# Patient Record
Sex: MALE | Race: WHITE | Employment: OTHER | URBAN - METROPOLITAN AREA
[De-identification: names, ages, dates, MRNs, and addresses within clinical notes are randomized per-mention and may not be internally consistent; named-entity substitution may affect disease eponyms.]

---

## 2017-01-13 DIAGNOSIS — I10 ESSENTIAL HYPERTENSION WITH GOAL BLOOD PRESSURE LESS THAN 140/90: Primary | ICD-10-CM

## 2017-01-13 NOTE — TELEPHONE ENCOUNTER
Nifedipine 30mg    Last Written Prescription Date: 09/22/2016  Last Fill Quantity: 90, # refills: 1  Last Office Visit with G, P or [facility] prescribing provider: 11/21/2016-Dr Alves       BP Readings from Last 3 Encounters:   11/21/16 114/58   11/15/16 124/70   11/10/16 131/60

## 2017-01-17 RX ORDER — NIFEDIPINE 30 MG
30 TABLET, EXTENDED RELEASE ORAL DAILY
Qty: 90 TABLET | Refills: 1 | Status: SHIPPED | OUTPATIENT
Start: 2017-01-17 | End: 2017-04-20

## 2017-01-17 NOTE — TELEPHONE ENCOUNTER
Prescription approved per [facility] Refill Protocol..  Channing Mcdonough RN, BSN

## 2017-03-16 DIAGNOSIS — C61 MALIGNANT NEOPLASM OF PROSTATE (H): Primary | ICD-10-CM

## 2017-04-20 ENCOUNTER — TELEPHONE (OUTPATIENT)
Dept: OTHER | Facility: CLINIC | Age: 77
End: 2017-04-20

## 2017-04-20 ENCOUNTER — OFFICE VISIT (OUTPATIENT)
Dept: FAMILY MEDICINE | Facility: CLINIC | Age: 77
End: 2017-04-20
Payer: MEDICARE

## 2017-04-20 VITALS
HEIGHT: 66 IN | HEART RATE: 91 BPM | WEIGHT: 178.6 LBS | DIASTOLIC BLOOD PRESSURE: 58 MMHG | BODY MASS INDEX: 28.7 KG/M2 | SYSTOLIC BLOOD PRESSURE: 142 MMHG | TEMPERATURE: 97.6 F | RESPIRATION RATE: 12 BRPM

## 2017-04-20 DIAGNOSIS — Z00.00 ROUTINE GENERAL MEDICAL EXAMINATION AT A HEALTH CARE FACILITY: Primary | ICD-10-CM

## 2017-04-20 DIAGNOSIS — R60.0 PEDAL EDEMA: ICD-10-CM

## 2017-04-20 DIAGNOSIS — C61 PROSTATE CANCER (H): ICD-10-CM

## 2017-04-20 DIAGNOSIS — R73.9 ELEVATED BLOOD SUGAR: ICD-10-CM

## 2017-04-20 DIAGNOSIS — I10 HTN, GOAL BELOW 150/90: ICD-10-CM

## 2017-04-20 DIAGNOSIS — K40.90 RIGHT INGUINAL HERNIA: ICD-10-CM

## 2017-04-20 DIAGNOSIS — D50.8 IRON DEFICIENCY ANEMIA SECONDARY TO INADEQUATE DIETARY IRON INTAKE: ICD-10-CM

## 2017-04-20 DIAGNOSIS — Z12.5 ENCOUNTER FOR SCREENING FOR MALIGNANT NEOPLASM OF PROSTATE: ICD-10-CM

## 2017-04-20 LAB
ANION GAP SERPL CALCULATED.3IONS-SCNC: 9 MMOL/L (ref 3–14)
BASOPHILS # BLD AUTO: 0.1 10E9/L (ref 0–0.2)
BASOPHILS NFR BLD AUTO: 1.2 %
BUN SERPL-MCNC: 12 MG/DL (ref 7–30)
CALCIUM SERPL-MCNC: 9.6 MG/DL (ref 8.5–10.1)
CHLORIDE SERPL-SCNC: 105 MMOL/L (ref 94–109)
CO2 SERPL-SCNC: 27 MMOL/L (ref 20–32)
CREAT SERPL-MCNC: 0.87 MG/DL (ref 0.66–1.25)
DIFFERENTIAL METHOD BLD: ABNORMAL
EOSINOPHIL # BLD AUTO: 0.1 10E9/L (ref 0–0.7)
EOSINOPHIL NFR BLD AUTO: 2.6 %
ERYTHROCYTE [DISTWIDTH] IN BLOOD BY AUTOMATED COUNT: 14.6 % (ref 10–15)
GFR SERPL CREATININE-BSD FRML MDRD: 85 ML/MIN/1.7M2
GLUCOSE SERPL-MCNC: 98 MG/DL (ref 70–99)
HBA1C MFR BLD: 5.4 % (ref 4.3–6)
HCT VFR BLD AUTO: 38.3 % (ref 40–53)
HGB BLD-MCNC: 12.7 G/DL (ref 13.3–17.7)
LYMPHOCYTES # BLD AUTO: 0.9 10E9/L (ref 0.8–5.3)
LYMPHOCYTES NFR BLD AUTO: 19.9 %
MCH RBC QN AUTO: 31.2 PG (ref 26.5–33)
MCHC RBC AUTO-ENTMCNC: 33.2 G/DL (ref 31.5–36.5)
MCV RBC AUTO: 94 FL (ref 78–100)
MONOCYTES # BLD AUTO: 0.6 10E9/L (ref 0–1.3)
MONOCYTES NFR BLD AUTO: 14 %
NEUTROPHILS # BLD AUTO: 2.7 10E9/L (ref 1.6–8.3)
NEUTROPHILS NFR BLD AUTO: 62.3 %
PLATELET # BLD AUTO: 134 10E9/L (ref 150–450)
POTASSIUM SERPL-SCNC: 3.8 MMOL/L (ref 3.4–5.3)
PSA SERPL-ACNC: NORMAL UG/L (ref 0–4)
RBC # BLD AUTO: 4.07 10E12/L (ref 4.4–5.9)
SODIUM SERPL-SCNC: 141 MMOL/L (ref 133–144)
TRANSFERRIN SERPL-MCNC: 274 MG/DL (ref 210–360)
WBC # BLD AUTO: 4.3 10E9/L (ref 4–11)

## 2017-04-20 PROCEDURE — 80048 BASIC METABOLIC PNL TOTAL CA: CPT | Performed by: FAMILY MEDICINE

## 2017-04-20 PROCEDURE — 99213 OFFICE O/P EST LOW 20 MIN: CPT | Mod: 25 | Performed by: FAMILY MEDICINE

## 2017-04-20 PROCEDURE — 84466 ASSAY OF TRANSFERRIN: CPT | Performed by: FAMILY MEDICINE

## 2017-04-20 PROCEDURE — 85025 COMPLETE CBC W/AUTO DIFF WBC: CPT | Performed by: FAMILY MEDICINE

## 2017-04-20 PROCEDURE — 83036 HEMOGLOBIN GLYCOSYLATED A1C: CPT | Performed by: FAMILY MEDICINE

## 2017-04-20 PROCEDURE — 84153 ASSAY OF PSA TOTAL: CPT | Performed by: FAMILY MEDICINE

## 2017-04-20 PROCEDURE — G0439 PPPS, SUBSEQ VISIT: HCPCS | Performed by: FAMILY MEDICINE

## 2017-04-20 PROCEDURE — 36415 COLL VENOUS BLD VENIPUNCTURE: CPT | Performed by: FAMILY MEDICINE

## 2017-04-20 RX ORDER — LISINOPRIL 20 MG/1
20 TABLET ORAL DAILY
Qty: 90 TABLET | Refills: 1 | Status: SHIPPED | OUTPATIENT
Start: 2017-04-20 | End: 2018-01-06

## 2017-04-20 NOTE — MR AVS SNAPSHOT
After Visit Summary   4/20/2017    Milo Serna    MRN: [id number]           Patient Information     Date Of Birth          1940        Visit Information        Provider Department      4/20/2017 9:00 AM Oscar Alves MD [facility]        Today's Diagnoses     Routine general medical examination at a health care facility    -  1    Elevated blood sugar        HTN, goal below 150/90        Prostate cancer (H)        Encounter for screening for malignant neoplasm of prostate         Iron deficiency anemia secondary to inadequate dietary iron intake        Right inguinal hernia        Pedal edema          Care Instructions      Preventive Health Recommendations:   Male Ages 65 and over    Yearly exam:             See your health care provider every year in order to  o   Review health changes.   o   Discuss preventive care.    o   Review your medicines if your doctor has prescribed any.    Talk with your health care provider about whether you should have a test to screen for prostate cancer (PSA).    Every 3 years, have a diabetes test (fasting glucose). If you are at risk for diabetes, you should have this test more often.    Every 5 years, have a cholesterol test. Have this test more often if you are at risk for high cholesterol or heart disease.     Every 10 years, have a colonoscopy. Or, have a yearly FIT test (stool test). These exams will check for colon cancer.    Talk to with your health care provider about screening for Abdominal Aortic Aneurysm if you have a family history of AAA or have a history of smoking.    Shots:     Get a flu shot each year.     Get a tetanus shot every 10 years.     Talk to your doctor about your pneumonia vaccines. There are now two you should receive - Pneumovax (PPSV 23) and Prevnar (PCV 13).     Talk to your doctor about a shingles vaccine.     Talk to your doctor about the hepatitis B vaccine.  Nutrition:     Eat at least 5 servings of  fruits and vegetables each day.     Eat whole-grain bread, whole-wheat pasta and brown rice instead of white grains and rice.     Talk to your provider about Calcium and Vitamin D.   Lifestyle    Exercise for at least 150 minutes a week (30 minutes a day, 5 days a week). This will help you control your weight and prevent disease.     Limit alcohol to one drink per day.     No smoking.     Wear sunscreen to prevent skin cancer.     See your dentist every six months for an exam and cleaning.     See your eye doctor every 1 to 2 years to screen for conditions such as glaucoma, macular degeneration, cataracts, etc         Follow-ups after your visit        Additional Services     GENERAL SURG ADULT REFERRAL       Your provider has referred you to: [facility]: [facility] [place] [phone]   [email or web address]    Please be aware that coverage of these services is subject to the terms and limitations of your health insurance plan.  Call member services at your health plan with any benefit or coverage questions.      Please bring the following with you to your appointment:    (1) Any X-Rays, CTs or MRIs which have been performed.  Contact the facility where they were done to arrange for  prior to your scheduled appointment.   (2) List of current medications   (3) This referral request   (4) Any documents/labs given to you for this referral            VASCULAR SURGERY REFERRAL       Your provider has referred you to: **[facility]  Services [phone] - [facility]    [email or web address]    Please be aware that coverage of these services is subject to the terms and limitations of your health insurance plan.  Call member services at your health plan with any benefit or coverage questions.      Please bring the following with you to your appointment:    (1) Any X-Rays, CTs or MRIs which have been performed.  Contact the  facility where they were done to arrange for  prior to your scheduled appointment.    (2) List of current medications   (3) This referral request   (4) Any documents/labs given to you for this referral                  Your next 10 appointments already scheduled     May 02, 2017  1:20 PM CDT   Return Visit with Krystian Owens MD   [facility] ([facility])    [address] [facility]  [address]  [facility] [id number] [phone]            May 05, 2017  8:30 AM CDT   SHORT with Oscar Alves MD   [facility]    [address] [place] [id number] [phone]              Who to contact     If you have questions or need follow up information about today's clinic visit or your schedule please contact [facility] directly at [phone].  Normal or non-critical lab and imaging results will be communicated to you by MyChart, letter or phone within 4 business days after the clinic has received the results. If you do not hear from us within 7 days, please contact the clinic through [facility]IDhart or phone. If you have a critical or abnormal lab result, we will notify you by phone as soon as possible.  Submit refill requests through [employer] or call your pharmacy and they will forward the refill request to us. Please allow 3 business days for your refill to be completed.          Additional Information About Your Visit        SLIDhart Information     [facility] gives you secure access to your electronic health record. If you see a primary care provider, you can also send messages to your care team and make appointments. If you have questions, please call your primary care clinic.  If you do not have a primary care provider, please call [phone] and they will assist you.        Care EveryWhere ID     This is your Care EveryWhere ID. This could be used by other  "organizations to access your [place] medical records  [id number]        Your Vitals Were     Pulse Temperature Respirations Height BMI (Body Mass Index)       91 97.6  F (36.4  C) (Oral) 12 5' 6\" (1.676 m) 28.83 kg/m2        Blood Pressure from Last 3 Encounters:   04/20/17 142/58   11/21/16 114/58   11/15/16 124/70    Weight from Last 3 Encounters:   04/20/17 178 lb 9.6 oz (81 kg)   11/21/16 195 lb 11.2 oz (88.8 kg)   11/09/16 188 lb (85.3 kg)              We Performed the Following     Basic metabolic panel  (Ca, Cl, CO2, Creat, Gluc, K, Na, BUN)     CBC with platelets and differential     GENERAL SURG ADULT REFERRAL     Hemoglobin A1c     PSA, screen     Transferrin     VASCULAR SURGERY REFERRAL          Today's Medication Changes          These changes are accurate as of: 4/20/17  1:58 PM.  If you have any questions, ask your nurse or doctor.               These medicines have changed or have updated prescriptions.        Dose/Directions    lisinopril 20 MG tablet   Commonly known as:  PRINIVIL/ZESTRIL   This may have changed:    - medication strength  - how much to take   Used for:  HTN, goal below 150/90   Changed by:  Oscar Alves MD        Dose:  20 mg   Take 1 tablet (20 mg) by mouth daily   Quantity:  90 tablet   Refills:  1         Stop taking these medicines if you haven't already. Please contact your care team if you have questions.     NIFEdipine ER 30 MG Tb24   Commonly known as:  ADALAT CC   Stopped by:  Oscar Alves MD                Where to get your medicines      These medications were sent to [facility] [address]  [address] [facility] [address]     Phone:  [phone]     lisinopril 20 MG tablet                Primary Care Provider Office Phone # Fax #    Oscar Alves [phone]       [address] [address]        Thank you!     Thank you for choosing [facility]  for your care. Our goal is always to provide you with excellent care. Hearing back from our patients is one way we can continue to improve our services. Please take a few minutes to complete the written survey that you may receive in the mail after your visit with us. Thank you!             Your Updated Medication List - Protect others around you: Learn how to safely use, store and throw away your medicines at www.disposemymeds.org.          This list is accurate as of: 4/20/17  1:58 PM.  Always use your most recent med list.                   Brand Name Dispense Instructions for use    acetaminophen 650 MG CR tablet    TYLENOL    100 tablet    Take 1 tablet (650 mg) by mouth every 6 hours as needed for pain       apixaban ANTICOAGULANT 2.5 MG tablet    ELIQUIS    180 tablet    Take 1 tablet (2.5 mg) by mouth 2 times daily       aspirin 81 MG tablet      Take 81 mg by mouth daily       co-enzyme Q-10 100 MG Caps capsule     180 capsule    Take 1 capsule (100 mg) by mouth daily       diclofenac 1 % Gel topical gel    VOLTAREN    100 g    Apply 4 gramsto area qid prn       doxycycline 100 MG capsule    VIBRAMYCIN    90 capsule    Take 1 capsule (100 mg) by mouth daily       fish oil-omega-3 fatty acids 1000 MG capsule    EQL FISH OIL    180 capsule    Take 1 capsule by mouth daily.       Glucosamine-Chondroitin 250-200 MG Tabs    OSTEO BI-FLEX REGULAR STRENGTH    100 tablet    Take 1 tablet by mouth 2 times daily       lisinopril 20 MG tablet    PRINIVIL/ZESTRIL    90 tablet    Take 1 tablet (20 mg) by mouth daily       MULTIVITAL Chew     360 tablet    Take 2 chew tab by mouth 2 times daily       omeprazole 40 MG capsule    priLOSEC    90 capsule    Take 1 capsule (40 mg) by mouth daily       SAW PALMETTO COMPLEX Caps     200 capsule    Take 1 capsule by mouth daily.       senna 8.6 MG tablet    SENOKOT    60 tablet    Take 1 tablet by mouth daily       simvastatin 20 MG tablet    ZOCOR    90  tablet    Take 1 tablet (20 mg) by mouth At Bedtime       sodium chloride 0.65 % nasal spray    OCEAN NASAL SPRAY    2 Bottle    Spray 1 spray into both nostrils daily as needed for congestion       tamsulosin 0.4 MG capsule    FLOMAX    90 capsule    Take 1 capsule (0.4 mg) by mouth daily

## 2017-04-20 NOTE — PROGRESS NOTES
SUBJECTIVE:                                                              Milo Serna is a 76 year old male who presents for Preventive Visit.    Answers for HPI/ROS submitted by the patient on 4/17/2017   Annual Exam:  Getting at least 3 servings of Calcium per day:: Yes  Bi-annual eye exam:: NO  Dental care twice a year:: Yes  Sleep apnea or symptoms of sleep apnea:: None  Diet:: Low salt, Low fat/cholesterol  Frequency of exercise:: 4-5 days/week  Taking medications regularly:: Yes  Medication side effects:: None  Additional concerns today:: YES  Q1: Little interest or pleasure in doing things: 0=Not at all  Q2: Feeling down, depressed or hopeless: 0=Not at all  PHQ-2 Score: 0  Duration of exercise:: 30-45 minutes    COGNITIVE SCREEN  1) Repeat 3 items (Banana, Sunrise, Chair)    2) Clock draw: NORMAL  3) 3 item recall: Recalls 2 objects   Results: NORMAL clock, 1-2 items recalled: COGNITIVE IMPAIRMENT LESS LIKELY    Mini-CogTM Copyright MIKAELA Tucker. Licensed by the author for use in [facility]; reprinted with permission (lito@[facility]). All rights reserved.        Social History   Substance Use Topics     Smoking status: Former Smoker     Smokeless tobacco: Never Used      Comment: quit 3/1974     Alcohol use 0.0 oz/week     0 Standard drinks or equivalent per week      Comment: small amounts to moderate       occ    Today's PHQ-2 Score:   PHQ-2 ( 1999 [employer]) 4/17/2017 11/21/2016   Q1: Little interest or pleasure in doing things - 0   Q2: Feeling down, depressed or hopeless - 0   PHQ-2 Score - 0   Little interest or pleasure in doing things Not at all -   Feeling down, depressed or hopeless Not at all -   PHQ-2 Score 0 -       Do you feel safe in your environment - Yes    Do you have a Health Care Directive?: Yes: Advance Directive has been received and scanned.    Current providers sharing in care for this patient include:   Patient Care Team:  Oscar Alves MD as PCP - General (Family  Practice)      Hearing impairment: NO    Ability to successfully perform activities of daily living: Yes, no assistance needed     Fall risk:       Home safety:  none identified      The following health maintenance items are reviewed in Epic and correct as of today:  Health Maintenance   Topic Date Due     MEDICARE ANNUAL WELLNESS VISIT  11/04/2005     ADVANCE DIRECTIVE PLANNING Q5 YRS (NO INBASKET)  01/10/2017     BMP Q6 MOS (NO INBASKET)  05/15/2017     FALL RISK ASSESSMENT  06/24/2017     INFLUENZA VACCINE (SYSTEM ASSIGNED)  09/01/2017     A1C Q1 YR (NO INBASKET)  10/14/2017     TETANUS IMMUNIZATION (SYSTEM ASSIGNED)  09/22/2020     LIPID MONITORING Q5 YEARS (NO INBASKET)  10/02/2020     PNEUMOCOCCAL  Completed       Routine general medical examination at a health care facility  (primary encounter diagnosis): See above      Elevated blood sugar:   Glucose   Date Value Ref Range Status   11/15/2016 105 (H) 70 - 99 mg/dL Final     Lab Results   Component Value Date    A1C 5.5 10/14/2016    A1C 5.5@ 09/21/2005         HTN, goal below 150/90: BP elevated   BP Readings from Last 6 Encounters:   04/20/17 159/68   11/21/16 114/58   11/15/16 124/70   11/10/16 131/60   11/07/16 127/66   10/19/16 108/68         Prostate cancer (H):  The patient follows up with urology and requires a PSA test    Encounter for screening for malignant neoplasm of prostate: ICD 10 requirement      The patient follows up with cardiology and has an echocardiogram last fall.         Past Medical History:   Diagnosis Date     Anemia, unspecified 11/8/2016     Atrial fibrillation (H) 5/27/2015     BPH (benign prostatic hyperplasia) 1/25/2012     CAD (coronary artery disease) 4/4/2012     Closed fracture of metatarsal bone 4/4/2012     Coronary atherosclerosis of unspecified type of vessel, native or graft [facility]    CAB 1996 following MI (at [facility])      Dilated aortic root (H) 5/26/2016     Drug-induced erectile dysfunction  10/2/2015     Elevated blood sugar 2016     Elevated PSA 2013     Essential hypertension with goal blood pressure less than 140/90 2016     Essential hypertension, benign      HAV (hallux abducto valgus) 2012     History of prostate cancer 6/15/2016     HTN, goal below 150/90 2017     Iron deficiency anemia secondary to inadequate dietary iron intake 11/15/2016     Low blood pressure reading 10/17/2016     Lumbago     had degendisc dz on MRI      Mixed hyperlipidemia      Neuropathy of foot 2012     OA (osteoarthritis) 2012     Old myocardial infarction 2012     Pes planus 2012     Prostate cancer (H) 2016     PUD (peptic ulcer disease) 2012     Rhinophyma 2012     Rosacea 1/15/2008     Unspecified hyperplasia of prostate with urinary obstruction and other lower urinary tract symptoms (LUTS)     better on avodart & hytrin     Venous stasis 2012     Venous stasis 2012       Past Surgical History:   Procedure Laterality Date     C NONSPECIFIC PROCEDURE      CAB  [place]     C NONSPECIFIC PROCEDURE      l inguinal hernia repair      C NONSPECIFIC PROCEDURE      R hernia remote     C NONSPECIFIC PROCEDURE      R ankle ORIF for fx     C NONSPECIFIC PROCEDURE  2005    nasal surgery      C NONSPECIFIC PROCEDURE      R rotater repair (remote)      C NONSPECIFIC PROCEDURE      appy 1966     C NONSPECIFIC PROCEDURE      sinus surgery x 2     C NONSPECIFIC PROCEDURE      L shoulder      CARDIAC SURGERY      bipass     CYSTOSCOPY FLEXIBLE, CYOABLATION PROSTATE N/A 2016    Procedure: CYSTOSCOPY FLEXIBLE, CRYOABLATION PROSTATE;  Surgeon: Krystian Owens MD;  Location:  OR     GENITOURINARY SURGERY      prostate surgery        Family History   Problem Relation Age of Onset     CANCER Mother      stomach cancer,  age 61     HEART DISEASE Father      MI,  age 71     HEART DISEASE Brother      stent placement, RA       "Hypertension Brother      HEART DISEASE Sister      rythmn problems with heart, hypertension       Social History   Substance Use Topics     Smoking status: Former Smoker     Smokeless tobacco: Never Used      Comment: quit 3/1974     Alcohol use 0.0 oz/week     0 Standard drinks or equivalent per week      Comment: small amounts to moderate       ROS: Denies falls, dizziness, vision changes, tooth pain, difficulty swallowing, heart burn, chest pain,  palpations, diarrhea, constipation, numbness, tingles, epistaxis       This document serves as a record of the services and decisions personally performed and made by Joselyn Moore MD. It was created on his behalf by Andres Alvarado a trained medical scribe. The creation of this document is based the provider's statements to the medical scribe. Andres Alvarado April 20, 2017 9:13 AM  OBJECTIVE:                                                            /68 (BP Location: Left arm, Patient Position: Chair, Cuff Size: Adult Regular)  Pulse 91  Temp 97.6  F (36.4  C) (Oral)  Resp 12  Ht 1.676 m (5' 6\")  Wt 81 kg (178 lb 9.6 oz)  BMI 28.83 kg/m2 Estimated body mass index is 28.83 kg/(m^2) as calculated from the following:    Height as of this encounter: 1.676 m (5' 6\").    Weight as of this encounter: 81 kg (178 lb 9.6 oz).  EXAM:   GEN: Healthy, Alert, No distress  EYES: pupils are symmetric  ENT: ears without cerumen, mucus membranes moist  NECK: no thyroidmegaly  CHEST: Clear to auscultation, respirations unlabored  HEART: irregularly irregular rhythm, arthritic nodules in the hands and feet   ABD: soft without mass or organomegaly      FEET: onychomycosis on multiple toes, palpable pedal pulses  ASSESSMENT / PLAN:                                                            (Z00.00) Routine general medical examination at a health care facility  (primary encounter diagnosis)  Comment: Routine  Plan: No changes    (R73.9) Elevated blood sugar  Comment: Assess " "  Glucose   Date Value Ref Range Status   11/15/2016 105 (H) 70 - 99 mg/dL Final     Lab Results   Component Value Date    A1C 5.5 10/14/2016    A1C 5.5@ 09/21/2005   Plan: Hemoglobin A1c            (I10) HTN, goal below 150/90  Comment: stop norvasc recheck 2 weeks  BP Readings from Last 6 Encounters:   04/20/17 142/58   11/21/16 114/58   11/15/16 124/70   11/10/16 131/60   11/07/16 127/66   10/19/16 108/68   Plan: Basic metabolic panel  (Ca, Cl, CO2, Creat,         Gluc, K, Na, BUN), lisinopril (PRINIVIL/ZESTRIL) 20 MG tablet            (C61) Prostate cancer (H)  Comment: Assess  Plan: PSA, screen      (  (D50.8) Iron deficiency anemia secondary to inadequate dietary iron intake  Comment: assess  Plan: CBC with platelets and differential,         Transferrin                      (Z12.5) Encounter for screening for malignant neoplasm of prostate   Comment: Offered  Plan: PSA, screen            RCK 2 weeks      End of Life Planning:  Patient currently has an advanced directive: Not Discussed     COUNSELING:  Reviewed preventive health counseling, as reflected in patient instructions       Prostate cancer screening      Estimated body mass index is 31.59 kg/(m^2) as calculated from the following:    Height as of 11/21/16: 5' 6\" (1.676 m).    Weight as of 11/21/16: 195 lb 11.2 oz (88.8 kg).  Weight management plan: Not Discussed    reports that he has quit smoking. He has never used smokeless tobacco.    Appropriate preventive services were discussed with this patient, including applicable screening as appropriate for cardiovascular disease, diabetes, osteopenia/osteoporosis, and glaucoma.  As appropriate for age/gender, discussed screening for colorectal cancer, prostate cancer, breast cancer, and cervical cancer. Checklist reviewing preventive services available has been given to the patient.    Reviewed patients plan of care and provided an AVS. The Basic Care Plan (routine screening as documented in Health " Maintenance) for Milo meets the Care Plan requirement. This Care Plan has been established and reviewed with the Patient.    Counseling Resources:  ATP IV Guidelines  Pooled Cohorts Equation Calculator  Breast Cancer Risk Calculator  FRAX Risk Assessment  ICSI Preventive Guidelines  Dietary Guidelines for Americans, 2010  USDA's MyPlate  ASA Prophylaxis  Lung CA Screening    The information in this document, created by a scribe for me, accurately reflects the services I personally performed and the decisions made by me. I have reviewed and approved this document for accuracy. 9:13 AM April 20, 2017    Oscar Alves MD  [facility]

## 2017-04-20 NOTE — TELEPHONE ENCOUNTER
Pt is scheduled to see Dr. Biggs on 5/22 at 1pm. Goes to  for all his care, and has not had any imaging done.

## 2017-04-20 NOTE — NURSING NOTE
"Chief Complaint   Patient presents with     Physical     fasting       Initial /68 (BP Location: Left arm, Patient Position: Chair, Cuff Size: Adult Regular)  Pulse 91  Temp 97.6  F (36.4  C) (Oral)  Resp 12  Ht 5' 6\" (1.676 m)  Wt 178 lb 9.6 oz (81 kg)  BMI 28.83 kg/m2 Estimated body mass index is 28.83 kg/(m^2) as calculated from the following:    Height as of this encounter: 5' 6\" (1.676 m).    Weight as of this encounter: 178 lb 9.6 oz (81 kg).  Medication Reconciliation: complete left arm Beronica Lucero MA    "

## 2017-04-20 NOTE — PROGRESS NOTES
"  Right inguinal hernia Pt reports pain in right inguinal area, palliatives and provocatives not noted. S/P herniorrhaphy, he believes he has recurrence  Pedal edema does not care for sox at all. Cardiology felt MV Insufficiency not clinically contributing, although worsening  ROS no dyspnea  /58  Pulse 91  Temp 97.6  F (36.4  C) (Oral)  Resp 12  Ht 5' 6\" (1.676 m)  Wt 178 lb 9.6 oz (81 kg)  BMI 28.83 kg/m2  No palpable hernia  1 plus edema, brawny changes, palpable pulses    ASSESSMENT / PLAN:        (K40.90) Right inguinal hernia  Comment: If so, subtle  Plan: GENERAL SURG ADULT REFERRAL           (R60.0) Pedal edema  Comment: stop norvasc  Plan: VASCULAR SURGERY REFERRAL        Was on chlorthalidone previously: hypotension. May benefit from compression hose      RTC 2 weeks    Oscar Alves MD        "

## 2017-04-20 NOTE — TELEPHONE ENCOUNTER
"Pt referred to [facility] by Oscar Alves MD for pedal edema    4-20-17 Dr. Alves notes   \"MS: +1 edema, brawny changes  FEET: onychomycosis on multiple toes, palpable, pedal pulses\"    Pt needs to be scheduled for consult with vascular medicine.  Will route to scheduling to coordinate an appointment at next available.    Ana Rodríguez RN BSN  "

## 2017-04-21 PROBLEM — D69.6 THROMBOCYTOPENIA (H): Status: ACTIVE | Noted: 2017-04-21

## 2017-05-01 ENCOUNTER — OFFICE VISIT (OUTPATIENT)
Dept: SURGERY | Facility: CLINIC | Age: 77
End: 2017-05-01
Payer: MEDICARE

## 2017-05-01 ENCOUNTER — OFFICE VISIT (OUTPATIENT)
Dept: FAMILY MEDICINE | Facility: CLINIC | Age: 77
End: 2017-05-01
Payer: MEDICARE

## 2017-05-01 VITALS
BODY MASS INDEX: 28.61 KG/M2 | WEIGHT: 178 LBS | HEIGHT: 66 IN | RESPIRATION RATE: 14 BRPM | TEMPERATURE: 97.9 F | DIASTOLIC BLOOD PRESSURE: 76 MMHG | HEART RATE: 75 BPM | SYSTOLIC BLOOD PRESSURE: 154 MMHG

## 2017-05-01 VITALS
DIASTOLIC BLOOD PRESSURE: 68 MMHG | WEIGHT: 178 LBS | HEART RATE: 57 BPM | HEIGHT: 66 IN | BODY MASS INDEX: 28.61 KG/M2 | SYSTOLIC BLOOD PRESSURE: 138 MMHG | OXYGEN SATURATION: 99 %

## 2017-05-01 DIAGNOSIS — K40.91 RECURRENT RIGHT INGUINAL HERNIA: Primary | ICD-10-CM

## 2017-05-01 DIAGNOSIS — Z01.818 PREOP GENERAL PHYSICAL EXAM: Primary | ICD-10-CM

## 2017-05-01 PROCEDURE — 99215 OFFICE O/P EST HI 40 MIN: CPT | Performed by: FAMILY MEDICINE

## 2017-05-01 PROCEDURE — 99203 OFFICE O/P NEW LOW 30 MIN: CPT | Performed by: SURGERY

## 2017-05-01 PROCEDURE — 93000 ELECTROCARDIOGRAM COMPLETE: CPT | Performed by: FAMILY MEDICINE

## 2017-05-01 NOTE — PROGRESS NOTES
"Milo is a 76 year old male who presents for evaluation for inguinal hernia.  Symptoms began a few months ago.  This is described as aching and is located in the right groin, most notably when walking a lot.  He does note a bulge which he is able to reduce.  Pt has had previous abdominal surgery including open inguinal hernias on both sides in the past.  The right one was done in the [facility] many years ago, [facility] as well.  He has no left sided symptoms. Employment does not require heavy lifting; he is retired.  Of note, he has had a cryoablation of his prostate last year, he states that he had a Biggs afterwards for a week, no current urinary concerns.    Constipation No   Dysuria No  Cough No    Pt's chart has been reviewed for PMH, PSH, allergies, medications, social history and family history.    ROS:  Pulm:  No shortness of breath, dyspnea on exertion, cough, or hemoptysis  CV:  negative  ABD:  See chief complaint  :  Negative  All other systems negative/normal    /68  Pulse 57  Ht 5' 6\" (1.676 m)  Wt 178 lb (80.7 kg)  SpO2 99%  BMI 28.73 kg/m2  Physical exam:  Patient able to get up on table without difficulty.  Abdomen is abdomen is soft without significant tenderness, masses, organomegaly or guarding  bowel sounds are positive and no caput medusa noted.  There are no low midline scars  Hernia  Left inguinal hernia is not present with valsalva              Right inguinal hernia is present with valsalva              The hernia is reducible   There are bilateral well healed groin incisions              Testicles are normal    Assesment: right inguinal hernia, recurrent    Plan: The nature of hernias, anatomic considerations, indications and approaches to repair were discussed.  Risks of operative intervention were discussed including infection, bleeding, harm to structures including vessels and nerves as well as recurrence, which is about 5% per " decade of life.  Post operative recuperation and activity limitations were reviewed as well.  As his hernia is recurrent after a prior open repair, I have advised laparoscopy as a means to reduce further recurrence risk and risk to the cord structures.  I have also offered exploration of the left groin and repair if there is a hernia present as well.  The patient is interested in pursuing repair and we will assist in scheduling this with him.      Enrico Dalton MD  5/1/2017 12:30 PM    Please route or send letter to:  Primary Care Provider (PCP)

## 2017-05-01 NOTE — PROGRESS NOTES
[facility]  [address] [facility] [address]  [phone]  Dept: [phone]    PRE-OP EVALUATION:  Today's date: 2017    Milo Serna (: 1940) presents for pre-operative evaluation assessment as requested by Dr. Bridges.  He requires evaluation and anesthesia risk assessment prior to undergoing surgery/procedure for treatment of Herniorrhaphy Inguinal .  Proposed procedure: Herniorrhaphy Inguinal      Date of Surgery/ Procedure: 5/10/17  Time of Surgery/ Procedure: 935 am   Hospital/Surgical Facility: [facility]    Primary Physician: Oscar Alves  Type of Anesthesia Anticipated: General    Patient has a Health Care Directive or Living Will:  YES In the Process of getting it changes    4. NO - Are you troubled by shortness of breath when: walking on the level, up a slight hill or at night?  5. NO - Do you currently have a cold, bronchitis or other respiratory infection?  6. NO - Do you have a cough, shortness of breath or wheezing?  7. NO - Do you sometimes get pains in the calves of your legs when you walk?  8. NO - Do you or anyone in your family have previous history of blood clots?  10. NO - Have you ever had problems with anemia or been told to take iron pills?  11. NO - Have you had any abnormal blood loss such as black, tarry or bloody stools, or abnormal vaginal bleeding?  14. NO - Do you have sleep apnea, excessive snoring or daytime drowsiness?  15. NO - Do you have any prosthetic heart valves?  16. NO - Do you have prosthetic joints?  17. NO - Is there any chance that you may be pregnant?  1. YES - Do you have a history of heart attack, stroke, stent, bypass or surgery on an artery in the head, neck, heart or legs?  2. YES - Do you ever have any pain or discomfort in your chest?  3. YES - Do you have a history of  Heart Failure?  9. YES - Do you or does anyone in your family have a serious bleeding problem such as prolonged bleeding following  surgeries or cuts?  12. YES - Have you ever had a blood transfusion?  13. YES - Have you or any of your relatives ever had problems with anesthesia?  HPI:                                                      Brief HPI related to upcoming procedure: Herniorrhaphy Inguinal     MEDICAL HISTORY:                                                      Patient Active Problem List    Diagnosis Date Noted     Thrombocytopenia (H) 04/21/2017     Priority: Medium     HTN, goal below 150/90 04/20/2017     Priority: Medium     Iron deficiency anemia secondary to inadequate dietary iron intake 11/15/2016     Priority: Medium     Prostate cancer (H) 11/09/2016     Priority: Medium     Elevated blood sugar 11/08/2016     Priority: Medium     Low blood pressure reading 10/17/2016     Priority: Medium     History of prostate cancer 06/15/2016     Priority: Medium     Acute bilateral low back pain with left-sided sciatica 06/15/2016     Priority: Medium     Dilated aortic root (H) 05/26/2016     Priority: Medium     Drug-induced erectile dysfunction 10/02/2015     Priority: Medium     PVC's (premature ventricular contractions) 05/27/2015     Priority: Medium     Premature atrial contractions 05/27/2015     Priority: Medium     Atrial fibrillation (H) 05/27/2015     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       Venous stasis 04/04/2012     In both legs.       Advanced directives, counseling/discussion 01/10/2012     Advance Directive Problem List Overview:   Name Relationship Phone    Primary Health Care Agent            Alternative Health Care Agent          Patient states has Advance Directive and will bring in a copy  to clinic. 1/10/2012        HYPERLIPIDEMIA LDL GOAL <100 10/31/2010     Rosacea 01/15/2008     Coronary atherosclerosis      CAB 1996 following MI (at [facility])   Problem list name updated by automated process. Provider to review        Past Medical History:   Diagnosis Date     Anemia, unspecified 11/8/2016     Atrial fibrillation (H) 5/27/2015     BPH (benign prostatic hyperplasia) 1/25/2012     CAD (coronary artery disease) 4/4/2012     Closed fracture of metatarsal bone 4/4/2012     Coronary atherosclerosis of unspecified type of vessel, native or graft [facility]    CAB 1996 following MI (at [facility])      Dilated aortic root (H) 5/26/2016     Drug-induced erectile dysfunction 10/2/2015     Elevated blood sugar 11/8/2016     Elevated PSA 9/19/2013     Essential hypertension with goal blood pressure less than 140/90 9/22/2016     Essential hypertension, benign      HAV (hallux abducto valgus) 4/4/2012     History of prostate cancer 6/15/2016     HTN, goal below 150/90 4/20/2017     Iron deficiency anemia secondary to inadequate dietary iron intake 11/15/2016     Low blood pressure reading 10/17/2016     Lumbago     had degendisc dz on MRI 7/07     Mixed hyperlipidemia      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     OA (osteoarthritis) 4/4/2012     Old myocardial infarction 4/4/2012     Pes planus 4/4/2012     Prostate cancer (H) 5/26/2016     PUD (peptic ulcer disease) 4/4/2012     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     Rosacea 1/15/2008     Thrombocytopenia (H) 4/21/2017     Unspecified hyperplasia of prostate with urinary obstruction and other lower urinary tract symptoms (LUTS)     better on avodart & hytrin     Venous stasis 4/4/2012     Venous stasis 4/4/2012     Past Surgical History:   Procedure Laterality Date     C NONSPECIFIC PROCEDURE      CAB 1996 [place]     C NONSPECIFIC PROCEDURE  2/07    l inguinal hernia repair      C NONSPECIFIC PROCEDURE      R hernia remote     C NONSPECIFIC PROCEDURE  2000    R ankle  ORIF for fx     C NONSPECIFIC PROCEDURE  2005    nasal surgery      C NONSPECIFIC PROCEDURE      R rotater repair (remote)      C NONSPECIFIC PROCEDURE      appy 1966     C NONSPECIFIC PROCEDURE      sinus surgery x 2     C NONSPECIFIC PROCEDURE      L shoulder 6/09     CARDIAC SURGERY      bipass     CYSTOSCOPY FLEXIBLE, CYOABLATION PROSTATE N/A 11/9/2016    Procedure: CYSTOSCOPY FLEXIBLE, CRYOABLATION PROSTATE;  Surgeon: Krystian Owens MD;  Location:  OR     GENITOURINARY SURGERY      prostate surgery      Current Outpatient Prescriptions   Medication Sig Dispense Refill     lisinopril (PRINIVIL/ZESTRIL) 20 MG tablet Take 1 tablet (20 mg) by mouth daily 90 tablet 1     senna (SENOKOT) 8.6 MG tablet Take 1 tablet by mouth daily 60 tablet 0     omeprazole (PRILOSEC) 40 MG capsule Take 1 capsule (40 mg) by mouth daily 90 capsule 3     diclofenac (VOLTAREN) 1 % GEL Apply 4 gramsto area qid prn 100 g 11     simvastatin (ZOCOR) 20 MG tablet Take 1 tablet (20 mg) by mouth At Bedtime 90 tablet 3     tamsulosin (FLOMAX) 0.4 MG 24 hr capsule Take 1 capsule (0.4 mg) by mouth daily 90 capsule 3     doxycycline (VIBRAMYCIN) 100 MG capsule Take 1 capsule (100 mg) by mouth daily 90 capsule 3     apixaban ANTICOAGULANT (ELIQUIS) 2.5 MG tablet Take 1 tablet (2.5 mg) by mouth 2 times daily 180 tablet 3     aspirin 81 MG tablet Take 81 mg by mouth daily       acetaminophen (TYLENOL) 650 MG CR tablet Take 1 tablet (650 mg) by mouth every 6 hours as needed for pain 100 tablet 11     co-enzyme Q-10 100 MG CAPS Take 1 capsule (100 mg) by mouth daily 180 capsule 3     sodium chloride (OCEAN NASAL SPRAY) 0.65 % nasal spray Spray 1 spray into both nostrils daily as needed for congestion 2 Bottle 6     Glucosamine-Chondroitin (OSTEO BI-FLEX REGULAR STRENGTH) 250-200 MG TABS Take 1 tablet by mouth 2 times daily 100 tablet 8     Multiple Vitamins-Minerals (MULTIVITAL) CHEW Take 2 chew tab by mouth 2 times daily 360 tablet 3     fish  "oil-omega-3 fatty acids (EQL FISH OIL) 1000 MG capsule Take 1 capsule by mouth daily. 180 capsule 12     Zn-Pyg Afri-Nettle-Saw Palmet (SAW PALMETTO COMPLEX) CAPS Take 1 capsule by mouth daily. 200 capsule PRN     OTC products: None, except as noted above    No Known Allergies   Latex Allergy: NO    Social History   Substance Use Topics     Smoking status: Former Smoker     Smokeless tobacco: Never Used      Comment: quit 3/1974     Alcohol use 0.0 oz/week     0 Standard drinks or equivalent per week      Comment: small amounts to moderate     History   Drug Use No     REVIEW OF SYSTEMS:                                                    Denies difficulty swallowing, heart burn, chest pain, SOB, palpations, diarrhea, constipation, dysuria, urinary frequency, bruising, bleeding.A complete review of systems is otherwise negative      This document serves as a record of the services and decisions personally performed and made by Joselyn Moore MD. It was created on his behalf by Andres Alvarado a trained medical scribe. The creation of this document is based the provider's statements to the medical scribe. Andres Alvarado May 1, 2017 6:13 PM  EXAM:                                                    /76 (BP Location: Right arm, Patient Position: Chair, Cuff Size: Adult Regular)  Pulse 75  Temp 97.9  F (36.6  C) (Oral)  Resp 14  Ht 5' 6\" (1.676 m)  Wt 178 lb (80.7 kg)  BMI 28.73 kg/m2  GEN: Healthy, Alert, No distress  NECK: no thyromegaly  CHEST: Clear to auscultation, respirations unlabored  HEART: S1S2 RRR no murmur nor apical displacement  ABD: soft without mass or organomegaly   MS: no edema  DIAGNOSTICS:                                                    EKG: atrial fibrillation, rate , no LVH by voltage criteria, nonspecific intraventricular conduction delay, nonspecific ST-T changes, unchanged from previous tracings    Recent Labs   Lab Test  04/20/17   0938  11/15/16   1124   10/14/16   0953   HGB  " 12.7*  11.0*   < >   --    PLT  134*  159   < >   --    INR   --   1.27*   --    --    NA  141  135   < >  134   POTASSIUM  3.8  3.7   < >  3.9   CR  0.87  1.04   < >  1.18   A1C  5.4   --    --   5.5    < > = values in this interval not displayed.     IMPRESSION:                                                    Reason for surgery/procedure: Herniorrhaphy Inguinal   Diagnosis/reason for consult: assess perioperative risk    The proposed surgical procedure is considered INTERMEDIATE risk.    REVISED CARDIAC RISK INDEX  The patient has the following serious cardiovascular risks for perioperative complications such as (MI, PE, VFib and 3  AV Block):  No serious cardiac risks  INTERPRETATION: 0 risks: Class I (very low risk - 0.4% complication rate)    The patient has the following additional risks for perioperative complications:  A fib, thrombocytopenia  Lab Results   Component Value Date    WBC 4.3 04/20/2017     Lab Results   Component Value Date    RBC 4.07 04/20/2017     Lab Results   Component Value Date    HGB 12.7 04/20/2017     Lab Results   Component Value Date    HCT 38.3 04/20/2017     No components found for: MCT  Lab Results   Component Value Date    MCV 94 04/20/2017     Lab Results   Component Value Date    MCH 31.2 04/20/2017     Lab Results   Component Value Date    MCHC 33.2 04/20/2017     Lab Results   Component Value Date    RDW 14.6 04/20/2017     Lab Results   Component Value Date     04/20/2017           ICD-10-CM    1. Preop general physical exam Z01.818        RECOMMENDATIONS:                                                          Pt will hold eliquis 5 days before procedure, remain on ASA    APPROVAL GIVEN to proceed with proposed procedure, without further diagnostic evaluation       Signed Electronically by: Oscar Alves MD    The information in this document, created by a scribe for me, accurately reflects the services I personally performed and the decisions made by me. I  have reviewed and approved this document for accuracy. 6:23 PM May 1, 2017    Copy of this evaluation report is provided to requesting physician.    [facility] Preop Guidelines

## 2017-05-01 NOTE — MR AVS SNAPSHOT
After Visit Summary   5/1/2017    Milo Serna    MRN: [id number]           Patient Information     Date Of Birth          1940        Visit Information        Provider Department      5/1/2017 11:30 AM Enrico Bridges MD [facility]      Today's Diagnoses     Recurrent right inguinal hernia    -  1       Follow-ups after your visit        Your next 10 appointments already scheduled     May 02, 2017  1:20 PM CDT   Return Visit with Krystian Owens MD   [facility] ([facility])    [address]  [address]  [facility] [address]   [phone]            May 05, 2017  8:30 AM CDT   SHORT with Oscar Alves MD   [facility] ([facility])    [address] [place] [id number] [phone]            May 10, 2017   Procedure with Enrico Bridges MD   [facility] (--)    [address]  [facility] [address]   [phone]            May 10, 2017  9:45 AM CDT   [facility] Same Day Surgery with Enrico Bridges MD, Haider Alva PA-C   [facility] ([facility])    [facility])   [phone]            May 22, 2017  1:00 PM CDT   New Visit with Grover Biggs MD   [facility] ([facility])    [address]  [facility] [address] [phone]              Who to contact     If you have questions or need follow up information about today's clinic visit or your schedule please contact [facility] directly at [phone].  Normal or non-critical lab and imaging results will be communicated to you by MyChart, letter or phone within 4  "business days after the clinic has received the results. If you do not hear from us within 7 days, please contact the clinic through [facility] or phone. If you have a critical or abnormal lab result, we will notify you by phone as soon as possible.  Submit refill requests through [facility] or call your pharmacy and they will forward the refill request to us. Please allow 3 business days for your refill to be completed.          Additional Information About Your Visit        [facility] Information     [facility] gives you secure access to your electronic health record. If you see a primary care provider, you can also send messages to your care team and make appointments. If you have questions, please call your primary care clinic.  If you do not have a primary care provider, please call [phone] and they will assist you.        Care EveryWhere ID     This is your Care EveryWhere ID. This could be used by other organizations to access your [place] medical records  [id number]        Your Vitals Were     Pulse Height Pulse Oximetry BMI (Body Mass Index)          57 5' 6\" (1.676 m) 99% 28.73 kg/m2         Blood Pressure from Last 3 Encounters:   05/01/17 138/68   04/20/17 142/58   11/21/16 114/58    Weight from Last 3 Encounters:   05/01/17 178 lb (80.7 kg)   04/20/17 178 lb 9.6 oz (81 kg)   11/21/16 195 lb 11.2 oz (88.8 kg)              Today, you had the following     No orders found for display       Primary Care Provider Office Phone # Fax #    Oscar Alves [phone]       [address] [address]        Thank you!     Thank you for choosing [facility]  for your care. Our goal is always to provide you with excellent care. Hearing back from our patients is one way we can continue to improve our services. Please take a few minutes to complete the written survey that you may receive in the mail after your visit with us. Thank you!      "        Your Updated Medication List - Protect others around you: Learn how to safely use, store and throw away your medicines at www.disposemymeds.org.          This list is accurate as of: 5/1/17 12:39 PM.  Always use your most recent med list.                   Brand Name Dispense Instructions for use    acetaminophen 650 MG CR tablet    TYLENOL    100 tablet    Take 1 tablet (650 mg) by mouth every 6 hours as needed for pain       apixaban ANTICOAGULANT 2.5 MG tablet    ELIQUIS    180 tablet    Take 1 tablet (2.5 mg) by mouth 2 times daily       aspirin 81 MG tablet      Take 81 mg by mouth daily       co-enzyme Q-10 100 MG Caps capsule     180 capsule    Take 1 capsule (100 mg) by mouth daily       diclofenac 1 % Gel topical gel    VOLTAREN    100 g    Apply 4 gramsto area qid prn       doxycycline 100 MG capsule    VIBRAMYCIN    90 capsule    Take 1 capsule (100 mg) by mouth daily       fish oil-omega-3 fatty acids 1000 MG capsule    EQL FISH OIL    180 capsule    Take 1 capsule by mouth daily.       Glucosamine-Chondroitin 250-200 MG Tabs    OSTEO BI-FLEX REGULAR STRENGTH    100 tablet    Take 1 tablet by mouth 2 times daily       lisinopril 20 MG tablet    PRINIVIL/ZESTRIL    90 tablet    Take 1 tablet (20 mg) by mouth daily       MULTIVITAL Chew     360 tablet    Take 2 chew tab by mouth 2 times daily       omeprazole 40 MG capsule    priLOSEC    90 capsule    Take 1 capsule (40 mg) by mouth daily       SAW PALMETTO COMPLEX Caps     200 capsule    Take 1 capsule by mouth daily.       senna 8.6 MG tablet    SENOKOT    60 tablet    Take 1 tablet by mouth daily       simvastatin 20 MG tablet    ZOCOR    90 tablet    Take 1 tablet (20 mg) by mouth At Bedtime       sodium chloride 0.65 % nasal spray    OCEAN NASAL SPRAY    2 Bottle    Spray 1 spray into both nostrils daily as needed for congestion       tamsulosin 0.4 MG capsule    FLOMAX    90 capsule    Take 1 capsule (0.4 mg) by mouth daily

## 2017-05-01 NOTE — MR AVS SNAPSHOT
After Visit Summary   5/1/2017    Milo Serna    MRN: [id number]           Patient Information     Date Of Birth          1940        Visit Information        Provider Department      5/1/2017 6:00 PM Oscar Alves MD [facility]        Today's Diagnoses     Preop general physical exam    -  1      Care Instructions      Before Your Surgery      Call your surgeon if there is any change in your health. This includes signs of a cold or flu (such as a sore throat, runny nose, cough, rash or fever).    Do not smoke, drink alcohol or take over the counter medicine (unless your surgeon or primary care doctor tells you to) for the 24 hours before and after surgery.    If you take prescribed drugs: Follow your doctor s orders about which medicines to take and which to stop until after surgery.    Eating and drinking prior to surgery: follow the instructions from your surgeon    Take a shower or bath the night before surgery. Use the soap your surgeon gave you to gently clean your skin. If you do not have soap from your surgeon, use your regular soap. Do not shave or scrub the surgery site.  Wear clean pajamas and have clean sheets on your bed.    STOP Eliquis 5 DAYS prior to surgery.   No meds AM of surgery        Follow-ups after your visit        Your next 10 appointments already scheduled     May 02, 2017  1:20 PM CDT   Return Visit with Krystian Owens MD   [facility] [place] ([facility])    [address]  [address]  [facility] [address]   [phone]            May 10, 2017   Procedure with Enrico Bridges MD   [facility]Op Services (--)    [address]  [facility] [address]   [phone]            May 10, 2017  9:45 AM CDT   [facility] Same Day Surgery with Enrico Bridges MD, Haider Alva PA-C   [facility]  "([facility])    [facility] ([facility])   [phone]            May 22, 2017  1:00 PM CDT   New Visit with Grover Biggs MD   [facility] ([facility])    [address]  [place] [address] [phone]              Who to contact     If you have questions or need follow up information about today's clinic visit or your schedule please contact [facility] directly at [phone].  Normal or non-critical lab and imaging results will be communicated to you by [employer] Infotekhart, letter or phone within 4 business days after the clinic has received the results. If you do not hear from us within 7 days, please contact the clinic through Mix[employer]t or phone. If you have a critical or abnormal lab result, we will notify you by phone as soon as possible.  Submit refill requests through Feedsky or call your pharmacy and they will forward the refill request to us. Please allow 3 business days for your refill to be completed.          Additional Information About Your Visit        [employer] Information     [employer] gives you secure access to your electronic health record. If you see a primary care provider, you can also send messages to your care team and make appointments. If you have questions, please call your primary care clinic.  If you do not have a primary care provider, please call [phone] and they will assist you.        Care EveryWhere ID     This is your Care EveryWhere ID. This could be used by other organizations to access your [facility] medical records  [id number]        Your Vitals Were     Pulse Temperature Respirations Height BMI (Body Mass Index)       75 97.9  F (36.6  C) (Oral) 14 5' 6\" (1.676 m) 28.73 kg/m2        Blood Pressure from Last 3 Encounters:   05/01/17 154/76   05/01/17 138/68   04/20/17 142/58    Weight from Last 3 Encounters:   05/01/17 178 " lb (80.7 kg)   05/01/17 178 lb (80.7 kg)   04/20/17 178 lb 9.6 oz (81 kg)              We Performed the Following     EKG 12-lead complete w/read - Clinics        Primary Care Provider Office Phone # Fax #    Oscar Alves [phone]       [facility] [address] [address]        Thank you!     Thank you for choosing [facility]  for your care. Our goal is always to provide you with excellent care. Hearing back from our patients is one way we can continue to improve our services. Please take a few minutes to complete the written survey that you may receive in the mail after your visit with us. Thank you!             Your Updated Medication List - Protect others around you: Learn how to safely use, store and throw away your medicines at www.disposemymeds.org.          This list is accurate as of: 5/1/17  6:43 PM.  Always use your most recent med list.                   Brand Name Dispense Instructions for use    acetaminophen 650 MG CR tablet    TYLENOL    100 tablet    Take 1 tablet (650 mg) by mouth every 6 hours as needed for pain       apixaban ANTICOAGULANT 2.5 MG tablet    ELIQUIS    180 tablet    Take 1 tablet (2.5 mg) by mouth 2 times daily       aspirin 81 MG tablet      Take 81 mg by mouth daily       co-enzyme Q-10 100 MG Caps capsule     180 capsule    Take 1 capsule (100 mg) by mouth daily       diclofenac 1 % Gel topical gel    VOLTAREN    100 g    Apply 4 gramsto area qid prn       doxycycline 100 MG capsule    VIBRAMYCIN    90 capsule    Take 1 capsule (100 mg) by mouth daily       fish oil-omega-3 fatty acids 1000 MG capsule    EQL FISH OIL    180 capsule    Take 1 capsule by mouth daily.       Glucosamine-Chondroitin 250-200 MG Tabs    OSTEO BI-FLEX REGULAR STRENGTH    100 tablet    Take 1 tablet by mouth 2 times daily       lisinopril 20 MG tablet    PRINIVIL/ZESTRIL    90 tablet    Take 1 tablet (20 mg) by mouth daily        MULTIVITAL Chew     360 tablet    Take 2 chew tab by mouth 2 times daily       omeprazole 40 MG capsule    priLOSEC    90 capsule    Take 1 capsule (40 mg) by mouth daily       SAW PALMETTO COMPLEX Caps     200 capsule    Take 1 capsule by mouth daily.       senna 8.6 MG tablet    SENOKOT    60 tablet    Take 1 tablet by mouth daily       simvastatin 20 MG tablet    ZOCOR    90 tablet    Take 1 tablet (20 mg) by mouth At Bedtime       sodium chloride 0.65 % nasal spray    OCEAN NASAL SPRAY    2 Bottle    Spray 1 spray into both nostrils daily as needed for congestion       tamsulosin 0.4 MG capsule    FLOMAX    90 capsule    Take 1 capsule (0.4 mg) by mouth daily

## 2017-05-01 NOTE — LETTER
"May 1, 2017    RE:  Milo Serna-:  [birth year]    Milo is a 76 year old male who presents for evaluation for inguinal hernia. Symptoms began a few months ago. This is described as aching and is located in the right groin, most notably when walking a lot. He does note a bulge which he is able to reduce.  Pt has had previous abdominal surgery including open inguinal hernias on both sides in the past. The right one was done in [facility] years ago, [facility] as [facility]. He has no left sided symptoms. Employment does not require heavy lifting; he is retired. Of note, he has had a cryoablation of his prostate last year, he states that he had a Biggs afterwards for a week, no current urinary concerns.     Constipation No   Dysuria No  Cough No     Pt's chart has been reviewed for PMH, PSH, allergies, medications, social history and family history.     ROS:  Pulm: No shortness of breath, dyspnea on exertion, cough, or hemoptysis  CV: negative  ABD: See chief complaint  : Negative  All other systems negative/normal     /68  Pulse 57  Ht 5' 6\" (1.676 m)  Wt 178 lb (80.7 kg)  SpO2 99%  BMI 28.73 kg/m2  Physical exam: Patient able to get up on table without difficulty.  Abdomen is abdomen is soft without significant tenderness, masses, organomegaly or guarding bowel sounds are positive and no caput medusa noted. There are no low midline scars  Hernia Left inguinal hernia is not present with valsalva  Right inguinal hernia is present with valsalva  The hernia is reducible  There are bilateral well healed groin incisions  Testicles are normal     Assesment: right inguinal hernia, recurrent     Plan: The nature of hernias, anatomic considerations, indications and approaches to repair were discussed. Risks of operative intervention were discussed including infection, bleeding, harm to structures including vessels and nerves as well as recurrence, which is about " 5% per decade of life. Post operative recuperation and activity limitations were reviewed as well. As his hernia is recurrent after a prior open repair, I have advised laparoscopy as a means to reduce further recurrence risk and risk to the cord structures. I have also offered exploration of the left groin and repair if there is a hernia present as well. The patient is interested in pursuing repair and we will assist in scheduling this with him.        Enrico Dalton MD  5/1/2017 12:30 PM

## 2017-05-01 NOTE — PATIENT INSTRUCTIONS
Before Your Surgery      Call your surgeon if there is any change in your health. This includes signs of a cold or flu (such as a sore throat, runny nose, cough, rash or fever).    Do not smoke, drink alcohol or take over the counter medicine (unless your surgeon or primary care doctor tells you to) for the 24 hours before and after surgery.    If you take prescribed drugs: Follow your doctor s orders about which medicines to take and which to stop until after surgery.    Eating and drinking prior to surgery: follow the instructions from your surgeon    Take a shower or bath the night before surgery. Use the soap your surgeon gave you to gently clean your skin. If you do not have soap from your surgeon, use your regular soap. Do not shave or scrub the surgery site.  Wear clean pajamas and have clean sheets on your bed.    STOP Eliquis 5 DAYS prior to surgery.   No meds AM of surgery

## 2017-05-02 ENCOUNTER — OFFICE VISIT (OUTPATIENT)
Dept: UROLOGY | Facility: CLINIC | Age: 77
End: 2017-05-02
Payer: MEDICARE

## 2017-05-02 VITALS
WEIGHT: 172 LBS | DIASTOLIC BLOOD PRESSURE: 80 MMHG | SYSTOLIC BLOOD PRESSURE: 168 MMHG | HEIGHT: 67 IN | HEART RATE: 56 BPM | BODY MASS INDEX: 27 KG/M2

## 2017-05-02 DIAGNOSIS — C61 PROSTATE CANCER (H): Primary | ICD-10-CM

## 2017-05-02 PROCEDURE — 99213 OFFICE O/P EST LOW 20 MIN: CPT | Performed by: UROLOGY

## 2017-05-02 ASSESSMENT — PAIN SCALES - GENERAL: PAINLEVEL: MILD PAIN (2)

## 2017-05-02 NOTE — LETTER
5/2/2017       RE: Milo Serna  [address] [place] [address]     Dear Colleague,    Thank you for referring your patient, Milo Serna, to the [facility] [place] [facility]. Please see a copy of my visit note below.    History: This very pleasant 76-year-old gentleman had cryoablation of the prostate 6 months ago for Angelique 8 adenocarcinoma the prostate.  At this point he feels very well, his urine stream is greatly improved, and PSA is < 0.01.  He has no significant complaints today    Past Medical History:   Diagnosis Date     Anemia, unspecified 11/8/2016     Atrial fibrillation (H) 5/27/2015     BPH (benign prostatic hyperplasia) 1/25/2012     CAD (coronary artery disease) 4/4/2012     Closed fracture of metatarsal bone 4/4/2012     Coronary atherosclerosis of unspecified type of vessel, native or [facility]    CAB 1996 following MI (at [facility])      Dilated aortic root (H) 5/26/2016     Drug-induced erectile dysfunction 10/2/2015     Elevated blood sugar 11/8/2016     Elevated PSA 9/19/2013     Essential hypertension with goal blood pressure less than 140/90 9/22/2016     Essential hypertension, benign      Gout      HAV (hallux abducto valgus) 4/4/2012     Hernia, abdominal      History of prostate cancer 6/15/2016     HTN, goal below 150/90 4/20/2017     Iron deficiency anemia secondary to inadequate dietary iron intake 11/15/2016     Low blood pressure reading 10/17/2016     Lumbago     had degendisc dz on MRI 7/07     Mixed hyperlipidemia      Mumps      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     OA (osteoarthritis) 4/4/2012     Old myocardial infarction 4/4/2012     Palpitations      Pes planus 4/4/2012     Prostate cancer (H) 5/26/2016     PUD (peptic ulcer disease) 4/4/2012     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     Rosacea 1/15/2008     Thrombocytopenia (H) 4/21/2017     Unspecified hyperplasia of  prostate with urinary obstruction and other lower urinary tract symptoms (LUTS)     better on avodart & hytrin     Venous stasis 2012     Venous stasis 2012       Social History     Social History     Marital status:      Spouse name: N/A     Number of children: N/A     Years of education: N/A     Social History Main Topics     Smoking status: Former Smoker     Smokeless tobacco: Never Used      Comment: quit 3/1974     Alcohol use 0.0 oz/week     0 Standard drinks or equivalent per week      Comment: small amounts to moderate     Drug use: No     Sexual activity: Yes     Partners: Female     Other Topics Concern     Caffeine Concern Yes     1 cups of coffee and soda per day     Special Diet No     Exercise Yes     walking     Seat Belt Yes     Social History Narrative       Past Surgical History:   Procedure Laterality Date     C NONSPECIFIC PROCEDURE      CAB  [place]     C NONSPECIFIC PROCEDURE      l inguinal hernia repair      C NONSPECIFIC PROCEDURE      R hernia remote     C NONSPECIFIC PROCEDURE      R ankle ORIF for fx     C NONSPECIFIC PROCEDURE      nasal surgery      C NONSPECIFIC PROCEDURE      R rotater repair (remote)      C NONSPECIFIC PROCEDURE      appy 1966     C NONSPECIFIC PROCEDURE      sinus surgery x 2     C NONSPECIFIC PROCEDURE      L shoulder      CARDIAC SURGERY      bipass     CYSTOSCOPY FLEXIBLE, CYOABLATION PROSTATE N/A 2016    Procedure: CYSTOSCOPY FLEXIBLE, CRYOABLATION PROSTATE;  Surgeon: Krystian Owens MD;  Location: [facility] OR     GENITOURINARY SURGERY      prostate surgery      HERNIA REPAIR       PROSTATE SURGERY         Family History   Problem Relation Age of Onset     CANCER Mother      stomach cancer,  age 61     HEART DISEASE Father      MI,  age 71     HEART DISEASE Brother      stent placement, RA      Hypertension Brother      HEART DISEASE Sister      rythmn problems with heart, hypertension         Current  Outpatient Prescriptions:      lisinopril (PRINIVIL/ZESTRIL) 20 MG tablet, Take 1 tablet (20 mg) by mouth daily, Disp: 90 tablet, Rfl: 1     senna (SENOKOT) 8.6 MG tablet, Take 1 tablet by mouth daily, Disp: 60 tablet, Rfl: 0     omeprazole (PRILOSEC) 40 MG capsule, Take 1 capsule (40 mg) by mouth daily, Disp: 90 capsule, Rfl: 3     diclofenac (VOLTAREN) 1 % GEL, Apply 4 gramsto area qid prn, Disp: 100 g, Rfl: 11     simvastatin (ZOCOR) 20 MG tablet, Take 1 tablet (20 mg) by mouth At Bedtime, Disp: 90 tablet, Rfl: 3     tamsulosin (FLOMAX) 0.4 MG 24 hr capsule, Take 1 capsule (0.4 mg) by mouth daily, Disp: 90 capsule, Rfl: 3     doxycycline (VIBRAMYCIN) 100 MG capsule, Take 1 capsule (100 mg) by mouth daily, Disp: 90 capsule, Rfl: 3     apixaban ANTICOAGULANT (ELIQUIS) 2.5 MG tablet, Take 1 tablet (2.5 mg) by mouth 2 times daily, Disp: 180 tablet, Rfl: 3     aspirin 81 MG tablet, Take 81 mg by mouth daily, Disp: , Rfl:      co-enzyme Q-10 100 MG CAPS, Take 1 capsule (100 mg) by mouth daily, Disp: 180 capsule, Rfl: 3     sodium chloride (OCEAN NASAL SPRAY) 0.65 % nasal spray, Spray 1 spray into both nostrils daily as needed for congestion, Disp: 2 Bottle, Rfl: 6     Glucosamine-Chondroitin (OSTEO BI-FLEX REGULAR STRENGTH) 250-200 MG TABS, Take 1 tablet by mouth 2 times daily, Disp: 100 tablet, Rfl: 8     Multiple Vitamins-Minerals (MULTIVITAL) CHEW, Take 2 chew tab by mouth 2 times daily, Disp: 360 tablet, Rfl: 3     fish oil-omega-3 fatty acids (EQL FISH OIL) 1000 MG capsule, Take 1 capsule by mouth daily., Disp: 180 capsule, Rfl: 12     Zn-Pyg Afri-Nettle-Saw Palmet (SAW PALMETTO COMPLEX) CAPS, Take 1 capsule by mouth daily., Disp: 200 capsule, Rfl: PRN     acetaminophen (TYLENOL) 650 MG CR tablet, Take 1 tablet (650 mg) by mouth every 6 hours as needed for pain, Disp: 100 tablet, Rfl: 11    10 point ROS of systems including Constitutional, Eyes, Respiratory, Cardiovascular, Gastroenterology, Genitourinary,  "Integumentary, Muscularskeletal, Psychiatric were all negative except for pertinent positives noted in my HPI.    Examination:   /80  Pulse 56  Ht 1.702 m (5' 7\")  Wt 78 kg (172 lb)  BMI 26.94 kg/m2  General Impression: Very pleasant gentleman in no acute distress, well-oriented in time place and person.  Mental Status: Normal.  HEENT.  There is no evidence of jaundice, and mucous membranes are normal  Skin: The skin is normal to examination  Respiratory System: The respiratory cycle is normal  Lymph Nodes: Not examined  Back/Flank Tenderness: Not examined  Cardiovascular System: Examined  Abdominal Examination: Not examined  Extremities: There is no significant peripheral edema  Genitial: Not examined   Rectal examination . good sphincter tone, normal perianal sensation.  Smooth rectal mucosa without hemorrhoids or fissures.  Smooth soft and small prostate without evidence of tenderness, bogginess and nodules.  Seminal vesicles.  Not palpable  Rectal Examination: See above  Neurologic System: There are no no abnormal clinical neurological signs in the central peripheral nervous system    Impression: He is exhibiting excellent biochemical control of his disease 6 months after cryoablation of the prostate for Angelique 8 adenocarcinoma prostate.  In addition his urine stream is also greatly improved.  I'm very pleased with his progress at this point.  We will need to repeat the PSA and examination in 6 months.  I discussed the entire situation with the patient in detail today.  I answered all the questions    Plan: 6 months for PSA and examination    Time: 20 minutes.  Greater than 50% was spent in discussion and consultation.      \"This dictation was performed with voice recognition software and may contain errors,  omissions and inadvertent word substitution.\"        Again, thank you for allowing me to participate in the care of your patient.      Sincerely,    Krystian Owens MD      "

## 2017-05-02 NOTE — MR AVS SNAPSHOT
After Visit Summary   5/2/2017    Milo Serna    MRN: [id number]           Patient Information     Date Of Birth          1940        Visit Information        Provider Department      5/2/2017 1:20 PM Krystian Owens MD [facility]        Today's Diagnoses     Prostate cancer (H)    -  1       Follow-ups after your visit        Follow-up notes from your care team     Return in about 6 months (around 11/2/2017) for PSA, Physical Exam.      Your next 10 appointments already scheduled     May 10, 2017   Procedure with Enrico Bridges MD   [facility] (--)    [address]  [facility] [address]   [phone]            May 10, 2017  9:45 AM CDT   [facility] Same Day Surgery with Enrico Bridges MD, Haider Alva PA-C   [facility] ([facility])    [facility] ([facility])   [phone]            May 22, 2017  1:00 PM CDT   New Visit with Grover Biggs MD   [facility] ([facility])    [address]  [facility] [address] [phone]            Dec 19, 2017  1:00 PM CST   Return Visit with Krystian Owens MD   [facility] ([facility] [place])    [address]  [address]  [facility] [id number] [phone]              Who to contact     If you have questions or need follow up information about today's clinic visit or your schedule please contact [facility] directly at [phone].  Normal or non-critical lab and imaging results will be communicated to you by MyChart, letter or phone within 4 business days after the clinic has received the results. If you do not hear from us within 7  "days, please contact the clinic through [employer] or phone. If you have a critical or abnormal lab result, we will notify you by phone as soon as possible.  Submit refill requests through [employer] or call your pharmacy and they will forward the refill request to us. Please allow 3 business days for your refill to be completed.          Additional Information About Your Visit        [employer] Information     [employer] gives you secure access to your electronic health record. If you see a primary care provider, you can also send messages to your care team and make appointments. If you have questions, please call your primary care clinic.  If you do not have a primary care provider, please call [phone] and they will assist you.        Care EveryWhere ID     This is your Care EveryWhere ID. This could be used by other organizations to access your [facility] medical records  [id number]        Your Vitals Were     Pulse Height BMI (Body Mass Index)             56 1.702 m (5' 7\") 26.94 kg/m2          Blood Pressure from Last 3 Encounters:   05/02/17 168/80   05/01/17 154/76   05/01/17 138/68    Weight from Last 3 Encounters:   05/02/17 78 kg (172 lb)   05/01/17 80.7 kg (178 lb)   05/01/17 80.7 kg (178 lb)              Today, you had the following     No orders found for display       Primary Care Provider Office Phone # Fax #    Oscar Alves [phone]       [address] [address]        Thank you!     Thank you for choosing [facility] [place]  for your care. Our goal is always to provide you with excellent care. Hearing back from our patients is one way we can continue to improve our services. Please take a few minutes to complete the written survey that you may receive in the mail after your visit with us. Thank you!             Your Updated Medication List - Protect others around you: Learn how to safely use, store and " throw away your medicines at www.disposemymeds.org.          This list is accurate as of: 5/2/17  2:07 PM.  Always use your most recent med list.                   Brand Name Dispense Instructions for use    acetaminophen 650 MG CR tablet    TYLENOL    100 tablet    Take 1 tablet (650 mg) by mouth every 6 hours as needed for pain       apixaban ANTICOAGULANT 2.5 MG tablet    ELIQUIS    180 tablet    Take 1 tablet (2.5 mg) by mouth 2 times daily       aspirin 81 MG tablet      Take 81 mg by mouth daily       co-enzyme Q-10 100 MG Caps capsule     180 capsule    Take 1 capsule (100 mg) by mouth daily       diclofenac 1 % Gel topical gel    VOLTAREN    100 g    Apply 4 gramsto area qid prn       doxycycline 100 MG capsule    VIBRAMYCIN    90 capsule    Take 1 capsule (100 mg) by mouth daily       fish oil-omega-3 fatty acids 1000 MG capsule    EQL FISH OIL    180 capsule    Take 1 capsule by mouth daily.       Glucosamine-Chondroitin 250-200 MG Tabs    OSTEO BI-FLEX REGULAR STRENGTH    100 tablet    Take 1 tablet by mouth 2 times daily       lisinopril 20 MG tablet    PRINIVIL/ZESTRIL    90 tablet    Take 1 tablet (20 mg) by mouth daily       MULTIVITAL Chew     360 tablet    Take 2 chew tab by mouth 2 times daily       omeprazole 40 MG capsule    priLOSEC    90 capsule    Take 1 capsule (40 mg) by mouth daily       SAW PALMETTO COMPLEX Caps     200 capsule    Take 1 capsule by mouth daily.       senna 8.6 MG tablet    SENOKOT    60 tablet    Take 1 tablet by mouth daily       simvastatin 20 MG tablet    ZOCOR    90 tablet    Take 1 tablet (20 mg) by mouth At Bedtime       sodium chloride 0.65 % nasal spray    OCEAN NASAL SPRAY    2 Bottle    Spray 1 spray into both nostrils daily as needed for congestion       tamsulosin 0.4 MG capsule    FLOMAX    90 capsule    Take 1 capsule (0.4 mg) by mouth daily

## 2017-05-02 NOTE — PROGRESS NOTES
History: This very pleasant 76-year-old gentleman had cryoablation of the prostate 6 months ago for Angelique 8 adenocarcinoma the prostate.  At this point he feels very well, his urine stream is greatly improved, and PSA is < 0.01.  He has no significant complaints today    Past Medical History:   Diagnosis Date     Anemia, unspecified 11/8/2016     Atrial fibrillation (H) 5/27/2015     BPH (benign prostatic hyperplasia) 1/25/2012     CAD (coronary artery disease) 4/4/2012     Closed fracture of metatarsal bone 4/4/2012     Coronary atherosclerosis of unspecified type of vessel, native or graft [facility]    CAB 1996 following MI (at [facility])      Dilated aortic root (H) 5/26/2016     Drug-induced erectile dysfunction 10/2/2015     Elevated blood sugar 11/8/2016     Elevated PSA 9/19/2013     Essential hypertension with goal blood pressure less than 140/90 9/22/2016     Essential hypertension, benign      Gout      HAV (hallux abducto valgus) 4/4/2012     Hernia, abdominal      History of prostate cancer 6/15/2016     HTN, goal below 150/90 4/20/2017     Iron deficiency anemia secondary to inadequate dietary iron intake 11/15/2016     Low blood pressure reading 10/17/2016     Lumbago     had degendisc dz on MRI 7/07     Mixed hyperlipidemia      Mumps      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     OA (osteoarthritis) 4/4/2012     Old myocardial infarction 4/4/2012     Palpitations      Pes planus 4/4/2012     Prostate cancer (H) 5/26/2016     PUD (peptic ulcer disease) 4/4/2012     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     Rosacea 1/15/2008     Thrombocytopenia (H) 4/21/2017     Unspecified hyperplasia of prostate with urinary obstruction and other lower urinary tract symptoms (LUTS)     better on avodart & hytrin     Venous stasis 4/4/2012     Venous stasis 4/4/2012       Social History     Social History     Marital status:      Spouse name: N/A     Number of children: N/A     Years of education: N/A     Social History Main  Topics     Smoking status: Former Smoker     Smokeless tobacco: Never Used      Comment: quit 3/1974     Alcohol use 0.0 oz/week     0 Standard drinks or equivalent per week      Comment: small amounts to moderate     Drug use: No     Sexual activity: Yes     Partners: Female     Other Topics Concern     Caffeine Concern Yes     1 cups of coffee and soda per day     Special Diet No     Exercise Yes     walking     Seat Belt Yes     Social History Narrative       Past Surgical History:   Procedure Laterality Date     C NONSPECIFIC PROCEDURE      CAB  [place]     C NONSPECIFIC PROCEDURE      l inguinal hernia repair      C NONSPECIFIC PROCEDURE      R hernia remote     C NONSPECIFIC PROCEDURE      R ankle ORIF for fx     C NONSPECIFIC PROCEDURE      nasal surgery      C NONSPECIFIC PROCEDURE      R rotater repair (remote)      C NONSPECIFIC PROCEDURE      appy      C NONSPECIFIC PROCEDURE      sinus surgery x 2     C NONSPECIFIC PROCEDURE      L shoulder      CARDIAC SURGERY      bipass     CYSTOSCOPY FLEXIBLE, CYOABLATION PROSTATE N/A 2016    Procedure: CYSTOSCOPY FLEXIBLE, CRYOABLATION PROSTATE;  Surgeon: Krystian Owens MD;  Location: [facility] OR     GENITOURINARY SURGERY      prostate surgery      HERNIA REPAIR       PROSTATE SURGERY         Family History   Problem Relation Age of Onset     CANCER Mother      stomach cancer,  age 61     HEART DISEASE Father      MI,  age 71     HEART DISEASE Brother      stent placement, RA      Hypertension Brother      HEART DISEASE Sister      rythmn problems with heart, hypertension         Current Outpatient Prescriptions:      lisinopril (PRINIVIL/ZESTRIL) 20 MG tablet, Take 1 tablet (20 mg) by mouth daily, Disp: 90 tablet, Rfl: 1     senna (SENOKOT) 8.6 MG tablet, Take 1 tablet by mouth daily, Disp: 60 tablet, Rfl: 0     omeprazole (PRILOSEC) 40 MG capsule, Take 1 capsule (40 mg) by mouth daily, Disp: 90 capsule, Rfl: 3      "diclofenac (VOLTAREN) 1 % GEL, Apply 4 gramsto area qid prn, Disp: 100 g, Rfl: 11     simvastatin (ZOCOR) 20 MG tablet, Take 1 tablet (20 mg) by mouth At Bedtime, Disp: 90 tablet, Rfl: 3     tamsulosin (FLOMAX) 0.4 MG 24 hr capsule, Take 1 capsule (0.4 mg) by mouth daily, Disp: 90 capsule, Rfl: 3     doxycycline (VIBRAMYCIN) 100 MG capsule, Take 1 capsule (100 mg) by mouth daily, Disp: 90 capsule, Rfl: 3     apixaban ANTICOAGULANT (ELIQUIS) 2.5 MG tablet, Take 1 tablet (2.5 mg) by mouth 2 times daily, Disp: 180 tablet, Rfl: 3     aspirin 81 MG tablet, Take 81 mg by mouth daily, Disp: , Rfl:      co-enzyme Q-10 100 MG CAPS, Take 1 capsule (100 mg) by mouth daily, Disp: 180 capsule, Rfl: 3     sodium chloride (OCEAN NASAL SPRAY) 0.65 % nasal spray, Spray 1 spray into both nostrils daily as needed for congestion, Disp: 2 Bottle, Rfl: 6     Glucosamine-Chondroitin (OSTEO BI-FLEX REGULAR STRENGTH) 250-200 MG TABS, Take 1 tablet by mouth 2 times daily, Disp: 100 tablet, Rfl: 8     Multiple Vitamins-Minerals (MULTIVITAL) CHEW, Take 2 chew tab by mouth 2 times daily, Disp: 360 tablet, Rfl: 3     fish oil-omega-3 fatty acids (EQL FISH OIL) 1000 MG capsule, Take 1 capsule by mouth daily., Disp: 180 capsule, Rfl: 12     Zn-Pyg Afri-Nettle-Saw Palmet (SAW PALMETTO COMPLEX) CAPS, Take 1 capsule by mouth daily., Disp: 200 capsule, Rfl: PRN     acetaminophen (TYLENOL) 650 MG CR tablet, Take 1 tablet (650 mg) by mouth every 6 hours as needed for pain, Disp: 100 tablet, Rfl: 11    10 point ROS of systems including Constitutional, Eyes, Respiratory, Cardiovascular, Gastroenterology, Genitourinary, Integumentary, Muscularskeletal, Psychiatric were all negative except for pertinent positives noted in my HPI.    Examination:   /80  Pulse 56  Ht 1.702 m (5' 7\")  Wt 78 kg (172 lb)  BMI 26.94 kg/m2  General Impression: Very pleasant gentleman in no acute distress, well-oriented in time place and person.  Mental Status: " "Normal.  HEENT.  There is no evidence of jaundice, and mucous membranes are normal  Skin: The skin is normal to examination  Respiratory System: The respiratory cycle is normal  Lymph Nodes: Not examined  Back/Flank Tenderness: Not examined  Cardiovascular System: Examined  Abdominal Examination: Not examined  Extremities: There is no significant peripheral edema  Genitial: Not examined   Rectal examination . good sphincter tone, normal perianal sensation.  Smooth rectal mucosa without hemorrhoids or fissures.  Smooth soft and small prostate without evidence of tenderness, bogginess and nodules.  Seminal vesicles.  Not palpable  Rectal Examination: See above  Neurologic System: There are no no abnormal clinical neurological signs in the central peripheral nervous system    Impression: He is exhibiting excellent biochemical control of his disease 6 months after cryoablation of the prostate for Angelique 8 adenocarcinoma prostate.  In addition his urine stream is also greatly improved.  I'm very pleased with his progress at this point.  We will need to repeat the PSA and examination in 6 months.  I discussed the entire situation with the patient in detail today.  I answered all the questions    Plan: 6 months for PSA and examination    Time: 20 minutes.  Greater than 50% was spent in discussion and consultation.      \"This dictation was performed with voice recognition software and may contain errors,  omissions and inadvertent word substitution.\"      "

## 2017-05-02 NOTE — PROGRESS NOTES
Hypotension resolved with nifedipine cessation  BP Readings from Last 1 Encounters:   05/01/17 154/76     Monitor after surgery  Oscar Alves

## 2017-05-09 NOTE — H&P (VIEW-ONLY)
[facility]  [address] [address]  [phone]  Dept: [phone]    PRE-OP EVALUATION:  Today's date: 2017    Milo Serna (: 1940) presents for pre-operative evaluation assessment as requested by Dr. Bridges.  He requires evaluation and anesthesia risk assessment prior to undergoing surgery/procedure for treatment of Herniorrhaphy Inguinal .  Proposed procedure: Herniorrhaphy Inguinal      Date of Surgery/ Procedure: 5/10/17  Time of Surgery/ Procedure: 935 am   Hospital/Surgical Facility: [facility]    Primary Physician: Oscar Alves  Type of Anesthesia Anticipated: General    Patient has a Health Care Directive or Living Will:  YES In the Process of getting it changes    4. NO - Are you troubled by shortness of breath when: walking on the level, up a slight hill or at night?  5. NO - Do you currently have a cold, bronchitis or other respiratory infection?  6. NO - Do you have a cough, shortness of breath or wheezing?  7. NO - Do you sometimes get pains in the calves of your legs when you walk?  8. NO - Do you or anyone in your family have previous history of blood clots?  10. NO - Have you ever had problems with anemia or been told to take iron pills?  11. NO - Have you had any abnormal blood loss such as black, tarry or bloody stools, or abnormal vaginal bleeding?  14. NO - Do you have sleep apnea, excessive snoring or daytime drowsiness?  15. NO - Do you have any prosthetic heart valves?  16. NO - Do you have prosthetic joints?  17. NO - Is there any chance that you may be pregnant?  1. YES - Do you have a history of heart attack, stroke, stent, bypass or surgery on an artery in the head, neck, heart or legs?  2. YES - Do you ever have any pain or discomfort in your chest?  3. YES - Do you have a history of  Heart Failure?  9. YES - Do you or does anyone in your family have a serious bleeding problem such as prolonged bleeding following  surgeries or cuts?  12. YES - Have you ever had a blood transfusion?  13. YES - Have you or any of your relatives ever had problems with anesthesia?  HPI:                                                      Brief HPI related to upcoming procedure: Herniorrhaphy Inguinal     MEDICAL HISTORY:                                                      Patient Active Problem List    Diagnosis Date Noted     Thrombocytopenia (H) 04/21/2017     Priority: Medium     HTN, goal below 150/90 04/20/2017     Priority: Medium     Iron deficiency anemia secondary to inadequate dietary iron intake 11/15/2016     Priority: Medium     Prostate cancer (H) 11/09/2016     Priority: Medium     Elevated blood sugar 11/08/2016     Priority: Medium     Low blood pressure reading 10/17/2016     Priority: Medium     History of prostate cancer 06/15/2016     Priority: Medium     Acute bilateral low back pain with left-sided sciatica 06/15/2016     Priority: Medium     Dilated aortic root (H) 05/26/2016     Priority: Medium     Drug-induced erectile dysfunction 10/02/2015     Priority: Medium     PVC's (premature ventricular contractions) 05/27/2015     Priority: Medium     Premature atrial contractions 05/27/2015     Priority: Medium     Atrial fibrillation (H) 05/27/2015     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       Venous stasis 04/04/2012     In both legs.       Advanced directives, counseling/discussion 01/10/2012     Advance Directive Problem List Overview:   Name Relationship Phone    Primary Health Care Agent            Alternative Health Care Agent          Patient states has Advance Directive and will bring in a copy  to clinic. 1/10/2012        HYPERLIPIDEMIA LDL GOAL <100 10/31/2010     Rosacea 01/15/2008     Coronary atherosclerosis      CAB 1996 following MI (at [facility])   Problem list name updated by automated process. Provider to review        Past Medical History:   Diagnosis Date     Anemia, unspecified 11/8/2016     Atrial fibrillation (H) 5/27/2015     BPH (benign prostatic hyperplasia) 1/25/2012     CAD (coronary artery disease) 4/4/2012     Closed fracture of metatarsal bone 4/4/2012     Coronary atherosclerosis of unspecified type of vessel, native or graft [facility]    CAB 1996 following MI (at [facility])      Dilated aortic root (H) 5/26/2016     Drug-induced erectile dysfunction 10/2/2015     Elevated blood sugar 11/8/2016     Elevated PSA 9/19/2013     Essential hypertension with goal blood pressure less than 140/90 9/22/2016     Essential hypertension, benign      HAV (hallux abducto valgus) 4/4/2012     History of prostate cancer 6/15/2016     HTN, goal below 150/90 4/20/2017     Iron deficiency anemia secondary to inadequate dietary iron intake 11/15/2016     Low blood pressure reading 10/17/2016     Lumbago     had degendisc dz on MRI 7/07     Mixed hyperlipidemia      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     OA (osteoarthritis) 4/4/2012     Old myocardial infarction 4/4/2012     Pes planus 4/4/2012     Prostate cancer (H) 5/26/2016     PUD (peptic ulcer disease) 4/4/2012     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     Rosacea 1/15/2008     Thrombocytopenia (H) 4/21/2017     Unspecified hyperplasia of prostate with urinary obstruction and other lower urinary tract symptoms (LUTS)     better on avodart & hytrin     Venous stasis 4/4/2012     Venous stasis 4/4/2012     Past Surgical History:   Procedure Laterality Date     C NONSPECIFIC PROCEDURE      CAB 1996 [place]     C NONSPECIFIC PROCEDURE  2/07    l inguinal hernia repair      C NONSPECIFIC PROCEDURE      R hernia remote     C NONSPECIFIC PROCEDURE  2000    R ankle  ORIF for fx     C NONSPECIFIC PROCEDURE  2005    nasal surgery      C NONSPECIFIC PROCEDURE      R rotater repair (remote)      C NONSPECIFIC PROCEDURE      appy 1966     C NONSPECIFIC PROCEDURE      sinus surgery x 2     C NONSPECIFIC PROCEDURE      L shoulder 6/09     CARDIAC SURGERY      bipass     CYSTOSCOPY FLEXIBLE, CYOABLATION PROSTATE N/A 11/9/2016    Procedure: CYSTOSCOPY FLEXIBLE, CRYOABLATION PROSTATE;  Surgeon: Krystian Owens MD;  Location:  OR     GENITOURINARY SURGERY      prostate surgery      Current Outpatient Prescriptions   Medication Sig Dispense Refill     lisinopril (PRINIVIL/ZESTRIL) 20 MG tablet Take 1 tablet (20 mg) by mouth daily 90 tablet 1     senna (SENOKOT) 8.6 MG tablet Take 1 tablet by mouth daily 60 tablet 0     omeprazole (PRILOSEC) 40 MG capsule Take 1 capsule (40 mg) by mouth daily 90 capsule 3     diclofenac (VOLTAREN) 1 % GEL Apply 4 gramsto area qid prn 100 g 11     simvastatin (ZOCOR) 20 MG tablet Take 1 tablet (20 mg) by mouth At Bedtime 90 tablet 3     tamsulosin (FLOMAX) 0.4 MG 24 hr capsule Take 1 capsule (0.4 mg) by mouth daily 90 capsule 3     doxycycline (VIBRAMYCIN) 100 MG capsule Take 1 capsule (100 mg) by mouth daily 90 capsule 3     apixaban ANTICOAGULANT (ELIQUIS) 2.5 MG tablet Take 1 tablet (2.5 mg) by mouth 2 times daily 180 tablet 3     aspirin 81 MG tablet Take 81 mg by mouth daily       acetaminophen (TYLENOL) 650 MG CR tablet Take 1 tablet (650 mg) by mouth every 6 hours as needed for pain 100 tablet 11     co-enzyme Q-10 100 MG CAPS Take 1 capsule (100 mg) by mouth daily 180 capsule 3     sodium chloride (OCEAN NASAL SPRAY) 0.65 % nasal spray Spray 1 spray into both nostrils daily as needed for congestion 2 Bottle 6     Glucosamine-Chondroitin (OSTEO BI-FLEX REGULAR STRENGTH) 250-200 MG TABS Take 1 tablet by mouth 2 times daily 100 tablet 8     Multiple Vitamins-Minerals (MULTIVITAL) CHEW Take 2 chew tab by mouth 2 times daily 360 tablet 3     fish  "oil-omega-3 fatty acids (EQL FISH OIL) 1000 MG capsule Take 1 capsule by mouth daily. 180 capsule 12     Zn-Pyg Afri-Nettle-Saw Palmet (SAW PALMETTO COMPLEX) CAPS Take 1 capsule by mouth daily. 200 capsule PRN     OTC products: None, except as noted above    No Known Allergies   Latex Allergy: NO    Social History   Substance Use Topics     Smoking status: Former Smoker     Smokeless tobacco: Never Used      Comment: quit 3/1974     Alcohol use 0.0 oz/week     0 Standard drinks or equivalent per week      Comment: small amounts to moderate     History   Drug Use No     REVIEW OF SYSTEMS:                                                    Denies difficulty swallowing, heart burn, chest pain, SOB, palpations, diarrhea, constipation, dysuria, urinary frequency, bruising, bleeding.A complete review of systems is otherwise negative      This document serves as a record of the services and decisions personally performed and made by Joselyn Moore MD. It was created on his behalf by Andres Alvarado a trained medical scribe. The creation of this document is based the provider's statements to the medical scribe. Andres Alvarado May 1, 2017 6:13 PM  EXAM:                                                    /76 (BP Location: Right arm, Patient Position: Chair, Cuff Size: Adult Regular)  Pulse 75  Temp 97.9  F (36.6  C) (Oral)  Resp 14  Ht 5' 6\" (1.676 m)  Wt 178 lb (80.7 kg)  BMI 28.73 kg/m2  GEN: Healthy, Alert, No distress  NECK: no thyromegaly  CHEST: Clear to auscultation, respirations unlabored  HEART: S1S2 RRR no murmur nor apical displacement  ABD: soft without mass or organomegaly   MS: no edema  DIAGNOSTICS:                                                    EKG: atrial fibrillation, rate , no LVH by voltage criteria, nonspecific intraventricular conduction delay, nonspecific ST-T changes, unchanged from previous tracings    Recent Labs   Lab Test  04/20/17   0938  11/15/16   1124   10/14/16   0953   HGB  " 12.7*  11.0*   < >   --    PLT  134*  159   < >   --    INR   --   1.27*   --    --    NA  141  135   < >  134   POTASSIUM  3.8  3.7   < >  3.9   CR  0.87  1.04   < >  1.18   A1C  5.4   --    --   5.5    < > = values in this interval not displayed.     IMPRESSION:                                                    Reason for surgery/procedure: Herniorrhaphy Inguinal   Diagnosis/reason for consult: assess perioperative risk    The proposed surgical procedure is considered INTERMEDIATE risk.    REVISED CARDIAC RISK INDEX  The patient has the following serious cardiovascular risks for perioperative complications such as (MI, PE, VFib and 3  AV Block):  No serious cardiac risks  INTERPRETATION: 0 risks: Class I (very low risk - 0.4% complication rate)    The patient has the following additional risks for perioperative complications:  A fib, thrombocytopenia  Lab Results   Component Value Date    WBC 4.3 04/20/2017     Lab Results   Component Value Date    RBC 4.07 04/20/2017     Lab Results   Component Value Date    HGB 12.7 04/20/2017     Lab Results   Component Value Date    HCT 38.3 04/20/2017     No components found for: MCT  Lab Results   Component Value Date    MCV 94 04/20/2017     Lab Results   Component Value Date    MCH 31.2 04/20/2017     Lab Results   Component Value Date    MCHC 33.2 04/20/2017     Lab Results   Component Value Date    RDW 14.6 04/20/2017     Lab Results   Component Value Date     04/20/2017           ICD-10-CM    1. Preop general physical exam Z01.818        RECOMMENDATIONS:                                                          Pt will hold eliquis 5 days before procedure, remain on ASA    APPROVAL GIVEN to proceed with proposed procedure, without further diagnostic evaluation       Signed Electronically by: Oscar Alves MD    The information in this document, created by a scribe for me, accurately reflects the services I personally performed and the decisions made by me. I  have reviewed and approved this document for accuracy. 6:23 PM May 1, 2017    Copy of this evaluation report is provided to requesting physician.    [facility] Preop Guidelines

## 2017-05-10 ENCOUNTER — ANESTHESIA (OUTPATIENT)
Dept: SURGERY | Facility: CLINIC | Age: 77
End: 2017-05-10
Payer: MEDICARE

## 2017-05-10 ENCOUNTER — ANESTHESIA EVENT (OUTPATIENT)
Dept: SURGERY | Facility: CLINIC | Age: 77
End: 2017-05-10
Payer: MEDICARE

## 2017-05-10 ENCOUNTER — HOSPITAL ENCOUNTER (OUTPATIENT)
Facility: CLINIC | Age: 77
Discharge: HOME | End: 2017-05-10
Attending: SURGERY | Admitting: SURGERY
Payer: MEDICARE

## 2017-05-10 ENCOUNTER — APPOINTMENT (OUTPATIENT)
Dept: SURGERY | Facility: PHYSICIAN GROUP | Age: 77
End: 2017-05-10
Payer: MEDICARE

## 2017-05-10 ENCOUNTER — SURGERY (OUTPATIENT)
Age: 77
End: 2017-05-10

## 2017-05-10 VITALS
DIASTOLIC BLOOD PRESSURE: 88 MMHG | TEMPERATURE: 97.7 F | HEIGHT: 67 IN | WEIGHT: 172 LBS | OXYGEN SATURATION: 94 % | BODY MASS INDEX: 27 KG/M2 | HEART RATE: 65 BPM | SYSTOLIC BLOOD PRESSURE: 155 MMHG | RESPIRATION RATE: 13 BRPM

## 2017-05-10 DIAGNOSIS — K40.91 RECURRENT RIGHT INGUINAL HERNIA: Primary | ICD-10-CM

## 2017-05-10 DIAGNOSIS — I48.19 PERSISTENT ATRIAL FIBRILLATION (H): ICD-10-CM

## 2017-05-10 PROCEDURE — C1781 MESH (IMPLANTABLE): HCPCS | Performed by: SURGERY

## 2017-05-10 PROCEDURE — 25000566 ZZH SEVOFLURANE, EA 15 MIN: Performed by: SURGERY

## 2017-05-10 PROCEDURE — 37000008 ZZH ANESTHESIA TECHNICAL FEE, 1ST 30 MIN: Performed by: SURGERY

## 2017-05-10 PROCEDURE — 71000027 ZZH RECOVERY PHASE 2 EACH 15 MINS: Performed by: SURGERY

## 2017-05-10 PROCEDURE — 71000012 ZZH RECOVERY PHASE 1 LEVEL 1 FIRST HR: Performed by: SURGERY

## 2017-05-10 PROCEDURE — 25000125 ZZHC RX 250: Performed by: NURSE ANESTHETIST, CERTIFIED REGISTERED

## 2017-05-10 PROCEDURE — 40000306 ZZH STATISTIC PRE PROC ASSESS II: Performed by: SURGERY

## 2017-05-10 PROCEDURE — S0020 INJECTION, BUPIVICAINE HYDRO: HCPCS | Performed by: SURGERY

## 2017-05-10 PROCEDURE — 27210794 ZZH OR GENERAL SUPPLY STERILE: Performed by: SURGERY

## 2017-05-10 PROCEDURE — 49651 LAP ING HERNIA REPAIR RECUR: CPT | Mod: AS | Performed by: PHYSICIAN ASSISTANT

## 2017-05-10 PROCEDURE — 25000128 H RX IP 250 OP 636: Performed by: NURSE ANESTHETIST, CERTIFIED REGISTERED

## 2017-05-10 PROCEDURE — 49651 LAP ING HERNIA REPAIR RECUR: CPT | Performed by: SURGERY

## 2017-05-10 PROCEDURE — 71000013 ZZH RECOVERY PHASE 1 LEVEL 1 EA ADDTL HR: Performed by: SURGERY

## 2017-05-10 PROCEDURE — 36000056 ZZH SURGERY LEVEL 3 1ST 30 MIN: Performed by: SURGERY

## 2017-05-10 PROCEDURE — C1727 CATH, BAL TIS DIS, NON-VAS: HCPCS | Performed by: SURGERY

## 2017-05-10 PROCEDURE — 25000128 H RX IP 250 OP 636: Performed by: SURGERY

## 2017-05-10 PROCEDURE — 37000009 ZZH ANESTHESIA TECHNICAL FEE, EACH ADDTL 15 MIN: Performed by: SURGERY

## 2017-05-10 PROCEDURE — 25000125 ZZHC RX 250: Performed by: SURGERY

## 2017-05-10 PROCEDURE — 36000058 ZZH SURGERY LEVEL 3 EA 15 ADDTL MIN: Performed by: SURGERY

## 2017-05-10 PROCEDURE — 25800025 ZZH RX 258: Performed by: ANESTHESIOLOGY

## 2017-05-10 DEVICE — MESH PROGRIP LAPAROSCOPIC 5.9X3.9" PARIETEX SELF-FIX LPG1510: Type: IMPLANTABLE DEVICE | Site: ABDOMEN | Status: FUNCTIONAL

## 2017-05-10 RX ORDER — MEPERIDINE HYDROCHLORIDE 25 MG/ML
12.5 INJECTION INTRAMUSCULAR; INTRAVENOUS; SUBCUTANEOUS EVERY 5 MIN PRN
Status: DISCONTINUED | OUTPATIENT
Start: 2017-05-10 | End: 2017-05-10 | Stop reason: HOSPADM

## 2017-05-10 RX ORDER — ALBUTEROL SULFATE 0.83 MG/ML
2.5 SOLUTION RESPIRATORY (INHALATION) EVERY 4 HOURS PRN
Status: DISCONTINUED | OUTPATIENT
Start: 2017-05-10 | End: 2017-05-10 | Stop reason: HOSPADM

## 2017-05-10 RX ORDER — KETOROLAC TROMETHAMINE 30 MG/ML
INJECTION, SOLUTION INTRAMUSCULAR; INTRAVENOUS PRN
Status: DISCONTINUED | OUTPATIENT
Start: 2017-05-10 | End: 2017-05-10

## 2017-05-10 RX ORDER — LABETALOL HYDROCHLORIDE 5 MG/ML
10 INJECTION, SOLUTION INTRAVENOUS
Status: DISCONTINUED | OUTPATIENT
Start: 2017-05-10 | End: 2017-05-10 | Stop reason: HOSPADM

## 2017-05-10 RX ORDER — ACETAMINOPHEN 10 MG/ML
1000 INJECTION, SOLUTION INTRAVENOUS ONCE
Status: DISCONTINUED | OUTPATIENT
Start: 2017-05-10 | End: 2017-05-10 | Stop reason: CLARIF

## 2017-05-10 RX ORDER — ONDANSETRON 4 MG/1
4 TABLET, ORALLY DISINTEGRATING ORAL EVERY 30 MIN PRN
Status: DISCONTINUED | OUTPATIENT
Start: 2017-05-10 | End: 2017-05-10 | Stop reason: HOSPADM

## 2017-05-10 RX ORDER — CEFAZOLIN SODIUM 1 G/3ML
1 INJECTION, POWDER, FOR SOLUTION INTRAMUSCULAR; INTRAVENOUS SEE ADMIN INSTRUCTIONS
Status: DISCONTINUED | OUTPATIENT
Start: 2017-05-10 | End: 2017-05-10 | Stop reason: HOSPADM

## 2017-05-10 RX ORDER — DROPERIDOL 2.5 MG/ML
0.62 INJECTION, SOLUTION INTRAMUSCULAR; INTRAVENOUS
Status: DISCONTINUED | OUTPATIENT
Start: 2017-05-10 | End: 2017-05-10 | Stop reason: HOSPADM

## 2017-05-10 RX ORDER — FENTANYL CITRATE 50 UG/ML
INJECTION, SOLUTION INTRAMUSCULAR; INTRAVENOUS PRN
Status: DISCONTINUED | OUTPATIENT
Start: 2017-05-10 | End: 2017-05-10

## 2017-05-10 RX ORDER — ONDANSETRON 2 MG/ML
INJECTION INTRAMUSCULAR; INTRAVENOUS PRN
Status: DISCONTINUED | OUTPATIENT
Start: 2017-05-10 | End: 2017-05-10

## 2017-05-10 RX ORDER — DEXAMETHASONE SODIUM PHOSPHATE 4 MG/ML
INJECTION, SOLUTION INTRA-ARTICULAR; INTRALESIONAL; INTRAMUSCULAR; INTRAVENOUS; SOFT TISSUE PRN
Status: DISCONTINUED | OUTPATIENT
Start: 2017-05-10 | End: 2017-05-10

## 2017-05-10 RX ORDER — DIAZEPAM 10 MG/2ML
2.5 INJECTION, SOLUTION INTRAMUSCULAR; INTRAVENOUS
Status: DISCONTINUED | OUTPATIENT
Start: 2017-05-10 | End: 2017-05-10 | Stop reason: HOSPADM

## 2017-05-10 RX ORDER — PROPOFOL 10 MG/ML
INJECTION, EMULSION INTRAVENOUS PRN
Status: DISCONTINUED | OUTPATIENT
Start: 2017-05-10 | End: 2017-05-10

## 2017-05-10 RX ORDER — DIMENHYDRINATE 50 MG/ML
25 INJECTION, SOLUTION INTRAMUSCULAR; INTRAVENOUS
Status: DISCONTINUED | OUTPATIENT
Start: 2017-05-10 | End: 2017-05-10 | Stop reason: HOSPADM

## 2017-05-10 RX ORDER — NEOSTIGMINE METHYLSULFATE 1 MG/ML
VIAL (ML) INJECTION PRN
Status: DISCONTINUED | OUTPATIENT
Start: 2017-05-10 | End: 2017-05-10

## 2017-05-10 RX ORDER — GLYCINE 1.5 G/100ML
SOLUTION IRRIGATION PRN
Status: DISCONTINUED | OUTPATIENT
Start: 2017-05-10 | End: 2017-05-10 | Stop reason: HOSPADM

## 2017-05-10 RX ORDER — SODIUM CHLORIDE, SODIUM LACTATE, POTASSIUM CHLORIDE, CALCIUM CHLORIDE 600; 310; 30; 20 MG/100ML; MG/100ML; MG/100ML; MG/100ML
INJECTION, SOLUTION INTRAVENOUS CONTINUOUS
Status: DISCONTINUED | OUTPATIENT
Start: 2017-05-10 | End: 2017-05-10 | Stop reason: HOSPADM

## 2017-05-10 RX ORDER — HYDROCODONE BITARTRATE AND ACETAMINOPHEN 5; 325 MG/1; MG/1
1-2 TABLET ORAL
Status: DISCONTINUED | OUTPATIENT
Start: 2017-05-10 | End: 2017-05-10 | Stop reason: HOSPADM

## 2017-05-10 RX ORDER — ONDANSETRON 2 MG/ML
4 INJECTION INTRAMUSCULAR; INTRAVENOUS EVERY 30 MIN PRN
Status: DISCONTINUED | OUTPATIENT
Start: 2017-05-10 | End: 2017-05-10 | Stop reason: HOSPADM

## 2017-05-10 RX ORDER — BUPIVACAINE HYDROCHLORIDE 5 MG/ML
INJECTION, SOLUTION EPIDURAL; INTRACAUDAL PRN
Status: DISCONTINUED | OUTPATIENT
Start: 2017-05-10 | End: 2017-05-10 | Stop reason: HOSPADM

## 2017-05-10 RX ORDER — FENTANYL CITRATE 50 UG/ML
25-50 INJECTION, SOLUTION INTRAMUSCULAR; INTRAVENOUS
Status: DISCONTINUED | OUTPATIENT
Start: 2017-05-10 | End: 2017-05-10 | Stop reason: HOSPADM

## 2017-05-10 RX ORDER — GLYCOPYRROLATE 0.2 MG/ML
INJECTION, SOLUTION INTRAMUSCULAR; INTRAVENOUS PRN
Status: DISCONTINUED | OUTPATIENT
Start: 2017-05-10 | End: 2017-05-10

## 2017-05-10 RX ORDER — LIDOCAINE HYDROCHLORIDE 10 MG/ML
INJECTION, SOLUTION INFILTRATION; PERINEURAL PRN
Status: DISCONTINUED | OUTPATIENT
Start: 2017-05-10 | End: 2017-05-10

## 2017-05-10 RX ORDER — CEFAZOLIN SODIUM 2 G/100ML
2 INJECTION, SOLUTION INTRAVENOUS
Status: COMPLETED | OUTPATIENT
Start: 2017-05-10 | End: 2017-05-10

## 2017-05-10 RX ORDER — HYDROCODONE BITARTRATE AND ACETAMINOPHEN 5; 325 MG/1; MG/1
1-2 TABLET ORAL EVERY 4 HOURS PRN
Qty: 20 TABLET | Refills: 0 | Status: SHIPPED | OUTPATIENT
Start: 2017-05-10 | End: 2017-09-18

## 2017-05-10 RX ADMIN — GLYCINE 250 ML: 1.5 SOLUTION IRRIGATION at 10:55

## 2017-05-10 RX ADMIN — PROPOFOL 120 MG: 10 INJECTION, EMULSION INTRAVENOUS at 10:00

## 2017-05-10 RX ADMIN — KETOROLAC TROMETHAMINE 15 MG: 30 INJECTION, SOLUTION INTRAMUSCULAR at 10:00

## 2017-05-10 RX ADMIN — DEXAMETHASONE SODIUM PHOSPHATE 8 MG: 4 INJECTION, SOLUTION INTRA-ARTICULAR; INTRALESIONAL; INTRAMUSCULAR; INTRAVENOUS; SOFT TISSUE at 10:00

## 2017-05-10 RX ADMIN — GLYCOPYRROLATE 0.2 MG: 0.2 INJECTION, SOLUTION INTRAMUSCULAR; INTRAVENOUS at 10:00

## 2017-05-10 RX ADMIN — SODIUM CHLORIDE, POTASSIUM CHLORIDE, SODIUM LACTATE AND CALCIUM CHLORIDE: 600; 310; 30; 20 INJECTION, SOLUTION INTRAVENOUS at 10:54

## 2017-05-10 RX ADMIN — GLYCOPYRROLATE 0.4 MG: 0.2 INJECTION, SOLUTION INTRAMUSCULAR; INTRAVENOUS at 11:15

## 2017-05-10 RX ADMIN — LIDOCAINE HYDROCHLORIDE 30 MG: 10 INJECTION, SOLUTION INFILTRATION; PERINEURAL at 10:00

## 2017-05-10 RX ADMIN — ROCURONIUM BROMIDE 40 MG: 10 INJECTION INTRAVENOUS at 10:00

## 2017-05-10 RX ADMIN — CEFAZOLIN SODIUM 2 G: 2 INJECTION, SOLUTION INTRAVENOUS at 09:52

## 2017-05-10 RX ADMIN — ONDANSETRON 4 MG: 2 INJECTION INTRAMUSCULAR; INTRAVENOUS at 10:00

## 2017-05-10 RX ADMIN — FENTANYL CITRATE 50 MCG: 50 INJECTION, SOLUTION INTRAMUSCULAR; INTRAVENOUS at 10:38

## 2017-05-10 RX ADMIN — FENTANYL CITRATE 100 MCG: 50 INJECTION, SOLUTION INTRAMUSCULAR; INTRAVENOUS at 10:00

## 2017-05-10 RX ADMIN — BUPIVACAINE HYDROCHLORIDE 30 ML: 5 INJECTION, SOLUTION EPIDURAL; INTRACAUDAL at 11:13

## 2017-05-10 RX ADMIN — ROCURONIUM BROMIDE 10 MG: 10 INJECTION INTRAVENOUS at 11:05

## 2017-05-10 RX ADMIN — SODIUM CHLORIDE, POTASSIUM CHLORIDE, SODIUM LACTATE AND CALCIUM CHLORIDE: 600; 310; 30; 20 INJECTION, SOLUTION INTRAVENOUS at 09:52

## 2017-05-10 RX ADMIN — Medication 4 MG: at 11:15

## 2017-05-10 ASSESSMENT — LIFESTYLE VARIABLES: TOBACCO_USE: 1

## 2017-05-10 ASSESSMENT — ENCOUNTER SYMPTOMS: DYSRHYTHMIAS: 1

## 2017-05-10 NOTE — IP AVS SNAPSHOT
MRN:[id number]                      After Visit Summary   5/10/2017    Milo Serna    MRN: [id number]           Thank you!     Thank you for choosing [facility] for your care. Our goal is always to provide you with excellent care. Hearing back from our patients is one way we can continue to improve our services. Please take a few minutes to complete the written survey that you may receive in the mail after you visit. If you would like to speak to someone directly about your visit please contact Patient Relations at [phone]. Thank you!          Patient Information     Date Of Birth          1940        About your hospital stay     You were admitted on:  May 10, 2017 You last received care in the:  [facility] PreOP/PostOP    You were discharged on:  May 10, 2017       Who to Call     For medical emergencies, please call 911.  For non-urgent questions about your medical care, please call your primary care provider or clinic, [phone]  For questions related to your surgery, please call your surgery clinic        Attending Provider     Provider Specialty    Enrico Bridges MD General Surgery       Primary Care Provider Office Phone # Fax #    Oscar Alves [phone]       [facility] [id number] [place] [id number]        Your next 10 appointments already scheduled     May 22, 2017  1:00 PM CDT   New Visit with Grover Biggs MD   [facility] ([facility])    [address] [place]  [facility] [address] [phone]            Dec 19, 2017  1:00 PM CST   Return Visit with Krystian Owens MD   [facility] ([facility])    [address]  [address]  [facility] [id number] [phone]              Further instructions from your care team       HOME  CARE FOLLOWING INGUINAL/FEMORAL HERNIA REPAIR  LETTY Mendoza E. Gavin, N. Guttormson, D. Maurer, ABIGAIL Prescott    DIET:  No restrictions.  Increased fluid intake is recommended. While taking pain medications, increase dietary fiber or add a fiber supplementation like Metamucil or Citrucel to help prevent constipation - a possible side effect of pain medications.    NAUSEA:  If nauseated from the anesthetic/pain meds; rest in bed, get up cautiously with assistance, and drink clear liquids (juice, tea, broth).    ACTIVITY:  Light Activity -- you may immediately be up and about as tolerated.  Driving -- you may drive when comfortable and off narcotic pain medications.  Light Work -- resume when comfortable off pain medications.  (If you can drive, you probably can work.)  Strenuous Work/Activity -- limit lifting to 20 pounds for 3 weeks.  Active Sports (running, biking, etc.) -- cautiously resume after 4 weeks.    INCISIONAL CARE:    If you have a dressing in place, keep clean and dry for 48 hours; you may replace the gauze if it becomes soiled.    After 48 hours you may remove the dressing and shower.  Do not submerse incision in water for 1 week.    If you have a Dermabond dressing (a type of skin glue), you may shower immediately.    Sutures will absorb and need not be removed.    If present, leave the steri-strips (white paper tapes) in place for 14 days after surgery.    If present, leave Dermabond glue in place until it wears/flakes off.    Expect a variable amount of swelling/black and blue discoloration that may involve the penis/scrotum or labia.    Some numbness around the incision is common.    A lump/ridge under the incision is normal and will gradually resolve.    DISCOMFORT:  Local anesthetic placed at surgery should provide relief for 4-8 hours.  Begin taking pain pills before discomfort is severe.  Take the pain medication with some food, when possible, to minimize side  effects.  Intermittent use of ice packs to the hernia repair site may help during the first 1-3 weeks after surgery.  Expect gradual improvement.    Over-the-counter anti-inflammatory medications (i.e. Ibuprofen/Advil/Motrin or Naprosyn/Aleve) may be used per package instructions in addition to or while tapering off the narcotic pain medications to decrease swelling and sensitivity at the repair site.  DO NOT TAKE these Anti-inflammatory medications if your primary physician has advised against doing so, or if you have acid reflux, ulcer, or bleeding disorder, or take blood-thinner medications.  Call your primary physician or the surgery office if you have medication questions.    RETURN APPOINTMENT:  Schedule a follow-up visit 2-3 weeks post-op.  Office Phone:  [phone]     CONTACT US IF THE FOLLOWING DEVELOPS:   1. A fever that is above 101     2. If there is a large amount of drainage, bleeding, or swelling.   3. Severe pain that is not relieved by your prescription.   4. Drainage that is thick, cloudy, yellow, green or white.   5. Any other questions not answered by  Frequently Asked Questions  sheet.      FREQUENTLY ASKED QUESTIONS:    Q:  How should my incision look?    A:  Normally your incision will appear slightly swollen with light redness directly along the incision itself as it heals.  It may feel like a bump or ridge as the healing/scarring happens, and over time (3-4 months) this bump or ridge feeling should slowly go away.  In general, clear or pink watery drainage can be normal at first as your incision heals, but should decrease over time.    Q:  How do I know if my incision is infected?  A:  Look at your incision for signs of infection, like redness around the incision spreading to surrounding skin, or drainage of cloudy or foul-smelling drainage.  If you feel warm, check your temperature to see if you are running a fever.    **If any of these things occur, please notify the nurse at our  office.  We may need you to come into the office for an incision check.      Q:  How do I take care of my incision?  A:  If you have a dressing in place - Starting the day after surgery, replace the dressing 1-2 times a day until there is no further drainage from the incision.  At that time, a dressing is no longer needed.  Try to minimize tape on the skin if irritation is occurring at the tape sites.  If you have significant irritation from tape on the skin, please call the office to discuss other method of dressing your incision.    Small pieces of tape called  steri-strips  may be present directly overlying your incision; these may be removed 10 days after surgery unless otherwise specified by your surgeon.  If these tapes start to loosen at the ends, you may trim them back until they fall off or are removed.    A:  If you had  Dermabond  tissue glue used as a dressing (this causes your incision to look shiny with a clear covering over it) - This type of dressing wears off with time and does not require more dressings over the top unless it is draining around the glue as it wears off.  Do not apply ointments or lotions over the incisions until the glue has completely worn off.    Q:  There is a piece of tape or a sticky  lead  still on my skin.  Can I remove this?  A:  Sometimes the sticky  leads  used for monitoring during surgery or for evaluation in the emergency department are not all removed while you are in the hospital.  These sometimes have a tab or metal dot on them.  You can easily remove these on your own, like taking off a band-aid.  If there is a gel substance under the  lead , simply wipe/clean it off with a washcloth or paper towel.      Q:  What can I do to minimize constipation (very hard stools, or lack of stools)?  A:  Stay well hydrated.  Increase your dietary fiber intake or take a fiber supplement -with plenty of water.  Walk around frequently.  You may consider an over-the-counter  stool-softener.  Your Pharmacist can assist you with choosing one that is stocked at your pharmacy.  Constipation is also one of the most common side effects of pain medication.  If you are using pain medication, be pro-active and try to PREVENT problems with constipation by taking the steps above BEFORE constipation becomes a problem.    Q:  What do I do if I need more pain medications?  A:  Call the office to receive refills.  Be aware that certain pain meds cannot be called into a pharmacy and actually require a paper prescription.  A change may be made in your pain med as you progress thru your recovery period or if you have side effects to certain meds.    --Pain meds are NOT refilled after 5pm on weekdays, and NOT AT ALL on the weekends, so please look ahead to prevent problems.      Q:  Why am I having a hard time sleeping now that I am at home?  A:  Many medications you receive while you are in the hospital can impact your sleep for a number of days after your surgery/hospitalization.  Decreased level of activity and naps during the day may also make sleeping at night difficult.  Try to minimize day-time naps, and get up frequently during the day to walk around your home during your recovery time.  Sleep aides may be of some help, but are not recommended for long-term use.      Q:  I am having some back discomfort.  What should I do?  A:  This may be related to certain positioning that was required for your surgery, extended periods of time in bed, or other changes in your overall activity level.  You may try ice, heat, acetaminophen, or ibuprofen to treat this temporarily.  Note that many pain medications have acetaminophen in them and would state this on the prescription bottle.  Be sure not to exceed the maximum of 4000mg per day of acetaminophen.     **If the pain you are having does not resolve, is severe, or is a flare of back pain you have had on other occasions prior to surgery, please contact your  primary physician for further recommendations or for an appointment to be examined at their office.    Q:  Why am I having headaches?  A:  Headaches can be caused by many things:  caffeine withdrawal, use of pain meds, dehydration, high blood pressure, lack of sleep, over-activity/exhaustion, flare-up of usual migraine headaches.  If you feel this is related to muscle tension (a band-like feeling around the head, or a pressure at the low-back of the head) you may try ice or heat to this area.  You may need to drink more fluids (try electrolyte drink like Gatorade), rest, or take your usual migraine medications.   **If your headaches do not resolve, worsen, are accompanied by other symptoms, or if your blood pressure is high, please call your primary physician for recommendation and/or examination.    Q:  I am unable to urinate.  What do I do?  A:  A small percentage of people can have difficulty urinating initially after surgery.  This includes being able to urinate only a very small amount at a time and feeling discomfort or pressure in the very low abdomen.  This is called  urinary retention , and is actually an urgent situation.  Proceed to your nearest Emergency department for evaluation (not an Urgent Care Center).  Sometimes the bladder does not work correctly after certain medications you receive during surgery, or related to certain procedures.  You may need to have a catheter placed until your bladder recovers.  When planning to go to an Emergency department, it may help to call the ER to let them know you are coming in for this problem after a surgery.  This may help you get in quicker to be evaluated.  **If you have symptoms of a urinary tract infection, please contact your primary physician for the proper evaluation and treatment.          If you have other questions, please call the office Monday thru Friday between 8am and 5pm to discuss with the nurse or physician assistant.  #[phone]    There  is a surgeon ON CALL on weekday evenings and over the weekend in case of urgent need only, and may be contacted at the same number.    If you are having an emergency, call 911 or proceed to your nearest emergency department.      GENERAL ANESTHESIA OR SEDATION ADULT DISCHARGE INSTRUCTIONS   SPECIAL PRECAUTIONS FOR 24 HOURS AFTER SURGERY    IT IS NOT UNUSUAL TO FEEL LIGHT-HEADED OR FAINT, UP TO 24 HOURS AFTER SURGERY OR WHILE TAKING PAIN MEDICATION.  IF YOU HAVE THESE SYMPTOMS; SIT FOR A FEW MINUTES BEFORE STANDING AND HAVE SOMEONE ASSIST YOU WHEN YOU GET UP TO WALK OR USE THE BATHROOM.    YOU SHOULD REST AND RELAX FOR THE NEXT 24 HOURS AND YOU MUST MAKE ARRANGEMENTS TO HAVE SOMEONE STAY WITH YOU FOR AT LEAST 24 HOURS AFTER YOUR DISCHARGE.  AVOID HAZARDOUS AND STRENUOUS ACTIVITIES.  DO NOT MAKE IMPORTANT DECISIONS FOR 24 HOURS.    DO NOT DRIVE ANY VEHICLE OR OPERATE MECHANICAL EQUIPMENT FOR 24 HOURS FOLLOWING THE END OF YOUR SURGERY.  EVEN THOUGH YOU MAY FEEL NORMAL, YOUR REACTIONS MAY BE AFFECTED BY THE MEDICATION YOU HAVE RECEIVED.    DO NOT DRINK ALCOHOLIC BEVERAGES FOR 24 HOURS FOLLOWING YOUR SURGERY.    DRINK CLEAR LIQUIDS (APPLE JUICE, GINGER ALE, 7-UP, BROTH, ETC.).  PROGRESS TO YOUR REGULAR DIET AS YOU FEEL ABLE.    YOU MAY HAVE A DRY MOUTH, A SORE THROAT, MUSCLES ACHES OR TROUBLE SLEEPING.  THESE SHOULD GO AWAY AFTER 24 HOURS.    CALL YOUR DOCTOR FOR ANY OF THE FOLLOWING:  SIGNS OF INFECTION (FEVER, GROWING TENDERNESS AT THE SURGERY SITE, A LARGE AMOUNT OF DRAINAGE OR BLEEDING, SEVERE PAIN, FOUL-SMELLING DRAINAGE, REDNESS OR SWELLING.    IT HAS BEEN OVER 8 TO 10 HOURS SINCE SURGERY AND YOU ARE STILL NOT ABLE TO URINATE (PASS WATER).         You received Toradol, an IV form of ibuprofen (Motrin) at 1000.  Do not take any ibuprofen products until 4:00 pm.          Pending Results     No orders found from 5/8/2017 to 5/11/2017.            Admission Information     Date & Time Provider Department Dept. Phone     "5/10/2017 Enrico Bridges MD [facility] PreOP/PostOP [phone]      Your Vitals Were     Blood Pressure Pulse Temperature Respirations Height Weight    162/95 65 97.5  F (36.4  C) (Temporal) 14 1.702 m (5' 7\") 78 kg (172 lb)    Pulse Oximetry BMI (Body Mass Index)                95% 26.94 kg/m2          [employer] Information     [employer] gives you secure access to your electronic health record. If you see a primary care provider, you can also send messages to your care team and make appointments. If you have questions, please call your primary care clinic.  If you do not have a primary care provider, please call [phone] and they will assist you.        Care EveryWhere ID     This is your Care EveryWhere ID. This could be used by other organizations to access your [place] medical records  [id number]           Review of your medicines      START taking        Dose / Directions    HYDROcodone-acetaminophen 5-325 MG per tablet   Commonly known as:  NORCO   Used for:  Recurrent right inguinal hernia        Dose:  1-2 tablet   Take 1-2 tablets by mouth every 4 hours as needed for other (Moderate to Severe Pain)   Quantity:  20 tablet   Refills:  0         CONTINUE these medicines which have NOT CHANGED        Dose / Directions    acetaminophen 650 MG CR tablet   Commonly known as:  TYLENOL   Used for:  Osteoarthritis        Dose:  650 mg   Take 1 tablet (650 mg) by mouth every 6 hours as needed for pain   Quantity:  100 tablet   Refills:  11       apixaban ANTICOAGULANT 2.5 MG tablet   Commonly known as:  ELIQUIS   Used for:  Persistent atrial fibrillation (H)        Dose:  2.5 mg   Start taking on:  5/12/2017   Take 1 tablet (2.5 mg) by mouth 2 times daily   Quantity:  180 tablet   Refills:  3       aspirin 81 MG tablet        Dose:  81 mg   Take 81 mg by mouth daily   Refills:  0       co-enzyme Q-10 100 MG Caps capsule   Used for:  Mixed hyperlipidemia        Dose:  100 mg   Take 1 capsule (100 mg) " by mouth daily   Quantity:  180 capsule   Refills:  3       diclofenac 1 % Gel topical gel   Commonly known as:  VOLTAREN   Used for:  Acute left-sided low back pain without sciatica        Apply 4 gramsto area qid prn   Quantity:  100 g   Refills:  11       doxycycline 100 MG capsule   Commonly known as:  VIBRAMYCIN   Used for:  Rosacea        Dose:  100 mg   Take 1 capsule (100 mg) by mouth daily   Quantity:  90 capsule   Refills:  3       fish oil-omega-3 fatty acids 1000 MG capsule   Commonly known as:  EQL FISH OIL   Used for:  Hyperlipidemia LDL goal <100        Dose:  1 g   Take 1 capsule by mouth daily.   Quantity:  180 capsule   Refills:  12       Glucosamine-Chondroitin 250-200 MG Tabs   Commonly known as:  OSTEO BI-FLEX REGULAR STRENGTH   Used for:  Osteoarthritis        Dose:  1 tablet   Take 1 tablet by mouth 2 times daily   Quantity:  100 tablet   Refills:  8       lisinopril 20 MG tablet   Commonly known as:  PRINIVIL/ZESTRIL   Used for:  HTN, goal below 150/90        Dose:  20 mg   Take 1 tablet (20 mg) by mouth daily   Quantity:  90 tablet   Refills:  1       MULTIVITAL Chew   Used for:  Neuropathy of foot        Dose:  2 chew tab   Take 2 chew tab by mouth 2 times daily   Quantity:  360 tablet   Refills:  3       omeprazole 40 MG capsule   Commonly known as:  priLOSEC   Used for:  PUD (peptic ulcer disease)        Dose:  40 mg   Take 1 capsule (40 mg) by mouth daily   Quantity:  90 capsule   Refills:  3       SAW PALMETTO COMPLEX Caps   Used for:  Essential hypertension, benign        Dose:  1 capsule   Take 1 capsule by mouth daily.   Quantity:  200 capsule   Refills:  PRN       senna 8.6 MG tablet   Commonly known as:  SENOKOT   Used for:  Prostate cancer (H)        Dose:  1 tablet   Take 1 tablet by mouth daily   Quantity:  60 tablet   Refills:  0       simvastatin 20 MG tablet   Commonly known as:  ZOCOR   Used for:  Hyperlipidemia LDL goal <100        Dose:  20 mg   Take 1 tablet (20 mg) by  mouth At Bedtime   Quantity:  90 tablet   Refills:  3       sodium chloride 0.65 % nasal spray   Commonly known as:  OCEAN NASAL SPRAY   Used for:  Bloody nose        Dose:  1 spray   Spray 1 spray into both nostrils daily as needed for congestion   Quantity:  2 Bottle   Refills:  6       tamsulosin 0.4 MG capsule   Commonly known as:  FLOMAX   Used for:  Benign prostatic hyperplasia with lower urinary tract symptoms, unspecified morphology        Dose:  0.4 mg   Take 1 capsule (0.4 mg) by mouth daily   Quantity:  90 capsule   Refills:  3            Where to get your medicines      These medications were sent to [place], [place] - [address] [facility]  [address] [facility] [address]     Phone:  [phone]     apixaban ANTICOAGULANT 2.5 MG tablet         Some of these will need a paper prescription and others can be bought over the counter. Ask your nurse if you have questions.     Bring a paper prescription for each of these medications     HYDROcodone-acetaminophen 5-325 MG per tablet                Protect others around you: Learn how to safely use, store and throw away your medicines at www.disposemymeds.org.             Medication List: This is a list of all your medications and when to take them. Check marks below indicate your daily home schedule. Keep this list as a reference.      Medications           Morning Afternoon Evening Bedtime As Needed    acetaminophen 650 MG CR tablet   Commonly known as:  TYLENOL   Take 1 tablet (650 mg) by mouth every 6 hours as needed for pain                                apixaban ANTICOAGULANT 2.5 MG tablet   Commonly known as:  ELIQUIS   Take 1 tablet (2.5 mg) by mouth 2 times daily   Start taking on:  5/12/2017                                aspirin 81 MG tablet   Take 81 mg by mouth daily                                co-enzyme Q-10 100 MG Caps capsule   Take 1 capsule (100 mg) by mouth daily                                 diclofenac 1 % Gel topical gel   Commonly known as:  VOLTAREN   Apply 4 gramsto area qid prn                                doxycycline 100 MG capsule   Commonly known as:  VIBRAMYCIN   Take 1 capsule (100 mg) by mouth daily                                fish oil-omega-3 fatty acids 1000 MG capsule   Commonly known as:  EQL FISH OIL   Take 1 capsule by mouth daily.                                Glucosamine-Chondroitin 250-200 MG Tabs   Commonly known as:  OSTEO BI-FLEX REGULAR STRENGTH   Take 1 tablet by mouth 2 times daily                                HYDROcodone-acetaminophen 5-325 MG per tablet   Commonly known as:  NORCO   Take 1-2 tablets by mouth every 4 hours as needed for other (Moderate to Severe Pain)                                lisinopril 20 MG tablet   Commonly known as:  PRINIVIL/ZESTRIL   Take 1 tablet (20 mg) by mouth daily                                MULTIVITAL Chew   Take 2 chew tab by mouth 2 times daily                                omeprazole 40 MG capsule   Commonly known as:  priLOSEC   Take 1 capsule (40 mg) by mouth daily                                SAW PALMETTO COMPLEX Caps   Take 1 capsule by mouth daily.                                senna 8.6 MG tablet   Commonly known as:  SENOKOT   Take 1 tablet by mouth daily                                simvastatin 20 MG tablet   Commonly known as:  ZOCOR   Take 1 tablet (20 mg) by mouth At Bedtime                                sodium chloride 0.65 % nasal spray   Commonly known as:  OCEAN NASAL SPRAY   [place] 1 spray into both nostrils daily as needed for congestion                                tamsulosin 0.4 MG capsule   Commonly known as:  FLOMAX   Take 1 capsule (0.4 mg) by mouth daily

## 2017-05-10 NOTE — BRIEF OP NOTE
[facility] Brief Operative Note    Pre-operative diagnosis: hernia   Post-operative diagnosis Recurrent right inguinal hernia   Procedure: Procedure(s):  Laparoscopic right inguinal hernia repair with mesh - Wound Class: I-Clean   Surgeon(s): Surgeon(s) and Role:     * Enrico Bridges MD - Primary     * Haider Alva PA-C - Assisting   Estimated blood loss: 10 mL    Specimens: * No specimens in log *   Findings: Indirect defect containing sac, small direct defect containing fat only.  No obvious left groin hernia but thorough exploration aborted due to bleeding.

## 2017-05-10 NOTE — ANESTHESIA CARE TRANSFER NOTE
Patient: Milo Serna    Procedure(s):  Laparoscopic right inguinal hernia repair with mesh - Wound Class: I-Clean    Diagnosis: hernia  Diagnosis Additional Information: No value filed.    Anesthesia Type:   General, ETT     Note:  Airway :Face Mask  Patient transferred to:PACU  Comments: To PACU, adequate gas exchange, report to RN.      Vitals: (Last set prior to Anesthesia Care Transfer)    CRNA VITALS  5/10/2017 1053 - 5/10/2017 1129      5/10/2017             Pulse: 90    SpO2: 100 %    Resp Rate (observed): 15                Electronically Signed By: DEMETRI Zarate CRNA  May 10, 2017  11:29 AM

## 2017-05-10 NOTE — ANESTHESIA POSTPROCEDURE EVALUATION
Patient: Milo Serna    Procedure(s):  Laparoscopic right inguinal hernia repair with mesh - Wound Class: I-Clean    Diagnosis:hernia  Diagnosis Additional Information: Recurrent RIH  Dr. Dalton    Anesthesia Type:  General, ETT    Note:  Anesthesia Post Evaluation    Patient location during evaluation: PACU  Patient participation: Able to fully participate in evaluation  Level of consciousness: awake  Pain management: adequate  Airway patency: patent  Cardiovascular status: acceptable  Respiratory status: acceptable  Hydration status: acceptable  PONV: none             Last vitals:  Vitals:    05/10/17 1125 05/10/17 1130 05/10/17 1135   BP:  170/84 171/86   Pulse:      Resp: 10 10 13   Temp: 98.3  F (36.8  C)     SpO2:  100% 100%         Electronically Signed By: Damian Thurman MD  May 10, 2017  11:53 AM

## 2017-05-10 NOTE — IP AVS SNAPSHOT
[facility] PreOP/PostOP    [address]    [facility] [address]    Phone:  [phone]    Fax:  [phone]                                       After Visit Summary   5/10/2017    Milo Serna    MRN: [id number]           After Visit Summary Signature Page     I have received my discharge instructions, and my questions have been answered. I have discussed any challenges I see with this plan with the nurse or doctor.    ..........................................................................................................................................  Patient/Patient Representative Signature      ..........................................................................................................................................  Patient Representative Print Name and Relationship to Patient    ..................................................               ................................................  Date                                            Time    ..........................................................................................................................................  Reviewed by Signature/Title    ...................................................              ..............................................  Date                                                            Time

## 2017-05-10 NOTE — ANESTHESIA PREPROCEDURE EVALUATION
Anesthesia Evaluation     . Pt has had prior anesthetic. Type: General    No history of anesthetic complications          ROS/MED HX    ENT/Pulmonary:     (+)tobacco use, Past use , . .    Neurologic:  - neg neurologic ROS     Cardiovascular:     (+) Dyslipidemia, hypertension--CAD, -CABG-date: 1996, . Taking blood thinners Pt has received instructions: Instructions Given to patient: held. . . :. dysrhythmias a-fib, Irregular Heartbeat/Palpitations, .       METS/Exercise Tolerance:     Hematologic:     (+) Other Hematologic Disorder-chronic thrombocytopenia      Musculoskeletal:  - neg musculoskeletal ROS       GI/Hepatic:  - neg GI/hepatic ROS       Renal/Genitourinary:     (+) BPH,       Endo:  - neg endo ROS       Psychiatric:  - neg psychiatric ROS       Infectious Disease:  - neg infectious disease ROS       Malignancy:   (+) Malignancy History of Prostate  Prostate CA Remission status post Surgery,         Other:    - neg other ROS                 Physical Exam  Normal systems: cardiovascular, pulmonary and dental    Airway   Mallampati: II  TM distance: >3 FB  Neck ROM: full    Dental     Cardiovascular   Rhythm and rate: regular and normal      Pulmonary    breath sounds clear to auscultation    Other findings: Lab Test        04/20/17     11/15/16     11/11/16     11/07/16                       0938          1124          1432          1024          WBC          4.3          4.7           --          4.8           HGB          12.7*        11.0*        10.9*        11.8*         MCV          94           99            --          99            PLT          134*         159           --          121*          INR           --          1.27*         --           --            Lab Test        04/20/17     11/15/16     11/07/16                       0938          1124          1024          NA           141          135          138           POTASSIUM    3.8          3.7          3.9           CHLORIDE      105          102          104           CO2          27           27           25            BUN          12           12           12            CR           0.87         1.04         0.90          ANIONGAP     9            6            9             ANG          9.6          9.1          9.5           GLC          98           105*         115*                EKG: atrial fibrillation, rate , no LVH by voltage criteria, nonspecific intraventricular conduction delay, nonspecific ST-T changes, unchanged from previous tracings                   Anesthesia Plan      History & Physical Review  History and physical reviewed and following examination; no interval change.    ASA Status:  3 .    NPO Status:  > 8 hours    Plan for General and ETT with Intravenous induction. Maintenance will be Balanced.    PONV prophylaxis:  Ondansetron (or other 5HT-3) and Dexamethasone or Solumedrol       Postoperative Care  Postoperative pain management:  IV analgesics and Oral pain medications.      Consents  Anesthetic plan, risks, benefits and alternatives discussed with:  Patient.  Use of blood products discussed: No .   .                          .

## 2017-05-10 NOTE — DISCHARGE INSTRUCTIONS
HOME CARE FOLLOWING INGUINAL/FEMORAL HERNIA REPAIR  LETTY Mendoza E. Gavin, N. Guttormson, D. Maurer, ABIGAIL Prescott    DIET:  No restrictions.  Increased fluid intake is recommended. While taking pain medications, increase dietary fiber or add a fiber supplementation like Metamucil or Citrucel to help prevent constipation - a possible side effect of pain medications.    NAUSEA:  If nauseated from the anesthetic/pain meds; rest in bed, get up cautiously with assistance, and drink clear liquids (juice, tea, broth).    ACTIVITY:  Light Activity -- you may immediately be up and about as tolerated.  Driving -- you may drive when comfortable and off narcotic pain medications.  Light Work -- resume when comfortable off pain medications.  (If you can drive, you probably can work.)  Strenuous Work/Activity -- limit lifting to 20 pounds for 3 weeks.  Active Sports (running, biking, etc.) -- cautiously resume after 4 weeks.    INCISIONAL CARE:    If you have a dressing in place, keep clean and dry for 48 hours; you may replace the gauze if it becomes soiled.    After 48 hours you may remove the dressing and shower.  Do not submerse incision in water for 1 week.    If you have a Dermabond dressing (a type of skin glue), you may shower immediately.    Sutures will absorb and need not be removed.    If present, leave the steri-strips (white paper tapes) in place for 14 days after surgery.    If present, leave Dermabond glue in place until it wears/flakes off.    Expect a variable amount of swelling/black and blue discoloration that may involve the penis/scrotum or labia.    Some numbness around the incision is common.    A lump/ridge under the incision is normal and will gradually resolve.    DISCOMFORT:  Local anesthetic placed at surgery should provide relief for 4-8 hours.  Begin taking pain pills before discomfort is severe.  Take the pain medication with some food, when possible, to minimize side  effects.  Intermittent use of ice packs to the hernia repair site may help during the first 1-3 weeks after surgery.  Expect gradual improvement.    Over-the-counter anti-inflammatory medications (i.e. Ibuprofen/Advil/Motrin or Naprosyn/Aleve) may be used per package instructions in addition to or while tapering off the narcotic pain medications to decrease swelling and sensitivity at the repair site.  DO NOT TAKE these Anti-inflammatory medications if your primary physician has advised against doing so, or if you have acid reflux, ulcer, or bleeding disorder, or take blood-thinner medications.  Call your primary physician or the surgery office if you have medication questions.    RETURN APPOINTMENT:  Schedule a follow-up visit 2-3 weeks post-op.  Office Phone:  [phone]     CONTACT US IF THE FOLLOWING DEVELOPS:   1. A fever that is above 101     2. If there is a large amount of drainage, bleeding, or swelling.   3. Severe pain that is not relieved by your prescription.   4. Drainage that is thick, cloudy, yellow, green or white.   5. Any other questions not answered by  Frequently Asked Questions  sheet.      FREQUENTLY ASKED QUESTIONS:    Q:  How should my incision look?    A:  Normally your incision will appear slightly swollen with light redness directly along the incision itself as it heals.  It may feel like a bump or ridge as the healing/scarring happens, and over time (3-4 months) this bump or ridge feeling should slowly go away.  In general, clear or pink watery drainage can be normal at first as your incision heals, but should decrease over time.    Q:  How do I know if my incision is infected?  A:  Look at your incision for signs of infection, like redness around the incision spreading to surrounding skin, or drainage of cloudy or foul-smelling drainage.  If you feel warm, check your temperature to see if you are running a fever.    **If any of these things occur, please notify the nurse at our  office.  We may need you to come into the office for an incision check.      Q:  How do I take care of my incision?  A:  If you have a dressing in place - Starting the day after surgery, replace the dressing 1-2 times a day until there is no further drainage from the incision.  At that time, a dressing is no longer needed.  Try to minimize tape on the skin if irritation is occurring at the tape sites.  If you have significant irritation from tape on the skin, please call the office to discuss other method of dressing your incision.    Small pieces of tape called  steri-strips  may be present directly overlying your incision; these may be removed 10 days after surgery unless otherwise specified by your surgeon.  If these tapes start to loosen at the ends, you may trim them back until they fall off or are removed.    A:  If you had  Dermabond  tissue glue used as a dressing (this causes your incision to look shiny with a clear covering over it) - This type of dressing wears off with time and does not require more dressings over the top unless it is draining around the glue as it wears off.  Do not apply ointments or lotions over the incisions until the glue has completely worn off.    Q:  There is a piece of tape or a sticky  lead  still on my skin.  Can I remove this?  A:  Sometimes the sticky  leads  used for monitoring during surgery or for evaluation in the emergency department are not all removed while you are in the hospital.  These sometimes have a tab or metal dot on them.  You can easily remove these on your own, like taking off a band-aid.  If there is a gel substance under the  lead , simply wipe/clean it off with a washcloth or paper towel.      Q:  What can I do to minimize constipation (very hard stools, or lack of stools)?  A:  Stay well hydrated.  Increase your dietary fiber intake or take a fiber supplement -with plenty of water.  Walk around frequently.  You may consider an over-the-counter  stool-softener.  Your Pharmacist can assist you with choosing one that is stocked at your pharmacy.  Constipation is also one of the most common side effects of pain medication.  If you are using pain medication, be pro-active and try to PREVENT problems with constipation by taking the steps above BEFORE constipation becomes a problem.    Q:  What do I do if I need more pain medications?  A:  Call the office to receive refills.  Be aware that certain pain meds cannot be called into a pharmacy and actually require a paper prescription.  A change may be made in your pain med as you progress thru your recovery period or if you have side effects to certain meds.    --Pain meds are NOT refilled after 5pm on weekdays, and NOT AT ALL on the weekends, so please look ahead to prevent problems.      Q:  Why am I having a hard time sleeping now that I am at home?  A:  Many medications you receive while you are in the hospital can impact your sleep for a number of days after your surgery/hospitalization.  Decreased level of activity and naps during the day may also make sleeping at night difficult.  Try to minimize day-time naps, and get up frequently during the day to walk around your home during your recovery time.  Sleep aides may be of some help, but are not recommended for long-term use.      Q:  I am having some back discomfort.  What should I do?  A:  This may be related to certain positioning that was required for your surgery, extended periods of time in bed, or other changes in your overall activity level.  You may try ice, heat, acetaminophen, or ibuprofen to treat this temporarily.  Note that many pain medications have acetaminophen in them and would state this on the prescription bottle.  Be sure not to exceed the maximum of 4000mg per day of acetaminophen.     **If the pain you are having does not resolve, is severe, or is a flare of back pain you have had on other occasions prior to surgery, please contact your  primary physician for further recommendations or for an appointment to be examined at their office.    Q:  Why am I having headaches?  A:  Headaches can be caused by many things:  caffeine withdrawal, use of pain meds, dehydration, high blood pressure, lack of sleep, over-activity/exhaustion, flare-up of usual migraine headaches.  If you feel this is related to muscle tension (a band-like feeling around the head, or a pressure at the low-back of the head) you may try ice or heat to this area.  You may need to drink more fluids (try electrolyte drink like Gatorade), rest, or take your usual migraine medications.   **If your headaches do not resolve, worsen, are accompanied by other symptoms, or if your blood pressure is high, please call your primary physician for recommendation and/or examination.    Q:  I am unable to urinate.  What do I do?  A:  A small percentage of people can have difficulty urinating initially after surgery.  This includes being able to urinate only a very small amount at a time and feeling discomfort or pressure in the very low abdomen.  This is called  urinary retention , and is actually an urgent situation.  Proceed to your nearest Emergency department for evaluation (not an Urgent Care Center).  Sometimes the bladder does not work correctly after certain medications you receive during surgery, or related to certain procedures.  You may need to have a catheter placed until your bladder recovers.  When planning to go to an Emergency department, it may help to call the ER to let them know you are coming in for this problem after a surgery.  This may help you get in quicker to be evaluated.  **If you have symptoms of a urinary tract infection, please contact your primary physician for the proper evaluation and treatment.          If you have other questions, please call the office Monday thru Friday between 8am and 5pm to discuss with the nurse or physician assistant.  #[phone]    There  is a surgeon ON CALL on weekday evenings and over the weekend in case of urgent need only, and may be contacted at the same number.    If you are having an emergency, call 911 or proceed to your nearest emergency department.      GENERAL ANESTHESIA OR SEDATION ADULT DISCHARGE INSTRUCTIONS   SPECIAL PRECAUTIONS FOR 24 HOURS AFTER SURGERY    IT IS NOT UNUSUAL TO FEEL LIGHT-HEADED OR FAINT, UP TO 24 HOURS AFTER SURGERY OR WHILE TAKING PAIN MEDICATION.  IF YOU HAVE THESE SYMPTOMS; SIT FOR A FEW MINUTES BEFORE STANDING AND HAVE SOMEONE ASSIST YOU WHEN YOU GET UP TO WALK OR USE THE BATHROOM.    YOU SHOULD REST AND RELAX FOR THE NEXT 24 HOURS AND YOU MUST MAKE ARRANGEMENTS TO HAVE SOMEONE STAY WITH YOU FOR AT LEAST 24 HOURS AFTER YOUR DISCHARGE.  AVOID HAZARDOUS AND STRENUOUS ACTIVITIES.  DO NOT MAKE IMPORTANT DECISIONS FOR 24 HOURS.    DO NOT DRIVE ANY VEHICLE OR OPERATE MECHANICAL EQUIPMENT FOR 24 HOURS FOLLOWING THE END OF YOUR SURGERY.  EVEN THOUGH YOU MAY FEEL NORMAL, YOUR REACTIONS MAY BE AFFECTED BY THE MEDICATION YOU HAVE RECEIVED.    DO NOT DRINK ALCOHOLIC BEVERAGES FOR 24 HOURS FOLLOWING YOUR SURGERY.    DRINK CLEAR LIQUIDS (APPLE JUICE, GINGER ALE, 7-UP, BROTH, ETC.).  PROGRESS TO YOUR REGULAR DIET AS YOU FEEL ABLE.    YOU MAY HAVE A DRY MOUTH, A SORE THROAT, MUSCLES ACHES OR TROUBLE SLEEPING.  THESE SHOULD GO AWAY AFTER 24 HOURS.    CALL YOUR DOCTOR FOR ANY OF THE FOLLOWING:  SIGNS OF INFECTION (FEVER, GROWING TENDERNESS AT THE SURGERY SITE, A LARGE AMOUNT OF DRAINAGE OR BLEEDING, SEVERE PAIN, FOUL-SMELLING DRAINAGE, REDNESS OR SWELLING.    IT HAS BEEN OVER 8 TO 10 HOURS SINCE SURGERY AND YOU ARE STILL NOT ABLE TO URINATE (PASS WATER).         You received Toradol, an IV form of ibuprofen (Motrin) at 1000.  Do not take any ibuprofen products until 4:00 pm.

## 2017-05-11 NOTE — OP NOTE
DATE OF PROCEDURE:  05/10/2017      PREOPERATIVE DIAGNOSES:  Recurrent right inguinal hernia.      PREOPERATIVE DIAGNOSIS:  Recurrent right inguinal hernia.      PROCEDURE:  Laparoscopic preperitoneal repair of right inguinal hernia with placement of underlay mesh.      ANESTHESIA:  General plus local.      SURGEON:  Enrico Dalton MD      ASSISTANT:  Haider Alva PA-C  necessary for adequate exposure, camera management, suturing, cutting of suture and identification of critical structures.      SPECIMENS:  None submitted.      COMPLICATIONS:  None.      INDICATIONS:  Mr. Milo Serna is a 76-year-old gentleman with a previous history of a left and right inguinal hernia repair, both with mesh, presented with a symptomatic recurrence on the right side.  This has been present for a few months and was causing aching, especially when walking long distances as he frequently does.  There was no appreciable recurrent left inguinal hernia.  Because of this recurrence after previous anterior approach, I offered and recommended laparoscopy as a means to reduce the chance of further recurrence as well as cord injury.  Risks otherwise of the operation were discussed including infection, bleeding, harm to adjacent structures, need to revert to an anterior approach as well as hernia recurrence.  The patient verbalized understanding of all the above and consented to proceed.      FINDINGS:  There was a direct defect on the right containing fat as well as an indirect defect consisting of a well-developed sac.  This was repaired with ProGrip mesh in an underlay fashion.  We did not explore the left groin as aggressively as there was a moderate amount of inflammation and bleeding during the course of our dissection.  There was no clinically evident hernia previously.      DESCRIPTION OF PROCEDURE:  With the patient under excellent general anesthesia in supine position, Biggs catheter was placed to decompress the bladder, the low  abdomen subsequently clippered and prepped and draped in the usual sterile surgical fashion.  Timeout was then performed confirming the patient, procedure to be done as well as drug allergies and preoperative markings.  He did receive a dose of Ancef prior to incision for infection prophylaxis.  We began by marking the midline between the pubic symphysis and the umbilicus.  A transverse incision was made just inferior to the umbilicus and electrocautery dissection was carried out down to the level of the anterior rectus sheath defect off the patient's left.  The sheath was incised longitudinally for several centimeters.  Stay sutures were placed at the apices, the rectus muscle was then swept laterally to expose the preperitoneal fat.  A Spacemaker balloon dissector was threaded down through this defect to the level of the pubis and gently insufflated under direct vision with the manual insufflating balloon.  The balloon trocar was advanced over this, the balloon dissector was desufflated and brought through the trocar.  Gentle insufflation was applied to the preperitoneal space and 2 further 5 mm ports were placed along the midline under direct vision.  We developed a preperitoneal space around the pubis and then out lateral to the cord bluntly with bullet nose graspers.  There was a moderate amount of inflammation in the fat and a moderate amount of vascularity here as well driving a slow meticulous dissection.  There was a tongue of fat emanating towards the direct space on the triangle of Hesselbach on the right, this fat was gently reduced again with the use of electrocautery gently.  We also noted that the peritoneum was emanating into the internal ring consistent with an indirect defect, the peritoneum was pulled medially.  The cord was swept laterally.  This was done circumferentially to the point where we had a window between these 2 structures.  At this juncture, we reduced the hernia sac and cauterized  these last few attachments such that it was entirely reduced.  The peritoneum was then swept away from the cord for several centimeters as well.  We visualized the left groin and noted that the epigastrics had been dissected downwards by the balloon, there was no obvious direct defect, however.  We explored lateral to the cord slightly but did not visualize the cord due to the obstructed view and elected not to do further dissection attributable to the amount of bleeding we experienced already on the right side.  We introduced ProGrip mesh into the field soaked it in IrriSept and rinsed it gently.  The inferior and lateral aspect was trimmed slightly.  We rolled this mesh up tightly and placed it through the balloon trocar.  It was unfurled over the groin such that the midportion was above the internal ring, the medial aspect was along the pubic symphysis.  We swept the hernia sac away to assure that it was not beneath the mesh as well.  The inferior aspect of the mesh was then held down as we slowly released the preperitoneal insufflation.  The lower ports were then removed as was the balloon trocar.  The anterior rectus sheath defect was closed with 0 Vicryl stitches in a figure-of-eight fashion x2.  The stay sutures then tied atop of them.  30 mL of 0.5% Marcaine were distributed in all the incisions.  Skin was closed with 4-0 Vicryls in a deep dermal interrupted fashion and skin glue was applied atop of this.  The patient tolerated the procedure well.  Biggs catheter was removed and he was extubated and brought to recovery in excellent condition.  All sharps and sponge counts were correct at the conclusion of the case.         AXEL VERAS MD             D: 05/10/2017 11:42   T: 05/10/2017 23:11   MT: [id number]      Name:     SAÚL GONZALEZ   MRN:      [id number]        Account:        [id number]   :      1940           Procedure Date: 05/10/2017      Document: [id number]       cc: Oscar Alves MD

## 2017-05-22 ENCOUNTER — OFFICE VISIT (OUTPATIENT)
Dept: OTHER | Facility: CLINIC | Age: 77
End: 2017-05-22
Attending: INTERNAL MEDICINE
Payer: MEDICARE

## 2017-05-22 VITALS
OXYGEN SATURATION: 99 % | BODY MASS INDEX: 27.57 KG/M2 | WEIGHT: 176 LBS | SYSTOLIC BLOOD PRESSURE: 153 MMHG | HEART RATE: 44 BPM | DIASTOLIC BLOOD PRESSURE: 86 MMHG

## 2017-05-22 DIAGNOSIS — I87.2 VENOUS (PERIPHERAL) INSUFFICIENCY: Primary | ICD-10-CM

## 2017-05-22 PROCEDURE — 99211 OFF/OP EST MAY X REQ PHY/QHP: CPT

## 2017-05-22 PROCEDURE — 99214 OFFICE O/P EST MOD 30 MIN: CPT | Mod: ZP | Performed by: INTERNAL MEDICINE

## 2017-05-22 NOTE — MR AVS SNAPSHOT
After Visit Summary   5/22/2017    Milo Serna    MRN: [id number]           Patient Information     Date Of Birth          1940        Visit Information        Provider Department      5/22/2017 1:00 PM Grover Biggs MD [facility]      Today's Diagnoses     Venous (peripheral) insufficiency    -  1       Follow-ups after your visit        Your next 10 appointments already scheduled     May 25, 2017  8:30 AM CDT   Office Visit with Oscar Alves MD   [facility] ([facility]    [id number] [place] [address] [phone]           Bring a current list of meds and any records pertaining to this visit.  For Physicals, please bring immunization records and any forms needing to be filled out.  Please arrive 10 minutes early to complete paperwork.            May 31, 2017  9:00 AM CDT   Post Op with Idalia Huerta PA-C   [facility]    [address]  [facility] [id number] [phone]            Dec 14, 2017 10:00 AM CST   LAB with CR LAB   [facility] ([facility]    [address] [facility] [address] [phone]           Patient must bring picture ID.  Patient should be prepared to give a urine specimen  Please do not eat 10-12 hours before your appointment if you are coming in fasting for labs on lipids, cholesterol, or glucose (sugar).  Pregnant women should follow their Care Team instructions. Water with medications is okay. Do not drink coffee or other fluids.   If you have concerns about taking  your medications, please ask at office or if scheduling via [employer], send a message by clicking on Secure Messaging, Message Your Care Team.            Dec 19, 2017  1:00 PM CST   Return Visit with Krystian Owens  MD   [facility] ([facility])    [address] [facility]  [address]  [facility] [address] [phone]              Who to contact     If you have questions or need follow up information about today's clinic visit or your schedule please contact [facility] directly at [phone].  Normal or non-critical lab and imaging results will be communicated to you by MyChart, letter or phone within 4 business days after the clinic has received the results. If you do not hear from us within 7 days, please contact the clinic through [email or web address] or phone. If you have a critical or abnormal lab result, we will notify you by phone as soon as possible.  Submit refill requests through [employer] or call your pharmacy and they will forward the refill request to us. Please allow 3 business days for your refill to be completed.          Additional Information About Your Visit        [email or web address] Information     [employer] gives you secure access to your electronic health record. If you see a primary care provider, you can also send messages to your care team and make appointments. If you have questions, please call your primary care clinic.  If you do not have a primary care provider, please call [phone] and they will assist you.        Care EveryWhere ID     This is your Care EveryWhere ID. This could be used by other organizations to access your [facility] medical records  [id number]        Your Vitals Were     Pulse Pulse Oximetry BMI (Body Mass Index)             44 99% 27.57 kg/m2          Blood Pressure from Last 3 Encounters:   05/22/17 153/86   05/10/17 155/88   05/02/17 168/80    Weight from Last 3 Encounters:   05/22/17 176 lb (79.8 kg)   05/10/17 172 lb (78 kg)   05/02/17 172 lb (78 kg)              Today, you had the following     No orders found for display       Primary Care Provider Office Phone # Fax #    Oscar Alves [phone]  [phone]       [facility] [address]  [facility] [address]        Thank you!     Thank you for choosing [facility]  for your care. Our goal is always to provide you with excellent care. Hearing back from our patients is one way we can continue to improve our services. Please take a few minutes to complete the written survey that you may receive in the mail after your visit with us. Thank you!             Your Updated Medication List - Protect others around you: Learn how to safely use, store and throw away your medicines at www.disposemymeds.org.          This list is accurate as of: 5/22/17  1:41 PM.  Always use your most recent med list.                   Brand Name Dispense Instructions for use    acetaminophen 650 MG CR tablet    TYLENOL    100 tablet    Take 1 tablet (650 mg) by mouth every 6 hours as needed for pain       apixaban ANTICOAGULANT 2.5 MG tablet    ELIQUIS    180 tablet    Take 1 tablet (2.5 mg) by mouth 2 times daily       aspirin 81 MG tablet      Take 81 mg by mouth daily       co-enzyme Q-10 100 MG Caps capsule     180 capsule    Take 1 capsule (100 mg) by mouth daily       diclofenac 1 % Gel topical gel    VOLTAREN    100 g    Apply 4 gramsto area qid prn       doxycycline 100 MG capsule    VIBRAMYCIN    90 capsule    Take 1 capsule (100 mg) by mouth daily       fish oil-omega-3 fatty acids 1000 MG capsule    EQL FISH OIL    180 capsule    Take 1 capsule by mouth daily.       Glucosamine-Chondroitin 250-200 MG Tabs    OSTEO BI-FLEX REGULAR STRENGTH    100 tablet    Take 1 tablet by mouth 2 times daily       HYDROcodone-acetaminophen 5-325 MG per tablet    NORCO    20 tablet    Take 1-2 tablets by mouth every 4 hours as needed for other (Moderate to Severe Pain)       lisinopril 20 MG tablet    PRINIVIL/ZESTRIL    90 tablet    Take 1 tablet (20 mg) by mouth daily       MULTIVITAL Chew     360 tablet    Take 2 chew tab by mouth 2 times daily        omeprazole 40 MG capsule    priLOSEC    90 capsule    Take 1 capsule (40 mg) by mouth daily       SAW PALMETTO COMPLEX Caps     200 capsule    Take 1 capsule by mouth daily.       senna 8.6 MG tablet    SENOKOT    60 tablet    Take 1 tablet by mouth daily       simvastatin 20 MG tablet    ZOCOR    90 tablet    Take 1 tablet (20 mg) by mouth At Bedtime       sodium chloride 0.65 % nasal spray    OCEAN NASAL SPRAY    2 Bottle    Spray 1 spray into both nostrils daily as needed for congestion       tamsulosin 0.4 MG capsule    FLOMAX    90 capsule    Take 1 capsule (0.4 mg) by mouth daily

## 2017-05-22 NOTE — NURSING NOTE
"Chief Complaint   Patient presents with     Consult     New Pt referred by Dr. Alves for Pedel Edema        Initial /88 (BP Location: Right arm, Patient Position: Chair, Cuff Size: Adult Regular)  Pulse 53  Wt 176 lb (79.8 kg)  SpO2 99%  BMI 27.57 kg/m2 Estimated body mass index is 27.57 kg/(m^2) as calculated from the following:    Height as of 5/10/17: 5' 7\" (1.702 m).    Weight as of this encounter: 176 lb (79.8 kg).  Medication Reconciliation: complete     Face to face nursing time: 8 minutes    Senait Armenta MA     "

## 2017-05-22 NOTE — PROGRESS NOTES
See letter to PCP.      Greater than one half the fortyfive minutes total spent on the pt's visit were spent providing education and counselling to the patient regarding the above matters.

## 2017-05-25 ENCOUNTER — OFFICE VISIT (OUTPATIENT)
Dept: FAMILY MEDICINE | Facility: CLINIC | Age: 77
End: 2017-05-25
Payer: MEDICARE

## 2017-05-25 VITALS
DIASTOLIC BLOOD PRESSURE: 72 MMHG | WEIGHT: 175.6 LBS | OXYGEN SATURATION: 100 % | HEIGHT: 67 IN | HEART RATE: 63 BPM | RESPIRATION RATE: 16 BRPM | TEMPERATURE: 97.7 F | SYSTOLIC BLOOD PRESSURE: 130 MMHG | BODY MASS INDEX: 27.56 KG/M2

## 2017-05-25 DIAGNOSIS — D50.8 IRON DEFICIENCY ANEMIA SECONDARY TO INADEQUATE DIETARY IRON INTAKE: ICD-10-CM

## 2017-05-25 DIAGNOSIS — I10 HTN, GOAL BELOW 150/90: ICD-10-CM

## 2017-05-25 DIAGNOSIS — I48.19 PERSISTENT ATRIAL FIBRILLATION (H): Primary | ICD-10-CM

## 2017-05-25 DIAGNOSIS — Z98.890 H/O INGUINAL HERNIA REPAIR: ICD-10-CM

## 2017-05-25 DIAGNOSIS — Z87.19 H/O INGUINAL HERNIA REPAIR: ICD-10-CM

## 2017-05-25 DIAGNOSIS — I87.8 VENOUS STASIS: ICD-10-CM

## 2017-05-25 PROCEDURE — 99214 OFFICE O/P EST MOD 30 MIN: CPT | Performed by: FAMILY MEDICINE

## 2017-05-25 RX ORDER — FERROUS GLUCONATE 324(38)MG
324 TABLET ORAL
Qty: 270 TABLET | Refills: 3 | Status: SHIPPED | OUTPATIENT
Start: 2017-05-25 | End: 2017-10-02

## 2017-05-25 NOTE — NURSING NOTE
"Chief Complaint   Patient presents with     Hernia     post surgical follow up     Hypertension       Initial /88 (BP Location: Right arm, Patient Position: Chair, Cuff Size: Adult Regular)  Pulse 63  Temp 97.7  F (36.5  C) (Oral)  Resp 16  Ht 5' 6.5\" (1.689 m)  Wt 175 lb 9.6 oz (79.7 kg)  SpO2 100%  BMI 27.92 kg/m2 Estimated body mass index is 27.92 kg/(m^2) as calculated from the following:    Height as of this encounter: 5' 6.5\" (1.689 m).    Weight as of this encounter: 175 lb 9.6 oz (79.7 kg).  Medication Reconciliation: incomplete   Josselyn Mccollum CMA ([facility])      "

## 2017-05-25 NOTE — PROGRESS NOTES
SUBJECTIVE:                                                    Milo Serna is a 76 year old male who presents to clinic today with wife for the following health issues:    Hospital Follow-up Visit:    Hospital/Nursing Home/IP Rehab Facility: [facility]  Date of Admission: 5/10/17  Date of Discharge: 5/10/17  Reason(s) for Admission: hernia repair            Problems taking medications regularly:  None       Medication changes since discharge: None       Problems adhering to non-medication therapy:  None    Summary of hospitalization:  [facility] discharge summary reviewed  Diagnostic Tests/Treatments reviewed.  Follow up needed: none  Other Healthcare Providers Involved in Patient s Care:         None  Update since discharge: improved.     Post Discharge Medication Reconciliation: discharge medications reconciled and changed, per note/orders (see AVS).  Plan of care communicated with patient and wife     Coding guidelines for this visit:  Type of Medical   Decision Making Face-to-Face Visit       within 7 Days of discharge Face-to-Face Visit        within 14 days of discharge   Moderate Complexity [id number]   High Complexity [id number]          Hypertension Follow-up      Outpatient blood pressures are being checked at home.  Results are averaging 121/61 (l) 162/81 (h). In hospital they were rather high    Low Salt Diet: low salt     Surgery 5/10/17 for Laparoscopic Right Inguinal Herniorrhaphy with placement of underlay mesh.         BP Readings from Last 3 Encounters:   05/25/17 130/72   05/22/17 153/86   05/10/17 155/88    he is moving his bowels he has no pain  Weight -- Pt reported 30 pound intentional weight loss due to exercise and diet changes.     Wt Readings from Last 5 Encounters:   05/25/17 79.7 kg (175 lb 9.6 oz)   05/22/17 79.8 kg (176 lb)   05/10/17 78 kg (172 lb)   05/02/17 78 kg (172 lb)   05/01/17 80.7 kg (178 lb)       Amount of exercise or physical activity: 4-5  days/week for an average of 30-45 minutes    Problems taking medications regularly: No    Medication side effects: none    Diet: low salt  Persistent atrial fibrillation (H)  (primary encounter diagnosis) his anticoagulation as prescribed by cardiology. His vascular surgery consultant suggested he discuss dosing changes with cardiology  Venous stasis consultation suggested no interventions as his skin is intact and he is asymptomatic evaluation for deteriorating status is delineated  Iron deficiency anemia secondary to inadequate dietary iron intake   Hemoglobin   Date Value Ref Range Status   04/20/2017 12.7 (L) 13.3 - 17.7 g/dL Final     Comment:     Results confirmed by repeat test   ]      Past Medical History:   Diagnosis Date     Anemia, unspecified 11/8/2016     Atrial fibrillation (H) 5/27/2015     BPH (benign prostatic hyperplasia) 1/25/2012     CAD (coronary artery disease) 4/4/2012     Closed fracture of metatarsal bone 4/4/2012     Coronary atherosclerosis of unspecified type of vessel, native or graft [facility]    CAB 1996 following MI (at [facility])      Dilated aortic root (H) 5/26/2016     Drug-induced erectile dysfunction 10/2/2015     Elevated blood sugar 11/8/2016     Elevated PSA 9/19/2013     Essential hypertension with goal blood pressure less than 140/90 9/22/2016     Essential hypertension, benign      Gout      HAV (hallux abducto valgus) 4/4/2012     Hernia, abdominal      History of prostate cancer 6/15/2016     HTN, goal below 150/90 4/20/2017     Iron deficiency anemia secondary to inadequate dietary iron intake 11/15/2016     Low blood pressure reading 10/17/2016     Lumbago     had degendisc dz on MRI 7/07     Mixed hyperlipidemia      Mumps      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     OA (osteoarthritis) 4/4/2012     Old myocardial infarction 4/4/2012     Palpitations      Pes planus 4/4/2012     Prostate cancer (H) 5/26/2016     PUD (peptic ulcer disease) 4/4/2012     Rhinophyma  2012     Rosacea 1/15/2008     Thrombocytopenia (H) 2017     Unspecified hyperplasia of prostate with urinary obstruction and other lower urinary tract symptoms (LUTS)     better on avodart & hytrin     Venous stasis 2012     Venous stasis 2012       Past Surgical History:   Procedure Laterality Date     C NONSPECIFIC PROCEDURE      CAB  [place]     C NONSPECIFIC PROCEDURE      l inguinal hernia repair      C NONSPECIFIC PROCEDURE      R hernia remote     C NONSPECIFIC PROCEDURE      R ankle ORIF for fx     C NONSPECIFIC PROCEDURE      nasal surgery      C NONSPECIFIC PROCEDURE      R rotater repair (remote)      C NONSPECIFIC PROCEDURE      appy 1966     C NONSPECIFIC PROCEDURE      sinus surgery x 2     C NONSPECIFIC PROCEDURE      L shoulder      CARDIAC SURGERY      bipass     CYSTOSCOPY FLEXIBLE, CYOABLATION PROSTATE N/A 2016    Procedure: CYSTOSCOPY FLEXIBLE, CRYOABLATION PROSTATE;  Surgeon: Krystian Owens MD;  Location: [facility] OR     GENITOURINARY SURGERY      prostate surgery      HERNIA REPAIR       LAPAROSCOPIC HERNIORRHAPHY INGUINAL Right 5/10/2017    Procedure: LAPAROSCOPIC HERNIORRHAPHY INGUINAL;  Laparoscopic preperitoneal repair of right inguinal hernia with placement of underlay mesh;  Surgeon: Enrico Bridges MD;  Location: [facility] OR     PROSTATE SURGERY         Family History   Problem Relation Age of Onset     CANCER Mother      stomach cancer,  age 61     HEART DISEASE Father      MI,  age 71     HEART DISEASE Brother      stent placement, RA      Hypertension Brother      HEART DISEASE Sister      rythmn problems with heart, hypertension       Social History   Substance Use Topics     Smoking status: Former Smoker     Smokeless tobacco: Never Used      Comment: quit 3/1974     Alcohol use 0.0 oz/week     0 Standard drinks or equivalent per week      Comment: small amounts to moderate       Problem list and histories reviewed &  "adjusted, as indicated.  Additional history: as documented    Reviewed and updated as needed this visit by clinical staff  Tobacco  Allergies  Meds  Med Hx  Surg Hx  Fam Hx  Soc Hx      Reviewed and updated as needed this visit by Provider         ROS:  Positive for bruising to abdominal wall. Great mood no pain some edema no dyspnea    This document serves as a record of the services and decisions personally performed and made by Oscar Alves MD. It was created on their behalf by Buddy Early, a trained medical scribe. The creation of this document is based the provider's statements to the medical scribe.  Buddy Early May 25, 2017 8:47 AM       OBJECTIVE:                                                    /72 (BP Location: Right arm, Patient Position: Chair, Cuff Size: Adult Regular)  Pulse 63  Temp 97.7  F (36.5  C) (Oral)  Resp 16  Ht 1.689 m (5' 6.5\")  Wt 79.7 kg (175 lb 9.6 oz)  SpO2 100%  BMI 27.92 kg/m2  Body mass index is 27.92 kg/(m^2).  GENERAL: healthy, alert and no distress  SKIN: ecchymosis to left inguinal area and hip, surgical incisions noted with no signs of infection  NEURO: mentation intact and speech normal  PSYCH: mentation appears normal, affect normal/bright  Trace edema  Diagnostic Test Results:  Results for orders placed or performed in visit on 04/20/17   Basic metabolic panel  (Ca, Cl, CO2, Creat, Gluc, K, Na, BUN)   Result Value Ref Range    Sodium 141 133 - 144 mmol/L    Potassium 3.8 3.4 - 5.3 mmol/L    Chloride 105 94 - 109 mmol/L    Carbon Dioxide 27 20 - 32 mmol/L    Anion Gap 9 3 - 14 mmol/L    Glucose 98 70 - 99 mg/dL    Urea Nitrogen 12 7 - 30 mg/dL    Creatinine 0.87 0.66 - 1.25 mg/dL    GFR Estimate 85 >60 mL/min/1.7m2    GFR Estimate If Black >90   GFR Calc   >60 mL/min/1.7m2    Calcium 9.6 8.5 - 10.1 mg/dL   Hemoglobin A1c   Result Value Ref Range    Hemoglobin A1C 5.4 4.3 - 6.0 %   PSA, screen   Result Value Ref Range    PSA  0 - 4 ug/L     " <0.01  Assay Method:  Chemiluminescence using Siemens Vista analyzer     CBC with platelets and differential   Result Value Ref Range    WBC 4.3 4.0 - 11.0 10e9/L    RBC Count 4.07 (L) 4.4 - 5.9 10e12/L    Hemoglobin 12.7 (L) 13.3 - 17.7 g/dL    Hematocrit 38.3 (L) 40.0 - 53.0 %    MCV 94 78 - 100 fl    MCH 31.2 26.5 - 33.0 pg    MCHC 33.2 31.5 - 36.5 g/dL    RDW 14.6 10.0 - 15.0 %    Platelet Count 134 (L) 150 - 450 10e9/L    Diff Method Automated Method     % Neutrophils 62.3 %    % Lymphocytes 19.9 %    % Monocytes 14.0 %    % Eosinophils 2.6 %    % Basophils 1.2 %    Absolute Neutrophil 2.7 1.6 - 8.3 10e9/L    Absolute Lymphocytes 0.9 0.8 - 5.3 10e9/L    Absolute Monocytes 0.6 0.0 - 1.3 10e9/L    Absolute Eosinophils 0.1 0.0 - 0.7 10e9/L    Absolute Basophils 0.1 0.0 - 0.2 10e9/L   Transferrin   Result Value Ref Range    Transferrin 274 210 - 360 mg/dL        ASSESSMENT/PLAN:                                                      (I48.1) Persistent atrial fibrillation (H)  (primary encounter diagnosis)  Comment: stable  Plan: I will defer to cardiology patient is 76 years old    (I87.8) Venous stasis  Comment: stable  Plan: continue to monitor refer if need be and future    (D50.8) Iron deficiency anemia secondary to inadequate dietary iron intake  Comment: Refill  Plan: ferrous gluconate (FERGON) 324 (38 FE) MG         Tablet recheck in the fall          (I10) HTN, goal below 150/90  Comment: 130/72   Plan: continue to monitor      H/O inguinal hernia repair doing well no sequelae      Patient Instructions   Follow up in October before you leave for [place]    The information in this document, created by the medical scribe for me, accurately reflects the services I personally performed and the decisions made by me. I have reviewed and approved this document for accuracy.   MD Oscar Fairbanks MD  [facility]

## 2017-05-25 NOTE — MR AVS SNAPSHOT
After Visit Summary   5/25/2017    Milo Serna    MRN: [id number]           Patient Information     Date Of Birth          1940        Visit Information        Provider Department      5/25/2017 8:30 AM Oscar Alves MD [facility]        Today's Diagnoses     Persistent atrial fibrillation (H)    -  1    Venous stasis        Iron deficiency anemia secondary to inadequate dietary iron intake        HTN, goal below 150/90          Care Instructions    Follow up in October before you leave for [place]          Follow-ups after your visit        Your next 10 appointments already scheduled     May 31, 2017 10:30 AM CDT   Post Op with Idalia Huerta PA-C   [facility] ([facility]    [address]  [facility] [id number] [phone]            Dec 14, 2017 10:00 AM CST   LAB with CR LAB   [facility] ([facility])    [address] [place] [address] [phone]           Patient must bring picture ID.  Patient should be prepared to give a urine specimen  Please do not eat 10-12 hours before your appointment if you are coming in fasting for labs on lipids, cholesterol, or glucose (sugar).  Pregnant women should follow their Care Team instructions. Water with medications is okay. Do not drink coffee or other fluids.   If you have concerns about taking  your medications, please ask at office or if scheduling via [employer], send a message by clicking on Secure Messaging, Message Your Care Team.            Dec 19, 2017  1:00 PM CST   Return Visit with Krystian Owens MD   [facility] ([facility])    [address]  [address]  [facility] [phone] [phone]              Who to contact     If you have questions or need follow up information about today's clinic visit or  "your schedule please contact [facility] directly at [phone].  Normal or non-critical lab and imaging results will be communicated to you by MyChart, letter or phone within 4 business days after the clinic has received the results. If you do not hear from us within 7 days, please contact the clinic through [employer] or phone. If you have a critical or abnormal lab result, we will notify you by phone as soon as possible.  Submit refill requests through [employer] or call your pharmacy and they will forward the refill request to us. Please allow 3 business days for your refill to be completed.          Additional Information About Your Visit        [employer] Information     [employer] gives you secure access to your electronic health record. If you see a primary care provider, you can also send messages to your care team and make appointments. If you have questions, please call your primary care clinic.  If you do not have a primary care provider, please call [phone] and they will assist you.        Care EveryWhere ID     This is your Care EveryWhere ID. This could be used by other organizations to access your [place] medical records  [id number]        Your Vitals Were     Pulse Temperature Respirations Height Pulse Oximetry BMI (Body Mass Index)    63 97.7  F (36.5  C) (Oral) 16 5' 6.5\" (1.689 m) 100% 27.92 kg/m2       Blood Pressure from Last 3 Encounters:   05/25/17 130/72   05/22/17 153/86   05/10/17 155/88    Weight from Last 3 Encounters:   05/25/17 175 lb 9.6 oz (79.7 kg)   05/22/17 176 lb (79.8 kg)   05/10/17 172 lb (78 kg)              Today, you had the following     No orders found for display         Today's Medication Changes          These changes are accurate as of: 5/25/17  9:02 AM.  If you have any questions, ask your nurse or doctor.               Start taking these medicines.        Dose/Directions    ferrous gluconate 324 (38 FE) MG tablet   Commonly known as:  FERGON   Used " for:  Iron deficiency anemia secondary to inadequate dietary iron intake   Started by:  Oscar Alves MD        Dose:  324 mg   Take 1 tablet (324 mg) by mouth 3 times daily (with meals)   Quantity:  270 tablet   Refills:  3            Where to get your medicines      These medications were sent to [facility] - [place], [place] - [address]  [address] [address]     Phone:  [phone]     ferrous gluconate 324 (38 FE) MG tablet                Primary Care Provider Office Phone # Fax #    Oscar Alves [phone]       [facility] [id number] [place] [address]        Thank you!     Thank you for choosing [facility]  for your care. Our goal is always to provide you with excellent care. Hearing back from our patients is one way we can continue to improve our services. Please take a few minutes to complete the written survey that you may receive in the mail after your visit with us. Thank you!             Your Updated Medication List - Protect others around you: Learn how to safely use, store and throw away your medicines at www.disposemymeds.org.          This list is accurate as of: 5/25/17  9:02 AM.  Always use your most recent med list.                   Brand Name Dispense Instructions for use    acetaminophen 650 MG CR tablet    TYLENOL    100 tablet    Take 1 tablet (650 mg) by mouth every 6 hours as needed for pain       apixaban ANTICOAGULANT 2.5 MG tablet    ELIQUIS    180 tablet    Take 1 tablet (2.5 mg) by mouth 2 times daily       aspirin 81 MG tablet      Take 81 mg by mouth daily       co-enzyme Q-10 100 MG Caps capsule     180 capsule    Take 1 capsule (100 mg) by mouth daily       diclofenac 1 % Gel topical gel    VOLTAREN    100 g    Apply 4 gramsto area qid prn       doxycycline 100 MG capsule    VIBRAMYCIN    90 capsule    Take 1 capsule (100 mg) by mouth daily       ferrous gluconate  324 (38 FE) MG tablet    FERGON    270 tablet    Take 1 tablet (324 mg) by mouth 3 times daily (with meals)       fish oil-omega-3 fatty acids 1000 MG capsule    EQL FISH OIL    180 capsule    Take 1 capsule by mouth daily.       Glucosamine-Chondroitin 250-200 MG Tabs    OSTEO BI-FLEX REGULAR STRENGTH    100 tablet    Take 1 tablet by mouth 2 times daily       HYDROcodone-acetaminophen 5-325 MG per tablet    NORCO    20 tablet    Take 1-2 tablets by mouth every 4 hours as needed for other (Moderate to Severe Pain)       lisinopril 20 MG tablet    PRINIVIL/ZESTRIL    90 tablet    Take 1 tablet (20 mg) by mouth daily       MULTIVITAL Chew     360 tablet    Take 2 chew tab by mouth 2 times daily       omeprazole 40 MG capsule    priLOSEC    90 capsule    Take 1 capsule (40 mg) by mouth daily       SAW PALMETTO COMPLEX Caps     200 capsule    Take 1 capsule by mouth daily.       senna 8.6 MG tablet    SENOKOT    60 tablet    Take 1 tablet by mouth daily       simvastatin 20 MG tablet    ZOCOR    90 tablet    Take 1 tablet (20 mg) by mouth At Bedtime       sodium chloride 0.65 % nasal spray    OCEAN NASAL SPRAY    2 Bottle    Spray 1 spray into both nostrils daily as needed for congestion       tamsulosin 0.4 MG capsule    FLOMAX    90 capsule    Take 1 capsule (0.4 mg) by mouth daily

## 2017-05-31 ENCOUNTER — OFFICE VISIT (OUTPATIENT)
Dept: SURGERY | Facility: CLINIC | Age: 77
End: 2017-05-31
Payer: MEDICARE

## 2017-05-31 VITALS — DIASTOLIC BLOOD PRESSURE: 74 MMHG | HEART RATE: 54 BPM | SYSTOLIC BLOOD PRESSURE: 128 MMHG

## 2017-05-31 DIAGNOSIS — Z09 SURGICAL FOLLOWUP VISIT: Primary | ICD-10-CM

## 2017-05-31 PROCEDURE — 99024 POSTOP FOLLOW-UP VISIT: CPT | Performed by: PHYSICIAN ASSISTANT

## 2017-05-31 NOTE — PROGRESS NOTES
[facility] Note 5/31/2017  Subjective:  Milo Serna is here for his first postoperative visit.  He underwent Laparoscopic repair of recurrent Right Inguinal hernia with Progrip mesh by Dr. Dalton.  He is now 3 weeks postop, and feels his recovery has been progressing nicely.  He did have extensive bruising postop after he resumed his anticoagulation on POD#2, but this is resolving nicely at this point.  Rx/OTC pain medication has been used appropriately for pain control.  Postop recovery complications: None.    Today he has minimal discomfort at the repair site with activities, tolerating a regular diet, and having normal bowel activity.  Current pain management: none.  Following restrictions outlined by surgeon.    Objective:  Abd - soft, non-tender, non-distended.  Yellowish bruising discoloration at beltline and around to left flank  Laparoscopic incisions - healing well, no erythema/bruising, +normal healing ridge, no seroma/hematoma noted, no hernia noted at right groin    Assessment:  S/p Laparoscopic repair of recurrent Right Inguinal hernia with Progrip mesh  Postoperative bruising due to concurrent anticoagulation therapy  A.Fib    Plan:  Milo may continue to slowly advance his activities at this time.  General recommendation is to remain at a 20 lb weight restriction until 4 weeks after surgery.  After that time, he may increase weight restriction by 10 lbs per week.  He may continue to utilize OTC pain management options as well as use of ice/heat to site for comfort.  He should expect progressive resolution of the healing ridge along the incisional site over the following 2-3 months.      Milo is recommended to contact the office if worsening pain, onset of fever/redness at inc site, or new drainage from the area.  Pt also recommended to call office at any time if ongoing questions/concerns during recovery, but otherwise may follow-up on a prn basis.  Pt is in agreement  with this plan.      Idalia Huerta PA-C      Please route or send letter to:  Primary Care Provider (PCP)

## 2017-05-31 NOTE — MR AVS SNAPSHOT
After Visit Summary   5/31/2017    Milo Serna    MRN: [id number]           Patient Information     Date Of Birth          1940        Visit Information        Provider Department      5/31/2017 10:30 AM Idalia Huerta PA-C [facility]      Today's Diagnoses     Surgical followup visit    -  1       Follow-ups after your visit        Follow-up notes from your care team     Return if symptoms worsen or fail to improve.      Your next 10 appointments already scheduled     Dec 14, 2017 10:00 AM CST   LAB with CR LAB   [facility] ([facility])    [id number] [place] [phone]           Patient must bring picture ID.  Patient should be prepared to give a urine specimen  Please do not eat 10-12 hours before your appointment if you are coming in fasting for labs on lipids, cholesterol, or glucose (sugar).  Pregnant women should follow their Care Team instructions. Water with medications is okay. Do not drink coffee or other fluids.   If you have concerns about taking  your medications, please ask at office or if scheduling via [employer], send a message by clicking on Secure Messaging, Message Your Care Team.            Dec 19, 2017  1:00 PM CST   Return Visit with Krystian Owens MD   [facility] ([facility])    [address]  [address]  [facility] [address] [phone]              Who to contact     If you have questions or need follow up information about today's clinic visit or your schedule please contact [facility] directly at [phone].  Normal or non-critical lab and imaging results will be communicated to you by MyChart, letter or phone within 4 business days after the clinic has received the results. If you do not hear from us within 7 days,  please contact the clinic through [employer] or phone. If you have a critical or abnormal lab result, we will notify you by phone as soon as possible.  Submit refill requests through [employer] or call your pharmacy and they will forward the refill request to us. Please allow 3 business days for your refill to be completed.          Additional Information About Your Visit        [employer] Information     [employer] gives you secure access to your electronic health record. If you see a primary care provider, you can also send messages to your care team and make appointments. If you have questions, please call your primary care clinic.  If you do not have a primary care provider, please call [phone] and they will assist you.        Care EveryWhere ID     This is your Care EveryWhere ID. This could be used by other organizations to access your [place] medical records  [id number]        Your Vitals Were     Pulse                   54            Blood Pressure from Last 3 Encounters:   05/31/17 128/74   05/25/17 130/72   05/22/17 153/86    Weight from Last 3 Encounters:   05/25/17 175 lb 9.6 oz (79.7 kg)   05/22/17 176 lb (79.8 kg)   05/10/17 172 lb (78 kg)              Today, you had the following     No orders found for display       Primary Care Provider Office Phone # Fax #    Oscar Alves [phone]       [address] [address]        Thank you!     Thank you for choosing [facility]  for your care. Our goal is always to provide you with excellent care. Hearing back from our patients is one way we can continue to improve our services. Please take a few minutes to complete the written survey that you may receive in the mail after your visit with us. Thank you!             Your Updated Medication List - Protect others around you: Learn how to safely use, store and throw away your medicines at www.disposemymeds.org.          This list is  accurate as of: 5/31/17 11:03 AM.  Always use your most recent med list.                   Brand Name Dispense Instructions for use    acetaminophen 650 MG CR tablet    TYLENOL    100 tablet    Take 1 tablet (650 mg) by mouth every 6 hours as needed for pain       apixaban ANTICOAGULANT 2.5 MG tablet    ELIQUIS    180 tablet    Take 1 tablet (2.5 mg) by mouth 2 times daily       aspirin 81 MG tablet      Take 81 mg by mouth daily       co-enzyme Q-10 100 MG Caps capsule     180 capsule    Take 1 capsule (100 mg) by mouth daily       diclofenac 1 % Gel topical gel    VOLTAREN    100 g    Apply 4 gramsto area qid prn       doxycycline 100 MG capsule    VIBRAMYCIN    90 capsule    Take 1 capsule (100 mg) by mouth daily       ferrous gluconate 324 (38 FE) MG tablet    FERGON    270 tablet    Take 1 tablet (324 mg) by mouth 3 times daily (with meals)       fish oil-omega-3 fatty acids 1000 MG capsule    EQL FISH OIL    180 capsule    Take 1 capsule by mouth daily.       Glucosamine-Chondroitin 250-200 MG Tabs    OSTEO BI-FLEX REGULAR STRENGTH    100 tablet    Take 1 tablet by mouth 2 times daily       HYDROcodone-acetaminophen 5-325 MG per tablet    NORCO    20 tablet    Take 1-2 tablets by mouth every 4 hours as needed for other (Moderate to Severe Pain)       lisinopril 20 MG tablet    PRINIVIL/ZESTRIL    90 tablet    Take 1 tablet (20 mg) by mouth daily       MULTIVITAL Chew     360 tablet    Take 2 chew tab by mouth 2 times daily       omeprazole 40 MG capsule    priLOSEC    90 capsule    Take 1 capsule (40 mg) by mouth daily       SAW PALMETTO COMPLEX Caps     200 capsule    Take 1 capsule by mouth daily.       senna 8.6 MG tablet    SENOKOT    60 tablet    Take 1 tablet by mouth daily       simvastatin 20 MG tablet    ZOCOR    90 tablet    Take 1 tablet (20 mg) by mouth At Bedtime       sodium chloride 0.65 % nasal spray    OCEAN NASAL SPRAY    2 Bottle    Spray 1 spray into both nostrils daily as needed for  congestion       tamsulosin 0.4 MG capsule    FLOMAX    90 capsule    Take 1 capsule (0.4 mg) by mouth daily

## 2017-05-31 NOTE — LETTER
[facility] 2017    RE:  Milo Serna-:  [birth year]    Subjective:  Milo Serna is here for his first postoperative visit.  He underwent Laparoscopic repair of recurrent Right Inguinal hernia with Progrip mesh by Dr. Dalton.  He is now 3 weeks postop, and feels his recovery has been progressing nicely.  He did have extensive bruising postop after he resumed his anticoagulation on POD#2, but this is resolving nicely at this point.  Rx/OTC pain medication has been used appropriately for pain control.  Postop recovery complications: None.     Today he has minimal discomfort at the repair site with activities, tolerating a regular diet, and having normal bowel activity.  Current pain management: none.  Following restrictions outlined by surgeon.     Objective:  Abd - soft, non-tender, non-distended.  Yellowish bruising discoloration at beltline and around to left flank  Laparoscopic incisions - healing well, no erythema/bruising, +normal healing ridge, no seroma/hematoma noted, no hernia noted at right groin     Assessment:  S/p Laparoscopic repair of recurrent Right Inguinal hernia with Progrip mesh  Postoperative bruising due to concurrent anticoagulation therapy  A.Fib     Plan:  Milo may continue to slowly advance his activities at this time.  General recommendation is to remain at a 20 lb weight restriction until 4 weeks after surgery.  After that time, he may increase weight restriction by 10 lbs per week.  He may continue to utilize OTC pain management options as well as use of ice/heat to site for comfort.  He should expect progressive resolution of the healing ridge along the incisional site over the following 2-3 months.       Milo is recommended to contact the office if worsening pain, onset of fever/redness at inc site, or new drainage from the area.  Pt also recommended to call office at any time if ongoing questions/concerns during recovery, but otherwise may  follow-up on a prn basis.  Pt is in agreement with this plan.        Idalia Huerta PA-C

## 2017-06-19 DIAGNOSIS — L71.9 ROSACEA: ICD-10-CM

## 2017-06-19 DIAGNOSIS — K27.9 PUD (PEPTIC ULCER DISEASE): ICD-10-CM

## 2017-06-19 DIAGNOSIS — N40.1 BENIGN PROSTATIC HYPERPLASIA WITH LOWER URINARY TRACT SYMPTOMS, UNSPECIFIED MORPHOLOGY: ICD-10-CM

## 2017-06-20 NOTE — TELEPHONE ENCOUNTER
Doxycycline 100 mg       Last Written Prescription Date: 09/22/16  Last Fill Quantity: 90,  # refills: 3   [place] [facility] [place] [facility] or [facility] prescribing provider: 05/25/17 Dr. Alves     Tamsulosin 0.4 mg          Last Written Prescription Date: 09/22/16  Last Fill Quantity: 90, # refills: 3    Last Office Visit with [facility] or [facility] prescribing provider:  05/25/17 Dr. Alves    Future Office Visit:      BP Readings from Last 3 Encounters:   05/31/17 128/74   05/25/17 130/72   05/22/17 153/86                                                    Omeprazole 40 mg       Last Written Prescription Date: 09/22/16  Last Fill Quantity: 90,  # refills: 3   Last Office Visit with [facility] or [facility] prescribing provider: 05/25/17 Dr. Alves

## 2017-06-21 RX ORDER — OMEPRAZOLE 40 MG/1
CAPSULE, DELAYED RELEASE ORAL
Qty: 90 CAPSULE | Refills: 2 | Status: SHIPPED | OUTPATIENT
Start: 2017-06-21 | End: 2018-02-28

## 2017-06-21 RX ORDER — TAMSULOSIN HYDROCHLORIDE 0.4 MG/1
CAPSULE ORAL
Qty: 90 CAPSULE | Refills: 2 | Status: SHIPPED | OUTPATIENT
Start: 2017-06-21 | End: 2018-01-15

## 2017-06-21 RX ORDER — DOXYCYCLINE 100 MG/1
CAPSULE ORAL
Qty: 90 CAPSULE | Refills: 2 | Status: SHIPPED | OUTPATIENT
Start: 2017-06-21 | End: 2018-02-22

## 2017-07-11 ENCOUNTER — OFFICE VISIT (OUTPATIENT)
Dept: PODIATRY | Facility: CLINIC | Age: 77
End: 2017-07-11
Payer: MEDICARE

## 2017-07-11 VITALS
SYSTOLIC BLOOD PRESSURE: 124 MMHG | BODY MASS INDEX: 27.28 KG/M2 | DIASTOLIC BLOOD PRESSURE: 74 MMHG | HEIGHT: 67 IN | WEIGHT: 173.8 LBS

## 2017-07-11 DIAGNOSIS — I87.2 VENOUS INSUFFICIENCY OF BOTH LOWER EXTREMITIES: ICD-10-CM

## 2017-07-11 DIAGNOSIS — M79.671 RIGHT FOOT PAIN: Primary | ICD-10-CM

## 2017-07-11 DIAGNOSIS — M20.11 HAV (HALLUX ABDUCTO VALGUS), RIGHT: ICD-10-CM

## 2017-07-11 DIAGNOSIS — B35.1 ONYCHOMYCOSIS OF TOENAIL: ICD-10-CM

## 2017-07-11 DIAGNOSIS — M21.42 PES PLANUS OF BOTH FEET: ICD-10-CM

## 2017-07-11 DIAGNOSIS — M21.41 PES PLANUS OF BOTH FEET: ICD-10-CM

## 2017-07-11 DIAGNOSIS — L84 SKIN CALLUS: ICD-10-CM

## 2017-07-11 PROCEDURE — 99203 OFFICE O/P NEW LOW 30 MIN: CPT | Performed by: PODIATRIST

## 2017-07-11 RX ORDER — UREA 200 MG/G
CREAM TOPICAL PRN
Qty: 120 G | Refills: 2 | Status: SHIPPED | OUTPATIENT
Start: 2017-07-11 | End: 2019-01-07

## 2017-07-11 RX ORDER — NAFTIFINE HYDROCHLORIDE 10 MG/G
GEL TOPICAL DAILY
Qty: 90 G | Refills: 3 | Status: SHIPPED | OUTPATIENT
Start: 2017-07-11 | End: 2020-04-14

## 2017-07-11 NOTE — NURSING NOTE
"Chief Complaint   Patient presents with     Foot Problems     pt thinks he has a callus on his right foot around the arch x9-10months        Initial /74  Ht 5' 6.5\" (1.689 m)  Wt 173 lb 12.8 oz (78.8 kg)  BMI 27.63 kg/m2 Estimated body mass index is 27.63 kg/(m^2) as calculated from the following:    Height as of this encounter: 5' 6.5\" (1.689 m).    Weight as of this encounter: 173 lb 12.8 oz (78.8 kg).  Medication Reconciliation: complete   Gigi Pfeiffer MA      "

## 2017-07-11 NOTE — PATIENT INSTRUCTIONS
DR. RODRIGUES'S CLINIC LOCATIONS:   MONDAY AM - [place] TUESDAY - [place]   [address] [address] [address] [address] [place] [address]   [phone] / [phone] / [phone]       WEDNESDAY - [place] FRIDAY AM - [facility]   [address]   [place] [address] [address] [place] [address]   [phone] / [phone]       FRIDAY PM - [place] SCHEDULE SURGERY: [phone]   [address] BILLING QUESTIONS: [phone]   [address] [place] [address] AFTER HOURS: [phone]   [phone] / [phone] APPOINTMENTS: [phone]         Body Mass Index (BMI)  Many things can cause foot and ankle problems. Foot structure, activity level, foot mechanics and injuries are common causes of pain.  One very important issue that often goes unmentioned, is body weight. Extra weight can cause increased stress on muscles, ligaments, bones and tendons.  Sometimes just a few extra pounds is all it takes to put one over her/his threshold. Without reducing that stress, it can be difficult to alleviate pain. Some people are uncomfortable addressing this issue, but we feel it is important for you to think about it. As Foot &  Ankle specialists, our job is addressing the lower extremity problem and possible causes. Regarding extra body weight, we encourage patients to discuss diet and weight management plans with their primary care doctors. It is this team approach that gives you the best opportunity for pain relief and getting you back on your feet.        CALLUS / CORNS / IPKs  When there is excessive friction or pressure on the skin, the body responds by making the skin thicker to protect the deeper structures from becoming exposed. While this works well to protect the deeper structures, the thickened skin can increase pressure and pain.    CALLUS: Flat, diffuse thickening are simple calluses and they are usually caused by friction. Often  these are the result of rubbing on a shoe or going barefoot.    CORNS: Calluses with a central core between the toes are called corns. These result from prominent joints on adjacent toes rubbing together. Theses are a symptom of bone malalignment and will always recur unless the underlying bones are addressed surgically.    IPKs: Calluses with a central core on the ball of the foot are usually IPKs (intractable plantar keratosis). These are caused by excessive pressure from the metatarsals, the bones that make up the ball of the foot. Often one of these bones is too long or too prominent.  Again, these will always recur unless the underlying bone issue is addressed. There is no cure for these. They will either go away by themselves, recur, or more could develop.    ROUTINE MAINTENANCE  1. File them down with a pumice stone or callus file a couple times a week.   2. An electric callus removing device. Amope Pedi Perfect Electronic Pedicure Foot File and Callus Remover can be a good option.   3. Lotion can be applied to soften the callus. A urea based cream such as Kersal or Vanicream or thicker cream with shea butter are good options.  4. Toe spacers or toe covers can be used for corns, gel pads can be used for other lesions on the bottom of the foot.   If there is a surgical pathology noted, such as a prominent bone, often this needs to be addressed surgically to minimize recurrence. However, sometimes the lesion simply migrates to another spot after surgery, so it is not a guaranteed cure.         FOOT CARE NURSES  If you are interested in having a foot care nurse come out to your   home, please call one of these contacts for more information:  [facility]  [phone] [facility]  [phone]   [facility]  [phone]  [facility] [phone]  [place]   [facility]  [phone]  At [facility] [phone]       NAIL FUNGUS  / ONYCHOMYCOSIS   Nail fungus is not a hygiene problem and will not likely lead to significant medical   problems. The nails may get thick causing pain and possibly local skin infection.   Treatments include debridement (trimming), oral antifungals, topical antifungals and complete removal of the nail. Most fungal nails are not treated.   Topicals such as tea tree oil can be helpful for surface fungus and may, at best, limit   progression. Over the counter creams (such as Lamisil) can also be used however, their effectiveness is also quite low.  Topical treatment with Pen lac is expensive and often not covered by insurance. Pen lac has an approximate 8% success rate. Topical therapy recommendations is to apply twice a day for at least 3-4 months as it takes 9 months for new nail to grow out.    Experts suggest soaking your feet for 15 to 20 minutes in a mixture of 1 cup vinegar to 4 cups warm water. Be sure to rinse well and pat your feet dry when you're done. You can soak your feet like this daily. But if your skin becomes irritated, try soaking only two to three times a week. Vicks VapoRub, as with vinegar, there have been no controlled clinical trials to assess the effectiveness of Vicks VapoRub on nail fungus, but there have been numerous anecdotal reports that it works. There's no consensus on how often to apply this product, so check with your doctor before using it on your nails.     Oral therapies include Sporanox and Lamisil. Oral therapies are also expensive and not very effective. Side effects such as liver disease are the main concern. Return of fungus is common even if the treatment worked.     Other Tips:  - Penlac nail medication apply daily x 4 months; remove old polish first day of each week  - Antifungal cream/powder (Zeasorb)   apply daily to feet and shoes x 2 months  - Clean shoes with Lysol or in washing machine every few weeks  - Rotate shoe gear; give them 24 hours to dry out between days  wearing them  - Clean pair of socks in morning, clean pair in afternoon if your feet sweat  - Shower shoes used in public showers/pools

## 2017-07-11 NOTE — MR AVS SNAPSHOT
After Visit Summary   7/11/2017    Milo Serna    MRN: [id number]           Patient Information     Date Of Birth          1940        Visit Information        Provider Department      7/11/2017 8:45 AM Zulay Alexandra DPM, Podiatry/Foot and Ankle Surgery [facility]        Care Instructions      DR. ALEXANDRA'S CLINIC LOCATIONS:   MONDAY AM - [place] TUESDAY - [place]   [address] [address] [place] [address] [place] [address]   [phone] / [phone]       WEDNESDAY - [place] FRIDAY AM - [facility]   [address]   [place] [address] [place] [address]   [phone] / [phone]       FRIDAY PM - [place] SCHEDULE SURGERY: [phone]   [address] BILLING QUESTIONS: [phone]   [place] [address] AFTER HOURS: [phone]   [phone] / [phone] APPOINTMENTS: [phone]         Body Mass Index (BMI)  Many things can cause foot and ankle problems. Foot structure, activity level, foot mechanics and injuries are common causes of pain.  One very important issue that often goes unmentioned, is body weight. Extra weight can cause increased stress on muscles, ligaments, bones and tendons.  Sometimes just a few extra pounds is all it takes to put one over her/his threshold. Without reducing that stress, it can be difficult to alleviate pain. Some people are uncomfortable addressing this issue, but we feel it is important for you to think about it. As Foot &  Ankle specialists, our job is addressing the lower extremity problem and possible causes. Regarding extra body weight, we encourage patients to discuss diet and weight management plans with their primary care doctors. It is this team approach that gives you the best opportunity for pain relief and getting you back on your feet.        CALLUS / CORNS / IPKs  When there is excessive  friction or pressure on the skin, the body responds by making the skin thicker to protect the deeper structures from becoming exposed. While this works well to protect the deeper structures, the thickened skin can increase pressure and pain.    CALLUS: Flat, diffuse thickening are simple calluses and they are usually caused by friction. Often these are the result of rubbing on a shoe or going barefoot.    CORNS: Calluses with a central core between the toes are called corns. These result from prominent joints on adjacent toes rubbing together. Theses are a symptom of bone malalignment and will always recur unless the underlying bones are addressed surgically.    IPKs: Calluses with a central core on the ball of the foot are usually IPKs (intractable plantar keratosis). These are caused by excessive pressure from the metatarsals, the bones that make up the ball of the foot. Often one of these bones is too long or too prominent.  Again, these will always recur unless the underlying bone issue is addressed. There is no cure for these. They will either go away by themselves, recur, or more could develop.    ROUTINE MAINTENANCE  1. File them down with a pumice stone or callus file a couple times a week.   2. An electric callus removing device. Amope Pedi Perfect Electronic Pedicure Foot File and Callus Remover can be a good option.   3. Lotion can be applied to soften the callus. A urea based cream such as Kersal or Vanicream or thicker cream with shea butter are good options.  4. Toe spacers or toe covers can be used for corns, gel pads can be used for other lesions on the bottom of the foot.   If there is a surgical pathology noted, such as a prominent bone, often this needs to be addressed surgically to minimize recurrence. However, sometimes the lesion simply migrates to another spot after surgery, so it is not a guaranteed cure.         FOOT CARE NURSES  If you are interested in having a foot care nurse come out to  your   home, please call one of these contacts for more information:  [facility]  [phone] [facility]  [phone]   [facility]  [phone]  [facility] [phone]  [place]   [facility]  [phone]  At [facility] [phone]       NAIL FUNGUS / ONYCHOMYCOSIS   Nail fungus is not a hygiene problem and will not likely lead to significant medical   problems. The nails may get thick causing pain and possibly local skin infection.   Treatments include debridement (trimming), oral antifungals, topical antifungals and complete removal of the nail. Most fungal nails are not treated.   Topicals such as tea tree oil can be helpful for surface fungus and may, at best, limit   progression. Over the counter creams (such as Lamisil) can also be used however, their effectiveness is also quite low.  Topical treatment with Pen lac is expensive and often not covered by insurance. Pen lac has an approximate 8% success rate. Topical therapy recommendations is to apply twice a day for at least 3-4 months as it takes 9 months for new nail to grow out.    Experts suggest soaking your feet for 15 to 20 minutes in a mixture of 1 cup vinegar to 4 cups warm water. Be sure to rinse well and pat your feet dry when you're done. You can soak your feet like this daily. But if your skin becomes irritated, try soaking only two to three times a week. Vicks VapoRub, as with vinegar, there have been no controlled clinical trials to assess the effectiveness of Vicks VapoRub on nail fungus, but there have been numerous anecdotal reports that it works. There's no consensus on how often to apply this product, so check with your doctor before using it on your nails.     Oral therapies include Sporanox and Lamisil. Oral therapies are also expensive and not very effective. Side effects such as liver disease are the main concern. Return of fungus is common  even if the treatment worked.     Other Tips:  - Penlac nail medication apply daily x 4 months; remove old polish first day of each week  - Antifungal cream/powder (Zeasorb) - apply daily to feet and shoes x 2 months  - Clean shoes with Lysol or in washing machine every few weeks  - Rotate shoe gear; give them 24 hours to dry out between days wearing them  - Clean pair of socks in morning, clean pair in afternoon if your feet sweat  - Shower shoes used in public showers/pools            Follow-ups after your visit        Your next 10 appointments already scheduled     Sep 14, 2017  9:00 AM CDT   Ech Complete with [id number] ([facility])    [address]  [facility] [id number] [phone]           1.  Please bring or wear a comfortable two-piece outfit. 2.  You may eat, drink and take your normal medicines. 3.  For any questions that cannot be answered, please contact the ordering physician ***Please check-in at the [facility] Registration Office located in [address] in the [facility] building. When you are finished registering, please go to [address] and have a seat. The technician will call your name for the test.            Sep 18, 2017  9:15 AM CDT   Return Visit with Michael Duckworth MD   [facility] [place] ([facility])    [address]  [facility] [id number] [phone]            Sep 25, 2017  9:00 AM CDT   [facility] with [facility] Alves MD   [facility]    [address] [facility] [address] [phone]            Dec 14, 2017 10:00 AM CST   LAB with CR LAB   [facility]    [address] [facility] [facility] [address] [phone]           Patient must bring picture ID.  Patient should be prepared to give a urine specimen  Please do not  eat 10-12 hours before your appointment if you are coming in fasting for labs on lipids, cholesterol, or glucose (sugar).  Pregnant women should follow their Care Team instructions. Water with medications is okay. Do not drink coffee or other fluids.   If you have concerns about taking  your medications, please ask at office or if scheduling via LIFEMODELER, send a message by clicking on Secure Messaging, Message Your Care Team.            Dec 20, 2017 10:10 AM CST   (Arrive by 10:00 AM)   Return Visit with Krystian Owens MD   [facility]    [address]  [address]  [facility] [address] [phone]              Who to contact     If you have questions or need follow up information about today's clinic visit or your schedule please contact [facility] directly at [phone].  Normal or non-critical lab and imaging results will be communicated to you by [email or web address], letter or phone within 4 business days after the clinic has received the results. If you do not hear from us within 7 days, please contact the clinic through LIFXt or phone. If you have a critical or abnormal lab result, we will notify you by phone as soon as possible.  Submit refill requests through LIFEMODELER or call your pharmacy and they will forward the refill request to us. Please allow 3 business days for your refill to be completed.          Additional Information About Your Visit        [email or web address] Information     LIFEMODELER gives you secure access to your electronic health record. If you see a primary care provider, you can also send messages to your care team and make appointments. If you have questions, please call your primary care clinic.  If you do not have a primary care provider, please call [phone] and they will assist you.        Care EveryWhere ID     This is your Care EveryWhere ID. This could be used by other organizations to access your  "[facility] medical records  [id number]        Your Vitals Were     Height BMI (Body Mass Index)                5' 6.5\" (1.689 m) 27.63 kg/m2           Blood Pressure from Last 3 Encounters:   07/11/17 124/74   05/31/17 128/74   05/25/17 130/72    Weight from Last 3 Encounters:   07/11/17 173 lb 12.8 oz (78.8 kg)   05/25/17 175 lb 9.6 oz (79.7 kg)   05/22/17 176 lb (79.8 kg)              Today, you had the following     No orders found for display       Primary Care Provider Office Phone # Fax #    Oscar Alves [phone]       [facility] [address] [place] [address]        Equal Access to Services     JOSE BHAKTA : Hadii aad ku hadasho Soomaali, waaxda luqadaha, qaybta kaalmada adeegyada, chayito reyes . So [facility] [phone].    ATENCIÓN: Si habla español, tiene a randall disposición servicios gratuitos de asistencia lingüística. Gloria al [phone].    We comply with applicable federal civil rights laws and [place] laws. We do not discriminate on the basis of race, color, national origin, age, disability sex, sexual orientation or gender identity.            Thank you!     Thank you for choosing [facility]  for your care. Our goal is always to provide you with excellent care. Hearing back from our patients is one way we can continue to improve our services. Please take a few minutes to complete the written survey that you may receive in the mail after your visit with us. Thank you!             Your Updated Medication List - Protect others around you: Learn how to safely use, store and throw away your medicines at www.disposemymeds.org.          This list is accurate as of: 7/11/17  9:08 AM.  Always use your most recent med list.                   Brand Name Dispense Instructions for use Diagnosis    acetaminophen 650 MG CR tablet    TYLENOL    100 tablet    Take 1 tablet (650 mg) by mouth every 6 hours as needed for pain "    Osteoarthritis       apixaban ANTICOAGULANT 2.5 MG tablet    ELIQUIS    180 tablet    Take 1 tablet (2.5 mg) by mouth 2 times daily    Persistent atrial fibrillation (H)       aspirin 81 MG tablet      Take 81 mg by mouth daily        co-enzyme Q-10 100 MG Caps capsule     180 capsule    Take 1 capsule (100 mg) by mouth daily    Mixed hyperlipidemia       diclofenac 1 % Gel topical gel    VOLTAREN    100 g    Apply 4 gramsto area qid prn    Acute left-sided low back pain without sciatica       doxycycline 100 MG capsule    VIBRAMYCIN    90 capsule    TAKE 1 CAPSULE DAILY    Rosacea       ferrous gluconate 324 (38 FE) MG tablet    FERGON    270 tablet    Take 1 tablet (324 mg) by mouth 3 times daily (with meals)    Iron deficiency anemia secondary to inadequate dietary iron intake       fish oil-omega-3 fatty acids 1000 MG capsule    EQL FISH OIL    180 capsule    Take 1 capsule by mouth daily.    Hyperlipidemia LDL goal <100       Glucosamine-Chondroitin 250-200 MG Tabs    OSTEO BI-FLEX REGULAR STRENGTH    100 tablet    Take 1 tablet by mouth 2 times daily    Osteoarthritis       HYDROcodone-acetaminophen 5-325 MG per tablet    NORCO    20 tablet    Take 1-2 tablets by mouth every 4 hours as needed for other (Moderate to Severe Pain)    Recurrent right inguinal hernia       lisinopril 20 MG tablet    PRINIVIL/ZESTRIL    90 tablet    Take 1 tablet (20 mg) by mouth daily    HTN, goal below 150/90       MULTIVITAL Chew     360 tablet    Take 2 chew tab by mouth 2 times daily    Neuropathy of foot       omeprazole 40 MG capsule    priLOSEC    90 capsule    TAKE 1 CAPSULE DAILY    PUD (peptic ulcer disease)       SAW PALMETTO COMPLEX Caps     200 capsule    Take 1 capsule by mouth daily.    Essential hypertension, benign       senna 8.6 MG tablet    SENOKOT    60 tablet    Take 1 tablet by mouth daily    Prostate cancer (H)       simvastatin 20 MG tablet    ZOCOR    90 tablet    Take 1 tablet (20 mg) by mouth At  Bedtime    Hyperlipidemia LDL goal <100       sodium chloride 0.65 % nasal spray    OCEAN NASAL SPRAY    2 Bottle    Spray 1 spray into both nostrils daily as needed for congestion    Bloody nose       tamsulosin 0.4 MG capsule    FLOMAX    90 capsule    TAKE 1 CAPSULE DAILY    Benign prostatic hyperplasia with lower urinary tract symptoms, unspecified morphology

## 2017-07-11 NOTE — LETTER
"      7/11/2017         RE: Milo Serna  [address] [place] [address]        Dear Colleague,    Thank you for referring your patient, Milo Serna, to the [facility]. Please see a copy of my visit note below.    PATIENT HISTORY:  Milo Serna is a 76 year old male who presents to clinic for painful \"lesion\" on the right foot. Notes that he likes to walk but has been having hard time due to pain. Pain is 8/10. Shoes help some. bein off foot helps, walking makes it hurt more. Denies injury. Would like to know what can be done to get rid of the lesion and the pain. Also notes that his nails are thick and would like to know what can be done for that.     Review of Systems:  Patient denies fever, chills, rash, wound, stiffness, limping, numbness, weakness, heart burn, blood in stool, chest pain with activity, calf pain when walking, shortness of breath with activity, chronic cough, easy bleeding/bruising, swelling of ankles, excessive thirst, fatigue, depression, anxiety.       PAST MEDICAL HISTORY:   Past Medical History:   Diagnosis Date     Anemia, unspecified 11/8/2016     Atrial fibrillation (H) 5/27/2015     BPH (benign prostatic hyperplasia) 1/25/2012     CAD (coronary artery disease) 4/4/2012     Closed fracture of metatarsal bone 4/4/2012     Coronary atherosclerosis of unspecified type of vessel, native or [facility]    CAB 1996 following MI (at [facility])      Dilated aortic root (H) 5/26/2016     Drug-induced erectile dysfunction 10/2/2015     Elevated blood sugar 11/8/2016     Elevated PSA 9/19/2013     Essential hypertension with goal blood pressure less than 140/90 9/22/2016     Essential hypertension, benign      Gout      HAV (hallux abducto valgus) 4/4/2012     Hernia, abdominal      History of prostate cancer 6/15/2016     HTN, goal below 150/90 4/20/2017     Iron deficiency anemia secondary to inadequate dietary iron intake " 11/15/2016     Low blood pressure reading 10/17/2016     Lumbago     had degendisc dz on MRI 7/07     Mixed hyperlipidemia      Mumps      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     OA (osteoarthritis) 4/4/2012     Old myocardial infarction 4/4/2012     Palpitations      Pes planus 4/4/2012     Prostate cancer (H) 5/26/2016     PUD (peptic ulcer disease) 4/4/2012     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     Rosacea 1/15/2008     Thrombocytopenia (H) 4/21/2017     Unspecified hyperplasia of prostate with urinary obstruction and other lower urinary tract symptoms (LUTS)     better on avodart & hytrin     Venous stasis 4/4/2012     Venous stasis 4/4/2012        PAST SURGICAL HISTORY:   Past Surgical History:   Procedure Laterality Date     C NONSPECIFIC PROCEDURE      CAB 1996 [place]     C NONSPECIFIC PROCEDURE  2/07    l inguinal hernia repair      C NONSPECIFIC PROCEDURE      R hernia remote     C NONSPECIFIC PROCEDURE  2000    R ankle ORIF for fx     C NONSPECIFIC PROCEDURE  2005    nasal surgery      C NONSPECIFIC PROCEDURE      R rotater repair (remote)      C NONSPECIFIC PROCEDURE      appy 1966     C NONSPECIFIC PROCEDURE      sinus surgery x 2     C NONSPECIFIC PROCEDURE      L shoulder 6/09     CARDIAC SURGERY      bipass     CYSTOSCOPY FLEXIBLE, CYOABLATION PROSTATE N/A 11/9/2016    Procedure: CYSTOSCOPY FLEXIBLE, CRYOABLATION PROSTATE;  Surgeon: Krystian Owens MD;  Location:  OR     GENITOURINARY SURGERY      prostate surgery      HERNIA REPAIR       LAPAROSCOPIC HERNIORRHAPHY INGUINAL Right 5/10/2017    Procedure: LAPAROSCOPIC HERNIORRHAPHY INGUINAL;  Laparoscopic preperitoneal repair of right inguinal hernia with placement of underlay mesh;  Surgeon: Enrico Bridges MD;  Location: [facility] OR     PROSTATE SURGERY          MEDICATIONS:   Current Outpatient Prescriptions:      Urea 20 % CREA cream, Apply topically as needed Apply to right foot daily, Disp: 120 g, Rfl: 2     naftifine (NAFTIN) 1 % GEL topical gel, Apply  topically daily Apply to affected nails daily, Disp: 90 g, Rfl: 3     doxycycline (VIBRAMYCIN) 100 MG capsule, TAKE 1 CAPSULE DAILY, Disp: 90 capsule, Rfl: 2     tamsulosin (FLOMAX) 0.4 MG capsule, TAKE 1 CAPSULE DAILY, Disp: 90 capsule, Rfl: 2     omeprazole (PRILOSEC) 40 MG capsule, TAKE 1 CAPSULE DAILY, Disp: 90 capsule, Rfl: 2     ferrous gluconate (FERGON) 324 (38 FE) MG tablet, Take 1 tablet (324 mg) by mouth 3 times daily (with meals), Disp: 270 tablet, Rfl: 3     apixaban ANTICOAGULANT (ELIQUIS) 2.5 MG tablet, Take 1 tablet (2.5 mg) by mouth 2 times daily, Disp: 180 tablet, Rfl: 3     HYDROcodone-acetaminophen (NORCO) 5-325 MG per tablet, Take 1-2 tablets by mouth every 4 hours as needed for other (Moderate to Severe Pain) (Patient not taking: Reported on 5/31/2017), Disp: 20 tablet, Rfl: 0     lisinopril (PRINIVIL/ZESTRIL) 20 MG tablet, Take 1 tablet (20 mg) by mouth daily, Disp: 90 tablet, Rfl: 1     senna (SENOKOT) 8.6 MG tablet, Take 1 tablet by mouth daily, Disp: 60 tablet, Rfl: 0     diclofenac (VOLTAREN) 1 % GEL, Apply 4 gramsto area qid prn, Disp: 100 g, Rfl: 11     simvastatin (ZOCOR) 20 MG tablet, Take 1 tablet (20 mg) by mouth At Bedtime, Disp: 90 tablet, Rfl: 3     aspirin 81 MG tablet, Take 81 mg by mouth daily, Disp: , Rfl:      acetaminophen (TYLENOL) 650 MG CR tablet, Take 1 tablet (650 mg) by mouth every 6 hours as needed for pain, Disp: 100 tablet, Rfl: 11     co-enzyme Q-10 100 MG CAPS, Take 1 capsule (100 mg) by mouth daily, Disp: 180 capsule, Rfl: 3     sodium chloride (OCEAN NASAL SPRAY) 0.65 % nasal spray, Spray 1 spray into both nostrils daily as needed for congestion, Disp: 2 Bottle, Rfl: 6     Glucosamine-Chondroitin (OSTEO BI-FLEX REGULAR STRENGTH) 250-200 MG TABS, Take 1 tablet by mouth 2 times daily, Disp: 100 tablet, Rfl: 8     Multiple Vitamins-Minerals (MULTIVITAL) CHEW, Take 2 chew tab by mouth 2 times daily, Disp: 360 tablet, Rfl: 3     fish oil-omega-3 fatty acids (EQL FISH  "OIL) 1000 MG capsule, Take 1 capsule by mouth daily., Disp: 180 capsule, Rfl: 12     Zn-Pyg Afri-Nettle-Saw Palmet (SAW PALMETTO COMPLEX) CAPS, Take 1 capsule by mouth daily., Disp: 200 capsule, Rfl: PRN     ALLERGIES:  No Known Allergies     SOCIAL HISTORY:   Social History     Social History     Marital status:      Spouse name: N/A     Number of children: N/A     Years of education: N/A     Occupational History     Not on file.     Social History Main Topics     Smoking status: Former Smoker     Smokeless tobacco: Never Used      Comment: quit 3/1974     Alcohol use 0.0 oz/week     0 Standard drinks or equivalent per week      Comment: small amounts to moderate     Drug use: No     Sexual activity: Yes     Partners: Female     Other Topics Concern     Caffeine Concern Yes     1 cups of coffee and soda per day     Special Diet No     Exercise Yes     walking     Seat Belt Yes     Social History Narrative        FAMILY HISTORY:   Family History   Problem Relation Age of Onset     CANCER Mother      stomach cancer,  age 61     HEART DISEASE Father      MI,  age 71     HEART DISEASE Brother      stent placement, RA      Hypertension Brother      HEART DISEASE Sister      rythmn problems with heart, hypertension        EXAM:Vitals: /74  Ht 5' 6.5\" (1.689 m)  Wt 173 lb 12.8 oz (78.8 kg)  BMI 27.63 kg/m2  BMI= Body mass index is 27.63 kg/(m^2).    General appearance: Patient is alert and fully cooperative with history & exam.  No sign of distress is noted during the visit.     Psychiatric: Affect is pleasant & appropriate.  Patient appears motivated to improve health.     Respiratory: Breathing is regular & unlabored while sitting.     HEENT: Hearing is intact to spoken word.  Speech is clear.  No gross evidence of visual impairment that would impact ambulation.     Dermatologic: localized hyperkeratotic lesion plantar right arch. No open lesions noted. No redness or signs of infection. " Both great toenails are thicken and present with subungual debri.     Vascular: DP & PT pulses are intact & regular bilaterally. edema and varicosities noted.  CFT and skin temperature is normal to both lower extremities.     Neurologic: Lower extremity sensation is diminished.     Musculoskeletal: Patient is ambulatory without assistive device or brace.  Decrease arch height. Lateral deviation of the right hallux.      ASSESSMENT:    Right foot pain  Skin callus  Pes planus of both feet  Venous insufficiency of both lower extremities  HAV (hallux abducto valgus), right  Onychomycosis of toenail     PLAN:  Reviewed patient's chart in epic. Discussed causes of keratomas.  They are due to areas of increase friction.  Hammertoes can create these as they put more pressure to the metatarsal head.  Discussed treatments such as using foot file, pumice stone, metatarsal pads, orthotics, and not walking barefoot.     Discussed causes and treatments of nail fungus.  Explained that even if a culture comes back negative, a patient could still have nail fungus.  Discussed treatment options with patient and explained that there isn't one treatment that is 100% effective.  Discussed oral lamisil which is the most effective at about 70% but which can have liver effects.  Explained that if she wanted to try this that she would need serial blood draws to test her liver function.  Discussed over the counter antifungal creams.  Explained that these are about 50% effective and need to be applied twice a day for about 3-4 months.  Also talked about prescription penlac which is a nail laquer.  Again this is also only 50% effective.  Also discussed that if there was damage to the nail and the nail is now dystrophic that non of the above is going to change the nail.  If there was damage, there is note anything that can be done for the nail to correct it.  Discussed that if it becomes painful, we can remove the nail in clinic.         at  this time, recommend different shoes as his appear to rub in area, urea cream, foot file and was given prescription for anti fungal cream.     Zulay Alexandra DPM, Podiatry/Foot and Ankle Surgery    Weight management plan: Patient was referred to their PCP to discuss a diet and exercise plan.      Again, thank you for allowing me to participate in the care of your patient.        Sincerely,        Zulay Alexandra DPM, Podiatry/Foot and Ankle Surgery

## 2017-07-11 NOTE — PROGRESS NOTES
"PATIENT HISTORY:  Milo Serna is a 76 year old male who presents to clinic for painful \"lesion\" on the right foot. Notes that he likes to walk but has been having hard time due to pain. Pain is 8/10. Shoes help some. bein off foot helps, walking makes it hurt more. Denies injury. Would like to know what can be done to get rid of the lesion and the pain. Also notes that his nails are thick and would like to know what can be done for that.     Review of Systems:  Patient denies fever, chills, rash, wound, stiffness, limping, numbness, weakness, heart burn, blood in stool, chest pain with activity, calf pain when walking, shortness of breath with activity, chronic cough, easy bleeding/bruising, swelling of ankles, excessive thirst, fatigue, depression, anxiety.       PAST MEDICAL HISTORY:   Past Medical History:   Diagnosis Date     Anemia, unspecified 11/8/2016     Atrial fibrillation (H) 5/27/2015     BPH (benign prostatic hyperplasia) 1/25/2012     CAD (coronary artery disease) 4/4/2012     Closed fracture of metatarsal bone 4/4/2012     Coronary atherosclerosis of unspecified type of vessel, native or graft [facility]    CAB 1996 following MI (at [facility])      Dilated aortic root (H) 5/26/2016     Drug-induced erectile dysfunction 10/2/2015     Elevated blood sugar 11/8/2016     Elevated PSA 9/19/2013     Essential hypertension with goal blood pressure less than 140/90 9/22/2016     Essential hypertension, benign      Gout      HAV (hallux abducto valgus) 4/4/2012     Hernia, abdominal      History of prostate cancer 6/15/2016     HTN, goal below 150/90 4/20/2017     Iron deficiency anemia secondary to inadequate dietary iron intake 11/15/2016     Low blood pressure reading 10/17/2016     Lumbago     had degendisc dz on MRI 7/07     Mixed hyperlipidemia      Mumps      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     OA (osteoarthritis) 4/4/2012     Old myocardial infarction 4/4/2012     Palpitations      Pes " planus 4/4/2012     Prostate cancer (H) 5/26/2016     PUD (peptic ulcer disease) 4/4/2012     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     Rosacea 1/15/2008     Thrombocytopenia (H) 4/21/2017     Unspecified hyperplasia of prostate with urinary obstruction and other lower urinary tract symptoms (LUTS)     better on avodart & hytrin     Venous stasis 4/4/2012     Venous stasis 4/4/2012        PAST SURGICAL HISTORY:   Past Surgical History:   Procedure Laterality Date     C NONSPECIFIC PROCEDURE      CAB 1996 [place]     C NONSPECIFIC PROCEDURE  2/07    l inguinal hernia repair      C NONSPECIFIC PROCEDURE      R hernia remote     C NONSPECIFIC PROCEDURE  2000    R ankle ORIF for fx     C NONSPECIFIC PROCEDURE  2005    nasal surgery      C NONSPECIFIC PROCEDURE      R rotater repair (remote)      C NONSPECIFIC PROCEDURE      appy 1966     C NONSPECIFIC PROCEDURE      sinus surgery x 2     C NONSPECIFIC PROCEDURE      L shoulder 6/09     CARDIAC SURGERY      bipass     CYSTOSCOPY FLEXIBLE, CYOABLATION PROSTATE N/A 11/9/2016    Procedure: CYSTOSCOPY FLEXIBLE, CRYOABLATION PROSTATE;  Surgeon: Krystian Owens MD;  Location: [facility] OR     GENITOURINARY SURGERY      prostate surgery      HERNIA REPAIR       LAPAROSCOPIC HERNIORRHAPHY INGUINAL Right 5/10/2017    Procedure: LAPAROSCOPIC HERNIORRHAPHY INGUINAL;  Laparoscopic preperitoneal repair of right inguinal hernia with placement of underlay mesh;  Surgeon: Enrico Bridges MD;  Location: [facility] OR     PROSTATE SURGERY          MEDICATIONS:   Current Outpatient Prescriptions:      Urea 20 % CREA cream, Apply topically as needed Apply to right foot daily, Disp: 120 g, Rfl: 2     naftifine (NAFTIN) 1 % GEL topical gel, Apply topically daily Apply to affected nails daily, Disp: 90 g, Rfl: 3     doxycycline (VIBRAMYCIN) 100 MG capsule, TAKE 1 CAPSULE DAILY, Disp: 90 capsule, Rfl: 2     tamsulosin (FLOMAX) 0.4 MG capsule, TAKE 1 CAPSULE DAILY, Disp: 90 capsule, Rfl: 2     omeprazole  (PRILOSEC) 40 MG capsule, TAKE 1 CAPSULE DAILY, Disp: 90 capsule, Rfl: 2     ferrous gluconate (FERGON) 324 (38 FE) MG tablet, Take 1 tablet (324 mg) by mouth 3 times daily (with meals), Disp: 270 tablet, Rfl: 3     apixaban ANTICOAGULANT (ELIQUIS) 2.5 MG tablet, Take 1 tablet (2.5 mg) by mouth 2 times daily, Disp: 180 tablet, Rfl: 3     HYDROcodone-acetaminophen (NORCO) 5-325 MG per tablet, Take 1-2 tablets by mouth every 4 hours as needed for other (Moderate to Severe Pain) (Patient not taking: Reported on 5/31/2017), Disp: 20 tablet, Rfl: 0     lisinopril (PRINIVIL/ZESTRIL) 20 MG tablet, Take 1 tablet (20 mg) by mouth daily, Disp: 90 tablet, Rfl: 1     senna (SENOKOT) 8.6 MG tablet, Take 1 tablet by mouth daily, Disp: 60 tablet, Rfl: 0     diclofenac (VOLTAREN) 1 % GEL, Apply 4 gramsto area qid prn, Disp: 100 g, Rfl: 11     simvastatin (ZOCOR) 20 MG tablet, Take 1 tablet (20 mg) by mouth At Bedtime, Disp: 90 tablet, Rfl: 3     aspirin 81 MG tablet, Take 81 mg by mouth daily, Disp: , Rfl:      acetaminophen (TYLENOL) 650 MG CR tablet, Take 1 tablet (650 mg) by mouth every 6 hours as needed for pain, Disp: 100 tablet, Rfl: 11     co-enzyme Q-10 100 MG CAPS, Take 1 capsule (100 mg) by mouth daily, Disp: 180 capsule, Rfl: 3     sodium chloride (OCEAN NASAL SPRAY) 0.65 % nasal spray, Spray 1 spray into both nostrils daily as needed for congestion, Disp: 2 Bottle, Rfl: 6     Glucosamine-Chondroitin (OSTEO BI-FLEX REGULAR STRENGTH) 250-200 MG TABS, Take 1 tablet by mouth 2 times daily, Disp: 100 tablet, Rfl: 8     Multiple Vitamins-Minerals (MULTIVITAL) CHEW, Take 2 chew tab by mouth 2 times daily, Disp: 360 tablet, Rfl: 3     fish oil-omega-3 fatty acids (EQL FISH OIL) 1000 MG capsule, Take 1 capsule by mouth daily., Disp: 180 capsule, Rfl: 12     Zn-Pyg Afri-Nettle-Saw Palmet (SAW PALMETTO COMPLEX) CAPS, Take 1 capsule by mouth daily., Disp: 200 capsule, Rfl: PRN     ALLERGIES:  No Known Allergies     SOCIAL HISTORY:  "  Social History     Social History     Marital status:      Spouse name: N/A     Number of children: N/A     Years of education: N/A     Occupational History     Not on file.     Social History Main Topics     Smoking status: Former Smoker     Smokeless tobacco: Never Used      Comment: quit 3/1974     Alcohol use 0.0 oz/week     0 Standard drinks or equivalent per week      Comment: small amounts to moderate     Drug use: No     Sexual activity: Yes     Partners: Female     Other Topics Concern     Caffeine Concern Yes     1 cups of coffee and soda per day     Special Diet No     Exercise Yes     walking     Seat Belt Yes     Social History Narrative        FAMILY HISTORY:   Family History   Problem Relation Age of Onset     CANCER Mother      stomach cancer,  age 61     HEART DISEASE Father      MI,  age 71     HEART DISEASE Brother      stent placement, RA      Hypertension Brother      HEART DISEASE Sister      rythmn problems with heart, hypertension        EXAM:Vitals: /74  Ht 5' 6.5\" (1.689 m)  Wt 173 lb 12.8 oz (78.8 kg)  BMI 27.63 kg/m2  BMI= Body mass index is 27.63 kg/(m^2).    General appearance: Patient is alert and fully cooperative with history & exam.  No sign of distress is noted during the visit.     Psychiatric: Affect is pleasant & appropriate.  Patient appears motivated to improve health.     Respiratory: Breathing is regular & unlabored while sitting.     HEENT: Hearing is intact to spoken word.  Speech is clear.  No gross evidence of visual impairment that would impact ambulation.     Dermatologic: localized hyperkeratotic lesion plantar right arch. No open lesions noted. No redness or signs of infection. Both great toenails are thicken and present with subungual debri.     Vascular: DP & PT pulses are intact & regular bilaterally. edema and varicosities noted.  CFT and skin temperature is normal to both lower extremities.     Neurologic: Lower extremity " sensation is diminished.     Musculoskeletal: Patient is ambulatory without assistive device or brace.  Decrease arch height. Lateral deviation of the right hallux.      ASSESSMENT:    Right foot pain  Skin callus  Pes planus of both feet  Venous insufficiency of both lower extremities  HAV (hallux abducto valgus), right  Onychomycosis of toenail     PLAN:  Reviewed patient's chart in epic. Discussed causes of keratomas.  They are due to areas of increase friction.  Hammertoes can create these as they put more pressure to the metatarsal head.  Discussed treatments such as using foot file, pumice stone, metatarsal pads, orthotics, and not walking barefoot.     Discussed causes and treatments of nail fungus.  Explained that even if a culture comes back negative, a patient could still have nail fungus.  Discussed treatment options with patient and explained that there isn't one treatment that is 100% effective.  Discussed oral lamisil which is the most effective at about 70% but which can have liver effects.  Explained that if she wanted to try this that she would need serial blood draws to test her liver function.  Discussed over the counter antifungal creams.  Explained that these are about 50% effective and need to be applied twice a day for about 3-4 months.  Also talked about prescription penlac which is a nail laquer.  Again this is also only 50% effective.  Also discussed that if there was damage to the nail and the nail is now dystrophic that non of the above is going to change the nail.  If there was damage, there is note anything that can be done for the nail to correct it.  Discussed that if it becomes painful, we can remove the nail in clinic.         at this time, recommend different shoes as his appear to rub in area, urea cream, foot file and was given prescription for anti fungal cream.     Zulay Alexandra DPM, Podiatry/Foot and Ankle Surgery    Weight management plan: Patient was referred to their PCP  to discuss a diet and exercise plan.

## 2017-08-07 DIAGNOSIS — I48.19 PERSISTENT ATRIAL FIBRILLATION (H): ICD-10-CM

## 2017-08-07 DIAGNOSIS — E78.5 HYPERLIPIDEMIA LDL GOAL <100: ICD-10-CM

## 2017-08-08 NOTE — TELEPHONE ENCOUNTER
Simvastatin       Last Written Prescription Date: 09/22/2016  Last Fill Quantity: 180,06/05/2017 # refills: 3    Last Office Visit with [facility] [place] [place] provider:  5/25/2017-Dr Alves   Future Office Visit:    Next 5 appointments (look out 90 days)     Sep 18, 2017  9:15 AM CDT   Return Visit with Michael Duckworth MD   [facility] [place] ([facility]    [address]  [facility] [id number] [phone]            Sep 25, 2017  9:00 AM CDT   SHORT with Oscar Alves MD   [facility]    [address] [place] [address] [phone]                  Cholesterol   Date Value Ref Range Status   10/02/2015 122 <200 mg/dL Final     Comment:     LDL Cholesterol is the primary guide to therapy.   The NCEP recommends further evaluation of: patients with cholesterol greater   than 200 mg/dL if additional risk factors are present, cholesterol greater   than   240 mg/dL, triglycerides greater than 150 mg/dL, or HDL less than 40 mg/dL.       HDL Cholesterol   Date Value Ref Range Status   10/02/2015 48 >40 mg/dL Final     LDL Cholesterol Calculated   Date Value Ref Range Status   10/02/2015 63 0 - 129 mg/dL Final     Comment:     LDL Cholesterol is the primary guide to therapy: LDL-cholesterol goal in high   risk patients is <100 mg/dL and in very high risk patients is <70 mg/dL.       Triglycerides   Date Value Ref Range Status   10/02/2015 53 0 - 150 mg/dL Final     Comment:     Fasting specimen     Cholesterol/HDL Ratio   Date Value Ref Range Status   10/02/2015 2.5 0.0 - 5.0 Final     ALT   Date Value Ref Range Status   09/18/2013 37 0 - 70 U/L Final        Eliquis          Last Written Prescription Date: 05/11/2017  Last Fill Quantity: 180, # refills: 3    Last Office Visit with [facility] [place] [place] provider:  05/25/2017-Dr Alves   Future Office Visit:    Next 5  appointments (look out 90 days)     Sep 18, 2017  9:15 AM CDT   Return Visit with Michael Duckworth MD   [facility] [place] ([facility])    [address]  [facility] [address] [phone]            Sep 25, 2017  9:00 AM CDT   SHORT with Oscar Alves MD   [facility]    [address] [place] [address] [phone]                   Lab Results   Component Value Date    WBC 4.3 04/20/2017     Lab Results   Component Value Date    RBC 4.07 04/20/2017     Lab Results   Component Value Date    HGB 12.7 04/20/2017     Lab Results   Component Value Date    HCT 38.3 04/20/2017     No components found for: MCT  Lab Results   Component Value Date    MCV 94 04/20/2017     Lab Results   Component Value Date    MCH 31.2 04/20/2017     Lab Results   Component Value Date    MCHC 33.2 04/20/2017     Lab Results   Component Value Date    RDW 14.6 04/20/2017     Lab Results   Component Value Date     04/20/2017     Lab Results   Component Value Date    AST 34 09/18/2013     Lab Results   Component Value Date    ALT 37 09/18/2013     Creatinine   Date Value Ref Range Status   04/20/2017 0.87 0.66 - 1.25 mg/dL Final   ]

## 2017-08-09 RX ORDER — APIXABAN 2.5 MG/1
TABLET, FILM COATED ORAL
Qty: 180 TABLET | Refills: 1 | Status: SHIPPED | OUTPATIENT
Start: 2017-08-09 | End: 2018-02-22

## 2017-08-09 RX ORDER — SIMVASTATIN 20 MG
TABLET ORAL
Qty: 90 TABLET | Refills: 0 | Status: SHIPPED | OUTPATIENT
Start: 2017-08-09 | End: 2017-10-11

## 2017-08-09 NOTE — TELEPHONE ENCOUNTER
Simvastatin-labs not current.  Do you want fasting labs at 9/25/17 visit or just non-fasting?  Does have 9 am so I can send letter to come fasting.  Sent to provider.  Please advise.  Eliquis-Medication approved per standing orders.     Rebeka Gale RN    5/25/17  Patient Instructions   Follow up in October before you leave for [place]     The information in this document, created by the medical scribe for me, accurately reflects the services I personally performed and the decisions made by me. I have reviewed and approved this document for accuracy.   MD Oscar Fairbanks MD  [facility]

## 2017-09-14 ENCOUNTER — PRE VISIT (OUTPATIENT)
Dept: CARDIOLOGY | Facility: CLINIC | Age: 77
End: 2017-09-14

## 2017-09-14 ENCOUNTER — HOSPITAL ENCOUNTER (OUTPATIENT)
Dept: CARDIOLOGY | Facility: CLINIC | Age: 77
Discharge: HOME | End: 2017-09-14
Attending: INTERNAL MEDICINE | Admitting: INTERNAL MEDICINE
Payer: MEDICARE

## 2017-09-14 DIAGNOSIS — I71.20 THORACIC AORTIC ANEURYSM WITHOUT RUPTURE (H): ICD-10-CM

## 2017-09-14 DIAGNOSIS — I34.0 NON-RHEUMATIC MITRAL REGURGITATION: ICD-10-CM

## 2017-09-14 PROCEDURE — 93306 TTE W/DOPPLER COMPLETE: CPT

## 2017-09-14 PROCEDURE — 93306 TTE W/DOPPLER COMPLETE: CPT | Mod: 26 | Performed by: INTERNAL MEDICINE

## 2017-09-18 ENCOUNTER — OFFICE VISIT (OUTPATIENT)
Dept: CARDIOLOGY | Facility: CLINIC | Age: 77
End: 2017-09-18
Attending: INTERNAL MEDICINE
Payer: MEDICARE

## 2017-09-18 VITALS
BODY MASS INDEX: 28.93 KG/M2 | SYSTOLIC BLOOD PRESSURE: 138 MMHG | DIASTOLIC BLOOD PRESSURE: 70 MMHG | WEIGHT: 184.3 LBS | HEART RATE: 52 BPM | HEIGHT: 67 IN

## 2017-09-18 DIAGNOSIS — I25.10 CORONARY ARTERY DISEASE INVOLVING NATIVE CORONARY ARTERY OF NATIVE HEART WITHOUT ANGINA PECTORIS: ICD-10-CM

## 2017-09-18 DIAGNOSIS — I27.20 PULMONARY HYPERTENSION (H): ICD-10-CM

## 2017-09-18 DIAGNOSIS — I71.20 THORACIC AORTIC ANEURYSM WITHOUT RUPTURE (H): ICD-10-CM

## 2017-09-18 DIAGNOSIS — I42.9 SECONDARY CARDIOMYOPATHY (H): Primary | ICD-10-CM

## 2017-09-18 PROCEDURE — 99214 OFFICE O/P EST MOD 30 MIN: CPT | Performed by: INTERNAL MEDICINE

## 2017-09-18 RX ORDER — DIAZEPAM 5 MG
5 TABLET ORAL ONCE
Qty: 1 TABLET | Refills: 0 | Status: SHIPPED | OUTPATIENT
Start: 2017-09-18 | End: 2017-09-18

## 2017-09-18 NOTE — PROGRESS NOTES
REASON FOR CLINIC VISIT:  Follow-up thoracic aorta dilatation, CAD, atrial fibrillation.      HISTORY OF PRESENT ILLNESS:  Mr. Serna is a very pleasant 76-year-old gentleman with history of CAD with history of CABG in 1986, chronic atrial fibrillation with [id number]-YVPd score of 4, intentionally on low dose apixaban in view of history of epistaxis, dyslipidemia, hypertension, mild to moderately dilated ascending aorta who is here for annual followup.  The patient had a repeat echocardiogram done last week that reported a normal LVEF, restrictive filling pattern.  RV was severely dilated with mildly reduced RV systolic function.  There was 2+ mitral regurgitation.  There was 1+ tricuspid regurgitation, elevated RVSP with RVSP of 31 mmHg plus RA and IVC was dilated.  Aortic root 4.1 cm was 4 cm last year and ascending aorta 4.5 cm was 4.2 cm last year.  These echocardiogram images were personally reviewed by me and RV does appear more enlarged compared to last year's study.  The patient tells me overall he is doing well.  He walks 2-4 hours a week.  Overall, 4-5 times a week.  No chest discomfort or shortness of breath with physical activity.  He has intentionally lost about 10 pounds of weight since we met last year.  He does notice some lower extremity edema, especially at the end of the day.  He is presently on 2.5 mg b.i.d. of apixaban, baby aspirin, lisinopril 20 mg daily, simvastatin 20 mg daily, LDL is well controlled at 63.  No history of PE or DVT.  No history of tobacco abuse.      PHYSICAL EXAMINATION:   VITAL SIGNS:  Blood pressure 138/70, heart rate 52 and regular, weight 184 pounds, BMI 29.36.   GENERAL:  The patient appears pleasant, comfortable.   NECK:  JVP elevated to around 12 cm.   CARDIOVASCULAR SYSTEM:  Irregular, normal rate.  There is grade II/VI systolic murmur heard over the apex.  No S3, S4, rub or gallop.   RESPIRATORY SYSTEM:  Clear to auscultation bilaterally.   ABDOMEN:  Soft,  nontender.   EXTREMITIES:  Minimal to 1+ pitting pedal edema with some chronic venous stasis changes.   PSYCH:  Normal affect.   SKIN:  Some bruises seen over bilateral forearms.   HEENT:  No pallor or icterus.      IMPRESSION AND PLAN:  A very pleasant 76-year-old gentleman with history of CAD with history of CABG more than 30 years ago, chronic atrial fibrillation, NAE4XC9-XSBd score of 4, on apixaban for stroke prophylaxis, systemic hypertension, pulmonary hypertension, moderate dilated ascending aorta and mildly dilated aortic root and with evidence of enlargement of RV with mildly reduced RV systolic function with elevated CVP assessment on examination as well as on echocardiogram with mild pedal edema.  He does not appear in any decompensated congestive heart failure either left or right-sided and no symptoms concerning for angina.  One possibility of RV enlargement and RV systolic dysfunction could be due to pulmonary hypertension which itself could be due to elevated filling pressure on the left side.  There is also some increase in ascending aorta size.  At this time, I recommend doing a cardiac MRI along with MRA angiogram to have a better look at the RV size and RV function as well as to more comprehensively measure the aortic dimensions.  The patient does tell me that during the time of his bypass surgery there was some aorta complication and he had a patch repair of the aorta.  Regarding RV enlargement and possible pulmonary hypertension as the cause of it, I recommend a review in Pulmonary Hypertension clinic to see if further workup like right heart catheterization is warranted.  At this time, I am setting up a follow up in a year with an echocardiogram but my office is going to update him with the results of the cardiac MRI and the MRA angiogram.         DOUG ROSA MD             D: 09/18/2017 10:21   T: 09/18/2017 12:30   MT: viviane      Name:     SAÚL GONZALEZ   MRN:      [id number]         Account:      [id number]   :      1940           Service Date: 2017      Document: [id number]

## 2017-09-18 NOTE — MR AVS SNAPSHOT
After Visit Summary   9/18/2017    Milo Serna    MRN: [id number]           Patient Information     Date Of Birth          1940        Visit Information        Provider Department      9/18/2017 9:15 AM Michael Duckworht MD [facility] AT [place]        Today's Diagnoses     Secondary cardiomyopathy (H)    -  1    Thoracic aortic aneurysm without rupture (H)        Pulmonary hypertension (H)        Coronary artery disease involving native coronary artery of native heart without angina pectoris           Follow-ups after your visit        Additional Services     Follow-Up with Pulmonary Hypertension Clinic           Follow-Up with Cardiologist                 Your next 10 appointments already scheduled     Sep 25, 2017  9:00 AM CDT   SHORT with Oscar Alves MD   [facility] ([facility])    [address] [address] [phone]            Sep 28, 2017  9:30 AM CDT   MR MYOCARDIUM W CONTRAST with SCIMR1   [facility] (Cardiovascular Imaging at [facility])    [address] [place] [address]  [address]  [facility] [id number] [phone]           Take your medicines as usual, unless your doctor tells you not to. Bring a list of your current medicines to your exam (including vitamins, minerals and over-the-counter drugs).  You will be given intravenous contrast for this exam. To prepare:   The day before your exam, drink extra fluids at least six 8-ounce glasses (unless your doctor tells you to restrict your fluids).   Have a blood test (creatinine test) within 30 days of your exam. Go to your clinic or Diagnostic Imaging Department for this test.  The MRI machine uses a strong magnet. Please wear clothes without metal (snaps, zippers). A sweatsuit works well, or we may give you a hospital gown.  Please remove any body piercings and hair extensions before you arrive. You  will also remove watches, jewelry, hairpins, wallets, dentures, partial dental plates and hearing aids. You may wear contact lenses, and you may be able to wear your rings. We have a safe place to keep your personal items, but it is safer to leave them at home.   **IMPORTANT** THE INSTRUCTIONS BELOW ARE ONLY FOR THOSE PATIENTS WHO HAVE BEEN TOLD THEY WILL RECEIVE SEDATION OR GENERAL ANESTHESIA DURING THEIR MRI PROCEDURE:  IF YOU WILL RECEIVE SEDATION (take medicine to help you relax during your exam):   You must get the medicine from your doctor before you arrive. Bring the medicine to the exam. Do not take it at home.   Arrive one hour early. Bring someone who can take you home after the test. Your medicine will make you sleepy. After the exam, you may not drive, take a bus or take a taxi by yourself.   No eating 8 hours before your exam. You may have clear liquids up until 4 hours before your exam. (Clear liquids include water, clear tea, black coffee and fruit juice without pulp.)  IF YOU WILL RECEIVE ANESTHESIA (be asleep for your exam):   Arrive 1 1/2 hours early. Bring someone who can take you home after the test. You may not drive, take a bus or take a taxi by yourself.   No eating 8 hours before your exam. You may have clear liquids up until 4 hours before your exam. (Clear liquids include water, clear tea, black coffee and fruit juice without pulp.)  Please call the Imaging Department at your exam site with any questions.            Sep 28, 2017 11:00 AM CDT   MR CHEST W/O & W CONTRAST ANGIOGRAM with SCIMR1   [facility] (Cardiovascular Imaging at [facility])    [address] [place] [address]  [address] [id number] [phone]           Take your medicines as usual, unless your doctor tells you not to. Bring a list of your current medicines to your exam (including vitamins, minerals and over-the-counter drugs).  You will be given intravenous contrast for this exam. To  prepare:   The day before your exam, drink extra fluids at least six 8-ounce glasses (unless your doctor tells you to restrict your fluids).   Have a blood test (creatinine test) within 30 days of your exam. Go to your clinic or Diagnostic Imaging Department for this test.  The MRI machine uses a strong magnet. Please wear clothes without metal (snaps, zippers). A sweatsuit works well, or we may give you a hospital gown.  Please remove any body piercings and hair extensions before you arrive. You will also remove watches, jewelry, hairpins, wallets, dentures, partial dental plates and hearing aids. You may wear contact lenses, and you may be able to wear your rings. We have a safe place to keep your personal items, but it is safer to leave them at home.   **IMPORTANT** THE INSTRUCTIONS BELOW ARE ONLY FOR THOSE PATIENTS WHO HAVE BEEN TOLD THEY WILL RECEIVE SEDATION OR GENERAL ANESTHESIA DURING THEIR MRI PROCEDURE:  IF YOU WILL RECEIVE SEDATION (take medicine to help you relax during your exam):   You must get the medicine from your doctor before you arrive. Bring the medicine to the exam. Do not take it at home.   Arrive one hour early. Bring someone who can take you home after the test. Your medicine will make you sleepy. After the exam, you may not drive, take a bus or take a taxi by yourself.   No eating 8 hours before your exam. You may have clear liquids up until 4 hours before your exam. (Clear liquids include water, clear tea, black coffee and fruit juice without pulp.)  IF YOU WILL RECEIVE ANESTHESIA (be asleep for your exam):   Arrive 1 1/2 hours early. Bring someone who can take you home after the test. You may not drive, take a bus or take a taxi by yourself.   No eating 8 hours before your exam. You may have clear liquids up until 4 hours before your exam. (Clear liquids include water, clear tea, black coffee and fruit juice without pulp.)  Please call the Imaging Department at your exam site with any  questions.            Sep 28, 2017  1:45 PM CDT   Pulmonary Hypertension New with Sixto Conley MD   [facility] AT [place] ([facility])    [address]  [place] [id number] [phone]            Dec 14, 2017 10:00 AM CST   LAB with CR LAB   [facility] ([facility])    [address] [facility] [address] [phone]           Patient must bring picture ID. Patient should be prepared to give a urine specimen  Please do not eat 10-12 hours before your appointment if you are coming in fasting for labs on lipids, cholesterol, or glucose (sugar). Pregnant women should follow their Care Team instructions. Water with medications is okay. Do not drink coffee or other fluids. If you have concerns about taking  your medications, please ask at office or if scheduling via [employer], send a message by clicking on Secure Messaging, Message Your Care Team.            Dec 20, 2017 10:10 AM CST   (Arrive by 10:00 AM)   Return Visit with Krystian Owens MD   [facility] [place] ([facility] [place])    [address]  [address]  [facility] [id number] [phone]              Future tests that were ordered for you today     Open Future Orders        Priority Expected Expires Ordered    Follow-Up with Cardiologist Routine 9/18/2018 9/19/2018 9/18/2017    Echocardiogram Routine 9/18/2018 9/19/2018 9/18/2017    Follow-Up with Pulmonary Hypertension Clinic Routine  9/18/2018 9/18/2017    MRI Angiogram chest w & w/o contrast Routine 9/19/2017 9/18/2018 9/18/2017    MRI Cardiac w/contrast Routine 9/19/2017 9/18/2018 9/18/2017            Who to contact     If you have questions or need follow up information about today's clinic visit or your schedule please contact [facility] [place] directly at [phone].  Normal or non-critical lab  "and imaging results will be communicated to you by MyChart, letter or phone within 4 business days after the clinic has received the results. If you do not hear from us within 7 days, please contact the clinic through [employer] or phone. If you have a critical or abnormal lab result, we will notify you by phone as soon as possible.  Submit refill requests through [employer] or call your pharmacy and they will forward the refill request to us. Please allow 3 business days for your refill to be completed.          Additional Information About Your Visit        [employer] Information     [employer] gives you secure access to your electronic health record. If you see a primary care provider, you can also send messages to your care team and make appointments. If you have questions, please call your primary care clinic.  If you do not have a primary care provider, please call [phone] and they will assist you.        Care EveryWhere ID     This is your Care EveryWhere ID. This could be used by other organizations to access your [place] medical records  [id number]        Your Vitals Were     Pulse Height BMI (Body Mass Index)             52 1.689 m (5' 6.5\") 29.3 kg/m2          Blood Pressure from Last 3 Encounters:   09/18/17 138/70   07/11/17 124/74   05/31/17 128/74    Weight from Last 3 Encounters:   09/18/17 83.6 kg (184 lb 4.8 oz)   07/11/17 78.8 kg (173 lb 12.8 oz)   05/25/17 79.7 kg (175 lb 9.6 oz)                 Today's Medication Changes          These changes are accurate as of: 9/18/17 10:26 AM.  If you have any questions, ask your nurse or doctor.               Start taking these medicines.        Dose/Directions    diazepam 5 MG tablet   Commonly known as:  VALIUM   Used for:  Secondary cardiomyopathy (H)   Started by:  Michael Duckworth MD        Dose:  5 mg   Take 1 tablet (5 mg) by mouth once for 1 dose   Quantity:  1 tablet   Refills:  0            Where to get your medicines      Some of these will need a " paper prescription and others can be bought over the counter.  Ask your nurse if you have questions.     Bring a paper prescription for each of these medications     diazepam 5 MG tablet                Primary Care Provider Office Phone # Fax #    Oscar Alves [phone] [address]  [facility] [address]        Equal Access to Services     JOSE BHAKTA : Hadii aad ku hadasho Soomaali, waaxda luqadaha, qaybta kaalmada adeegyada, waxay idiin hayaan adeeg kharash lacolbyn . So [facility] [phone].    ATENCIÓN: Si habla español, tiene a randall disposición servicios gratuitos de asistencia lingüística. Llame al [phone].    We comply with applicable federal civil rights laws and [place] laws. We do not discriminate on the basis of race, color, national origin, age, disability sex, sexual orientation or gender identity.            Thank you!     Thank you for choosing [facility] AT [place]  for your care. Our goal is always to provide you with excellent care. Hearing back from our patients is one way we can continue to improve our services. Please take a few minutes to complete the written survey that you may receive in the mail after your visit with us. Thank you!             Your Updated Medication List - Protect others around you: Learn how to safely use, store and throw away your medicines at www.disposemymeds.org.          This list is accurate as of: 9/18/17 10:26 AM.  Always use your most recent med list.                   Brand Name Dispense Instructions for use Diagnosis    acetaminophen 650 MG CR tablet    TYLENOL    100 tablet    Take 1 tablet (650 mg) by mouth every 6 hours as needed for pain    Osteoarthritis       aspirin 81 MG tablet      Take 81 mg by mouth daily        co-enzyme Q-10 100 MG Caps capsule     180 capsule    Take 1 capsule (100 mg) by mouth daily    Mixed hyperlipidemia       diazepam 5 MG tablet    VALIUM    1 tablet    Take 1  tablet (5 mg) by mouth once for 1 dose    Secondary cardiomyopathy (H)       diclofenac 1 % Gel topical gel    VOLTAREN    100 g    Apply 4 gramsto area qid prn    Acute left-sided low back pain without sciatica       doxycycline 100 MG capsule    VIBRAMYCIN    90 capsule    TAKE 1 CAPSULE DAILY    Rosacea       ELIQUIS 2.5 MG tablet   Generic drug:  apixaban ANTICOAGULANT     180 tablet    TAKE 1 TABLET TWICE A DAY    Persistent atrial fibrillation (H)       ferrous gluconate 324 (38 FE) MG tablet    FERGON    270 tablet    Take 1 tablet (324 mg) by mouth 3 times daily (with meals)    Iron deficiency anemia secondary to inadequate dietary iron intake       fish oil-omega-3 fatty acids 1000 MG capsule    EQL FISH OIL    180 capsule    Take 1 capsule by mouth daily.    Hyperlipidemia LDL goal <100       Glucosamine-Chondroitin 250-200 MG Tabs    OSTEO BI-FLEX REGULAR STRENGTH    100 tablet    Take 1 tablet by mouth 2 times daily    Osteoarthritis       lisinopril 20 MG tablet    PRINIVIL/ZESTRIL    90 tablet    Take 1 tablet (20 mg) by mouth daily    HTN, goal below 150/90       MULTIVITAL Chew     360 tablet    Take 2 chew tab by mouth 2 times daily    Neuropathy of foot       naftifine 1 % Gel topical gel    NAFTIN    90 g    Apply topically daily Apply to affected nails daily    Onychomycosis of toenail       omeprazole 40 MG capsule    priLOSEC    90 capsule    TAKE 1 CAPSULE DAILY    PUD (peptic ulcer disease)       SAW PALMETTO COMPLEX Caps     200 capsule    Take 1 capsule by mouth daily.    Essential hypertension, benign       senna 8.6 MG tablet    SENOKOT    60 tablet    Take 1 tablet by mouth daily    Prostate cancer (H)       simvastatin 20 MG tablet    ZOCOR    90 tablet    TAKE 1 TABLET AT BEDTIME    Hyperlipidemia LDL goal <100       sodium chloride 0.65 % nasal spray    OCEAN NASAL SPRAY    2 Bottle    Spray 1 spray into both nostrils daily as needed for congestion    Bloody nose       tamsulosin 0.4  MG capsule    FLOMAX    90 capsule    TAKE 1 CAPSULE DAILY    Benign prostatic hyperplasia with lower urinary tract symptoms, unspecified morphology       Urea 20 % Crea cream     120 g    Apply topically as needed Apply to right foot daily    Right foot pain, Skin callus, Pes planus of both feet, Venous insufficiency of both lower extremities, HAV (hallux abducto valgus), right, Onychomycosis of toenail

## 2017-09-18 NOTE — PROGRESS NOTES
HPI and Plan:   See dictation(#[id number])    Orders Placed This Encounter   Procedures     MRI Cardiac w/contrast     MRI Angiogram chest w & w/o contrast     Follow-Up with Pulmonary Hypertension Clinic     Follow-Up with Cardiologist     Echocardiogram       Orders Placed This Encounter   Medications     diazepam (VALIUM) 5 MG tablet     Sig: Take 1 tablet (5 mg) by mouth once for 1 dose     Dispense:  1 tablet     Refill:  0     Take an hour before the mri, do not drive or operate heavy Synarcary for atleast 24 hrs after taking valium       Medications Discontinued During This Encounter   Medication Reason     HYDROcodone-acetaminophen (NORCO) 5-325 MG per tablet Stopped by Patient         Encounter Diagnoses   Name Primary?     Secondary cardiomyopathy (H) Yes     Thoracic aortic aneurysm without rupture (H)      Pulmonary hypertension (H)      Coronary artery disease involving native coronary artery of native heart without angina pectoris        CURRENT MEDICATIONS:  Current Outpatient Prescriptions   Medication Sig Dispense Refill     diazepam (VALIUM) 5 MG tablet Take 1 tablet (5 mg) by mouth once for 1 dose 1 tablet 0     simvastatin (ZOCOR) 20 MG tablet TAKE 1 TABLET AT BEDTIME 90 tablet 0     ELIQUIS 2.5 MG tablet TAKE 1 TABLET TWICE A  tablet 1     Urea 20 % CREA cream Apply topically as needed Apply to right foot daily 120 g 2     naftifine (NAFTIN) 1 % GEL topical gel Apply topically daily Apply to affected nails daily 90 g 3     doxycycline (VIBRAMYCIN) 100 MG capsule TAKE 1 CAPSULE DAILY 90 capsule 2     tamsulosin (FLOMAX) 0.4 MG capsule TAKE 1 CAPSULE DAILY 90 capsule 2     omeprazole (PRILOSEC) 40 MG capsule TAKE 1 CAPSULE DAILY 90 capsule 2     ferrous gluconate (FERGON) 324 (38 FE) MG tablet Take 1 tablet (324 mg) by mouth 3 times daily (with meals) 270 tablet 3     lisinopril (PRINIVIL/ZESTRIL) 20 MG tablet Take 1 tablet (20 mg) by mouth daily 90 tablet 1     senna (SENOKOT) 8.6 MG tablet  Take 1 tablet by mouth daily 60 tablet 0     diclofenac (VOLTAREN) 1 % GEL Apply 4 gramsto area qid prn 100 g 11     aspirin 81 MG tablet Take 81 mg by mouth daily       acetaminophen (TYLENOL) 650 MG CR tablet Take 1 tablet (650 mg) by mouth every 6 hours as needed for pain 100 tablet 11     co-enzyme Q-10 100 MG CAPS Take 1 capsule (100 mg) by mouth daily 180 capsule 3     sodium chloride (OCEAN NASAL SPRAY) 0.65 % nasal spray Spray 1 spray into both nostrils daily as needed for congestion 2 Bottle 6     Glucosamine-Chondroitin (OSTEO BI-FLEX REGULAR STRENGTH) 250-200 MG TABS Take 1 tablet by mouth 2 times daily 100 tablet 8     Multiple Vitamins-Minerals (MULTIVITAL) CHEW Take 2 chew tab by mouth 2 times daily 360 tablet 3     fish oil-omega-3 fatty acids (EQL FISH OIL) 1000 MG capsule Take 1 capsule by mouth daily. 180 capsule 12     Zn-Pyg Afri-Nettle-Saw Palmet (SAW PALMETTO COMPLEX) CAPS Take 1 capsule by mouth daily. 200 capsule PRN       ALLERGIES   No Known Allergies    PAST MEDICAL HISTORY:  Past Medical History:   Diagnosis Date     Anemia, unspecified 11/8/2016     Atrial fibrillation (H) 5/27/2015     BPH (benign prostatic hyperplasia) 1/25/2012     CAD (coronary artery disease) 4/4/2012     Closed fracture of metatarsal bone 4/4/2012     Coronary atherosclerosis of unspecified type of vessel, native or graft [facility]    CAB 1996 following MI (at [facility])      Dilated aortic root (H) 5/26/2016     Drug-induced erectile dysfunction 10/2/2015     Elevated blood sugar 11/8/2016     Elevated PSA 9/19/2013     Essential hypertension with goal blood pressure less than 140/90 9/22/2016     Essential hypertension, benign      Gout      HAV (hallux abducto valgus) 4/4/2012     Hernia, abdominal      History of prostate cancer 6/15/2016     HTN, goal below 150/90 4/20/2017     Iron deficiency anemia secondary to inadequate dietary iron intake 11/15/2016     Low blood pressure reading 10/17/2016      Lumbago     had degendisc dz on MRI      Mixed hyperlipidemia      Mumps      Neuropathy of foot 2012     OA (osteoarthritis) 2012     Old myocardial infarction 2012     Palpitations      Pes planus 2012     Prostate cancer (H) 2016     PUD (peptic ulcer disease) 2012     Rhinophyma 2012     Rosacea 1/15/2008     Thrombocytopenia (H) 2017     Unspecified hyperplasia of prostate with urinary obstruction and other lower urinary tract symptoms (LUTS)     better on avodart & hytrin     Venous stasis 2012     Venous stasis 2012       PAST SURGICAL HISTORY:  Past Surgical History:   Procedure Laterality Date     C NONSPECIFIC PROCEDURE      CAB 1996     C NONSPECIFIC PROCEDURE      l inguinal hernia repair      C NONSPECIFIC PROCEDURE      R hernia remote     C NONSPECIFIC PROCEDURE      R ankle ORIF for fx     C NONSPECIFIC PROCEDURE      nasal surgery      C NONSPECIFIC PROCEDURE      R rotater repair (remote)      C NONSPECIFIC PROCEDURE      appy 1966     C NONSPECIFIC PROCEDURE      sinus surgery x 2     C NONSPECIFIC PROCEDURE      L shoulder      CARDIAC SURGERY      bipass     CYSTOSCOPY FLEXIBLE, CYOABLATION PROSTATE N/A 2016    Procedure: CYSTOSCOPY FLEXIBLE, CRYOABLATION PROSTATE;  Surgeon: Krystian Owens MD;  Location:  OR     GENITOURINARY SURGERY      prostate surgery      HERNIA REPAIR       LAPAROSCOPIC HERNIORRHAPHY INGUINAL Right 5/10/2017    Procedure: LAPAROSCOPIC HERNIORRHAPHY INGUINAL;  Laparoscopic preperitoneal repair of right inguinal hernia with placement of underlay mesh;  Surgeon: Enrico Bridges MD;  Location: [facility] OR     PROSTATE SURGERY         FAMILY HISTORY:  Family History   Problem Relation Age of Onset     CANCER Mother      stomach cancer,  age 61     HEART DISEASE Father      MI,  age 71     HEART DISEASE Brother      stent placement, RA      Hypertension Brother      HEART  "DISEASE Sister      rythmn problems with heart, hypertension       SOCIAL HISTORY:  Social History     Social History     Marital status:      Spouse name: N/A     Number of children: N/A     Years of education: N/A     Social History Main Topics     Smoking status: Former Smoker     Smokeless tobacco: Never Used      Comment: quit 3/1974     Alcohol use 0.0 oz/week     0 Standard drinks or equivalent per week      Comment: small amounts to moderate     Drug use: No     Sexual activity: Yes     Partners: Female     Other Topics Concern     Caffeine Concern Yes     1 cups of coffee and soda per day     Special Diet No     Exercise Yes     walking     Seat Belt Yes     Social History Narrative       Review of Systems:  Skin:  Positive for bruising rosacea   Eyes:  Positive for glasses;cataracts catarct extraction of right eye, cataract in left eye  ENT:  Positive for hearing loss;sinus trouble    Respiratory:  Positive for sleep apnea does not use machine    Cardiovascular:    Positive for;edema of the legs  Gastroenterology: Negative      Genitourinary:  Positive for urinary frequency;prostate problem;nocturia hx prostate cancer, 2x per night  Musculoskeletal:  Positive for arthritis back and feet  Neurologic:  Positive for numbness or tingling of feet    Psychiatric:  Positive for sleep disturbances nocturia  Heme/Lymph/Imm:  Positive for easy bruising    Endocrine:  Negative        Physical Exam:  Vitals: /70 (BP Location: Right arm, Patient Position: Chair, Cuff Size: Adult Regular)  Pulse 52  Ht 1.689 m (5' 6.5\")  Wt 83.6 kg (184 lb 4.8 oz)  BMI 29.3 kg/m2    Constitutional:           Skin:           Head:           Eyes:           ENT:           Neck:           Chest:             Cardiac:                    Abdomen:           Vascular:                                          Extremities and Back:                 Neurological:                 JALYN Duckworth MD  [id number]  [place] [id number]

## 2017-09-18 NOTE — LETTER
9/18/2017    Oscar Alves MD  [address] [facility] [address]    RE: Milo Serna       Dear Colleague,    I had the pleasure of seeing Milo Serna in the [facility].    REASON FOR CLINIC VISIT:  Follow-up thoracic aorta dilatation, CAD, atrial fibrillation.      Mr. Serna is a very pleasant 76-year-old gentleman with history of CAD with history of CABG in 1986, chronic atrial fibrillation with [id number] score of 4, intentionally on low dose apixaban in view of history of epistaxis, dyslipidemia, hypertension, mild to moderately dilated ascending aorta who is here for annual followup.  The patient had a repeat echocardiogram done last week that reported a normal LVEF, restrictive filling pattern.  RV was severely dilated with mildly reduced RV systolic function.  There was 2+ mitral regurgitation.  There was 1+ tricuspid regurgitation, elevated RVSP with RVSP of 31 mmHg plus RA and IVC was dilated.  Aortic root 4.1 cm was 4 cm last year and ascending aorta 4.5 cm was 4.2 cm last year.  These echocardiogram images were personally reviewed by me and RV does appear more enlarged compared to last year's study.  The patient tells me overall he is doing well.  He walks 2-4 hours a week.  Overall, 4-5 times a week.  No chest discomfort or shortness of breath with physical activity.  He has intentionally lost about 10 pounds of weight since we met last year.  He does notice some lower extremity edema, especially at the end of the day.  He is presently on 2.5 mg b.i.d. of apixaban, baby aspirin, lisinopril 20 mg daily, simvastatin 20 mg daily, LDL is well controlled at 63.  No history of PE or DVT.  No history of tobacco abuse.      PHYSICAL EXAMINATION:   VITAL SIGNS:  Blood pressure 138/70, heart rate 52 and regular, weight 184 pounds, BMI 29.36.   GENERAL:  The patient appears pleasant, comfortable.   NECK:  JVP elevated to around 12 cm.   CARDIOVASCULAR SYSTEM:   Irregular, normal rate.  There is grade II/VI systolic murmur heard over the apex.  No S3, S4, rub or gallop.   RESPIRATORY SYSTEM:  Clear to auscultation bilaterally.   ABDOMEN:  Soft, nontender.   EXTREMITIES:  Minimal to 1+ pitting pedal edema with some chronic venous stasis changes.   PSYCH:  Normal affect.   SKIN:  Some bruises seen over bilateral forearms.   HEENT:  No pallor or icterus.     Outpatient Encounter Prescriptions as of 2017   Medication Sig Dispense Refill     [] diazepam (VALIUM) 5 MG tablet Take 1 tablet (5 mg) by mouth once for 1 dose 1 tablet 0     simvastatin (ZOCOR) 20 MG tablet TAKE 1 TABLET AT BEDTIME 90 tablet 0     ELIQUIS 2.5 MG tablet TAKE 1 TABLET TWICE A  tablet 1     Urea 20 % CREA cream Apply topically as needed Apply to right foot daily 120 g 2     naftifine (NAFTIN) 1 % GEL topical gel Apply topically daily Apply to affected nails daily 90 g 3     doxycycline (VIBRAMYCIN) 100 MG capsule TAKE 1 CAPSULE DAILY 90 capsule 2     tamsulosin (FLOMAX) 0.4 MG capsule TAKE 1 CAPSULE DAILY 90 capsule 2     omeprazole (PRILOSEC) 40 MG capsule TAKE 1 CAPSULE DAILY 90 capsule 2     ferrous gluconate (FERGON) 324 (38 FE) MG tablet Take 1 tablet (324 mg) by mouth 3 times daily (with meals) 270 tablet 3     lisinopril (PRINIVIL/ZESTRIL) 20 MG tablet Take 1 tablet (20 mg) by mouth daily 90 tablet 1     senna (SENOKOT) 8.6 MG tablet Take 1 tablet by mouth daily 60 tablet 0     diclofenac (VOLTAREN) 1 % GEL Apply 4 gramsto area qid prn 100 g 11     aspirin 81 MG tablet Take 81 mg by mouth daily       acetaminophen (TYLENOL) 650 MG CR tablet Take 1 tablet (650 mg) by mouth every 6 hours as needed for pain 100 tablet 11     co-enzyme Q-10 100 MG CAPS Take 1 capsule (100 mg) by mouth daily 180 capsule 3     sodium chloride (OCEAN NASAL SPRAY) 0.65 % nasal spray Spray 1 spray into both nostrils daily as needed for congestion 2 Bottle 6     Glucosamine-Chondroitin (OSTEO BI-FLEX  REGULAR STRENGTH) 250-200 MG TABS Take 1 tablet by mouth 2 times daily 100 tablet 8     Multiple Vitamins-Minerals (MULTIVITAL) CHEW Take 2 chew tab by mouth 2 times daily 360 tablet 3     fish oil-omega-3 fatty acids (EQL FISH OIL) 1000 MG capsule Take 1 capsule by mouth daily. 180 capsule 12     Zn-Pyg Afri-Nettle-Saw Palmet (SAW PALMETTO COMPLEX) CAPS Take 1 capsule by mouth daily. 200 capsule PRN     [DISCONTINUED] HYDROcodone-acetaminophen (NORCO) 5-325 MG per tablet Take 1-2 tablets by mouth every 4 hours as needed for other (Moderate to Severe Pain) (Patient not taking: Reported on 5/31/2017) 20 tablet 0     No facility-administered encounter medications on file as of 9/18/2017.       IMPRESSION AND PLAN:  A very pleasant 76-year-old gentleman with history of CAD with history of CABG more than 30 years ago, chronic atrial fibrillation, [id number] score of 4, on apixaban for stroke prophylaxis, systemic hypertension, pulmonary hypertension, moderate dilated ascending aorta and mildly dilated aortic root and with evidence of enlargement of RV with mildly reduced RV systolic function with elevated CVP assessment on examination as well as on echocardiogram with mild pedal edema.  He does not appear in any decompensated congestive heart failure either left or right-sided and no symptoms concerning for angina.  One possibility of RV enlargement and RV systolic dysfunction could be due to pulmonary hypertension which itself could be due to elevated filling pressure on the left side.  There is also some increase in ascending aorta size.  At this time, I recommend doing a cardiac MRI along with MRA angiogram to have a better look at the RV size and RV function as well as to more comprehensively measure the aortic dimensions.  The patient does tell me that during the time of his bypass surgery there was some aorta complication and he had a patch repair of the aorta.  Regarding RV enlargement and possible pulmonary  hypertension as the cause of it, I recommend a review in Pulmonary Hypertension clinic to see if further workup like right heart catheterization is warranted.  At this time, I am setting up a follow up in a year with an echocardiogram but my office is going to update him with the results of the cardiac MRI and the MRA angiogram.     Again, thank you for allowing me to participate in the care of your patient.      Sincerely,    Michael Duckworth MD     [facility]

## 2017-09-20 ENCOUNTER — CARE COORDINATION (OUTPATIENT)
Dept: CARDIOLOGY | Facility: CLINIC | Age: 77
End: 2017-09-20

## 2017-09-25 ENCOUNTER — OFFICE VISIT (OUTPATIENT)
Dept: FAMILY MEDICINE | Facility: CLINIC | Age: 77
End: 2017-09-25
Payer: MEDICARE

## 2017-09-25 VITALS
SYSTOLIC BLOOD PRESSURE: 128 MMHG | RESPIRATION RATE: 14 BRPM | TEMPERATURE: 97.5 F | HEIGHT: 67 IN | HEART RATE: 66 BPM | BODY MASS INDEX: 28.19 KG/M2 | DIASTOLIC BLOOD PRESSURE: 78 MMHG | WEIGHT: 179.6 LBS

## 2017-09-25 DIAGNOSIS — Z71.89 ADVANCED DIRECTIVES, COUNSELING/DISCUSSION: ICD-10-CM

## 2017-09-25 DIAGNOSIS — D69.6 THROMBOCYTOPENIA (H): ICD-10-CM

## 2017-09-25 DIAGNOSIS — I77.810 DILATED AORTIC ROOT (H): Primary | ICD-10-CM

## 2017-09-25 DIAGNOSIS — I27.20 PULMONARY HYPERTENSION (H): ICD-10-CM

## 2017-09-25 DIAGNOSIS — I10 HTN, GOAL BELOW 150/90: ICD-10-CM

## 2017-09-25 DIAGNOSIS — D50.8 IRON DEFICIENCY ANEMIA SECONDARY TO INADEQUATE DIETARY IRON INTAKE: ICD-10-CM

## 2017-09-25 DIAGNOSIS — I25.10 CORONARY ARTERY DISEASE INVOLVING NATIVE CORONARY ARTERY OF NATIVE HEART WITHOUT ANGINA PECTORIS: ICD-10-CM

## 2017-09-25 DIAGNOSIS — Z23 NEED FOR PROPHYLACTIC VACCINATION AND INOCULATION AGAINST INFLUENZA: ICD-10-CM

## 2017-09-25 LAB
BASOPHILS # BLD AUTO: 0 10E9/L (ref 0–0.2)
BASOPHILS NFR BLD AUTO: 0.9 %
DIFFERENTIAL METHOD BLD: ABNORMAL
EOSINOPHIL # BLD AUTO: 0.1 10E9/L (ref 0–0.7)
EOSINOPHIL NFR BLD AUTO: 2.9 %
ERYTHROCYTE [DISTWIDTH] IN BLOOD BY AUTOMATED COUNT: 15.6 % (ref 10–15)
HCT VFR BLD AUTO: 37.8 % (ref 40–53)
HGB BLD-MCNC: 12.9 G/DL (ref 13.3–17.7)
LYMPHOCYTES # BLD AUTO: 0.7 10E9/L (ref 0.8–5.3)
LYMPHOCYTES NFR BLD AUTO: 14.5 %
MCH RBC QN AUTO: 31.9 PG (ref 26.5–33)
MCHC RBC AUTO-ENTMCNC: 34.1 G/DL (ref 31.5–36.5)
MCV RBC AUTO: 93 FL (ref 78–100)
MONOCYTES # BLD AUTO: 0.6 10E9/L (ref 0–1.3)
MONOCYTES NFR BLD AUTO: 13.8 %
NEUTROPHILS # BLD AUTO: 3.1 10E9/L (ref 1.6–8.3)
NEUTROPHILS NFR BLD AUTO: 67.9 %
PLATELET # BLD AUTO: 124 10E9/L (ref 150–450)
RBC # BLD AUTO: 4.05 10E12/L (ref 4.4–5.9)
WBC # BLD AUTO: 4.5 10E9/L (ref 4–11)

## 2017-09-25 PROCEDURE — 80048 BASIC METABOLIC PNL TOTAL CA: CPT | Performed by: FAMILY MEDICINE

## 2017-09-25 PROCEDURE — G0008 ADMIN INFLUENZA VIRUS VAC: HCPCS | Performed by: FAMILY MEDICINE

## 2017-09-25 PROCEDURE — 99214 OFFICE O/P EST MOD 30 MIN: CPT | Mod: 25 | Performed by: FAMILY MEDICINE

## 2017-09-25 PROCEDURE — 85025 COMPLETE CBC W/AUTO DIFF WBC: CPT | Performed by: FAMILY MEDICINE

## 2017-09-25 PROCEDURE — 80061 LIPID PANEL: CPT | Performed by: FAMILY MEDICINE

## 2017-09-25 PROCEDURE — 36415 COLL VENOUS BLD VENIPUNCTURE: CPT | Performed by: FAMILY MEDICINE

## 2017-09-25 PROCEDURE — 90662 IIV NO PRSV INCREASED AG IM: CPT | Performed by: FAMILY MEDICINE

## 2017-09-25 PROCEDURE — 82728 ASSAY OF FERRITIN: CPT | Performed by: FAMILY MEDICINE

## 2017-09-25 NOTE — PROGRESS NOTES
SUBJECTIVE:   Milo Serna is a 76 year old male who presents to clinic today for the following health issues:    Dilated aortic root (H)  (primary encounter diagnosis): periodic monitoring    Thrombocytopenia (H) periodic monitoring  Lab Results   Component Value Date     04/20/2017       Pulmonary hypertension (H): Dx by recent cardiology eval. R heart is dilated Patient has plans to see Pulm Htn cardiologyThursday.    HTN, goal below 150/90  Hypertension Follow-up    Outpatient blood pressures are being checked at home. .    Low Salt Diet: no added salt    Amount of exercise or physical activity: 4-5 days/week for an average of 30-45 minutes    Problems taking medications regularly: No    Medication side effects: none  Diet: no salt     Iron deficiency anemia secondary to inadequate dietary iron intake   Hemoglobin   Date Value Ref Range Status   04/20/2017 12.7 (L) 13.3 - 17.7 g/dL Final     Comment:     Results confirmed by repeat test   ]  On iron supplements  Coronary artery disease involving native coronary artery of native heart without angina pectoris: no CP      Problem list and histories reviewed & adjusted, as indicated.  Additional history: none        Reviewed and updated as needed this visit by clinical staffTobacco  Allergies  Meds  Med Hx  Surg Hx  Fam Hx  Soc Hx       Past Medical History:   Diagnosis Date     Anemia, unspecified 11/8/2016     Atrial fibrillation (H) 5/27/2015     BPH (benign prostatic hyperplasia) 1/25/2012     CAD (coronary artery disease) 4/4/2012     Closed fracture of metatarsal bone 4/4/2012     Coronary atherosclerosis of unspecified type of vessel, native or graft nl stress at [facility] 2006    CAB 1996 following MI (at [facility])      Dilated aortic root (H) 5/26/2016     Drug-induced erectile dysfunction 10/2/2015     Elevated blood sugar 11/8/2016     Elevated PSA 9/19/2013     Essential hypertension with goal blood pressure less than 140/90 9/22/2016      Essential hypertension, benign      Gout      HAV (hallux abducto valgus) 4/4/2012     Hernia, abdominal      History of prostate cancer 6/15/2016     HTN, goal below 150/90 4/20/2017     Iron deficiency anemia secondary to inadequate dietary iron intake 11/15/2016     Low blood pressure reading 10/17/2016     Lumbago     had degendisc dz on MRI 7/07     Mixed hyperlipidemia      Mumps      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     OA (osteoarthritis) 4/4/2012     Old myocardial infarction 4/4/2012     Palpitations      Pes planus 4/4/2012     Prostate cancer (H) 5/26/2016     PUD (peptic ulcer disease) 4/4/2012     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     Rosacea 1/15/2008     Thrombocytopenia (H) 4/21/2017     Unspecified hyperplasia of prostate with urinary obstruction and other lower urinary tract symptoms (LUTS)     better on avodart & hytrin     Venous stasis 4/4/2012     Venous stasis 4/4/2012       Past Surgical History:   Procedure Laterality Date     C NONSPECIFIC PROCEDURE      CAB 1996 [place]     C NONSPECIFIC PROCEDURE  2/07    l inguinal hernia repair      C NONSPECIFIC PROCEDURE      R hernia remote     C NONSPECIFIC PROCEDURE  2000    R ankle ORIF for fx     C NONSPECIFIC PROCEDURE  2005    nasal surgery      C NONSPECIFIC PROCEDURE      R rotater repair (remote)      C NONSPECIFIC PROCEDURE      appy 1966     C NONSPECIFIC PROCEDURE      sinus surgery x 2     C NONSPECIFIC PROCEDURE      L shoulder 6/09     CARDIAC SURGERY      bipass     CYSTOSCOPY FLEXIBLE, CYOABLATION PROSTATE N/A 11/9/2016    Procedure: CYSTOSCOPY FLEXIBLE, CRYOABLATION PROSTATE;  Surgeon: Krystian Owens MD;  Location:  OR     GENITOURINARY SURGERY      prostate surgery      HERNIA REPAIR       LAPAROSCOPIC HERNIORRHAPHY INGUINAL Right 5/10/2017    Procedure: LAPAROSCOPIC HERNIORRHAPHY INGUINAL;  Laparoscopic preperitoneal repair of right inguinal hernia with placement of underlay mesh;  Surgeon: Enrico Bridges MD;  Location:   "OR     PROSTATE SURGERY         Family History   Problem Relation Age of Onset     CANCER Mother      stomach cancer,  age 61     HEART DISEASE Father      MI,  age 71     HEART DISEASE Brother      stent placement, RA      Hypertension Brother      HEART DISEASE Sister      rythmn problems with heart, hypertension       Social History   Substance Use Topics     Smoking status: Former Smoker     Smokeless tobacco: Never Used      Comment: quit 3/1974     Alcohol use 0.0 oz/week     0 Standard drinks or equivalent per week      Comment: small amounts to moderate       Reviewed and updated as needed this visit by Provider         ROS:  No shortness of breath, edema resolves daily    This document serves as a record of the services and decisions personally performed and made by Oscar Alves MD. It was created on his behalf by Betty Vines, a trained medical scribe.  The creation of this document is based on the scribe's personal observations and the provider's statements to the medical scribe.  Betty Vines, 2017 9:32 AM    OBJECTIVE:     BP (!) 160/95 (BP Location: Right arm, Patient Position: Chair, Cuff Size: Adult Regular)  Pulse 66  Temp 97.5  F (36.4  C) (Oral)  Resp 14  Ht 1.689 m (5' 6.5\")  Wt 81.5 kg (179 lb 9.6 oz)  BMI 28.55 kg/m2  Body mass index is 28.55 kg/(m^2).  Repeat BP  128/78      CV: Trace LE edema      ASSESSMENT/PLAN:   ASSESSMENT / PLAN:  (I77.810) Dilated aortic root (H)  (primary encounter diagnosis)  Comment: evaluation bt cardiology no symptoms discussed  Plan:     (I27.2) Pulmonary hypertension (H)  Comment: broaden database  Plan: Basic metabolic panel  (Ca, Cl, CO2, Creat,         Gluc, K, Na, BUN)            (D69.6) Thrombocytopenia (H)  Comment: periodic measure  Plan: CBC with platelets and differential            (I10) HTN, goal below 150/90  Comment: controlled  BP Readings from Last 6 Encounters:   17 (!) 160/95   17 138/70 "   07/11/17 124/74   05/31/17 128/74   05/25/17 130/72   05/22/17 153/86       Plan: Basic metabolic panel  (Ca, Cl, CO2, Creat,         Gluc, K, Na, BUN)            (D50.8) Iron deficiency anemia secondary to inadequate dietary iron intake  Comment: broaden database  Plan: CBC with platelets and differential, Ferritin                (I25.10) Coronary artery disease involving native coronary artery of native heart without angina pectoris  Comment: no sx  Recent Labs   Lab Test  10/02/15   0955  09/25/14   1210   CHOL  122  117   HDL  48  45   LDL  63  60   TRIG  53  62   CHOLHDLRATIO  2.5  2.6     Plan: Lipid Profile (Chol, Trig, HDL, LDL calc)        Adv directives he proposes to update his desires, last filed 2012  RTC 3-6 mos, after [place] trip    The information in this document, created by the medical scribe for me, accurately reflects the services I personally performed and the decisions made by me. I have reviewed and approved this document for accuracy prior to leaving the patient care area.  Oscar Alves MD September 25, 2017 9:32 AM    Oscar Alves MD  [facility]  Injectable Influenza Immunization Documentation    1.  Is the person to be vaccinated sick today?   No    2. Does the person to be vaccinated have an allergy to a component   of the vaccine?   No    3. Has the person to be vaccinated ever had a serious reaction   to influenza vaccine in the past?   No    4. Has the person to be vaccinated ever had Guillain-Barré syndrome?   No    Form completed by Beronica Lucero MA

## 2017-09-25 NOTE — NURSING NOTE
"Chief Complaint   Patient presents with     Hypertension     fasting      Lipids       Initial BP (!) 160/95 (BP Location: Right arm, Patient Position: Chair, Cuff Size: Adult Regular)  Pulse 66  Temp 97.5  F (36.4  C) (Oral)  Resp 14  Ht 5' 6.5\" (1.689 m)  Wt 179 lb 9.6 oz (81.5 kg)  BMI 28.55 kg/m2 Estimated body mass index is 28.55 kg/(m^2) as calculated from the following:    Height as of this encounter: 5' 6.5\" (1.689 m).    Weight as of this encounter: 179 lb 9.6 oz (81.5 kg).  Medication Reconciliation: complete rt arm Beronica Lucero MA      "

## 2017-09-25 NOTE — MR AVS SNAPSHOT
After Visit Summary   9/25/2017    Milo Serna    MRN: [id number]           Patient Information     Date Of Birth          1940        Visit Information        Provider Department      9/25/2017 9:00 AM Oscar Alves MD [facility]        Today's Diagnoses     Dilated aortic root (H)    -  1    Pulmonary hypertension (H)        HTN, goal below 150/90        Iron deficiency anemia secondary to inadequate dietary iron intake        Thrombocytopenia (H)        Coronary artery disease involving native coronary artery of native heart without angina pectoris        Need for prophylactic vaccination and inoculation against influenza        Advanced directives, counseling/discussion           Follow-ups after your visit        Your next 10 appointments already scheduled     Sep 28, 2017  9:30 AM CDT   MR MYOCARDIUM W CONTRAST with SCIMR1   [facility] (Cardiovascular Imaging at [facility])    [address] [place] [address]  [address] [place] [address] [phone]           Take your medicines as usual, unless your doctor tells you not to. Bring a list of your current medicines to your exam (including vitamins, minerals and over-the-counter drugs).  You will be given intravenous contrast for this exam. To prepare:   The day before your exam, drink extra fluids at least six 8-ounce glasses (unless your doctor tells you to restrict your fluids).   Have a blood test (creatinine test) within 30 days of your exam. Go to your clinic or Diagnostic Imaging Department for this test.  The MRI machine uses a strong magnet. Please wear clothes without metal (snaps, zippers). A sweatsuit works well, or we may give you a hospital gown.  Please remove any body piercings and hair extensions before you arrive. You will also remove watches, jewelry, hairpins, wallets, dentures, partial dental plates and hearing aids. You may wear contact lenses, and you may be  able to wear your rings. We have a safe place to keep your personal items, but it is safer to leave them at home.   **IMPORTANT** THE INSTRUCTIONS BELOW ARE ONLY FOR THOSE PATIENTS WHO HAVE BEEN TOLD THEY WILL RECEIVE SEDATION OR GENERAL ANESTHESIA DURING THEIR MRI PROCEDURE:  IF YOU WILL RECEIVE SEDATION (take medicine to help you relax during your exam):   You must get the medicine from your doctor before you arrive. Bring the medicine to the exam. Do not take it at home.   Arrive one hour early. Bring someone who can take you home after the test. Your medicine will make you sleepy. After the exam, you may not drive, take a bus or take a taxi by yourself.   No eating 8 hours before your exam. You may have clear liquids up until 4 hours before your exam. (Clear liquids include water, clear tea, black coffee and fruit juice without pulp.)  IF YOU WILL RECEIVE ANESTHESIA (be asleep for your exam):   Arrive 1 1/2 hours early. Bring someone who can take you home after the test. You may not drive, take a bus or take a taxi by yourself.   No eating 8 hours before your exam. You may have clear liquids up until 4 hours before your exam. (Clear liquids include water, clear tea, black coffee and fruit juice without pulp.)  Please call the Imaging Department at your exam site with any questions.            Sep 28, 2017 11:00 AM CDT   MR CHEST W/O & W CONTRAST ANGIOGRAM with SCIMR1   [facility] (Cardiovascular Imaging at [facility])    [address] [place] [address]  [address] [id number] [phone]           Take your medicines as usual, unless your doctor tells you not to. Bring a list of your current medicines to your exam (including vitamins, minerals and over-the-counter drugs).  You will be given intravenous contrast for this exam. To prepare:   The day before your exam, drink extra fluids at least six 8-ounce glasses (unless your doctor tells you to restrict your fluids).   Have  a blood test (creatinine test) within 30 days of your exam. Go to your clinic or Diagnostic Imaging Department for this test.  The MRI machine uses a strong magnet. Please wear clothes without metal (snaps, zippers). A sweatsuit works well, or we may give you a hospital gown.  Please remove any body piercings and hair extensions before you arrive. You will also remove watches, jewelry, hairpins, wallets, dentures, partial dental plates and hearing aids. You may wear contact lenses, and you may be able to wear your rings. We have a safe place to keep your personal items, but it is safer to leave them at home.   **IMPORTANT** THE INSTRUCTIONS BELOW ARE ONLY FOR THOSE PATIENTS WHO HAVE BEEN TOLD THEY WILL RECEIVE SEDATION OR GENERAL ANESTHESIA DURING THEIR MRI PROCEDURE:  IF YOU WILL RECEIVE SEDATION (take medicine to help you relax during your exam):   You must get the medicine from your doctor before you arrive. Bring the medicine to the exam. Do not take it at home.   Arrive one hour early. Bring someone who can take you home after the test. Your medicine will make you sleepy. After the exam, you may not drive, take a bus or take a taxi by yourself.   No eating 8 hours before your exam. You may have clear liquids up until 4 hours before your exam. (Clear liquids include water, clear tea, black coffee and fruit juice without pulp.)  IF YOU WILL RECEIVE ANESTHESIA (be asleep for your exam):   Arrive 1 1/2 hours early. Bring someone who can take you home after the test. You may not drive, take a bus or take a taxi by yourself.   No eating 8 hours before your exam. You may have clear liquids up until 4 hours before your exam. (Clear liquids include water, clear tea, black coffee and fruit juice without pulp.)  Please call the Imaging Department at your exam site with any questions.            Sep 28, 2017  1:45 PM CDT   Pulmonary Hypertension New with Giannal Miesha Conley MD   [facility] ([facility])    [address]  [place] [address] [phone]            Dec 14, 2017 10:00 AM CST   LAB with CR LAB   [facility]    [address] [address] [address] [phone]           Patient must bring picture ID. Patient should be prepared to give a urine specimen  Please do not eat 10-12 hours before your appointment if you are coming in fasting for labs on lipids, cholesterol, or glucose (sugar). Pregnant women should follow their Care Team instructions. Water with medications is okay. Do not drink coffee or other fluids. If you have concerns about taking  your medications, please ask at office or if scheduling via [employer], send a message by clicking on Secure Messaging, Message Your Care Team.            Dec 20, 2017 10:10 AM CST   (Arrive by 10:00 AM)   Return Visit with Krystian Owens MD   [facility] ([facility])    [address]  [address]  [place] [id number] [phone]              Who to contact     If you have questions or need follow up information about today's clinic visit or your schedule please contact [facility] directly at [phone].  Normal or non-critical lab and imaging results will be communicated to you by [employer]by[employer]hart, letter or phone within 4 business days after the clinic has received the results. If you do not hear from us within 7 days, please contact the clinic through MyChart or phone. If you have a critical or abnormal lab result, we will notify you by phone as soon as possible.  Submit refill requests through [employer] or call your pharmacy and they will forward the refill request to us. Please allow 3 business days for your refill to be completed.          Additional Information About Your Visit        [employer] Information     [employer] gives you secure access to your electronic health  "record. If you see a primary care provider, you can also send messages to your care team and make appointments. If you have questions, please call your primary care clinic.  If you do not have a primary care provider, please call [phone] and they will assist you.        Care EveryWhere ID     This is your Care EveryWhere ID. This could be used by other organizations to access your [place] medical records  [id number]        Your Vitals Were     Pulse Temperature Respirations Height BMI (Body Mass Index)       66 97.5  F (36.4  C) (Oral) 14 5' 6.5\" (1.689 m) 28.55 kg/m2        Blood Pressure from Last 3 Encounters:   09/25/17 128/78   09/18/17 138/70   07/11/17 124/74    Weight from Last 3 Encounters:   09/25/17 179 lb 9.6 oz (81.5 kg)   09/18/17 184 lb 4.8 oz (83.6 kg)   07/11/17 173 lb 12.8 oz (78.8 kg)              We Performed the Following     Basic metabolic panel  (Ca, Cl, CO2, Creat, Gluc, K, Na, BUN)     CBC with platelets and differential     Ferritin     FLU VACCINE, INCREASED ANTIGEN, PRESV FREE, AGE 65+ [01121]     Lipid Profile (Chol, Trig, HDL, LDL calc)     Vaccine Administration, Initial [05574]        Primary Care Provider Office Phone # Fax #    Oscar Alves [phone] [address] [facility] [address]        Equal Access to Services     [facility] AH: Hadii aad ku hadasho Soomaali, waaxda luqadaha, qaybta kaalmada adeegyada, chayito reyes . So [facility] [phone].    ATENCIÓN: Si habla fatouañol, tiene a randall disposición servicios gratuitos de asistencia lingüística. Llame al [phone].    We comply with applicable federal civil rights laws and [place] laws. We do not discriminate on the basis of race, color, national origin, age, disability sex, sexual orientation or gender identity.            Thank you!     Thank you for choosing [facility]  for your care. Our goal is always to provide you with excellent care. " Hearing back from our patients is one way we can continue to improve our services. Please take a few minutes to complete the written survey that you may receive in the mail after your visit with us. Thank you!             Your Updated Medication List - Protect others around you: Learn how to safely use, store and throw away your medicines at www.disposemymeds.org.          This list is accurate as of: 9/25/17 12:45 PM.  Always use your most recent med list.                   Brand Name Dispense Instructions for use Diagnosis    acetaminophen 650 MG CR tablet    TYLENOL    100 tablet    Take 1 tablet (650 mg) by mouth every 6 hours as needed for pain    Osteoarthritis       aspirin 81 MG tablet      Take 81 mg by mouth daily        co-enzyme Q-10 100 MG Caps capsule     180 capsule    Take 1 capsule (100 mg) by mouth daily    Mixed hyperlipidemia       diclofenac 1 % Gel topical gel    VOLTAREN    100 g    Apply 4 gramsto area qid prn    Acute left-sided low back pain without sciatica       doxycycline 100 MG capsule    VIBRAMYCIN    90 capsule    TAKE 1 CAPSULE DAILY    Rosacea       ELIQUIS 2.5 MG tablet   Generic drug:  apixaban ANTICOAGULANT     180 tablet    TAKE 1 TABLET TWICE A DAY    Persistent atrial fibrillation (H)       ferrous gluconate 324 (38 FE) MG tablet    FERGON    270 tablet    Take 1 tablet (324 mg) by mouth 3 times daily (with meals)    Iron deficiency anemia secondary to inadequate dietary iron intake       Glucosamine-Chondroitin 250-200 MG Tabs    OSTEO BI-FLEX REGULAR STRENGTH    100 tablet    Take 1 tablet by mouth 2 times daily    Osteoarthritis       lisinopril 20 MG tablet    PRINIVIL/ZESTRIL    90 tablet    Take 1 tablet (20 mg) by mouth daily    HTN, goal below 150/90       MULTIVITAL Chew     360 tablet    Take 2 chew tab by mouth 2 times daily    Neuropathy of foot       naftifine 1 % Gel topical gel    NAFTIN    90 g    Apply topically daily Apply to affected nails daily     Onychomycosis of toenail       OMEGA 3-6-9 PO           omeprazole 40 MG capsule    priLOSEC    90 capsule    TAKE 1 CAPSULE DAILY    PUD (peptic ulcer disease)       senna 8.6 MG tablet    SENOKOT    60 tablet    Take 1 tablet by mouth daily    Prostate cancer (H)       simvastatin 20 MG tablet    ZOCOR    90 tablet    TAKE 1 TABLET AT BEDTIME    Hyperlipidemia LDL goal <100       sodium chloride 0.65 % nasal spray    OCEAN NASAL SPRAY    2 Bottle    Spray 1 spray into both nostrils daily as needed for congestion    Bloody nose       tamsulosin 0.4 MG capsule    FLOMAX    90 capsule    TAKE 1 CAPSULE DAILY    Benign prostatic hyperplasia with lower urinary tract symptoms, unspecified morphology       Urea 20 % Crea cream     120 g    Apply topically as needed Apply to right foot daily    Right foot pain, Skin callus, Pes planus of both feet, Venous insufficiency of both lower extremities, HAV (hallux abducto valgus), right, Onychomycosis of toenail

## 2017-09-26 LAB
ANION GAP SERPL CALCULATED.3IONS-SCNC: 10 MMOL/L (ref 3–14)
BUN SERPL-MCNC: 11 MG/DL (ref 7–30)
CALCIUM SERPL-MCNC: 9.4 MG/DL (ref 8.5–10.1)
CHLORIDE SERPL-SCNC: 102 MMOL/L (ref 94–109)
CHOLEST SERPL-MCNC: 122 MG/DL
CO2 SERPL-SCNC: 25 MMOL/L (ref 20–32)
CREAT SERPL-MCNC: 0.83 MG/DL (ref 0.66–1.25)
FERRITIN SERPL-MCNC: 97 NG/ML (ref 26–388)
GFR SERPL CREATININE-BSD FRML MDRD: 90 ML/MIN/1.7M2
GLUCOSE SERPL-MCNC: 101 MG/DL (ref 70–99)
HDLC SERPL-MCNC: 64 MG/DL
LDLC SERPL CALC-MCNC: 46 MG/DL
NONHDLC SERPL-MCNC: 58 MG/DL
POTASSIUM SERPL-SCNC: 4.1 MMOL/L (ref 3.4–5.3)
SODIUM SERPL-SCNC: 137 MMOL/L (ref 133–144)
TRIGL SERPL-MCNC: 62 MG/DL

## 2017-09-26 NOTE — PROGRESS NOTES
Blood count is coming up. Reduce the iron to 1 daily, though. You were iron deficient last November  Oscar Alves MD

## 2017-09-27 ENCOUNTER — CARE COORDINATION (OUTPATIENT)
Dept: CARDIOLOGY | Facility: CLINIC | Age: 77
End: 2017-09-27

## 2017-09-27 DIAGNOSIS — I27.20 PULMONARY HYPERTENSION (H): Primary | ICD-10-CM

## 2017-09-27 NOTE — PROGRESS NOTES
Call back from patient - he agrees to previsit testing for Pulmonary HTN workup with Dr Conley visit.  Patsy Donato RN 09/27/17 5:17 PM

## 2017-09-28 ENCOUNTER — HOSPITAL ENCOUNTER (OUTPATIENT)
Dept: CARDIOLOGY | Facility: CLINIC | Age: 77
End: 2017-09-28
Attending: INTERNAL MEDICINE
Payer: MEDICARE

## 2017-09-28 ENCOUNTER — HOSPITAL ENCOUNTER (OUTPATIENT)
Dept: CARDIOLOGY | Facility: CLINIC | Age: 77
Discharge: HOME | End: 2017-09-28
Attending: INTERNAL MEDICINE | Admitting: INTERNAL MEDICINE
Payer: MEDICARE

## 2017-09-28 ENCOUNTER — DOCUMENTATION ONLY (OUTPATIENT)
Dept: CARDIOLOGY | Facility: CLINIC | Age: 77
End: 2017-09-28

## 2017-09-28 DIAGNOSIS — I42.9 SECONDARY CARDIOMYOPATHY (H): ICD-10-CM

## 2017-09-28 DIAGNOSIS — I27.20 PULMONARY HYPERTENSION (H): ICD-10-CM

## 2017-09-28 DIAGNOSIS — I71.20 THORACIC AORTIC ANEURYSM WITHOUT RUPTURE (H): ICD-10-CM

## 2017-09-28 LAB
ALBUMIN SERPL-MCNC: 4.1 G/DL (ref 3.4–5)
ALP SERPL-CCNC: 195 U/L (ref 40–150)
ALT SERPL W P-5'-P-CCNC: 18 U/L (ref 0–70)
ANION GAP SERPL CALCULATED.3IONS-SCNC: 7 MMOL/L (ref 3–14)
AST SERPL W P-5'-P-CCNC: 19 U/L (ref 0–45)
BILIRUB SERPL-MCNC: 1.5 MG/DL (ref 0.2–1.3)
BUN SERPL-MCNC: 10 MG/DL (ref 7–30)
CALCIUM SERPL-MCNC: 9.7 MG/DL (ref 8.5–10.1)
CHLORIDE SERPL-SCNC: 103 MMOL/L (ref 94–109)
CO2 SERPL-SCNC: 28 MMOL/L (ref 20–32)
CREAT SERPL-MCNC: 0.8 MG/DL (ref 0.66–1.25)
GFR SERPL CREATININE-BSD FRML MDRD: >90 ML/MIN/1.7M2
GLUCOSE SERPL-MCNC: 101 MG/DL (ref 70–99)
NT-PROBNP SERPL-MCNC: 3364 PG/ML (ref 0–450)
POTASSIUM SERPL-SCNC: 3.9 MMOL/L (ref 3.4–5.3)
PROT SERPL-MCNC: 8.3 G/DL (ref 6.8–8.8)
SODIUM SERPL-SCNC: 138 MMOL/L (ref 133–144)
TSH SERPL DL<=0.005 MIU/L-ACNC: 1.12 MU/L (ref 0.4–4)

## 2017-09-28 PROCEDURE — 36415 COLL VENOUS BLD VENIPUNCTURE: CPT | Performed by: INTERNAL MEDICINE

## 2017-09-28 PROCEDURE — 25000128 H RX IP 250 OP 636: Performed by: INTERNAL MEDICINE

## 2017-09-28 PROCEDURE — 86038 ANTINUCLEAR ANTIBODIES: CPT | Performed by: INTERNAL MEDICINE

## 2017-09-28 PROCEDURE — 86431 RHEUMATOID FACTOR QUANT: CPT | Performed by: INTERNAL MEDICINE

## 2017-09-28 PROCEDURE — 84443 ASSAY THYROID STIM HORMONE: CPT | Performed by: INTERNAL MEDICINE

## 2017-09-28 PROCEDURE — 71555 MRI ANGIO CHEST W OR W/O DYE: CPT | Mod: 26 | Performed by: INTERNAL MEDICINE

## 2017-09-28 PROCEDURE — A9585 GADOBUTROL INJECTION: HCPCS | Performed by: INTERNAL MEDICINE

## 2017-09-28 PROCEDURE — 75561 CARDIAC MRI FOR MORPH W/DYE: CPT | Mod: 26 | Performed by: INTERNAL MEDICINE

## 2017-09-28 PROCEDURE — 83880 ASSAY OF NATRIURETIC PEPTIDE: CPT | Performed by: INTERNAL MEDICINE

## 2017-09-28 PROCEDURE — 86039 ANTINUCLEAR ANTIBODIES (ANA): CPT | Performed by: INTERNAL MEDICINE

## 2017-09-28 PROCEDURE — 75561 CARDIAC MRI FOR MORPH W/DYE: CPT

## 2017-09-28 PROCEDURE — 80053 COMPREHEN METABOLIC PANEL: CPT | Performed by: INTERNAL MEDICINE

## 2017-09-28 PROCEDURE — 71555 MRI ANGIO CHEST W OR W/O DYE: CPT

## 2017-09-28 RX ORDER — GADOBUTROL 604.72 MG/ML
5-65 INJECTION INTRAVENOUS ONCE
Status: COMPLETED | OUTPATIENT
Start: 2017-09-28 | End: 2017-09-28

## 2017-09-28 RX ADMIN — GADOBUTROL 16 ML: 604.72 INJECTION INTRAVENOUS at 12:04

## 2017-09-28 NOTE — PROGRESS NOTES
Hi Dr. Duckworth: c-MRI/MRA angiogram results for your review. Tests were ordered to evaluate RV size and function and thoracic aortic measurements. No mention of aortic patch. Patient will see Dr. Conley on 10/2 in [facility] clinic. Thanks, Bill      Comparison CMR: None    1. The left ventricle wall is moderately dilated with normal wall thickness. The global systolic function  is mildly reduced. The LVEF is 53%. There is akinesis of the basal inferior and hypokinesis of the  mid-distal inferior segments .     2. The right ventricle is moderately dilated in cavity size. The global systolic function is normal. The  RVEF is 56%.     3. Both atria are severely enlarged.    4. Mitral and aortic valve regurgitation are present.     5. There is subendocardial late gadolinium enhancement in the basal to mid inferior and inferoseptal  segments, consistent with a prior myocardial infarction in the RCA territory, there is approximately 60%  viability in the RCA territory and 100% viability in the LAD and LCx territories.     6. There is no intracardiac thrombus.    7. There is no pericardial effusion present.    MRA Aorta    1. Mild dilation of the aortic sinuses of Valsalva (4.1 x 3.8 cm), sinotubular junction (3.8 x 3.6 cm), and  proximal ascending aorta (3.9 x 3.9 cm). The transverse arch and descending thoracic aorta are normal in  size without an aneurysm or dissection.    2. The aortic arch is left sided. There is normal branching of the arch vessels. There is no coarctation.    3. The main and proximal branch pulmonary arteries are normal in size.     4. The systemic venous connections are normal.     CONCLUSIONS: Ischemic cardiomyopathy, LVEF of 53%, with prior myocardial infarction in the RCA territory.  Moderately dilated right ventricle with preserved function. Mildly dilated aorta at the sinuses of Valsalva  with a maximal diameter of 4.1 cm.     CORE  EXAM   ==========================================================================================================    MEASUREMENTS   ----------------------------------------------------------------------------------------      VOLUMETRIC ANALYSIS       ----------------------------------------------  .--------------------------------------------------------------.                    LV      Reference   RV      Reference    +------+-----------+--------+------------+--------+------------+   EDV   ml         255.99   (105-187)  246.03   (100-200)          ml/m^2        134   (58-93)     128.8   (52-98)      ESV   ml         119.28   (25-70)    108.79   (16-76)            ml/m^2       62.5   (13-35)        57   (8-37)       CO    L/min        6.97                   7                      L/min/m^2     3.6                 3.7                MASS                                                       SV    ml         136.71   ()   137.24   ()           ml/m^2       71.6   (39-63)      71.9   (36-69)      EF    %            53.4   (59-77)     55.78   (57-83)     '------+-----------+--------+------------+--------+------------'            HEART RATE:  51       LV DIMENSIONS       ----------------------------------------------          WALL THICKNESS - ANTEROSEPTAL:  1 cm          WALL THICKNESS - INFEROLATERAL:  0.7 cm          LV BHAVANI:  6.6 cm          LV ESD:  4.8 cm        LA DIMENSIONS (LV SYSTOLE)       ----------------------------------------------          DIAMETER:  6.4 cm        AORTIC ROOT DIMENSIONS       ----------------------------------------------          AORTIC ROOT SIZE:  Normal       ANATOMY   ----------------------------------------------------------------------------------------      LEFT VENTRICLE       ----------------------------------------------          WALL THICKNESS:  Normal           CAVITY SIZE:  MODERATELY  ENLARGED         RIGHT VENTRICLE       ----------------------------------------------          WALL THICKNESS:  Normal           CONTRACTILITY:  Normal           CAVITY SIZE:  MODERATELY ENLARGED         LEFT ATRIUM       ----------------------------------------------          CAVITY SIZE:  SEVERELY ENLARGED         RIGHT ATRIUM       ----------------------------------------------          CAVITY SIZE:  SEVERELY ENLARGED         PERICARDIUM       ----------------------------------------------          Normal           EFFUSION:  None         PLEURAL EFFUSION       ----------------------------------------------  None       VALVES   ----------------------------------------------------------------------------------------      AORTIC VALVE       ----------------------------------------------          AORTIC VALVE LEAFLETS:  Trileaflet           AORTIC REGURGITATION:  Present         MITRAL VALVE       ----------------------------------------------          MITRAL VALVE LEAFLETS:  Normal Leaflets           MITRAL REGURGITATION:  Present         TRICUSPID VALVE       ----------------------------------------------          TRICUSPID VALVE LEAFLETS:  Normal Leaflets         PULMONIC VALVE       ----------------------------------------------          PULMONIC VALVE LEAFLETS:  Normal Leaflets       17 SEGMENT   ----------------------------------------------------------------------------------------  .-------------------------------------------------------------------------------------------.   Segments            Wall Motion    Hyperenhancement  Stress Perfusion  Interpretation   +--------------------+---------------+------------------+------------------+----------------+   Base Anterior       Normal/Hyper   None                                Normal            Base Anteroseptal   Normal/Hyper   None                                Normal            Base Inferoseptal   Mild/Mod Hypo  1-25%                                Sub-Endo MI       Base Inferior       Akinetic       %                             Transmural MI     Base Inferolateral  Normal/Hyper   1-25%                               Sub-Endo MI       Base Anterolateral  Normal/Hyper   None                                Normal            Mid Anterior        Normal/Hyper   None                                Normal            Mid Anteroseptal    Normal/Hyper   None                                Normal            Mid Inferoseptal    Mild/Mod Hypo  1-25%                               Sub-Endo MI       Mid Inferior        Severe Hypo    26-50%                              Sub-Endo MI       Mid Inferolateral   Normal/Hyper   None                                Normal            Mid Anterolateral   Normal/Hyper   None                                Normal            Apical Anterior     Normal/Hyper   None                                Normal            Apical Septal       Normal/Hyper   None                                Normal            Apical Inferior     Mild/Mod Hypo  None                                Normal            Apical Lateral      Normal/Hyper   None                                Normal            [place]                Normal/Hyper   None                                Normal           +--------------------+---------------+------------------+------------------+----------------+   RV Segments         Wall Motion    Hyperenhancement  Stress Perfusion  Interpretation   +--------------------+---------------+------------------+------------------+----------------+   RV Basal Anterior   Normal/Hyper   None                                Normal            RV Basal Inferior   Normal/Hyper   None                                Normal            RV Mid              Normal/Hyper   None                                Normal            RV Apical            Normal/Hyper   None                                Normal           '--------------------+---------------+------------------+------------------+----------------'        FINDINGS       ----------------------------------------------          INFARCT/SCAR SIZE:  10 %      VASCULAR   ==========================================================================================================    UPPER VASCULAR   ----------------------------------------------------------------------------------------    EVALUATION OF THE THORACIC AORTA  ================================      COMMENTS        (no comments)    .----------------------------------------------------------------------------------------------------.   EVALUATION DETAILS (Thoracic Aorta)                                                                  ----------------------------------------------------------------------------------------------------   AORTIC ROOT                                                                                           Annulus Max Diameter A:  3.2 cm                                                                       Annulus Max Diameter B:  2.7 cm                                                                       Sinus of Valsalva Max Diameter A:  4.1 cm                                                             Sinus of Valsalva Max Diameter B:  3.8 cm                                                             Sinotubular Junction Max Diameter A:  3.8 cm                                                          Sinotubular Junction Max Diameter B:  3.6 cm                                                         +----------------------------------------------------------------------------------------------------+   ASCENDING AORTA                                                                                       Dimension A:  3.9 cm                                                                                   Dimension B:  3.9 cm                                                                                 +----------------------------------------------------------------------------------------------------+   ARCH OF THE AORTA                                                                                     Dimension A:  2.8 cm                                                                                  Dimension B:  2.7 cm                                                                                 +----------------------------------------------------------------------------------------------------+   ISTHMUS OF THE AORTA                                                                                  Dimension A:  3.2 cm                                                                                  Dimension B:  3.1 cm                                                                                 +----------------------------------------------------------------------------------------------------+   MID-DESCENDING AORTA                                                                                  Dimension A:  2.7 cm                                                                                  Dimension B:  2.6 cm                                                                                 +----------------------------------------------------------------------------------------------------+   DISTAL DESCENDING AORTA                                                                               Dimension A:  2.7 cm                                                                                  Dimension B:  2.6 cm                                                                                 .----------------------------------------------------------------------------------------------------.          IMPRESSION AND PLAN:  A very pleasant 76-year-old  gentleman with history of CAD with history of CABG more than 30 years ago, chronic atrial fibrillation, [id number] score of 4, on apixaban for stroke prophylaxis, systemic hypertension, pulmonary hypertension, moderate dilated ascending aorta and mildly dilated aortic root and with evidence of enlargement of RV with mildly reduced RV systolic function with elevated CVP assessment on examination as well as on echocardiogram with mild pedal edema.  He does not appear in any decompensated congestive heart failure either left or right-sided and no symptoms concerning for angina.  One possibility of RV enlargement and RV systolic dysfunction could be due to pulmonary hypertension which itself could be due to elevated filling pressure on the left side.  There is also some increase in ascending aorta size.  At this time, I recommend doing a cardiac MRI along with MRA angiogram to have a better look at the RV size and RV function as well as to more comprehensively measure the aortic dimensions.  The patient does tell me that during the time of his bypass surgery there was some aorta complication and he had a patch repair of the aorta.  Regarding RV enlargement and possible pulmonary hypertension as the cause of it, I recommend a review in Pulmonary Hypertension clinic to see if further workup like right heart catheterization is warranted.  At this time, I am setting up a follow up in a year with an echocardiogram but my office is going to update him with the results of the cardiac MRI and the MRA angiogram.           DOUG ROSA MD

## 2017-09-29 ENCOUNTER — TELEPHONE (OUTPATIENT)
Dept: CARDIOLOGY | Facility: CLINIC | Age: 77
End: 2017-09-29

## 2017-09-29 DIAGNOSIS — I77.810 DILATED AORTIC ROOT (H): Primary | ICD-10-CM

## 2017-09-29 LAB
ANA PAT SER IF-IMP: ABNORMAL
ANA SER QL IF: POSITIVE
ANA TITR SER IF: ABNORMAL {TITER}
RHEUMATOID FACT SER NEPH-ACNC: <20 IU/ML (ref 0–20)

## 2017-09-29 NOTE — TELEPHONE ENCOUNTER
I reviewed with Dr. Duckworth this morning the c-MRI and MRA angiogram. He is doubtful that further workup is necessary based on reasonable RV function. Still the same, he would like patient to keep appointment with Dr. Conley. I will update Dr. Conley's team. Rashawn

## 2017-09-29 NOTE — TELEPHONE ENCOUNTER
Phone call to patient to inform him that we have the results of his c-MRI/MRA which will be reviewed in detail on Monday with Dr. Conley. I mentioned to him that a portion of his aorta is mildly dilated with no evidence of a repair patch that he thought was done when he had his CBAG. I asked him when he planned to leave for [place] and he said that it will be around October 9,10 or 11. He plans to return in December for some medical appointments.I will update Dr. Conley's team regarding his travel plans. Patient appreciated the call. Rashawn

## 2017-09-29 NOTE — PROGRESS NOTES
Mild aortic root dilatation.  Recommend repeat MRA aorta in 2 years.  Moderately dilated RV with normal RVEF noted. Borderline pulmonary hypertension on echo. Will await pulmonary htn clinic review.

## 2017-10-02 ENCOUNTER — OFFICE VISIT (OUTPATIENT)
Dept: CARDIOLOGY | Facility: CLINIC | Age: 77
End: 2017-10-02
Payer: MEDICARE

## 2017-10-02 VITALS
HEIGHT: 67 IN | DIASTOLIC BLOOD PRESSURE: 82 MMHG | WEIGHT: 182.6 LBS | OXYGEN SATURATION: 100 % | BODY MASS INDEX: 28.66 KG/M2 | SYSTOLIC BLOOD PRESSURE: 169 MMHG | HEART RATE: 50 BPM

## 2017-10-02 DIAGNOSIS — I10 BENIGN ESSENTIAL HYPERTENSION: ICD-10-CM

## 2017-10-02 DIAGNOSIS — Z95.1 S/P CABG (CORONARY ARTERY BYPASS GRAFT): ICD-10-CM

## 2017-10-02 DIAGNOSIS — I51.7 RIGHT HEART ENLARGEMENT: Primary | ICD-10-CM

## 2017-10-02 DIAGNOSIS — F10.29 ALCOHOL DEPENDENCE WITH UNSPECIFIED ALCOHOL-INDUCED DISORDER (H): ICD-10-CM

## 2017-10-02 PROCEDURE — 93000 ELECTROCARDIOGRAM COMPLETE: CPT | Performed by: INTERNAL MEDICINE

## 2017-10-02 PROCEDURE — 99214 OFFICE O/P EST MOD 30 MIN: CPT | Performed by: INTERNAL MEDICINE

## 2017-10-02 NOTE — LETTER
10/2/2017    Oscar Alves MD  [id number] [id number]    RE: Milo W Daphne       Dear Colleague,    I had the pleasure of seeing Milo QUIJANO Daphne in the [facility].    LOCATION:  [facility].      REFERRAL PROVIDER:  Dr. Michael Duckworth MD cardiologist [facility] [place] [facility].      PRIMARY CARE PROVIDER:     Dr. Oscar Alves    [address] [place] [address]      REASON FOR VISIT AND CHIEF COMPLAINT:   1.  Right ventricular dysfunction   2.  Question of pulmonary hypertension.      The patient is new to my practice.  He is an established patient of my cardiologist partner, Dr. Michael Duckworth, who last saw him a couple of weeks ago on 09/18/2017.  The patient is accompanied by his wife today.  He follows with Dr. Duckworth for a diagnosis of coronary artery disease, chronic atrial fibrillation, and stable ascending aorta dilatation of 4.2-4.5 cm.  He is on long-term Eliquis anticoagulation for a high CHADS-VASc score (on lower dose of 2.5 mg b.i.d. due to history of epistaxis) and essential hypertension.      On his recent annual followup, he had an echocardiogram which showed a moderately dilated right ventricle with mildly reduced systolic function, normal left ventricular systolic function with an LVEF of 55%-60%, moderate mitral regurgitation, mild to moderate tricuspid regurgitation, dilated IVC and an estimated right ventricular systolic pressure of approximately 40 mmHg.  The ascending aorta was 4.5 cm.  The right ventricular enlargement was new compared to his previous study in 2016.      The patient was referred here to see if additional testing is needed.  In the interim, Dr. Duckworth also ordered a cardiac MRI, which in fact showed that the right ventricle was moderately dilated with normal systolic function, RVEF of 56%.  The left ventricle was also reported as moderately dilated with normal wall thickness and LVEF of 53% with  akinesis of the basal inferior with wall motion abnormalities in keeping with his known CAD and CABG history.  There was severe biatrial enlargement, and the proximal ascending aorta measured 3.9 x 3.9 cm with aortic root measuring 4.1 cm.  The pulmonary arteries were normal in size.      The patient really does not have much in terms of symptoms.  He says he feels occasionally fatigued and attributes this to being 76 years old, but he walks for 45 minutes 4 times a week at his own pace and is able to do this consistently.  There is mild chronic lower extremity edema, no orthopnea, PND or palpitations.  He has had no bleeding issues.  He does not have a history of previous DVT/PE, connective tissue disease, known congenital heart disease such as an ASD, history of anorexia medications, lung disease, he quit smoking in the year 2000, but he does have alcohol excess.  He used to drink heavily for at least 2 or 3 decades and over the last few years has tried to cut down, but even at this he drinks about 3-4 alcoholic drinks a day.  His systemic blood pressure is not well controlled, and today it was 169/82 mmHg, whereas in the past it has been reasonably controlled.  His oxygen saturations at rest are normal.      Please see my attached note in Epic for medications, past medical, surgical, social and family history.       His family history is concerning for significant coronary artery disease, mostly non-premature.  There is no family history of pulmonary hypertension.  One of his brothers reportedly had rheumatoid arthritis.      PHYSICAL EXAMINATION:   VITAL SIGNS:  Blood pressure 169/82, pulse 50 per minute and irregular, height 1.689 meters, weight 82.8 kg, saturations 100% on room air, BMI 29.03 kg/m2.   CONSTITUTIONAL:  He is comfortable, cooperative, alert, oriented, well-developed, well-nourished.   EYES:  Normal.   ENT:  Satisfactory dentition with some fillings, no cyanosis.   CARDIOVASCULAR:  Jugular venous  pressure is normal.  Carotid pulse character is normal.  Apical impulse is normal to palpation.  Midline sternotomy scar is well-healed.  Heart sounds are very slow at about 45-50 BPM, irregularly irregular without any audible murmur.  No S3 or S4.   LUNGS:  Breath sounds are rather quiet, but he has good air entry to the bases.  There are no rales or wheeze.   GASTROINTESTINAL:  Soft, nontender, no hepatosplenomegaly or masses.  Active bowel sounds.   MUSCULOSKELETAL:  Normal muscle tone and strength.  Normal gait.   EXTREMITIES:  He has bilateral lower extremity skin changes suggesting chronic venous stasis with superficial varicosities but minimal to no lower extremity edema.      DIAGNOSTIC DATA:  I reviewed pertinent labs and imaging studies including ECG, transthoracic echocardiogram and cardiac MRI as documented in HPI.      ECG done today shows atrial fibrillation with a very slow ventricular rate of 50 BPM.  The QTc is 473 milliseconds.      LABORATORY DATA -- Sodium 138, potassium 3.9, BUN 10, creatinine 0.8 with an estimated GFR greater than 90, serum albumin 4.1, total bilirubin 1.5, alkaline phosphatase is mildly elevated at 195 with normal ALT and AST of 18 and 19.  Rheumatoid factor negative.  NT proBNP is elevated at 3364, TSH 1.1, glucose 101, hemoglobin 12.9.      DIAGNOSES:   1.  Right ventricular dilatation with preserved systolic function.  Etiology unclear.   2.  Chronic atrial fibrillation with slow ventricular response, not on any atrioventricular slowing agents.   3.  Coronary artery disease, status post remote CABG in 1996.   4.  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   5.  Alcohol excess.   6.  Hypertension.     Outpatient Encounter Prescriptions as of 10/2/2017   Medication Sig Dispense Refill     FERROUS GLUCONATE PO Take 324 mg by mouth daily (with breakfast)       Omega 3-6-9 Fatty Acids (OMEGA 3-6-9 PO)        ELIQUIS 2.5 MG tablet TAKE 1 TABLET TWICE A  tablet 1      [DISCONTINUED] simvastatin (ZOCOR) 20 MG tablet TAKE 1 TABLET AT BEDTIME 90 tablet 0     Urea 20 % CREA cream Apply topically as needed Apply to right foot daily 120 g 2     naftifine (NAFTIN) 1 % GEL topical gel Apply topically daily Apply to affected nails daily 90 g 3     doxycycline (VIBRAMYCIN) 100 MG capsule TAKE 1 CAPSULE DAILY 90 capsule 2     tamsulosin (FLOMAX) 0.4 MG capsule TAKE 1 CAPSULE DAILY 90 capsule 2     omeprazole (PRILOSEC) 40 MG capsule TAKE 1 CAPSULE DAILY 90 capsule 2     lisinopril (PRINIVIL/ZESTRIL) 20 MG tablet Take 1 tablet (20 mg) by mouth daily 90 tablet 1     senna (SENOKOT) 8.6 MG tablet Take 1 tablet by mouth daily 60 tablet 0     diclofenac (VOLTAREN) 1 % GEL Apply 4 gramsto area qid prn 100 g 11     aspirin 81 MG tablet Take 81 mg by mouth daily       acetaminophen (TYLENOL) 650 MG CR tablet Take 1 tablet (650 mg) by mouth every 6 hours as needed for pain 100 tablet 11     co-enzyme Q-10 100 MG CAPS Take 1 capsule (100 mg) by mouth daily 180 capsule 3     sodium chloride (OCEAN NASAL SPRAY) 0.65 % nasal spray Spray 1 spray into both nostrils daily as needed for congestion 2 Bottle 6     Glucosamine-Chondroitin (OSTEO BI-FLEX REGULAR STRENGTH) 250-200 MG TABS Take 1 tablet by mouth 2 times daily 100 tablet 8     Multiple Vitamins-Minerals (MULTIVITAL) CHEW Take 2 chew tab by mouth 2 times daily 360 tablet 3     [DISCONTINUED] ferrous gluconate (FERGON) 324 (38 FE) MG tablet Take 1 tablet (324 mg) by mouth 3 times daily (with meals) (Patient taking differently: Take 324 mg by mouth daily (with breakfast) ) 270 tablet 3     No facility-administered encounter medications on file as of 10/2/2017.       ASSESSMENT:    This is a 76-year-old gentleman with chronic atrial arrhythmia, severe biatrial enlargement and right ventricular enlargement without a clear etiology.  His right ventricular systolic function is preserved, and he has no symptoms of right or left-sided heart failure.  He  has a fairly slow ventricular response despite being on no AV blocking agents and states this has been the case for several years.  There is no evidence of exertional fatigue or intolerance; therefore, I do not think he has significant chronotropic incompetence, but certainly this is something that can be assessed.      PLAN:   1.  I agree with Dr. Duckworth that the RV dilatation is new compared to a year ago and should be investigated further.  Although he is 76 years old, the primary 2 differentials would be undiagnosed sleep-disordered breathing (wife does report he has insomnia, daytime fatigue and drowsiness) and inter-atrial shunt like late presenting ASD.   2.  The patient is scheduled to go to [place] for the winter 2 weeks down the line.  He wants to defer all testing until he returns in 04/2018.  We will facilitate this.   3.  I counseled him to cut back on his alcohol intake.  His alkaline phosphatase is elevated.  He will need a liver workup as well.   4.  Recommended downstream testing includes -- liver ultrasound, transthoracic echocardiogram with bubble study to rule out intraatrial shunt, V/Q scan, 6-minute walk, Sleep Clinic referral.      It was a pleasure to visit with the patient.  I look forward to seeing him again.  As reiterated with the patient, I will primarily focus on evaluation of his RV dysfunction, and all his other cardiology problems will be addressed by Dr. Duckworth and his team.     Sincerely,    Sixto Conley MD     [facility]

## 2017-10-02 NOTE — MR AVS SNAPSHOT
After Visit Summary   10/2/2017    Milo Serna    MRN: [id number]           Patient Information     Date Of Birth          1940        Visit Information        Provider Department      10/2/2017 2:45 PM Sixto Conley MD [facility] [place]        Today's Diagnoses     Right heart enlargement    -  1    S/P CABG (coronary artery bypass graft)        Alcohol dependence with unspecified alcohol-induced disorder (H)          Care Instructions    Dr Conley would like to further assess why your right heart is enlarged and recommends further testing in April 2018 and a visit with her after tests Including:  Hepatitis B & C lab  HIV lab test  Echocardiogram with bubble study  PFT lung testing with DLCO  6 minute walk test  VQ scan with chest xray  Liver Ultrasound  Sleep MD consult     Call my nurse Patsy JOHNSON with any questions you may have. [phone].            Follow-ups after your visit        Your next 10 appointments already scheduled     Dec 14, 2017 10:00 AM CST   LAB with [facility] LAB   [facility]    [address] [place] [id number] [phone]           Patient must bring picture ID. Patient should be prepared to give a urine specimen  Please do not eat 10-12 hours before your appointment if you are coming in fasting for labs on lipids, cholesterol, or glucose (sugar). Pregnant women should follow their Care Team instructions. Water with medications is okay. Do not drink coffee or other fluids. If you have concerns about taking  your medications, please ask at office or if scheduling via Re[employer], send a message by clicking on Secure Messaging, Message Your Care Team.            Dec 20, 2017 10:10 AM CST   (Arrive by 10:00 AM)   Return Visit with Krystian Owens MD   [facility] ([facility])    [address]  [address]  [place] [address] [phone]              Who to contact     If you have questions or need follow up information about today's clinic visit or your schedule please contact [facility] [place] directly at [phone].  Normal or non-critical lab and imaging results will be communicated to you by MyChart, letter or phone within 4 business days after the clinic has received the results. If you do not hear from us within 7 days, please contact the clinic through [employer] or phone. If you have a critical or abnormal lab result, we will notify you by phone as soon as possible.  Submit refill requests through [employer] or call your pharmacy and they will forward the refill request to us. Please allow 3 business days for your refill to be completed.          Additional Information About Your Visit        [employer] Information     [employer] gives you secure access to your electronic health record. If you see a primary care provider, you can also send messages to your care team and make appointments. If you have questions, please call your primary care clinic.  If you do not have a primary care provider, please call [phone] and they will assist you.        Care EveryWhere ID     This is your Care EveryWhere ID. This could be used by other organizations to access your [place] medical records  [id number]        Your Vitals Were     Pulse Height Pulse Oximetry BMI (Body Mass Index)          50 1.689 m (5' 6.5\") 100% 29.03 kg/m2         Blood Pressure from Last 3 Encounters:   10/02/17 169/82   09/25/17 128/78   09/18/17 138/70    Weight from Last 3 Encounters:   10/02/17 82.8 kg (182 lb 9.6 oz)   09/25/17 81.5 kg (179 lb 9.6 oz)   09/18/17 83.6 kg (184 lb 4.8 oz)              We Performed the Following     EKG 12-lead complete w/read - Clinics          Today's Medication Changes          These changes are accurate as of: 10/2/17  3:56 PM.  If you have any questions, ask your nurse or " doctor.               These medicines have changed or have updated prescriptions.        Dose/Directions    FERROUS GLUCONATE PO   This may have changed:  Another medication with the same name was removed. Continue taking this medication, and follow the directions you see here.   Changed by:  Sixto Conley MD        Dose:  324 mg   Take 324 mg by mouth daily (with breakfast)   Refills:  0                Primary Care Provider Office Phone # Fax #    Oscar Alves [phone] [address] [facility] [address]        Equal Access to Services     [facility]: Hadii aad ku hadasho Soomaali, waaxda luqadaha, qaybta kaalmada adeegyada, waxay idiin hayaan adeeg bakari reyes . So [facility] [phone].    ATENCIÓN: Si habla español, tiene a randall disposición servicios gratuitos de asistencia lingüística. Ll[facility] [phone].    We comply with applicable federal civil rights laws and [place] laws. We do not discriminate on the basis of race, color, national origin, age, disability, sex, sexual orientation, or gender identity.            Thank you!     Thank you for choosing [facility] [place]  for your care. Our goal is always to provide you with excellent care. Hearing back from our patients is one way we can continue to improve our services. Please take a few minutes to complete the written survey that you may receive in the mail after your visit with us. Thank you!             Your Updated Medication List - Protect others around you: Learn how to safely use, store and throw away your medicines at www.disposemymeds.org.          This list is accurate as of: 10/2/17  3:56 PM.  Always use your most recent med list.                   Brand Name Dispense Instructions for use Diagnosis    acetaminophen 650 MG CR tablet    TYLENOL    100 tablet    Take 1 tablet (650 mg) by mouth every 6 hours as needed for pain    Osteoarthritis       aspirin 81 MG tablet       Take 81 mg by mouth daily        co-enzyme Q-10 100 MG Caps capsule     180 capsule    Take 1 capsule (100 mg) by mouth daily    Mixed hyperlipidemia       diclofenac 1 % Gel topical gel    VOLTAREN    100 g    Apply 4 gramsto area qid prn    Acute left-sided low back pain without sciatica       doxycycline 100 MG capsule    VIBRAMYCIN    90 capsule    TAKE 1 CAPSULE DAILY    Rosacea       ELIQUIS 2.5 MG tablet   Generic drug:  apixaban ANTICOAGULANT     180 tablet    TAKE 1 TABLET TWICE A DAY    Persistent atrial fibrillation (H)       FERROUS GLUCONATE PO      Take 324 mg by mouth daily (with breakfast)        Glucosamine-Chondroitin 250-200 MG Tabs    OSTEO BI-FLEX REGULAR STRENGTH    100 tablet    Take 1 tablet by mouth 2 times daily    Osteoarthritis       lisinopril 20 MG tablet    PRINIVIL/ZESTRIL    90 tablet    Take 1 tablet (20 mg) by mouth daily    HTN, goal below 150/90       MULTIVITAL Chew     360 tablet    Take 2 chew tab by mouth 2 times daily    Neuropathy of foot       naftifine 1 % Gel topical gel    NAFTIN    90 g    Apply topically daily Apply to affected nails daily    Onychomycosis of toenail       OMEGA 3-6-9 PO           omeprazole 40 MG capsule    priLOSEC    90 capsule    TAKE 1 CAPSULE DAILY    PUD (peptic ulcer disease)       senna 8.6 MG tablet    SENOKOT    60 tablet    Take 1 tablet by mouth daily    Prostate cancer (H)       simvastatin 20 MG tablet    ZOCOR    90 tablet    TAKE 1 TABLET AT BEDTIME    Hyperlipidemia LDL goal <100       sodium chloride 0.65 % nasal spray    OCEAN NASAL SPRAY    2 Bottle    Spray 1 spray into both nostrils daily as needed for congestion    Bloody nose       tamsulosin 0.4 MG capsule    FLOMAX    90 capsule    TAKE 1 CAPSULE DAILY    Benign prostatic hyperplasia with lower urinary tract symptoms, unspecified morphology       Urea 20 % Crea cream     120 g    Apply topically as needed Apply to right foot daily    Right foot pain, Skin callus, Pes  planus of both feet, Venous insufficiency of both lower extremities, Hav (hallux abducto valgus), right, Onychomycosis of toenail

## 2017-10-02 NOTE — PATIENT INSTRUCTIONS
Dr Conley would like to further assess why your right heart is enlarged and recommends further testing in April 2018 and a visit with her after tests Including:  Hepatitis B & C lab  HIV lab test  Echocardiogram with bubble study  PFT lung testing with DLCO  6 minute walk test  VQ scan with chest xray  Liver Ultrasound  Sleep MD consult     Call my nurse Patsy JOHNSON with any questions you may have. [phone].

## 2017-10-02 NOTE — PROGRESS NOTES
Dictation reference number - [id number]    REFERRING PROVIDER:  No referring provider defined for this encounter.    PRIMARY CARE PROVIDER:  Oscar Alves  [address] [facility] [address]      REVIEW OF SYSTEMS:  A comprehensive 10-point review of systems was completed and the pertinent positives are documented in the history of present illness.    Skin:  Positive for bruising   Eyes:  Positive for glasses;cataracts  ENT:  Positive for hearing loss;sinus trouble  Respiratory:  Negative    Cardiovascular:  Negative    Gastroenterology: Negative    Genitourinary:  Positive for urinary frequency;prostate problem;nocturia  Musculoskeletal:  Positive for arthritis  Neurologic:  Positive for numbness or tingling of feet  Psychiatric:  Positive for sleep disturbances  Heme/Lymph/Imm:  Positive for easy bruising  Endocrine:  Negative      CURRENT MEDICATIONS:  Current Outpatient Prescriptions   Medication Sig Dispense Refill     FERROUS GLUCONATE PO Take 324 mg by mouth daily (with breakfast)       Omega 3-6-9 Fatty Acids (OMEGA 3-6-9 PO)        simvastatin (ZOCOR) 20 MG tablet TAKE 1 TABLET AT BEDTIME 90 tablet 0     ELIQUIS 2.5 MG tablet TAKE 1 TABLET TWICE A  tablet 1     Urea 20 % CREA cream Apply topically as needed Apply to right foot daily 120 g 2     naftifine (NAFTIN) 1 % GEL topical gel Apply topically daily Apply to affected nails daily 90 g 3     doxycycline (VIBRAMYCIN) 100 MG capsule TAKE 1 CAPSULE DAILY 90 capsule 2     tamsulosin (FLOMAX) 0.4 MG capsule TAKE 1 CAPSULE DAILY 90 capsule 2     omeprazole (PRILOSEC) 40 MG capsule TAKE 1 CAPSULE DAILY 90 capsule 2     lisinopril (PRINIVIL/ZESTRIL) 20 MG tablet Take 1 tablet (20 mg) by mouth daily 90 tablet 1     senna (SENOKOT) 8.6 MG tablet Take 1 tablet by mouth daily 60 tablet 0     diclofenac (VOLTAREN) 1 % GEL Apply 4 gramsto area qid prn 100 g 11     aspirin 81 MG tablet Take 81 mg by mouth daily       acetaminophen (TYLENOL) 650 MG CR tablet  Take 1 tablet (650 mg) by mouth every 6 hours as needed for pain 100 tablet 11     co-enzyme Q-10 100 MG CAPS Take 1 capsule (100 mg) by mouth daily 180 capsule 3     sodium chloride (OCEAN NASAL SPRAY) 0.65 % nasal spray Spray 1 spray into both nostrils daily as needed for congestion 2 Bottle 6     Glucosamine-Chondroitin (OSTEO BI-FLEX REGULAR STRENGTH) 250-200 MG TABS Take 1 tablet by mouth 2 times daily 100 tablet 8     Multiple Vitamins-Minerals (MULTIVITAL) CHEW Take 2 chew tab by mouth 2 times daily 360 tablet 3         ALLERGIES:  No Known Allergies    PAST MEDICAL HISTORY:    Past Medical History:   Diagnosis Date     Anemia, unspecified 11/8/2016     Atrial fibrillation (H) 5/27/2015     BPH (benign prostatic hyperplasia) 1/25/2012     CAD (coronary artery disease) 4/4/2012     Closed fracture of metatarsal bone 4/4/2012     Coronary atherosclerosis of unspecified type of vessel, native or [facility]    CAB 1996 following MI (at [facility])      Dilated aortic root (H) 5/26/2016     Drug-induced erectile dysfunction 10/2/2015     Elevated blood sugar 11/8/2016     Elevated PSA 9/19/2013     Essential hypertension with goal blood pressure less than 140/90 9/22/2016     Essential hypertension, benign      Gout      HAV (hallux abducto valgus) 4/4/2012     Hernia, abdominal      History of prostate cancer 6/15/2016     HTN, goal below 150/90 4/20/2017     Iron deficiency anemia secondary to inadequate dietary iron intake 11/15/2016     Low blood pressure reading 10/17/2016     Lumbago     had degendisc dz on MRI 7/07     Mixed hyperlipidemia      Mumps      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     OA (osteoarthritis) 4/4/2012     Old myocardial infarction 4/4/2012     Palpitations      Pes planus 4/4/2012     Prostate cancer (H) 5/26/2016     PUD (peptic ulcer disease) 4/4/2012     Pulmonary hypertension 9/25/2017     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     Rosacea 1/15/2008     Thrombocytopenia (H) 4/21/2017      Unspecified hyperplasia of prostate with urinary obstruction and other lower urinary tract symptoms (LUTS)     better on avodart & hytrin     Venous stasis 2012     Venous stasis 2012       PAST SURGICAL HISTORY:    Past Surgical History:   Procedure Laterality Date     C NONSPECIFIC PROCEDURE      CAB 1996     C NONSPECIFIC PROCEDURE      l inguinal hernia repair      C NONSPECIFIC PROCEDURE      R hernia remote     C NONSPECIFIC PROCEDURE      R ankle ORIF for fx     C NONSPECIFIC PROCEDURE      nasal surgery      C NONSPECIFIC PROCEDURE      R rotater repair (remote)      C NONSPECIFIC PROCEDURE      appy 1966     C NONSPECIFIC PROCEDURE      sinus surgery x 2     C NONSPECIFIC PROCEDURE      L shoulder      CARDIAC SURGERY      bipass     CYSTOSCOPY FLEXIBLE, CYOABLATION PROSTATE N/A 2016    Procedure: CYSTOSCOPY FLEXIBLE, CRYOABLATION PROSTATE;  Surgeon: Krystian Owens MD;  Location: [facility] OR     GENITOURINARY SURGERY      prostate surgery      HERNIA REPAIR       LAPAROSCOPIC HERNIORRHAPHY INGUINAL Right 5/10/2017    Procedure: LAPAROSCOPIC HERNIORRHAPHY INGUINAL;  Laparoscopic preperitoneal repair of right inguinal hernia with placement of underlay mesh;  Surgeon: Enrico Bridges MD;  Location: [facility] OR     PROSTATE SURGERY         FAMILY HISTORY:    Family History   Problem Relation Age of Onset     CANCER Mother      stomach cancer,  age 61     HEART DISEASE Father      MI,  age 71     HEART DISEASE Brother      stent placement, RA      Hypertension Brother      HEART DISEASE Sister      rythmn problems with heart, hypertension       SOCIAL HISTORY:    Social History     Social History     Marital status:      Spouse name: N/A     Number of children: N/A     Years of education: N/A     Social History Main Topics     Smoking status: Former Smoker     Smokeless tobacco: Never Used      Comment: quit 3/1974     Alcohol use 0.0 oz/week     0  "Standard drinks or equivalent per week      Comment: small amounts to moderate     Drug use: No     Sexual activity: Yes     Partners: Female     Other Topics Concern     Caffeine Concern Yes     1 cups of coffee and soda per day     Special Diet No     Exercise Yes     walking     Seat Belt Yes     Social History Narrative       PHYSICAL EXAM:    Vitals: /82  Pulse 50  Ht 1.689 m (5' 6.5\")  Wt 82.8 kg (182 lb 9.6 oz)  SpO2 100%  BMI 29.03 kg/m2  Wt Readings from Last 5 Encounters:   10/02/17 82.8 kg (182 lb 9.6 oz)   09/25/17 81.5 kg (179 lb 9.6 oz)   09/18/17 83.6 kg (184 lb 4.8 oz)   07/11/17 78.8 kg (173 lb 12.8 oz)   05/25/17 79.7 kg (175 lb 9.6 oz)       Constitutional: Comfortable at rest. Cooperative, alert and oriented, well developed, well nourished.  Eyes: Pupils equal and round, conjunctivae and lids unremarkable, sclera white, no xanthalasma,   ENT: Satisfactory dentition.  No cyanosis or pallor.  Neck: Carotid pulses are full and equal bilaterally, no carotid bruit, no thyromegaly     Respiratory: Normal respiratory effort with symmetrical chest wall movements and no use of accessory muscles. Good air entry with normal breath sounds and no rales or wheeze.  Cardiovascular: Normal jugular venous pulse and pressure.  Normal carotid pulse character and volume.  No carotid bruit.  Apical impulse is undisplaced and normal to palpation without parasternal heave or thrill.  Heart sounds are normal and irregular. No audible murmur. No S3, S4 or friction rub.    GI: Soft, nontender, no hepatosplenomegaly, no masses, active bowel sounds.    Skin: No rash, erythema, ecchymosis.  Musculoskeletal: Normal muscle tone and strength. Normal gait. No spinal deformities.  Neuropsychiatric: Oriented to time place and person.  Affect normal.  No gross motor deficits.            Encounter Diagnoses   Name Primary?     Right heart enlargement Yes     S/P CABG (coronary artery bypass graft)      Alcohol dependence " with unspecified alcohol-induced disorder (H)        Orders Placed This Encounter   Procedures     EKG 12-lead complete w/read - Clinics       CC  REFERRING PROVIDER:  No referring provider defined for this encounter.

## 2017-10-03 NOTE — PROGRESS NOTES
LOCATION:  [facility].      REFERRAL PROVIDER:  Dr. Michael Duckworth MD cardiologist [facility].      PRIMARY CARE PROVIDER:     Dr. Oscar Alves    [address] [phone]      REASON FOR VISIT AND CHIEF COMPLAINT:   1.  Right ventricular dysfunction   2.  Question of pulmonary hypertension.      HISTORY OF PRESENT ILLNESS:  The patient is new to my practice.  He is an established patient of my cardiologist partner, Dr. Michael Duckworth, who last saw him a couple of weeks ago on 09/18/2017.  The patient is accompanied by his wife today.  He follows with Dr. Duckworth for a diagnosis of coronary artery disease, chronic atrial fibrillation, and stable ascending aorta dilatation of 4.2-4.5 cm.  He is on long-term Eliquis anticoagulation for a high CHADS-VASc score (on lower dose of 2.5 mg b.i.d. due to history of epistaxis) and essential hypertension.      On his recent annual followup, he had an echocardiogram which showed a moderately dilated right ventricle with mildly reduced systolic function, normal left ventricular systolic function with an LVEF of 55%-60%, moderate mitral regurgitation, mild to moderate tricuspid regurgitation, dilated IVC and an estimated right ventricular systolic pressure of approximately 40 mmHg.  The ascending aorta was 4.5 cm.  The right ventricular enlargement was new compared to his previous study in 2016.      The patient was referred here to see if additional testing is needed.  In the interim, Dr. Duckworth also ordered a cardiac MRI, which in fact showed that the right ventricle was moderately dilated with normal systolic function, RVEF of 56%.  The left ventricle was also reported as moderately dilated with normal wall thickness and LVEF of 53% with akinesis of the basal inferior with wall motion abnormalities in keeping with his known CAD and CABG history.  There was severe biatrial enlargement, and the proximal ascending aorta measured  3.9 x 3.9 cm with aortic root measuring 4.1 cm.  The pulmonary arteries were normal in size.      The patient really does not have much in terms of symptoms.  He says he feels occasionally fatigued and attributes this to being 76 years old, but he walks for 45 minutes 4 times a week at his own pace and is able to do this consistently.  There is mild chronic lower extremity edema, no orthopnea, PND or palpitations.  He has had no bleeding issues.  He does not have a history of previous DVT/PE, connective tissue disease, known congenital heart disease such as an ASD, history of anorexia medications, lung disease, he quit smoking in the year 2000, but he does have alcohol excess.  He used to drink heavily for at least 2 or 3 decades and over the last few years has tried to cut down, but even at this he drinks about 3-4 alcoholic drinks a day.  His systemic blood pressure is not well controlled, and today it was 169/82 mmHg, whereas in the past it has been reasonably controlled.  His oxygen saturations at rest are normal.      Please see my attached note in Epic for medications, past medical, surgical, social and family history.       His family history is concerning for significant coronary artery disease, mostly non-premature.  There is no family history of pulmonary hypertension.  One of his brothers reportedly had rheumatoid arthritis.      PHYSICAL EXAMINATION:   VITAL SIGNS:  Blood pressure 169/82, pulse 50 per minute and irregular, height 1.689 meters, weight 82.8 kg, saturations 100% on room air, BMI 29.03 kg/m2.   CONSTITUTIONAL:  He is comfortable, cooperative, alert, oriented, well-developed, well-nourished.   EYES:  Normal.   ENT:  Satisfactory dentition with some fillings, no cyanosis.   CARDIOVASCULAR:  Jugular venous pressure is normal.  Carotid pulse character is normal.  Apical impulse is normal to palpation.  Midline sternotomy scar is well-healed.  Heart sounds are very slow at about 45-50 BPM,  irregularly irregular without any audible murmur.  No S3 or S4.   LUNGS:  Breath sounds are rather quiet, but he has good air entry to the bases.  There are no rales or wheeze.   GASTROINTESTINAL:  Soft, nontender, no hepatosplenomegaly or masses.  Active bowel sounds.   MUSCULOSKELETAL:  Normal muscle tone and strength.  Normal gait.   EXTREMITIES:  He has bilateral lower extremity skin changes suggesting chronic venous stasis with superficial varicosities but minimal to no lower extremity edema.      DIAGNOSTIC DATA:  I reviewed pertinent labs and imaging studies including ECG, transthoracic echocardiogram and cardiac MRI as documented in HPI.      ECG done today shows atrial fibrillation with a very slow ventricular rate of 50 BPM.  The QTc is 473 milliseconds.      LABORATORY DATA -- Sodium 138, potassium 3.9, BUN 10, creatinine 0.8 with an estimated GFR greater than 90, serum albumin 4.1, total bilirubin 1.5, alkaline phosphatase is mildly elevated at 195 with normal ALT and AST of 18 and 19.  Rheumatoid factor negative.  NT proBNP is elevated at 3364, TSH 1.1, glucose 101, hemoglobin 12.9.      DIAGNOSES:   1.  Right ventricular dilatation with preserved systolic function.  Etiology unclear.   2.  Chronic atrial fibrillation with slow ventricular response, not on any atrioventricular slowing agents.   3.  Coronary artery disease, status post remote CABG in 1996.   4.  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   5.  Alcohol excess.   6.  Hypertension.      ASSESSMENT:    This is a 76-year-old gentleman with chronic atrial arrhythmia, severe biatrial enlargement and right ventricular enlargement without a clear etiology.  His right ventricular systolic function is preserved, and he has no symptoms of right or left-sided heart failure.  He has a fairly slow ventricular response despite being on no AV blocking agents and states this has been the case for several years.  There is no evidence of exertional  fatigue or intolerance; therefore, I do not think he has significant chronotropic incompetence, but certainly this is something that can be assessed.      PLAN:   1.  I agree with Dr. Duckworth that the RV dilatation is new compared to a year ago and should be investigated further.  Although he is 76 years old, the primary 2 differentials would be undiagnosed sleep-disordered breathing (wife does report he has insomnia, daytime fatigue and drowsiness) and inter-atrial shunt like late presenting ASD.   2.  The patient is scheduled to go to [place] for the winter 2 weeks down the line.  He wants to defer all testing until he returns in 2018.  We will facilitate this.   3.  I counseled him to cut back on his alcohol intake.  His alkaline phosphatase is elevated.  He will need a liver workup as well.   4.  Recommended downstream testing includes -- liver ultrasound, transthoracic echocardiogram with bubble study to rule out intraatrial shunt, V/Q scan, 6-minute walk, Sleep Clinic referral.      It was a pleasure to visit with the patient.  I look forward to seeing him again.  As reiterated with the patient, I will primarily focus on evaluation of his RV dysfunction, and all his other cardiology problems will be addressed by Dr. Duckworth and his team.         DANIELA PENA MD             D: 10/02/2017 16:34   T: 10/03/2017 02:11   MT: KANE      Name:     SAÚL GONZALEZ   MRN:      [id number]        Account:      [id number]   :      1940           Service Date: 10/02/2017      Document: [id number]

## 2017-10-11 DIAGNOSIS — E78.5 HYPERLIPIDEMIA LDL GOAL <100: ICD-10-CM

## 2017-10-12 RX ORDER — SIMVASTATIN 20 MG
TABLET ORAL
Qty: 90 TABLET | Refills: 1 | Status: SHIPPED | OUTPATIENT
Start: 2017-10-12 | End: 2018-05-01

## 2017-10-12 NOTE — TELEPHONE ENCOUNTER
SIMVASTATIN TABS 20MG       Last Written Prescription Date: 08-  Last Fill Quantity: 08-, #90 refills: 0    Last Office Visit with [facility] or [facility] prescribing provider:  09- Dr. Alves.   Future Office Visit:      Cholesterol   Date Value Ref Range Status   09/25/2017 122 <200 mg/dL Final     HDL Cholesterol   Date Value Ref Range Status   09/25/2017 64 >39 mg/dL Final     LDL Cholesterol Calculated   Date Value Ref Range Status   09/25/2017 46 <100 mg/dL Final     Comment:     Desirable:       <100 mg/dl     Triglycerides   Date Value Ref Range Status   09/25/2017 62 <150 mg/dL Final     Comment:     Fasting specimen     Cholesterol/HDL Ratio   Date Value Ref Range Status   10/02/2015 2.5 0.0 - 5.0 Final     ALT   Date Value Ref Range Status   09/28/2017 18 0 - 70 U/L Final

## 2017-10-30 ENCOUNTER — TELEPHONE (OUTPATIENT)
Dept: CARDIOLOGY | Facility: CLINIC | Age: 77
End: 2017-10-30

## 2017-10-30 NOTE — TELEPHONE ENCOUNTER
Call from patient to report that he will be home from [place] for a while for the holidays. He stated that he will be flying back to [place] on 12/14/17 and wanted to see about completing some of the testing recommended by Dr. Conley at his visit on 10/2/17. Call was transferred to scheduling to arrange for this. He will plan to get his echo with bubble study and liver ultrasound done while he is here in December. Reviewed that further planned testing and follow up visit with Dr. Conley could be left as planned for 4/2018 when he returns from [place] for the season. He stated that he can be reached at [phone] while he is in [place]. Will route to Dr. Conley's nurse team for update. ZOILA Garcia RN

## 2017-12-20 ENCOUNTER — OFFICE VISIT (OUTPATIENT)
Dept: UROLOGY | Facility: CLINIC | Age: 77
End: 2017-12-20
Payer: MEDICARE

## 2017-12-20 VITALS
BODY MASS INDEX: 28.56 KG/M2 | HEART RATE: 80 BPM | SYSTOLIC BLOOD PRESSURE: 142 MMHG | DIASTOLIC BLOOD PRESSURE: 68 MMHG | WEIGHT: 182 LBS | HEIGHT: 67 IN

## 2017-12-20 DIAGNOSIS — Z85.46 PERSONAL HISTORY OF MALIGNANT NEOPLASM OF PROSTATE: Primary | ICD-10-CM

## 2017-12-20 LAB — PSA SERPL-MCNC: 0.12 NG/ML (ref 0–4)

## 2017-12-20 PROCEDURE — 36415 COLL VENOUS BLD VENIPUNCTURE: CPT | Performed by: UROLOGY

## 2017-12-20 PROCEDURE — 99213 OFFICE O/P EST LOW 20 MIN: CPT | Performed by: UROLOGY

## 2017-12-20 PROCEDURE — 84153 ASSAY OF PSA TOTAL: CPT | Performed by: UROLOGY

## 2017-12-20 ASSESSMENT — PAIN SCALES - GENERAL: PAINLEVEL: NO PAIN (0)

## 2017-12-20 NOTE — LETTER
12/20/2017       RE: Milo Serna  [address] [place] [address]     Dear Colleague,    Thank you for referring your patient, Milo Serna, to the [facility] at [facility]. Please see a copy of my visit note below.    History: It is a great pleasure to see this very pleasant 77-year-old gentleman in follow-up consultation today.  We recall that he had, being diagnosed with prostate cancer with [place] score 7 and 8, but he also had atrial fibrillation and was on anticoagulants.  The initial size of the prostate was 77 cc, he was not considered a candidate for radical prostatectomy, nor was he considered appropriate for radiation therapy due to his need for anticoagulants.  Socially therefore we reduced the size of the prostate with Eligard with, 2 doses, and then the cryoablation of the prostate just over a year ago.  PSA today 0.12 having started at 7.0.  He is voiding well without any issues of incontinence, with a good stream, with no evidence of gross hematuria and he has no other significant complaints    Past Medical History:   Diagnosis Date     Anemia, unspecified 11/8/2016     Atrial fibrillation (H) 5/27/2015     BPH (benign prostatic hyperplasia) 1/25/2012     CAD (coronary artery disease) 4/4/2012     Closed fracture of metatarsal bone 4/4/2012     Coronary atherosclerosis of unspecified type of vessel, native or [facility]    CAB 1996 following MI (at [facility])      Dilated aortic root (H) 5/26/2016     Drug-induced erectile dysfunction 10/2/2015     Elevated blood sugar 11/8/2016     Elevated PSA 9/19/2013     Essential hypertension with goal blood pressure less than 140/90 9/22/2016     Essential hypertension, benign      Gout      HAV (hallux abducto valgus) 4/4/2012     Hernia, abdominal      History of prostate cancer 6/15/2016     HTN, goal below 150/90 4/20/2017     Iron  deficiency anemia secondary to inadequate dietary iron intake 11/15/2016     Low blood pressure reading 10/17/2016     [place]     had degendisc dz on MRI 7/07     Mixed hyperlipidemia      Mumps      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     OA (osteoarthritis) 4/4/2012     Old myocardial infarction 4/4/2012     Palpitations      Pes planus 4/4/2012     Prostate cancer (H) 5/26/2016     PUD (peptic ulcer disease) 4/4/2012     Pulmonary hypertension 9/25/2017     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     Rosacea 1/15/2008     Thrombocytopenia (H) 4/21/2017     Unspecified hyperplasia of prostate with urinary obstruction and other lower urinary tract symptoms (LUTS)     better on avodart & hytrin     Venous stasis 4/4/2012     Venous stasis 4/4/2012       Social History     Social History     Marital status:      Spouse name: N/A     Number of children: N/A     Years of education: N/A     Social History Main Topics     Smoking status: Former Smoker     Smokeless tobacco: Never Used      Comment: quit 3/1974     Alcohol use 0.0 oz/week     0 Standard drinks or equivalent per week      Comment: small amounts to moderate     Drug use: No     Sexual activity: Yes     Partners: Female     Other Topics Concern     Caffeine Concern Yes     1 cups of coffee and soda per day     Special Diet No     Exercise Yes     walking     Seat Belt Yes     Social History Narrative       Past Surgical History:   Procedure Laterality Date     C NONSPECIFIC PROCEDURE      CAB 1996 [place]     C NONSPECIFIC PROCEDURE  2/07    l inguinal hernia repair      C NONSPECIFIC PROCEDURE      R hernia remote     C NONSPECIFIC PROCEDURE  2000    R ankle ORIF for fx     C NONSPECIFIC PROCEDURE  2005    nasal surgery      C NONSPECIFIC PROCEDURE      R rotater repair (remote)      C NONSPECIFIC PROCEDURE      appy 1966     C NONSPECIFIC PROCEDURE      sinus surgery x 2     C NONSPECIFIC PROCEDURE      L shoulder 6/09     CARDIAC SURGERY      bipass     CYSTOSCOPY FLEXIBLE,  CYOABLATION PROSTATE N/A 2016    Procedure: CYSTOSCOPY FLEXIBLE, CRYOABLATION PROSTATE;  Surgeon: Krystian Owens MD;  Location: [facility] OR     GENITOURINARY SURGERY      prostate surgery      HERNIA REPAIR       LAPAROSCOPIC HERNIORRHAPHY INGUINAL Right 5/10/2017    Procedure: LAPAROSCOPIC HERNIORRHAPHY INGUINAL;  Laparoscopic preperitoneal repair of right inguinal hernia with placement of underlay mesh;  Surgeon: Enrico Bridges MD;  Location: [facility] OR     PROSTATE SURGERY         Family History   Problem Relation Age of Onset     CANCER Mother      stomach cancer,  age 61     HEART DISEASE Father      MI,  age 71     HEART DISEASE Brother      stent placement, RA      Hypertension Brother      HEART DISEASE Sister      rythmn problems with heart, hypertension         Current Outpatient Prescriptions:      simvastatin (ZOCOR) 20 MG tablet, TAKE 1 TABLET AT BEDTIME, Disp: 90 tablet, Rfl: 1     FERROUS GLUCONATE PO, Take 324 mg by mouth daily (with breakfast), Disp: , Rfl:      Omega 3-6-9 Fatty Acids (OMEGA 3-6-9 PO), , Disp: , Rfl:      ELIQUIS 2.5 MG tablet, TAKE 1 TABLET TWICE A DAY, Disp: 180 tablet, Rfl: 1     Urea 20 % CREA cream, Apply topically as needed Apply to right foot daily, Disp: 120 g, Rfl: 2     naftifine (NAFTIN) 1 % GEL topical gel, Apply topically daily Apply to affected nails daily, Disp: 90 g, Rfl: 3     doxycycline (VIBRAMYCIN) 100 MG capsule, TAKE 1 CAPSULE DAILY, Disp: 90 capsule, Rfl: 2     tamsulosin (FLOMAX) 0.4 MG capsule, TAKE 1 CAPSULE DAILY, Disp: 90 capsule, Rfl: 2     omeprazole (PRILOSEC) 40 MG capsule, TAKE 1 CAPSULE DAILY, Disp: 90 capsule, Rfl: 2     lisinopril (PRINIVIL/ZESTRIL) 20 MG tablet, Take 1 tablet (20 mg) by mouth daily, Disp: 90 tablet, Rfl: 1     senna (SENOKOT) 8.6 MG tablet, Take 1 tablet by mouth daily, Disp: 60 tablet, Rfl: 0     diclofenac (VOLTAREN) 1 % GEL, Apply 4 gramsto area qid prn, Disp: 100 g, Rfl: 11     aspirin 81 MG  "tablet, Take 81 mg by mouth daily, Disp: , Rfl:      acetaminophen (TYLENOL) 650 MG CR tablet, Take 1 tablet (650 mg) by mouth every 6 hours as needed for pain, Disp: 100 tablet, Rfl: 11     co-enzyme Q-10 100 MG CAPS, Take 1 capsule (100 mg) by mouth daily, Disp: 180 capsule, Rfl: 3     sodium chloride (OCEAN NASAL SPRAY) 0.65 % nasal spray, Spray 1 spray into both nostrils daily as needed for congestion, Disp: 2 Bottle, Rfl: 6     Glucosamine-Chondroitin (OSTEO BI-FLEX REGULAR STRENGTH) 250-200 MG TABS, Take 1 tablet by mouth 2 times daily, Disp: 100 tablet, Rfl: 8     Multiple Vitamins-Minerals (MULTIVITAL) CHEW, Take 2 chew tab by mouth 2 times daily, Disp: 360 tablet, Rfl: 3    10 point ROS of systems including Constitutional, Eyes, Respiratory, Cardiovascular, Gastroenterology, Genitourinary, Integumentary, Muscularskeletal, Psychiatric were all negative except for pertinent positives noted in my HPI.    Examination:   /68 (BP Location: Right arm)  Pulse 80  Ht 1.689 m (5' 6.5\")  Wt 82.6 kg (182 lb)  BMI 28.94 kg/m2  General Impression: Very pleasant gentleman in no acute distress, well-oriented to time place and person  Mental Status: Normal.  HEENT.  There is no chemical evidence of jaundice and mucous membranes are normal  Skin: Skin is otherwise normal to examination  Respiratory System: The respiratory cycle is normal  Lymph Nodes: Not examined  Back/Flank Tenderness: Not examined  Cardiovascular System: Not examined  Abdominal Examination: Not examined  Extremities: There is no significant peripheral edema  Genitial: Not examined  Rectal Examination: Good sphincter tone, normal perianal sensation.  Through the rectal mucosa without hemorrhoids or fissures.  Smooth soft and small prostate without evidence of tenderness, bogginess or nodules.  Seminal vesicles.  Not palpable.  Perineum is otherwise normal to examination  Neurologic System: There are no focal abnormal clinical neurological signs " and central, or peripheral nervous systems    Impression: His progress is excellent.  He has made an excellent biochemical response to cryotherapy of the prostate.  He is voiding well without incontinence.  I would recommend we repeat PSA in 6 months and if it remains stable we will probably then be able to see him once a year.  I did discuss the entire situation with the patient in detail.  I answered all his questions    Plan: 6 months for PSA and examination    Time: 20 minutes.  Greater than 50% spent in discussion and consultation      Again, thank you for allowing me to participate in the care of your patient.      Sincerely,    Krystian Owens MD

## 2017-12-20 NOTE — NURSING NOTE
Chief Complaint   Patient presents with     Clinic Care Coordination - Follow-up     History of Prostate Cancer      Merly Arriaza LPN

## 2017-12-20 NOTE — PROGRESS NOTES
History: It is a great pleasure to see this very pleasant 77-year-old gentleman in follow-up consultation today.  We recall that he had, being diagnosed with prostate cancer with [place] score 7 and 8, but he also had atrial fibrillation and was on anticoagulants.  The initial size of the prostate was 77 cc, he was not considered a candidate for radical prostatectomy, nor was he considered appropriate for radiation therapy due to his need for anticoagulants.  Socially therefore we reduced the size of the prostate with Eligard with, 2 doses, and then the cryoablation of the prostate just over a year ago.  PSA today 0.12 having started at 7.0.  He is voiding well without any issues of incontinence, with a good stream, with no evidence of gross hematuria and he has no other significant complaints    Past Medical History:   Diagnosis Date     Anemia, unspecified 11/8/2016     Atrial fibrillation (H) 5/27/2015     BPH (benign prostatic hyperplasia) 1/25/2012     CAD (coronary artery disease) 4/4/2012     Closed fracture of metatarsal bone 4/4/2012     Coronary atherosclerosis of unspecified type of vessel, native or graft [facility]    CAB 1996 following MI (at [facility])      Dilated aortic root (H) 5/26/2016     Drug-induced erectile dysfunction 10/2/2015     Elevated blood sugar 11/8/2016     Elevated PSA 9/19/2013     Essential hypertension with goal blood pressure less than 140/90 9/22/2016     Essential hypertension, benign      Gout      HAV (hallux abducto valgus) 4/4/2012     Hernia, abdominal      History of prostate cancer 6/15/2016     HTN, goal below 150/90 4/20/2017     Iron deficiency anemia secondary to inadequate dietary iron intake 11/15/2016     Low blood pressure reading 10/17/2016     Lumbago     had degendisc dz on MRI 7/07     Mixed hyperlipidemia      Mumps      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     OA (osteoarthritis) 4/4/2012     Old myocardial infarction 4/4/2012     Palpitations      Pes  planus 4/4/2012     Prostate cancer (H) 5/26/2016     PUD (peptic ulcer disease) 4/4/2012     Pulmonary hypertension 9/25/2017     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     Rosacea 1/15/2008     Thrombocytopenia (H) 4/21/2017     Unspecified hyperplasia of prostate with urinary obstruction and other lower urinary tract symptoms (LUTS)     better on avodart & hytrin     Venous stasis 4/4/2012     Venous stasis 4/4/2012       Social History     Social History     Marital status:      Spouse name: N/A     Number of children: N/A     Years of education: N/A     Social History Main Topics     Smoking status: Former Smoker     Smokeless tobacco: Never Used      Comment: quit 3/1974     Alcohol use 0.0 oz/week     0 Standard drinks or equivalent per week      Comment: small amounts to moderate     Drug use: No     Sexual activity: Yes     Partners: Female     Other Topics Concern     Caffeine Concern Yes     1 cups of coffee and soda per day     Special Diet No     Exercise Yes     walking     Seat Belt Yes     Social History Narrative       Past Surgical History:   Procedure Laterality Date     C NONSPECIFIC PROCEDURE      CAB 1996 [place]     C NONSPECIFIC PROCEDURE  2/07    l inguinal hernia repair      C NONSPECIFIC PROCEDURE      R hernia remote     C NONSPECIFIC PROCEDURE  2000    R ankle ORIF for fx     C NONSPECIFIC PROCEDURE  2005    nasal surgery      C NONSPECIFIC PROCEDURE      R rotater repair (remote)      C NONSPECIFIC PROCEDURE      appy 1966     C NONSPECIFIC PROCEDURE      sinus surgery x 2     C NONSPECIFIC PROCEDURE      L shoulder 6/09     CARDIAC SURGERY      bipass     CYSTOSCOPY FLEXIBLE, CYOABLATION PROSTATE N/A 11/9/2016    Procedure: CYSTOSCOPY FLEXIBLE, CRYOABLATION PROSTATE;  Surgeon: Krystian Owens MD;  Location:  OR     GENITOURINARY SURGERY      prostate surgery      HERNIA REPAIR       LAPAROSCOPIC HERNIORRHAPHY INGUINAL Right 5/10/2017    Procedure: LAPAROSCOPIC HERNIORRHAPHY INGUINAL;   Laparoscopic preperitoneal repair of right inguinal hernia with placement of underlay mesh;  Surgeon: Enrico Bridges MD;  Location: [facility] OR     PROSTATE SURGERY         Family History   Problem Relation Age of Onset     CANCER Mother      stomach cancer,  age 61     HEART DISEASE Father      MI,  age 71     HEART DISEASE Brother      stent placement, RA      Hypertension Brother      HEART DISEASE Sister      rythmn problems with heart, hypertension         Current Outpatient Prescriptions:      simvastatin (ZOCOR) 20 MG tablet, TAKE 1 TABLET AT BEDTIME, Disp: 90 tablet, Rfl: 1     FERROUS GLUCONATE PO, Take 324 mg by mouth daily (with breakfast), Disp: , Rfl:      Omega 3-6-9 Fatty Acids (OMEGA 3-6-9 PO), , Disp: , Rfl:      ELIQUIS 2.5 MG tablet, TAKE 1 TABLET TWICE A DAY, Disp: 180 tablet, Rfl: 1     Urea 20 % CREA cream, Apply topically as needed Apply to right foot daily, Disp: 120 g, Rfl: 2     naftifine (NAFTIN) 1 % GEL topical gel, Apply topically daily Apply to affected nails daily, Disp: 90 g, Rfl: 3     doxycycline (VIBRAMYCIN) 100 MG capsule, TAKE 1 CAPSULE DAILY, Disp: 90 capsule, Rfl: 2     tamsulosin (FLOMAX) 0.4 MG capsule, TAKE 1 CAPSULE DAILY, Disp: 90 capsule, Rfl: 2     omeprazole (PRILOSEC) 40 MG capsule, TAKE 1 CAPSULE DAILY, Disp: 90 capsule, Rfl: 2     lisinopril (PRINIVIL/ZESTRIL) 20 MG tablet, Take 1 tablet (20 mg) by mouth daily, Disp: 90 tablet, Rfl: 1     senna (SENOKOT) 8.6 MG tablet, Take 1 tablet by mouth daily, Disp: 60 tablet, Rfl: 0     diclofenac (VOLTAREN) 1 % GEL, Apply 4 gramsto area qid prn, Disp: 100 g, Rfl: 11     aspirin 81 MG tablet, Take 81 mg by mouth daily, Disp: , Rfl:      acetaminophen (TYLENOL) 650 MG CR tablet, Take 1 tablet (650 mg) by mouth every 6 hours as needed for pain, Disp: 100 tablet, Rfl: 11     co-enzyme Q-10 100 MG CAPS, Take 1 capsule (100 mg) by mouth daily, Disp: 180 capsule, Rfl: 3     sodium chloride (OCEAN NASAL SPRAY) 0.65  "% nasal spray, Spray 1 spray into both nostrils daily as needed for congestion, Disp: 2 Bottle, Rfl: 6     Glucosamine-Chondroitin (OSTEO BI-FLEX REGULAR STRENGTH) 250-200 MG TABS, Take 1 tablet by mouth 2 times daily, Disp: 100 tablet, Rfl: 8     Multiple Vitamins-Minerals (MULTIVITAL) CHEW, Take 2 chew tab by mouth 2 times daily, Disp: 360 tablet, Rfl: 3    10 point ROS of systems including Constitutional, Eyes, Respiratory, Cardiovascular, Gastroenterology, Genitourinary, Integumentary, Muscularskeletal, Psychiatric were all negative except for pertinent positives noted in my HPI.    Examination:   /68 (BP Location: Right arm)  Pulse 80  Ht 1.689 m (5' 6.5\")  Wt 82.6 kg (182 lb)  BMI 28.94 kg/m2  General Impression: Very pleasant gentleman in no acute distress, well-oriented to time place and person  Mental Status: Normal.  HEENT.  There is no chemical evidence of jaundice and mucous membranes are normal  Skin: Skin is otherwise normal to examination  Respiratory System: The respiratory cycle is normal  Lymph Nodes: Not examined  Back/Flank Tenderness: Not examined  Cardiovascular System: Not examined  Abdominal Examination: Not examined  Extremities: There is no significant peripheral edema  Genitial: Not examined  Rectal Examination: Good sphincter tone, normal perianal sensation.  Through the rectal mucosa without hemorrhoids or fissures.  Smooth soft and small prostate without evidence of tenderness, bogginess or nodules.  Seminal vesicles.  Not palpable.  Perineum is otherwise normal to examination  Neurologic System: There are no focal abnormal clinical neurological signs and central, or peripheral nervous systems    Impression: His progress is excellent.  He has made an excellent biochemical response to cryotherapy of the prostate.  He is voiding well without incontinence.  I would recommend we repeat PSA in 6 months and if it remains stable we will probably then be able to see him once a " "year.  I did discuss the entire situation with the patient in detail.  I answered all his questions    Plan: 6 months for PSA and examination    Time: 20 minutes.  Greater than 50% spent in discussion and consultation    \"This dictation was performed with voice recognition software and may contain errors,  omissions and inadvertent word substitution.\"      "

## 2017-12-20 NOTE — MR AVS SNAPSHOT
After Visit Summary   12/20/2017    Milo Serna    MRN: [id number]           Patient Information     Date Of Birth          1940        Visit Information        Provider Department      12/20/2017 10:20 AM Krystian Owens MD [facility]        Today's Diagnoses     Personal history of malignant neoplasm of prostate    -  1       Follow-ups after your visit        Follow-up notes from your care team     Return in about 6 months (around 6/20/2018) for PSA, Physical Exam.      Your next 10 appointments already scheduled     Jun 20, 2018 10:20 AM CDT   Return Visit with Krystian Owens MD   [facility] ([facility])    [address]  [address]  [facility] [address] [phone]              Future tests that were ordered for you today     Open Future Orders        Priority Expected Expires Ordered    UA without Microscopic Routine  12/20/2018 12/20/2017            Who to contact     If you have questions or need follow up information about today's clinic visit or your schedule please contact [facility] directly at [phone].  Normal or non-critical lab and imaging results will be communicated to you by MyChart, letter or phone within 4 business days after the clinic has received the results. If you do not hear from us within 7 days, please contact the clinic through [employer] or phone. If you have a critical or abnormal lab result, we will notify you by phone as soon as possible.  Submit refill requests through [employer] or call your pharmacy and they will forward the refill request to us. Please allow 3 business days for your refill to be completed.          Additional Information About Your Visit        MyChart Information     [employer] gives you secure access to your electronic health record. If you see a primary care provider, you can also  "send messages to your care team and make appointments. If you have questions, please call your primary care clinic.  If you do not have a primary care provider, please call [phone] and they will assist you.        Care EveryWhere ID     This is your Care EveryWhere ID. This could be used by other organizations to access your [facility] medical records  [id number]        Your Vitals Were     Pulse Height BMI (Body Mass Index)             80 1.689 m (5' 6.5\") 28.94 kg/m2          Blood Pressure from Last 3 Encounters:   12/20/17 142/68   10/02/17 169/82   09/25/17 128/78    Weight from Last 3 Encounters:   12/20/17 82.6 kg (182 lb)   10/02/17 82.8 kg (182 lb 9.6 oz)   09/25/17 81.5 kg (179 lb 9.6 oz)              We Performed the Following     PSA Diag Urologic Phys        Primary Care Provider Office Phone # Fax #    Oscar Alves [phone] [address] [address]        Equal Access to Services     [facility]: Hadii porfirio ku hadasho Sohoda, waaxda luqadaha, qaybta kaalmada julián, chayito reyes . So [facility] [phone].    ATENCIÓN: Si habla español, tiene a randall disposición servicios gratuitos de asistencia lingüística. [facility] [phone].    We comply with applicable federal civil rights laws and [place] laws. We do not discriminate on the basis of race, color, national origin, age, disability, sex, sexual orientation, or gender identity.            Thank you!     Thank you for choosing [facility] [place]  for your care. Our goal is always to provide you with excellent care. Hearing back from our patients is one way we can continue to improve our services. Please take a few minutes to complete the written survey that you may receive in the mail after your visit with us. Thank you!             Your Updated Medication List - Protect others around you: Learn how to safely use, store and throw away your " medicines at www.disposemymeds.org.          This list is accurate as of: 12/20/17 11:03 AM.  Always use your most recent med list.                   Brand Name Dispense Instructions for use Diagnosis    acetaminophen 650 MG CR tablet    TYLENOL    100 tablet    Take 1 tablet (650 mg) by mouth every 6 hours as needed for pain    Osteoarthritis       aspirin 81 MG tablet      Take 81 mg by mouth daily        co-enzyme Q-10 100 MG Caps capsule     180 capsule    Take 1 capsule (100 mg) by mouth daily    Mixed hyperlipidemia       diclofenac 1 % Gel topical gel    VOLTAREN    100 g    Apply 4 gramsto area qid prn    Acute left-sided low back pain without sciatica       doxycycline 100 MG capsule    VIBRAMYCIN    90 capsule    TAKE 1 CAPSULE DAILY    Rosacea       ELIQUIS 2.5 MG tablet   Generic drug:  apixaban ANTICOAGULANT     180 tablet    TAKE 1 TABLET TWICE A DAY    Persistent atrial fibrillation (H)       FERROUS GLUCONATE PO      Take 324 mg by mouth daily (with breakfast)        Glucosamine-Chondroitin 250-200 MG Tabs    OSTEO BI-FLEX REGULAR STRENGTH    100 tablet    Take 1 tablet by mouth 2 times daily    Osteoarthritis       lisinopril 20 MG tablet    PRINIVIL/ZESTRIL    90 tablet    Take 1 tablet (20 mg) by mouth daily    HTN, goal below 150/90       MULTIVITAL Chew     360 tablet    Take 2 chew tab by mouth 2 times daily    Neuropathy of foot       naftifine 1 % Gel topical gel    NAFTIN    90 g    Apply topically daily Apply to affected nails daily    Onychomycosis of toenail       OMEGA 3-6-9 PO           omeprazole 40 MG capsule    priLOSEC    90 capsule    TAKE 1 CAPSULE DAILY    PUD (peptic ulcer disease)       senna 8.6 MG tablet    SENOKOT    60 tablet    Take 1 tablet by mouth daily    Prostate cancer (H)       simvastatin 20 MG tablet    ZOCOR    90 tablet    TAKE 1 TABLET AT BEDTIME    Hyperlipidemia LDL goal <100       sodium chloride 0.65 % nasal spray    OCEAN NASAL SPRAY    2 Bottle    [place] 1  spray into both nostrils daily as needed for congestion    Bloody nose       tamsulosin 0.4 MG capsule    FLOMAX    90 capsule    TAKE 1 CAPSULE DAILY    Benign prostatic hyperplasia with lower urinary tract symptoms, unspecified morphology       Urea 20 % Crea cream     120 g    Apply topically as needed Apply to right foot daily    Right foot pain, Skin callus, Pes planus of both feet, Venous insufficiency of both lower extremities, Hav (hallux abducto valgus), right, Onychomycosis of toenail

## 2018-01-06 DIAGNOSIS — I10 HTN, GOAL BELOW 150/90: ICD-10-CM

## 2018-01-06 RX ORDER — LISINOPRIL 20 MG/1
20 TABLET ORAL DAILY
Qty: 90 TABLET | Refills: 0 | Status: SHIPPED | OUTPATIENT
Start: 2018-01-06 | End: 2018-01-06

## 2018-01-06 NOTE — TELEPHONE ENCOUNTER
Mr. Serna called. Nearly out of lisinopril   BP Readings from Last 1 Encounters:   12/20/17 142/68     We sent one 90 day refill to day so does not run out.   Dr. Alves, pt does not have plan to follow up with you.    BP Readings from Last 1 Encounters:   12/20/17 142/68     OK additional refills?  Or when is next OV due?   Sudheer Hinojosa RN

## 2018-01-08 RX ORDER — LISINOPRIL 20 MG/1
20 TABLET ORAL DAILY
Qty: 90 TABLET | Refills: 0 | Status: SHIPPED | OUTPATIENT
Start: 2018-01-08 | End: 2018-05-01

## 2018-01-15 DIAGNOSIS — N40.1 BENIGN PROSTATIC HYPERPLASIA WITH LOWER URINARY TRACT SYMPTOMS: Primary | ICD-10-CM

## 2018-01-15 NOTE — TELEPHONE ENCOUNTER
"Requested Prescriptions   Pending Prescriptions Disp Refills     tamsulosin (FLOMAX) 0.4 MG capsule 90 capsule 2     Sig: Take 1 capsule (0.4 mg) by mouth daily    Alpha Blockers Failed    1/15/2018 11:50 AM       Failed - BP is less than 140/90    BP Readings from Last 3 Encounters:   12/20/17 142/68   10/02/17 169/82   09/25/17 128/78                Passed - Recent or future visit with authorizing provider's specialty    Patient had office visit in the last year or has a visit in the next 30 days with authorizing provider.  See \"Patient Info\" tab in inbasket, or \"Choose Columns\" in Meds & Orders section of the refill encounter.              Passed - Patient does not have Tadalafil, Vardenafil, or Sildenafil on their medication list       Passed - Patient is 18 years of age or older        Last Written Prescription Date:  06/21/17-pt would like 30 day supply is in [place]  Last Fill Quantity: 90,  # refills: 2   Last Office Visit with [facility], [facility] or [facility] prescribing provider:  09/25/17 w/Dr Alves   Future Office Visit:       "

## 2018-01-17 DIAGNOSIS — I27.20 PULMONARY HYPERTENSION (H): Primary | ICD-10-CM

## 2018-01-18 RX ORDER — TAMSULOSIN HYDROCHLORIDE 0.4 MG/1
0.4 CAPSULE ORAL DAILY
Qty: 90 CAPSULE | Refills: 2 | Status: SHIPPED | OUTPATIENT
Start: 2018-01-18 | End: 2018-02-28

## 2018-01-18 NOTE — TELEPHONE ENCOUNTER
Prescription approved per [facility] [place] [place] Refill Protocol.    Senait Vaughan RN -- [facility]

## 2018-02-28 DIAGNOSIS — K27.9 PUD (PEPTIC ULCER DISEASE): ICD-10-CM

## 2018-02-28 DIAGNOSIS — N40.1 BENIGN PROSTATIC HYPERPLASIA WITH LOWER URINARY TRACT SYMPTOMS: ICD-10-CM

## 2018-03-02 RX ORDER — TAMSULOSIN HYDROCHLORIDE 0.4 MG/1
CAPSULE ORAL
Qty: 90 CAPSULE | Refills: 0 | Status: SHIPPED | OUTPATIENT
Start: 2018-03-02 | End: 2018-05-29

## 2018-03-02 RX ORDER — OMEPRAZOLE 40 MG/1
CAPSULE, DELAYED RELEASE ORAL
Qty: 90 CAPSULE | Refills: 0 | Status: SHIPPED | OUTPATIENT
Start: 2018-03-02 | End: 2018-05-29

## 2018-03-02 NOTE — TELEPHONE ENCOUNTER
BP elevated.  Due for visit when returns from [place].  One refill each sent as new pharmacy and not sure when last filled.  Rebeka Gale RN

## 2018-03-19 DIAGNOSIS — I10 HTN, GOAL BELOW 150/90: ICD-10-CM

## 2018-03-19 RX ORDER — LISINOPRIL 20 MG/1
TABLET ORAL
Qty: 90 TABLET | Refills: 0 | Status: SHIPPED | OUTPATIENT
Start: 2018-03-19 | End: 2018-05-30

## 2018-03-19 NOTE — TELEPHONE ENCOUNTER
Failing due to bp.  Note on 1/8/18 refill to see MD in March.  No upcoming appt's.  May still be in [place] for winter?  Letter sent.  Sent to provider.  Rebeka Gale RN

## 2018-03-19 NOTE — LETTER
[facility]  [address] [place], [address]  [phone]        March 19, 2018    Milo Serna                                                                                                                           [address] [place] [address]            We recently received a call from your pharmacy requesting a refill of Lisinopril.     A review of your chart indicates that an appointment is required with your provider for med check. Please call the clinic at [phone] to schedule your appointment.     Taking care of your health is important to us and ongoing visits with your provider are vital to your care.  We look forward to seeing you in the near future.     Sincerely,     [facility]

## 2018-04-24 ENCOUNTER — HOSPITAL ENCOUNTER (OUTPATIENT)
Dept: RESPIRATORY THERAPY | Facility: CLINIC | Age: 78
End: 2018-04-24
Attending: INTERNAL MEDICINE
Payer: MEDICARE

## 2018-04-24 ENCOUNTER — HOSPITAL ENCOUNTER (OUTPATIENT)
Dept: CARDIOLOGY | Facility: CLINIC | Age: 78
Discharge: HOME | End: 2018-04-24
Attending: INTERNAL MEDICINE | Admitting: INTERNAL MEDICINE
Payer: MEDICARE

## 2018-04-24 DIAGNOSIS — I51.7 RIGHT HEART ENLARGEMENT: ICD-10-CM

## 2018-04-24 DIAGNOSIS — F10.29 ALCOHOL DEPENDENCE WITH UNSPECIFIED ALCOHOL-INDUCED DISORDER (H): ICD-10-CM

## 2018-04-24 DIAGNOSIS — Z95.1 S/P CABG (CORONARY ARTERY BYPASS GRAFT): ICD-10-CM

## 2018-04-24 DIAGNOSIS — I27.20 PULMONARY HYPERTENSION (H): ICD-10-CM

## 2018-04-24 LAB
DLCOUNC-%PRED-PRE: 100 %
DLCOUNC-PRE: 23.02 ML/MIN/MMHG
DLCOUNC-PRED: 22.95 ML/MIN/MMHG
ERV-%PRED-PRE: 152 %
ERV-PRE: 1.11 L
ERV-PRED: 0.73 L
EXPTIME-PRE: 12.59 SEC
FEF2575-%PRED-PRE: 93 %
FEF2575-PRE: 1.81 L/SEC
FEF2575-PRED: 1.94 L/SEC
FEFMAX-%PRED-PRE: 139 %
FEFMAX-PRE: 9.45 L/SEC
FEFMAX-PRED: 6.76 L/SEC
FEV1-%PRED-PRE: 104 %
FEV1-PRE: 2.77 L
FEV1FEV6-PRE: 76 %
FEV1FEV6-PRED: 76 %
FEV1FVC-PRE: 73 %
FEV1FVC-PRED: 72 %
FEV1SVC-PRE: 68 %
FEV1SVC-PRED: 66 %
FIFMAX-PRE: 5.56 L/SEC
FRCPLETH-%PRED-PRE: 107 %
FRCPLETH-PRE: 3.87 L
FRCPLETH-PRED: 3.6 L
FVC-%PRED-PRE: 106 %
FVC-PRE: 3.79 L
FVC-PRED: 3.55 L
HBV CORE AB SERPL QL IA: NONREACTIVE
HCV AB SERPL QL IA: NONREACTIVE
HIV 1+2 AB+HIV1 P24 AG SERPL QL IA: NONREACTIVE
IC-%PRED-PRE: 89 %
IC-PRE: 2.95 L
IC-PRED: 3.31 L
RVPLETH-%PRED-PRE: 101 %
RVPLETH-PRE: 2.76 L
RVPLETH-PRED: 2.73 L
TLCPLETH-%PRED-PRE: 104 %
TLCPLETH-PRE: 6.83 L
TLCPLETH-PRED: 6.52 L
VA-%PRED-PRE: 90 %
VA-PRE: 5.67 L
VC-%PRED-PRE: 100 %
VC-PRE: 4.07 L
VC-PRED: 4.04 L

## 2018-04-24 PROCEDURE — 94010 BREATHING CAPACITY TEST: CPT

## 2018-04-24 PROCEDURE — 93225 XTRNL ECG REC<48 HRS REC: CPT | Performed by: INTERNAL MEDICINE

## 2018-04-24 PROCEDURE — 94726 PLETHYSMOGRAPHY LUNG VOLUMES: CPT

## 2018-04-24 PROCEDURE — 94729 DIFFUSING CAPACITY: CPT

## 2018-04-24 PROCEDURE — 93227 XTRNL ECG REC<48 HR R&I: CPT | Performed by: INTERNAL MEDICINE

## 2018-04-24 PROCEDURE — 86803 HEPATITIS C AB TEST: CPT | Performed by: INTERNAL MEDICINE

## 2018-04-24 PROCEDURE — 86704 HEP B CORE ANTIBODY TOTAL: CPT | Performed by: INTERNAL MEDICINE

## 2018-04-24 PROCEDURE — 87389 HIV-1 AG W/HIV-1&-2 AB AG IA: CPT | Performed by: INTERNAL MEDICINE

## 2018-04-24 PROCEDURE — 36415 COLL VENOUS BLD VENIPUNCTURE: CPT | Performed by: INTERNAL MEDICINE

## 2018-04-24 NOTE — PROGRESS NOTES
PFT Note:        Pt completed pulmonary function testing with DLCO.  Good Pt effort and cooperation.     Spirometry   Meets all ATS-ERS recommendations except back-extrapolated volume was >5% of FVC.     Plethysmography All plethysmographic measurements meet ATS-ERS recommendations.    DLCO  Meets all ATS-ERS recommendations.  DLCO is an average of 2 maneuvers.  No recent Hgb for DLCO correction.    April 24, 2018.11:00 AM  Enrico Elliott

## 2018-04-25 ENCOUNTER — HOSPITAL ENCOUNTER (OUTPATIENT)
Dept: ULTRASOUND IMAGING | Facility: CLINIC | Age: 78
End: 2018-04-25
Attending: INTERNAL MEDICINE
Payer: MEDICARE

## 2018-04-25 ENCOUNTER — HOSPITAL ENCOUNTER (OUTPATIENT)
Dept: CARDIOLOGY | Facility: CLINIC | Age: 78
Discharge: HOME | End: 2018-04-25
Attending: INTERNAL MEDICINE | Admitting: INTERNAL MEDICINE
Payer: MEDICARE

## 2018-04-25 DIAGNOSIS — F10.29 ALCOHOL DEPENDENCE WITH UNSPECIFIED ALCOHOL-INDUCED DISORDER (H): ICD-10-CM

## 2018-04-25 DIAGNOSIS — Z95.1 S/P CABG (CORONARY ARTERY BYPASS GRAFT): ICD-10-CM

## 2018-04-25 DIAGNOSIS — I51.7 RIGHT HEART ENLARGEMENT: ICD-10-CM

## 2018-04-25 PROCEDURE — 76700 US EXAM ABDOM COMPLETE: CPT

## 2018-04-25 PROCEDURE — 93306 TTE W/DOPPLER COMPLETE: CPT

## 2018-04-25 PROCEDURE — 93306 TTE W/DOPPLER COMPLETE: CPT | Mod: 26 | Performed by: INTERNAL MEDICINE

## 2018-04-26 ENCOUNTER — HOSPITAL ENCOUNTER (OUTPATIENT)
Dept: NUCLEAR MEDICINE | Facility: CLINIC | Age: 78
Setting detail: NUCLEAR MEDICINE
Discharge: HOME | End: 2018-04-26
Attending: INTERNAL MEDICINE | Admitting: INTERNAL MEDICINE
Payer: MEDICARE

## 2018-04-26 ENCOUNTER — HOSPITAL ENCOUNTER (OUTPATIENT)
Dept: GENERAL RADIOLOGY | Facility: CLINIC | Age: 78
Discharge: HOME | End: 2018-04-26
Attending: INTERNAL MEDICINE | Admitting: INTERNAL MEDICINE
Payer: MEDICARE

## 2018-04-26 DIAGNOSIS — I51.7 RIGHT HEART ENLARGEMENT: ICD-10-CM

## 2018-04-26 DIAGNOSIS — Z95.1 S/P CABG (CORONARY ARTERY BYPASS GRAFT): ICD-10-CM

## 2018-04-26 DIAGNOSIS — F10.29 ALCOHOL DEPENDENCE WITH UNSPECIFIED ALCOHOL-INDUCED DISORDER (H): ICD-10-CM

## 2018-04-26 LAB — INTERPRETATION MONITOR -MUSE: NORMAL

## 2018-04-26 PROCEDURE — 71046 X-RAY EXAM CHEST 2 VIEWS: CPT

## 2018-04-26 PROCEDURE — 34300033 ZZH RX 343: Performed by: INTERNAL MEDICINE

## 2018-04-26 PROCEDURE — A9540 TC99M MAA: HCPCS | Performed by: INTERNAL MEDICINE

## 2018-04-26 PROCEDURE — 78580 LUNG PERFUSION IMAGING: CPT

## 2018-04-26 RX ADMIN — Medication 3.3 MCI.: at 09:03

## 2018-04-30 ENCOUNTER — HOSPITAL ENCOUNTER (OUTPATIENT)
Dept: CARDIAC REHAB | Facility: CLINIC | Age: 78
End: 2018-04-30
Attending: INTERNAL MEDICINE
Payer: MEDICARE

## 2018-04-30 PROCEDURE — 94618 PULMONARY STRESS TESTING: CPT

## 2018-04-30 PROCEDURE — 40000116 ZZH STATISTIC OP CR VISIT

## 2018-04-30 NOTE — PROGRESS NOTES
SIX MINUTE WALK TEST  Cardiac Rehab  2018    Milo Serna MRN# [id number]   YOB: 1940 Age: 77 year old     Height:67  Weight (lbs):182 Weight (kg):  Supplemental oxygen during the test: No, flow 0 L/min    Oxygen Appliance: None    Oximetry: 99    Gait Aid: None     Pre-test Post-test   Time 11am 11:30 am   Blood Pressure (mm Hg) 140/80 164/80   Heart rate (bpm) 52 89   Rated Perceived Dyspnea (Jorgito Scale) 0 -- Nothing at all  4 -- Somewhat severe    Rated Perceived Exertion (Jorgito Scale) 0 -- (Nothing at all) 2 -- Weak (light)    SpO2 (%) 99 99     Total distance walked in 6 minutes: 1295 feet, 394 meters    Paused during test?No  Number of pauses: 0  Total Time stopped: 0    Stopped test before 6 minutes? No  Time completed:66  Distance: 1295  Reason: none    Did the patient experience any pain or discomfort during the test? yes  Pain Ratin/10  Pain Description: [id number]  Comments: Chronic right foot discomfort everyday    Oxygen Titration Required: No  Resting oxygen requirement: 0 Liters on None  Exercise oxygen requirement: 0 Liters on None    Performing Staff: Sandra Roberts, RT         
2 seconds or less

## 2018-05-01 ENCOUNTER — OFFICE VISIT (OUTPATIENT)
Dept: PODIATRY | Facility: CLINIC | Age: 78
End: 2018-05-01
Payer: MEDICARE

## 2018-05-01 ENCOUNTER — OFFICE VISIT (OUTPATIENT)
Dept: FAMILY MEDICINE | Facility: CLINIC | Age: 78
End: 2018-05-01
Payer: MEDICARE

## 2018-05-01 VITALS
BODY MASS INDEX: 28.63 KG/M2 | TEMPERATURE: 98.1 F | HEART RATE: 54 BPM | WEIGHT: 182.4 LBS | DIASTOLIC BLOOD PRESSURE: 80 MMHG | SYSTOLIC BLOOD PRESSURE: 144 MMHG | OXYGEN SATURATION: 98 % | HEIGHT: 67 IN

## 2018-05-01 VITALS
WEIGHT: 175 LBS | BODY MASS INDEX: 27.47 KG/M2 | DIASTOLIC BLOOD PRESSURE: 78 MMHG | SYSTOLIC BLOOD PRESSURE: 130 MMHG | HEIGHT: 67 IN

## 2018-05-01 DIAGNOSIS — L84 PRE-ULCERATIVE CORN OR CALLOUS: ICD-10-CM

## 2018-05-01 DIAGNOSIS — M21.41 PES PLANUS OF BOTH FEET: ICD-10-CM

## 2018-05-01 DIAGNOSIS — I27.20 PULMONARY HYPERTENSION (H): ICD-10-CM

## 2018-05-01 DIAGNOSIS — M21.42 PES PLANUS OF BOTH FEET: ICD-10-CM

## 2018-05-01 DIAGNOSIS — I10 HTN, GOAL BELOW 150/90: ICD-10-CM

## 2018-05-01 DIAGNOSIS — R73.9 ELEVATED BLOOD SUGAR: Primary | ICD-10-CM

## 2018-05-01 DIAGNOSIS — E78.5 HYPERLIPIDEMIA LDL GOAL <100: ICD-10-CM

## 2018-05-01 DIAGNOSIS — K82.4 GALLBLADDER POLYP: ICD-10-CM

## 2018-05-01 DIAGNOSIS — M79.671 FOOT PAIN, RIGHT: ICD-10-CM

## 2018-05-01 DIAGNOSIS — G57.91 NEUROPATHY OF RIGHT FOOT: Primary | ICD-10-CM

## 2018-05-01 LAB — HBA1C MFR BLD: 5.4 % (ref 0–5.6)

## 2018-05-01 PROCEDURE — 99214 OFFICE O/P EST MOD 30 MIN: CPT | Performed by: FAMILY MEDICINE

## 2018-05-01 PROCEDURE — 11055 PARING/CUTG B9 HYPRKER LES 1: CPT | Performed by: PODIATRIST

## 2018-05-01 PROCEDURE — 80048 BASIC METABOLIC PNL TOTAL CA: CPT | Performed by: FAMILY MEDICINE

## 2018-05-01 PROCEDURE — 99213 OFFICE O/P EST LOW 20 MIN: CPT | Mod: 25 | Performed by: PODIATRIST

## 2018-05-01 PROCEDURE — 83036 HEMOGLOBIN GLYCOSYLATED A1C: CPT | Performed by: FAMILY MEDICINE

## 2018-05-01 PROCEDURE — 36415 COLL VENOUS BLD VENIPUNCTURE: CPT | Performed by: FAMILY MEDICINE

## 2018-05-01 RX ORDER — SIMVASTATIN 20 MG
20 TABLET ORAL AT BEDTIME
Qty: 90 TABLET | Refills: 1 | Status: SHIPPED | OUTPATIENT
Start: 2018-05-01 | End: 2019-01-15

## 2018-05-01 NOTE — PROGRESS NOTES
"  SUBJECTIVE:   Milo Serna is a 77 year old male who presents to clinic today for the following health issues:      Hypertension Follow-up      Outpatient blood pressures are being checked at home.  Results are \"mostly good\".    Low Salt Diet: no added salt      Amount of exercise or physical activity: None - has been having some trouble with his foot    Problems taking medications regularly: No    Medication side effects: none    Diet: regular (no restrictions)    Elevated blood sugar  (primary encounter diagnosis) periodic assessment  Glucose   Date Value Ref Range Status   09/28/2017 101 (H) 70 - 99 mg/dL Final   ]    HTN, goal below 150/90 satisfactory today  BP Readings from Last 6 Encounters:   05/01/18 144/80   05/01/18 130/78   12/20/17 142/68   10/02/17 169/82   09/25/17 128/78   09/18/17 138/70       Pulmonary hypertension: Follows pulmonology no edema dyspnea    Gallbladder polyp: Gallbladder polyp.  Surveillance has been discussed not arranged    Hyperlipidemia LDL goal <100: Patient is on a statin     Problem list and histories reviewed & adjusted, as indicated.  Additional history: as documented    Patient Active Problem List   Diagnosis     Coronary atherosclerosis     Rosacea     HYPERLIPIDEMIA LDL GOAL <100     Advanced directives, counseling/discussion     Neuropathy of foot     Pes planus     HAV (hallux abducto valgus)     Closed fracture of metatarsal bone     OA (osteoarthritis)     Old myocardial infarction     PUD (peptic ulcer disease)     CAD (coronary artery disease)     Rhinophyma     Venous stasis     PVC's (premature ventricular contractions)     Premature atrial contractions     Atrial fibrillation (H)     Drug-induced erectile dysfunction     Dilated aortic root (H)     History of prostate cancer     Acute bilateral low back pain with left-sided sciatica     Elevated blood sugar     Iron deficiency anemia secondary to inadequate dietary iron intake     HTN, goal below 150/90 "     Thrombocytopenia (H)     Pulmonary hypertension     Past Surgical History:   Procedure Laterality Date     C NONSPECIFIC PROCEDURE      CAB  [place]     C NONSPECIFIC PROCEDURE      l inguinal hernia repair      C NONSPECIFIC PROCEDURE      R hernia remote     C NONSPECIFIC PROCEDURE      R ankle ORIF for fx     C NONSPECIFIC PROCEDURE      nasal surgery      C NONSPECIFIC PROCEDURE      R rotater repair (remote)      C NONSPECIFIC PROCEDURE      appy      C NONSPECIFIC PROCEDURE      sinus surgery x 2     C NONSPECIFIC PROCEDURE      L shoulder      CARDIAC SURGERY      bipass     CYSTOSCOPY FLEXIBLE, CYOABLATION PROSTATE N/A 2016    Procedure: CYSTOSCOPY FLEXIBLE, CRYOABLATION PROSTATE;  Surgeon: Krystian Owens MD;  Location: [facility] OR     GENITOURINARY SURGERY      prostate surgery      HERNIA REPAIR       LAPAROSCOPIC HERNIORRHAPHY INGUINAL Right 5/10/2017    Procedure: LAPAROSCOPIC HERNIORRHAPHY INGUINAL;  Laparoscopic preperitoneal repair of right inguinal hernia with placement of underlay mesh;  Surgeon: Enrico Bridges MD;  Location: [facility] OR     PROSTATE SURGERY         Social History   Substance Use Topics     Smoking status: Former Smoker     Smokeless tobacco: Never Used      Comment: quit 3/1974     Alcohol use 0.0 oz/week     0 Standard drinks or equivalent per week      Comment: small amounts to moderate     Family History   Problem Relation Age of Onset     CANCER Mother      stomach cancer,  age 61     HEART DISEASE Father      MI,  age 71     HEART DISEASE Brother      stent placement, RA      Hypertension Brother      HEART DISEASE Sister      rythmn problems with heart, hypertension           Reviewed and updated as needed this visit by clinical staff  Tobacco  Allergies  Meds  Med Hx  Surg Hx  Fam Hx  Soc Hx      Reviewed and updated as needed this visit by Provider         ROS: No abdominal symptoms no falls    This document  "serves as a record of the services and decisions personally performed and made by Oscar Alves MD. It was created on his behalf by Betty Vines, a trained medical scribe.  The creation of this document is based on the scribe's personal observations and the provider's statements to the medical scribe.  Betty Vines, May 1, 2018 10:11 AM    OBJECTIVE:     /80 (BP Location: Right arm, Patient Position: Chair, Cuff Size: Adult Regular)  Pulse 54  Temp 98.1  F (36.7  C) (Oral)  Ht 1.702 m (5' 7\")  Wt 82.7 kg (182 lb 6.4 oz)  SpO2 98%  BMI 28.57 kg/m2  Body mass index is 28.57 kg/(m^2).          ASSESSMENT/PLAN:     ASSESSMENT / PLAN:  (R73.9) Elevated blood sugar  (primary encounter diagnosis)  Comment: Not prediabetes  Lab Results   Component Value Date    A1C 5.4 04/20/2017    A1C 5.5 10/14/2016    A1C 5.5@ 09/21/2005        Plan: Hemoglobin A1c is due            (I10) HTN, goal below 150/90  Comment: controlled  Plan: Basic metabolic panel  (Ca, Cl, CO2, Creat,         Gluc, K, Na, BUN)            (I27.20) Pulmonary hypertension  Comment:  Quiescent  Plan:     (K82.4) Gallbladder polyp  Comment: Discussed surveillance  Plan US q 6-12 mos recommended:     (E78.5) Hyperlipidemia LDL goal <100  Comment:   LDL Cholesterol Calculated   Date Value Ref Range Status   09/25/2017 46 <100 mg/dL Final     Comment:     Desirable:       <100 mg/dl   ]    Plan: simvastatin (ZOCOR) 20 MG tablet              RTC twice yearly      The information in this document, created by the medical scribe for me, accurately reflects the services I personally performed and the decisions made by me. I have reviewed and approved this document for accuracy prior to leaving the patient care area.  Oscar Alves MD May 1, 2018 10:11 AM      Oscar Alves MD  [facility]  "

## 2018-05-01 NOTE — LETTER
5/1/2018         RE: Milo Serna  [address] [place] [address]        Dear Colleague,    Thank you for referring your patient, Milo Serna, to the [facility]. Please see a copy of my visit note below.    PATIENT HISTORY:   Milo Serna is a 77 year old male who presents to clinic for painful callus to bottom of the right foot. Denies injury. Has some numbness to foot but very sore when there is pressure to callus. Notes wife normally trims it down but has not in a month. Pain is 9/10. Would like to know how to get rid of it.     Review of Systems:  Patient denies fever, chills, rash, wound, stiffness, numbness, weakness, heart burn, blood in stool, chest pain with activity, calf pain when walking, shortness of breath with activity, chronic cough, easy bleeding/bruising, swelling of ankles, excessive thirst, fatigue, depression, anxiety.  Patient admits to limping at times.     PAST MEDICAL HISTORY:   Past Medical History:   Diagnosis Date     Anemia, unspecified 11/8/2016     Atrial fibrillation (H) 5/27/2015     BPH (benign prostatic hyperplasia) 1/25/2012     CAD (coronary artery disease) 4/4/2012     Closed fracture of metatarsal bone 4/4/2012     Coronary atherosclerosis of unspecified type of vessel, native or [facility]    CAB 1996 following MI (at [facility])      Dilated aortic root (H) 5/26/2016     Drug-induced erectile dysfunction 10/2/2015     Elevated blood sugar 11/8/2016     Elevated PSA 9/19/2013     Essential hypertension with goal blood pressure less than 140/90 9/22/2016     Essential hypertension, benign      Gallbladder polyp 5/1/2018     Gout      HAV (hallux abducto valgus) 4/4/2012     Hernia, abdominal      History of prostate cancer 6/15/2016     HTN, goal below 150/90 4/20/2017     Iron deficiency anemia secondary to inadequate dietary iron intake 11/15/2016     Low blood pressure reading 10/17/2016     Lumbago      had degendisc dz on MRI 7/07     Mixed hyperlipidemia      Mumps      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     OA (osteoarthritis) 4/4/2012     Old myocardial infarction 4/4/2012     Palpitations      Pes planus 4/4/2012     Prostate cancer (H) 5/26/2016     PUD (peptic ulcer disease) 4/4/2012     Pulmonary hypertension 9/25/2017     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     Rosacea 1/15/2008     Thrombocytopenia (H) 4/21/2017     Unspecified hyperplasia of prostate with urinary obstruction and other lower urinary tract symptoms (LUTS)     better on avodart & hytrin     Venous stasis 4/4/2012     Venous stasis 4/4/2012        PAST SURGICAL HISTORY:   Past Surgical History:   Procedure Laterality Date     C NONSPECIFIC PROCEDURE      CAB 1996 [place]     C NONSPECIFIC PROCEDURE  2/07    l inguinal hernia repair      C NONSPECIFIC PROCEDURE      R hernia remote     C NONSPECIFIC PROCEDURE  2000    R ankle ORIF for fx     C NONSPECIFIC PROCEDURE  2005    nasal surgery      C NONSPECIFIC PROCEDURE      R rotater repair (remote)      C NONSPECIFIC PROCEDURE      appy 1966     C NONSPECIFIC PROCEDURE      sinus surgery x 2     C NONSPECIFIC PROCEDURE      L shoulder 6/09     CARDIAC SURGERY      bipass     CYSTOSCOPY FLEXIBLE, CYOABLATION PROSTATE N/A 11/9/2016    Procedure: CYSTOSCOPY FLEXIBLE, CRYOABLATION PROSTATE;  Surgeon: Krystain Owens MD;  Location:  OR     GENITOURINARY SURGERY      prostate surgery      HERNIA REPAIR       LAPAROSCOPIC HERNIORRHAPHY INGUINAL Right 5/10/2017    Procedure: LAPAROSCOPIC HERNIORRHAPHY INGUINAL;  Laparoscopic preperitoneal repair of right inguinal hernia with placement of underlay mesh;  Surgeon: Enrico Bridges MD;  Location: [facility] OR     PROSTATE SURGERY          MEDICATIONS:   Current Outpatient Prescriptions:      acetaminophen (TYLENOL) 650 MG CR tablet, Take 1 tablet (650 mg) by mouth every 6 hours as needed for pain, Disp: 100 tablet, Rfl: 11     aspirin 81 MG tablet, Take 81 mg by mouth  daily, Disp: , Rfl:      co-enzyme Q-10 100 MG CAPS, Take 1 capsule (100 mg) by mouth daily, Disp: 180 capsule, Rfl: 3     diclofenac (VOLTAREN) 1 % GEL, Apply 4 gramsto area qid prn, Disp: 100 g, Rfl: 11     doxycycline (VIBRAMYCIN) 100 MG capsule, TAKE 1 CAPSULE DAILY, Disp: 90 capsule, Rfl: 1     ELIQUIS 2.5 MG tablet, TAKE 1 TABLET TWICE A DAY, Disp: 180 tablet, Rfl: 1     FERROUS GLUCONATE PO, Take 324 mg by mouth daily (with breakfast), Disp: , Rfl:      Glucosamine-Chondroitin (OSTEO BI-FLEX REGULAR STRENGTH) 250-200 MG TABS, Take 1 tablet by mouth 2 times daily, Disp: 100 tablet, Rfl: 8     lisinopril (PRINIVIL/ZESTRIL) 20 MG tablet, TAKE 1 TABLET DAILY, Disp: 90 tablet, Rfl: 0     Multiple Vitamins-Minerals (MULTIVITAL) CHEW, Take 2 chew tab by mouth 2 times daily, Disp: 360 tablet, Rfl: 3     naftifine (NAFTIN) 1 % GEL topical gel, Apply topically daily Apply to affected nails daily, Disp: 90 g, Rfl: 3     Omega 3-6-9 Fatty Acids (OMEGA 3-6-9 PO), , Disp: , Rfl:      omeprazole (PRILOSEC) 40 MG capsule, TAKE 1 CAPSULE DAILY, Disp: 90 capsule, Rfl: 0     senna (SENOKOT) 8.6 MG tablet, Take 1 tablet by mouth daily, Disp: 60 tablet, Rfl: 0     simvastatin (ZOCOR) 20 MG tablet, Take 1 tablet (20 mg) by mouth At Bedtime, Disp: 90 tablet, Rfl: 1     sodium chloride (OCEAN NASAL SPRAY) 0.65 % nasal spray, Spray 1 spray into both nostrils daily as needed for congestion, Disp: 2 Bottle, Rfl: 6     tamsulosin (FLOMAX) 0.4 MG capsule, TAKE 1 CAPSULE DAILY, Disp: 90 capsule, Rfl: 0     Urea 20 % CREA cream, Apply topically as needed Apply to right foot daily, Disp: 120 g, Rfl: 2     [DISCONTINUED] lisinopril (PRINIVIL/ZESTRIL) 20 MG tablet, Take 1 tablet (20 mg) by mouth daily, Disp: 90 tablet, Rfl: 0     [DISCONTINUED] simvastatin (ZOCOR) 20 MG tablet, TAKE 1 TABLET AT BEDTIME, Disp: 90 tablet, Rfl: 1     ALLERGIES:  No Known Allergies     SOCIAL HISTORY:   Social History     Social History     Marital status:  "     Spouse name: N/A     Number of children: N/A     Years of education: N/A     Occupational History     Not on file.     Social History Main Topics     Smoking status: Former Smoker     Smokeless tobacco: Never Used      Comment: quit 3/1974     Alcohol use 0.0 oz/week     0 Standard drinks or equivalent per week      Comment: small amounts to moderate     Drug use: No     Sexual activity: Yes     Partners: Female     Other Topics Concern     Caffeine Concern Yes     1 cups of coffee and soda per day     Special Diet No     Exercise Yes     walking     Seat Belt Yes     Social History Narrative        FAMILY HISTORY:   Family History   Problem Relation Age of Onset     CANCER Mother      stomach cancer,  age 61     HEART DISEASE Father      MI,  age 71     HEART DISEASE Brother      stent placement, RA      Hypertension Brother      HEART DISEASE Sister      rythmn problems with heart, hypertension        EXAM:Vitals: /78  Ht 5' 7\" (1.702 m)  Wt 175 lb (79.4 kg)  BMI 27.41 kg/m2  BMI= Body mass index is 27.41 kg/(m^2).    General appearance: Patient is alert and fully cooperative with history & exam.  No sign of distress is noted during the visit.     Psychiatric: Affect is pleasant & appropriate.  Patient appears motivated to improve health.     Respiratory: Breathing is regular & unlabored while sitting.     HEENT: Hearing is intact to spoken word.  Speech is clear.  No gross evidence of visual impairment that would impact ambulation.     Dermatologic: localized nucleated hyperkeratotic lesion plantar left arch. No open lesions on debridement. No signs of infection noted.      Vascular: DP & PT pulses are intact & regular bilaterally.  No significant edema but varicosities noted.  CFT and skin temperature is normal to both lower extremities.     Neurologic: Lower extremity sensation is intact to light touch.  No evidence of weakness or contracture in the lower extremities.  No " evidence of neuropathy.     Musculoskeletal: Patient is ambulatory without assistive device or brace. Decrease arch height. Pain on palpation of callus at plantar arch..     ASSESSMENT:    Neuropathy of right foot  Pre-ulcerative corn or callous  Pes planus of both feet  Foot pain, right     PLAN:  Reviewed patient's chart in epic. Discussed causes of keratomas.  They are due to areas of increase friction.  Hammertoes can create these as they put more pressure to the metatarsal head.  Discussed treatments such as using foot file, pumice stone, metatarsal pads, orthotics, and not walking barefoot.     We discussed causes and treatments of flat feet.  Overtime, flat feet or falling arches can become painful and possible cause tension to the posterior tibial tendon leading to tendonitis or possible tear/rupture.  Conservatively we treat these with arch supports, shoe gear, physical therapy, immobilization and sometimes, surgically such as multiple fusions in the foot to help stabilize the foot. Surgery is quite involved and requires 6-10 weeks non weight bearing followed by 1 month of protected weight bearing.     At this time, pre ulcer callus was debrided, shoe was modified to offload callus. Recommend pumice stone, lotion to area.      PRocedure: After verbal consent, the porokeratoses was debrided using a #15 blade without incident. Patient tolerated procedure well.       Zulay Alexandra DPM, Podiatry/Foot and Ankle Surgery    Weight management plan: Patient was referred to their PCP to discuss a diet and exercise plan.    Patient to follow up with Primary Care provider regarding elevated blood pressure.        Again, thank you for allowing me to participate in the care of your patient.        Sincerely,        Zulay Alexandra DPM, Podiatry/Foot and Ankle Surgery

## 2018-05-01 NOTE — MR AVS SNAPSHOT
After Visit Summary   5/1/2018    Milo Serna    MRN: [id number]           Patient Information     Date Of Birth          1940        Visit Information        Provider Department      5/1/2018 10:00 AM Oscar Alves MD [facility]        Today's Diagnoses     Elevated blood sugar    -  1    HTN, goal below 150/90        Pulmonary hypertension        Gallbladder polyp        Hyperlipidemia LDL goal <100           Follow-ups after your visit        Follow-up notes from your care team     Return in about 6 months (around 11/1/2018).      Your next 10 appointments already scheduled     May 17, 2018 10:45 AM CDT   Pulmonary Hypertension Return with Sixto Conley MD   [facility] ([facility])    [address]  [place] [address] [phone]            Jun 20, 2018 10:20 AM CDT   Return Visit with Krystian Owens MD   [facility] ([facility])    [address]  [address]  [place] [address] [phone]              Who to contact     If you have questions or need follow up information about today's clinic visit or your schedule please contact [facility] directly at [phone].  Normal or non-critical lab and imaging results will be communicated to you by MyChart, letter or phone within 4 business days after the clinic has received the results. If you do not hear from us within 7 days, please contact the clinic through MyChart or phone. If you have a critical or abnormal lab result, we will notify you by phone as soon as possible.  Submit refill requests through [employer] or call your pharmacy and they will forward the refill request to us. Please allow 3 business days for your refill to be completed.          Additional Information About Your Visit        [employer] Information     [employer] gives you secure access to  "your electronic health record. If you see a primary care provider, you can also send messages to your care team and make appointments. If you have questions, please call your primary care clinic.  If you do not have a primary care provider, please call [phone] and they will assist you.        Care EveryWhere ID     This is your Care EveryWhere ID. This could be used by other organizations to access your [place] medical records  [id number]        Your Vitals Were     Pulse Temperature Height Pulse Oximetry BMI (Body Mass Index)       54 98.1  F (36.7  C) (Oral) 5' 7\" (1.702 m) 98% 28.57 kg/m2        Blood Pressure from Last 3 Encounters:   05/01/18 144/80   05/01/18 130/78   12/20/17 142/68    Weight from Last 3 Encounters:   05/01/18 182 lb 6.4 oz (82.7 kg)   05/01/18 175 lb (79.4 kg)   12/20/17 182 lb (82.6 kg)              We Performed the Following     Basic metabolic panel  (Ca, Cl, CO2, Creat, Gluc, K, Na, BUN)     Hemoglobin A1c          Today's Medication Changes          These changes are accurate as of 5/1/18 11:59 PM.  If you have any questions, ask your nurse or doctor.               These medicines have changed or have updated prescriptions.        Dose/Directions    lisinopril 20 MG tablet   Commonly known as:  PRINIVIL/ZESTRIL   This may have changed:  Another medication with the same name was removed. Continue taking this medication, and follow the directions you see here.   Used for:  HTN, goal below 150/90   Changed by:  Oscar Alves MD        TAKE 1 TABLET DAILY   Quantity:  90 tablet   Refills:  0       simvastatin 20 MG tablet   Commonly known as:  ZOCOR   This may have changed:  See the new instructions.   Used for:  Hyperlipidemia LDL goal <100   Changed by:  Oscar Alves MD        Dose:  20 mg   Take 1 tablet (20 mg) by mouth At Bedtime   Quantity:  90 tablet   Refills:  1            Where to get your medicines      These medications were sent to [facility] - " [place], [place] - [address] [address]     Phone:  [phone]     simvastatin 20 MG tablet                Primary Care Provider Office Phone # Fax #    Oscar Alves [phone] [address] [facility] [address]        Equal Access to Services     [facility]. Howard Memorial HospitalWILFRIDO : Hadii aad ku hadasho Soomaali, waaxda luqadaha, qaybta kaalmada adeegyada, waxay idiin hayaan adeeg kharash la'aan . So [facility] [phone].    ATENCIÓN: Si habla español, tiene a randall disposición servicios gratuitos de asistencia lingüística. Gloria al [phone].    We comply with applicable federal civil rights laws and [place] laws. We do not discriminate on the basis of race, color, national origin, age, disability, sex, sexual orientation, or gender identity.            Thank you!     Thank you for choosing [place] [facility]  for your care. Our goal is always to provide you with excellent care. Hearing back from our patients is one way we can continue to improve our services. Please take a few minutes to complete the written survey that you may receive in the mail after your visit with us. Thank you!             Your Updated Medication List - Protect others around you: Learn how to safely use, store and throw away your medicines at www.disposemymeds.org.          This list is accurate as of 5/1/18 11:59 PM.  Always use your most recent med list.                   Brand Name Dispense Instructions for use Diagnosis    acetaminophen 650 MG CR tablet    TYLENOL    100 tablet    Take 1 tablet (650 mg) by mouth every 6 hours as needed for pain    Osteoarthritis       aspirin 81 MG tablet      Take 81 mg by mouth daily        co-enzyme Q-10 100 MG Caps capsule     180 capsule    Take 1 capsule (100 mg) by mouth daily    Mixed hyperlipidemia       diclofenac 1 % Gel topical gel    VOLTAREN    100 g    Apply 4 gramsto area qid prn    Acute left-sided low back pain without  sciatica       doxycycline 100 MG capsule    VIBRAMYCIN    90 capsule    TAKE 1 CAPSULE DAILY    Rosacea       ELIQUIS 2.5 MG tablet   Generic drug:  apixaban ANTICOAGULANT     180 tablet    TAKE 1 TABLET TWICE A DAY    Persistent atrial fibrillation (H)       FERROUS GLUCONATE PO      Take 324 mg by mouth daily (with breakfast)        Glucosamine-Chondroitin 250-200 MG Tabs    OSTEO BI-FLEX REGULAR STRENGTH    100 tablet    Take 1 tablet by mouth 2 times daily    Osteoarthritis       lisinopril 20 MG tablet    PRINIVIL/ZESTRIL    90 tablet    TAKE 1 TABLET DAILY    HTN, goal below 150/90       MULTIVITAL Chew     360 tablet    Take 2 chew tab by mouth 2 times daily    Neuropathy of foot       naftifine 1 % Gel topical gel    NAFTIN    90 g    Apply topically daily Apply to affected nails daily    Onychomycosis of toenail       OMEGA 3-6-9 PO           omeprazole 40 MG capsule    priLOSEC    90 capsule    TAKE 1 CAPSULE DAILY    PUD (peptic ulcer disease)       senna 8.6 MG tablet    SENOKOT    60 tablet    Take 1 tablet by mouth daily    Prostate cancer (H)       simvastatin 20 MG tablet    ZOCOR    90 tablet    Take 1 tablet (20 mg) by mouth At Bedtime    Hyperlipidemia LDL goal <100       sodium chloride 0.65 % nasal spray    OCEAN NASAL SPRAY    2 Bottle    Spray 1 spray into both nostrils daily as needed for congestion    Bloody nose       tamsulosin 0.4 MG capsule    FLOMAX    90 capsule    TAKE 1 CAPSULE DAILY    Benign prostatic hyperplasia with lower urinary tract symptoms       Urea 20 % Crea cream     120 g    Apply topically as needed Apply to right foot daily    Right foot pain, Skin callus, Pes planus of both feet, Venous insufficiency of both lower extremities, Hav (hallux abducto valgus), right, Onychomycosis of toenail

## 2018-05-01 NOTE — PROGRESS NOTES
PATIENT HISTORY:   Milo Serna is a 77 year old male who presents to clinic for painful callus to bottom of the right foot. Denies injury. Has some numbness to foot but very sore when there is pressure to callus. Notes wife normally trims it down but has not in a month. Pain is 9/10. Would like to know how to get rid of it.     Review of Systems:  Patient denies fever, chills, rash, wound, stiffness, numbness, weakness, heart burn, blood in stool, chest pain with activity, calf pain when walking, shortness of breath with activity, chronic cough, easy bleeding/bruising, swelling of ankles, excessive thirst, fatigue, depression, anxiety.  Patient admits to limping at times.     PAST MEDICAL HISTORY:   Past Medical History:   Diagnosis Date     Anemia, unspecified 11/8/2016     Atrial fibrillation (H) 5/27/2015     BPH (benign prostatic hyperplasia) 1/25/2012     CAD (coronary artery disease) 4/4/2012     Closed fracture of metatarsal bone 4/4/2012     Coronary atherosclerosis of unspecified type of vessel, native or [facility]    CAB 1996 following MI (at [facility])      Dilated aortic root (H) 5/26/2016     Drug-induced erectile dysfunction 10/2/2015     Elevated blood sugar 11/8/2016     Elevated PSA 9/19/2013     Essential hypertension with goal blood pressure less than 140/90 9/22/2016     Essential hypertension, benign      Gallbladder polyp 5/1/2018     Gout      HAV (hallux abducto valgus) 4/4/2012     Hernia, abdominal      History of prostate cancer 6/15/2016     HTN, goal below 150/90 4/20/2017     Iron deficiency anemia secondary to inadequate dietary iron intake 11/15/2016     Low blood pressure reading 10/17/2016     Lumbago     had degendisc dz on MRI 7/07     Mixed hyperlipidemia      Mumps      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     OA (osteoarthritis) 4/4/2012     Old myocardial infarction 4/4/2012     Palpitations      Pes planus 4/4/2012     Prostate cancer (H) 5/26/2016     PUD  (peptic ulcer disease) 4/4/2012     Pulmonary hypertension 9/25/2017     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     Rosacea 1/15/2008     Thrombocytopenia (H) 4/21/2017     Unspecified hyperplasia of prostate with urinary obstruction and other lower urinary tract symptoms (LUTS)     better on avodart & hytrin     Venous stasis 4/4/2012     Venous stasis 4/4/2012        PAST SURGICAL HISTORY:   Past Surgical History:   Procedure Laterality Date     C NONSPECIFIC PROCEDURE      CAB 1996 [place]     C NONSPECIFIC PROCEDURE  2/07    l inguinal hernia repair      C NONSPECIFIC PROCEDURE      R hernia remote     C NONSPECIFIC PROCEDURE  2000    R ankle ORIF for fx     C NONSPECIFIC PROCEDURE  2005    nasal surgery      C NONSPECIFIC PROCEDURE      R rotater repair (remote)      C NONSPECIFIC PROCEDURE      appy 1966     C NONSPECIFIC PROCEDURE      sinus surgery x 2     C NONSPECIFIC PROCEDURE      L shoulder 6/09     CARDIAC SURGERY      bipass     CYSTOSCOPY FLEXIBLE, CYOABLATION PROSTATE N/A 11/9/2016    Procedure: CYSTOSCOPY FLEXIBLE, CRYOABLATION PROSTATE;  Surgeon: Krystian Owens MD;  Location: [facility] OR     GENITOURINARY SURGERY      prostate surgery      HERNIA REPAIR       LAPAROSCOPIC HERNIORRHAPHY INGUINAL Right 5/10/2017    Procedure: LAPAROSCOPIC HERNIORRHAPHY INGUINAL;  Laparoscopic preperitoneal repair of right inguinal hernia with placement of underlay mesh;  Surgeon: Enrico Bridges MD;  Location: [facility] OR     PROSTATE SURGERY          MEDICATIONS:   Current Outpatient Prescriptions:      acetaminophen (TYLENOL) 650 MG CR tablet, Take 1 tablet (650 mg) by mouth every 6 hours as needed for pain, Disp: 100 tablet, Rfl: 11     aspirin 81 MG tablet, Take 81 mg by mouth daily, Disp: , Rfl:      co-enzyme Q-10 100 MG CAPS, Take 1 capsule (100 mg) by mouth daily, Disp: 180 capsule, Rfl: 3     diclofenac (VOLTAREN) 1 % GEL, Apply 4 gramsto area qid prn, Disp: 100 g, Rfl: 11     doxycycline (VIBRAMYCIN) 100 MG capsule,  TAKE 1 CAPSULE DAILY, Disp: 90 capsule, Rfl: 1     ELIQUIS 2.5 MG tablet, TAKE 1 TABLET TWICE A DAY, Disp: 180 tablet, Rfl: 1     FERROUS GLUCONATE PO, Take 324 mg by mouth daily (with breakfast), Disp: , Rfl:      Glucosamine-Chondroitin (OSTEO BI-FLEX REGULAR STRENGTH) 250-200 MG TABS, Take 1 tablet by mouth 2 times daily, Disp: 100 tablet, Rfl: 8     lisinopril (PRINIVIL/ZESTRIL) 20 MG tablet, TAKE 1 TABLET DAILY, Disp: 90 tablet, Rfl: 0     Multiple Vitamins-Minerals (MULTIVITAL) CHEW, Take 2 chew tab by mouth 2 times daily, Disp: 360 tablet, Rfl: 3     naftifine (NAFTIN) 1 % GEL topical gel, Apply topically daily Apply to affected nails daily, Disp: 90 g, Rfl: 3     Omega 3-6-9 Fatty Acids (OMEGA 3-6-9 PO), , Disp: , Rfl:      omeprazole (PRILOSEC) 40 MG capsule, TAKE 1 CAPSULE DAILY, Disp: 90 capsule, Rfl: 0     senna (SENOKOT) 8.6 MG tablet, Take 1 tablet by mouth daily, Disp: 60 tablet, Rfl: 0     simvastatin (ZOCOR) 20 MG tablet, Take 1 tablet (20 mg) by mouth At Bedtime, Disp: 90 tablet, Rfl: 1     sodium chloride (OCEAN NASAL SPRAY) 0.65 % nasal spray, Spray 1 spray into both nostrils daily as needed for congestion, Disp: 2 Bottle, Rfl: 6     tamsulosin (FLOMAX) 0.4 MG capsule, TAKE 1 CAPSULE DAILY, Disp: 90 capsule, Rfl: 0     Urea 20 % CREA cream, Apply topically as needed Apply to right foot daily, Disp: 120 g, Rfl: 2     [DISCONTINUED] lisinopril (PRINIVIL/ZESTRIL) 20 MG tablet, Take 1 tablet (20 mg) by mouth daily, Disp: 90 tablet, Rfl: 0     [DISCONTINUED] simvastatin (ZOCOR) 20 MG tablet, TAKE 1 TABLET AT BEDTIME, Disp: 90 tablet, Rfl: 1     ALLERGIES:  No Known Allergies     SOCIAL HISTORY:   Social History     Social History     Marital status:      Spouse name: N/A     Number of children: N/A     Years of education: N/A     Occupational History     Not on file.     Social History Main Topics     Smoking status: Former Smoker     Smokeless tobacco: Never Used      Comment: quit 3/1974      "Alcohol use 0.0 oz/week     0 Standard drinks or equivalent per week      Comment: small amounts to moderate     Drug use: No     Sexual activity: Yes     Partners: Female     Other Topics Concern     Caffeine Concern Yes     1 cups of coffee and soda per day     Special Diet No     Exercise Yes     walking     Seat Belt Yes     Social History Narrative        FAMILY HISTORY:   Family History   Problem Relation Age of Onset     CANCER Mother      stomach cancer,  age 61     HEART DISEASE Father      MI,  age 71     HEART DISEASE Brother      stent placement, RA      Hypertension Brother      HEART DISEASE Sister      rythmn problems with heart, hypertension        EXAM:Vitals: /78  Ht 5' 7\" (1.702 m)  Wt 175 lb (79.4 kg)  BMI 27.41 kg/m2  BMI= Body mass index is 27.41 kg/(m^2).    General appearance: Patient is alert and fully cooperative with history & exam.  No sign of distress is noted during the visit.     Psychiatric: Affect is pleasant & appropriate.  Patient appears motivated to improve health.     Respiratory: Breathing is regular & unlabored while sitting.     HEENT: Hearing is intact to spoken word.  Speech is clear.  No gross evidence of visual impairment that would impact ambulation.     Dermatologic: localized nucleated hyperkeratotic lesion plantar left arch. No open lesions on debridement. No signs of infection noted.      Vascular: DP & PT pulses are intact & regular bilaterally.  No significant edema but varicosities noted.  CFT and skin temperature is normal to both lower extremities.     Neurologic: Lower extremity sensation is intact to light touch.  No evidence of weakness or contracture in the lower extremities.  No evidence of neuropathy.     Musculoskeletal: Patient is ambulatory without assistive device or brace. Decrease arch height. Pain on palpation of callus at plantar arch..     ASSESSMENT:    Neuropathy of right foot  Pre-ulcerative corn or callous  Pes planus " of both feet  Foot pain, right     PLAN:  Reviewed patient's chart in epic. Discussed causes of keratomas.  They are due to areas of increase friction.  Hammertoes can create these as they put more pressure to the metatarsal head.  Discussed treatments such as using foot file, pumice stone, metatarsal pads, orthotics, and not walking barefoot.     We discussed causes and treatments of flat feet.  Overtime, flat feet or falling arches can become painful and possible cause tension to the posterior tibial tendon leading to tendonitis or possible tear/rupture.  Conservatively we treat these with arch supports, shoe gear, physical therapy, immobilization and sometimes, surgically such as multiple fusions in the foot to help stabilize the foot. Surgery is quite involved and requires 6-10 weeks non weight bearing followed by 1 month of protected weight bearing.     At this time, pre ulcer callus was debrided, shoe was modified to offload callus. Recommend pumice stone, lotion to area.      PRocedure: After verbal consent, the porokeratoses was debrided using a #15 blade without incident. Patient tolerated procedure well.       Zulay Alexandra DPM, Podiatry/Foot and Ankle Surgery    Weight management plan: Patient was referred to their PCP to discuss a diet and exercise plan.    Patient to follow up with Primary Care provider regarding elevated blood pressure.

## 2018-05-01 NOTE — PATIENT INSTRUCTIONS
Thank you for choosing [facility]!    DR. [facility] CL[facility] SCHEDULE  MONDAY AM - [place] TUESDAY - [place]   [address] [address] [address] [address] [place] [address]   [phone] / [phone] / [phone]       WEDNESDAY - [place] FRIDAY AM - [facility]   [address]   [place] [address] [place] [address]   [phone] / [phone]       FRIDAY PM - [place] SCHEDULE SURGERY: [phone]   [address] BILLING QUESTIONS: [phone]   [place] [address] AFTER HOURS: [phone]   [phone] / [phone] APPOINTMENTS: [phone]     CALLUS / CORNS / IPKs  When there is excessive friction or pressure on the skin, the body responds by making the skin thicker to protect the deeper structures from becoming exposed. While this works well to protect the deeper structures, the thickened skin can increase pressure and pain.    CALLUS: Flat, diffuse thickening are simple calluses and they are usually caused by friction. Often these are the result of rubbing on a shoe or going barefoot.    CORNS: Calluses with a central core between the toes are called corns. These result from prominent joints on adjacent toes rubbing together. Theses are a symptom of bone malalignment and will always recur unless the underlying bones are addressed surgically.    IPKs: Calluses with a central core on the ball of the foot are usually IPKs (intractable plantar keratosis). These are caused by excessive pressure from the metatarsals, the bones that make up the ball of the foot. Often one of these bones is too long or too prominent.  Again, these will always recur unless the underlying bone issue is addressed. There is no cure for these. They will either go away by themselves, recur, or more could develop.    ROUTINE MAINTENANCE  1. File them down with a pumice stone or callus file a couple  times a week.   2. An electric callus removing device. Amope Pedi Perfect Electronic Pedicure Foot File and Callus Remover can be a good option.   3. Lotion can be applied to soften the callus. A urea based cream such as Kersal or Vanicream or thicker cream with shea butter are good options.  4. Toe spacers or toe covers can be used for corns, gel pads can be used for other lesions on the bottom of the foot.   If there is a surgical pathology noted, such as a prominent bone, often this needs to be addressed surgically to minimize recurrence. However, sometimes the lesion simply migrates to another spot after surgery, so it is not a guaranteed cure.             Body Mass Index (BMI)  Many things can cause foot and ankle problems. Foot structure, activity level, foot mechanics and injuries are common causes of pain.  One very important issue that often goes unmentioned, is body weight. Extra weight can cause increased stress on muscles, ligaments, bones and tendons.  Sometimes just a few extra pounds is all it takes to put one over her/his threshold. Without reducing that stress, it can be difficult to alleviate pain. Some people are uncomfortable addressing this issue, but we feel it is important for you to think about it. As Foot &  Ankle specialists, our job is addressing the lower extremity problem and possible causes. Regarding extra body weight, we encourage patients to discuss diet and weight management plans with their primary care doctors. It is this team approach that gives you the best opportunity for pain relief and getting you back on your feet.

## 2018-05-02 LAB
ANION GAP SERPL CALCULATED.3IONS-SCNC: 9 MMOL/L (ref 3–14)
BUN SERPL-MCNC: 12 MG/DL (ref 7–30)
CALCIUM SERPL-MCNC: 9.7 MG/DL (ref 8.5–10.1)
CHLORIDE SERPL-SCNC: 105 MMOL/L (ref 94–109)
CO2 SERPL-SCNC: 27 MMOL/L (ref 20–32)
CREAT SERPL-MCNC: 0.8 MG/DL (ref 0.66–1.25)
GFR SERPL CREATININE-BSD FRML MDRD: >90 ML/MIN/1.7M2
GLUCOSE SERPL-MCNC: 84 MG/DL (ref 70–99)
POTASSIUM SERPL-SCNC: 4.5 MMOL/L (ref 3.4–5.3)
SODIUM SERPL-SCNC: 141 MMOL/L (ref 133–144)

## 2018-05-17 ENCOUNTER — CARE COORDINATION (OUTPATIENT)
Dept: CARDIOLOGY | Facility: CLINIC | Age: 78
End: 2018-05-17

## 2018-05-17 ENCOUNTER — OFFICE VISIT (OUTPATIENT)
Dept: CARDIOLOGY | Facility: CLINIC | Age: 78
End: 2018-05-17
Payer: MEDICARE

## 2018-05-17 VITALS
HEIGHT: 67 IN | HEART RATE: 48 BPM | BODY MASS INDEX: 28.33 KG/M2 | DIASTOLIC BLOOD PRESSURE: 98 MMHG | WEIGHT: 180.5 LBS | OXYGEN SATURATION: 98 % | SYSTOLIC BLOOD PRESSURE: 156 MMHG

## 2018-05-17 DIAGNOSIS — I49.3 FREQUENT PVCS: ICD-10-CM

## 2018-05-17 DIAGNOSIS — I50.42 CHRONIC COMBINED SYSTOLIC AND DIASTOLIC CONGESTIVE HEART FAILURE (H): ICD-10-CM

## 2018-05-17 DIAGNOSIS — I50.810 RIGHT HEART FAILURE WITH REDUCED RIGHT VENTRICULAR FUNCTION (H): Primary | ICD-10-CM

## 2018-05-17 DIAGNOSIS — I10 HTN, GOAL BELOW 150/90: ICD-10-CM

## 2018-05-17 DIAGNOSIS — Z95.1 STATUS POST CORONARY ARTERY BYPASS GRAFT: ICD-10-CM

## 2018-05-17 DIAGNOSIS — I48.20 CHRONIC ATRIAL FIBRILLATION (H): ICD-10-CM

## 2018-05-17 PROCEDURE — 99215 OFFICE O/P EST HI 40 MIN: CPT | Performed by: INTERNAL MEDICINE

## 2018-05-17 RX ORDER — METOPROLOL SUCCINATE 25 MG/1
25 TABLET, EXTENDED RELEASE ORAL
Qty: 30 TABLET | Refills: 3 | Status: SHIPPED | OUTPATIENT
Start: 2018-05-17 | End: 2018-05-30

## 2018-05-17 RX ORDER — TORSEMIDE 20 MG/1
20 TABLET ORAL
Qty: 30 TABLET | Refills: 3 | Status: SHIPPED | OUTPATIENT
Start: 2018-05-17 | End: 2018-05-30

## 2018-05-17 NOTE — PROGRESS NOTES
Service Date: 05/17/2018      PRIMARY CARDIOLOGIST AND REFERRING PROVIDER:  Michael Duckworth MD      PRIMARY CARE PROVIDER:  Oscar Alves MD      REASON FOR VISIT:  Followup with multiple test results for evaluation of right ventricular systolic dysfunction and question of pulmonary hypertension.      HISTORY OF PRESENT ILLNESS:    Mr. Serna is accompanied by his wife today.  They are both known to me from his initial visit with me on 10/02/2017, after which they spent their winter in [place].  The patient is a 77-year-old  gentleman known to have CABG (1997), chronic atrial fibrillation not requiring AV slowing agents (on lower-dose Eliquis anticoagulation 2.5 mg b.i.d. due to history of epistaxis), essential hypertension, stable ascending aorta dilatation of 4.2 cm.  He has never used tobacco products.  He is not known to have diabetes or chronic kidney disease.     He was referred to me because of progressive dilatation of his right ventricle with mildly reduced systolic function, normal LV function of 55%-60% and estimated RVSP of 40 mmHg.  Cardiac MRI showed moderately dilated RV with normal systolic function, LVEF of 56%, severe biatrial enlargement and no evidence of intraatrial shunt. The patient is physically quite active.  He walks for about 30 minutes a day.  Denies any lower extremity edema, orthopnea, PND, dizziness or palpitations.  He does not recall any recurrence of chest pain, which is his historical angina since his CABG 20 years ago.         The patient had elected to go to [place] for the winter, but upon his return, he has completed some of the testing I required.      V/Q scan was negative for pulmonary embolus.      Labs - His hepatitis and HIV workup was normal, HbA1c was 5.4%, normal renal panel with sodium of 141, potassium 4.5, BUN 12, creatinine 0.8, estimated GFR greater than 90.  Normal TSH.  Hemoglobin 12.9.      A 24 hour Holter showed atrial fibrillation throughout with  an average heart rate of 57 BPM, as low as 34 BPM when asleep.  However, he did have 17% (14,700) polymorphic PVCs with the longest run of 4 beats.  No patient-recorded symptoms.      Liver ultrasound showed a few small hepatic cysts without evidence of fatty infiltration of the liver.  Mild splenomegaly.  No ascites.  (This was done due to a history of excess alcohol intake in the past.)      Pulmonary function tests were satisfactory with a normal FEV1 of 2.7 L (104% predicted), FEV1/FVC ratio 73%, normal diffusing capacity.      A repeat transthoracic echocardiogram (which I personally reviewed) showed a drop in LV function from previously normal to 35%-40% with inferior and inferolateral wall akinesis and moderate global hypokinesis.  The right ventricle was moderately dilated with moderately decreased systolic function.  There was severe biatrial enlargement without color evidence of atrial shunt.  Moderately severe functional mitral regurgitation, mild tricuspid regurgitation with an estimated RVSP of 50 mmHg.  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  Aortic valve was trileaflet and sclerotic.  In the underlying rhythm was atrial fibrillation with a rate of 50 BPM.  The ascending aorta was dilated at 4.0 cm (stable).      A 6 minute walk distance was 1300 feet (lower limit of normal 1167).  Oxygen saturation was above 95% at all times, and the heart rate went from a baseline of 52 to a peak of 97 BPM with normal blood pressure response.      CURRENT MEDICATIONS:   1.  Eliquis/apixaban 2.5 mg b.i.d.   2.  Aspirin 81 mg daily.   3.  Coenzyme Q10.   4.  Diclofenac (Voltaren) 1% gel.     5.  Doxycycline 100 mg daily.   6.  Ferrous gluconate 324 mg daily.   7.  Lisinopril 20 mg daily.   8.  Daily multivitamin.   9.  Omeprazole 40 mg daily.   10.  Simvastatin 20 mg daily.   11.  Tamsulosin (Flomax) 0.4 mg daily.   12.  Omega 3 fatty acids.      ALLERGIES:  No known allergies.      PHYSICAL  EXAMINATION:   VITAL SIGNS:  Blood pressure 160/80 (was rechecked), pulse 48 per minute and irregularly irregular, height 1.70 m (5 feet 7 inches), weight 81.9 kg (180 pounds), respiratory rate 16 per minute, sats 98% on room air, BMI 28.3 kg/m2.   CONSTITUTIONAL:  Comfortable at rest.  Appears his stated age.  Alert, oriented x3.   EYES:  No pallor or icterus.   ENT:  Satisfactory dentition.     RESPIRATORY:  Normal breath sounds.  Bilateral few bibasilar crackles.   CARDIOVASCULAR:  JVP is elevated at 4 cm.  Apical impulse is normal to palpation.  No parasternal heave or thrill.  Heart sounds are irregularly irregular, barely audible systolic murmur of mitral regurgitation without radiation.   ABDOMEN:  Soft and nontender.   EXTREMITIES:  He has chronic lower extremity skin discoloration consistent with possible venous stasis where his venous grafts were harvested.  However, there is no edema or clubbing.      DIAGNOSES:     1.  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   2.  Reduced right ventricular systolic function.   3.  Chronic atrial fibrillation with slow ventricular response in the absence of AV lelia slowing agents.   4.  Frequent premature ventricular complexes (17% ventricular ectopy burden).   5.  Stable coronary artery disease, status post CABG in 1997 (grafts not known).   6.  Hypertension.   7.  Prior history of epistaxis, therefore on lower-dose apixaban.      ASSESSMENT/PLAN:   1.  The patient has biventricular systolic heart failure that is clinically well compensated.  His LVEF has dropped from 55% to 40%.  I suspect this is ischemic cardiomyopathy.  His grafts are approximately 20 years old, but the patient does not have symptoms of angina or heart failure.  He can certainly benefit from a diuretic because his IVC is dilated and noncollapsible.  Additionally, he is very hypertensive, so we have room to optimize his heart failure medications.   2.   Medication changes:  Start torsemide 20 mg daily, start Toprol XL 25 mg daily, continue lisinopril 20 mg daily.   3.  Caution with Toprol XL.  I note that his average heart rate is in the 40s, and it does drop down to mid 30s when he is asleep.  This is to target his heart failure as well as frequent PVCs.  If he gets symptomatic bradycardia, we may have to discontinue it.   4.  [facility].O[facility] Clinic enrollment and follow up with Cardiology [facility], Juliano Caballero, in 2 weeks with previsit CBC, comprehensive metabolic panel and NT-proBNP.  I have discussed the patient's care with Juliano Caballero.   5.  Sleep Clinic referral.   6.  Phase 2 cardiac rehabilitation referral.   7.  Follow up in my clinic in 8 weeks with previsit labs and repeat 24 hour Holter.  My sense is even if the patient does have some pulmonary hypertension, it is largely going to be group 2 from left heart disease (combination of cardiomyopathy, functional mitral regurgitation and suboptimally treated hypertension).  An intracardiac shunt is less likely based on the cardiac MRI and echo findings, and the patient is not hypoxemic on the testing or have lung disease on the testing obtained.  Following optimization of heart failure therapy, we can progress to diagnostic coronary/graft angiography and right heart catheterization.      It was my pleasure to visit with this patient with complex co-morbidities.  I spent 50 minutes with him reviewing his extensive testing, medication changes, heart failure management and coordinating future care. Greater than 50% of the time was spent in counseling and co-ordination of care.     cc:   Michael Duckworth MD   [facility] [address]      Oscar Avles MD   [address] JOHANNY PENA MD             D: 05/17/2018   T: 05/17/2018   MT: [email or web address]      Name:     SAÚL GONZALEZ   MRN:      [id number]         Account:      [id number]   :      1940           Service Date: 2018      Document: [id number]

## 2018-05-17 NOTE — MR AVS SNAPSHOT
After Visit Summary   5/17/2018    Milo Serna    MRN: [id number]           Patient Information     Date Of Birth          1940        Visit Information        Provider Department      5/17/2018 10:45 AM Sixto Conley MD [facility]        Today's Diagnoses     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    -  1    Chronic combined systolic and diastolic congestive heart failure (H)        Chronic atrial fibrillation (H)        Frequent PVCs        Status post coronary artery bypass graft        HTN, goal below 150/90        Right heart enlargement        S/P CABG (coronary artery bypass graft)        Alcohol dependence with unspecified alcohol-induced disorder (H)          Care Instructions    1. MEDICATION CHANGES:  - Start torsemide 20 mg daily. Take in the morning.  - Start Metoprolol XL 25 mg daily.  - Continue lisinopril 20 mg daily.  - Increase Eliquis from 2.5 mg b.i.d. to 5 mg b.i.d.    2. C.O.R.E. clinic enrollment. Patient to follow-up with cardiology in [facility] in 2 weeks in CORE clinic with pre- visit CBC, comprehensive metabolic panel, NT proBNP.     3. Sleep clinic referral [place] location. Number to call if you do not hear from them is [phone].    4. Phase 2 cardiac rehabilitation referral.    5. Follow up in my clinic in 8 weeks with pre-visit CBC, comprehensive metabolic panel, NT proBNP, TSH with free T4, repeat 24-hour Holter.    If you have any questions or concerns, please call my nurse Patsy Gomez at [phone].            Follow-ups after your visit        Additional Services     Follow-Up with CORE Clinic       2 weeks w/ CORE visit            SLEEP EVALUATION & MANAGEMENT REFERRAL - [facility]  [phone] (Age 18 and up)       Please be aware that coverage of these services is subject to the terms and limitations of your health insurance plan.  Call member  "services at your health plan with any benefit or coverage questions.      Please bring the following to your appointment:    >>   List of current medications   >>   This referral request   >>   Any documents/labs given to you for this referral                CARDIAC REHAB REFERRAL       *This therapy referral will be filtered to a centralized scheduling office at [facility] and the patient will receive a call to schedule an appointment at a [facility] location most convenient for them.*     If you have not heard from the scheduling office within 2 business days, please call [phone] for all locations.    Please be aware that coverage of these services is subject to the terms and limitations of your health insurance plan.  Call member services at your health plan with any benefit or coverage questions.      **Note to Provider:  If you are referring outside of [facility] for the therapy appointment, please list the name of the location in the \"special instructions\" above, print the referral and give to the patient to schedule the appointment.                   Follow-Up with Pulmonary Hypertension Clinic       8 week visit w/DR Conley in PH clinic Labs and Holter                  Your next 10 appointments already scheduled     May 24, 2018  9:00 AM CDT   LAB with RU LAB   [facility]    [id number] [place] [address] [address]  [facility] [address] [phone]           Please do not eat 10-12 hours before your appointment if you are coming in fasting for labs on lipids, cholesterol, or glucose (sugar). This does not apply to pregnant women. Water, hot tea and black coffee (with nothing added) are okay. Do not drink other fluids, diet soda or chew gum.            May 30, 2018  8:45 AM CDT   LAB with RU LAB   [facility])    [address]  [facility] [address] "   [phone]           Please do not eat 10-12 hours before your appointment if you are coming in fasting for labs on lipids, cholesterol, or glucose (sugar). This does not apply to pregnant women. Water, hot tea and black coffee (with nothing added) are okay. Do not drink other fluids, diet soda or chew gum.            May 30, 2018  9:30 AM CDT   CORE Enrollment with Juliano Caballero PA-C   [facility] ([facility])    [address]  [facility] [address]   [phone]            Jun 20, 2018 10:20 AM CDT   Return Visit with Krystian Owens MD   [facility])    [address]  [address]  [facility] [address]   [phone]            Jul 12, 2018  9:00 AM CDT   Holter Monitor with [facility] HOLTER PROC [facility] ([facility])    [address]  [facility] [address]   [phone]           LOCATION - [address] [place] [address] **Please check-in at the [place] Registration Office, [address], in the [facility] building. When you are finished registering, please go to [address] and have a seat.            Jul 12, 2018  9:30 AM CDT   LAB with RU LAB   [facility] ([facility])    [address]  [facility] [address]   [phone]           Please do not eat 10-12 hours before your appointment if you are coming in fasting for labs on lipids, cholesterol, or glucose (sugar). This does not apply to pregnant women. Water, hot tea and black coffee (with nothing added) are okay. Do not drink other fluids, diet soda or chew gum.            Jul 19, 2018  8:45 AM CDT   Pulmonary Hypertension Return with Sixto Conley MD   [facility] ([facility])    [address]  [facility] [address]   [phone]              Future tests that were ordered for you today     Open Future Orders        Priority Expected Expires Ordered    CARDIAC REHAB REFERRAL Routine 6/17/2018 5/16/2020 5/17/2018    Follow-Up with Pulmonary Hypertension Clinic Routine 7/16/2018 5/17/2019 5/17/2018    Holter Monitor 24 hour - Adult Routine 7/17/2018 10/1/2018 5/17/2018    Comprehensive metabolic panel Routine 7/17/2018 5/17/2019 5/17/2018    TSH with free T4 reflex Routine 7/17/2018 5/17/2019 5/17/2018    CBC with platelets differential Routine 7/17/2018 5/17/2019 5/17/2018    N terminal pro BNP outpatient Routine 5/24/2018 5/17/2019 5/17/2018    SLEEP EVALUATION & MANAGEMENT REFERRAL - [facility]  [phone] (Age 18 and up) Routine 5/24/2018 5/17/2019 5/17/2018    CBC with platelets Routine 5/31/2018 5/17/2019 5/17/2018    N terminal pro BNP outpatient Routine 5/31/2018 5/17/2019 5/17/2018    Follow-Up with CORE Clinic Routine 5/24/2018 5/17/2019 5/17/2018    Comprehensive metabolic panel Routine 5/31/2018 5/17/2019 5/17/2018            Who to contact     If you have questions or need follow up information about today's clinic visit or your schedule please contact [facility] directly at [phone].  Normal or non-critical lab and imaging results will be communicated to you by MyChart, letter or phone within 4 business days after the clinic has received the results. If you do not hear from us within 7 days, please contact the clinic through MyChart or phone. If you have a critical or abnormal lab result, we will notify you by phone as soon as possible.  Submit refill requests through [employer] or call your pharmacy and they will forward the refill request to us. Please allow 3 business days for your refill to be completed.          Additional Information About Your Visit        MyChart Information     [employer]  "gives you secure access to your electronic health record. If you see a primary care provider, you can also send messages to your care team and make appointments. If you have questions, please call your primary care clinic.  If you do not have a primary care provider, please call [phone] and they will assist you.        Care EveryWhere ID     This is your Care EveryWhere ID. This could be used by other organizations to access your [facility] medical records  [id number]        Your Vitals Were     Pulse Height Pulse Oximetry BMI (Body Mass Index)          48 1.702 m (5' 7\") 98% 28.27 kg/m2         Blood Pressure from Last 3 Encounters:   05/17/18 (P) 160/80   05/01/18 144/80   05/01/18 130/78    Weight from Last 3 Encounters:   05/17/18 81.9 kg (180 lb 8 oz)   05/01/18 82.7 kg (182 lb 6.4 oz)   05/01/18 79.4 kg (175 lb)              We Performed the Following     Follow-Up with Pulmonary Hypertension Clinic          Today's Medication Changes          These changes are accurate as of 5/17/18 12:15 PM.  If you have any questions, ask your nurse or doctor.               Start taking these medicines.        Dose/Directions    metoprolol succinate 25 MG 24 hr tablet   Commonly known as:  TOPROL-XL   Used for:  Frequent PVCs, Chronic atrial fibrillation (H)   Started by:  Sixto Conley MD        Dose:  25 mg   Take 1 tablet (25 mg) by mouth daily (with breakfast)   Quantity:  30 tablet   Refills:  3       torsemide 20 MG tablet   Commonly known as:  DEMADEX   Used for:  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, Chronic combined systolic and diastolic congestive heart failure (H)   Started by:  Sixto Conley MD        Dose:  20 mg   Take 1 tablet (20 mg) by mouth daily (with breakfast)   Quantity:  30 tablet   Refills:  3         These medicines have changed or have updated prescriptions.        Dose/Directions    apixaban ANTICOAGULANT 5 MG tablet   Commonly known as:  ELIQUIS   This " may have changed:  See the new instructions.   Used for:  Chronic atrial fibrillation (H)   Changed by:  Sixto Conley MD        Dose:  5 mg   Take 1 tablet (5 mg) by mouth 2 times daily   Quantity:  100 tablet   Refills:  3            Where to get your medicines      These medications were sent to [id number] - [place], [place] - [address]  [address] [facility] [address]    Hours:  9Am-9Pm (M-F) 9Am-6Pm (S&S) Phone:  [phone]     metoprolol succinate 25 MG 24 hr tablet    torsemide 20 MG tablet         These medications were sent to [facility] - [address] [address]     Phone:  [phone]     apixaban ANTICOAGULANT 5 MG tablet                Primary Care Provider Office Phone # Fax #    Oscar Alves [phone] [address] [facility] [address]        Equal Access to Services     [facility] AH: Hadii aad ku hadasho Soomaali, waaxda luqadaha, qaybta kaalmada adeegyada, waxay idiin hayangel luisn alice reyes . So [facility] [phone].    ATENCIÓN: Si habla español, tiene a randall disposición servicios gratuitos de asistencia lingüística. [facility] [phone].    We comply with applicable federal civil rights laws and [place] laws. We do not discriminate on the basis of race, color, national origin, age, disability, sex, sexual orientation, or gender identity.            Thank you!     Thank you for choosing [facility]  for your care. Our goal is always to provide you with excellent care. Hearing back from our patients is one way we can continue to improve our services. Please take a few minutes to complete the written survey that you may receive in the mail after your visit with us. Thank you!             Your Updated Medication List - Protect others around you: Learn how to safely use, store and throw away your medicines at  www.disposemymeds.org.          This list is accurate as of 5/17/18 12:15 PM.  Always use your most recent med list.                   Brand Name Dispense Instructions for use Diagnosis    acetaminophen 650 MG CR tablet    TYLENOL    100 tablet    Take 1 tablet (650 mg) by mouth every 6 hours as needed for pain    Osteoarthritis       apixaban ANTICOAGULANT 5 MG tablet    ELIQUIS    100 tablet    Take 1 tablet (5 mg) by mouth 2 times daily    Chronic atrial fibrillation (H)       aspirin 81 MG tablet      Take 81 mg by mouth daily        co-enzyme Q-10 100 MG Caps capsule     180 capsule    Take 1 capsule (100 mg) by mouth daily    Mixed hyperlipidemia       diclofenac 1 % Gel topical gel    VOLTAREN    100 g    Apply 4 gramsto area qid prn    Acute left-sided low back pain without sciatica       doxycycline 100 MG capsule    VIBRAMYCIN    90 capsule    TAKE 1 CAPSULE DAILY    Rosacea       FERROUS GLUCONATE PO      Take 324 mg by mouth daily (with breakfast)        Glucosamine-Chondroitin 250-200 MG Tabs    OSTEO BI-FLEX REGULAR STRENGTH    100 tablet    Take 1 tablet by mouth 2 times daily    Osteoarthritis       lisinopril 20 MG tablet    PRINIVIL/ZESTRIL    90 tablet    TAKE 1 TABLET DAILY    HTN, goal below 150/90       metoprolol succinate 25 MG 24 hr tablet    TOPROL-XL    30 tablet    Take 1 tablet (25 mg) by mouth daily (with breakfast)    Frequent PVCs, Chronic atrial fibrillation (H)       MULTIVITAL Chew     360 tablet    Take 2 chew tab by mouth 2 times daily    Neuropathy of foot       naftifine 1 % Gel topical gel    NAFTIN    90 g    Apply topically daily Apply to affected nails daily    Onychomycosis of toenail       OMEGA 3-6-9 PO           omeprazole 40 MG capsule    priLOSEC    90 capsule    TAKE 1 CAPSULE DAILY    PUD (peptic ulcer disease)       senna 8.6 MG tablet    SENOKOT    60 tablet    Take 1 tablet by mouth daily    Prostate cancer (H)       simvastatin 20 MG tablet    ZOCOR    90 tablet     Take 1 tablet (20 mg) by mouth At Bedtime    Hyperlipidemia LDL goal <100       sodium chloride 0.65 % nasal spray    OCEAN NASAL SPRAY    2 Bottle    Spray 1 spray into both nostrils daily as needed for congestion    Bloody nose       tamsulosin 0.4 MG capsule    FLOMAX    90 capsule    TAKE 1 CAPSULE DAILY    Benign prostatic hyperplasia with lower urinary tract symptoms       torsemide 20 MG tablet    DEMADEX    30 tablet    Take 1 tablet (20 mg) by mouth daily (with breakfast)    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, Chronic combined systolic and diastolic congestive heart failure (H)       Urea 20 % Crea cream     120 g    Apply topically as needed Apply to right foot daily    Right foot pain, Skin callus, Pes planus of both feet, Venous insufficiency of both lower extremities, Hav (hallux abducto valgus), right, Onychomycosis of toenail

## 2018-05-17 NOTE — LETTER
5/17/2018    Oscar Alves MD  [address]  [facility] [address]    RE: Milo Serna       Dear Colleague,    I had the pleasure of seeing Milo Serna in the [facility].    Service Date: 05/17/2018      PRIMARY CARDIOLOGIST AND REFERRING PROVIDER:  Michael Duckworth MD      PRIMARY CARE PROVIDER:  Oscar Alves MD      REASON FOR VISIT:  Followup with multiple test results for evaluation of right ventricular systolic dysfunction and question of pulmonary hypertension.      HISTORY OF PRESENT ILLNESS:  Mr. Serna is accompanied by his wife today.  They are both known to me from his initial visit with me on 10/02/2017, after which they spent their winter in [place].  The patient is a 77-year-old  gentleman known to have CABG (1997), chronic atrial fibrillation not requiring AV slowing agents (on lower-dose Eliquis anticoagulation 2.5 mg b.i.d. due to history of epistaxis), essential hypertension, stable ascending aorta dilatation of 4.2 cm.  He has never used tobacco products.  He is not known to have diabetes or chronic kidney disease.      The patient is physically quite active.  He walks for about 30 minutes a day.  Denies any lower extremity edema, orthopnea, PND, dizziness or palpitations.  He does not recall any recurrence of chest pain, which is his historical angina since his CABG 20 years ago.        He was referred to me because of progressive dilatation of his right ventricle with mildly reduced systolic function, normal LV function of 55%-60% and estimated RVSP of 40 mmHg.  Cardiac MRI showed moderately dilated RV with normal systolic function, LVEF of 56%, severe biatrial enlargement and no evidence of intraatrial shunt.      The patient had elected to go to [place] for the winter, but upon his return, he has completed some of the testing I required.  V/Q scan was negative for pulmonary embolus.  His hepatitis and HIV workup was normal, HbA1c was  5.4%, normal renal panel with sodium of 141, potassium 4.5, BUN 12, creatinine 0.8, estimated GFR greater than 90.  Normal TSH.  Hemoglobin 12.9.      A 24 hour Holter showed atrial fibrillation throughout with an average heart rate of 57 BPM, as low as 34 BPM when asleep.  However, he did have 17% (14,700) polymorphic PVCs with the longest run of 4 beats.  No patient-recorded symptoms.      Liver ultrasound showed a few small hepatic cysts without evidence of fatty infiltration of the liver.  Mild splenomegaly.  No ascites.  (This was done due to a history of excess alcohol intake in the past.)      Pulmonary function tests were satisfactory with a normal FEV1 of 2.7 L (104% predicted), FEV1/FVC ratio 73%, normal diffusing capacity.      A repeat transthoracic echocardiogram (which I personally reviewed) showed a drop in LV function from previously normal to 35%-40% with inferior and inferolateral wall akinesis and moderate global hypokinesis.  The right ventricle was moderately dilated with moderately decreased systolic function.  There was severe biatrial enlargement without color evidence of atrial shunt.  Moderately severe functional mitral regurgitation, mild tricuspid regurgitation with an estimated RVSP of 50 mmHg.  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  Aortic valve was trileaflet and sclerotic.  In the underlying rhythm was atrial fibrillation with a rate of 50 BPM.  The ascending aorta was dilated at 4.0 cm (stable).      A 6 minute walk distance was 1300 feet (lower limit of normal 1167).  The patient's median saturation was above 95% at all times, and the heart rate went from a baseline of 52 to a peak of 97 BPM with normal blood pressure response.      CURRENT MEDICATIONS:   1.  Eliquis/apixaban 2.5 mg b.i.d.   2.  Aspirin 81 mg daily.   3.  Coenzyme Q10.   4.  Diclofenac (Voltaren) 1% gel.     5.  Doxycycline 100 mg daily.   6.  Ferrous gluconate 324 mg daily.   7.  Lisinopril 20  mg daily.   8.  Daily multivitamin.   9.  Omeprazole 40 mg daily.   10.  Simvastatin 20 mg daily.   11.  Tamsulosin (Flomax) 0.4 mg daily.   12.  Omega 3 fatty acids.      ALLERGIES:  No known allergies.      PHYSICAL EXAMINATION:   VITAL SIGNS:  Blood pressure 160/80 (was rechecked), pulse 48 per minute and irregularly irregular, height 1.70 m (5 feet 7 inches), weight 81.9 kg (180 pounds), respiratory rate 16 per minute, sats 98% on room air, BMI 28.3 kg/m2.   CONSTITUTIONAL:  Comfortable at rest.  Appears his stated age.  Alert, oriented x3.   EYES:  No pallor or icterus.   ENT:  Satisfactory dentition.     RESPIRATORY:  Normal breath sounds.  Bilateral few bibasilar crackles.   CARDIOVASCULAR:  JVP is elevated at 4 cm.  Apical impulse is normal to palpation.  No parasternal heave or thrill.  Heart sounds are irregularly irregular, barely audible systolic murmur of mitral regurgitation without radiation.   ABDOMEN:  Soft and nontender.   EXTREMITIES:  He has chronic lower extremity skin discoloration consistent with possible venous stasis where his venous grafts were harvested.  However, there is no edema or clubbing.      DIAGNOSES:     1.  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   2.  Reduced right ventricular systolic function.   3.  Chronic atrial fibrillation with slow ventricular response in the absence of AV lelia slowing agents.   4.  Frequent premature ventricular complexes (17% ventricular ectopy burden).   5.  Stable coronary artery disease, status post CABG in 1997 (grafts not known).   6.  Hypertension.   7.  Prior history of epistaxis, therefore on lower-dose apixaban.      ASSESSMENT/PLAN:   1.  The patient has biventricular systolic heart failure that is clinically well compensated.  His LVEF has dropped from 55%-40%.  I suspect this is ischemic cardiomyopathy.  His grafts are approximately 20 years old, but the patient does not have symptoms of angina or  heart failure.  He can certainly benefit from a diuretic because his IVC is dilated and noncollapsible.  Additionally, he is very hypertensive, so we have room to optimize his heart failure medications.   2.  Medication changes:  Start torsemide 20 mg daily, start Toprol XL 25 mg daily, continue lisinopril 20 mg daily.   3.  Caution with Toprol XL.  I note that his average heart rate is in the 40s, and it does drop down to mid 30s when he is asleep.  This is to target his heart failure as well as frequent PVCs.  If he gets symptomatic bradycardia, we may have to discontinue it.   4.  C.O.R.E. Clinic enrollment and follow up with Cardiology [facility], Juliano Caballero, in 2 weeks with previsit CBC, comprehensive metabolic panel and NT-proBNP.  I have discussed the patient's care with Juliano Caballero.   5.  Sleep Clinic referral.   6.  Phase 2 cardiac rehabilitation referral.   7.  Follow up in my clinic in 8 weeks with previsit labs and repeat 24 hour Holter.  My sense is even if the patient does have some pulmonary hypertension, it is largely going to be group 2 from left heart disease (combination of cardiomyopathy, functional mitral regurgitation and suboptimally treated hypertension).  An intracardiac shunt is less likely based on a cardiac MRI and echo findings, and the patient is not hypoxemic on the testing or have lung disease on the testing obtained.  Following optimization of heart failure therapy, we can progress to diagnostic coronary/graft angiography and right heart catheterization.      It was my pleasure to visit with the patient.  I spent 50 minutes with him reviewing his extensive testing, medication changes, heart failure management and coordinating future care.      cc:   Michael Duckworth MD   [facility] [place] [address]      Oscar Alves MD   [address] JOHANNY PENA MD              D: 2018   T: 2018   MT: afshin      Name:     SAÚL GONZALEZ   MRN:      [id number]        Account:      [id number]   :      1940           Service Date: 2018      Document: [id number]       Thank you for allowing me to participate in the care of your patient.      Sincerely,     Sixto Conley MD     [facility]    cc:   No referring provider defined for this encounter.

## 2018-05-17 NOTE — PROGRESS NOTES
Call to patient at request of Dr Conley - plan to keep Eliquis at 2.5 mg twice daily due to history of epitaxis. Left detailed message and to call back to confirm.  Called to pharmacy [facility] - disregard 5 mg rx. Pharmacist will cancel.  Pharmacy Contact   Telephone Fax   [phone]     Patsy Donato RN 05/17/18 12:57 PM    Call back from patient - he agrees to remain on eliquis 2.5 mg twice daily per DR Conley.  Patsy Donato RN 05/17/18 3:43 PM

## 2018-05-17 NOTE — PATIENT INSTRUCTIONS
1. MEDICATION CHANGES:  - Start torsemide 20 mg daily. Take in the morning.  - Start Metoprolol XL 25 mg daily.  - Continue lisinopril 20 mg daily.    2. C.O.R.E. clinic enrollment. Patient to follow-up with cardiology in [place] in 2 weeks in [facility] with pre- visit CBC, comprehensive metabolic panel, NT proBNP.     3. Sleep clinic referral [facility]. Number to call if you do not hear from them is [phone].    4. Phase 2 cardiac rehabilitation referral.    5. Follow up in my clinic in 8 weeks with pre-visit CBC, comprehensive metabolic panel, NT proBNP, TSH with free T4, repeat 24-hour Holter.    If you have any questions or concerns, please call my nurse Patsy Gomez at [phone].

## 2018-05-17 NOTE — LETTER
5/17/2018      Oscar Alves MD  [address]  [facility] [address]      RE: Milo Serna       Dear Colleague,    I had the pleasure of seeing Milo Serna in the [facility].    Service Date: 05/17/2018      PRIMARY CARDIOLOGIST AND REFERRING PROVIDER:  Michael Duckworth MD      PRIMARY CARE PROVIDER:  Oscar Alves MD      REASON FOR VISIT:  Followup with multiple test results for evaluation of right ventricular systolic dysfunction and question of pulmonary hypertension.      HISTORY OF PRESENT ILLNESS:    Mr. Serna is accompanied by his wife today.  They are both known to me from his initial visit with me on 10/02/2017, after which they spent their winter in [place].  The patient is a 77-year-old  gentleman known to have CABG (1997), chronic atrial fibrillation not requiring AV slowing agents (on lower-dose Eliquis anticoagulation 2.5 mg b.i.d. due to history of epistaxis), essential hypertension, stable ascending aorta dilatation of 4.2 cm.  He has never used tobacco products.  He is not known to have diabetes or chronic kidney disease.      The patient is physically quite active.  He walks for about 30 minutes a day.  Denies any lower extremity edema, orthopnea, PND, dizziness or palpitations.  He does not recall any recurrence of chest pain, which is his historical angina since his CABG 20 years ago.        He was referred to me because of progressive dilatation of his right ventricle with mildly reduced systolic function, normal LV function of 55%-60% and estimated RVSP of 40 mmHg.  Cardiac MRI showed moderately dilated RV with normal systolic function, LVEF of 56%, severe biatrial enlargement and no evidence of intraatrial shunt.      The patient had elected to go to [place] for the winter, but upon his return, he has completed some of the testing I required.  V/Q scan was negative for pulmonary embolus.  His hepatitis and HIV workup was normal,  HbA1c was 5.4%, normal renal panel with sodium of 141, potassium 4.5, BUN 12, creatinine 0.8, estimated GFR greater than 90.  Normal TSH.  Hemoglobin 12.9.      A 24 hour Holter showed atrial fibrillation throughout with an average heart rate of 57 BPM, as low as 34 BPM when asleep.  However, he did have 17% (14,700) polymorphic PVCs with the longest run of 4 beats.  No patient-recorded symptoms.      Liver ultrasound showed a few small hepatic cysts without evidence of fatty infiltration of the liver.  Mild splenomegaly.  No ascites.  (This was done due to a history of excess alcohol intake in the past.)      Pulmonary function tests were satisfactory with a normal FEV1 of 2.7 L (104% predicted), FEV1/FVC ratio 73%, normal diffusing capacity.      A repeat transthoracic echocardiogram (which I personally reviewed) showed a drop in LV function from previously normal to 35%-40% with inferior and inferolateral wall akinesis and moderate global hypokinesis.  The right ventricle was moderately dilated with moderately decreased systolic function.  There was severe biatrial enlargement without color evidence of atrial shunt.  Moderately severe functional mitral regurgitation, mild tricuspid regurgitation with an estimated RVSP of 50 mmHg.  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  Aortic valve was trileaflet and sclerotic.  In the underlying rhythm was atrial fibrillation with a rate of 50 BPM.  The ascending aorta was dilated at 4.0 cm (stable).      A 6 minute walk distance was 1300 feet (lower limit of normal 1167).  The patient's median saturation was above 95% at all times, and the heart rate went from a baseline of 52 to a peak of 97 BPM with normal blood pressure response.      CURRENT MEDICATIONS:   1.  Eliquis/apixaban 2.5 mg b.i.d.   2.  Aspirin 81 mg daily.   3.  Coenzyme Q10.   4.  Diclofenac (Voltaren) 1% gel.     5.  Doxycycline 100 mg daily.   6.  Ferrous gluconate 324 mg daily.   7.   Lisinopril 20 mg daily.   8.  Daily multivitamin.   9.  Omeprazole 40 mg daily.   10.  Simvastatin 20 mg daily.   11.  Tamsulosin (Flomax) 0.4 mg daily.   12.  Omega 3 fatty acids.      ALLERGIES:  No known allergies.      PHYSICAL EXAMINATION:   VITAL SIGNS:  Blood pressure 160/80 (was rechecked), pulse 48 per minute and irregularly irregular, height 1.70 m (5 feet 7 inches), weight 81.9 kg (180 pounds), respiratory rate 16 per minute, sats 98% on room air, BMI 28.3 kg/m2.   CONSTITUTIONAL:  Comfortable at rest.  Appears his stated age.  Alert, oriented x3.   EYES:  No pallor or icterus.   ENT:  Satisfactory dentition.     RESPIRATORY:  Normal breath sounds.  Bilateral few bibasilar crackles.   CARDIOVASCULAR:  JVP is elevated at 4 cm.  Apical impulse is normal to palpation.  No parasternal heave or thrill.  Heart sounds are irregularly irregular, barely audible systolic murmur of mitral regurgitation without radiation.   ABDOMEN:  Soft and nontender.   EXTREMITIES:  He has chronic lower extremity skin discoloration consistent with possible venous stasis where his venous grafts were harvested.  However, there is no edema or clubbing.      DIAGNOSES:     1.  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   2.  Reduced right ventricular systolic function.   3.  Chronic atrial fibrillation with slow ventricular response in the absence of AV lelia slowing agents.   4.  Frequent premature ventricular complexes (17% ventricular ectopy burden).   5.  Stable coronary artery disease, status post CABG in 1997 (grafts not known).   6.  Hypertension.   7.  Prior history of epistaxis, therefore on lower-dose apixaban.      ASSESSMENT/PLAN:   1.  The patient has biventricular systolic heart failure that is clinically well compensated.  His LVEF has dropped from 55%-40%.  I suspect this is ischemic cardiomyopathy.  His grafts are approximately 20 years old, but the patient does not have symptoms  of angina or heart failure.  He can certainly benefit from a diuretic because his IVC is dilated and noncollapsible.  Additionally, he is very hypertensive, so we have room to optimize his heart failure medications.   2.  Medication changes:  Start torsemide 20 mg daily, start Toprol XL 25 mg daily, continue lisinopril 20 mg daily.   3.  Caution with Toprol XL.  I note that his average heart rate is in the 40s, and it does drop down to mid 30s when he is asleep.  This is to target his heart failure as well as frequent PVCs.  If he gets symptomatic bradycardia, we may have to discontinue it.   4.  [facility] Clinic enrollment and follow up with Cardiology [place], Juliano Caballero, in 2 weeks with previsit CBC, comprehensive metabolic panel and NT-proBNP.  I have discussed the patient's care with Juliano Caballero.   5.  Sleep Clinic referral.   6.  Phase 2 cardiac rehabilitation referral.   7.  Follow up in my clinic in 8 weeks with previsit labs and repeat 24 hour Holter.  My sense is even if the patient does have some pulmonary hypertension, it is largely going to be group 2 from left heart disease (combination of cardiomyopathy, functional mitral regurgitation and suboptimally treated hypertension).  An intracardiac shunt is less likely based on a cardiac MRI and echo findings, and the patient is not hypoxemic on the testing or have lung disease on the testing obtained.  Following optimization of heart failure therapy, we can progress to diagnostic coronary/graft angiography and right heart catheterization.      It was my pleasure to visit with the patient.  I spent 50 minutes with him reviewing his extensive testing, medication changes, heart failure management and coordinating future care.      cc:   Michael Duckworth MD   [facility] [place] [id number]      Oscar Alves MD   [address] JOHANNY PENA MD              D: 2018   T: 2018   MT: afshin      Name:     SAÚL GONZALEZ   MRN:      [id number]        Account:      [id number]   :      1940           Service Date: 2018      Document: [id number]        Outpatient Encounter Prescriptions as of 2018   Medication Sig Dispense Refill     acetaminophen (TYLENOL) 650 MG CR tablet Take 1 tablet (650 mg) by mouth every 6 hours as needed for pain 100 tablet 11     apixaban ANTICOAGULANT (ELIQUIS) 2.5 MG tablet Take 1 tablet (2.5 mg) by mouth 2 times daily 200 tablet 3     aspirin 81 MG tablet Take 81 mg by mouth daily       co-enzyme Q-10 100 MG CAPS Take 1 capsule (100 mg) by mouth daily 180 capsule 3     diclofenac (VOLTAREN) 1 % GEL Apply 4 gramsto area qid prn 100 g 11     doxycycline (VIBRAMYCIN) 100 MG capsule TAKE 1 CAPSULE DAILY 90 capsule 1     FERROUS GLUCONATE PO Take 324 mg by mouth daily (with breakfast)       Glucosamine-Chondroitin (OSTEO BI-FLEX REGULAR STRENGTH) 250-200 MG TABS Take 1 tablet by mouth 2 times daily 100 tablet 8     lisinopril (PRINIVIL/ZESTRIL) 20 MG tablet TAKE 1 TABLET DAILY 90 tablet 0     metoprolol succinate (TOPROL-XL) 25 MG 24 hr tablet Take 1 tablet (25 mg) by mouth daily (with breakfast) 30 tablet 3     Multiple Vitamins-Minerals (MULTIVITAL) CHEW Take 2 chew tab by mouth 2 times daily 360 tablet 3     naftifine (NAFTIN) 1 % GEL topical gel Apply topically daily Apply to affected nails daily 90 g 3     omeprazole (PRILOSEC) 40 MG capsule TAKE 1 CAPSULE DAILY 90 capsule 0     senna (SENOKOT) 8.6 MG tablet Take 1 tablet by mouth daily 60 tablet 0     simvastatin (ZOCOR) 20 MG tablet Take 1 tablet (20 mg) by mouth At Bedtime 90 tablet 1     sodium chloride (OCEAN NASAL SPRAY) 0.65 % nasal spray Spray 1 spray into both nostrils daily as needed for congestion 2 Bottle 6     tamsulosin (FLOMAX) 0.4 MG capsule TAKE 1 CAPSULE DAILY 90 capsule 0     torsemide (DEMADEX) 20 MG tablet Take 1 tablet (20 mg) by  mouth daily (with breakfast) 30 tablet 3     Urea 20 % CREA cream Apply topically as needed Apply to right foot daily 120 g 2     Omega 3-6-9 Fatty Acids (OMEGA 3-6-9 PO)        [DISCONTINUED] apixaban ANTICOAGULANT (ELIQUIS) 5 MG tablet Take 1 tablet (5 mg) by mouth 2 times daily 100 tablet 3     [DISCONTINUED] ELIQUIS 2.5 MG tablet TAKE 1 TABLET TWICE A  tablet 1     No facility-administered encounter medications on file as of 5/17/2018.        Again, thank you for allowing me to participate in the care of your patient.      Sincerely,    Sixto Conley MD     [facility]

## 2018-05-22 ENCOUNTER — HOSPITAL ENCOUNTER (OUTPATIENT)
Dept: CARDIAC REHAB | Facility: CLINIC | Age: 78
End: 2018-05-22
Attending: INTERNAL MEDICINE
Payer: MEDICARE

## 2018-05-22 ENCOUNTER — TELEPHONE (OUTPATIENT)
Dept: FAMILY MEDICINE | Facility: CLINIC | Age: 78
End: 2018-05-22

## 2018-05-22 DIAGNOSIS — I49.3 FREQUENT PVCS: ICD-10-CM

## 2018-05-22 DIAGNOSIS — I10 HTN, GOAL BELOW 150/90: ICD-10-CM

## 2018-05-22 DIAGNOSIS — I50.42 CHRONIC COMBINED SYSTOLIC AND DIASTOLIC CONGESTIVE HEART FAILURE (H): ICD-10-CM

## 2018-05-22 DIAGNOSIS — I50.810 RIGHT HEART FAILURE WITH REDUCED RIGHT VENTRICULAR FUNCTION (H): ICD-10-CM

## 2018-05-22 DIAGNOSIS — Z95.1 STATUS POST CORONARY ARTERY BYPASS GRAFT: ICD-10-CM

## 2018-05-22 DIAGNOSIS — I48.20 CHRONIC ATRIAL FIBRILLATION (H): ICD-10-CM

## 2018-05-22 PROCEDURE — 93797 PHYS/QHP OP CAR RHAB WO ECG: CPT

## 2018-05-22 PROCEDURE — 40000575 ZZH STATISTIC OP CARDIAC VISIT #2

## 2018-05-22 PROCEDURE — 40000116 ZZH STATISTIC OP CR VISIT

## 2018-05-22 PROCEDURE — 93798 PHYS/QHP OP CAR RHAB W/ECG: CPT

## 2018-05-22 NOTE — TELEPHONE ENCOUNTER
Cardiac rehab calls, have to report pt score of 11 for PHQ 9 at cardiac rehab appointment today, pt informs normal for him, not suicidal, wants FYI sent to , sent  Patsy Whitaker RN, BSN  Message handled by Nurse Triage.

## 2018-05-25 ENCOUNTER — HOSPITAL ENCOUNTER (OUTPATIENT)
Dept: CARDIAC REHAB | Facility: CLINIC | Age: 78
End: 2018-05-25
Attending: INTERNAL MEDICINE
Payer: MEDICARE

## 2018-05-25 PROCEDURE — 40000116 ZZH STATISTIC OP CR VISIT

## 2018-05-25 PROCEDURE — 93798 PHYS/QHP OP CAR RHAB W/ECG: CPT

## 2018-05-29 DIAGNOSIS — K27.9 PUD (PEPTIC ULCER DISEASE): ICD-10-CM

## 2018-05-29 DIAGNOSIS — N40.1 BENIGN PROSTATIC HYPERPLASIA WITH LOWER URINARY TRACT SYMPTOMS: ICD-10-CM

## 2018-05-29 NOTE — TELEPHONE ENCOUNTER
"Requested Prescriptions   Pending Prescriptions Disp Refills     omeprazole (PRILOSEC) 40 MG capsule [Pharmacy Med Name: OMEPRAZOLE CAPS 40MG] 90 capsule 0     Sig: TAKE 1 CAPSULE DAILY    PPI Protocol Passed    5/29/2018  2:04 AM       Passed - Not on Clopidogrel (unless Pantoprazole ordered)       Passed - No diagnosis of osteoporosis on record       Passed - Recent (12 mo) or future (30 days) visit within the authorizing provider's specialty    Patient had office visit in the last 12 months or has a visit in the next 30 days with authorizing provider or within the authorizing provider's specialty.  See \"Patient Info\" tab in inbasket, or \"Choose Columns\" in Meds & Orders section of the refill encounter.           Passed - Patient is age 18 or older     Last Written Prescription Date:  3/02/18  Last Fill Quantity: 90 capsule,  # refills: 0   Last office visit: 5/1/2018 with prescribing provider:  Joselyn   Future Office Visit:           tamsulosin (FLOMAX) 0.4 MG capsule [Pharmacy Med Name: TAMSULOSIN HCL CAPS 0.4MG] 90 capsule 0     Sig: TAKE 1 CAPSULE DAILY    Alpha Blockers Failed    5/29/2018  2:04 AM       Failed - Blood pressure under 140/90 in past 12 months    BP Readings from Last 3 Encounters:   05/17/18 (P) 160/80   05/01/18 144/80   05/01/18 130/78                Passed - Recent (12 mo) or future (30 days) visit within the authorizing provider's specialty    Patient had office visit in the last 12 months or has a visit in the next 30 days with authorizing provider or within the authorizing provider's specialty.  See \"Patient Info\" tab in inbasket, or \"Choose Columns\" in Meds & Orders section of the refill encounter.           Passed - Patient does not have Tadalafil, Vardenafil, or Sildenafil on their medication list       Passed - Patient is 18 years of age or older     Last Written Prescription Date:  3/02/18  Last Fill Quantity: 90 capsule,  # refills: 0   Last office visit: 5/1/2018 with prescribing " provider:  Joselyn Grey Office Visit:

## 2018-05-30 ENCOUNTER — HOSPITAL ENCOUNTER (OUTPATIENT)
Dept: CARDIAC REHAB | Facility: CLINIC | Age: 78
End: 2018-05-30
Attending: INTERNAL MEDICINE
Payer: MEDICARE

## 2018-05-30 ENCOUNTER — OFFICE VISIT (OUTPATIENT)
Dept: CARDIOLOGY | Facility: CLINIC | Age: 78
End: 2018-05-30
Attending: INTERNAL MEDICINE
Payer: MEDICARE

## 2018-05-30 VITALS
HEART RATE: 50 BPM | WEIGHT: 181.1 LBS | HEIGHT: 67 IN | OXYGEN SATURATION: 99 % | DIASTOLIC BLOOD PRESSURE: 72 MMHG | BODY MASS INDEX: 28.43 KG/M2 | SYSTOLIC BLOOD PRESSURE: 144 MMHG

## 2018-05-30 DIAGNOSIS — I50.42 CHRONIC COMBINED SYSTOLIC AND DIASTOLIC CONGESTIVE HEART FAILURE (H): ICD-10-CM

## 2018-05-30 DIAGNOSIS — I49.3 FREQUENT PVCS: ICD-10-CM

## 2018-05-30 DIAGNOSIS — I50.810 RIGHT HEART FAILURE WITH REDUCED RIGHT VENTRICULAR FUNCTION (H): ICD-10-CM

## 2018-05-30 DIAGNOSIS — Z95.1 STATUS POST CORONARY ARTERY BYPASS GRAFT: ICD-10-CM

## 2018-05-30 DIAGNOSIS — I10 HTN, GOAL BELOW 150/90: ICD-10-CM

## 2018-05-30 DIAGNOSIS — I48.20 CHRONIC ATRIAL FIBRILLATION (H): ICD-10-CM

## 2018-05-30 LAB
ALBUMIN SERPL-MCNC: 3.7 G/DL (ref 3.4–5)
ALP SERPL-CCNC: 171 U/L (ref 40–150)
ALT SERPL W P-5'-P-CCNC: 18 U/L (ref 0–70)
ANION GAP SERPL CALCULATED.3IONS-SCNC: 6 MMOL/L (ref 3–14)
AST SERPL W P-5'-P-CCNC: 24 U/L (ref 0–45)
BILIRUB SERPL-MCNC: 1.7 MG/DL (ref 0.2–1.3)
BUN SERPL-MCNC: 18 MG/DL (ref 7–30)
CALCIUM SERPL-MCNC: 8.9 MG/DL (ref 8.5–10.1)
CHLORIDE SERPL-SCNC: 103 MMOL/L (ref 94–109)
CO2 SERPL-SCNC: 30 MMOL/L (ref 20–32)
CREAT SERPL-MCNC: 1.02 MG/DL (ref 0.66–1.25)
ERYTHROCYTE [DISTWIDTH] IN BLOOD BY AUTOMATED COUNT: 14.5 % (ref 10–15)
GFR SERPL CREATININE-BSD FRML MDRD: 71 ML/MIN/1.7M2
GLUCOSE SERPL-MCNC: 114 MG/DL (ref 70–99)
HCT VFR BLD AUTO: 37.2 % (ref 40–53)
HGB BLD-MCNC: 12.2 G/DL (ref 13.3–17.7)
MCH RBC QN AUTO: 31.8 PG (ref 26.5–33)
MCHC RBC AUTO-ENTMCNC: 32.8 G/DL (ref 31.5–36.5)
MCV RBC AUTO: 97 FL (ref 78–100)
NT-PROBNP SERPL-MCNC: 3161 PG/ML (ref 0–450)
PLATELET # BLD AUTO: 111 10E9/L (ref 150–450)
POTASSIUM SERPL-SCNC: 3.7 MMOL/L (ref 3.4–5.3)
PROT SERPL-MCNC: 7.3 G/DL (ref 6.8–8.8)
RBC # BLD AUTO: 3.84 10E12/L (ref 4.4–5.9)
SODIUM SERPL-SCNC: 139 MMOL/L (ref 133–144)
WBC # BLD AUTO: 4.1 10E9/L (ref 4–11)

## 2018-05-30 PROCEDURE — 80053 COMPREHEN METABOLIC PANEL: CPT | Performed by: INTERNAL MEDICINE

## 2018-05-30 PROCEDURE — 40000116 ZZH STATISTIC OP CR VISIT: Performed by: OCCUPATIONAL THERAPIST

## 2018-05-30 PROCEDURE — 99214 OFFICE O/P EST MOD 30 MIN: CPT | Performed by: PHYSICIAN ASSISTANT

## 2018-05-30 PROCEDURE — 36415 COLL VENOUS BLD VENIPUNCTURE: CPT | Performed by: INTERNAL MEDICINE

## 2018-05-30 PROCEDURE — 83880 ASSAY OF NATRIURETIC PEPTIDE: CPT | Performed by: INTERNAL MEDICINE

## 2018-05-30 PROCEDURE — 85027 COMPLETE CBC AUTOMATED: CPT | Performed by: INTERNAL MEDICINE

## 2018-05-30 PROCEDURE — 93798 PHYS/QHP OP CAR RHAB W/ECG: CPT | Performed by: OCCUPATIONAL THERAPIST

## 2018-05-30 RX ORDER — POTASSIUM CHLORIDE 750 MG/1
10 TABLET, EXTENDED RELEASE ORAL DAILY
Qty: 30 TABLET | Refills: 3 | Status: SHIPPED | OUTPATIENT
Start: 2018-05-30 | End: 2018-06-13

## 2018-05-30 RX ORDER — METOPROLOL SUCCINATE 25 MG/1
25 TABLET, EXTENDED RELEASE ORAL
Qty: 90 TABLET | Refills: 3 | Status: SHIPPED | OUTPATIENT
Start: 2018-05-30 | End: 2018-06-13

## 2018-05-30 RX ORDER — OMEPRAZOLE 40 MG/1
CAPSULE, DELAYED RELEASE ORAL
Qty: 90 CAPSULE | Refills: 0 | Status: SHIPPED | OUTPATIENT
Start: 2018-05-30 | End: 2018-10-30

## 2018-05-30 RX ORDER — TORSEMIDE 20 MG/1
20 TABLET ORAL
Qty: 90 TABLET | Refills: 3 | Status: SHIPPED | OUTPATIENT
Start: 2018-05-30 | End: 2019-06-17

## 2018-05-30 RX ORDER — TAMSULOSIN HYDROCHLORIDE 0.4 MG/1
CAPSULE ORAL
Qty: 90 CAPSULE | Refills: 0 | Status: SHIPPED | OUTPATIENT
Start: 2018-05-30 | End: 2018-10-30

## 2018-05-30 RX ORDER — LISINOPRIL 20 MG/1
20 TABLET ORAL DAILY
Qty: 90 TABLET | Refills: 3 | Status: SHIPPED | OUTPATIENT
Start: 2018-05-30 | End: 2019-06-26

## 2018-05-30 NOTE — PROGRESS NOTES
CARDIOLOGY CLINIC PROGRESS NOTE - [facility] ENROLLMENT     DOS: 2018    Milo Serna  : 1940, 77 year old  MRN: [id number]      History:   I had the pleasure of meeting Milo (he goes by Luís) MACIE Serna today for [facility] enrollment. He is a patient of Dr. Conley and Dr. Duckworth.       He saw Dr. Duckworth last 17, and he was referred to the [facility] clinic.  He met with Dr. Conley 10/2/17.     Luís is a 77-year-old  gentleman known to have CABG (), chronic atrial fibrillation not requiring AV slowing agents (on lower-dose Eliquis anticoagulation 2.5 mg b.i.d. due to history of epistaxis), essential hypertension, stable ascending aorta dilatation of 4.2 cm.  He has had progressive dilatation of his right ventricle with mildly reduced systolic function (cardiac MRI 17 showed moderately dilated RV with normal systolic function, LVEF of 56%, severe biatrial enlargement and no evidence of intraatrial shunt), for which he saw Dr. Conlye last .     He underwent testing that has been reviewed by Dr. Conley.   -  V/Q scan was negative for pulmonary embolus.     -  Hepatitis and HIV workup was normal, HbA1c was 5.4%, normal renal panel with sodium of 141, potassium 4.5, BUN 12, creatinine 0.8, estimated GFR  greater than 90.  Normal TSH.  Hemoglobin 12.9.   -  A 24 hour Holter showed atrial fibrillation throughout with an average heart rate of 57 BPM, as low as 34 BPM when asleep.  However, he did have 17% (14,700) polymorphic PVCs with the longest run of 4 beats.  No patient-recorded symptoms.   -  Liver ultrasound showed a few small hepatic cysts without evidence of fatty infiltration of the liver.  Mild splenomegaly.  No ascites.  (This was done due to a history of excess alcohol intake in the past.)   -  Pulmonary function tests were satisfactory with a normal FEV1 of 2.7 L (104% predicted), FEV1/FVC ratio 73%, normal diffusing capacity.   -  A repeat transthoracic echocardiogram (which I  personally reviewed) showed a drop in LV function from previously normal to 35%-40% with inferior and inferolateral wall akinesis and moderate global hypokinesis.  The right ventricle was moderately dilated with moderately decreased systolic function.  There was severe biatrial enlargement without color evidence of atrial shunt.  Moderately severe functional mitral regurgitation, mild tricuspid regurgitation with an estimated RVSP of 50 mmHg.  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  Aortic valve was trileaflet and sclerotic.  In the underlying rhythm was atrial fibrillation with a rate of 50 BPM.  The ascending aorta was dilated at 4.0 cm (stable).   -  A 6 minute walk distance was 1300 feet (lower limit of normal 1167).  Oxygen saturation was above 95% at all times, and the heart rate went from a baseline of 52 to a peak of 97 BPM with normal blood pressure response.       This testing revealed a new decline in LVEF.  He has significant PVC burden of 17%.   She started him on torsemide 20 mg daily, and Toprol XL 25 mg. Lisinopril 20 mg was continued.     Interval History:   He presents today for follow up. He is feeling well. He has no specific concerns. He has some very mild edema. He is weighing at home, and weight is very stable.  He has been checking his BP at home and it has been 130s mostly. At cardiac rehab he reports it was < 120.  Today he has not taken his medications yet and it is 144/72.   He is watching his diet and sodium consumption.   He attended a session of cardiac rehab, and reports it went well.   He has tried to make an appointment for a sleep evaluation, but he has not gotten through. He plans to walk up to the office today after our appointment.     ROS:  Skin:  Negative     Eyes:  Positive for cataracts;glasses  ENT:  Negative    Respiratory:  Positive for: needs to make sleep apnea appointment.  No ARAMBULA or SOB    Cardiovascular:  Negative;lightheadedness Positive  for;edema  Gastroenterology: Negative    Genitourinary:  Positive for urinary frequency;prostate problem;nocturia  Musculoskeletal:  Positive for arthritis;back pain  Neurologic:  Positive for numbness or tingling of feet  Psychiatric:  Positive for sleep disturbances  Heme/Lymph/Imm:  Positive for easy bruising  Endocrine:  Negative      PAST MEDICAL HISTORY:  Past Medical History:   Diagnosis Date     Anemia, unspecified 11/8/2016     Atrial fibrillation (H) 5/27/2015     BPH (benign prostatic hyperplasia) 1/25/2012     CAD (coronary artery disease) 4/4/2012     Closed fracture of metatarsal bone 4/4/2012     Coronary atherosclerosis of unspecified type of vessel, native or graft [facility]    CAB 1996 following MI (at [facility])      Dilated aortic root (H) 5/26/2016     Drug-induced erectile dysfunction 10/2/2015     Elevated blood sugar 11/8/2016     Elevated PSA 9/19/2013     Essential hypertension with goal blood pressure less than 140/90 9/22/2016     Essential hypertension, benign      Gallbladder polyp 5/1/2018     Gout      HAV (hallux abducto valgus) 4/4/2012     Hernia, abdominal      History of prostate cancer 6/15/2016     HTN, goal below 150/90 4/20/2017     Iron deficiency anemia secondary to inadequate dietary iron intake 11/15/2016     Low blood pressure reading 10/17/2016     Lumbago     had degendisc dz on MRI 7/07     Mixed hyperlipidemia      Mumps      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     OA (osteoarthritis) 4/4/2012     Old myocardial infarction 4/4/2012     Palpitations      Pes planus 4/4/2012     Prostate cancer (H) 5/26/2016     PUD (peptic ulcer disease) 4/4/2012     Pulmonary hypertension 9/25/2017     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     Rosacea 1/15/2008     Thrombocytopenia (H) 4/21/2017     Unspecified hyperplasia of prostate with urinary obstruction and other lower urinary tract symptoms (LUTS)     better on avodart & hytrin     Venous stasis 4/4/2012     Venous stasis 4/4/2012       PAST  SURGICAL HISTORY:  Past Surgical History:   Procedure Laterality Date     C NONSPECIFIC PROCEDURE      CAB  [place]     C NONSPECIFIC PROCEDURE      l inguinal hernia repair      C NONSPECIFIC PROCEDURE      R hernia remote     C NONSPECIFIC PROCEDURE      R ankle ORIF for fx     C NONSPECIFIC PROCEDURE      nasal surgery      C NONSPECIFIC PROCEDURE      R rotater repair (remote)      C NONSPECIFIC PROCEDURE      appy      C NONSPECIFIC PROCEDURE      sinus surgery x 2     C NONSPECIFIC PROCEDURE      L shoulder      CARDIAC SURGERY      bipass     CYSTOSCOPY FLEXIBLE, CYOABLATION PROSTATE N/A 2016    Procedure: CYSTOSCOPY FLEXIBLE, CRYOABLATION PROSTATE;  Surgeon: Krystian Owens MD;  Location: [facility] OR     GENITOURINARY SURGERY      prostate surgery      HERNIA REPAIR       LAPAROSCOPIC HERNIORRHAPHY INGUINAL Right 5/10/2017    Procedure: LAPAROSCOPIC HERNIORRHAPHY INGUINAL;  Laparoscopic preperitoneal repair of right inguinal hernia with placement of underlay mesh;  Surgeon: Enrico Bridges MD;  Location: [facility] OR     PROSTATE SURGERY         SOCIAL HISTORY:  Social History     Social History     Marital status:      Spouse name: N/A     Number of children: N/A     Years of education: N/A     Social History Main Topics     Smoking status: Former Smoker     Smokeless tobacco: Never Used      Comment: quit 3/1974     Alcohol use 0.0 oz/week     0 Standard drinks or equivalent per week      Comment: 1-2 drinks per day     Drug use: No     Sexual activity: Yes     Partners: Female     Other Topics Concern     Caffeine Concern Yes     1 cups of coffee and soda per day     Special Diet No     Exercise Yes     walking     Seat Belt Yes     Social History Narrative       FAMILY HISTORY:  Family History   Problem Relation Age of Onset     CANCER Mother      stomach cancer,  age 61     HEART DISEASE Father      MI,  age 71     HEART DISEASE Brother      stent  placement, RA      Hypertension Brother      HEART DISEASE Sister      rythmn problems with heart, hypertension       MEDS:   Current Outpatient Prescriptions on File Prior to Visit:  acetaminophen (TYLENOL) 650 MG CR tablet Take 1 tablet (650 mg) by mouth every 6 hours as needed for pain   apixaban ANTICOAGULANT (ELIQUIS) 2.5 MG tablet Take 1 tablet (2.5 mg) by mouth 2 times daily   aspirin 81 MG tablet Take 81 mg by mouth daily   co-enzyme Q-10 100 MG CAPS Take 1 capsule (100 mg) by mouth daily   doxycycline (VIBRAMYCIN) 100 MG capsule TAKE 1 CAPSULE DAILY   FERROUS GLUCONATE PO Take 324 mg by mouth daily (with breakfast)   Glucosamine-Chondroitin (OSTEO BI-FLEX REGULAR STRENGTH) 250-200 MG TABS Take 1 tablet by mouth 2 times daily   naftifine (NAFTIN) 1 % GEL topical gel Apply topically daily Apply to affected nails daily   Omega 3-6-9 Fatty Acids (OMEGA 3-6-9 PO) Take 1 tablet by mouth daily    simvastatin (ZOCOR) 20 MG tablet Take 1 tablet (20 mg) by mouth At Bedtime   sodium chloride (OCEAN NASAL SPRAY) 0.65 % nasal spray Spray 1 spray into both nostrils daily as needed for congestion   diclofenac (VOLTAREN) 1 % GEL Apply 4 gramsto area qid prn (Patient not taking: Reported on 5/30/2018)   Multiple Vitamins-Minerals (MULTIVITAL) CHEW Take 2 chew tab by mouth 2 times daily (Patient not taking: Reported on 5/30/2018)   omeprazole (PRILOSEC) 40 MG capsule TAKE 1 CAPSULE DAILY   senna (SENOKOT) 8.6 MG tablet Take 1 tablet by mouth daily (Patient not taking: Reported on 5/30/2018)   tamsulosin (FLOMAX) 0.4 MG capsule TAKE 1 CAPSULE DAILY   Urea 20 % CREA cream Apply topically as needed Apply to right foot daily (Patient not taking: Reported on 5/30/2018)   [DISCONTINUED] lisinopril (PRINIVIL/ZESTRIL) 20 MG tablet TAKE 1 TABLET DAILY   [DISCONTINUED] metoprolol succinate (TOPROL-XL) 25 MG 24 hr tablet Take 1 tablet (25 mg) by mouth daily (with breakfast)   [DISCONTINUED] torsemide (DEMADEX) 20 MG tablet Take 1  "tablet (20 mg) by mouth daily (with breakfast)     No current facility-administered medications on file prior to visit.     ALLERGIES: No Known Allergies    PHYSICAL EXAM:  Vitals: /72 (BP Location: Right arm, Patient Position: Sitting, Cuff Size: Adult Regular)  Pulse 50  Ht 1.702 m (5' 7\")  Wt 82.1 kg (181 lb 1.6 oz)  SpO2 99%  BMI 28.36 kg/m2  Constitutional:  cooperative, alert and oriented, well developed, well nourished, in no acute distress        Skin:  warm and dry to the touch        Head:  normocephalic        Eyes:  pupils equal and round;conjunctivae and lids unremarkable        ENT:  no pallor or cyanosis        Neck:  JVP normal        Respiratory:  clear to auscultation        Cardiac:   irregularly irregular rhythm;bradycardic distant heart sounds   systolic murmur;grade 2;grade 3;apical          GI:  abdomen soft;non-tender        Vascular:                                        Extremities and Musculoskeletal:  no deformities, clubbing, cyanosis, erythema observed   chronic lower extremity skin discoloration consistent with possible venous stasis     Neurological:  no gross motor deficits;affect appropriate          LABS/DATA:  I reviewed the following:  Component      Latest Ref Rng & Units 5/30/2018   Sodium      133 - 144 mmol/L 139   Potassium      3.4 - 5.3 mmol/L 3.7   Chloride      94 - 109 mmol/L 103   Carbon Dioxide      20 - 32 mmol/L 30   Anion Gap      3 - 14 mmol/L 6   Glucose      70 - 99 mg/dL 114 (H)   Urea Nitrogen      7 - 30 mg/dL 18   Creatinine      0.66 - 1.25 mg/dL 1.02   GFR Estimate      >60 mL/min/1.7m2 71   GFR Estimate If Black      >60 mL/min/1.7m2 85   Calcium      8.5 - 10.1 mg/dL 8.9   Bilirubin Total      0.2 - 1.3 mg/dL 1.7 (H)   Albumin      3.4 - 5.0 g/dL 3.7   Protein Total      6.8 - 8.8 g/dL 7.3   Alkaline Phosphatase      40 - 150 U/L 171 (H)   ALT      0 - 70 U/L 18   AST      0 - 45 U/L 24   WBC      4.0 - 11.0 10e9/L 4.1   RBC Count      4.4 - " 5.9 10e12/L 3.84 (L)   Hemoglobin      13.3 - 17.7 g/dL 12.2 (L)   Hematocrit      40.0 - 53.0 % 37.2 (L)   MCV      78 - 100 fl 97   MCH      26.5 - 33.0 pg 31.8   MCHC      31.5 - 36.5 g/dL 32.8   RDW      10.0 - 15.0 % 14.5   Platelet Count      150 - 450 10e9/L 111 (L)   N-Terminal Pro Bnp      0 - 450 pg/mL 3161 (H)         ASSESSMENT/PLAN:  1.  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   - ? If drop in LVEF from 55% to 40% is ischemic vs due to PVCs.  He has old grafts but no angina.   - Echo showed his IVC was dilated and collapsible. Torsemide 20 mg was started.  His weight is stable/unchanged. His NT pro BNP is elevated at 3161.    I considered increasing his torsemide, but he has no ARAMBULA/SOB and his BMP shows BUN and creat are both up (BUN 12->18, creat 0.8->1.02) but WNL. Will continue at this dose.   His K went from 4.5 in early May to 3.7.  With his low LVEF and frequent ectopy, would like his K at 4.0. Start KCl 10 mEq daily. BMP in 2 weeks.   - Continue lisinopril 20 mg, Toprol XL 25 mg. Continue to titrate as able.  Has not taken meds today so will not adjust either. Likely cannot tolerate much more BB.    - Continue cardiac rehab  - He has been educated to cut down his alcohol use from 2 drinks per day (1 beer and 1 wine) to 1 drink per day, ideally less.   - Sleep referral. He is going to schedule appt  - Dr. Conley will determine timing of proceeding to diagnostic coronary/graft angiography and right heart catheterization.     2.  Reduced right ventricular systolic function.  - Evaluation per Dr. Conley   - His CMP from today shows his total bili and alk phos are elevated. I will defer this to Dr. Conley.   - Dr. Conley will determine timing of proceeding to diagnostic coronary/graft angiography and right heart catheterization.     3.  Chronic atrial fibrillation with slow ventricular response in the absence of AV lelia slowing agents.   - Prior history of epistaxis,  therefore on lower-dose apixaban.   - Low dose Toprol XL started for CMY and PVCs.  Noted is that his average heart rate is in the 40s, and it does drop down to mid 30s when he is asleep.   If he gets symptomatic bradycardia, we may have to discontinue it.  He is tolerating it now.     4.  Frequent premature ventricular complexes (17% ventricular ectopy burden).   - Toprol XL 25 mg started  - Repeat Holter is scheduled for July 5.  H/o coronary artery disease, status post CABG in 1997 (grafts not known).    - No anginal sxs  - On ASA 81 mg (also Eliquis 2.5 mg BID for afib), Toprol XL 25 mg, simvastatin 20 mg.     6.  Hypertension. Better controlled on lisinopril 20 mg, Toprol XL 25 mg, torsemide 20 mg.          Follow up:  Juliano Caballero with BMP in 2-3 weeks  Dr. Conley with Holter and labs in July          Juliano Caballero PA-C

## 2018-05-30 NOTE — PATIENT INSTRUCTIONS
Call the [facility].O.R[facility] nurse for any questions or concerns:  [phone]    1.  Medication changes from today:  - I started potassium today.  Take 10mEq daily.     2.  Continue to weigh yourself daily and write it down.    3.  Call [facility] nurse if your weight is up more than 2 pounds in one day or 5 pounds in one week.    4.  Call [facility] nurse if you feel more short of breath, have more abdominal bloating, or leg swelling.  Also call if lightheaded or feeling like you will pass out.     5.  Continue low sodium diet (less than 2000 mg daily). If you eat less salt, you will retain less fluid.    6.  Alcohol can weaken your heart further. You should avoid alcohol or limit its use to special times, such as a holiday or birthday.     7.  Do NOT take Aleve (naproxen) or Advil (ibuprofen) without talking to your doctor first.     8.  Lab Results:   Component      Latest Ref Rng & Units 5/30/2018   Sodium      133 - 144 mmol/L 139   Potassium      3.4 - 5.3 mmol/L 3.7   Chloride      94 - 109 mmol/L 103   Carbon Dioxide      20 - 32 mmol/L 30   Anion Gap      3 - 14 mmol/L 6   Glucose      70 - 99 mg/dL 114 (H)   Urea Nitrogen      7 - 30 mg/dL 18   Creatinine      0.66 - 1.25 mg/dL 1.02   GFR Estimate      >60 mL/min/1.7m2 71   GFR Estimate If Black      >60 mL/min/1.7m2 85   Calcium      8.5 - 10.1 mg/dL 8.9   Bilirubin Total      0.2 - 1.3 mg/dL 1.7 (H)   Albumin      3.4 - 5.0 g/dL 3.7   Protein Total      6.8 - 8.8 g/dL 7.3   Alkaline Phosphatase      40 - 150 U/L 171 (H)   ALT      0 - 70 U/L 18   AST      0 - 45 U/L 24   WBC      4.0 - 11.0 10e9/L 4.1   RBC Count      4.4 - 5.9 10e12/L 3.84 (L)   Hemoglobin      13.3 - 17.7 g/dL 12.2 (L)   Hematocrit      40.0 - 53.0 % 37.2 (L)   MCV      78 - 100 fl 97   MCH      26.5 - 33.0 pg 31.8   MCHC      31.5 - 36.5 g/dL 32.8   RDW      10.0 - 15.0 % 14.5   Platelet Count      150 - 450 10e9/L 111 (L)   N-Terminal Pro Bnp      0 - 450 pg/mL 3161 (H)   9.  Follow   - See me with blood  tests in 2 weeks.    - Labs and monitor 7/12/18, then Dr. Conley 7/19/18      [facility]: Cardiomyopathy, Optimization, Rehabilitation, Education  The [facility] is a heart failure specialty clinic within the [facility] where you will work with your cardiologist, nurse practitioners, physician assistants and registered nurses who specialize in heart failure care. They are dedicated to helping patients with heart failure to carefully adjust medications, receive education, and learn who and when to call if symptoms develop. They specialize in helping you better understand your condition, slow the progression of your disease, improve the length and quality of your life, help you detect future heart problems before they become life threatening, and avoid hospitalizations.

## 2018-05-30 NOTE — MR AVS SNAPSHOT
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 nurse for any questions or concerns:  [phone]    1.  Medication changes from today:  - I started potassium today.  Take 10mEq daily.     2.  Continue to weigh yourself daily and write it down.    3.  Call CORE nurse if your weight is up more than 2 pounds in one day or 5 pounds in one week.    4.  Call CORE nurse if you feel more short of breath, have more abdominal bloating, or leg swelling.  Also call if lightheaded or feeling like you will pass out.     5.  Continue low sodium diet (less than 2000 mg daily). If you eat less salt, you will retain less fluid.    6.  Alcohol can weaken your heart further. You should avoid alcohol or limit its use to special times, such as a holiday or birthday.     7.  Do NOT take Aleve (naproxen) or Advil (ibuprofen) without talking to your doctor first.     8.  Lab Results:   Component      Latest Ref Rng & Units 5/30/2018   Sodium      133 - 144 mmol/L 139   Potassium      3.4 - 5.3 mmol/L 3.7   Chloride      94 - 109 mmol/L 103   Carbon Dioxide      20 - 32 mmol/L 30   Anion Gap      3 - 14 mmol/L 6   Glucose      70 - 99 mg/dL 114 (H)   Urea Nitrogen      7 - 30 mg/dL 18   Creatinine      0.66 - 1.25 mg/dL 1.02   GFR Estimate      >60 mL/min/1.7m2 71   GFR Estimate If Black      >60 mL/min/1.7m2 85   Calcium      8.5 - 10.1  mg/dL 8.9   Bilirubin Total      0.2 - 1.3 mg/dL 1.7 (H)   Albumin      3.4 - 5.0 g/dL 3.7   Protein Total      6.8 - 8.8 g/dL 7.3   Alkaline Phosphatase      40 - 150 U/L 171 (H)   ALT      0 - 70 U/L 18   AST      0 - 45 U/L 24   WBC      4.0 - 11.0 10e9/L 4.1   RBC Count      4.4 - 5.9 10e12/L 3.84 (L)   Hemoglobin      13.3 - 17.7 g/dL 12.2 (L)   Hematocrit      40.0 - 53.0 % 37.2 (L)   MCV      78 - 100 fl 97   MCH      26.5 - 33.0 pg 31.8   MCHC      31.5 - 36.5 g/dL 32.8   RDW      10.0 - 15.0 % 14.5   Platelet Count      150 - 450 10e9/L 111 (L)   N-Terminal Pro Bnp      0 - 450 pg/mL 3161 (H)   9.  Follow   - See me with blood tests in 2 weeks.    - Labs and monitor 7/12/18, then Dr. Conley 7/19/18      [facility]: Cardiomyopathy, Optimization, Rehabilitation, Education  The [facility] is a heart failure specialty clinic within the [facility] where you will work with your cardiologist, nurse practitioners, physician assistants and registered nurses who specialize in heart failure care. They are dedicated to helping patients with heart failure to carefully adjust medications, receive education, and learn who and when to call if symptoms develop. They specialize in helping you better understand your condition, slow the progression of your disease, improve the length and quality of your life, help you detect future heart problems before they become life threatening, and avoid hospitalizations.                Follow-ups after your visit        Additional Services     Follow-Up with [facility] - FILEMON visit                 Your next 10 appointments already scheduled     May 30, 2018  1:00 PM CDT   Cardiac Treatment with [facility] [address] [place] [address] ([facility])    [address], [address]  [facility] [address]   [phone]            Jun 01, 2018  8:00 AM CDT   Cardiac Treatment with [facility] [address] [place] [address]  ([facility])    [address], [address]  [facility] [address]   [phone]            Jun 04, 2018  8:00 AM CDT   Cardiac Treatment with [facility] 1   [facility] ([facility])    [address], [address]  [facility] [address]   [phone]            Jun 06, 2018  8:00 AM CDT   Cardiac Treatment with [facility] Card[facility] 1   [facility] ([facility])    [address], [address]  [facility] [address]   [phone]            Jun 08, 2018  8:00 AM CDT   Cardiac Treatment with [facility] 1   [facility] ([facility])    [address], [address]  [facility] [address]   [phone]            Jun 11, 2018  8:00 AM CDT   Cardiac Treatment with [facility] 1   [facility] ([facility])    [address], [address]  [facility] [address]   [phone]            Jun 13, 2018 10:00 AM CDT   Cardiac Treatment with Rh Card[facility] Re[facility] 1   [facility] ([facility])    [address], [address]  [facility] [address]   [phone]            Jun 13, 2018 12:45 PM CDT   LAB with RU LAB   [facility] ([facility])    [address] [place] [address] [address]  [facility] [address]   [phone]           Please do not eat 10-12 hours before your appointment if you are coming in fasting for labs on lipids, cholesterol, or glucose (sugar). This does not apply to pregnant women. Water, hot tea and black coffee (with nothing added) are okay. Do not drink other fluids, diet soda or chew gum.            Jun 13, 2018  1:50 PM CDT   Core Return with Juliano Caballero PA-C   [facility] ([facility])    [address] [place] [address] [address]  [facility] [address]   [phone]            Chris 15, 2018 10:00 AM CDT  "  Cardiac Treatment with Rh Cardiac Rehab 1   [facility] ([facility])    [address], [address]  [facility] [address] [phone]              Future tests that were ordered for you today     Open Future Orders        Priority Expected Expires Ordered    N terminal pro BNP outpatient Routine 6/13/2018 5/30/2019 5/30/2018    Follow-Up with [facility] - FILEMON visit Routine 6/13/2018 5/30/2019 5/30/2018    Basic metabolic panel Routine 6/13/2018 5/30/2019 5/30/2018            Who to contact     If you have questions or need follow up information about today's clinic visit or your schedule please contact [facility] directly at [phone].  Normal or non-critical lab and imaging results will be communicated to you by MyChart, letter or phone within 4 business days after the clinic has received the results. If you do not hear from us within 7 days, please contact the clinic through VERTILAShart or phone. If you have a critical or abnormal lab result, we will notify you by phone as soon as possible.  Submit refill requests through [employer] or call your pharmacy and they will forward the refill request to us. Please allow 3 business days for your refill to be completed.          Additional Information About Your Visit        VERTILAShart Information     [employer] gives you secure access to your electronic health record. If you see a primary care provider, you can also send messages to your care team and make appointments. If you have questions, please call your primary care clinic.  If you do not have a primary care provider, please call [phone] and they will assist you.        Care EveryWhere ID     This is your Care EveryWhere ID. This could be used by other organizations to access your [place] medical records  [id number]        Your Vitals Were     Pulse Height Pulse Oximetry BMI (Body Mass Index)          50 1.702 m (5' 7\") 99% 28.36 " kg/m2         Blood Pressure from Last 3 Encounters:   05/30/18 144/72   05/17/18 (P) 160/80   05/01/18 144/80    Weight from Last 3 Encounters:   05/30/18 82.1 kg (181 lb 1.6 oz)   05/17/18 81.9 kg (180 lb 8 oz)   05/01/18 82.7 kg (182 lb 6.4 oz)              We Performed the Following     Follow-Up with [facility]          Today's Medication Changes          These changes are accurate as of 5/30/18 10:40 AM.  If you have any questions, ask your nurse or doctor.               Start taking these medicines.        Dose/Directions    potassium chloride SA 10 MEQ CR tablet   Commonly known as:  K-DUR/KLOR-CON M   Used for:  Frequent PVCs, Chronic combined systolic and diastolic congestive heart failure (H)   Started by:  Juliano Caballero PA-C        Dose:  10 mEq   Take 1 tablet (10 mEq) by mouth daily   Quantity:  30 tablet   Refills:  3         These medicines have changed or have updated prescriptions.        Dose/Directions    lisinopril 20 MG tablet   Commonly known as:  PRINIVIL/ZESTRIL   This may have changed:  See the new instructions.   Used for:  HTN, goal below 150/90   Changed by:  Juliano Caballero PA-C        Dose:  20 mg   Take 1 tablet (20 mg) by mouth daily   Quantity:  90 tablet   Refills:  3            Where to get your medicines      These medications were sent to [id number] - [place], [place] - [address]  [address] [facility] [address]    Hours:  9Am-9Pm (M-F) 9Am-6Pm (S&S) Phone:  [phone]     potassium chloride SA 10 MEQ CR tablet         These medications were sent to [facility] - [place], [address]  [address] [place] [address]     Phone:  [phone]     lisinopril 20 MG tablet    metoprolol succinate 25 MG 24 hr tablet    torsemide 20 MG tablet                Primary Care Provider Office Phone # Fax #    Oscar Alves [phone] [address] [facility] [address]        Equal Access to  Services     [facility]: Hadii porfirio lucero benjamintaya Noraali, waaxda luqadaha, qaybta kaalmada julián, chayito reyes . So [facility] [phone].    ATENCIÓN: Si anglela jojo, tiene a randall disposición servicios gratuitos de asistencia lingüística. Llame al [phone].    We comply with applicable federal civil rights laws and [place] laws. We do not discriminate on the basis of race, color, national origin, age, disability, sex, sexual orientation, or gender identity.            Thank you!     Thank you for choosing [facility]  for your care. Our goal is always to provide you with excellent care. Hearing back from our patients is one way we can continue to improve our services. Please take a few minutes to complete the written survey that you may receive in the mail after your visit with us. Thank you!             Your Updated Medication List - Protect others around you: Learn how to safely use, store and throw away your medicines at www.disposemymeds.org.          This list is accurate as of 5/30/18 10:40 AM.  Always use your most recent med list.                   Brand Name Dispense Instructions for use Diagnosis    acetaminophen 650 MG CR tablet    TYLENOL    100 tablet    Take 1 tablet (650 mg) by mouth every 6 hours as needed for pain    Osteoarthritis       apixaban ANTICOAGULANT 2.5 MG tablet    ELIQUIS    200 tablet    Take 1 tablet (2.5 mg) by mouth 2 times daily    Chronic atrial fibrillation (H)       aspirin 81 MG tablet      Take 81 mg by mouth daily        co-enzyme Q-10 100 MG Caps capsule     180 capsule    Take 1 capsule (100 mg) by mouth daily    Mixed hyperlipidemia       diclofenac 1 % Gel topical gel    VOLTAREN    100 g    Apply 4 gramsto area qid prn    Acute left-sided low back pain without sciatica       doxycycline 100 MG capsule    VIBRAMYCIN    90 capsule    TAKE 1 CAPSULE DAILY    Rosacea       FERROUS GLUCONATE PO       Take 324 mg by mouth daily (with breakfast)        Glucosamine-Chondroitin 250-200 MG Tabs    OSTEO BI-FLEX REGULAR STRENGTH    100 tablet    Take 1 tablet by mouth 2 times daily    Osteoarthritis       lisinopril 20 MG tablet    PRINIVIL/ZESTRIL    90 tablet    Take 1 tablet (20 mg) by mouth daily    HTN, goal below 150/90       metoprolol succinate 25 MG 24 hr tablet    TOPROL-XL    90 tablet    Take 1 tablet (25 mg) by mouth daily (with breakfast)    Frequent PVCs, Chronic atrial fibrillation (H)       MULTIVITAL Chew     360 tablet    Take 2 chew tab by mouth 2 times daily    Neuropathy of foot       naftifine 1 % Gel topical gel    NAFTIN    90 g    Apply topically daily Apply to affected nails daily    Onychomycosis of toenail       OMEGA 3-6-9 PO      Take 1 tablet by mouth daily        omeprazole 40 MG capsule    priLOSEC    90 capsule    TAKE 1 CAPSULE DAILY    PUD (peptic ulcer disease)       potassium chloride SA 10 MEQ CR tablet    K-DUR/KLOR-CON M    30 tablet    Take 1 tablet (10 mEq) by mouth daily    Frequent PVCs, Chronic combined systolic and diastolic congestive heart failure (H)       senna 8.6 MG tablet    SENOKOT    60 tablet    Take 1 tablet by mouth daily    Prostate cancer (H)       simvastatin 20 MG tablet    ZOCOR    90 tablet    Take 1 tablet (20 mg) by mouth At Bedtime    Hyperlipidemia LDL goal <100       sodium chloride 0.65 % nasal spray    OCEAN NASAL SPRAY    2 Bottle    Spray 1 spray into both nostrils daily as needed for congestion    Bloody nose       tamsulosin 0.4 MG capsule    FLOMAX    90 capsule    TAKE 1 CAPSULE DAILY    Benign prostatic hyperplasia with lower urinary tract symptoms       torsemide 20 MG tablet    DEMADEX    90 tablet    Take 1 tablet (20 mg) by mouth daily (with breakfast)    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

## 2018-05-30 NOTE — LETTER
2018    Oscar Alves MD  [address]  [facility] [address]    RE: Milo QUIJANO Daphne       Dear Colleague,    I had the pleasure of seeing Milo Serna in the [facility].    CARDIOLOGY CLINIC PROGRESS NOTE - [facility]IC ENROLLMENT     DOS: 2018    Milo Serna  : 1940, 77 year old  MRN: [id number]      History:   I had the pleasure of meeting Milo (he goes by Luís) MACIE Gellerdiego today for [facility] enrollment. He is a patient of Dr. Conley and Dr. Duckworth.       He saw Dr. Duckworth last 17, and he was referred to the PH clinic.  He met with Dr. Conley 10/2/17.     Luís is a 77-year-old  gentleman known to have CABG (), chronic atrial fibrillation not requiring AV slowing agents (on lower-dose Eliquis anticoagulation 2.5 mg b.i.d. due to history of epistaxis), essential hypertension, stable ascending aorta dilatation of 4.2 cm.   He has had progressive dilatation of his right ventricle with mildly reduced systolic function (cardiac MRI 17 showed moderately dilated RV with normal systolic function, LVEF of 56%, severe biatrial enlargement and no evidence of intraatrial shunt), for which he saw Dr. Conley last .     He underwent testing that has been reviewed by Dr. Conley.   -  V/Q scan was negative for pulmonary embolus.     -  Hepatitis and HIV workup was normal, HbA1c was 5.4%, normal renal panel with sodium of 141, potassium 4.5, BUN 12, creatinine 0.8, estimated GFR  greater than 90.  Normal TSH.  Hemoglobin 12.9.   -  A 24 hour Holter showed atrial fibrillation throughout with an average heart rate of 57 BPM, as low as 34 BPM when asleep.  However, he did have 17% (14,700) polymorphic PVCs with the longest run of 4 beats.  No patient-recorded symptoms.   -  Liver ultrasound showed a few small hepatic cysts without evidence of fatty infiltration of the liver.  Mild splenomegaly.  No ascites.  (This was done due to a history of  excess alcohol intake in the past.)   -  Pulmonary function tests were satisfactory with a normal FEV1 of 2.7 L (104% predicted), FEV1/FVC ratio 73%, normal diffusing capacity.   -  A repeat transthoracic echocardiogram (which I personally reviewed) showed a drop in LV function from previously normal to 35%-40% with inferior and inferolateral wall akinesis and moderate global hypokinesis.  The right ventricle was moderately dilated with moderately decreased systolic function.  There was severe biatrial enlargement without color evidence of atrial shunt.  Moderately severe functional mitral regurgitation, mild tricuspid regurgitation with an estimated RVSP of 50 mmHg.  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  Aortic valve was trileaflet and sclerotic.  In the underlying rhythm was atrial fibrillation with a rate of 50 BPM.  The ascending aorta was dilated at 4.0 cm (stable).   -  A 6 minute walk distance was 1300 feet (lower limit of normal 1167).  Oxygen saturation was above 95% at all times, and the heart rate went from a baseline of 52 to a peak of 97 BPM with normal blood pressure response.       This testing revealed a new decline in LVEF.  He has significant PVC burden of 17%.   She started him on torsemide 20 mg daily, and Toprol XL 25 mg. Lisinopril 20 mg was continued.     Interval History:   He presents today for follow up. He is feeling well. He has no specific concerns. He has some very mild edema. He is weighing at home, and weight is very stable.  He has been checking his BP at home and it has been 130s mostly. At cardiac rehab he reports it was < 120.  Today he has not taken his medications yet and it is 144/72.   He is watching his diet and sodium consumption.   He attended a session of cardiac rehab, and reports it went well.   He has tried to make an appointment for a sleep evaluation, but he has not gotten through. He plans to walk up to the office today after our appointment.      ROS:  Skin:  Negative     Eyes:  Positive for cataracts;glasses  ENT:  Negative    Respiratory:  Positive for: needs to make sleep apnea appointment.  No ARAMBULA or SOB    Cardiovascular:  Negative;lightheadedness Positive for;edema  Gastroenterology: Negative    Genitourinary:  Positive for urinary frequency;prostate problem;nocturia  Musculoskeletal:  Positive for arthritis;back pain  Neurologic:  Positive for numbness or tingling of feet  Psychiatric:  Positive for sleep disturbances  Heme/Lymph/Imm:  Positive for easy bruising  Endocrine:  Negative      PAST MEDICAL HISTORY:  Past Medical History:   Diagnosis Date     Anemia, unspecified 11/8/2016     Atrial fibrillation (H) 5/27/2015     BPH (benign prostatic hyperplasia) 1/25/2012     CAD (coronary artery disease) 4/4/2012     Closed fracture of metatarsal bone 4/4/2012     Coronary atherosclerosis of unspecified type of vessel, native or graft [facility]    CAB 1996 following MI (at [facility])      Dilated aortic root (H) 5/26/2016     Drug-induced erectile dysfunction 10/2/2015     Elevated blood sugar 11/8/2016     Elevated PSA 9/19/2013     Essential hypertension with goal blood pressure less than 140/90 9/22/2016     Essential hypertension, benign      Gallbladder polyp 5/1/2018     Gout      HAV (hallux abducto valgus) 4/4/2012     Hernia, abdominal      History of prostate cancer 6/15/2016     HTN, goal below 150/90 4/20/2017     Iron deficiency anemia secondary to inadequate dietary iron intake 11/15/2016     Low blood pressure reading 10/17/2016     Lumbago     had degendisc dz on MRI 7/07     Mixed hyperlipidemia      Mumps      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     OA (osteoarthritis) 4/4/2012     Old myocardial infarction 4/4/2012     Palpitations      Pes planus 4/4/2012     Prostate cancer (H) 5/26/2016     PUD (peptic ulcer disease) 4/4/2012     Pulmonary hypertension 9/25/2017     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     Rosacea 1/15/2008      Thrombocytopenia (H) 4/21/2017     Unspecified hyperplasia of prostate with urinary obstruction and other lower urinary tract symptoms (LUTS)     better on avodart & hytrin     Venous stasis 4/4/2012     Venous stasis 4/4/2012       PAST SURGICAL HISTORY:  Past Surgical History:   Procedure Laterality Date     C NONSPECIFIC PROCEDURE      CAB 1996 [place]     C NONSPECIFIC PROCEDURE  2/07    l inguinal hernia repair      C NONSPECIFIC PROCEDURE      R hernia remote     C NONSPECIFIC PROCEDURE  2000    R ankle ORIF for fx     C NONSPECIFIC PROCEDURE  2005    nasal surgery      C NONSPECIFIC PROCEDURE      R rotater repair (remote)      C NONSPECIFIC PROCEDURE      appy 1966     C NONSPECIFIC PROCEDURE      sinus surgery x 2     C NONSPECIFIC PROCEDURE      L shoulder 6/09     CARDIAC SURGERY      bipass     CYSTOSCOPY FLEXIBLE, CYOABLATION PROSTATE N/A 11/9/2016    Procedure: CYSTOSCOPY FLEXIBLE, CRYOABLATION PROSTATE;  Surgeon: Krystian Owens MD;  Location: [facility] OR     GENITOURINARY SURGERY      prostate surgery      HERNIA REPAIR       LAPAROSCOPIC HERNIORRHAPHY INGUINAL Right 5/10/2017    Procedure: LAPAROSCOPIC HERNIORRHAPHY INGUINAL;  Laparoscopic preperitoneal repair of right inguinal hernia with placement of underlay mesh;  Surgeon: Enrico Bridges MD;  Location: [facility] OR     PROSTATE SURGERY         SOCIAL HISTORY:  Social History     Social History     Marital status:      Spouse name: N/A     Number of children: N/A     Years of education: N/A     Social History Main Topics     Smoking status: Former Smoker     Smokeless tobacco: Never Used      Comment: quit 3/1974     Alcohol use 0.0 oz/week     0 Standard drinks or equivalent per week      Comment: 1-2 drinks per day     Drug use: No     Sexual activity: Yes     Partners: Female     Other Topics Concern     Caffeine Concern Yes     1 cups of coffee and soda per day     Special Diet No     Exercise Yes     walking     Seat Belt Yes      Social History Narrative       FAMILY HISTORY:  Family History   Problem Relation Age of Onset     CANCER Mother      stomach cancer,  age 61     HEART DISEASE Father      MI,  age 71     HEART DISEASE Brother      stent placement, RA      Hypertension Brother      HEART DISEASE Sister      rythmn problems with heart, hypertension       MEDS:   Current Outpatient Prescriptions on File Prior to Visit:  acetaminophen (TYLENOL) 650 MG CR tablet Take 1 tablet (650 mg) by mouth every 6 hours as needed for pain   apixaban ANTICOAGULANT (ELIQUIS) 2.5 MG tablet Take 1 tablet (2.5 mg) by mouth 2 times daily   aspirin 81 MG tablet Take 81 mg by mouth daily   co-enzyme Q-10 100 MG CAPS Take 1 capsule (100 mg) by mouth daily   doxycycline (VIBRAMYCIN) 100 MG capsule TAKE 1 CAPSULE DAILY   FERROUS GLUCONATE PO Take 324 mg by mouth daily (with breakfast)   Glucosamine-Chondroitin (OSTEO BI-FLEX REGULAR STRENGTH) 250-200 MG TABS Take 1 tablet by mouth 2 times daily   naftifine (NAFTIN) 1 % GEL topical gel Apply topically daily Apply to affected nails daily   Omega 3-6-9 Fatty Acids (OMEGA 3-6-9 PO) Take 1 tablet by mouth daily    simvastatin (ZOCOR) 20 MG tablet Take 1 tablet (20 mg) by mouth At Bedtime   sodium chloride (OCEAN NASAL SPRAY) 0.65 % nasal spray Spray 1 spray into both nostrils daily as needed for congestion   diclofenac (VOLTAREN) 1 % GEL Apply 4 gramsto area qid prn (Patient not taking: Reported on 2018)   Multiple Vitamins-Minerals (MULTIVITAL) CHEW Take 2 chew tab by mouth 2 times daily (Patient not taking: Reported on 2018)   omeprazole (PRILOSEC) 40 MG capsule TAKE 1 CAPSULE DAILY   senna (SENOKOT) 8.6 MG tablet Take 1 tablet by mouth daily (Patient not taking: Reported on 2018)   tamsulosin (FLOMAX) 0.4 MG capsule TAKE 1 CAPSULE DAILY   Urea 20 % CREA cream Apply topically as needed Apply to right foot daily (Patient not taking: Reported on 2018)   [DISCONTINUED]  "lisinopril (PRINIVIL/ZESTRIL) 20 MG tablet TAKE 1 TABLET DAILY   [DISCONTINUED] metoprolol succinate (TOPROL-XL) 25 MG 24 hr tablet Take 1 tablet (25 mg) by mouth daily (with breakfast)   [DISCONTINUED] torsemide (DEMADEX) 20 MG tablet Take 1 tablet (20 mg) by mouth daily (with breakfast)     No current facility-administered medications on file prior to visit.     ALLERGIES: No Known Allergies    PHYSICAL EXAM:  Vitals: /72 (BP Location: Right arm, Patient Position: Sitting, Cuff Size: Adult Regular)  Pulse 50  Ht 1.702 m (5' 7\")  Wt 82.1 kg (181 lb 1.6 oz)  SpO2 99%  BMI 28.36 kg/m2  Constitutional:  cooperative, alert and oriented, well developed, well nourished, in no acute distress        Skin:  warm and dry to the touch        Head:  normocephalic        Eyes:  pupils equal and round;conjunctivae and lids unremarkable        ENT:  no pallor or cyanosis        Neck:  JVP normal        Respiratory:  clear to auscultation        Cardiac:   irregularly irregular rhythm;bradycardic distant heart sounds   systolic murmur;grade 2;grade 3;apical          GI:  abdomen soft;non-tender        Vascular:                                        Extremities and Musculoskeletal:  no deformities, clubbing, cyanosis, erythema observed   chronic lower extremity skin discoloration consistent with possible venous stasis     Neurological:  no gross motor deficits;affect appropriate          LABS/DATA:  I reviewed the following:  Component      Latest Ref Rng & Units 5/30/2018   Sodium      133 - 144 mmol/L 139   Potassium      3.4 - 5.3 mmol/L 3.7   Chloride      94 - 109 mmol/L 103   Carbon Dioxide      20 - 32 mmol/L 30   Anion Gap      3 - 14 mmol/L 6   Glucose      70 - 99 mg/dL 114 (H)   Urea Nitrogen      7 - 30 mg/dL 18   Creatinine      0.66 - 1.25 mg/dL 1.02   GFR Estimate      >60 mL/min/1.7m2 71   GFR Estimate If Black      >60 mL/min/1.7m2 85   Calcium      8.5 - 10.1 mg/dL 8.9   Bilirubin Total      0.2 - 1.3 " mg/dL 1.7 (H)   Albumin      3.4 - 5.0 g/dL 3.7   Protein Total      6.8 - 8.8 g/dL 7.3   Alkaline Phosphatase      40 - 150 U/L 171 (H)   ALT      0 - 70 U/L 18   AST      0 - 45 U/L 24   WBC      4.0 - 11.0 10e9/L 4.1   RBC Count      4.4 - 5.9 10e12/L 3.84 (L)   Hemoglobin      13.3 - 17.7 g/dL 12.2 (L)   Hematocrit      40.0 - 53.0 % 37.2 (L)   MCV      78 - 100 fl 97   MCH      26.5 - 33.0 pg 31.8   MCHC      31.5 - 36.5 g/dL 32.8   RDW      10.0 - 15.0 % 14.5   Platelet Count      150 - 450 10e9/L 111 (L)   N-Terminal Pro Bnp      0 - 450 pg/mL 3161 (H)         ASSESSMENT/PLAN:  1.  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   - ? If drop in LVEF from 55% to 40% is ischemic vs due to PVCs.  He has old grafts but no angina.   - Echo showed his IVC was dilated and collapsible. Torsemide 20 mg was started.  His weight is stable/unchanged. His NT pro BNP is elevated at 3161.    I considered increasing his torsemide, but he has no ARAMBULA/SOB and his BMP shows BUN and creat are both up (BUN 12->18, creat 0.8->1.02) but WNL. Will continue at this dose.   His K went from 4.5 in early May to 3.7.  With his low LVEF and frequent ectopy, would like his K at 4.0. Start KCl 10 mEq daily. BMP in 2 weeks.   - Continue lisinopril 20 mg, Toprol XL 25 mg. Continue to titrate as able.  Has not taken meds today so will not adjust either. Likely cannot tolerate much more BB.    - Continue cardiac rehab  - He has been educated to cut down his alcohol use from 2 drinks per day (1 beer and 1 wine) to 1 drink per day, ideally less.   - Sleep referral. He is going to schedule appt  - Dr. Conley will determine timing of proceeding to diagnostic coronary/graft angiography and right heart catheterization.     2.  Reduced right ventricular systolic function.  - Evaluation per Dr. Conley   - His CMP from today shows his total bili and alk phos are elevated. I will defer this to Dr. Conley.   - Dr. Conley will  determine timing of proceeding to diagnostic coronary/graft angiography and right heart catheterization.     3.  Chronic atrial fibrillation with slow ventricular response in the absence of AV lelia slowing agents.   - Prior history of epistaxis, therefore on lower-dose apixaban.   - Low dose Toprol XL started for CMY and PVCs.  Noted is that his average heart rate is in the 40s, and it does drop down to mid 30s when he is asleep.   If he gets symptomatic bradycardia, we may have to discontinue it.  He is tolerating it now.     4.  Frequent premature ventricular complexes (17% ventricular ectopy burden).   - Toprol XL 25 mg started  - Repeat Holter is scheduled for July 5.   H/o coronary artery disease, status post CABG in 1997 (grafts not known).    - No anginal sxs  - On ASA 81 mg (also Eliquis 2.5 mg BID for afib), Toprol XL 25 mg, simvastatin 20 mg.     6.  Hypertension.  Better controlled on lisinopril 20 mg, Toprol XL 25 mg, torsemide 20 mg.          Follow up:  Juliano Caballero with BMP in 2-3 weeks  Dr. Conley with Holter and labs in July          Juliano Caballero PA-C        Thank you for allowing me to participate in the care of your patient.    Sincerely,     Juliano Caballero PA-C     [facility]

## 2018-05-30 NOTE — TELEPHONE ENCOUNTER
Medication approved per standing orders.     Rebeka Gale, RN    5/1/18  ASSESSMENT/PLAN:      ASSESSMENT / PLAN:  (R73.9) Elevated blood sugar  (primary encounter diagnosis)  Comment: Not prediabetes        Lab Results   Component Value Date     A1C 5.4 04/20/2017     A1C 5.5 10/14/2016     A1C 5.5@ 09/21/2005                              Plan: Hemoglobin A1c is due         (I10) HTN, goal below 150/90  Comment: controlled  Plan: Basic metabolic panel  (Ca, Cl, CO2, Creat,         Gluc, K, Na, BUN)         (I27.20) Pulmonary hypertension  Comment:  Quiescent  Plan:      (K82.4) Gallbladder polyp  Comment: Discussed surveillance  Plan US q 6-12 mos recommended:      (E78.5) Hyperlipidemia LDL goal <100  Comment:           LDL Cholesterol Calculated   Date Value Ref Range Status   09/25/2017 46 <100 mg/dL Final       Comment:       Desirable:       <100 mg/dl   ]     Plan: simvastatin (ZOCOR) 20 MG tablet            RTC twice yearly        The information in this document, created by the medical scribe for me, accurately reflects the services I personally performed and the decisions made by me. I have reviewed and approved this document for accuracy prior to leaving the patient care area.  Oscar Alves MD May 1, 2018 10:11 AM        Oscar Alves MD  [facility]      Instructions        Return in about 6 months (around 11/1/2018).

## 2018-06-01 ENCOUNTER — HOSPITAL ENCOUNTER (OUTPATIENT)
Dept: CARDIAC REHAB | Facility: CLINIC | Age: 78
End: 2018-06-01
Attending: INTERNAL MEDICINE
Payer: MEDICARE

## 2018-06-01 PROCEDURE — 40000116 ZZH STATISTIC OP CR VISIT

## 2018-06-01 PROCEDURE — 93798 PHYS/QHP OP CAR RHAB W/ECG: CPT

## 2018-06-04 ENCOUNTER — HOSPITAL ENCOUNTER (OUTPATIENT)
Dept: CARDIAC REHAB | Facility: CLINIC | Age: 78
End: 2018-06-04
Attending: INTERNAL MEDICINE
Payer: MEDICARE

## 2018-06-04 PROCEDURE — 40000116 ZZH STATISTIC OP CR VISIT

## 2018-06-04 PROCEDURE — 40000575 ZZH STATISTIC OP CARDIAC VISIT #2: Performed by: OCCUPATIONAL THERAPIST

## 2018-06-04 PROCEDURE — 93797 PHYS/QHP OP CAR RHAB WO ECG: CPT | Mod: 59 | Performed by: OCCUPATIONAL THERAPIST

## 2018-06-04 PROCEDURE — 93798 PHYS/QHP OP CAR RHAB W/ECG: CPT

## 2018-06-06 ENCOUNTER — HOSPITAL ENCOUNTER (OUTPATIENT)
Dept: CARDIAC REHAB | Facility: CLINIC | Age: 78
End: 2018-06-06
Attending: INTERNAL MEDICINE
Payer: MEDICARE

## 2018-06-06 PROCEDURE — 93797 PHYS/QHP OP CAR RHAB WO ECG: CPT | Performed by: REHABILITATION PRACTITIONER

## 2018-06-06 PROCEDURE — 40000116 ZZH STATISTIC OP CR VISIT: Performed by: REHABILITATION PRACTITIONER

## 2018-06-06 PROCEDURE — 40000575 ZZH STATISTIC OP CARDIAC VISIT #2: Performed by: REHABILITATION PRACTITIONER

## 2018-06-06 PROCEDURE — 93798 PHYS/QHP OP CAR RHAB W/ECG: CPT | Performed by: REHABILITATION PRACTITIONER

## 2018-06-07 ENCOUNTER — OFFICE VISIT (OUTPATIENT)
Dept: SLEEP MEDICINE | Facility: CLINIC | Age: 78
End: 2018-06-07
Payer: MEDICARE

## 2018-06-07 VITALS
DIASTOLIC BLOOD PRESSURE: 58 MMHG | WEIGHT: 175 LBS | OXYGEN SATURATION: 99 % | SYSTOLIC BLOOD PRESSURE: 131 MMHG | RESPIRATION RATE: 14 BRPM | BODY MASS INDEX: 27.47 KG/M2 | HEART RATE: 47 BPM | HEIGHT: 67 IN

## 2018-06-07 DIAGNOSIS — I10 HTN, GOAL BELOW 150/90: ICD-10-CM

## 2018-06-07 DIAGNOSIS — Z95.1 S/P CABG (CORONARY ARTERY BYPASS GRAFT): ICD-10-CM

## 2018-06-07 DIAGNOSIS — Z95.1 STATUS POST CORONARY ARTERY BYPASS GRAFT: ICD-10-CM

## 2018-06-07 DIAGNOSIS — I49.3 FREQUENT PVCS: ICD-10-CM

## 2018-06-07 DIAGNOSIS — I50.810 RIGHT HEART FAILURE WITH REDUCED RIGHT VENTRICULAR FUNCTION (H): ICD-10-CM

## 2018-06-07 DIAGNOSIS — I51.7 RIGHT HEART ENLARGEMENT: ICD-10-CM

## 2018-06-07 DIAGNOSIS — I48.20 CHRONIC ATRIAL FIBRILLATION (H): ICD-10-CM

## 2018-06-07 DIAGNOSIS — F10.29 ALCOHOL DEPENDENCE WITH UNSPECIFIED ALCOHOL-INDUCED DISORDER (H): ICD-10-CM

## 2018-06-07 DIAGNOSIS — G47.19 EXCESSIVE DAYTIME SLEEPINESS: Primary | ICD-10-CM

## 2018-06-07 DIAGNOSIS — I50.42 CHRONIC COMBINED SYSTOLIC AND DIASTOLIC CONGESTIVE HEART FAILURE (H): ICD-10-CM

## 2018-06-07 PROCEDURE — 99205 OFFICE O/P NEW HI 60 MIN: CPT | Performed by: FAMILY MEDICINE

## 2018-06-07 PROCEDURE — 99354 ZZC PROLONGED SERV,OFFICE,1ST HR: CPT | Performed by: FAMILY MEDICINE

## 2018-06-07 RX ORDER — ZOLPIDEM TARTRATE 5 MG/1
TABLET ORAL
Qty: 1 TABLET | Refills: 0 | Status: SHIPPED | OUTPATIENT
Start: 2018-06-07 | End: 2018-07-19

## 2018-06-07 NOTE — NURSING NOTE
"Chief Complaint   Patient presents with     Consult     heart problem, cardiology referral,        Initial /58  Pulse (!) 47  Resp 14  Ht 1.702 m (5' 7.01\")  Wt 79.4 kg (175 lb)  SpO2 99%  BMI 27.4 kg/m2 Estimated body mass index is 27.4 kg/(m^2) as calculated from the following:    Height as of this encounter: 1.702 m (5' 7.01\").    Weight as of this encounter: 79.4 kg (175 lb).    Medication Reconciliation: complete    Neck circumference: 15.75 inches / 40 centimeters.    ESS 9    Zoe Arriaza, Sleep clinic Specialist  "

## 2018-06-07 NOTE — MR AVS SNAPSHOT
After Visit Summary   6/7/2018    Milo Serna    MRN: [id number]           Patient Information     Date Of Birth          1940        Visit Information        Provider Department      6/7/2018 9:30 AM Taye Phillips MD [facility]        Today's Diagnoses     Excessive daytime sleepiness    -  1    Right heart enlargement        S/P CABG (coronary artery bypass graft)        Alcohol dependence with unspecified alcohol-induced disorder (H)        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        Chronic combined systolic and diastolic congestive heart failure (H)        Chronic atrial fibrillation (H)        Frequent PVCs        Status post coronary artery bypass graft        HTN, goal below 150/90          Care Instructions    Your blood pressure was checked while you were in clinic today.  Please read the guidelines below about what these numbers mean and what you should do about them.  Your systolic blood pressure is the top number.  This is the pressure when the heart is pumping.  Your diastolic blood pressure is the bottom number.  This is the pressure in between beats.  If your systolic blood pressure is less than 120 and your diastolic blood pressure is less than 80, then your blood pressure is normal. There is nothing more that you need to do about it  If your systolic blood pressure is 120-139 or your diastolic blood pressure is 80-89, your blood pressure may be higher than it should be.  You should have your blood pressure re-checked within a year by a primary care provider.  If your systolic blood pressure is 140 or greater or your diastolic blood pressure is 90 or greater, you may have high blood pressure.  High blood pressure is treatable, but if left untreated over time it can put you at risk for heart attack, stroke, or kidney failure.  You should have your blood pressure re-checked by a primary care provider within the next four  weeks.  Your BMI is There is no height or weight on file to calculate BMI.  Weight management is a personal decision.  If you are interested in exploring weight loss strategies, the following discussion covers the approaches that may be successful. Body mass index (BMI) is one way to tell whether you are at a healthy weight, overweight, or obese. It measures your weight in relation to your height.  A BMI of 18.5 to 24.9 is in the healthy range. A person with a BMI of 25 to 29.9 is considered overweight, and someone with a BMI of 30 or greater is considered obese. More than two-thirds of American adults are considered overweight or obese.  Being overweight or obese increases the risk for further weight gain. Excess weight may lead to heart disease and diabetes.  Creating and following plans for healthy eating and physical activity may help you improve your health.  Weight control is part of healthy lifestyle and includes exercise, emotional health, and healthy eating habits. Careful eating habits lifelong are the mainstay of weight control. Though there are significant health benefits from weight loss, long-term weight loss with diet alone may be very difficult to achieve- studies show long-term success with dietary management in less than 10% of people. Attaining a healthy weight may be especially difficult to achieve in those with severe obesity. In some cases, medications, devices and surgical management might be considered.  What can you do?  If you are overweight or obese and are interested in methods for weight loss, you should discuss this with your provider.     Consider reducing daily calorie intake by 500 calories.     Keep a food journal.     Avoiding skipping meals, consider cutting portions instead.    Diet combined with exercise helps maintain muscle while optimizing fat loss. Strength training is particularly important for building and maintaining muscle mass. Exercise helps reduce stress, increase  "energy, and improves fitness. Increasing exercise without diet control, however, may not burn enough calories to loose weight.       Start walking three days a week 10-20 minutes at a time    Work towards walking thirty minutes five days a week     Eventually, increase the speed of your walking for 1-2 minutes at time    In addition, we recommend that you review healthy lifestyles and methods for weight loss available through the National Institutes of Health patient information sites:  http://win.niddk.nih.gov/publications/index.htm    And look into health and wellness programs that may be available through your health insurance provider, employer, local community center, or clarissa club.        MY TREATMENT INFORMATION FOR SLEEP APNEA -  Milo Serna    DOCTOR: Taye Phillips MD, MPH  SLEEP CENTER : [facility]         If I haven't had a sleep study yet, what can I expect?  A personal story from [employer]a  https://www.Eyeona.com/watch?v=AxPLmlRpnCs      Am I having a home sleep study?  Here is a video in case you get home and want to make sure you have done it correctly  https://www.Eyeona.com/watch?v=KJL0Q4iTrl8&feature=youtu.be      Frequently asked questions:  1. What is Obstructive Sleep Apnea (LENORA)? LENORA is the most common type of sleep apnea. Apnea literally means, \"without breath.\" It is characterized by repetitive pauses in breathing, despite continued effort to breathe, and is usually associated with a reduction in blood oxygen saturation. Apneas can last 10 to over 60 seconds. It is caused by narrowing or collapse of the upper airway as muscles relax during sleep. Severity of sleep apnea is determined by frequency of breathing events and their effect on your sleep and oxygen levels determined during sleep testing.     2. What are the consequences of LENORA? Symptoms include: daytime sleepiness- possibly increasing the risk of falling asleep while driving, unrefreshing/restless sleep, " snoring, insomnia, waking frequently to urinate, waking with heartburn or reflux, reduced concentration and memory, and morning headaches. Other health consequences may include development of high blood pressure and other cardiovascular disease in persons who are susceptible. Untreated LENORA  can contribute to heart disease, stroke and diabetes.     3. What are the treatment options? In most situations, sleep apnea is a lifelong disease that must be managed with daily therapy. Medications are not effective for sleep apnea and surgery is generally not performed until other therapies have been tried. Therapy is usually tailored to the individual patient based on many factors including your wishes as well as severity of sleep apnea and severity of obesity. Continuous Positive Airway (CPAP) is the most reliable treatment. An oral device to hold your jaw forward is usually the next most reliable option. Other options include postioning devices (to keep you off your back), weight loss, and surgery including a tongue pacing device. There is more detail about some of these options below.            1. CPAP-  WHAT DOES IT DO AND HOW CAN I LEARN TO WEAR IT?                               BEFORE I START, CAN I WATCH A MOVIE TO GET A PLAN ON HOW TO USE CPAP?  https://www.LiveGO.com/watch?c=n6R46au866Z      Continuous positive airway pressure, or CPAP, is the most effective treatment for obstructive sleep apnea. It works by blowing room air, through a mask, to hold your throat open. A decision to use CPAP is a major step forward in the pursuit of a healthier life. The successful use of CPAP will help you breathe easier, sleep better and live healthier. You can choose CPAP equipment from any durable medical equipment provider that meets your needs.  Using CPAP can be a positive experience if you keep these ordonez points in mind:  1. Commitment  CPAP is not a quick fix for your problem. It involves a long-term commitment to improve  "your sleep and your health.    2. Communication  Stay in close communication with both your sleep doctor and your CPAP supplier. Ask lots of questions and seek help when you need it.    3. Consistency  Use CPAP all night, every night and for every nap. You will receive the maximum health benefits from CPAP when you use it every time that you sleep. This will also make it easier for your body to adjust to the treatment.    4. Correction  The first machine and mask that you try may not be the best ones for you. Work with your sleep doctor and your CPAP supplier to make corrections to your equipment selection. Ask about trying a different type of machine or mask if you have ongoing problems. Make sure that your mask is a good fit and learn to use your equipment properly.    5. Challenge  Tell a family member or close friend to ask you each morning if you used your CPAP the previous night. Have someone to challenge you to give it your best effort.    6. Connection   Your adjustment to CPAP will be easier if you are able to connect with others who use the same treatment. Ask your sleep doctor if there is a support group in your area for people who have sleep apnea, or look for one on the Internet.  7. Comfort   Increase your level of comfort by using a saline spray, decongestant or heated humidifier if CPAP irritates your nose, mouth or throat. Use your unit's \"ramp\" setting to slowly get used to the air pressure level. There may be soft pads you can buy that will fit over your mask straps. Look on www.CPAP.com for accessories that can help make CPAP use more comfortable.  8. Cleaning   Clean your mask, tubing and headgear on a regular basis. Put this time in your schedule so that you don't forget to do it. Check and replace the filters for your CPAP unit and humidifier.    9. Completion   Although you are never finished with CPAP therapy, you should reward yourself by celebrating the completion of your first month of " treatment. Expect this first month to be your hardest period of adjustment. It will involve some trial and error as you find the machine, mask and pressure settings that are right for you.    10. Continuation  After your first month of treatment, continue to make a daily commitment to use your CPAP all night, every night and for every nap.    CPAP-Tips to starting with success:  Begin using your CPAP for short periods of time during the day while you watch TV or read.    Use CPAP every night and for every nap. Using it less often reduces the health benefits and makes it harder for your body to get used to it.    Make small adjustments to your mask, tubing, straps and headgear until you get the right fit. Tightening the mask may actually worsen the leak.  If it leaves significant marks on your face or irritates the bridge of your nose, it may not be the best mask for you.  Speak with the person who supplied the mask and consider trying other masks. Insurances will allow you to try different masks during the first month of starting CPAP.  Insurance also covers a new mask, hose and filter about every 6 months.    Use a saline nasal spray to ease mild nasal congestion. Neti-Pot or saline nasal rinses may also help. Nasal gel sprays can help reduce nasal dryness.  Biotene mouthwash can be helpful to protect your teeth if you experience frequent dry mouth.  Dry mouth may be a sign of air escaping out of your mouth or out of the mask in the case of a full face mask.  Speak with your provider if you expect that is the case.     Take a nasal decongestant to relieve more severe nasal or sinus congestion.  Do not use Afrin (oxymetazoline) nasal spray more than 3 days in a row.  Speak with your sleep doctor if your nasal congestion is chronic.    Use a heated humidifier that fits your CPAP model to enhance your breathing comfort. Adjust the heat setting up if you get a dry nose or throat, down if you get condensation in the  hose or mask.  Position the CPAP lower than you so that any condensation in the hose drains back into the machine rather than towards the mask.    Try a system that uses nasal pillows if traditional masks give you problems.    Clean your mask, tubing and headgear once a week. Make sure the equipment dries fully.    Regularly check and replace the filters for your CPAP unit and humidifier.    Work closely with your sleep provider and your CPAP supplier to make sure that you have the machine, mask and air pressure setting that works best for you. It is better to stop using it and call your provider to solve problems than to lay awake all night frustrated with the device.      BESIDES CPAP, WHAT OTHER THERAPIES ARE THERE?        Positioning Device  Positioning devices are generally used when sleep apnea is mild and only occurs on your back.This example shows a pillow that straps around the waist. It may be appropriate for those whose sleep study shows milder sleep apnea that occurs primarily when lying flat on one's back. Preliminary studies have shown benefit but effectiveness at home may need to be verified by a home sleep test. These devices are generally not covered by medical insurance.                        Oral Appliance  What is oral appliance therapy?  An oral appliance is a small acrylic device that fits over the upper and lower teeth or tongue (similar to an orthodontic retainer or a mouth guard). This device slightly advances the lower jaw or tongue, which moves the base of the tongue forward, opens the airway, improves breathing and can effectively treat snoring and obstructive sleep apnea sleep apnea. The appliance is fabricated and customized by a qualified dentist with experience in treating snoring and sleep apnea. Oral appliances are usually well tolerated and have relatively high compliance by patients1, 2, 3.  When is an oral appliance indicated?  Oral appliance therapy is recommended as a  first-line treatment for patients with primary snoring, mild sleep apnea, and for patients with moderate sleep apnea who prefer appliance therapy to use of CPAP4, 5. Severity of sleep apnea is determined by sleep testing and is based on the number of respiratory events per hour of sleep.   How successful is oral appliance therapy?  The success rate of oral appliance therapy in patients with mild sleep apnea is 75-80% while in patients with moderate sleep apnea it is 50-70%. The chance of success in patients with severe sleep apnea is 40-50%. The research also shows that oral appliances have a beneficial effect on the cardiovascular health of LENORA patients at the same magnitude as CPAP therapy7.  Oral appliances should be a second-line treatment in cases of severe sleep apnea, but if not completely successful then a combination therapy utilizing CPAP plus oral appliance therapy may be effective. Oral appliances tend to be effective in a broad range of patients although studies show that the patients who have the highest success are females, younger patients, those with milder disease, and less severe obesity. 3, 6.   The chances of success are lower in patients who have more severe LENORA, are older, and those who are morbidly obese.     Example of an oral appliance   Finding a dentist that practices dental sleep medicine  Specific training is available through the American Academy of Dental Sleep Medicine for dentists interested in working in the field of sleep. To find a dentist who is educated in the field of sleep and the use of oral appliances, near you, visit the Web site of the American Academy of Dental Sleep Medicine; also see   http://www.accpstorage.org/newOrganization/patients/oralAppliances.pdf  To search for a dentist certified in these practices:  Http://aadsm.org/FindADentist.aspx?1  1. Sree et al. Objectively measured vs self-reported compliance during oral appliance therapy for sleep-disordered  breathing. Chest 2013; 144(5): 6899-0714.  2. Og, et al. Objective measurement of compliance during oral appliance therapy for sleep-disordered breathing. Thorax 2013; 68(1): 91-96.  3. Yola et al. Mandibular advancement devices in 620 men and women with LENORA and snoring: tolerability and predictors of treatment success. Chest 2004; 125: 3664-2931.  4. Patrick et al. Oral appliances for snoring and LENORA: a review. Sleep 2006; 29: 244-262.  5. Brianne et al. Oral appliance treatment for LENORA: an update. J Clin Sleep Med 2014; 10(2): 215-227.  6. Enedelia et al. Predictors of OSAH treatment outcome. J Dent Res 2007; 86: 3306-2502.      Weight Loss:    Weight management is a personal decision.  If you are interested in exploring weight loss strategies, the following discussion covers the impact on weight loss on sleep apnea and the approaches that may be successful.    Weight loss decreases severity of sleep apnea in most people with obesity. For those with mild obesity who have developed snoring with weight gain, even 15-30 pound weight loss can improve and occasionally eliminate sleep apnea.  Structured and life-long dietary and health habits are necessary to lose weight and keep healthier weight levels.     Though there may be significant health benefits from weight loss, long-term weight loss is very difficult to achieve- studies show success with dietary management in less than 10% of people. In addition, substantial weight loss may require years of dietary control and may be difficult if patients have severe obesity. In these cases, surgical management may be considered.  Finally, older individuals who have tolerated obesity without health complications may be less likely to benefit from weight loss strategies.    Your BMI is Body mass index is 27.4 kg/(m^2).  Body mass index (BMI) is one way to tell whether you are at a healthy weight, overweight, or obese. It measures your weight in relation to  your height.  A BMI of 18.5 to 24.9 is in the healthy range. A person with a BMI of 25 to 29.9 is considered overweight, and someone with a BMI of 30 or greater is considered obese. More than two-thirds of American adults are considered overweight or obese.  Being overweight or obese increases the risk for further weight gain. Excess weight may lead to heart disease and diabetes.  Creating and following plans for healthy eating and physical activity may help you improve your health.  Weight control is part of healthy lifestyle and includes exercise, emotional health, and healthy eating habits. Careful eating habits lifelong are the mainstay of weight control. Though there are significant health benefits from weight loss, long-term weight loss with diet alone may be very difficult to achieve- studies show long-term success with dietary management in less than 10% of people. Attaining a healthy weight may be especially difficult to achieve in those with severe obesity. In some cases, medications, devices and surgical management might be considered.  What can you do?  If you are overweight or obese and are interested in methods for weight loss, you should discuss this with your provider.     Consider reducing daily calorie intake by 500 calories.     Keep a food journal.     Avoiding skipping meals, consider cutting portions instead.    Diet combined with exercise helps maintain muscle while optimizing fat loss. Strength training is particularly important for building and maintaining muscle mass. Exercise helps reduce stress, increase energy, and improves fitness. Increasing exercise without diet control, however, may not burn enough calories to loose weight.       Start walking three days a week 10-20 minutes at a time    Work towards walking thirty minutes five days a week     Eventually, increase the speed of your walking for 1-2 minutes at time    In addition, we recommend that you review healthy lifestyles and  methods for weight loss available through the National Institutes of Health patient information sites:  http://win.niddk.nih.gov/publications/index.htm    And look into health and wellness programs that may be available through your health insurance provider, employer, local community center, or clarissa club.    Surgery:    Upper Airway Surgery for LENORA  Surgery for LENORA is a second-line treatment option in the management of sleep apnea.  Surgery should be considered for patients who are having a difficult time tolerating CPAP.    Surgery for LENORA is directed at areas that are responsible for narrowing or complete obstruction of the airway during sleep.  There are a wide range of procedures available to enlarge and/or stabilize the airway to prevent blockage of breathing in the three major areas where it can occur: the palate, tongue, and nasal regions.  Successful surgical treatment depends on the accurate identification of the factors responsible for obstructive sleep apnea in each person.  A personalized approach is required because there is no single treatment that works well for everyone.  Because of anatomic variation, consultation with an examination by a sleep surgeon is a critical first step in determining what surgical options are best for each patient.  In some cases, examination during sedation may be recommended in order to guide the selection of procedures.  Patients will be counseled about risks and benefits as well as the typical recovery course after surgery. Surgery is typically not a cure for a person s LENORA.  However, surgery will often significantly improve one s LENORA severity (termed  success rate ).  Even in the absence of a cure, surgery will decrease the cardiovascular risk associated with OSA7; improve overall quality of life8 (sleepiness, functionality, sleep quality, etc).          Palate Procedures:  Patients with LENORA often have narrowing of their airway in the region of their tonsils and  uvula.  The goals of palate procedures are to widen the airway in this region as well as to help the tissues resist collapse.  Modern palate procedure techniques focus on tissue conservation and soft tissue rearrangement, rather than tissue removal.  Often the uvula is preserved in this procedure. Residual sleep apnea is common in patient after pharyngoplasty with an average reduction in sleep apnea events of 33%2.      Tongue Procedures:  While patients are awake, the muscles that surround the throat are active and keep this region open for breathing. These muscles relax during sleep, allowing the tongue and other structures to collapse and block breathing.  There are several different tongue procedures available.  Selection of a tongue base procedure depends on characteristics seen on physical exam.  Generally, procedures are aimed at removing bulky tissues in this area or preventing the back of the tongue from falling back during sleep.  Success rates for tongue surgery range from 50-62%3.    Hypoglossal Nerve Stimulation:  Hypoglossal nerve stimulation has recently received approval from the [place] Food and Drug Administration for the treatment of obstructive sleep apnea.  This is based on research showing that the system was safe and effective in treating sleep apnea6.  Results showed that the median AHI score decreased 68%, from 29.3 to 9.0. This therapy uses an implant system that senses breathing patterns and delivers mild stimulation to airway muscles, which keeps the airway open during sleep.  The system consists of three fully implanted components: a small generator (similar in size to a pacemaker), a breathing sensor, and a stimulation lead.  Using a small handheld remote, a patient turns the therapy on before bed and off upon awakening.    Candidates for this device must be greater than 22 years of age, have moderate to severe LENORA (AHI between 20-65), BMI less than 32, have tried CPAP/oral  appliance without success, and have appropriate upper airway anatomy (determined by a sleep endoscopy performed by Dr. Llamas).    Hypoglossal Nerve Stimulation Pathway:    The sleep surgeon s office will work with the patient through the insurance prior-authorization process (including communications and appeals).    Nasal Procedures:  Nasal obstruction can interfere with nasal breathing during the day and night.  Studies have shown that relief of nasal obstruction can improve the ability of some patients to tolerate positive airway pressure therapy for obstructive sleep apnea1.  Treatment options include medications such as nasal saline, topical corticosteroid and antihistamine sprays, and oral medications such as antihistamines or decongestants. Non-surgical treatments can include external nasal dilators for selected patients. If these are not successful by themselves, surgery can improve the nasal airway either alone or in combination with these other options.    Combination Procedures:  Combination of surgical procedures and other treatments may be recommended, particularly if patients have more than one area of narrowing or persistent positional disease.  The success rate of combination surgery ranges from 66-80%2,3.      1. Caro MANZANO. The Role of the Nose in Snoring and Obstructive Sleep Apnoea: An Update.  Eur Arch Otorhinolaryngol. 2011; 268: 1365-73.  2.  Capmary lou SM; George JA; Vinayak JR; Pallanch JF; Levi MB; Tasha SG; Kvng ONEILL. Surgical modifications of the upper airway for obstructive sleep apnea in adults: a systematic review and meta-analysis. SLEEP 2010;33(10):6268-8874. Martha CORNELIUS. Hypopharyngeal surgery in obstructive sleep apnea: an evidence-based medicine review.  Arch Otolaryngol Head Neck Surg. 2006 Feb;132(2):206-13.  3. Caleb SMITHH1, Angelina Y, Molina DUNN. The efficacy of anatomically based multilevel surgery for obstructive sleep apnea. Otolaryngol Head Neck Surg. 2003  Oct;129(4):327-35.  4. Martha CORNELIUS, Goldberg A. Hypopharyngeal Surgery in Obstructive Sleep Apnea: An Evidence-Based Medicine Review. Arch Otolaryngol Head Neck Surg. 2006 Feb;132(2):206-13.  5. Elel HENDRICKS et al. Upper-Airway Stimulation for Obstructive Sleep Apnea.  N Engl J Med. 2014 Jan 9;370(2):139-49.  6. Akbar Y et al. Increased Incidence of Cardiovascular Disease in Middle-aged Men with Obstructive Sleep Apnea. Am J Respir Crit Care Med; 2002 166: 159-165  7. Grijalva EM et al. Studying Life Effects and Effectiveness of Palatopharyngoplasty (SLEEP) study: Subjective Outcomes of Isolated Uvulopalatopharyngoplasty. Otolaryngol Head Neck Surg. 2011; 144: 623-631.  Please, schedule sleep study and follow up visit with the nurse (she will coming into the room and meet with you). Use sleeping aid only if needed during the night of the study.    Thank you!    Taye Phillips MD, MPH  Clinical Sleep and Occupational / Environmental Medicine              Follow-ups after your visit        Your next 10 appointments already scheduled     Jun 08, 2018  8:00 AM CDT   Cardiac Treatment with Rh Cardiac Rehab 1   [facility] ([facility])    [address], [address]  [facility] [address]   [phone]            Jun 11, 2018  8:00 AM CDT   Cardiac Treatment with Rh Cardiac Rehab 1   [facility] ([facility])    [address]  [facility] [address]   [phone]            Jun 13, 2018  8:00 AM CDT   Cardiac Treatment with Rh Cardiac Rehab 1   [facility] ([facility])    [address]  [place] [address]   [phone]            Jun 13, 2018 12:45 PM CDT   LAB with RU LAB   [facility] ([facility])    [address] [place] [address] [address]  [facility] [address]   [phone]           Please do not eat 10-12 hours before your appointment  if you are coming in fasting for labs on lipids, cholesterol, or glucose (sugar). This does not apply to pregnant women. Water, hot tea and black coffee (with nothing added) are okay. Do not drink other fluids, diet soda or chew gum.            Jun 13, 2018  1:50 PM CDT   Core Return with Juliano Caballero PA-C   [facility] ([facility])    [address]  [facility] [address]   [phone]            Chris 15, 2018  8:00 AM CDT   Cardiac Treatment with Rh Cardiac Rehab 1    ([facility])    [address], [address]  [place] [facility] [address]   [phone]            Chris 15, 2018  9:00 AM CDT   CONSULT with Rh Cardiac Rehab 1    ([facility])    [address]  [place] [facility] [address]   [phone]            Jun 19, 2018  9:30 AM CDT   Cardiac Treatment with Rh Cardiac Rehab 2    ([facility])    [address]  [place] [facility] [address]   [phone]            Jun 20, 2018 11:00 AM CDT   Return Visit with Krystian Owens MD   [facility] ([facility])    [address]  [address]  [facility] [address]   [phone]            Jun 21, 2018  9:30 AM CDT   Cardiac Treatment with Rh Cardiac Rehab 2    ([facility])    [address]  [place] [facility] [address]   [phone]              Future tests that were ordered for you today     Open Future Orders        Priority Expected Expires Ordered    Comprehensive Sleep Study Routine  12/4/2018 6/7/2018    ABG-Blood Gas Arterial ([place] / [place]) Routine  8/6/2018 6/7/2018            Who to contact     If you have questions or need follow up information about today's clinic visit or your schedule please contact  "[facility] directly at [phone].  Normal or non-critical lab and imaging results will be communicated to you by MyChart, letter or phone within 4 business days after the clinic has received the results. If you do not hear from us within 7 days, please contact the clinic through [employer] or phone. If you have a critical or abnormal lab result, we will notify you by phone as soon as possible.  Submit refill requests through [employer] or call your pharmacy and they will forward the refill request to us. Please allow 3 business days for your refill to be completed.          Additional Information About Your Visit        "[employer] Information     [employer] gives you secure access to your electronic health record. If you see a primary care provider, you can also send messages to your care team and make appointments. If you have questions, please call your primary care clinic.  If you do not have a primary care provider, please call [phone] and they will assist you.        Care EveryWhere ID     This is your Care EveryWhere ID. This could be used by other organizations to access your [place] medical records  [id number]        Your Vitals Were     Pulse Respirations Height Pulse Oximetry BMI (Body Mass Index)       47 14 1.702 m (5' 7.01\") 99% 27.4 kg/m2        Blood Pressure from Last 3 Encounters:   06/07/18 131/58   05/30/18 144/72   05/17/18 (P) 160/80    Weight from Last 3 Encounters:   06/07/18 79.4 kg (175 lb)   05/30/18 82.1 kg (181 lb 1.6 oz)   05/17/18 81.9 kg (180 lb 8 oz)              We Performed the Following     SLEEP EVALUATION & MANAGEMENT REFERRAL - AD[place] [place]  [phone] (Age 18 and up)     Sleep Study (referral)          Today's Medication Changes          These changes are accurate as of 6/7/18 10:05 AM.  If you have any questions, ask your nurse or doctor.               Start taking these medicines.        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  Ask your nurse if you have questions.     Bring a paper prescription for each of these medications     zolpidem 5 MG tablet                Primary Care Provider Office Phone # Fax #    Oscar Alves [phone] [address] [address]        Equal Access to Services     [facility]SE [facility] : Hadii porfirio lucero hadasho Sohoda, waaxda luqadaha, qaybta kaalmada adeegyada, chayito chacon haydianne reyes . So [facility] [phone].    ATENCIÓN: Si habla español, tiene a randall disposición servicios gratuitos de asistencia lingüística. Llame al [phone].    We comply with applicable federal civil rights laws and [place] laws. We do not discriminate on the basis of race, color, national origin, age, disability, sex, sexual orientation, or gender identity.            Thank you!     Thank you for choosing [facility]  for your care. Our goal is always to provide you with excellent care. Hearing back from our patients is one way we can continue to improve our services. Please take a few minutes to complete the written survey that you may receive in the mail after your visit with us. Thank you!             Your Updated Medication List - Protect others around you: Learn how to safely  use, store and throw away your medicines at www.disposemymeds.org.          This list is accurate as of 6/7/18 10:05 AM.  Always use your most recent med list.                   Brand Name Dispense Instructions for use Diagnosis    acetaminophen 650 MG CR tablet    TYLENOL    100 tablet    Take 1 tablet (650 mg) by mouth every 6 hours as needed for pain    Osteoarthritis       apixaban ANTICOAGULANT 2.5 MG tablet    ELIQUIS    200 tablet    Take 1 tablet (2.5 mg) by mouth 2 times daily    Chronic atrial fibrillation (H)       aspirin 81 MG tablet      Take 81 mg by mouth daily        co-enzyme Q-10 100 MG Caps capsule     180 capsule    Take 1 capsule (100 mg) by mouth daily    Mixed hyperlipidemia       diclofenac 1 % Gel topical gel    VOLTAREN    100 g    Apply 4 gramsto area qid prn    Acute left-sided low back pain without sciatica       doxycycline 100 MG capsule    VIBRAMYCIN    90 capsule    TAKE 1 CAPSULE DAILY    Rosacea       FERROUS GLUCONATE PO      Take 324 mg by mouth daily (with breakfast)        Glucosamine-Chondroitin 250-200 MG Tabs    OSTEO BI-FLEX REGULAR STRENGTH    100 tablet    Take 1 tablet by mouth 2 times daily    Osteoarthritis       lisinopril 20 MG tablet    PRINIVIL/ZESTRIL    90 tablet    Take 1 tablet (20 mg) by mouth daily    HTN, goal below 150/90       metoprolol succinate 25 MG 24 hr tablet    TOPROL-XL    90 tablet    Take 1 tablet (25 mg) by mouth daily (with breakfast)    Frequent PVCs, Chronic atrial fibrillation (H)       MULTIVITAL Chew     360 tablet    Take 2 chew tab by mouth 2 times daily    Neuropathy of foot       naftifine 1 % Gel topical gel    NAFTIN    90 g    Apply topically daily Apply to affected nails daily    Onychomycosis of toenail       OMEGA 3-6-9 PO      Take 1 tablet by mouth daily        omeprazole 40 MG capsule    priLOSEC    90 capsule    TAKE 1 CAPSULE DAILY    PUD (peptic ulcer disease)       potassium chloride SA 10 MEQ CR tablet    K-DUR/KLOR-CON  M    30 tablet    Take 1 tablet (10 mEq) by mouth daily    Frequent PVCs, Chronic combined systolic and diastolic congestive heart failure (H)       senna 8.6 MG tablet    SENOKOT    60 tablet    Take 1 tablet by mouth daily    Prostate cancer (H)       simvastatin 20 MG tablet    ZOCOR    90 tablet    Take 1 tablet (20 mg) by mouth At Bedtime    Hyperlipidemia LDL goal <100       sodium chloride 0.65 % nasal spray    OCEAN NASAL SPRAY    2 Bottle    Spray 1 spray into both nostrils daily as needed for congestion    Bloody nose       tamsulosin 0.4 MG capsule    FLOMAX    90 capsule    TAKE 1 CAPSULE DAILY    Benign prostatic hyperplasia with lower urinary tract symptoms       torsemide 20 MG tablet    DEMADEX    90 tablet    Take 1 tablet (20 mg) by mouth daily (with breakfast)    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, Chronic combined systolic and diastolic congestive heart failure (H)       Urea 20 % Crea cream     120 g    Apply topically as needed Apply to right foot daily    Right foot pain, Skin callus, Pes planus of both feet, Venous insufficiency of both lower extremities, Hav (hallux abducto valgus), right, Onychomycosis of toenail       zolpidem 5 MG tablet    AMBIEN    1 tablet    Take 1/2 tablet by mouth 15 minutes prior to sleep, for Sleep Study    Right heart enlargement, S/P CABG (coronary artery bypass graft), Alcohol dependence with unspecified alcohol-induced disorder (H), 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

## 2018-06-07 NOTE — PATIENT INSTRUCTIONS
Your blood pressure was checked while you were in clinic today.  Please read the guidelines below about what these numbers mean and what you should do about them.  Your systolic blood pressure is the top number.  This is the pressure when the heart is pumping.  Your diastolic blood pressure is the bottom number.  This is the pressure in between beats.  If your systolic blood pressure is less than 120 and your diastolic blood pressure is less than 80, then your blood pressure is normal. There is nothing more that you need to do about it  If your systolic blood pressure is 120-139 or your diastolic blood pressure is 80-89, your blood pressure may be higher than it should be.  You should have your blood pressure re-checked within a year by a primary care provider.  If your systolic blood pressure is 140 or greater or your diastolic blood pressure is 90 or greater, you may have high blood pressure.  High blood pressure is treatable, but if left untreated over time it can put you at risk for heart attack, stroke, or kidney failure.  You should have your blood pressure re-checked by a primary care provider within the next four weeks.  Your BMI is There is no height or weight on file to calculate BMI.  Weight management is a personal decision.  If you are interested in exploring weight loss strategies, the following discussion covers the approaches that may be successful. Body mass index (BMI) is one way to tell whether you are at a healthy weight, overweight, or obese. It measures your weight in relation to your height.  A BMI of 18.5 to 24.9 is in the healthy range. A person with a BMI of 25 to 29.9 is considered overweight, and someone with a BMI of 30 or greater is considered obese. More than two-thirds of American adults are considered overweight or obese.  Being overweight or obese increases the risk for further weight gain. Excess weight may lead to heart disease and diabetes.  Creating and following plans for  healthy eating and physical activity may help you improve your health.  Weight control is part of healthy lifestyle and includes exercise, emotional health, and healthy eating habits. Careful eating habits lifelong are the mainstay of weight control. Though there are significant health benefits from weight loss, long-term weight loss with diet alone may be very difficult to achieve- studies show long-term success with dietary management in less than 10% of people. Attaining a healthy weight may be especially difficult to achieve in those with severe obesity. In some cases, medications, devices and surgical management might be considered.  What can you do?  If you are overweight or obese and are interested in methods for weight loss, you should discuss this with your provider.     Consider reducing daily calorie intake by 500 calories.     Keep a food journal.     Avoiding skipping meals, consider cutting portions instead.    Diet combined with exercise helps maintain muscle while optimizing fat loss. Strength training is particularly important for building and maintaining muscle mass. Exercise helps reduce stress, increase energy, and improves fitness. Increasing exercise without diet control, however, may not burn enough calories to loose weight.       Start walking three days a week 10-20 minutes at a time    Work towards walking thirty minutes five days a week     Eventually, increase the speed of your walking for 1-2 minutes at time    In addition, we recommend that you review healthy lifestyles and methods for weight loss available through the National Institutes of Health patient information sites:  http://win.niddk.nih.gov/publications/index.htm    And look into health and wellness programs that may be available through your health insurance provider, employer, local community center, or clarissa club.        MY TREATMENT INFORMATION FOR SLEEP APNEA -  Milo Serna    DOCTOR: Taye Phillips MD,  "Health system  SLEEP CENTER : [facility]         If I haven't had a sleep study yet, what can I expect?  A personal story from Asa  https://www.youSportsBoardube.com/watch?v=AxPLmlRpnCs      Am I having a home sleep study?  Here is a video in case you get home and want to make sure you have done it correctly  https://www.PublicEarth[employer].com/watch?v=MKY8U0fPhf2&feature=youtu.be      Frequently asked questions:  1. What is Obstructive Sleep Apnea (LENORA)? LENORA is the most common type of sleep apnea. Apnea literally means, \"without breath.\" It is characterized by repetitive pauses in breathing, despite continued effort to breathe, and is usually associated with a reduction in blood oxygen saturation. Apneas can last 10 to over 60 seconds. It is caused by narrowing or collapse of the upper airway as muscles relax during sleep. Severity of sleep apnea is determined by frequency of breathing events and their effect on your sleep and oxygen levels determined during sleep testing.     2. What are the consequences of LENORA? Symptoms include: daytime sleepiness- possibly increasing the risk of falling asleep while driving, unrefreshing/restless sleep, snoring, insomnia, waking frequently to urinate, waking with heartburn or reflux, reduced concentration and memory, and morning headaches. Other health consequences may include development of high blood pressure and other cardiovascular disease in persons who are susceptible. Untreated LENORA  can contribute to heart disease, stroke and diabetes.     3. What are the treatment options? In most situations, sleep apnea is a lifelong disease that must be managed with daily therapy. Medications are not effective for sleep apnea and surgery is generally not performed until other therapies have been tried. Therapy is usually tailored to the individual patient based on many factors including your wishes as well as severity of sleep apnea and severity of obesity. Continuous Positive Airway (CPAP) is " the most reliable treatment. An oral device to hold your jaw forward is usually the next most reliable option. Other options include postioning devices (to keep you off your back), weight loss, and surgery including a tongue pacing device. There is more detail about some of these options below.            1. CPAP-  WHAT DOES IT DO AND HOW CAN I LEARN TO WEAR IT?                               BEFORE I START, CAN I WATCH A MOVIE TO GET A PLAN ON HOW TO USE CPAP?  https://www.[employer].com/watch?k=v6T82jl804Q      Continuous positive airway pressure, or CPAP, is the most effective treatment for obstructive sleep apnea. It works by blowing room air, through a mask, to hold your throat open. A decision to use CPAP is a major step forward in the pursuit of a healthier life. The successful use of CPAP will help you breathe easier, sleep better and live healthier. You can choose CPAP equipment from any durable medical equipment provider that meets your needs.  Using CPAP can be a positive experience if you keep these ordonez points in mind:  1. Commitment  CPAP is not a quick fix for your problem. It involves a long-term commitment to improve your sleep and your health.    2. Communication  Stay in close communication with both your sleep doctor and your CPAP supplier. Ask lots of questions and seek help when you need it.    3. Consistency  Use CPAP all night, every night and for every nap. You will receive the maximum health benefits from CPAP when you use it every time that you sleep. This will also make it easier for your body to adjust to the treatment.    4. Correction  The first machine and mask that you try may not be the best ones for you. Work with your sleep doctor and your CPAP supplier to make corrections to your equipment selection. Ask about trying a different type of machine or mask if you have ongoing problems. Make sure that your mask is a good fit and learn to use your equipment properly.    5. Challenge  Tell a  "family member or close friend to ask you each morning if you used your CPAP the previous night. Have someone to challenge you to give it your best effort.    6. Connection   Your adjustment to CPAP will be easier if you are able to connect with others who use the same treatment. Ask your sleep doctor if there is a support group in your area for people who have sleep apnea, or look for one on the Internet.  7. Comfort   Increase your level of comfort by using a saline spray, decongestant or heated humidifier if CPAP irritates your nose, mouth or throat. Use your unit's \"ramp\" setting to slowly get used to the air pressure level. There may be soft pads you can buy that will fit over your mask straps. Look on www.CPAP.com for accessories that can help make CPAP use more comfortable.  8. Cleaning   Clean your mask, tubing and headgear on a regular basis. Put this time in your schedule so that you don't forget to do it. Check and replace the filters for your CPAP unit and humidifier.    9. Completion   Although you are never finished with CPAP therapy, you should reward yourself by celebrating the completion of your first month of treatment. Expect this first month to be your hardest period of adjustment. It will involve some trial and error as you find the machine, mask and pressure settings that are right for you.    10. Continuation  After your first month of treatment, continue to make a daily commitment to use your CPAP all night, every night and for every nap.    CPAP-Tips to starting with success:  Begin using your CPAP for short periods of time during the day while you watch TV or read.    Use CPAP every night and for every nap. Using it less often reduces the health benefits and makes it harder for your body to get used to it.    Make small adjustments to your mask, tubing, straps and headgear until you get the right fit. Tightening the mask may actually worsen the leak.  If it leaves significant marks on your " face or irritates the bridge of your nose, it may not be the best mask for you.  Speak with the person who supplied the mask and consider trying other masks. Insurances will allow you to try different masks during the first month of starting CPAP.  Insurance also covers a new mask, hose and filter about every 6 months.    Use a saline nasal spray to ease mild nasal congestion. Neti-Pot or saline nasal rinses may also help. Nasal gel sprays can help reduce nasal dryness.  Biotene mouthwash can be helpful to protect your teeth if you experience frequent dry mouth.  Dry mouth may be a sign of air escaping out of your mouth or out of the mask in the case of a full face mask.  Speak with your provider if you expect that is the case.     Take a nasal decongestant to relieve more severe nasal or sinus congestion.  Do not use Afrin (oxymetazoline) nasal spray more than 3 days in a row.  Speak with your sleep doctor if your nasal congestion is chronic.    Use a heated humidifier that fits your CPAP model to enhance your breathing comfort. Adjust the heat setting up if you get a dry nose or throat, down if you get condensation in the hose or mask.  Position the CPAP lower than you so that any condensation in the hose drains back into the machine rather than towards the mask.    Try a system that uses nasal pillows if traditional masks give you problems.    Clean your mask, tubing and headgear once a week. Make sure the equipment dries fully.    Regularly check and replace the filters for your CPAP unit and humidifier.    Work closely with your sleep provider and your CPAP supplier to make sure that you have the machine, mask and air pressure setting that works best for you. It is better to stop using it and call your provider to solve problems than to lay awake all night frustrated with the device.      BESIDES CPAP, WHAT OTHER THERAPIES ARE THERE?        Positioning Device  Positioning devices are generally used when sleep  apnea is mild and only occurs on your back.This example shows a pillow that straps around the waist. It may be appropriate for those whose sleep study shows milder sleep apnea that occurs primarily when lying flat on one's back. Preliminary studies have shown benefit but effectiveness at home may need to be verified by a home sleep test. These devices are generally not covered by medical insurance.                        Oral Appliance  What is oral appliance therapy?  An oral appliance is a small acrylic device that fits over the upper and lower teeth or tongue (similar to an orthodontic retainer or a mouth guard). This device slightly advances the lower jaw or tongue, which moves the base of the tongue forward, opens the airway, improves breathing and can effectively treat snoring and obstructive sleep apnea sleep apnea. The appliance is fabricated and customized by a qualified dentist with experience in treating snoring and sleep apnea. Oral appliances are usually well tolerated and have relatively high compliance by patients1, 2, 3.  When is an oral appliance indicated?  Oral appliance therapy is recommended as a first-line treatment for patients with primary snoring, mild sleep apnea, and for patients with moderate sleep apnea who prefer appliance therapy to use of CPAP4, 5. Severity of sleep apnea is determined by sleep testing and is based on the number of respiratory events per hour of sleep.   How successful is oral appliance therapy?  The success rate of oral appliance therapy in patients with mild sleep apnea is 75-80% while in patients with moderate sleep apnea it is 50-70%. The chance of success in patients with severe sleep apnea is 40-50%. The research also shows that oral appliances have a beneficial effect on the cardiovascular health of LENORA patients at the same magnitude as CPAP therapy7.  Oral appliances should be a second-line treatment in cases of severe sleep apnea, but if not completely  successful then a combination therapy utilizing CPAP plus oral appliance therapy may be effective. Oral appliances tend to be effective in a broad range of patients although studies show that the patients who have the highest success are females, younger patients, those with milder disease, and less severe obesity. 3, 6.   The chances of success are lower in patients who have more severe LENORA, are older, and those who are morbidly obese.     Example of an oral appliance   Finding a dentist that practices dental sleep medicine  Specific training is available through the American Academy of Dental Sleep Medicine for dentists interested in working in the field of sleep. To find a dentist who is educated in the field of sleep and the use of oral appliances, near you, visit the Web site of the American Academy of Dental Sleep Medicine; also see   http://www.accpstorage.org/newOrganization/patients/oralAppliances.pdf  To search for a dentist certified in these practices:  Http://aadsm.org/FindADentist.aspx?1  1. Sree, et al. Objectively measured vs self-reported compliance during oral appliance therapy for sleep-disordered breathing. Chest 2013; 144(5): 8442-1907.  2. Og, et al. Objective measurement of compliance during oral appliance therapy for sleep-disordered breathing. Thorax 2013; 68(1): 91-96.  3. Yola et al. Mandibular advancement devices in 620 men and women with LENORA and snoring: tolerability and predictors of treatment success. Chest 2004; 125: 4355-3551.  4. Patrick, et al. Oral appliances for snoring and LENORA: a review. Sleep 2006; 29: 244-262.  5. Brianne et al. Oral appliance treatment for LENORA: an update. J Clin Sleep Med 2014; 10(2): 215-227.  6. Enedelia, et al. Predictors of OSAH treatment outcome. J Dent Res 2007; 86: 3795-8372.      Weight Loss:    Weight management is a personal decision.  If you are interested in exploring weight loss strategies, the following discussion covers  the impact on weight loss on sleep apnea and the approaches that may be successful.    Weight loss decreases severity of sleep apnea in most people with obesity. For those with mild obesity who have developed snoring with weight gain, even 15-30 pound weight loss can improve and occasionally eliminate sleep apnea.  Structured and life-long dietary and health habits are necessary to lose weight and keep healthier weight levels.     Though there may be significant health benefits from weight loss, long-term weight loss is very difficult to achieve- studies show success with dietary management in less than 10% of people. In addition, substantial weight loss may require years of dietary control and may be difficult if patients have severe obesity. In these cases, surgical management may be considered.  Finally, older individuals who have tolerated obesity without health complications may be less likely to benefit from weight loss strategies.    Your BMI is Body mass index is 27.4 kg/(m^2).  Body mass index (BMI) is one way to tell whether you are at a healthy weight, overweight, or obese. It measures your weight in relation to your height.  A BMI of 18.5 to 24.9 is in the healthy range. A person with a BMI of 25 to 29.9 is considered overweight, and someone with a BMI of 30 or greater is considered obese. More than two-thirds of American adults are considered overweight or obese.  Being overweight or obese increases the risk for further weight gain. Excess weight may lead to heart disease and diabetes.  Creating and following plans for healthy eating and physical activity may help you improve your health.  Weight control is part of healthy lifestyle and includes exercise, emotional health, and healthy eating habits. Careful eating habits lifelong are the mainstay of weight control. Though there are significant health benefits from weight loss, long-term weight loss with diet alone may be very difficult to achieve-  studies show long-term success with dietary management in less than 10% of people. Attaining a healthy weight may be especially difficult to achieve in those with severe obesity. In some cases, medications, devices and surgical management might be considered.  What can you do?  If you are overweight or obese and are interested in methods for weight loss, you should discuss this with your provider.     Consider reducing daily calorie intake by 500 calories.     Keep a food journal.     Avoiding skipping meals, consider cutting portions instead.    Diet combined with exercise helps maintain muscle while optimizing fat loss. Strength training is particularly important for building and maintaining muscle mass. Exercise helps reduce stress, increase energy, and improves fitness. Increasing exercise without diet control, however, may not burn enough calories to loose weight.       Start walking three days a week 10-20 minutes at a time    Work towards walking thirty minutes five days a week     Eventually, increase the speed of your walking for 1-2 minutes at time    In addition, we recommend that you review healthy lifestyles and methods for weight loss available through the National Institutes of Health patient information sites:  http://win.niddk.nih.gov/publications/index.htm    And look into health and wellness programs that may be available through your health insurance provider, employer, local community center, or clarissa club.    Surgery:    Upper Airway Surgery for LENORA  Surgery for LENORA is a second-line treatment option in the management of sleep apnea.  Surgery should be considered for patients who are having a difficult time tolerating CPAP.    Surgery for LENORA is directed at areas that are responsible for narrowing or complete obstruction of the airway during sleep.  There are a wide range of procedures available to enlarge and/or stabilize the airway to prevent blockage of breathing in the three major areas  where it can occur: the palate, tongue, and nasal regions.  Successful surgical treatment depends on the accurate identification of the factors responsible for obstructive sleep apnea in each person.  A personalized approach is required because there is no single treatment that works well for everyone.  Because of anatomic variation, consultation with an examination by a sleep surgeon is a critical first step in determining what surgical options are best for each patient.  In some cases, examination during sedation may be recommended in order to guide the selection of procedures.  Patients will be counseled about risks and benefits as well as the typical recovery course after surgery. Surgery is typically not a cure for a person s LENORA.  However, surgery will often significantly improve one s LENORA severity (termed  success rate ).  Even in the absence of a cure, surgery will decrease the cardiovascular risk associated with OSA7; improve overall quality of life8 (sleepiness, functionality, sleep quality, etc).          Palate Procedures:  Patients with LENORA often have narrowing of their airway in the region of their tonsils and uvula.  The goals of palate procedures are to widen the airway in this region as well as to help the tissues resist collapse.  Modern palate procedure techniques focus on tissue conservation and soft tissue rearrangement, rather than tissue removal.  Often the uvula is preserved in this procedure. Residual sleep apnea is common in patient after pharyngoplasty with an average reduction in sleep apnea events of 33%2.      Tongue Procedures:  While patients are awake, the muscles that surround the throat are active and keep this region open for breathing. These muscles relax during sleep, allowing the tongue and other structures to collapse and block breathing.  There are several different tongue procedures available.  Selection of a tongue base procedure depends on characteristics seen on physical  exam.  Generally, procedures are aimed at removing bulky tissues in this area or preventing the back of the tongue from falling back during sleep.  Success rates for tongue surgery range from 50-62%3.    Hypoglossal Nerve Stimulation:  Hypoglossal nerve stimulation has recently received approval from the [place] Food and Drug Administration for the treatment of obstructive sleep apnea.  This is based on research showing that the system was safe and effective in treating sleep apnea6.  Results showed that the median AHI score decreased 68%, from 29.3 to 9.0. This therapy uses an implant system that senses breathing patterns and delivers mild stimulation to airway muscles, which keeps the airway open during sleep.  The system consists of three fully implanted components: a small generator (similar in size to a pacemaker), a breathing sensor, and a stimulation lead.  Using a small handheld remote, a patient turns the therapy on before bed and off upon awakening.    Candidates for this device must be greater than 22 years of age, have moderate to severe LENORA (AHI between 20-65), BMI less than 32, have tried CPAP/oral appliance without success, and have appropriate upper airway anatomy (determined by a sleep endoscopy performed by Dr. Llamas).    Hypoglossal Nerve Stimulation Pathway:    The sleep surgeon s office will work with the patient through the insurance prior-authorization process (including communications and appeals).    Nasal Procedures:  Nasal obstruction can interfere with nasal breathing during the day and night.  Studies have shown that relief of nasal obstruction can improve the ability of some patients to tolerate positive airway pressure therapy for obstructive sleep apnea1.  Treatment options include medications such as nasal saline, topical corticosteroid and antihistamine sprays, and oral medications such as antihistamines or decongestants. Non-surgical treatments can include external nasal  dilators for selected patients. If these are not successful by themselves, surgery can improve the nasal airway either alone or in combination with these other options.    Combination Procedures:  Combination of surgical procedures and other treatments may be recommended, particularly if patients have more than one area of narrowing or persistent positional disease.  The success rate of combination surgery ranges from 66-80%2,3.      1. Caro MANZANO. The Role of the Nose in Snoring and Obstructive Sleep Apnoea: An Update.  Eur Arch Otorhinolaryngol. 2011; 268: 1365-73.  2.  Lidia SM; George JA; Vinayak JR; Pallanch JF; Levi MB; Tasha SG; Kvng ONEILL. Surgical modifications of the upper airway for obstructive sleep apnea in adults: a systematic review and meta-analysis. SLEEP 2010;33(10):4756-3656. Martha CORNELIUS. Hypopharyngeal surgery in obstructive sleep apnea: an evidence-based medicine review.  Arch Otolaryngol Head Neck Surg. 2006 Feb;132(2):206-13.  3. Caleb YH1, Angelina Y, Molina MONA. The efficacy of anatomically based multilevel surgery for obstructive sleep apnea. Otolaryngol Head Neck Surg. 2003 Oct;129(4):327-35.  4. Martha CORNELIUS, Goldberg A. Hypopharyngeal Surgery in Obstructive Sleep Apnea: An Evidence-Based Medicine Review. Arch Otolaryngol Head Neck Surg. 2006 Feb;132(2):206-13.  5. Elle PJ et al. Upper-Airway Stimulation for Obstructive Sleep Apnea.  N Engl J Med. 2014 Jan 9;370(2):139-49.  6. Akbar Y et al. Increased Incidence of Cardiovascular Disease in Middle-aged Men with Obstructive Sleep Apnea. Am J Respir Crit Care Med; 2002 166: 159-165  7. Grijalav EM et al. Studying Life Effects and Effectiveness of Palatopharyngoplasty (SLEEP) study: Subjective Outcomes of Isolated Uvulopalatopharyngoplasty. Otolaryngol Head Neck Surg. 2011; 144: 623-631.  Please, schedule sleep study and follow up visit with the nurse (she will coming into the room and meet with you). Use sleeping aid only if needed during the  night of the study.    Thank you!    Taye Phillips MD, MPH  Clinical Sleep and Occupational / Environmental Medicine

## 2018-06-07 NOTE — PROGRESS NOTES
"Sleep Center [facility]  Outpatient Sleep Medicine Consultation  June 7, 2018    Name: Milo Serna MRN# [id number]   Age: 77 year old YOB: 1940     Date of Consultation: June 7, 2018  Consultation is requested by: Sixto Conley MD  [address] [place] [id number]  Primary care provider: Oscar Alves    Patient is accompanied by: Patient presents alone today.       Reason for Sleep Consult:     Milo Serna is a 77 year old male patient that presents here for an initial evaluation due to \"chronic atrial fibrillation and heart failure.\"         Assessment and Plan:     Summary Sleep Diagnoses:    (1) EDS  (2) Cardiomegaly  (3) Congestive Heart Failure (LVEF estimated at 35 - 40%)  (4) Chronic Atrial Fibrillation  (5) Frequent PVCs  (6) CAD with CABG  (7) HTN  (8) Pulmonary Hypertension    Summary Recommendations / Discussion:    This patient has sxs, hx, and physical findings that suggest the diagnosis of a sleep disordered breathing. In addition, he has an intermediate pre-test probability of LENORA. Given this patient's extensive cardiac history, a portable HST sleep study should not be performed and it would not be the best testing alternative for this patient. Instead, this patient should undergo an in-lab split-night PSG sleep study (split at 15 events per hour). Based on the patient's history, clinical presentation, and physical examination, there is a reasonable level of suspicion for hypoventilation and, therefore, TCM monitoring as well as ABG studies would be necessary for a complete evaluation (ABG should only be obtained if TCM suggests hypoventilation). Given the decreased LVEF below 45% (currently at 35 - 40%), bilevel therapy, including ASV therapy, should not be used. If hypoxemia is noted during the study, obstructive events should be reduce below 10 events per hour and, if hypoxemia persists, oxygen supplementation should be started as per protocol. CPAP " should be used mainly for obstructive events. If central apneas are the predominant component, elevation of the head of the bed should be considered. Today, the nature and pathophysiology of LENORA were discussed. The different treatment options for LENORA were also reviewed and explained today. The patient was given zolpidem 2.5 mg to use only during the night of the study and only if needed (medication side effects and possible complications discussed). Lifestyle recommendations including healthy dietary and exercising habits were discussed. Pt will follow up with me after having completed an in-lab PSG sleep study.     Visit Summary:                       -In-lab PSG sleep study with TCM and, if needed, ABG (split at 15 and obtain ABG if TCM suggests hypoventilation) - Study placed as urgent                       -Avoid bilevel therapy, including ASV                       -As long as obstructive events are below 10 events per hour, oxygen supplementation should be used for persistent hypoxemia                       -Elevation of the head of the bed for central events                       -Zolpidem 2.5 mg for the night of the study                       -Follow up with me after PSG is completed    Coding:  (G47.19) Excessive daytime sleepiness  (primary encounter diagnosis)  (I51.7) Right heart enlargement  (I50.810) 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  (I50.42) Chronic combined systolic and diastolic congestive heart failure (H)  (I48.2) Chronic atrial fibrillation (H)  (I49.3) Frequent PVCs  (Z95.1) Status post coronary artery bypass graft  (I10) HTN, goal below 150/90    Counseling included a comprehensive review of diagnostic and therapeutic strategies as well as risks of inadequate therapy.    Educational materials provided in instructions. The patient was instructed to avoid driving or operating any heavy machinery when experiencing drowsiness.    All questions and concerns were addressed  today. Pt agrees and understands the assessment and plan.         History of Present Illness:   Milo Serna is a 77 year old  RHD male pt with PMH of coronary atherosclerosis with coronary artery disease and past myocardial infarction, hyperlipidemia, peptic ulcer disease, venous stasis, history of premature ventricular contractions and premature atrial contractions, chronic atrial fibrillation, dilated aortic root, history of prostate cancer, iron deficiency anemia, hypertension, thrombocytopenia, pulmonary hypertension, and biventricular systolic heart failure presents for an initial evaluation today due to congestive heart failure and atrial fibrillation.    This is a rather medically complex individual with extensive cardiac history including CABG in 1997, chronic atrial fibrillation, hypertension, stable ascending aortic dilation, and pulmonary hypertension. The patient has dilation of the right ventricle with also reduced LVEF estimated at 35 - 40% during most recent echocardiogram. The study also showed global hypokinesis of the left ventricle (see summary below).    The patient also completed a PFT with no obvious abnormalities noted (see summary below). A 6 minute walk distance was also obtained and this showed no obvious hypoxemia with saturations above 95% at all times.    Given this patient s medical co-morbidity as well as medical history, I was asked by Dr Sixto Conley to evaluate this patient for possible sleep disordered breathing.    This patient reports no snoring, gasping / choking for air, or witnessed apneas The patient admits having restorative sleep with no sleep inertia. He also admits having some mild EDS with no inadvertent naps. This patient admits having some xerostomia in the morning. The patient denies any morning cephalgia, morning myalgias, CP, SOB, N/V, diarrhea, nocturia, nocturnal coughing spells, positional dyspnea, orthopnea, or GERD sxs. The patient sleeps  with one pillow under his head with elevation of the head of the bed (due to chronic back pain). Lastly, this patient admits having some drowsiness when driving with no near accidents.     PREVIOUS IN- LAB or HOME SLEEP STUDIES: None Reported    SLEEP-WAKE SCHEDULE:     Milo Serna      -Describes himself as a morning person; prefers to go to sleep at 10:00 PM and wakes up at 6:00 AM.      -Pt takes about 6 naps a week and each nap lasts about 15 minutes to one hour. Pt feels refreshed after each nap; takes no inadvertent naps.      -ON WEEKDAYS, goes to sleep at 10:00 PM during the week; awakens 6:00 AM with an alarm; falls asleep in 5 minutes; denies difficulty falling asleep.      -ON WEEKENDS, goes to sleep at 10:00 PM and wakes up at 6:00 AM with an alarm; falls asleep in 5 minutes.        -Awakens 1-2 times a night for 5 minutes before falling back to sleep; awakens to go to the bathroom.      BEDTIME ACTIVITIES AND SHIFT WORK:    Milo Serna     -Bedtime Activities and Other Sleeping Information: Pt lives with wife. Pt sleeps with wife on a ashtyn size bed. No pets in the bedroom. Pt sleeps on back and sides. No electronics used in bed.      -Occupation: Retired     SCALES        -SLEEP APNEA: [employer] score: 4 / 8        -SLEEPINESS: [place] sleepiness scale (ESS):  9 / 24    Drowsy driving / near accidents: Some drowsiness reported with no near accidents    Consequences: Mild EDS reported    SLEEP COMPLAINTS:  Cardio-respiratory     -Snoring: No snoring reported    -Dyspnea: Pt denies having any witnessed apneas or dyspnea   -Morning headaches or confusion: Denies any morning cephalgia   -Coexisting Lung disease: See Above     -Coexisting Heart disease: See Above     -Does patient have a bed partner: Patient sleeps with spouse   -Has bed partner been sleeping separately because of snoring:  No            RLS Screen:    -When you try to relax in the evening or sleep at night, do you ever have  unpleasant, restless feelings in your legs that can be relieved by walking or movement? None Reported     -Periodic limb movement: None Reported    Narcolepsy:     - Denies sudden urges of sleep attacks   - Denies cataplexy   - Denies sleep paralysis    - Denies hallucinations    - Admits feeling refreshed after a nap.    Sleep Behaviors:   - Denies leg symptoms/movements   - Denies motor restlessness   - Denies night terrors   - Denies bruxism   - Denies automatic behaviors    Other Subjective Complaints:   - Denies anxiety or rumination    - Denies pain and discomfort at night   - Denies waking up with heart pounding or racing   - Denies GERD or aspiration         Parasomnia:    -NREM - Denies recurrent persistent confusional arousal, night eating, sleep walking or sleep terrors.      -REM - Denies dream enactment or injuries.          Medications:     Current Outpatient Prescriptions   Medication Sig     acetaminophen (TYLENOL) 650 MG CR tablet Take 1 tablet (650 mg) by mouth every 6 hours as needed for pain     apixaban ANTICOAGULANT (ELIQUIS) 2.5 MG tablet Take 1 tablet (2.5 mg) by mouth 2 times daily     aspirin 81 MG tablet Take 81 mg by mouth daily     co-enzyme Q-10 100 MG CAPS Take 1 capsule (100 mg) by mouth daily     diclofenac (VOLTAREN) 1 % GEL Apply 4 gramsto area qid prn (Patient not taking: Reported on 5/30/2018)     doxycycline (VIBRAMYCIN) 100 MG capsule TAKE 1 CAPSULE DAILY     FERROUS GLUCONATE PO Take 324 mg by mouth daily (with breakfast)     Glucosamine-Chondroitin (OSTEO BI-FLEX REGULAR STRENGTH) 250-200 MG TABS Take 1 tablet by mouth 2 times daily     lisinopril (PRINIVIL/ZESTRIL) 20 MG tablet Take 1 tablet (20 mg) by mouth daily     metoprolol succinate (TOPROL-XL) 25 MG 24 hr tablet Take 1 tablet (25 mg) by mouth daily (with breakfast)     Multiple Vitamins-Minerals (MULTIVITAL) CHEW Take 2 chew tab by mouth 2 times daily (Patient not taking: Reported on 5/30/2018)     naftifine (NAFTIN)  1 % GEL topical gel Apply topically daily Apply to affected nails daily     Omega 3-6-9 Fatty Acids (OMEGA 3-6-9 PO) Take 1 tablet by mouth daily      omeprazole (PRILOSEC) 40 MG capsule TAKE 1 CAPSULE DAILY     potassium chloride SA (K-DUR/KLOR-CON M) 10 MEQ CR tablet Take 1 tablet (10 mEq) by mouth daily     senna (SENOKOT) 8.6 MG tablet Take 1 tablet by mouth daily (Patient not taking: Reported on 5/30/2018)     simvastatin (ZOCOR) 20 MG tablet Take 1 tablet (20 mg) by mouth At Bedtime     sodium chloride (OCEAN NASAL SPRAY) 0.65 % nasal spray Spray 1 spray into both nostrils daily as needed for congestion     tamsulosin (FLOMAX) 0.4 MG capsule TAKE 1 CAPSULE DAILY     torsemide (DEMADEX) 20 MG tablet Take 1 tablet (20 mg) by mouth daily (with breakfast)     Urea 20 % CREA cream Apply topically as needed Apply to right foot daily (Patient not taking: Reported on 5/30/2018)     No current facility-administered medications for this visit.       No Known Allergies         Past Medical History:     Denies needing any 02 supplement at night.    Past Medical History:   Diagnosis Date     Anemia, unspecified 11/8/2016     Atrial fibrillation (H) 5/27/2015     BPH (benign prostatic hyperplasia) 1/25/2012     CAD (coronary artery disease) 4/4/2012     Closed fracture of metatarsal bone 4/4/2012     Coronary atherosclerosis of unspecified type of vessel, native or [facility]    CAB 1996 following MI (at [facility])      Dilated aortic root (H) 5/26/2016     Drug-induced erectile dysfunction 10/2/2015     Elevated blood sugar 11/8/2016     Elevated PSA 9/19/2013     Essential hypertension with goal blood pressure less than 140/90 9/22/2016     Essential hypertension, benign      Gallbladder polyp 5/1/2018     Gout      HAV (hallux abducto valgus) 4/4/2012     Hernia, abdominal      History of prostate cancer 6/15/2016     HTN, goal below 150/90 4/20/2017     Iron deficiency anemia secondary to inadequate  dietary iron intake 11/15/2016     Low blood pressure reading 10/17/2016     Lumbago     had degendisc dz on MRI 7/07     Mixed hyperlipidemia      Mumps      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     Past Surgical History:   Procedure Laterality Date     C NONSPECIFIC PROCEDURE      CAB 1996 [place]     C NONSPECIFIC PROCEDURE  2/07    l inguinal hernia repair      C NONSPECIFIC PROCEDURE      R hernia remote     C NONSPECIFIC PROCEDURE  2000    R ankle ORIF for fx     C NONSPECIFIC PROCEDURE  2005    nasal surgery      C NONSPECIFIC PROCEDURE      R rotater repair (remote)      C NONSPECIFIC PROCEDURE      appy 1966     C NONSPECIFIC PROCEDURE      sinus surgery x 2     C NONSPECIFIC PROCEDURE      L shoulder 6/09     CARDIAC SURGERY      bipass     CYSTOSCOPY FLEXIBLE, CYOABLATION PROSTATE N/A 11/9/2016    Procedure: CYSTOSCOPY FLEXIBLE, CRYOABLATION PROSTATE;  Surgeon: Krystian Owens MD;  Location:  OR     GENITOURINARY SURGERY      prostate surgery      HERNIA REPAIR       LAPAROSCOPIC HERNIORRHAPHY INGUINAL Right 5/10/2017    Procedure: LAPAROSCOPIC HERNIORRHAPHY INGUINAL;  Laparoscopic preperitoneal repair of right inguinal hernia with placement of underlay mesh;  Surgeon: Enrico Bridges MD;  Location: [facility] OR     PROSTATE SURGERY            Social History:     Social History   Substance Use Topics     Smoking status: Former Smoker      Smokeless tobacco: Never Used      Comment: quit 3/1974     Alcohol use 0.0 oz/week     0 Standard drinks or equivalent per week      Comment: 1-2 drinks per day     Chemical History:     Tobacco: Quit smoking about 44 yrs ago     Uses 2 cups/day of coffee. Last caffeine intake is usually before 10 AM.    Supplements for wakefulness: Patient does not use any supplements to stay awake    EtOH: Occasional use only  Recreational Drugs: Patient denies using any recreational drugs     Psych Hx:   Current dangers to self or others: No. Pt denies any SI / HI, hallucinations, or delusions         Family History:     Family History   Problem Relation Age of Onset     CANCER Mother      stomach cancer,  age 61     HEART DISEASE Father      MI,  age 71     HEART DISEASE Brother      stent placement, RA      Hypertension Brother      HEART DISEASE Sister      rythmn problems with heart, hypertension      Sleep Family Hx:       RLS - None reported   LENORA - None reported  Insomnia - None reported  Parasomnia - None reported         Review of Systems:     A complete 10 point review of systems was negative other than HPI or as commented below:     CONSTITUTIONAL: NEGATIVE for weight gain/loss, fever, chills, sweats or night sweats, drug allergies.  EYES: NEGATIVE for double vision. Pt reports changes in vision and blind spots.  ENT: NEGATIVE for ear pain, sore throat, sinus pain, post-nasal drip, runny nose, bloody nose  CARDIAC: NEGATIVE for chest pain or pressure or breathlessness when lying flat. Pt endorses swollen legs / swollen feet and palpitation.  NEUROLOGIC: NEGATIVE headaches, weakness or numbness in the arms or legs.  DERMATOLOGIC: NEGATIVE for rashes, new moles or change in mole(s)  PULMONARY: NEGATIVE SOB at rest, dry cough, coughing up blood, wheezing or whistling when breathing. Pt reports SOB with activity and mildly productive cough.  GASTROINTESTINAL: NEGATIVE for nausea or vomitting, loose or  "watery stools, fat or grease in stools, constipation, abdominal pain, bowel movements black in color or blood noted.  GENITOURINARY: NEGATIVE for pain during urination, blood in urine, urinating more frequently than usual, irregular menstrual periods.  MUSCULOSKELETAL: NEGATIVE for muscle pain, bone or joint pain, swollen joints.  ENDOCRINE: NEGATIVE for increased thirst or urination, diabetes.  LYMPHATIC: NEGATIVE for swollen lymph nodes, lumps or bumps in the breasts or nipple discharge.         Physical Examination:   /58  Pulse (!) 47  Resp 14  Ht 1.702 m (5' 7.01\")  Wt 79.4 kg (175 lb)  SpO2 99%  BMI 27.4 kg/m2     VS: Reviewed and some bradycardia noted (asymptomatic).  General: Alert, oriented, not in distress. Dressed casually; Good eye contact; Comfortably sitting in a chair; in no apparent distress  HEENT: Normocephalic and atraumatic; NL TM x 2; pupils are isocoric and equally responsive to the light. PERRLA. EOMI. Normal fundoscopic examination; Nasal turbinates are normal with a mild septal deviation noted;  Mallampati score: Grade III; Tonsillar hypertrophy: 1  hidden by pillars; Pharynx with no erythema or exudates. Some malocclusion observed.  NECK: Neck supple; symmetrical; no lymphadenopathy; no thyromegaly, bruit, JVD noted. Neck circumference of 15.75 inches (40 cm).  Lungs: Both hemithoraces are symmetrical, normal to palpation, no dullness to percussion, auscultation of lungs revealed normal breath sounds with no expirium prolongation, wheezing, rhonci and crackles.  CVS: Normal S1 and S2 heart sounds with no extra heart sounds. No murmur, rubs, or clicks. Normal peripheral pulses throughout with no obvious peripheral edema. Irregularly irregular rhythm noted.  Abdomen: Bowel sounds present. Abdomen is soft, non-tender, and non-distended. No organomegaly, ascitis, or obvious masses noted. Negative CVA tenderness.  Extremities/musculoskeletal: Deformity of the hands noted due to " advanced arthritis. Otherwise, no cyanosis or clubbing noted.  Neurology: Awake, alert, and oriented x 3. No obvious gross motor / sensorial deficits with normal strength in all extremities at 5/5 and normal sensation throughout. Cranial nerves are grossly intact with normal II to XII CN functions. Negative Romberg's test with normal gait. DTR are symmetric and normal at 2+/4.  Integumentary: No obvious skin rash.  Psychiatry: Mood and affect are appropriate. Euthymic with affect congruent with full range and intensity. No SI/HI with adequate insight and judgement.          Data: All pertinent previous laboratory data reviewed     Lab Results   Component Value Date    PH 7.38 02/20/2007    PO2 66 (L) 02/20/2007    PCO2 45 02/20/2007    HCO3 25 02/20/2007     Lab Results   Component Value Date    TSH 1.12 09/28/2017    TSH 1.82 08/23/2011     Lab Results   Component Value Date     (H) 05/30/2018    GLC 84 05/01/2018     Lab Results   Component Value Date    HGB 12.2 (L) 05/30/2018    HGB 12.9 (L) 09/25/2017     Lab Results   Component Value Date    BUN 18 05/30/2018    BUN 12 05/01/2018    CR 1.02 05/30/2018    CR 0.80 05/01/2018     Echocardiology:    -Echocardiogram obtained on 04/25/2018 showed mildly dilated left ventricle with normal left ventricular wall thickness. Left ventricle systolic function is reduced with a LVEF estimated at 35 - 40%. Moderate to severely global hypokinesia of the left ventricle noted. Right ventricle was mild to moderately dilated with moderately decreased systolic function. Severe biatrial enlargement noted. Moderate to mod-severe mitral regurgitation was present. Mild tricuspid regurgitation was noted. Mild pulmonary hypertension was present. Mild aortic regurgitation was observed. Only trace pulmonic valvular regurgitation was noted. The patient was in atrial fibrillation during the study.       Chest x-ray:    -Chest x-ray films obtained on 04/26/2018 showed enlarged cardiac  silhouette with also enlarged pulmonary arteries present. Median sternotomy wires were noted. No other cardiopulmonary abnormalities were noted.    PFT:    -The patient had a PFT obtained on 04/24/2018 and this showed normal lung mechanics, normal lung volumes, and normal gas transfer. FVC was 106%, FEV1 was 104%, VC was 100%, TLC was 104%, and the DLCO was also 100%.    Laboratory Studies:   Component Value Flag Ref Range Units Status Collected Lab   WBC 4.1  4.0 - 11.0 10e9/L Final 05/30/2018  8:44 AM [facility]   RBC Count 3.84 (L) 4.4 - 5.9 10e12/L Final 05/30/2018  8:44 AM [facility]   Hemoglobin 12.2 (L) 13.3 - 17.7 g/dL Final 05/30/2018  8:44 AM [facility]   Hematocrit 37.2 (L) 40.0 - 53.0 % Final 05/30/2018  8:44 AM [facility]   MCV 97  78 - 100 fl Final 05/30/2018  8:44 AM [facility]   MCH 31.8  26.5 - 33.0 pg Final 05/30/2018  8:44 AM [facility]   MCHC 32.8  31.5 - 36.5 g/dL Final 05/30/2018  8:44 AM [facility]   RDW 14.5  10.0 - 15.0 % Final 05/30/2018  8:44 AM [facility]   Platelet Count 111 (L) 150 - 450 10e9/L Final 05/30/2018  8:44 AM [facility]     Component Value Flag Ref Range Units Status Collected Lab   Sodium 139  133 - 144 mmol/L Final 05/30/2018  8:44 AM [facility]   Potassium 3.7  3.4 - 5.3 mmol/L Final 05/30/2018  8:44 AM [facility]   Chloride 103  94 - 109 mmol/L Final 05/30/2018  8:44 AM [facility]   Carbon Dioxide 30  20 - 32 mmol/L Final 05/30/2018  8:44 AM [facility]   Anion Gap 6  3 - 14 mmol/L Final 05/30/2018  8:44 AM [facility]   Glucose 114 (H) 70 - 99 mg/dL Final 05/30/2018  8:44 AM [facility]   Urea Nitrogen 18  7 - 30 mg/dL Final 05/30/2018  8:44 AM [facility]   Creatinine 1.02  0.66 - 1.25 mg/dL Final 05/30/2018  8:44 AM [facility]   GFR Estimate 71  >60 mL/min/1.7m2 Final 05/30/2018  8:44 AM [facility]   Comment:   Non  GFR Calc   GFR Estimate If Black 85  >60 mL/min/1.7m2 Final 05/30/2018  8:44 AM [facility]   Comment:   African American GFR Calc   Calcium 8.9  8.5 - 10.1 mg/dL Final 05/30/2018  8:44 AM [facility]   Bilirubin Total 1.7  (H) 0.2 - 1.3 mg/dL Final 05/30/2018  8:44 AM [facility]   Albumin 3.7  3.4 - 5.0 g/dL Final 05/30/2018  8:44 AM [facility]   Protein Total 7.3  6.8 - 8.8 g/dL Final 05/30/2018  8:44 AM [facility]   Alkaline Phosphatase 171 (H) 40 - 150 U/L Final 05/30/2018  8:44 AM [facility]   ALT 18  0 - 70 U/L Final 05/30/2018  8:44 AM [facility]   AST 24  0 - 45 U/L Final 05/30/2018  8:44 AM [facility]     Component Value Flag Ref Range Units Status Collected Lab   Hemoglobin A1C 5.4  0 - 5.6 % Final 05/01/2018 10:32 AM 53     Taye Phillips MD, MPH  Clinical Sleep Medicine    Total time spent with patient: 90 min. Over >50% of the time was spent for face to face counseling, education, and evaluation.

## 2018-06-08 ENCOUNTER — HOSPITAL ENCOUNTER (OUTPATIENT)
Dept: CARDIAC REHAB | Facility: CLINIC | Age: 78
End: 2018-06-08
Attending: INTERNAL MEDICINE
Payer: MEDICARE

## 2018-06-08 PROCEDURE — 93798 PHYS/QHP OP CAR RHAB W/ECG: CPT

## 2018-06-08 PROCEDURE — 40000116 ZZH STATISTIC OP CR VISIT

## 2018-06-10 ENCOUNTER — THERAPY VISIT (OUTPATIENT)
Dept: SLEEP MEDICINE | Facility: CLINIC | Age: 78
End: 2018-06-10
Payer: MEDICARE

## 2018-06-10 DIAGNOSIS — I48.20 CHRONIC ATRIAL FIBRILLATION (H): ICD-10-CM

## 2018-06-10 DIAGNOSIS — G47.19 EXCESSIVE DAYTIME SLEEPINESS: ICD-10-CM

## 2018-06-10 DIAGNOSIS — Z95.1 S/P CABG (CORONARY ARTERY BYPASS GRAFT): ICD-10-CM

## 2018-06-10 DIAGNOSIS — I49.3 FREQUENT PVCS: ICD-10-CM

## 2018-06-10 DIAGNOSIS — I50.810 RIGHT HEART FAILURE WITH REDUCED RIGHT VENTRICULAR FUNCTION (H): ICD-10-CM

## 2018-06-10 DIAGNOSIS — Z95.1 STATUS POST CORONARY ARTERY BYPASS GRAFT: ICD-10-CM

## 2018-06-10 DIAGNOSIS — I50.42 CHRONIC COMBINED SYSTOLIC AND DIASTOLIC CONGESTIVE HEART FAILURE (H): ICD-10-CM

## 2018-06-10 DIAGNOSIS — I51.7 RIGHT HEART ENLARGEMENT: ICD-10-CM

## 2018-06-10 DIAGNOSIS — I10 HTN, GOAL BELOW 150/90: ICD-10-CM

## 2018-06-10 DIAGNOSIS — F10.29 ALCOHOL DEPENDENCE WITH UNSPECIFIED ALCOHOL-INDUCED DISORDER (H): ICD-10-CM

## 2018-06-10 PROCEDURE — 95810 POLYSOM 6/> YRS 4/> PARAM: CPT | Performed by: FAMILY MEDICINE

## 2018-06-10 NOTE — MR AVS SNAPSHOT
After Visit Summary   6/10/2018    Milo Serna    MRN: [id number]           Patient Information     Date Of Birth          1940        Visit Information        Provider Department      6/10/2018 8:30 PM BED 3  SLEEP [facility]        Today's Diagnoses     Right heart enlargement        S/P CABG (coronary artery bypass graft)        Alcohol dependence with unspecified alcohol-induced disorder (H)        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        Chronic combined systolic and diastolic congestive heart failure (H)        Chronic atrial fibrillation (H)        Frequent PVCs        Status post coronary artery bypass graft        HTN, goal below 150/90        Excessive daytime sleepiness           Follow-ups after your visit        Your next 10 appointments already scheduled     Jun 13, 2018  8:00 AM CDT   Cardiac Treatment with Rh Cardiac Rehab 1   [facility] ([facility])    [address] [place] [address], [address]  [facility] [id number] [phone]            Jun 13, 2018 12:45 PM CDT   LAB with RU LAB   [facility] ([facility])    [address] [place] [address] [address]  [facility] [address] [phone]           Please do not eat 10-12 hours before your appointment if you are coming in fasting for labs on lipids, cholesterol, or glucose (sugar). This does not apply to pregnant women. Water, hot tea and black coffee (with nothing added) are okay. Do not drink other fluids, diet soda or chew gum.            Jun 13, 2018  1:50 PM CDT   Core Return with Juliano Caballero PA-C   [facility] ([facility])    [address] [place] [address] [address]  [facility] [id number] [phone]            Chris 15, 2018  8:00 AM CDT   Cardiac Treatment with Rh Cardiac Rehab 1   [facility] ([facility])    [address] [place] [address]  [facility], [address]  [facility] [address]   [phone]            Chris 15, 2018  9:00 AM CDT   CONSULT with Rh Cardiac Rehab 1   [facility] ([facility])    [address], [address]  [facility] [address]   [phone]            Jun 19, 2018  9:30 AM CDT   Cardiac Treatment with Rh Cardiac Rehab 2   [facility] ([facility])    [address], [address]  [facility] [address]   [phone]            Jun 19, 2018  2:00 PM CDT   Return Sleep Patient with Taye Phillips MD   [facility]    [address]  [facility] [address]   [phone]            Jun 20, 2018 11:00 AM CDT   Return Visit with Krystian Owens MD   [facility] ([facility])    [address]  [facility] [address]   [phone]            Jun 21, 2018  9:30 AM CDT   Cardiac Treatment with Rh Cardiac Rehab 2   [facility] ([facility])    [address], [address]  [facility] [address]   [phone]            Jun 22, 2018  8:00 AM CDT   Cardiac Treatment with Rh Cardiac Rehab 1   [facility] ([facility])    [address], [address]  [facility] [address]   [phone]              Who to contact     If you have questions or need follow up information about today's clinic visit or your schedule please contact [facility] directly at [phone].  Normal or non-critical lab and imaging results will be communicated to you by MyChart, letter or phone within 4 business days after the clinic has received the results. If you do not hear from us within 7 days, please contact the clinic through MyChart or phone. If you have a critical or abnormal lab result, we will notify you by phone as soon as possible.  Submit refill  requests through [facility] or call your pharmacy and they will forward the refill request to us. Please allow 3 business days for your refill to be completed.          Additional Information About Your Visit        PlaceBloggerhart Information     [facility] gives you secure access to your electronic health record. If you see a primary care provider, you can also send messages to your care team and make appointments. If you have questions, please call your primary care clinic.  If you do not have a primary care provider, please call [phone] and they will assist you.        Care EveryWhere ID     This is your Care EveryWhere ID. This could be used by other organizations to access your [facility] medical records  [id number]         Blood Pressure from Last 3 Encounters:   06/07/18 131/58   05/30/18 144/72   05/17/18 (P) 160/80    Weight from Last 3 Encounters:   06/07/18 79.4 kg (175 lb)   05/30/18 82.1 kg (181 lb 1.6 oz)   05/17/18 81.9 kg (180 lb 8 oz)              We Performed the Following     Comprehensive Sleep Study        Primary Care Provider Office Phone # Fax #    Oscar Alves [phone] [address] [facility] [address]        Equal Access to Services     JOSE BHAKTA : Hadii aad ku hadasho Soomaali, waaxda luqadaha, qaybta kaalmada adeegyada, chayito fuentesn alice abbasi. So [facility] [phone].    ATENCIÓN: Si habla español, tiene a randall disposición servicios gratuitos de asistencia lingüística. Llame al [phone].    We comply with applicable federal civil rights laws and [place] laws. We do not discriminate on the basis of race, color, national origin, age, disability, sex, sexual orientation, or gender identity.            Thank you!     Thank you for choosing [facility]  for your care. Our goal is always to provide you with excellent care. Hearing back from our patients is one way we can continue to improve our services. Please take a few minutes  to complete the written survey that you may receive in the mail after your visit with us. Thank you!             Your Updated Medication List - Protect others around you: Learn how to safely use, store and throw away your medicines at www.disposemymeds.org.          This list is accurate as of 6/10/18 11:59 PM.  Always use your most recent med list.                   Brand Name Dispense Instructions for use Diagnosis    acetaminophen 650 MG CR tablet    TYLENOL    100 tablet    Take 1 tablet (650 mg) by mouth every 6 hours as needed for pain    Osteoarthritis       apixaban ANTICOAGULANT 2.5 MG tablet    ELIQUIS    200 tablet    Take 1 tablet (2.5 mg) by mouth 2 times daily    Chronic atrial fibrillation (H)       aspirin 81 MG tablet      Take 81 mg by mouth daily        co-enzyme Q-10 100 MG Caps capsule     180 capsule    Take 1 capsule (100 mg) by mouth daily    Mixed hyperlipidemia       diclofenac 1 % Gel topical gel    VOLTAREN    100 g    Apply 4 gramsto area qid prn    Acute left-sided low back pain without sciatica       doxycycline 100 MG capsule    VIBRAMYCIN    90 capsule    TAKE 1 CAPSULE DAILY    Rosacea       FERROUS GLUCONATE PO      Take 324 mg by mouth daily (with breakfast)        Glucosamine-Chondroitin 250-200 MG Tabs    OSTEO BI-FLEX REGULAR STRENGTH    100 tablet    Take 1 tablet by mouth 2 times daily    Osteoarthritis       lisinopril 20 MG tablet    PRINIVIL/ZESTRIL    90 tablet    Take 1 tablet (20 mg) by mouth daily    HTN, goal below 150/90       metoprolol succinate 25 MG 24 hr tablet    TOPROL-XL    90 tablet    Take 1 tablet (25 mg) by mouth daily (with breakfast)    Frequent PVCs, Chronic atrial fibrillation (H)       MULTIVITAL Chew     360 tablet    Take 2 chew tab by mouth 2 times daily    Neuropathy of foot       naftifine 1 % Gel topical gel    NAFTIN    90 g    Apply topically daily Apply to affected nails daily    Onychomycosis of toenail       OMEGA 3-6-9 PO      Take 1  tablet by mouth daily        omeprazole 40 MG capsule    priLOSEC    90 capsule    TAKE 1 CAPSULE DAILY    PUD (peptic ulcer disease)       potassium chloride SA 10 MEQ CR tablet    K-DUR/KLOR-CON M    30 tablet    Take 1 tablet (10 mEq) by mouth daily    Frequent PVCs, Chronic combined systolic and diastolic congestive heart failure (H)       senna 8.6 MG tablet    SENOKOT    60 tablet    Take 1 tablet by mouth daily    Prostate cancer (H)       simvastatin 20 MG tablet    ZOCOR    90 tablet    Take 1 tablet (20 mg) by mouth At Bedtime    Hyperlipidemia LDL goal <100       sodium chloride 0.65 % nasal spray    OCEAN NASAL SPRAY    2 Bottle    Spray 1 spray into both nostrils daily as needed for congestion    Bloody nose       tamsulosin 0.4 MG capsule    FLOMAX    90 capsule    TAKE 1 CAPSULE DAILY    Benign prostatic hyperplasia with lower urinary tract symptoms       torsemide 20 MG tablet    DEMADEX    90 tablet    Take 1 tablet (20 mg) by mouth daily (with breakfast)    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, Chronic combined systolic and diastolic congestive heart failure (H)       Urea 20 % Crea cream     120 g    Apply topically as needed Apply to right foot daily    Right foot pain, Skin callus, Pes planus of both feet, Venous insufficiency of both lower extremities, Hav (hallux abducto valgus), right, Onychomycosis of toenail       zolpidem 5 MG tablet    AMBIEN    1 tablet    Take 1/2 tablet by mouth 15 minutes prior to sleep, for Sleep Study    Right heart enlargement, S/P CABG (coronary artery bypass graft), Alcohol dependence with unspecified alcohol-induced disorder (H), 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

## 2018-06-11 NOTE — PROGRESS NOTES
Diagnostic PSG completed per provider order.  Patient met criteria for PAP therapy. Pt refused treatment after very short time using.

## 2018-06-12 ENCOUNTER — HOSPITAL ENCOUNTER (OUTPATIENT)
Dept: CARDIAC REHAB | Facility: CLINIC | Age: 78
End: 2018-06-12
Attending: INTERNAL MEDICINE
Payer: MEDICARE

## 2018-06-12 PROCEDURE — 40000116 ZZH STATISTIC OP CR VISIT: Performed by: REHABILITATION PRACTITIONER

## 2018-06-12 PROCEDURE — 93798 PHYS/QHP OP CAR RHAB W/ECG: CPT | Performed by: REHABILITATION PRACTITIONER

## 2018-06-13 ENCOUNTER — OFFICE VISIT (OUTPATIENT)
Dept: CARDIOLOGY | Facility: CLINIC | Age: 78
End: 2018-06-13
Attending: PHYSICIAN ASSISTANT
Payer: MEDICARE

## 2018-06-13 ENCOUNTER — HOSPITAL ENCOUNTER (OUTPATIENT)
Dept: CARDIAC REHAB | Facility: CLINIC | Age: 78
End: 2018-06-13
Attending: INTERNAL MEDICINE
Payer: MEDICARE

## 2018-06-13 VITALS
HEIGHT: 67 IN | OXYGEN SATURATION: 98 % | HEART RATE: 40 BPM | DIASTOLIC BLOOD PRESSURE: 66 MMHG | SYSTOLIC BLOOD PRESSURE: 128 MMHG | WEIGHT: 175.1 LBS | BODY MASS INDEX: 27.48 KG/M2

## 2018-06-13 DIAGNOSIS — I50.42 CHRONIC COMBINED SYSTOLIC AND DIASTOLIC CONGESTIVE HEART FAILURE (H): ICD-10-CM

## 2018-06-13 DIAGNOSIS — I48.20 CHRONIC ATRIAL FIBRILLATION (H): ICD-10-CM

## 2018-06-13 DIAGNOSIS — I10 HTN, GOAL BELOW 150/90: ICD-10-CM

## 2018-06-13 DIAGNOSIS — I50.810 RIGHT HEART FAILURE WITH REDUCED RIGHT VENTRICULAR FUNCTION (H): ICD-10-CM

## 2018-06-13 DIAGNOSIS — I49.3 FREQUENT PVCS: ICD-10-CM

## 2018-06-13 LAB
ANION GAP SERPL CALCULATED.3IONS-SCNC: 9 MMOL/L (ref 3–14)
BUN SERPL-MCNC: 20 MG/DL (ref 7–30)
CALCIUM SERPL-MCNC: 8.8 MG/DL (ref 8.5–10.1)
CHLORIDE SERPL-SCNC: 102 MMOL/L (ref 94–109)
CO2 SERPL-SCNC: 27 MMOL/L (ref 20–32)
CREAT SERPL-MCNC: 0.88 MG/DL (ref 0.66–1.25)
GFR SERPL CREATININE-BSD FRML MDRD: 84 ML/MIN/1.7M2
GLUCOSE SERPL-MCNC: 94 MG/DL (ref 70–99)
NT-PROBNP SERPL-MCNC: 2155 PG/ML (ref 0–450)
POTASSIUM SERPL-SCNC: 3.6 MMOL/L (ref 3.4–5.3)
SODIUM SERPL-SCNC: 138 MMOL/L (ref 133–144)

## 2018-06-13 PROCEDURE — 93000 ELECTROCARDIOGRAM COMPLETE: CPT | Performed by: PHYSICIAN ASSISTANT

## 2018-06-13 PROCEDURE — 40000575 ZZH STATISTIC OP CARDIAC VISIT #2

## 2018-06-13 PROCEDURE — 93797 PHYS/QHP OP CAR RHAB WO ECG: CPT

## 2018-06-13 PROCEDURE — 83880 ASSAY OF NATRIURETIC PEPTIDE: CPT | Performed by: PHYSICIAN ASSISTANT

## 2018-06-13 PROCEDURE — 99214 OFFICE O/P EST MOD 30 MIN: CPT | Performed by: PHYSICIAN ASSISTANT

## 2018-06-13 PROCEDURE — 40000116 ZZH STATISTIC OP CR VISIT

## 2018-06-13 PROCEDURE — 93798 PHYS/QHP OP CAR RHAB W/ECG: CPT

## 2018-06-13 PROCEDURE — 80048 BASIC METABOLIC PNL TOTAL CA: CPT | Performed by: PHYSICIAN ASSISTANT

## 2018-06-13 PROCEDURE — 36415 COLL VENOUS BLD VENIPUNCTURE: CPT | Performed by: PHYSICIAN ASSISTANT

## 2018-06-13 RX ORDER — METOPROLOL SUCCINATE 25 MG/1
12.5 TABLET, EXTENDED RELEASE ORAL
Qty: 90 TABLET | Refills: 3 | Status: SHIPPED | OUTPATIENT
Start: 2018-06-13 | End: 2019-07-22

## 2018-06-13 RX ORDER — POTASSIUM CHLORIDE 750 MG/1
20 TABLET, EXTENDED RELEASE ORAL DAILY
Qty: 60 TABLET | Refills: 3 | Status: SHIPPED | OUTPATIENT
Start: 2018-06-13 | End: 2018-07-19

## 2018-06-13 NOTE — PROGRESS NOTES
CARDIOLOGY CLINIC PROGRESS NOTE - [facility]    DOS: 18    Milo Serna  : 1940, 77 year old  MRN: [id number]      History:   I had the pleasure of following up with Milo (he goes by Luís) MACIE Serna today in the [facility]. He is a patient of Dr. Conley and Dr. Duckworth.     He saw Dr. Duckworth last 17, and he was referred to the [facility] clinic.  He met with Dr. Conley 10/2/17.     Luís is a 77-year-old  gentleman known to have CAD s/p CABG (), chronic atrial fibrillation not requiring AV slowing agents (on lower-dose Eliquis anticoagulation 2.5 mg b.i.d. due to history of epistaxis), essential hypertension, stable ascending aorta dilatation of 4.2 cm.  He has had progressive dilatation of his right ventricle with mildly reduced systolic function (cardiac MRI 17 showed moderately dilated RV with normal systolic function, LVEF of 56%, severe biatrial enlargement and no evidence of intraatrial shunt), for which he saw Dr. Conley last .  More recently, he has been found to have a high burden of PVCs and a new decline in LVEF.      He underwent testing that has been reviewed by Dr. Conley.   -  V/Q scan was negative for pulmonary embolus.     -  Hepatitis and HIV workup was normal, HbA1c was 5.4%, normal renal panel with sodium of 141, potassium 4.5, BUN 12, creatinine 0.8, estimated GFR  greater than 90.  Normal TSH.  Hemoglobin 12.9.   -  A 24 hour Holter showed atrial fibrillation throughout with an average heart rate of 57 BPM, as low as 34 BPM when asleep.  However, he did have 17% (14,700) polymorphic PVCs with the longest run of 4 beats.  No patient-recorded symptoms.   -  Liver ultrasound showed a few small hepatic cysts without evidence of fatty infiltration of the liver.  Mild splenomegaly.  No ascites.  (This was done due to a history of excess alcohol intake in the past.)   -  Pulmonary function tests were satisfactory with a normal FEV1 of 2.7 L (104% predicted), FEV1/FVC  ratio 73%, normal diffusing capacity.   -  A repeat transthoracic echocardiogram showed a drop in LV function from previously normal to 35%-40% with inferior and inferolateral wall akinesis and moderate global hypokinesis.  The right ventricle was moderately dilated with moderately decreased systolic function.  There was severe biatrial enlargement without color evidence of atrial shunt.  Moderately severe functional mitral regurgitation, mild tricuspid regurgitation with an estimated RVSP of 50 mmHg.  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  Aortic valve was trileaflet and sclerotic.  In the underlying rhythm was atrial fibrillation with a rate of 50 BPM.  The ascending aorta was dilated at 4.0 cm (stable).   -  A 6 minute walk distance was 1300 feet (lower limit of normal 1167).  Oxygen saturation was above 95% at all times, and the heart rate went from a baseline of 52 to a peak of 97 BPM with normal blood pressure response.       This testing revealed a new decline in LVEF.  He has significant PVC burden of 17%.   She started him on torsemide 20 mg daily, and Toprol XL 25 mg. Lisinopril 20 mg was continued.     I met Luís to follow up. His NT pro BNP was elevated, but he had no sxs of CHF; therefore, we continued his current torsemide dose. His K was running lower, so we started KCl.     Interval History:   He presents today for follow up.   He is feeling well.  He has some very mild edema that is improved from prior.  He has some fatigue that he thinks is a little more pronounced over the past few weeks.    He is weighing at home, and weight is stable between 170-175 lbs.  In clinic, his weight is down 6 lbs since I last saw him.   He has been checking his BP at home and it has been 120s-130s mostly.   He is watching his diet and sodium consumption.   He has really cut down his alcohol intake.  He last had a drink 5 days ago, and then a week before that.   He is participating in cardiac rehab.    He has been seen in the sleep clinic.      ROS:  Skin:  Negative     Eyes:  Positive for cataracts;glasses  ENT:  Negative    Respiratory:    (saw Sleep MD 6/7/18)  Cardiovascular:    Positive for;fatigue  Gastroenterology: Negative    Genitourinary:  Positive for urinary frequency;prostate problem;nocturia  Musculoskeletal:  Positive for arthritis;back pain  Neurologic:  Positive for numbness or tingling of feet  Psychiatric:  Positive for sleep disturbances  Heme/Lymph/Imm:  Positive for easy bruising  Endocrine:  Negative      PAST MEDICAL HISTORY:  Past Medical History:   Diagnosis Date     Anemia, unspecified 11/8/2016     Atrial fibrillation (H) 5/27/2015     BPH (benign prostatic hyperplasia) 1/25/2012     CAD (coronary artery disease) 4/4/2012     Closed fracture of metatarsal bone 4/4/2012     Coronary atherosclerosis of unspecified type of vessel, native or graft [facility]    CAB 1996 following [facility] (at [facility])      Dilated aortic root (H) 5/26/2016     Drug-induced erectile dysfunction 10/2/2015     Elevated blood sugar 11/8/2016     Elevated PSA 9/19/2013     Essential hypertension with goal blood pressure less than 140/90 9/22/2016     Essential hypertension, benign      Gallbladder polyp 5/1/2018     Gout      HAV (hallux abducto valgus) 4/4/2012     Hernia, abdominal      History of prostate cancer 6/15/2016     HTN, goal below 150/90 4/20/2017     Iron deficiency anemia secondary to inadequate dietary iron intake 11/15/2016     Low blood pressure reading 10/17/2016     Lumbago     had degendisc dz on MRI 7/07     Mixed hyperlipidemia      Mumps      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     OA (osteoarthritis) 4/4/2012     Old myocardial infarction 4/4/2012     Palpitations      Pes planus 4/4/2012     Prostate cancer (H) 5/26/2016     PUD (peptic ulcer disease) 4/4/2012     Pulmonary hypertension 9/25/2017     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     Rosacea 1/15/2008     Thrombocytopenia (H) 4/21/2017      Unspecified hyperplasia of prostate with urinary obstruction and other lower urinary tract symptoms (LUTS)     better on avodart & hytrin     Venous stasis 4/4/2012     Venous stasis 4/4/2012       PAST SURGICAL HISTORY:  Past Surgical History:   Procedure Laterality Date     C NONSPECIFIC PROCEDURE      CAB 1996 [place]     C NONSPECIFIC PROCEDURE  2/07    l inguinal hernia repair      C NONSPECIFIC PROCEDURE      R hernia remote     C NONSPECIFIC PROCEDURE  2000    R ankle ORIF for fx     C NONSPECIFIC PROCEDURE  2005    nasal surgery      C NONSPECIFIC PROCEDURE      R rotater repair (remote)      C NONSPECIFIC PROCEDURE      appy 1966     C NONSPECIFIC PROCEDURE      sinus surgery x 2     C NONSPECIFIC PROCEDURE      L shoulder 6/09     CARDIAC SURGERY      bipass     CYSTOSCOPY FLEXIBLE, CYOABLATION PROSTATE N/A 11/9/2016    Procedure: CYSTOSCOPY FLEXIBLE, CRYOABLATION PROSTATE;  Surgeon: Krystian Owens MD;  Location: [facility] OR     GENITOURINARY SURGERY      prostate surgery      HERNIA REPAIR       LAPAROSCOPIC HERNIORRHAPHY INGUINAL Right 5/10/2017    Procedure: LAPAROSCOPIC HERNIORRHAPHY INGUINAL;  Laparoscopic preperitoneal repair of right inguinal hernia with placement of underlay mesh;  Surgeon: Enrico Bridges MD;  Location: [facility] OR     PROSTATE SURGERY         SOCIAL HISTORY:  Social History     Social History     Marital status:      Spouse name: N/A     Number of children: N/A     Years of education: N/A     Social History Main Topics     Smoking status: Former Smoker     Smokeless tobacco: Never Used      Comment: quit 3/1974     Alcohol use 0.0 oz/week     0 Standard drinks or equivalent per week      Comment: 1-2 drinks per day     Drug use: No     Sexual activity: Yes     Partners: Female     Other Topics Concern     Caffeine Concern Yes     1 cups of coffee and soda per day     Special Diet No     Exercise Yes     walking     Seat Belt Yes     Social History Narrative        FAMILY HISTORY:  Family History   Problem Relation Age of Onset     CANCER Mother      stomach cancer,  age 61     HEART DISEASE Father      MI,  age 71     HEART DISEASE Brother      stent placement, RA      Hypertension Brother      HEART DISEASE Sister      rythmn problems with heart, hypertension       MEDS:   Current Outpatient Prescriptions on File Prior to Visit:  acetaminophen (TYLENOL) 650 MG CR tablet Take 1 tablet (650 mg) by mouth every 6 hours as needed for pain   apixaban ANTICOAGULANT (ELIQUIS) 2.5 MG tablet Take 1 tablet (2.5 mg) by mouth 2 times daily   aspirin 81 MG tablet Take 81 mg by mouth daily   co-enzyme Q-10 100 MG CAPS Take 1 capsule (100 mg) by mouth daily   diclofenac (VOLTAREN) 1 % GEL Apply 4 gramsto area qid prn   doxycycline (VIBRAMYCIN) 100 MG capsule TAKE 1 CAPSULE DAILY   FERROUS GLUCONATE PO Take 324 mg by mouth daily (with breakfast)   Glucosamine-Chondroitin (OSTEO BI-FLEX REGULAR STRENGTH) 250-200 MG TABS Take 1 tablet by mouth 2 times daily   lisinopril (PRINIVIL/ZESTRIL) 20 MG tablet Take 1 tablet (20 mg) by mouth daily   metoprolol succinate (TOPROL-XL) 25 MG 24 hr tablet Take 1 tablet (25 mg) by mouth daily (with breakfast)   Multiple Vitamins-Minerals (MULTIVITAL) CHEW Take 2 chew tab by mouth 2 times daily   naftifine (NAFTIN) 1 % GEL topical gel Apply topically daily Apply to affected nails daily   Omega 3-6-9 Fatty Acids (OMEGA 3-6-9 PO) Take 1 tablet by mouth daily    omeprazole (PRILOSEC) 40 MG capsule TAKE 1 CAPSULE DAILY   senna (SENOKOT) 8.6 MG tablet Take 1 tablet by mouth daily   simvastatin (ZOCOR) 20 MG tablet Take 1 tablet (20 mg) by mouth At Bedtime   sodium chloride (OCEAN NASAL SPRAY) 0.65 % nasal spray Spray 1 spray into both nostrils daily as needed for congestion   tamsulosin (FLOMAX) 0.4 MG capsule TAKE 1 CAPSULE DAILY   torsemide (DEMADEX) 20 MG tablet Take 1 tablet (20 mg) by mouth daily (with breakfast)   Urea 20 % CREA cream  "Apply topically as needed Apply to right foot daily   zolpidem (AMBIEN) 5 MG tablet Take 1/2 tablet by mouth 15 minutes prior to sleep, for Sleep Study     No current facility-administered medications on file prior to visit.     ALLERGIES: No Known Allergies    PHYSICAL EXAM:  Vitals: /66 (BP Location: Right arm, Patient Position: Chair, Cuff Size: Adult Small)  Pulse (!) 40  Ht 1.702 m (5' 7\")  Wt 79.4 kg (175 lb 1.6 oz)  SpO2 98%  BMI 27.42 kg/m2  Constitutional:  cooperative, alert and oriented, well developed, well nourished, in no acute distress        Skin:  warm and dry to the touch        Head:  normocephalic        Eyes:  pupils equal and round;conjunctivae and lids unremarkable        ENT:  no pallor or cyanosis        Neck:  JVP normal        Respiratory:  clear to auscultation        Cardiac:   bradycardic;irregular rhythm;frequent premature beats distant heart sounds   systolic murmur;grade 2;grade 3;apical          GI:  abdomen soft;non-tender        Vascular:                                        Extremities and Musculoskeletal:  no deformities, clubbing, cyanosis, erythema observed   chronic lower extremity skin discoloration consistent with possible venous stasis     Neurological:  no gross motor deficits;affect appropriate          LABS/DATA:  I reviewed the following:  Component      Latest Ref Rng & Units 5/30/2018   Sodium      133 - 144 mmol/L 139   Potassium      3.4 - 5.3 mmol/L 3.7   Chloride      94 - 109 mmol/L 103   Carbon Dioxide      20 - 32 mmol/L 30   Anion Gap      3 - 14 mmol/L 6   Glucose      70 - 99 mg/dL 114 (H)   Urea Nitrogen      7 - 30 mg/dL 18   Creatinine      0.66 - 1.25 mg/dL 1.02   GFR Estimate      >60 mL/min/1.7m2 71   GFR Estimate If Black      >60 mL/min/1.7m2 85   Calcium      8.5 - 10.1 mg/dL 8.9   Bilirubin Total      0.2 - 1.3 mg/dL 1.7 (H)   Albumin      3.4 - 5.0 g/dL 3.7   Protein Total      6.8 - 8.8 g/dL 7.3   Alkaline Phosphatase      40 - 150 " U/L 171 (H)   ALT      0 - 70 U/L 18   AST      0 - 45 U/L 24   WBC      4.0 - 11.0 10e9/L 4.1   RBC Count      4.4 - 5.9 10e12/L 3.84 (L)   Hemoglobin      13.3 - 17.7 g/dL 12.2 (L)   Hematocrit      40.0 - 53.0 % 37.2 (L)   MCV      78 - 100 fl 97   MCH      26.5 - 33.0 pg 31.8   MCHC      31.5 - 36.5 g/dL 32.8   RDW      10.0 - 15.0 % 14.5   Platelet Count      150 - 450 10e9/L 111 (L)   N-Terminal Pro Bnp      0 - 450 pg/mL 3161 (H)       Component      Latest Ref Rng & Units 6/13/2018   Sodium      133 - 144 mmol/L 138   Potassium      3.4 - 5.3 mmol/L 3.6   Chloride      94 - 109 mmol/L 102   Carbon Dioxide      20 - 32 mmol/L 27   Anion Gap      3 - 14 mmol/L 9   Glucose      70 - 99 mg/dL 94   Urea Nitrogen      7 - 30 mg/dL 20   Creatinine      0.66 - 1.25 mg/dL 0.88   GFR Estimate      >60 mL/min/1.7m2 84   GFR Estimate If Black      >60 mL/min/1.7m2 >90   Calcium      8.5 - 10.1 mg/dL 8.8   N-Terminal Pro Bnp      0 - 450 pg/mL 2155 (H)         ASSESSMENT/PLAN:  1.  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   - ? If drop in LVEF from 55% to 40% is ischemic vs due to PVCs.  He has old grafts but no angina.   - Echo showed his IVC was dilated and collapsible. Torsemide 20 mg was started.  His weight is stable to down a bit, and his NT pro BNP is coming down, today it is 2155 (was 3161).   No ARAMBULA/SOB.  Continue torsemide at this dose.   His K went from 4.5 in early May to 3.7 5/30/18.  With his low LVEF and frequent ectopy, would like his K closer to 4.0. Started KCl 10 mEq daily. K today is 3.6.  Will increase to 20 mEq daily.  BMP in 1-2 weeks.  - Continue lisinopril 20 mg.  He has more fatigue, and bradycardia (but also frequent PVCs).  Will review Toprol XL dose with Dr. Conley.     - Continue cardiac rehab  - He has been educated to cut down his alcohol use from 2 drinks per day (1 beer and 1 wine) to 1 drink per day, ideally less.  He is doing well with this -  having only had a drink 5 days ago and then 1 week before that.   - Undergoing sleep evaluation currently.  - Dr. Conley will determine timing of proceeding to diagnostic coronary/graft angiography and right heart catheterization. Pending this eval, may need to consider EP referral re high PVC burden and decreased LVEF.     2.  Reduced right ventricular systolic function.  - Evaluation per Dr. Conley   - His CMP from 5/30/18 show his total bili and alk phos are elevated.  I will defer this to Dr. Conley.   - Dr. Conley will determine timing of proceeding to diagnostic coronary/graft angiography and right heart catheterization.     3.  Chronic atrial fibrillation with slow ventricular response in the absence of AV lelia slowing agents.   - Prior history of epistaxis, therefore on lower-dose apixaban.   - Low dose Toprol XL started for CMY and PVCs.  Noted is that his heart rate is in the 40s, and it does drop down to mid 30s when he is asleep.   He has more fatigue lately.  Will review Toprol XL dose with Dr. Conley.  We may need to decrease or discontinue.      4.  Frequent premature ventricular complexes (17% ventricular ectopy burden).   - Toprol XL 25 mg started.  We may be decreasing this.   - Repeat Holter is scheduled for July.   - May need EP referral pending ischemic evaluation re decreased LVEF.     5.  H/o coronary artery disease, status post CABG in 1997 (grafts not known).    - No anginal sxs  - On ASA 81 mg (also Eliquis 2.5 mg BID for afib), BB, simvastatin 20 mg.     6.  Hypertension. Controlled on lisinopril 20 mg, torsemide 20 mg, and Toprol XL 25 mg.  We may be decreasing the BB due to bradycardia and fatigue.          Follow up:  BMP in 1-2 weeks.   Dr. Conley with Holter and labs in July.      ADDENDUM   Discussed with Dr. Conley. We will decrease Toprol XL to 12.5 mg daily.             Juliano Caballero PA-C

## 2018-06-13 NOTE — LETTER
2018    Oscar Alves MD  [address]  [facility] [address]    RE: Milo Hudsonnancy       Dear Colleague,    I had the pleasure of seeing Milo Serna in the [facility].    CARDIOLOGY CLINIC PROGRESS NOTE - [facility]    DOS: 18    Milo Serna  : 1940, 77 year old  MRN: [id number]      History:   I had the pleasure of following up with Milo (he goes by LuísKena Serna today in the [facility]. He is a patient of Dr. Conley and Dr. Duckworth.     He saw Dr. Duckworth last 17, and he was referred to the [facility] clinic.  He met with Dr. Conley 10/2/17.     Luís is a 77-year-old  gentleman known to have CAD s/p CABG (), chronic atrial fibrillation not requiring AV slowing agents (on lower-dose Eliquis anticoagulation 2.5 mg b.i.d. due to history of epistaxis), essential hypertension, stable ascending aorta dilatation of 4.2 cm.  He has had progressive dilatation of his right ventricle with mildly reduced systolic function (cardiac MRI 17 showed moderately dilated RV with normal systolic function, LVEF of 56%, severe biatrial enlargement and no evidence of intraatrial shunt), for which he saw Dr. Conley last .  More recently, he has been found to have a high burden of PVCs and a new decline in LVEF.      He underwent testing that has been reviewed by Dr. Conley.   -  V/Q scan was negative for pulmonary embolus.     -  Hepatitis and HIV workup was normal, HbA1c was 5.4%, normal renal panel with sodium of 141, potassium 4.5, BUN 12, creatinine 0.8, estimated GFR  greater than 90.  Normal TSH.  Hemoglobin 12.9.   -  A 24 hour Holter showed atrial fibrillation throughout with an average heart rate of 57 BPM, as low as 34 BPM when asleep.  However, he did have 17% (14,700) polymorphic PVCs with the longest run of 4 beats.  No patient-recorded symptoms.   -  Liver ultrasound showed a few small hepatic cysts without evidence of fatty infiltration of  the liver.  Mild splenomegaly.  No ascites.  (This was done due to a history of excess alcohol intake in the past.)   -  Pulmonary function tests were satisfactory with a normal FEV1 of 2.7 L (104% predicted), FEV1/FVC ratio 73%, normal diffusing capacity.   -  A repeat transthoracic echocardiogram showed a drop in LV function from previously normal to 35%-40% with inferior and inferolateral wall akinesis and moderate global hypokinesis.  The right ventricle was moderately dilated with moderately decreased systolic function.  There was severe biatrial enlargement without color evidence of atrial shunt.  Moderately severe functional mitral regurgitation, mild tricuspid regurgitation with an estimated RVSP of 50 mmHg.  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  Aortic valve was trileaflet and sclerotic.  In the underlying rhythm was atrial fibrillation with a rate of 50 BPM.  The ascending aorta was dilated at 4.0 cm (stable).   -  A 6 minute walk distance was 1300 feet (lower limit of normal 1167).  Oxygen saturation was above 95% at all times, and the heart rate went from a baseline of 52 to a peak of 97 BPM with normal blood pressure response.       This testing revealed a new decline in LVEF.  He has significant PVC burden of 17%.   She started him on torsemide 20 mg daily, and Toprol XL 25 mg. Lisinopril 20 mg was continued.     I met Skip to follow up. His NT pro BNP was elevated, but he had no sxs of CHF; therefore, we continued his current torsemide dose. His K was running lower, so we started KCl.     Interval History:   He presents today for follow up.   He is feeling well.  He has some very mild edema that is improved from prior.  He has some fatigue that he thinks is a little more pronounced over the past few weeks.    He is weighing at home, and weight is stable between 170-175 lbs.  In clinic, his weight is down 6 lbs since I last saw him.   He has been checking his BP at home and it has  been 120s-130s mostly.   He is watching his diet and sodium consumption.   He has really cut down his alcohol intake.  He last had a drink 5 days ago, and then a week before that.   He is participating in cardiac rehab.   He has been seen in the sleep clinic.      ROS:  Skin:  Negative     Eyes:  Positive for cataracts;glasses  ENT:  Negative    Respiratory:    (saw Sleep MD 6/7/18)  Cardiovascular:    Positive for;fatigue  Gastroenterology: Negative    Genitourinary:  Positive for urinary frequency;prostate problem;nocturia  Musculoskeletal:  Positive for arthritis;back pain  Neurologic:  Positive for numbness or tingling of feet  Psychiatric:  Positive for sleep disturbances  Heme/Lymph/Imm:  Positive for easy bruising  Endocrine:  Negative      PAST MEDICAL HISTORY:  Past Medical History:   Diagnosis Date     Anemia, unspecified 11/8/2016     Atrial fibrillation (H) 5/27/2015     BPH (benign prostatic hyperplasia) 1/25/2012     CAD (coronary artery disease) 4/4/2012     Closed fracture of metatarsal bone 4/4/2012     Coronary atherosclerosis of unspecified type of vessel, native or graft [facility]    CAB 1996 following MI (at [facility])      Dilated aortic root (H) 5/26/2016     Drug-induced erectile dysfunction 10/2/2015     Elevated blood sugar 11/8/2016     Elevated PSA 9/19/2013     Essential hypertension with goal blood pressure less than 140/90 9/22/2016     Essential hypertension, benign      Gallbladder polyp 5/1/2018     Gout      HAV (hallux abducto valgus) 4/4/2012     Hernia, abdominal      History of prostate cancer 6/15/2016     HTN, goal below 150/90 4/20/2017     Iron deficiency anemia secondary to inadequate dietary iron intake 11/15/2016     Low blood pressure reading 10/17/2016     Lumbago     had degendisc dz on MRI 7/07     Mixed hyperlipidemia      Mumps      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     OA (osteoarthritis) 4/4/2012     Old myocardial infarction 4/4/2012     Palpitations       Pes planus 4/4/2012     Prostate cancer (H) 5/26/2016     PUD (peptic ulcer disease) 4/4/2012     Pulmonary hypertension 9/25/2017     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     Rosacea 1/15/2008     Thrombocytopenia (H) 4/21/2017     Unspecified hyperplasia of prostate with urinary obstruction and other lower urinary tract symptoms (LUTS)     better on avodart & hytrin     Venous stasis 4/4/2012     Venous stasis 4/4/2012       PAST SURGICAL HISTORY:  Past Surgical History:   Procedure Laterality Date     C NONSPECIFIC PROCEDURE      CAB 1996 [place]     C NONSPECIFIC PROCEDURE  2/07    l inguinal hernia repair      C NONSPECIFIC PROCEDURE      R hernia remote     C NONSPECIFIC PROCEDURE  2000    R ankle ORIF for fx     C NONSPECIFIC PROCEDURE  2005    nasal surgery      C NONSPECIFIC PROCEDURE      R rotater repair (remote)      C NONSPECIFIC PROCEDURE      appy 1966     C NONSPECIFIC PROCEDURE      sinus surgery x 2     C NONSPECIFIC PROCEDURE      L shoulder 6/09     CARDIAC SURGERY      bipass     CYSTOSCOPY FLEXIBLE, CYOABLATION PROSTATE N/A 11/9/2016    Procedure: CYSTOSCOPY FLEXIBLE, CRYOABLATION PROSTATE;  Surgeon: Krystian Owens MD;  Location: [facility] OR     GENITOURINARY SURGERY      prostate surgery      HERNIA REPAIR       LAPAROSCOPIC HERNIORRHAPHY INGUINAL Right 5/10/2017    Procedure: LAPAROSCOPIC HERNIORRHAPHY INGUINAL;  Laparoscopic preperitoneal repair of right inguinal hernia with placement of underlay mesh;  Surgeon: Enrico Bridges MD;  Location: [facility] OR     PROSTATE SURGERY         SOCIAL HISTORY:  Social History     Social History     Marital status:      Spouse name: N/A     Number of children: N/A     Years of education: N/A     Social History Main Topics     Smoking status: Former Smoker     Smokeless tobacco: Never Used      Comment: quit 3/1974     Alcohol use 0.0 oz/week     0 Standard drinks or equivalent per week      Comment: 1-2 drinks per day     Drug use: No     Sexual activity: Yes      Partners: Female     Other Topics Concern     Caffeine Concern Yes     1 cups of coffee and soda per day     Special Diet No     Exercise Yes     walking     Seat Belt Yes     Social History Narrative       FAMILY HISTORY:  Family History   Problem Relation Age of Onset     CANCER Mother      stomach cancer,  age 61     HEART DISEASE Father      MI,  age 71     HEART DISEASE Brother      stent placement, RA      Hypertension Brother      HEART DISEASE Sister      rythmn problems with heart, hypertension       MEDS:   Current Outpatient Prescriptions on File Prior to Visit:  acetaminophen (TYLENOL) 650 MG CR tablet Take 1 tablet (650 mg) by mouth every 6 hours as needed for pain   apixaban ANTICOAGULANT (ELIQUIS) 2.5 MG tablet Take 1 tablet (2.5 mg) by mouth 2 times daily   aspirin 81 MG tablet Take 81 mg by mouth daily   co-enzyme Q-10 100 MG CAPS Take 1 capsule (100 mg) by mouth daily   diclofenac (VOLTAREN) 1 % GEL Apply 4 gramsto area qid prn   doxycycline (VIBRAMYCIN) 100 MG capsule TAKE 1 CAPSULE DAILY   FERROUS GLUCONATE PO Take 324 mg by mouth daily (with breakfast)   Glucosamine-Chondroitin (OSTEO BI-FLEX REGULAR STRENGTH) 250-200 MG TABS Take 1 tablet by mouth 2 times daily   lisinopril (PRINIVIL/ZESTRIL) 20 MG tablet Take 1 tablet (20 mg) by mouth daily   metoprolol succinate (TOPROL-XL) 25 MG 24 hr tablet Take 1 tablet (25 mg) by mouth daily (with breakfast)   Multiple Vitamins-Minerals (MULTIVITAL) CHEW Take 2 chew tab by mouth 2 times daily   naftifine (NAFTIN) 1 % GEL topical gel Apply topically daily Apply to affected nails daily   Omega 3-6-9 Fatty Acids (OMEGA 3-6-9 PO) Take 1 tablet by mouth daily    omeprazole (PRILOSEC) 40 MG capsule TAKE 1 CAPSULE DAILY   senna (SENOKOT) 8.6 MG tablet Take 1 tablet by mouth daily   simvastatin (ZOCOR) 20 MG tablet Take 1 tablet (20 mg) by mouth At Bedtime   sodium chloride (OCEAN NASAL SPRAY) 0.65 % nasal spray Spray 1 spray into both  "nostrils daily as needed for congestion   tamsulosin (FLOMAX) 0.4 MG capsule TAKE 1 CAPSULE DAILY   torsemide (DEMADEX) 20 MG tablet Take 1 tablet (20 mg) by mouth daily (with breakfast)   Urea 20 % CREA cream Apply topically as needed Apply to right foot daily   zolpidem (AMBIEN) 5 MG tablet Take 1/2 tablet by mouth 15 minutes prior to sleep, for Sleep Study     No current facility-administered medications on file prior to visit.     ALLERGIES: No Known Allergies    PHYSICAL EXAM:  Vitals: /66 (BP Location: Right arm, Patient Position: Chair, Cuff Size: Adult Small)  Pulse (!) 40  Ht 1.702 m (5' 7\")  Wt 79.4 kg (175 lb 1.6 oz)  SpO2 98%  BMI 27.42 kg/m2  Constitutional:  cooperative, alert and oriented, well developed, well nourished, in no acute distress        Skin:  warm and dry to the touch        Head:  normocephalic        Eyes:  pupils equal and round;conjunctivae and lids unremarkable        ENT:  no pallor or cyanosis        Neck:  JVP normal        Respiratory:  clear to auscultation        Cardiac:   bradycardic;irregular rhythm;frequent premature beats distant heart sounds   systolic murmur;grade 2;grade 3;apical          GI:  abdomen soft;non-tender        Vascular:                                        Extremities and Musculoskeletal:  no deformities, clubbing, cyanosis, erythema observed   chronic lower extremity skin discoloration consistent with possible venous stasis     Neurological:  no gross motor deficits;affect appropriate          LABS/DATA:  I reviewed the following:  Component      Latest Ref Rng & Units 5/30/2018   Sodium      133 - 144 mmol/L 139   Potassium      3.4 - 5.3 mmol/L 3.7   Chloride      94 - 109 mmol/L 103   Carbon Dioxide      20 - 32 mmol/L 30   Anion Gap      3 - 14 mmol/L 6   Glucose      70 - 99 mg/dL 114 (H)   Urea Nitrogen      7 - 30 mg/dL 18   Creatinine      0.66 - 1.25 mg/dL 1.02   GFR Estimate      >60 mL/min/1.7m2 71   GFR Estimate If Black      >60 " mL/min/1.7m2 85   Calcium      8.5 - 10.1 mg/dL 8.9   Bilirubin Total      0.2 - 1.3 mg/dL 1.7 (H)   Albumin      3.4 - 5.0 g/dL 3.7   Protein Total      6.8 - 8.8 g/dL 7.3   Alkaline Phosphatase      40 - 150 U/L 171 (H)   ALT      0 - 70 U/L 18   AST      0 - 45 U/L 24   WBC      4.0 - 11.0 10e9/L 4.1   RBC Count      4.4 - 5.9 10e12/L 3.84 (L)   Hemoglobin      13.3 - 17.7 g/dL 12.2 (L)   Hematocrit      40.0 - 53.0 % 37.2 (L)   MCV      78 - 100 fl 97   MCH      26.5 - 33.0 pg 31.8   MCHC      31.5 - 36.5 g/dL 32.8   RDW      10.0 - 15.0 % 14.5   Platelet Count      150 - 450 10e9/L 111 (L)   N-Terminal Pro Bnp      0 - 450 pg/mL 3161 (H)       Component      Latest Ref Rng & Units 6/13/2018   Sodium      133 - 144 mmol/L 138   Potassium      3.4 - 5.3 mmol/L 3.6   Chloride      94 - 109 mmol/L 102   Carbon Dioxide      20 - 32 mmol/L 27   Anion Gap      3 - 14 mmol/L 9   Glucose      70 - 99 mg/dL 94   Urea Nitrogen      7 - 30 mg/dL 20   Creatinine      0.66 - 1.25 mg/dL 0.88   GFR Estimate      >60 mL/min/1.7m2 84   GFR Estimate If Black      >60 mL/min/1.7m2 >90   Calcium      8.5 - 10.1 mg/dL 8.8   N-Terminal Pro Bnp      0 - 450 pg/mL 2155 (H)         ASSESSMENT/PLAN:  1.  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   - ? If drop in LVEF from 55% to 40% is ischemic vs due to PVCs.  He has old grafts but no angina.   - Echo showed his IVC was dilated and collapsible. Torsemide 20 mg was started.  His weight is stable to down a bit, and his NT pro BNP is coming down, today it is 2155 (was 3161).   No ARAMBULA/SOB.  Continue torsemide at this dose.   His K went from 4.5 in early May to 3.7 5/30/18.  With his low LVEF and frequent ectopy, would like his K closer to 4.0. Started KCl 10 mEq daily. K today is 3.6.  Will increase to 20 mEq daily.  BMP in 1-2 weeks.  - Continue lisinopril 20 mg.  He has more fatigue, and bradycardia (but also frequent PVCs).  Will review Toprol XL  dose with Dr. Conley.     - Continue cardiac rehab  - He has been educated to cut down his alcohol use from 2 drinks per day (1 beer and 1 wine) to 1 drink per day, ideally less.  He is doing well with this - having only had a drink 5 days ago and then 1 week before that.   - Undergoing sleep evaluation currently.  - Dr. Conley will determine timing of proceeding to diagnostic coronary/graft angiography and right heart catheterization. Pending this eval, may need to consider EP referral re high PVC burden and decreased LVEF.     2.  Reduced right ventricular systolic function.  - Evaluation per Dr. Conley   - His CMP from 5/30/18 show his total bili and alk phos are elevated.  I will defer this to Dr. Conley.   - Dr. Conley will determine timing of proceeding to diagnostic coronary/graft angiography and right heart catheterization.     3.  Chronic atrial fibrillation with slow ventricular response in the absence of AV lelia slowing agents.   - Prior history of epistaxis, therefore on lower-dose apixaban.   - Low dose Toprol XL started for CMY and PVCs.  Noted is that his heart rate is in the 40s, and it does drop down to mid 30s when he is asleep.   He has more fatigue lately.  Will review Toprol XL dose with Dr. Conley.  We may need to decrease or discontinue.      4.  Frequent premature ventricular complexes (17% ventricular ectopy burden).   - Toprol XL 25 mg started.  We may be decreasing this.   - Repeat Holter is scheduled for July.   - May need EP referral pending ischemic evaluation re decreased LVEF.     5.  H/o coronary artery disease, status post CABG in 1997 (grafts not known).    - No anginal sxs  - On ASA 81 mg (also Eliquis 2.5 mg BID for afib), BB, simvastatin 20 mg.     6.  Hypertension.  Controlled on lisinopril 20 mg, torsemide 20 mg, and Toprol XL 25 mg.  We may be decreasing the BB due to bradycardia and fatigue.          Follow up:  BMP in 1-2 weeks.   Dr. Conley with Holter and labs in July.      ADDENDUM    Discussed with Dr. Conley. We will decrease Toprol XL to 12.5 mg daily.             Juliano Caballero PA-C    Toprol XL decreased from 25 mg QD to 12.5 mg. Medication list in EPIC updated, pharm updated, generic message left for pt to call [facility].  Marga Roberson, RN 3:23 PM 06/13/18      Thank you for allowing me to participate in the care of your patient.      Sincerely,     Juliano Caballero PA-C     [facility], [place]    cc:   Juliano Caballero PA-C  [address] [place] [address]

## 2018-06-13 NOTE — NURSING NOTE
The After Visit Summary was reviewed with the patient following their office visit with Juliano Caballero PA-C. Patient was educated about any medication changes, the importance of following a low sodium diet, importance of recording daily weights, and when to call [facility] clinic. Patient verbalized understanding of the information and agrees to call with any questions or concerns.     Labs: Lab results from today were reviewed with the patient during the office visit. A copy of the results were provided on the After Visit Summary.     Return appointment: Patient was given instructions on when and how to schedule their next office visit with the [facility].     - BMP (blood test) in 1-2 weeks   - Dr. Conley in July      Medication Changes:   Increase potassium (KDur) to 20 meq daily.  You will take 2 tablets now, instead of one.  - I will discuss the Toprol XL dose with Dr. Conley, and will call you if she would like to lower the dose.        Marga Roberson RN  [facility] Care Coordinator  [facility]  [phone]

## 2018-06-13 NOTE — PATIENT INSTRUCTIONS
Call the [facility] nurse for any questions or concerns:  [phone]    1.  Medication changes from today:  - Increase potassium (KDur) to 20 meq daily.  You will take 2 tablets now, instead of one.  - I will discuss the Toprol XL dose with Dr. Conley, and will call you if she would like to lower the dose.        2. Follow up plan:   - BMP (blood test) in 1-2 weeks   - Dr. Conley in July      3.  Weigh yourself daily and write it down.    4.  Call [facility] nurse if your weight is up more than 2 pounds in one day or 5 pounds in one week.    5.  Call [facility] nurse if you feel more short of breath, have more abdominal bloating, or leg swelling.    6.  Continue low sodium diet (less than 2000 mg daily). If you eat less salt, you will retain less fluid.    7.  Alcohol can weaken your heart further. You should avoid alcohol or limit its use to special times, such as a holiday or birthday.     8.  Do NOT take Aleve (naproxen) or Advil (ibuprofen) without talking to your doctor first.     9.  Lab Results:   Component      Latest Ref Rng & Units 6/13/2018   Sodium      133 - 144 mmol/L 138   Potassium      3.4 - 5.3 mmol/L 3.6   Chloride      94 - 109 mmol/L 102   Carbon Dioxide      20 - 32 mmol/L 27   Anion Gap      3 - 14 mmol/L 9   Glucose      70 - 99 mg/dL 94   Urea Nitrogen      7 - 30 mg/dL 20   Creatinine      0.66 - 1.25 mg/dL 0.88   GFR Estimate      >60 mL/min/1.7m2 84   GFR Estimate If Black      >60 mL/min/1.7m2 >90   Calcium      8.5 - 10.1 mg/dL 8.8   N-Terminal Pro Bnp      0 - 450 pg/mL 2155 (H)        [facility]: Cardiomyopathy, Optimization, Rehabilitation, Education  The [facility] is a heart failure specialty clinic within the [facility] where you will work with your cardiologist, nurse practitioners, physician assistants and registered nurses who specialize in heart failure care. They are dedicated to helping patients with heart failure to carefully adjust medications, receive  education, and learn who and when to call if symptoms develop. They specialize in helping you better understand your condition, slow the progression of your disease, improve the length and quality of your life, help you detect future heart problems before they become life threatening, and avoid hospitalizations.

## 2018-06-13 NOTE — PROGRESS NOTES
Toprol XL decreased from 25 mg QD to 12.5 mg. Medication list in EPIC updated, pharm updated, generic message left for pt to call [facility] clinic.  Marga Roberson RN 3:23 PM 06/13/18

## 2018-06-13 NOTE — LETTER
2018    Oscar Alves MD  [address]  [facility] [address]    RE: Milo Hudsonnancy       Dear Colleague,    I had the pleasure of seeing Milo Serna in the [facility].    CARDIOLOGY CLINIC PROGRESS NOTE - [facility]    DOS: 18    Milo Serna  : 1940, 77 year old  MRN: [id number]      History:   I had the pleasure of following up with Milo (he goes by LuísKena Serna today in the [facility]. He is a patient of Dr. Conley and Dr. Duckworth.     He saw Dr. Duckworth last 17, and he was referred to the [facility] clinic.  He met with Dr. Conley 10/2/17.     Luís is a 77-year-old  gentleman known to have CAD s/p CABG (), chronic atrial fibrillation not requiring AV slowing agents (on lower-dose Eliquis anticoagulation 2.5 mg b.i.d. due to history of epistaxis), essential hypertension, stable ascending aorta dilatation of 4.2 cm.  He has had progressive dilatation of his right ventricle with mildly reduced systolic function (cardiac MRI 17 showed moderately dilated RV with normal systolic function, LVEF of 56%, severe biatrial enlargement and no evidence of intraatrial shunt), for which he saw Dr. Conley last .  More recently, he has been found to have a high burden of PVCs and a new decline in LVEF.      He underwent testing that has been reviewed by Dr. Conley.   -  V/Q scan was negative for pulmonary embolus.     -  Hepatitis and HIV workup was normal, HbA1c was 5.4%, normal renal panel with sodium of 141, potassium 4.5, BUN 12, creatinine 0.8, estimated GFR  greater than 90.  Normal TSH.  Hemoglobin 12.9.   -  A 24 hour Holter showed atrial fibrillation throughout with an average heart rate of 57 BPM, as low as 34 BPM when asleep.  However, he did have 17% (14,700) polymorphic PVCs with the longest run of 4 beats.  No patient-recorded symptoms.   -  Liver ultrasound showed a few small hepatic cysts without evidence of fatty infiltration of  the liver.  Mild splenomegaly.  No ascites.  (This was done due to a history of excess alcohol intake in the past.)   -  Pulmonary function tests were satisfactory with a normal FEV1 of 2.7 L (104% predicted), FEV1/FVC ratio 73%, normal diffusing capacity.   -  A repeat transthoracic echocardiogram showed a drop in LV function from previously normal to 35%-40% with inferior and inferolateral wall akinesis and moderate global hypokinesis.  The right ventricle was moderately dilated with moderately decreased systolic function.  There was severe biatrial enlargement without color evidence of atrial shunt.  Moderately severe functional mitral regurgitation, mild tricuspid regurgitation with an estimated RVSP of 50 mmHg.  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  Aortic valve was trileaflet and sclerotic.  In the underlying rhythm was atrial fibrillation with a rate of 50 BPM.  The ascending aorta was dilated at 4.0 cm (stable).   -  A 6 minute walk distance was 1300 feet (lower limit of normal 1167).  Oxygen saturation was above 95% at all times, and the heart rate went from a baseline of 52 to a peak of 97 BPM with normal blood pressure response.       This testing revealed a new decline in LVEF.  He has significant PVC burden of 17%.   She started him on torsemide 20 mg daily, and Toprol XL 25 mg. Lisinopril 20 mg was continued.     I met Skip to follow up. His NT pro BNP was elevated, but he had no sxs of CHF; therefore, we continued his current torsemide dose. His K was running lower, so we started KCl.     Interval History:   He presents today for follow up.   He is feeling well.  He has some very mild edema that is improved from prior.  He has some fatigue that he thinks is a little more pronounced over the past few weeks.    He is weighing at home, and weight is stable between 170-175 lbs.  In clinic, his weight is down 6 lbs since I last saw him.   He has been checking his BP at home and it has  been 120s-130s mostly.   He is watching his diet and sodium consumption.   He has really cut down his alcohol intake.  He last had a drink 5 days ago, and then a week before that.   He is participating in cardiac rehab.   He has been seen in the sleep clinic.      ROS:  Skin:  Negative     Eyes:  Positive for cataracts;glasses  ENT:  Negative    Respiratory:    (saw Sleep MD 6/7/18)  Cardiovascular:    Positive for;fatigue  Gastroenterology: Negative    Genitourinary:  Positive for urinary frequency;prostate problem;nocturia  Musculoskeletal:  Positive for arthritis;back pain  Neurologic:  Positive for numbness or tingling of feet  Psychiatric:  Positive for sleep disturbances  Heme/Lymph/Imm:  Positive for easy bruising  Endocrine:  Negative      PAST MEDICAL HISTORY:  Past Medical History:   Diagnosis Date     Anemia, unspecified 11/8/2016     Atrial fibrillation (H) 5/27/2015     BPH (benign prostatic hyperplasia) 1/25/2012     CAD (coronary artery disease) 4/4/2012     Closed fracture of metatarsal bone 4/4/2012     Coronary atherosclerosis of unspecified type of vessel, native or graft [facility] 2006    CAB 1996 following MI (at [facility])      Dilated aortic root (H) 5/26/2016     Drug-induced erectile dysfunction 10/2/2015     Elevated blood sugar 11/8/2016     Elevated PSA 9/19/2013     Essential hypertension with goal blood pressure less than 140/90 9/22/2016     Essential hypertension, benign      Gallbladder polyp 5/1/2018     Gout      HAV (hallux abducto valgus) 4/4/2012     Hernia, abdominal      History of prostate cancer 6/15/2016     HTN, goal below 150/90 4/20/2017     Iron deficiency anemia secondary to inadequate dietary iron intake 11/15/2016     Low blood pressure reading 10/17/2016     Lumbago     had degendisc dz on MRI 7/07     Mixed hyperlipidemia      Mumps      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     OA (osteoarthritis) 4/4/2012     Old myocardial infarction 4/4/2012     Palpitations       Pes planus 4/4/2012     Prostate cancer (H) 5/26/2016     PUD (peptic ulcer disease) 4/4/2012     Pulmonary hypertension 9/25/2017     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     Rosacea 1/15/2008     Thrombocytopenia (H) 4/21/2017     Unspecified hyperplasia of prostate with urinary obstruction and other lower urinary tract symptoms (LUTS)     better on avodart & hytrin     Venous stasis 4/4/2012     Venous stasis 4/4/2012       PAST SURGICAL HISTORY:  Past Surgical History:   Procedure Laterality Date     C NONSPECIFIC PROCEDURE      CAB 1996 [place]     C NONSPECIFIC PROCEDURE  2/07    l inguinal hernia repair      C NONSPECIFIC PROCEDURE      R hernia remote     C NONSPECIFIC PROCEDURE  2000    R ankle ORIF for fx     C NONSPECIFIC PROCEDURE  2005    nasal surgery      C NONSPECIFIC PROCEDURE      R rotater repair (remote)      C NONSPECIFIC PROCEDURE      appy 1966     C NONSPECIFIC PROCEDURE      sinus surgery x 2     C NONSPECIFIC PROCEDURE      L shoulder 6/09     CARDIAC SURGERY      bipass     CYSTOSCOPY FLEXIBLE, CYOABLATION PROSTATE N/A 11/9/2016    Procedure: CYSTOSCOPY FLEXIBLE, CRYOABLATION PROSTATE;  Surgeon: Krystian Owens MD;  Location: [facility] OR     GENITOURINARY SURGERY      prostate surgery      HERNIA REPAIR       LAPAROSCOPIC HERNIORRHAPHY INGUINAL Right 5/10/2017    Procedure: LAPAROSCOPIC HERNIORRHAPHY INGUINAL;  Laparoscopic preperitoneal repair of right inguinal hernia with placement of underlay mesh;  Surgeon: Enrico Bridges MD;  Location: [facility] OR     PROSTATE SURGERY         SOCIAL HISTORY:  Social History     Social History     Marital status:      Spouse name: N/A     Number of children: N/A     Years of education: N/A     Social History Main Topics     Smoking status: Former Smoker     Smokeless tobacco: Never Used      Comment: quit 3/1974     Alcohol use 0.0 oz/week     0 Standard drinks or equivalent per week      Comment: 1-2 drinks per day     Drug use: No     Sexual activity: Yes      Partners: Female     Other Topics Concern     Caffeine Concern Yes     1 cups of coffee and soda per day     Special Diet No     Exercise Yes     walking     Seat Belt Yes     Social History Narrative       FAMILY HISTORY:  Family History   Problem Relation Age of Onset     CANCER Mother      stomach cancer,  age 61     HEART DISEASE Father      MI,  age 71     HEART DISEASE Brother      stent placement, RA      Hypertension Brother      HEART DISEASE Sister      rythmn problems with heart, hypertension       MEDS:   Current Outpatient Prescriptions on File Prior to Visit:  acetaminophen (TYLENOL) 650 MG CR tablet Take 1 tablet (650 mg) by mouth every 6 hours as needed for pain   apixaban ANTICOAGULANT (ELIQUIS) 2.5 MG tablet Take 1 tablet (2.5 mg) by mouth 2 times daily   aspirin 81 MG tablet Take 81 mg by mouth daily   co-enzyme Q-10 100 MG CAPS Take 1 capsule (100 mg) by mouth daily   diclofenac (VOLTAREN) 1 % GEL Apply 4 gramsto area qid prn   doxycycline (VIBRAMYCIN) 100 MG capsule TAKE 1 CAPSULE DAILY   FERROUS GLUCONATE PO Take 324 mg by mouth daily (with breakfast)   Glucosamine-Chondroitin (OSTEO BI-FLEX REGULAR STRENGTH) 250-200 MG TABS Take 1 tablet by mouth 2 times daily   lisinopril (PRINIVIL/ZESTRIL) 20 MG tablet Take 1 tablet (20 mg) by mouth daily   metoprolol succinate (TOPROL-XL) 25 MG 24 hr tablet Take 1 tablet (25 mg) by mouth daily (with breakfast)   Multiple Vitamins-Minerals (MULTIVITAL) CHEW Take 2 chew tab by mouth 2 times daily   naftifine (NAFTIN) 1 % GEL topical gel Apply topically daily Apply to affected nails daily   Omega 3-6-9 Fatty Acids (OMEGA 3-6-9 PO) Take 1 tablet by mouth daily    omeprazole (PRILOSEC) 40 MG capsule TAKE 1 CAPSULE DAILY   senna (SENOKOT) 8.6 MG tablet Take 1 tablet by mouth daily   simvastatin (ZOCOR) 20 MG tablet Take 1 tablet (20 mg) by mouth At Bedtime   sodium chloride (OCEAN NASAL SPRAY) 0.65 % nasal spray Spray 1 spray into both  "nostrils daily as needed for congestion   tamsulosin (FLOMAX) 0.4 MG capsule TAKE 1 CAPSULE DAILY   torsemide (DEMADEX) 20 MG tablet Take 1 tablet (20 mg) by mouth daily (with breakfast)   Urea 20 % CREA cream Apply topically as needed Apply to right foot daily   zolpidem (AMBIEN) 5 MG tablet Take 1/2 tablet by mouth 15 minutes prior to sleep, for Sleep Study     No current facility-administered medications on file prior to visit.     ALLERGIES: No Known Allergies    PHYSICAL EXAM:  Vitals: /66 (BP Location: Right arm, Patient Position: Chair, Cuff Size: Adult Small)  Pulse (!) 40  Ht 1.702 m (5' 7\")  Wt 79.4 kg (175 lb 1.6 oz)  SpO2 98%  BMI 27.42 kg/m2  Constitutional:  cooperative, alert and oriented, well developed, well nourished, in no acute distress        Skin:  warm and dry to the touch        Head:  normocephalic        Eyes:  pupils equal and round;conjunctivae and lids unremarkable        ENT:  no pallor or cyanosis        Neck:  JVP normal        Respiratory:  clear to auscultation        Cardiac:   bradycardic;irregular rhythm;frequent premature beats distant heart sounds   systolic murmur;grade 2;grade 3;apical          GI:  abdomen soft;non-tender        Vascular:                                        Extremities and Musculoskeletal:  no deformities, clubbing, cyanosis, erythema observed   chronic lower extremity skin discoloration consistent with possible venous stasis     Neurological:  no gross motor deficits;affect appropriate          LABS/DATA:  I reviewed the following:  Component      Latest Ref Rng & Units 5/30/2018   Sodium      133 - 144 mmol/L 139   Potassium      3.4 - 5.3 mmol/L 3.7   Chloride      94 - 109 mmol/L 103   Carbon Dioxide      20 - 32 mmol/L 30   Anion Gap      3 - 14 mmol/L 6   Glucose      70 - 99 mg/dL 114 (H)   Urea Nitrogen      7 - 30 mg/dL 18   Creatinine      0.66 - 1.25 mg/dL 1.02   GFR Estimate      >60 mL/min/1.7m2 71   GFR Estimate If Black      >60 " mL/min/1.7m2 85   Calcium      8.5 - 10.1 mg/dL 8.9   Bilirubin Total      0.2 - 1.3 mg/dL 1.7 (H)   Albumin      3.4 - 5.0 g/dL 3.7   Protein Total      6.8 - 8.8 g/dL 7.3   Alkaline Phosphatase      40 - 150 U/L 171 (H)   ALT      0 - 70 U/L 18   AST      0 - 45 U/L 24   WBC      4.0 - 11.0 10e9/L 4.1   RBC Count      4.4 - 5.9 10e12/L 3.84 (L)   Hemoglobin      13.3 - 17.7 g/dL 12.2 (L)   Hematocrit      40.0 - 53.0 % 37.2 (L)   MCV      78 - 100 fl 97   MCH      26.5 - 33.0 pg 31.8   MCHC      31.5 - 36.5 g/dL 32.8   RDW      10.0 - 15.0 % 14.5   Platelet Count      150 - 450 10e9/L 111 (L)   N-Terminal Pro Bnp      0 - 450 pg/mL 3161 (H)       Component      Latest Ref Rng & Units 6/13/2018   Sodium      133 - 144 mmol/L 138   Potassium      3.4 - 5.3 mmol/L 3.6   Chloride      94 - 109 mmol/L 102   Carbon Dioxide      20 - 32 mmol/L 27   Anion Gap      3 - 14 mmol/L 9   Glucose      70 - 99 mg/dL 94   Urea Nitrogen      7 - 30 mg/dL 20   Creatinine      0.66 - 1.25 mg/dL 0.88   GFR Estimate      >60 mL/min/1.7m2 84   GFR Estimate If Black      >60 mL/min/1.7m2 >90   Calcium      8.5 - 10.1 mg/dL 8.8   N-Terminal Pro Bnp      0 - 450 pg/mL 2155 (H)         ASSESSMENT/PLAN:  1.  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   - ? If drop in LVEF from 55% to 40% is ischemic vs due to PVCs.  He has old grafts but no angina.   - Echo showed his IVC was dilated and collapsible. Torsemide 20 mg was started.  His weight is stable to down a bit, and his NT pro BNP is coming down, today it is 2155 (was 3161).   No ARAMBULA/SOB.  Continue torsemide at this dose.   His K went from 4.5 in early May to 3.7 5/30/18.  With his low LVEF and frequent ectopy, would like his K closer to 4.0. Started KCl 10 mEq daily. K today is 3.6.  Will increase to 20 mEq daily.  BMP in 1-2 weeks.  - Continue lisinopril 20 mg.  He has more fatigue, and bradycardia (but also frequent PVCs).  Will review Toprol XL  dose with Dr. Conley.     - Continue cardiac rehab  - He has been educated to cut down his alcohol use from 2 drinks per day (1 beer and 1 wine) to 1 drink per day, ideally less.  He is doing well with this - having only had a drink 5 days ago and then 1 week before that.   - Undergoing sleep evaluation currently.  - Dr. Conley will determine timing of proceeding to diagnostic coronary/graft angiography and right heart catheterization. Pending this eval, may need to consider EP referral re high PVC burden and decreased LVEF.     2.  Reduced right ventricular systolic function.  - Evaluation per Dr. Conley   - His CMP from 5/30/18 show his total bili and alk phos are elevated.  I will defer this to Dr. Conley.   - Dr. Conley will determine timing of proceeding to diagnostic coronary/graft angiography and right heart catheterization.     3.  Chronic atrial fibrillation with slow ventricular response in the absence of AV lelia slowing agents.   - Prior history of epistaxis, therefore on lower-dose apixaban.   - Low dose Toprol XL started for CMY and PVCs.  Noted is that his heart rate is in the 40s, and it does drop down to mid 30s when he is asleep.   He has more fatigue lately.  Will review Toprol XL dose with Dr. Conley.  We may need to decrease or discontinue.      4.  Frequent premature ventricular complexes (17% ventricular ectopy burden).   - Toprol XL 25 mg started.  We may be decreasing this.   - Repeat Holter is scheduled for July.   - May need EP referral pending ischemic evaluation re decreased LVEF.     5.  H/o coronary artery disease, status post CABG in 1997 (grafts not known).    - No anginal sxs  - On ASA 81 mg (also Eliquis 2.5 mg BID for afib), BB, simvastatin 20 mg.     6.  Hypertension.  Controlled on lisinopril 20 mg, torsemide 20 mg, and Toprol XL 25 mg.  We may be decreasing the BB due to bradycardia and fatigue.          Follow up:  BMP in 1-2 weeks.   Dr. Conley with Holter and labs in July.      ADDENDUM    Discussed with Dr. Conley. We will decrease Toprol XL to 12.5 mg daily.       Toprol XL decreased from 25 mg QD to 12.5 mg. Medication list in EPIC updated, pharm updated, generic message left for pt to call [facility].  Marga Roberson, RN 3:23 PM 06/13/18      Thank you for allowing me to participate in the care of your patient.    Sincerely,     Juliano Caballero PA-C     [facility]

## 2018-06-13 NOTE — MR AVS SNAPSHOT
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 nurse for any questions or concerns:  [phone]    1.  Medication changes from today:  - Increase potassium (KDur) to 20 meq daily.  You will take 2 tablets now, instead of one.  - I will discuss the Toprol XL dose with Dr. Conley, and will call you if she would like to lower the dose.        2. Follow up plan:   - BMP (blood test) in 1-2 weeks   - Dr. Conley in July      3.  Weigh yourself daily and write it down.    4.  Call [facility] nurse if your weight is up more than 2 pounds in one day or 5 pounds in one week.    5.  Call [facility] nurse if you feel more short of breath, have more abdominal bloating, or leg swelling.    6.  Continue low sodium diet (less than 2000 mg daily). If you eat less salt, you will retain less fluid.    7.  Alcohol can weaken your heart further. You should avoid alcohol or limit its use to special times, such as a holiday or birthday.     8.  Do NOT take Aleve (naproxen) or Advil (ibuprofen) without talking to your doctor first.     9.  Lab Results:   Component      Latest Ref Rng & Units 6/13/2018   Sodium      133 - 144 mmol/L 138   Potassium      3.4 - 5.3 mmol/L 3.6   Chloride      94 - 109 mmol/L 102   Carbon Dioxide      20 - 32 mmol/L 27   Anion Gap      3 - 14 mmol/L 9   Glucose      70 - 99 mg/dL 94   Urea Nitrogen      7 - 30 mg/dL 20   Creatinine      0.66 - 1.25 mg/dL 0.88   GFR  Estimate      >60 mL/min/1.7m2 84   GFR Estimate If Black      >60 mL/min/1.7m2 >90   Calcium      8.5 - 10.1 mg/dL 8.8   N-Terminal Pro Bnp      0 - 450 pg/mL 2155 (H)        [facility]: Cardiomyopathy, Optimization, Rehabilitation, Education  The [facility] is a heart failure specialty clinic within the [facility] where you will work with your cardiologist, nurse practitioners, physician assistants and registered nurses who specialize in heart failure care. They are dedicated to helping patients with heart failure to carefully adjust medications, receive education, and learn who and when to call if symptoms develop. They specialize in helping you better understand your condition, slow the progression of your disease, improve the length and quality of your life, help you detect future heart problems before they become life threatening, and avoid hospitalizations.            Follow-ups after your visit        Your next 10 appointments already scheduled     Chris 15, 2018  8:00 AM CDT   Cardiac Treatment with Rh Cardiac Rehab 1   [facility]    [address], [address]  [place] [address]   [phone]            Chris 15, 2018  9:00 AM CDT   CONSULT with Rh Cardiac Rehab 1   [facility]    [address], [address]  [place] [address]   [phone]            Jun 19, 2018  9:30 AM CDT   Cardiac Treatment with Rh Cardiac Rehab 2   [facility]    [address], [address]  [place] [address]   [phone]            Jun 19, 2018  2:00 PM CDT   Return Sleep Patient with Taye Phillips MD   [facility] ([facility])    [address]  [place] [address]   [phone]            Jun 20, 2018 11:00 AM CDT   Return Visit with Krystian Owens MD    [facility] ([facility])    [address]  [address]  [place] [address]   [phone]            Jun 21, 2018  9:30 AM CDT   Cardiac Treatment with Rh Cardiac Rehab 2   [facility]    [address]  [place] [address]   [phone]            Jun 22, 2018  8:00 AM CDT   Cardiac Treatment with Rh Cardiac Rehab 1   [facility]    [address]  [place] [address]   [phone]            Jun 22, 2018  9:30 AM CDT   LAB with RU LAB   [facility] ([facility])    [address]  [place] [id number] [phone]           Please do not eat 10-12 hours before your appointment if you are coming in fasting for labs on lipids, cholesterol, or glucose (sugar). This does not apply to pregnant women. Water, hot tea and black coffee (with nothing added) are okay. Do not drink other fluids, diet soda or chew gum.            Jun 25, 2018  8:00 AM CDT   Cardiac Treatment with Rh Cardiac Rehab 1   [facility] ([facility]    [address]  [place] [address] [phone]            Jun 27, 2018  8:00 AM CDT   Cardiac Treatment with Rh Cardiac Rehab 1   [facility]    [address]  [place] [id number] [phone]              Future tests that were ordered for you today     Open Future Orders        Priority Expected Expires Ordered    Basic metabolic panel Routine 6/22/2018 6/13/2019 6/13/2018            Who to contact     If you have questions or need follow up information about today's clinic visit or your schedule please contact [facility] directly at [phone].  Normal or non-critical lab and  "imaging results will be communicated to you by MyChart, letter or phone within 4 business days after the clinic has received the results. If you do not hear from us within 7 days, please contact the clinic through [employer] or phone. If you have a critical or abnormal lab result, we will notify you by phone as soon as possible.  Submit refill requests through [employer] or call your pharmacy and they will forward the refill request to us. Please allow 3 business days for your refill to be completed.          Additional Information About Your Visit        [employer] Information     [employer] gives you secure access to your electronic health record. If you see a primary care provider, you can also send messages to your care team and make appointments. If you have questions, please call your primary care clinic.  If you do not have a primary care provider, please call [phone] and they will assist you.        Care EveryWhere ID     This is your Care EveryWhere ID. This could be used by other organizations to access your [facility] medical records  [id number]        Your Vitals Were     Pulse Height Pulse Oximetry BMI (Body Mass Index)          40 1.702 m (5' 7\") 98% 27.42 kg/m2         Blood Pressure from Last 3 Encounters:   06/13/18 128/66   06/07/18 131/58   05/30/18 144/72    Weight from Last 3 Encounters:   06/13/18 79.4 kg (175 lb 1.6 oz)   06/07/18 79.4 kg (175 lb)   05/30/18 82.1 kg (181 lb 1.6 oz)              We Performed the Following     EKG 12-lead complete w/read - Clinics (performed today)     Follow-Up with [facility] - FILEMON visit          Today's Medication Changes          These changes are accurate as of 6/13/18  2:36 PM.  If you have any questions, ask your nurse or doctor.               These medicines have changed or have updated prescriptions.        Dose/Directions    potassium chloride SA 10 MEQ CR tablet   Commonly known as:  K-DUR/KLOR-CON M   This may have changed:  how much to take   Used for:  " Frequent PVCs, Chronic combined systolic and diastolic congestive heart failure (H)   Changed by:  Juliano Caballero PA-C        Dose:  20 mEq   Take 2 tablets (20 mEq) by mouth daily   Quantity:  60 tablet   Refills:  3            Where to get your medicines      These medications were sent to [id number] - [place], [place] - [address]  [address] [facility] [address]    Hours:  9Am-9Pm (M-F) 9Am-6Pm (S&S) Phone:  [phone]     potassium chloride SA 10 MEQ CR tablet                Primary Care Provider Office Phone # Fax #    Oscar Alves [phone] [address] [address]        Equal Access to Services     JOSE BHAKTA : Hadii porfirio lucero hadasho Soomaali, waaxda luqadaha, qaybta kaalmada adeegyada, waxoskar reyes . So [facility] [phone].    ATENCIÓN: Si habla español, tiene a randall disposición servicios gratuitos de asistencia lingüística. [facility] [phone].    We comply with applicable federal civil rights laws and [place] laws. We do not discriminate on the basis of race, color, national origin, age, disability, sex, sexual orientation, or gender identity.            Thank you!     Thank you for choosing [facility]  for your care. Our goal is always to provide you with excellent care. Hearing back from our patients is one way we can continue to improve our services. Please take a few minutes to complete the written survey that you may receive in the mail after your visit with us. Thank you!             Your Updated Medication List - Protect others around you: Learn how to safely use, store and throw away your medicines at www.disposemymeds.org.          This list is accurate as of 6/13/18  2:36 PM.  Always use your most recent med list.                   Brand Name Dispense Instructions for use Diagnosis    acetaminophen 650 MG CR tablet    TYLENOL    100 tablet    Take 1 tablet  (650 mg) by mouth every 6 hours as needed for pain    Osteoarthritis       apixaban ANTICOAGULANT 2.5 MG tablet    ELIQUIS    200 tablet    Take 1 tablet (2.5 mg) by mouth 2 times daily    Chronic atrial fibrillation (H)       aspirin 81 MG tablet      Take 81 mg by mouth daily        co-enzyme Q-10 100 MG Caps capsule     180 capsule    Take 1 capsule (100 mg) by mouth daily    Mixed hyperlipidemia       diclofenac 1 % Gel topical gel    VOLTAREN    100 g    Apply 4 gramsto area qid prn    Acute left-sided low back pain without sciatica       doxycycline 100 MG capsule    VIBRAMYCIN    90 capsule    TAKE 1 CAPSULE DAILY    Rosacea       FERROUS GLUCONATE PO      Take 324 mg by mouth daily (with breakfast)        Glucosamine-Chondroitin 250-200 MG Tabs    OSTEO BI-FLEX REGULAR STRENGTH    100 tablet    Take 1 tablet by mouth 2 times daily    Osteoarthritis       lisinopril 20 MG tablet    PRINIVIL/ZESTRIL    90 tablet    Take 1 tablet (20 mg) by mouth daily    HTN, goal below 150/90       metoprolol succinate 25 MG 24 hr tablet    TOPROL-XL    90 tablet    Take 1 tablet (25 mg) by mouth daily (with breakfast)    Frequent PVCs, Chronic atrial fibrillation (H)       MULTIVITAL Chew     360 tablet    Take 2 chew tab by mouth 2 times daily    Neuropathy of foot       naftifine 1 % Gel topical gel    NAFTIN    90 g    Apply topically daily Apply to affected nails daily    Onychomycosis of toenail       OMEGA 3-6-9 PO      Take 1 tablet by mouth daily        omeprazole 40 MG capsule    priLOSEC    90 capsule    TAKE 1 CAPSULE DAILY    PUD (peptic ulcer disease)       potassium chloride SA 10 MEQ CR tablet    K-DUR/KLOR-CON M    60 tablet    Take 2 tablets (20 mEq) by mouth daily    Frequent PVCs, Chronic combined systolic and diastolic congestive heart failure (H)       senna 8.6 MG tablet    SENOKOT    60 tablet    Take 1 tablet by mouth daily    Prostate cancer (H)       simvastatin 20 MG tablet    ZOCOR    90 tablet     Take 1 tablet (20 mg) by mouth At Bedtime    Hyperlipidemia LDL goal <100       sodium chloride 0.65 % nasal spray    OCEAN NASAL SPRAY    2 Bottle    Spray 1 spray into both nostrils daily as needed for congestion    Bloody nose       tamsulosin 0.4 MG capsule    FLOMAX    90 capsule    TAKE 1 CAPSULE DAILY    Benign prostatic hyperplasia with lower urinary tract symptoms       torsemide 20 MG tablet    DEMADEX    90 tablet    Take 1 tablet (20 mg) by mouth daily (with breakfast)    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, Chronic combined systolic and diastolic congestive heart failure (H)       Urea 20 % Crea cream     120 g    Apply topically as needed Apply to right foot daily    Right foot pain, Skin callus, Pes planus of both feet, Venous insufficiency of both lower extremities, Hav (hallux abducto valgus), right, Onychomycosis of toenail       zolpidem 5 MG tablet    AMBIEN    1 tablet    Take 1/2 tablet by mouth 15 minutes prior to sleep, for Sleep Study    Right heart enlargement, S/P CABG (coronary artery bypass graft), Alcohol dependence with unspecified alcohol-induced disorder (H), 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

## 2018-06-14 ENCOUNTER — CARE COORDINATION (OUTPATIENT)
Dept: CARDIOLOGY | Facility: CLINIC | Age: 78
End: 2018-06-14

## 2018-06-14 LAB — SLPCOMP: NORMAL

## 2018-06-14 NOTE — PROGRESS NOTES
Called pt's wife cell pone to make sure pt received wisai's VM about decreasing dose of Toprol XL to 12.5 mg QD. Spoke to pt and he has not listened to VM or taken medications yet. Instructions given to pt to take half tablet of Toprol XL (has 25 mg tablets) and informed him that [facility] is aware of decreased dose.   Marga Roberson RN 8:53 AM 06/14/18

## 2018-06-14 NOTE — PROCEDURES
" SLEEP STUDY INTERPRETATION  SPLIT NIGHT STUDY      Patient: SAÚL GONZALEZ  YOB: 1940  Study Date: 6/10/2018  MRN: [id number]  Referring Provider: MD Conley Mothilal  Ordering Provider: Taye Phillips MD, MPH    Indications for Polysomnography: The patient is a 77 year old male who is 5' 7\" and weighs 175.0 lbs. His BMI is 27.5 kg/m2, [place] sleepiness scale 9, and neck circumference is 40.0 cm. Relevant medical history includes atherosclerosis with coronary artery disease and past myocardial infarction, hyperlipidemia, peptic ulcer disease, venous stasis, history of premature ventricular contractions and premature atrial contractions, chronic atrial fibrillation, dilated aortic root, history of prostate cancer, iron deficiency anemia, hypertension, thrombocytopenia, pulmonary hypertension, and biventricular systolic heart failure. A diagnostic polysomnogram (PSG) was performed to evaluate for obstructive sleep apnea (LENORA), periodic limb movements (PLMs), central sleep apnea (CSA), and/or sleep associated hypoventilation/hypoxemia. After 355.5 minutes of sleep time the patient exhibited sufficient respiratory events qualifying him for a CPAP trial which was then initiated, but patient was unable to tolerate.    Polysomnogram Data: A full night polysomnogram (PSG) recorded the standard physiologic parameters including EEG, EOG, EMG, ECG, nasal and oral airflow. Respiratory parameters of chest and abdominal movements were recorded with respiratory inductance plethysmography. Oxygen saturation was recorded by pulse oximetry. Hypopnea scoring rule used: 1B 4%    Diagnostic PSG  Sleep Architecture: Somewhat preserved sleep architecture with some sleep fragmentation and decreased sleep efficiency. REM sleep, excluding REM supine, captured.  The total recording time of the polysomnogram was 493.6 minutes. The total sleep time was 355.5 minutes. Sleep latency was mildly decreased at 5.0 minutes with " the use of a sleep aid (zolpidem 2.5 mg). REM latency was 106.0 minutes. Arousal index was increased at 29.0 arousals per hour. Sleep efficiency was decreased at 72.0%. Wake after sleep onset was 132.0 minutes. The patient spent 15.8% of total sleep time in Stage N1, 60.1% in Stage N2, 9.7% in Stage N3, and 14.5% in REM. No REM supine was captured during the sleep study.    Respiration: Moderate LENORA with some Cheyne-Stoke respirations noted. Sleep associated hypoxemia without hypoventilation observed. Rare and mild snoring recorded.    Events ? The polysomnogram revealed a presence of 28 obstructive, 9 central, and 0 mixed apneas resulting in an apnea index of 6.2 events per hour. There were 60 obstructive hypopneas and 0 central hypopneas resulting in an obstructive hypopnea index of 10.1 and central hypopnea index of 0 events per hour. The combined apnea/hypopnea index was 16.4 events per hour (central apnea/hypopnea index was 1.5 events per hour). The REM AHI was 22.1 events per hour. The supine AHI was 30.3 events per hour. The RERA index was 9.3 events per hour. The RDI was 25.7 events per hour.    Snoring - was reported as rare and mild snoring.    Respiratory rate and pattern - was notable for Cheyne-Alejandro respiratory rate and pattern with increased cycle length at 60 seconds and increased circulation time at 38 seconds (see picture 1 below).    Sustained Sleep Associated Hypoventilation - Transcutaneous carbon dioxide monitoring was used, however significant hypoventilation was not suggested by oximetry, and this was not present on TCM monitoring with a maximum change from 32.9 to 42.1 mmHg and 0 minutes at or greater than 55 mmHg.    Sleep Associated Hypoxemia - (Greater than 5 minutes O2 sat at or below 88%) was present. Baseline oxygen saturation was 94.9%. Lowest oxygen saturation was 83.0%. Time spent less than or equal to 88% was 16.3 minutes. Time spent less than or equal to 89% was 18.0 minutes.      Picture 1 - Cheyne-Stoke Respirations with increased circulation time and cycle length    Treatment PSG  Sleep Architecture: Abnormal sleep architecture with significant sleep fragmentation and decreased sleep efficiency. No REM sleep captured.  At 02:05:53 AM the patient was placed on PAP treatment and was titrated at pressures ranging from CPAP 5 cmH2O up to CPAP 5 cmH2O. The total recording time of the treatment portion of the study was 14.5 minutes. The total sleep time was 6.0 minutes. During the treatment portion of the study the sleep latency was 2.0 minutes. REM latency was 0 minutes. Arousal index was increased at 50.0 arousals per hour. Sleep efficiency was decreased at 41.4%. Wake after sleep onset was 2.5 minutes. The patient spent 75.0% of total sleep time in Stage N1, 25.0% in Stage N2, 0.0% in Stage N3, and 0.0% in REM.    Respiration: Incomplete PAP titration at a final PAP pressure of 5 cmH2O and with a residual AHI of 40 events per hour. Due to discomfort, the patient wished the titration to end during the sleep study (titration only last a few minutes).    The final pressure was CPAP 5 cmH2O with an AHI of 40.0 events per hour. No REM supine was captured during this final PAP pressure. This titration was considered unacceptable.    Movement Activity: Mildly increased period limb movements with mild cortical arousal association noted. Otherwise, no abnormal REM EMG activity, nocturnal behaviors, or bruxism noted.    Periodic Limb Movements  o During the diagnostic portion of the study, there were 136 PLMs recorded. The PLM index was 23.0 movements per hour. The PLM Arousal Index was 2.2 per hour.  o During the treatment portion of the study, there were 0 PLMs recorded. The PLM index was 0 movements per hour. The PLM Arousal Index was 0 per hour.    REM EMG Activity - Excessive transient/sustained muscle activity was not present.    Nocturnal Behavior - Abnormal sleep related behaviors were not  noted during/arising out of NREM / REM sleep.    Bruxism - None apparent.    Cardiac Summary: Atrial fibrillation with frequent polymorphic PVCs, couplets PVCs, bigeminal PVCs, as well as non-sustained ventricular contraction was observed.   During the diagnostic portion of the study, the average pulse rate was 42.5 bpm. The minimum pulse rate was 31.0 bpm while the maximum pulse rate was 80.0 bpm.    During the treatment portion of the study, the average pulse rate was 43.8 bpm. The minimum pulse rate was 36.9 bpm while the maximum pulse rate was 55.9 bpm.     Arrhythmias were noted. A trace with indiscernible P waves with irregularly irregular ventricular contractions was noted. This was consistent with atria fibrillation. In addition, polymorphic premature wide complex beats suggestive of polymorphic premature ventricular contractions (PVCs) were noted. In addition, some couplets (see picture two), bigeminal PVCs (see picture three), and a short non-sustained ventricular tachycardia (see picture four) were observed.     Picture 2 - Some couplet PVCs noted.    Picture 3 - Some bigeminal PVCs noted.      Picture 4 - Short non-sustained ventricular tachycardia noted.    Assessment:     The PSG sleep study demonstrated moderate LENORA with some Cheyne-Stoke respirations also noted. Sleep associated hypoxemia without hypoventilation was present. Rare and mild snoring was recorded during the study. Of note, the lack of REM supine during the diagnostic portion of the study may have underestimated the true severity of the condition.    The treatment portion of the sleep study showed an incomplete PAP titration at a final PAP pressure of 5 cmH2O and with a residual AHI of 40 events per hour. Due to discomfort, the patient wished the titration to end during the sleep study (titration only last a few minutes).    There was somewhat preserved sleep architecture with some sleep fragmentation and decreased sleep efficiency. REM  sleep, excluding REM supine, was captured during this portion of the study.    The short treatment portion of the study showed abnormal sleep architecture with significant sleep fragmentation and decreased sleep efficiency. No REM sleep was captured during this portion.    There were mildly increased period limb movements with mild cortical arousal association noted. Otherwise, there was no abnormal REM EMG activity, nocturnal behaviors, or bruxism noted during the sleep study.    Lastly, there was atrial fibrillation with frequent polymorphic PVCs, couplets PCVs, bigeminal PVCs, as well as non-sustained ventricular contraction observed throughout the PSG sleep study.    Recommendations:    An in-lab PSG titration sleep study would be necessary to adequately treat the moderate degree of LENORA accompanied by hypoxic stress in this patient.     If PAP therapy is not an acceptable initial treatment alternative, the patient may be a candidate for dental appliance through referral to Sleep Dentistry for the treatment of obstructive sleep apnea and/or socially disruptive snoring. Sleep positional therapy with elevation of the head of the bed as well as positional device to avoid supine sleep may also be necessary / considered.    Advice regarding the risks of drowsy driving.    Suggest optimizing sleep schedule and avoiding sleep deprivation.    Pharmacologic therapy should be used for management of restless legs syndrome only if present and clinically indicated and not based on the presence of periodic limb movements alone.    Although the cardiac findings noted during the sleep study are not new, these patient should follow up with cardiology.    Diagnostic Codes:     Obstructive Sleep Apnea G47.33    Sleep Hypoxemia G47.36     Unspecified Sleep Disturbance G47.9    Periodic Limb Movement Disorder G47.61    Cheyne?Alejandro Breathing Pattern R06.3    6/10/2018 [facility] Sleep Study (175.0 lbs) - AHI 16.4, RDI 25.7,  Supine AHI 30.3, REM AHI 22.1, Low O2% 83.0%, Time Spent ?88% 16.3, Time Spent ?89% 18.0. Treatment was titrated to a pressure of CPAP 5 with an AHI 40.0. Time spent in REM supine at this pressure was - minutes.         ______________________________________________________________   Electronically Signed By: Taye Phillips MD, MPH (06/13/2018)         Range(%) Time in range (min)   0.0 - 88.0 16.3   0.0 - 89.0 18.0       Stage Min(mm Hg) Max(mm Hg)   Wake 32.9 39.7   NREM(1+2+3) 32.9 40.8   REM 37.8 42.1         Range(mmHg) Time in range (min)   55.0 - 100.0 -   Excluded data <20.0 & >65.0 74.5

## 2018-06-15 ENCOUNTER — HOSPITAL ENCOUNTER (OUTPATIENT)
Dept: CARDIAC REHAB | Facility: CLINIC | Age: 78
End: 2018-06-15
Attending: INTERNAL MEDICINE
Payer: MEDICARE

## 2018-06-15 PROCEDURE — 40000116 ZZH STATISTIC OP CR VISIT

## 2018-06-15 PROCEDURE — 40000575 ZZH STATISTIC OP CARDIAC VISIT #2

## 2018-06-15 PROCEDURE — 93797 PHYS/QHP OP CAR RHAB WO ECG: CPT | Mod: 59

## 2018-06-15 PROCEDURE — 93798 PHYS/QHP OP CAR RHAB W/ECG: CPT

## 2018-06-19 ENCOUNTER — HOSPITAL ENCOUNTER (OUTPATIENT)
Dept: CARDIAC REHAB | Facility: CLINIC | Age: 78
End: 2018-06-19
Attending: INTERNAL MEDICINE
Payer: MEDICARE

## 2018-06-19 ENCOUNTER — OFFICE VISIT (OUTPATIENT)
Dept: SLEEP MEDICINE | Facility: CLINIC | Age: 78
End: 2018-06-19
Payer: MEDICARE

## 2018-06-19 VITALS
SYSTOLIC BLOOD PRESSURE: 110 MMHG | WEIGHT: 180 LBS | OXYGEN SATURATION: 98 % | RESPIRATION RATE: 14 BRPM | HEART RATE: 52 BPM | DIASTOLIC BLOOD PRESSURE: 52 MMHG | BODY MASS INDEX: 28.25 KG/M2 | HEIGHT: 67 IN

## 2018-06-19 DIAGNOSIS — I50.810 RIGHT HEART FAILURE WITH REDUCED RIGHT VENTRICULAR FUNCTION (H): ICD-10-CM

## 2018-06-19 DIAGNOSIS — G47.19 EXCESSIVE DAYTIME SLEEPINESS: ICD-10-CM

## 2018-06-19 DIAGNOSIS — I49.3 FREQUENT PVCS: ICD-10-CM

## 2018-06-19 DIAGNOSIS — G47.33 OSA (OBSTRUCTIVE SLEEP APNEA): Primary | ICD-10-CM

## 2018-06-19 DIAGNOSIS — I48.20 CHRONIC ATRIAL FIBRILLATION (H): ICD-10-CM

## 2018-06-19 DIAGNOSIS — R06.3 CHEYNE-STOKES BREATHING: ICD-10-CM

## 2018-06-19 DIAGNOSIS — G47.34 NOCTURNAL HYPOXEMIA: ICD-10-CM

## 2018-06-19 DIAGNOSIS — I50.42 CHRONIC COMBINED SYSTOLIC AND DIASTOLIC CHF (CONGESTIVE HEART FAILURE) (H): ICD-10-CM

## 2018-06-19 DIAGNOSIS — I10 ESSENTIAL HYPERTENSION: ICD-10-CM

## 2018-06-19 DIAGNOSIS — Z95.1 STATUS POST CORONARY ARTERY BYPASS GRAFT: ICD-10-CM

## 2018-06-19 DIAGNOSIS — I27.20 PULMONARY HYPERTENSION (H): ICD-10-CM

## 2018-06-19 PROCEDURE — 99215 OFFICE O/P EST HI 40 MIN: CPT | Performed by: FAMILY MEDICINE

## 2018-06-19 PROCEDURE — 93798 PHYS/QHP OP CAR RHAB W/ECG: CPT | Performed by: REHABILITATION PRACTITIONER

## 2018-06-19 PROCEDURE — 40000116 ZZH STATISTIC OP CR VISIT: Performed by: REHABILITATION PRACTITIONER

## 2018-06-19 RX ORDER — ZOLPIDEM TARTRATE 5 MG/1
TABLET ORAL
Qty: 1 TABLET | Refills: 0 | Status: SHIPPED | OUTPATIENT
Start: 2018-06-19 | End: 2018-07-19

## 2018-06-19 NOTE — MR AVS SNAPSHOT
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  Please read the guidelines below about what these numbers mean and what you should do about them.  Your systolic blood pressure is the top number.  This is the pressure when the heart is pumping.  Your diastolic blood pressure is the bottom number.  This is the pressure in between beats.  If your systolic blood pressure is less than 120 and your diastolic blood pressure is less than 80, then your blood pressure is normal. There is nothing more that you need to do about it  If your systolic blood pressure is 120-139 or your diastolic blood pressure is 80-89, your blood pressure may be higher than it should be.  You should have your blood pressure re-checked within a year by a primary care provider.  If your systolic blood pressure is 140 or greater or your diastolic blood pressure is 90 or greater, you may have high blood pressure.  High blood pressure is treatable, but if left untreated over time it can put you at risk for heart attack, stroke, or kidney failure.  You should have your blood pressure re-checked by a primary care provider within the next four weeks.  Your BMI is Body mass index is 28.19  kg/(m^2).  Weight management is a personal decision.  If you are interested in exploring weight loss strategies, the following discussion covers the approaches that may be successful. Body mass index (BMI) is one way to tell whether you are at a healthy weight, overweight, or obese. It measures your weight in relation to your height.  A BMI of 18.5 to 24.9 is in the healthy range. A person with a BMI of 25 to 29.9 is considered overweight, and someone with a BMI of 30 or greater is considered obese. More than two-thirds of American adults are considered overweight or obese.  Being overweight or obese increases the risk for further weight gain. Excess weight may lead to heart disease and diabetes.  Creating and following plans for healthy eating and physical activity may help you improve your health.  Weight control is part of healthy lifestyle and includes exercise, emotional health, and healthy eating habits. Careful eating habits lifelong are the mainstay of weight control. Though there are significant health benefits from weight loss, long-term weight loss with diet alone may be very difficult to achieve- studies show long-term success with dietary management in less than 10% of people. Attaining a healthy weight may be especially difficult to achieve in those with severe obesity. In some cases, medications, devices and surgical management might be considered.  What can you do?  If you are overweight or obese and are interested in methods for weight loss, you should discuss this with your provider.     Consider reducing daily calorie intake by 500 calories.     Keep a food journal.     Avoiding skipping meals, consider cutting portions instead.    Diet combined with exercise helps maintain muscle while optimizing fat loss. Strength training is particularly important for building and maintaining muscle mass. Exercise helps reduce stress, increase energy, and improves fitness. Increasing exercise without diet  control, however, may not burn enough calories to loose weight.       Start walking three days a week 10-20 minutes at a time    Work towards walking thirty minutes five days a week     Eventually, increase the speed of your walking for 1-2 minutes at time    In addition, we recommend that you review healthy lifestyles and methods for weight loss available through the National Institutes of Health patient information sites:  http://win.niddk.nih.gov/publications/index.htm    And look into health and wellness programs that may be available through your health insurance provider, employer, local community center, or clarissa club.      Please, schedule sleep study and follow up visit with the nurse (she will coming into the room and meet with you). Use sleeping aid only if needed during the night of the study.    Thank you!    Taye Phillips MD, MPH  Clinical Sleep and Occupational / Environmental Medicine              Follow-ups after your visit        Your next 10 appointments already scheduled     Jun 20, 2018 11:00 AM CDT   Return Visit with Krystian Owens MD   [facility] ([facility])    [address]  [address]  [facility] [address]   [phone]            Jun 21, 2018  9:30 AM CDT   Cardiac Treatment with Rh Cardiac Rehab 2   [facility])    [address]  [place] [facility] [address]   [phone]            Jun 22, 2018  8:00 AM CDT   Cardiac Treatment with Rh Cardiac Rehab 1   [facility] ([facility])    [address]  [place] [address]   [phone]            Jun 22, 2018  9:30 AM CDT   LAB with RU LAB   [facility] [place] ([facility])    [address]  [place] [address] [phone]           Please do not eat 10-12 hours before your appointment if you are coming  in fasting for labs on lipids, cholesterol, or glucose (sugar). This does not apply to pregnant women. Water, hot tea and black coffee (with nothing added) are okay. Do not drink other fluids, diet soda or chew gum.            Jun 25, 2018  8:00 AM CDT   Cardiac Treatment with Rh Cardiac Rehab 1    ([facility])    [address]  [place] [facility] [address]   [phone]            Jun 27, 2018  8:00 AM CDT   Cardiac Treatment with Rh Cardiac Rehab 1    ([facility])    [address]  [place] [facility] [address]   [phone]            Jun 28, 2018  9:30 AM CDT   Cardiac Treatment with Rh Cardiac Rehab 2    ([facility])    [address]  [place] [facility] [address]   [phone]            Jul 02, 2018  8:00 AM CDT   Cardiac Treatment with Rh Cardiac Rehab 1    ([facility])    [address]  [place] [facility] [place] [address]   [phone]            Jul 06, 2018  8:00 AM CDT   Cardiac Treatment with Rh Cardiac Rehab 1    ([facility])    [address]  [place] [facility] [address]   [phone]            Jul 09, 2018  8:00 AM CDT   Cardiac Treatment with Rh Cardiac Rehab 1    ([facility])    [address]  [place] [facility] [address]   [phone]              Future tests that were ordered for you today     Open Future Orders        Priority Expected Expires Ordered    ABG-Blood Gas Arterial ([place] / [place]) Routine  8/18/2018 6/19/2018    Comprehensive Sleep Study Routine  12/16/2018 6/19/2018            Who to contact     If you have questions or need follow up information about today's clinic visit or your schedule please contact [facility]  "directly at [phone].  Normal or non-critical lab and imaging results will be communicated to you by [employer], letter or phone within 4 business days after the clinic has received the results. If you do not hear from us within 7 days, please contact the clinic through [employer] or phone. If you have a critical or abnormal lab result, we will notify you by phone as soon as possible.  Submit refill requests through Contractually or call your pharmacy and they will forward the refill request to us. Please allow 3 business days for your refill to be completed.          Additional Information About Your Visit        [employer] Information     Contractually gives you secure access to your electronic health record. If you see a primary care provider, you can also send messages to your care team and make appointments. If you have questions, please call your primary care clinic.  If you do not have a primary care provider, please call [phone] and they will assist you.        Care EveryWhere ID     This is your Care EveryWhere ID. This could be used by other organizations to access your [facility] medical records  [id number]        Your Vitals Were     Pulse Respirations Height Pulse Oximetry BMI (Body Mass Index)       52 14 1.702 m (5' 7.01\") 98% 28.19 kg/m2        Blood Pressure from Last 3 Encounters:   06/19/18 110/52   06/13/18 128/66   06/07/18 131/58    Weight from Last 3 Encounters:   06/19/18 81.6 kg (180 lb)   06/13/18 79.4 kg (175 lb 1.6 oz)   06/07/18 79.4 kg (175 lb)                 Today's Medication Changes          These changes are accurate as of 6/19/18  2:32 PM.  If you have any questions, ask your nurse or doctor.               These medicines have changed or have updated prescriptions.        Dose/Directions    * zolpidem 5 MG tablet   Commonly known as:  AMBIEN   This may have changed:  Another medication with the same name was added. Make sure you understand how and when to take each.   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 Make sure you understand how and when to take each.   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 Read the directions carefully, and ask your doctor or other care provider to review them with you.         Where to get your medicines      Some of these will need a paper prescription and others can be bought over the counter.  Ask your nurse if you have questions.     Bring a paper prescription for each of these medications     zolpidem 5 MG tablet                Primary Care Provider Office Phone # Fax #    Oscar Alves [phone] [address] [facility] [address]        Equal Access to Services     JOSE BHAKTA AH: Donna Rowe, dex navarro, terrell gallego,  chayito blackmancole moore'aan ah. So [facility] [phone].    ATENCIÓN: Si anglela jojo, tiene a randall disposición servicios gratuitos de asistencia lingüística. [facility] [phone].    We comply with applicable federal civil rights laws and [place] laws. We do not discriminate on the basis of race, color, national origin, age, disability, sex, sexual orientation, or gender identity.            Thank you!     Thank you for choosing [facility]  for your care. Our goal is always to provide you with excellent care. Hearing back from our patients is one way we can continue to improve our services. Please take a few minutes to complete the written survey that you may receive in the mail after your visit with us. Thank you!             Your Updated Medication List - Protect others around you: Learn how to safely use, store and throw away your medicines at www.disposemymeds.org.          This list is accurate as of 6/19/18  2:32 PM.  Always use your most recent med list.                   Brand Name Dispense Instructions for use Diagnosis    acetaminophen 650 MG CR tablet    TYLENOL    100 tablet    Take 1 tablet (650 mg) by mouth every 6 hours as needed for pain    Osteoarthritis       apixaban ANTICOAGULANT 2.5 MG tablet    ELIQUIS    200 tablet    Take 1 tablet (2.5 mg) by mouth 2 times daily    Chronic atrial fibrillation (H)       aspirin 81 MG tablet      Take 81 mg by mouth daily        co-enzyme Q-10 100 MG Caps capsule     180 capsule    Take 1 capsule (100 mg) by mouth daily    Mixed hyperlipidemia       diclofenac 1 % Gel topical gel    VOLTAREN    100 g    Apply 4 gramsto area qid prn    Acute left-sided low back pain without sciatica       doxycycline 100 MG capsule    VIBRAMYCIN    90 capsule    TAKE 1 CAPSULE DAILY    Rosacea       FERROUS GLUCONATE PO      Take 324 mg by mouth daily (with breakfast)        Glucosamine-Chondroitin 250-200 MG Tabs    OSTEO BI-FLEX REGULAR  STRENGTH    100 tablet    Take 1 tablet by mouth 2 times daily    Osteoarthritis       lisinopril 20 MG tablet    PRINIVIL/ZESTRIL    90 tablet    Take 1 tablet (20 mg) by mouth daily    HTN, goal below 150/90       metoprolol succinate 25 MG 24 hr tablet    TOPROL-XL    90 tablet    Take 0.5 tablets (12.5 mg) by mouth daily (with breakfast)    Frequent PVCs, Chronic atrial fibrillation (H)       MULTIVITAL Chew     360 tablet    Take 2 chew tab by mouth 2 times daily    Neuropathy of foot       naftifine 1 % Gel topical gel    NAFTIN    90 g    Apply topically daily Apply to affected nails daily    Onychomycosis of toenail       OMEGA 3-6-9 PO      Take 1 tablet by mouth daily        omeprazole 40 MG capsule    priLOSEC    90 capsule    TAKE 1 CAPSULE DAILY    PUD (peptic ulcer disease)       potassium chloride SA 10 MEQ CR tablet    K-DUR/KLOR-CON M    60 tablet    Take 2 tablets (20 mEq) by mouth daily    Frequent PVCs, Chronic combined systolic and diastolic congestive heart failure (H)       senna 8.6 MG tablet    SENOKOT    60 tablet    Take 1 tablet by mouth daily    Prostate cancer (H)       simvastatin 20 MG tablet    ZOCOR    90 tablet    Take 1 tablet (20 mg) by mouth At Bedtime    Hyperlipidemia LDL goal <100       sodium chloride 0.65 % nasal spray    OCEAN NASAL SPRAY    2 Bottle    Spray 1 spray into both nostrils daily as needed for congestion    Bloody nose       tamsulosin 0.4 MG capsule    FLOMAX    90 capsule    TAKE 1 CAPSULE DAILY    Benign prostatic hyperplasia with lower urinary tract symptoms       torsemide 20 MG tablet    DEMADEX    90 tablet    Take 1 tablet (20 mg) by mouth daily (with breakfast)    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, Chronic combined systolic and diastolic congestive heart failure (H)       Urea 20 % Crea cream     120 g    Apply topically as needed Apply to right foot daily    Right foot pain, Skin callus, Pes planus of both feet, Venous  insufficiency of both lower extremities, Hav (hallux abducto valgus), right, Onychomycosis of toenail       * zolpidem 5 MG tablet    AMBIEN    1 tablet    Take 1/2 tablet by mouth 15 minutes prior to sleep, for Sleep Study    Right heart enlargement, S/P CABG (coronary artery bypass graft), Alcohol dependence with unspecified alcohol-induced disorder (H), 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       * zolpidem 5 MG tablet    AMBIEN    1 tablet    Take 1/2 tablet by mouth 15 minutes prior to sleep, for Sleep Study    Nocturnal hypoxemia, Cheyne-Alejandro breathing, LENORA (obstructive sleep apnea), Essential hypertension, Status post coronary artery bypass graft, Frequent PVCs, Chronic atrial fibrillation (H), Chronic combined systolic and diastolic CHF (congestive heart failure) (H), 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, Excessive daytime sleepiness       * Notice:  This list has 2 medication(s) that are the same as other medications prescribed for you. Read the directions carefully, and ask your doctor or other care provider to review them with you.

## 2018-06-19 NOTE — PATIENT INSTRUCTIONS
Your blood pressure was checked while you were in clinic today.  Please read the guidelines below about what these numbers mean and what you should do about them.  Your systolic blood pressure is the top number.  This is the pressure when the heart is pumping.  Your diastolic blood pressure is the bottom number.  This is the pressure in between beats.  If your systolic blood pressure is less than 120 and your diastolic blood pressure is less than 80, then your blood pressure is normal. There is nothing more that you need to do about it  If your systolic blood pressure is 120-139 or your diastolic blood pressure is 80-89, your blood pressure may be higher than it should be.  You should have your blood pressure re-checked within a year by a primary care provider.  If your systolic blood pressure is 140 or greater or your diastolic blood pressure is 90 or greater, you may have high blood pressure.  High blood pressure is treatable, but if left untreated over time it can put you at risk for heart attack, stroke, or kidney failure.  You should have your blood pressure re-checked by a primary care provider within the next four weeks.  Your BMI is Body mass index is 28.19 kg/(m^2).  Weight management is a personal decision.  If you are interested in exploring weight loss strategies, the following discussion covers the approaches that may be successful. Body mass index (BMI) is one way to tell whether you are at a healthy weight, overweight, or obese. It measures your weight in relation to your height.  A BMI of 18.5 to 24.9 is in the healthy range. A person with a BMI of 25 to 29.9 is considered overweight, and someone with a BMI of 30 or greater is considered obese. More than two-thirds of American adults are considered overweight or obese.  Being overweight or obese increases the risk for further weight gain. Excess weight may lead to heart disease and diabetes.  Creating and following plans for healthy eating and  physical activity may help you improve your health.  Weight control is part of healthy lifestyle and includes exercise, emotional health, and healthy eating habits. Careful eating habits lifelong are the mainstay of weight control. Though there are significant health benefits from weight loss, long-term weight loss with diet alone may be very difficult to achieve- studies show long-term success with dietary management in less than 10% of people. Attaining a healthy weight may be especially difficult to achieve in those with severe obesity. In some cases, medications, devices and surgical management might be considered.  What can you do?  If you are overweight or obese and are interested in methods for weight loss, you should discuss this with your provider.     Consider reducing daily calorie intake by 500 calories.     Keep a food journal.     Avoiding skipping meals, consider cutting portions instead.    Diet combined with exercise helps maintain muscle while optimizing fat loss. Strength training is particularly important for building and maintaining muscle mass. Exercise helps reduce stress, increase energy, and improves fitness. Increasing exercise without diet control, however, may not burn enough calories to loose weight.       Start walking three days a week 10-20 minutes at a time    Work towards walking thirty minutes five days a week     Eventually, increase the speed of your walking for 1-2 minutes at time    In addition, we recommend that you review healthy lifestyles and methods for weight loss available through the National Institutes of Health patient information sites:  http://win.niddk.nih.gov/publications/index.htm    And look into health and wellness programs that may be available through your health insurance provider, employer, local community center, or [employer].      Please, schedule sleep study and follow up visit with the nurse (she will coming into the room and meet with you). Use  sleeping aid only if needed during the night of the study.    Thank you!    Taye Phillips MD, MPH  Clinical Sleep and Occupational / Environmental Medicine

## 2018-06-19 NOTE — Clinical Note
Hi,  Pt completed PSG sleep study showing LENORA with also Cheyene Stoke respirations. Study showed some abnormal beats with also a non-sustained ventricular tachycardia. He is following up with cardiology in July. Please, contact the patient if you believe he needs to follow up with you guys before then. I looked at the record and these findings did not seem to be new.  Thanks!  Taye Phillips MD, MPH

## 2018-06-19 NOTE — PROGRESS NOTES
Sleep Center [facility]  Outpatient Sleep Medicine Follow Up Visit  June 19, 2018    Name: Milo Serna MRN# [id number]   Age: 77 year old YOB: 1940     Date of Follow Up Visit: June 19, 2018  Consultation is requested by: Sixto Conley MD  [address] [place] [id number]  Primary care provider: Oscar Alves    Patient is accompanied by: Patient presents alone today.       Reason for Sleep Consult:     Milo Serna is a 77 year old male patient that presents here for a follow up evaluation after completing PSG sleep study.         Assessment and Plan:     Summary Sleep Diagnoses:    (1) Moderate LENORA (AHI was 16.4 events per hour)  (2) Sleep Associated Hypoxemia (16.3 minutes under the SpO2 of 88%)  (3) Cheyne Stoke Respirations  (4) EDS  (5) Cardiomegaly  (6) Congestive Heart Failure (LVEF estimated at 35 - 40%)  (7) Chronic Atrial Fibrillation  (8) Frequent PVCs  (9) CAD with CABG  (10) HTN  (11) Pulmonary Hypertension    Summary Recommendations / Discussion:    During the initial visit, this patient had sxs, hx, and physical findings that suggested the diagnosis of a sleep disordered breathing. In addition, he had an intermediate pre-test probability of LENORA. Given this patient's extensive cardiac history, a portable HST sleep study was not performed, and instead, this patient underwent an in-lab split-night PSG sleep study. Based on the patient's history, clinical presentation, and physical examination, there was a reasonable level of suspicion for hypoventilation and, therefore, TCM monitoring as well as ABG studies were utilized. The in-lab PSG sleep study obtained on 06/10/2018 demonstrated moderate LENORA with Cheyne Stoke respirations as well as sleep associated hypoxemia (AHI was 16.4 events per hour and the patient spent 16.3 minutes under the SpO2 of 88%). During the study, PAP therapy was initiated, but the patient was unable to tolerate this and, as such, it  was discontinued. The patient showed atrial fibrillation with frequent polymorphic PVC, couplet PVCs, bigeminal PVCs, as well as non-sustained ventricular tachycardia. Today, the study results were carefully discussed and explained. The possible consequences of not treating hypoxic stress were also reviewed. After much discussion, the patient explained that he wishes to do everything possible to protect his cardiac function (including using a PAP device). As such, he is willing to undergo an in-lab PSG titration study and use PAP therapy if necessary. At this point, given the patient history and PSG findings, the patient will undergo an in-lab PSG titration study with TCM and, if necessary, ABG. Given the decreased LVEF below 45% (currently at 35 - 40%), bilevel therapy, including ASV therapy, should not be used. If hypoxemia is noted during the study, obstructive events should be reduce below 10 events per hour and, if hypoxemia persists, oxygen supplementation should be started as per protocol. CPAP should be used mainly for obstructive events. If central apneas are the predominant component, elevation of the head of the bed should be considered. Once again, the nature and pathophysiology of LENORA were discussed. The different treatment options for LENORA were also reviewed and explained again today. The patient was given zolpidem 2.5 mg to use only during the night of the study and only if needed (medication side effects and possible complications discussed). Lifestyle recommendations including healthy dietary and exercising habits were discussed. Pt will follow up with me after having completed an in-lab PSG titration sleep study.    The patient will follow up with cardiology regarding cardiac findings noted on PSG. Cardiologist will be informed today.     Visit Summary:                       -In-lab PSG titration sleep study with TCM and, if needed, ABG - Study placed as urgent                       -Avoid bilevel  therapy, including ASV                       -As long as obstructive events are below 10 events per hour, oxygen supplementation should be used for persistent hypoxemia                       -Elevation of the head of the bed for central events                       -Zolpidem 2.5 mg for the night of the study     -Follow up with cardiology                       -Follow up with me after PSG is completed    Coding:  (G47.33) LENORA (obstructive sleep apnea)  (primary encounter diagnosis)  (G47.19) Excessive daytime sleepiness  (I50.810) 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  (I50.42) Chronic combined systolic and diastolic CHF (congestive heart failure) (H)  (I48.2) Chronic atrial fibrillation (H)  (I49.3) Frequent PVCs  (Z95.1) Status post coronary artery bypass graft  (I10) Essential hypertension  (R06.3) Cheyne-Alejandro breathing  (G47.34) Nocturnal hypoxemia  (I27.20) Pulmonary hypertension    Counseling included a comprehensive review of diagnostic and therapeutic strategies as well as risks of inadequate therapy.    Educational materials provided in instructions. The patient was instructed to avoid driving or operating any heavy machinery when experiencing drowsiness.    All questions and concerns were addressed today. Pt agrees and understands the assessment and plan.         History of Present Illness:   Milo Serna is a 77 year old  RHD male pt with PMH of coronary atherosclerosis with coronary artery disease and past myocardial infarction, hyperlipidemia, peptic ulcer disease, venous stasis, history of premature ventricular contractions and premature atrial contractions, chronic atrial fibrillation, dilated aortic root, history of prostate cancer, iron deficiency anemia, hypertension, thrombocytopenia, pulmonary hypertension, and biventricular systolic heart failure presents for a follow up evaluation today after completing an in-lab PSG sleep study due to congestive heart  failure and atrial fibrillation.    As previously explained, this is a rather medically complex individual with extensive cardiac history including CABG in 1997, chronic atrial fibrillation, hypertension, stable ascending aortic dilation, and pulmonary hypertension. The patient has dilation of the right ventricle with also reduced LVEF estimated at 35 - 40% during most recent echocardiogram. The study also showed global hypokinesis of the left ventricle (see summary below).    The patient also completed a PFT with no obvious abnormalities noted (see summary below). A 6 minute walk distance was also obtained and this showed no obvious hypoxemia with saturations above 95% at all times.    During the initial visit, this patient reported no snoring, gasping / choking for air, or witnessed apneas The patient admitted having restorative sleep with no sleep inertia. He also admitted having some mild EDS with no inadvertent naps. This patient admitted having some xerostomia in the morning. The patient denied any morning cephalgia, morning myalgias, CP, SOB, N/V, diarrhea, nocturia, nocturnal coughing spells, positional dyspnea, orthopnea, or GERD sxs. The patient sleeps with one pillow under his head with elevation of the head of the bed (due to chronic back pain). Lastly, this patient admitted having some drowsiness when driving with no near accidents.     After the initial visit, the patient completed an in-lab PSG sleep study on 06/10/2018 and this demonstrated moderate LENORA with Cheyne Stoke respirations as well as sleep associated hypoxemia (AHI was 16.4 events per hour and the patient spent 16.3 minutes under the SpO2 of 88%). There was mildly increased PLMs noted with only mild cortical arousal association noted. During the study, PAP therapy was initiated, but the patient was unable to tolerate this and, as such, it was discontinued. The patient showed atrial fibrillation with frequent polymorphic PVC, couplet PVCs,  bigeminal PVCs, as well as non-sustained ventricular tachycardia.    Today, the patient denies any new sxs or concerns. Pt explains that he was unable to tolerate the PAP therapy during the night of the study, but he is willing to try it again. In fact, the patient states that he wishes to PAP therapy to try to protect his heart as much possible.    PREVIOUS IN- LAB or HOME SLEEP STUDIES: None Reported    SLEEP-WAKE SCHEDULE:     Milo Serna      -Describes himself as a morning person; prefers to go to sleep at 10:00 PM and wakes up at 6:00 AM.      -Pt takes about 6 naps a week and each nap lasts about 15 minutes to one hour. Pt feels refreshed after each nap; takes no inadvertent naps.      -ON WEEKDAYS, goes to sleep at 10:00 PM during the week; awakens 6:00 AM with an alarm; falls asleep in 5 minutes; denies difficulty falling asleep.      -ON WEEKENDS, goes to sleep at 10:00 PM and wakes up at 6:00 AM with an alarm; falls asleep in 5 minutes.        -Awakens 1-2 times a night for 5 minutes before falling back to sleep; awakens to go to the bathroom.      BEDTIME ACTIVITIES AND SHIFT WORK:    Milo QUIJANO Daphne     -Bedtime Activities and Other Sleeping Information: Pt lives with wife. Pt sleeps with wife on a ashtyn size bed. No pets in the bedroom. Pt sleeps on back and sides. No electronics used in bed.      -Occupation: Retired     SCALES        -SLEEP APNEA: Stopbang score: 4 / 8        -SLEEPINESS: Charlotte sleepiness scale (ESS):  9 / 24    Drowsy driving / near accidents: Some drowsiness reported with no near accidents    Consequences: Mild EDS reported    SLEEP COMPLAINTS:  Cardio-respiratory     -Snoring: No snoring reported    -Dyspnea: Pt denies having any witnessed apneas or dyspnea   -Morning headaches or confusion: Denies any morning cephalgia   -Coexisting Lung disease: See Above     -Coexisting Heart disease: See Above     -Does patient have a bed partner: Patient sleeps with spouse   -Has  bed partner been sleeping separately because of snoring:  No            RLS Screen:    -When you try to relax in the evening or sleep at night, do you ever have unpleasant, restless feelings in your legs that can be relieved by walking or movement? None Reported     -Periodic limb movement: None Reported    Narcolepsy:     - Denies sudden urges of sleep attacks   - Denies cataplexy   - Denies sleep paralysis    - Denies hallucinations    - Admits feeling refreshed after a nap.    Sleep Behaviors:   - Denies leg symptoms/movements   - Denies motor restlessness   - Denies night terrors   - Denies bruxism   - Denies automatic behaviors    Other Subjective Complaints:   - Denies anxiety or rumination    - Denies pain and discomfort at night   - Denies waking up with heart pounding or racing   - Denies GERD or aspiration         Parasomnia:    -NREM - Denies recurrent persistent confusional arousal, night eating, sleep walking or sleep terrors.      -REM - Denies dream enactment or injuries.          Medications:     Current Outpatient Prescriptions   Medication Sig     acetaminophen (TYLENOL) 650 MG CR tablet Take 1 tablet (650 mg) by mouth every 6 hours as needed for pain     apixaban ANTICOAGULANT (ELIQUIS) 2.5 MG tablet Take 1 tablet (2.5 mg) by mouth 2 times daily     aspirin 81 MG tablet Take 81 mg by mouth daily     lisinopril (PRINIVIL/ZESTRIL) 20 MG tablet Take 1 tablet (20 mg) by mouth daily     metoprolol succinate (TOPROL-XL) 25 MG 24 hr tablet Take 0.5 tablets (12.5 mg) by mouth daily (with breakfast)     Multiple Vitamins-Minerals (MULTIVITAL) CHEW Take 2 chew tab by mouth 2 times daily     omeprazole (PRILOSEC) 40 MG capsule TAKE 1 CAPSULE DAILY     potassium chloride SA (K-DUR/KLOR-CON M) 10 MEQ CR tablet Take 2 tablets (20 mEq) by mouth daily     senna (SENOKOT) 8.6 MG tablet Take 1 tablet by mouth daily     simvastatin (ZOCOR) 20 MG tablet Take 1 tablet (20 mg) by mouth At Bedtime     torsemide  (DEMADEX) 20 MG tablet Take 1 tablet (20 mg) by mouth daily (with breakfast)     co-enzyme Q-10 100 MG CAPS Take 1 capsule (100 mg) by mouth daily     diclofenac (VOLTAREN) 1 % GEL Apply 4 gramsto area qid prn     doxycycline (VIBRAMYCIN) 100 MG capsule TAKE 1 CAPSULE DAILY     FERROUS GLUCONATE PO Take 324 mg by mouth daily (with breakfast)     Glucosamine-Chondroitin (OSTEO BI-FLEX REGULAR STRENGTH) 250-200 MG TABS Take 1 tablet by mouth 2 times daily     naftifine (NAFTIN) 1 % GEL topical gel Apply topically daily Apply to affected nails daily     Omega 3-6-9 Fatty Acids (OMEGA 3-6-9 PO) Take 1 tablet by mouth daily      sodium chloride (OCEAN NASAL SPRAY) 0.65 % nasal spray Spray 1 spray into both nostrils daily as needed for congestion     tamsulosin (FLOMAX) 0.4 MG capsule TAKE 1 CAPSULE DAILY (Patient not taking: Reported on 6/19/2018)     Urea 20 % CREA cream Apply topically as needed Apply to right foot daily     zolpidem (AMBIEN) 5 MG tablet Take 1/2 tablet by mouth 15 minutes prior to sleep, for Sleep Study (Patient not taking: Reported on 6/19/2018)     No current facility-administered medications for this visit.       No Known Allergies         Past Medical History:     Denies needing any 02 supplement at night.    Past Medical History:   Diagnosis Date     Anemia, unspecified 11/8/2016     Atrial fibrillation (H) 5/27/2015     BPH (benign prostatic hyperplasia) 1/25/2012     CAD (coronary artery disease) 4/4/2012     Closed fracture of metatarsal bone 4/4/2012     Coronary atherosclerosis of unspecified type of vessel, native or graft [facility]    CAB 1996 following MI (at [facility])      Dilated aortic root (H) 5/26/2016     Drug-induced erectile dysfunction 10/2/2015     Elevated blood sugar 11/8/2016     Elevated PSA 9/19/2013     Essential hypertension with goal blood pressure less than 140/90 9/22/2016     Essential hypertension, benign      Gallbladder polyp 5/1/2018     Gout      HAV  (hallux abducto valgus) 4/4/2012     Hernia, abdominal      History of prostate cancer 6/15/2016     HTN, goal below 150/90 4/20/2017     Iron deficiency anemia secondary to inadequate dietary iron intake 11/15/2016     Low blood pressure reading 10/17/2016     Lumbago     had degendisc dz on MRI 7/07     Mixed hyperlipidemia      Mumps      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     Past Surgical History:   Procedure Laterality Date     C NONSPECIFIC PROCEDURE      [facility]     C NONSPECIFIC PROCEDURE  2/07    l inguinal hernia repair      C NONSPECIFIC PROCEDURE      R hernia remote     C NONSPECIFIC PROCEDURE  2000    R ankle ORIF for fx     C NONSPECIFIC PROCEDURE  2005    nasal surgery      C NONSPECIFIC PROCEDURE      R rotater repair (remote)      C NONSPECIFIC PROCEDURE      appy 1966     C NONSPECIFIC PROCEDURE      sinus surgery x 2     C NONSPECIFIC PROCEDURE      L shoulder 6/09     CARDIAC SURGERY      bipass     CYSTOSCOPY FLEXIBLE, CYOABLATION PROSTATE N/A 11/9/2016    Procedure: CYSTOSCOPY FLEXIBLE, CRYOABLATION PROSTATE;  Surgeon: Krystian Owens MD;  Location:  OR     GENITOURINARY SURGERY      prostate surgery      HERNIA REPAIR       LAPAROSCOPIC HERNIORRHAPHY INGUINAL Right 5/10/2017    Procedure: LAPAROSCOPIC HERNIORRHAPHY INGUINAL;  Laparoscopic preperitoneal repair of right inguinal hernia with placement of underlay  mesh;  Surgeon: Enrico Bridges MD;  Location: [facility] OR     PROSTATE SURGERY            Social History:     Social History   Substance Use Topics     Smoking status: Former Smoker     Smokeless tobacco: Never Used      Comment: quit 3/1974     Alcohol use 0.0 oz/week     0 Standard drinks or equivalent per week      Comment: No alcohol x 5 days (18)     Chemical History:     Tobacco: Quit smoking about 44 yrs ago     Uses 2 cups/day of coffee. Last caffeine intake is usually before 10 AM.    Supplements for wakefulness: Patient does not use any supplements to stay awake    EtOH: Occasional use only  Recreational Drugs: Patient denies using any recreational drugs     Psych Hx:   Current dangers to self or others: No. Pt denies any SI / HI, hallucinations, or delusions         Family History:     Family History   Problem Relation Age of Onset     Cancer Mother      stomach cancer,  age 61     HEART DISEASE Father      MI,  age 71     HEART DISEASE Brother      stent placement, RA      Hypertension Brother      HEART DISEASE Sister      rythmn problems with heart, hypertension      Sleep Family Hx:       RLS - None reported   LENORA - None reported  Insomnia - None reported  Parasomnia - None reported         Review of Systems:     A complete 10 point review of systems was negative other than HPI or as commented below:     CONSTITUTIONAL: NEGATIVE for weight gain/loss, fever, chills, sweats or night sweats, drug allergies.  EYES: NEGATIVE for double vision. Pt reports changes in vision and blind spots.  ENT: NEGATIVE for ear pain, sore throat, sinus pain, post-nasal drip, runny nose, bloody nose  CARDIAC: NEGATIVE for chest pain or pressure or breathlessness when lying flat. Pt endorses swollen legs / swollen feet and palpitation.  NEUROLOGIC: NEGATIVE headaches, weakness or numbness in the arms or legs.  DERMATOLOGIC: NEGATIVE for rashes, new moles or change in mole(s)  PULMONARY: NEGATIVE SOB  "at rest, dry cough, coughing up blood, wheezing or whistling when breathing. Pt reports SOB with activity and mildly productive cough.  GASTROINTESTINAL: NEGATIVE for nausea or vomitting, loose or watery stools, fat or grease in stools, constipation, abdominal pain, bowel movements black in color or blood noted.  GENITOURINARY: NEGATIVE for pain during urination, blood in urine, urinating more frequently than usual, irregular menstrual periods.  MUSCULOSKELETAL: NEGATIVE for muscle pain, bone or joint pain, swollen joints.  ENDOCRINE: NEGATIVE for increased thirst or urination, diabetes.  LYMPHATIC: NEGATIVE for swollen lymph nodes, lumps or bumps in the breasts or nipple discharge.         Physical Examination:   /52  Pulse 52  Resp 14  Ht 1.702 m (5' 7.01\")  Wt 81.6 kg (180 lb)  SpO2 98%  BMI 28.19 kg/m2     VS: Reviewed and mild bradycardia noted (asymptomatic).  General: Alert, oriented, not in distress. Dressed casually; Good eye contact; Comfortably sitting in a chair; in no apparent distress  Lungs: Both hemithoraces are symmetrical, normal to palpation, no dullness to percussion, auscultation of lungs revealed normal breath sounds with no expirium prolongation, wheezing, rhonci and crackles.  CVS: Normal S1 and S2 heart sounds with no extra heart sounds. No murmur, rubs, or clicks. Normal peripheral pulses throughout with no obvious peripheral edema. Irregularly irregular rhythm noted.  Psychiatry: Mood and affect are appropriate. Euthymic with affect congruent with full range and intensity. No SI/HI with adequate insight and judgement.          Data: All pertinent previous laboratory data reviewed     Lab Results   Component Value Date    PH 7.38 02/20/2007    PO2 66 (L) 02/20/2007    PCO2 45 02/20/2007    HCO3 25 02/20/2007     Lab Results   Component Value Date    TSH 1.12 09/28/2017    TSH 1.82 08/23/2011     Lab Results   Component Value Date    GLC 94 06/13/2018     (H) 05/30/2018 "     Lab Results   Component Value Date    HGB 12.2 (L) 05/30/2018    HGB 12.9 (L) 09/25/2017     Lab Results   Component Value Date    BUN 20 06/13/2018    BUN 18 05/30/2018    CR 0.88 06/13/2018    CR 1.02 05/30/2018     Echocardiology:    -Echocardiogram obtained on 04/25/2018 showed mildly dilated left ventricle with normal left ventricular wall thickness. Left ventricle systolic function is reduced with a LVEF estimated at 35 - 40%. Moderate to severely global hypokinesia of the left ventricle noted. Right ventricle was mild to moderately dilated with moderately decreased systolic function. Severe biatrial enlargement noted. Moderate to mod-severe mitral regurgitation was present. Mild tricuspid regurgitation was noted. Mild pulmonary hypertension was present. Mild aortic regurgitation was observed. Only trace pulmonic valvular regurgitation was noted. The patient was in atrial fibrillation during the study.       Chest x-ray:    -Chest x-ray films obtained on 04/26/2018 showed enlarged cardiac silhouette with also enlarged pulmonary arteries present. Median sternotomy wires were noted. No other cardiopulmonary abnormalities were noted.    PFT:    -The patient had a PFT obtained on 04/24/2018 and this showed normal lung mechanics, normal lung volumes, and normal gas transfer. FVC was 106%, FEV1 was 104%, VC was 100%, TLC was 104%, and the DLCO was also 100%.    Laboratory Studies:   Component Value Flag Ref Range Units Status Collected Lab   WBC 4.1  4.0 - 11.0 10e9/L Final 05/30/2018  8:44 AM FrRd[facility]   RBC Count 3.84 (L) 4.4 - 5.9 10e12/L Final 05/30/2018  8:44 AM FrRdHs   Hemoglobin 12.2 (L) 13.3 - 17.7 g/dL Final 05/30/2018  8:44 AM FrRdHs   Hematocrit 37.2 (L) 40.0 - 53.0 % Final 05/30/2018  8:44 AM FrRd[facility]   MCV 97  78 - 100 fl Final 05/30/2018  8:44 AM FrRd[facility]   MCH 31.8  26.5 - 33.0 pg Final 05/30/2018  8:44 AM Fr[facility]   MCHC 32.8  31.5 - 36.5 g/dL Final 05/30/2018  8:44 AM Fr[facility]dHs   RDW 14.5  10.0 - 15.0 %  Final 05/30/2018  8:44 AM Fr[facility]   Platelet Count 111 (L) 150 - 450 10e9/L Final 05/30/2018  8:44 AM FrRdHs     Component Value Flag Ref Range Units Status Collected Lab   Sodium 139  133 - 144 mmol/L Final 05/30/2018  8:44 AM Fr[facility]   Potassium 3.7  3.4 - 5.3 mmol/L Final 05/30/2018  8:44 AM Fr[facility]   Chloride 103  94 - 109 mmol/L Final 05/30/2018  8:44 AM Fr[facility]   Carbon Dioxide 30  20 - 32 mmol/L Final 05/30/2018  8:44 AM Fr[facility]dHs   Anion Gap 6  3 - 14 mmol/L Final 05/30/2018  8:44 AM Fr[facility]dHs   Glucose 114 (H) 70 - 99 mg/dL Final 05/30/2018  8:44 AM Fr[facility]dHs   Urea Nitrogen 18  7 - 30 mg/dL Final 05/30/2018  8:44 AM Fr[facility]dHs   Creatinine 1.02  0.66 - 1.25 mg/dL Final 05/30/2018  8:44 AM FrRdHs   GFR Estimate 71  >60 mL/min/1.7m2 Final 05/30/2018  8:44 AM FrRdHs   Comment:   Non  GFR Calc   GFR Estimate If Black 85  >60 mL/min/1.7m2 Final 05/30/2018  8:44 AM FrRdHs   Comment:   African American GFR Calc   Calcium 8.9  8.5 - 10.1 mg/dL Final 05/30/2018  8:44 AM FrRdHs   Bilirubin Total 1.7 (H) 0.2 - 1.3 mg/dL Final 05/30/2018  8:44 AM FrRdHs   Albumin 3.7  3.4 - 5.0 g/dL Final 05/30/2018  8:44 AM FrRdHs   Protein Total 7.3  6.8 - 8.8 g/dL Final 05/30/2018  8:44 AM FrRdHs   Alkaline Phosphatase 171 (H) 40 - 150 U/L Final 05/30/2018  8:44 AM FrRdHs   ALT 18  0 - 70 U/L Final 05/30/2018  8:44 AM FrRdHs   AST 24  0 - 45 U/L Final 05/30/2018  8:44 AM FrRdHs     Component Value Flag Ref Range Units Status Collected Lab   Hemoglobin A1C 5.4  0 - 5.6 % Final 05/01/2018 10:32 AM 53     Taye Phillips MD, MPH  Clinical Sleep Medicine    Total time spent with patient: 40 min. Over >50% of the time was spent for face to face counseling, education, and evaluation.

## 2018-06-19 NOTE — NURSING NOTE
"Chief Complaint   Patient presents with     RECHECK     f/u split 6/10       Initial /52  Pulse 52  Resp 14  Ht 1.702 m (5' 7.01\")  Wt 81.6 kg (180 lb)  SpO2 98%  BMI 28.19 kg/m2 Estimated body mass index is 28.19 kg/(m^2) as calculated from the following:    Height as of this encounter: 1.702 m (5' 7.01\").    Weight as of this encounter: 81.6 kg (180 lb).    Medication Reconciliation: complete        [facility]  "

## 2018-06-21 ENCOUNTER — HOSPITAL ENCOUNTER (OUTPATIENT)
Dept: CARDIAC REHAB | Facility: CLINIC | Age: 78
End: 2018-06-21
Attending: INTERNAL MEDICINE
Payer: MEDICARE

## 2018-06-21 ENCOUNTER — OFFICE VISIT (OUTPATIENT)
Dept: UROLOGY | Facility: CLINIC | Age: 78
End: 2018-06-21
Payer: MEDICARE

## 2018-06-21 VITALS
DIASTOLIC BLOOD PRESSURE: 59 MMHG | WEIGHT: 175 LBS | BODY MASS INDEX: 27.47 KG/M2 | HEIGHT: 67 IN | SYSTOLIC BLOOD PRESSURE: 108 MMHG

## 2018-06-21 DIAGNOSIS — Z85.46 HISTORY OF PROSTATE CANCER: Primary | ICD-10-CM

## 2018-06-21 DIAGNOSIS — Z85.46 HISTORY OF PROSTATE CANCER: ICD-10-CM

## 2018-06-21 LAB — PSA SERPL-MCNC: 0.24 NG/ML (ref 0–4)

## 2018-06-21 PROCEDURE — 40000116 ZZH STATISTIC OP CR VISIT

## 2018-06-21 PROCEDURE — 99213 OFFICE O/P EST LOW 20 MIN: CPT | Performed by: UROLOGY

## 2018-06-21 PROCEDURE — 93798 PHYS/QHP OP CAR RHAB W/ECG: CPT

## 2018-06-21 PROCEDURE — 84153 ASSAY OF PSA TOTAL: CPT | Performed by: UROLOGY

## 2018-06-21 PROCEDURE — 36415 COLL VENOUS BLD VENIPUNCTURE: CPT | Performed by: UROLOGY

## 2018-06-21 ASSESSMENT — PAIN SCALES - GENERAL: PAINLEVEL: NO PAIN (0)

## 2018-06-21 NOTE — PROGRESS NOTES
History: It is a great pleasure to see this very pleasant 77-year-old gentleman and follow-up consultation today.  He had cryotherapy for prostate cancer with a [place] score of 8,  18 months ago.  Also had a history of atrial fibrillation was anticoagulants.  Initial PSA was before cryotherapy.  He was on Eligard for 12 months before cryotherapy.  He is no longer taking Eligard.  At the last visit 6 months ago the PSA was 0.12 and today it is 0.24.  He is voiding well with a good stream, has no evidence of incontinence,, has not observed gross hematuria and has had no other significant urinary complaints.  He does have significant cardiovascular disease as listed below and currently has right heart failure.    Past Medical History:   Diagnosis Date     Anemia, unspecified 11/8/2016     Atrial fibrillation (H) 5/27/2015     BPH (benign prostatic hyperplasia) 1/25/2012     CAD (coronary artery disease) 4/4/2012     Closed fracture of metatarsal bone 4/4/2012     Coronary atherosclerosis of unspecified type of vessel, native or graft [facility]    CAB 1996 following MI (at [facility])      Dilated aortic root (H) 5/26/2016     Drug-induced erectile dysfunction 10/2/2015     Elevated blood sugar 11/8/2016     Elevated PSA 9/19/2013     Essential hypertension with goal blood pressure less than 140/90 9/22/2016     Essential hypertension, benign      Gallbladder polyp 5/1/2018     Gout      HAV (hallux abducto valgus) 4/4/2012     Hernia, abdominal      History of prostate cancer 6/15/2016     HTN, goal below 150/90 4/20/2017     Iron deficiency anemia secondary to inadequate dietary iron intake 11/15/2016     Low blood pressure reading 10/17/2016     Lumbago     had degendisc dz on MRI 7/07     Mixed hyperlipidemia      Mumps      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     OA (osteoarthritis) 4/4/2012     Old myocardial infarction 4/4/2012     Palpitations      Pes planus 4/4/2012     Prostate cancer (H) 5/26/2016     PUD  (peptic ulcer disease) 4/4/2012     Pulmonary hypertension 9/25/2017     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     Rosacea 1/15/2008     Thrombocytopenia (H) 4/21/2017     Unspecified hyperplasia of prostate with urinary obstruction and other lower urinary tract symptoms (LUTS)     better on avodart & hytrin     Venous stasis 4/4/2012     Venous stasis 4/4/2012       Social History     Social History     Marital status:      Spouse name: N/A     Number of children: N/A     Years of education: N/A     Social History Main Topics     Smoking status: Former Smoker     Smokeless tobacco: Never Used      Comment: quit 3/1974     Alcohol use 0.0 oz/week     0 Standard drinks or equivalent per week      Comment: No alcohol x 5 days (6/13/18)     Drug use: No     Sexual activity: Yes     Partners: Female     Other Topics Concern     Caffeine Concern Yes     1 cups of coffee and soda per day     Special Diet No     Exercise Yes     walking     Seat Belt Yes     Social History Narrative       Past Surgical History:   Procedure Laterality Date     C NONSPECIFIC PROCEDURE      CAB 1996 [place]     C NONSPECIFIC PROCEDURE  2/07    l inguinal hernia repair      C NONSPECIFIC PROCEDURE      R hernia remote     C NONSPECIFIC PROCEDURE  2000    R ankle ORIF for fx     C NONSPECIFIC PROCEDURE  2005    nasal surgery      C NONSPECIFIC PROCEDURE      R rotater repair (remote)      C NONSPECIFIC PROCEDURE      appy 1966     C NONSPECIFIC PROCEDURE      sinus surgery x 2     C NONSPECIFIC PROCEDURE      L shoulder 6/09     CARDIAC SURGERY      bipass     CYSTOSCOPY FLEXIBLE, CYOABLATION PROSTATE N/A 11/9/2016    Procedure: CYSTOSCOPY FLEXIBLE, CRYOABLATION PROSTATE;  Surgeon: Krystian Owens MD;  Location:  OR     GENITOURINARY SURGERY      prostate surgery      HERNIA REPAIR       LAPAROSCOPIC HERNIORRHAPHY INGUINAL Right 5/10/2017    Procedure: LAPAROSCOPIC HERNIORRHAPHY INGUINAL;  Laparoscopic preperitoneal repair of right inguinal  hernia with placement of underlay mesh;  Surgeon: Enrico Bridges MD;  Location: [facility] OR     PROSTATE SURGERY         Family History   Problem Relation Age of Onset     Cancer Mother      stomach cancer,  age 61     HEART DISEASE Father      MI,  age 71     HEART DISEASE Brother      stent placement, RA      Hypertension Brother      HEART DISEASE Sister      rythmn problems with heart, hypertension         Current Outpatient Prescriptions:      acetaminophen (TYLENOL) 650 MG CR tablet, Take 1 tablet (650 mg) by mouth every 6 hours as needed for pain, Disp: 100 tablet, Rfl: 11     apixaban ANTICOAGULANT (ELIQUIS) 2.5 MG tablet, Take 1 tablet (2.5 mg) by mouth 2 times daily, Disp: 200 tablet, Rfl: 3     aspirin 81 MG tablet, Take 81 mg by mouth daily, Disp: , Rfl:      co-enzyme Q-10 100 MG CAPS, Take 1 capsule (100 mg) by mouth daily, Disp: 180 capsule, Rfl: 3     diclofenac (VOLTAREN) 1 % GEL, Apply 4 gramsto area qid prn, Disp: 100 g, Rfl: 11     doxycycline (VIBRAMYCIN) 100 MG capsule, TAKE 1 CAPSULE DAILY, Disp: 90 capsule, Rfl: 1     FERROUS GLUCONATE PO, Take 324 mg by mouth daily (with breakfast), Disp: , Rfl:      Glucosamine-Chondroitin (OSTEO BI-FLEX REGULAR STRENGTH) 250-200 MG TABS, Take 1 tablet by mouth 2 times daily, Disp: 100 tablet, Rfl: 8     lisinopril (PRINIVIL/ZESTRIL) 20 MG tablet, Take 1 tablet (20 mg) by mouth daily, Disp: 90 tablet, Rfl: 3     metoprolol succinate (TOPROL-XL) 25 MG 24 hr tablet, Take 0.5 tablets (12.5 mg) by mouth daily (with breakfast), Disp: 90 tablet, Rfl: 3     Multiple Vitamins-Minerals (MULTIVITAL) CHEW, Take 2 chew tab by mouth 2 times daily, Disp: 360 tablet, Rfl: 3     naftifine (NAFTIN) 1 % GEL topical gel, Apply topically daily Apply to affected nails daily, Disp: 90 g, Rfl: 3     Omega 3-6-9 Fatty Acids (OMEGA 3-6-9 PO), Take 1 tablet by mouth daily , Disp: , Rfl:      omeprazole (PRILOSEC) 40 MG capsule, TAKE 1 CAPSULE DAILY, Disp: 90  "capsule, Rfl: 0     potassium chloride SA (K-DUR/KLOR-CON M) 10 MEQ CR tablet, Take 2 tablets (20 mEq) by mouth daily, Disp: 60 tablet, Rfl: 3     senna (SENOKOT) 8.6 MG tablet, Take 1 tablet by mouth daily, Disp: 60 tablet, Rfl: 0     simvastatin (ZOCOR) 20 MG tablet, Take 1 tablet (20 mg) by mouth At Bedtime, Disp: 90 tablet, Rfl: 1     sodium chloride (OCEAN NASAL SPRAY) 0.65 % nasal spray, Spray 1 spray into both nostrils daily as needed for congestion, Disp: 2 Bottle, Rfl: 6     tamsulosin (FLOMAX) 0.4 MG capsule, TAKE 1 CAPSULE DAILY, Disp: 90 capsule, Rfl: 0     torsemide (DEMADEX) 20 MG tablet, Take 1 tablet (20 mg) by mouth daily (with breakfast), Disp: 90 tablet, Rfl: 3     Urea 20 % CREA cream, Apply topically as needed Apply to right foot daily, Disp: 120 g, Rfl: 2     zolpidem (AMBIEN) 5 MG tablet, Take 1/2 tablet by mouth 15 minutes prior to sleep, for Sleep Study (Patient not taking: Reported on 6/21/2018), Disp: 1 tablet, Rfl: 0     zolpidem (AMBIEN) 5 MG tablet, Take 1/2 tablet by mouth 15 minutes prior to sleep, for Sleep Study (Patient not taking: Reported on 6/19/2018), Disp: 1 tablet, Rfl: 0    10 point ROS of systems including Constitutional, Eyes, Respiratory, Cardiovascular, Gastroenterology, Genitourinary, Integumentary, Muscularskeletal, Psychiatric were all negative except for pertinent positives noted in my HPI.    Examination:   /59  Ht 1.702 m (5' 7\")  Wt 79.4 kg (175 lb)  BMI 27.41 kg/m2  General Impression: Very pleasant gentleman in no acute distress, well oriented in time place and person  Mental Status: Normal.  HEENT.  There is no clinical evidence of jaundice and the mucous membranes are normal  Skin: Skin is normal to examination  Respiratory System: Respiratory cycle is normal  Lymph Nodes: Not examined  Back/Flank Tenderness: Not examined  Cardiovascular System: Not examined  Abdominal Examination: Not examined  Extremities: There is mild peripheral edema " "noted  Genitial: Not examined  Rectal Examination: Good sphincter tone, normal perianal sensation.  Smooth rectal mucosa without hemorrhoids or fissures.  Smooth soft and small prostate without evidence of tenderness, bogginess or nodules.  Seminal vesicles.  Not palpable.  Perineum is otherwise normal to examination and well-healed  Neurologic System: There are no focal abnormal clinical neurological signs in the central, or peripheral nervous systems    Impression: He continues to make excellent progress.  There has been a slight rise in the PSA over the last 6 months but this is most likely representing the waning effects of Eligard.  I would anticipate the PSA that will level off somewhat between 0.5 and 1.  I would recommend we repeat the PSA and examination in 1 year.  He will continue ongoing treatment for his right heart failure with his cardiologist.      Plan: 1 year for PSA and examination    Time: 20 minutes with greater than 50% spent in careful discussion consultation    \"This dictation was performed with voice recognition software and may contain errors,  omissions and inadvertent word substitution.\"      "

## 2018-06-21 NOTE — LETTER
6/21/2018       RE: Milo Serna  [address] [place] [address]     Dear Colleague,    Thank you for referring your patient, Milo Serna, to the [facility] at [facility]. Please see a copy of my visit note below.    History: It is a great pleasure to see this very pleasant 77-year-old gentleman and follow-up consultation today.  He had cryotherapy for prostate cancer with a [place] score of 8,  18 months ago.  Also had a history of atrial fibrillation was anticoagulants.  Initial PSA was before cryotherapy.  He was on Eligard for 12 months before cryotherapy.  He is no longer taking Eligard.  At the last visit 6 months ago the PSA was 0.12 and today it is 0.24.  He is voiding well with a good stream, has no evidence of incontinence,, has not observed gross hematuria and has had no other significant urinary complaints.  He does have significant cardiovascular disease as listed below and currently has right heart failure.  Past Medical History:   Diagnosis Date     Anemia, unspecified 11/8/2016     Atrial fibrillation (H) 5/27/2015     BPH (benign prostatic hyperplasia) 1/25/2012     CAD (coronary artery disease) 4/4/2012     Closed fracture of metatarsal bone 4/4/2012     Coronary atherosclerosis of unspecified type of vessel, native or graft [facility]    CAB 1996 following MI (at [facility])      Dilated aortic root (H) 5/26/2016     Drug-induced erectile dysfunction 10/2/2015     Elevated blood sugar 11/8/2016     Elevated PSA 9/19/2013     Essential hypertension with goal blood pressure less than 140/90 9/22/2016     Essential hypertension, benign      Gallbladder polyp 5/1/2018     Gout      HAV (hallux abducto valgus) 4/4/2012     Hernia, abdominal      History of prostate cancer 6/15/2016     HTN, goal below 150/90 4/20/2017     Iron deficiency anemia secondary to inadequate dietary iron intake  11/15/2016     Low blood pressure reading 10/17/2016     Lumbago     had degendisc dz on MRI 7/07     Mixed hyperlipidemia      Mumps      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     OA (osteoarthritis) 4/4/2012     Old myocardial infarction 4/4/2012     Palpitations      Pes planus 4/4/2012     Prostate cancer (H) 5/26/2016     PUD (peptic ulcer disease) 4/4/2012     Pulmonary hypertension 9/25/2017     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     Rosacea 1/15/2008     Thrombocytopenia (H) 4/21/2017     Unspecified hyperplasia of prostate with urinary obstruction and other lower urinary tract symptoms (LUTS)     better on avodart & hytrin     Venous stasis 4/4/2012     Venous stasis 4/4/2012       Social History     Social History     Marital status:      Spouse name: N/A     Number of children: N/A     Years of education: N/A     Social History Main Topics     Smoking status: Former Smoker     Smokeless tobacco: Never Used      Comment: quit 3/1974     Alcohol use 0.0 oz/week     0 Standard drinks or equivalent per week      Comment: No alcohol x 5 days (6/13/18)     Drug use: No     Sexual activity: Yes     Partners: Female     Other Topics Concern     Caffeine Concern Yes     1 cups of coffee and soda per day     Special Diet No     Exercise Yes     walking     Seat Belt Yes     Social History Narrative       Past Surgical History:   Procedure Laterality Date     C NONSPECIFIC PROCEDURE      CAB 1996 [place]     C NONSPECIFIC PROCEDURE  2/07    l inguinal hernia repair      C NONSPECIFIC PROCEDURE      R hernia remote     C NONSPECIFIC PROCEDURE  2000    R ankle ORIF for fx     C NONSPECIFIC PROCEDURE  2005    nasal surgery      C NONSPECIFIC PROCEDURE      R rotater repair (remote)      C NONSPECIFIC PROCEDURE      appy 1966     C NONSPECIFIC PROCEDURE      sinus surgery x 2     C NONSPECIFIC PROCEDURE      L shoulder 6/09     CARDIAC SURGERY      bipass     CYSTOSCOPY FLEXIBLE, CYOABLATION PROSTATE N/A 11/9/2016    Procedure:  CYSTOSCOPY FLEXIBLE, CRYOABLATION PROSTATE;  Surgeon: Krystian Owens MD;  Location: [facility] OR     GENITOURINARY SURGERY      prostate surgery      HERNIA REPAIR       LAPAROSCOPIC HERNIORRHAPHY INGUINAL Right 5/10/2017    Procedure: LAPAROSCOPIC HERNIORRHAPHY INGUINAL;  Laparoscopic preperitoneal repair of right inguinal hernia with placement of underlay mesh;  Surgeon: Enrico Bridges MD;  Location: [facility] OR     PROSTATE SURGERY         Family History   Problem Relation Age of Onset     Cancer Mother      stomach cancer,  age 61     HEART DISEASE Father      MI,  age 71     HEART DISEASE Brother      stent placement, RA      Hypertension Brother      HEART DISEASE Sister      rythmn problems with heart, hypertension     Current Outpatient Prescriptions:      acetaminophen (TYLENOL) 650 MG CR tablet, Take 1 tablet (650 mg) by mouth every 6 hours as needed for pain, Disp: 100 tablet, Rfl: 11     apixaban ANTICOAGULANT (ELIQUIS) 2.5 MG tablet, Take 1 tablet (2.5 mg) by mouth 2 times daily, Disp: 200 tablet, Rfl: 3     aspirin 81 MG tablet, Take 81 mg by mouth daily, Disp: , Rfl:      co-enzyme Q-10 100 MG CAPS, Take 1 capsule (100 mg) by mouth daily, Disp: 180 capsule, Rfl: 3     diclofenac (VOLTAREN) 1 % GEL, Apply 4 gramsto area qid prn, Disp: 100 g, Rfl: 11     doxycycline (VIBRAMYCIN) 100 MG capsule, TAKE 1 CAPSULE DAILY, Disp: 90 capsule, Rfl: 1     FERROUS GLUCONATE PO, Take 324 mg by mouth daily (with breakfast), Disp: , Rfl:      Glucosamine-Chondroitin (OSTEO BI-FLEX REGULAR STRENGTH) 250-200 MG TABS, Take 1 tablet by mouth 2 times daily, Disp: 100 tablet, Rfl: 8     lisinopril (PRINIVIL/ZESTRIL) 20 MG tablet, Take 1 tablet (20 mg) by mouth daily, Disp: 90 tablet, Rfl: 3     metoprolol succinate (TOPROL-XL) 25 MG 24 hr tablet, Take 0.5 tablets (12.5 mg) by mouth daily (with breakfast), Disp: 90 tablet, Rfl: 3     Multiple Vitamins-Minerals (MULTIVITAL) CHEW, Take 2 chew tab by mouth 2  "times daily, Disp: 360 tablet, Rfl: 3     naftifine (NAFTIN) 1 % GEL topical gel, Apply topically daily Apply to affected nails daily, Disp: 90 g, Rfl: 3     Omega 3-6-9 Fatty Acids (OMEGA 3-6-9 PO), Take 1 tablet by mouth daily , Disp: , Rfl:      omeprazole (PRILOSEC) 40 MG capsule, TAKE 1 CAPSULE DAILY, Disp: 90 capsule, Rfl: 0     potassium chloride SA (K-DUR/KLOR-CON M) 10 MEQ CR tablet, Take 2 tablets (20 mEq) by mouth daily, Disp: 60 tablet, Rfl: 3     senna (SENOKOT) 8.6 MG tablet, Take 1 tablet by mouth daily, Disp: 60 tablet, Rfl: 0     simvastatin (ZOCOR) 20 MG tablet, Take 1 tablet (20 mg) by mouth At Bedtime, Disp: 90 tablet, Rfl: 1     sodium chloride (OCEAN NASAL SPRAY) 0.65 % nasal spray, Spray 1 spray into both nostrils daily as needed for congestion, Disp: 2 Bottle, Rfl: 6     tamsulosin (FLOMAX) 0.4 MG capsule, TAKE 1 CAPSULE DAILY, Disp: 90 capsule, Rfl: 0     torsemide (DEMADEX) 20 MG tablet, Take 1 tablet (20 mg) by mouth daily (with breakfast), Disp: 90 tablet, Rfl: 3     Urea 20 % CREA cream, Apply topically as needed Apply to right foot daily, Disp: 120 g, Rfl: 2     zolpidem (AMBIEN) 5 MG tablet, Take 1/2 tablet by mouth 15 minutes prior to sleep, for Sleep Study (Patient not taking: Reported on 6/21/2018), Disp: 1 tablet, Rfl: 0     zolpidem (AMBIEN) 5 MG tablet, Take 1/2 tablet by mouth 15 minutes prior to sleep, for Sleep Study (Patient not taking: Reported on 6/19/2018), Disp: 1 tablet, Rfl: 0    10 point ROS of systems including Constitutional, Eyes, Respiratory, Cardiovascular, Gastroenterology, Genitourinary, Integumentary, Muscularskeletal, Psychiatric were all negative except for pertinent positives noted in my HPI.    Examination:   /59  Ht 1.702 m (5' 7\")  Wt 79.4 kg (175 lb)  BMI 27.41 kg/m2  General Impression: Very pleasant gentleman in no acute distress, well oriented in time place and person  Mental Status: Normal.  HEENT.  There is no clinical evidence of jaundice " "and the mucous membranes are normal  Skin: Skin is normal to examination  Respiratory System: Respiratory cycle is normal  Lymph Nodes: Not examined  Back/Flank Tenderness: Not examined  Cardiovascular System: Not examined  Abdominal Examination: Not examined  Extremities: There is mild peripheral edema noted  Genitial: Not examined  Rectal Examination: Good sphincter tone, normal perianal sensation.  Smooth rectal mucosa without hemorrhoids or fissures.  Smooth soft and small prostate without evidence of tenderness, bogginess or nodules.  Seminal vesicles.  Not palpable.  Perineum is otherwise normal to examination and well-healed  Neurologic System: There are no focal abnormal clinical neurological signs in the central, or peripheral nervous systems    Impression: He continues to make excellent progress.  There has been a slight rise in the PSA over the last 6 months but this is most likely representing the waning effects of Eligard.  I would anticipate the PSA that will level off somewhat between 0.5 and 1.  I would recommend we repeat the PSA and examination in 1 year.  He will continue ongoing treatment for his right heart failure with his cardiologist.    Plan: 1 year for PSA and examination    Time: 20 minutes with greater than 50% spent in careful discussion consultation    \"This dictation was performed with voice recognition software and may contain errors,  omissions and inadvertent word substitution.\"    Again, thank you for allowing me to participate in the care of your patient.    Sincerely,  Krystian Owens MD      "

## 2018-06-21 NOTE — MR AVS SNAPSHOT
After Visit Summary   6/21/2018    Milo Serna    MRN: [id number]           Patient Information     Date Of Birth          1940        Visit Information        Provider Department      6/21/2018 2:20 PM Krystian Owens MD [facility] [place]        Today's Diagnoses     History of prostate cancer           Follow-ups after your visit        Follow-up notes from your care team     Return in about 1 year (around 6/21/2019) for PSA, Physical Exam.      Your next 10 appointments already scheduled     Jun 22, 2018  8:00 AM CDT   Cardiac Treatment with Rh Cardiac Rehab 1   [facility] ([facility])    [address], [address]  [facility] [address]   [phone]            Jun 22, 2018  9:30 AM CDT   LAB with RU LAB   [facility] ([facility])    [address] [place] [address] [address]  [facility] [address]   [phone]           Please do not eat 10-12 hours before your appointment if you are coming in fasting for labs on lipids, cholesterol, or glucose (sugar). This does not apply to pregnant women. Water, hot tea and black coffee (with nothing added) are okay. Do not drink other fluids, diet soda or chew gum.            Jun 25, 2018  8:00 AM CDT   Cardiac Treatment with Rh Cardiac Rehab 1   [facility] ([facility])    [address], [address]  [facility] [address]   [phone]            Jun 27, 2018  8:00 AM CDT   Cardiac Treatment with Rh Cardiac Rehab 1   [facility] ([facility])    [address], [address]  [facility] [address]   [phone]            Jun 28, 2018  9:30 AM CDT   Cardiac Treatment with Rh Cardiac Rehab 2   [facility] ([facility])    [address], [address]  [facility] [address]   [phone]            Jul 02, 2018  8:00 AM  CDT   Cardiac Treatment with Rh Cardiac Rehab 1   [facility] ([facility])    [address]  [facility] [address]   [phone]            Jul 06, 2018  8:00 AM CDT   Cardiac Treatment with Rh Cardiac Rehab 1   [facility] ([facility])    [address], [address]  [facility] [address]   [phone]            Jul 09, 2018  8:00 AM CDT   Cardiac Treatment with Rh Cardiac Rehab 1   [facility] ([facility])    [address], [address]  [facility] [address]   [phone]            Jul 11, 2018  8:00 AM CDT   Cardiac Treatment with Rh Cardiac Rehab 1   [facility] ([facility])    [address]  [facility] [address]   [phone]            Jul 12, 2018  9:00 AM CDT   Holter Monitor with [facility] HOLTER PROC [facility] ([facility])    [address]  [facility] [address]   [phone]           LOCATION - [address], [address] [place] [address] **Please check-in at the [place] Registration Office, [address], in the [facility] building. When you are finished registering, please go to [address] and have a seat.              Future tests that were ordered for you today     Open Future Orders        Priority Expected Expires Ordered    UA without Microscopic Routine 6/21/2018 6/21/2019 6/21/2018            Who to contact     If you have questions or need follow up information about today's clinic visit or your schedule please contact [facility] [place] directly at [phone].  Normal or non-critical lab and imaging results will be communicated to you by MyChart, letter or phone within 4 business days after the clinic has received the results. If you do not hear from us within 7 days, please contact the clinic  "through [employer] or phone. If you have a critical or abnormal lab result, we will notify you by phone as soon as possible.  Submit refill requests through AquaHydrate or call your pharmacy and they will forward the refill request to us. Please allow 3 business days for your refill to be completed.          Additional Information About Your Visit        Twillionhart Information     AquaHydrate gives you secure access to your electronic health record. If you see a primary care provider, you can also send messages to your care team and make appointments. If you have questions, please call your primary care clinic.  If you do not have a primary care provider, please call [phone] and they will assist you.        Care EveryWhere ID     This is your Care EveryWhere ID. This could be used by other organizations to access your [facility] medical records  [id number]        Your Vitals Were     Height BMI (Body Mass Index)                1.702 m (5' 7\") 27.41 kg/m2           Blood Pressure from Last 3 Encounters:   06/21/18 108/59   06/19/18 110/52   06/13/18 128/66    Weight from Last 3 Encounters:   06/21/18 79.4 kg (175 lb)   06/19/18 81.6 kg (180 lb)   06/13/18 79.4 kg (175 lb 1.6 oz)              We Performed the Following     PSA Diag Urologic Phys        Primary Care Provider Office Phone # Fax #    Oscar Alves [phone] [address] [address] [facility] [address]        Equal Access to Services     [facility] : Hadii aad ku hadasho Soomaali, waaxda luqadaha, qaybta kaalmada adeegyada, chayito reyes . So [facility] [phone].    ATENCIÓN: Si habla fatouañol, tiene a randall disposición servicios gratuitos de asistencia lingüística. Llame al [phone].    We comply with applicable federal civil rights laws and [place] laws. We do not discriminate on the basis of race, color, national origin, age, disability, sex, sexual orientation, or gender identity.            Thank you!     Thank " you for choosing [facility] [place]  for your care. Our goal is always to provide you with excellent care. Hearing back from our patients is one way we can continue to improve our services. Please take a few minutes to complete the written survey that you may receive in the mail after your visit with us. Thank you!             Your Updated Medication List - Protect others around you: Learn how to safely use, store and throw away your medicines at www.disposemymeds.org.          This list is accurate as of 6/21/18  2:56 PM.  Always use your most recent med list.                   Brand Name Dispense Instructions for use Diagnosis    acetaminophen 650 MG CR tablet    TYLENOL    100 tablet    Take 1 tablet (650 mg) by mouth every 6 hours as needed for pain    Osteoarthritis       apixaban ANTICOAGULANT 2.5 MG tablet    ELIQUIS    200 tablet    Take 1 tablet (2.5 mg) by mouth 2 times daily    Chronic atrial fibrillation (H)       aspirin 81 MG tablet      Take 81 mg by mouth daily        co-enzyme Q-10 100 MG Caps capsule     180 capsule    Take 1 capsule (100 mg) by mouth daily    Mixed hyperlipidemia       diclofenac 1 % Gel topical gel    VOLTAREN    100 g    Apply 4 gramsto area qid prn    Acute left-sided low back pain without sciatica       doxycycline 100 MG capsule    VIBRAMYCIN    90 capsule    TAKE 1 CAPSULE DAILY    Rosacea       FERROUS GLUCONATE PO      Take 324 mg by mouth daily (with breakfast)        Glucosamine-Chondroitin 250-200 MG Tabs    OSTEO BI-FLEX REGULAR STRENGTH    100 tablet    Take 1 tablet by mouth 2 times daily    Osteoarthritis       lisinopril 20 MG tablet    PRINIVIL/ZESTRIL    90 tablet    Take 1 tablet (20 mg) by mouth daily    HTN, goal below 150/90       metoprolol succinate 25 MG 24 hr tablet    TOPROL-XL    90 tablet    Take 0.5 tablets (12.5 mg) by mouth daily (with breakfast)    Frequent PVCs, Chronic atrial fibrillation (H)       MULTIVITAL Chew      360 tablet    Take 2 chew tab by mouth 2 times daily    Neuropathy of foot       naftifine 1 % Gel topical gel    NAFTIN    90 g    Apply topically daily Apply to affected nails daily    Onychomycosis of toenail       OMEGA 3-6-9 PO      Take 1 tablet by mouth daily        omeprazole 40 MG capsule    priLOSEC    90 capsule    TAKE 1 CAPSULE DAILY    PUD (peptic ulcer disease)       potassium chloride SA 10 MEQ CR tablet    K-DUR/KLOR-CON M    60 tablet    Take 2 tablets (20 mEq) by mouth daily    Frequent PVCs, Chronic combined systolic and diastolic congestive heart failure (H)       senna 8.6 MG tablet    SENOKOT    60 tablet    Take 1 tablet by mouth daily    Prostate cancer (H)       simvastatin 20 MG tablet    ZOCOR    90 tablet    Take 1 tablet (20 mg) by mouth At Bedtime    Hyperlipidemia LDL goal <100       sodium chloride 0.65 % nasal spray    OCEAN NASAL SPRAY    2 Bottle    Spray 1 spray into both nostrils daily as needed for congestion    Bloody nose       tamsulosin 0.4 MG capsule    FLOMAX    90 capsule    TAKE 1 CAPSULE DAILY    Benign prostatic hyperplasia with lower urinary tract symptoms       torsemide 20 MG tablet    DEMADEX    90 tablet    Take 1 tablet (20 mg) by mouth daily (with breakfast)    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, Chronic combined systolic and diastolic congestive heart failure (H)       Urea 20 % Crea cream     120 g    Apply topically as needed Apply to right foot daily    Right foot pain, Skin callus, Pes planus of both feet, Venous insufficiency of both lower extremities, Hav (hallux abducto valgus), right, Onychomycosis of toenail       * zolpidem 5 MG tablet    AMBIEN    1 tablet    Take 1/2 tablet by mouth 15 minutes prior to sleep, for Sleep Study    Right heart enlargement, S/P CABG (coronary artery bypass graft), Alcohol dependence with unspecified alcohol-induced disorder (H), 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,  Chronic combined systolic and diastolic congestive heart failure (H), Chronic atrial fibrillation (H), Frequent PVCs, Status post coronary artery bypass graft, HTN, goal below 150/90, Excessive daytime sleepiness       * zolpidem 5 MG tablet    AMBIEN    1 tablet    Take 1/2 tablet by mouth 15 minutes prior to sleep, for Sleep Study    Nocturnal hypoxemia, Cheyne-Alejandro breathing, LENORA (obstructive sleep apnea), Essential hypertension, Status post coronary artery bypass graft, Frequent PVCs, Chronic atrial fibrillation (H), Chronic combined systolic and diastolic CHF (congestive heart failure) (H), 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, Excessive daytime sleepiness       * Notice:  This list has 2 medication(s) that are the same as other medications prescribed for you. Read the directions carefully, and ask your doctor or other care provider to review them with you.

## 2018-06-22 ENCOUNTER — HOSPITAL ENCOUNTER (OUTPATIENT)
Dept: CARDIAC REHAB | Facility: CLINIC | Age: 78
End: 2018-06-22
Attending: INTERNAL MEDICINE
Payer: MEDICARE

## 2018-06-22 DIAGNOSIS — I49.3 FREQUENT PVCS: ICD-10-CM

## 2018-06-22 DIAGNOSIS — I10 HTN, GOAL BELOW 150/90: ICD-10-CM

## 2018-06-22 DIAGNOSIS — I50.810 RIGHT HEART FAILURE WITH REDUCED RIGHT VENTRICULAR FUNCTION (H): ICD-10-CM

## 2018-06-22 DIAGNOSIS — I50.42 CHRONIC COMBINED SYSTOLIC AND DIASTOLIC CONGESTIVE HEART FAILURE (H): ICD-10-CM

## 2018-06-22 LAB
ANION GAP SERPL CALCULATED.3IONS-SCNC: 3 MMOL/L (ref 3–14)
BUN SERPL-MCNC: 18 MG/DL (ref 7–30)
CALCIUM SERPL-MCNC: 9 MG/DL (ref 8.5–10.1)
CHLORIDE SERPL-SCNC: 100 MMOL/L (ref 94–109)
CO2 SERPL-SCNC: 32 MMOL/L (ref 20–32)
CREAT SERPL-MCNC: 0.94 MG/DL (ref 0.66–1.25)
GFR SERPL CREATININE-BSD FRML MDRD: 77 ML/MIN/1.7M2
GLUCOSE SERPL-MCNC: 69 MG/DL (ref 70–99)
POTASSIUM SERPL-SCNC: 4 MMOL/L (ref 3.4–5.3)
SODIUM SERPL-SCNC: 135 MMOL/L (ref 133–144)

## 2018-06-22 PROCEDURE — 40000116 ZZH STATISTIC OP CR VISIT: Performed by: REHABILITATION PRACTITIONER

## 2018-06-22 PROCEDURE — 80048 BASIC METABOLIC PNL TOTAL CA: CPT | Performed by: PHYSICIAN ASSISTANT

## 2018-06-22 PROCEDURE — 93798 PHYS/QHP OP CAR RHAB W/ECG: CPT | Performed by: REHABILITATION PRACTITIONER

## 2018-06-22 PROCEDURE — 36415 COLL VENOUS BLD VENIPUNCTURE: CPT | Performed by: PHYSICIAN ASSISTANT

## 2018-06-25 ENCOUNTER — HOSPITAL ENCOUNTER (OUTPATIENT)
Dept: CARDIAC REHAB | Facility: CLINIC | Age: 78
End: 2018-06-25
Attending: INTERNAL MEDICINE
Payer: MEDICARE

## 2018-06-25 PROCEDURE — 40000575 ZZH STATISTIC OP CARDIAC VISIT #2: Performed by: OCCUPATIONAL THERAPIST

## 2018-06-25 PROCEDURE — 93798 PHYS/QHP OP CAR RHAB W/ECG: CPT | Performed by: OCCUPATIONAL THERAPIST

## 2018-06-25 PROCEDURE — 40000116 ZZH STATISTIC OP CR VISIT: Performed by: OCCUPATIONAL THERAPIST

## 2018-06-25 PROCEDURE — 93797 PHYS/QHP OP CAR RHAB WO ECG: CPT | Mod: 59 | Performed by: OCCUPATIONAL THERAPIST

## 2018-06-26 ENCOUNTER — THERAPY VISIT (OUTPATIENT)
Dept: SLEEP MEDICINE | Facility: CLINIC | Age: 78
End: 2018-06-26
Payer: MEDICARE

## 2018-06-26 DIAGNOSIS — Z95.1 STATUS POST CORONARY ARTERY BYPASS GRAFT: ICD-10-CM

## 2018-06-26 DIAGNOSIS — R06.3 CHEYNE-STOKES BREATHING: ICD-10-CM

## 2018-06-26 DIAGNOSIS — G47.33 OSA (OBSTRUCTIVE SLEEP APNEA): ICD-10-CM

## 2018-06-26 DIAGNOSIS — I48.20 CHRONIC ATRIAL FIBRILLATION (H): ICD-10-CM

## 2018-06-26 DIAGNOSIS — I49.3 FREQUENT PVCS: ICD-10-CM

## 2018-06-26 DIAGNOSIS — G47.19 EXCESSIVE DAYTIME SLEEPINESS: ICD-10-CM

## 2018-06-26 DIAGNOSIS — G47.34 NOCTURNAL HYPOXEMIA: ICD-10-CM

## 2018-06-26 DIAGNOSIS — I10 ESSENTIAL HYPERTENSION: ICD-10-CM

## 2018-06-26 DIAGNOSIS — I50.810 RIGHT HEART FAILURE WITH REDUCED RIGHT VENTRICULAR FUNCTION (H): ICD-10-CM

## 2018-06-26 DIAGNOSIS — I50.42 CHRONIC COMBINED SYSTOLIC AND DIASTOLIC CHF (CONGESTIVE HEART FAILURE) (H): ICD-10-CM

## 2018-06-26 PROCEDURE — 95811 POLYSOM 6/>YRS CPAP 4/> PARM: CPT | Performed by: FAMILY MEDICINE

## 2018-06-26 NOTE — MR AVS SNAPSHOT
After Visit Summary   6/26/2018    Milo Serna    MRN: [id number]           Patient Information     Date Of Birth          1940        Visit Information        Provider Department      6/26/2018 8:30 PM BED 2 SH SLEEP [facility]        Today's Diagnoses     Nocturnal hypoxemia        Cheyne-Alejandro breathing        LENORA (obstructive sleep apnea)        Essential hypertension        Status post coronary artery bypass graft        Frequent PVCs        Chronic atrial fibrillation (H)        Chronic combined systolic and diastolic CHF (congestive heart failure) (H)        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        Excessive daytime sleepiness          Care Instructions    [employer]    1. Your sleep study will be reviewed by a sleep physician within the next few days.     2. Please follow up in the sleep clinic as scheduled, or, make an appointment with your sleep provider to be seen within two weeks to discuss the results of the sleep study.    3. If you have any questions or problems with your treatment plan, please contact your sleep clinic provider at [phone] to further manage your condition.    4. Please review your attached medication list, and, at your follow-up appointment advise your sleep clinic provider about any changes.    5. Go to [email or web address] for more information about your sleep problems.    WINSTON Garduno  June 27, 2018                  Follow-ups after your visit        Your next 10 appointments already scheduled     Jun 27, 2018  8:00 AM CDT   Cardiac Treatment with Rh Cardiac Rehab 1   [facility] ([facility])    [address] [place] [address] [phone]            Jun 28, 2018  9:30 AM CDT   Cardiac Treatment with Rh Cardiac Rehab 2   [facility] ([facility])    [address]  [address]  [facility] [address]   [phone]            Jul 02, 2018  8:00 AM CDT   Cardiac Treatment with Rh Cardiac Rehab 1   [facility] ([facility])    [address]  [facility] [address]   [phone]            Jul 06, 2018  8:00 AM CDT   Cardiac Treatment with Rh Cardiac Rehab 1   [facility] ([facility])    [address]  [facility] [address]   [phone]            Jul 09, 2018  8:00 AM CDT   Cardiac Treatment with Rh Cardiac Rehab 1   [facility] ([facility])    [address]  [facility] [address]   [phone]            Jul 10, 2018 10:00 AM CDT   Return Sleep Patient with Taye Phillips MD   [facility] ([facility])    [address]  [facility] [address]   [phone]            Jul 11, 2018  8:00 AM CDT   Cardiac Treatment with Rh Cardiac Rehab 1   [facility] ([facility])    [address]  [facility] [address]   [phone]            Jul 12, 2018  9:00 AM CDT   Holter Monitor with [facility] HOLTER PROC [facility] ([facility])    [address]  [facility] [address]   [phone]           LOCATION - [address] [place] [address], [address] [place] [address] **Please check-in at the [place] Registration Office, [address], in the [facility] building. When you are finished registering, please go to [address] and have a seat.            Jul 12, 2018  9:30 AM CDT   LAB with RU LAB   [facility] [place] [facility])    [address]  [facility] [address]   [phone]           Please do not eat 10-12 hours before your appointment if you are coming in fasting for labs on lipids,  cholesterol, or glucose (sugar). This does not apply to pregnant women. Water, hot tea and black coffee (with nothing added) are okay. Do not drink other fluids, diet soda or chew gum.            Jul 19, 2018  8:45 AM CDT   Pulmonary Hypertension Return with Sixto Conley MD   [facility] ([facility])    [address]  [facility] [address] [phone]              Who to contact     If you have questions or need follow up information about today's clinic visit or your schedule please contact [place] [facility] directly at [phone].  Normal or non-critical lab and imaging results will be communicated to you by MyChart, letter or phone within 4 business days after the clinic has received the results. If you do not hear from us within 7 days, please contact the clinic through Macrotherapyhart or phone. If you have a critical or abnormal lab result, we will notify you by phone as soon as possible.  Submit refill requests through uBeam or call your pharmacy and they will forward the refill request to us. Please allow 3 business days for your refill to be completed.          Additional Information About Your Visit        Macrotherapyhart Information     uBeam gives you secure access to your electronic health record. If you see a primary care provider, you can also send messages to your care team and make appointments. If you have questions, please call your primary care clinic.  If you do not have a primary care provider, please call [phone] and they will assist you.        Care EveryWhere ID     This is your Care EveryWhere ID. This could be used by other organizations to access your [facility] medical records  [id number]         Blood Pressure from Last 3 Encounters:   06/21/18 108/59   06/19/18 110/52   06/13/18 128/66    Weight from Last 3 Encounters:   06/21/18 79.4 kg (175 lb)   06/19/18 81.6 kg (180 lb)   06/13/18 79.4 kg (175 lb  1.6 oz)              We Performed the Following     Comprehensive Sleep Study        Primary Care Provider Office Phone # Fax #    Oscar Alves [phone] [address] [facility] [address]        Equal Access to Services     SANTOSHUNTER YONY : Hadii porfirio ku hadsushilao Soomaali, waaxda luqadaha, qaybta kaalmada adeegyada, chayito fuentesn alice villegas lacolbyshaan abbasi. So [facility] [phone].    ATENCIÓN: Si habla español, tiene a randall disposición servicios gratuitos de asistencia lingüística. Llame al [phone].    We comply with applicable federal civil rights laws and [place] laws. We do not discriminate on the basis of race, color, national origin, age, disability, sex, sexual orientation, or gender identity.            Thank you!     Thank you for choosing [place] [facility]  for your care. Our goal is always to provide you with excellent care. Hearing back from our patients is one way we can continue to improve our services. Please take a few minutes to complete the written survey that you may receive in the mail after your visit with us. Thank you!             Your Updated Medication List - Protect others around you: Learn how to safely use, store and throw away your medicines at www.disposemymeds.org.          This list is accurate as of 6/26/18 11:59 PM.  Always use your most recent med list.                   Brand Name Dispense Instructions for use Diagnosis    acetaminophen 650 MG CR tablet    TYLENOL    100 tablet    Take 1 tablet (650 mg) by mouth every 6 hours as needed for pain    Osteoarthritis       apixaban ANTICOAGULANT 2.5 MG tablet    ELIQUIS    200 tablet    Take 1 tablet (2.5 mg) by mouth 2 times daily    Chronic atrial fibrillation (H)       aspirin 81 MG tablet      Take 81 mg by mouth daily        co-enzyme Q-10 100 MG Caps capsule     180 capsule    Take 1 capsule (100 mg) by mouth daily    Mixed hyperlipidemia       diclofenac 1 % Gel topical gel    VOLTAREN     100 g    Apply 4 gramsto area qid prn    Acute left-sided low back pain without sciatica       doxycycline 100 MG capsule    VIBRAMYCIN    90 capsule    TAKE 1 CAPSULE DAILY    Rosacea       FERROUS GLUCONATE PO      Take 324 mg by mouth daily (with breakfast)        Glucosamine-Chondroitin 250-200 MG Tabs    OSTEO BI-FLEX REGULAR STRENGTH    100 tablet    Take 1 tablet by mouth 2 times daily    Osteoarthritis       lisinopril 20 MG tablet    PRINIVIL/ZESTRIL    90 tablet    Take 1 tablet (20 mg) by mouth daily    HTN, goal below 150/90       metoprolol succinate 25 MG 24 hr tablet    TOPROL-XL    90 tablet    Take 0.5 tablets (12.5 mg) by mouth daily (with breakfast)    Frequent PVCs, Chronic atrial fibrillation (H)       MULTIVITAL Chew     360 tablet    Take 2 chew tab by mouth 2 times daily    Neuropathy of foot       naftifine 1 % Gel topical gel    NAFTIN    90 g    Apply topically daily Apply to affected nails daily    Onychomycosis of toenail       OMEGA 3-6-9 PO      Take 1 tablet by mouth daily        omeprazole 40 MG capsule    priLOSEC    90 capsule    TAKE 1 CAPSULE DAILY    PUD (peptic ulcer disease)       potassium chloride SA 10 MEQ CR tablet    K-DUR/KLOR-CON M    60 tablet    Take 2 tablets (20 mEq) by mouth daily    Frequent PVCs, Chronic combined systolic and diastolic congestive heart failure (H)       senna 8.6 MG tablet    SENOKOT    60 tablet    Take 1 tablet by mouth daily    Prostate cancer (H)       simvastatin 20 MG tablet    ZOCOR    90 tablet    Take 1 tablet (20 mg) by mouth At Bedtime    Hyperlipidemia LDL goal <100       sodium chloride 0.65 % nasal spray    OCEAN NASAL SPRAY    2 Bottle    Spray 1 spray into both nostrils daily as needed for congestion    Bloody nose       tamsulosin 0.4 MG capsule    FLOMAX    90 capsule    TAKE 1 CAPSULE DAILY    Benign prostatic hyperplasia with lower urinary tract symptoms       torsemide 20 MG tablet    DEMADEX    90 tablet    Take 1 tablet  (20 mg) by mouth daily (with breakfast)    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, Chronic combined systolic and diastolic congestive heart failure (H)       Urea 20 % Crea cream     120 g    Apply topically as needed Apply to right foot daily    Right foot pain, Skin callus, Pes planus of both feet, Venous insufficiency of both lower extremities, Hav (hallux abducto valgus), right, Onychomycosis of toenail       * zolpidem 5 MG tablet    AMBIEN    1 tablet    Take 1/2 tablet by mouth 15 minutes prior to sleep, for Sleep Study    Right heart enlargement, S/P CABG (coronary artery bypass graft), Alcohol dependence with unspecified alcohol-induced disorder (H), 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       * zolpidem 5 MG tablet    AMBIEN    1 tablet    Take 1/2 tablet by mouth 15 minutes prior to sleep, for Sleep Study    Nocturnal hypoxemia, Cheyne-Alejandro breathing, LENORA (obstructive sleep apnea), Essential hypertension, Status post coronary artery bypass graft, Frequent PVCs, Chronic atrial fibrillation (H), Chronic combined systolic and diastolic CHF (congestive heart failure) (H), 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, Excessive daytime sleepiness       * Notice:  This list has 2 medication(s) that are the same as other medications prescribed for you. Read the directions carefully, and ask your doctor or other care provider to review them with you.

## 2018-06-27 ENCOUNTER — HOSPITAL ENCOUNTER (OUTPATIENT)
Dept: CARDIAC REHAB | Facility: CLINIC | Age: 78
End: 2018-06-27
Attending: INTERNAL MEDICINE
Payer: MEDICARE

## 2018-06-27 PROCEDURE — 40000575 ZZH STATISTIC OP CARDIAC VISIT #2: Performed by: REHABILITATION PRACTITIONER

## 2018-06-27 PROCEDURE — 93798 PHYS/QHP OP CAR RHAB W/ECG: CPT | Performed by: REHABILITATION PRACTITIONER

## 2018-06-27 PROCEDURE — 93797 PHYS/QHP OP CAR RHAB WO ECG: CPT | Performed by: REHABILITATION PRACTITIONER

## 2018-06-27 PROCEDURE — 40000116 ZZH STATISTIC OP CR VISIT: Performed by: REHABILITATION PRACTITIONER

## 2018-06-27 NOTE — PROCEDURES
" SLEEP STUDY INTERPRETATION  TITRATION STUDY      Patient: SAÚL GONZALEZ  YOB: 1940  Study Date: 6/26/2018  MRN: [id number]  Referring Provider: MD Henson Sonia Jain  Ordering Provider: Taye Phillips MD, MPH    Indications for Polysomnography: The patient is a 77 years old male who is 5' 7\" and weighs 180.0 lbs. His BMI is 28.3 kg/m2, [place] sleepiness scale 9, and neck circumference is 40.0 cm. 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 A polysomnogram (PSG) with PAP titration was performed to correct for obstructive sleep apnea (LENORA), central sleep apnea (CSA), and/or sleep associated hypoventilation/hypoxemia.    Polysomnogram Data: A full night polysomnogram (PSG) recorded the standard physiologic parameters including EEG, EOG, EMG, ECG, nasal and oral airflow. Respiratory parameters of chest and abdominal movements were recorded with respiratory inductance plethysmography. Oxygen saturation was recorded by pulse oximetry. Hypopnea scoring rule used: 1B 4%.    Treatment PSG  Sleep Architecture: Somewhat preserved sleep architecture with sleep fragmentation and decreased sleep efficiency. REM sleep, including REM supine, captured.  The total recording time of the polysomnogram was 426.5 minutes. The total sleep time was 301.0 minutes. Sleep latency was decreased at 3.1 minutes with the use of a sleep aid (zolpidem 2.5 mg). REM latency was 78.0 minutes. Arousal index was increased at 38.1 arousals per hour. Sleep efficiency was decreased at 70.6%. Wake after " sleep onset was 119.0 minutes. The patient spent 7.8% of total sleep time in Stage N1, 63.0% in Stage N2, 13.0% in Stage N3, and 16.3% in REM. Time in REM supine was 40.5 minutes.    Respiration: Optimal PAP titration with a final pressure of 15 cmH2O and a residual AHI of 4.3 events per hour. REM supine was captured during this final PAP pressure. Cheyne-Stoke respiration was noted during the study. No sleep associated hypoxemia or hypoventilation noted during final PAP pressure.     The patient was titrated at pressures ranging from CPAP 5 cmH2O up to CPAP 15 cmH2O. The final pressure achieved was CPAP 15 cmH2O with a residual AHI of 4.3 events per hour. Time in REM supine on final pressure was 10.5 minutes.     This titration was considered optimal (residual AHI < 5 events per hour and including REM supine sleep at final pressure). Elevation of the head of the bed was also implemented to head of the bed elevated at 30 degrees.    Snoring - was reported as absent on treatment.    Respiratory rate and pattern - was notable for Cheyne-Alejandro respiratory rate and pattern was observed with also increased cycle length at ~85.7 seconds and increased circulation time at ~42.16 seconds (see picture below).    Sustained Sleep Associated Hypoventilation - Transcutaneous carbon dioxide monitoring was used, however significant hypoventilation was not suggested by oximetry, and this was not present on TCM monitoring with a maximum change from 40.2 to 46.9 mmHg and 0 minutes at or greater than 55 mmHg.    Sleep Associated Hypoxemia - (Greater than 5 minutes O2 sat at or below 88%) was not present. Baseline oxygen saturation was 96.2%. Lowest oxygen saturation was 88.1%. Time spent less than or equal to 88% was 0 minutes. Time spent less than or equal to 89% was 0.2 minutes.    Cheyne-Stoke respirations with prolonged cycle length and circulation time.    Movement Activity: No abnormal periodic limb movements, REM EMG activity,  nocturnal behaviors, or bruxism noted.    Periodic Limb Activity - There were 8 PLMs during the entire study. The PLM index was 1.6 movements per hour. The PLM Arousal Index was 0.4 per hour.    REM EMG Activity - Excessive transient/sustained muscle activity was not present.    Nocturnal Behavior - Abnormal sleep related behaviors were not noted during/arising out of NREM / REM sleep.    Bruxism - None apparent.    Cardiac Summary: Atrial fibrillation with frequent polymorphic PVCs, couplets PVCs, bigeminal PVCs, as well as PACs was observed.  The average pulse rate was 43.0 bpm. The minimum pulse rate was 27.1 bpm while the maximum pulse rate was 96.1 bpm. As during the prior study, arrhythmias were noted. A trace with indiscernible P waves with irregularly irregular ventricular contractions was noted. This was consistent with atria fibrillation. In addition, polymorphic premature wide complex beats suggestive of polymorphic premature ventricular contractions (PVCs) were noted. In addition, some couplets, bigeminal PVCs, as well as some premature atrial contractions (PACs) were observed.    Assessment:     The PSG sleep study demonstrated an optimal PAP titration with a final pressure of 15 cmH2O and a residual AHI of 4.3 events per hour. Elevation of the head of the bed was implemented at 30 degrees. REM supine was captured during this final PAP pressure. Cheyne-Stoke respiration was noted during the study. No sleep associated hypoxemia or hypoventilation was noted during final PAP pressure.    The sleep study showed somewhat preserved sleep architecture with sleep fragmentation and decreased sleep efficiency noted. REM sleep, including REM supine, was captured.    There were no abnormal periodic limb movements, REM EMG activity, nocturnal behaviors, or bruxism noted during the sleep study.    Lastly, atrial fibrillation with frequent polymorphic PVCs, couplets PVCs, bigeminal PVCs, as well as PACs was  observed.    Recommendations:    Treatment of LENORA and Cheyne-Stoke respirations with CPAP at fixed 15 cmH2O and elevation of the head of the bead at 30 degrees. Given that no hypoxic stress was noted, no oxygen supplementation would be necessary. Recommend clinical follow up with sleep management team, for coaching and review of effectiveness and compliance measures.    Advice regarding the risks of drowsy driving.    Suggest optimizing sleep schedule and avoiding sleep deprivation.    Although the cardiac findings noted during the sleep study are not new, the patient should follow up with cardiology.    Diagnostic Codes:     Obstructive Sleep Apnea G47.33    Sleep Hypoxemia G47.36     Unspecified Sleep Disturbance G47.9    Periodic Limb Movement Disorder G47.61    Cheyne?Alejandro Breathing Pattern R06.3    6/26/2018 [place] Titration Sleep Study (180.0 lbs) - Treatment was titrated to a pressure of CPAP 15 with an AHI of 4.3. Time Spent in REM supine at this pressure was 10.5 cmH2O.      __________________________________________________________   Electronically Signed By: Taye Phillips MD, MPH (06/27/2018)         Range(%) Time in range (min)   0.0 - 89.0 0.2   0.0 - 88.0 -         Stage Min(mm Hg) Max(mm Hg)   Wake 40.2 46.5   NREM(1+2+3) 40.9 46.7   REM 42.0 46.9         Range(mmHg) Time in range (min)   55.0 - 100.0 -   Excluded data <20.0 & >65.0 3.6

## 2018-06-27 NOTE — PATIENT INSTRUCTIONS
[place] [facility]    1. Your sleep study will be reviewed by a sleep physician within the next few days.     2. Please follow up in the sleep clinic as scheduled, or, make an appointment with your sleep provider to be seen within two weeks to discuss the results of the sleep study.    3. If you have any questions or problems with your treatment plan, please contact your sleep clinic provider at [phone] to further manage your condition.    4. Please review your attached medication list, and, at your follow-up appointment advise your sleep clinic provider about any changes.    5. Go to [email or web address] for more information about your sleep problems.    Stacia Garcia, REYNALDOGT  June 27, 2018

## 2018-06-27 NOTE — PROGRESS NOTES
Completed a all night titration PSG per provider order.    Preliminary AHI n/a.  A final therapeutic PAP pressure was achieved.  There was a wide discrepancy between lateral therapeutic pressure and supine therapeutic pressure; it was less clear that a final therapeutic pressure was achieved when supine.    Supine REM was seen on therapeutic pressure.    Patient reports feeling refreshed in AM.

## 2018-06-28 ENCOUNTER — HOSPITAL ENCOUNTER (OUTPATIENT)
Dept: CARDIAC REHAB | Facility: CLINIC | Age: 78
End: 2018-06-28
Attending: INTERNAL MEDICINE
Payer: MEDICARE

## 2018-06-28 LAB — SLPCOMP: NORMAL

## 2018-06-28 PROCEDURE — 93798 PHYS/QHP OP CAR RHAB W/ECG: CPT | Performed by: OCCUPATIONAL THERAPIST

## 2018-06-28 PROCEDURE — 40000116 ZZH STATISTIC OP CR VISIT: Performed by: OCCUPATIONAL THERAPIST

## 2018-07-02 ENCOUNTER — HOSPITAL ENCOUNTER (OUTPATIENT)
Dept: CARDIAC REHAB | Facility: CLINIC | Age: 78
End: 2018-07-02
Attending: INTERNAL MEDICINE
Payer: MEDICARE

## 2018-07-02 PROCEDURE — 40000116 ZZH STATISTIC OP CR VISIT

## 2018-07-02 PROCEDURE — 93798 PHYS/QHP OP CAR RHAB W/ECG: CPT

## 2018-07-06 ENCOUNTER — HOSPITAL ENCOUNTER (OUTPATIENT)
Dept: CARDIAC REHAB | Facility: CLINIC | Age: 78
End: 2018-07-06
Attending: INTERNAL MEDICINE
Payer: MEDICARE

## 2018-07-06 PROCEDURE — 93798 PHYS/QHP OP CAR RHAB W/ECG: CPT

## 2018-07-06 PROCEDURE — 40000116 ZZH STATISTIC OP CR VISIT

## 2018-07-09 ENCOUNTER — HOSPITAL ENCOUNTER (OUTPATIENT)
Dept: CARDIAC REHAB | Facility: CLINIC | Age: 78
End: 2018-07-09
Attending: INTERNAL MEDICINE
Payer: MEDICARE

## 2018-07-09 PROCEDURE — 40000116 ZZH STATISTIC OP CR VISIT

## 2018-07-09 PROCEDURE — 93798 PHYS/QHP OP CAR RHAB W/ECG: CPT

## 2018-07-11 ENCOUNTER — HOSPITAL ENCOUNTER (OUTPATIENT)
Dept: CARDIAC REHAB | Facility: CLINIC | Age: 78
End: 2018-07-11
Attending: INTERNAL MEDICINE
Payer: MEDICARE

## 2018-07-11 PROCEDURE — 40000116 ZZH STATISTIC OP CR VISIT: Performed by: OCCUPATIONAL THERAPIST

## 2018-07-11 PROCEDURE — 93798 PHYS/QHP OP CAR RHAB W/ECG: CPT | Performed by: OCCUPATIONAL THERAPIST

## 2018-07-12 ENCOUNTER — HOSPITAL ENCOUNTER (OUTPATIENT)
Dept: CARDIOLOGY | Facility: CLINIC | Age: 78
Discharge: HOME | End: 2018-07-12
Attending: INTERNAL MEDICINE | Admitting: INTERNAL MEDICINE
Payer: MEDICARE

## 2018-07-12 DIAGNOSIS — I48.20 CHRONIC ATRIAL FIBRILLATION (H): ICD-10-CM

## 2018-07-12 DIAGNOSIS — Z95.1 STATUS POST CORONARY ARTERY BYPASS GRAFT: ICD-10-CM

## 2018-07-12 DIAGNOSIS — I50.810 RIGHT HEART FAILURE WITH REDUCED RIGHT VENTRICULAR FUNCTION (H): ICD-10-CM

## 2018-07-12 DIAGNOSIS — I49.3 FREQUENT PVCS: ICD-10-CM

## 2018-07-12 DIAGNOSIS — I50.42 CHRONIC COMBINED SYSTOLIC AND DIASTOLIC CONGESTIVE HEART FAILURE (H): ICD-10-CM

## 2018-07-12 DIAGNOSIS — I10 HTN, GOAL BELOW 150/90: ICD-10-CM

## 2018-07-12 LAB
ALBUMIN SERPL-MCNC: 3.9 G/DL (ref 3.4–5)
ALP SERPL-CCNC: 177 U/L (ref 40–150)
ALT SERPL W P-5'-P-CCNC: 22 U/L (ref 0–70)
ANION GAP SERPL CALCULATED.3IONS-SCNC: 6 MMOL/L (ref 3–14)
AST SERPL W P-5'-P-CCNC: 24 U/L (ref 0–45)
BASOPHILS # BLD AUTO: 0 10E9/L (ref 0–0.2)
BASOPHILS NFR BLD AUTO: 1.1 %
BILIRUB SERPL-MCNC: 1.8 MG/DL (ref 0.2–1.3)
BUN SERPL-MCNC: 20 MG/DL (ref 7–30)
CALCIUM SERPL-MCNC: 8.9 MG/DL (ref 8.5–10.1)
CHLORIDE SERPL-SCNC: 102 MMOL/L (ref 94–109)
CO2 SERPL-SCNC: 30 MMOL/L (ref 20–32)
CREAT SERPL-MCNC: 1 MG/DL (ref 0.66–1.25)
DIFFERENTIAL METHOD BLD: ABNORMAL
EOSINOPHIL # BLD AUTO: 0.1 10E9/L (ref 0–0.7)
EOSINOPHIL NFR BLD AUTO: 2.7 %
ERYTHROCYTE [DISTWIDTH] IN BLOOD BY AUTOMATED COUNT: 14.6 % (ref 10–15)
GFR SERPL CREATININE-BSD FRML MDRD: 72 ML/MIN/1.7M2
GLUCOSE SERPL-MCNC: 71 MG/DL (ref 70–99)
HCT VFR BLD AUTO: 37.8 % (ref 40–53)
HGB BLD-MCNC: 12.4 G/DL (ref 13.3–17.7)
IMM GRANULOCYTES # BLD: 0 10E9/L (ref 0–0.4)
IMM GRANULOCYTES NFR BLD: 0 %
LYMPHOCYTES # BLD AUTO: 0.6 10E9/L (ref 0.8–5.3)
LYMPHOCYTES NFR BLD AUTO: 14.6 %
MCH RBC QN AUTO: 31.6 PG (ref 26.5–33)
MCHC RBC AUTO-ENTMCNC: 32.8 G/DL (ref 31.5–36.5)
MCV RBC AUTO: 96 FL (ref 78–100)
MONOCYTES # BLD AUTO: 0.5 10E9/L (ref 0–1.3)
MONOCYTES NFR BLD AUTO: 14.4 %
NEUTROPHILS # BLD AUTO: 2.5 10E9/L (ref 1.6–8.3)
NEUTROPHILS NFR BLD AUTO: 67.2 %
NRBC # BLD AUTO: 0 10*3/UL
NRBC BLD AUTO-RTO: 0 /100
NT-PROBNP SERPL-MCNC: 2079 PG/ML (ref 0–450)
PLATELET # BLD AUTO: 110 10E9/L (ref 150–450)
POTASSIUM SERPL-SCNC: 3.8 MMOL/L (ref 3.4–5.3)
PROT SERPL-MCNC: 7.8 G/DL (ref 6.8–8.8)
RBC # BLD AUTO: 3.93 10E12/L (ref 4.4–5.9)
SODIUM SERPL-SCNC: 138 MMOL/L (ref 133–144)
TSH SERPL DL<=0.005 MIU/L-ACNC: 1.44 MU/L (ref 0.4–4)
WBC # BLD AUTO: 3.8 10E9/L (ref 4–11)

## 2018-07-12 PROCEDURE — 80053 COMPREHEN METABOLIC PANEL: CPT | Performed by: INTERNAL MEDICINE

## 2018-07-12 PROCEDURE — 85025 COMPLETE CBC W/AUTO DIFF WBC: CPT | Performed by: INTERNAL MEDICINE

## 2018-07-12 PROCEDURE — 83880 ASSAY OF NATRIURETIC PEPTIDE: CPT | Performed by: INTERNAL MEDICINE

## 2018-07-12 PROCEDURE — 93225 XTRNL ECG REC<48 HRS REC: CPT | Performed by: INTERNAL MEDICINE

## 2018-07-12 PROCEDURE — 36415 COLL VENOUS BLD VENIPUNCTURE: CPT | Performed by: INTERNAL MEDICINE

## 2018-07-12 PROCEDURE — 84443 ASSAY THYROID STIM HORMONE: CPT | Performed by: INTERNAL MEDICINE

## 2018-07-12 PROCEDURE — 93227 XTRNL ECG REC<48 HR R&I: CPT | Performed by: INTERNAL MEDICINE

## 2018-07-13 ENCOUNTER — HOSPITAL ENCOUNTER (OUTPATIENT)
Dept: CARDIAC REHAB | Facility: CLINIC | Age: 78
End: 2018-07-13
Attending: INTERNAL MEDICINE
Payer: MEDICARE

## 2018-07-13 PROCEDURE — 93798 PHYS/QHP OP CAR RHAB W/ECG: CPT | Performed by: OCCUPATIONAL THERAPIST

## 2018-07-13 PROCEDURE — 40000116 ZZH STATISTIC OP CR VISIT: Performed by: OCCUPATIONAL THERAPIST

## 2018-07-16 ENCOUNTER — HOSPITAL ENCOUNTER (OUTPATIENT)
Dept: CARDIAC REHAB | Facility: CLINIC | Age: 78
End: 2018-07-16
Attending: INTERNAL MEDICINE
Payer: MEDICARE

## 2018-07-16 LAB — INTERPRETATION MONITOR -MUSE: NORMAL

## 2018-07-16 PROCEDURE — 40000116 ZZH STATISTIC OP CR VISIT: Performed by: OCCUPATIONAL THERAPIST

## 2018-07-16 PROCEDURE — 93798 PHYS/QHP OP CAR RHAB W/ECG: CPT | Performed by: OCCUPATIONAL THERAPIST

## 2018-07-17 ENCOUNTER — OFFICE VISIT (OUTPATIENT)
Dept: SLEEP MEDICINE | Facility: CLINIC | Age: 78
End: 2018-07-17
Payer: MEDICARE

## 2018-07-17 VITALS
HEART RATE: 48 BPM | WEIGHT: 173 LBS | RESPIRATION RATE: 16 BRPM | BODY MASS INDEX: 27.15 KG/M2 | DIASTOLIC BLOOD PRESSURE: 56 MMHG | OXYGEN SATURATION: 99 % | SYSTOLIC BLOOD PRESSURE: 118 MMHG | HEIGHT: 67 IN

## 2018-07-17 DIAGNOSIS — I25.10 CORONARY ARTERY DISEASE INVOLVING NATIVE CORONARY ARTERY OF NATIVE HEART WITHOUT ANGINA PECTORIS: ICD-10-CM

## 2018-07-17 DIAGNOSIS — G47.33 OSA (OBSTRUCTIVE SLEEP APNEA): Primary | ICD-10-CM

## 2018-07-17 DIAGNOSIS — I50.22 CHRONIC SYSTOLIC CONGESTIVE HEART FAILURE (H): ICD-10-CM

## 2018-07-17 DIAGNOSIS — I27.20 PULMONARY HYPERTENSION (H): ICD-10-CM

## 2018-07-17 DIAGNOSIS — I51.7 CARDIOMEGALY: ICD-10-CM

## 2018-07-17 DIAGNOSIS — G47.34 NOCTURNAL HYPOXEMIA: ICD-10-CM

## 2018-07-17 DIAGNOSIS — I48.20 CHRONIC ATRIAL FIBRILLATION (H): ICD-10-CM

## 2018-07-17 DIAGNOSIS — G47.19 EXCESSIVE DAYTIME SLEEPINESS: ICD-10-CM

## 2018-07-17 DIAGNOSIS — I49.3 FREQUENT PVCS: ICD-10-CM

## 2018-07-17 DIAGNOSIS — I10 ESSENTIAL HYPERTENSION: ICD-10-CM

## 2018-07-17 DIAGNOSIS — R06.3 CHEYNE-STOKES RESPIRATION: ICD-10-CM

## 2018-07-17 PROCEDURE — 99215 OFFICE O/P EST HI 40 MIN: CPT | Performed by: FAMILY MEDICINE

## 2018-07-17 NOTE — PROGRESS NOTES
[facility]  Outpatient Sleep Medicine Follow Up Visit  July 17, 2018    Name: Milo Serna MRN# [id number]   Age: 77 year old YOB: 1940     Date of Follow Up Visit: July 17, 2018  Consultation is requested by: Sixto Conley MD  [address] [place] [id number]  Primary care provider: Oscar Alves    Patient is accompanied by: Patient presents alone today.       Reason for Sleep Consult:     Milo Serna is a 77 year old male patient that presents here for a follow up evaluation after completing PSG titration sleep study.         Assessment and Plan:     Summary Sleep Diagnoses:    (1) Moderate LENORA (AHI was 16.4 events per hour)  (2) Sleep Associated Hypoxemia (16.3 minutes under the SpO2 of 88%)  (3) Cheyne Stoke Respirations  (4) EDS  (5) Cardiomegaly  (6) Congestive Heart Failure (LVEF estimated at 35 - 40%)  (7) Chronic Atrial Fibrillation  (8) Frequent PVCs  (9) CAD with CABG  (10) HTN  (11) Pulmonary Hypertension    Summary Recommendations / Discussion:    During the initial visit, this patient had sxs, hx, and physical findings that suggested the diagnosis of a sleep disordered breathing. In addition, he had an intermediate pre-test probability of LENORA. Given this patient's extensive cardiac history, a portable HST sleep study was not performed, and instead, this patient underwent an in-lab split-night PSG sleep study. Based on the patient's history, clinical presentation, and physical examination, there was a reasonable level of suspicion for hypoventilation and, therefore, TCM monitoring as well as ABG studies were utilized. The in-lab PSG sleep study obtained on 06/10/2018 demonstrated moderate LENORA with Cheyne Stoke respirations as well as sleep associated hypoxemia (AHI was 16.4 events per hour and the patient spent 16.3 minutes under the SpO2 of 88%). During the study, PAP therapy was initiated, but the patient was unable to tolerate this and, as  such, it was discontinued. The patient showed atrial fibrillation with frequent polymorphic PVC, couplet PVCs, bigeminal PVCs, as well as non-sustained ventricular tachycardia. Given this PSG findings, the patient underwent a PSG titration study which achieved an optimal PAP titration with a final CPAP pressure of 15 cmH2O and a residual AHI of 4.3 events per hour. REM supine was captured during this final PAP pressure. Some mild residual Cheyne-Stoke respirations were noted during the titration study. However, no sleep associated hypoxemia or hypoventilation was noted during the final PAP pressure. The study showed atrial fibrillation with frequent polymorphic PVCs, couplets PVCs, bigeminal PVCs, as well as PACs. Today, the PSG titration study results were carefully discussed and explained. The possible consequences of not treating hypoxic stress were also reviewed once again. After much discussion, once again, the patient explained that he wishes to do everything possible to protect his cardiac function (including using a PAP device). Given the decreased LVEF below 45% (currently at 35 - 40%), bilevel therapy, including ASV therapy, should not be used. As such, based on the PSG titration sleep study, the patient will be started on CPAP at fixed pressure of 15 cmH2O. Given that sleep associated hypoxemia resolved with this PAP therapy, overnight oximetry test would not be necessary. Once again, the nature and pathophysiology of LENORA were discussed. The different treatment options for LENORA were also reviewed and explained again today. PAP compliance was discussed and encouraged. Lifestyle recommendations including healthy dietary and exercising habits were discussed. Pt will follow up with me after having completed 6 to 8 weeks of PAP therapy.    The patient will follow up with cardiology regarding cardiac findings noted on PSG.     Visit Summary:                       -Start CPAP at 15 cmH2O                        -Elevation of the head of the bed and avoiding supine sleep was advised to minimize central events                       -Follow up with cardiology                       -Follow up with me after completing 6 to 8 weeks of PAP therapy    Coding:  (G47.33) LENORA (obstructive sleep apnea)  (primary encounter diagnosis)  (G47.19) Excessive daytime sleepiness  (I50.810) 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  (I50.42) Chronic combined systolic and diastolic CHF (congestive heart failure) (H)  (I48.2) Chronic atrial fibrillation (H)  (I49.3) Frequent PVCs  (Z95.1) Status post coronary artery bypass graft  (I10) Essential hypertension  (R06.3) Cheyne-Alejandro breathing  (G47.34) Nocturnal hypoxemia  (I27.20) Pulmonary hypertension    Counseling included a comprehensive review of diagnostic and therapeutic strategies as well as risks of inadequate therapy.    Educational materials provided in instructions. The patient was instructed to avoid driving or operating any heavy machinery when experiencing drowsiness.    All questions and concerns were addressed today. Pt agrees and understands the assessment and plan.         History of Present Illness:   Milo Serna is a 77 year old  RHD male pt with PMH of coronary atherosclerosis with coronary artery disease and past myocardial infarction, hyperlipidemia, peptic ulcer disease, venous stasis, history of premature ventricular contractions and premature atrial contractions, chronic atrial fibrillation, dilated aortic root, history of prostate cancer, iron deficiency anemia, hypertension, thrombocytopenia, pulmonary hypertension, and biventricular systolic heart failure presents for a follow up evaluation today after completing an in-lab PSG titration sleep study.    As previously explained, this is a rather medically complex individual with extensive cardiac history including CABG in 1997, chronic atrial fibrillation, hypertension, stable  ascending aortic dilation, and pulmonary hypertension. The patient has dilation of the right ventricle with also reduced LVEF estimated at 35 - 40% during most recent echocardiogram. The study also showed global hypokinesis of the left ventricle (see summary below).    The patient also completed a PFT with no obvious abnormalities noted (see summary below). A 6 minute walk distance was also obtained and this showed no obvious hypoxemia with saturations above 95% at all times.    During the initial visit, this patient reported no snoring, gasping / choking for air, or witnessed apneas The patient admitted having restorative sleep with no sleep inertia. He also admitted having some mild EDS with no inadvertent naps. This patient admitted having some xerostomia in the morning. The patient denied any morning cephalgia, morning myalgias, CP, SOB, N/V, diarrhea, nocturia, nocturnal coughing spells, positional dyspnea, orthopnea, or GERD sxs. The patient sleeps with one pillow under his head with elevation of the head of the bed (due to chronic back pain). Lastly, this patient admitted having some drowsiness when driving with no near accidents.     After the initial visit, the patient completed an in-lab PSG sleep study on 06/10/2018 and this demonstrated moderate LENORA with Cheyne Stoke respirations as well as sleep associated hypoxemia (AHI was 16.4 events per hour and the patient spent 16.3 minutes under the SpO2 of 88%). There was mildly increased PLMs noted with only mild cortical arousal association noted. During the study, PAP therapy was initiated, but the patient was unable to tolerate this and, as such, it was discontinued. The patient showed atrial fibrillation with frequent polymorphic PVC, couplet PVCs, bigeminal PVCs, as well as non-sustained ventricular tachycardia.    This patient, after the last visit with me, completed an in-lab PSG titration study on 06/26/2018. The PSG titration sleep study achieved an  optimal PAP titration with a final CPAP pressure of 15 cmH2O and a residual AHI of 4.3 events per hour. REM supine was captured during this final PAP pressure. Some Cheyne-Stoke respirations were noted during the titration study. However, no sleep associated hypoxemia or hypoventilation was noted during the final PAP pressure. The study showed atrial fibrillation with frequent polymorphic PVCs, couplets PVCs, bigeminal PVCs, as well as PACs. Otherwise, no movement abnormalities were observed.    Today, the patient denies any new sxs or concerns. Pt explains that he was unable to tolerate the PAP therapy during the night of the study, but he is willing to try it again. In fact, the patient states that he wishes to PAP therapy to try to protect his heart as much possible.    PREVIOUS IN- LAB or HOME SLEEP STUDIES: None Reported    SLEEP-WAKE SCHEDULE:     Milo Serna      -Describes himself as a morning person; prefers to go to sleep at 10:00 PM and wakes up at 6:00 AM.      -Pt takes about 6 naps a week and each nap lasts about 15 minutes to one hour. Pt feels refreshed after each nap; takes no inadvertent naps.      -ON WEEKDAYS, goes to sleep at 10:00 PM during the week; awakens 6:00 AM with an alarm; falls asleep in 5 minutes; denies difficulty falling asleep.      -ON WEEKENDS, goes to sleep at 10:00 PM and wakes up at 6:00 AM with an alarm; falls asleep in 5 minutes.        -Awakens 1-2 times a night for 5 minutes before falling back to sleep; awakens to go to the bathroom.      BEDTIME ACTIVITIES AND SHIFT WORK:    Milo Serna     -Bedtime Activities and Other Sleeping Information: Pt lives with wife. Pt sleeps with wife on a ashtyn size bed. No pets in the bedroom. Pt sleeps on back and sides. No electronics used in bed.      -Occupation: Retired     SCALES        -SLEEP APNEA: [employer] score: 4 / 8        -SLEEPINESS: [place] sleepiness scale (ESS):  9 / 24 (initial score)    Drowsy driving / near  accidents: Some drowsiness reported with no near accidents    Consequences: Mild EDS reported    SLEEP COMPLAINTS:  Cardio-respiratory     -Snoring: No snoring reported    -Dyspnea: Pt denies having any witnessed apneas or dyspnea   -Morning headaches or confusion: Denies any morning cephalgia   -Coexisting Lung disease: See Above     -Coexisting Heart disease: See Above     -Does patient have a bed partner: Patient sleeps with spouse   -Has bed partner been sleeping separately because of snoring:  No            RLS Screen:    -When you try to relax in the evening or sleep at night, do you ever have unpleasant, restless feelings in your legs that can be relieved by walking or movement? None Reported     -Periodic limb movement: None Reported    Narcolepsy:     - Denies sudden urges of sleep attacks   - Denies cataplexy   - Denies sleep paralysis    - Denies hallucinations    - Admits feeling refreshed after a nap.    Sleep Behaviors:   - Denies leg symptoms/movements   - Denies motor restlessness   - Denies night terrors   - Denies bruxism   - Denies automatic behaviors    Other Subjective Complaints:   - Denies anxiety or rumination    - Denies pain and discomfort at night   - Denies waking up with heart pounding or racing   - Denies GERD or aspiration         Parasomnia:    -NREM - Denies recurrent persistent confusional arousal, night eating, sleep walking or sleep terrors.      -REM - Denies dream enactment or injuries.          Medications:     Current Outpatient Prescriptions   Medication Sig     acetaminophen (TYLENOL) 650 MG CR tablet Take 1 tablet (650 mg) by mouth every 6 hours as needed for pain     apixaban ANTICOAGULANT (ELIQUIS) 2.5 MG tablet Take 1 tablet (2.5 mg) by mouth 2 times daily     aspirin 81 MG tablet Take 81 mg by mouth daily     co-enzyme Q-10 100 MG CAPS Take 1 capsule (100 mg) by mouth daily     diclofenac (VOLTAREN) 1 % GEL Apply 4 gramsto area qid prn     doxycycline (VIBRAMYCIN) 100  MG capsule TAKE 1 CAPSULE DAILY     FERROUS GLUCONATE PO Take 324 mg by mouth daily (with breakfast)     Glucosamine-Chondroitin (OSTEO BI-FLEX REGULAR STRENGTH) 250-200 MG TABS Take 1 tablet by mouth 2 times daily     lisinopril (PRINIVIL/ZESTRIL) 20 MG tablet Take 1 tablet (20 mg) by mouth daily     metoprolol succinate (TOPROL-XL) 25 MG 24 hr tablet Take 0.5 tablets (12.5 mg) by mouth daily (with breakfast)     Multiple Vitamins-Minerals (MULTIVITAL) CHEW Take 2 chew tab by mouth 2 times daily     naftifine (NAFTIN) 1 % GEL topical gel Apply topically daily Apply to affected nails daily     Omega 3-6-9 Fatty Acids (OMEGA 3-6-9 PO) Take 1 tablet by mouth daily      omeprazole (PRILOSEC) 40 MG capsule TAKE 1 CAPSULE DAILY     potassium chloride SA (K-DUR/KLOR-CON M) 10 MEQ CR tablet Take 2 tablets (20 mEq) by mouth daily     senna (SENOKOT) 8.6 MG tablet Take 1 tablet by mouth daily     simvastatin (ZOCOR) 20 MG tablet Take 1 tablet (20 mg) by mouth At Bedtime     sodium chloride (OCEAN NASAL SPRAY) 0.65 % nasal spray Spray 1 spray into both nostrils daily as needed for congestion     tamsulosin (FLOMAX) 0.4 MG capsule TAKE 1 CAPSULE DAILY     torsemide (DEMADEX) 20 MG tablet Take 1 tablet (20 mg) by mouth daily (with breakfast)     Urea 20 % CREA cream Apply topically as needed Apply to right foot daily     zolpidem (AMBIEN) 5 MG tablet Take 1/2 tablet by mouth 15 minutes prior to sleep, for Sleep Study (Patient not taking: Reported on 6/21/2018)     zolpidem (AMBIEN) 5 MG tablet Take 1/2 tablet by mouth 15 minutes prior to sleep, for Sleep Study (Patient not taking: Reported on 6/19/2018)     No current facility-administered medications for this visit.       No Known Allergies         Past Medical History:     Denies needing any 02 supplement at night.    Past Medical History:   Diagnosis Date     Anemia, unspecified 11/8/2016     Atrial fibrillation (H) 5/27/2015     BPH (benign prostatic hyperplasia) 1/25/2012      CAD (coronary artery disease) 4/4/2012     Closed fracture of metatarsal bone 4/4/2012     Coronary atherosclerosis of unspecified type of vessel, native or [facility]    CAB 1996 following MI (at [facility])      Dilated aortic root (H) 5/26/2016     Drug-induced erectile dysfunction 10/2/2015     Elevated blood sugar 11/8/2016     Elevated PSA 9/19/2013     Essential hypertension with goal blood pressure less than 140/90 9/22/2016     Essential hypertension, benign      Gallbladder polyp 5/1/2018     Gout      HAV (hallux abducto valgus) 4/4/2012     Hernia, abdominal      History of prostate cancer 6/15/2016     HTN, goal below 150/90 4/20/2017     Iron deficiency anemia secondary to inadequate dietary iron intake 11/15/2016     Low blood pressure reading 10/17/2016     Lumbago     had degendisc dz on MRI 7/07     Mixed hyperlipidemia      Mumps      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     Past Surgical History:   Procedure Laterality Date     C NONSPECIFIC PROCEDURE      CAB 1996 [place]     C NONSPECIFIC PROCEDURE  2/07    l inguinal hernia repair      C NONSPECIFIC PROCEDURE      R hernia remote     C NONSPECIFIC PROCEDURE  2000    R ankle ORIF for fx     C NONSPECIFIC PROCEDURE  2005    nasal surgery      C NONSPECIFIC PROCEDURE      R rotater repair (remote)      C NONSPECIFIC PROCEDURE      appy 1966     C NONSPECIFIC PROCEDURE       sinus surgery x 2     C NONSPECIFIC PROCEDURE      L shoulder      CARDIAC SURGERY      bipass     CYSTOSCOPY FLEXIBLE, CYOABLATION PROSTATE N/A 2016    Procedure: CYSTOSCOPY FLEXIBLE, CRYOABLATION PROSTATE;  Surgeon: Krystian Owens MD;  Location: [facility] OR     GENITOURINARY SURGERY      prostate surgery      HERNIA REPAIR       LAPAROSCOPIC HERNIORRHAPHY INGUINAL Right 5/10/2017    Procedure: LAPAROSCOPIC HERNIORRHAPHY INGUINAL;  Laparoscopic preperitoneal repair of right inguinal hernia with placement of underlay mesh;  Surgeon: Enrico Bridges MD;  Location: [facility] OR     PROSTATE SURGERY            Social History:     Social History   Substance Use Topics     Smoking status: Former Smoker     Smokeless tobacco: Never Used      Comment: quit 3/1974     Alcohol use 0.0 oz/week     0 Standard drinks or equivalent per week      Comment: No alcohol x 5 days (18)     Chemical History:     Tobacco: Quit smoking about 44 yrs ago     Uses 2 cups/day of coffee. Last caffeine intake is usually before 10 AM.    Supplements for wakefulness: Patient does not use any supplements to stay awake    EtOH: Occasional use only  Recreational Drugs: Patient denies using any recreational drugs     Psych Hx:   Current dangers to self or others: No. Pt denies any SI / HI, hallucinations, or delusions         Family History:     Family History   Problem Relation Age of Onset     Cancer Mother      stomach cancer,  age 61     HEART DISEASE Father      MI,  age 71     HEART DISEASE Brother      stent placement, RA      Hypertension Brother      HEART DISEASE Sister      rythmn problems with heart, hypertension      Sleep Family Hx:       RLS - None reported   LENORA - None reported  Insomnia - None reported  Parasomnia - None reported         Review of Systems:     A complete 10 point review of systems was negative other than HPI or as commented below:     CONSTITUTIONAL: NEGATIVE for weight gain/loss,  "fever, chills, sweats or night sweats, drug allergies.  EYES: NEGATIVE for double vision. Pt reports changes in vision and blind spots.  ENT: NEGATIVE for ear pain, sore throat, sinus pain, post-nasal drip, runny nose, bloody nose  CARDIAC: NEGATIVE for chest pain or pressure or breathlessness when lying flat. Pt endorses swollen legs / swollen feet and palpitation.  NEUROLOGIC: NEGATIVE headaches, weakness or numbness in the arms or legs.  DERMATOLOGIC: NEGATIVE for rashes, new moles or change in mole(s)  PULMONARY: NEGATIVE SOB at rest, dry cough, coughing up blood, wheezing or whistling when breathing. Pt reports SOB with activity and mildly productive cough.  GASTROINTESTINAL: NEGATIVE for nausea or vomitting, loose or watery stools, fat or grease in stools, constipation, abdominal pain, bowel movements black in color or blood noted.  GENITOURINARY: NEGATIVE for pain during urination, blood in urine, urinating more frequently than usual, irregular menstrual periods.  MUSCULOSKELETAL: NEGATIVE for muscle pain, bone or joint pain, swollen joints.  ENDOCRINE: NEGATIVE for increased thirst or urination, diabetes.  LYMPHATIC: NEGATIVE for swollen lymph nodes, lumps or bumps in the breasts or nipple discharge.         Physical Examination:   /56  Pulse (!) 48  Resp 16  Ht 1.702 m (5' 7.01\")  Wt 78.5 kg (173 lb)  SpO2 99%  BMI 27.09 kg/m2    VS: Reviewed and mild bradycardia noted (asymptomatic).  General: Alert, oriented, not in distress. Dressed casually; Good eye contact; Comfortably sitting in a chair; in no apparent distress  Lungs: Both hemithoraces are symmetrical, normal to palpation, no dullness to percussion, auscultation of lungs revealed normal breath sounds with no expirium prolongation, wheezing, rhonci and crackles.  CVS: Normal S1 and S2 heart sounds with no extra heart sounds. No murmur, rubs, or clicks. Normal peripheral pulses throughout with no obvious peripheral edema. Irregularly " irregular rhythm noted.  Psychiatry: Mood and affect are appropriate. Euthymic with affect congruent with full range and intensity. No SI/HI with adequate insight and judgement.          Data: All pertinent previous laboratory data reviewed     Lab Results   Component Value Date    PH 7.38 02/20/2007    PO2 66 (L) 02/20/2007    PCO2 45 02/20/2007    HCO3 25 02/20/2007     Lab Results   Component Value Date    TSH 1.44 07/12/2018    TSH 1.12 09/28/2017     Lab Results   Component Value Date    GLC 71 07/12/2018    GLC 69 (L) 06/22/2018     Lab Results   Component Value Date    HGB 12.4 (L) 07/12/2018    HGB 12.2 (L) 05/30/2018     Lab Results   Component Value Date    BUN 20 07/12/2018    BUN 18 06/22/2018    CR 1.00 07/12/2018    CR 0.94 06/22/2018     Echocardiology:    -Echocardiogram obtained on 04/25/2018 showed mildly dilated left ventricle with normal left ventricular wall thickness. Left ventricle systolic function is reduced with a LVEF estimated at 35 - 40%. Moderate to severely global hypokinesia of the left ventricle noted. Right ventricle was mild to moderately dilated with moderately decreased systolic function. Severe biatrial enlargement noted. Moderate to mod-severe mitral regurgitation was present. Mild tricuspid regurgitation was noted. Mild pulmonary hypertension was present. Mild aortic regurgitation was observed. Only trace pulmonic valvular regurgitation was noted. The patient was in atrial fibrillation during the study.       Chest x-ray:    -Chest x-ray films obtained on 04/26/2018 showed enlarged cardiac silhouette with also enlarged pulmonary arteries present. Median sternotomy wires were noted. No other cardiopulmonary abnormalities were noted.    PFT:    -The patient had a PFT obtained on 04/24/2018 and this showed normal lung mechanics, normal lung volumes, and normal gas transfer. FVC was 106%, FEV1 was 104%, VC was 100%, TLC was 104%, and the DLCO was also 100%.    Laboratory Studies:    Component Value Flag Ref Range Units Status Collected Lab   WBC 4.1  4.0 - 11.0 10e9/L Final 05/30/2018  8:44 AM [facility]   RBC Count 3.84 (L) 4.4 - 5.9 10e12/L Final 05/30/2018  8:44 AM [facility]   Hemoglobin 12.2 (L) 13.3 - 17.7 g/dL Final 05/30/2018  8:44 AM [facility]   Hematocrit 37.2 (L) 40.0 - 53.0 % Final 05/30/2018  8:44 AM [facility]   MCV 97  78 - 100 fl Final 05/30/2018  8:44 AM [facility]   MCH 31.8  26.5 - 33.0 pg Final 05/30/2018  8:44 AM [facility]   MCHC 32.8  31.5 - 36.5 g/dL Final 05/30/2018  8:44 AM [facility]   RDW 14.5  10.0 - 15.0 % Final 05/30/2018  8:44 AM [facility]   Platelet Count 111 (L) 150 - 450 10e9/L Final 05/30/2018  8:44 AM [facility]     Component Value Flag Ref Range Units Status Collected Lab   Sodium 139  133 - 144 mmol/L Final 05/30/2018  8:44 AM [facility]   Potassium 3.7  3.4 - 5.3 mmol/L Final 05/30/2018  8:44 AM [facility]   Chloride 103  94 - 109 mmol/L Final 05/30/2018  8:44 AM [facility]   Carbon Dioxide 30  20 - 32 mmol/L Final 05/30/2018  8:44 AM [facility]   Anion Gap 6  3 - 14 mmol/L Final 05/30/2018  8:44 AM [facility]   Glucose 114 (H) 70 - 99 mg/dL Final 05/30/2018  8:44 AM [facility]   Urea Nitrogen 18  7 - 30 mg/dL Final 05/30/2018  8:44 AM [facility]   Creatinine 1.02  0.66 - 1.25 mg/dL Final 05/30/2018  8:44 AM [facility]   GFR Estimate 71  >60 mL/min/1.7m2 Final 05/30/2018  8:44 AM [facility]   Comment:   Non  GFR Calc   GFR Estimate If Black 85  >60 mL/min/1.7m2 Final 05/30/2018  8:44 AM [facility]   Comment:   African American GFR Calc   Calcium 8.9  8.5 - 10.1 mg/dL Final 05/30/2018  8:44 AM [facility]   Bilirubin Total 1.7 (H) 0.2 - 1.3 mg/dL Final 05/30/2018  8:44 AM [facility]   Albumin 3.7  3.4 - 5.0 g/dL Final 05/30/2018  8:44 AM [facility]   Protein Total 7.3  6.8 - 8.8 g/dL Final 05/30/2018  8:44 AM [facility]   Alkaline Phosphatase 171 (H) 40 - 150 U/L Final 05/30/2018  8:44 AM [facility]   ALT 18  0 - 70 U/L Final 05/30/2018  8:44 AM [facility]   AST 24  0 - 45 U/L Final 05/30/2018  8:44 AM [facility]     Component Value Flag  Ref Range Units Status Collected Lab   Hemoglobin A1C 5.4  0 - 5.6 % Final 05/01/2018 10:32 AM 53     Taye Phillips MD, MPH  Clinical Sleep Medicine    Total time spent with patient: 40 min. Over >50% of the time was spent for face to face counseling, education, and evaluation.

## 2018-07-17 NOTE — PATIENT INSTRUCTIONS
Your blood pressure was checked while you were in clinic today.  Please read the guidelines below about what these numbers mean and what you should do about them.  Your systolic blood pressure is the top number.  This is the pressure when the heart is pumping.  Your diastolic blood pressure is the bottom number.  This is the pressure in between beats.  If your systolic blood pressure is less than 120 and your diastolic blood pressure is less than 80, then your blood pressure is normal. There is nothing more that you need to do about it  If your systolic blood pressure is 120-139 or your diastolic blood pressure is 80-89, your blood pressure may be higher than it should be.  You should have your blood pressure re-checked within a year by a primary care provider.  If your systolic blood pressure is 140 or greater or your diastolic blood pressure is 90 or greater, you may have high blood pressure.  High blood pressure is treatable, but if left untreated over time it can put you at risk for heart attack, stroke, or kidney failure.  You should have your blood pressure re-checked by a primary care provider within the next four weeks.  Your BMI is Body mass index is 27.09 kg/(m^2).  Weight management is a personal decision.  If you are interested in exploring weight loss strategies, the following discussion covers the approaches that may be successful. Body mass index (BMI) is one way to tell whether you are at a healthy weight, overweight, or obese. It measures your weight in relation to your height.  A BMI of 18.5 to 24.9 is in the healthy range. A person with a BMI of 25 to 29.9 is considered overweight, and someone with a BMI of 30 or greater is considered obese. More than two-thirds of [place] adults are considered overweight or obese.  Being overweight or obese increases the risk for further weight gain. Excess weight may lead to heart disease and diabetes.  Creating and following plans for healthy eating and  physical activity may help you improve your health.  Weight control is part of healthy lifestyle and includes exercise, emotional health, and healthy eating habits. Careful eating habits lifelong are the mainstay of weight control. Though there are significant health benefits from weight loss, long-term weight loss with diet alone may be very difficult to achieve- studies show long-term success with dietary management in less than 10% of people. Attaining a healthy weight may be especially difficult to achieve in those with severe obesity. In some cases, medications, devices and surgical management might be considered.  What can you do?  If you are overweight or obese and are interested in methods for weight loss, you should discuss this with your provider.     Consider reducing daily calorie intake by 500 calories.     Keep a food journal.     Avoiding skipping meals, consider cutting portions instead.    Diet combined with exercise helps maintain muscle while optimizing fat loss. Strength training is particularly important for building and maintaining muscle mass. Exercise helps reduce stress, increase energy, and improves fitness. Increasing exercise without diet control, however, may not burn enough calories to loose weight.       Start walking three days a week 10-20 minutes at a time    Work towards walking thirty minutes five days a week     Eventually, increase the speed of your walking for 1-2 minutes at time    In addition, we recommend that you review healthy lifestyles and methods for weight loss available through the National [employer] of [employer] patient information sites:  http://win.niddk.nih.gov/publications/index.htm    And look into health and wellness programs that may be available through your health insurance provider, employer, local community center, or [employer].      1. CPAP-  WHAT DOES IT DO AND HOW CAN I LEARN TO WEAR IT?                               BEFORE I START, CAN I WATCH A MOVIE  TO GET A PLAN ON HOW TO USE CPAP?  https://www.[employer]ODUCTION[employer].com/watch?c=l8Y87sw969G      Continuous positive airway pressure, or CPAP, is the most effective treatment for obstructive sleep apnea. It works by blowing room air, through a mask, to hold your throat open. A decision to use CPAP is a major step forward in the pursuit of a healthier life. The successful use of CPAP will help you breathe easier, sleep better and live healthier. You can choose CPAP equipment from any durable medical equipment provider that meets your needs.  Using CPAP can be a positive experience if you keep these ordonez points in mind:  1. Commitment  CPAP is not a quick fix for your problem. It involves a long-term commitment to improve your sleep and your health.    2. Communication  Stay in close communication with both your sleep doctor and your CPAP supplier. Ask lots of questions and seek help when you need it.    3. Consistency  Use CPAP all night, every night and for every nap. You will receive the maximum health benefits from CPAP when you use it every time that you sleep. This will also make it easier for your body to adjust to the treatment.    4. Correction  The first machine and mask that you try may not be the best ones for you. Work with your sleep doctor and your CPAP supplier to make corrections to your equipment selection. Ask about trying a different type of machine or mask if you have ongoing problems. Make sure that your mask is a good fit and learn to use your equipment properly.    5. Challenge  Tell a family member or close friend to ask you each morning if you used your CPAP the previous night. Have someone to challenge you to give it your best effort.    6. Connection   Your adjustment to CPAP will be easier if you are able to connect with others who use the same treatment. Ask your sleep doctor if there is a support group in your area for people who have sleep apnea, or look for one on the Internet.  7. Comfort  "  Increase your level of comfort by using a saline spray, decongestant or heated humidifier if CPAP irritates your nose, mouth or throat. Use your unit's \"ramp\" setting to slowly get used to the air pressure level. There may be soft pads you can buy that will fit over your mask straps. Look on www.CPAP.com for accessories that can help make CPAP use more comfortable.  8. Cleaning   Clean your mask, tubing and headgear on a regular basis. Put this time in your schedule so that you don't forget to do it. Check and replace the filters for your CPAP unit and humidifier.    9. Completion   Although you are never finished with CPAP therapy, you should reward yourself by celebrating the completion of your first month of treatment. Expect this first month to be your hardest period of adjustment. It will involve some trial and error as you find the machine, mask and pressure settings that are right for you.    10. Continuation  After your first month of treatment, continue to make a daily commitment to use your CPAP all night, every night and for every nap.    CPAP-Tips to starting with success:  Begin using your CPAP for short periods of time during the day while you watch TV or read.    Use CPAP every night and for every nap. Using it less often reduces the health benefits and makes it harder for your body to get used to it.    Make small adjustments to your mask, tubing, straps and headgear until you get the right fit. Tightening the mask may actually worsen the leak.  If it leaves significant marks on your face or irritates the bridge of your nose, it may not be the best mask for you.  Speak with the person who supplied the mask and consider trying other masks. Insurances will allow you to try different masks during the first month of starting CPAP.  Insurance also covers a new mask, hose and filter about every 6 months.    Use a saline nasal spray to ease mild nasal congestion. Neti-Pot or saline nasal rinses may also " help. Nasal gel sprays can help reduce nasal dryness.  Biotene mouthwash can be helpful to protect your teeth if you experience frequent dry mouth.  Dry mouth may be a sign of air escaping out of your mouth or out of the mask in the case of a full face mask.  Speak with your provider if you expect that is the case.     Take a nasal decongestant to relieve more severe nasal or sinus congestion.  Do not use Afrin (oxymetazoline) nasal spray more than 3 days in a row.  Speak with your sleep doctor if your nasal congestion is chronic.    Use a heated humidifier that fits your CPAP model to enhance your breathing comfort. Adjust the heat setting up if you get a dry nose or throat, down if you get condensation in the hose or mask.  Position the CPAP lower than you so that any condensation in the hose drains back into the machine rather than towards the mask.    Try a system that uses nasal pillows if traditional masks give you problems.    Clean your mask, tubing and headgear once a week. Make sure the equipment dries fully.    Regularly check and replace the filters for your CPAP unit and humidifier.    Work closely with your sleep provider and your CPAP supplier to make sure that you have the machine, mask and air pressure setting that works best for you. It is better to stop using it and call your provider to solve problems than to lay awake all night frustrated with the device.    Daytime naps are not advised, but use the PAP device if taking naps. Many insurances require that we prove you are using the PAP device at least 4 hours on at least 70% of nights over a 30 day period. We have 90 days to meet those criteria.    You can get new supplies (mask, hose and filter) for your device every 3-6 months (covered by insurance). You do not need to get supplies that often, but they are available if you would like them. You may exchange the mask once within the first month if you feel the initial mask does not fit well.  Please, contact your medical equipment provider for equipment issues.    Please let me know if you have any snoring, daytime sleepiness, or poor sleep quality. We will want to make sure your PAP device is adequately treating your condition.    There is a website called CPAP.com that has accessories that may make CPAP use easier. Please visit it at your convenience.    Please, schedule follow up visit with the nurse (she will coming into the room and meet with you).     Thank you!    Taye Phillips MD, MPH  Clinical Sleep and Occupational / Environmental Medicine

## 2018-07-17 NOTE — NURSING NOTE
"Chief Complaint   Patient presents with     RECHECK     f/u psg 6/26/18       Initial /56  Pulse (!) 48  Resp 16  Ht 1.702 m (5' 7.01\")  Wt 78.5 kg (173 lb)  SpO2 99%  BMI 27.09 kg/m2 Estimated body mass index is 27.09 kg/(m^2) as calculated from the following:    Height as of this encounter: 1.702 m (5' 7.01\").    Weight as of this encounter: 78.5 kg (173 lb).    Medication Reconciliation: complete        Zoe Arriaza, Sleep Clinic specialist  "

## 2018-07-17 NOTE — MR AVS SNAPSHOT
After Visit Summary   7/17/2018    Milo Serna    MRN: [id number]           Patient Information     Date Of Birth          1940        Visit Information        Provider Department      7/17/2018 8:00 AM Taye Phillips MD [facility]        Today's Diagnoses     LENORA (obstructive sleep apnea)    -  1    Nocturnal hypoxemia        Cheyne-Alejandro respiration        Excessive daytime sleepiness        Cardiomegaly        Chronic systolic congestive heart failure (H)        Chronic atrial fibrillation (H)        Frequent PVCs        Coronary artery disease involving native coronary artery of native heart without angina pectoris        Essential hypertension        Pulmonary hypertension          Care Instructions    Your blood pressure was checked while you were in clinic today.  Please read the guidelines below about what these numbers mean and what you should do about them.  Your systolic blood pressure is the top number.  This is the pressure when the heart is pumping.  Your diastolic blood pressure is the bottom number.  This is the pressure in between beats.  If your systolic blood pressure is less than 120 and your diastolic blood pressure is less than 80, then your blood pressure is normal. There is nothing more that you need to do about it  If your systolic blood pressure is 120-139 or your diastolic blood pressure is 80-89, your blood pressure may be higher than it should be.  You should have your blood pressure re-checked within a year by a primary care provider.  If your systolic blood pressure is 140 or greater or your diastolic blood pressure is 90 or greater, you may have high blood pressure.  High blood pressure is treatable, but if left untreated over time it can put you at risk for heart attack, stroke, or kidney failure.  You should have your blood pressure re-checked by a primary care provider within the next four weeks.  Your BMI is Body mass index is  27.09 kg/(m^2).  Weight management is a personal decision.  If you are interested in exploring weight loss strategies, the following discussion covers the approaches that may be successful. Body mass index (BMI) is one way to tell whether you are at a healthy weight, overweight, or obese. It measures your weight in relation to your height.  A BMI of 18.5 to 24.9 is in the healthy range. A person with a BMI of 25 to 29.9 is considered overweight, and someone with a BMI of 30 or greater is considered obese. More than two-thirds of American adults are considered overweight or obese.  Being overweight or obese increases the risk for further weight gain. Excess weight may lead to heart disease and diabetes.  Creating and following plans for healthy eating and physical activity may help you improve your health.  Weight control is part of healthy lifestyle and includes exercise, emotional health, and healthy eating habits. Careful eating habits lifelong are the mainstay of weight control. Though there are significant health benefits from weight loss, long-term weight loss with diet alone may be very difficult to achieve- studies show long-term success with dietary management in less than 10% of people. Attaining a healthy weight may be especially difficult to achieve in those with severe obesity. In some cases, medications, devices and surgical management might be considered.  What can you do?  If you are overweight or obese and are interested in methods for weight loss, you should discuss this with your provider.     Consider reducing daily calorie intake by 500 calories.     Keep a food journal.     Avoiding skipping meals, consider cutting portions instead.    Diet combined with exercise helps maintain muscle while optimizing fat loss. Strength training is particularly important for building and maintaining muscle mass. Exercise helps reduce stress, increase energy, and improves fitness. Increasing exercise without diet  control, however, may not burn enough calories to loose weight.       Start walking three days a week 10-20 minutes at a time    Work towards walking thirty minutes five days a week     Eventually, increase the speed of your walking for 1-2 minutes at time    In addition, we recommend that you review healthy lifestyles and methods for weight loss available through the National Institutes of Health patient information sites:  http://win.niddk.nih.gov/publications/index.htm    And look into health and wellness programs that may be available through your health insurance provider, employer, local community center, or clarissa club.      1. CPAP-  WHAT DOES IT DO AND HOW CAN I LEARN TO WEAR IT?                               BEFORE I START, CAN I WATCH A MOVIE TO GET A PLAN ON HOW TO USE CPAP?  https://www.Knock Knock.com/watch?r=g1J03nj371C      Continuous positive airway pressure, or CPAP, is the most effective treatment for obstructive sleep apnea. It works by blowing room air, through a mask, to hold your throat open. A decision to use CPAP is a major step forward in the pursuit of a healthier life. The successful use of CPAP will help you breathe easier, sleep better and live healthier. You can choose CPAP equipment from any durable medical equipment provider that meets your needs.  Using CPAP can be a positive experience if you keep these ordonez points in mind:  1. Commitment  CPAP is not a quick fix for your problem. It involves a long-term commitment to improve your sleep and your health.    2. Communication  Stay in close communication with both your sleep doctor and your CPAP supplier. Ask lots of questions and seek help when you need it.    3. Consistency  Use CPAP all night, every night and for every nap. You will receive the maximum health benefits from CPAP when you use it every time that you sleep. This will also make it easier for your body to adjust to the treatment.    4. Correction  The first machine and mask  "that you try may not be the best ones for you. Work with your sleep doctor and your CPAP supplier to make corrections to your equipment selection. Ask about trying a different type of machine or mask if you have ongoing problems. Make sure that your mask is a good fit and learn to use your equipment properly.    5. Challenge  Tell a family member or close friend to ask you each morning if you used your CPAP the previous night. Have someone to challenge you to give it your best effort.    6. Connection   Your adjustment to CPAP will be easier if you are able to connect with others who use the same treatment. Ask your sleep doctor if there is a support group in your area for people who have sleep apnea, or look for one on the Internet.  7. Comfort   Increase your level of comfort by using a saline spray, decongestant or heated humidifier if CPAP irritates your nose, mouth or throat. Use your unit's \"ramp\" setting to slowly get used to the air pressure level. There may be soft pads you can buy that will fit over your mask straps. Look on www.CPAP.com for accessories that can help make CPAP use more comfortable.  8. Cleaning   Clean your mask, tubing and headgear on a regular basis. Put this time in your schedule so that you don't forget to do it. Check and replace the filters for your CPAP unit and humidifier.    9. Completion   Although you are never finished with CPAP therapy, you should reward yourself by celebrating the completion of your first month of treatment. Expect this first month to be your hardest period of adjustment. It will involve some trial and error as you find the machine, mask and pressure settings that are right for you.    10. Continuation  After your first month of treatment, continue to make a daily commitment to use your CPAP all night, every night and for every nap.    CPAP-Tips to starting with success:  Begin using your CPAP for short periods of time during the day while you watch TV or " read.    Use CPAP every night and for every nap. Using it less often reduces the health benefits and makes it harder for your body to get used to it.    Make small adjustments to your mask, tubing, straps and headgear until you get the right fit. Tightening the mask may actually worsen the leak.  If it leaves significant marks on your face or irritates the bridge of your nose, it may not be the best mask for you.  Speak with the person who supplied the mask and consider trying other masks. Insurances will allow you to try different masks during the first month of starting CPAP.  Insurance also covers a new mask, hose and filter about every 6 months.    Use a saline nasal spray to ease mild nasal congestion. Neti-Pot or saline nasal rinses may also help. Nasal gel sprays can help reduce nasal dryness.  Biotene mouthwash can be helpful to protect your teeth if you experience frequent dry mouth.  Dry mouth may be a sign of air escaping out of your mouth or out of the mask in the case of a full face mask.  Speak with your provider if you expect that is the case.     Take a nasal decongestant to relieve more severe nasal or sinus congestion.  Do not use Afrin (oxymetazoline) nasal spray more than 3 days in a row.  Speak with your sleep doctor if your nasal congestion is chronic.    Use a heated humidifier that fits your CPAP model to enhance your breathing comfort. Adjust the heat setting up if you get a dry nose or throat, down if you get condensation in the hose or mask.  Position the CPAP lower than you so that any condensation in the hose drains back into the machine rather than towards the mask.    Try a system that uses nasal pillows if traditional masks give you problems.    Clean your mask, tubing and headgear once a week. Make sure the equipment dries fully.    Regularly check and replace the filters for your CPAP unit and humidifier.    Work closely with your sleep provider and your CPAP supplier to make sure  that you have the machine, mask and air pressure setting that works best for you. It is better to stop using it and call your provider to solve problems than to lay awake all night frustrated with the device.    Daytime naps are not advised, but use the PAP device if taking naps. Many insurances require that we prove you are using the PAP device at least 4 hours on at least 70% of nights over a 30 day period. We have 90 days to meet those criteria.    You can get new supplies (mask, hose and filter) for your device every 3-6 months (covered by insurance). You do not need to get supplies that often, but they are available if you would like them. You may exchange the mask once within the first month if you feel the initial mask does not fit well. Please, contact your medical equipment provider for equipment issues.    Please let me know if you have any snoring, daytime sleepiness, or poor sleep quality. We will want to make sure your PAP device is adequately treating your condition.    There is a website called CPAP.com that has accessories that may make CPAP use easier. Please visit it at your convenience.    Please, schedule follow up visit with the nurse (she will coming into the room and meet with you).     Thank you!    Taye Phillips MD, MPH  Clinical Sleep and Occupational / Environmental Medicine                Follow-ups after your visit        Your next 10 appointments already scheduled     Jul 18, 2018  8:00 AM CDT   Cardiac Treatment with Rh Cardiac Rehab 1   [facility] ([facility])    [address]  [place] [address]   [phone]            Jul 19, 2018  8:45 AM CDT   Pulmonary Hypertension Return with Sixto Conley MD   [facility] ([facility])    [address]  [place] [address]   [phone]            Jul 20, 2018  8:00 AM CDT   Cardiac Treatment with Rh Cardiac  Rehab 1   [facility] ([facility])    [address]  [place] [address]   [phone]            Jul 23, 2018  8:00 AM CDT   Cardiac Treatment with Rh Cardiac Rehab 1   [facility] ([facility])    [address]  [place] [address]   [phone]            Jul 25, 2018  8:00 AM CDT   Cardiac Treatment with Rh Cardiac Rehab 1   [facility] ([facility])    [address]  [place] [facility] [address]   [phone]            Jul 27, 2018  8:00 AM CDT   Cardiac Treatment with Rh Cardiac Rehab 1   [facility] ([facility])    [address]  [place] [place] [address]   [phone]            Jul 31, 2018  8:15 AM CDT   Cardiac Discharge with Rh Cardiac Rehab 1   [facility] ([facility])    [address]  [place] [place] [address]   [phone]            Aug 10, 2018  8:30 AM CDT   Return Sleep Patient with Taye Phillips MD   [facility] ([facility])    [address]  [place] [address]   [phone]              Who to contact     If you have questions or need follow up information about today's clinic visit or your schedule please contact [facility] directly at [phone].  Normal or non-critical lab and imaging results will be communicated to you by MyChart, letter or phone within 4 business days after the clinic has received the results. If you do not hear from us within 7 days, please contact the clinic through MyChart or phone. If you have a critical or abnormal lab result, we will notify you by phone as soon as possible.  Submit refill requests through [employer] or call your pharmacy and they will forward the refill request to us. Please allow 3 business days for your  "refill to be completed.          Additional Information About Your Visit        MyChart Information     [employer] gives you secure access to your electronic health record. If you see a primary care provider, you can also send messages to your care team and make appointments. If you have questions, please call your primary care clinic.  If you do not have a primary care provider, please call [phone] and they will assist you.        Care EveryWhere ID     This is your Care EveryWhere ID. This could be used by other organizations to access your [place] medical records  [id number]        Your Vitals Were     Pulse Respirations Height Pulse Oximetry BMI (Body Mass Index)       48 16 1.702 m (5' 7.01\") 99% 27.09 kg/m2        Blood Pressure from Last 3 Encounters:   07/17/18 118/56   06/21/18 108/59   06/19/18 110/52    Weight from Last 3 Encounters:   07/17/18 78.5 kg (173 lb)   06/21/18 79.4 kg (175 lb)   06/19/18 81.6 kg (180 lb)              We Performed the Following     Comprehensive DME        Primary Care Provider Office Phone # Fax #    Oscar Alves [phone] [address] [address]        Equal Access to Services     JOSE BHAKTA AH: Hadii aad ku hadasho Soomaali, waaxda luqadaha, qaybta kaalmada adeegyada, waxay idiin hayangel luisn alice villegas layamilka ah. So [facility] [phone].    ATENCIÓN: Si habla español, tiene a randall disposición servicios gratuitos de asistencia lingüística. Llame al [phone].    We comply with applicable federal civil rights laws and [place] laws. We do not discriminate on the basis of race, color, national origin, age, disability, sex, sexual orientation, or gender identity.            Thank you!     Thank you for choosing [facility]  for your care. Our goal is always to provide you with excellent care. Hearing back from our patients is one way we can continue to improve our services. Please take a few minutes to complete " the written survey that you may receive in the mail after your visit with us. Thank you!             Your Updated Medication List - Protect others around you: Learn how to safely use, store and throw away your medicines at www.disposemymeds.org.          This list is accurate as of 7/17/18  8:20 AM.  Always use your most recent med list.                   Brand Name Dispense Instructions for use Diagnosis    acetaminophen 650 MG CR tablet    TYLENOL    100 tablet    Take 1 tablet (650 mg) by mouth every 6 hours as needed for pain    Osteoarthritis       apixaban ANTICOAGULANT 2.5 MG tablet    ELIQUIS    200 tablet    Take 1 tablet (2.5 mg) by mouth 2 times daily    Chronic atrial fibrillation (H)       aspirin 81 MG tablet      Take 81 mg by mouth daily        co-enzyme Q-10 100 MG Caps capsule     180 capsule    Take 1 capsule (100 mg) by mouth daily    Mixed hyperlipidemia       diclofenac 1 % Gel topical gel    VOLTAREN    100 g    Apply 4 gramsto area qid prn    Acute left-sided low back pain without sciatica       doxycycline 100 MG capsule    VIBRAMYCIN    90 capsule    TAKE 1 CAPSULE DAILY    Rosacea       FERROUS GLUCONATE PO      Take 324 mg by mouth daily (with breakfast)        Glucosamine-Chondroitin 250-200 MG Tabs    OSTEO BI-FLEX REGULAR STRENGTH    100 tablet    Take 1 tablet by mouth 2 times daily    Osteoarthritis       lisinopril 20 MG tablet    PRINIVIL/ZESTRIL    90 tablet    Take 1 tablet (20 mg) by mouth daily    HTN, goal below 150/90       metoprolol succinate 25 MG 24 hr tablet    TOPROL-XL    90 tablet    Take 0.5 tablets (12.5 mg) by mouth daily (with breakfast)    Frequent PVCs, Chronic atrial fibrillation (H)       MULTIVITAL Chew     360 tablet    Take 2 chew tab by mouth 2 times daily    Neuropathy of foot       naftifine 1 % Gel topical gel    NAFTIN    90 g    Apply topically daily Apply to affected nails daily    Onychomycosis of toenail       OMEGA 3-6-9 PO      Take 1 tablet by  mouth daily        omeprazole 40 MG capsule    priLOSEC    90 capsule    TAKE 1 CAPSULE DAILY    PUD (peptic ulcer disease)       potassium chloride SA 10 MEQ CR tablet    K-DUR/KLOR-CON M    60 tablet    Take 2 tablets (20 mEq) by mouth daily    Frequent PVCs, Chronic combined systolic and diastolic congestive heart failure (H)       senna 8.6 MG tablet    SENOKOT    60 tablet    Take 1 tablet by mouth daily    Prostate cancer (H)       simvastatin 20 MG tablet    ZOCOR    90 tablet    Take 1 tablet (20 mg) by mouth At Bedtime    Hyperlipidemia LDL goal <100       sodium chloride 0.65 % nasal spray    OCEAN NASAL SPRAY    2 Bottle    Spray 1 spray into both nostrils daily as needed for congestion    Bloody nose       tamsulosin 0.4 MG capsule    FLOMAX    90 capsule    TAKE 1 CAPSULE DAILY    Benign prostatic hyperplasia with lower urinary tract symptoms       torsemide 20 MG tablet    DEMADEX    90 tablet    Take 1 tablet (20 mg) by mouth daily (with breakfast)    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, Chronic combined systolic and diastolic congestive heart failure (H)       Urea 20 % Crea cream     120 g    Apply topically as needed Apply to right foot daily    Right foot pain, Skin callus, Pes planus of both feet, Venous insufficiency of both lower extremities, Hav (hallux abducto valgus), right, Onychomycosis of toenail       * zolpidem 5 MG tablet    AMBIEN    1 tablet    Take 1/2 tablet by mouth 15 minutes prior to sleep, for Sleep Study    Right heart enlargement, S/P CABG (coronary artery bypass graft), Alcohol dependence with unspecified alcohol-induced disorder (H), 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       * zolpidem 5 MG tablet    AMBIEN    1  tablet    Take 1/2 tablet by mouth 15 minutes prior to sleep, for Sleep Study    Nocturnal hypoxemia, Cheyne-Alejandro breathing, LENORA (obstructive sleep apnea), Essential hypertension, Status post coronary artery bypass graft, Frequent PVCs, Chronic atrial fibrillation (H), Chronic combined systolic and diastolic CHF (congestive heart failure) (H), 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, Excessive daytime sleepiness       * Notice:  This list has 2 medication(s) that are the same as other medications prescribed for you. Read the directions carefully, and ask your doctor or other care provider to review them with you.

## 2018-07-18 ENCOUNTER — HOSPITAL ENCOUNTER (OUTPATIENT)
Dept: CARDIAC REHAB | Facility: CLINIC | Age: 78
End: 2018-07-18
Attending: INTERNAL MEDICINE
Payer: MEDICARE

## 2018-07-18 PROCEDURE — 40000116 ZZH STATISTIC OP CR VISIT: Performed by: REHABILITATION PRACTITIONER

## 2018-07-18 PROCEDURE — 93798 PHYS/QHP OP CAR RHAB W/ECG: CPT | Performed by: REHABILITATION PRACTITIONER

## 2018-07-19 ENCOUNTER — OFFICE VISIT (OUTPATIENT)
Dept: CARDIOLOGY | Facility: CLINIC | Age: 78
End: 2018-07-19
Attending: INTERNAL MEDICINE
Payer: MEDICARE

## 2018-07-19 VITALS
SYSTOLIC BLOOD PRESSURE: 108 MMHG | OXYGEN SATURATION: 98 % | WEIGHT: 180 LBS | DIASTOLIC BLOOD PRESSURE: 48 MMHG | HEART RATE: 42 BPM | HEIGHT: 67 IN | BODY MASS INDEX: 28.25 KG/M2

## 2018-07-19 DIAGNOSIS — I48.20 CHRONIC ATRIAL FIBRILLATION (H): ICD-10-CM

## 2018-07-19 DIAGNOSIS — Z95.1 STATUS POST CORONARY ARTERY BYPASS GRAFT: ICD-10-CM

## 2018-07-19 DIAGNOSIS — I50.42 CHRONIC COMBINED SYSTOLIC AND DIASTOLIC CONGESTIVE HEART FAILURE (H): ICD-10-CM

## 2018-07-19 DIAGNOSIS — I49.3 FREQUENT PVCS: ICD-10-CM

## 2018-07-19 DIAGNOSIS — I50.810 RIGHT HEART FAILURE, NYHA CLASS 1 (H): ICD-10-CM

## 2018-07-19 PROCEDURE — 99214 OFFICE O/P EST MOD 30 MIN: CPT | Performed by: INTERNAL MEDICINE

## 2018-07-19 NOTE — PROGRESS NOTES
Service Date: 07/19/2018      REFERRING PROVIDER:  Michael Duckworth MD      PRIMARY CARE PROVIDER:  Oscar Alves MD      REASON FOR VISIT:    Followup of biventricular systolic heart failure and question of pulmonary hypertension.      HISTORY OF PRESENT ILLNESS:    Milo is well known to me from previous visits.  He was unaccompanied today.  He is a 77-year-old  gentleman status post remote CABG in 1997, chronic atrial fibrillation (on low-dose Eliquis at 2.5 mg b.i.d. due to history of epistaxis), treated hypertension, stable ascending aortic dilatation of 4.2 cm and remote ex-smoker (quit 1974).      I had seen him initially for progressive dilatation of the right ventricle with mildly reduced systolic function and an estimated RVSP of 40 mmHg, which was confirmed on cardiac MRI.  He also had severe biatrial enlargement without evidence of inter-atrial shunt.  His other testing for secondary causes of pulmonary hypertension was negative.  Subsequent imaging also showed a drop in left ventricular systolic function from a normal LVEF to 35%-40% with inferior/inferolateral akinesis and global hypokinesis. The right ventricle was moderately dilated with moderately reduced systolic function with mild tricuspid regurgitation.  He has never had evidence of angina, heart failure symptoms or desaturation.      As part of his workup, I referred him to the sleep center and he has been diagnosed with moderate obstructive sleep apnea with AHI index of 17 per hour.  CPAP is being initiated in a couple of weeks.      He has done very well with outpatient cardiac rehabilitation exercise program.  On average he walks for 1 or 2 hours a day and is able to do this without any limitation.  Unfortunately, his wife has progressive dementia and he is her primary caregiver.  The couple spend their nieto in [place] and will be leaving in October.  Weight has remained stable at 180 pounds.  Most of his meals are home cooked with  attention to low sodium, rarely eats out, rare alcohol, no tobacco.      RECENT LABORATORIES:  Sodium 138, potassium 3.8, BUN 20, creatinine 1.0 (on 20 mg of torsemide), estimated GFR 72.  His alkaline phosphatase has always been slightly elevated at 177 with a bilirubin of 1.8.  The etiology of this is not clear.  Transaminases are normal.  NT-proBNP is better than previous, but still elevated at 2000.  Normal TSH of 1.4.  Hemoglobin 12.4.      I had obtained a 24-hour Holter for evaluation of frequent PVCs.  It showed atrial fibrillation throughout with an average heart rate of 54 BPM and frequent ventricular ectopy with a total burden of 17,000 or 22%, but no patient reported symptoms.      CURRENT MEDICATIONS:   1.  Apixaban (Eliquis) 2.5 mg b.i.d.   2.  Aspirin 81 mg daily for CAD and history of CABG.   3.  Ferrous gluconate 324 mg daily.   4.  Lisinopril 20 mg daily.   5.  Toprol-XL 25 mg daily.   6.  Omeprazole 40 mg daily.   7.  Simvastatin 20 mg daily.   8.  Tamsulosin 0.4 mg daily.   9.  Torsemide 20 mg daily.   10.  Diclofenac gel.   11.  Coenzyme Q10 100 mg daily.   13.  Tylenol as needed.      ALLERGIES:  No known allergies.      PHYSICAL EXAMINATION:   VITAL SIGNS:  Blood pressure 108/48 (asymptomatic), pulse 40 and 50 beats per minute (atrial fibrillation), height 1.702 meters (5 feet 7 inches), weight 82 kg (180 pounds), sats 98% on room air, BMI 28.2 kg/m2.   CONSTITUTIONAL:  Normal body habitus, cooperative.  Alert, oriented x3.   EYES:  No pallor.   ENT:  No cyanosis.   RESPIRATORY:  Bilateral normal breath sounds, no rales or wheeze.   CARDIOVASCULAR:  Midline healed sternotomy scar.  Apical impulses nondisplaced and normal to palpation without RV heave.  Heart sounds are irregularly irregular, no audible murmur.   NECK:  Jugular venous pressure is normal.   GASTROINTESTINAL:  Soft, nontender, no hepatosplenomegaly.   SKIN:  No lower extremity edema, but he does have skin changes of both legs  suggestive of prior chronic venostasis.  No clubbing.      DIAGNOSES:   1.  Compensated right heart failure with reduced systolic function, NYHA class I.    2.  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  Chronic atrial fibrillation (rate control and Eliquis anticoagulation strategy).   4.  Frequent premature ventricular complexes (22% ventricular ectopy burden).   5.  Stable coronary artery disease, status post CABG in 1997 (graft status not known).   6.  Treated hypertension.   7.  Recent diagnosis of obstructive sleep apnea, awaiting CPAP therapy.      ASSESSMENT AND PLAN:    As Milo himself states, he feels better than he has for a long time.  I think this is primarily because of adjustment of his diuretic therapy and the excellent efforts he has made at the cardiac rehabilitation exercise program.  He is clinically euvolemic, has NYHA class I status, stable renal function.  His ventricular ectopy burden has increased from 17% to 22% in the context of low-dose metoprolol XL that was started for his LV systolic dysfunction.  Although the beta blockade may be contributing to the increase in PVCs, I am inclined to leave him on his current therapy because he is symptomatically very stable.  Additionally, I want to see if he improves after his sleep apnea is treated.      PLAN:   1.  No changes to medications today.  He can stop the 20 mEq of potassium supplement.   2.  Followup 24-hour Holter about 8 weeks after CPAP therapy.   3.  I will see him back just before he leaves to [place] in the winter (early 10/2018) with a previsit cardiac MRI to reassess his biventricular systolic function and basic labs.      As always, it was my pleasure to visit with Milo.  I look forward to seeing him again.      cc:   Oscar Alves MD    [facility]   [address] [place] [address]       Michael Duckworth MD    [facility]   [address]   [place] [address]         [facility] JOHANNY PENA MD             D: 2018   T: 2018   MT: SHAHEED      Name:     SAÚL GONZALEZ   MRN:      [id number]        Account:      [id number]   :      1940           Service Date: 2018      Document: [id number]

## 2018-07-19 NOTE — MR AVS SNAPSHOT
After Visit Summary   7/19/2018    Milo Serna    MRN: [id number]           Patient Information     Date Of Birth          1940        Visit Information        Provider Department      7/19/2018 8:45 AM Sixto Conley MD [facility]        Today's Diagnoses     Right heart failure, NYHA class 1        Chronic combined systolic and diastolic congestive heart failure (H)        Chronic atrial fibrillation (H)        Frequent PVCs        Status post coronary artery bypass graft          Care Instructions    - No changes to medications today.    - 24-hour heart rhythm monitor (Holter) to be done approximately 8 weeks after you start to CPAP therapy (end September 2018).    - After you complete the outpatient cardiac rehab exercise program, we will refer you to the [facility] program to continue exercising.    - Return visit to see me in early October 2018 (before you go to [place] for the winter). Pre-visit tests - repeat cardiac MRI with contrast, labs (basic metabolic panel).    If you have any questions or concerns, please call my nurse Patsy Gomez at [phone].            Follow-ups after your visit        Additional Services     Follow-Up with Pulmonary Hypertension Clinic       Cardiac MRI and BMP prior                  Your next 10 appointments already scheduled     Jul 23, 2018  8:00 AM CDT   Cardiac Treatment with Rh Cardiac Rehab 1   [facility] ([facility])    [address] [place] [address], [address] [address]   [phone]            Jul 25, 2018  8:00 AM CDT   Cardiac Treatment with Rh Cardiac Rehab 1   [facility] ([facility])    [address] [place] [address], [address] [address]   [phone]            Jul 27, 2018  8:00 AM CDT   Cardiac Treatment with Rh Cardiac Rehab 1   [facility] ([facility])    [address] [place] [address],  [address]  [facility] [address]   [phone]            Jul 31, 2018  8:15 AM CDT   Cardiac Discharge with Rh Cardiac Rehab 1   [facility] ([facility])    [address]  [facility] [address]   [phone]            Aug 10, 2018  8:30 AM CDT   Return Sleep Patient with Taye Phillips MD   [facility]    [address]  [facility] [address]   [phone]            Sep 19, 2018 11:00 AM CDT   MR MYOCARDIUM W CONTRAST with SCIMR1   [facility] ([facility]ovascular Imaging at [facility])    [address] [place] [address]  [address]  [facility] [address] [phone]           Take your medicines as usual, unless your doctor tells you not to. Bring a list of your current medicines to your exam (including vitamins, minerals and over-the-counter drugs).  You may or may not receive intravenous (IV) contrast for this exam pending the discretion of the Radiologist.  You do not need to do anything special to prepare.  The MRI machine uses a strong magnet. Please wear clothes without metal (snaps, zippers). A sweatsuit works well, or we may give you a hospital gown.  Please remove any body piercings and hair extensions before you arrive. You will also remove watches, jewelry, hairpins, wallets, dentures, partial dental plates and hearing aids. You may wear contact lenses, and you may be able to wear your rings. We have a safe place to keep your personal items, but it is safer to leave them at home.  **IMPORTANT** THE INSTRUCTIONS BELOW ARE ONLY FOR THOSE PATIENTS WHO HAVE BEEN PRESCRIBED SEDATION OR GENERAL ANESTHESIA DURING THEIR MRI PROCEDURE:  IF YOUR DOCTOR PRESCRIBED ORAL SEDATION (take medicine to help you relax during your exam):   You must get the medicine from your doctor (oral medication) before you arrive. Bring the medicine to the exam. Do not take  it at home. You ll be told when to take it upon arriving for your exam.   Arrive one hour early. Bring someone who can take you home after the test. Your medicine will make you sleepy. After the exam, you may not drive, take a bus or take a taxi by yourself.  IF YOUR DOCTOR PRESCRIBED IV SEDATION:   Arrive one hour early. Bring someone who can take you home after the test. Your medicine will make you sleepy. After the exam, you may not drive, take a bus or take a taxi by yourself.   No eating 6 hours before your exam. You may have clear liquids up until 4 hours before your exam. (Clear liquids include water, clear tea, black coffee and fruit juice without pulp.)  IF YOUR DOCTOR PRESCRIBED ANESTHESIA (be asleep for your exam):   Arrive 1 1/2 hours early. Bring someone who can take you home after the test. You may not drive, take a bus or take a taxi by yourself.   No eating 8 hours before your exam. You may have clear liquids up until 4 hours before your exam. (Clear liquids include water, clear tea, black coffee and fruit juice without pulp.)   You will spend four to five hours in the recovery room.  Please call the Imaging Department at your exam site with any questions.            Sep 19, 2018 12:10 PM CDT   LAB with [facility]   [facility] [place] ([facility])    [address] [place] [address] [address]  [place]  [place] [address]   [phone]           Please do not eat 10-12 hours before your appointment if you are coming in fasting for labs on lipids, cholesterol, or glucose (sugar). This does not apply to pregnant women. Water, hot tea and black coffee (with nothing added) are okay. Do not drink other fluids, diet soda or chew gum.            Sep 19, 2018  1:00 PM CDT   Holter Monitor with PRINCE   [facility] ([facility] [place] [facility])    [address] [place] [address] [address]  [place] [address]   [phone]            Sep 27, 2018  9:45 AM CDT    Pulmonary Hypertension Return with Sixto Conley MD   [facility] ([facility])    [address]  [facility] [address] [phone]              Future tests that were ordered for you today     Open Future Orders        Priority Expected Expires Ordered    MRI Cardiac w/contrast Routine 10/18/2018 7/19/2019 7/19/2018    Follow-Up with Pulmonary Hypertension Clinic Routine 10/18/2018 7/19/2019 7/19/2018    Basic metabolic panel Routine 10/18/2018 7/19/2019 7/19/2018    Holter Monitor 24 hour - Adult Routine 9/19/2018 7/2/2019 7/19/2018            Who to contact     If you have questions or need follow up information about today's clinic visit or your schedule please contact [facility] directly at [phone].  Normal or non-critical lab and imaging results will be communicated to you by MyChart, letter or phone within 4 business days after the clinic has received the results. If you do not hear from us within 7 days, please contact the clinic through [employer] or phone. If you have a critical or abnormal lab result, we will notify you by phone as soon as possible.  Submit refill requests through [employer] or call your pharmacy and they will forward the refill request to us. Please allow 3 business days for your refill to be completed.          Additional Information About Your Visit        MyChart Information     [employer] gives you secure access to your electronic health record. If you see a primary care provider, you can also send messages to your care team and make appointments. If you have questions, please call your primary care clinic.  If you do not have a primary care provider, please call [phone] and they will assist you.        Care EveryWhere ID     This is your Care EveryWhere ID. This could be used by other organizations to access your [facility] medical records  [id number]       "  Your Vitals Were     Pulse Height Pulse Oximetry BMI (Body Mass Index)          42 1.702 m (5' 7\") 98% 28.19 kg/m2         Blood Pressure from Last 3 Encounters:   07/19/18 108/48   07/17/18 118/56   06/21/18 108/59    Weight from Last 3 Encounters:   07/19/18 81.6 kg (180 lb)   07/17/18 78.5 kg (173 lb)   06/21/18 79.4 kg (175 lb)              We Performed the Following     Follow-Up with Pulmonary Hypertension Clinic          Today's Medication Changes          These changes are accurate as of 7/19/18 10:15 AM.  If you have any questions, ask your nurse or doctor.               These medicines have changed or have updated prescriptions.        Dose/Directions    MULTIVITAL Chew   This may have changed:  how much to take   Used for:  Neuropathy of foot        Dose:  2 chew tab   Take 2 chew tab by mouth 2 times daily   Quantity:  360 tablet   Refills:  3       senna 8.6 MG tablet   Commonly known as:  SENOKOT   This may have changed:    - when to take this  - reasons to take this   Used for:  Prostate cancer (H)        Dose:  1 tablet   Take 1 tablet by mouth daily   Quantity:  60 tablet   Refills:  0         Stop taking these medicines if you haven't already. Please contact your care team if you have questions.     potassium chloride SA 10 MEQ CR tablet   Commonly known as:  K-DUR/KLOR-CON M   Stopped by:  Sixto Conley MD                    Primary Care Provider Office Phone # Fax #    Oscar Alves [phone] [address] [facility] [address]        Equal Access to Services     [facility]: Hadii porfirio lucero hadsushilao Sohoda, waaxda luqadaha, qaybta kaalmada adeegyada, waxay idiin hayaan adecole abbasi. So [facility] [phone].    ATENCIÓN: Si habla español, tiene a ranadll disposición servicios gratuitos de asistencia lingüística. Llame al [phone].    We comply with applicable federal civil rights laws and [place] laws. We do not discriminate on the basis of race, " color, national origin, age, disability, sex, sexual orientation, or gender identity.            Thank you!     Thank you for choosing [facility]  for your care. Our goal is always to provide you with excellent care. Hearing back from our patients is one way we can continue to improve our services. Please take a few minutes to complete the written survey that you may receive in the mail after your visit with us. Thank you!             Your Updated Medication List - Protect others around you: Learn how to safely use, store and throw away your medicines at www.disposemymeds.org.          This list is accurate as of 7/19/18 10:15 AM.  Always use your most recent med list.                   Brand Name Dispense Instructions for use Diagnosis    acetaminophen 650 MG CR tablet    TYLENOL    100 tablet    Take 1 tablet (650 mg) by mouth every 6 hours as needed for pain    Osteoarthritis       apixaban ANTICOAGULANT 2.5 MG tablet    ELIQUIS    200 tablet    Take 1 tablet (2.5 mg) by mouth 2 times daily    Chronic atrial fibrillation (H)       aspirin 81 MG tablet      Take 81 mg by mouth daily        co-enzyme Q-10 100 MG Caps capsule     180 capsule    Take 1 capsule (100 mg) by mouth daily    Mixed hyperlipidemia       diclofenac 1 % Gel topical gel    VOLTAREN    100 g    Apply 4 gramsto area qid prn    Acute left-sided low back pain without sciatica       doxycycline 100 MG capsule    VIBRAMYCIN    90 capsule    TAKE 1 CAPSULE DAILY    Rosacea       FERROUS GLUCONATE PO      Take 324 mg by mouth daily (with breakfast)        Glucosamine-Chondroitin 250-200 MG Tabs    OSTEO BI-FLEX REGULAR STRENGTH    100 tablet    Take 1 tablet by mouth 2 times daily    Osteoarthritis       lisinopril 20 MG tablet    PRINIVIL/ZESTRIL    90 tablet    Take 1 tablet (20 mg) by mouth daily    HTN, goal below 150/90       MELATONIN PO      Take 1 tablet by mouth At Bedtime        metoprolol  succinate 25 MG 24 hr tablet    TOPROL-XL    90 tablet    Take 0.5 tablets (12.5 mg) by mouth daily (with breakfast)    Frequent PVCs, Chronic atrial fibrillation (H)       MULTIVITAL Chew     360 tablet    Take 2 chew tab by mouth 2 times daily    Neuropathy of foot       naftifine 1 % Gel topical gel    NAFTIN    90 g    Apply topically daily Apply to affected nails daily    Onychomycosis of toenail       OMEGA 3-6-9 PO      Take 1 tablet by mouth daily        omeprazole 40 MG capsule    priLOSEC    90 capsule    TAKE 1 CAPSULE DAILY    PUD (peptic ulcer disease)       senna 8.6 MG tablet    SENOKOT    60 tablet    Take 1 tablet by mouth daily    Prostate cancer (H)       simvastatin 20 MG tablet    ZOCOR    90 tablet    Take 1 tablet (20 mg) by mouth At Bedtime    Hyperlipidemia LDL goal <100       sodium chloride 0.65 % nasal spray    OCEAN NASAL SPRAY    2 Bottle    Spray 1 spray into both nostrils daily as needed for congestion    Bloody nose       tamsulosin 0.4 MG capsule    FLOMAX    90 capsule    TAKE 1 CAPSULE DAILY    Benign prostatic hyperplasia with lower urinary tract symptoms       torsemide 20 MG tablet    DEMADEX    90 tablet    Take 1 tablet (20 mg) by mouth daily (with breakfast)    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, Chronic combined systolic and diastolic congestive heart failure (H)       Urea 20 % Crea cream     120 g    Apply topically as needed Apply to right foot daily    Right foot pain, Skin callus, Pes planus of both feet, Venous insufficiency of both lower extremities, Hav (hallux abducto valgus), right, Onychomycosis of toenail

## 2018-07-19 NOTE — PATIENT INSTRUCTIONS
- No changes to medications today.    - 24-hour heart rhythm monitor (Holter) to be done approximately 8 weeks after you start to CPAP therapy (end September 2018).    - After you complete the outpatient cardiac rehab exercise program, we will refer you to the [facility] program to continue exercising.    - Return visit to see me in early October 2018 (before you go to [place] for the winter). Pre-visit tests - repeat cardiac MRI with contrast, labs (basic metabolic panel).    If you have any questions or concerns, please call my nurse Patsy Gomez at [phone].

## 2018-07-19 NOTE — NURSING NOTE
Reviewed the AVS (After Visit Summary) with patient in detail following their office visit with Dr. Conley. The patient was educated on the outlined plan of care including ordered tests, labs, medication changes, and follow up. Patient verbalized understanding of the information and agrees to call with any questions or concerns.     Return appointment: Patient was given instructions on when and how to schedule their next office visit with the  clinic.     Patsy CARR, MA, RN  RN Care Coordinator  [facility]  [phone]

## 2018-07-19 NOTE — LETTER
7/19/2018      Oscar Alves MD  [address]  [facility] [address]      RE: Milo Serna       Dear Colleague,    I had the pleasure of seeing Milo Serna in the [facility].    Service Date: 07/19/2018      REFERRING PROVIDER:  Michael Duckworth MD      PRIMARY CARE PROVIDER:  Oscar Alves MD      REASON FOR VISIT:  Followup of biventricular systolic heart failure and question of pulmonary hypertension.      HISTORY OF PRESENT ILLNESS:  Milo is well known to me from previous visits.  He was unaccompanied today.  He is a 77-year-old  gentleman status post remote CABG in 1997, chronic atrial fibrillation (on low-dose Eliquis at 2.5 mg b.i.d. due to history of epistaxis), treated hypertension, stable ascending aortic dilatation of 4.2 cm and remote ex-smoker (quit 1974).      I had seen him initially for progressive dilatation of the right ventricle with mildly reduced systolic function and an estimated RVSP of 40 mmHg, which was confirmed on cardiac MRI.  He also had severe biatrial enlargement without evidence of intraatrial shunt.  His other testing for secondary causes of pulmonary hypertension was negative.  Subsequent imaging also showed a drop in left ventricular systolic function from LVEF that was previously normal to 35%-40% with inferior/inferolateral akinesis and global hypokinesis.  The right ventricle was moderately dilated with moderately reduced systolic function with mild tricuspid regurgitation.  He has never had evidence of angina, heart failure symptoms or desaturation.      As part of his workup, I referred him to a sleep center and he has been diagnosed with moderate obstructive sleep apnea with AHI index of 17 per hour.  CPAP is being initiated in a couple of weeks.      He has done very well with outpatient cardiac rehabilitation exercise program.  On average he walks for 1 or 2 hours a day and is able to do this without any limitation.   Unfortunately, his wife has progressive dementia and he is her primary caregiver.  The couple spend their nieto in [place] and will be leaving in October.      Weight has remained stable at 180 pounds.  Most of his meals are home cooked with attention to low sodium, rarely eats out, rare alcohol, no tobacco.      RECENT LABORATORIES:  Sodium 138, potassium 3.8, BUN 20, creatinine 1.0 (on 20 mg of torsemide), estimated GFR 72.  His alkaline phosphatase has always been slightly elevated at 177 with a bilirubin of 1.8.  The etiology of this is not clear.  Transaminases are normal.  NT-proBNP is better than previous, but still elevated at 2000.  Normal TSH of 1.4.  Hemoglobin 12.4.      I had obtained a 24-hour Holter for evaluation of frequent PVCs.  It showed atrial fibrillation throughout with an average heart rate of 54 BPM and frequent ventricular ectopy with a total burden of 17,000 or 22%, but no patient reported symptoms.      CURRENT MEDICATIONS:   1.  Apixaban (Eliquis) 2.5 mg b.i.d.   2.  Aspirin 81 mg daily for CAD and history of CABG.   3.  Ferrous gluconate 324 mg daily.   4.  Lisinopril 20 mg daily.   5.  Toprol-XL 25 mg daily.   6.  Omeprazole 40 mg daily.   7.  Simvastatin 20 mg daily.   8.  Tamsulosin 0.4 mg daily.   9.  Torsemide 20 mg daily.   10.  Diclofenac gel.   11.  Coenzyme Q10 100 mg daily.   13.  Tylenol as needed.      ALLERGIES:  No known allergies.      PHYSICAL EXAMINATION:   VITAL SIGNS:  Blood pressure 108/48 (asymptomatic), pulse 40 and 50 beats per minute (atrial fibrillation), height 1.702 meters (5 feet 7 inches), weight 82 kg (180 pounds), sats 98% on room air, BMI 28.2 kg/m2.   CONSTITUTIONAL:  Normal body habitus, cooperative.  Alert, oriented x3.   EYES:  No pallor.   ENT:  No cyanosis.   RESPIRATORY:  Bilateral normal breath sounds, no rales or wheeze.   CARDIOVASCULAR:  Midline healed sternotomy scar.  Apical impulses nondisplaced and normal to palpation without RV heave.   Heart sounds are irregularly irregular, no audible murmur.   NECK:  Jugular venous pressure is normal.   GASTROINTESTINAL:  Soft, nontender, no hepatosplenomegaly.   SKIN:  No lower extremity edema, but he does have skin changes of both legs suggestive of prior chronic venostasis.  No clubbing.      DIAGNOSES:   1.  Compensated right heart failure with reduced systolic function, NYHA class I.    2.  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  Chronic atrial fibrillation (rate control and Eliquis anticoagulation strategy).   4.  Frequent premature ventricular complexes (22% ventricular ectopy burden).   5.  Stable coronary artery disease, status post CABG in 1997 (graft status not known).   6.  Treated hypertension.   7.  Recent diagnosis of obstructive sleep apnea, awaiting CPAP therapy.      ASSESSMENT AND PLAN:  As Milo himself states, he feels better than he has for a long time.  I think this is primarily because of adjustment of his diuretic therapy and the excellent effort he has done at the cardiac rehabilitation exercise program.  He is clinically euvolemic, has NYHA class I status, stable renal function.  His ventricular ectopy burden has increased from 17% to 22% in the context of low-dose metoprolol XL that was started for his LV systolic dysfunction.  Although the beta blockade may be contributing to the increase in PVCs, I am inclined to leave him on his current therapy because he is symptomatically very stable.  Additionally, I want to see if he improves after his sleep apnea is treated.      PLAN:   1.  No changes to medications today.  He can stop the 20 mEq of potassium supplement.   2.  Followup 24-hour Holter about 8 weeks after CPAP therapy.   3.  I will see him back just before he leaves to [place] in the winter (early 10/2018) with previsit cardiac MRI to reassess his biventricular systolic  function and basic labs.      As always, it was my pleasure to visit with Milo.  I look forward to seeing him again.      cc:   Oscar Alves MD    [facility]   [address] [place] [address]       Michael Duckworth MD   [facility]   [address]   [place] [address]         CHEN JOHANNY PENA MD             D: 2018   T: 2018   MT:       Name:     MILO GONZALEZ   MRN:      [id number]        Account:      [id number]   :      1940           Service Date: 2018      Document: [id number]         Outpatient Encounter Prescriptions as of 2018   Medication Sig Dispense Refill     acetaminophen (TYLENOL) 650 MG CR tablet Take 1 tablet (650 mg) by mouth every 6 hours as needed for pain 100 tablet 11     apixaban ANTICOAGULANT (ELIQUIS) 2.5 MG tablet Take 1 tablet (2.5 mg) by mouth 2 times daily 200 tablet 3     aspirin 81 MG tablet Take 81 mg by mouth daily       co-enzyme Q-10 100 MG CAPS Take 1 capsule (100 mg) by mouth daily 180 capsule 3     diclofenac (VOLTAREN) 1 % GEL Apply 4 gramsto area qid prn 100 g 11     doxycycline (VIBRAMYCIN) 100 MG capsule TAKE 1 CAPSULE DAILY 90 capsule 1     FERROUS GLUCONATE PO Take 324 mg by mouth daily (with breakfast)       Glucosamine-Chondroitin (OSTEO BI-FLEX REGULAR STRENGTH) 250-200 MG TABS Take 1 tablet by mouth 2 times daily 100 tablet 8     lisinopril (PRINIVIL/ZESTRIL) 20 MG tablet Take 1 tablet (20 mg) by mouth daily 90 tablet 3     MELATONIN PO Take 1 tablet by mouth At Bedtime       metoprolol succinate (TOPROL-XL) 25 MG 24 hr tablet Take 0.5 tablets (12.5 mg) by mouth daily (with breakfast) 90 tablet 3     Multiple Vitamins-Minerals (MULTIVITAL) CHEW Take 2 chew tab by mouth 2 times daily (Patient taking differently: Take 1 chew tab by mouth 2 times daily ) 360 tablet 3     naftifine (NAFTIN) 1 % GEL topical gel Apply topically daily Apply to affected nails daily 90 g 3      Omega 3-6-9 Fatty Acids (OMEGA 3-6-9 PO) Take 1 tablet by mouth daily        omeprazole (PRILOSEC) 40 MG capsule TAKE 1 CAPSULE DAILY 90 capsule 0     senna (SENOKOT) 8.6 MG tablet Take 1 tablet by mouth daily (Patient taking differently: Take 1 tablet by mouth daily as needed ) 60 tablet 0     simvastatin (ZOCOR) 20 MG tablet Take 1 tablet (20 mg) by mouth At Bedtime 90 tablet 1     sodium chloride (OCEAN NASAL SPRAY) 0.65 % nasal spray Spray 1 spray into both nostrils daily as needed for congestion 2 Bottle 6     tamsulosin (FLOMAX) 0.4 MG capsule TAKE 1 CAPSULE DAILY 90 capsule 0     torsemide (DEMADEX) 20 MG tablet Take 1 tablet (20 mg) by mouth daily (with breakfast) 90 tablet 3     Urea 20 % CREA cream Apply topically as needed Apply to right foot daily 120 g 2     [DISCONTINUED] potassium chloride SA (K-DUR/KLOR-CON M) 10 MEQ CR tablet Take 2 tablets (20 mEq) by mouth daily 60 tablet 3     [DISCONTINUED] zolpidem (AMBIEN) 5 MG tablet Take 1/2 tablet by mouth 15 minutes prior to sleep, for Sleep Study (Patient not taking: Reported on 6/21/2018) 1 tablet 0     [DISCONTINUED] zolpidem (AMBIEN) 5 MG tablet Take 1/2 tablet by mouth 15 minutes prior to sleep, for Sleep Study (Patient not taking: Reported on 6/19/2018) 1 tablet 0     No facility-administered encounter medications on file as of 7/19/2018.        Again, thank you for allowing me to participate in the care of your patient.      Sincerely,    Sixto Conley MD     [facility]

## 2018-07-19 NOTE — LETTER
7/19/2018    Oscar Alves MD  [address] [facility] [address]    RE: Milo Serna       Dear Colleague,    I had the pleasure of seeing Milo Serna in the [facility].      Office note dictated. Dictation reference number - [id number]        Thank you for allowing me to participate in the care of your patient.      Sincerely,     Sixto Conley MD     [facility]    cc:   Sixto Conley MD  [address] [place] [address]

## 2018-07-23 ENCOUNTER — DOCUMENTATION ONLY (OUTPATIENT)
Dept: SLEEP MEDICINE | Facility: CLINIC | Age: 78
End: 2018-07-23
Payer: MEDICARE

## 2018-07-23 ENCOUNTER — HOSPITAL ENCOUNTER (OUTPATIENT)
Dept: CARDIAC REHAB | Facility: CLINIC | Age: 78
End: 2018-07-23
Attending: INTERNAL MEDICINE
Payer: MEDICARE

## 2018-07-23 PROCEDURE — 93798 PHYS/QHP OP CAR RHAB W/ECG: CPT | Performed by: OCCUPATIONAL THERAPIST

## 2018-07-23 PROCEDURE — 40000116 ZZH STATISTIC OP CR VISIT: Performed by: OCCUPATIONAL THERAPIST

## 2018-07-23 NOTE — PROGRESS NOTES
Patient was offered choice of vendor and chose [facility].  Patient Milo Serna was set up at [facility] on July 23, 2018. Patient received a Resmed AirSense 10 CPAP. Pressures were set at 15 cm H2O.   Patient s ramp is 6 cm H2O for Auto and FLEX/EPR is EPR, 2.  Patient received a Resmed Mask name: F20  Full Face mask Size Large, heated tubing and heated humidifier.  Patient is enrolled in the STM Program and does need to meet compliance. Patient has a follow up on 8/10/18 with Dr. Phillips.    LIONEL LARSEN

## 2018-07-25 ENCOUNTER — HOSPITAL ENCOUNTER (OUTPATIENT)
Dept: CARDIAC REHAB | Facility: CLINIC | Age: 78
End: 2018-07-25
Attending: INTERNAL MEDICINE
Payer: MEDICARE

## 2018-07-25 PROCEDURE — 93798 PHYS/QHP OP CAR RHAB W/ECG: CPT | Performed by: OCCUPATIONAL THERAPIST

## 2018-07-25 PROCEDURE — 40000116 ZZH STATISTIC OP CR VISIT: Performed by: OCCUPATIONAL THERAPIST

## 2018-07-26 ENCOUNTER — HOSPITAL ENCOUNTER (OUTPATIENT)
Dept: CARDIAC REHAB | Facility: CLINIC | Age: 78
End: 2018-07-26
Attending: INTERNAL MEDICINE
Payer: MEDICARE

## 2018-07-26 PROCEDURE — 40000116 ZZH STATISTIC OP CR VISIT: Performed by: REHABILITATION PRACTITIONER

## 2018-07-26 PROCEDURE — 93798 PHYS/QHP OP CAR RHAB W/ECG: CPT | Performed by: REHABILITATION PRACTITIONER

## 2018-07-26 PROCEDURE — 93797 PHYS/QHP OP CAR RHAB WO ECG: CPT | Mod: 59 | Performed by: REHABILITATION PRACTITIONER

## 2018-07-26 PROCEDURE — 40000575 ZZH STATISTIC OP CARDIAC VISIT #2: Performed by: REHABILITATION PRACTITIONER

## 2018-07-27 ENCOUNTER — DOCUMENTATION ONLY (OUTPATIENT)
Dept: SLEEP MEDICINE | Facility: CLINIC | Age: 78
End: 2018-07-27

## 2018-07-27 NOTE — PROGRESS NOTES
Patient returned call.     Subjective measures: Pt states things are going well and has no issues or complaints.  Pt is benefiting from therapy. Pt was driving and will call back to reschedule his follow-up appointment  Patient meeting subjective benchmarks.     Action plan:pt to have 14 day STM visit.

## 2018-07-27 NOTE — PROGRESS NOTES
3 DAY STM VISIT    Diagnostic AHI: 16.4     Patient contacted for 3 day STM visit  Message left for patient to return call     Device type: CPAP  PAP settings from order::  CPAP fixed 15 cm  H20  Mask type:    Full Face Mask     Device settings from machine: CPAP fixed 15.0 cm  H20  Assessment: Nightly usage over four hours.  Action plan: Pt to have f/u 14 day STM visit.  Patient has a follow up visit scheduled:   No, appt needs to be moved out another 3 weeks.

## 2018-08-02 ENCOUNTER — OFFICE VISIT (OUTPATIENT)
Dept: FAMILY MEDICINE | Facility: CLINIC | Age: 78
End: 2018-08-02
Payer: MEDICARE

## 2018-08-02 VITALS
WEIGHT: 183 LBS | SYSTOLIC BLOOD PRESSURE: 139 MMHG | DIASTOLIC BLOOD PRESSURE: 62 MMHG | RESPIRATION RATE: 14 BRPM | OXYGEN SATURATION: 100 % | TEMPERATURE: 97.7 F | HEART RATE: 49 BPM | HEIGHT: 67 IN | BODY MASS INDEX: 28.72 KG/M2

## 2018-08-02 DIAGNOSIS — R74.8 ELEVATED ALKALINE PHOSPHATASE LEVEL: ICD-10-CM

## 2018-08-02 DIAGNOSIS — E78.5 HYPERLIPIDEMIA LDL GOAL <100: ICD-10-CM

## 2018-08-02 DIAGNOSIS — M54.2 CERVICALGIA: Primary | ICD-10-CM

## 2018-08-02 LAB
CHOLEST SERPL-MCNC: 110 MG/DL
GGT SERPL-CCNC: 164 U/L (ref 0–75)
HDLC SERPL-MCNC: 59 MG/DL
LDLC SERPL CALC-MCNC: 45 MG/DL
NONHDLC SERPL-MCNC: 51 MG/DL
TRIGL SERPL-MCNC: 28 MG/DL

## 2018-08-02 PROCEDURE — 80061 LIPID PANEL: CPT | Performed by: FAMILY MEDICINE

## 2018-08-02 PROCEDURE — 99214 OFFICE O/P EST MOD 30 MIN: CPT | Performed by: FAMILY MEDICINE

## 2018-08-02 PROCEDURE — 36415 COLL VENOUS BLD VENIPUNCTURE: CPT | Performed by: FAMILY MEDICINE

## 2018-08-02 PROCEDURE — 82977 ASSAY OF GGT: CPT | Performed by: FAMILY MEDICINE

## 2018-08-02 NOTE — PROGRESS NOTES
SUBJECTIVE:   Milo Serna is a 77 year old male who presents to clinic today for the following health issues:      Joint Pain    Onset: 2-3 weeks    Description:   Location: left shoulder, right shoulder and neck  Character: Sharp and Stabbing    Intensity: moderate, severe    Progression of Symptoms: same    Accompanying Signs & Symptoms:  Other symptoms: none    History:   Previous similar pain: no       Precipitating factors:   Trauma or overuse: no     Alleviating factors:  Improved by: deep heating rub    Therapies Tried and outcome:             Problem list and histories reviewed & adjusted, as indicated.  Additional history: none        Reviewed and updated as needed this visit by clinical staff  Tobacco  Allergies  Meds  Med Hx  Surg Hx  Fam Hx  Soc Hx      Reviewed and updated as needed this visit by Provider         Cervicalgia  (primary encounter diagnosis) a few weeks, treat with OTC products effective brief respite  Elevated alkaline phosphatase level trivial last measure July nl transaminases Elevated bilirubin  Hyperlipidemia LDL goal <100 on statin    Past Medical History:   Diagnosis Date     Anemia, unspecified 11/8/2016     Atrial fibrillation (H) 5/27/2015     BPH (benign prostatic hyperplasia) 1/25/2012     CAD (coronary artery disease) 4/4/2012     Closed fracture of metatarsal bone 4/4/2012     Coronary atherosclerosis of unspecified type of vessel, native or graft [facility]    CAB 1996 following MI (at [facility])      Dilated aortic root (H) 5/26/2016     Drug-induced erectile dysfunction 10/2/2015     Elevated blood sugar 11/8/2016     Elevated PSA 9/19/2013     Essential hypertension with goal blood pressure less than 140/90 9/22/2016     Essential hypertension, benign      Gallbladder polyp 5/1/2018     Gout      HAV (hallux abducto valgus) 4/4/2012     Hernia, abdominal      History of prostate cancer 6/15/2016     HTN, goal below 150/90 4/20/2017     Iron  deficiency anemia secondary to inadequate dietary iron intake 11/15/2016     Low blood pressure reading 10/17/2016     Lumbago     had degendisc dz on MRI      Mixed hyperlipidemia      Mumps      Neuropathy of foot 2012     OA (osteoarthritis) 2012     Old myocardial infarction 2012     Palpitations      Pes planus 2012     Prostate cancer (H) 2016     PUD (peptic ulcer disease) 2012     Pulmonary hypertension 2017     Rhinophyma 2012     Rosacea 1/15/2008     Thrombocytopenia (H) 2017     Unspecified hyperplasia of prostate with urinary obstruction and other lower urinary tract symptoms (LUTS)     better on avodart & hytrin     Venous stasis 2012     Venous stasis 2012       Past Surgical History:   Procedure Laterality Date     C NONSPECIFIC PROCEDURE      CAB  [facility]     C NONSPECIFIC PROCEDURE      l inguinal hernia repair      C NONSPECIFIC PROCEDURE      R hernia remote     C NONSPECIFIC PROCEDURE      R ankle ORIF for fx     C NONSPECIFIC PROCEDURE  2005    nasal surgery      C NONSPECIFIC PROCEDURE      R rotater repair (remote)      C NONSPECIFIC PROCEDURE      appy 1966     C NONSPECIFIC PROCEDURE      sinus surgery x 2     C NONSPECIFIC PROCEDURE      L shoulder      CARDIAC SURGERY      bipass     CYSTOSCOPY FLEXIBLE, CYOABLATION PROSTATE N/A 2016    Procedure: CYSTOSCOPY FLEXIBLE, CRYOABLATION PROSTATE;  Surgeon: Krystian Owens MD;  Location: [facility] OR     GENITOURINARY SURGERY      prostate surgery      HERNIA REPAIR       LAPAROSCOPIC HERNIORRHAPHY INGUINAL Right 5/10/2017    Procedure: LAPAROSCOPIC HERNIORRHAPHY INGUINAL;  Laparoscopic preperitoneal repair of right inguinal hernia with placement of underlay mesh;  Surgeon: Enrico Bridges MD;  Location: [facility] OR     PROSTATE SURGERY         Family History   Problem Relation Age of Onset     Cancer Mother      stomach cancer,  age 61     HEART DISEASE Father   "    MI,  age 71     HEART DISEASE Brother      stent placement, RA      Hypertension Brother      HEART DISEASE Sister      rythmn problems with heart, hypertension       Social History   Substance Use Topics     Smoking status: Former Smoker     Smokeless tobacco: Never Used      Comment: quit 3/1974     Alcohol use 0.0 oz/week     0 Standard drinks or equivalent per week      Comment: rare drink     Current Outpatient Prescriptions   Medication     acetaminophen (TYLENOL) 650 MG CR tablet     apixaban ANTICOAGULANT (ELIQUIS) 2.5 MG tablet     aspirin 81 MG tablet     co-enzyme Q-10 100 MG CAPS     diclofenac (VOLTAREN) 1 % GEL     doxycycline (VIBRAMYCIN) 100 MG capsule     FERROUS GLUCONATE PO     Glucosamine-Chondroitin (OSTEO BI-FLEX REGULAR STRENGTH) 250-200 MG TABS     lisinopril (PRINIVIL/ZESTRIL) 20 MG tablet     MELATONIN PO     metoprolol succinate (TOPROL-XL) 25 MG 24 hr tablet     Multiple Vitamins-Minerals (MULTIVITAL) CHEW     naftifine (NAFTIN) 1 % GEL topical gel     Omega 3-6-9 Fatty Acids (OMEGA 3-6-9 PO)     omeprazole (PRILOSEC) 40 MG capsule     senna (SENOKOT) 8.6 MG tablet     simvastatin (ZOCOR) 20 MG tablet     sodium chloride (OCEAN NASAL SPRAY) 0.65 % nasal spray     tamsulosin (FLOMAX) 0.4 MG capsule     torsemide (DEMADEX) 20 MG tablet     Urea 20 % CREA cream     No current facility-administered medications for this visit.      ROS no abd pain, no systemic symptoms, no radicular sx  /62 (BP Location: Left arm, Patient Position: Chair, Cuff Size: Adult Regular)  Pulse (!) 49  Temp 97.7  F (36.5  C) (Oral)  Resp 14  Ht 5' 7\" (1.702 m)  Wt 183 lb (83 kg)  SpO2 100%  BMI 28.66 kg/m2  Neck symmetric ROM, ponderous tender along trapezius  Chest clear  No neurotension signs    ASSESSMENT / PLAN:  (M54.2) Cervicalgia  (primary encounter diagnosis)  Comment: refer  Plan: [facility] PT, HAND, AND CHIROPRACTIC REFERRAL        His neighbor had excellent results    (R74.8) Elevated " alkaline phosphatase level  Comment: asymptomatic incidental  Plan: ALKALINE PHOS, ISO ENZ, GGT        Broaden data base      (E78.5) Hyperlipidemia LDL goal <100  Comment: measure  Plan: Lipid panel reflex to direct LDL Non-fasting                  Oscar Alves MD

## 2018-08-02 NOTE — MR AVS SNAPSHOT
After Visit Summary   8/2/2018    Milo Serna    MRN: [id number]           Patient Information     Date Of Birth          1940        Visit Information        Provider Department      8/2/2018 8:30 AM Oscar Alves MD [facility]        Today's Diagnoses     Cervicalgia    -  1    Elevated alkaline phosphatase level        Hyperlipidemia LDL goal <100          Care Instructions    New Shingles Vaccination @ pharmacy            Follow-ups after your visit        Additional Services     [facility] PT, HAND, AND CHIROPRACTIC REFERRAL       **This order will print in the [facility] Scheduling Office**    Physical Therapy, Hand Therapy and Chiropractic Care are available through:    *[facility]  *[facility]  *[facility]    Call one number to schedule at any of the above locations: [phone].    Your provider has referred you to: As Indicated:     Indication/Reason for Referral: Wrist Pain  Onset of Illness:3 mos  Therapy Orders: Evaluate and Treat  Special Programs: None and Walk in Spine  Special Request: None    Henry Figueroa      Additional Comments for the Therapist or Chiropractor:     Please be aware that coverage of these services is subject to the terms and limitations of your health insurance plan.  Call member services at your health plan with any benefit or coverage questions.      Please bring the following to your appointment:    *Your personal calendar for scheduling future appointments  *Comfortable clothing                  Follow-up notes from your care team     Return in about 10 weeks (around 10/11/2018).      Your next 10 appointments already scheduled     Aug 28, 2018 10:00 AM CDT   Return Sleep Patient with Taye Phillips MD   [facility] ([facility])    [address] [address] [address]   [phone]            Sep 19, 2018 11:00 AM  CDT   MR MYOCARDIUM W CONTRAST with SCIMR1   [facility] [place] [facility] ([facility] at [facility])    [address]  [address]  [place] [address] [phone]           Take your medicines as usual, unless your doctor tells you not to. Bring a list of your current medicines to your exam (including vitamins, minerals and over-the-counter drugs).  You may or may not receive intravenous (IV) contrast for this exam pending the discretion of the Radiologist.  You do not need to do anything special to prepare.  The MRI machine uses a strong magnet. Please wear clothes without metal (snaps, zippers). A sweatsuit works well, or we may give you a hospital gown.  Please remove any body piercings and hair extensions before you arrive. You will also remove watches, jewelry, hairpins, wallets, dentures, partial dental plates and hearing aids. You may wear contact lenses, and you may be able to wear your rings. We have a safe place to keep your personal items, but it is safer to leave them at home.  **IMPORTANT** THE INSTRUCTIONS BELOW ARE ONLY FOR THOSE PATIENTS WHO HAVE BEEN PRESCRIBED SEDATION OR GENERAL ANESTHESIA DURING THEIR MRI PROCEDURE:  IF YOUR DOCTOR PRESCRIBED ORAL SEDATION (take medicine to help you relax during your exam):   You must get the medicine from your doctor (oral medication) before you arrive. Bring the medicine to the exam. Do not take it at home. You ll be told when to take it upon arriving for your exam.   Arrive one hour early. Bring someone who can take you home after the test. Your medicine will make you sleepy. After the exam, you may not drive, take a bus or take a taxi by yourself.  IF YOUR DOCTOR PRESCRIBED IV SEDATION:   Arrive one hour early. Bring someone who can take you home after the test. Your medicine will make you sleepy. After the exam, you may not drive, take a bus or take a taxi by yourself.   No eating 6 hours before your exam. You may  have clear liquids up until 4 hours before your exam. (Clear liquids include water, clear tea, black coffee and fruit juice without pulp.)  IF YOUR DOCTOR PRESCRIBED ANESTHESIA (be asleep for your exam):   Arrive 1 1/2 hours early. Bring someone who can take you home after the test. You may not drive, take a bus or take a taxi by yourself.   No eating 8 hours before your exam. You may have clear liquids up until 4 hours before your exam. (Clear liquids include water, clear tea, black coffee and fruit juice without pulp.)   You will spend four to five hours in the recovery room.  Please call the Imaging Department at your exam site with any questions.            Sep 19, 2018 12:10 PM CDT   LAB with [facility]   [facility] [place] ([facility])    [address]  [facility] [address] [phone]           Please do not eat 10-12 hours before your appointment if you are coming in fasting for labs on lipids, cholesterol, or glucose (sugar). This does not apply to pregnant women. Water, hot tea and black coffee (with nothing added) are okay. Do not drink other fluids, diet soda or chew gum.            Sep 19, 2018  1:00 PM CDT   Holter Monitor with PRINCE   [facility] ([facility])    [address]  [facility] [address] [phone]            Sep 27, 2018  9:45 AM CDT   Pulmonary Hypertension Return with Sixto Conley MD   [facility]   [place] ([facility])    [address]  [facility] [id number] [phone]              Who to contact     If you have questions or need follow up information about today's clinic visit or your schedule please contact [facility] directly at [phone].  Normal or non-critical lab and imaging results will be communicated to you by MyChart, letter or phone within 4 business days  "after the clinic has received the results. If you do not hear from us within 7 days, please contact the clinic through [employer] or phone. If you have a critical or abnormal lab result, we will notify you by phone as soon as possible.  Submit refill requests through [employer] or call your pharmacy and they will forward the refill request to us. Please allow 3 business days for your refill to be completed.          Additional Information About Your Visit        [employer] Information     [employer] gives you secure access to your electronic health record. If you see a primary care provider, you can also send messages to your care team and make appointments. If you have questions, please call your primary care clinic.  If you do not have a primary care provider, please call [phone] and they will assist you.        Care EveryWhere ID     This is your Care EveryWhere ID. This could be used by other organizations to access your [facility] medical records  [id number]        Your Vitals Were     Pulse Temperature Respirations Height Pulse Oximetry BMI (Body Mass Index)    49 97.7  F (36.5  C) (Oral) 14 5' 7\" (1.702 m) 100% 28.66 kg/m2       Blood Pressure from Last 3 Encounters:   08/02/18 139/62   07/19/18 108/48   07/17/18 118/56    Weight from Last 3 Encounters:   08/02/18 183 lb (83 kg)   07/19/18 180 lb (81.6 kg)   07/17/18 173 lb (78.5 kg)              We Performed the Following     ALKALINE PHOS, ISO ENZ     GGT     [facility] PT, HAND, AND CHIROPRACTIC REFERRAL     Lipid panel reflex to direct LDL Non-fasting          Today's Medication Changes          These changes are accurate as of 8/2/18  9:15 AM.  If you have any questions, ask your nurse or doctor.               These medicines have changed or have updated prescriptions.        Dose/Directions    MULTIVITAL Chew   This may have changed:  how much to take   Used for:  Neuropathy of foot        Dose:  2 chew tab   Take 2 chew tab by mouth 2 times daily   Quantity:  360 " tablet   Refills:  3       senna 8.6 MG tablet   Commonly known as:  SENOKOT   This may have changed:    - when to take this  - reasons to take this   Used for:  Prostate cancer (H)        Dose:  1 tablet   Take 1 tablet by mouth daily   Quantity:  60 tablet   Refills:  0                Primary Care Provider Office Phone # Fax #    Oscar Alves [phone] [address] [address] [address]        Equal Access to Services     [facility]FRIDO : Hadii aad ku hadasho Soomaali, waaxda luqadaha, qaybta kaalmada adeegyada, waxay idiin hayaan adeeg khalvinsh layamilka . So [facility] [phone].    ATENCIÓN: Si danny uribe, tiene a randall disposición servicios gratuitos de asistencia lingüística. [facility] [phone].    We comply with applicable federal civil rights laws and [place] laws. We do not discriminate on the basis of race, color, national origin, age, disability, sex, sexual orientation, or gender identity.            Thank you!     Thank you for choosing [facility]  for your care. Our goal is always to provide you with excellent care. Hearing back from our patients is one way we can continue to improve our services. Please take a few minutes to complete the written survey that you may receive in the mail after your visit with us. Thank you!             Your Updated Medication List - Protect others around you: Learn how to safely use, store and throw away your medicines at www.disposemymeds.org.          This list is accurate as of 8/2/18  9:15 AM.  Always use your most recent med list.                   Brand Name Dispense Instructions for use Diagnosis    acetaminophen 650 MG CR tablet    TYLENOL    100 tablet    Take 1 tablet (650 mg) by mouth every 6 hours as needed for pain    Osteoarthritis       apixaban ANTICOAGULANT 2.5 MG tablet    ELIQUIS    200 tablet    Take 1 tablet (2.5 mg) by mouth 2 times daily    Chronic atrial fibrillation (H)       aspirin 81 MG tablet       Take 81 mg by mouth daily        co-enzyme Q-10 100 MG Caps capsule     180 capsule    Take 1 capsule (100 mg) by mouth daily    Mixed hyperlipidemia       diclofenac 1 % Gel topical gel    VOLTAREN    100 g    Apply 4 gramsto area qid prn    Acute left-sided low back pain without sciatica       doxycycline 100 MG capsule    VIBRAMYCIN    90 capsule    TAKE 1 CAPSULE DAILY    Rosacea       FERROUS GLUCONATE PO      Take 324 mg by mouth daily (with breakfast)        Glucosamine-Chondroitin 250-200 MG Tabs    OSTEO BI-FLEX REGULAR STRENGTH    100 tablet    Take 1 tablet by mouth 2 times daily    Osteoarthritis       lisinopril 20 MG tablet    PRINIVIL/ZESTRIL    90 tablet    Take 1 tablet (20 mg) by mouth daily    HTN, goal below 150/90       MELATONIN PO      Take 1 tablet by mouth At Bedtime        metoprolol succinate 25 MG 24 hr tablet    TOPROL-XL    90 tablet    Take 0.5 tablets (12.5 mg) by mouth daily (with breakfast)    Frequent PVCs, Chronic atrial fibrillation (H)       MULTIVITAL Chew     360 tablet    Take 2 chew tab by mouth 2 times daily    Neuropathy of foot       naftifine 1 % Gel topical gel    NAFTIN    90 g    Apply topically daily Apply to affected nails daily    Onychomycosis of toenail       OMEGA 3-6-9 PO      Take 1 tablet by mouth daily        omeprazole 40 MG capsule    priLOSEC    90 capsule    TAKE 1 CAPSULE DAILY    PUD (peptic ulcer disease)       senna 8.6 MG tablet    SENOKOT    60 tablet    Take 1 tablet by mouth daily    Prostate cancer (H)       simvastatin 20 MG tablet    ZOCOR    90 tablet    Take 1 tablet (20 mg) by mouth At Bedtime    Hyperlipidemia LDL goal <100       sodium chloride 0.65 % nasal spray    OCEAN NASAL SPRAY    2 Bottle    Spray 1 spray into both nostrils daily as needed for congestion    Bloody nose       tamsulosin 0.4 MG capsule    FLOMAX    90 capsule    TAKE 1 CAPSULE DAILY    Benign prostatic hyperplasia with lower urinary tract symptoms       torsemide  20 MG tablet    DEMADEX    90 tablet    Take 1 tablet (20 mg) by mouth daily (with breakfast)    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, Chronic combined systolic and diastolic congestive heart failure (H)       Urea 20 % Crea cream     120 g    Apply topically as needed Apply to right foot daily    Right foot pain, Skin callus, Pes planus of both feet, Venous insufficiency of both lower extremities, Hav (hallux abducto valgus), right, Onychomycosis of toenail

## 2018-08-06 ENCOUNTER — THERAPY VISIT (OUTPATIENT)
Dept: PHYSICAL THERAPY | Facility: CLINIC | Age: 78
End: 2018-08-06
Payer: MEDICARE

## 2018-08-06 ENCOUNTER — DOCUMENTATION ONLY (OUTPATIENT)
Dept: SLEEP MEDICINE | Facility: CLINIC | Age: 78
End: 2018-08-06

## 2018-08-06 ENCOUNTER — TELEPHONE (OUTPATIENT)
Dept: FAMILY MEDICINE | Facility: CLINIC | Age: 78
End: 2018-08-06

## 2018-08-06 DIAGNOSIS — M54.2 CERVICALGIA: Primary | ICD-10-CM

## 2018-08-06 PROCEDURE — 97112 NEUROMUSCULAR REEDUCATION: CPT | Mod: GP | Performed by: PHYSICAL THERAPIST

## 2018-08-06 PROCEDURE — G8981 BODY POS CURRENT STATUS: HCPCS | Mod: GP | Performed by: PHYSICAL THERAPIST

## 2018-08-06 PROCEDURE — G8982 BODY POS GOAL STATUS: HCPCS | Mod: GP | Performed by: PHYSICAL THERAPIST

## 2018-08-06 PROCEDURE — 97110 THERAPEUTIC EXERCISES: CPT | Mod: GP | Performed by: PHYSICAL THERAPIST

## 2018-08-06 PROCEDURE — 97161 PT EVAL LOW COMPLEX 20 MIN: CPT | Mod: GP | Performed by: PHYSICAL THERAPIST

## 2018-08-06 NOTE — LETTER
DEPARTMENT OF HEALTH AND HUMAN SERVICES  CENTERS FOR MEDICARE & MEDICAID SERVICES    PLAN/UPDATED PLAN OF PROGRESS FOR OUTPATIENT REHABILITATION    PATIENTS NAME:  Milo Serna   : 1940  PROVIDER NUMBER:    [id number]  HICN:   [id number]  PROVIDER NAME: [facility]  MEDICAL RECORD NUMBER: [id number]   START OF CARE DATE:  SOC Date: 18   TYPE:  PT  PRIMARY/TREATMENT DIAGNOSIS: Cervicalgia  VISITS FROM START OF CARE:  Rxs Used: 1     [facility] Initial Evaluation -- Cervical    Evaluation Date: 2018  Milo Serna is a 77 year old male with a cervical condition.   Referral: family practice  Work mechanical stresses: retired   Employment status: retired  Leisure mechanical stresses: computer work, driving, lifting/carrying, repetitive tasks, pt does 45 mn of cardio rehab exercises at home every day since finishing formal cardio rehab.  Functional disability score (NDI):  32  VAS score (0-10): 9/10 at worst (getting out of bed in the morning)  Patient goals/expectations:  Be able to wake up without pain  HISTORY:  Present symptoms:  B neck pain (right in the middle)  Pain quality (sharp/shooting/stabbing/aching/burning/cramping):   Sharp pain in morning, intermittent .  Paresthesia (yes/no):  no  Present since (onset date): 2018.     Symptoms (improving/unchanging/worsening):  unchanging.  Symptoms commenced as a result of: insiduous  Condition occurred in the following environment:  insiduous  Symptoms at onset (neck/arm/forearm/headache): B neck pain to B shoulder pain (moves from one spot to the other)  Constant symptoms (neck/arm/forearm/headache): none  Intermittent symptoms (neck/arm/forearm/headache): B neck pain to B shoulder pain - moves around.  Symptoms are made worse with the following: time of day - Always AM and lifting head up from pillow in the morning   Symptoms are made better with the following:  tylenol  Disturbed sleep (yes/no): yes  Number of pillows: one  Sleeping postures (prone/sup/side R/L): He sleeps on his side most of the time.  When he wakes at night he gets up and goes to the recliner to sleep.  Also starting sleep apnea.  Previous episodes (0/1-5/6-10/11+): a few mild episodes  Year of first episode: unsure  Previous history: B neck pain  Previous treatments: never sought treatment    PATIENTS NAME:  Milo Serna   : 1940    Specific Questions: (as reported by the patient)  Dizziness/Tinnitus/Nausea/Swallowing (pos/neg): neg  Gait/Upper Limbs (normal/abnormal): normal  Medications (nil/NSAIDS/anlag/steroids/anticoag/other):  Other - tylenol for pain - see list in Epic  Medical allergies:  None reported  General health (excellent/good/fair/poor):  fair  Pertinent medical history:  Cancer and Heart problems Just finished cardio rehab - no surgery, just Afib.  Imaging (None/Xray/MRI/Other):  none  Recent or major surgery (yes/no): no  Night pain (yes/no): yes  Accidents (yes/no): no  Unexplained weight loss (yes/no): no  Barriers at home: no  Other red flags: h/o prostate cancer  EXAMINATION  Posture:   Sitting (good/fair/poor): fair  Standing (good/fair/poor): fair (thoracic kyphosis)  Protruded head (yes/no): yes    Wry Neck (right/left/nil):  Nil  Relevant (yes/no):  no  Correction of posture(better/worse/no effect): better  Other observations: none  Neurological: none assessed  Movement Loss:   Varun Mod Min Nil Pain   Protrusion        Flexion    X    Retraction  X   ERP   Extension  X   ERP   Lateral flexion R   X  ERP   Lateral flexion L   X  ERP   Rotation R   X  ERP   Rotation L   X  ERP     Test Movements:   During: produces, abolishes, increases, decreases, no effect, centralizing, peripheralizing  After: better, worse, no better, no worse, no effect, centralized, peripheralized    Pretest symptoms sitting:    Symptoms During Symptoms After ROM increased ROM decreased No  Effect                   RET Produces    No Worse         Rep RET Produces    No Worse    X  Cervical ext ROM increased                     Pretest symptoms lying:  3/10 (painful just lying down)   Symptoms During Symptoms After ROM increased ROM decreased No Effect   RET Increases    Worse         Rep RET Increases    Worse     X                                          Static Tests: none assessed  Provisional Classification:  Dysfunction - Extension  Principle of Management:  Education:  Traffic light guide for pain (P/NW for HEP), postural education Equipment provided:  None, recommended lumbar support  Mechanical therapy (Y/N):  Y   Extension principle:  Repeated cervical retraction x 10-15 repetitions, every 2-3 hrs     ASSESSMENT/PLAN:  Patient is a 77 year old male with cervical complaints.    Patient has the following significant findings with corresponding treatment plan.                Diagnosis 1:  Cervical pain  Pain -  directional preference exercise and home program  Decreased ROM/flexibility - manual therapy, therapeutic exercise and home program  Decreased function - therapeutic activities and home program  Impaired posture - neuro re-education and home program  Therapy Evaluation Codes:   1) History comprised of:   Personal factors that impact the plan of care:      Age.    Comorbidity factors that impact the plan of care are:      Cancer and Heart problems.     Medications impacting care: Cardiac and tylenol.  2) Examination of Body Systems comprised of:   Body structures and functions that impact the plan of care:      Cervical spine.   Activity limitations that impact the plan of care are:      Sleeping and Laying down.  3) Clinical presentation characteristics are:   Stable/Uncomplicated.  4) Decision-Making    Low complexity using standardized patient assessment instrument and/or measureable assessment of functional outcome.          PATIENTS NAME:  Milo Serna   :  "1940    Cumulative Therapy Evaluation is: Low complexity.  Previous and current functional limitations:  (See Goal Flow Sheet for this information)    Short term and Long term goals: (See Goal Flow Sheet for this information)   Communication ability:  Patient appears to be able to clearly communicate and understand verbal and written communication and follow directions correctly.  Treatment Explanation - The following has been discussed with the patient:   RX ordered/plan of care  Anticipated outcomes  Possible risks and side effects  This patient would benefit from PT intervention to resume normal activities.   Rehab potential is good.  Frequency:  1 X week, once daily  Duration:  for 8 weeks  Discharge Plan:  Achieve all LTG.  Independent in home treatment program.  Reach maximal therapeutic benefit.      Caregiver Signature/Credentials _____________________________ Date ________      Treating Provider: Rosanne Pedro DPT, Cert. MDT     I have reviewed and certified the need for these services and plan of treatment while under my care.        PHYSICIAN'S SIGNATURE:   _____________________________________  Date___________   Oscar Alves MD    Certification period:  Beginning of Cert date period: 08/06/18 to  End of Cert period date: 11/03/18     Functional Level Progress Report: Please see attached \"Goal Flow sheet for Functional level.\"    ____X____ Continue Services or       ________ DC Services                Service dates: From  SOC Date: 08/06/18 date to present                         "

## 2018-08-06 NOTE — PROGRESS NOTES
[facility] Initial Evaluation -- Cervical    Evaluation Date: August 6, 2018  Milo Serna is a 77 year old male with a cervical condition.   Referral: family practice  Work mechanical stresses: retired   Employment status: retired  Leisure mechanical stresses: computer work, driving, lifting/carrying, repetitive tasks, pt does 45 mn of cardio rehab exercises at home every day since finishing formal cardio rehab.  Functional disability score (NDI):  32  VAS score (0-10): 9/10 at worst (getting out of bed in the morning)  Patient goals/expectations:  Be able to wake up without pain    HISTORY:    Present symptoms:  B neck pain (right in the middle)  Pain quality (sharp/shooting/stabbing/aching/burning/cramping):   Sharp pain in morning, intermittent .  Paresthesia (yes/no):  no    Present since (onset date): 7/6/2018.     Symptoms (improving/unchanging/worsening):  unchanging.    Symptoms commenced as a result of: insiduous  Condition occurred in the following environment:  insiduous    Symptoms at onset (neck/arm/forearm/headache): B neck pain to B shoulder pain (moves from one spot to the other)  Constant symptoms (neck/arm/forearm/headache): none  Intermittent symptoms (neck/arm/forearm/headache): B neck pain to B shoulder pain - moves around.    Symptoms are made worse with the following: time of day - Always AM and lifting head up from pillow in the morning   Symptoms are made better with the following: tylenol    Disturbed sleep (yes/no): yes  Number of pillows: one  Sleeping postures (prone/sup/side R/L): He sleeps on his side most of the time.  When he wakes at night he gets up and goes to the recliner to sleep.  Also starting sleep apnea.    Previous episodes (0/1-5/6-10/11+): a few mild episodes  Year of first episode: unsure    Previous history: B neck pain  Previous treatments: never sought treatment    Specific Questions: (as reported by the  patient)  Dizziness/Tinnitus/Nausea/Swallowing (pos/neg): neg  Gait/Upper Limbs (normal/abnormal): normal  Medications (nil/NSAIDS/anlag/steroids/anticoag/other):  Other - tylenol for pain - see list in Epic  Medical allergies:  None reported  General health (excellent/good/fair/poor):  fair  Pertinent medical history:  Cancer and Heart problems Just finished cardio rehab - no surgery, just Afib.  Imaging (None/Xray/MRI/Other):  none  Recent or major surgery (yes/no): no  Night pain (yes/no): yes  Accidents (yes/no): no  Unexplained weight loss (yes/no): no  Barriers at home: no  Other red flags: h/o prostate cancer    EXAMINATION    Posture:   Sitting (good/fair/poor): fair  Standing (good/fair/poor): fair (thoracic kyphosis)    Protruded head (yes/no): yes    Wry Neck (right/left/nil):  Nil  Relevant (yes/no):  no    Correction of posture(better/worse/no effect): better  Other observations: none    Neurological: none assessed    Movement Loss:   Varun Mod Min Nil Pain   Protrusion        Flexion    X    Retraction  X   ERP   Extension  X   ERP   Lateral flexion R   X  ERP   Lateral flexion L   X  ERP   Rotation R   X  ERP   Rotation L   X  ERP     Test Movements:   During: produces, abolishes, increases, decreases, no effect, centralizing, peripheralizing  After: better, worse, no better, no worse, no effect, centralized, peripheralized    Pretest symptoms sitting:    Symptoms During Symptoms After ROM increased ROM decreased No Effect                   RET Produces    No Worse         Rep RET Produces    No Worse    X  Cervical ext ROM increased                     Pretest symptoms lying:  3/10 (painful just lying down)   Symptoms During Symptoms After ROM increased ROM decreased No Effect   RET Increases    Worse         Rep RET Increases    Worse     X                                          Static Tests: none assessed    Provisional Classification:  Dysfunction - Extension    Principle of  Management:  Education:  Traffic light guide for pain (P/NW for HEP), postural education Equipment provided:  None, recommended lumbar support  Mechanical therapy (Y/N):  Y   Extension principle:  Repeated cervical retraction x 10-15 repetitions, every 2-3 hrs       ASSESSMENT/PLAN:    Patient is a 77 year old male with cervical complaints.    Patient has the following significant findings with corresponding treatment plan.                Diagnosis 1:  Cervical pain  Pain -  directional preference exercise and home program  Decreased ROM/flexibility - manual therapy, therapeutic exercise and home program  Decreased function - therapeutic activities and home program  Impaired posture - neuro re-education and home program    Therapy Evaluation Codes:   1) History comprised of:   Personal factors that impact the plan of care:      Age.    Comorbidity factors that impact the plan of care are:      Cancer and Heart problems.     Medications impacting care: Cardiac and tylenol.  2) Examination of Body Systems comprised of:   Body structures and functions that impact the plan of care:      Cervical spine.   Activity limitations that impact the plan of care are:      Sleeping and Laying down.  3) Clinical presentation characteristics are:   Stable/Uncomplicated.  4) Decision-Making    Low complexity using standardized patient assessment instrument and/or measureable assessment of functional outcome.  Cumulative Therapy Evaluation is: Low complexity.    Previous and current functional limitations:  (See Goal Flow Sheet for this information)    Short term and Long term goals: (See Goal Flow Sheet for this information)     Communication ability:  Patient appears to be able to clearly communicate and understand verbal and written communication and follow directions correctly.  Treatment Explanation - The following has been discussed with the patient:   RX ordered/plan of care  Anticipated outcomes  Possible risks and side  effects  This patient would benefit from PT intervention to resume normal activities.   Rehab potential is good.    Frequency:  1 X week, once daily  Duration:  for 8 weeks  Discharge Plan:  Achieve all LTG.  Independent in home treatment program.  Reach maximal therapeutic benefit.    Please refer to the daily flowsheet for treatment today, total treatment time and time spent performing 1:1 timed codes.

## 2018-08-06 NOTE — MR AVS SNAPSHOT
After Visit Summary   8/6/2018    Milo Serna    MRN: [id number]           Patient Information     Date Of Birth          1940        Visit Information        Provider Department      8/6/2018 5:00 PM Rosanne Pedro PT [facility]        Today's Diagnoses     Cervicalgia    -  1       Follow-ups after your visit        Your next 10 appointments already scheduled     Aug 13, 2018 10:30 AM CDT   [facility] Spine with Rosanne Pedro PT   [facility] ([facility] [place])    [address]  [place] [address]   [phone]            Aug 28, 2018 10:00 AM CDT   Return [place] Patient with Taye Phillips MD   [facility]    [address]  [facility] [address]   [phone]            Sep 19, 2018 11:00 AM CDT   MR MYOCARDIUM W CONTRAST with SCIMR1   [facility] ([facility])    [facility]  [address]  [facility] [facility] [address] [phone]           Take your medicines as usual, unless your doctor tells you not to. Bring a list of your current medicines to your exam (including vitamins, minerals and over-the-counter drugs).  You may or may not receive intravenous (IV) contrast for this exam pending the discretion of the Radiologist.  You do not need to do anything special to prepare.  The MRI machine uses a strong magnet. Please wear clothes without metal (snaps, zippers). A sweatsuit works well, or we may give you a hospital gown.  Please remove any body piercings and hair extensions before you arrive. You will also remove watches, jewelry, hairpins, wallets, dentures, partial dental plates and hearing aids. You may wear contact lenses, and you may be able to wear your rings. We have a safe place to keep your personal items, but it is safer to leave them at home.  **IMPORTANT** THE  INSTRUCTIONS BELOW ARE ONLY FOR THOSE PATIENTS WHO HAVE BEEN PRESCRIBED SEDATION OR GENERAL ANESTHESIA DURING THEIR MRI PROCEDURE:  IF YOUR DOCTOR PRESCRIBED ORAL SEDATION (take medicine to help you relax during your exam):   You must get the medicine from your doctor (oral medication) before you arrive. Bring the medicine to the exam. Do not take it at home. You ll be told when to take it upon arriving for your exam.   Arrive one hour early. Bring someone who can take you home after the test. Your medicine will make you sleepy. After the exam, you may not drive, take a bus or take a taxi by yourself.  IF YOUR DOCTOR PRESCRIBED IV SEDATION:   Arrive one hour early. Bring someone who can take you home after the test. Your medicine will make you sleepy. After the exam, you may not drive, take a bus or take a taxi by yourself.   No eating 6 hours before your exam. You may have clear liquids up until 4 hours before your exam. (Clear liquids include water, clear tea, black coffee and fruit juice without pulp.)  IF YOUR DOCTOR PRESCRIBED ANESTHESIA (be asleep for your exam):   Arrive 1 1/2 hours early. Bring someone who can take you home after the test. You may not drive, take a bus or take a taxi by yourself.   No eating 8 hours before your exam. You may have clear liquids up until 4 hours before your exam. (Clear liquids include water, clear tea, black coffee and fruit juice without pulp.)   You will spend four to five hours in the recovery room.  Please call the Imaging Department at your exam site with any questions.            Sep 19, 2018 12:10 PM CDT   LAB with [facility]   [facility] [place] ([facility])    [address] [place] [address] [phone]           Please do not eat 10-12 hours before your appointment if you are coming in fasting for labs on lipids, cholesterol, or glucose (sugar). This does not apply to pregnant women. Water, hot tea and  black coffee (with nothing added) are okay. Do not drink other fluids, diet soda or chew gum.            Sep 19, 2018  1:00 PM CDT   Holter Monitor with PRINCE   [facility] ([facility])    [address]  [place] [address] [phone]            Sep 27, 2018  9:45 AM CDT   Pulmonary Hypertension Return with Sixto Conley MD   [facility] ([facility])    [address]  [facility] [place] [place] [address] [phone]              Who to contact     If you have questions or need follow up information about today's clinic visit or your schedule please contact [facility] directly at [phone].  Normal or non-critical lab and imaging results will be communicated to you by MyChart, letter or phone within 4 business days after the clinic has received the results. If you do not hear from us within 7 days, please contact the clinic through [facility] or phone. If you have a critical or abnormal lab result, we will notify you by phone as soon as possible.  Submit refill requests through [employer] or call your pharmacy and they will forward the refill request to us. Please allow 3 business days for your refill to be completed.          Additional Information About Your Visit        MyChart Information     [employer] gives you secure access to your electronic health record. If you see a primary care provider, you can also send messages to your care team and make appointments. If you have questions, please call your primary care clinic.  If you do not have a primary care provider, please call [phone] and they will assist you.        Care EveryWhere ID     This is your Care EveryWhere ID. This could be used by other organizations to access your [place] medical records  [id number]         Blood Pressure from Last 3 Encounters:   08/02/18 139/62   07/19/18 108/48    07/17/18 118/56    Weight from Last 3 Encounters:   08/02/18 83 kg (183 lb)   07/19/18 81.6 kg (180 lb)   07/17/18 78.5 kg (173 lb)              We Performed the Following     HC PT EVAL, LOW COMPLEXITY     LACY CERT REPORT     LACY INITIAL EVAL REPORT     NEUROMUSCULAR RE-EDUCATION     THERAPEUTIC EXERCISES          Today's Medication Changes          These changes are accurate as of 8/6/18  6:22 PM.  If you have any questions, ask your nurse or doctor.               These medicines have changed or have updated prescriptions.        Dose/Directions    MULTIVITAL Chew   This may have changed:  how much to take   Used for:  Neuropathy of foot        Dose:  2 chew tab   Take 2 chew tab by mouth 2 times daily   Quantity:  360 tablet   Refills:  3       senna 8.6 MG tablet   Commonly known as:  SENOKOT   This may have changed:    - when to take this  - reasons to take this   Used for:  Prostate cancer (H)        Dose:  1 tablet   Take 1 tablet by mouth daily   Quantity:  60 tablet   Refills:  0                Primary Care Provider Office Phone # Fax #    Oscar Alves [phone] [address] [facility] [address]        Equal Access to Services     [facility] AH: Hadii porfirio lucero hadasho Sohoda, waaxda luqadaha, qaybta kaalmada adeantwon, chayito reyes . So [facility] [phone].    ATENCIÓN: Si habla español, tiene a randall disposición servicios gratuitos de asistencia lingüística. Llame al [phone].    We comply with applicable federal civil rights laws and [place] laws. We do not discriminate on the basis of race, color, national origin, age, disability, sex, sexual orientation, or gender identity.            Thank you!     Thank you for choosing [facility]  for your care. Our goal is always to provide you with excellent care. Hearing back from our patients is one way we can continue to improve our services. Please take a few  minutes to complete the written survey that you may receive in the mail after your visit with us. Thank you!             Your Updated Medication List - Protect others around you: Learn how to safely use, store and throw away your medicines at www.disposemymeds.org.          This list is accurate as of 8/6/18  6:22 PM.  Always use your most recent med list.                   Brand Name Dispense Instructions for use Diagnosis    acetaminophen 650 MG CR tablet    TYLENOL    100 tablet    Take 1 tablet (650 mg) by mouth every 6 hours as needed for pain    Osteoarthritis       apixaban ANTICOAGULANT 2.5 MG tablet    ELIQUIS    200 tablet    Take 1 tablet (2.5 mg) by mouth 2 times daily    Chronic atrial fibrillation (H)       aspirin 81 MG tablet      Take 81 mg by mouth daily        co-enzyme Q-10 100 MG Caps capsule     180 capsule    Take 1 capsule (100 mg) by mouth daily    Mixed hyperlipidemia       diclofenac 1 % Gel topical gel    VOLTAREN    100 g    Apply 4 gramsto area qid prn    Acute left-sided low back pain without sciatica       doxycycline 100 MG capsule    VIBRAMYCIN    90 capsule    TAKE 1 CAPSULE DAILY    Rosacea       FERROUS GLUCONATE PO      Take 324 mg by mouth daily (with breakfast)        Glucosamine-Chondroitin 250-200 MG Tabs    OSTEO BI-FLEX REGULAR STRENGTH    100 tablet    Take 1 tablet by mouth 2 times daily    Osteoarthritis       lisinopril 20 MG tablet    PRINIVIL/ZESTRIL    90 tablet    Take 1 tablet (20 mg) by mouth daily    HTN, goal below 150/90       MELATONIN PO      Take 1 tablet by mouth At Bedtime        metoprolol succinate 25 MG 24 hr tablet    TOPROL-XL    90 tablet    Take 0.5 tablets (12.5 mg) by mouth daily (with breakfast)    Frequent PVCs, Chronic atrial fibrillation (H)       MULTIVITAL Chew     360 tablet    Take 2 chew tab by mouth 2 times daily    Neuropathy of foot       naftifine 1 % Gel topical gel    NAFTIN    90 g    Apply topically daily Apply to affected nails  daily    Onychomycosis of toenail       OMEGA 3-6-9 PO      Take 1 tablet by mouth daily        omeprazole 40 MG capsule    priLOSEC    90 capsule    TAKE 1 CAPSULE DAILY    PUD (peptic ulcer disease)       senna 8.6 MG tablet    SENOKOT    60 tablet    Take 1 tablet by mouth daily    Prostate cancer (H)       simvastatin 20 MG tablet    ZOCOR    90 tablet    Take 1 tablet (20 mg) by mouth At Bedtime    Hyperlipidemia LDL goal <100       sodium chloride 0.65 % nasal spray    OCEAN NASAL SPRAY    2 Bottle    Spray 1 spray into both nostrils daily as needed for congestion    Bloody nose       tamsulosin 0.4 MG capsule    FLOMAX    90 capsule    TAKE 1 CAPSULE DAILY    Benign prostatic hyperplasia with lower urinary tract symptoms       torsemide 20 MG tablet    DEMADEX    90 tablet    Take 1 tablet (20 mg) by mouth daily (with breakfast)    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, Chronic combined systolic and diastolic congestive heart failure (H)       Urea 20 % Crea cream     120 g    Apply topically as needed Apply to right foot daily    Right foot pain, Skin callus, Pes planus of both feet, Venous insufficiency of both lower extremities, Hav (hallux abducto valgus), right, Onychomycosis of toenail

## 2018-08-06 NOTE — PROGRESS NOTES
07/24/2018 2:19:59 PM CALLED PT BACK. PT STATED WIFE FIGURED OUT THE PROBLEM HE WAS HAVING. PT WAS WONDERING IF THERE WERE ANY EASIER MASKS TO PUT ON. I LET PT KNOW THAT ALL THE FULL FACE MASKS HAVE SOME FORM OF HOOKING AND STRAPS. PT STATED HE WOULD TRY IT AGAIN FOR A LITTLE BIT. I TOLD HIM THAT HE DOES HAVE A 30 DAY MASK EXCHANGE WITH US AND THAT HE CAN SCHEDULE A MASK EXCHANGE APPT WITH US IF HE CONTINUES TO HAVE PROBLEMS AND WE CAN TRY OUT SOME OTHER MASKS FOR HIM.     8/3/18- SUZIE SAENZ- PT CAME HAVING ISSUES WITH HIS F20 MASK. I ASKED PT HOW HE WAS PUTTING HIS MASK ON AND TAKING IT OFF. PT STATED HE UNDOES THE VELCRO EVERY TIME. I INFORMED PT THAT IT DOES HAVE MAGNETS THAT HE CAN USE TO TAKE HIS MASK ON AND OFF. PT STATED THAT THAT WOULD MAKE THINGS MUCH EASIER. I DID SHOW PT THE F&P SIMPLUS (LG) AND HAD HIM TRY IT ON. PT STATED THAT THIS MASK ALSO FELT COMFORTABLE, BUT WOULD LIKE TO STAY WITH THE F20. I HELPED REFIT THE PTS F20 AND HE WAS HAPPY WITH THAT. LET PT KNOW IF HE IS STILL HAVING TROUBLE TO LET US KNOW.  08/6/2018 10:14:48 AM PT IS STILL HAVING MASK ISSUES AND WOULD LIKE TO TALK TO SUZIE AGAIN, HE SAYS THE FLAPS ARE BLOWING AROUND AND AIR IS GOING ALL OVER THE PLACE, HE DOESN'T WANT TO USE THE CPAP ANYMORE BECAUSE OF THIS. EMAILED SUZIE TO CALL PT.   8/6/18- SUZIE SAENZ- CALLED PT BACK, PT IS STATING HE IS GETTING A LOT OF AIR OUT THE SIDES OF HIS MASK, ONCE IT RAMPS UP TO HIS HIGHEST PRESSURE. PT IS TALKING ABOUT RETURNING EQUIPMENT. I ASKED PT TO COME IN FOR ANOTHER MASK FITTING AND WE WILL TRY SOME OTHER MASKS AND SEE IF WE CANT CORRECT HIS LEAKING ISSUE. I DO FEEL PT IS HAVING A HARD TIME GETTING HIS MASK ON PROPERLY.  SCHEDULED PT TO COME IN 8/8/18 @ 11:30AM TO HAVE A MASK FITTING APPT.

## 2018-08-07 ENCOUNTER — DOCUMENTATION ONLY (OUTPATIENT)
Dept: SLEEP MEDICINE | Facility: CLINIC | Age: 78
End: 2018-08-07

## 2018-08-07 NOTE — PROGRESS NOTES
14 DAY STM VISIT    Diagnostic AHI: 16.4 PSG    Message left for patient to return call     Assessment: Pt not meeting objective benchmarks for leak and compliance     Action plan: waiting for patient to return call.  and pt to have 30 day STM visit.    Device type: CPAP  PAP settings: CPAP fixed 15.0 cm  H20  Mask type:  Full Face Mask  Objective measures: 14 day rolling measures      Compliance  64 %      Leak  72.65 lpm  last  upload      AHI 6.64   last  Upload (unknown index is occasionally high, most likely due to leak)      Average number of minutes 239     Average hours of usage 4          Objective measure goal  Compliance   Goal >70%  Leak   Goal < 24 lpm  AHI  Goal < 5  Usage  Goal >240

## 2018-08-08 ENCOUNTER — DOCUMENTATION ONLY (OUTPATIENT)
Dept: SLEEP MEDICINE | Facility: CLINIC | Age: 78
End: 2018-08-08
Payer: MEDICARE

## 2018-08-08 NOTE — PROGRESS NOTES
Patient came to [facility] mask fitting appointment on August 8, 2018. Patient requested to switch masks because soreness/skin irritation. Patient tried on the followings masks: airtouch f20. Patient selected  Resmed, type Airtouch F20, Full Face mask, Large. This counts as the patients 30 day mask exchange.

## 2018-08-09 DIAGNOSIS — R74.8 ELEVATED ALKALINE PHOSPHATASE LEVEL: ICD-10-CM

## 2018-08-09 PROCEDURE — 99000 SPECIMEN HANDLING OFFICE-LAB: CPT | Performed by: FAMILY MEDICINE

## 2018-08-09 PROCEDURE — 36415 COLL VENOUS BLD VENIPUNCTURE: CPT | Performed by: FAMILY MEDICINE

## 2018-08-09 PROCEDURE — 84080 ASSAY ALKALINE PHOSPHATASES: CPT | Mod: 90 | Performed by: FAMILY MEDICINE

## 2018-08-11 LAB
ALP BONE CFR SERPL: 36 U/L (ref 0–55)
ALP LIVER SERPL-CCNC: 153 U/L (ref 0–94)
ALP OTHER CFR SERPL: 0 U/L
ALP SERPL-CCNC: 189 U/L (ref 40–120)

## 2018-08-13 ENCOUNTER — THERAPY VISIT (OUTPATIENT)
Dept: PHYSICAL THERAPY | Facility: CLINIC | Age: 78
End: 2018-08-13
Payer: MEDICARE

## 2018-08-13 DIAGNOSIS — M54.2 CERVICALGIA: ICD-10-CM

## 2018-08-13 PROCEDURE — G8983 BODY POS D/C STATUS: HCPCS | Mod: GP | Performed by: PHYSICAL THERAPIST

## 2018-08-13 PROCEDURE — 97110 THERAPEUTIC EXERCISES: CPT | Mod: GP | Performed by: PHYSICAL THERAPIST

## 2018-08-13 PROCEDURE — G8982 BODY POS GOAL STATUS: HCPCS | Mod: GP | Performed by: PHYSICAL THERAPIST

## 2018-08-13 NOTE — PROGRESS NOTES
Subjective:  HPI                    Objective:  System    Physical Exam    General     ROS    Assessment/Plan:    PROGRESS  REPORT    Progress reporting period is from 8/6/20178 to 8/13/2018.       SUBJECTIVE  Subjective changes noted by patient:  Pt reports that his neck is at about 80% of normal - primarily just about 20% tighter than normal.  He no longer has any pain with lifting his head off the pillow when he gets up or any pain with anything.  He primarily just has tightness in his neck still.  He feels ready to continue his HEP independently at this point.  Current pain level is 0/10  .     Previous pain level was  9/10  .   Changes in function:  Yes (See Goal flowsheet attached for changes in current functional level)  Adverse reaction to treatment or activity: None    OBJECTIVE  Changes noted in objective findings:  Yes, see below.  Objective: cervical AROM flex WNL, ext min loss, retraction min loss, B rotation min loss (ERP), L SB min loss, R SB mod loss.     ASSESSMENT/PLAN  Updated problem list and treatment plan: Diagnosis 1:  Neck pain secondary to derangement  Pain -  directional preference exercise and home program  Decreased ROM/flexibility - therapeutic exercise and home program  Decreased function - therapeutic activities and home program  STG/LTGs have been met or progress has been made towards goals:  Yes (See Goal flow sheet completed today.)  Assessment of Progress: The patient's condition is improving.  The patient's condition has potential to improve.  Self Management Plans:  Patient is independent in a home treatment program.  I have re-evaluated this patient and find that the nature, scope, duration and intensity of the therapy is appropriate for the medical condition of the patient.  Milo continues to require the following intervention to meet STG and LTG's:  Pt doesn't need any further PT unless symptoms exacerbate in the next month or so    Recommendations:  Pt will continue with  his HEP independently and return only if symptoms exacerbate in the next month or so.    Please refer to the daily flowsheet for treatment today, total treatment time and time spent performing 1:1 timed codes.

## 2018-08-13 NOTE — MR AVS SNAPSHOT
After Visit Summary   8/13/2018    Milo Serna    MRN: [id number]           Patient Information     Date Of Birth          1940        Visit Information        Provider Department      8/13/2018 10:30 AM Rosanne Pedro PT [facility]        Today's Diagnoses     Cervicalgia           Follow-ups after your visit        Your next 10 appointments already scheduled     Aug 28, 2018 10:00 AM CDT   Return Sleep Patient with Taye Phillips MD   [facility] ([facility])    [address]  [place] [address]   [phone]            Sep 19, 2018 11:00 AM CDT   MR MYOCARDIUM W CONTRAST with SCIMR1   [facility] ([facility]iovascular Imaging at [facility])    [address]  [address]  [place] [address] [phone]           Take your medicines as usual, unless your doctor tells you not to. Bring a list of your current medicines to your exam (including vitamins, minerals and over-the-counter drugs).  You may or may not receive intravenous (IV) contrast for this exam pending the discretion of the Radiologist.  You do not need to do anything special to prepare.  The MRI machine uses a strong magnet. Please wear clothes without metal (snaps, zippers). A sweatsuit works well, or we may give you a hospital gown.  Please remove any body piercings and hair extensions before you arrive. You will also remove watches, jewelry, hairpins, wallets, dentures, partial dental plates and hearing aids. You may wear contact lenses, and you may be able to wear your rings. We have a safe place to keep your personal items, but it is safer to leave them at home.  **IMPORTANT** THE INSTRUCTIONS BELOW ARE ONLY FOR THOSE PATIENTS WHO HAVE BEEN PRESCRIBED SEDATION OR GENERAL ANESTHESIA DURING THEIR MRI PROCEDURE:  IF YOUR DOCTOR PRESCRIBED ORAL SEDATION (take medicine to help you  relax during your exam):   You must get the medicine from your doctor (oral medication) before you arrive. Bring the medicine to the exam. Do not take it at home. You ll be told when to take it upon arriving for your exam.   Arrive one hour early. Bring someone who can take you home after the test. Your medicine will make you sleepy. After the exam, you may not drive, take a bus or take a taxi by yourself.  IF YOUR DOCTOR PRESCRIBED IV SEDATION:   Arrive one hour early. Bring someone who can take you home after the test. Your medicine will make you sleepy. After the exam, you may not drive, take a bus or take a taxi by yourself.   No eating 6 hours before your exam. You may have clear liquids up until 4 hours before your exam. (Clear liquids include water, clear tea, black coffee and fruit juice without pulp.)  IF YOUR DOCTOR PRESCRIBED ANESTHESIA (be asleep for your exam):   Arrive 1 1/2 hours early. Bring someone who can take you home after the test. You may not drive, take a bus or take a taxi by yourself.   No eating 8 hours before your exam. You may have clear liquids up until 4 hours before your exam. (Clear liquids include water, clear tea, black coffee and fruit juice without pulp.)   You will spend four to five hours in the recovery room.  Please call the Imaging Department at your exam site with any questions.            Sep 19, 2018 12:10 PM CDT   LAB with [facility]   [facility] [place] ([facility])    [address] [place] [id number] [phone]           Please do not eat 10-12 hours before your appointment if you are coming in fasting for labs on lipids, cholesterol, or glucose (sugar). This does not apply to pregnant women. Water, hot tea and black coffee (with nothing added) are okay. Do not drink other fluids, diet soda or chew gum.            Sep 19, 2018  1:00 PM CDT   Holter Monitor with SHMON   [facility]  Heart Imaging ([facility])    [address]  [place] [address]   [phone]            Sep 27, 2018  9:45 AM CDT   Pulmonary Hypertension Return with Sixto Conley MD   [facility] ([facility])    [address]  [place] [address] [phone]              Who to contact     If you have questions or need follow up information about today's clinic visit or your schedule please contact [facility] directly at [phone].  Normal or non-critical lab and imaging results will be communicated to you by MyChart, letter or phone within 4 business days after the clinic has received the results. If you do not hear from us within 7 days, please contact the clinic through [employer] or phone. If you have a critical or abnormal lab result, we will notify you by phone as soon as possible.  Submit refill requests through [employer] or call your pharmacy and they will forward the refill request to us. Please allow 3 business days for your refill to be completed.          Additional Information About Your Visit        [employer] Information     [employer] gives you secure access to your electronic health record. If you see a primary care provider, you can also send messages to your care team and make appointments. If you have questions, please call your primary care clinic.  If you do not have a primary care provider, please call [phone] and they will assist you.        Care EveryWhere ID     This is your Care EveryWhere ID. This could be used by other organizations to access your [place] medical records  [id number]         Blood Pressure from Last 3 Encounters:   08/02/18 139/62   07/19/18 108/48   07/17/18 118/56    Weight from Last 3 Encounters:   08/02/18 83 kg (183 lb)   07/19/18 81.6 kg (180 lb)   07/17/18 78.5 kg (173 lb)              We Performed the Following     LACY PROGRESS NOTES  REPORT     THERAPEUTIC EXERCISES          Today's Medication Changes          These changes are accurate as of 8/13/18 10:53 AM.  If you have any questions, ask your nurse or doctor.               These medicines have changed or have updated prescriptions.        Dose/Directions    MULTIVITAL Chew   This may have changed:  how much to take   Used for:  Neuropathy of foot        Dose:  2 chew tab   Take 2 chew tab by mouth 2 times daily   Quantity:  360 tablet   Refills:  3       senna 8.6 MG tablet   Commonly known as:  SENOKOT   This may have changed:    - when to take this  - reasons to take this   Used for:  Prostate cancer (H)        Dose:  1 tablet   Take 1 tablet by mouth daily   Quantity:  60 tablet   Refills:  0                Primary Care Provider Office Phone # Fax #    Oscar Alves [phone] [address] [address]        Equal Access to Services     [facility] : Donna alexandero Sohoda, waaxda luqadaha, qaybta kaalmada julián, chayito reyes . So [facility] [phone].    ATENCIÓN: Si habla español, tiene a randall disposición servicios gratuitos de asistencia lingüística. [facility] [phone].    We comply with applicable federal civil rights laws and [place] laws. We do not discriminate on the basis of race, color, national origin, age, disability, sex, sexual orientation, or gender identity.            Thank you!     Thank you for choosing [facility]  for your care. Our goal is always to provide you with excellent care. Hearing back from our patients is one way we can continue to improve our services. Please take a few minutes to complete the written survey that you may receive in the mail after your visit with us. Thank you!             Your Updated Medication List - Protect others around you: Learn how to safely use, store and throw away your medicines at www.disposemymeds.org.          This  list is accurate as of 8/13/18 10:53 AM.  Always use your most recent med list.                   Brand Name Dispense Instructions for use Diagnosis    acetaminophen 650 MG CR tablet    TYLENOL    100 tablet    Take 1 tablet (650 mg) by mouth every 6 hours as needed for pain    Osteoarthritis       apixaban ANTICOAGULANT 2.5 MG tablet    ELIQUIS    200 tablet    Take 1 tablet (2.5 mg) by mouth 2 times daily    Chronic atrial fibrillation (H)       aspirin 81 MG tablet      Take 81 mg by mouth daily        co-enzyme Q-10 100 MG Caps capsule     180 capsule    Take 1 capsule (100 mg) by mouth daily    Mixed hyperlipidemia       diclofenac 1 % Gel topical gel    VOLTAREN    100 g    Apply 4 gramsto area qid prn    Acute left-sided low back pain without sciatica       doxycycline 100 MG capsule    VIBRAMYCIN    90 capsule    TAKE 1 CAPSULE DAILY    Rosacea       FERROUS GLUCONATE PO      Take 324 mg by mouth daily (with breakfast)        Glucosamine-Chondroitin 250-200 MG Tabs    OSTEO BI-FLEX REGULAR STRENGTH    100 tablet    Take 1 tablet by mouth 2 times daily    Osteoarthritis       lisinopril 20 MG tablet    PRINIVIL/ZESTRIL    90 tablet    Take 1 tablet (20 mg) by mouth daily    HTN, goal below 150/90       MELATONIN PO      Take 1 tablet by mouth At Bedtime        metoprolol succinate 25 MG 24 hr tablet    TOPROL-XL    90 tablet    Take 0.5 tablets (12.5 mg) by mouth daily (with breakfast)    Frequent PVCs, Chronic atrial fibrillation (H)       MULTIVITAL Chew     360 tablet    Take 2 chew tab by mouth 2 times daily    Neuropathy of foot       naftifine 1 % Gel topical gel    NAFTIN    90 g    Apply topically daily Apply to affected nails daily    Onychomycosis of toenail       OMEGA 3-6-9 PO      Take 1 tablet by mouth daily        omeprazole 40 MG capsule    priLOSEC    90 capsule    TAKE 1 CAPSULE DAILY    PUD (peptic ulcer disease)       senna 8.6 MG tablet    SENOKOT    60 tablet    Take 1 tablet by mouth  daily    Prostate cancer (H)       simvastatin 20 MG tablet    ZOCOR    90 tablet    Take 1 tablet (20 mg) by mouth At Bedtime    Hyperlipidemia LDL goal <100       sodium chloride 0.65 % nasal spray    OCEAN NASAL SPRAY    2 Bottle    Spray 1 spray into both nostrils daily as needed for congestion    Bloody nose       tamsulosin 0.4 MG capsule    FLOMAX    90 capsule    TAKE 1 CAPSULE DAILY    Benign prostatic hyperplasia with lower urinary tract symptoms       torsemide 20 MG tablet    DEMADEX    90 tablet    Take 1 tablet (20 mg) by mouth daily (with breakfast)    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, Chronic combined systolic and diastolic congestive heart failure (H)       Urea 20 % Crea cream     120 g    Apply topically as needed Apply to right foot daily    Right foot pain, Skin callus, Pes planus of both feet, Venous insufficiency of both lower extremities, Hav (hallux abducto valgus), right, Onychomycosis of toenail

## 2018-08-14 ENCOUNTER — TELEPHONE (OUTPATIENT)
Dept: FAMILY MEDICINE | Facility: CLINIC | Age: 78
End: 2018-08-14

## 2018-08-16 ENCOUNTER — TELEPHONE (OUTPATIENT)
Dept: FAMILY MEDICINE | Facility: CLINIC | Age: 78
End: 2018-08-16

## 2018-08-16 NOTE — PROGRESS NOTES
These tests take a little longer.  This suggests that you are having some trouble with your liver that does not show up on other tests.  We just looked at your liver in April, and will plan on doing so again in October.  This was looking at your gallbladder   Oscar Alves MD

## 2018-08-16 NOTE — TELEPHONE ENCOUNTER
Pt calling into clinic  Wants to obtain results of labs done 8.2.18 and 8.9.18    # [phone] OK to LM    Route to provider to review and advise    Ebony Barnes RN Nurse Triage

## 2018-08-23 ENCOUNTER — DOCUMENTATION ONLY (OUTPATIENT)
Dept: SLEEP MEDICINE | Facility: CLINIC | Age: 78
End: 2018-08-23

## 2018-08-23 NOTE — PROGRESS NOTES
30 DAY [facility] VISIT    Diagnostic AHI: 16.4   PSG    Subjective measures:   Pt continues to struggle with mask leak, even after mask exchange.  He wants to continue to keep trying with this mask.      Assessment: Pt not meeting objective benchmarks for leak Patient failing following subjective benchmarks: leak issues   Action plan:   Patient has a follow up visit with Dr. Phillips  on 8/28/2018. Pt was encouraged to fix mask leak during the night by turning device off and then repositioning the mask and turning the machine back on.  Device type: CPAP  PAP settings:      CPAP fixed 15.0 cm  H20  Mask type:  Full Face Mask  Objective measures: 14 day rolling measures      Compliance  85 %      Leak  92.91 lpm  last  upload      AHI 6.14   last  Upload- maybe due to mask leak      Average number of minutes 271      Objective measure goal  Compliance   Goal >70%  Leak   Goal < 24 lpm  AHI  Goal < 5  Usage  Goal >240

## 2018-08-24 NOTE — TELEPHONE ENCOUNTER
Entered by Oscar Alves MD at 8/16/2018  3:25 PM   These tests take a little longer.  This suggests that you are having some trouble with your liver that does not show up on other tests.  We just looked at your liver in April, and will plan on doing so again in October.  This was looking at your gallbladder   Oscar Alves MD      Pt had not read [employer] messages  Call out to pt, message given  Scheduled appt for Oct 2 to recheck    Ebony Barnes RN, BS  Clinical Nurse Triage.

## 2018-08-28 ENCOUNTER — OFFICE VISIT (OUTPATIENT)
Dept: SLEEP MEDICINE | Facility: CLINIC | Age: 78
End: 2018-08-28
Payer: MEDICARE

## 2018-08-28 VITALS
RESPIRATION RATE: 18 BRPM | HEIGHT: 67 IN | OXYGEN SATURATION: 98 % | SYSTOLIC BLOOD PRESSURE: 120 MMHG | DIASTOLIC BLOOD PRESSURE: 65 MMHG | WEIGHT: 183 LBS | HEART RATE: 52 BPM | BODY MASS INDEX: 28.72 KG/M2

## 2018-08-28 DIAGNOSIS — I25.810 CORONARY ATHEROSCLEROSIS OF AUTOLOGOUS VEIN BYPASS GRAFT WITHOUT ANGINA: ICD-10-CM

## 2018-08-28 DIAGNOSIS — I48.20 CHRONIC ATRIAL FIBRILLATION (H): ICD-10-CM

## 2018-08-28 DIAGNOSIS — G47.33 OSA (OBSTRUCTIVE SLEEP APNEA): Primary | ICD-10-CM

## 2018-08-28 DIAGNOSIS — G47.19 EXCESSIVE DAYTIME SLEEPINESS: ICD-10-CM

## 2018-08-28 DIAGNOSIS — I49.3 FREQUENT PVCS: ICD-10-CM

## 2018-08-28 DIAGNOSIS — I51.7 CARDIOMEGALY: ICD-10-CM

## 2018-08-28 DIAGNOSIS — I27.20 PULMONARY HYPERTENSION (H): ICD-10-CM

## 2018-08-28 DIAGNOSIS — G47.34 NOCTURNAL HYPOXEMIA: ICD-10-CM

## 2018-08-28 DIAGNOSIS — R06.3 CHEYNE-STOKES RESPIRATION: ICD-10-CM

## 2018-08-28 DIAGNOSIS — I50.22 CHRONIC SYSTOLIC CONGESTIVE HEART FAILURE (H): ICD-10-CM

## 2018-08-28 DIAGNOSIS — I10 ESSENTIAL HYPERTENSION: ICD-10-CM

## 2018-08-28 PROCEDURE — 99215 OFFICE O/P EST HI 40 MIN: CPT | Performed by: FAMILY MEDICINE

## 2018-08-28 NOTE — NURSING NOTE
"Chief Complaint   Patient presents with     RECHECK     F/u Psg titration 8/1       Initial /65  Pulse (!) 48  Resp 18  Ht 1.702 m (5' 7\")  Wt 83 kg (183 lb)  SpO2 98%  BMI 28.66 kg/m2 Estimated body mass index is 28.66 kg/(m^2) as calculated from the following:    Height as of this encounter: 1.702 m (5' 7\").    Weight as of this encounter: 83 kg (183 lb).    Medication Reconciliation: complete    DME: Y  "

## 2018-08-28 NOTE — MR AVS SNAPSHOT
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  Weight management is a personal decision.  If you are interested in exploring weight loss strategies, the following discussion covers the approaches that may be successful. Body mass index (BMI) is one way to tell whether you are at a healthy weight, overweight, or obese. It measures your weight in relation to your height.  A BMI of 18.5 to 24.9 is in the healthy range. A person with a BMI of 25 to 29.9 is considered overweight, and someone with a BMI of 30 or greater is considered obese. More than two-thirds of American adults are considered overweight or obese.  Being overweight or obese increases the risk for further weight gain. Excess weight may lead to heart disease and diabetes.  Creating and following plans for healthy eating and physical activity may help you improve your health.  Weight control is part of healthy lifestyle and includes exercise, emotional health, and healthy eating habits. Careful eating habits lifelong are the mainstay of weight control. Though there are significant health benefits from weight loss, long-term weight loss with diet alone may be very difficult to achieve- studies show long-term success  with dietary management in less than 10% of people. Attaining a healthy weight may be especially difficult to achieve in those with severe obesity. In some cases, medications, devices and surgical management might be considered.  What can you do?  If you are overweight or obese and are interested in methods for weight loss, you should discuss this with your provider.     Consider reducing daily calorie intake by 500 calories.     Keep a food journal.     Avoiding skipping meals, consider cutting portions instead.    Diet combined with exercise helps maintain muscle while optimizing fat loss. Strength training is particularly important for building and maintaining muscle mass. Exercise helps reduce stress, increase energy, and improves fitness. Increasing exercise without diet control, however, may not burn enough calories to loose weight.       Start walking three days a week 10-20 minutes at a time    Work towards walking thirty minutes five days a week     Eventually, increase the speed of your walking for 1-2 minutes at time    In addition, we recommend that you review healthy lifestyles and methods for weight loss available through the National Institutes of Health patient information sites:  http://win.niddk.nih.gov/publications/index.htm    And look into health and wellness programs that may be available through your health insurance provider, employer, local community center, or clarissa club.    Weight management plan: Patient was referred to their PCP to discuss a diet and exercise plan.     Your blood pressure was checked while you were in clinic today.  Please read the guidelines below about what these numbers mean and what you should do about them.  Your systolic blood pressure is the top number.  This is the pressure when the heart is pumping.  Your diastolic blood pressure is the bottom number.  This is the pressure in between beats.  If your systolic blood pressure is less than 120 and your diastolic  blood pressure is less than 80, then your blood pressure is normal. There is nothing more that you need to do about it  If your systolic blood pressure is 120-139 or your diastolic blood pressure is 80-89, your blood pressure may be higher than it should be.  You should have your blood pressure re-checked within a year by a primary care provider.  If your systolic blood pressure is 140 or greater or your diastolic blood pressure is 90 or greater, you may have high blood pressure.  High blood pressure is treatable, but if left untreated over time it can put you at risk for heart attack, stroke, or kidney failure.  You should have your blood pressure re-checked by a primary care provider within the next four weeks.    1. CPAP-  WHAT DOES IT DO AND HOW CAN I LEARN TO WEAR IT?                               BEFORE I START, CAN I WATCH A MOVIE TO GET A PLAN ON HOW TO USE CPAP?  https://www.[employer].com/watch?v=v9Z97aj057Z      Continuous positive airway pressure, or CPAP, is the most effective treatment for obstructive sleep apnea. It works by blowing room air, through a mask, to hold your throat open. A decision to use CPAP is a major step forward in the pursuit of a healthier life. The successful use of CPAP will help you breathe easier, sleep better and live healthier. You can choose CPAP equipment from any durable medical equipment provider that meets your needs.  Using CPAP can be a positive experience if you keep these ordonez points in mind:  1. Commitment  CPAP is not a quick fix for your problem. It involves a long-term commitment to improve your sleep and your health.    2. Communication  Stay in close communication with both your sleep doctor and your CPAP supplier. Ask lots of questions and seek help when you need it.    3. Consistency  Use CPAP all night, every night and for every nap. You will receive the maximum health benefits from CPAP when you use it every time that you sleep. This will also make it easier  "for your body to adjust to the treatment.    4. Correction  The first machine and mask that you try may not be the best ones for you. Work with your sleep doctor and your CPAP supplier to make corrections to your equipment selection. Ask about trying a different type of machine or mask if you have ongoing problems. Make sure that your mask is a good fit and learn to use your equipment properly.    5. Challenge  Tell a family member or close friend to ask you each morning if you used your CPAP the previous night. Have someone to challenge you to give it your best effort.    6. Connection   Your adjustment to CPAP will be easier if you are able to connect with others who use the same treatment. Ask your sleep doctor if there is a support group in your area for people who have sleep apnea, or look for one on the Internet.  7. Comfort   Increase your level of comfort by using a saline spray, decongestant or heated humidifier if CPAP irritates your nose, mouth or throat. Use your unit's \"ramp\" setting to slowly get used to the air pressure level. There may be soft pads you can buy that will fit over your mask straps. Look on www.CPAP.com for accessories that can help make CPAP use more comfortable.  8. Cleaning   Clean your mask, tubing and headgear on a regular basis. Put this time in your schedule so that you don't forget to do it. Check and replace the filters for your CPAP unit and humidifier.    9. Completion   Although you are never finished with CPAP therapy, you should reward yourself by celebrating the completion of your first month of treatment. Expect this first month to be your hardest period of adjustment. It will involve some trial and error as you find the machine, mask and pressure settings that are right for you.    10. Continuation  After your first month of treatment, continue to make a daily commitment to use your CPAP all night, every night and for every nap.    CPAP-Tips to starting with " success:  Begin using your CPAP for short periods of time during the day while you watch TV or read.    Use CPAP every night and for every nap. Using it less often reduces the health benefits and makes it harder for your body to get used to it.    Make small adjustments to your mask, tubing, straps and headgear until you get the right fit. Tightening the mask may actually worsen the leak.  If it leaves significant marks on your face or irritates the bridge of your nose, it may not be the best mask for you.  Speak with the person who supplied the mask and consider trying other masks. Insurances will allow you to try different masks during the first month of starting CPAP.  Insurance also covers a new mask, hose and filter about every 6 months.    Use a saline nasal spray to ease mild nasal congestion. Neti-Pot or saline nasal rinses may also help. Nasal gel sprays can help reduce nasal dryness.  Biotene mouthwash can be helpful to protect your teeth if you experience frequent dry mouth.  Dry mouth may be a sign of air escaping out of your mouth or out of the mask in the case of a full face mask.  Speak with your provider if you expect that is the case.     Take a nasal decongestant to relieve more severe nasal or sinus congestion.  Do not use Afrin (oxymetazoline) nasal spray more than 3 days in a row.  Speak with your sleep doctor if your nasal congestion is chronic.    Use a heated humidifier that fits your CPAP model to enhance your breathing comfort. Adjust the heat setting up if you get a dry nose or throat, down if you get condensation in the hose or mask.  Position the CPAP lower than you so that any condensation in the hose drains back into the machine rather than towards the mask.    Try a system that uses nasal pillows if traditional masks give you problems.    Clean your mask, tubing and headgear once a week. Make sure the equipment dries fully.    Regularly check and replace the filters for your CPAP unit  and humidifier.    Work closely with your sleep provider and your CPAP supplier to make sure that you have the machine, mask and air pressure setting that works best for you. It is better to stop using it and call your provider to solve problems than to lay awake all night frustrated with the device.    Daytime naps are not advised, but use the PAP device if taking naps. Many insurances require that we prove you are using the PAP device at least 4 hours on at least 70% of nights over a 30 day period. We have 90 days to meet those criteria.    You can get new supplies (mask, hose and filter) for your device every 3-6 months (covered by insurance). You do not need to get supplies that often, but they are available if you would like them. You may exchange the mask once within the first month if you feel the initial mask does not fit well. Please, contact your medical equipment provider for equipment issues.    Please let me know if you have any snoring, daytime sleepiness, or poor sleep quality. We will want to make sure your PAP device is adequately treating your condition.    There is a website called CPAP.com that has accessories that may make CPAP use easier. Please visit it at your convenience.    Please, schedule follow up visit with the nurse (she will coming into the room and meet with you).     Thank you!    Taye Phillips MD, MPH  Clinical Sleep and Occupational / Environmental Medicine                Follow-ups after your visit        Your next 10 appointments already scheduled     Sep 19, 2018 11:00 AM CDT   MR ACEVEDO W LUIS with SCIMR1   [facility] ([facility] at [facility])    [address] [place] [address]  [address] [address] [phone]           Take your medicines as usual, unless your doctor tells you not to. Bring a list of your current medicines to your exam (including vitamins, minerals and over-the-counter drugs).  You may or may  not receive intravenous (IV) contrast for this exam pending the discretion of the Radiologist.  You do not need to do anything special to prepare.  The MRI machine uses a strong magnet. Please wear clothes without metal (snaps, zippers). A sweatsuit works well, or we may give you a hospital gown.  Please remove any body piercings and hair extensions before you arrive. You will also remove watches, jewelry, hairpins, wallets, dentures, partial dental plates and hearing aids. You may wear contact lenses, and you may be able to wear your rings. We have a safe place to keep your personal items, but it is safer to leave them at home.  **IMPORTANT** THE INSTRUCTIONS BELOW ARE ONLY FOR THOSE PATIENTS WHO HAVE BEEN PRESCRIBED SEDATION OR GENERAL ANESTHESIA DURING THEIR MRI PROCEDURE:  IF YOUR DOCTOR PRESCRIBED ORAL SEDATION (take medicine to help you relax during your exam):   You must get the medicine from your doctor (oral medication) before you arrive. Bring the medicine to the exam. Do not take it at home. You ll be told when to take it upon arriving for your exam.   Arrive one hour early. Bring someone who can take you home after the test. Your medicine will make you sleepy. After the exam, you may not drive, take a bus or take a taxi by yourself.  IF YOUR DOCTOR PRESCRIBED IV SEDATION:   Arrive one hour early. Bring someone who can take you home after the test. Your medicine will make you sleepy. After the exam, you may not drive, take a bus or take a taxi by yourself.   No eating 6 hours before your exam. You may have clear liquids up until 4 hours before your exam. (Clear liquids include water, clear tea, black coffee and fruit juice without pulp.)  IF YOUR DOCTOR PRESCRIBED ANESTHESIA (be asleep for your exam):   Arrive 1 1/2 hours early. Bring someone who can take you home after the test. You may not drive, take a bus or take a taxi by yourself.   No eating 8 hours before your exam. You may have clear liquids up  until 4 hours before your exam. (Clear liquids include water, clear tea, black coffee and fruit juice without pulp.)   You will spend four to five hours in the recovery room.  Please call the Imaging Department at your exam site with any questions.            Sep 19, 2018 12:10 PM CDT   LAB with [facility]   [facility] ([facility])    [address]  [facility] [address]   [phone]           Please do not eat 10-12 hours before your appointment if you are coming in fasting for labs on lipids, cholesterol, or glucose (sugar). This does not apply to pregnant women. Water, hot tea and black coffee (with nothing added) are okay. Do not drink other fluids, diet soda or chew gum.            Sep 19, 2018  1:00 PM CDT   Holter Monitor with PRINCE   [facility] ([facility])    [address]  [facility] [address] [phone]            Sep 27, 2018  9:45 AM CDT   Pulmonary Hypertension Return with Sixto Conley MD   [facility] ([facility])    [address]  [facility] [id number] [phone]            Oct 02, 2018  9:00 AM CDT   SHORT with Oscar Alves MD   [facility] ([facility]    [address] [id number] [phone]              Who to contact     If you have questions or need follow up information about today's clinic visit or your schedule please contact [facility] directly at [phone].  Normal or non-critical lab and imaging results will be communicated to you by MyChart, letter or phone within 4 business days after the clinic has received the results. If you do not hear from us within 7 days, please contact the clinic through MyChart or phone. If you have a critical or abnormal lab result, we will notify you  "by phone as soon as possible.  Submit refill requests through [employer] or call your pharmacy and they will forward the refill request to us. Please allow 3 business days for your refill to be completed.          Additional Information About Your Visit        [employer] Information     [employer] gives you secure access to your electronic health record. If you see a primary care provider, you can also send messages to your care team and make appointments. If you have questions, please call your primary care clinic.  If you do not have a primary care provider, please call [phone] and they will assist you.        Care EveryWhere ID     This is your Care EveryWhere ID. This could be used by other organizations to access your [place] medical records  [id number]        Your Vitals Were     Pulse Respirations Height Pulse Oximetry BMI (Body Mass Index)       52 18 1.702 m (5' 7\") 98% 28.66 kg/m2        Blood Pressure from Last 3 Encounters:   08/28/18 120/65   08/02/18 139/62   07/19/18 108/48    Weight from Last 3 Encounters:   08/28/18 83 kg (183 lb)   08/02/18 83 kg (183 lb)   07/19/18 81.6 kg (180 lb)              We Performed the Following     Comprehensive DME          Today's Medication Changes          These changes are accurate as of 8/28/18 10:30 AM.  If you have any questions, ask your nurse or doctor.               These medicines have changed or have updated prescriptions.        Dose/Directions    MULTIVITAL Chew   This may have changed:  how much to take   Used for:  Neuropathy of foot        Dose:  2 chew tab   Take 2 chew tab by mouth 2 times daily   Quantity:  360 tablet   Refills:  3       senna 8.6 MG tablet   Commonly known as:  SENOKOT   This may have changed:    - when to take this  - reasons to take this   Used for:  Prostate cancer (H)        Dose:  1 tablet   Take 1 tablet by mouth daily   Quantity:  60 tablet   Refills:  0                Primary Care Provider Office Phone # Fax #    Oscar MANZANO " MD Joselyn [phone]       [address] [facility] [address]        Equal Access to Services     JOSE BHAKTA : Hadrosy aad ku hadroxy Rowe, wajohnathanda pramodnapoleonha, terrell kagiovanyda julián, chayito sutton yehudacole villegas lacolbyshaan abbasi. So [facility] [phone].    ATENCIÓN: Si habla español, tiene a randall disposición servicios gratuitos de asistencia lingüística. Llame al [phone].    We comply with applicable federal civil rights laws and [place] laws. We do not discriminate on the basis of race, color, national origin, age, disability, sex, sexual orientation, or gender identity.            Thank you!     Thank you for choosing [facility]  for your care. Our goal is always to provide you with excellent care. Hearing back from our patients is one way we can continue to improve our services. Please take a few minutes to complete the written survey that you may receive in the mail after your visit with us. Thank you!             Your Updated Medication List - Protect others around you: Learn how to safely use, store and throw away your medicines at www.disposemymeds.org.          This list is accurate as of 8/28/18 10:30 AM.  Always use your most recent med list.                   Brand Name Dispense Instructions for use Diagnosis    acetaminophen 650 MG CR tablet    TYLENOL    100 tablet    Take 1 tablet (650 mg) by mouth every 6 hours as needed for pain    Osteoarthritis       apixaban ANTICOAGULANT 2.5 MG tablet    ELIQUIS    200 tablet    Take 1 tablet (2.5 mg) by mouth 2 times daily    Chronic atrial fibrillation (H)       aspirin 81 MG tablet      Take 81 mg by mouth daily        co-enzyme Q-10 100 MG Caps capsule     180 capsule    Take 1 capsule (100 mg) by mouth daily    Mixed hyperlipidemia       diclofenac 1 % Gel topical gel    VOLTAREN    100 g    Apply 4 gramsto area qid prn    Acute left-sided low back pain without sciatica       doxycycline 100 MG capsule     VIBRAMYCIN    90 capsule    TAKE 1 CAPSULE DAILY    Rosacea       FERROUS GLUCONATE PO      Take 324 mg by mouth daily (with breakfast)        Glucosamine-Chondroitin 250-200 MG Tabs    OSTEO BI-FLEX REGULAR STRENGTH    100 tablet    Take 1 tablet by mouth 2 times daily    Osteoarthritis       lisinopril 20 MG tablet    PRINIVIL/ZESTRIL    90 tablet    Take 1 tablet (20 mg) by mouth daily    HTN, goal below 150/90       MELATONIN PO      Take 1 tablet by mouth At Bedtime        metoprolol succinate 25 MG 24 hr tablet    TOPROL-XL    90 tablet    Take 0.5 tablets (12.5 mg) by mouth daily (with breakfast)    Frequent PVCs, Chronic atrial fibrillation (H)       MULTIVITAL Chew     360 tablet    Take 2 chew tab by mouth 2 times daily    Neuropathy of foot       naftifine 1 % Gel topical gel    NAFTIN    90 g    Apply topically daily Apply to affected nails daily    Onychomycosis of toenail       OMEGA 3-6-9 PO      Take 1 tablet by mouth daily        omeprazole 40 MG capsule    priLOSEC    90 capsule    TAKE 1 CAPSULE DAILY    PUD (peptic ulcer disease)       senna 8.6 MG tablet    SENOKOT    60 tablet    Take 1 tablet by mouth daily    Prostate cancer (H)       simvastatin 20 MG tablet    ZOCOR    90 tablet    Take 1 tablet (20 mg) by mouth At Bedtime    Hyperlipidemia LDL goal <100       sodium chloride 0.65 % nasal spray    OCEAN NASAL SPRAY    2 Bottle    Spray 1 spray into both nostrils daily as needed for congestion    Bloody nose       tamsulosin 0.4 MG capsule    FLOMAX    90 capsule    TAKE 1 CAPSULE DAILY    Benign prostatic hyperplasia with lower urinary tract symptoms       torsemide 20 MG tablet    DEMADEX    90 tablet    Take 1 tablet (20 mg) by mouth daily (with breakfast)    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

## 2018-08-28 NOTE — PROGRESS NOTES
[facility]  Outpatient Sleep Medicine Follow Up Visit  August 28, 2018    Name: Milo Serna MRN# [id number]   Age: 77 year old YOB: 1940     Date of Follow Up Visit: August 28, 2018  Consultation is requested by: Sixto Conley MD  [address] [place] [id number]  Primary care provider: Oscar Alves    Patient is accompanied by: Patient presents alone today.       Reason for Sleep Consult:     Milo Serna is a 77 year old male patient that presents here for an LENORA follow up evaluation.         Assessment and Plan:     Summary Sleep Diagnoses:    (1) Moderate LENORA (AHI was 16.4 events per hour)  (2) Sleep Associated Hypoxemia (16.3 minutes under the SpO2 of 88%)  (3) Cheyne Stoke Respirations  (4) EDS  (5) Cardiomegaly  (6) Congestive Heart Failure (LVEF estimated at 35 - 40%)  (7) Chronic Atrial Fibrillation  (8) Frequent PVCs  (9) CAD with CABG  (10) HTN  (11) Pulmonary Hypertension    Summary Recommendations / Discussion:    During the initial visit, this patient had sxs, hx, and physical findings that suggested the diagnosis of a sleep disordered breathing. In addition, he had an intermediate pre-test probability of LENORA. Given this patient's extensive cardiac history, a portable HST sleep study was not performed, and instead, this patient underwent an in-lab split-night PSG sleep study. Based on the patient's history, clinical presentation, and physical examination, there was a reasonable level of suspicion for hypoventilation and, therefore, TCM monitoring as well as ABG studies were utilized. The in-lab PSG sleep study obtained on 06/10/2018 demonstrated moderate LENORA with Cheyne Stoke respirations as well as sleep associated hypoxemia (AHI was 16.4 events per hour and the patient spent 16.3 minutes under the SpO2 of 88%). During the study, PAP therapy was initiated, but the patient was unable to tolerate this and, as such, it was discontinued. The  patient showed atrial fibrillation with frequent polymorphic PVC, couplet PVCs, bigeminal PVCs, as well as non-sustained ventricular tachycardia. Given this PSG findings, the patient underwent a PSG titration study which achieved an optimal PAP titration with a final CPAP pressure of 15 cmH2O and a residual AHI of 4.3 events per hour. REM supine was captured during this final PAP pressure. Some mild residual Cheyne-Stoke respirations were noted during the titration study. However, no sleep associated hypoxemia or hypoventilation was noted during the final PAP pressure. The study showed atrial fibrillation with frequent polymorphic PVCs, couplets PVCs, bigeminal PVCs, as well as PACs. Today, the PSG titration study results were carefully discussed and explained. The possible consequences of not treating hypoxic stress were also reviewed once again. Given the decreased LVEF below 45% (currently at 35 - 40%), bilevel therapy, including ASV therapy, should not be used. As such, based on the PSG titration sleep study, the patient was started on CPAP at fixed pressure of 15 cmH2O. The patient is compliant with the therapy and this is effective at treating the condition with a residual AHI of 6.3 events per hour. Given the interface discomfort and high leaks, an mask fitting sessions was ordered. Current PAP therapy will be continued with CPAP at 15 cmH2O. PAP compliance was discussed and encouraged. Lifestyle recommendations including healthy dietary and exercising habits were discussed. Pt will follow up with me after having completed another 6 to 8 weeks of PAP therapy.    Visit Summary:                       -Continue CPAP at 15 cmH2O                       -Elevation of the head of the bed and avoiding supine sleep was advised to minimize central events                       -Mask fitting session ordered     -Follow up with cardiology                       -Follow up with me after completing another 6 to 8 weeks of PAP  therapy    Coding:  (G47.33) LENORA (obstructive sleep apnea)  (primary encounter diagnosis)  (G47.19) Excessive daytime sleepiness  (I50.810) 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  (I50.42) Chronic combined systolic and diastolic CHF (congestive heart failure) (H)  (I48.2) Chronic atrial fibrillation (H)  (I49.3) Frequent PVCs  (Z95.1) Status post coronary artery bypass graft  (I10) Essential hypertension  (R06.3) Cheyne-Alejandro breathing  (G47.34) Nocturnal hypoxemia  (I27.20) Pulmonary hypertension    Counseling included a comprehensive review of diagnostic and therapeutic strategies as well as risks of inadequate therapy.    Educational materials provided in instructions. The patient was instructed to avoid driving or operating any heavy machinery when experiencing drowsiness.    All questions and concerns were addressed today. Pt agrees and understands the assessment and plan.         History of Present Illness:   Milo Serna is a 77 year old  RHD male pt with PMH of coronary atherosclerosis with coronary artery disease and past myocardial infarction, hyperlipidemia, peptic ulcer disease, venous stasis, history of premature ventricular contractions and premature atrial contractions, chronic atrial fibrillation, dilated aortic root, history of prostate cancer, iron deficiency anemia, hypertension, thrombocytopenia, pulmonary hypertension, and biventricular systolic heart failure presents for an LENORA follow up evaluation today.    As previously explained, this is a rather medically complex individual with extensive cardiac history including CABG in 1997, chronic atrial fibrillation, hypertension, stable ascending aortic dilation, and pulmonary hypertension. The patient has dilation of the right ventricle with also reduced LVEF estimated at 35 - 40% during most recent echocardiogram. The study also showed global hypokinesis of the left ventricle (see summary below).    The patient  also completed a PFT with no obvious abnormalities noted (see summary below). A 6 minute walk distance was also obtained and this showed no obvious hypoxemia with saturations above 95% at all times.    During the initial visit, this patient reported no snoring, gasping / choking for air, or witnessed apneas The patient admitted having restorative sleep with no sleep inertia. He also admitted having some mild EDS with no inadvertent naps. This patient admitted having some xerostomia in the morning. The patient denied any morning cephalgia, morning myalgias, CP, SOB, N/V, diarrhea, nocturia, nocturnal coughing spells, positional dyspnea, orthopnea, or GERD sxs. The patient sleeps with one pillow under his head with elevation of the head of the bed (due to chronic back pain). Lastly, this patient admitted having some drowsiness when driving with no near accidents.     After the initial visit, the patient completed an in-lab PSG sleep study on 06/10/2018 and this demonstrated moderate LENORA with Cheyne Stoke respirations as well as sleep associated hypoxemia (AHI was 16.4 events per hour and the patient spent 16.3 minutes under the SpO2 of 88%). There was mildly increased PLMs noted with only mild cortical arousal association noted. During the study, PAP therapy was initiated, but the patient was unable to tolerate this and, as such, it was discontinued. The patient showed atrial fibrillation with frequent polymorphic PVC, couplet PVCs, bigeminal PVCs, as well as non-sustained ventricular tachycardia.    This patient completed an in-lab PSG titration study on 06/26/2018. The PSG titration sleep study achieved an optimal PAP titration with a final CPAP pressure of 15 cmH2O and a residual AHI of 4.3 events per hour. REM supine was captured during this final PAP pressure. Some Cheyne-Stoke respirations were noted during the titration study. However, no sleep associated hypoxemia or hypoventilation was noted during the final  PAP pressure. The study showed atrial fibrillation with frequent polymorphic PVCs, couplets PVCs, bigeminal PVCs, as well as PACs. Otherwise, no movement abnormalities were observed.    During the last visit, the patient was started on CPAP at fixed pressure of 15 cmH2O and elevation of the head of the bed was advised. The PAP download today shows a compliant patient who is using the device 90% of the time with 77% over 4 hours. The device is effective at treating the condition with a residual AHI of 6.3 events per hour. No significant leaks noted with a 95th percentile at 84.7 L/min.    CPAP Compliance:   Dates: August 28, 2018       77 % > 4 hour use    Average Use: 4 hours 38 minutes   Leak: 84.7 L/min    Residual AHI: 6.3 events per hour    The patient reports uncomfortable interface with leaks and some integumentary discomfort. The patient states he is using the PAP device, but he would like a new mask. The patient has not noticed any benefits with the PAP therapy. He denies any snoring, gasping / choking for air, or witnessed apneas. No aerophagia, bloating, CP, SOB, or any other sxs or concerns reported.    PREVIOUS IN- LAB or HOME SLEEP STUDIES: None Reported    SLEEP-WAKE SCHEDULE:     Milo QUIJANO Daphne      -Describes himself as a morning person; prefers to go to sleep at 10:00 PM and wakes up at 6:00 AM.      -Pt takes about 6 naps a week and each nap lasts about 15 minutes to one hour. Pt feels refreshed after each nap; takes no inadvertent naps.      -ON WEEKDAYS, goes to sleep at 10:00 PM during the week; awakens 6:00 AM with an alarm; falls asleep in 5 minutes; denies difficulty falling asleep.      -ON WEEKENDS, goes to sleep at 10:00 PM and wakes up at 6:00 AM with an alarm; falls asleep in 5 minutes.        -Awakens 1-2 times a night for 5 minutes before falling back to sleep; awakens to go to the bathroom.      BEDTIME ACTIVITIES AND SHIFT WORK:    Milo Serna     -Bedtime Activities and  Other Sleeping Information: Pt lives with wife. Pt sleeps with wife on a ashtyn size bed. No pets in the bedroom. Pt sleeps on back and sides. No electronics used in bed.      -Occupation: Retired     SCALES        -SLEEP APNEA: [employer] score: 4 / 8        -SLEEPINESS: [place] sleepiness scale (ESS):  9 / 24 (initial score)    Drowsy driving / near accidents: Some drowsiness reported with no near accidents    Consequences: Mild EDS reported    SLEEP COMPLAINTS:  Cardio-respiratory     -Snoring: No snoring reported    -Dyspnea: Pt denies having any witnessed apneas or dyspnea   -Morning headaches or confusion: Denies any morning cephalgia   -Coexisting Lung disease: See Above     -Coexisting Heart disease: See Above     -Does patient have a bed partner: Patient sleeps with spouse   -Has bed partner been sleeping separately because of snoring:  No            RLS Screen:    -When you try to relax in the evening or sleep at night, do you ever have unpleasant, restless feelings in your legs that can be relieved by walking or movement? None Reported     -Periodic limb movement: None Reported    Narcolepsy:     - Denies sudden urges of sleep attacks   - Denies cataplexy   - Denies sleep paralysis    - Denies hallucinations    - Admits feeling refreshed after a nap.    Sleep Behaviors:   - Denies leg symptoms/movements   - Denies motor restlessness   - Denies night terrors   - Denies bruxism   - Denies automatic behaviors    Other Subjective Complaints:   - Denies anxiety or rumination    - Denies pain and discomfort at night   - Denies waking up with heart pounding or racing   - Denies GERD or aspiration         Parasomnia:    -NREM - Denies recurrent persistent confusional arousal, night eating, sleep walking or sleep terrors.      -REM - Denies dream enactment or injuries.          Medications:     Current Outpatient Prescriptions   Medication Sig     acetaminophen (TYLENOL) 650 MG CR tablet Take 1 tablet (650 mg) by  mouth every 6 hours as needed for pain     apixaban ANTICOAGULANT (ELIQUIS) 2.5 MG tablet Take 1 tablet (2.5 mg) by mouth 2 times daily     aspirin 81 MG tablet Take 81 mg by mouth daily     co-enzyme Q-10 100 MG CAPS Take 1 capsule (100 mg) by mouth daily     diclofenac (VOLTAREN) 1 % GEL Apply 4 gramsto area qid prn     doxycycline (VIBRAMYCIN) 100 MG capsule TAKE 1 CAPSULE DAILY     FERROUS GLUCONATE PO Take 324 mg by mouth daily (with breakfast)     Glucosamine-Chondroitin (OSTEO BI-FLEX REGULAR STRENGTH) 250-200 MG TABS Take 1 tablet by mouth 2 times daily     lisinopril (PRINIVIL/ZESTRIL) 20 MG tablet Take 1 tablet (20 mg) by mouth daily     MELATONIN PO Take 1 tablet by mouth At Bedtime     metoprolol succinate (TOPROL-XL) 25 MG 24 hr tablet Take 0.5 tablets (12.5 mg) by mouth daily (with breakfast)     Multiple Vitamins-Minerals (MULTIVITAL) CHEW Take 2 chew tab by mouth 2 times daily (Patient taking differently: Take 1 chew tab by mouth 2 times daily )     naftifine (NAFTIN) 1 % GEL topical gel Apply topically daily Apply to affected nails daily     Omega 3-6-9 Fatty Acids (OMEGA 3-6-9 PO) Take 1 tablet by mouth daily      omeprazole (PRILOSEC) 40 MG capsule TAKE 1 CAPSULE DAILY     senna (SENOKOT) 8.6 MG tablet Take 1 tablet by mouth daily (Patient taking differently: Take 1 tablet by mouth daily as needed )     simvastatin (ZOCOR) 20 MG tablet Take 1 tablet (20 mg) by mouth At Bedtime     sodium chloride (OCEAN NASAL SPRAY) 0.65 % nasal spray Spray 1 spray into both nostrils daily as needed for congestion     tamsulosin (FLOMAX) 0.4 MG capsule TAKE 1 CAPSULE DAILY     torsemide (DEMADEX) 20 MG tablet Take 1 tablet (20 mg) by mouth daily (with breakfast)     Urea 20 % CREA cream Apply topically as needed Apply to right foot daily     No current facility-administered medications for this visit.       No Known Allergies         Past Medical History:     Denies needing any 02 supplement at night.    Past  Medical History:   Diagnosis Date     Anemia, unspecified 11/8/2016     Atrial fibrillation (H) 5/27/2015     BPH (benign prostatic hyperplasia) 1/25/2012     CAD (coronary artery disease) 4/4/2012     Closed fracture of metatarsal bone 4/4/2012     Coronary atherosclerosis of unspecified type of vessel, native or graft [facility] 1996 following MI (at [facility])      Dilated aortic root (H) 5/26/2016     Drug-induced erectile dysfunction 10/2/2015     Elevated blood sugar 11/8/2016     Elevated PSA 9/19/2013     Essential hypertension with goal blood pressure less than 140/90 9/22/2016     Essential hypertension, benign      Gallbladder polyp 5/1/2018     Gout      HAV (hallux abducto valgus) 4/4/2012     Hernia, abdominal      History of prostate cancer 6/15/2016     HTN, goal below 150/90 4/20/2017     Iron deficiency anemia secondary to inadequate dietary iron intake 11/15/2016     Low blood pressure reading 10/17/2016     Lumbago     had degendisc dz on MRI 7/07     Mixed hyperlipidemia      Mumps      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     Past Surgical History:   Procedure Laterality Date     C NONSPECIFIC PROCEDURE      CAB 1996 [place]     C NONSPECIFIC PROCEDURE  2/07    l inguinal hernia repair      C NONSPECIFIC PROCEDURE      R hernia remote     C NONSPECIFIC PROCEDURE  2000    R ankle ORIF for fx     C NONSPECIFIC PROCEDURE       nasal surgery      C NONSPECIFIC PROCEDURE      R rotater repair (remote)      C NONSPECIFIC PROCEDURE      appy 1966     C NONSPECIFIC PROCEDURE      sinus surgery x 2     C NONSPECIFIC PROCEDURE      L shoulder      CARDIAC SURGERY      bipass     CYSTOSCOPY FLEXIBLE, CYOABLATION PROSTATE N/A 2016    Procedure: CYSTOSCOPY FLEXIBLE, CRYOABLATION PROSTATE;  Surgeon: Krystian Owens MD;  Location: [facility] OR     GENITOURINARY SURGERY      prostate surgery      HERNIA REPAIR       LAPAROSCOPIC HERNIORRHAPHY INGUINAL Right 5/10/2017    Procedure: LAPAROSCOPIC HERNIORRHAPHY INGUINAL;  Laparoscopic preperitoneal repair of right inguinal hernia with placement of underlay mesh;  Surgeon: Enrico Bridges MD;  Location: [facility] OR     PROSTATE SURGERY            Social History:     Social History   Substance Use Topics     Smoking status: Former Smoker     Smokeless tobacco: Never Used      Comment: quit 3/1974     Alcohol use 0.0 oz/week     0 Standard drinks or equivalent per week      Comment: rare drink     Chemical History:     Tobacco: Quit smoking about 44 yrs ago     Uses 2 cups/day of coffee. Last caffeine intake is usually before 10 AM.    Supplements for wakefulness: Patient does not use any supplements to stay awake    EtOH: Occasional use only  Recreational Drugs: Patient denies using any recreational drugs     Psych Hx:   Current dangers to self or others: No. Pt denies any SI / HI, hallucinations, or delusions         Family History:     Family History   Problem Relation Age of Onset     Cancer Mother      stomach cancer,  age 61     HEART DISEASE Father      MI,  age 71     HEART DISEASE Brother      stent placement, RA      Hypertension Brother      HEART DISEASE Sister      rythmn problems with heart, hypertension      Sleep Family Hx:       RLS - None reported   LENORA - None reported  Insomnia - None reported  Parasomnia - None reported         Review of Systems:     A  "complete 10 point review of systems was negative other than HPI or as commented below:     CONSTITUTIONAL: NEGATIVE for weight gain/loss, fever, chills, sweats or night sweats, drug allergies.  EYES: NEGATIVE for double vision. Pt reports changes in vision and blind spots.  ENT: NEGATIVE for ear pain, sore throat, sinus pain, post-nasal drip, runny nose, bloody nose  CARDIAC: NEGATIVE for chest pain or pressure or breathlessness when lying flat. Pt endorses swollen legs / swollen feet and palpitation.  NEUROLOGIC: NEGATIVE headaches, weakness or numbness in the arms or legs.  DERMATOLOGIC: NEGATIVE for rashes, new moles or change in mole(s)  PULMONARY: NEGATIVE SOB at rest, dry cough, coughing up blood, wheezing or whistling when breathing. Pt reports SOB with activity and mildly productive cough.  GASTROINTESTINAL: NEGATIVE for nausea or vomitting, loose or watery stools, fat or grease in stools, constipation, abdominal pain, bowel movements black in color or blood noted.  GENITOURINARY: NEGATIVE for pain during urination, blood in urine, urinating more frequently than usual, irregular menstrual periods.  MUSCULOSKELETAL: NEGATIVE for muscle pain, bone or joint pain, swollen joints.  ENDOCRINE: NEGATIVE for increased thirst or urination, diabetes.  LYMPHATIC: NEGATIVE for swollen lymph nodes, lumps or bumps in the breasts or nipple discharge.         Physical Examination:   /65  Pulse 52  Resp 18  Ht 1.702 m (5' 7\")  Wt 83 kg (183 lb)  SpO2 98%  BMI 28.66 kg/m2    VS: Reviewed and normal.  General: Alert, oriented, not in distress. Dressed casually; Good eye contact; Comfortably sitting in a chair; in no apparent distress  Lungs: Both hemithoraces are symmetrical, normal to palpation, no dullness to percussion, auscultation of lungs revealed normal breath sounds with no expirium prolongation, wheezing, rhonci and crackles.  CVS: Normal S1 and S2 heart sounds with no extra heart sounds. No murmur, rubs, " or clicks. Normal peripheral pulses throughout with no obvious peripheral edema. Irregularly irregular rhythm noted.  Psychiatry: Mood and affect are appropriate. Euthymic with affect congruent with full range and intensity. No SI/HI with adequate insight and judgement.          Data: All pertinent previous laboratory data reviewed     Lab Results   Component Value Date    PH 7.38 02/20/2007    PO2 66 (L) 02/20/2007    PCO2 45 02/20/2007    HCO3 25 02/20/2007     Lab Results   Component Value Date    TSH 1.44 07/12/2018    TSH 1.12 09/28/2017     Lab Results   Component Value Date    GLC 71 07/12/2018    GLC 69 (L) 06/22/2018     Lab Results   Component Value Date    HGB 12.4 (L) 07/12/2018    HGB 12.2 (L) 05/30/2018     Lab Results   Component Value Date    BUN 20 07/12/2018    BUN 18 06/22/2018    CR 1.00 07/12/2018    CR 0.94 06/22/2018     Echocardiology:    -Echocardiogram obtained on 04/25/2018 showed mildly dilated left ventricle with normal left ventricular wall thickness. Left ventricle systolic function is reduced with a LVEF estimated at 35 - 40%. Moderate to severely global hypokinesia of the left ventricle noted. Right ventricle was mild to moderately dilated with moderately decreased systolic function. Severe biatrial enlargement noted. Moderate to mod-severe mitral regurgitation was present. Mild tricuspid regurgitation was noted. Mild pulmonary hypertension was present. Mild aortic regurgitation was observed. Only trace pulmonic valvular regurgitation was noted. The patient was in atrial fibrillation during the study.       Chest x-ray:    -Chest x-ray films obtained on 04/26/2018 showed enlarged cardiac silhouette with also enlarged pulmonary arteries present. Median sternotomy wires were noted. No other cardiopulmonary abnormalities were noted.    PFT:    -The patient had a PFT obtained on 04/24/2018 and this showed normal lung mechanics, normal lung volumes, and normal gas transfer. FVC was 106%,  FEV1 was 104%, VC was 100%, TLC was 104%, and the DLCO was also 100%.    Laboratory Studies:   Component Value Flag Ref Range Units Status Collected Lab   WBC 4.1  4.0 - 11.0 10e9/L Final 05/30/2018  8:44 AM [facility]   RBC Count 3.84 (L) 4.4 - 5.9 10e12/L Final 05/30/2018  8:44 AM [facility]   Hemoglobin 12.2 (L) 13.3 - 17.7 g/dL Final 05/30/2018  8:44 AM [facility]   Hematocrit 37.2 (L) 40.0 - 53.0 % Final 05/30/2018  8:44 AM [facility]   MCV 97  78 - 100 fl Final 05/30/2018  8:44 AM [facility]   MCH 31.8  26.5 - 33.0 pg Final 05/30/2018  8:44 AM [facility]   MCHC 32.8  31.5 - 36.5 g/dL Final 05/30/2018  8:44 AM [facility]   RDW 14.5  10.0 - 15.0 % Final 05/30/2018  8:44 AM [facility]   Platelet Count 111 (L) 150 - 450 10e9/L Final 05/30/2018  8:44 AM [facility]     Component Value Flag Ref Range Units Status Collected Lab   Sodium 139  133 - 144 mmol/L Final 05/30/2018  8:44 AM [facility]   Potassium 3.7  3.4 - 5.3 mmol/L Final 05/30/2018  8:44 AM [facility]   Chloride 103  94 - 109 mmol/L Final 05/30/2018  8:44 AM [facility]   Carbon Dioxide 30  20 - 32 mmol/L Final 05/30/2018  8:44 AM [facility]   Anion Gap 6  3 - 14 mmol/L Final 05/30/2018  8:44 AM [facility]   Glucose 114 (H) 70 - 99 mg/dL Final 05/30/2018  8:44 AM [facility]   Urea Nitrogen 18  7 - 30 mg/dL Final 05/30/2018  8:44 AM [facility]   Creatinine 1.02  0.66 - 1.25 mg/dL Final 05/30/2018  8:44 AM [facility]   GFR Estimate 71  >60 mL/min/1.7m2 Final 05/30/2018  8:44 AM [facility]   Comment:   Non  GFR Calc   GFR Estimate If Black 85  >60 mL/min/1.7m2 Final 05/30/2018  8:44 AM [facility]   Comment:   African American GFR Calc   Calcium 8.9  8.5 - 10.1 mg/dL Final 05/30/2018  8:44 AM [facility]   Bilirubin Total 1.7 (H) 0.2 - 1.3 mg/dL Final 05/30/2018  8:44 AM [facility]   Albumin 3.7  3.4 - 5.0 g/dL Final 05/30/2018  8:44 AM [facility]   Protein Total 7.3  6.8 - 8.8 g/dL Final 05/30/2018  8:44 AM [facility]   Alkaline Phosphatase 171 (H) 40 - 150 U/L Final 05/30/2018  8:44 AM [facility]   ALT 18  0 - 70 U/L Final 05/30/2018   8:44 AM [facility]   AST 24  0 - 45 U/L Final 05/30/2018  8:44 AM Rd     Component Value Flag Ref Range Units Status Collected Lab   Hemoglobin A1C 5.4  0 - 5.6 % Final 05/01/2018 10:32 AM 53     Taye Phillips MD, MPH  Clinical Sleep Medicine    Total time spent with patient: 40 min. Over >50% of the time was spent for face to face counseling, education, and evaluation.

## 2018-08-28 NOTE — PATIENT INSTRUCTIONS
Your BMI is Body mass index is 28.66 kg/(m^2).  Weight management is a personal decision.  If you are interested in exploring weight loss strategies, the following discussion covers the approaches that may be successful. Body mass index (BMI) is one way to tell whether you are at a healthy weight, overweight, or obese. It measures your weight in relation to your height.  A BMI of 18.5 to 24.9 is in the healthy range. A person with a BMI of 25 to 29.9 is considered overweight, and someone with a BMI of 30 or greater is considered obese. More than two-thirds of [place] adults are considered overweight or obese.  Being overweight or obese increases the risk for further weight gain. Excess weight may lead to heart disease and diabetes.  Creating and following plans for healthy eating and physical activity may help you improve your health.  Weight control is part of healthy lifestyle and includes exercise, emotional health, and healthy eating habits. Careful eating habits lifelong are the mainstay of weight control. Though there are significant health benefits from weight loss, long-term weight loss with diet alone may be very difficult to achieve- studies show long-term success with dietary management in less than 10% of people. Attaining a healthy weight may be especially difficult to achieve in those with severe obesity. In some cases, medications, devices and surgical management might be considered.  What can you do?  If you are overweight or obese and are interested in methods for weight loss, you should discuss this with your provider.     Consider reducing daily calorie intake by 500 calories.     Keep a food journal.     Avoiding skipping meals, consider cutting portions instead.    Diet combined with exercise helps maintain muscle while optimizing fat loss. Strength training is particularly important for building and maintaining muscle mass. Exercise helps reduce stress, increase energy, and improves  fitness. Increasing exercise without diet control, however, may not burn enough calories to loose weight.       Start walking three days a week 10-20 minutes at a time    Work towards walking thirty minutes five days a week     Eventually, increase the speed of your walking for 1-2 minutes at time    In addition, we recommend that you review healthy lifestyles and methods for weight loss available through the National Institutes of Health patient information sites:  http://win.niddk.nih.gov/publications/index.htm    And look into health and wellness programs that may be available through your health insurance provider, employer, local community center, or [employer].    Weight management plan: Patient was referred to their PCP to discuss a diet and exercise plan.     Your blood pressure was checked while you were in clinic today.  Please read the guidelines below about what these numbers mean and what you should do about them.  Your systolic blood pressure is the top number.  This is the pressure when the heart is pumping.  Your diastolic blood pressure is the bottom number.  This is the pressure in between beats.  If your systolic blood pressure is less than 120 and your diastolic blood pressure is less than 80, then your blood pressure is normal. There is nothing more that you need to do about it  If your systolic blood pressure is 120-139 or your diastolic blood pressure is 80-89, your blood pressure may be higher than it should be.  You should have your blood pressure re-checked within a year by a primary care provider.  If your systolic blood pressure is 140 or greater or your diastolic blood pressure is 90 or greater, you may have high blood pressure.  High blood pressure is treatable, but if left untreated over time it can put you at risk for heart attack, stroke, or kidney failure.  You should have your blood pressure re-checked by a primary care provider within the next four weeks.    1. CPAP-  WHAT DOES IT  DO AND HOW CAN I LEARN TO WEAR IT?                               BEFORE I START, CAN I WATCH A MOVIE TO GET A PLAN ON HOW TO USE CPAP?  https://www.youtube.com/watch?c=q2L79zq422E      Continuous positive airway pressure, or CPAP, is the most effective treatment for obstructive sleep apnea. It works by blowing room air, through a mask, to hold your throat open. A decision to use CPAP is a major step forward in the pursuit of a healthier life. The successful use of CPAP will help you breathe easier, sleep better and live healthier. You can choose CPAP equipment from any durable medical equipment provider that meets your needs.  Using CPAP can be a positive experience if you keep these ordonez points in mind:  1. Commitment  CPAP is not a quick fix for your problem. It involves a long-term commitment to improve your sleep and your health.    2. Communication  Stay in close communication with both your sleep doctor and your CPAP supplier. Ask lots of questions and seek help when you need it.    3. Consistency  Use CPAP all night, every night and for every nap. You will receive the maximum health benefits from CPAP when you use it every time that you sleep. This will also make it easier for your body to adjust to the treatment.    4. Correction  The first machine and mask that you try may not be the best ones for you. Work with your sleep doctor and your CPAP supplier to make corrections to your equipment selection. Ask about trying a different type of machine or mask if you have ongoing problems. Make sure that your mask is a good fit and learn to use your equipment properly.    5. Challenge  Tell a family member or close friend to ask you each morning if you used your CPAP the previous night. Have someone to challenge you to give it your best effort.    6. Connection   Your adjustment to CPAP will be easier if you are able to connect with others who use the same treatment. Ask your sleep doctor if there is a support group  "in your area for people who have sleep apnea, or look for one on the Internet.  7. Comfort   Increase your level of comfort by using a saline spray, decongestant or heated humidifier if CPAP irritates your nose, mouth or throat. Use your unit's \"ramp\" setting to slowly get used to the air pressure level. There may be soft pads you can buy that will fit over your mask straps. Look on www.CPAP.com for accessories that can help make CPAP use more comfortable.  8. Cleaning   Clean your mask, tubing and headgear on a regular basis. Put this time in your schedule so that you don't forget to do it. Check and replace the filters for your CPAP unit and humidifier.    9. Completion   Although you are never finished with CPAP therapy, you should reward yourself by celebrating the completion of your first month of treatment. Expect this first month to be your hardest period of adjustment. It will involve some trial and error as you find the machine, mask and pressure settings that are right for you.    10. Continuation  After your first month of treatment, continue to make a daily commitment to use your CPAP all night, every night and for every nap.    CPAP-Tips to starting with success:  Begin using your CPAP for short periods of time during the day while you watch TV or read.    Use CPAP every night and for every nap. Using it less often reduces the health benefits and makes it harder for your body to get used to it.    Make small adjustments to your mask, tubing, straps and headgear until you get the right fit. Tightening the mask may actually worsen the leak.  If it leaves significant marks on your face or irritates the bridge of your nose, it may not be the best mask for you.  Speak with the person who supplied the mask and consider trying other masks. Insurances will allow you to try different masks during the first month of starting CPAP.  Insurance also covers a new mask, hose and filter about every 6 months.    Use a " saline nasal spray to ease mild nasal congestion. Neti-Pot or saline nasal rinses may also help. Nasal gel sprays can help reduce nasal dryness.  Biotene mouthwash can be helpful to protect your teeth if you experience frequent dry mouth.  Dry mouth may be a sign of air escaping out of your mouth or out of the mask in the case of a full face mask.  Speak with your provider if you expect that is the case.     Take a nasal decongestant to relieve more severe nasal or sinus congestion.  Do not use Afrin (oxymetazoline) nasal spray more than 3 days in a row.  Speak with your sleep doctor if your nasal congestion is chronic.    Use a heated humidifier that fits your CPAP model to enhance your breathing comfort. Adjust the heat setting up if you get a dry nose or throat, down if you get condensation in the hose or mask.  Position the CPAP lower than you so that any condensation in the hose drains back into the machine rather than towards the mask.    Try a system that uses nasal pillows if traditional masks give you problems.    Clean your mask, tubing and headgear once a week. Make sure the equipment dries fully.    Regularly check and replace the filters for your CPAP unit and humidifier.    Work closely with your sleep provider and your CPAP supplier to make sure that you have the machine, mask and air pressure setting that works best for you. It is better to stop using it and call your provider to solve problems than to lay awake all night frustrated with the device.    Daytime naps are not advised, but use the PAP device if taking naps. Many insurances require that we prove you are using the PAP device at least 4 hours on at least 70% of nights over a 30 day period. We have 90 days to meet those criteria.    You can get new supplies (mask, hose and filter) for your device every 3-6 months (covered by insurance). You do not need to get supplies that often, but they are available if you would like them. You may  exchange the mask once within the first month if you feel the initial mask does not fit well. Please, contact your medical equipment provider for equipment issues.    Please let me know if you have any snoring, daytime sleepiness, or poor sleep quality. We will want to make sure your PAP device is adequately treating your condition.    There is a website called CPAP.com that has accessories that may make CPAP use easier. Please visit it at your convenience.    Please, schedule follow up visit with the nurse (she will coming into the room and meet with you).     Thank you!    Taye Phillips MD, MPH  Clinical Sleep and Occupational / Environmental Medicine

## 2018-08-29 ENCOUNTER — DOCUMENTATION ONLY (OUTPATIENT)
Dept: SLEEP MEDICINE | Facility: CLINIC | Age: 78
End: 2018-08-29
Payer: MEDICARE

## 2018-08-29 DIAGNOSIS — G47.39 COMPLEX SLEEP APNEA SYNDROME: Primary | ICD-10-CM

## 2018-08-29 NOTE — PROGRESS NOTES
Patient had known complex and sleep apnea, and he is struggling with a pressure of the CPAP, also had a mask leak.  Reviewed CPAP download, and mask refitting and use was evaluated at the sleep clinic today, will decrease his CPAP pressure of 13 cm water, and follow-up in 3 months.

## 2018-08-30 NOTE — PROGRESS NOTES
Milo brought in his machine and mask to go over how he is using and adjusting his equipment.  While here in clinic he demonstrated how he applies his mask.  When air pressure was started he started to breathe in deeply and was having difficulty using the pressure.  I explained that the machine is not breathing for him, he is to try to breathe naturally.  After few minutes his breathing calmed to normal.  He experienced the 15.0 cm of pressure, which again prompted his breathing to almost a gulp.  He shut the machine off, and then tried the 15.0 cm pressure again.  While he was using this pressure we talked about other things to try to get him to calm his breathing.  He explained this is what happens in the night, the higher pressure wakes him up as it is puffing out the sides of the mask.  His F20 mask appears to be adjusted well.  Mr. Serna says he is mainly on his back, and has a bed that he has the head raised at an incline.  He does not move much.  Asked Dr Yee to look at his download, and Dr Yee put in order to change pressure to 13.0 fixed .  Milo will try this pressure and call if he needs further instruction.  I have asked for another style of full face from sleep lab at [facility].  If, Milo continues to have trouble then ask him to try another mask.  [facility] so Milo cannot bill for another mask for 2 months.  Pressure was changed via modem to 13.0cm, with ramp at 7.0 cm.

## 2018-08-31 ENCOUNTER — DOCUMENTATION ONLY (OUTPATIENT)
Dept: SLEEP MEDICINE | Facility: CLINIC | Age: 78
End: 2018-08-31
Payer: MEDICARE

## 2018-09-04 NOTE — PROGRESS NOTES
Milo returned today as still having issues with mask.  We tried another style the simplus full face, size large,  however, this mask was difficult for him to use.  We readjusted the F20 mask he is using, and switched the foam cushion back to original silcone cushion.  Milo did say that he has been slipping in his bed, (sleep number with head of bed raised ), which is moving his mask.  Last night he sat in a chair with a pillow and had no issues with the mask puffing, or leaking.  Download of the night shows this, as well as the 13 cm pressure is working for him. Suggested either placing pillows under his knees , or using a yoga mat in bed to help with him slipping down in the bed.    He will call if any other issues.  Suggested he use the cpap during day even though he is compliant.  Just get used to breathing, and meditate at the same time .  He did this in the office and it did calm his breathing .    He will return in October to see Dr Yee, before leaving for the [place]

## 2018-09-05 ENCOUNTER — CARE COORDINATION (OUTPATIENT)
Dept: CARDIOLOGY | Facility: CLINIC | Age: 78
End: 2018-09-05

## 2018-09-05 NOTE — PROGRESS NOTES
Call from patient with questions on instructions for Cardiac MRI scheduled at 1100 on 9/19/2018 with arrival 1 hour prior for sedation for procedure with recommendation for .  Patient states he did not receive written instructions from scheduling and would like a written copy mailed to his home. Reviewed instructions for MRI testing per instruction sheet with patient.  Patient states if he cannot find a  he wants to plan on doing test without medication and use prayer instead - reviewed with patient would need to ensure he can lay flat and quiet without moving to help with adequate test. Spoke with scheduling to send instructions including arrival times. Patient states he will call back with any questions or concerns.  Patsy Donato RN 09/05/18 10:06 AM

## 2018-09-11 ENCOUNTER — OFFICE VISIT (OUTPATIENT)
Dept: PODIATRY | Facility: CLINIC | Age: 78
End: 2018-09-11
Payer: MEDICARE

## 2018-09-11 VITALS
SYSTOLIC BLOOD PRESSURE: 116 MMHG | DIASTOLIC BLOOD PRESSURE: 60 MMHG | HEIGHT: 67 IN | BODY MASS INDEX: 28.72 KG/M2 | WEIGHT: 183 LBS

## 2018-09-11 DIAGNOSIS — M79.671 RIGHT FOOT PAIN: ICD-10-CM

## 2018-09-11 DIAGNOSIS — M21.41 PES PLANUS OF BOTH FEET: ICD-10-CM

## 2018-09-11 DIAGNOSIS — G57.91 NEUROPATHY OF RIGHT FOOT: Primary | ICD-10-CM

## 2018-09-11 DIAGNOSIS — M21.42 PES PLANUS OF BOTH FEET: ICD-10-CM

## 2018-09-11 DIAGNOSIS — L84 PRE-ULCERATIVE CALLUSES: ICD-10-CM

## 2018-09-11 PROCEDURE — 11055 PARING/CUTG B9 HYPRKER LES 1: CPT | Performed by: PODIATRIST

## 2018-09-11 RX ORDER — LIDOCAINE 50 MG/G
PATCH TOPICAL
Qty: 30 PATCH | Refills: 0 | Status: SHIPPED | OUTPATIENT
Start: 2018-09-11 | End: 2019-10-28

## 2018-09-11 NOTE — LETTER
"    9/11/2018         RE: Milo Serna  [address] [place] [address]        Dear Colleague,    Thank you for referring your patient, Milo Serna, to the [facility]. Please see a copy of my visit note below.    Podiatry / Foot and Ankle Surgery Progress Note    September 11, 2018    Subject: Patient was seen for follow up on painful right foot callus. States it was really sore last night. Not much pain when walking. Here with wife.     Objective:  Vitals:/60  Ht 5' 7\" (1.702 m)  Wt 183 lb (83 kg)  BMI 28.66 kg/m2    Dermatologic: localized nucleated hyperkeratotic lesion plantar left arch. No open lesions on debridement. No signs of infection noted.      Vascular: DP & PT pulses are intact & regular bilaterally.  No significant edema but varicosities noted.  CFT and skin temperature is normal to both lower extremities.      Neurologic: Lower extremity sensation is intact to light touch.  No evidence of weakness or contracture in the lower extremities.  No evidence of neuropathy.      Musculoskeletal: Patient is ambulatory without assistive device or brace. Decrease arch height. Pain on palpation of callus at plantar arch..      ASSESSMENT:    Neuropathy of right foot  Pre-ulcerative corn or callous  Pes planus of both feet  Foot pain, right      PLAN:  Reviewed patient's chart in epic. Discussed causes of keratomas.  They are due to areas of increase friction.  Hammertoes can create these as they put more pressure to the metatarsal head.  Discussed treatments such as using foot file, pumice stone, metatarsal pads, orthotics, and not walking barefoot.      We discussed causes and treatments of flat feet.  Overtime, flat feet or falling arches can become painful and possible cause tension to the posterior tibial tendon leading to tendonitis or possible tear/rupture.  Conservatively we treat these with arch supports, shoe gear, physical therapy, immobilization and " sometimes, surgically such as multiple fusions in the foot to help stabilize the foot. Surgery is quite involved and requires 6-10 weeks non weight bearing followed by 1 month of protected weight bearing. He is not a good surgical candidate.  The risks outweigh the benefits.      At this time, pre ulcer callus was debrided, shoe was modified to offload callus. Recommend pumice stone, lotion to area. Was given order for lidocaine patch to try to further help with the pain.       PRocedure: After verbal consent, the porokeratoses was debrided using a #15 blade without incident. Patient tolerated procedure well.       Zulay Alexandra DPM, Podiatry/Foot and Ankle Surgery    Weight management plan: Patient was referred to their PCP to discuss a diet and exercise plan.      Again, thank you for allowing me to participate in the care of your patient.        Sincerely,        Zulay Alexandra DPM, Podiatry/Foot and Ankle Surgery

## 2018-09-11 NOTE — PROGRESS NOTES
"Podiatry / Foot and Ankle Surgery Progress Note    September 11, 2018    Subject: Patient was seen for follow up on painful right foot callus. States it was really sore last night. Not much pain when walking. Here with wife.     Objective:  Vitals:/60  Ht 5' 7\" (1.702 m)  Wt 183 lb (83 kg)  BMI 28.66 kg/m2    Dermatologic: localized nucleated hyperkeratotic lesion plantar left arch. No open lesions on debridement. No signs of infection noted.      Vascular: DP & PT pulses are intact & regular bilaterally.  No significant edema but varicosities noted.  CFT and skin temperature is normal to both lower extremities.      Neurologic: Lower extremity sensation is intact to light touch.  No evidence of weakness or contracture in the lower extremities.  No evidence of neuropathy.      Musculoskeletal: Patient is ambulatory without assistive device or brace. Decrease arch height. Pain on palpation of callus at plantar arch..      ASSESSMENT:    Neuropathy of right foot  Pre-ulcerative corn or callous  Pes planus of both feet  Foot pain, right      PLAN:  Reviewed patient's chart in epic. Discussed causes of keratomas.  They are due to areas of increase friction.  Hammertoes can create these as they put more pressure to the metatarsal head.  Discussed treatments such as using foot file, pumice stone, metatarsal pads, orthotics, and not walking barefoot.      We discussed causes and treatments of flat feet.  Overtime, flat feet or falling arches can become painful and possible cause tension to the posterior tibial tendon leading to tendonitis or possible tear/rupture.  Conservatively we treat these with arch supports, shoe gear, physical therapy, immobilization and sometimes, surgically such as multiple fusions in the foot to help stabilize the foot. Surgery is quite involved and requires 6-10 weeks non weight bearing followed by 1 month of protected weight bearing. He is not a good surgical candidate.  The risks " outweigh the benefits.      At this time, pre ulcer callus was debrided, shoe was modified to offload callus. Recommend pumice stone, lotion to area. Was given order for lidocaine patch to try to further help with the pain.       PRocedure: After verbal consent, the porokeratoses was debrided using a #15 blade without incident. Patient tolerated procedure well.       Zulay Alexandra DPM, Podiatry/Foot and Ankle Surgery    Weight management plan: Patient was referred to their PCP to discuss a diet and exercise plan.

## 2018-09-11 NOTE — MR AVS SNAPSHOT
After Visit Summary   9/11/2018    Milo Serna    MRN: [id number]           Patient Information     Date Of Birth          1940        Visit Information        Provider Department      9/11/2018 10:45 AM Zulay Alexandra DPM, Podiatry/Foot and Ankle Surgery [facility]        Today's Diagnoses     Neuropathy of right foot    -  1    Pre-ulcerative calluses        Pes planus of both feet        Right foot pain          Care Instructions    Thank you for choosing [facility]!    DR. ALEXANDRA'S CLINIC SCHEDULE  MONDAY AM - [place] TUESDAY - [place]   [address] [address] [address] [place] [address] [place] [address]   [phone] / [phone] / [phone]       WEDNESDAY - [place] FRIDAY AM - [facility]   [address]   [place] [address] [place] [address]   [phone] / [phone]       FRIDAY PM - [place] SCHEDULE SURGERY: [phone]   [address] BILLING QUESTIONS: [phone]   [place] [address] AFTER HOURS: [phone]   [phone] / [phone] APPOINTMENTS: [phone]     Consumer Price Line (CPL) [phone]       CALLUS / CORNS / IPKs  When there is excessive friction or pressure on the skin, the body responds by making the skin thicker to protect the deeper structures from becoming exposed. While this works well to protect the deeper structures, the thickened skin can increase pressure and pain.    CALLUS: Flat, diffuse thickening are simple calluses and they are usually caused by friction. Often these are the result of rubbing on a shoe or going barefoot.    CORNS: Calluses with a central core between the toes are called corns. These result from prominent joints on adjacent toes rubbing together. Theses are a symptom of bone malalignment and will always recur unless the underlying bones are addressed  surgically.    IPKs: Calluses with a central core on the ball of the foot are usually IPKs (intractable plantar keratosis). These are caused by excessive pressure from the metatarsals, the bones that make up the ball of the foot. Often one of these bones is too long or too prominent.  Again, these will always recur unless the underlying bone issue is addressed. There is no cure for these. They will either go away by themselves, recur, or more could develop.    ROUTINE MAINTENANCE  1. File them down with a pumice stone or callus file a couple times a week.   2. An electric callus removing device. Amope Pedi Perfect Electronic Pedicure Foot File and Callus Remover can be a good option.   3. Lotion can be applied to soften the callus. A urea based cream such as Kersal or Vanicream or thicker cream with shea butter are good options.  4. Toe spacers or toe covers can be used for corns, gel pads can be used for other lesions on the bottom of the foot.   If there is a surgical pathology noted, such as a prominent bone, often this needs to be addressed surgically to minimize recurrence. However, sometimes the lesion simply migrates to another spot after surgery, so it is not a guaranteed cure.             Body Mass Index (BMI)  Many things can cause foot and ankle problems. Foot structure, activity level, foot mechanics and injuries are common causes of pain.  One very important issue that often goes unmentioned, is body weight. Extra weight can cause increased stress on muscles, ligaments, bones and tendons.  Sometimes just a few extra pounds is all it takes to put one over her/his threshold. Without reducing that stress, it can be difficult to alleviate pain. Some people are uncomfortable addressing this issue, but we feel it is important for you to think about it. As Foot &  Ankle specialists, our job is addressing the lower extremity problem and possible causes. Regarding extra body weight, we encourage patients to  discuss diet and weight management plans with their primary care doctors. It is this team approach that gives you the best opportunity for pain relief and getting you back on your feet.                  Follow-ups after your visit        Your next 10 appointments already scheduled     Sep 17, 2018  9:00 AM CDT   Holter Monitor with [facility] HOLTER PROC [facility] ([facility])    [address]  [facility] [address]   [phone]           LOCATION - [address], [address] [place] [address] **Please check-in at the [place] Registration Office, [address], in the [facility] building. When you are finished registering, please go to [address] and have a seat.            Sep 19, 2018 11:00 AM CDT   MR MYOCARDIUM W CONTRAST with SCIMR1   [facility] ([facility])    [address] [place] [address]  [address]  [facility] [address]   [phone]           Take your medicines as usual, unless your doctor tells you not to. Bring a list of your current medicines to your exam (including vitamins, minerals and over-the-counter drugs).  You may or may not receive intravenous (IV) contrast for this exam pending the discretion of the Radiologist.  You do not need to do anything special to prepare.  The MRI machine uses a strong magnet. Please wear clothes without metal (snaps, zippers). A sweatsuit works well, or we may give you a hospital gown.  Please remove any body piercings and hair extensions before you arrive. You will also remove watches, jewelry, hairpins, wallets, dentures, partial dental plates and hearing aids. You may wear contact lenses, and you may be able to wear your rings. We have a safe place to keep your personal items, but it is safer to leave them at home.  **IMPORTANT** THE INSTRUCTIONS BELOW ARE ONLY FOR THOSE PATIENTS WHO HAVE BEEN PRESCRIBED SEDATION OR GENERAL  ANESTHESIA DURING THEIR MRI PROCEDURE:  IF YOUR DOCTOR PRESCRIBED ORAL SEDATION (take medicine to help you relax during your exam):   You must get the medicine from your doctor (oral medication) before you arrive. Bring the medicine to the exam. Do not take it at home. You ll be told when to take it upon arriving for your exam.   Arrive one hour early. Bring someone who can take you home after the test. Your medicine will make you sleepy. After the exam, you may not drive, take a bus or take a taxi by yourself.  IF YOUR DOCTOR PRESCRIBED IV SEDATION:   Arrive one hour early. Bring someone who can take you home after the test. Your medicine will make you sleepy. After the exam, you may not drive, take a bus or take a taxi by yourself.   No eating 6 hours before your exam. You may have clear liquids up until 4 hours before your exam. (Clear liquids include water, clear tea, black coffee and fruit juice without pulp.)  IF YOUR DOCTOR PRESCRIBED ANESTHESIA (be asleep for your exam):   Arrive 1 1/2 hours early. Bring someone who can take you home after the test. You may not drive, take a bus or take a taxi by yourself.   No eating 8 hours before your exam. You may have clear liquids up until 4 hours before your exam. (Clear liquids include water, clear tea, black coffee and fruit juice without pulp.)   You will spend four to five hours in the recovery room.  Please call the Imaging Department at your exam site with any questions.            Sep 19, 2018 12:10 PM CDT   LAB with [facility]   [facility] ([facility])    [address] [place] [address] [phone]           Please do not eat 10-12 hours before your appointment if you are coming in fasting for labs on lipids, cholesterol, or glucose (sugar). This does not apply to pregnant women. Water, hot tea and black coffee (with nothing added) are okay. Do not drink other fluids, diet soda or chew  gum.            Sep 27, 2018  9:45 AM CDT   Pulmonary Hypertension Return with Sixto Conley MD   [facility] ([facility])    [address]  [facility] [id number] [phone]            Oct 02, 2018  9:00 AM CDT   SHORT with Oscar Alves MD   [facility]    [id number] [place] [address] [phone]            Oct 09, 2018  9:00 AM CDT   Return Sleep Patient with Maicol Yee MD   [facility] ([facility])    [address]  [facility] [id number] [phone]              Who to contact     If you have questions or need follow up information about today's clinic visit or your schedule please contact [facility] directly at [phone].  Normal or non-critical lab and imaging results will be communicated to you by Ascadehart, letter or phone within 4 business days after the clinic has received the results. If you do not hear from us within 7 days, please contact the clinic through uShipt or phone. If you have a critical or abnormal lab result, we will notify you by phone as soon as possible.  Submit refill requests through Streetcar or call your pharmacy and they will forward the refill request to us. Please allow 3 business days for your refill to be completed.          Additional Information About Your Visit        Streetcar Information     Streetcar gives you secure access to your electronic health record. If you see a primary care provider, you can also send messages to your care team and make appointments. If you have questions, please call your primary care clinic.  If you do not have a primary care provider, please call [phone] and they will assist you.        Care EveryWhere ID     This is your Care EveryWhere ID. This could be used by other organizations to access your  "[facility] medical records  [id number]        Your Vitals Were     Height BMI (Body Mass Index)                5' 7\" (1.702 m) 28.66 kg/m2           Blood Pressure from Last 3 Encounters:   09/11/18 116/60   08/28/18 120/65   08/02/18 139/62    Weight from Last 3 Encounters:   09/11/18 183 lb (83 kg)   08/28/18 183 lb (83 kg)   08/02/18 183 lb (83 kg)              Today, you had the following     No orders found for display         Today's Medication Changes          These changes are accurate as of 9/11/18 11:27 AM.  If you have any questions, ask your nurse or doctor.               Start taking these medicines.        Dose/Directions    lidocaine 5 % Patch   Commonly known as:  LIDODERM   Used for:  Neuropathy of right foot, Pre-ulcerative calluses, Pes planus of both feet, Right foot pain   Started by:  Zulay Alexandra DPM, Podiatry/Foot and Ankle Surgery        Apply 1 patches to bottom of right foot for up to 12 h within a 24 h period.  Remove after 12 hours.   Quantity:  30 patch   Refills:  0         These medicines have changed or have updated prescriptions.        Dose/Directions    MULTIVITAL Chew   This may have changed:  how much to take   Used for:  Neuropathy of foot        Dose:  2 chew tab   Take 2 chew tab by mouth 2 times daily   Quantity:  360 tablet   Refills:  3       senna 8.6 MG tablet   Commonly known as:  SENOKOT   This may have changed:    - when to take this  - reasons to take this   Used for:  Prostate cancer (H)        Dose:  1 tablet   Take 1 tablet by mouth daily   Quantity:  60 tablet   Refills:  0            Where to get your medicines      These medications were sent to [id number] - [place], [place] - [address]  [address] [facility] [address]    Hours:  9Am-9Pm (M-F) 9Am-6Pm (S&S) Phone:  [phone]     lidocaine 5 % Patch                Primary Care Provider Office Phone # Fax #    Oscar Alves [phone] [address] [facility] [address]        Equal Access to Services     [facility] CenterWILFRIDO : Hadii aad ku hadsushilataya Soledad, waaxda luqadaha, qaybta kaalmada julián, chayito abbasi. So [facility] [phone].    ATENCIÓN: Si habla español, tiene a randall disposición servicios gratuitos de asistencia lingüística. Gloria al [phone].    We comply with applicable federal civil rights laws and [place] laws. We do not discriminate on the basis of race, color, national origin, age, disability, sex, sexual orientation, or gender identity.            Thank you!     Thank you for choosing [facility]  for your care. Our goal is always to provide you with excellent care. Hearing back from our patients is one way we can continue to improve our services. Please take a few minutes to complete the written survey that you may receive in the mail after your visit with us. Thank you!             Your Updated Medication List - Protect others around you: Learn how to safely use, store and throw away your medicines at www.disposemymeds.org.          This list is accurate as of 9/11/18 11:27 AM.  Always use your most recent med list.                   Brand Name Dispense Instructions for use Diagnosis    acetaminophen 650 MG CR tablet    TYLENOL    100 tablet    Take 1 tablet (650 mg) by mouth every 6 hours as needed for pain    Osteoarthritis       apixaban ANTICOAGULANT 2.5 MG tablet    ELIQUIS    200 tablet    Take 1 tablet (2.5 mg) by mouth 2 times daily    Chronic atrial fibrillation (H)       aspirin 81 MG tablet      Take 81 mg by mouth daily        co-enzyme Q-10 100 MG Caps capsule     180 capsule    Take 1 capsule (100 mg) by mouth daily    Mixed hyperlipidemia       diclofenac 1 % Gel topical gel    VOLTAREN    100 g    Apply 4 gramsto area qid prn    Acute left-sided low back pain without sciatica       doxycycline 100 MG capsule    VIBRAMYCIN    90 capsule    TAKE 1 CAPSULE DAILY    Rosacea       FERROUS  GLUCONATE PO      Take 324 mg by mouth daily (with breakfast)        Glucosamine-Chondroitin 250-200 MG Tabs    OSTEO BI-FLEX REGULAR STRENGTH    100 tablet    Take 1 tablet by mouth 2 times daily    Osteoarthritis       lidocaine 5 % Patch    LIDODERM    30 patch    Apply 1 patches to bottom of right foot for up to 12 h within a 24 h period.  Remove after 12 hours.    Neuropathy of right foot, Pre-ulcerative calluses, Pes planus of both feet, Right foot pain       lisinopril 20 MG tablet    PRINIVIL/ZESTRIL    90 tablet    Take 1 tablet (20 mg) by mouth daily    HTN, goal below 150/90       MELATONIN PO      Take 1 tablet by mouth At Bedtime        metoprolol succinate 25 MG 24 hr tablet    TOPROL-XL    90 tablet    Take 0.5 tablets (12.5 mg) by mouth daily (with breakfast)    Frequent PVCs, Chronic atrial fibrillation (H)       MULTIVITAL Chew     360 tablet    Take 2 chew tab by mouth 2 times daily    Neuropathy of foot       naftifine 1 % Gel topical gel    NAFTIN    90 g    Apply topically daily Apply to affected nails daily    Onychomycosis of toenail       OMEGA 3-6-9 PO      Take 1 tablet by mouth daily        omeprazole 40 MG capsule    priLOSEC    90 capsule    TAKE 1 CAPSULE DAILY    PUD (peptic ulcer disease)       senna 8.6 MG tablet    SENOKOT    60 tablet    Take 1 tablet by mouth daily    Prostate cancer (H)       simvastatin 20 MG tablet    ZOCOR    90 tablet    Take 1 tablet (20 mg) by mouth At Bedtime    Hyperlipidemia LDL goal <100       sodium chloride 0.65 % nasal spray    OCEAN NASAL SPRAY    2 Bottle    Spray 1 spray into both nostrils daily as needed for congestion    Bloody nose       tamsulosin 0.4 MG capsule    FLOMAX    90 capsule    TAKE 1 CAPSULE DAILY    Benign prostatic hyperplasia with lower urinary tract symptoms       torsemide 20 MG tablet    DEMADEX    90 tablet    Take 1 tablet (20 mg) by mouth daily (with breakfast)    Right heart failure with reduced right ventricular  function, Chronic combined systolic and diastolic congestive heart failure (H)       Urea 20 % Crea cream     120 g    Apply topically as needed Apply to right foot daily    Right foot pain, Skin callus, Pes planus of both feet, Venous insufficiency of both lower extremities, Hav (hallux abducto valgus), right, Onychomycosis of toenail

## 2018-09-11 NOTE — PATIENT INSTRUCTIONS
Thank you for choosing [facility]!    DR. RODRIGUES'S CLINIC SCHEDULE  MONDAY AM - [place] TUESDAY - [place]   [address] [address] [place] [address] [place] [address]   [phone] / [phone] / [phone]       WEDNESDAY - [place] FRIDAY AM - [facility]   [address]   [place] [address] [place] [address]   [phone] / [phone]       FRIDAY PM - [place] SCHEDULE SURGERY: [phone]   [address] BILLING QUESTIONS: [phone]   [place] [address] AFTER HOURS: [phone]   [phone] / [phone] APPOINTMENTS: [phone]     Consumer Price Line (CPL) [phone]       CALLUS / CORNS / IPKs  When there is excessive friction or pressure on the skin, the body responds by making the skin thicker to protect the deeper structures from becoming exposed. While this works well to protect the deeper structures, the thickened skin can increase pressure and pain.    CALLUS: Flat, diffuse thickening are simple calluses and they are usually caused by friction. Often these are the result of rubbing on a shoe or going barefoot.    CORNS: Calluses with a central core between the toes are called corns. These result from prominent joints on adjacent toes rubbing together. Theses are a symptom of bone malalignment and will always recur unless the underlying bones are addressed surgically.    IPKs: Calluses with a central core on the ball of the foot are usually IPKs (intractable plantar keratosis). These are caused by excessive pressure from the metatarsals, the bones that make up the ball of the foot. Often one of these bones is too long or too prominent.  Again, these will always recur unless the underlying bone issue is addressed. There is no cure for these. They will either go away by themselves, recur, or more could develop.    ROUTINE MAINTENANCE  1. File them down  with a pumice stone or callus file a couple times a week.   2. An electric callus removing device. Amope Pedi Perfect Electronic Pedicure Foot File and Callus Remover can be a good option.   3. Lotion can be applied to soften the callus. A urea based cream such as Kersal or Vanicream or thicker cream with shea butter are good options.  4. Toe spacers or toe covers can be used for corns, gel pads can be used for other lesions on the bottom of the foot.   If there is a surgical pathology noted, such as a prominent bone, often this needs to be addressed surgically to minimize recurrence. However, sometimes the lesion simply migrates to another spot after surgery, so it is not a guaranteed cure.             Body Mass Index (BMI)  Many things can cause foot and ankle problems. Foot structure, activity level, foot mechanics and injuries are common causes of pain.  One very important issue that often goes unmentioned, is body weight. Extra weight can cause increased stress on muscles, ligaments, bones and tendons.  Sometimes just a few extra pounds is all it takes to put one over her/his threshold. Without reducing that stress, it can be difficult to alleviate pain. Some people are uncomfortable addressing this issue, but we feel it is important for you to think about it. As Foot &  Ankle specialists, our job is addressing the lower extremity problem and possible causes. Regarding extra body weight, we encourage patients to discuss diet and weight management plans with their primary care doctors. It is this team approach that gives you the best opportunity for pain relief and getting you back on your feet.

## 2018-09-13 ENCOUNTER — CARE COORDINATION (OUTPATIENT)
Dept: CARDIOLOGY | Facility: CLINIC | Age: 78
End: 2018-09-13

## 2018-09-13 NOTE — PROGRESS NOTES
Call from patient Dr Conley review and approval to use lidocaine 0.5% patch for pain from a callous on his foot.  He did receive the Rx from his Primary MD and picked up prescription but would like Dr Conley to approve use.  Messaged to DR Conley.  Patsy Donato RN 09/13/18 11:12 AM    Yes, that should be no problem with lidocaine patch.  Thank you.        9/17/2018                 Called to patient with Dr Conley response.  Patsy Donato RN 09/18/18 10:10 AM

## 2018-09-17 ENCOUNTER — HOSPITAL ENCOUNTER (OUTPATIENT)
Dept: CARDIOLOGY | Facility: CLINIC | Age: 78
Discharge: HOME | End: 2018-09-17
Attending: INTERNAL MEDICINE | Admitting: INTERNAL MEDICINE
Payer: MEDICARE

## 2018-09-17 DIAGNOSIS — I49.3 FREQUENT PVCS: ICD-10-CM

## 2018-09-17 DIAGNOSIS — I48.20 CHRONIC ATRIAL FIBRILLATION (H): ICD-10-CM

## 2018-09-17 DIAGNOSIS — I50.810 RIGHT HEART FAILURE, NYHA CLASS 1 (H): ICD-10-CM

## 2018-09-17 DIAGNOSIS — Z95.1 STATUS POST CORONARY ARTERY BYPASS GRAFT: ICD-10-CM

## 2018-09-17 DIAGNOSIS — I50.42 CHRONIC COMBINED SYSTOLIC AND DIASTOLIC CONGESTIVE HEART FAILURE (H): ICD-10-CM

## 2018-09-17 PROCEDURE — 93225 XTRNL ECG REC<48 HRS REC: CPT | Performed by: INTERNAL MEDICINE

## 2018-09-17 PROCEDURE — 93227 XTRNL ECG REC<48 HR R&I: CPT | Performed by: INTERNAL MEDICINE

## 2018-09-19 ENCOUNTER — HOSPITAL ENCOUNTER (OUTPATIENT)
Dept: CARDIOLOGY | Facility: CLINIC | Age: 78
Discharge: HOME | End: 2018-09-19
Attending: INTERNAL MEDICINE | Admitting: INTERNAL MEDICINE
Payer: MEDICARE

## 2018-09-19 VITALS — HEART RATE: 54 BPM | DIASTOLIC BLOOD PRESSURE: 77 MMHG | SYSTOLIC BLOOD PRESSURE: 153 MMHG | OXYGEN SATURATION: 97 %

## 2018-09-19 DIAGNOSIS — Z95.1 STATUS POST CORONARY ARTERY BYPASS GRAFT: ICD-10-CM

## 2018-09-19 DIAGNOSIS — I50.810 RIGHT HEART FAILURE, NYHA CLASS 1 (H): ICD-10-CM

## 2018-09-19 DIAGNOSIS — I48.20 CHRONIC ATRIAL FIBRILLATION (H): ICD-10-CM

## 2018-09-19 DIAGNOSIS — I50.42 CHRONIC COMBINED SYSTOLIC AND DIASTOLIC CONGESTIVE HEART FAILURE (H): ICD-10-CM

## 2018-09-19 DIAGNOSIS — I49.3 FREQUENT PVCS: ICD-10-CM

## 2018-09-19 LAB
ANION GAP SERPL CALCULATED.3IONS-SCNC: 12.7 MMOL/L (ref 6–17)
BUN SERPL-MCNC: 13 MG/DL (ref 7–30)
CALCIUM SERPL-MCNC: 10.2 MG/DL (ref 8.5–10.5)
CHLORIDE SERPL-SCNC: 102 MMOL/L (ref 98–107)
CO2 SERPL-SCNC: 28 MMOL/L (ref 23–29)
CREAT SERPL-MCNC: 1.08 MG/DL (ref 0.7–1.3)
GFR SERPL CREATININE-BSD FRML MDRD: 66 ML/MIN/1.7M2
GLUCOSE SERPL-MCNC: 100 MG/DL (ref 70–105)
POTASSIUM SERPL-SCNC: 3.7 MMOL/L (ref 3.5–5.1)
SODIUM SERPL-SCNC: 139 MMOL/L (ref 136–145)

## 2018-09-19 PROCEDURE — 36415 COLL VENOUS BLD VENIPUNCTURE: CPT | Performed by: INTERNAL MEDICINE

## 2018-09-19 PROCEDURE — 80048 BASIC METABOLIC PNL TOTAL CA: CPT | Performed by: INTERNAL MEDICINE

## 2018-09-19 PROCEDURE — 75561 CARDIAC MRI FOR MORPH W/DYE: CPT | Mod: 26 | Performed by: INTERNAL MEDICINE

## 2018-09-19 PROCEDURE — A9270 NON-COVERED ITEM OR SERVICE: HCPCS | Mod: GY | Performed by: INTERNAL MEDICINE

## 2018-09-19 PROCEDURE — 25000132 ZZH RX MED GY IP 250 OP 250 PS 637: Mod: GY | Performed by: INTERNAL MEDICINE

## 2018-09-19 PROCEDURE — 25500064 ZZH RX 255 OP 636: Performed by: INTERNAL MEDICINE

## 2018-09-19 PROCEDURE — A9585 GADOBUTROL INJECTION: HCPCS | Performed by: INTERNAL MEDICINE

## 2018-09-19 PROCEDURE — 75561 CARDIAC MRI FOR MORPH W/DYE: CPT

## 2018-09-19 RX ORDER — DIPHENHYDRAMINE HYDROCHLORIDE 50 MG/ML
25-50 INJECTION INTRAMUSCULAR; INTRAVENOUS
Status: DISCONTINUED | OUTPATIENT
Start: 2018-09-19 | End: 2018-09-20 | Stop reason: HOSPADM

## 2018-09-19 RX ORDER — GADOBUTROL 604.72 MG/ML
5-65 INJECTION INTRAVENOUS ONCE
Status: COMPLETED | OUTPATIENT
Start: 2018-09-19 | End: 2018-09-19

## 2018-09-19 RX ORDER — ONDANSETRON 2 MG/ML
4 INJECTION INTRAMUSCULAR; INTRAVENOUS
Status: DISCONTINUED | OUTPATIENT
Start: 2018-09-19 | End: 2018-09-20 | Stop reason: HOSPADM

## 2018-09-19 RX ORDER — METHYLPREDNISOLONE SODIUM SUCCINATE 125 MG/2ML
125 INJECTION, POWDER, LYOPHILIZED, FOR SOLUTION INTRAMUSCULAR; INTRAVENOUS
Status: DISCONTINUED | OUTPATIENT
Start: 2018-09-19 | End: 2018-09-20 | Stop reason: HOSPADM

## 2018-09-19 RX ORDER — DIAZEPAM 5 MG
5 TABLET ORAL EVERY 30 MIN PRN
Status: DISCONTINUED | OUTPATIENT
Start: 2018-09-19 | End: 2018-09-20 | Stop reason: HOSPADM

## 2018-09-19 RX ORDER — ACYCLOVIR 200 MG/1
0-1 CAPSULE ORAL
Status: DISCONTINUED | OUTPATIENT
Start: 2018-09-19 | End: 2018-09-20 | Stop reason: HOSPADM

## 2018-09-19 RX ORDER — DIPHENHYDRAMINE HCL 25 MG
25 CAPSULE ORAL
Status: DISCONTINUED | OUTPATIENT
Start: 2018-09-19 | End: 2018-09-20 | Stop reason: HOSPADM

## 2018-09-19 RX ADMIN — DIAZEPAM 5 MG: 5 TABLET ORAL at 10:39

## 2018-09-19 RX ADMIN — GADOBUTROL 11 ML: 604.72 INJECTION INTRAVENOUS at 12:30

## 2018-09-19 NOTE — PROGRESS NOTES
Dario Gómez MD  P  [employer]                     CORE              Contrast administered per weight based protocol.

## 2018-09-20 LAB — INTERPRETATION MONITOR -MUSE: NORMAL

## 2018-09-27 ENCOUNTER — OFFICE VISIT (OUTPATIENT)
Dept: CARDIOLOGY | Facility: CLINIC | Age: 78
End: 2018-09-27
Attending: INTERNAL MEDICINE
Payer: MEDICARE

## 2018-09-27 ENCOUNTER — CARE COORDINATION (OUTPATIENT)
Dept: CARDIOLOGY | Facility: CLINIC | Age: 78
End: 2018-09-27

## 2018-09-27 VITALS
BODY MASS INDEX: 27.92 KG/M2 | HEART RATE: 52 BPM | HEIGHT: 67 IN | OXYGEN SATURATION: 99 % | DIASTOLIC BLOOD PRESSURE: 80 MMHG | SYSTOLIC BLOOD PRESSURE: 132 MMHG | WEIGHT: 177.9 LBS

## 2018-09-27 DIAGNOSIS — I49.3 FREQUENT PVCS: ICD-10-CM

## 2018-09-27 DIAGNOSIS — I10 BENIGN ESSENTIAL HYPERTENSION: ICD-10-CM

## 2018-09-27 DIAGNOSIS — Z95.1 STATUS POST CORONARY ARTERY BYPASS GRAFT: ICD-10-CM

## 2018-09-27 DIAGNOSIS — I50.42 CHRONIC COMBINED SYSTOLIC AND DIASTOLIC CONGESTIVE HEART FAILURE (H): Primary | ICD-10-CM

## 2018-09-27 DIAGNOSIS — I48.20 CHRONIC ATRIAL FIBRILLATION (H): ICD-10-CM

## 2018-09-27 DIAGNOSIS — G47.33 OSA (OBSTRUCTIVE SLEEP APNEA): ICD-10-CM

## 2018-09-27 PROCEDURE — 99214 OFFICE O/P EST MOD 30 MIN: CPT | Performed by: INTERNAL MEDICINE

## 2018-09-27 NOTE — LETTER
9/27/2018    Oscar Alves [place]  [address] [facility] [address]    RE: Milo Serna       Dear Colleague,    I had the pleasure of seeing Milo Serna in the [facility].      Clinic visit note dictated. Dictation reference number - [id number]    Thank you for allowing me to participate in the care of your patient.      Sincerely,     Sixto Conley MD     [facility]    cc:   Sixto Conley MD  [address] [place] [id number]

## 2018-09-27 NOTE — PROGRESS NOTES
Reviewed the AVS (After Visit Summary) with patient in detail following their office visit with Dr. Conley. The patient was educated on the outlined plan of care including ordered tests, labs, medication changes, and follow up. Patient verbalized understanding of the information and agrees to call with any questions or concerns.   Jr in [place] - patient states he has arranged with [facility] for his Rx to be shipped there during his stay and has updated refills - no needs at this time.    Return appointment: Patient was given instructions on when and how to schedule their next office visit with the PH clinic.     Patsy CARR, MA, RN  RN Care Coordinator  [facility]  [phone]

## 2018-09-27 NOTE — PATIENT INSTRUCTIONS
1.  No medication changes.    2.  My office will check with [facility], so all your cardiac medications can be renewed.    3.  No need to check your blood pressure at home.    4.  Enjoy your time in [place].  I will see you in approximately 10 months when you get back.  Previsit testing will include fasting labs (CBC, fasting lipid, TSH with reflex free T4, comprehensive metabolic panel, NT proBNP, complete transthoracic echocardiogram, ECG, 24-hour Holter). Please fast prior to labs.    If you have any questions or concerns, please call my nurse Patsy Gomez at [phone].

## 2018-09-27 NOTE — MR AVS SNAPSHOT
After Visit Summary   9/27/2018    Milo Serna    MRN: [id number]           Patient Information     Date Of Birth          1940        Visit Information        Provider Department      9/27/2018 9:45 AM Sixto Conley MD [facility]        Today's Diagnoses     Chronic combined systolic and diastolic congestive heart failure (H)    -  1    Chronic atrial fibrillation (H)        Frequent PVCs        Status post coronary artery bypass graft        Benign essential hypertension        LENORA (obstructive sleep apnea)          Care Instructions    1.  No medication changes.    2.  My office will check with [facility], so all your cardiac medications can be renewed.    3.  No need to check your blood pressure at home.    4.  Enjoy your time in [place].  I will see you in approximately 10 months when you get back.  Previsit testing will include fasting labs (CBC, fasting lipid, TSH with reflex free T4, comprehensive metabolic panel, NT proBNP, complete transthoracic echocardiogram, ECG, 24-hour Holter). Please fast prior to labs.    If you have any questions or concerns, please call my nurse Patsy Gomez at [phone].            Follow-ups after your visit        Additional Services     Follow-Up with Pulmonary Hypertension Clinic       With tests  In 10 months  Going to [place] for Winter                  Your next 10 appointments already scheduled     Oct 02, 2018  9:00 AM CDT   [facility]    [address] [place] [id number] [phone]            Oct 09, 2018  9:00 AM CDT   Return Sleep Patient with Maicol Yee MD   [facility] ([facility])    [address] [address] [address] [phone]              Future tests that were ordered for you today     Open  Future Orders        Priority Expected Expires Ordered    Lipid Profile Routine 8/30/2019 9/27/2019 9/27/2018    Follow-Up with Pulmonary Hypertension Clinic Routine 8/30/2019 9/27/2019 9/27/2018    EKG 12-lead complete w/read - Clinics Routine 8/30/2019 9/27/2019 9/27/2018    CBC with platelets differential Routine 8/30/2019 9/27/2019 9/27/2018    Comprehensive metabolic panel Routine 8/30/2019 9/27/2019 9/27/2018    N terminal pro BNP outpatient Routine 8/30/2019 9/27/2019 9/27/2018    TSH with free T4 reflex Routine 8/30/2019 9/27/2019 9/27/2018    Echocardiogram Routine 8/30/2019 9/27/2019 9/27/2018    Holter Monitor 24 hour - Adult Routine 8/30/2019 9/27/2019 9/27/2018            Who to contact     If you have questions or need follow up information about today's clinic visit or your schedule please contact [facility] directly at [phone].  Normal or non-critical lab and imaging results will be communicated to you by Wizzgohart, letter or phone within 4 business days after the clinic has received the results. If you do not hear from us within 7 days, please contact the clinic through Sverve or phone. If you have a critical or abnormal lab result, we will notify you by phone as soon as possible.  Submit refill requests through Sverve or call your pharmacy and they will forward the refill request to us. Please allow 3 business days for your refill to be completed.          Additional Information About Your Visit        Sverve Information     Sverve gives you secure access to your electronic health record. If you see a primary care provider, you can also send messages to your care team and make appointments. If you have questions, please call your primary care clinic.  If you do not have a primary care provider, please call [phone] and they will assist you.        Care EveryWhere ID     This is your Care EveryWhere ID. This could be used by other organizations  "to access your [facility] medical records  [id number]        Your Vitals Were     Pulse Height Pulse Oximetry BMI (Body Mass Index)          52 1.702 m (5' 7\") 99% 27.86 kg/m2         Blood Pressure from Last 3 Encounters:   09/27/18 132/80   09/19/18 153/77   09/11/18 116/60    Weight from Last 3 Encounters:   09/27/18 80.7 kg (177 lb 14.4 oz)   09/11/18 83 kg (183 lb)   08/28/18 83 kg (183 lb)              We Performed the Following     Follow-Up with Pulmonary Hypertension Clinic          Today's Medication Changes          These changes are accurate as of 9/27/18 10:56 AM.  If you have any questions, ask your nurse or doctor.               These medicines have changed or have updated prescriptions.        Dose/Directions    MULTIVITAL Chew   This may have changed:  how much to take   Used for:  Neuropathy of foot        Dose:  2 chew tab   Take 2 chew tab by mouth 2 times daily   Quantity:  360 tablet   Refills:  3       senna 8.6 MG tablet   Commonly known as:  SENOKOT   This may have changed:    - when to take this  - reasons to take this   Used for:  Prostate cancer (H)        Dose:  1 tablet   Take 1 tablet by mouth daily   Quantity:  60 tablet   Refills:  0                Primary Care Provider Office Phone # Fax #    Oscar Alves [phone] [address] [facility] [address]        Equal Access to Services     JOSE BHAKTA AH: Hadii porfirio mcrae Sohoda, waaxda luqadaha, qaybta kaalmada julián, chayito reyes . So [facility] [phone].    ATENCIÓN: Si habla español, tiene a randall disposición servicios gratuitos de asistencia lingüística. Gloria al [phone].    We comply with applicable federal civil rights laws and [place] laws. We do not discriminate on the basis of race, color, national origin, age, disability, sex, sexual orientation, or gender identity.            Thank you!     Thank you for choosing [facility]   " YULY  for your care. Our goal is always to provide you with excellent care. Hearing back from our patients is one way we can continue to improve our services. Please take a few minutes to complete the written survey that you may receive in the mail after your visit with us. Thank you!             Your Updated Medication List - Protect others around you: Learn how to safely use, store and throw away your medicines at www.disposemymeds.org.          This list is accurate as of 9/27/18 10:56 AM.  Always use your most recent med list.                   Brand Name Dispense Instructions for use Diagnosis    acetaminophen 650 MG CR tablet    TYLENOL    100 tablet    Take 1 tablet (650 mg) by mouth every 6 hours as needed for pain    Osteoarthritis       apixaban ANTICOAGULANT 2.5 MG tablet    ELIQUIS    200 tablet    Take 1 tablet (2.5 mg) by mouth 2 times daily    Chronic atrial fibrillation (H)       aspirin 81 MG tablet      Take 81 mg by mouth daily        co-enzyme Q-10 100 MG Caps capsule     180 capsule    Take 1 capsule (100 mg) by mouth daily    Mixed hyperlipidemia       diclofenac 1 % Gel topical gel    VOLTAREN    100 g    Apply 4 gramsto area qid prn    Acute left-sided low back pain without sciatica       doxycycline 100 MG capsule    VIBRAMYCIN    90 capsule    TAKE 1 CAPSULE DAILY    Rosacea       FERROUS GLUCONATE PO      Take 324 mg by mouth daily (with breakfast)        Glucosamine-Chondroitin 250-200 MG Tabs    OSTEO BI-FLEX REGULAR STRENGTH    100 tablet    Take 1 tablet by mouth 2 times daily    Osteoarthritis       lidocaine 5 % Patch    LIDODERM    30 patch    Apply 1 patches to bottom of right foot for up to 12 h within a 24 h period.  Remove after 12 hours.    Neuropathy of right foot, Pre-ulcerative calluses, Pes planus of both feet, Right foot pain       lisinopril 20 MG tablet    PRINIVIL/ZESTRIL    90 tablet    Take 1 tablet (20 mg) by mouth daily    HTN, goal below 150/90        MELATONIN PO      Take 1 tablet by mouth At Bedtime        metoprolol succinate 25 MG 24 hr tablet    TOPROL-XL    90 tablet    Take 0.5 tablets (12.5 mg) by mouth daily (with breakfast)    Frequent PVCs, Chronic atrial fibrillation (H)       MULTIVITAL Chew     360 tablet    Take 2 chew tab by mouth 2 times daily    Neuropathy of foot       naftifine 1 % Gel topical gel    NAFTIN    90 g    Apply topically daily Apply to affected nails daily    Onychomycosis of toenail       OMEGA 3-6-9 PO      Take 1 tablet by mouth daily        omeprazole 40 MG capsule    priLOSEC    90 capsule    TAKE 1 CAPSULE DAILY    PUD (peptic ulcer disease)       senna 8.6 MG tablet    SENOKOT    60 tablet    Take 1 tablet by mouth daily    Prostate cancer (H)       simvastatin 20 MG tablet    ZOCOR    90 tablet    Take 1 tablet (20 mg) by mouth At Bedtime    Hyperlipidemia LDL goal <100       sodium chloride 0.65 % nasal spray    OCEAN NASAL SPRAY    2 Bottle    Spray 1 spray into both nostrils daily as needed for congestion    Bloody nose       tamsulosin 0.4 MG capsule    FLOMAX    90 capsule    TAKE 1 CAPSULE DAILY    Benign prostatic hyperplasia with lower urinary tract symptoms       torsemide 20 MG tablet    DEMADEX    90 tablet    Take 1 tablet (20 mg) by mouth daily (with breakfast)    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, Chronic combined systolic and diastolic congestive heart failure (H)       Urea 20 % Crea cream     120 g    Apply topically as needed Apply to right foot daily    Right foot pain, Skin callus, Pes planus of both feet, Venous insufficiency of both lower extremities, Hav (hallux abducto valgus), right, Onychomycosis of toenail

## 2018-09-27 NOTE — PROGRESS NOTES
Service Date: 09/27/2018      PRIMARY CARE PROVIDER:  Oscar Alves MD       REASON FOR VISIT:  Followup of biventricular systolic heart failure and question of pulmonary hypertension.      HISTORY OF PRESENT ILLNESS:    Milo is a delightful patient and well known to me from previous visits.  He was unaccompanied today.  Milo is 77 years old, a retired  gentleman who underwent CABG several years ago in 1997 and has remained angina-free since then.  He has chronic atrial fibrillation that is well rate controlled and asymptomatic.  He is on low-dose Eliquis of 2.5 mg b.i.d. due to history of epistaxis 5 mg twice daily dose.  He has longstanding hypertension that is well treated.  Typically, his blood pressure at home is in the 120s/60s range and his office blood pressure is typically slightly elevated but still normal.  He has a stable ascending aorta dilatation of 4.2 cm.  He is a remote tobacco user and quit smoking in 1974.      I had initially seen Milo for progressive dilatation of the right ventricle with mildly reduced systolic function and an estimated echocardiographic pulmonary artery pressure of 40 mmHg.  We have ruled out an inter-atrial shunt but workup did reveal obstructive sleep apnea with an apnea-hypopnea index of 17 per hour.  He has been using CPAP for the last 2 months.      Milo remains without any symptoms.  As part of his Pulmonary Hypertension Clinic workup, we had enrolled him in the exercise rehabilitation program.  Following this, he has had significant symptomatic improvement.  He now walks for 45 minutes 5 days a week, has no symptoms for heart failure, his weight has been at all-time low of 177 pounds (and previously used to be as high as 185 pounds).  He denies any symptoms of angina (historically chest tightness), palpitations or heart failure.  He tells me he has never felt this good in several years.      His followup imaging is also very reassuring.  Cardiac MRI  shows that his left ventricle remains moderately dilated but his systolic function has improved from 35% to 51%.  He has hypokinesis of the inferior wall that is previously known.  His right ventricular systolic function has also normalized with an RVEF of 54% and the right ventricle remains stable and moderately dilated.  Both atria are normal in size.  He has moderate mitral regurgitation.  Gadolinium enhancement shows a prior infarct in the right coronary artery distribution.      We had done a 24-hour Holter for frequent PVCs.  The PVC burden has decreased from 22% to 18%.  Atrial fibrillation is well rate controlled with an average heart rate of 53 BPM (he is on low-dose Toprol-XL 12.5 mg daily).      Labs:  Well-controlled lipid panel with a total cholesterol of 110, HDL 59, LDL 45, triglycerides 28 (on 20 mg of simvastatin).  Sodium 139, potassium 3.7, BUN 13, creatinine 1.0, estimated GFR 66.  TSH 1.4.  Hemoglobin 12.4, chronic mild thrombocytopenia of 110.      CURRENT MEDICATIONS:   1.  Apixaban 2.5 mg twice daily (lower dose due to significant epistaxis on the higher dose).   2.  Aspirin 81 mg daily for CAD.     3.  Diclofenac gel topical use.    4.  Coenzyme Q10 at 100 mg daily.   5.  Glucosamine chondroitin.   6.  Lisinopril 20 mg daily.   7.  Metoprolol XL 12.5 mg daily in the morning.    8.  Over-the-counter fish oil.    9.  Omeprazole 40 mg daily.   10.  Simvastatin 20 mg daily.   11.  Torsemide 20 mg daily.   12.  Tamsulosin (Flomax) 0.4 mg daily.   13.  Melatonin as needed for bedtime.      ALLERGIES:  No known allergies.      PHYSICAL EXAMINATION:   VITAL SIGNS:  Blood pressure 140/82 mmHg, pulse 52 per minute, irregularly irregular, height 5 feet 7 inches (1.7 meters), weight 80.7 kg (177 pounds), respiratory rate 16 per minute, sats 99% on room air, BMI 27.9 kg/m2.   CONSTITUTIONAL:  Comfortable at rest.  Appears younger than his stated age, well developed and nourished with good muscle mass.    EYES:  No pallor.   ENT:  No cyanosis or conversational dyspnea.   RESPIRATORY:  Bilaterally clear to auscultation.   CARDIOVASCULAR:  Normal JVP.  Normal carotid pulse.  Midline well-healed sternotomy scar.  Normal apical impulse which is possibly slightly laterally displaced.  Heart sounds are irregularly irregular.  No audible murmur.   GASTROINTESTINAL:  Soft, nontender.   EXTREMITIES:  No edema.   NEUROPSYCHIATRIC:  Normal.      DIAGNOSES:   1.  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  Etiology ischemic cardiomyopathy, euvolemic status.   2.  Chronic atrial fibrillation, rate control and low-dose Eliquis anticoagulation strategy due to epistaxis with higher dosage.   3.  Frequent premature ventricular complexes (18% ectopy burden, asymptomatic).   4.  Stable coronary artery disease status post remote CABG in 1997 (graft status not known).   5.  Optimally treated hypertension.   6.  Optimally treated hyperlipidemia (LDL 45 on low-dose simvastatin).   7.  Obstructive sleep apnea, CPAP compliant.      ASSESSMENT AND PLAN:    Milo is doing extremely well.  He has increased his daily exercise activity, his LV function has improved, his right ventricular size and systolic function have improved, his obstructive sleep apnea is well treated, his atrial fibrillation is appropriately treated and his stable hypertension optimally treated.  Although he is only on 20 mg of simvastatin, his LDL is 45 and optimal.     1.  Renewed prescriptions.   2.  Milo is going down to [place] for the winter.  He will be back in [place] in April.  I will plan on seeing him next year in the Pulmonary Hypertension Clinic.   3.  As his biventricular function and clinical status have markedly improved, I do not think there is much benefit in doing a right heart catheterization to evaluate him further for pulmonary hypertension.  We will watch him conservatively.   His risk factors are optimally addressed.      It was my pleasure to visit with Milo.  I look forward to seeing him again next year.         DANIELA PENA MD             D: 2018   T: 2018   MT: TERESSA      Name:     MILO GONZALEZ   MRN:      [id number]        Account:      [id number]   :      1940           Service Date: 2018      Document: [id number]

## 2018-09-27 NOTE — LETTER
9/27/2018      Oscar Alves MD  [address]  [facility] [address]      RE: Milo Serna       Dear Colleague,    I had the pleasure of seeing Milo Serna in the [facility].    Service Date: 09/27/2018      PRIMARY CARE PROVIDER:  Oscar Alves MD       REASON FOR VISIT:  Followup of biventricular systolic heart failure and question of pulmonary hypertension.      HISTORY OF PRESENT ILLNESS:    Milo is a delightful patient and well known to me from previous visits.  He was unaccompanied today.  Milo is 77 years old, a retired  gentleman who underwent CABG several years ago in 1997 and has remained angina-free since then.  He has chronic atrial fibrillation that is well rate controlled and asymptomatic.  He is on low-dose Eliquis of 2.5 mg b.i.d. due to history of epistaxis 5 mg twice daily dose.  He has longstanding hypertension that is well treated.  Typically, his blood pressure at home is in the 120s/60s range and his office blood pressure is typically slightly elevated but still normal.  He has a stable ascending aorta dilatation of 4.2 cm.  He is a remote tobacco user and quit smoking in 1974.      I had initially seen Milo for progressive dilatation of the right ventricle with mildly reduced systolic function and an estimated echocardiographic pulmonary artery pressure of 40 mmHg.  We have ruled out an inter-atrial shunt but workup did reveal obstructive sleep apnea with an apnea-hypopnea index of 17 per hour.  He has been using CPAP for the last 2 months.      Milo remains without any symptoms.  As part of his Pulmonary Hypertension Clinic workup, we had enrolled him in the exercise rehabilitation program.  Following this, he has had significant symptomatic improvement.  He now walks for 45 minutes 5 days a week, has no symptoms for heart failure, his weight has been at all-time low of 177 pounds (and previously used to be as high as 185  pounds).  He denies any symptoms of angina (historically chest tightness), palpitations or heart failure.  He tells me he has never felt this good in several years.      His followup imaging is also very reassuring.  Cardiac MRI shows that his left ventricle remains moderately dilated but his systolic function has improved from 35% to 51%.  He has hypokinesis of the inferior wall that is previously known.  His right ventricular systolic function has also normalized with an RVEF of 54% and the right ventricle remains stable and moderately dilated.  Both atria are normal in size.  He has moderate mitral regurgitation.  Gadolinium enhancement shows a prior infarct in the right coronary artery distribution.      We had done a 24-hour Holter for frequent PVCs.  The PVC burden has decreased from 22% to 18%.  Atrial fibrillation is well rate controlled with an average heart rate of 53 BPM (he is on low-dose Toprol-XL 12.5 mg daily).      Labs:  Well-controlled lipid panel with a total cholesterol of 110, HDL 59, LDL 45, triglycerides 28 (on 20 mg of simvastatin).  Sodium 139, potassium 3.7, BUN 13, creatinine 1.0, estimated GFR 66.  TSH 1.4.  Hemoglobin 12.4, chronic mild thrombocytopenia of 110.      CURRENT MEDICATIONS:   1.  Apixaban 2.5 mg twice daily (lower dose due to significant epistaxis on the higher dose).   2.  Aspirin 81 mg daily for CAD.     3.  Diclofenac gel topical use.    4.  Coenzyme Q10 at 100 mg daily.   5.  Glucosamine chondroitin.   6.  Lisinopril 20 mg daily.   7.  Metoprolol XL 12.5 mg daily in the morning.    8.  Over-the-counter fish oil.    9.  Omeprazole 40 mg daily.   10.  Simvastatin 20 mg daily.   11.  Torsemide 20 mg daily.   12.  Tamsulosin (Flomax) 0.4 mg daily.   13.  Melatonin as needed for bedtime.      ALLERGIES:  No known allergies.      PHYSICAL EXAMINATION:   VITAL SIGNS:  Blood pressure 140/82 mmHg, pulse 52 per minute, irregularly irregular, height 5 feet 7 inches (1.7 meters),  weight 80.7 kg (177 pounds), respiratory rate 16 per minute, sats 99% on room air, BMI 27.9 kg/m2.   CONSTITUTIONAL:  Comfortable at rest.  Appears younger than his stated age, well developed and nourished with good muscle mass.   EYES:  No pallor.   ENT:  No cyanosis or conversational dyspnea.   RESPIRATORY:  Bilaterally clear to auscultation.   CARDIOVASCULAR:  Normal JVP.  Normal carotid pulse.  Midline well-healed sternotomy scar.  Normal apical impulse which is possibly slightly laterally displaced.  Heart sounds are irregularly irregular.  No audible murmur.   GASTROINTESTINAL:  Soft, nontender.   EXTREMITIES:  No edema.   NEUROPSYCHIATRIC:  Normal.      DIAGNOSES:   1.  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  Etiology ischemic cardiomyopathy, euvolemic status.   2.  Chronic atrial fibrillation, rate control and low-dose Eliquis anticoagulation strategy due to epistaxis with higher dosage.   3.  Frequent premature ventricular complexes (18% ectopy burden, asymptomatic).   4.  Stable coronary artery disease status post remote CABG in 1997 (graft status not known).   5.  Optimally treated hypertension.   6.  Optimally treated hyperlipidemia (LDL 45 on low-dose simvastatin).   7.  Obstructive sleep apnea, CPAP compliant.      ASSESSMENT AND PLAN:    Milo is doing extremely well.  He has increased his daily exercise activity, his LV function has improved, his right ventricular size and systolic function have improved, his obstructive sleep apnea is well treated, his atrial fibrillation is appropriately treated and his stable hypertension optimally treated.  Although he is only on 20 mg of simvastatin, his LDL is 45 and optimal.     1.  Renewed prescriptions.   2.  Milo is going down to [place] for the winter.  He will be back in [place] in April.  I will plan on seeing him next year in the Pulmonary Hypertension Clinic.   3.  As his  biventricular function and clinical status have markedly improved, I do not think there is much benefit in doing a right heart catheterization to evaluate him further for pulmonary hypertension.  We will watch him conservatively.  His risk factors are optimally addressed.      It was my pleasure to visit with Milo.  I look forward to seeing him again next year.         DANIELA PENA MD             D: 2018   T: 2018   MT: DW      Name:     MILO GONZALEZ   MRN:      [id number]        Account:      [id number]   :      1940           Service Date: 2018      Document: [id number]           Outpatient Encounter Prescriptions as of 2018   Medication Sig Dispense Refill     apixaban ANTICOAGULANT (ELIQUIS) 2.5 MG tablet Take 1 tablet (2.5 mg) by mouth 2 times daily 200 tablet 3     aspirin 81 MG tablet Take 81 mg by mouth daily       co-enzyme Q-10 100 MG CAPS Take 1 capsule (100 mg) by mouth daily 180 capsule 3     diclofenac (VOLTAREN) 1 % GEL Apply 4 gramsto area qid prn 100 g 11     doxycycline (VIBRAMYCIN) 100 MG capsule TAKE 1 CAPSULE DAILY 90 capsule 1     FERROUS GLUCONATE PO Take 324 mg by mouth daily (with breakfast)       Glucosamine-Chondroitin (OSTEO BI-FLEX REGULAR STRENGTH) 250-200 MG TABS Take 1 tablet by mouth 2 times daily 100 tablet 8     lidocaine (LIDODERM) 5 % Patch Apply 1 patches to bottom of right foot for up to 12 h within a 24 h period.  Remove after 12 hours. 30 patch 0     lisinopril (PRINIVIL/ZESTRIL) 20 MG tablet Take 1 tablet (20 mg) by mouth daily 90 tablet 3     metoprolol succinate (TOPROL-XL) 25 MG 24 hr tablet Take 0.5 tablets (12.5 mg) by mouth daily (with breakfast) 90 tablet 3     Multiple Vitamins-Minerals (MULTIVITAL) CHEW Take 2 chew tab by mouth 2 times daily (Patient taking differently: Take 1 chew tab by mouth 2 times daily ) 360 tablet 3     naftifine (NAFTIN) 1 % GEL topical gel Apply topically daily Apply to affected nails daily  90 g 3     Omega 3-6-9 Fatty Acids (OMEGA 3-6-9 PO) Take 1 tablet by mouth daily        omeprazole (PRILOSEC) 40 MG capsule TAKE 1 CAPSULE DAILY 90 capsule 0     senna (SENOKOT) 8.6 MG tablet Take 1 tablet by mouth daily (Patient taking differently: Take 1 tablet by mouth daily as needed ) 60 tablet 0     simvastatin (ZOCOR) 20 MG tablet Take 1 tablet (20 mg) by mouth At Bedtime 90 tablet 1     sodium chloride (OCEAN NASAL SPRAY) 0.65 % nasal spray Spray 1 spray into both nostrils daily as needed for congestion 2 Bottle 6     tamsulosin (FLOMAX) 0.4 MG capsule TAKE 1 CAPSULE DAILY 90 capsule 0     torsemide (DEMADEX) 20 MG tablet Take 1 tablet (20 mg) by mouth daily (with breakfast) 90 tablet 3     Urea 20 % CREA cream Apply topically as needed Apply to right foot daily 120 g 2     acetaminophen (TYLENOL) 650 MG CR tablet Take 1 tablet (650 mg) by mouth every 6 hours as needed for pain 100 tablet 11     MELATONIN PO Take 1 tablet by mouth At Bedtime       No facility-administered encounter medications on file as of 9/27/2018.        Again, thank you for allowing me to participate in the care of your patient.      Sincerely,    Sixto Conley MD     [facility]

## 2018-10-02 ENCOUNTER — OFFICE VISIT (OUTPATIENT)
Dept: FAMILY MEDICINE | Facility: CLINIC | Age: 78
End: 2018-10-02
Payer: MEDICARE

## 2018-10-02 VITALS
HEIGHT: 67 IN | HEART RATE: 60 BPM | RESPIRATION RATE: 14 BRPM | SYSTOLIC BLOOD PRESSURE: 112 MMHG | BODY MASS INDEX: 27.15 KG/M2 | DIASTOLIC BLOOD PRESSURE: 80 MMHG | TEMPERATURE: 97.6 F | WEIGHT: 173 LBS

## 2018-10-02 DIAGNOSIS — Z23 NEED FOR PROPHYLACTIC VACCINATION AND INOCULATION AGAINST INFLUENZA: ICD-10-CM

## 2018-10-02 DIAGNOSIS — R74.8 ELEVATED ALKALINE PHOSPHATASE LEVEL: Primary | ICD-10-CM

## 2018-10-02 PROCEDURE — 90662 IIV NO PRSV INCREASED AG IM: CPT | Performed by: FAMILY MEDICINE

## 2018-10-02 PROCEDURE — G0008 ADMIN INFLUENZA VIRUS VAC: HCPCS | Performed by: FAMILY MEDICINE

## 2018-10-02 PROCEDURE — 36415 COLL VENOUS BLD VENIPUNCTURE: CPT | Performed by: FAMILY MEDICINE

## 2018-10-02 PROCEDURE — 80053 COMPREHEN METABOLIC PANEL: CPT | Performed by: FAMILY MEDICINE

## 2018-10-02 PROCEDURE — 99213 OFFICE O/P EST LOW 20 MIN: CPT | Mod: 25 | Performed by: FAMILY MEDICINE

## 2018-10-02 NOTE — MR AVS SNAPSHOT
After Visit Summary   10/2/2018    Milo Serna    MRN: [id number]           Patient Information     Date Of Birth          1940        Visit Information        Provider Department      10/2/2018 9:00 AM Oscar Alves MD [facility]        Today's Diagnoses     Elevated alkaline phosphatase level    -  1    Need for prophylactic vaccination and inoculation against influenza           Follow-ups after your visit        Follow-up notes from your care team     Return in about 3 months (around 1/2/2019).      Your next 10 appointments already scheduled     Oct 09, 2018  9:00 AM CDT   Return Sleep Patient with Maicol Yee MD   [facility]    [address] [facility] [address] [place] [id number] [phone]              Future tests that were ordered for you today     Open Future Orders        Priority Expected Expires Ordered    CT Liver wo & w Contrast Routine  10/2/2019 10/2/2018            Who to contact     If you have questions or need follow up information about today's clinic visit or your schedule please contact [facility] directly at [phone].  Normal or non-critical lab and imaging results will be communicated to you by MyChart, letter or phone within 4 business days after the clinic has received the results. If you do not hear from us within 7 days, please contact the clinic through [employer] or phone. If you have a critical or abnormal lab result, we will notify you by phone as soon as possible.  Submit refill requests through [employer] or call your pharmacy and they will forward the refill request to us. Please allow 3 business days for your refill to be completed.          Additional Information About Your Visit        MyChart Information     [employer] gives you secure access to your electronic health record. If you see a primary care provider, you can also send messages to  "your care team and make appointments. If you have questions, please call your primary care clinic.  If you do not have a primary care provider, please call [phone] and they will assist you.        Care EveryWhere ID     This is your Care EveryWhere ID. This could be used by other organizations to access your [place] medical records  [id number]        Your Vitals Were     Pulse Temperature Respirations Height BMI (Body Mass Index)       60 97.6  F (36.4  C) (Oral) 14 5' 7\" (1.702 m) 27.1 kg/m2        Blood Pressure from Last 3 Encounters:   10/02/18 112/80   09/27/18 132/80   09/19/18 153/77    Weight from Last 3 Encounters:   10/02/18 173 lb (78.5 kg)   09/27/18 177 lb 14.4 oz (80.7 kg)   09/11/18 183 lb (83 kg)              We Performed the Following     Comprehensive metabolic panel     FLU VACCINE, INCREASED ANTIGEN, PRESV FREE, AGE 65+ [64991]     Vaccine Administration, Initial [72043]          Today's Medication Changes          These changes are accurate as of 10/2/18 12:29 PM.  If you have any questions, ask your nurse or doctor.               These medicines have changed or have updated prescriptions.        Dose/Directions    MULTIVITAL Chew   This may have changed:  how much to take   Used for:  Neuropathy of foot        Dose:  2 chew tab   Take 2 chew tab by mouth 2 times daily   Quantity:  360 tablet   Refills:  3       senna 8.6 MG tablet   Commonly known as:  SENOKOT   This may have changed:    - when to take this  - reasons to take this   Used for:  Prostate cancer (H)        Dose:  1 tablet   Take 1 tablet by mouth daily   Quantity:  60 tablet   Refills:  0                Primary Care Provider Office Phone # Fax #    Oscar Alves [phone] [address] [facility] [address]        Equal Access to Services     JOSE BHAKTA AH: Donna Rowe, waaxda luqadaha, qaybta kaalmada aliceyada, chayito abbasi. So wac " [phone].    ATENCIÓN: Si danny uribe, tiene a randall disposición servicios gratuitos de asistencia lingüística. Gloria langley [phone].    We comply with applicable federal civil rights laws and [place] laws. We do not discriminate on the basis of race, color, national origin, age, disability, sex, sexual orientation, or gender identity.            Thank you!     Thank you for choosing [facility]  for your care. Our goal is always to provide you with excellent care. Hearing back from our patients is one way we can continue to improve our services. Please take a few minutes to complete the written survey that you may receive in the mail after your visit with us. Thank you!             Your Updated Medication List - Protect others around you: Learn how to safely use, store and throw away your medicines at www.disposemymeds.org.          This list is accurate as of 10/2/18 12:29 PM.  Always use your most recent med list.                   Brand Name Dispense Instructions for use Diagnosis    acetaminophen 650 MG CR tablet    TYLENOL    100 tablet    Take 1 tablet (650 mg) by mouth every 6 hours as needed for pain    Osteoarthritis       apixaban ANTICOAGULANT 2.5 MG tablet    ELIQUIS    200 tablet    Take 1 tablet (2.5 mg) by mouth 2 times daily    Chronic atrial fibrillation (H)       aspirin 81 MG tablet      Take 81 mg by mouth daily        co-enzyme Q-10 100 MG Caps capsule     180 capsule    Take 1 capsule (100 mg) by mouth daily    Mixed hyperlipidemia       diclofenac 1 % Gel topical gel    VOLTAREN    100 g    Apply 4 gramsto area qid prn    Acute left-sided low back pain without sciatica       doxycycline 100 MG capsule    VIBRAMYCIN    90 capsule    TAKE 1 CAPSULE DAILY    Rosacea       FERROUS GLUCONATE PO      Take 324 mg by mouth daily (with breakfast)        Glucosamine-Chondroitin 250-200 MG Tabs    OSTEO BI-FLEX REGULAR STRENGTH    100 tablet    Take 1 tablet by mouth 2 times  daily    Osteoarthritis       lidocaine 5 % Patch    LIDODERM    30 patch    Apply 1 patches to bottom of right foot for up to 12 h within a 24 h period.  Remove after 12 hours.    Neuropathy of right foot, Pre-ulcerative calluses, Pes planus of both feet, Right foot pain       lisinopril 20 MG tablet    PRINIVIL/ZESTRIL    90 tablet    Take 1 tablet (20 mg) by mouth daily    HTN, goal below 150/90       MELATONIN PO      Take 1 tablet by mouth At Bedtime        metoprolol succinate 25 MG 24 hr tablet    TOPROL-XL    90 tablet    Take 0.5 tablets (12.5 mg) by mouth daily (with breakfast)    Frequent PVCs, Chronic atrial fibrillation (H)       MULTIVITAL Chew     360 tablet    Take 2 chew tab by mouth 2 times daily    Neuropathy of foot       naftifine 1 % Gel topical gel    NAFTIN    90 g    Apply topically daily Apply to affected nails daily    Onychomycosis of toenail       OMEGA 3-6-9 PO      Take 1 tablet by mouth daily        omeprazole 40 MG capsule    priLOSEC    90 capsule    TAKE 1 CAPSULE DAILY    PUD (peptic ulcer disease)       senna 8.6 MG tablet    SENOKOT    60 tablet    Take 1 tablet by mouth daily    Prostate cancer (H)       simvastatin 20 MG tablet    ZOCOR    90 tablet    Take 1 tablet (20 mg) by mouth At Bedtime    Hyperlipidemia LDL goal <100       sodium chloride 0.65 % nasal spray    OCEAN NASAL SPRAY    2 Bottle    Spray 1 spray into both nostrils daily as needed for congestion    Bloody nose       tamsulosin 0.4 MG capsule    FLOMAX    90 capsule    TAKE 1 CAPSULE DAILY    Benign prostatic hyperplasia with lower urinary tract symptoms       torsemide 20 MG tablet    DEMADEX    90 tablet    Take 1 tablet (20 mg) by mouth daily (with breakfast)    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 (H), Chronic combined systolic and diastolic congestive heart failure (H)       Urea 20 % Crea cream     120 g    Apply topically as needed Apply to right foot daily    Right foot pain,  Skin callus, Pes planus of both feet, Venous insufficiency of both lower extremities, Hav (hallux abducto valgus), right, Onychomycosis of toenail

## 2018-10-02 NOTE — PROGRESS NOTES
"  SUBJECTIVE:   Milo Serna is a 77 year old male who presents to clinic today for the following health issues:    Elevated alkaline phosphatase level  (primary encounter diagnosis)-   Lab Results   Component Value Date    ALKPHOS 177 07/12/2018     Follow up and recheck liver evaluation indicates his elevated alkaline phosphatase is of hepatic origin.  PMH known gallbladder polyp  Problem list and histories reviewed & adjusted, as indicated.  Additional history: none    Reviewed and updated as needed this visit by clinical staff  Tobacco  Allergies  Meds  Med Hx  Surg Hx  Fam Hx  Soc Hx      Reviewed and updated as needed this visit by Provider         ROS: No itching  RESP: NEGATIVE for significant cough or SOB  CV: NEGATIVE for chest pain, palpitations or peripheral edema recent cardiology report quite  Excellent EF almost nl  PSYCHIATRIC: Great mood  This document serves as a record of the services and decisions personally performed and made by Oscar Alves MD. It was created on his behalf by Charly Vasquez, a trained medical scribe. The creation of this document is based on the provider's statements to the medical scribe.  Charly Vasquez October 2, 2018 9:39 AM      OBJECTIVE:     /80 (BP Location: Right arm, Patient Position: Chair, Cuff Size: Adult Regular)  Pulse 60  Temp 97.6  F (36.4  C) (Oral)  Resp 14  Ht 1.702 m (5' 7\")  Wt 78.5 kg (173 lb)  BMI 27.1 kg/m2  Body mass index is 27.1 kg/(m^2).  GENERAL: healthy, alert and no distress  No icterus  PSYCH: mentation appears normal, affect normal/bright    Diagnostic Test Results:  No results found for this or any previous visit (from the past 24 hour(s)).    ASSESSMENT/PLAN:   (R74.8) Elevated alkaline phosphatase level  (primary encounter diagnosis)  Comment:   Lab Results   Component Value Date    ALKPHOS 177 07/12/2018     Plan: CT Liver wo & w Contrast, CANCELED: MR Liver wo        & w Contrast.  Although this might have more " detail, the patient finds the time involved intolerable            The information in this document, created by the medical scribe for me, accurately reflects the services I personally performed and the decisions made by me. I have reviewed and approved this document for accuracy prior to leaving the patient care area.  October 2, 2018 9:39 AM      Oscar Alves MD  [facility]

## 2018-10-02 NOTE — PROGRESS NOTES

## 2018-10-03 LAB
ALBUMIN SERPL-MCNC: 3.9 G/DL (ref 3.4–5)
ALP SERPL-CCNC: 176 U/L (ref 40–150)
ALT SERPL W P-5'-P-CCNC: 23 U/L (ref 0–70)
ANION GAP SERPL CALCULATED.3IONS-SCNC: 5 MMOL/L (ref 3–14)
AST SERPL W P-5'-P-CCNC: 27 U/L (ref 0–45)
BILIRUB SERPL-MCNC: 1.4 MG/DL (ref 0.2–1.3)
BUN SERPL-MCNC: 19 MG/DL (ref 7–30)
CALCIUM SERPL-MCNC: 9.1 MG/DL (ref 8.5–10.1)
CHLORIDE SERPL-SCNC: 103 MMOL/L (ref 94–109)
CO2 SERPL-SCNC: 31 MMOL/L (ref 20–32)
CREAT SERPL-MCNC: 0.96 MG/DL (ref 0.66–1.25)
GFR SERPL CREATININE-BSD FRML MDRD: 75 ML/MIN/1.7M2
GLUCOSE SERPL-MCNC: 107 MG/DL (ref 70–99)
POTASSIUM SERPL-SCNC: 4.3 MMOL/L (ref 3.4–5.3)
PROT SERPL-MCNC: 7.7 G/DL (ref 6.8–8.8)
SODIUM SERPL-SCNC: 139 MMOL/L (ref 133–144)

## 2018-10-09 ENCOUNTER — OFFICE VISIT (OUTPATIENT)
Dept: SLEEP MEDICINE | Facility: CLINIC | Age: 78
End: 2018-10-09
Payer: MEDICARE

## 2018-10-09 VITALS
SYSTOLIC BLOOD PRESSURE: 120 MMHG | OXYGEN SATURATION: 98 % | HEART RATE: 48 BPM | WEIGHT: 173 LBS | BODY MASS INDEX: 27.15 KG/M2 | DIASTOLIC BLOOD PRESSURE: 54 MMHG | HEIGHT: 67 IN

## 2018-10-09 DIAGNOSIS — G47.39 COMPLEX SLEEP APNEA SYNDROME: Primary | ICD-10-CM

## 2018-10-09 DIAGNOSIS — G47.34 SLEEP RELATED HYPOXIA: ICD-10-CM

## 2018-10-09 DIAGNOSIS — R06.3 CHEYNE-STOKES BREATHING: ICD-10-CM

## 2018-10-09 PROCEDURE — 99214 OFFICE O/P EST MOD 30 MIN: CPT | Performed by: INTERNAL MEDICINE

## 2018-10-09 NOTE — PATIENT INSTRUCTIONS
MY TREATMENT INFORMATION FOR SLEEP APNEA-  Milo Serna    MY CONTACT NUMBERS ARE:  [phone]  DOCTOR : Maicol MANCERA [facility]  SLEEP CENTER :  [place]    Your sleep apnea is nearly controlled with CPAP.  You have high mask leak,  May need mask refitting or adjusting. Mask and supplies ordered.  Continue use of CPAP:  - Recommend using CPAP every night for at least 7-8 hours each night  - Daytime naps are not advised, but use CPAP if taking naps.    - Do not remove mask headgear when you go to bathroom at night, but just unplug the hose.    Objective measure goal  Compliance  Goal >70% (preferrably 100%)  Leak   Goal < 10% (less than 24 L/min)  AHI  Goal < 5 events per hour   Usage  Goal 7-8 hours.      Patient was advised not to drive if drowsy or sleepy.      Follow up in 12 months.      Your blood pressure was checked while you were in clinic today.  Please read the guidelines below about what these numbers mean and what you should do about them.  Your systolic blood pressure is the top number.  This is the pressure when the heart is pumping.  Your diastolic blood pressure is the bottom number.  This is the pressure in between beats.  If your systolic blood pressure is less than 120 and your diastolic blood pressure is less than 80, then your blood pressure is normal. There is nothing more that you need to do about it  If your systolic blood pressure is 120-139 or your diastolic blood pressure is 80-89, your blood pressure may be higher than it should be.  You should have your blood pressure re-checked within a year by a primary care provider.  If your systolic blood pressure is 140 or greater or your diastolic blood pressure is 90 or greater, you may have high blood pressure.  High blood pressure is treatable, but if left untreated over time it can put you at risk for heart attack, stroke, or kidney failure.  You should have your blood pressure re-checked by a primary care provider within the next four  weeks.  Your BMI is Body mass index is 27.09 kg/(m^2).  Weight management is a personal decision.  If you are interested in exploring weight loss strategies, the following discussion covers the approaches that may be successful. Body mass index (BMI) is one way to tell whether you are at a healthy weight, overweight, or obese. It measures your weight in relation to your height.  A BMI of 18.5 to 24.9 is in the healthy range. A person with a BMI of 25 to 29.9 is considered overweight, and someone with a BMI of 30 or greater is considered obese. More than two-thirds of American adults are considered overweight or obese.  Being overweight or obese increases the risk for further weight gain. Excess weight may lead to heart disease and diabetes.  Creating and following plans for healthy eating and physical activity may help you improve your health.  Weight control is part of healthy lifestyle and includes exercise, emotional health, and healthy eating habits. Careful eating habits lifelong are the mainstay of weight control. Though there are significant health benefits from weight loss, long-term weight loss with diet alone may be very difficult to achieve- studies show long-term success with dietary management in less than 10% of people. Attaining a healthy weight may be especially difficult to achieve in those with severe obesity. In some cases, medications, devices and surgical management might be considered.  What can you do?  If you are overweight or obese and are interested in methods for weight loss, you should discuss this with your provider.     Consider reducing daily calorie intake by 500 calories.     Keep a food journal.     Avoiding skipping meals, consider cutting portions instead.    Diet combined with exercise helps maintain muscle while optimizing fat loss. Strength training is particularly important for building and maintaining muscle mass. Exercise helps reduce stress, increase energy, and improves  fitness. Increasing exercise without diet control, however, may not burn enough calories to loose weight.       Start walking three days a week 10-20 minutes at a time    Work towards walking thirty minutes five days a week     Eventually, increase the speed of your walking for 1-2 minutes at time    In addition, we recommend that you review healthy lifestyles and methods for weight loss available through the National Institutes of Health patient information sites:  http://win.niddk.nih.gov/publications/index.htm    And look into health and wellness programs that may be available through your health insurance provider, employer, local community center, or [employer].

## 2018-10-09 NOTE — NURSING NOTE
"Chief Complaint   Patient presents with     RECHECK     f/u pap use new pressure setting,        Initial /54  Pulse (!) 48  Ht 1.702 m (5' 7.01\")  Wt 78.5 kg (173 lb)  SpO2 98%  BMI 27.09 kg/m2 Estimated body mass index is 27.09 kg/(m^2) as calculated from the following:    Height as of this encounter: 1.702 m (5' 7.01\").    Weight as of this encounter: 78.5 kg (173 lb).    Medication Reconciliation: complete        ESS 6    Zoe Arriaza, Sleep Clinic specialist  "

## 2018-10-09 NOTE — MR AVS SNAPSHOT
After Visit Summary   10/9/2018    Milo Serna    MRN: [id number]           Patient Information     Date Of Birth          1940        Visit Information        Provider Department      10/9/2018 9:00 AM Maicol Yee MD [facility]        Today's Diagnoses     Complex sleep apnea syndrome    -  1    Cheyne-Alejandro breathing        Sleep related hypoxia          Care Instructions    MY TREATMENT INFORMATION FOR SLEEP APNEA-  Milo Serna    MY CONTACT NUMBERS ARE:  [phone]  DOCTOR : Maicol Yee  SLEEP CENTER :  [place]    Your sleep apnea is nearly controlled with CPAP.  You have high mask leak,  May need mask refitting or adjusting. Mask and supplies ordered.  Continue use of CPAP:  - Recommend using CPAP every night for at least 7-8 hours each night  - Daytime naps are not advised, but use CPAP if taking naps.    - Do not remove mask headgear when you go to bathroom at night, but just unplug the hose.    Objective measure goal  Compliance  Goal >70% (preferrably 100%)  Leak   Goal < 10% (less than 24 L/min)  AHI  Goal < 5 events per hour   Usage  Goal 7-8 hours.      Patient was advised not to drive if drowsy or sleepy.      Follow up in 12 months.      Your blood pressure was checked while you were in clinic today.  Please read the guidelines below about what these numbers mean and what you should do about them.  Your systolic blood pressure is the top number.  This is the pressure when the heart is pumping.  Your diastolic blood pressure is the bottom number.  This is the pressure in between beats.  If your systolic blood pressure is less than 120 and your diastolic blood pressure is less than 80, then your blood pressure is normal. There is nothing more that you need to do about it  If your systolic blood pressure is 120-139 or your diastolic blood pressure is 80-89, your blood pressure may be higher than it should be.  You should have  your blood pressure re-checked within a year by a primary care provider.  If your systolic blood pressure is 140 or greater or your diastolic blood pressure is 90 or greater, you may have high blood pressure.  High blood pressure is treatable, but if left untreated over time it can put you at risk for heart attack, stroke, or kidney failure.  You should have your blood pressure re-checked by a primary care provider within the next four weeks.  Your BMI is Body mass index is 27.09 kg/(m^2).  Weight management is a personal decision.  If you are interested in exploring weight loss strategies, the following discussion covers the approaches that may be successful. Body mass index (BMI) is one way to tell whether you are at a healthy weight, overweight, or obese. It measures your weight in relation to your height.  A BMI of 18.5 to 24.9 is in the healthy range. A person with a BMI of 25 to 29.9 is considered overweight, and someone with a BMI of 30 or greater is considered obese. More than two-thirds of American adults are considered overweight or obese.  Being overweight or obese increases the risk for further weight gain. Excess weight may lead to heart disease and diabetes.  Creating and following plans for healthy eating and physical activity may help you improve your health.  Weight control is part of healthy lifestyle and includes exercise, emotional health, and healthy eating habits. Careful eating habits lifelong are the mainstay of weight control. Though there are significant health benefits from weight loss, long-term weight loss with diet alone may be very difficult to achieve- studies show long-term success with dietary management in less than 10% of people. Attaining a healthy weight may be especially difficult to achieve in those with severe obesity. In some cases, medications, devices and surgical management might be considered.  What can you do?  If you are overweight or obese and are interested in  methods for weight loss, you should discuss this with your provider.     Consider reducing daily calorie intake by 500 calories.     Keep a food journal.     Avoiding skipping meals, consider cutting portions instead.    Diet combined with exercise helps maintain muscle while optimizing fat loss. Strength training is particularly important for building and maintaining muscle mass. Exercise helps reduce stress, increase energy, and improves fitness. Increasing exercise without diet control, however, may not burn enough calories to loose weight.       Start walking three days a week 10-20 minutes at a time    Work towards walking thirty minutes five days a week     Eventually, increase the speed of your walking for 1-2 minutes at time    In addition, we recommend that you review healthy lifestyles and methods for weight loss available through the National Institutes of Health patient information sites:  http://win.niddk.nih.gov/publications/index.htm    And look into health and wellness programs that may be available through your health insurance provider, employer, local community center, or [employer] club.                Follow-ups after your visit        Your next 10 appointments already scheduled     Oct 10, 2018 10:30 AM CDT   CT LIVER WO & W CONTRAST with [address] ([facility])    [id number] [place] [id number] [phone]           How do I prepare for my exam? (Food and drink instructions) To prepare: Do not eat or drink for 2 hours before your exam. If you need to take medicine, you may take it with small sips of water. (We may ask you to take liquid medicine as well.)  How do I prepare for my exam? (Other instructions) Please arrive 30 minutes early for your CT.  Once in the department you might be asked to drink water 15-20 minutes prior to your exam.  If indicated you may be asked to drink an oral contrast in advance of your  CT.  If this is the case, the imaging team will let you know or be in contact with you prior to your appointment  Patients over 70 or patients with diabetes or kidney problems: If you haven t had a blood test (creatinine test) within the last 30 days, the Cardiologist/Radiologist may require you to get this test prior to your exam.  If you have diabetes:  Continue to take your metformin medication on the day of your exam  What should I wear: Please wear loose clothing, such as a sweat suit or jogging clothes. Avoid snaps, zippers and other metal. We may ask you to undress and put on a hospital gown.  How long does the exam take: Most scans take less than 20 minutes.  What should I bring: Please bring any scans or X-rays taken at other hospitals, if similar tests were done. Also bring a list of your medicines, including vitamins, minerals and over-the-counter drugs. It is safest to leave personal items at home.  Do I need a : No  is needed.  What do I need to tell my doctor? Be sure to tell your doctor: * If you have any allergies. * If there s any chance you are pregnant. * If you are breastfeeding.  What should I do after the exam: No restrictions, You may resume normal activities.  What is this test: A CT (computed tomography) scan is a series of pictures that allows us to look inside your body. The scanner creates images of the body in cross sections, much like slices of bread. This helps us see any problems more clearly. You may receive contrast (X-ray dye) before or during your scan. You will be asked to drink the contrast.  Who should I call with questions: If you have any questions, please call the Imaging Department where you will have your exam. Directions, parking instructions, and other information is available on our website, [email or web address].              Who to contact     If you have questions or need follow up information about today's clinic visit or your schedule please contact  "[facility] directly at [phone].  Normal or non-critical lab and imaging results will be communicated to you by MyChart, letter or phone within 4 business days after the clinic has received the results. If you do not hear from us within 7 days, please contact the clinic through gdgthart or phone. If you have a critical or abnormal lab result, we will notify you by phone as soon as possible.  Submit refill requests through [employer] or call your pharmacy and they will forward the refill request to us. Please allow 3 business days for your refill to be completed.          Additional Information About Your Visit        gdgthart Information     [facility] gives you secure access to your electronic health record. If you see a primary care provider, you can also send messages to your care team and make appointments. If you have questions, please call your primary care clinic.  If you do not have a primary care provider, please call [phone] and they will assist you.        Care EveryWhere ID     This is your Care EveryWhere ID. This could be used by other organizations to access your [facility] medical records  [id number]        Your Vitals Were     Pulse Height Pulse Oximetry BMI (Body Mass Index)          48 1.702 m (5' 7.01\") 98% 27.09 kg/m2         Blood Pressure from Last 3 Encounters:   10/09/18 120/54   10/02/18 112/80   09/27/18 132/80    Weight from Last 3 Encounters:   10/09/18 78.5 kg (173 lb)   10/02/18 78.5 kg (173 lb)   09/27/18 80.7 kg (177 lb 14.4 oz)              We Performed the Following     Comprehensive DME          Today's Medication Changes          These changes are accurate as of 10/9/18  9:35 AM.  If you have any questions, ask your nurse or doctor.               These medicines have changed or have updated prescriptions.        Dose/Directions    MULTIVITAL Chew   This may have changed:  how much to take   Used for:  Neuropathy of foot        Dose:  2 chew tab   Take 2 " chew tab by mouth 2 times daily   Quantity:  360 tablet   Refills:  3       senna 8.6 MG tablet   Commonly known as:  SENOKOT   This may have changed:    - when to take this  - reasons to take this   Used for:  Prostate cancer (H)        Dose:  1 tablet   Take 1 tablet by mouth daily   Quantity:  60 tablet   Refills:  0                Primary Care Provider Office Phone # Fax #    Oscar Alves [phone] [address] [facility] [address]        Equal Access to Services     JOSE BHAKTA AH: Hadii porfirio ku hadasho Soomaali, waaxda luqadaha, qaybta kaalmada adeegyada, waxay idiin hayaan alice reyes . So [facility] [phone].    ATENCIÓN: Si habla español, tiene a randall disposición servicios gratuitos de asistencia lingüística. [facility] [phone].    We comply with applicable federal civil rights laws and [place] laws. We do not discriminate on the basis of race, color, national origin, age, disability, sex, sexual orientation, or gender identity.            Thank you!     Thank you for choosing [facility]  [place] your [place]. Our goal is always to provide you with excellent care. Hearing back from our patients is one way we can continue to improve our services. Please take a few minutes to complete the written survey that you may receive in the mail after your visit with us. Thank you!             Your Updated Medication List - Protect others around you: Learn how to safely use, store and throw away your medicines at www.disposemymeds.org.          This list is accurate as of 10/9/18  9:35 AM.  Always use your most recent med list.                   Brand Name Dispense Instructions for use Diagnosis    acetaminophen 650 MG CR tablet    TYLENOL    100 tablet    Take 1 tablet (650 mg) by mouth every 6 hours as needed for pain    Osteoarthritis       apixaban ANTICOAGULANT 2.5 MG tablet    ELIQUIS    200 tablet    Take 1 tablet (2.5 mg) by mouth 2 times daily     Chronic atrial fibrillation (H)       aspirin 81 MG tablet      Take 81 mg by mouth daily        co-enzyme Q-10 100 MG Caps capsule     180 capsule    Take 1 capsule (100 mg) by mouth daily    Mixed hyperlipidemia       diclofenac 1 % Gel topical gel    VOLTAREN    100 g    Apply 4 gramsto area qid prn    Acute left-sided low back pain without sciatica       doxycycline 100 MG capsule    VIBRAMYCIN    90 capsule    TAKE 1 CAPSULE DAILY    Rosacea       FERROUS GLUCONATE PO      Take 324 mg by mouth daily (with breakfast)        Glucosamine-Chondroitin 250-200 MG Tabs    OSTEO BI-FLEX REGULAR STRENGTH    100 tablet    Take 1 tablet by mouth 2 times daily    Osteoarthritis       lidocaine 5 % Patch    LIDODERM    30 patch    Apply 1 patches to bottom of right foot for up to 12 h within a 24 h period.  Remove after 12 hours.    Neuropathy of right foot, Pre-ulcerative calluses, Pes planus of both feet, Right foot pain       lisinopril 20 MG tablet    PRINIVIL/ZESTRIL    90 tablet    Take 1 tablet (20 mg) by mouth daily    HTN, goal below 150/90       MELATONIN PO      Take 1 tablet by mouth At Bedtime        metoprolol succinate 25 MG 24 hr tablet    TOPROL-XL    90 tablet    Take 0.5 tablets (12.5 mg) by mouth daily (with breakfast)    Frequent PVCs, Chronic atrial fibrillation (H)       MULTIVITAL Chew     360 tablet    Take 2 chew tab by mouth 2 times daily    Neuropathy of foot       naftifine 1 % Gel topical gel    NAFTIN    90 g    Apply topically daily Apply to affected nails daily    Onychomycosis of toenail       OMEGA 3-6-9 PO      Take 1 tablet by mouth daily        omeprazole 40 MG capsule    priLOSEC    90 capsule    TAKE 1 CAPSULE DAILY    PUD (peptic ulcer disease)       senna 8.6 MG tablet    SENOKOT    60 tablet    Take 1 tablet by mouth daily    Prostate cancer (H)       simvastatin 20 MG tablet    ZOCOR    90 tablet    Take 1 tablet (20 mg) by mouth At Bedtime    Hyperlipidemia LDL goal <100        sodium chloride 0.65 % nasal spray    OCEAN NASAL SPRAY    2 Bottle    Spray 1 spray into both nostrils daily as needed for congestion    Bloody nose       tamsulosin 0.4 MG capsule    FLOMAX    90 capsule    TAKE 1 CAPSULE DAILY    Benign prostatic hyperplasia with lower urinary tract symptoms       torsemide 20 MG tablet    DEMADEX    90 tablet    Take 1 tablet (20 mg) by mouth daily (with breakfast)    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 (H), Chronic combined systolic and diastolic congestive heart failure (H)       Urea 20 % Crea cream     120 g    Apply topically as needed Apply to right foot daily    Right foot pain, Skin callus, Pes planus of both feet, Venous insufficiency of both lower extremities, Hav (hallux abducto valgus), right, Onychomycosis of toenail

## 2018-10-09 NOTE — PROGRESS NOTES
[facility] Follow-[facility]  October 9, 2018      Name: Milo Serna MRN# [id number]   Age: 77 year old YOB: 1940   Date of visit: October 9, 2018  Primary care provider: Oscar Alves         Assessment and Plan:     1. 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 Suspect mouth rebreathing too and PAP emergent central apnea and few CSB. He is going to [place] for winter. Recommend mask refitting and adjustment. Mask and supplies ordered.  - Clinical response: good  - Recommend using CPAP every night for at least 7-8 hours each night  - Daytime naps are not advised, but use CPAP if taking naps  - Patient was advised not to drive if drowsy or sleepy.  - Follow up in sleep clinic in 12 months.    2. Obesity: Encouraged patient to lose weight. Counseled regarding weight loss through diet modification and increased physical activity. Patient was given nstuctions of weight loss and advised to follow up her PCP for further weight loss interventions. Weight loss instructions given.    Orders Placed This Encounter   Procedures     Comprehensive DME       Summary Counseling:  New sleep schedule recommendation: given    Check out [email or web address]    All questions were answered.  The patient indicates understanding of the above issues and agrees with the plan set forth.           Chief Complaint:    Routine Follow up            History of Present Illness:     Milo Serna is a 77 year old male with history of moderate complex sleep apnea, sleep related hypoxemia, Cheyne-Alejandro Breathing pattern, atrial fibrillation, systolic CHF EF 35-40%, CAD s/p CABG, pulmonary hypertension and hypertension who comes to [facility] for follow up. Please see H&P for his presenting sleep symptoms for additional details.  Patient is  diagnosed sleep apnea on 6/10/18 and CPAP titration 6/26/18 and was prescribed CPAP pressure of 13 cmH2O, and was setup at PAP therapy on 7/23/18.   Last visit on 8/28/18, he was 77% compliant of his PAP use over 4 hrs and he had residual AHI of 6.3/hr.  He struggled with a pressure of the CPAP 15 cmH2O, also had a mask leak.  Recommended mask refitting and decreased his CPAP pressure of 13 cm water on 8/29/18.  He is using his PAP therapy and has good benefits. He has mask leak. He denies snoring with CPAP machine use. His heart function (EF 5% by MRI on 9/19/18) and cardiac symptoms improved.      PREVIOUS SLEEP STUDIES:  Date: 6/10/2018  AHI: 16.6/hr, central apnea with CSB  Intervention: CPAP  Lowest O2 saturation: 88.1%  O2 sat below 88% was 16.3 minutes  PLM's: 1.6/hr      CPAP Compliance:  Dates:  9/8/2018- 10/7/2018       93 % >4hour use   Days used: 30/30  Average daily use (days used): 6.55 hrs  Leak 95 percentile: 62.4 L/min  Residual AHI: 6/hr (CA 0.1, OA 3.3, unknown 0.7), CSB 5%  Pressure:  13 cmH2O    [place] Sleepiness Scale score today: 6/24  [place] Sleepiness Scale score last visit: /24   Pre-PAP [place] Sleepiness Scale score: 9/24    PAP machine: ResMed    Interface:  Mask: full face mask  Chin strap: No  Leak: Yes  Humidity: Yes    Difficulties with therapy:    [-] Difficulty tolerating the pressure  [-] Epistaxis:   [-] Nasal congestion   [x] Dry mouth: occasional,mild  [x] Mouth breathing:   [-] Pain/skin breakdown:     Improvements noted with CPAP:   [+] waking up more refreshed  [+] sleeping better with less arousals  [+] nocturia improved   [+] improved energy level during the day    SLEEP-WAKE SCHEDULE: unchanged    Other sleep problems: None     Drowsy driving / near incidents: No    Medications that affect sleep: No            Medications:     Current Outpatient Prescriptions   Medication Sig     acetaminophen (TYLENOL) 650 MG CR tablet Take 1 tablet (650 mg) by mouth every 6 hours as  needed for pain     apixaban ANTICOAGULANT (ELIQUIS) 2.5 MG tablet Take 1 tablet (2.5 mg) by mouth 2 times daily     aspirin 81 MG tablet Take 81 mg by mouth daily     co-enzyme Q-10 100 MG CAPS Take 1 capsule (100 mg) by mouth daily     diclofenac (VOLTAREN) 1 % GEL Apply 4 gramsto area qid prn     doxycycline (VIBRAMYCIN) 100 MG capsule TAKE 1 CAPSULE DAILY     FERROUS GLUCONATE PO Take 324 mg by mouth daily (with breakfast)     Glucosamine-Chondroitin (OSTEO BI-FLEX REGULAR STRENGTH) 250-200 MG TABS Take 1 tablet by mouth 2 times daily     lidocaine (LIDODERM) 5 % Patch Apply 1 patches to bottom of right foot for up to 12 h within a 24 h period.  Remove after 12 hours.     lisinopril (PRINIVIL/ZESTRIL) 20 MG tablet Take 1 tablet (20 mg) by mouth daily     MELATONIN PO Take 1 tablet by mouth At Bedtime     metoprolol succinate (TOPROL-XL) 25 MG 24 hr tablet Take 0.5 tablets (12.5 mg) by mouth daily (with breakfast)     Multiple Vitamins-Minerals (MULTIVITAL) CHEW Take 2 chew tab by mouth 2 times daily (Patient taking differently: Take 1 chew tab by mouth 2 times daily )     naftifine (NAFTIN) 1 % GEL topical gel Apply topically daily Apply to affected nails daily     Omega 3-6-9 Fatty Acids (OMEGA 3-6-9 PO) Take 1 tablet by mouth daily      omeprazole (PRILOSEC) 40 MG capsule TAKE 1 CAPSULE DAILY     senna (SENOKOT) 8.6 MG tablet Take 1 tablet by mouth daily (Patient taking differently: Take 1 tablet by mouth daily as needed )     simvastatin (ZOCOR) 20 MG tablet Take 1 tablet (20 mg) by mouth At Bedtime     sodium chloride (OCEAN NASAL SPRAY) 0.65 % nasal spray Spray 1 spray into both nostrils daily as needed for congestion     tamsulosin (FLOMAX) 0.4 MG capsule TAKE 1 CAPSULE DAILY     torsemide (DEMADEX) 20 MG tablet Take 1 tablet (20 mg) by mouth daily (with breakfast)     Urea 20 % CREA cream Apply topically as needed Apply to right foot daily     No current facility-administered medications for this visit.          No Known Allergies         Past Medical History:     Past Medical History:   Diagnosis Date     Anemia, unspecified 11/8/2016     Atrial fibrillation (H) 5/27/2015     BPH (benign prostatic hyperplasia) 1/25/2012     CAD (coronary artery disease) 4/4/2012     Closed fracture of metatarsal bone 4/4/2012     Coronary atherosclerosis of unspecified type of vessel, native or [facility]    CAB 1996 following MI (at [facility])      Dilated aortic root (H) 5/26/2016     Drug-induced erectile dysfunction 10/2/2015     Elevated blood sugar 11/8/2016     Elevated PSA 9/19/2013     Essential hypertension with goal blood pressure less than 140/90 9/22/2016     Essential hypertension, benign      Gallbladder polyp 5/1/2018     Gout      HAV (hallux abducto valgus) 4/4/2012     Hernia, abdominal      History of prostate cancer 6/15/2016     HTN, goal below 150/90 4/20/2017     Iron deficiency anemia secondary to inadequate dietary iron intake 11/15/2016     Low blood pressure reading 10/17/2016     Lumbago     had degendisc dz on MRI 7/07     Mixed hyperlipidemia      Mumps      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     OA (osteoarthritis) 4/4/2012     Old myocardial infarction 4/4/2012     Palpitations      Pes planus 4/4/2012     Prostate cancer (H) 5/26/2016     PUD (peptic ulcer disease) 4/4/2012     Pulmonary hypertension (H) 9/25/2017     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     Rosacea 1/15/2008     Thrombocytopenia (H) 4/21/2017     Unspecified hyperplasia of prostate with urinary obstruction and other lower urinary tract symptoms (LUTS)     better on avodart & hytrin     Venous stasis 4/4/2012     Venous stasis 4/4/2012             Past Surgical History:      Past Surgical History:   Procedure Laterality Date     C NONSPECIFIC PROCEDURE      CAB 1996 [place]     C NONSPECIFIC PROCEDURE  2/07    l inguinal hernia repair      C NONSPECIFIC PROCEDURE      R hernia remote     C NONSPECIFIC PROCEDURE  2000    R ankle ORIF for fx  "    C NONSPECIFIC PROCEDURE  2005    nasal surgery      C NONSPECIFIC PROCEDURE      R rotater repair (remote)      C NONSPECIFIC PROCEDURE      appy 1966     C NONSPECIFIC PROCEDURE      sinus surgery x 2     C NONSPECIFIC PROCEDURE      L shoulder      CARDIAC SURGERY      bipass     CYSTOSCOPY FLEXIBLE, CYOABLATION PROSTATE N/A 2016    Procedure: CYSTOSCOPY FLEXIBLE, CRYOABLATION PROSTATE;  Surgeon: Krystian Owens MD;  Location: [facility] OR     GENITOURINARY SURGERY      prostate surgery      HERNIA REPAIR       LAPAROSCOPIC HERNIORRHAPHY INGUINAL Right 5/10/2017    Procedure: LAPAROSCOPIC HERNIORRHAPHY INGUINAL;  Laparoscopic preperitoneal repair of right inguinal hernia with placement of underlay mesh;  Surgeon: Enrico Bridges MD;  Location: [facility] OR     PROSTATE SURGERY         Social History   Substance Use Topics     Smoking status: Former Smoker     Smokeless tobacco: Never Used      Comment: quit 3/1974     Alcohol use 0.0 oz/week     0 Standard drinks or equivalent per week      Comment: rare drink       Family History   Problem Relation Age of Onset     Cancer Mother      stomach cancer,  age 61     HEART DISEASE Father      MI,  age 71     HEART DISEASE Brother      stent placement, RA      Hypertension Brother      HEART DISEASE Sister      rythmn problems with heart, hypertension            Physical Examination:   /54  Pulse (!) 48  Ht 1.702 m (5' 7.01\")  Wt 78.5 kg (173 lb)  SpO2 98%  BMI 27.09 kg/m2            Data: All pertinent previous laboratory data reviewed     Lab Results   Component Value Date    PH 7.38 2007    PO2 66 (L) 2007    PCO2 45 2007    HCO3 25 2007     Lab Results   Component Value Date    TSH 1.44 2018    TSH 1.12 2017     Lab Results   Component Value Date     (H) 10/02/2018     2018     Lab Results   Component Value Date    HGB 12.4 (L) 2018    HGB 12.2 (L) 2018 "     Lab Results   Component Value Date    BUN 19 10/02/2018    BUN 13 09/19/2018    CR 0.96 10/02/2018    CR 1.08 09/19/2018     Lab Results   Component Value Date    ROBINA 97 09/25/2017         He will follow up with me in about 12 month(s).         Copy to: Oscar Alves MD 10/9/2018     [facility]  [id number] [place] [id number]       Twenty-five minutes spent with patient, all of which were spent face-to-face counseling, consulting, coordinating plan of care and going over PAP download/sleep study and chart review.

## 2018-10-10 ENCOUNTER — HOSPITAL ENCOUNTER (OUTPATIENT)
Dept: CT IMAGING | Facility: CLINIC | Age: 78
Discharge: HOME | End: 2018-10-10
Attending: FAMILY MEDICINE | Admitting: FAMILY MEDICINE
Payer: MEDICARE

## 2018-10-10 DIAGNOSIS — R74.8 ELEVATED ALKALINE PHOSPHATASE LEVEL: ICD-10-CM

## 2018-10-10 PROCEDURE — 25000128 H RX IP 250 OP 636: Performed by: FAMILY MEDICINE

## 2018-10-10 PROCEDURE — 74178 CT ABD&PLV WO CNTR FLWD CNTR: CPT

## 2018-10-10 RX ORDER — IOPAMIDOL 755 MG/ML
500 INJECTION, SOLUTION INTRAVASCULAR ONCE
Status: COMPLETED | OUTPATIENT
Start: 2018-10-10 | End: 2018-10-10

## 2018-10-10 RX ADMIN — SODIUM CHLORIDE 52 ML: 9 INJECTION, SOLUTION INTRAVENOUS at 11:08

## 2018-10-10 RX ADMIN — IOPAMIDOL 86 ML: 755 INJECTION, SOLUTION INTRAVENOUS at 11:08

## 2018-10-30 DIAGNOSIS — K27.9 PUD (PEPTIC ULCER DISEASE): ICD-10-CM

## 2018-10-30 DIAGNOSIS — N40.1 BENIGN PROSTATIC HYPERPLASIA WITH LOWER URINARY TRACT SYMPTOMS: ICD-10-CM

## 2018-10-30 DIAGNOSIS — L71.9 ROSACEA: ICD-10-CM

## 2018-10-31 RX ORDER — OMEPRAZOLE 40 MG/1
CAPSULE, DELAYED RELEASE ORAL
Qty: 90 CAPSULE | Refills: 0 | Status: SHIPPED | OUTPATIENT
Start: 2018-10-31 | End: 2019-01-15

## 2018-10-31 RX ORDER — TAMSULOSIN HYDROCHLORIDE 0.4 MG/1
CAPSULE ORAL
Qty: 90 CAPSULE | Refills: 0 | Status: SHIPPED | OUTPATIENT
Start: 2018-10-31 | End: 2019-01-15

## 2018-10-31 RX ORDER — DOXYCYCLINE 100 MG/1
CAPSULE ORAL
Qty: 90 CAPSULE | Refills: 0 | Status: SHIPPED | OUTPATIENT
Start: 2018-10-31 | End: 2019-01-18

## 2018-10-31 NOTE — TELEPHONE ENCOUNTER
Flomax and Omeprazole- medication approved per standing orders.  Doxycycline sent to provider.  Not [facility] med/diagnosis.      Rebeka Gale RN    10/2/18  ASSESSMENT/PLAN:   (R74.8) Elevated alkaline phosphatase level  (primary encounter diagnosis)  Comment:         Lab Results   Component Value Date     ALKPHOS 177 07/12/2018      Plan: CT Liver wo & w Contrast, CANCELED: MR Liver wo        & w Contrast.  Although this might have more detail, the patient finds the time involved intolerable         The information in this document, created by the medical scribe for me, accurately reflects the services I personally performed and the decisions made by me. I have reviewed and approved this document for accuracy prior to leaving the patient care area.  October 2, 2018 9:39 AM        Oscar Alves MD  [facility]      Instructions        Return in about 3 months (around 1/2/2019).

## 2018-10-31 NOTE — TELEPHONE ENCOUNTER
"Requested Prescriptions   Pending Prescriptions Disp Refills     tamsulosin (FLOMAX) 0.4 MG capsule [Pharmacy Med Name: TAMSULOSIN HCL CAPS 0.4MG] 90 capsule 0    Last Written Prescription Date:  5/30/18  Last Fill Quantity: 90,  # refills: 0   Last Office Visit: 10/2/2018 Joselyn       Return in about 3 months (around 1/2/2019).     Future Office Visit:      Sig: TAKE 1 CAPSULE DAILY    Alpha Blockers Passed    10/30/2018  5:20 PM       Passed - Blood pressure under 140/90 in past 12 months    BP Readings from Last 3 Encounters:   10/09/18 120/54   10/02/18 112/80   09/27/18 132/80                Passed - Recent (12 mo) or future (30 days) visit within the authorizing provider's specialty    Patient had office visit in the last 12 months or has a visit in the next 30 days with authorizing provider or within the authorizing provider's specialty.  See \"Patient Info\" tab in inbasket, or \"Choose Columns\" in Meds & Orders section of the refill encounter.             Passed - Patient does not have Tadalafil, Vardenafil, or Sildenafil on their medication list       Passed - Patient is 18 years of age or older   _________________________________________________________________________________________________________________________       omeprazole (PRILOSEC) 40 MG capsule [Pharmacy Med Name: OMEPRAZOLE DR CAPS 40MG] 90 capsule 0    Last Written Prescription Date:  5/30/18  Last Fill Quantity: 90,  # refills: 0   Last Office Visit: 10/2/2018   Future Office Visit:      Sig: TAKE 1 CAPSULE DAILY    PPI Protocol Passed    10/30/2018  5:20 PM       Passed - Not on Clopidogrel (unless Pantoprazole ordered)       Passed - No diagnosis of osteoporosis on record       Passed - Recent (12 mo) or future (30 days) visit within the authorizing provider's specialty    Patient had office visit in the last 12 months or has a visit in the next 30 days with authorizing provider or within the authorizing provider's specialty.  See \"Patient " "Info\" tab in inbasket, or \"Choose Columns\" in Meds & Orders section of the refill encounter.             Passed - Patient is age 18 or older   _________________________________________________________________________________________________________________________       doxycycline (VIBRAMYCIN) 100 MG capsule [Pharmacy Med Name: DOXYCYCLINE HYCL CAPS 100MG] 90 capsule 1    Last Written Prescription Date:  2/26/18  Last Fill Quantity: 90,  # refills: 1   Last Office Visit: 10/2/2018   Future Office Visit:      Sig: TAKE 1 CAPSULE DAILY    There is no refill protocol information for this order          "

## 2018-12-24 DIAGNOSIS — M21.42 PES PLANUS OF BOTH FEET: ICD-10-CM

## 2018-12-24 DIAGNOSIS — M79.671 RIGHT FOOT PAIN: ICD-10-CM

## 2018-12-24 DIAGNOSIS — M21.41 PES PLANUS OF BOTH FEET: ICD-10-CM

## 2018-12-24 DIAGNOSIS — B35.1 ONYCHOMYCOSIS OF TOENAIL: ICD-10-CM

## 2018-12-24 DIAGNOSIS — I87.2 VENOUS INSUFFICIENCY OF BOTH LOWER EXTREMITIES: ICD-10-CM

## 2018-12-24 DIAGNOSIS — L84 SKIN CALLUS: ICD-10-CM

## 2018-12-24 DIAGNOSIS — M20.11 HAV (HALLUX ABDUCTO VALGUS), RIGHT: ICD-10-CM

## 2018-12-24 NOTE — TELEPHONE ENCOUNTER
Urea 20 % CREA cream      Last Written Prescription Date:  7/11/2017  Last Fill Quantity: 120 g,   # refills: 2  Last Office Visit: Joselyn, 10/2/2018  Future Office visit:       Routing refill request to provider for review/approval because:  Drug not on the [facility], [facility] or [facility] refill protocol or controlled substance

## 2019-01-07 RX ORDER — UREA 200 MG/G
CREAM TOPICAL PRN
Qty: 120 G | Refills: 2 | Status: SHIPPED | OUTPATIENT
Start: 2019-01-07 | End: 2020-05-18

## 2019-01-15 DIAGNOSIS — E78.5 HYPERLIPIDEMIA LDL GOAL <100: ICD-10-CM

## 2019-01-15 DIAGNOSIS — N40.1 BENIGN PROSTATIC HYPERPLASIA WITH LOWER URINARY TRACT SYMPTOMS: ICD-10-CM

## 2019-01-15 DIAGNOSIS — K27.9 PUD (PEPTIC ULCER DISEASE): ICD-10-CM

## 2019-01-16 DIAGNOSIS — L71.9 ROSACEA: ICD-10-CM

## 2019-01-16 NOTE — TELEPHONE ENCOUNTER
doxycycline (VIBRAMYCIN) 100 MG capsule    Last Written Prescription Date:  10/31/18  Last Fill Quantity: 90,   # refills: 0  Last Office Visit: Joselyn 10/30/18  Future Office visit:       Routing refill request to provider for review/approval because:  Drug not on the [facility], [facility] or [facility] refill protocol or controlled substance

## 2019-01-17 RX ORDER — SIMVASTATIN 20 MG
TABLET ORAL
Qty: 90 TABLET | Refills: 1 | Status: SHIPPED | OUTPATIENT
Start: 2019-01-17 | End: 2019-09-05

## 2019-01-17 RX ORDER — OMEPRAZOLE 40 MG/1
CAPSULE, DELAYED RELEASE ORAL
Qty: 90 CAPSULE | Refills: 1 | Status: SHIPPED | OUTPATIENT
Start: 2019-01-17 | End: 2019-04-15

## 2019-01-17 RX ORDER — TAMSULOSIN HYDROCHLORIDE 0.4 MG/1
CAPSULE ORAL
Qty: 90 CAPSULE | Refills: 1 | Status: SHIPPED | OUTPATIENT
Start: 2019-01-17 | End: 2019-07-20

## 2019-01-17 NOTE — TELEPHONE ENCOUNTER
"Requested Prescriptions   Pending Prescriptions Disp Refills     tamsulosin (FLOMAX) 0.4 MG capsule [Pharmacy Med Name: TAMSULOSIN HCL CAPS 0.4MG] 90 capsule 0    Last Written Prescription Date:  10/31/18  Last Fill Quantity: 90,  # refills: 0   Last Office Visit: 10/2/2018 Joselyn      Return in about 3 months (around 1/2/2019).     Future Office Visit:      Sig: TAKE 1 CAPSULE DAILY    Alpha Blockers Passed - 1/15/2019  3:52 PM       Passed - Blood pressure under 140/90 in past 12 months    BP Readings from Last 3 Encounters:   10/09/18 120/54   10/02/18 112/80   09/27/18 132/80                Passed - Recent (12 mo) or future (30 days) visit within the authorizing provider's specialty    Patient had office visit in the last 12 months or has a visit in the next 30 days with authorizing provider or within the authorizing provider's specialty.  See \"Patient Info\" tab in inbasket, or \"Choose Columns\" in Meds & Orders section of the refill encounter.             Passed - Patient does not have Tadalafil, Vardenafil, or Sildenafil on their medication list       Passed - Medication is active on med list       Passed - Patient is 18 years of age or older   _________________________________________________________________________________________________________________________       omeprazole (PRILOSEC) 40 MG DR capsule [Pharmacy Med Name: OMEPRAZOLE DR CAPS 40MG] 90 capsule 0    Last Written Prescription Date:  10/31/18  Last Fill Quantity: 90,  # refills: 0   Last Office Visit: 10/2/2018   Future Office Visit:      Sig: TAKE 1 CAPSULE DAILY    PPI Protocol Passed - 1/15/2019  3:52 PM       Passed - Not on Clopidogrel (unless Pantoprazole ordered)       Passed - No diagnosis of osteoporosis on record       Passed - Recent (12 mo) or future (30 days) visit within the authorizing provider's specialty    Patient had office visit in the last 12 months or has a visit in the next 30 days with authorizing provider or within the " "authorizing provider's specialty.  See \"Patient Info\" tab in inbasket, or \"Choose Columns\" in Meds & Orders section of the refill encounter.             Passed - Medication is active on med list       Passed - Patient is age 18 or older   _________________________________________________________________________________________________________________________       simvastatin (ZOCOR) 20 MG tablet [Pharmacy Med Name: SIMVASTATIN TABS 20MG] 90 tablet 1    Last Written Prescription Date:  5/1/18  Last Fill Quantity: 90,  # refills: 1   Last Office Visit: 10/2/2018   Future Office Visit:      Sig: TAKE 1 TABLET AT BEDTIME    Statins Protocol Passed - 1/15/2019  3:52 PM       Passed - LDL on file in past 12 months    Recent Labs   Lab Test 08/02/18  0921   LDL 45            Passed - No abnormal creatine kinase in past 12 months    No lab results found.            Passed - Recent (12 mo) or future (30 days) visit within the authorizing provider's specialty    Patient had office visit in the last 12 months or has a visit in the next 30 days with authorizing provider or within the authorizing provider's specialty.  See \"Patient Info\" tab in inbasket, or \"Choose Columns\" in Meds & Orders section of the refill encounter.             Passed - Medication is active on med list       Passed - Patient is age 18 or older        "

## 2019-01-18 RX ORDER — DOXYCYCLINE 100 MG/1
100 CAPSULE ORAL DAILY
Qty: 90 CAPSULE | Refills: 3 | Status: SHIPPED | OUTPATIENT
Start: 2019-01-18 | End: 2019-10-28

## 2019-01-18 NOTE — TELEPHONE ENCOUNTER
Not [facility] med.  Sent to provider.  Please advise.  Rebeka Gale RN    Associated Diagnoses     Rosacea [L71.9]         Viewed by Luís Serna on 10/10/2018  7:57 PM   Written by Oscar Alves MD on 10/10/2018  5:15 PM   A few little abnormalities in your liver that we do nothing about.  Everything looks okay   Oscar Alves MD      Notes Recorded by Oscar Alves MD on 10/3/2018 at 9:48 AM  All tests are quite stable.Let's see what the scan shows  Oscar Alves MD    10/2/18  ASSESSMENT/PLAN:   (R74.8) Elevated alkaline phosphatase level  (primary encounter diagnosis)  Comment:         Lab Results   Component Value Date     ALKPHOS 177 07/12/2018      Plan: CT Liver wo & w Contrast, CANCELED: MR Liver wo        & w Contrast.  Although this might have more detail, the patient finds the time involved intolerable             The information in this document, created by the medical scribe for me, accurately reflects the services I personally performed and the decisions made by me. I have reviewed and approved this document for accuracy prior to leaving the patient care area.  October 2, 2018 9:39 AM        Oscar Alves MD  [facility]      Instructions         Return in about 3 months (around 1/2/2019).

## 2019-01-22 ENCOUNTER — DOCUMENTATION ONLY (OUTPATIENT)
Dept: SLEEP MEDICINE | Facility: CLINIC | Age: 79
End: 2019-01-22

## 2019-01-22 NOTE — PROGRESS NOTES
6 month [facility] Recheck Visit     Diagnostic AHI: 16.4    PSG    Data only recheck -pt is currently in [place].  Plan was for him to get mask refit upon his return.    Assessment: Pt not meeting objective benchmarks for AHI (maybe due to large mask leak)  and leak     Action plan: pt to have mask fit appt upon return  pt to follow up per provider request       Device type: CPAP  PAP settings:      CPAP fixed 13.0 cm  H20      Objective measures: 14 day rolling measures      Compliance  85 %      Leak  90.34 lpm  last  Upload-increasing over last few months       AHI 17.85   last  Upload-slowly increasing over last few months.       Average number of minutes 310      Objective measure goal  Compliance   Goal >70%  Leak   Goal < 24 lpm  AHI  Goal < 5  Usage  Goal >240

## 2019-04-05 ENCOUNTER — ANCILLARY PROCEDURE (OUTPATIENT)
Dept: GENERAL RADIOLOGY | Facility: CLINIC | Age: 79
End: 2019-04-05
Attending: FAMILY MEDICINE
Payer: MEDICARE

## 2019-04-05 ENCOUNTER — OFFICE VISIT (OUTPATIENT)
Dept: FAMILY MEDICINE | Facility: CLINIC | Age: 79
End: 2019-04-05
Payer: MEDICARE

## 2019-04-05 VITALS
HEIGHT: 67 IN | RESPIRATION RATE: 18 BRPM | SYSTOLIC BLOOD PRESSURE: 149 MMHG | DIASTOLIC BLOOD PRESSURE: 60 MMHG | WEIGHT: 173 LBS | TEMPERATURE: 97.4 F | BODY MASS INDEX: 27.15 KG/M2 | OXYGEN SATURATION: 100 % | HEART RATE: 43 BPM

## 2019-04-05 DIAGNOSIS — Z95.1 STATUS POST CORONARY ARTERY BYPASS GRAFT: ICD-10-CM

## 2019-04-05 DIAGNOSIS — S82.842D CLOSED BIMALLEOLAR FRACTURE OF LEFT ANKLE WITH ROUTINE HEALING: ICD-10-CM

## 2019-04-05 DIAGNOSIS — I10 HTN, GOAL BELOW 150/90: ICD-10-CM

## 2019-04-05 DIAGNOSIS — R73.9 ELEVATED BLOOD SUGAR: ICD-10-CM

## 2019-04-05 DIAGNOSIS — I48.20 CHRONIC ATRIAL FIBRILLATION (H): ICD-10-CM

## 2019-04-05 DIAGNOSIS — I87.8 VENOUS STASIS: ICD-10-CM

## 2019-04-05 DIAGNOSIS — I10 BENIGN ESSENTIAL HYPERTENSION: ICD-10-CM

## 2019-04-05 DIAGNOSIS — S82.842D CLOSED BIMALLEOLAR FRACTURE OF LEFT ANKLE WITH ROUTINE HEALING: Primary | ICD-10-CM

## 2019-04-05 DIAGNOSIS — G47.33 OSA (OBSTRUCTIVE SLEEP APNEA): ICD-10-CM

## 2019-04-05 DIAGNOSIS — I50.42 CHRONIC COMBINED SYSTOLIC AND DIASTOLIC CONGESTIVE HEART FAILURE (H): ICD-10-CM

## 2019-04-05 DIAGNOSIS — I49.3 FREQUENT PVCS: ICD-10-CM

## 2019-04-05 LAB
ANION GAP SERPL CALCULATED.3IONS-SCNC: 6 MMOL/L (ref 3–14)
BASOPHILS # BLD AUTO: 0 10E9/L (ref 0–0.2)
BASOPHILS NFR BLD AUTO: 0.9 %
BUN SERPL-MCNC: 12 MG/DL (ref 7–30)
CALCIUM SERPL-MCNC: 9.5 MG/DL (ref 8.5–10.1)
CHLORIDE SERPL-SCNC: 103 MMOL/L (ref 94–109)
CHOLEST SERPL-MCNC: 116 MG/DL
CO2 SERPL-SCNC: 29 MMOL/L (ref 20–32)
CREAT SERPL-MCNC: 0.93 MG/DL (ref 0.66–1.25)
DIFFERENTIAL METHOD BLD: ABNORMAL
EOSINOPHIL # BLD AUTO: 0.1 10E9/L (ref 0–0.7)
EOSINOPHIL NFR BLD AUTO: 2.6 %
ERYTHROCYTE [DISTWIDTH] IN BLOOD BY AUTOMATED COUNT: 14.4 % (ref 10–15)
GFR SERPL CREATININE-BSD FRML MDRD: 78 ML/MIN/{1.73_M2}
GLUCOSE SERPL-MCNC: 111 MG/DL (ref 70–99)
HBA1C MFR BLD: 4.9 % (ref 0–5.6)
HCT VFR BLD AUTO: 33.8 % (ref 40–53)
HDLC SERPL-MCNC: 46 MG/DL
HGB BLD-MCNC: 10.9 G/DL (ref 13.3–17.7)
LDLC SERPL CALC-MCNC: 56 MG/DL
LYMPHOCYTES # BLD AUTO: 0.7 10E9/L (ref 0.8–5.3)
LYMPHOCYTES NFR BLD AUTO: 15.8 %
MCH RBC QN AUTO: 32.2 PG (ref 26.5–33)
MCHC RBC AUTO-ENTMCNC: 32.2 G/DL (ref 31.5–36.5)
MCV RBC AUTO: 100 FL (ref 78–100)
MONOCYTES # BLD AUTO: 0.6 10E9/L (ref 0–1.3)
MONOCYTES NFR BLD AUTO: 13 %
NEUTROPHILS # BLD AUTO: 3.1 10E9/L (ref 1.6–8.3)
NEUTROPHILS NFR BLD AUTO: 67.7 %
NONHDLC SERPL-MCNC: 70 MG/DL
PLATELET # BLD AUTO: 155 10E9/L (ref 150–450)
POTASSIUM SERPL-SCNC: 3.9 MMOL/L (ref 3.4–5.3)
RBC # BLD AUTO: 3.39 10E12/L (ref 4.4–5.9)
SODIUM SERPL-SCNC: 138 MMOL/L (ref 133–144)
TRIGL SERPL-MCNC: 69 MG/DL
WBC # BLD AUTO: 4.6 10E9/L (ref 4–11)

## 2019-04-05 PROCEDURE — 80048 BASIC METABOLIC PNL TOTAL CA: CPT | Performed by: FAMILY MEDICINE

## 2019-04-05 PROCEDURE — 93000 ELECTROCARDIOGRAM COMPLETE: CPT | Performed by: FAMILY MEDICINE

## 2019-04-05 PROCEDURE — 83036 HEMOGLOBIN GLYCOSYLATED A1C: CPT | Performed by: FAMILY MEDICINE

## 2019-04-05 PROCEDURE — 73610 X-RAY EXAM OF ANKLE: CPT | Mod: LT

## 2019-04-05 PROCEDURE — 36415 COLL VENOUS BLD VENIPUNCTURE: CPT | Performed by: FAMILY MEDICINE

## 2019-04-05 PROCEDURE — 99214 OFFICE O/P EST MOD 30 MIN: CPT | Performed by: FAMILY MEDICINE

## 2019-04-05 PROCEDURE — 85025 COMPLETE CBC W/AUTO DIFF WBC: CPT | Performed by: FAMILY MEDICINE

## 2019-04-05 PROCEDURE — 80061 LIPID PANEL: CPT | Performed by: FAMILY MEDICINE

## 2019-04-05 ASSESSMENT — MIFFLIN-ST. JEOR: SCORE: 1458.35

## 2019-04-05 NOTE — PROGRESS NOTES
"  SUBJECTIVE:   Milo Serna is a 78 year old male who presents to clinic today for the following health issues:      Joint Pain    Onset: ***    Description:   Location: {.:679658::\"***\"}  Character: {.:222516}    Intensity: {.:348261}    Progression of Symptoms: {.:297980:x}    Accompanying Signs & Symptoms:  Other symptoms: {.:117771::\"none\"}    History:   Previous similar pain: { :223077}      Precipitating factors:   Trauma or overuse: { :513259}    Alleviating factors:  Improved by: {.:856835::\"nothing\"}    Therapies Tried and outcome: ***        {additional problems for provider to add:594154}    Problem list and histories reviewed & adjusted, as indicated.  Additional history: {NONE - AS DOCUMENTED:338940::\"as documented\"}    {HIST REVIEW/ LINKS 2:475609}    Reviewed and updated as needed this visit by clinical staff  Tobacco  Allergies  Meds  Med Hx  Surg Hx  Fam Hx  Soc Hx      Reviewed and updated as needed this visit by Provider         {PROVIDER CHARTING PREFERENCE:466859}  "

## 2019-04-05 NOTE — PROGRESS NOTES
SUBJECTIVE:   Milo Serna is a 78 year old male who presents to clinic today for the following health issues:    Trauma     Follow-up on broken left ankle      HTN, goal below 150/90     BP Readings from Last 1 Encounters:   04/05/19 149/60       Chronic combined systolic and diastolic congestive heart failure (H)  Chronic atrial fibrillation (H) last defect ejection fraction was almost normal.  Cardiology wants to see him yearly  LENORA (obstructive sleep apnea) he has a Holter monitor on file  Benign essential hypertension adequately controlled  Frequent PVCs asymptomatic  Venous stasis his legs swell at the moment left more than right  Elevated blood sugar   Glucose   Date Value Ref Range Status   10/02/2018 107 (H) 70 - 99 mg/dL Final   09/19/2018 100 70 - 105 mg/dL Final     Comment:     Non Fasting   07/12/2018 71 70 - 99 mg/dL Final   06/22/2018 69 (L) 70 - 99 mg/dL Final   06/13/2018 94 70 - 99 mg/dL Final       Closed bimalleolar fracture of left ankle with routine healing (primary encounter diagnosis) 3 weeks ago fell without syncope in [place] he has brought some records as well as his old x-ray he has been in a walking boot with minimal weightbearing  Status post coronary artery bypass graft no chest pain           Problem list and histories reviewed & adjusted, as indicated.  Additional history: as documented    Past Medical History:   Diagnosis Date     Anemia, unspecified 11/8/2016     Atrial fibrillation (H) 5/27/2015     BPH (benign prostatic hyperplasia) 1/25/2012     CAD (coronary artery disease) 4/4/2012     Closed fracture of metatarsal bone 4/4/2012     Coronary atherosclerosis of unspecified type of vessel, native or graft [facility]    CAB 1996 following MI (at [facility])      Dilated aortic root (H) 5/26/2016     Drug-induced erectile dysfunction 10/2/2015     Elevated blood sugar 11/8/2016     Elevated PSA 9/19/2013     Essential hypertension with goal blood pressure less  than 140/90 9/22/2016     Essential hypertension, benign      Gallbladder polyp 5/1/2018     Gout      HAV (hallux abducto valgus) 4/4/2012     Hernia, abdominal      History of prostate cancer 6/15/2016     HTN, goal below 150/90 4/20/2017     Iron deficiency anemia secondary to inadequate dietary iron intake 11/15/2016     Low blood pressure reading 10/17/2016     Lumbago     had degendisc dz on MRI 7/07     Mixed hyperlipidemia      Mumps      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     OA (osteoarthritis) 4/4/2012     Old myocardial infarction 4/4/2012     Palpitations      Pes planus 4/4/2012     Prostate cancer (H) 5/26/2016     PUD (peptic ulcer disease) 4/4/2012     Pulmonary hypertension (H) 9/25/2017     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     Rosacea 1/15/2008     Thrombocytopenia (H) 4/21/2017     Unspecified hyperplasia of prostate with urinary obstruction and other lower urinary tract symptoms (LUTS)     better on avodart & hytrin     Venous stasis 4/4/2012     Venous stasis 4/4/2012       Past Surgical History:   Procedure Laterality Date     C NONSPECIFIC PROCEDURE      CAB 1996 [place]     C NONSPECIFIC PROCEDURE  2/07    l inguinal hernia repair      C NONSPECIFIC PROCEDURE      R hernia remote     C NONSPECIFIC PROCEDURE  2000    R ankle ORIF for fx     C NONSPECIFIC PROCEDURE  2005    nasal surgery      C NONSPECIFIC PROCEDURE      R rotater repair (remote)      C NONSPECIFIC PROCEDURE      appy 1966     C NONSPECIFIC PROCEDURE      sinus surgery x 2     C NONSPECIFIC PROCEDURE      L shoulder 6/09     CARDIAC SURGERY      bipass     CYSTOSCOPY FLEXIBLE, CYOABLATION PROSTATE N/A 11/9/2016    Procedure: CYSTOSCOPY FLEXIBLE, CRYOABLATION PROSTATE;  Surgeon: Krystian Owens MD;  Location:  OR     GENITOURINARY SURGERY      prostate surgery      HERNIA REPAIR       LAPAROSCOPIC HERNIORRHAPHY INGUINAL Right 5/10/2017    Procedure: LAPAROSCOPIC HERNIORRHAPHY INGUINAL;  Laparoscopic preperitoneal repair of right inguinal  "hernia with placement of underlay mesh;  Surgeon: Enrico Bridges MD;  Location: [facility] OR     PROSTATE SURGERY         Family History   Problem Relation Age of Onset     Cancer Mother         stomach cancer,  age 61     Heart Disease Father         MI,  age 71     Heart Disease Brother         stent placement, RA      Hypertension Brother      Heart Disease Sister         rythmn problems with heart, hypertension       Social History     Tobacco Use     Smoking status: Former Smoker     Smokeless tobacco: Never Used     Tobacco comment: quit 3/1974   Substance Use Topics     Alcohol use: Yes     Alcohol/week: 0.0 oz     Comment: rare drink       ROS:  Some constipation with encopretic phenomenon no hematochezia no  symptoms    This document serves as a record of the services and decisions personally performed and made by Oscar Alves MD. It was created on his behalf by Enrico Enrique, a trained medical scribe. The creation of this document is based on the provider's statements to the medical scribe.  Enrico Enrique 2019 11:08 AM    OBJECTIVE:     /60 (BP Location: Right arm, Patient Position: Chair, Cuff Size: Adult Large)   Pulse (!) 43   Temp 97.4  F (36.3  C) (Oral)   Resp 18   Ht 1.702 m (5' 7\")   Wt 78.5 kg (173 lb)   SpO2 100%   BMI 27.10 kg/m    Body mass index is 27.1 kg/m .    MS:  left lower foreleg with evolving bruising.  2+ edema.  No cords.  Ankle is quite stiff    XR shows healing bimalleolar fracture stable in position  EKG shows A. fib bradycardia has resolved by the time we obtain  ASSESSMENT/PLAN:   ASSESSMENT / PLAN:  (I18.317Q) Closed bimalleolar fracture of left ankle with routine healing  (primary encounter diagnosis)  Comment: Now 3 weeks out  Plan: XR Ankle Left G/E 3 Views, EKG 12-lead complete        w/read - Clinics, ORTHO  REFERRAL        Continue walking boot skin care discussed refer    (I50.42) Chronic combined systolic and diastolic " congestive heart failure (H)  Comment: Doing well at last  Plan:     (I48.2) Chronic atrial fibrillation (H)  Comment: No cardiac symptoms  Plan: CBC with platelets and differential            (I10) HTN, goal below 150/90  Comment: Adequately controlled  Plan: EKG 12-lead complete w/read - Clinics, Basic         metabolic panel  (Ca, Cl, CO2, Creat, Gluc, K,         Na, BUN), Lipid panel reflex to direct LDL         Fasting            (G47.33) LENORA (obstructive sleep apnea)  Comment: Holter monitor is associated with this diagnosis  Plan:         (I49.3) Frequent PVCs  Comment: Asymptomatic  Plan:     (I87.8) Venous stasis  Comment: Skin is intact  Plan:     (R73.9) Elevated blood sugar  Comment: broaden data base    Plan: Hemoglobin A1c            (Z95.1) Status post coronary artery bypass graft  Comment: Asymptomatic  Plan: Cardiology in fall          Oscar Alves MD            The information in this document, created by the medical scribe for me, accurately reflects the services I personally performed and the decisions made by me. I have reviewed and approved this document for accuracy prior to leaving the patient care area.  April 5, 2019 11:08 AM      Oscar Alves MD  [facility]

## 2019-04-08 DIAGNOSIS — D50.8 IRON DEFICIENCY ANEMIA SECONDARY TO INADEQUATE DIETARY IRON INTAKE: Primary | ICD-10-CM

## 2019-04-08 RX ORDER — FERROUS GLUCONATE 324(38)MG
324 TABLET ORAL
Qty: 90 TABLET | Refills: 3 | Status: SHIPPED | OUTPATIENT
Start: 2019-04-08 | End: 2019-09-06

## 2019-04-08 NOTE — RESULT ENCOUNTER NOTE
You are a bit anemic. Not enough to cause trouble, but worse than last fall. You were iron deficient previously. You should take iron as ferrous gluconate, 325 mg daily. [facility] sent it [facility]. Don't take it at the same time as doxycycline  Oscar Alves MD

## 2019-04-15 DIAGNOSIS — K27.9 PUD (PEPTIC ULCER DISEASE): ICD-10-CM

## 2019-04-15 NOTE — TELEPHONE ENCOUNTER
"Requested Prescriptions   Pending Prescriptions Disp Refills     omeprazole (PRILOSEC) 40 MG DR capsule [Pharmacy Med Name: OMEPRAZOLE DR CAPS 40MG]  Last Written Prescription Date:  1/17/2019  Last Fill Quantity: 90 capsule,  # refills: 1   Last office visit: 4/5/2019 with prescribing provider:  Joselyn   Future Office Visit:     90 capsule 1     Sig: TAKE 1 CAPSULE DAILY       PPI Protocol Passed - 4/15/2019  6:07 AM        Passed - Not on Clopidogrel (unless Pantoprazole ordered)        Passed - No diagnosis of osteoporosis on record        Passed - Recent (12 mo) or future (30 days) visit within the authorizing provider's specialty     Patient had office visit in the last 12 months or has a visit in the next 30 days with authorizing provider or within the authorizing provider's specialty.  See \"Patient Info\" tab in inbasket, or \"Choose Columns\" in Meds & Orders section of the refill encounter.              Passed - Medication is active on med list        Passed - Patient is age 18 or older          "

## 2019-04-16 ENCOUNTER — OFFICE VISIT (OUTPATIENT)
Dept: PODIATRY | Facility: CLINIC | Age: 79
End: 2019-04-16
Payer: MEDICARE

## 2019-04-16 VITALS
SYSTOLIC BLOOD PRESSURE: 122 MMHG | HEIGHT: 67 IN | BODY MASS INDEX: 27.15 KG/M2 | DIASTOLIC BLOOD PRESSURE: 70 MMHG | WEIGHT: 173 LBS

## 2019-04-16 DIAGNOSIS — M25.572 ACUTE LEFT ANKLE PAIN: Primary | ICD-10-CM

## 2019-04-16 DIAGNOSIS — I48.20 CHRONIC ATRIAL FIBRILLATION (H): ICD-10-CM

## 2019-04-16 DIAGNOSIS — M25.472 EDEMA OF LEFT ANKLE: ICD-10-CM

## 2019-04-16 DIAGNOSIS — S82.842G BIMALLEOLAR ANKLE FRACTURE, LEFT, CLOSED, WITH DELAYED HEALING, SUBSEQUENT ENCOUNTER: ICD-10-CM

## 2019-04-16 PROCEDURE — 99213 OFFICE O/P EST LOW 20 MIN: CPT | Performed by: PODIATRIST

## 2019-04-16 RX ORDER — OMEPRAZOLE 40 MG/1
CAPSULE, DELAYED RELEASE ORAL
Status: SHIPPED
Start: 2019-04-16 | End: 2019-09-06

## 2019-04-16 ASSESSMENT — MIFFLIN-ST. JEOR: SCORE: 1458.35

## 2019-04-16 NOTE — PROGRESS NOTES
PATIENT HISTORY:  Dr. Alves requested I see this patient for their foot issue.  Milo Serna is a 78 year old male who presents to clinic for left ankle fracture assessment. Tripped about a month ago  When in [place] and fractured ankle. Did have xrays at that time and 1 week ago. Wondering how the ankle is healing. Pain is minimal. 2/10 if on foot in boot a lot.     Review of Systems:  Patient denies fever, chills, rash, wound, stiffness, numbness, weakness, heart burn, blood in stool, chest pain with activity, calf pain when walking, shortness of breath with activity, chronic cough, easy bleeding/bruising,excessive thirst, fatigue, depression, anxiety.  Patient admits to limping, swelling.     PAST MEDICAL HISTORY:   Past Medical History:   Diagnosis Date     Anemia, unspecified 11/8/2016     Atrial fibrillation (H) 5/27/2015     BPH (benign prostatic hyperplasia) 1/25/2012     CAD (coronary artery disease) 4/4/2012     Closed bimalleolar fracture of left ankle with routine healing 4/5/2019     Closed fracture of metatarsal bone 4/4/2012     Coronary atherosclerosis of unspecified type of vessel, native or graft [facility]    CAB 1996 following MI (at [facility])      Dilated aortic root (H) 5/26/2016     Drug-induced erectile dysfunction 10/2/2015     Elevated blood sugar 11/8/2016     Elevated PSA 9/19/2013     Essential hypertension with goal blood pressure less than 140/90 9/22/2016     Essential hypertension, benign      Gallbladder polyp 5/1/2018     Gout      HAV (hallux abducto valgus) 4/4/2012     Hernia, abdominal      History of prostate cancer 6/15/2016     HTN, goal below 150/90 4/20/2017     Iron deficiency anemia secondary to inadequate dietary iron intake 11/15/2016     Low blood pressure reading 10/17/2016     Lumbago     had degendisc dz on MRI 7/07     Mixed hyperlipidemia      Mumps      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     OA (osteoarthritis) 4/4/2012     Old myocardial infarction  4/4/2012     Palpitations      Pes planus 4/4/2012     Prostate cancer (H) 5/26/2016     PUD (peptic ulcer disease) 4/4/2012     Pulmonary hypertension (H) 9/25/2017     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     Rosacea 1/15/2008     Thrombocytopenia (H) 4/21/2017     Unspecified hyperplasia of prostate with urinary obstruction and other lower urinary tract symptoms (LUTS)     better on avodart & hytrin     Venous stasis 4/4/2012     Venous stasis 4/4/2012        PAST SURGICAL HISTORY:   Past Surgical History:   Procedure Laterality Date     C NONSPECIFIC PROCEDURE      CAB 1996 [place]     C NONSPECIFIC PROCEDURE  2/07    l inguinal hernia repair      C NONSPECIFIC PROCEDURE      R hernia remote     C NONSPECIFIC PROCEDURE  2000    R ankle ORIF for fx     C NONSPECIFIC PROCEDURE  2005    nasal surgery      C NONSPECIFIC PROCEDURE      R rotater repair (remote)      C NONSPECIFIC PROCEDURE      appy 1966     C NONSPECIFIC PROCEDURE      sinus surgery x 2     C NONSPECIFIC PROCEDURE      L shoulder 6/09     CARDIAC SURGERY      bipass     CYSTOSCOPY FLEXIBLE, CYOABLATION PROSTATE N/A 11/9/2016    Procedure: CYSTOSCOPY FLEXIBLE, CRYOABLATION PROSTATE;  Surgeon: Krystian Owens MD;  Location:  OR     GENITOURINARY SURGERY      prostate surgery      HERNIA REPAIR       LAPAROSCOPIC HERNIORRHAPHY INGUINAL Right 5/10/2017    Procedure: LAPAROSCOPIC HERNIORRHAPHY INGUINAL;  Laparoscopic preperitoneal repair of right inguinal hernia with placement of underlay mesh;  Surgeon: Enrico Bridges MD;  Location:  OR     PROSTATE SURGERY          MEDICATIONS:   Current Outpatient Medications:      acetaminophen (TYLENOL) 650 MG CR tablet, Take 1 tablet (650 mg) by mouth every 6 hours as needed for pain, Disp: 100 tablet, Rfl: 11     apixaban ANTICOAGULANT (ELIQUIS) 2.5 MG tablet, Take 1 tablet (2.5 mg) by mouth 2 times daily, Disp: 200 tablet, Rfl: 3     aspirin 81 MG tablet, Take 81 mg by mouth daily, Disp: , Rfl:      co-enzyme  Q-10 100 MG CAPS, Take 1 capsule (100 mg) by mouth daily, Disp: 180 capsule, Rfl: 3     diclofenac (VOLTAREN) 1 % GEL, Apply 4 gramsto area qid prn, Disp: 100 g, Rfl: 11     doxycycline hyclate (VIBRAMYCIN) 100 MG capsule, Take 1 capsule (100 mg) by mouth daily, Disp: 90 capsule, Rfl: 3     ferrous gluconate (FERGON) 324 (38 Fe) MG tablet, Take 1 tablet (324 mg) by mouth daily (with breakfast), Disp: 90 tablet, Rfl: 3     FERROUS GLUCONATE PO, Take 324 mg by mouth daily (with breakfast), Disp: , Rfl:      Glucosamine-Chondroitin (OSTEO BI-FLEX REGULAR STRENGTH) 250-200 MG TABS, Take 1 tablet by mouth 2 times daily, Disp: 100 tablet, Rfl: 8     lidocaine (LIDODERM) 5 % Patch, Apply 1 patches to bottom of right foot for up to 12 h within a 24 h period.  Remove after 12 hours., Disp: 30 patch, Rfl: 0     lisinopril (PRINIVIL/ZESTRIL) 20 MG tablet, Take 1 tablet (20 mg) by mouth daily, Disp: 90 tablet, Rfl: 3     MELATONIN PO, Take 1 tablet by mouth At Bedtime, Disp: , Rfl:      metoprolol succinate (TOPROL-XL) 25 MG 24 hr tablet, Take 0.5 tablets (12.5 mg) by mouth daily (with breakfast), Disp: 90 tablet, Rfl: 3     Multiple Vitamins-Minerals (MULTIVITAL) CHEW, Take 2 chew tab by mouth 2 times daily (Patient taking differently: Take 1 chew tab by mouth 2 times daily ), Disp: 360 tablet, Rfl: 3     naftifine (NAFTIN) 1 % GEL topical gel, Apply topically daily Apply to affected nails daily, Disp: 90 g, Rfl: 3     Omega 3-6-9 Fatty Acids (OMEGA 3-6-9 PO), Take 1 tablet by mouth daily , Disp: , Rfl:      omeprazole (PRILOSEC) 40 MG DR capsule, TAKE 1 CAPSULE DAILY, Disp: 90 capsule, Rfl: 1     senna (SENOKOT) 8.6 MG tablet, Take 1 tablet by mouth daily (Patient taking differently: Take 1 tablet by mouth daily as needed ), Disp: 60 tablet, Rfl: 0     simvastatin (ZOCOR) 20 MG tablet, TAKE 1 TABLET AT BEDTIME, Disp: 90 tablet, Rfl: 1     sodium chloride (OCEAN NASAL SPRAY) 0.65 % nasal spray, Spray 1 spray into both nostrils  daily as needed for congestion, Disp: 2 Bottle, Rfl: 6     tamsulosin (FLOMAX) 0.4 MG capsule, TAKE 1 CAPSULE DAILY, Disp: 90 capsule, Rfl: 1     torsemide (DEMADEX) 20 MG tablet, Take 1 tablet (20 mg) by mouth daily (with breakfast), Disp: 90 tablet, Rfl: 3     urea (GORMEL) 20 % external cream, Apply topically as needed Apply to right foot daily, Disp: 120 g, Rfl: 2     ALLERGIES:  No Known Allergies     SOCIAL HISTORY:   Social History     Socioeconomic History     Marital status:      Spouse name: Not on file     Number of children: Not on file     Years of education: Not on file     Highest education level: Not on file   Occupational History     Not on file   Social Needs     Financial resource strain: Not on file     Food insecurity:     Worry: Not on file     Inability: Not on file     Transportation needs:     Medical: Not on file     Non-medical: Not on file   Tobacco Use     Smoking status: Former Smoker     Smokeless tobacco: Never Used     Tobacco comment: quit 3/1974   Substance and Sexual Activity     Alcohol use: Yes     Alcohol/week: 0.0 oz     Comment: rare drink     Drug use: No     Sexual activity: Yes     Partners: Female   Lifestyle     Physical activity:     Days per week: Not on file     Minutes per session: Not on file     Stress: Not on file   Relationships     Social connections:     Talks on phone: Not on file     Gets together: Not on file     Attends Mosque service: Not on file     Active member of club or organization: Not on file     Attends meetings of clubs or organizations: Not on file     Relationship status: Not on file     Intimate partner violence:     Fear of current or ex partner: Not on file     Emotionally abused: Not on file     Physically abused: Not on file     Forced sexual activity: Not on file   Other Topics Concern     Parent/sibling w/ CABG, MI or angioplasty before 65F 55M? Not Asked      Service Not Asked     Blood Transfusions Not Asked      "Caffeine Concern Yes     Comment: 1 cups of coffee and soda per day     Occupational Exposure Not Asked     Hobby Hazards Not Asked     Sleep Concern Not Asked     Stress Concern Not Asked     Weight Concern Not Asked     Special Diet No     Back Care Not Asked     Exercise Yes     Comment: walking     Bike Helmet Not Asked     Seat Belt Yes     Self-Exams Not Asked   Social History Narrative     Not on file        FAMILY HISTORY:   Family History   Problem Relation Age of Onset     Cancer Mother         stomach cancer,  age 61     Heart Disease Father         MI,  age 71     Heart Disease Brother         stent placement, RA      Hypertension Brother      Heart Disease Sister         rythmn problems with heart, hypertension        EXAM:Vitals: /70   Ht 1.702 m (5' 7\")   Wt 78.5 kg (173 lb)   BMI 27.10 kg/m    BMI= Body mass index is 27.1 kg/m .    General appearance: Patient is alert and fully cooperative with history & exam.  No sign of distress is noted during the visit.     Psychiatric: Affect is pleasant & appropriate.  Patient appears motivated to improve health.     Respiratory: Breathing is regular & unlabored while sitting.     HEENT: Hearing is intact to spoken word.  Speech is clear.  No gross evidence of visual impairment that would impact ambulation.     Dermatologic: Skin is intact to both lower extremities without significant lesions, rash or abrasion.  No paronychia or evidence of soft tissue infection is noted.     Vascular: DP & PT pulses are intact & regular bilaterally.   edema but no varicosities noted to the left leg.  CFT and skin temperature is normal to both lower extremities.     Neurologic: Lower extremity sensation is diminished to feet. .     Musculoskeletal: Patient is ambulatory without assistive device or brace.  No gross ankle deformity noted.  No foot or ankle joint effusion is noted.    Radiographs:  Left ankle xray - Bimalleolar ankle fracture is present. " There is evidence of osseous remodeling and healing across the fractures. No evidence of talar subluxation or syndesmotic widening. Moderate degenerative change is present at the tibiotalar joints. Scattered vascular calcifications are present.     ASSESSMENT:    Acute left ankle pain  Bimalleolar ankle fracture, left, closed, with delayed healing, subsequent encounter  Edema of left ankle     PLAN:  Reviewed patient's chart in epic. Reviewed xrays. Talked about fractures. Discussed that healing can take 6-10 weeks. Risk that the fracture will not heal and we may need to do surgery. Risk is increased 10-15% if you smoke.     Patient is a high surgical risk. Currently fractures are well aligned and there does show some signs of healing, however, given that this is a bimalleolar fracture it is more unstable. Recommend the boot for another month. Follow up then for repeat xrays. If good, possible transition to ankle brace and shoes.        Zulay Alexandra DPM, Podiatry/Foot and Ankle Surgery    Weight management plan: Patient was referred to their PCP to discuss a diet and exercise plan.

## 2019-04-16 NOTE — TELEPHONE ENCOUNTER
Last Written Prescription Date:  5/17/18  Last Fill Quantity: 200 tablet,  # refills: 3   Last office visit: 4/5/2019 with prescribing provider:  Joselyn   Future Office Visit:   Next 5 appointments (look out 90 days)    May 21, 2019 10:00 AM CDT  Return Visit with Zulay Alexandra DPM, Podiatry/Foot and Ankle Surgery  [facility] ([facility] [id number] [address] [address] [phone]         Requested Prescriptions   Pending Prescriptions Disp Refills     apixaban ANTICOAGULANT (ELIQUIS) 2.5 MG tablet 200 tablet 3     Sig: Take 1 tablet (2.5 mg) by mouth 2 times daily       Direct Oral Anticoagulant Agents Passed - 4/16/2019 11:05 AM        Passed - Normal Platelets on file in past 12 months     Recent Labs   Lab Test 04/05/19  1211                  Passed - Medication is active on med list        Passed - Patient is 18-79 years of age        Passed - Serum creatinine less than or equal to 1.4 on file in past 12 mos     Recent Labs   Lab Test 04/05/19  1211  01/07/16  1605   CR 0.93   < >  --    CREAT  --   --  1.0    < > = values in this interval not displayed.             Passed - Weight is greater than 60 kg for the past year     Wt Readings from Last 3 Encounters:   04/16/19 78.5 kg (173 lb)   04/05/19 78.5 kg (173 lb)   10/09/18 78.5 kg (173 lb)             Passed - Recent (6 mo) or future (30 days) visit within the authorizing provider's specialty

## 2019-04-16 NOTE — LETTER
4/16/2019         RE: Milo Serna  [address] [place] [address]        Dear Colleague,    Thank you for referring your patient, Milo Serna, to the [facility]. Please see a copy of my visit note below.    PATIENT HISTORY:  Dr. Alves requested I see this patient for their foot issue.  Milo Serna is a 78 year old male who presents to clinic for left ankle fracture assessment. Tripped about a month ago  When in [place] and fractured ankle. Did have xrays at that time and 1 week ago. Wondering how the ankle is healing. Pain is minimal. 2/10 if on foot in boot a lot.     Review of Systems:  Patient denies fever, chills, rash, wound, stiffness, numbness, weakness, heart burn, blood in stool, chest pain with activity, calf pain when walking, shortness of breath with activity, chronic cough, easy bleeding/bruising,excessive thirst, fatigue, depression, anxiety.  Patient admits to limping, swelling.     PAST MEDICAL HISTORY:   Past Medical History:   Diagnosis Date     Anemia, unspecified 11/8/2016     Atrial fibrillation (H) 5/27/2015     BPH (benign prostatic hyperplasia) 1/25/2012     CAD (coronary artery disease) 4/4/2012     Closed bimalleolar fracture of left ankle with routine healing 4/5/2019     Closed fracture of metatarsal bone 4/4/2012     Coronary atherosclerosis of unspecified type of vessel, native or [facility]    CAB 1996 following MI (at [facility])      Dilated aortic root (H) 5/26/2016     Drug-induced erectile dysfunction 10/2/2015     Elevated blood sugar 11/8/2016     Elevated PSA 9/19/2013     Essential hypertension with goal blood pressure less than 140/90 9/22/2016     Essential hypertension, benign      Gallbladder polyp 5/1/2018     Gout      HAV (hallux abducto valgus) 4/4/2012     Hernia, abdominal      History of prostate cancer 6/15/2016     HTN, goal below 150/90 4/20/2017     Iron deficiency anemia secondary to  inadequate dietary iron intake 11/15/2016     Low blood pressure reading 10/17/2016     Lumbago     had degendisc dz on MRI 7/07     Mixed hyperlipidemia      Mumps      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     OA (osteoarthritis) 4/4/2012     Old myocardial infarction 4/4/2012     Palpitations      Pes planus 4/4/2012     Prostate cancer (H) 5/26/2016     PUD (peptic ulcer disease) 4/4/2012     Pulmonary hypertension (H) 9/25/2017     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     Rosacea 1/15/2008     Thrombocytopenia (H) 4/21/2017     Unspecified hyperplasia of prostate with urinary obstruction and other lower urinary tract symptoms (LUTS)     better on avodart & hytrin     Venous stasis 4/4/2012     Venous stasis 4/4/2012        PAST SURGICAL HISTORY:   Past Surgical History:   Procedure Laterality Date     C NONSPECIFIC PROCEDURE      CAB 1996 [place]     C NONSPECIFIC PROCEDURE  2/07    l inguinal hernia repair      C NONSPECIFIC PROCEDURE      R hernia remote     C NONSPECIFIC PROCEDURE  2000    R ankle ORIF for fx     C NONSPECIFIC PROCEDURE  2005    nasal surgery      C NONSPECIFIC PROCEDURE      R rotater repair (remote)      C NONSPECIFIC PROCEDURE      appy 1966     C NONSPECIFIC PROCEDURE      sinus surgery x 2     C NONSPECIFIC PROCEDURE      L shoulder 6/09     CARDIAC SURGERY      bipass     CYSTOSCOPY FLEXIBLE, CYOABLATION PROSTATE N/A 11/9/2016    Procedure: CYSTOSCOPY FLEXIBLE, CRYOABLATION PROSTATE;  Surgeon: Krystian Owens MD;  Location:  OR     GENITOURINARY SURGERY      prostate surgery      HERNIA REPAIR       LAPAROSCOPIC HERNIORRHAPHY INGUINAL Right 5/10/2017    Procedure: LAPAROSCOPIC HERNIORRHAPHY INGUINAL;  Laparoscopic preperitoneal repair of right inguinal hernia with placement of underlay mesh;  Surgeon: Enrico Bridges MD;  Location: [facility] OR     PROSTATE SURGERY          MEDICATIONS:   Current Outpatient Medications:      acetaminophen (TYLENOL) 650 MG CR tablet, Take 1 tablet (650 mg) by mouth  every 6 hours as needed for pain, Disp: 100 tablet, Rfl: 11     apixaban ANTICOAGULANT (ELIQUIS) 2.5 MG tablet, Take 1 tablet (2.5 mg) by mouth 2 times daily, Disp: 200 tablet, Rfl: 3     aspirin 81 MG tablet, Take 81 mg by mouth daily, Disp: , Rfl:      co-enzyme Q-10 100 MG CAPS, Take 1 capsule (100 mg) by mouth daily, Disp: 180 capsule, Rfl: 3     diclofenac (VOLTAREN) 1 % GEL, Apply 4 gramsto area qid prn, Disp: 100 g, Rfl: 11     doxycycline hyclate (VIBRAMYCIN) 100 MG capsule, Take 1 capsule (100 mg) by mouth daily, Disp: 90 capsule, Rfl: 3     ferrous gluconate (FERGON) 324 (38 Fe) MG tablet, Take 1 tablet (324 mg) by mouth daily (with breakfast), Disp: 90 tablet, Rfl: 3     FERROUS GLUCONATE PO, Take 324 mg by mouth daily (with breakfast), Disp: , Rfl:      Glucosamine-Chondroitin (OSTEO BI-FLEX REGULAR STRENGTH) 250-200 MG TABS, Take 1 tablet by mouth 2 times daily, Disp: 100 tablet, Rfl: 8     lidocaine (LIDODERM) 5 % Patch, Apply 1 patches to bottom of right foot for up to 12 h within a 24 h period.  Remove after 12 hours., Disp: 30 patch, Rfl: 0     lisinopril (PRINIVIL/ZESTRIL) 20 MG tablet, Take 1 tablet (20 mg) by mouth daily, Disp: 90 tablet, Rfl: 3     MELATONIN PO, Take 1 tablet by mouth At Bedtime, Disp: , Rfl:      metoprolol succinate (TOPROL-XL) 25 MG 24 hr tablet, Take 0.5 tablets (12.5 mg) by mouth daily (with breakfast), Disp: 90 tablet, Rfl: 3     Multiple Vitamins-Minerals (MULTIVITAL) CHEW, Take 2 chew tab by mouth 2 times daily (Patient taking differently: Take 1 chew tab by mouth 2 times daily ), Disp: 360 tablet, Rfl: 3     naftifine (NAFTIN) 1 % GEL topical gel, Apply topically daily Apply to affected nails daily, Disp: 90 g, Rfl: 3     Omega 3-6-9 Fatty Acids (OMEGA 3-6-9 PO), Take 1 tablet by mouth daily , Disp: , Rfl:      omeprazole (PRILOSEC) 40 MG DR capsule, TAKE 1 CAPSULE DAILY, Disp: 90 capsule, Rfl: 1     senna (SENOKOT) 8.6 MG tablet, Take 1 tablet by mouth daily (Patient  taking differently: Take 1 tablet by mouth daily as needed ), Disp: 60 tablet, Rfl: 0     simvastatin (ZOCOR) 20 MG tablet, TAKE 1 TABLET AT BEDTIME, Disp: 90 tablet, Rfl: 1     sodium chloride (OCEAN NASAL SPRAY) 0.65 % nasal spray, Spray 1 spray into both nostrils daily as needed for congestion, Disp: 2 Bottle, Rfl: 6     tamsulosin (FLOMAX) 0.4 MG capsule, TAKE 1 CAPSULE DAILY, Disp: 90 capsule, Rfl: 1     torsemide (DEMADEX) 20 MG tablet, Take 1 tablet (20 mg) by mouth daily (with breakfast), Disp: 90 tablet, Rfl: 3     urea (GORMEL) 20 % external cream, Apply topically as needed Apply to right foot daily, Disp: 120 g, Rfl: 2     ALLERGIES:  No Known Allergies     SOCIAL HISTORY:   Social History     Socioeconomic History     Marital status:      Spouse name: Not on file     Number of children: Not on file     Years of education: Not on file     Highest education level: Not on file   Occupational History     Not on file   Social Needs     Financial resource strain: Not on file     Food insecurity:     Worry: Not on file     Inability: Not on file     Transportation needs:     Medical: Not on file     Non-medical: Not on file   Tobacco Use     Smoking status: Former Smoker     Smokeless tobacco: Never Used     Tobacco comment: quit 3/1974   Substance and Sexual Activity     Alcohol use: Yes     Alcohol/week: 0.0 oz     Comment: rare drink     Drug use: No     Sexual activity: Yes     Partners: Female   Lifestyle     Physical activity:     Days per week: Not on file     Minutes per session: Not on file     Stress: Not on file   Relationships     Social connections:     Talks on phone: Not on file     Gets together: Not on file     Attends Oriental orthodox service: Not on file     Active member of club or organization: Not on file     Attends meetings of clubs or organizations: Not on file     Relationship status: Not on file     Intimate partner violence:     Fear of current or ex partner: Not on file      "Emotionally abused: Not on file     Physically abused: Not on file     Forced sexual activity: Not on file   Other Topics Concern     Parent/sibling w/ CABG, MI or angioplasty before 65F 55M? Not Asked      Service Not Asked     Blood Transfusions Not Asked     Caffeine Concern Yes     Comment: 1 cups of coffee and soda per day     Occupational Exposure Not Asked     Hobby Hazards Not Asked     Sleep Concern Not Asked     Stress Concern Not Asked     Weight Concern Not Asked     Special Diet No     Back Care Not Asked     Exercise Yes     Comment: walking     Bike Helmet Not Asked     Seat Belt Yes     Self-Exams Not Asked   Social History Narrative     Not on file        FAMILY HISTORY:   Family History   Problem Relation Age of Onset     Cancer Mother         stomach cancer,  age 61     Heart Disease Father         MI,  age 71     Heart Disease Brother         stent placement, RA      Hypertension Brother      Heart Disease Sister         rythmn problems with heart, hypertension        EXAM:Vitals: /70   Ht 1.702 m (5' 7\")   Wt 78.5 kg (173 lb)   BMI 27.10 kg/m     BMI= Body mass index is 27.1 kg/m .    General appearance: Patient is alert and fully cooperative with history & exam.  No sign of distress is noted during the visit.     Psychiatric: Affect is pleasant & appropriate.  Patient appears motivated to improve health.     Respiratory: Breathing is regular & unlabored while sitting.     HEENT: Hearing is intact to spoken word.  Speech is clear.  No gross evidence of visual impairment that would impact ambulation.     Dermatologic: Skin is intact to both lower extremities without significant lesions, rash or abrasion.  No paronychia or evidence of soft tissue infection is noted.     Vascular: DP & PT pulses are intact & regular bilaterally.   edema but no varicosities noted to the left leg.  CFT and skin temperature is normal to both lower extremities.     Neurologic: Lower " extremity sensation is diminished to feet. .     Musculoskeletal: Patient is ambulatory without assistive device or brace.  No gross ankle deformity noted.  No foot or ankle joint effusion is noted.    Radiographs:  Left ankle xray - Bimalleolar ankle fracture is present. There is evidence of osseous remodeling and healing across the fractures. No evidence of talar subluxation or syndesmotic widening. Moderate degenerative change is present at the tibiotalar joints. Scattered vascular calcifications are present.     ASSESSMENT:    Acute left ankle pain  Bimalleolar ankle fracture, left, closed, with delayed healing, subsequent encounter  Edema of left ankle     PLAN:  Reviewed patient's chart in epic. Reviewed xrays. Talked about fractures. Discussed that healing can take 6-10 weeks. Risk that the fracture will not heal and we may need to do surgery. Risk is increased 10-15% if you smoke.     Patient is a high surgical risk. Currently fractures are well aligned and there does show some signs of healing, however, given that this is a bimalleolar fracture it is more unstable. Recommend the boot for another month. Follow up then for repeat xrays. If good, possible transition to ankle brace and shoes.        Zulay Alexandra DPM, Podiatry/Foot and Ankle Surgery    Weight management plan: Patient was referred to their PCP to discuss a diet and exercise plan.      Again, thank you for allowing me to participate in the care of your patient.        Sincerely,        Zulay Alexandra DPM, Podiatry/Foot and Ankle Surgery

## 2019-04-16 NOTE — PATIENT INSTRUCTIONS
Thank you for choosing [facility]!    DR. RODRIGUES'S CLINIC SCHEDULE  MONDAY AM - [place] TUESDAY - [place]   [address] [address] [place] [address] [place] [address]   [phone] / [phone] / [phone]       WEDNESDAY - [place] FRIDAY AM - [facility]   [address]   [place] [address] [place] [address]   [phone] / [phone]       FRIDAY PM - [place] SCHEDULE SURGERY: [phone]   [address] BILLING QUESTIONS: [phone]   [place] [address] AFTER HOURS: [phone]   [phone] / [phone] APPOINTMENTS: [phone]     Consumer Price Line (CPL) [phone]       4-5 week follow up    TOE & METATARSAL FRACTURES  The structure of the foot is complex, consisting of bones, muscles, tendons, and other soft tissues. Of the 26 bones in the foot, 19 are toe bones (phalanges) and metatarsal bones (the long bones in the midfoot). Fractures of the toe and metatarsal bones are common and require evaluation by a specialist. A foot and ankle surgeon should be seen for proper diagnosis and treatment, even if initial treatment has been received in an emergency room.  A fracture is a break in the bone. Fractures can be divided into two categories: traumatic fractures and stress fractures.  TRAUMATIC FRACTURES (also called acute fractures) are caused by a direct blow or impact, such as seriously stubbing your toe. Traumatic fractures can be displaced or non-displaced. If the fracture is displaced, the bone is broken in such a way that it has changed in position (dislocated).  Signs and symptoms of a traumatic fracture include:  You may hear a sound at the time of the break.    Pinpoint pain  (pain at the place of impact) at the time the fracture occurs and perhaps for a few hours later, but often the pain goes away after several hours.   Crooked or abnormal  appearance of the toe.   Bruising and swelling the next day.   It is not true that  if you can walk on it, it s not broken.  Evaluation by a foot and ankle surgeon is always recommended.   STRESS FRACTURES are tiny, hairline breaks that are usually caused by repetitive stress. Stress fractures often afflict athletes who, for example, too rapidly increase their running mileage. They can also be caused by an abnormal foot structure, deformities, or osteoporosis. Improper footwear may also lead to stress fractures. Stress fractures should not be ignored. They require proper medical attention to heal correctly.  Symptoms of stress fractures include:  Pain with or after normal activity   Pain that goes away when resting and then returns when standing or during activity    Pinpoint pain  (pain at the site of the fracture) when touched   Swelling, but no bruising   IMPROPER TREATMENT  Some people say that  the doctor can t do anything for a broken bone in the foot.  This is usually not true. In fact, if a fractured toe or metatarsal bone is not treated correctly, serious complications may develop. For example:  A deformity in the bony architecture which may limit the ability to move the foot or cause difficulty in fitting shoes   Arthritis, which may be caused by a fracture in a joint (the juncture where two bones meet), or may be a result of angular deformities that develop when a displaced fracture is severe or hasn t been properly corrected   Chronic pain and deformity   Non-union, or failure to heal, can lead to subsequent surgery or chronic pain.   PROPER TREATMENT FOR TOES  Fractures of the toe bones are almost always traumatic fractures. Treatment for traumatic fractures depends on the break itself and may include these options:  Rest. Sometimes rest is all that is needed to treat a traumatic fracture of the toe.   Splinting. The toe may be fitted with a splint to keep it in a fixed position.   Rigid or stiff-soled  shoe. Wearing a stiff-soled shoe protects the toe and helps keep it properly positioned.    Zeeshan taping  the fractured toe to another toe is sometimes appropriate, but in other cases it may be harmful.   Surgery. If the break is badly displaced or if the joint is affected, surgery may be necessary. Surgery often involves the use of fixation devices, such as pins.   PROPER TREATMENT OF METATARSALS  Breaks in the metatarsal bones may be either stress or traumatic fractures. Certain kinds of fractures of the metatarsal bones present unique challenges.  For example, sometimes a fracture of the first metatarsal bone (behind the big toe) can lead to arthritis. Since the big toe is used so frequently and bears more weight than other toes, arthritis in that area can make it painful to walk, bend, or even stand.  Another type of break, called a Rea fracture, occurs at the base of the fifth metatarsal bone (behind the little toe). It is often misdiagnosed as an ankle sprain, and misdiagnosis can have serious consequences since sprains and fractures require different treatments. Your foot and ankle surgeon is an expert in correctly identifying these conditions as well as other problems of the foot.  Treatment of metatarsal fractures depends on the type and extent of the fracture, and may include:  Rest. Sometimes rest is the only treatment needed to promote healing of a stress or traumatic fracture of a metatarsal bone.   Avoid the offending activity. Because stress fractures result from repetitive stress, it is important to avoid the activity that led to the fracture. Crutches or a wheelchair are sometimes required to offload weight from the foot to give it time to heal.   Immobilization, casting, or rigid shoe. A stiff-soled shoe or other form of immobilization may be used to protect the fractured bone while it is healing.   Surgery. Some traumatic fractures of the metatarsal bones require surgery, especially if the break  is badly displaced.   Follow-up care. Your foot and ankle surgeon will provide instructions for care following surgical or non-surgical treatment. Physical therapy, exercises and rehabilitation may be included in a schedule for return to normal activities.       BODY WEIGHT AND YOUR FEET  The following information is included in the after visit summary for all patients. Body weight can be a sensitive issue to discuss in clinic, but we think the following information is very important. Although we focus on the feet and ankles, we do support the overall health of our patients.     Many things can cause foot and ankle problems. Foot structure, activity level, foot mechanics and injuries are common causes of pain. One very important issue that often goes unmentioned, is body weight. Extra weight can cause increased stress on muscles, ligaments, bones and tendons. Sometimes just a few extra pounds is all it takes to put one over her/his threshold. Without reducing that stress, it can be difficult to alleviate pain. As Foot & Ankle specialists, our job is addressing the lower extremity problem and possible causes. Regarding extra body weight, we encourage patients to discuss diet and weight management plans with their primary care doctors. It is this team approach that gives you the best opportunity for pain relief and getting you back on your feet.      [facility] has a Comprehensive Weight Management Program. This program includes counseling, education, non-surgical and surgical approaches to weight loss. If you are interested in learning more either talk to you primary care provider or call [phone].

## 2019-04-23 ENCOUNTER — TELEPHONE (OUTPATIENT)
Dept: FAMILY MEDICINE | Facility: CLINIC | Age: 79
End: 2019-04-23

## 2019-04-23 NOTE — TELEPHONE ENCOUNTER
Pt calls,informs insurance does not cover iron pills, informs generic OTC, pt deleted email, will buy OTC or call insurance to confirm, do not see any correspondence from insurance, pt will f/u prn  Patsy Whitaker RN, BSN  Message handled by Nurse Triage.

## 2019-05-01 PROBLEM — M54.2 CERVICALGIA: Status: RESOLVED | Noted: 2018-08-06 | Resolved: 2019-05-01

## 2019-05-21 ENCOUNTER — ANCILLARY PROCEDURE (OUTPATIENT)
Dept: GENERAL RADIOLOGY | Facility: CLINIC | Age: 79
End: 2019-05-21
Attending: PODIATRIST
Payer: MEDICARE

## 2019-05-21 ENCOUNTER — OFFICE VISIT (OUTPATIENT)
Dept: PODIATRY | Facility: CLINIC | Age: 79
End: 2019-05-21
Payer: MEDICARE

## 2019-05-21 VITALS
HEIGHT: 67 IN | BODY MASS INDEX: 27.15 KG/M2 | DIASTOLIC BLOOD PRESSURE: 70 MMHG | WEIGHT: 173 LBS | SYSTOLIC BLOOD PRESSURE: 122 MMHG

## 2019-05-21 DIAGNOSIS — I87.2 EDEMA OF LEFT LOWER LEG DUE TO PERIPHERAL VENOUS INSUFFICIENCY: ICD-10-CM

## 2019-05-21 DIAGNOSIS — R60.0 EDEMA OF LEFT LOWER LEG DUE TO PERIPHERAL VENOUS INSUFFICIENCY: ICD-10-CM

## 2019-05-21 DIAGNOSIS — M25.572 ACUTE LEFT ANKLE PAIN: ICD-10-CM

## 2019-05-21 DIAGNOSIS — M25.572 ACUTE LEFT ANKLE PAIN: Primary | ICD-10-CM

## 2019-05-21 DIAGNOSIS — S82.842G BIMALLEOLAR ANKLE FRACTURE, LEFT, CLOSED, WITH DELAYED HEALING, SUBSEQUENT ENCOUNTER: ICD-10-CM

## 2019-05-21 DIAGNOSIS — G57.91 NEUROPATHY OF RIGHT FOOT: ICD-10-CM

## 2019-05-21 PROCEDURE — 73610 X-RAY EXAM OF ANKLE: CPT | Mod: LT

## 2019-05-21 PROCEDURE — 99213 OFFICE O/P EST LOW 20 MIN: CPT | Performed by: PODIATRIST

## 2019-05-21 ASSESSMENT — MIFFLIN-ST. JEOR: SCORE: 1458.35

## 2019-05-21 NOTE — PROGRESS NOTES
"Podiatry / Foot and Ankle Surgery Progress Note    May 21, 2019    Subject: Patient was seen for follow up on left ankle fracture. Notes it is sore at times. Has been wearing the boot. Here with wife. Pain is 4/10. Usually achy.     Objective:  Vitals: /70   Ht 1.702 m (5' 7\")   Wt 78.5 kg (173 lb)   BMI 27.10 kg/m    BMI= Body mass index is 27.1 kg/m .    General:  Patient is alert and orientated.  NAD     Dermatologic: Skin is intact to both lower extremities without significant lesions, rash or abrasion.  No paronychia or evidence of soft tissue infection is noted.     Vascular: DP & PT pulses are intact & regular bilaterally.   edema with pitting and lichentification of skin but no varicosities noted to the left leg.  CFT and skin temperature is normal to both lower extremities.     Neurologic: Lower extremity sensation is diminished to feet. .     Musculoskeletal: Patient is ambulatory without assistive device or brace.  No gross ankle deformity noted.  No foot or ankle joint effusion is noted.     Radiographs:  Left ankle xray - Bimalleolar ankle fracture is present. There is evidence of osseous remodeling and healing across the fractures. No evidence of talar subluxation or syndesmotic widening. Moderate degenerative change is present at the tibiotalar joints. Scattered vascular calcifications are present.    xrays 5/21/2019 - minimal bony consolidation but fracture lines still visible. No further displacement of the fracture fragments.      ASSESSMENT:    Acute left ankle pain  Bimalleolar ankle fracture, left, closed, with delayed healing, subsequent encounter  Edema of left ankle     PLAN:  Reviewed patient's chart in [facility]. Reviewed xrays today. Talked about fractures. Discussed that healing can take 10-12 weeks. Risk that the fracture will not heal and we may need to do surgery. Risk is increased 10-15% if you smoke. givene his age and comorbidities, it is going to take a while for him to heal. "      Patient is a high surgical risk. Currently fractures are well aligned and there does show some signs of healing, however, given that this is a bimalleolar fracture it is more unstable. Recommend boot for another 6 weeks. He is also prone to charcot of the ankle given his neuropathy. If no further healing noted at next visit, will try bone stimulator.  Follow up then for repeat xrays. If good, possible transition to ankle brace and shoes.     Zulay Alexandra DPM, Podiatry/Foot and Ankle Surgery    Weight management plan: Patient was referred to their PCP to discuss a diet and exercise plan.    Recommended to Milo Serna to follow up with Primary Care provider regarding elevated blood pressure.

## 2019-05-21 NOTE — PATIENT INSTRUCTIONS
Thank you for choosing [facility]!    DR. RODRIGUES'S CLINIC SCHEDULE  MONDAY AM - [place] TUESDAY - [place]   [address] [address] [address] [address] [address] [place] [address]   [phone] / [phone] / [phone]       WEDNESDAY - [place] FRIDAY AM - [facility]   [address]   [place] [address] [place] [address]   [phone] / [phone]       FRIDAY PM - [place] SCHEDULE SURGERY: [phone]   [address] BILLING QUESTIONS: [phone]   [place] [address] AFTER HOURS: [phone]   [phone] / [phone] APPOINTMENTS: [phone]     Consumer Price Line (CPL) [phone]       6 week follow up    TOE & METATARSAL FRACTURES  The structure of the foot is complex, consisting of bones, muscles, tendons, and other soft tissues. Of the 26 bones in the foot, 19 are toe bones (phalanges) and metatarsal bones (the long bones in the midfoot). Fractures of the toe and metatarsal bones are common and require evaluation by a specialist. A foot and ankle surgeon should be seen for proper diagnosis and treatment, even if initial treatment has been received in an emergency room.  A fracture is a break in the bone. Fractures can be divided into two categories: traumatic fractures and stress fractures.  TRAUMATIC FRACTURES (also called acute fractures) are caused by a direct blow or impact, such as seriously stubbing your toe. Traumatic fractures can be displaced or non-displaced. If the fracture is displaced, the bone is broken in such a way that it has changed in position (dislocated).  Signs and symptoms of a traumatic fracture include:  You may hear a sound at the time of the break.    Pinpoint pain  (pain at the place of impact) at the time the fracture occurs and perhaps for a few hours later, but often the pain goes away after several hours.   Crooked or abnormal  appearance of the toe.   Bruising and swelling the next day.   It is not true that  if you can walk on it, it s not broken.  Evaluation by a foot and ankle surgeon is always recommended.   STRESS FRACTURES are tiny, hairline breaks that are usually caused by repetitive stress. Stress fractures often afflict athletes who, for example, too rapidly increase their running mileage. They can also be caused by an abnormal foot structure, deformities, or osteoporosis. Improper footwear may also lead to stress fractures. Stress fractures should not be ignored. They require proper medical attention to heal correctly.  Symptoms of stress fractures include:  Pain with or after normal activity   Pain that goes away when resting and then returns when standing or during activity    Pinpoint pain  (pain at the site of the fracture) when touched   Swelling, but no bruising   IMPROPER TREATMENT  Some people say that  the doctor can t do anything for a broken bone in the foot.  This is usually not true. In fact, if a fractured toe or metatarsal bone is not treated correctly, serious complications may develop. For example:  A deformity in the bony architecture which may limit the ability to move the foot or cause difficulty in fitting shoes   Arthritis, which may be caused by a fracture in a joint (the juncture where two bones meet), or may be a result of angular deformities that develop when a displaced fracture is severe or hasn t been properly corrected   Chronic pain and deformity   Non-union, or failure to heal, can lead to subsequent surgery or chronic pain.   PROPER TREATMENT FOR TOES  Fractures of the toe bones are almost always traumatic fractures. Treatment for traumatic fractures depends on the break itself and may include these options:  Rest. Sometimes rest is all that is needed to treat a traumatic fracture of the toe.   Splinting. The toe may be fitted with a splint to keep it in a fixed position.   Rigid or stiff-soled  shoe. Wearing a stiff-soled shoe protects the toe and helps keep it properly positioned.    Zeeshan taping  the fractured toe to another toe is sometimes appropriate, but in other cases it may be harmful.   Surgery. If the break is badly displaced or if the joint is affected, surgery may be necessary. Surgery often involves the use of fixation devices, such as pins.   PROPER TREATMENT OF METATARSALS  Breaks in the metatarsal bones may be either stress or traumatic fractures. Certain kinds of fractures of the metatarsal bones present unique challenges.  For example, sometimes a fracture of the first metatarsal bone (behind the big toe) can lead to arthritis. Since the big toe is used so frequently and bears more weight than other toes, arthritis in that area can make it painful to walk, bend, or even stand.  Another type of break, called a Rea fracture, occurs at the base of the fifth metatarsal bone (behind the little toe). It is often misdiagnosed as an ankle sprain, and misdiagnosis can have serious consequences since sprains and fractures require different treatments. Your foot and ankle surgeon is an expert in correctly identifying these conditions as well as other problems of the foot.  Treatment of metatarsal fractures depends on the type and extent of the fracture, and may include:  Rest. Sometimes rest is the only treatment needed to promote healing of a stress or traumatic fracture of a metatarsal bone.   Avoid the offending activity. Because stress fractures result from repetitive stress, it is important to avoid the activity that led to the fracture. Crutches or a wheelchair are sometimes required to offload weight from the foot to give it time to heal.   Immobilization, casting, or rigid shoe. A stiff-soled shoe or other form of immobilization may be used to protect the fractured bone while it is healing.   Surgery. Some traumatic fractures of the metatarsal bones require surgery, especially if the break  is badly displaced.   Follow-up care. Your foot and ankle surgeon will provide instructions for care following surgical or non-surgical treatment. Physical therapy, exercises and rehabilitation may be included in a schedule for return to normal activities.         BODY WEIGHT AND YOUR FEET  The following information is included in the after visit summary for all patients. Body weight can be a sensitive issue to discuss in clinic, but we think the following information is very important. Although we focus on the feet and ankles, we do support the overall health of our patients.     Many things can cause foot and ankle problems. Foot structure, activity level, foot mechanics and injuries are common causes of pain. One very important issue that often goes unmentioned, is body weight. Extra weight can cause increased stress on muscles, ligaments, bones and tendons. Sometimes just a few extra pounds is all it takes to put one over her/his threshold. Without reducing that stress, it can be difficult to alleviate pain. As Foot & Ankle specialists, our job is addressing the lower extremity problem and possible causes. Regarding extra body weight, we encourage patients to discuss diet and weight management plans with their primary care doctors. It is this team approach that gives you the best opportunity for pain relief and getting you back on your feet.      [facility] has a Comprehensive Weight Management Program. This program includes counseling, education, non-surgical and surgical approaches to weight loss. If you are interested in learning more either talk to you primary care provider or call [phone].

## 2019-05-21 NOTE — LETTER
"    5/21/2019         RE: Milo Serna  [address] [place] [address]        Dear Colleague,    Thank you for referring your patient, Milo Serna, to the [facility]. Please see a copy of my visit note below.    Podiatry / Foot and Ankle Surgery Progress Note    May 21, 2019    Subject: Patient was seen for follow up on left ankle fracture. Notes it is sore at times. Has been wearing the boot. Here with wife. Pain is 4/10. Usually achy.     Objective:  Vitals: /70   Ht 1.702 m (5' 7\")   Wt 78.5 kg (173 lb)   BMI 27.10 kg/m     BMI= Body mass index is 27.1 kg/m .    General:  Patient is alert and orientated.  NAD     Dermatologic: Skin is intact to both lower extremities without significant lesions, rash or abrasion.  No paronychia or evidence of soft tissue infection is noted.     Vascular: DP & PT pulses are intact & regular bilaterally.   edema  with pitting and lichentification of skin but no varicosities noted to the left leg.  CFT and skin temperature is normal to both lower extremities.     Neurologic: Lower extremity sensation is diminished to feet. .     Musculoskeletal: Patient is ambulatory without assistive device or brace.  No gross ankle deformity noted.  No foot or ankle joint effusion is noted.     Radiographs:  Left ankle xray - Bimalleolar ankle fracture is present. There is evidence of osseous remodeling and healing across the fractures. No evidence of talar subluxation or syndesmotic widening. Moderate degenerative change is present at the tibiotalar joints. Scattered vascular calcifications are present.    xrays 5/21/2019 - minimal bony consolidation but fracture lines still visible. No further displacement of the fracture fragments.      ASSESSMENT:    Acute left ankle pain  Bimalleolar ankle fracture, left, closed, with delayed healing, subsequent encounter  Edema of left ankle     PLAN:  Reviewed patient's chart in epic. Reviewed xrays " today. Talked about fractures. Discussed that healing can take 10-12 weeks. Risk that the fracture will not heal and we may need to do surgery. Risk is increased 10-15% if you smoke.  givene his age and comorbidities, it is going to take a while for him to heal.      Patient is a high surgical risk. Currently fractures are well aligned and there does show some signs of healing, however, given that this is a bimalleolar fracture it is more unstable. Recommend boot for another 6 weeks. He is also prone to charcot of the ankle given his neuropathy. If no further healing noted at next visit, will try bone stimulator.  Follow up then for repeat xrays. If good, possible transition to ankle brace and shoes.     Zulay Alexandra DPM, Podiatry/Foot and Ankle Surgery    Weight management plan: Patient was referred to their PCP to discuss a diet and exercise plan.    Recommended to Milo Serna to follow up with Primary Care provider regarding elevated blood pressure.      Again, thank you for allowing me to participate in the care of your patient.        Sincerely,        Zulay Alexandra DPM, Podiatry/Foot and Ankle Surgery

## 2019-06-17 DIAGNOSIS — I50.810 RIGHT HEART FAILURE WITH REDUCED RIGHT VENTRICULAR FUNCTION (H): ICD-10-CM

## 2019-06-17 DIAGNOSIS — I50.42 CHRONIC COMBINED SYSTOLIC AND DIASTOLIC CONGESTIVE HEART FAILURE (H): ICD-10-CM

## 2019-06-17 RX ORDER — TORSEMIDE 20 MG/1
20 TABLET ORAL
Qty: 90 TABLET | Refills: 1 | Status: SHIPPED | OUTPATIENT
Start: 2019-06-17 | End: 2019-10-28

## 2019-06-18 ENCOUNTER — OFFICE VISIT (OUTPATIENT)
Dept: FAMILY MEDICINE | Facility: CLINIC | Age: 79
End: 2019-06-18
Payer: MEDICARE

## 2019-06-18 VITALS
SYSTOLIC BLOOD PRESSURE: 136 MMHG | WEIGHT: 173 LBS | BODY MASS INDEX: 27.15 KG/M2 | RESPIRATION RATE: 18 BRPM | HEIGHT: 67 IN | TEMPERATURE: 98 F | DIASTOLIC BLOOD PRESSURE: 73 MMHG | HEART RATE: 103 BPM

## 2019-06-18 DIAGNOSIS — I87.8 VENOUS STASIS: ICD-10-CM

## 2019-06-18 DIAGNOSIS — I27.20 PULMONARY HYPERTENSION (H): ICD-10-CM

## 2019-06-18 DIAGNOSIS — R41.3 MEMORY LOSS: ICD-10-CM

## 2019-06-18 DIAGNOSIS — D69.6 THROMBOCYTOPENIA (H): Primary | ICD-10-CM

## 2019-06-18 DIAGNOSIS — Z63.79 STRESS DUE TO SPOUSE WITH DEMENTIA: ICD-10-CM

## 2019-06-18 DIAGNOSIS — R23.3 PETECHIAE: ICD-10-CM

## 2019-06-18 LAB
BASOPHILS # BLD AUTO: 0.1 10E9/L (ref 0–0.2)
BASOPHILS NFR BLD AUTO: 1.7 %
DIFFERENTIAL METHOD BLD: ABNORMAL
EOSINOPHIL # BLD AUTO: 0.1 10E9/L (ref 0–0.7)
EOSINOPHIL NFR BLD AUTO: 3 %
ERYTHROCYTE [DISTWIDTH] IN BLOOD BY AUTOMATED COUNT: 15.6 % (ref 10–15)
FOLATE SERPL-MCNC: 25.3 NG/ML
HCT VFR BLD AUTO: 37.5 % (ref 40–53)
HGB BLD-MCNC: 12.3 G/DL (ref 13.3–17.7)
LYMPHOCYTES # BLD AUTO: 0.8 10E9/L (ref 0.8–5.3)
LYMPHOCYTES NFR BLD AUTO: 17.8 %
MCH RBC QN AUTO: 31.2 PG (ref 26.5–33)
MCHC RBC AUTO-ENTMCNC: 32.8 G/DL (ref 31.5–36.5)
MCV RBC AUTO: 95 FL (ref 78–100)
MONOCYTES # BLD AUTO: 0.6 10E9/L (ref 0–1.3)
MONOCYTES NFR BLD AUTO: 12.3 %
NEUTROPHILS # BLD AUTO: 3 10E9/L (ref 1.6–8.3)
NEUTROPHILS NFR BLD AUTO: 65.2 %
PLATELET # BLD AUTO: 127 10E9/L (ref 150–450)
RBC # BLD AUTO: 3.94 10E12/L (ref 4.4–5.9)
VIT B12 SERPL-MCNC: 573 PG/ML (ref 193–986)
WBC # BLD AUTO: 4.7 10E9/L (ref 4–11)

## 2019-06-18 PROCEDURE — 82746 ASSAY OF FOLIC ACID SERUM: CPT | Performed by: FAMILY MEDICINE

## 2019-06-18 PROCEDURE — 84443 ASSAY THYROID STIM HORMONE: CPT | Performed by: FAMILY MEDICINE

## 2019-06-18 PROCEDURE — 85730 THROMBOPLASTIN TIME PARTIAL: CPT | Performed by: FAMILY MEDICINE

## 2019-06-18 PROCEDURE — 82607 VITAMIN B-12: CPT | Performed by: FAMILY MEDICINE

## 2019-06-18 PROCEDURE — 85025 COMPLETE CBC W/AUTO DIFF WBC: CPT | Performed by: FAMILY MEDICINE

## 2019-06-18 PROCEDURE — 85610 PROTHROMBIN TIME: CPT | Performed by: FAMILY MEDICINE

## 2019-06-18 PROCEDURE — 86780 TREPONEMA PALLIDUM: CPT | Performed by: FAMILY MEDICINE

## 2019-06-18 PROCEDURE — 36415 COLL VENOUS BLD VENIPUNCTURE: CPT | Performed by: FAMILY MEDICINE

## 2019-06-18 PROCEDURE — 99214 OFFICE O/P EST MOD 30 MIN: CPT | Performed by: FAMILY MEDICINE

## 2019-06-18 RX ORDER — BUPROPION HYDROCHLORIDE 150 MG/1
150 TABLET ORAL EVERY MORNING
Qty: 30 TABLET | Refills: 1 | Status: SHIPPED | OUTPATIENT
Start: 2019-06-18 | End: 2019-07-24

## 2019-06-18 ASSESSMENT — MIFFLIN-ST. JEOR: SCORE: 1458.35

## 2019-06-18 NOTE — PROGRESS NOTES
Subjective     Milo Serna is a 78 year old male who presents to clinic today for the following health issues:    HPI   Rash  Onset: week    Description:   Location: legs   Character: round, red  Itching (Pruritis): YES    Progression of Symptoms:  improving    Accompanying Signs & Symptoms:  Fever: no   Body aches or joint pain: no   Sore throat symptoms: no   Recent cold symptoms: no     History:   Previous similar rash: no     Precipitating factors:   Exposure to similar rash: no   New exposures: None   Recent travel: no     Alleviating factors:      Therapies Tried and outcome:       Thrombocytopenia (H)  (primary encounter diagnosis)- known    Pulmonary hypertension (H)-   No edema    Venous stasis- Wearing knee high compression socks. He talks about them being uncomfortable and very difficult to get on and off. Edema remarkably better     Petechiae- Patient complains of a rash on both upper legs. The sites used to be very itchy, he scratched them until they bled. Not itchy at this time. No rash anywhere else.     Memory loss- Patient is concerned about his memory. He wonders about continued statin use, which he has read can worsen memory. In last 6 months he has noticeable increased difficulty remembering names.    Stress due to spouse with dementia- he observes her lack of response to aricept         Past Medical History:   Diagnosis Date     Anemia, unspecified 11/8/2016     Atrial fibrillation (H) 5/27/2015     BPH (benign prostatic hyperplasia) 1/25/2012     CAD (coronary artery disease) 4/4/2012     Closed bimalleolar fracture of left ankle with routine healing 4/5/2019     Closed fracture of metatarsal bone 4/4/2012     Coronary atherosclerosis of unspecified type of vessel, native or graft [facility]    CAB 1996 following MI (at [facility])      Dilated aortic root (H) 5/26/2016     Drug-induced erectile dysfunction 10/2/2015     Elevated blood sugar 11/8/2016     Elevated PSA 9/19/2013      Essential hypertension with goal blood pressure less than 140/90 9/22/2016     Essential hypertension, benign      Gallbladder polyp 5/1/2018     Gout      HAV (hallux abducto valgus) 4/4/2012     Hernia, abdominal      History of prostate cancer 6/15/2016     HTN, goal below 150/90 4/20/2017     Iron deficiency anemia secondary to inadequate dietary iron intake 11/15/2016     Low blood pressure reading 10/17/2016     Lumbago     had degendisc dz on MRI 7/07     Memory loss 6/18/2019     Mixed hyperlipidemia      Mumps      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     OA (osteoarthritis) 4/4/2012     Old myocardial infarction 4/4/2012     Palpitations      Pes planus 4/4/2012     Petechiae 6/18/2019     Prostate cancer (H) 5/26/2016     PUD (peptic ulcer disease) 4/4/2012     Pulmonary hypertension (H) 9/25/2017     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     Rosacea 1/15/2008     Stress due to spouse with dementia 6/18/2019     Thrombocytopenia (H) 4/21/2017     Unspecified hyperplasia of prostate with urinary obstruction and other lower urinary tract symptoms (LUTS)     better on avodart & hytrin     Venous stasis 4/4/2012     Venous stasis 4/4/2012       Past Surgical History:   Procedure Laterality Date     C NONSPECIFIC PROCEDURE      CAB 1996 [place]     C NONSPECIFIC PROCEDURE  2/07    l inguinal hernia repair      C NONSPECIFIC PROCEDURE      R hernia remote     C NONSPECIFIC PROCEDURE  2000    R ankle ORIF for fx     C NONSPECIFIC PROCEDURE  2005    nasal surgery      C NONSPECIFIC PROCEDURE      R rotater repair (remote)      C NONSPECIFIC PROCEDURE      appy 1966     C NONSPECIFIC PROCEDURE      sinus surgery x 2     C NONSPECIFIC PROCEDURE      L shoulder 6/09     CARDIAC SURGERY      bipass     CYSTOSCOPY FLEXIBLE, CYOABLATION PROSTATE N/A 11/9/2016    Procedure: CYSTOSCOPY FLEXIBLE, CRYOABLATION PROSTATE;  Surgeon: Krystian Owens MD;  Location: [facility] OR     GENITOURINARY SURGERY      prostate surgery      HERNIA REPAIR        "LAPAROSCOPIC HERNIORRHAPHY INGUINAL Right 5/10/2017    Procedure: LAPAROSCOPIC HERNIORRHAPHY INGUINAL;  Laparoscopic preperitoneal repair of right inguinal hernia with placement of underlay mesh;  Surgeon: Enrico Bridges MD;  Location: [facility] OR     PROSTATE SURGERY         Family History   Problem Relation Age of Onset     Cancer Mother         stomach cancer,  age 61     Heart Disease Father         MI,  age 71     Heart Disease Brother         stent placement, RA      Hypertension Brother      Heart Disease Sister         rythmn problems with heart, hypertension       Social History     Tobacco Use     Smoking status: Former Smoker     Smokeless tobacco: Never Used     Tobacco comment: quit 3/1974   Substance Use Topics     Alcohol use: Yes     Alcohol/week: 0.0 oz     Comment: rare drink         Reviewed and updated as needed this visit by Provider         Review of Systems   No other bruising, bleeding, no systemic symptoms      This document serves as a record of the services and decisions personally performed and made by Oscar Alves MD. It was created on his behalf by Enrico Ernique, a trained medical scribe. The creation of this document is based on the provider's statements to the medical scribe.  Enrico Enrique 2019 1:26 PM          Objective    /73 (BP Location: Right arm, Patient Position: Chair, Cuff Size: Adult Large)   Pulse 103   Temp 98  F (36.7  C) (Oral)   Resp 18   Ht 1.702 m (5' 7\")   Wt 78.5 kg (173 lb)   BMI 27.10 kg/m    Body mass index is 27.1 kg/m .     Physical Exam   Petechiae on upper thighs. No edema. Walking boot L (fx)    Diagnostic Test Results:  Labs reviewed in Epic  No results found for this or any previous visit (from the past 24 hour(s)).        Assessment & Plan     (D69.6) Thrombocytopenia (H)  (primary encounter diagnosis)  Comment: OK recently re assess  Plan: CBC with platelets and differential            (I27.20) Pulmonary " "hypertension (H)  Comment: Controlled  Plan: order for DME. Compression hose with zippers            (I87.8) Venous stasis  Comment: Discussed alternate compression socks  Plan: order for DME            (R23.3) Petechiae  Comment: I postulate secondary to improved LE edema with support hose  Plan: CBC with platelets and differential, INR,         Partial thromboplastin time broaden data base. emollients              (R41.3) Memory loss  Comment: Discussed memory exercises, Cerebra; atrophy on MR 2007  Plan: Treponema Abs w Reflex to RPR and Titer,         Vitamin B12, Folate, TSH with free T4 reflex,  Serologic evaluation        buPROPion (WELLBUTRIN XL) 150 MG 24 hr tablet            (Z63.6) Stress due to spouse with dementia  Comment: postulate  pseudodementia  Plan: buPROPion (WELLBUTRIN XL) 150 MG 24 hr tablet               BMI:   Estimated body mass index is 27.1 kg/m  as calculated from the following:    Height as of this encounter: 1.702 m (5' 7\").    Weight as of this encounter: 78.5 kg (173 lb).       Return in about 6 weeks (around 7/30/2019).    The information in this document, created by the medical scribe for me, accurately reflects the services I personally performed and the decisions made by me. I have reviewed and approved this document for accuracy prior to leaving the patient care area.  June 18, 2019 1:27 PM    Oscar Alves MD  [facility]"

## 2019-06-19 LAB
APTT PPP: 38 SEC (ref 22–37)
INR PPP: 1.39 (ref 0.86–1.14)
T PALLIDUM AB SER QL: NONREACTIVE
TSH SERPL DL<=0.005 MIU/L-ACNC: 0.98 MU/L (ref 0.4–4)

## 2019-06-20 ENCOUNTER — NURSE TRIAGE (OUTPATIENT)
Dept: FAMILY MEDICINE | Facility: CLINIC | Age: 79
End: 2019-06-20

## 2019-06-20 NOTE — TELEPHONE ENCOUNTER
No protocol that is appropriate for this call.      Patient calling and states got RX for Bupropion.  States was dizzy, shaky, was scared to walk as in boot for broken ankle, bp went up to 160/? 3 am and 140/80 this am and pulse was elevated also at 78 which is high for him.  Yesterday was first dose and states will not take again.  Is willing to try another med if Dr. Alves feels he should.  Please advise.  Rebeka Gale RN

## 2019-06-20 NOTE — TELEPHONE ENCOUNTER
Notified pt, will hold off on any new medication until after Boot is taken off (July 9th is scheduled f/u). No further questions.  Nay Matias RN

## 2019-06-24 ENCOUNTER — OFFICE VISIT (OUTPATIENT)
Dept: UROLOGY | Facility: CLINIC | Age: 79
End: 2019-06-24
Payer: MEDICARE

## 2019-06-24 VITALS
SYSTOLIC BLOOD PRESSURE: 138 MMHG | HEART RATE: 60 BPM | DIASTOLIC BLOOD PRESSURE: 68 MMHG | OXYGEN SATURATION: 99 % | BODY MASS INDEX: 28.25 KG/M2 | HEIGHT: 67 IN | WEIGHT: 180 LBS

## 2019-06-24 DIAGNOSIS — C61 PROSTATE CANCER (H): Primary | ICD-10-CM

## 2019-06-24 LAB — PSA SERPL-MCNC: 0.89 NG/ML (ref 0–4)

## 2019-06-24 PROCEDURE — 99214 OFFICE O/P EST MOD 30 MIN: CPT | Performed by: UROLOGY

## 2019-06-24 PROCEDURE — 84153 ASSAY OF PSA TOTAL: CPT | Performed by: UROLOGY

## 2019-06-24 PROCEDURE — 36415 COLL VENOUS BLD VENIPUNCTURE: CPT | Performed by: UROLOGY

## 2019-06-24 ASSESSMENT — PAIN SCALES - GENERAL: PAINLEVEL: NO PAIN (0)

## 2019-06-24 ASSESSMENT — MIFFLIN-ST. JEOR: SCORE: 1490.1

## 2019-06-24 NOTE — LETTER
6/24/2019       RE: Milo Serna  [address] [place] [address]     Dear Colleague,    Thank you for referring your patient, Milo Serna, to the [facility] [place] at [facility]. Please see a copy of my visit note below.    History: It is a great pleasure to see this very pleasant 78-year-old gentleman in follow-up consultation today.  He has a significantly complicated history.  He had presented to us in 2016 with a PSA of 7.0.  He had been diagnosed with [place] 7 and [place] 8 adenocarcinoma prostate at that time, but also had a history of atrial fibrillation and was on treatment with both Eliquis and baby aspirin.  They have been careful discussions by my colleagues about therapeutic options at that time and it was considered that he was not a candidate for radical prostatectomy and it was also considered inappropriate for radiation therapy due to the need for anticoagulants with the concern about the future potential for bleeding because of radiation therapy resulting in radiation cystitis.  We are therefore discussed the possibility of cryoablation of the prostate.  Metastatic evaluation of a negative, this was subsequently performed in November 2016.  He has done well since then, he is continent of urine, does have very mild nocturia and a satisfactory urine stream.  He has had a recent fracture of the left ankle and his leg today is in a boot.  General health is otherwise stable at the present time.    Past Medical History:   Diagnosis Date     Anemia, unspecified 11/8/2016     Atrial fibrillation (H) 5/27/2015     BPH (benign prostatic hyperplasia) 1/25/2012     CAD (coronary artery disease) 4/4/2012     Closed bimalleolar fracture of left ankle with routine healing 4/5/2019     Closed fracture of metatarsal bone 4/4/2012     Coronary atherosclerosis of unspecified type of vessel, native or graft [facility]    CAB 1996 following MI (at [facility])      Dilated aortic root (H) 5/26/2016     Drug-induced erectile dysfunction 10/2/2015     Elevated blood sugar 11/8/2016     Elevated PSA 9/19/2013     Essential hypertension with goal blood pressure less than 140/90 9/22/2016     Essential hypertension, benign      Gallbladder polyp 5/1/2018     Gout      HAV (hallux abducto valgus) 4/4/2012     Hernia, abdominal      History of prostate cancer 6/15/2016     HTN, goal below 150/90 4/20/2017     Iron deficiency anemia secondary to inadequate dietary iron intake 11/15/2016     Low blood pressure reading 10/17/2016     Lumbago     had degendisc dz on MRI 7/07     Memory loss 6/18/2019     Mixed hyperlipidemia      Mumps      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     OA (osteoarthritis) 4/4/2012     Old myocardial infarction 4/4/2012     Palpitations      Pes planus 4/4/2012     Petechiae 6/18/2019     Prostate cancer (H) 5/26/2016     PUD (peptic ulcer disease) 4/4/2012     Pulmonary hypertension (H) 9/25/2017     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     Rosacea 1/15/2008     Stress due to spouse with dementia 6/18/2019     Thrombocytopenia (H) 4/21/2017     Unspecified hyperplasia of prostate with urinary obstruction and other lower urinary tract symptoms (LUTS)     better on avodart & hytrin     Venous stasis 4/4/2012     Venous stasis 4/4/2012       Social History     Socioeconomic History     Marital status:      Spouse name: None     Number of children: None     Years of education: None     Highest education level: None   Occupational History     None   Social Needs     Financial resource strain: None     Food insecurity:     Worry: None     Inability: None     Transportation needs:     Medical: None     Non-medical: None   Tobacco Use     Smoking status: Former Smoker     Smokeless tobacco: Never Used     Tobacco comment: quit 3/1974   Substance and Sexual Activity     Alcohol use: Yes     Alcohol/week: 0.0 oz     Comment: rare drink      Drug use: No     Sexual activity: Yes     Partners: Female   Lifestyle     Physical activity:     Days per week: None     Minutes per session: None     Stress: None   Relationships     Social connections:     Talks on phone: None     Gets together: None     Attends [employer] service: None     Active member of club or organization: None     Attends meetings of clubs or organizations: None     Relationship status: None     Intimate partner violence:     Fear of current or ex partner: None     Emotionally abused: None     Physically abused: None     Forced sexual activity: None   Other Topics Concern     Parent/sibling w/ CABG, MI or angioplasty before 65F 55M? Not Asked      Service Not Asked     Blood Transfusions Not Asked     Caffeine Concern Yes     Comment: 1 cups of coffee and soda per day     Occupational Exposure Not Asked     Hobby Hazards Not Asked     Sleep Concern Not Asked     Stress Concern Not Asked     Weight Concern Not Asked     Special Diet No     Back Care Not Asked     Exercise Yes     Comment: walking     Bike Helmet Not Asked     Seat Belt Yes     Self-Exams Not Asked   Social History Narrative     None       Past Surgical History:   Procedure Laterality Date     C NONSPECIFIC PROCEDURE      CAB 1996 [place]     C NONSPECIFIC PROCEDURE  2/07    l inguinal hernia repair      C NONSPECIFIC PROCEDURE      R hernia remote     C NONSPECIFIC PROCEDURE  2000    R ankle ORIF for fx     C NONSPECIFIC PROCEDURE  2005    nasal surgery      C NONSPECIFIC PROCEDURE      R rotater repair (remote)      C NONSPECIFIC PROCEDURE      appy 1966     C NONSPECIFIC PROCEDURE      sinus surgery x 2     C NONSPECIFIC PROCEDURE      L shoulder 6/09     CARDIAC SURGERY      bipass     CYSTOSCOPY FLEXIBLE, CYOABLATION PROSTATE N/A 11/9/2016    Procedure: CYSTOSCOPY FLEXIBLE, CRYOABLATION PROSTATE;  Surgeon: Krystian Owens MD;  Location:  OR     GENITOURINARY SURGERY      prostate surgery      HERNIA  REPAIR       LAPAROSCOPIC HERNIORRHAPHY INGUINAL Right 5/10/2017    Procedure: LAPAROSCOPIC HERNIORRHAPHY INGUINAL;  Laparoscopic preperitoneal repair of right inguinal hernia with placement of underlay mesh;  Surgeon: Enrico Bridges MD;  Location: [facility] OR     PROSTATE SURGERY         Family History   Problem Relation Age of Onset     Cancer Mother         stomach cancer,  age 61     Heart Disease Father         MI,  age 71     Heart Disease Brother         stent placement, RA      Hypertension Brother      Heart Disease Sister         rythmn problems with heart, hypertension         Current Outpatient Medications:      acetaminophen (TYLENOL) 650 MG CR tablet, Take 1 tablet (650 mg) by mouth every 6 hours as needed for pain, Disp: 100 tablet, Rfl: 11     apixaban ANTICOAGULANT (ELIQUIS) 2.5 MG tablet, Take 1 tablet (2.5 mg) by mouth 2 times daily, Disp: 200 tablet, Rfl: 1     aspirin 81 MG tablet, Take 81 mg by mouth daily, Disp: , Rfl:      co-enzyme Q-10 100 MG CAPS, Take 1 capsule (100 mg) by mouth daily, Disp: 180 capsule, Rfl: 3     diclofenac (VOLTAREN) 1 % GEL, Apply 4 gramsto area qid prn, Disp: 100 g, Rfl: 11     doxycycline hyclate (VIBRAMYCIN) 100 MG capsule, Take 1 capsule (100 mg) by mouth daily, Disp: 90 capsule, Rfl: 3     ferrous gluconate (FERGON) 324 (38 Fe) MG tablet, Take 1 tablet (324 mg) by mouth daily (with breakfast), Disp: 90 tablet, Rfl: 3     FERROUS GLUCONATE PO, Take 324 mg by mouth daily (with breakfast), Disp: , Rfl:      Glucosamine-Chondroitin (OSTEO BI-FLEX REGULAR STRENGTH) 250-200 MG TABS, Take 1 tablet by mouth 2 times daily, Disp: 100 tablet, Rfl: 8     lidocaine (LIDODERM) 5 % Patch, Apply 1 patches to bottom of right foot for up to 12 h within a 24 h period.  Remove after 12 hours., Disp: 30 patch, Rfl: 0     lisinopril (PRINIVIL/ZESTRIL) 20 MG tablet, Take 1 tablet (20 mg) by mouth daily, Disp: 90 tablet, Rfl: 3     MELATONIN PO, Take 1 tablet by mouth  At Bedtime, Disp: , Rfl:      metoprolol succinate (TOPROL-XL) 25 MG 24 hr tablet, Take 0.5 tablets (12.5 mg) by mouth daily (with breakfast), Disp: 90 tablet, Rfl: 3     Multiple Vitamins-Minerals (MULTIVITAL) CHEW, Take 2 chew tab by mouth 2 times daily (Patient taking differently: Take 1 chew tab by mouth 2 times daily ), Disp: 360 tablet, Rfl: 3     naftifine (NAFTIN) 1 % GEL topical gel, Apply topically daily Apply to affected nails daily, Disp: 90 g, Rfl: 3     Omega 3-6-9 Fatty Acids (OMEGA 3-6-9 PO), Take 1 tablet by mouth daily , Disp: , Rfl:      omeprazole (PRILOSEC) 40 MG DR capsule, TAKE 1 CAPSULE DAILY, Disp: , Rfl:      order for DME, Equipment being ordered: Knee high compression hose LIGHT (15-20 MM Hg) with side zipper, Disp: 6 each, Rfl: 1     order for DME, Equipment being ordered: Please dispense up to 3 pairs of knee high compression stockings at 20-30 mmHg and one donning device if needed., Disp: 4 Device, Rfl: 0     senna (SENOKOT) 8.6 MG tablet, Take 1 tablet by mouth daily (Patient taking differently: Take 1 tablet by mouth daily as needed ), Disp: 60 tablet, Rfl: 0     simvastatin (ZOCOR) 20 MG tablet, TAKE 1 TABLET AT BEDTIME, Disp: 90 tablet, Rfl: 1     sodium chloride (OCEAN NASAL SPRAY) 0.65 % nasal spray, Spray 1 spray into both nostrils daily as needed for congestion, Disp: 2 Bottle, Rfl: 6     tamsulosin (FLOMAX) 0.4 MG capsule, TAKE 1 CAPSULE DAILY, Disp: 90 capsule, Rfl: 1     torsemide (DEMADEX) 20 MG tablet, Take 1 tablet (20 mg) by mouth daily (with breakfast), Disp: 90 tablet, Rfl: 1     urea (GORMEL) 20 % external cream, Apply topically as needed Apply to right foot daily, Disp: 120 g, Rfl: 2     buPROPion (WELLBUTRIN XL) 150 MG 24 hr tablet, Take 1 tablet (150 mg) by mouth every morning (Patient not taking: Reported on 6/24/2019), Disp: 30 tablet, Rfl: 1    10 point ROS of systems including Constitutional, Eyes, Respiratory, Cardiovascular, Gastroenterology, Genitourinary,  "Integumentary, Muscularskeletal, Psychiatric were all negative except for pertinent positives noted in my HPI.    Examination:   /68   Pulse 60   Ht 1.702 m (5' 7\")   Wt 81.6 kg (180 lb)   SpO2 99%   BMI 28.19 kg/m     General Impression: Very pleasant gentleman in no acute distress, well-oriented in time place and person  Mental Status: Normal.  HEENT.  There is no clinical evidence of jaundice in the mucous membranes are normal  Skin: Skin is otherwise normal to examination  Respiratory System: The respiratory cycle is normal  Lymph Nodes: There is no inguinal lymphadenopathy  Back/Flank Tenderness: There is no flank tenderness  Cardiovascular System: There is no significant peripheral edema that I can determine although the left foot is in a boot because of a fracture  Abdominal Examination: Examination of the abdomen reveals very mild obesity and no evidence of inguinal hernia  Extremities: The extremities show that the left foot is in a boot because of the left ankle fracture but there are no other remarkable features in the extremities  Genitial: The genitalia were examined.  The penis is uncircumcised with a normal size meatus.  Both testes are descended into the scrotum and there are no other remarkable features  Rectal Examination: Good sphincter tone, normal perianal sensation.  Smooth rectal mucosa without hemorrhoids or fissures.  Smooth soft and small prostate without evidence of tenderness, bogginess or nodules.  Perineum is otherwise normal examination.  Seminal vesicles.  Not palpable  Neurologic System: There are no focal abnormal clinical neurological signs in the central, peripheral nerve systems    Impression: This is a patient with a history of prostate cancer treated by cryoablation in 2016 with a PSA that initially declined completely  Results for JENI GONZALEZ (MRN [id number]) as of 6/24/2019 13:47   Ref. Range 1/21/2016 00:00 4/21/2016 00:00 7/18/2016 11:15 7/26/2016 00:00 " "4/20/2017 09:38   PSA Latest Ref Range: 0 - 4 ug/L 7.0 0.65 0.98 0.98 <0.01...   Results for JENI GONZALEZ (MRN [id number]) as of 6/24/2019 13:47   Ref. Range 12/20/2017 10:14 6/21/2018 14:00 6/24/2019 13:23   PSA Diag Urologic Phys Latest Ref Range: 0.00 - 4.00 ng/mL 0.12 0.24 0.89     However the PSA has slowly risen and is now 0.89.  Although this is very considerably below his pre-treatment PSA it is something we will continue to monitor closely.  I did discuss the situation with him carefully today and do not think we need to consider any other intervention at the present.  We did discuss possible future therapeutic options of PSA continued to rise which included repeat cryoablation which I will be less favorable of or possibly intermittent hormone treatment.  It is also possible that the PSA may level off at his current level.  In view of this I would like to repeat his PSA and examination no later than when he returns from [place] in the spring to assess his progress and we may consider the possibility of initiating intermittent hormone treatment at that time if I am concerned about that being a significant jump in the PSA  I did discuss the entire situation very carefully with him in detail today.  I answered all the questions    Plan: We will see him next spring for PSA and examination this may include a possible injection with Lupron at that time.    \"This dictation was performed with voice recognition software and may contain errors,  omissions and inadvertent word substitution.\"        Again, thank you for allowing me to participate in the care of your patient.      Sincerely,    Krystian Owens MD      "

## 2019-06-24 NOTE — NURSING NOTE
"Chief Complaint   Patient presents with     Prostate Cancer     Pt is here for a yearly check in with PSA and physicial exam due to hx of prostate cancer.       Patient Active Problem List   Diagnosis     Coronary atherosclerosis     Rosacea     HYPERLIPIDEMIA LDL GOAL <100     Advanced directives, counseling/discussion     Neuropathy of foot     Pes planus     HAV (hallux abducto valgus)     Closed fracture of metatarsal bone     OA (osteoarthritis)     Old myocardial infarction     PUD (peptic ulcer disease)     CAD (coronary artery disease)     Rhinophyma     Venous stasis     PVC's (premature ventricular contractions)     Premature atrial contractions     Atrial fibrillation (H)     Drug-induced erectile dysfunction     Dilated aortic root (H)     History of prostate cancer     Acute bilateral low back pain with left-sided sciatica     Elevated blood sugar     Iron deficiency anemia secondary to inadequate dietary iron intake     HTN, goal below 150/90     Thrombocytopenia (H)     Pulmonary hypertension (H)     Gallbladder polyp     Closed bimalleolar fracture of left ankle with routine healing     Petechiae     Memory loss     Stress due to spouse with dementia       No Known Allergies    /68   Pulse 60   Ht 1.702 m (5' 7\")   Wt 81.6 kg (180 lb)   SpO2 99%   BMI 28.19 kg/m            Joe Lopes, EMT-B  6/24/2019         "

## 2019-06-24 NOTE — PROGRESS NOTES
History: It is a great pleasure to see this very pleasant 78-year-old gentleman in follow-up consultation today.  He has a significantly complicated history.  He had presented to us in 2016 with a PSA of 7.0.  He had been diagnosed with Angelique 7 and Angelique 8 adenocarcinoma prostate at that time, but also had a history of atrial fibrillation and was on treatment with both Eliquis and baby aspirin.  They have been careful discussions by my colleagues about therapeutic options at that time and it was considered that he was not a candidate for radical prostatectomy and it was also considered inappropriate for radiation therapy due to the need for anticoagulants with the concern about the future potential for bleeding because of radiation therapy resulting in radiation cystitis.  We are therefore discussed the possibility of cryoablation of the prostate.  Metastatic evaluation of a negative, this was subsequently performed in November 2016.  He has done well since then, he is continent of urine, does have very mild nocturia and a satisfactory urine stream.  He has had a recent fracture of the left ankle and his leg today is in a boot.  General health is otherwise stable at the present time.    Past Medical History:   Diagnosis Date     Anemia, unspecified 11/8/2016     Atrial fibrillation (H) 5/27/2015     BPH (benign prostatic hyperplasia) 1/25/2012     CAD (coronary artery disease) 4/4/2012     Closed bimalleolar fracture of left ankle with routine healing 4/5/2019     Closed fracture of metatarsal bone 4/4/2012     Coronary atherosclerosis of unspecified type of vessel, native or graft [facility]    CAB 1996 following MI (at [facility])      Dilated aortic root (H) 5/26/2016     Drug-induced erectile dysfunction 10/2/2015     Elevated blood sugar 11/8/2016     Elevated PSA 9/19/2013     Essential hypertension with goal blood pressure less than 140/90 9/22/2016     Essential hypertension, benign       Gallbladder polyp 5/1/2018     Gout      HAV (hallux abducto valgus) 4/4/2012     Hernia, abdominal      History of prostate cancer 6/15/2016     HTN, goal below 150/90 4/20/2017     Iron deficiency anemia secondary to inadequate dietary iron intake 11/15/2016     Low blood pressure reading 10/17/2016     Lumkierstengo     had degendisc dz on MRI 7/07     Memory loss 6/18/2019     Mixed hyperlipidemia      Mumps      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     OA (osteoarthritis) 4/4/2012     Old myocardial infarction 4/4/2012     Palpitations      Pes planus 4/4/2012     Petechiae 6/18/2019     Prostate cancer (H) 5/26/2016     PUD (peptic ulcer disease) 4/4/2012     Pulmonary hypertension (H) 9/25/2017     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     Rosacea 1/15/2008     Stress due to spouse with dementia 6/18/2019     Thrombocytopenia (H) 4/21/2017     Unspecified hyperplasia of prostate with urinary obstruction and other lower urinary tract symptoms (LUTS)     better on avodart & hytrin     Venous stasis 4/4/2012     Venous stasis 4/4/2012       Social History     Socioeconomic History     Marital status:      Spouse name: None     Number of children: None     Years of education: None     Highest education level: None   Occupational History     None   Social Needs     Financial resource strain: None     Food insecurity:     Worry: None     Inability: None     Transportation needs:     Medical: None     Non-medical: None   Tobacco Use     Smoking status: Former Smoker     Smokeless tobacco: Never Used     Tobacco comment: quit 3/1974   Substance and Sexual Activity     Alcohol use: Yes     Alcohol/week: 0.0 oz     Comment: rare drink     Drug use: No     Sexual activity: Yes     Partners: Female   Lifestyle     Physical activity:     Days per week: None     Minutes per session: None     Stress: None   Relationships     Social connections:     Talks on phone: None     Gets together: None     Attends Hinduism service: None     Active member of  club or organization: None     Attends meetings of clubs or organizations: None     Relationship status: None     Intimate partner violence:     Fear of current or ex partner: None     Emotionally abused: None     Physically abused: None     Forced sexual activity: None   Other Topics Concern     Parent/sibling w/ CABG, MI or angioplasty before 65F 55M? Not Asked      Service Not Asked     Blood Transfusions Not Asked     Caffeine Concern Yes     Comment: 1 cups of coffee and soda per day     Occupational Exposure Not Asked     Hobby Hazards Not Asked     Sleep Concern Not Asked     Stress Concern Not Asked     Weight Concern Not Asked     Special Diet No     Back Care Not Asked     Exercise Yes     Comment: walking     Bike Helmet Not Asked     Seat Belt Yes     Self-Exams Not Asked   Social History Narrative     None       Past Surgical History:   Procedure Laterality Date     C NONSPECIFIC PROCEDURE      CAB 1996 [place]     C NONSPECIFIC PROCEDURE  2/07    l inguinal hernia repair      C NONSPECIFIC PROCEDURE      R hernia remote     C NONSPECIFIC PROCEDURE  2000    R ankle ORIF for fx     C NONSPECIFIC PROCEDURE  2005    nasal surgery      C NONSPECIFIC PROCEDURE      R rotater repair (remote)      C NONSPECIFIC PROCEDURE      appy 1966     C NONSPECIFIC PROCEDURE      sinus surgery x 2     C NONSPECIFIC PROCEDURE      L shoulder 6/09     CARDIAC SURGERY      bipass     CYSTOSCOPY FLEXIBLE, CYOABLATION PROSTATE N/A 11/9/2016    Procedure: CYSTOSCOPY FLEXIBLE, CRYOABLATION PROSTATE;  Surgeon: Krystian Owens MD;  Location:  OR     GENITOURINARY SURGERY      prostate surgery      HERNIA REPAIR       LAPAROSCOPIC HERNIORRHAPHY INGUINAL Right 5/10/2017    Procedure: LAPAROSCOPIC HERNIORRHAPHY INGUINAL;  Laparoscopic preperitoneal repair of right inguinal hernia with placement of underlay mesh;  Surgeon: Enrico Bridges MD;  Location: [facility] OR     PROSTATE SURGERY         Family History    Problem Relation Age of Onset     Cancer Mother         stomach cancer,  age 61     Heart Disease Father         MI,  age 71     Heart Disease Brother         stent placement, RA      Hypertension Brother      Heart Disease Sister         rythmn problems with heart, hypertension         Current Outpatient Medications:      acetaminophen (TYLENOL) 650 MG CR tablet, Take 1 tablet (650 mg) by mouth every 6 hours as needed for pain, Disp: 100 tablet, Rfl: 11     apixaban ANTICOAGULANT (ELIQUIS) 2.5 MG tablet, Take 1 tablet (2.5 mg) by mouth 2 times daily, Disp: 200 tablet, Rfl: 1     aspirin 81 MG tablet, Take 81 mg by mouth daily, Disp: , Rfl:      co-enzyme Q-10 100 MG CAPS, Take 1 capsule (100 mg) by mouth daily, Disp: 180 capsule, Rfl: 3     diclofenac (VOLTAREN) 1 % GEL, Apply 4 gramsto area qid prn, Disp: 100 g, Rfl: 11     doxycycline hyclate (VIBRAMYCIN) 100 MG capsule, Take 1 capsule (100 mg) by mouth daily, Disp: 90 capsule, Rfl: 3     ferrous gluconate (FERGON) 324 (38 Fe) MG tablet, Take 1 tablet (324 mg) by mouth daily (with breakfast), Disp: 90 tablet, Rfl: 3     FERROUS GLUCONATE PO, Take 324 mg by mouth daily (with breakfast), Disp: , Rfl:      Glucosamine-Chondroitin (OSTEO BI-FLEX REGULAR STRENGTH) 250-200 MG TABS, Take 1 tablet by mouth 2 times daily, Disp: 100 tablet, Rfl: 8     lidocaine (LIDODERM) 5 % Patch, Apply 1 patches to bottom of right foot for up to 12 h within a 24 h period.  Remove after 12 hours., Disp: 30 patch, Rfl: 0     lisinopril (PRINIVIL/ZESTRIL) 20 MG tablet, Take 1 tablet (20 mg) by mouth daily, Disp: 90 tablet, Rfl: 3     MELATONIN PO, Take 1 tablet by mouth At Bedtime, Disp: , Rfl:      metoprolol succinate (TOPROL-XL) 25 MG 24 hr tablet, Take 0.5 tablets (12.5 mg) by mouth daily (with breakfast), Disp: 90 tablet, Rfl: 3     Multiple Vitamins-Minerals (MULTIVITAL) CHEW, Take 2 chew tab by mouth 2 times daily (Patient taking differently: Take 1 chew tab by  "mouth 2 times daily ), Disp: 360 tablet, Rfl: 3     naftifine (NAFTIN) 1 % GEL topical gel, Apply topically daily Apply to affected nails daily, Disp: 90 g, Rfl: 3     Omega 3-6-9 Fatty Acids (OMEGA 3-6-9 PO), Take 1 tablet by mouth daily , Disp: , Rfl:      omeprazole (PRILOSEC) 40 MG DR capsule, TAKE 1 CAPSULE DAILY, Disp: , Rfl:      order for DME, Equipment being ordered: Knee high compression hose LIGHT (15-20 MM Hg) with side zipper, Disp: 6 each, Rfl: 1     order for DME, Equipment being ordered: Please dispense up to 3 pairs of knee high compression stockings at 20-30 mmHg and one donning device if needed., Disp: 4 Device, Rfl: 0     senna (SENOKOT) 8.6 MG tablet, Take 1 tablet by mouth daily (Patient taking differently: Take 1 tablet by mouth daily as needed ), Disp: 60 tablet, Rfl: 0     simvastatin (ZOCOR) 20 MG tablet, TAKE 1 TABLET AT BEDTIME, Disp: 90 tablet, Rfl: 1     sodium chloride (OCEAN NASAL SPRAY) 0.65 % nasal spray, Spray 1 spray into both nostrils daily as needed for congestion, Disp: 2 Bottle, Rfl: 6     tamsulosin (FLOMAX) 0.4 MG capsule, TAKE 1 CAPSULE DAILY, Disp: 90 capsule, Rfl: 1     torsemide (DEMADEX) 20 MG tablet, Take 1 tablet (20 mg) by mouth daily (with breakfast), Disp: 90 tablet, Rfl: 1     urea (GORMEL) 20 % external cream, Apply topically as needed Apply to right foot daily, Disp: 120 g, Rfl: 2     buPROPion (WELLBUTRIN XL) 150 MG 24 hr tablet, Take 1 tablet (150 mg) by mouth every morning (Patient not taking: Reported on 6/24/2019), Disp: 30 tablet, Rfl: 1    10 point ROS of systems including Constitutional, Eyes, Respiratory, Cardiovascular, Gastroenterology, Genitourinary, Integumentary, Muscularskeletal, Psychiatric were all negative except for pertinent positives noted in my HPI.    Examination:   /68   Pulse 60   Ht 1.702 m (5' 7\")   Wt 81.6 kg (180 lb)   SpO2 99%   BMI 28.19 kg/m    General Impression: Very pleasant gentleman in no acute distress, " well-oriented in time place and person  Mental Status: Normal.  HEENT.  There is no clinical evidence of jaundice in the mucous membranes are normal  Skin: Skin is otherwise normal to examination  Respiratory System: The respiratory cycle is normal  Lymph Nodes: There is no inguinal lymphadenopathy  Back/Flank Tenderness: There is no flank tenderness  Cardiovascular System: There is no significant peripheral edema that I can determine although the left foot is in a boot because of a fracture  Abdominal Examination: Examination of the abdomen reveals very mild obesity and no evidence of inguinal hernia  Extremities: The extremities show that the left foot is in a boot because of the left ankle fracture but there are no other remarkable features in the extremities  Genitial: The genitalia were examined.  The penis is uncircumcised with a normal size meatus.  Both testes are descended into the scrotum and there are no other remarkable features  Rectal Examination: Good sphincter tone, normal perianal sensation.  Smooth rectal mucosa without hemorrhoids or fissures.  Smooth soft and small prostate without evidence of tenderness, bogginess or nodules.  Perineum is otherwise normal examination.  Seminal vesicles.  Not palpable  Neurologic System: There are no focal abnormal clinical neurological signs in the central, peripheral nerve systems    Impression: This is a patient with a history of prostate cancer treated by cryoablation in 2016 with a PSA that initially declined completely  Results for JENI GONZALEZ (MRN [id number]) as of 6/24/2019 13:47   Ref. Range 1/21/2016 00:00 4/21/2016 00:00 7/18/2016 11:15 7/26/2016 00:00 4/20/2017 09:38   PSA Latest Ref Range: 0 - 4 ug/L 7.0 0.65 0.98 0.98 <0.01...   Results for JENI GONZALEZ (MRN [id number]) as of 6/24/2019 13:47   Ref. Range 12/20/2017 10:14 6/21/2018 14:00 6/24/2019 13:23   PSA Diag Urologic Phys Latest Ref Range: 0.00 - 4.00 ng/mL 0.12 0.24 0.89     However  "the PSA has slowly risen and is now 0.89.  Although this is very considerably below his pre-treatment PSA it is something we will continue to monitor closely.  I did discuss the situation with him carefully today and do not think we need to consider any other intervention at the present.  We did discuss possible future therapeutic options of PSA continued to rise which included repeat cryoablation which I will be less favorable of or possibly intermittent hormone treatment.  It is also possible that the PSA may level off at his current level.  In view of this I would like to repeat his PSA and examination no later than when he returns from [place] in the spring to assess his progress and we may consider the possibility of initiating intermittent hormone treatment at that time if I am concerned about that being a significant jump in the PSA  I did discuss the entire situation very carefully with him in detail today.  I answered all the questions    Plan: We will see him next spring for PSA and examination this may include a possible injection with Lupron at that time.    \"This dictation was performed with voice recognition software and may contain errors,  omissions and inadvertent word substitution.\"      "

## 2019-06-26 DIAGNOSIS — I10 HTN, GOAL BELOW 150/90: ICD-10-CM

## 2019-06-26 RX ORDER — LISINOPRIL 20 MG/1
20 TABLET ORAL DAILY
Qty: 90 TABLET | Refills: 1 | Status: SHIPPED | OUTPATIENT
Start: 2019-06-26 | End: 2019-10-28

## 2019-07-09 ENCOUNTER — OFFICE VISIT (OUTPATIENT)
Dept: PODIATRY | Facility: CLINIC | Age: 79
End: 2019-07-09
Payer: MEDICARE

## 2019-07-09 ENCOUNTER — ANCILLARY PROCEDURE (OUTPATIENT)
Dept: GENERAL RADIOLOGY | Facility: CLINIC | Age: 79
End: 2019-07-09
Attending: PODIATRIST
Payer: MEDICARE

## 2019-07-09 VITALS
SYSTOLIC BLOOD PRESSURE: 138 MMHG | WEIGHT: 180 LBS | HEIGHT: 67 IN | DIASTOLIC BLOOD PRESSURE: 68 MMHG | BODY MASS INDEX: 28.25 KG/M2

## 2019-07-09 DIAGNOSIS — G57.91 NEUROPATHY OF RIGHT FOOT: ICD-10-CM

## 2019-07-09 DIAGNOSIS — M25.572 ACUTE LEFT ANKLE PAIN: Primary | ICD-10-CM

## 2019-07-09 DIAGNOSIS — S82.842G BIMALLEOLAR ANKLE FRACTURE, LEFT, CLOSED, WITH DELAYED HEALING, SUBSEQUENT ENCOUNTER: ICD-10-CM

## 2019-07-09 DIAGNOSIS — M25.572 ACUTE LEFT ANKLE PAIN: ICD-10-CM

## 2019-07-09 DIAGNOSIS — R60.0 EDEMA OF BOTH LEGS: ICD-10-CM

## 2019-07-09 PROCEDURE — 99213 OFFICE O/P EST LOW 20 MIN: CPT | Performed by: PODIATRIST

## 2019-07-09 PROCEDURE — 73610 X-RAY EXAM OF ANKLE: CPT | Mod: LT

## 2019-07-09 ASSESSMENT — MIFFLIN-ST. JEOR: SCORE: 1490.1

## 2019-07-09 NOTE — PROGRESS NOTES
"Podiatry / Foot and Ankle Surgery Progress Note    July 9, 2019    Subject: Patient was seen for follow up on left ankle fracture.  Notes ankle feels great.  No pain. Just swelling. Has compression socks.     Objective:  Vitals: Ht 1.702 m (5' 7\")   Wt 81.6 kg (180 lb)   BMI 28.19 kg/m    BMI= Body mass index is 28.19 kg/m .    General:  Patient is alert and orientated.  NAD    Dermatologic: Skin is intact to both lower extremities without significant lesions, rash or abrasion.  No paronychia or evidence of soft tissue infection is noted.     Vascular: DP & PT pulses are intact & regular bilaterally.   edema with pitting and lichentification of skin but no varicosities noted to the left leg.  CFT and skin temperature is normal to both lower extremities.     Neurologic: Lower extremity sensation is diminished to feet. .     Musculoskeletal: Patient is ambulatory without assistive device or brace.  No gross ankle deformity noted.  No foot or ankle joint effusion is noted.     Radiographs:  Left ankle xray - Bimalleolar ankle fracture is present. There is evidence of osseous remodeling and healing across the fractures. No evidence of talar subluxation or syndesmotic widening. Moderate degenerative change is present at the tibiotalar joints. Scattered vascular calcifications are present.     xrays 5/21/2019 - minimal bony consolidation but fracture lines still visible. No further displacement of the fracture fragments.      ASSESSMENT:      Acute left ankle pain  Bimalleolar ankle fracture, left, closed, with delayed healing, subsequent encounter  Edema of both legs  Neuropathy of right foot     PLAN:  Reviewed patient's chart in [facility].xrays reviewed. At this time, will transition to ankle brace and shoes for next 2 months. Follow up in 2 months for reassessment. Recommend compression socks for edema.     Zulay Alexandra DPM, Podiatry/Foot and Ankle Surgery    Weight management plan: Patient was referred to their PCP " to discuss a diet and exercise plan.    Recommended to Milo Serna to follow up with Primary Care provider regarding elevated blood pressure.

## 2019-07-09 NOTE — LETTER
"    7/9/2019         RE: Milo Serna  [address] [place] [address]        Dear Colleague,    Thank you for referring your patient, Milo Serna, to the [facility]. Please see a copy of my visit note below.    Podiatry / Foot and Ankle Surgery Progress Note    July 9, 2019    Subject: Patient was seen for follow up on left ankle fracture.  Notes ankle feels great.  No pain. Just swelling. Has compression socks.     Objective:  Vitals: Ht 1.702 m (5' 7\")   Wt 81.6 kg (180 lb)   BMI 28.19 kg/m     BMI= Body mass index is 28.19 kg/m .    General:  Patient is alert and orientated.  NAD    Dermatologic: Skin is intact to both lower extremities without significant lesions, rash or abrasion.  No paronychia or evidence of soft tissue infection is noted.     Vascular: DP & PT pulses are intact & regular bilaterally.   edema with pitting and lichentification of skin but no varicosities noted to the left leg.  CFT and skin temperature is normal to both lower extremities.     Neurologic: Lower extremity sensation is diminished to feet. .     Musculoskeletal: Patient is ambulatory without assistive device or brace.  No gross ankle deformity noted.  No foot or ankle joint effusion is noted.     Radiographs:  Left ankle xray - Bimalleolar ankle fracture is present. There is evidence of osseous remodeling and healing across the fractures. No evidence of talar subluxation or syndesmotic widening. Moderate degenerative change is present at the tibiotalar joints. Scattered vascular calcifications are present.     xrays 5/21/2019 - minimal bony consolidation but fracture lines still visible. No further displacement of the fracture fragments.      ASSESSMENT:      Acute left ankle pain  Bimalleolar ankle fracture, left, closed, with delayed healing, subsequent encounter  Edema of both legs  Neuropathy of right foot     PLAN:  Reviewed patient's chart in [facility].xrays reviewed. At this time, " will transition to ankle brace and shoes for next 2 months. Follow up in 2 months for reassessment. Recommend compression socks for edema.     Zulay Alexandra DPM, Podiatry/Foot and Ankle Surgery    Weight management plan: Patient was referred to their PCP to discuss a diet and exercise plan.    Recommended to Milo Serna to follow up with Primary Care provider regarding elevated blood pressure.      Again, thank you for allowing me to participate in the care of your patient.        Sincerely,        Zulay Alexandra DPM, Podiatry/Foot and Ankle Surgery

## 2019-07-09 NOTE — PROGRESS NOTES
HEMATOLOGY/ONCOLOGY FOLLOWUP NOTE    CHIEF COMPLAINT:    Adenocarcinoma, grade 3 of the lung,   small cell lung cancer  Squamous cell lung cancer  History of severe Chemotherapy-induced thrombocytopenia    HISTORY OF PRESENT ILLNESS:  Denisse returns for ongoing management of lung cancer. She continues on home oxygen and uses a nebulizer PRN with increased pulmonary symptoms, congestion and dyspnea.  Her pulse ox is variable.  She is using mucinex.    She had a fall at home and bruised her ribs.  She is sleeping more and can sleep from 1PM to 8AM.    She notes that her eyes are injected but no facial plethora.    She notes no unusual pains, fever, bowel or bladder issues.      Her headaches are better.    PAST MEDICAL AND SURGICAL HISTORY:  Unchanged.    ONCOLOGIC HISTORY:  1.  12/29/2014 chest x-ray performed to follow up on previously noted pulmonary nodule in the setting of weight loss and dyspnea with a persistent nodule noted. CT scan of the chest 01/08/2015 showed a 1 cm spiculated lesion in the right upper lobe. Subsequent PET scan confirmed the nodule to be hypermetabolic with an SUV of 6.3 and an FDG avid right supraclavicular lymph node with an SUV of 10.9. Also seen was a 2.5x2.2 cm area in the posterior left lower lobe, which was mildly FDG avid with an SUV of 1.5.   2.  01/21/2015 seen by Dr. Xander Sullivan in pulmonary medicine. CT-guided needle biopsy of the supraclavicular lymph node was performed on 01/28/2015 showing metastatic poorly differentiated non-small cell carcinoma. MRI of the brain was negative for CNS metastases.   3.  Seen by Dr. Tello on 02/10/2015 with subsequent plan for supraclavicular lymph node biopsy to obtain further tissue for diagnosis. Of note, the initial biopsy contained insufficient cells to do studies for squamous or glandular differentiation. 2/20/15: LN PATHOLOGY--Poorly differentiated metastatic adenocarcinoma, consistent with a lung primary.   4.  3/3/2015:  May 22, 2017       Oscar Alves MD   [facility]    [address] [place] [address]      Dear Oscar:      Thank you for the kind referral of . Milo Serna.  You will note that the patient is a 76-year-old white male with longstanding venous stasis without venous ulceration who has a complex medical history as delineated below.  You sent him to me to be evaluated because of lower extremity edema, wherein the patient has demarcation at the top margin of his sock.        REVIEW OF SYSTEMS:  The patient denies leg heaviness leg pain.  He does note pigmentary changes in his gaiter areas bilaterally.  He notes no chest pain, shortness of breath, nausea, vomiting or diarrhea.  The remainder of his 14-point review of systems is within normal limits.      PAST MEDICAL HISTORY:   1.  Venous insufficiency.   2.  Prostate cancer.   3.  Rosacea.   4.  Rhinophyma.   5.  Peptic ulcer disease.   6.  Left blindness.   7.  Coronary artery disease.   8.  Osteoarthritis.   9.  Hyperlipidemia.   10.  Mumps as a child.   11.  Iron deficiency anemia secondary to poor oral iron intake.   12.  Hypertension.   13.  Ventral hernia.   14.  Gout.   15.  Mild aortic root dilatation.   16.  Erectile dysfunction.   17.  Chronic atrial fibrillation.   18.  Anemia.      PREVIOUS SURGICAL HISTORY:   1.  Prostatectomy.   2.  Laparoscopic inguinal herniorrhaphy on the right 05/10/2017.   3.  Cryoablation of prostate 2016.   4.  Left inguinal hernia repair 2007.   5.  CABG 3-vessel  when living in [place].   6.  Right ankle ORIF for fracture.   7.  Unknown nasal surgery.   8.  Remote right rotator cuff repair.   9.  Appendectomy .   10.  Sinus surgery x2.   11.  Left shoulder arthroplasty 2009.      FAMILY HISTORY:  Notable for mother being  of gastric cancer at age 61.  Father  at age 71 of coronary artery disease.  Two sisters have hypertension and atrial fibrillation.  Two brothers  Initiated weekly carboplatin and paclitaxel with radiation. Dose limiting thrombocytopenia requiring dose delays occurred.  Radiaton completed 4/17/15.  5. 8/3/16:  FNA progressive RUL nodule with ALK negative, ROS1 negative adenocarcinoma.  6. 10/21/16:  Completed Cyberknife therapy to isolated lung recurrence.  7. 06/2017:  2 progressive nodules, one in left upper lobe, one in left lower lobe. Biopsy demonstrated recurrent adenocarcinoma with a lepidic growth pattern. PET scan with no evidence of disease outside the lung. Brain MRI.  ALK negative, ROS-1 negative. EGFR and PDL 1 negative.  8. Radiation to progressive disease.  9. 11/27/17:  Lung, left, core biopsy performed for a new lung lesion: Small cell carcinoma. Rare atypical cells, cannot entirely exclude component of non-small cell carcinoma.  10. 12/19/17:  Carboplatin/Etoposide cycle 1 complicated by janee platelet count of 2000/mL requiring transfusion. Cycle 2 complicated by anemia and severe thrombocytopenia requiring transfusions. Chemotherapy delayed and radiation held due to cytopenias. Cycle 4 dose reduced by 50%.  11. 6/26/18:  Left lower lung, core biopsy and touch imprints: Interstitial fibrosis with associated pneumocyte atypia most consistent with radiation associated changes     12. 11/8/18 and 12/7/18:  Bronchoscopy with biopsies of left upper lobe negative for malignancy.  13. 3/11/19:     Lung, lateral left upper lobe, core biopsies and touch imprints:     - Squamous cell carcinoma, moderately differentiated.    Lung, medial left upper lobe, core biopsies and touch imprints:     - Small cell carcinoma.   14. 4/16/19:  Keytruda    Prior external beam radiation therapy/radiosurgery   --radiation therapy delivered March of 2015. 3-D treatment planning and encompassing the right supraclavicular mediastinum and right upper lobe region. Lung V 20 = 24%.   --Radiation therapy delivered in August 2018-left upper lobe and adjacent mediastinum-lung  had coronary artery disease, another brother has hypertension.      SOCIAL HISTORY:  Notable for the fact that the patient is a former smoker.  He is currently retired.  He is .  He quit smoking in 1974.      MEDICATIONS:   1.  Apixaban 2.5 mg p.o. b.i.d. (low dose secondary to chronic epistaxis).   2.  Norco p.r.n.   3.  Lisinopril 20 mg daily.   4.  Omeprazole 40 mg daily.   5.  Senokot p.r.n.   6.  Voltaren gel topically p.r.n.   7.  Simvastatin 20 mg at bedtime.   8.  Flomax 0.4 mg daily.   9.  Doxycycline 100 mg p.o. daily.   10.  Aspirin 81 mg daily.   11.  Coenzyme Q10.   12.  Tylenol p.r.n.   13.  Osteo Bi-Flex b.i.d.   14.  Multivitamin daily.   15.  Fish oil 1 gram daily.   16.  Saw palmetto complex 1 capsule daily.      ALLERGIES:  No known drug allergies.      PHYSICAL EXAMINATION:   GENERAL:  The patient is awake, alert and oriented.   VITAL SIGNS:  Blood pressure is 147/88, right arm 153/86 and left arm, pulse is 53 and regular, respirations are 12, weight is 176 pounds.  Body mass index is 27.57.     HEENT:  Oropharynx within normal limits.   NECK:  Revealed no JVD, thyromegaly, lymphadenopathy.   LUNGS:  Clear to auscultation bilaterally without rales, wheezes or rhonchi.   HEART:  Irregularly regular.  There is a well-healed mid sternotomy incision.  No murmur, gallop or rub.   ABDOMEN:  Soft, nondistended, nontender.   EXTREMITIES:  Without cyanosis, clubbing or edema.  There are CEAP C4 varicosities (varicose veins and lipodermatosclerosis without prior recurrent ulceration).   NEUROLOGIC:  Nonfocal.      IMPRESSION:  Mild venous insufficiency.      DISCUSSION:  Thank you, it was a pleasure to see this patient.  He is a very pleasant individual.  He has venous insufficiency.  The venous insufficiency is not causing significant symptoms other than noticing that he has an indentation of leg where he wears his socks.  I advised the patient that he could wear compressive hosiery if he would  V 20 = 15.0% (different region of the chest).   --CyberKnife treatments-October 2016- V20= <5%; Aug. 2017; V20= <5%.     ALLERGIES, MEDICATIONS, REVIEW OF SYSTEMS:  Documented in the MA notes and accepted.    PHYSICAL EXAMINATION:  Oncology Encounter Vitals [07/09/19 1156]   ONC OP Encounter Vitals Group      /70      Pulse 94      Resp       Temp 97.9 °F (36.6 °C)      Temp src Oral      SpO2 100 %      Weight 178 lb 11.2 oz (81.1 kg)      Height       Pain Score 3-4      Pain Location       Pain Education?       BSA (Calculated - m2) - Mo Hassan       BMI (Calculated)      QOPI Data:     Oral Chemo: NA    Psychiatric ROS: Negative for change in affect, anxiety, depression, insomnia, mentation or sleep disturbance.    CONSTITUTIONAL:  Alert and oriented.  In no apparent distress.  Well nourished.  Well developed.   HEAD: Normocephalic; atraumatic.   EYES:  The sclerae are anicteric.  Pupils are equal.  PSYCHIATRIC:  Alert and oriented times three.  Coherent speech.  Verbalizes understanding of our discussions today.    LABORATORY DATA:  Reviewed. PET/CT with progressive disease in multiple areas.    ASSESSMENT:  1. Stage III nonsmall cell carcinoma of the right upper lobe treated with chemoradiation with dose limiting thrombocytopenia. Subsequent RUL pulmonary recurrences vs separate second primaries in the RUL.   2. Adenocarcinoma Left upper lobe  3. Small cell carcinoma left lung with a non-small cell component. Treated with chemoradiation with significant hematologic intolerance requiring transfusion support and prolonged recovery.  4. Lateral left upper lobe Squamous cell carcinoma, moderately differentiated:  Mutational status shows no actionable mutations.    Updated PET CT scan remarkably continues to show localized disease to the lung but now the disease is progressive despite immunotherapy.    We discussed that we have several major issues.   The first issue is that although her lung cancers  like to, but that this would likely be more prominent.  He declines to do so.  I advised the patient that at present there is no obvious indication for imaging as the patient does not have significant enough venous insufficiency to warrant surgical intervention.  Nonetheless, prior to proceeding with surgical intervention, we would want the patient to wear compressive hosiery, so if this become a progressive problem for him I will prescribe 20-30 mm knee-high Sigvaris compressive hose.  If he fails to control symptoms with this, I would then obtain a venous competency study.  Please do not hesitate to contact me if I may be of assistance regarding this or other matters going forward in the future.      Sincerely,         DEMETRIA AGARWAL MD             D: 2017 13:39   T: 2017 15:22   MT: TIMMY      Name:     SAÚL GONZALEZ   MRN:      [id number]        Account:      [id number]   :      1940           Visit Date:   2017      Document: [id number]       cc: Oscar Alves MD      remain localized to the lung, further radiation to the lung would potentially be fatal.   The second issue is that she has 2 different histopathologies and chemotherapy is different for each of these. Additionally, her tolerance of chemotherapy is poor, presumably related to poor stem cell reserve due to prior extensive cytotoxic therapy, with prolonged cytopenias. I do not think she can tolerate further cytotoxic treatment. She agreed with that assessment.   The third issue is that she is unable to tolerate surgery for the same reasons that further radiation is not feasible.   The fourth is that with only medical therapy options available, we do not have a curative solution for her disease.   Due to progression on immunotherapy, which was used to potentially treat both different histopathologies, and a falling performance status, we discussed hospice enrollment.  The limited prognosis and the situation was reviewed.    She and her  were accepting of information and agreed to proceed with hospice enrollment. I will see them back in 3 weeks to assess the status and will also arrange a palliative care consultation at the same time. She will call for progressive symptoms of concern in the meantime.   5. Medial left upper lobe Small cell carcinoma. See discussion #4.  6. CODE STATUS: We reviewed the futility of heroic measures in the setting of the above and she agreed to be DNR/DNI. A bracelet was placed.          cc: Kimberly Mckoy MD

## 2019-07-09 NOTE — PATIENT INSTRUCTIONS
Thank you for choosing [facility]!    DR. [facility]IG[facility]S CLIN[facility] SCHEDULE  MONDAY AM - [place] TUESDAY - [place]   [address] [address] [address] [address] [address] [place] [address]   [phone] / [phone] / [phone]       WEDNESDAY - [place] FRIDAY AM - [facility]   [address]   [place] [address] [place] [address]   [phone] / [phone]       FRIDAY PM - [place] SCHEDULE SURGERY: [phone]   [address] [facility] BILLING QUESTIONS: [phone]   [place] [address] AFTER HOURS: [phone] / [phone] APPOINTMENTS: [phone]     Consumer Price Line (CPL) [phone]       2 month follow up    EDEMA (SWELLING)  Swollen ankles and swollen feet are common and usually not cause for concern, particularly if you have been standing or walking a lot. But feet and ankles that stay swollen or are accompanied by other symptoms could signal a serious health problem.     1. Pregnancy complications: Some swelling of the ankles and feet is normal during pregnancy. Sudden or excessive swelling, however, may be a sign of preeclampsia, a serious condition in which high blood pressure and protein in the urine develop after the 20th week of pregnancy. If you experience severe swelling or swelling accompanied by other symptoms such as abdominal pain, headaches, infrequent urination, nausea and vomiting, or vision changes, call your doctor immediately.    2. Foot or ankle injury or surgery: An injury to the foot or ankle can lead to swelling. The most common is a sprained ankle, which occurs when an injury or misstep causes the ligaments that hold the ankle in place to be stretched beyond their normal range. To reduce the swelling from a foot or ankle injury, rest to avoid walking on the injured ankle or foot, use ice packs, wrap the foot or ankle with compression bandage, and  elevate the foot on a stool or pillow.    3. Lymphedema: This is a collection of lymphatic fluid in the tissues that can develop because of the absence of or problems with the lymph vessels or after the removal of lymph nodes. Lymph is a protein-rich fluid that normally travels along an extensive network of vessels and capillaries. It is filtered through the lymph nodes, which trap and destroy unwanted substances, such as bacteria. When there is a problem with the vessels or lymph nodes, however, the fluid's movement can be blocked. Untreated, lymph buildup can impair wound healing and lead to infection and deformity. Lymphedema is common following radiation therapy or removal of the lymph nodes in patients with cancer. Can also be due to certain conditions such as spina bifida, heart disease, kidney disease, liver disease, obesity, or even from medications.    4. Venous insufficiency (Moste Common): Swelling of the ankles and feet is often an early symptom of venous insufficiency, a condition in which blood inadequately moves up the veins from the legs and feet up to the heart. Normally, the veins keep blood flowing upward with one-way valves. When these valves become damaged or weakened, the blood leaks back down the vessels and fluid is retained in the soft tissue of the lower legs, especially the ankles and feet. Chronic venous insufficiency can lead to skin changes, skin ulcers, and infection.    TREATMENT  1.  Compression - Either with compression stockings, ace bandages, wraps, or leg pumps. Compression is the most important treatment and usually the 1st step.   2.  Thermal ablation - This is done by a vascular surgeon to the veins.  3.  Phlebectomy - Surgical removal of the veins or varicosities.  Again done by a vascular surgeon.  4.  Scleropathy - Laser removal of veins (also done by vascular surgeon).    PREVENTION  1.  Exercise to improve circulation and fluid distribution.  2.  Eat a healthy diet; too  much salt may cause fluid retention, hypertension and swelling.  3.  Interrupt sitting or standing several times a day and elevate the feet and ankles above the heart  4. Lose excess weight to retain less fluid and decrease pressure on muscles and joints  5. Consider using support stockings or hose   6. Examine prescription and other medications; consult with the doctor if medication may be responsible for fluid retention   7. Avoid smoking, alcohol and other substances that can lead to underlying causes of swelling          BODY WEIGHT AND YOUR FEET  The following information is included in the after visit summary for all patients. Body weight can be a sensitive issue to discuss in clinic, but we think the following information is very important. Although we focus on the feet and ankles, we do support the overall health of our patients.     Many things can cause foot and ankle problems. Foot structure, activity level, foot mechanics and injuries are common causes of pain. One very important issue that often goes unmentioned, is body weight. Extra weight can cause increased stress on muscles, ligaments, bones and tendons. Sometimes just a few extra pounds is all it takes to put one over her/his threshold. Without reducing that stress, it can be difficult to alleviate pain. As Foot & Ankle specialists, our job is addressing the lower extremity problem and possible causes. Regarding extra body weight, we encourage patients to discuss diet and weight management plans with their primary care doctors. It is this team approach that gives you the best opportunity for pain relief and getting you back on your feet.      [facility] has a Comprehensive Weight Management Program. This program includes counseling, education, non-surgical and surgical approaches to weight loss. If you are interested in learning more either talk to you primary care provider or call [phone].

## 2019-07-12 ENCOUNTER — TELEPHONE (OUTPATIENT)
Dept: PODIATRY | Facility: CLINIC | Age: 79
End: 2019-07-12

## 2019-07-12 DIAGNOSIS — M25.572 ACUTE LEFT ANKLE PAIN: ICD-10-CM

## 2019-07-12 DIAGNOSIS — S82.842G BIMALLEOLAR ANKLE FRACTURE, LEFT, CLOSED, WITH DELAYED HEALING, SUBSEQUENT ENCOUNTER: Primary | ICD-10-CM

## 2019-07-12 NOTE — TELEPHONE ENCOUNTER
Order faxed to [facility], will call patient to inform.        Carlos Watson on 7/12/2019 at 4:11 PM

## 2019-07-12 NOTE — TELEPHONE ENCOUNTER
He can return the brace to [facility] and we can put in order for an ankle stirrup instead, which may not be as tight that he can  at [facility] in [place].     Please let patient know.       Zulay Alexandra DPM

## 2019-07-12 NOTE — TELEPHONE ENCOUNTER
Phone call to patient. He states the ankle brace he was given was too tight. Yesterday it caused his calf and foot to swell and caused redness of his calf. It is difficult for him to get the brace on. He switched back to the boot today.   The swelling and redness in his calf is not present today.   He also has extreme difficulty getting the compression stockings on , especially his left foot. He states yesterday it felt like he broke is 5th toe trying to get them on. He is not wearing them today. He states both the brace and the compression socks are too tight.   He reports the brace one piece with velcro straps. It was given to him at [facility] at his appointment with Dr. Alexandra.     Will discuss with provider and get back wit him.     Please advise.     DEANNA Sutherland RN

## 2019-07-12 NOTE — TELEPHONE ENCOUNTER
Informed patient that order had been faxed and gave contact info for [facility].        Carlos Watson on 7/12/2019 at 4:25 PM

## 2019-07-15 ENCOUNTER — HOSPITAL ENCOUNTER (EMERGENCY)
Facility: CLINIC | Age: 79
Discharge: HOME | End: 2019-07-15
Attending: EMERGENCY MEDICINE | Admitting: EMERGENCY MEDICINE
Payer: MEDICARE

## 2019-07-15 VITALS
RESPIRATION RATE: 16 BRPM | OXYGEN SATURATION: 99 % | SYSTOLIC BLOOD PRESSURE: 155 MMHG | TEMPERATURE: 97.6 F | HEART RATE: 69 BPM | DIASTOLIC BLOOD PRESSURE: 78 MMHG

## 2019-07-15 DIAGNOSIS — D64.9 MILD CHRONIC ANEMIA: ICD-10-CM

## 2019-07-15 DIAGNOSIS — R04.0 EPISTAXIS: ICD-10-CM

## 2019-07-15 LAB
BASOPHILS # BLD AUTO: 0.1 10E9/L (ref 0–0.2)
BASOPHILS NFR BLD AUTO: 1.4 %
DIFFERENTIAL METHOD BLD: ABNORMAL
EOSINOPHIL # BLD AUTO: 0.2 10E9/L (ref 0–0.7)
EOSINOPHIL NFR BLD AUTO: 4.4 %
ERYTHROCYTE [DISTWIDTH] IN BLOOD BY AUTOMATED COUNT: 16.2 % (ref 10–15)
HCT VFR BLD AUTO: 34.9 % (ref 40–53)
HGB BLD-MCNC: 11.5 G/DL (ref 13.3–17.7)
IMM GRANULOCYTES # BLD: 0 10E9/L (ref 0–0.4)
IMM GRANULOCYTES NFR BLD: 0.3 %
LYMPHOCYTES # BLD AUTO: 0.6 10E9/L (ref 0.8–5.3)
LYMPHOCYTES NFR BLD AUTO: 15 %
MCH RBC QN AUTO: 31.7 PG (ref 26.5–33)
MCHC RBC AUTO-ENTMCNC: 33 G/DL (ref 31.5–36.5)
MCV RBC AUTO: 96 FL (ref 78–100)
MONOCYTES # BLD AUTO: 0.5 10E9/L (ref 0–1.3)
MONOCYTES NFR BLD AUTO: 13.9 %
NEUTROPHILS # BLD AUTO: 2.4 10E9/L (ref 1.6–8.3)
NEUTROPHILS NFR BLD AUTO: 65 %
NRBC # BLD AUTO: 0 10*3/UL
NRBC BLD AUTO-RTO: 0 /100
PLATELET # BLD AUTO: 117 10E9/L (ref 150–450)
RBC # BLD AUTO: 3.63 10E12/L (ref 4.4–5.9)
WBC # BLD AUTO: 3.7 10E9/L (ref 4–11)

## 2019-07-15 PROCEDURE — 85025 COMPLETE CBC W/AUTO DIFF WBC: CPT | Performed by: EMERGENCY MEDICINE

## 2019-07-15 PROCEDURE — 99284 EMERGENCY DEPT VISIT MOD MDM: CPT | Mod: 25

## 2019-07-15 PROCEDURE — 30901 CONTROL OF NOSEBLEED: CPT | Mod: RT

## 2019-07-15 RX ORDER — OXYMETAZOLINE HYDROCHLORIDE 0.05 G/100ML
SPRAY NASAL
Status: DISCONTINUED
Start: 2019-07-15 | End: 2019-07-15 | Stop reason: HOSPADM

## 2019-07-15 RX ORDER — TRANEXAMIC ACID 100 MG/ML
500 INJECTION, SOLUTION INTRAVENOUS ONCE
Status: DISCONTINUED | OUTPATIENT
Start: 2019-07-15 | End: 2019-07-15 | Stop reason: HOSPADM

## 2019-07-15 RX ORDER — LIDOCAINE HYDROCHLORIDE 20 MG/ML
INJECTION, SOLUTION INFILTRATION; PERINEURAL
Status: DISCONTINUED
Start: 2019-07-15 | End: 2019-07-15 | Stop reason: HOSPADM

## 2019-07-15 RX ORDER — OXYMETAZOLINE HYDROCHLORIDE 0.05 G/100ML
2 SPRAY NASAL 2 TIMES DAILY
Qty: 1 BOTTLE | Refills: 0 | Status: SHIPPED | OUTPATIENT
Start: 2019-07-15 | End: 2019-09-05

## 2019-07-15 ASSESSMENT — ENCOUNTER SYMPTOMS
LIGHT-HEADEDNESS: 1
COUGH: 0
VOMITING: 0
RHINORRHEA: 0
NAUSEA: 0
HEADACHES: 1
FEVER: 0

## 2019-07-15 NOTE — DISCHARGE INSTRUCTIONS
"Please return to the ED as needed for new or worsening symptoms such as fainting, bleeding that lasts longer than 30 minutes, chest pain, shortness of breath, vomiting, any other concerning symptoms.          Discharge Instructions  Epistaxis    Today you were seen for a nosebleed.   Nosebleeds (the medical term is \"epistaxis\") are very common. Almost every person has had at least one in their lifetime.  Although the amount of blood loss can appear dramatic, nosebleeds rarely cause serious problems.  The most common causes are dry air or nose picking, but they also are common in people who have allergies, high blood pressure, or are on blood thinners (such as Coumadin, Aspirin or Plavix). If you or your child gets a nosebleed, the important thing is to know how to take care of it. With the right self-care, most nosebleeds will stop on their own.    Generally, every Emergency Department visit should have a follow-up clinic visit with either a primary or a specialty clinic/provider. Please follow-up as instructed by your emergency provider today.    Return to the Emergency Department if:  Your nose is bleeding a very large amount of blood and you are unable to stop it.  You get very pale, faint, or tired.  You cannot get the bleeding to stop after following these instructions.    Treatment:  Your provider may tell you to use a decongestant nose spray, like Afrin  (oxymetazoline), in both nostrils in the morning and at night for the next three days. Do not use this medicine for more than three days at a time.  If you do, it will cause nasal congestion.   Use a moisturizer. A small amount of Vaseline  to the inside of your nostrils for moisture before bed is one option. There are nasal sprays available over-the-counter for this purpose as well.  Using a humidifier in your bedroom or home will help as well when the air is dry.  For the next three days, do not blow your nose or put anything in your nose. You may sniffle, " or dab the outside of your nose.  Do not bend with your head below your waist for the next three days. Do not lift anything so heavy that you have to strain.   If you received nasal packing, please do not remove the packing until seen by an Ear, Nose, and Throat (ENT) specialist.  If antibiotics have been given with the packing, please take as directed.    If your nose starts to bleed again:  Blow your nose to get rid of some of the clots that have formed inside your nostrils. This may increase the bleeding temporarily, but that is okay.  Spray decongestant nose spray (like Afrin ) into both nostrils to constrict the blood vessels.  Sit or stand while bending forward slightly at the waist. Do not lie down or tilt your head back. This may cause you to swallow blood and can lead to vomiting.   the soft part of BOTH nostrils at the bottom of your nose and squeeze your nose closed for at least 5 minutes (for children) or 10 to 15 minutes (for adults). Use a clock to time yourself. Do not release the pressure every so often to check whether the bleeding has stopped. Many people hurt their chances of stopping the bleeding by releasing the pressure too soon or too often.    If you follow the steps outlined above, and your nose continues to bleed, repeat all the steps once more. Apply pressure for a total of at least 30 minutes. If you continue to bleed even then, seek medical attention.  If you were given a prescription for medicine here today, be sure to read all of the information (including the package insert) that comes with your prescription.  This will include important information about the medicine, its side effects, and any warnings that you need to know about.  The pharmacist who fills the prescription can provide more information and answer questions you may have about the medicine.  If you have questions or concerns that the pharmacist cannot address, please call or return to the Emergency Department.    Remember that you can always come back to the Emergency Department if you are not able to see your regular provider in the amount of time listed above, if you get any new symptoms, or if there is anything that worries you.

## 2019-07-15 NOTE — ED NOTES
Bed: ED10  Expected date: 7/15/19  Expected time: 7:13 AM  Means of arrival: Ambulance  Comments:  [id number]

## 2019-07-15 NOTE — ED PROVIDER NOTES
History     Chief Complaint:  Epistaxis    HPI   Milo Serna is a 78 year old male with a history of CAD and atrial fibrillation, on Eliquis, who presents via EMS for evaluation of epistaxis. Last night, around 2100, he reports the onset of a nosebleed. He was able to get it under control enough to go to bed around midnight, however he woke up at 0500 today with blood running down his nose and throat, prompting call to EMS. Here, he reports a headache and feeling lightheaded. He denies any nausea, vomiting, chest pain, or shortness of breath. He reports a history of epistaxis for as long as he can remember, but in the last few years he has had fewer. Today's bleed has lasted longer than any previous one; he denies any recent illness or infectious symptoms, including fever, rhinorrhea, cough, congestion, or sore throat. Of note, he reports a currently healing broken ankle for which he was scheduled to have a brace placed this morning.      Allergies:  NKDA    Medications:    Eliquis  Aspirin 81 mg  Bupropion   Doxycycline  Fergon  Lisinopril  Metoprolol  Melatonin   Prilosec   Simvastatin  Flomax  Torsemide     Past Medical History:    Anemia  A. Fib.   BPH  CAD  Coronary atherosclerosis  Dilated aortic root  ED  HTN  Gout  HAV  Abdominal hernia  Prostate cancer  Hyperlipidemia  OA  MI  PUD  Pulmonary HTN  Rhinophyma   LUTS  Venous stasis     Past Surgical History:    CABG  Left inguinal hernia repair  Right hernia repair  Right ankle ORIF  Nasal surgery  Right rotator cuff repair  Appendectomy  Sinus surgery x2  Cystoscopy, cryoablation prostate  Prostate surgery  Right inguinal hernia repair    Family History:    Stomach cancer - Mother  MI - Father  RA, heart disease - Brother  HTN - Brother  HTN, arrhythmia - Sister    Social History:  Marital Status:   [2]  Wife at bedside.   Former smoker.   Positive for alcohol use. Comment: 10 drinks/week.   Negative for drug use.      Review of Systems    Constitutional: Negative for fever.   HENT: Positive for nosebleeds. Negative for congestion and rhinorrhea.    Respiratory: Negative for cough.    Gastrointestinal: Negative for nausea and vomiting.   Neurological: Positive for light-headedness and headaches.   All other systems reviewed and are negative.        Physical Exam     Patient Vitals for the past 24 hrs:   BP Temp Temp src Pulse Resp SpO2   07/15/19 0845 145/87 -- -- 64 -- --   07/15/19 0830 135/82 -- -- 85 -- --   07/15/19 0815 131/73 -- -- 65 -- --   07/15/19 0800 136/72 -- -- 69 -- --   07/15/19 0731 150/86 97.6  F (36.4  C) Oral 81 16 99 %      Physical Exam  Constitutional: Well developed, nontox appearance  Head: Atraumatic.    HEENT: bleeding from L nare, no obvious source  Mouth/Throat: Oropharynx is moist.  Blood in OP  Neck:  no stridor  Eyes: no scleral icterus  Cardiovascular: Reg rate, 2+ bilat radial pulses  Pulmonary/Chest: nml resp effort  Abdominal: ND, +BS, soft, NT, no rebound or guarding   Ext: Warm, well perfused, no edema  Neurological: A&O, symmetric facies, moves ext x4  Skin: Skin is warm and dry.   Psychiatric: Behavior is normal. Thought content normal.   Nursing note and vitals reviewed.        Emergency Department Course   Laboratory:  CBC: WBC: 3.7 (L), HGB: 11.5 (L), PLT: 117 (L)     Procedures:  PROCEDURE NOTE: The patient's left nare was prepped with lidocaine 2% and aerosolized tranexamic acid.  The location of the patient's bleeding in the left nare was not identified and no cautery or packing was performed.        Interventions:  0820 Lidocaine 2% injection   Afrin nasal spray   Tranexamic acid  0900 Silver nitrate    Emergency Department Course:  Patient presents via EMS. Nursing notes and vitals reviewed.  0811: I performed an exam of the patient as documented above. I administered anesthesia and other medication, as noted above, in an attempt to stop the bleeding; this was unsuccessful.     IV was inserted and  blood was drawn for laboratory testing, results above.    0855: I attempted to perform nasal cautery, as described above. This is so far unsuccessful.     0942: Patient rechecked and updated. The bleeding still has not stopped.     1015: I rechecked the patient and at this time, the epistaxis has stopped.     I personally reviewed the laboratory results with the Patient and spouse and answered all related questions prior to discharge. He was given a prescription for Afrin nasal spray.     Impression & Plan      Medical Decision Making:  Milo Serna is a 78 year old male who presents for evaluation of nasal bleeding and epistaxis. Unable to identify source on exam.  Afrin, TXA, lidocaine used for treatment with eventual cessation of bleeding.  Pt is anticoagulated.  CBC demonstrated minimal anemia.  The bleeding stopped and did not restart here in ED, therefore no nasal packing is indicated.  Blood in OP cleared; doubt posterior bleed.  Discussed preventative measure for home.  Close follow-up with ENT and primary care; see discharge instructions. At this time I feel the patient is safe for discharge.  Recommendations given regarding return to the emergency department as needed for new or worsening symptoms.  Pt and family counseled on all results, diagnosis and disposition.  They are understanding and agreeable to plan. Patient discharged in stable condition.        Diagnosis:    ICD-10-CM    1. Epistaxis R04.0    2. Mild chronic anemia D64.9        Disposition:  discharged to home    Discharge Medications:     Medication List      Started    oxymetazoline 0.05 % nasal spray  Commonly known as:  AFRIN NASAL SPRAY  2 sprays, Nasal, 2 TIMES DAILY          Scribe Disclosure:  I, Argelia Sesay, am serving as a scribe on 7/15/2019 at 8:11 AM to personally document services performed by Grover May MD based on my observations and the provider's statements to me.        Argelia Sesay  7/15/2019   [place]  [facility] EMERGENCY DEPARTMENT       Grover May MD  07/16/19 0033       Grover May MD  07/16/19 0052

## 2019-07-15 NOTE — ED AVS SNAPSHOT
[facility] Emergency Department  [address]  [facility] [address]  Phone:  [phone]  Fax:  [phone]                                    Milo Serna   MRN: [id number]    Department:  [facility] Emergency Department   Date of Visit:  7/15/2019           After Visit Summary Signature Page    I have received my discharge instructions, and my questions have been answered. I have discussed any challenges I see with this plan with the nurse or doctor.    ..........................................................................................................................................  Patient/Patient Representative Signature      ..........................................................................................................................................  Patient Representative Print Name and Relationship to Patient    ..................................................               ................................................  Date                                   Time    ..........................................................................................................................................  Reviewed by Signature/Title    ...................................................              ..............................................  Date                                               Time          22EPIC Rev 08/18

## 2019-07-15 NOTE — ED TRIAGE NOTES
Patient presents to the ED with a nosebleed since 2100 last night. Bleeding from left nare. On eliquis.

## 2019-07-20 DIAGNOSIS — N40.1 BENIGN PROSTATIC HYPERPLASIA WITH LOWER URINARY TRACT SYMPTOMS: ICD-10-CM

## 2019-07-21 RX ORDER — TAMSULOSIN HYDROCHLORIDE 0.4 MG/1
CAPSULE ORAL
Qty: 90 CAPSULE | Refills: 1 | Status: SHIPPED | OUTPATIENT
Start: 2019-07-21 | End: 2019-10-28

## 2019-07-22 DIAGNOSIS — I49.3 FREQUENT PVCS: ICD-10-CM

## 2019-07-22 DIAGNOSIS — I48.20 CHRONIC ATRIAL FIBRILLATION (H): ICD-10-CM

## 2019-07-22 RX ORDER — METOPROLOL SUCCINATE 25 MG/1
12.5 TABLET, EXTENDED RELEASE ORAL
Qty: 45 TABLET | Refills: 0 | Status: SHIPPED | OUTPATIENT
Start: 2019-07-22 | End: 2019-09-06

## 2019-07-22 NOTE — TELEPHONE ENCOUNTER
Prescription approved per [facility].  Zoey Romero RN

## 2019-07-24 ENCOUNTER — OFFICE VISIT (OUTPATIENT)
Dept: FAMILY MEDICINE | Facility: CLINIC | Age: 79
End: 2019-07-24
Payer: MEDICARE

## 2019-07-24 VITALS
BODY MASS INDEX: 29.01 KG/M2 | WEIGHT: 185.2 LBS | TEMPERATURE: 97.4 F | SYSTOLIC BLOOD PRESSURE: 135 MMHG | RESPIRATION RATE: 14 BRPM | OXYGEN SATURATION: 99 % | DIASTOLIC BLOOD PRESSURE: 68 MMHG | HEART RATE: 48 BPM

## 2019-07-24 DIAGNOSIS — M79.675 PAIN OF TOE OF LEFT FOOT: Primary | ICD-10-CM

## 2019-07-24 DIAGNOSIS — M21.611 BUNION OF RIGHT FOOT: ICD-10-CM

## 2019-07-24 DIAGNOSIS — S82.842D CLOSED BIMALLEOLAR FRACTURE OF LEFT ANKLE WITH ROUTINE HEALING: ICD-10-CM

## 2019-07-24 DIAGNOSIS — R60.0 PERIPHERAL EDEMA: ICD-10-CM

## 2019-07-24 DIAGNOSIS — D50.8 IRON DEFICIENCY ANEMIA SECONDARY TO INADEQUATE DIETARY IRON INTAKE: ICD-10-CM

## 2019-07-24 DIAGNOSIS — R04.0 EPISTAXIS: ICD-10-CM

## 2019-07-24 LAB
BASOPHILS # BLD AUTO: 0 10E9/L (ref 0–0.2)
BASOPHILS NFR BLD AUTO: 0.8 %
DIFFERENTIAL METHOD BLD: ABNORMAL
EOSINOPHIL # BLD AUTO: 0.1 10E9/L (ref 0–0.7)
EOSINOPHIL NFR BLD AUTO: 2.3 %
ERYTHROCYTE [DISTWIDTH] IN BLOOD BY AUTOMATED COUNT: 16.8 % (ref 10–15)
HCT VFR BLD AUTO: 35.1 % (ref 40–53)
HGB BLD-MCNC: 11.5 G/DL (ref 13.3–17.7)
LYMPHOCYTES # BLD AUTO: 0.7 10E9/L (ref 0.8–5.3)
LYMPHOCYTES NFR BLD AUTO: 14.1 %
MCH RBC QN AUTO: 31.5 PG (ref 26.5–33)
MCHC RBC AUTO-ENTMCNC: 32.8 G/DL (ref 31.5–36.5)
MCV RBC AUTO: 96 FL (ref 78–100)
MONOCYTES # BLD AUTO: 0.9 10E9/L (ref 0–1.3)
MONOCYTES NFR BLD AUTO: 18 %
NEUTROPHILS # BLD AUTO: 3.2 10E9/L (ref 1.6–8.3)
NEUTROPHILS NFR BLD AUTO: 64.8 %
PLATELET # BLD AUTO: 157 10E9/L (ref 150–450)
RBC # BLD AUTO: 3.65 10E12/L (ref 4.4–5.9)
WBC # BLD AUTO: 4.9 10E9/L (ref 4–11)

## 2019-07-24 PROCEDURE — 36415 COLL VENOUS BLD VENIPUNCTURE: CPT | Performed by: PHYSICIAN ASSISTANT

## 2019-07-24 PROCEDURE — 99214 OFFICE O/P EST MOD 30 MIN: CPT | Performed by: PHYSICIAN ASSISTANT

## 2019-07-24 PROCEDURE — 85025 COMPLETE CBC W/AUTO DIFF WBC: CPT | Performed by: PHYSICIAN ASSISTANT

## 2019-07-24 RX ORDER — CEPHALEXIN 500 MG/1
500 CAPSULE ORAL 2 TIMES DAILY
Qty: 14 CAPSULE | Refills: 0 | Status: SHIPPED | OUTPATIENT
Start: 2019-07-24 | End: 2019-07-28

## 2019-07-24 NOTE — PROGRESS NOTES
Subjective     Milo Serna is a 78 year old male who presents to clinic today for the following health issues:    HPI   Joint Pain    Onset: one month    Description:   Location: left and right big toe. Left at tip of toe. Right at base of toe  Character: Sharp    Intensity: moderate    Progression of Symptoms: same    Accompanying Signs & Symptoms:  Other symptoms: redness    History:   Previous similar pain: no       Precipitating factors:   Trauma or overuse: no-though putting on compression socks on left hurts toe.     Alleviating factors:  Improved by: soaked in warm water    Therapies Tried and outcome: none       Patient has also been experiencing nose bleeds for the past week. Was in ER last week for 4 hours bleeding. Was given afrin, but has not used recently due to concerns for use longer than 3 days. Has 4 hour nose bleed again yesterday. Was slightly anemic at ER thought to be due to blood loss and iron deficiency. Was recommended to see ENT. Has yet to schedule follow up.     Ongoing left ankle pain. Both braces used by podiatry result in pain of lower leg due to edema. Has not been wearing brace for his bimalleolar fracture. Has not contacted podiatry recently due to this.     Patient Active Problem List   Diagnosis     Coronary atherosclerosis     Rosacea     HYPERLIPIDEMIA LDL GOAL <100     Advanced directives, counseling/discussion     Neuropathy of foot     Pes planus     HAV (hallux abducto valgus)     Closed fracture of metatarsal bone     OA (osteoarthritis)     Old myocardial infarction     PUD (peptic ulcer disease)     CAD (coronary artery disease)     Rhinophyma     Venous stasis     PVC's (premature ventricular contractions)     Premature atrial contractions     Atrial fibrillation (H)     Drug-induced erectile dysfunction     Dilated aortic root (H)     History of prostate cancer     Acute bilateral low back pain with left-sided sciatica     Elevated blood sugar     Iron deficiency  anemia secondary to inadequate dietary iron intake     HTN, goal below 150/90     Thrombocytopenia (H)     Pulmonary hypertension (H)     Gallbladder polyp     Closed bimalleolar fracture of left ankle with routine healing     Petechiae     Memory loss     Stress due to spouse with dementia     Past Surgical History:   Procedure Laterality Date     C NONSPECIFIC PROCEDURE      CAB 1996     C NONSPECIFIC PROCEDURE      l inguinal hernia repair      C NONSPECIFIC PROCEDURE      R hernia remote     C NONSPECIFIC PROCEDURE      R ankle ORIF for fx     C NONSPECIFIC PROCEDURE      nasal surgery      C NONSPECIFIC PROCEDURE      R rotater repair (remote)      C NONSPECIFIC PROCEDURE      appy 1966     C NONSPECIFIC PROCEDURE      sinus surgery x 2     C NONSPECIFIC PROCEDURE      L shoulder      CARDIAC SURGERY      bipass     CYSTOSCOPY FLEXIBLE, CYOABLATION PROSTATE N/A 2016    Procedure: CYSTOSCOPY FLEXIBLE, CRYOABLATION PROSTATE;  Surgeon: Krystian Owens MD;  Location: [facility] OR     GENITOURINARY SURGERY      prostate surgery      HERNIA REPAIR       LAPAROSCOPIC HERNIORRHAPHY INGUINAL Right 5/10/2017    Procedure: LAPAROSCOPIC HERNIORRHAPHY INGUINAL;  Laparoscopic preperitoneal repair of right inguinal hernia with placement of underlay mesh;  Surgeon: Enrico Bridges MD;  Location: [facility] OR     PROSTATE SURGERY         Social History     Tobacco Use     Smoking status: Former Smoker     Smokeless tobacco: Never Used     Tobacco comment: quit 3/1974   Substance Use Topics     Alcohol use: Yes     Alcohol/week: 0.0 oz     Comment: rare drink     Family History   Problem Relation Age of Onset     Cancer Mother         stomach cancer,  age 61     Heart Disease Father         MI,  age 71     Heart Disease Brother         stent placement, RA      Hypertension Brother      Heart Disease Sister         rythmn problems with heart, hypertension         Current Outpatient  Medications   Medication Sig Dispense Refill     acetaminophen (TYLENOL) 650 MG CR tablet Take 1 tablet (650 mg) by mouth every 6 hours as needed for pain 100 tablet 11     apixaban ANTICOAGULANT (ELIQUIS) 2.5 MG tablet Take 1 tablet (2.5 mg) by mouth 2 times daily 200 tablet 1     aspirin 81 MG tablet Take 81 mg by mouth daily       cephALEXin (KEFLEX) 500 MG capsule Take 1 capsule (500 mg) by mouth 2 times daily for 7 days 14 capsule 0     co-enzyme Q-10 100 MG CAPS Take 1 capsule (100 mg) by mouth daily 180 capsule 3     diclofenac (VOLTAREN) 1 % GEL Apply 4 gramsto area qid prn 100 g 11     doxycycline hyclate (VIBRAMYCIN) 100 MG capsule Take 1 capsule (100 mg) by mouth daily 90 capsule 3     ferrous gluconate (FERGON) 324 (38 Fe) MG tablet Take 1 tablet (324 mg) by mouth daily (with breakfast) 90 tablet 3     FERROUS GLUCONATE PO Take 324 mg by mouth daily (with breakfast)       Glucosamine-Chondroitin (OSTEO BI-FLEX REGULAR STRENGTH) 250-200 MG TABS Take 1 tablet by mouth 2 times daily 100 tablet 8     lidocaine (LIDODERM) 5 % Patch Apply 1 patches to bottom of right foot for up to 12 h within a 24 h period.  Remove after 12 hours. 30 patch 0     lisinopril (PRINIVIL/ZESTRIL) 20 MG tablet Take 1 tablet (20 mg) by mouth daily 90 tablet 1     MELATONIN PO Take 1 tablet by mouth At Bedtime       metoprolol succinate ER (TOPROL-XL) 25 MG 24 hr tablet Take 0.5 tablets (12.5 mg) by mouth daily (with breakfast) 45 tablet 0     Multiple Vitamins-Minerals (MULTIVITAL) CHEW Take 2 chew tab by mouth 2 times daily (Patient taking differently: Take 1 chew tab by mouth 2 times daily ) 360 tablet 3     naftifine (NAFTIN) 1 % GEL topical gel Apply topically daily Apply to affected nails daily 90 g 3     Omega 3-6-9 Fatty Acids (OMEGA 3-6-9 PO) Take 1 tablet by mouth daily        omeprazole (PRILOSEC) 40 MG DR capsule TAKE 1 CAPSULE DAILY       order for DME Equipment being ordered: zip compression stocking to knee. Left. 1  Device 0     order for DME Equipment being ordered: Please dispense up to 3 pairs of knee high compression stockings at 20-30 mmHg and one donning device if needed. 4 Device 0     senna (SENOKOT) 8.6 MG tablet Take 1 tablet by mouth daily (Patient taking differently: Take 1 tablet by mouth daily as needed ) 60 tablet 0     simvastatin (ZOCOR) 20 MG tablet TAKE 1 TABLET AT BEDTIME 90 tablet 1     sodium chloride (OCEAN NASAL SPRAY) 0.65 % nasal spray Spray 1 spray into both nostrils daily as needed for congestion 2 Bottle 6     tamsulosin (FLOMAX) 0.4 MG capsule TAKE 1 CAPSULE DAILY 90 capsule 1     torsemide (DEMADEX) 20 MG tablet Take 1 tablet (20 mg) by mouth daily (with breakfast) 90 tablet 1     urea (GORMEL) 20 % external cream Apply topically as needed Apply to right foot daily 120 g 2     order for DME Equipment being ordered: Ankle aircast stirrup. 1 Device 0     order for DME Equipment being ordered: ankle brace 1 Device 0     order for DME Equipment being ordered:  Please dispense up to 3 pairs of knee high compression stockings at 20-30 mmHg and one donning device if needed. 4 Device 0     order for DME Equipment being ordered: Knee high compression hose LIGHT (15-20 MM Hg) with side zipper 6 each 1     No Known Allergies  Recent Labs   Lab Test 06/18/19  1354 04/05/19  1211 10/02/18  1000  08/02/18  0921 07/12/18  0936  05/30/18  0844 05/01/18  1032  09/25/17  0950 04/20/17  0938   A1C  --  4.9  --   --   --   --   --   --  5.4  --   --  5.4   LDL  --  56  --   --  45  --   --   --   --   --  46  --    HDL  --  46  --   --  59  --   --   --   --   --  64  --    TRIG  --  69  --   --  28  --   --   --   --   --  62  --    ALT  --   --  23  --   --  22  --  18  --    < >  --   --    CR  --  0.93 0.96   < >  --  1.00   < > 1.02 0.80   < > 0.83 0.87   GFRESTIMATED  --  78 75   < >  --  72   < > 71 >90   < > 90 85   GFRESTBLACK  --  >90 >90   < >  --  87   < > 85 >90   < > >90 >90  African American GFR Calc      POTASSIUM  --  3.9 4.3   < >  --  3.8   < > 3.7 4.5   < > 4.1 3.8   TSH 0.98  --   --   --   --  1.44  --   --   --    < >  --   --     < > = values in this interval not displayed.      BP Readings from Last 3 Encounters:   07/24/19 135/68   07/15/19 155/78   07/09/19 138/68    Wt Readings from Last 3 Encounters:   07/24/19 84 kg (185 lb 3.2 oz)   07/09/19 81.6 kg (180 lb)   06/24/19 81.6 kg (180 lb)                    Reviewed and updated as needed this visit by Provider  Tobacco  Allergies  Meds  Problems  Med Hx  Surg Hx  Fam Hx         Review of Systems   ROS COMP: Constitutional, HEENT, cardiovascular, pulmonary, GI, , musculoskeletal, neuro, skin, endocrine and psych systems are negative, except as otherwise noted.      Objective    /68 (BP Location: Right arm, Patient Position: Chair, Cuff Size: Adult Regular)   Pulse (!) 48   Temp 97.4  F (36.3  C) (Oral)   Resp 14   Wt 84 kg (185 lb 3.2 oz)   SpO2 99%   BMI 29.01 kg/m    Body mass index is 29.01 kg/m .  Physical Exam   GENERAL: alert and no distress  EYES: Eyes grossly normal to inspection, PERRL and conjunctivae and sclerae normal  HENT: normal cephalic/atraumatic and scabbed kiesselbach plexus noted in left nare. No active bleeding.    NECK: no adenopathy, no asymmetry, masses, or scars and thyroid normal to palpation  RESP: lungs clear to auscultation - no rales, rhonchi or wheezes  CV: regular irregular   MS: large bunion deformity noted at medial right 1st MTP with erythema and tenderness to palpation. No warmth. Minimal erythema at tip of left large toe with onychomycosis and hyperkeratotic nail bed. No warmth, but tenderness to palpation.   SKIN: minimal warmth and erythema noted on left lower leg extending to mid shin and 3+ pitting edema. No streaking. Reported at baseline by patient.   PSYCH: mentation appears normal, affect normal/bright    Diagnostic Test Results:  none         Assessment & Plan     (M79.735) Pain of toe  of left foot  (primary encounter diagnosis)  Comment: likely due to irritation, but does have some erythema and erythema and warmth of stasis dermatitis. Though Peter felt unlikely, given age and heart disease as risk factor, will give kelfex bid for 7 days and recommending avoiding over the toe compression stockings. DME written for zip up to let toe heal and will have patient's Pal Larisa aid in follow up to obtain this.   Plan: cephALEXin (KEFLEX) 500 MG capsule        -Medication use and side effects discussed with the patient. Patient is in complete understanding and agreement with plan.       (M21.611) Bunion of right foot  Comment: cause of pain of right foot. Recommending follow up with his podiatrist  Plan:     (R04.0) Epistaxis  Comment: ongoing. Unclear cause. History of thrombocytopenia, but platelets were 117 at ER. Unlikely cause of bleed. Given location, likely benign etiology complicated by anticoagulation. Can use afrin prn. Not daily for nose bleeds and follow up with ENT. Will have Larisa aid with this as well.   Plan: OTOLARYNGOLOGY REFERRAL, CBC with platelets and        differential            (D50.8) Iron deficiency anemia secondary to inadequate dietary iron intake  Comment: will recheck cbc todayy  Plan: CBC with platelets and differential            (R60.9) Peripheral edema  Comment: see toe pain above. Also impacting his bracing for his fracture. Zip up compression ordered and will route note to podiatry to inform not compliant with bracing due to pain. Possible orthotic referral may be helpful. Will defer to podiatry.   Plan: order for DME            (S82.847S) Closed bimalleolar fracture of left ankle with routine healing  Comment: as above   Plan:          Follow up: per podiatry and ENT. pcp in ~1 month    Return in about 1 month (around 8/24/2019) for Routine Visit.    Alan Troy PA-C  [facility]

## 2019-07-28 ENCOUNTER — HOSPITAL ENCOUNTER (EMERGENCY)
Facility: CLINIC | Age: 79
Discharge: HOME | End: 2019-07-28
Attending: EMERGENCY MEDICINE | Admitting: EMERGENCY MEDICINE
Payer: MEDICARE

## 2019-07-28 VITALS
SYSTOLIC BLOOD PRESSURE: 159 MMHG | DIASTOLIC BLOOD PRESSURE: 101 MMHG | RESPIRATION RATE: 20 BRPM | TEMPERATURE: 98.3 F | OXYGEN SATURATION: 100 % | HEART RATE: 86 BPM

## 2019-07-28 DIAGNOSIS — R04.0 EPISTAXIS: ICD-10-CM

## 2019-07-28 PROCEDURE — 30903 CONTROL OF NOSEBLEED: CPT

## 2019-07-28 PROCEDURE — 99284 EMERGENCY DEPT VISIT MOD MDM: CPT | Mod: 25

## 2019-07-28 RX ORDER — CEPHALEXIN 500 MG/1
500 CAPSULE ORAL 3 TIMES DAILY
Qty: 21 CAPSULE | Refills: 0 | Status: SHIPPED | OUTPATIENT
Start: 2019-07-28 | End: 2019-09-05

## 2019-07-28 RX ORDER — OXYMETAZOLINE HYDROCHLORIDE 0.05 G/100ML
SPRAY NASAL
Status: DISCONTINUED
Start: 2019-07-28 | End: 2019-07-28 | Stop reason: HOSPADM

## 2019-07-28 RX ORDER — TRANEXAMIC ACID 100 MG/ML
500 INJECTION, SOLUTION INTRAVENOUS ONCE
Status: DISCONTINUED | OUTPATIENT
Start: 2019-07-28 | End: 2019-07-28 | Stop reason: HOSPADM

## 2019-07-28 ASSESSMENT — ENCOUNTER SYMPTOMS: SHORTNESS OF BREATH: 0

## 2019-07-28 NOTE — ED TRIAGE NOTES
Reports L nare bleeding x 3 hours. Hx of this 1 week.  Has ENT appointment 7/31.  No SOB, airway patent. ABCs intact. Pt on eliquis and aspirin

## 2019-07-28 NOTE — ED PROVIDER NOTES
History     Chief Complaint:  Epistaxis    HPI   Milo Serna is a 78 year old male, with complicated past medical history as noted below including prostate cancer, atrial fibrillation, anemia amongst others as noted below, currently anticoagulated on Eliquis and aspirin, who presents with his wife for evaluation of left sided epistaxis since 0630 this morning after taking a shower. He states he frequently experiences epistaxis episodes, only from the left nare, and typically can resolve them with some pressure. However, he states that this bleeding would not resolve, thus presented.    Here in the ED, patient reports he had an episode 1.5 weeks ago and was evaluated in the ED. Patient was discharged after bleeding was stopped with tranexamic acid nebulization. He reports since then he had no bleeding episodes until today. He has a scheduled appointment with ENT in three days. He does not report shortness of breath or any other complaint.    Allergies:  No Known Drug Allergies     Medications:    Eliquis  Aspirin 81  Vibramycin  Fergon  Ferrous gluconate  Glucosamine-chondroitin  Lidoderm patch  Lisinopril  Melatonin  Prilosec  Metoprolol  Senna  Zocor  [place] nasal spray  Flomax  Demadex  Keflex    Past Medical History:    Anemia  Atrial fibrillation  Benign prostatic hyperplasia  Coronary artery disease  Coronary atherosclerosis of unspecified type of vessel  Dilated aortic root  Drug-induced erectile dysfuntion  Elevated blood sugar  Elevated PSA  Hypertension  Gallbladder polyp  Gout  Hallux abducto valgus  Hernia, abdominal  Iron deficiency anemia  Lumbago   Memory loss  Mixed hyperlipidemia  Mumps  Neuropathy of foot  Osteoarthritis  MI  Palpitations  Pes planus  Petechia  Prostate cancer  Peptic ulcer disease  Pulmonary hypertension  Rhinophyma  Rosacea  Thrombocytopenia  Unspecified hyperplasia of prostate with urinary obstruction and other LUTS  Venous stasis  Premature atrial contractions    Past  Surgical History:    CAB  Left inguinal hernia repair  R hernia remote  R ankle ORIF  Nasal surgery  R rotator repair - remote  Appendectomy  Sinus surgery x2  Left shoulder  Cardia surgery - bypass  Cytoscopy flexible, cryoablation prostate  Prostate surgery  Hernia repair  Laparoscopic herniorrhaphy inguinal - right    Family History:    Mother - stomach cancer  Father - heart disease, MI  Brother(s) - heart disease, stent placement, RA, hypertension  Sister - heart disease, hypertension    Social History:  The patient was accompanied to the ED by wife.  Smoking Status: Former  Smokeless Tobacco: No  Alcohol Use: Yes  Drug Use: No   Marital Status:   [2]     Review of Systems   HENT: Positive for nosebleeds.    Respiratory: Negative for shortness of breath.    All other systems reviewed and are negative.        Physical Exam   Vitals:  Patient Vitals for the past 24 hrs:   BP Temp Temp src Pulse SpO2   07/28/19 1026 163/87 98.3  F (36.8  C) Temporal 84 99 %      Physical Exam   Constitutional: He appears well-developed and well-nourished.   HENT:   Right Ear: External ear normal.   Left Ear: External ear normal.   Mouth/Throat: No oropharyngeal exudate.   TM's clear bilaterally. Nose clamp on, small amount of blood coming from left nostril. R nostril clear. Dried blood in the posterior oropharynx. No bleeding seen.   Eyes: Pupils are equal, round, and reactive to light. Conjunctivae are normal. No scleral icterus.   Cardiovascular: Normal rate, regular rhythm, normal heart sounds and intact distal pulses. Exam reveals no gallop and no friction rub.   No murmur heard.  Pulmonary/Chest: Effort normal and breath sounds normal. No respiratory distress. He has no wheezes. He has no rales.   Neurological: He is alert.   Skin: Skin is warm and dry. Capillary refill takes less than 2 seconds. No rash noted.   Psychiatric: He has a normal mood and affect.       Emergency Department Course     Procedures:     Rapid  Rhino Nasal Packing     PHYSICIAN: Dr. Juan Burgess MD    INDICATION:  Epistaxis    ANESTHESIA: None    TECHNIQUE: 5.5 cm Rapid Rhino coated with TXA was inserted into the left naris to provide pressure. The patient had adequate hemostasis after application of packing, and the patient was generally comfortable after the procedure. The patient tolerated the procedure well with no immediate complications.    Interventions:  1040 Afrin spray Left nare  1110 Tranexamic acid spray 500 mg Left nare     Emergency Department Course:  Nursing notes and vitals reviewed.    (1032)   I performed an exam of the patient as documented above. History obtained from patient and spouse. Afrin nasal spray into left nostril. Clamp replaced.    (1110)   Rechecked patient. Placed the above tranexamic acid nasal spray and placed clamp. I was unable to see the site of bleeding. There is a small blood clot medially toward the septum.    (1139)   Rechecked patient. Performed the above procedure due to continue bleeding. Bleeding appeared to be from anterior source but I'm unable to visualize it.    (1232)   Updated that patient continues to do well. Will try to ambulate patient.     (1243)   Rechecked patient. He reports he feels fine. He also successfully ambulated without issue. Discussed plan of care and patient will be discharged home.     I discussed the treatment plan with the patient. They expressed understanding of this plan and consented to discharge. They will be discharged home with instructions for care and follow up. In addition, the patient will return to the emergency department if their symptoms persist, worsen, if new symptoms arise or if there is any concern.  All questions were answered.     I personally reviewed the laboratory results with the Patient and spouse and answered all related questions prior to discharge.    Impression & Plan      Medical Decision Making:  Milo Serna is a 78 year old male, currently  anticoagulated, with history of nosebleeds from the left nare, who presents for evaluation of nasal bleeding and epistaxis that has been ongoing since 0630 this morning. The bleeding continued despite attempts at conservative management.  The risks and benefits of packing were discussed with patient verbally and they consented to packing.  See procedure note above for packing.  There was no bleeding after the nasal pack was placed.  I will place patient on prophylactic antibiotics to minimize risk of sinusitis and toxic shock syndrome.     Close follow-up with ENT and primary care; see discharge instructions. Patient has a scheduled ENT appointment in three days. I sent the patient home with a 3ml syringe to reinflate the balloon if it starts to deflate on its own.    Diagnosis:    ICD-10-CM    1. Epistaxis R04.0         Disposition:   Discharged.    Discharge Medications:     Medication List      Modified    cephALEXin 500 MG capsule  Commonly known as:  KEFLEX  500 mg, Oral, 3 TIMES DAILY  What changed:  when to take this             Scribe Disclosure:  Sandra WREN, am serving as a scribe at 10:27 AM on 7/28/2019 to document services personally performed by Juan Burgess MD, based on my observations and the provider's statements to me.  7/28/2019   [facility] EMERGENCY DEPARTMENT       Juan Burgess MD  07/28/19 4932

## 2019-07-29 ENCOUNTER — TELEPHONE (OUTPATIENT)
Dept: FAMILY MEDICINE | Facility: CLINIC | Age: 79
End: 2019-07-29

## 2019-07-29 NOTE — TELEPHONE ENCOUNTER
"Called patient regarding recent ED visit for epistaxis. Patient states \"I am okay\". No nose bleed since discharge. Patient is looking forward to ENT appointment on Wednesday 7/31.     Reviewed medication change - patient verbalized understanding.       Modified    cephALEXin 500 MG capsule  Commonly known as:  KEFLEX  500 mg, Oral, 3 TIMES DAILY  What changed:  when to take this     Patient stated [facility] called him regarding knee high zipper compression socks which were ordered last week. He has not had a chance to follow up but plans to.     Larisa Young, MSN, RN, PHN    "

## 2019-07-31 ENCOUNTER — TRANSFERRED RECORDS (OUTPATIENT)
Dept: HEALTH INFORMATION MANAGEMENT | Facility: CLINIC | Age: 79
End: 2019-07-31

## 2019-08-07 ENCOUNTER — HOSPITAL ENCOUNTER (EMERGENCY)
Facility: CLINIC | Age: 79
Discharge: HOME | End: 2019-08-08
Attending: EMERGENCY MEDICINE | Admitting: EMERGENCY MEDICINE
Payer: MEDICARE

## 2019-08-07 DIAGNOSIS — R04.0 EPISTAXIS: ICD-10-CM

## 2019-08-07 PROCEDURE — 99283 EMERGENCY DEPT VISIT LOW MDM: CPT | Mod: 25

## 2019-08-07 PROCEDURE — 30901 CONTROL OF NOSEBLEED: CPT | Mod: LT

## 2019-08-07 PROCEDURE — 25000125 ZZHC RX 250: Performed by: EMERGENCY MEDICINE

## 2019-08-07 RX ORDER — OXYMETAZOLINE HYDROCHLORIDE 0.05 G/100ML
SPRAY NASAL
Status: DISCONTINUED
Start: 2019-08-07 | End: 2019-08-08 | Stop reason: HOSPADM

## 2019-08-07 RX ORDER — TRANEXAMIC ACID 100 MG/ML
500 INJECTION, SOLUTION INTRAVENOUS ONCE
Status: COMPLETED | OUTPATIENT
Start: 2019-08-07 | End: 2019-08-07

## 2019-08-07 RX ADMIN — TRANEXAMIC ACID 500 MG: 1 INJECTION, SOLUTION INTRAVENOUS at 22:56

## 2019-08-07 NOTE — ED AVS SNAPSHOT
[facility] Emergency Department  [address]  [facility] [address]  Phone:  [phone]  Fax:  [phone]                                    Milo Serna   MRN: [id number]    Department:  [facility] Emergency Department   Date of Visit:  8/7/2019           After Visit Summary Signature Page    I have received my discharge instructions, and my questions have been answered. I have discussed any challenges I see with this plan with the nurse or doctor.    ..........................................................................................................................................  Patient/Patient Representative Signature      ..........................................................................................................................................  Patient Representative Print Name and Relationship to Patient    ..................................................               ................................................  Date                                   Time    ..........................................................................................................................................  Reviewed by Signature/Title    ...................................................              ..............................................  Date                                               Time          22EPIC Rev 08/18

## 2019-08-08 ENCOUNTER — TELEPHONE (OUTPATIENT)
Dept: FAMILY MEDICINE | Facility: CLINIC | Age: 79
End: 2019-08-08

## 2019-08-08 VITALS
SYSTOLIC BLOOD PRESSURE: 171 MMHG | RESPIRATION RATE: 18 BRPM | DIASTOLIC BLOOD PRESSURE: 94 MMHG | OXYGEN SATURATION: 96 % | TEMPERATURE: 97.7 F

## 2019-08-08 NOTE — TELEPHONE ENCOUNTER
"Called patient to follow up after ER. \"I am okay now\" was told to keep it lubricated with Vaseline. No bleeding since procedure. Patient would like to see Dr. Alves next week. Told patient Dr. Alves's is booked until September and that patient should see ENT if nose bleeds continue. Advised patient to call for same day appointment if needed before September. Patient agrees to be seen in September.     Next 5 appointments (look out 90 days)    Sep 03, 2019  8:00 AM CDT  Return Visit with Zulay Alexandra DPM, Podiatry/Foot and Ankle Surgery  [facility] [address] [place] [id number]  [phone]   Sep 06, 2019  9:15 AM CDT  Office Visit with Oscar Alves MD,  EXAM ROOM 11  [facility] [address] [place] [id number]  [phone]             Larisa Young, MSN, RN, PHN    "

## 2019-08-08 NOTE — DISCHARGE INSTRUCTIONS
"Vasaline to the nose 2x per day for 1 week and then nightly before CPAP.  Hold eliquis and asprin for 24 hours.    Discharge Instructions  Epistaxis    Today you were seen for a nosebleed.   Nosebleeds (the medical term is \"epistaxis\") are very common. Almost every person has had at least one in their lifetime.  Although the amount of blood loss can appear dramatic, nosebleeds rarely cause serious problems.  The most common causes are dry air or nose picking, but they also are common in people who have allergies, high blood pressure, or are on blood thinners (such as Coumadin, Aspirin or Plavix). If you or your child gets a nosebleed, the important thing is to know how to take care of it. With the right self-care, most nosebleeds will stop on their own.    Generally, every Emergency Department visit should have a follow-up clinic visit with either a primary or a specialty clinic/provider. Please follow-up as instructed by your emergency provider today.    Return to the Emergency Department if:  Your nose is bleeding a very large amount of blood and you are unable to stop it.  You get very pale, faint, or tired.  You cannot get the bleeding to stop after following these instructions.    Treatment:  Your provider may tell you to use a decongestant nose spray, like Afrin  (oxymetazoline), in both nostrils in the morning and at night for the next three days. Do not use this medicine for more than three days at a time.  If you do, it will cause nasal congestion.   Use a moisturizer. A small amount of Vaseline  to the inside of your nostrils for moisture before bed is one option. There are nasal sprays available over-the-counter for this purpose as well.  Using a humidifier in your bedroom or home will help as well when the air is dry.  For the next three days, do not blow your nose or put anything in your nose. You may sniffle, or dab the outside of your nose.  Do not bend with your head below your waist for the next " three days. Do not lift anything so heavy that you have to strain.   If you received nasal packing, please do not remove the packing until seen by an Ear, Nose, and Throat (ENT) specialist.  If antibiotics have been given with the packing, please take as directed.    If your nose starts to bleed again:  Blow your nose to get rid of some of the clots that have formed inside your nostrils. This may increase the bleeding temporarily, but that is okay.  Spray decongestant nose spray (like Afrin ) into both nostrils to constrict the blood vessels.  Sit or stand while bending forward slightly at the waist. Do not lie down or tilt your head back. This may cause you to swallow blood and can lead to vomiting.   the soft part of BOTH nostrils at the bottom of your nose and squeeze your nose closed for at least 5 minutes (for children) or 10 to 15 minutes (for adults). Use a clock to time yourself. Do not release the pressure every so often to check whether the bleeding has stopped. Many people hurt their chances of stopping the bleeding by releasing the pressure too soon or too often.    If you follow the steps outlined above, and your nose continues to bleed, repeat all the steps once more. Apply pressure for a total of at least 30 minutes. If you continue to bleed even then, seek medical attention.  If you were given a prescription for medicine here today, be sure to read all of the information (including the package insert) that comes with your prescription.  This will include important information about the medicine, its side effects, and any warnings that you need to know about.  The pharmacist who fills the prescription can provide more information and answer questions you may have about the medicine.  If you have questions or concerns that the pharmacist cannot address, please call or return to the Emergency Department.   Remember that you can always come back to the Emergency Department if you are not able to see  your regular provider in the amount of time listed above, if you get any new symptoms, or if there is anything that worries you.

## 2019-08-08 NOTE — ED TRIAGE NOTES
Pt A&O x4. ABCs intact. 3rd time visiting ER for nosebleed. ENT could not find a source. Nose bleeding started half an hour ago.

## 2019-08-08 NOTE — ED PROVIDER NOTES
History     Chief Complaint:  Epistaxis    HPI   Milo Serna is a 78 year old male on Eliquis and baby aspirin who presents to the emergency department today with epistaxis. This is the patient's 3rd visit for epistaxis. He had nasal packing done on 7/28 and saw ENT on 7/31 where he had a nasal endoscopy, negative for source of bleeding. Tonight the patient got up from watching TV on his way to the bathroom when his nose started bleeding again on the left side. He denies injury to the nose.  No other bleeding signs or symptoms.  Recent CBC showed normal platelets.  COntinues on asa and eliquis    Allergies:  No Known Drug Allergies     Medications:    Apixaban Anticoagulant (Eliquis)   Aspirin 81 Mg Tablet  Co-Enzyme Q  Doxycycline Hyclate (Vibramycin)   Ferrous Gluconate  Glucosamine-Chondroitin  Lidocaine (Lidoderm)   Lisinopril (Prinivil/Zestril)   Melatonin Po  Metoprolol Succinate Er   Omeprazole (Prilosec)   Senna (Senokot)   Simvastatin (Zocor)   Tamsulosin (Flomax)   Torsemide (Demadex)      Past Medical History:    Acute bilateral low back pain with left-sided sciatica  Anemia, unspecified  Atrial fibrillation   BPH (benign prostatic hyperplasia)  CAD (coronary artery disease)  Closed bimalleolar fracture of left ankle with routine healing  Closed fracture of metatarsal bone  Coronary atherosclerosis  Dilated aortic root   Drug-induced erectile dysfunction  Elevated blood sugar  Elevated PSA  Essential hypertension with goal blood pressure less than 140/90  Gallbladder polyp  Gout  HAV (hallux abducto valgus)  Hernia, abdominal  Prostate cancer  Hypertension  Hyperlipidemia   Iron deficiency anemia secondary to inadequate dietary iron intake  Low blood pressure reading  Lumbago  Memory loss  Mixed hyperlipidemia  Mumps  Neuropathy of foot  OA (osteoarthritis)  Old myocardial infarction  Palpitations  Pes planus  Petechiae  Premature atrial contractions  Prostate cancer   PUD (peptic ulcer  disease)  Pulmonary hypertension   PVC's (premature ventricular contractions)  Rhinophyma  Rosacea  Stress due to spouse with dementia  Thrombocytopenia   Unspecified hyperplasia of prostate with urinary obstruction and other lower urinary tract symptoms (LUTS)  Venous stasis    Past Surgical History:    CAB   Inguinal hernia   Right ankle ORIF  Nasal surgery  Rotator cuff repair   Appendectomy  Sinus surgery x2  Left shoulder surgery  Coronary bypass  Cystoscopy   Prostate surgery   Hernia repair      Family History:    Cancer  Heart disease  Hypertension     Social History:  The patient was accompanied to the ED by wife and daughter.  Smoking Status: Former  Smokeless Tobacco: Never  Alcohol Use: Yes   Marital Status:      Review of Systems   HENT: Positive for nosebleeds.    All other systems reviewed and are negative.      Physical Exam     Patient Vitals for the past 24 hrs:   BP Temp Temp src Heart Rate Resp SpO2   08/07/19 2154 (!) 171/94 97.7  F (36.5  C) Oral 66 18 100 %      Physical Exam  Gen: alert, answers all questions appropriately.   HEENT: PERRL. No trismus, uvula midline, oropharynx symmetric. Blood in the posterior oral pharynx. Area of active bleeding to septum of left nare. Right nare normal.   Neck: full AROM, no midline tracheal tenderness   CV: RRR, no murmurs   Pulm: breath sounds equal, lungs clear  Skin: facial skin normal  Neuro: CN 5 and 7 normal     Emergency Department Course   Procedures:    Silver Nitrate Nasal Cautery     PROCEDURE NOTE: The patient's left nare was prepped with afrin and tranexamic acid.  The location of the patient's bleeding in the left nare was identified and cauterized with silver nitrate.  The patient tolerated the procedure well and there were no complications.  He was observed in the emergency department following the procedure and had no recurrence of his bleeding.     Interventions:  2256: Cyklokapron 500 mg Left Nostril      Emergency Department  Course:  Nursing notes and vitals reviewed.  2230: I performed an exam of the patient as documented above.   0015: Patient rechecked and updated.    0044: Patient rechecked and updated.  Patient ambulated with no bleeding.  0046: Findings and plan explained to the Patient and spouse. Patient discharged home with instructions regarding supportive care, medications, and reasons to return. The importance of close follow-up was reviewed.    I personally  answered all related questions prior to discharge.      Impression & Plan    Medical Decision Making:  Milo Serna is a 78 year old male who presents for evaluation of nasal bleeding and epistaxis.  The bleeding was controlled with interventions in the ED and therefore supportive outpatient management is indicated.  Close follow-up with ENT and primary care; see discharge instructions. There are no signs of coagulopathy causing the bleeding or a general medical condition (thrombocytopenia, DIC, leukemia, etc) causing the bleeding today.    Silver nitrate, Afrin, and tranexamic acid were used to cauterize the bleeding source, see above procedure note. The bleeding stopped and did not restart here in ED, therefore no nasal packing is indicated.  Discussed with patient to use humidity and Vaseline or bacitracin in nares bid for the next week.      Hold Eliquis and aspirin for 24 hours.     Diagnosis:    ICD-10-CM    1. Epistaxis R04.0        Disposition:  discharged to home    Scribe Disclosure:  I, Elizabeth Weiss, am serving as a scribe at 10:35 PM on 8/7/2019 to document services personally performed by Sandee Vidales based on my observations and the provider's statements to me.    8/7/2019   [facility] EMERGENCY DEPARTMENT       Sandee Vidales MD  08/08/19 9330

## 2019-08-23 ENCOUNTER — OFFICE VISIT (OUTPATIENT)
Dept: SLEEP MEDICINE | Facility: CLINIC | Age: 79
End: 2019-08-23
Payer: MEDICARE

## 2019-08-23 VITALS
WEIGHT: 177 LBS | HEIGHT: 67 IN | HEART RATE: 57 BPM | OXYGEN SATURATION: 97 % | DIASTOLIC BLOOD PRESSURE: 66 MMHG | SYSTOLIC BLOOD PRESSURE: 127 MMHG | BODY MASS INDEX: 27.78 KG/M2

## 2019-08-23 DIAGNOSIS — G47.39 COMPLEX SLEEP APNEA SYNDROME: Primary | ICD-10-CM

## 2019-08-23 PROCEDURE — 99214 OFFICE O/P EST MOD 30 MIN: CPT | Performed by: INTERNAL MEDICINE

## 2019-08-23 ASSESSMENT — MIFFLIN-ST. JEOR: SCORE: 1476.62

## 2019-08-23 NOTE — Clinical Note
Kris Singh, please provide me with an updated compliance download in 3 weeks for the patient with a revised CPAP at 14 cm water. Thank you, Da

## 2019-08-23 NOTE — PROGRESS NOTES
Changed pressure on airsense 10 received from [facility] on July 10  2019 not in airview to CPAP 14.0cm h2o.  Added machine to airview so now will have access to downloads, and any pressure changes.  Patient states the machine was switched at [facility] by D[facility].  Not documented in epic or added to airview.

## 2019-08-23 NOTE — PROGRESS NOTES
[facility]  Outpatient Sleep Medicine Follow-up  Aug 23, 2019        Name: Milo Serna MRN# [id number]   Age: 78 year old YOB: 1940   Date of visit: 8/23/19  Primary care provider: Oscar Alves    Purpose of visit:   Follow-up of sleep-related breathing disorder(moderate complex sleep apnea, sleep related hypoxemia, Cheyne-Alejandro Breathing pattern) and review CPAP compliance.       History of Present Illness:      Milo Serna is a pleasant 78 year old male with history of moderate complex sleep apnea, sleep related hypoxemia, Cheyne-Alejandro Breathing pattern, atrial fibrillation, systolic CHF EF 35-40%, CAD s/p CABG, pulmonary hypertension and hypertension who comes to [facility] for follow up of the sleep-related breathing disorder and to review CPAP compliance measures.  He was last seen at the sleep clinic on October 9, 2018.  He weighed 173 pounds during that visit and his current weight is 177 pounds.    Patient was diagnosed sleep apnea on 6/10/18 and CPAP titration was obtained on  6/26/18 and he was prescribed CPAP pressure of 13 cmH2O, and was setup at PAP therapy on 7/23/18.   During his last sleep clinic visit, he demonstrated good compliance with 93% of PAP use over 4 hrs, average device usage of 6.55 hours and he had residual AHI of 6 per hour.    He is currently being treated with CPAP at fixed pressure setting at 13 cmH2O.  In May 2019 he received a replacement CPAP device through [facility].  He uses a full facemask.  He was in  [place] from October 2018 through April 2019 and he did take his CPAP device with him and was using the device until March 13, 2019 when he sustained left ankle fracture and could not sleep in the bed with the brace and was sleeping in a rocking chair and was not comfortable using the CPAP and he discontinued the treatment.  After returning back to [place] he had a couple episodes of epistaxis   "that necessitated ER visits, the most recent one being on August 9, 2019.  He is apprehensive about using the CPAP and epistaxis.  He did report better sleep quality with the CPAP use including improvement in energy levels when he was using the device.    He resumed the CPAP use couple weeks ago. He denies any snoring or awakenings due to gasping for air or choking with the CPAP. He denies excessive daytime sleepiness . He endorses an Roggen sleepiness score of 7 out of 24.(Roggen Sleepiness Scale score during last sleep clinic visit: 6/24).  He denies drowsiness while driving.  He denies falling asleep at stoplight or head bobbing or drifting off the center line while driving.  He naps occasionally and does not use the CPAP during naps.    He has been frustrated with his limited activity ever since he sustained the ankle fracture and it has only been for the past 1 month that he has been able to start walking.    There was improvement  in LVEF from 35% to 51% by MRI on 9/19/18.     PREVIOUS SLEEP STUDIES:  Date: 6/10/2018  AHI: 16.6/hr, central apnea with CSB  Intervention: CPAP  Lowest O2 saturation: 88.1%  O2 sat below 88% was 16.3 minutes  PLM's: 1.6/hr        CPAP Compliance:( there are 2 sets of compliance data)  Dates:4/18/2019 through 7/16/ 2019       14% >4hour use   Days used:  15 out of 90  Average daily use (days used): 6. 26 hrs  Leak 95 percentile:  8.6 L/min  Residual AHI: 6.1/hr (CA 0.1, OA 3.4, HI 2.6 and unknown 0)  Pressure:  13 cmH2O    Dates:7/10/2019 through 8/22/ 2019      27% >4hour use   Days used:  16 out of 44  Average daily use (days used):  5.51 hrs  Leak 95 percentile:  37.2 L/min  Residual AHI:  8.6 /hr (CA 0.2, OA 5.2, HI 3.0 and unknown 0.2)  Pressure:  13 cmH2O     Current meds, Past medical history, Past surgical history, Allergies, Social history, Family history: reviewed, per EMR    Physical exam:  /66   Pulse 57   Ht 1.702 m (5' 7.01\")   Wt 80.3 kg (177 lb)   SpO2 " 97%   BMI 27.72 kg/m    General appearance:  in no apparent distress  Pt is dressed casually, cooperative with good eye contact.   CARDIOVASCULAR: Heart sounds are irregularly irregular.  No audible murmur.   RESPIRATORY: clear to auscultation bilaterally   EXTREMITIES:  No edema.   Speech is spontaneous with regular rate and volume.   Mood: euthymic; affect congruent with full range and intensity.   Sensorium: awake, alert and oriented to person, place, time, and situation.       Assessment and Plan:      1. Complex Sleep Apnea with sleep related hypoxemia, Cheyne-Alejandro Breathing pattern:  There has been decline in the CPAP compliance for reasons reported under HPI.  Patient recently resumed the CPAP use.  We discussed the association of LENORA with cardiovascular and other medical comorbidities  and considering the fact that he has significant medical co- morbidities, I have  strongly recommended him to use the CPAP regularly during sleep and improve compliance.  --Due to increase in the obstructive events with increase in the residual AHI compared to his previous sleep clinic visit, pressure setting on his CPAP device was increased  to 14 cmH2O. He was instructed to get back to me if he has any trouble tolerating the revised pressure settings. Plan to review the updated compliance with the revised pressure settings in 2-3 weeks.  - Recommend mask refitting and adjustment. Mask and supplies ordered.  - Daytime naps are not advised, but patient instructed to use CPAP with naps  - Patient was advised not to drive if drowsy or sleepy.  - Follow up in sleep clinic in  6-7 weeks to review CPAP compliance measures.     2.  He was last seen at the cardiology clinic on September 27, 2018 when he was recommended to return in 1 year for follow-up. He was instructed  to schedule the  follow-up appointment.    3.  Encouraged following healthy diet and regular exercise.      The above note was dictated using voice recognition  "software. Although reviewed after completion, some word and grammatical error may remain . Please contact the author for any clarifications.    \"I spent a total of 25 minutes face to face with Milo Serna during today's office visit. Over 50% of this time was spent counseling the patient and  coordinating care regarding sleep-related breathing disorder, improving CPAP compliance.\"       Marty Shearer MD   of Medicine,  Division of Pulmonary/Sleep Medicine  [facility].      "

## 2019-08-23 NOTE — NURSING NOTE
"Chief Complaint   Patient presents with     RECHECK     annual pap return       Initial /66   Pulse 57   Ht 1.702 m (5' 7.01\")   Wt 80.3 kg (177 lb)   SpO2 97%   BMI 27.72 kg/m   Estimated body mass index is 27.72 kg/m  as calculated from the following:    Height as of this encounter: 1.702 m (5' 7.01\").    Weight as of this encounter: 80.3 kg (177 lb).    Medication Reconciliation: complete        DME: yes       Ess 7  "

## 2019-08-26 ENCOUNTER — CARE COORDINATION (OUTPATIENT)
Dept: CARDIOLOGY | Facility: CLINIC | Age: 79
End: 2019-08-26

## 2019-08-26 NOTE — PROGRESS NOTES
Call transfer from scheduling ie patient being set up for annual PH clinic with DR Jarvis tavarez/vivian and labs. Dr Conley has no general or PH openings until 10/31 - patient leaving for [place] mid-October. Offer patient PH clinic PA visit - he prefers to see Juliano HERNANDEZ who is known to him. Transferred to scheduling.  Update to Juliano and Dr Conley.  Patsy Donato RN 08/26/19 3:17 PM    ================================    Sixto Conley MD  You; Juliano Caballero PA-C 29 minutes ago (11:03 AM)      Noted.    Routing comment

## 2019-09-03 ENCOUNTER — ANCILLARY PROCEDURE (OUTPATIENT)
Dept: GENERAL RADIOLOGY | Facility: CLINIC | Age: 79
End: 2019-09-03
Attending: PODIATRIST
Payer: MEDICARE

## 2019-09-03 ENCOUNTER — OFFICE VISIT (OUTPATIENT)
Dept: PODIATRY | Facility: CLINIC | Age: 79
End: 2019-09-03
Payer: MEDICARE

## 2019-09-03 VITALS
DIASTOLIC BLOOD PRESSURE: 70 MMHG | BODY MASS INDEX: 27.78 KG/M2 | HEIGHT: 67 IN | WEIGHT: 177 LBS | SYSTOLIC BLOOD PRESSURE: 128 MMHG

## 2019-09-03 DIAGNOSIS — M25.572 CHRONIC PAIN OF LEFT ANKLE: ICD-10-CM

## 2019-09-03 DIAGNOSIS — G89.29 CHRONIC PAIN OF LEFT ANKLE: ICD-10-CM

## 2019-09-03 DIAGNOSIS — G89.29 CHRONIC PAIN OF LEFT ANKLE: Primary | ICD-10-CM

## 2019-09-03 DIAGNOSIS — M25.572 CHRONIC PAIN OF LEFT ANKLE: Primary | ICD-10-CM

## 2019-09-03 DIAGNOSIS — G57.91 NEUROPATHY OF RIGHT FOOT: ICD-10-CM

## 2019-09-03 DIAGNOSIS — S92.302A MULTIPLE CLOSED FRACTURES OF METATARSAL BONE OF LEFT FOOT, INITIAL ENCOUNTER: ICD-10-CM

## 2019-09-03 DIAGNOSIS — M19.172 POST-TRAUMATIC OSTEOARTHRITIS OF LEFT ANKLE: ICD-10-CM

## 2019-09-03 PROCEDURE — 99214 OFFICE O/P EST MOD 30 MIN: CPT | Performed by: PODIATRIST

## 2019-09-03 PROCEDURE — 73610 X-RAY EXAM OF ANKLE: CPT | Mod: LT

## 2019-09-03 PROCEDURE — 73630 X-RAY EXAM OF FOOT: CPT | Mod: LT

## 2019-09-03 ASSESSMENT — MIFFLIN-ST. JEOR: SCORE: 1476.65

## 2019-09-03 NOTE — LETTER
"    9/3/2019         RE: Milo Serna  [address] [place] [address]        Dear Colleague,    Thank you for referring your patient, Milo Serna, to the [facility]. Please see a copy of my visit note below.    Podiatry / Foot and Ankle Surgery Progress Note    September 3, 2019    Subject: Patient was seen for follow up on left ankle fracture. Notes he fell on Thursday in the kitchen. Slipped. Has been limping on left foot and back is bruised and sore. No pain to foot really due to neuropathy.       Objective:  Vitals: Ht 1.702 m (5' 7.01\")   Wt 80.3 kg (177 lb)   BMI 27.71 kg/m     BMI= Body mass index is 27.71 kg/m .      General:  Patient is alert and orientated.  NAD    Dermatologic: significant bruising to the lower back. No open lesions to back or foot noted.      Vascular: DP & PT pulses are intact & regular bilaterally.   Significant edema to left foot and leg. Varicosities noted.      Neurologic: Lower extremity sensation is diminished to feet. .     Musculoskeletal: Patient is ambulatory without assistive device or brace.  decrease range of motion of left ankle with crepitus.      Radiographs:  Left ankle xray - no re fracturing of ankle noted. Degenerative changes noted to ankle.     Left foot xray - metatarsal neck fractures noted for metatarsals 2-4 left foot. Degenerative changes to left midfoot.      ASSESSMENT:    Chronic pain of left ankle  Neuropathy of right foot  Post-traumatic osteoarthritis of left ankle  Multiple closed fractures of metatarsal bone of left foot, initial encounter     PLAN:  reviewed xrays. Talked about fractures. Discussed that healing can take 6-10 weeks. Risk that the fracture will not heal and we may need to do surgery. Risk is increased 10-15% if you smoke.     Talked about metatarsal fractures. Recommend going back into the boot for next 5 weeks. Recommend spanogrip for compression. Follow up in 5 weeks for alfonso and re " assessment.     Talked about arthritis and treatments including orthotics, injections, icing, NSAIDS, bracing, physical therapy, compounding pain cream, or surgical intervention.    He does have significant arthritis to ankle. Recommend permanent ankle brace.     Zulay Alexandra DPM, Podiatry/Foot and Ankle Surgery    Weight management plan: Patient was referred to their PCP to discuss a diet and exercise plan.    Recommended to Milo Serna to follow up with Primary Care provider regarding elevated blood pressure.      Again, thank you for allowing me to participate in the care of your patient.        Sincerely,        Zulay Alexandra DPM, Podiatry/Foot and Ankle Surgery

## 2019-09-03 NOTE — PATIENT INSTRUCTIONS
Thank you for choosing [facility]!    DR. [facility]RIGUES[facility]S CLIN[facility] SCHEDULE  MONDAY AM - [place] TUESDAY - [place]   [address] [address] [place] [address] [place] [address]   [phone] / [phone] / [phone]       WEDNESDAY - [place] FRIDAY AM - [facility]   [address]   [place] [address] [place] [address]   [phone] / [phone]       FRIDAY PM - [place] SCHEDULE SURGERY: [phone]   [address] BILLING QUESTIONS: [phone]   [place] [address] AFTER HOURS: [phone]   [phone] / [phone] APPOINTMENTS: [phone]     [employer] Price [employer] (CPL) [phone]     5 week follow up    TOE & METATARSAL FRACTURES  The structure of the foot is complex, consisting of bones, muscles, tendons, and other soft tissues. Of the 26 bones in the foot, 19 are toe bones (phalanges) and metatarsal bones (the long bones in the midfoot). Fractures of the toe and metatarsal bones are common and require evaluation by a specialist. A foot and ankle surgeon should be seen for proper diagnosis and treatment, even if initial treatment has been received in an emergency room.  A fracture is a break in the bone. Fractures can be divided into two categories: traumatic fractures and stress fractures.  TRAUMATIC FRACTURES (also called acute fractures) are caused by a direct blow or impact, such as seriously stubbing your toe. Traumatic fractures can be displaced or non-displaced. If the fracture is displaced, the bone is broken in such a way that it has changed in position (dislocated).  Signs and symptoms of a traumatic fracture include:  You may hear a sound at the time of the break.    Pinpoint pain  (pain at the place of impact) at the time the fracture occurs and perhaps for a few hours later, but often the pain goes away after several hours.   Crooked or abnormal appearance  of the toe.   Bruising and swelling the next day.   It is not true that  if you can walk on it, it s not broken.  Evaluation by a foot and ankle surgeon is always recommended.   STRESS FRACTURES are tiny, hairline breaks that are usually caused by repetitive stress. Stress fractures often afflict athletes who, for example, too rapidly increase their running mileage. They can also be caused by an abnormal foot structure, deformities, or osteoporosis. Improper footwear may also lead to stress fractures. Stress fractures should not be ignored. They require proper medical attention to heal correctly.  Symptoms of stress fractures include:  Pain with or after normal activity   Pain that goes away when resting and then returns when standing or during activity    Pinpoint pain  (pain at the site of the fracture) when touched   Swelling, but no bruising   IMPROPER TREATMENT  Some people say that  the doctor can t do anything for a broken bone in the foot.  This is usually not true. In fact, if a fractured toe or metatarsal bone is not treated correctly, serious complications may develop. For example:  A deformity in the bony architecture which may limit the ability to move the foot or cause difficulty in fitting shoes   Arthritis, which may be caused by a fracture in a joint (the juncture where two bones meet), or may be a result of angular deformities that develop when a displaced fracture is severe or hasn t been properly corrected   Chronic pain and deformity   Non-union, or failure to heal, can lead to subsequent surgery or chronic pain.   PROPER TREATMENT FOR TOES  Fractures of the toe bones are almost always traumatic fractures. Treatment for traumatic fractures depends on the break itself and may include these options:  Rest. Sometimes rest is all that is needed to treat a traumatic fracture of the toe.   Splinting. The toe may be fitted with a splint to keep it in a fixed position.   Rigid or stiff-soled shoe.  Wearing a stiff-soled shoe protects the toe and helps keep it properly positioned.    Zeeshan taping  the fractured toe to another toe is sometimes appropriate, but in other cases it may be harmful.   Surgery. If the break is badly displaced or if the joint is affected, surgery may be necessary. Surgery often involves the use of fixation devices, such as pins.   PROPER TREATMENT OF METATARSALS  Breaks in the metatarsal bones may be either stress or traumatic fractures. Certain kinds of fractures of the metatarsal bones present unique challenges.  For example, sometimes a fracture of the first metatarsal bone (behind the big toe) can lead to arthritis. Since the big toe is used so frequently and bears more weight than other toes, arthritis in that area can make it painful to walk, bend, or even stand.  Another type of break, called a Era fracture, occurs at the base of the fifth metatarsal bone (behind the little toe). It is often misdiagnosed as an ankle sprain, and misdiagnosis can have serious consequences since sprains and fractures require different treatments. Your foot and ankle surgeon is an expert in correctly identifying these conditions as well as other problems of the foot.  Treatment of metatarsal fractures depends on the type and extent of the fracture, and may include:  Rest. Sometimes rest is the only treatment needed to promote healing of a stress or traumatic fracture of a metatarsal bone.   Avoid the offending activity. Because stress fractures result from repetitive stress, it is important to avoid the activity that led to the fracture. Crutches or a wheelchair are sometimes required to offload weight from the foot to give it time to heal.   Immobilization, casting, or rigid shoe. A stiff-soled shoe or other form of immobilization may be used to protect the fractured bone while it is healing.   Surgery. Some traumatic fractures of the metatarsal bones require surgery, especially if the break is  badly displaced.   Follow-up care. Your foot and ankle surgeon will provide instructions for care following surgical or non-surgical treatment. Physical therapy, exercises and rehabilitation may be included in a schedule for return to normal activities.     OSTEOARTHRITIS OF THE FOOT & ANKLE  Osteoarthritis is a condition characterized by the breakdown and eventual loss of cartilage in one or more joints. Cartilage (the connective tissue found at the end of the bones in the joints) protects and cushions the bones during movement. When cartilage deteriorates or is lost, symptoms develop that can restrict one s ability to easily perform daily activities.  Osteoarthritis is also known as degenerative arthritis, reflecting its nature to develop as part of the aging process. As the most common form of arthritis, osteoarthritis affects millions of Americans. Some people refer to osteoarthritis simply as arthritis, even though there are many different types of arthritis.  Osteoarthritis appears at various joints throughout the body, including the hands, feet, spine, hips, and knees. In the foot, the disease most frequently occurs in the big toe, although it is also often found in the midfoot and ankle.  CAUSES  Osteoarthritis is considered a  wear and tear  disease because the cartilage in the joint wears down with repeated stress and use over time. As the cartilage deteriorates and gets thinner, the bones lose their protective covering and eventually may rub together, causing pain and inflammation of the joint.  An injury may also lead to osteoarthritis, although it may take months or years after the injury for the condition to develop. For example, osteoarthritis in the big toe is often caused by kicking or jamming the toe, or by dropping something on the toe. Osteoarthritis in the midfoot is often caused by dropping something on it, or by a sprain or fracture. In the ankle, osteoarthritis is usually caused by a fracture  and occasionally by a severe sprain.  Sometimes osteoarthritis develops as a result of abnormal foot mechanics such as flat feet or high arches. A flat foot causes less stability in the ligaments (bands of tissue that connect bones), resulting in excessive strain on the joints, which can cause arthritis. A high arch is rigid and lacks mobility, causing a jamming of joints that creates an increased risk of arthritis.  SYMPTOMS  People with osteoarthritis in the foot or ankle experience, in varying degrees, one or more of the following: Pain and stiffness in the joint, swelling in or near the joint, or difficulty walking or bending the joint.   Some patients with osteoarthritis also develop a bone spur (a bony protrusion) at the affected joint. Shoe pressure may cause pain at the site of a bone spur, and in some cases blisters or calluses may form over its surface. Bone spurs can also limit the movement of the joint.    DIAGNOSIS  In diagnosing osteoarthritis, the foot and ankle surgeon will examine the foot thoroughly, looking for swelling in the joint, limited mobility, and pain with movement. In some cases, deformity and/or enlargement (spur) of the joint may be noted. X-rays may be ordered to evaluate the extent of the disease.  NON-SURGICAL TREATMENT  To help relieve symptoms, the surgeon may begin treating osteoarthritis with one or more of the following non-surgical approaches:  Oral medications. Nonsteroidal anti-inflammatory drugs (NSAIDs), such as ibuprofen, are often helpful in reducing the inflammation and pain. Occasionally a prescription for a steroid medication is needed to adequately reduce symptoms.   Orthotic devices. Custom orthotic devices (shoe inserts) are often prescribed to provide support to improve the foot s mechanics or cushioning to help minimize pain.   Bracing. Bracing, which restricts motion and supports the joint, can reduce pain during walking and help prevent further deformity.    Immobilization. Protecting the foot from movement by wearing a cast or removable cast-boot may be necessary to allow the inflammation to resolve.   Steroid injections. In some cases, steroid injections are applied to the affected joint to deliver anti-inflammatory medication.   Physical therapy. Exercises to strengthen the muscles, especially when the osteoarthritis occurs in the ankle, may give the patient greater stability and help avoid injury that might worsen the condition.   DO I NEED SURGERY?  When osteoarthritis has progressed substantially or failed to improve with non-surgical treatment, surgery may be recommended. In advanced cases, surgery may be the only option. The goal of surgery is to decrease pain and improve function. The foot and ankle surgeon will consider a number of factors when selecting the procedure best suited to the patient s condition and lifestyle.            BODY WEIGHT AND YOUR FEET  The following information is included in the after visit summary for all patients. Body weight can be a sensitive issue to discuss in clinic, but we think the following information is very important. Although we focus on the feet and ankles, we do support the overall health of our patients.     Many things can cause foot and ankle problems. Foot structure, activity level, foot mechanics and injuries are common causes of pain. One very important issue that often goes unmentioned, is body weight. Extra weight can cause increased stress on muscles, ligaments, bones and tendons. Sometimes just a few extra pounds is all it takes to put one over her/his threshold. Without reducing that stress, it can be difficult to alleviate pain. As Foot & Ankle specialists, our job is addressing the lower extremity problem and possible causes. Regarding extra body weight, we encourage patients to discuss diet and weight management plans with their primary care doctors. It is this team approach that gives you the best  opportunity for pain relief and getting you back on your feet.      [facility] has a Comprehensive Weight Management Program. This program includes counseling, education, non-surgical and surgical approaches to weight loss. If you are interested in learning more either talk to you primary care provider or call [phone].

## 2019-09-03 NOTE — PROGRESS NOTES
"Podiatry / Foot and Ankle Surgery Progress Note    September 3, 2019    Subject: Patient was seen for follow up on left ankle fracture. Notes he fell on Thursday in the kitchen. Slipped. Has been limping on left foot and back is bruised and sore. No pain to foot really due to neuropathy.       Objective:  Vitals: Ht 1.702 m (5' 7.01\")   Wt 80.3 kg (177 lb)   BMI 27.71 kg/m    BMI= Body mass index is 27.71 kg/m .      General:  Patient is alert and orientated.  NAD    Dermatologic: significant bruising to the lower back. No open lesions to back or foot noted.      Vascular: DP & PT pulses are intact & regular bilaterally.   Significant edema to left foot and leg. Varicosities noted.      Neurologic: Lower extremity sensation is diminished to feet. .     Musculoskeletal: Patient is ambulatory without assistive device or brace.  decrease range of motion of left ankle with crepitus.      Radiographs:  Left ankle xray - no re fracturing of ankle noted. Degenerative changes noted to ankle.     Left foot xray - metatarsal neck fractures noted for metatarsals 2-4 left foot. Degenerative changes to left midfoot.      ASSESSMENT:    Chronic pain of left ankle  Neuropathy of right foot  Post-traumatic osteoarthritis of left ankle  Multiple closed fractures of metatarsal bone of left foot, initial encounter     PLAN:  reviewed xrays. Talked about fractures. Discussed that healing can take 6-10 weeks. Risk that the fracture will not heal and we may need to do surgery. Risk is increased 10-15% if you smoke.     Talked about metatarsal fractures. Recommend going back into the boot for next 5 weeks. Recommend spanogrip for compression. Follow up in 5 weeks for rexray and re assessment.     Talked about arthritis and treatments including orthotics, injections, icing, NSAIDS, bracing, physical therapy, compounding pain cream, or surgical intervention.    He does have significant arthritis to ankle. Recommend permanent ankle brace. "     Zulay Alexandra DPM, Podiatry/Foot and Ankle Surgery    Weight management plan: Patient was referred to their PCP to discuss a diet and exercise plan.    Recommended to Milo Serna to follow up with Primary Care provider regarding elevated blood pressure.

## 2019-09-06 ENCOUNTER — OFFICE VISIT (OUTPATIENT)
Dept: FAMILY MEDICINE | Facility: CLINIC | Age: 79
End: 2019-09-06
Payer: MEDICARE

## 2019-09-06 VITALS
WEIGHT: 177 LBS | RESPIRATION RATE: 16 BRPM | HEIGHT: 67 IN | OXYGEN SATURATION: 99 % | SYSTOLIC BLOOD PRESSURE: 132 MMHG | TEMPERATURE: 97.7 F | DIASTOLIC BLOOD PRESSURE: 65 MMHG | HEART RATE: 50 BPM | BODY MASS INDEX: 27.78 KG/M2
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
## 2019-09-06 DIAGNOSIS — D50.8 IRON DEFICIENCY ANEMIA SECONDARY TO INADEQUATE DIETARY IRON INTAKE: ICD-10-CM

## 2019-09-06 DIAGNOSIS — K27.9 PUD (PEPTIC ULCER DISEASE): ICD-10-CM

## 2019-09-06 DIAGNOSIS — I50.42 CHRONIC COMBINED SYSTOLIC AND DIASTOLIC CONGESTIVE HEART FAILURE (H): Primary | ICD-10-CM

## 2019-09-06 DIAGNOSIS — E78.5 HYPERLIPIDEMIA LDL GOAL <100: ICD-10-CM

## 2019-09-06 DIAGNOSIS — G57.90 NEUROPATHY OF FOOT, UNSPECIFIED LATERALITY: ICD-10-CM

## 2019-09-06 DIAGNOSIS — S82.842D CLOSED BIMALLEOLAR FRACTURE OF LEFT ANKLE WITH ROUTINE HEALING: ICD-10-CM

## 2019-09-06 DIAGNOSIS — R41.3 MEMORY LOSS: ICD-10-CM

## 2019-09-06 DIAGNOSIS — B07.8 OTHER VIRAL WARTS: ICD-10-CM

## 2019-09-06 DIAGNOSIS — R04.0 EPISTAXIS: ICD-10-CM

## 2019-09-06 DIAGNOSIS — S92.302S: ICD-10-CM

## 2019-09-06 DIAGNOSIS — I48.20 CHRONIC ATRIAL FIBRILLATION (H): ICD-10-CM

## 2019-09-06 DIAGNOSIS — I87.8 VENOUS STASIS: ICD-10-CM

## 2019-09-06 DIAGNOSIS — I49.3 FREQUENT PVCS: ICD-10-CM

## 2019-09-06 DIAGNOSIS — T14.8XXA HEMATOMA: ICD-10-CM

## 2019-09-06 LAB
ALBUMIN SERPL-MCNC: 3.6 G/DL (ref 3.4–5)
ALP SERPL-CCNC: 244 U/L (ref 40–150)
ALT SERPL W P-5'-P-CCNC: 17 U/L (ref 0–70)
ANION GAP SERPL CALCULATED.3IONS-SCNC: 5 MMOL/L (ref 3–14)
AST SERPL W P-5'-P-CCNC: 22 U/L (ref 0–45)
BASOPHILS # BLD AUTO: 0 10E9/L (ref 0–0.2)
BASOPHILS NFR BLD AUTO: 1 %
BILIRUB SERPL-MCNC: 2 MG/DL (ref 0.2–1.3)
BUN SERPL-MCNC: 17 MG/DL (ref 7–30)
CALCIUM SERPL-MCNC: 9 MG/DL (ref 8.5–10.1)
CHLORIDE SERPL-SCNC: 103 MMOL/L (ref 94–109)
CO2 SERPL-SCNC: 28 MMOL/L (ref 20–32)
CREAT SERPL-MCNC: 0.87 MG/DL (ref 0.66–1.25)
DIFFERENTIAL METHOD BLD: ABNORMAL
EOSINOPHIL # BLD AUTO: 0.1 10E9/L (ref 0–0.7)
EOSINOPHIL NFR BLD AUTO: 1.7 %
ERYTHROCYTE [DISTWIDTH] IN BLOOD BY AUTOMATED COUNT: 15.4 % (ref 10–15)
GFR SERPL CREATININE-BSD FRML MDRD: 82 ML/MIN/{1.73_M2}
GLUCOSE SERPL-MCNC: 102 MG/DL (ref 70–99)
HCT VFR BLD AUTO: 32.7 % (ref 40–53)
HGB BLD-MCNC: 10.5 G/DL (ref 13.3–17.7)
LYMPHOCYTES # BLD AUTO: 0.6 10E9/L (ref 0.8–5.3)
LYMPHOCYTES NFR BLD AUTO: 14.5 %
MCH RBC QN AUTO: 31.3 PG (ref 26.5–33)
MCHC RBC AUTO-ENTMCNC: 32.1 G/DL (ref 31.5–36.5)
MCV RBC AUTO: 98 FL (ref 78–100)
MONOCYTES # BLD AUTO: 0.6 10E9/L (ref 0–1.3)
MONOCYTES NFR BLD AUTO: 14.5 %
NEUTROPHILS # BLD AUTO: 2.7 10E9/L (ref 1.6–8.3)
NEUTROPHILS NFR BLD AUTO: 68.3 %
PLATELET # BLD AUTO: 126 10E9/L (ref 150–450)
POTASSIUM SERPL-SCNC: 3.3 MMOL/L (ref 3.4–5.3)
PROT SERPL-MCNC: 7.8 G/DL (ref 6.8–8.8)
RBC # BLD AUTO: 3.35 10E12/L (ref 4.4–5.9)
SODIUM SERPL-SCNC: 136 MMOL/L (ref 133–144)
WBC # BLD AUTO: 4 10E9/L (ref 4–11)

## 2019-09-06 PROCEDURE — 85025 COMPLETE CBC W/AUTO DIFF WBC: CPT | Performed by: FAMILY MEDICINE

## 2019-09-06 PROCEDURE — 17110 DESTRUCTION B9 LES UP TO 14: CPT | Performed by: FAMILY MEDICINE

## 2019-09-06 PROCEDURE — 80053 COMPREHEN METABOLIC PANEL: CPT | Performed by: FAMILY MEDICINE

## 2019-09-06 PROCEDURE — 99214 OFFICE O/P EST MOD 30 MIN: CPT | Mod: 25 | Performed by: FAMILY MEDICINE

## 2019-09-06 PROCEDURE — 36415 COLL VENOUS BLD VENIPUNCTURE: CPT | Performed by: FAMILY MEDICINE

## 2019-09-06 RX ORDER — TRIAMCINOLONE ACETONIDE 1 MG/G
CREAM TOPICAL ONCE
Status: DISCONTINUED | OUTPATIENT
Start: 2019-09-06 | End: 2019-10-28

## 2019-09-06 RX ORDER — SIMVASTATIN 20 MG
20 TABLET ORAL AT BEDTIME
Qty: 90 TABLET | Refills: 1 | Status: SHIPPED | OUTPATIENT
Start: 2019-09-06 | End: 2019-09-06

## 2019-09-06 RX ORDER — FERROUS GLUCONATE 324(38)MG
324 TABLET ORAL
Qty: 90 TABLET | Refills: 3 | Status: SHIPPED | OUTPATIENT
Start: 2019-09-06

## 2019-09-06 RX ORDER — SIMVASTATIN 20 MG
20 TABLET ORAL AT BEDTIME
Qty: 90 TABLET | Refills: 1 | Status: SHIPPED | OUTPATIENT
Start: 2019-09-06 | End: 2019-10-28

## 2019-09-06 RX ORDER — METOPROLOL SUCCINATE 25 MG/1
12.5 TABLET, EXTENDED RELEASE ORAL
Qty: 45 TABLET | Refills: 1 | Status: SHIPPED | OUTPATIENT
Start: 2019-09-06 | End: 2019-10-03

## 2019-09-06 RX ORDER — OMEPRAZOLE 40 MG/1
40 CAPSULE, DELAYED RELEASE ORAL DAILY
Qty: 90 CAPSULE | Refills: 3 | Status: SHIPPED | OUTPATIENT
Start: 2019-09-06 | End: 2020-11-11

## 2019-09-06 ASSESSMENT — MIFFLIN-ST. JEOR: SCORE: 1476.5

## 2019-09-06 NOTE — LETTER
My Heart Failure Action Plan   Name: Milo Serna    YOB: 1940   Date: 9/6/2019    My doctor: Oscar Alves     [address] [address] [phone]  My Diagnosis:    My Exercise Goal: 30 minutes daily  .     My Weight Plan:   Wt Readings from Last 2 Encounters:   09/03/19 80.3 kg (177 lb)   08/23/19 80.3 kg (177 lb)     Weigh yourself daily using the same scale. If you gain more than 2 pounds in 24 hours or 5 pounds in a week     My Diet Goal:     Emergency Room Visits:    Our goal is to improve your quality of life and help you avoid a visit to the emergency room or hospital.  If we work together, we can achieve this goal. But, if you feel you need to call 911 or go to the emergency room, please do so.  If you go to the emergency room, please bring your list of medicines and your daily weight chart with you.       GREEN ZONE     Doing well today    Weight gained is no more than 2 pounds a day or 5 pounds a week.    No swelling in feet, ankles, legs or stomach.    No more swelling than usual.    No more trouble breathing than usual.    No change in my sleep.    No other problems. Actions:    I am doing fine.  I will take my medicine, follow my diet, see my doctor, exercise, and watch for symptoms.           YELLOW ZONE         Having a bad day or flare up    Weight gain of more than 2 pounds in one day or 5 pounds in one week.    New swelling in ankle, leg, knee or thigh.    Bloating in belly, pants feel tighter.    Swelling in hands or face.    Coughing or trouble breathing while walking or talking.    Harder to breathe last night.    Have trouble sleeping, wake up short of breath.    Much more tired than usual.    Not eating.    Pain in my chest or bad leg cramps.    Feel weak or dizzy. Actions:    I need to take action and call my doctor or nurse today.                 RED ZONE         Need medical care now    Weight gain of 5  pounds overnight.    Chest pain or pressure that does not go away.    Feel less alert.    Wheezing or have trouble breathing when at rest.    Cannot sleep lying down.    Cannot take my water pill.    Pass out or faint. Actions:    I need to call my doctor or nurse now!    Call 911 if I have chest pain or cannot breathe.

## 2019-09-06 NOTE — PROGRESS NOTES
"Subjective     Milo Serna is a 78 year old male who presents to clinic today for the following health issues:    HPI   ED/UC Followup:    Facility:  [facility], [facility]  Date of visit: 8/7/19  Reason for visit: epistaxis  Current Status: recently had a fall and has several injuries   No LOC, no head trauma. LArge \"lump\" lower back. Fractures of foot. He has  Sworn off melvin and water    Concern - Fall  Onset: x1 week    Description:   Fell in kitchen    Intensity: moderate, severe    Progression of Symptoms:  same    Accompanying Signs & Symptoms:  Injured back          Reviewed and updated as needed this visit by Provider  Tobacco  Allergies  Meds  Problems  Med Hx  Surg Hx  Fam Hx         Review of Systems         Objective    /65 (BP Location: Right arm, Patient Position: Chair, Cuff Size: Adult Regular)   Pulse 50   Temp 97.7  F (36.5  C) (Oral)   Resp 16   Ht 1.702 m (5' 7\")   Wt 80.3 kg (177 lb)   SpO2 99%   BMI 27.72 kg/m    Body mass index is 27.72 kg/m .  Physical Exam               Assessment & Plan   Problem List Items Addressed This Visit        Nervous and Auditory    Neuropathy of foot     Increased risk            Respiratory    Epistaxis     ANticoagulated. Recurrent. ENT            Digestive    PUD (peptic ulcer disease)     Clinically Silent. PPI         Relevant Medications    omeprazole (PRILOSEC) 40 MG DR capsule       Endocrine    HYPERLIPIDEMIA LDL GOAL <100     Statin. CAD         Relevant Medications    simvastatin (ZOCOR) 20 MG tablet    Other Relevant Orders    COMPREHENSIVE METABOLIC PANEL (Completed)       Circulatory    Venous stasis     Worse in leg with fractures (L)         Relevant Medications    triamcinolone (KENALOG) 0.1 % cream    Frequent PVCs     asymptomatic         Relevant Medications    metoprolol succinate ER (TOPROL-XL) 25 MG 24 hr tablet    Atrial fibrillation (H)     Anticoagulated         Relevant Medications    metoprolol succinate ER (TOPROL-XL) 25 MG " 24 hr tablet    Chronic combined systolic and diastolic congestive heart failure (H) - Primary     Edema treatment with compression hose. L side in boot due to fractures, no compression hose         Relevant Orders    HEART FAILURE ACTION PLAN (Completed)       Musculoskeletal and Integumentary    Closed fracture of metatarsal bone     Podiatry. CAM boot         Closed bimalleolar fracture of left ankle with routine healing     Podiatry. CAM boot            Hematologic    Iron deficiency anemia secondary to inadequate dietary iron intake     On repletion. Vows enhanced compliance         Relevant Medications    ferrous gluconate (FERGON) 324 (38 Fe) MG tablet    Other Relevant Orders    CBC with platelets and differential (Completed)       Infectious/Inflammatory    Other viral warts     Cryotherapy. Reassess:may reflect keratin horn. Unusual base. L wrist         Relevant Orders    DESTRUCT BENIGN LESION, UP TO 14 (Completed)       Other    Fall (Chronic)     He has given up melvin and water         Relevant Orders    PHYSICAL THERAPY REFERRAL    Memory loss     Hampers care         Hematoma     Lumbar. Simran history discussed              Past Medical History:   Diagnosis Date     Anemia, unspecified 11/8/2016     Atrial fibrillation (H) 5/27/2015     BPH (benign prostatic hyperplasia) 1/25/2012     CAD (coronary artery disease) 4/4/2012     Closed bimalleolar fracture of left ankle with routine healing 4/5/2019     Closed fracture of metatarsal bone 4/4/2012     Coronary atherosclerosis of unspecified type of vessel, native or graft [facility]    CAB 1996 following MI (at [facility])      Dilated aortic root (H) 5/26/2016     Drug-induced erectile dysfunction 10/2/2015     Elevated blood sugar 11/8/2016     Elevated PSA 9/19/2013     Epistaxis 9/9/2019     Essential hypertension with goal blood pressure less than 140/90 9/22/2016     Essential hypertension, benign      Fall 9/6/2019     Gallbladder polyp  5/1/2018     Gout      HAV (hallux abducto valgus) 4/4/2012     Hematoma 9/9/2019     Hernia, abdominal      History of prostate cancer 6/15/2016     HTN, goal below 150/90 4/20/2017     Iron deficiency anemia secondary to inadequate dietary iron intake 11/15/2016     Low blood pressure reading 10/17/2016     Lumbago     had degendisc dz on MRI 7/07     Memory loss 6/18/2019     Mixed hyperlipidemia      Mumps      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     OA (osteoarthritis) 4/4/2012     Old myocardial infarction 4/4/2012     Other viral warts 9/6/2019     Palpitations      Pes planus 4/4/2012     Petechiae 6/18/2019     Prostate cancer (H) 5/26/2016     PUD (peptic ulcer disease) 4/4/2012     Pulmonary hypertension (H) 9/25/2017     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     Rosacea 1/15/2008     Stress due to spouse with dementia 6/18/2019     Thrombocytopenia (H) 4/21/2017     Unspecified hyperplasia of prostate with urinary obstruction and other lower urinary tract symptoms (LUTS)     better on avodart & hytrin     Venous stasis 4/4/2012     Venous stasis 4/4/2012       Past Surgical History:   Procedure Laterality Date     C NONSPECIFIC PROCEDURE      CAB 1996 [facility]     C NONSPECIFIC PROCEDURE  2/07    l inguinal hernia repair      C NONSPECIFIC PROCEDURE      R hernia remote     C NONSPECIFIC PROCEDURE  2000    R ankle ORIF for fx     C NONSPECIFIC PROCEDURE  2005    nasal surgery      C NONSPECIFIC PROCEDURE      R rotater repair (remote)      C NONSPECIFIC PROCEDURE      appy 1966     C NONSPECIFIC PROCEDURE      sinus surgery x 2     C NONSPECIFIC PROCEDURE      L shoulder 6/09     CARDIAC SURGERY      bipass     CYSTOSCOPY FLEXIBLE, CYOABLATION PROSTATE N/A 11/9/2016    Procedure: CYSTOSCOPY FLEXIBLE, CRYOABLATION PROSTATE;  Surgeon: Krystian Owens MD;  Location:  OR     GENITOURINARY SURGERY      prostate surgery      HERNIA REPAIR       LAPAROSCOPIC HERNIORRHAPHY INGUINAL Right 5/10/2017    Procedure: LAPAROSCOPIC  HERNIORRHAPHY INGUINAL;  Laparoscopic preperitoneal repair of right inguinal hernia with placement of underlay mesh;  Surgeon: Enrico Bridges MD;  Location: [facility] OR     PROSTATE SURGERY         Family History   Problem Relation Age of Onset     Cancer Mother         stomach cancer,  age 61     Heart Disease Father         MI,  age 71     Heart Disease Brother         stent placement, RA      Hypertension Brother      Heart Disease Sister         rythmn problems with heart, hypertension       Social History     Tobacco Use     Smoking status: Former Smoker     Smokeless tobacco: Never Used     Tobacco comment: quit 3/1974   Substance Use Topics     Alcohol use: Not Currently     Alcohol/week: 0.0 oz     Comment: rare drink     Current Outpatient Medications   Medication     ferrous gluconate (FERGON) 324 (38 Fe) MG tablet     metoprolol succinate ER (TOPROL-XL) 25 MG 24 hr tablet     omeprazole (PRILOSEC) 40 MG DR capsule     simvastatin (ZOCOR) 20 MG tablet     acetaminophen (TYLENOL) 650 MG CR tablet     apixaban ANTICOAGULANT (ELIQUIS) 2.5 MG tablet     aspirin 81 MG tablet     co-enzyme Q-10 100 MG CAPS     diclofenac (VOLTAREN) 1 % GEL     doxycycline hyclate (VIBRAMYCIN) 100 MG capsule     Glucosamine-Chondroitin (OSTEO BI-FLEX REGULAR STRENGTH) 250-200 MG TABS     lidocaine (LIDODERM) 5 % Patch     lisinopril (PRINIVIL/ZESTRIL) 20 MG tablet     MELATONIN PO     Multiple Vitamins-Minerals (MULTIVITAL) CHEW     naftifine (NAFTIN) 1 % GEL topical gel     Omega 3-6-9 Fatty Acids (OMEGA 3-6-9 PO)     order for DME     order for DME     order for DME     order for DME     order for DME     order for DME     senna (SENOKOT) 8.6 MG tablet     sodium chloride (OCEAN NASAL SPRAY) 0.65 % nasal spray     tamsulosin (FLOMAX) 0.4 MG capsule     torsemide (DEMADEX) 20 MG tablet     urea (GORMEL) 20 % external cream     Current Facility-Administered Medications   Medication     triamcinolone (KENALOG)  "0.1 % cream     ROS no palpitations, chest pain, dizziness. Pain well controlle din CAM boot. Legs are swelling, L more so out of compression hose. NO dyspnea. A complete review of systems is otherwise negative        BMI:   Estimated body mass index is 27.72 kg/m  as calculated from the following:    Height as of this encounter: 1.702 m (5' 7\").    Weight as of this encounter: 80.3 kg (177 lb).   2+ edema L leg, some dermatitis. Skin intact  Back with extensive lumbar bruising, hematoma in center with eschared abrasion  RRR    Verrucous papule L wrist.   Cryo therapy applied  Atrial fibrillation (H)  Anticoagulated    Chronic combined systolic and diastolic congestive heart failure (H)  Edema treatment with compression hose. L side in boot due to fractures, no compression hose    Closed bimalleolar fracture of left ankle with routine healing  Podiatry. CAM boot    Closed fracture of metatarsal bone  Podiatry. CAM boot    Epistaxis  ANticoagulated. Recurrent. ENT    Fall  He has given up melvin and water    Frequent PVCs  asymptomatic    Hematoma  Lumbar. Simran history discussed    HYPERLIPIDEMIA LDL GOAL <100  Statin. CAD    Iron deficiency anemia secondary to inadequate dietary iron intake  On repletion. Vows enhanced compliance    Memory loss  Hampers care    Neuropathy of foot  Increased risk    Other viral warts  Cryotherapy. Reassess:may reflect keratin horn. Unusual base. L wrist    PUD (peptic ulcer disease)  Clinically Silent. PPI    Venous stasis  Worse in leg with fractures (L)        Return in about 3 weeks (around 9/27/2019).    Oscar Alves MD  [facility]    "

## 2019-09-06 NOTE — LETTER
My Heart Failure Action Plan   Name: Milo Serna    YOB: 1940   Date: 9/9/2019    My doctor: Oscar Alves     [address] [address] [phone]  My Diagnosis: Preserved (HFp)- EF:Over 50%   My Exercise Goal: 30 minutes daily  .     My Weight Plan:   Wt Readings from Last 2 Encounters:   09/06/19 80.3 kg (177 lb)   09/03/19 80.3 kg (177 lb)     Weigh yourself daily using the same scale. If you gain more than 2 pounds in 24 hours or 5 pounds in a week   My Diet Goal: alcohol avoidance    Emergency Room Visits:    Our goal is to improve your quality of life and help you avoid a visit to the emergency room or hospital.  If we work together, we can achieve this goal. But, if you feel you need to call 911 or go to the emergency room, please do so.  If you go to the emergency room, please bring your list of medicines and your daily weight chart with you.       GREEN ZONE     Doing well today    Weight gained is no more than 2 pounds a day or 5 pounds a week.    No swelling in feet, ankles, legs or stomach.    No more swelling than usual.    No more trouble breathing than usual.    No change in my sleep.    No other problems. Actions:    I am doing fine.  I will take my medicine, follow my diet, see my doctor, exercise, and watch for symptoms.           YELLOW ZONE         Having a bad day or flare up    Weight gain of more than 2 pounds in one day or 5 pounds in one week.    New swelling in ankle, leg, knee or thigh.    Bloating in belly, pants feel tighter.    Swelling in hands or face.    Coughing or trouble breathing while walking or talking.    Harder to breathe last night.    Have trouble sleeping, wake up short of breath.    Much more tired than usual.    Not eating.    Pain in my chest or bad leg cramps.    Feel weak or dizzy. Actions:    I need to take action and call my doctor or nurse today.                 RED ZONE          Need medical care now    Weight gain of 5 pounds overnight.    Chest pain or pressure that does not go away.    Feel less alert.    Wheezing or have trouble breathing when at rest.    Cannot sleep lying down.    Cannot take my water pill.    Pass out or faint. Actions:    I need to call my doctor or nurse now!    Call 911 if I have chest pain or cannot breathe.
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
## 2019-09-09 PROBLEM — T14.8XXA HEMATOMA: Status: ACTIVE | Noted: 2019-09-09

## 2019-09-09 PROBLEM — I50.42 CHRONIC COMBINED SYSTOLIC AND DIASTOLIC CONGESTIVE HEART FAILURE (H): Status: ACTIVE | Noted: 2019-09-09

## 2019-09-11 ENCOUNTER — TELEPHONE (OUTPATIENT)
Dept: CARDIOLOGY | Facility: CLINIC | Age: 79
End: 2019-09-11

## 2019-09-11 ENCOUNTER — HOSPITAL ENCOUNTER (OUTPATIENT)
Dept: CARDIOLOGY | Facility: CLINIC | Age: 79
Discharge: HOME | End: 2019-09-11
Attending: INTERNAL MEDICINE | Admitting: INTERNAL MEDICINE
Payer: MEDICARE

## 2019-09-11 DIAGNOSIS — I49.3 FREQUENT PVCS: ICD-10-CM

## 2019-09-11 DIAGNOSIS — I10 BENIGN ESSENTIAL HYPERTENSION: ICD-10-CM

## 2019-09-11 DIAGNOSIS — I48.20 CHRONIC ATRIAL FIBRILLATION (H): ICD-10-CM

## 2019-09-11 DIAGNOSIS — Z95.1 STATUS POST CORONARY ARTERY BYPASS GRAFT: ICD-10-CM

## 2019-09-11 DIAGNOSIS — I50.42 CHRONIC COMBINED SYSTOLIC AND DIASTOLIC CONGESTIVE HEART FAILURE (H): ICD-10-CM

## 2019-09-11 DIAGNOSIS — G47.33 OSA (OBSTRUCTIVE SLEEP APNEA): ICD-10-CM

## 2019-09-11 LAB
ALBUMIN SERPL-MCNC: 3.5 G/DL (ref 3.4–5)
ALP SERPL-CCNC: 239 U/L (ref 40–150)
ALT SERPL W P-5'-P-CCNC: 17 U/L (ref 0–70)
ANION GAP SERPL CALCULATED.3IONS-SCNC: 7 MMOL/L (ref 3–14)
AST SERPL W P-5'-P-CCNC: 26 U/L (ref 0–45)
BASOPHILS # BLD AUTO: 0.1 10E9/L (ref 0–0.2)
BASOPHILS NFR BLD AUTO: 2 %
BILIRUB SERPL-MCNC: 1.5 MG/DL (ref 0.2–1.3)
BUN SERPL-MCNC: 17 MG/DL (ref 7–30)
CALCIUM SERPL-MCNC: 9 MG/DL (ref 8.5–10.1)
CHLORIDE SERPL-SCNC: 103 MMOL/L (ref 94–109)
CHOLEST SERPL-MCNC: 88 MG/DL
CO2 SERPL-SCNC: 29 MMOL/L (ref 20–32)
CREAT SERPL-MCNC: 0.98 MG/DL (ref 0.66–1.25)
DIFFERENTIAL METHOD BLD: ABNORMAL
EOSINOPHIL # BLD AUTO: 0.1 10E9/L (ref 0–0.7)
EOSINOPHIL NFR BLD AUTO: 3.4 %
ERYTHROCYTE [DISTWIDTH] IN BLOOD BY AUTOMATED COUNT: 15.2 % (ref 10–15)
GFR SERPL CREATININE-BSD FRML MDRD: 72 ML/MIN/{1.73_M2}
GLUCOSE SERPL-MCNC: 97 MG/DL (ref 70–99)
HCT VFR BLD AUTO: 33.2 % (ref 40–53)
HDLC SERPL-MCNC: 43 MG/DL
HGB BLD-MCNC: 10.7 G/DL (ref 13.3–17.7)
IMM GRANULOCYTES # BLD: 0 10E9/L (ref 0–0.4)
IMM GRANULOCYTES NFR BLD: 0.3 %
LDLC SERPL CALC-MCNC: 34 MG/DL
LYMPHOCYTES # BLD AUTO: 0.6 10E9/L (ref 0.8–5.3)
LYMPHOCYTES NFR BLD AUTO: 17.9 %
MCH RBC QN AUTO: 31.5 PG (ref 26.5–33)
MCHC RBC AUTO-ENTMCNC: 32.2 G/DL (ref 31.5–36.5)
MCV RBC AUTO: 98 FL (ref 78–100)
MONOCYTES # BLD AUTO: 0.5 10E9/L (ref 0–1.3)
MONOCYTES NFR BLD AUTO: 13.2 %
NEUTROPHILS # BLD AUTO: 2.3 10E9/L (ref 1.6–8.3)
NEUTROPHILS NFR BLD AUTO: 63.2 %
NONHDLC SERPL-MCNC: 45 MG/DL
NRBC # BLD AUTO: 0 10*3/UL
NRBC BLD AUTO-RTO: 0 /100
NT-PROBNP SERPL-MCNC: 1165 PG/ML (ref 0–450)
PLATELET # BLD AUTO: 136 10E9/L (ref 150–450)
POTASSIUM SERPL-SCNC: 3.5 MMOL/L (ref 3.4–5.3)
PROT SERPL-MCNC: 7.8 G/DL (ref 6.8–8.8)
RBC # BLD AUTO: 3.4 10E12/L (ref 4.4–5.9)
SODIUM SERPL-SCNC: 139 MMOL/L (ref 133–144)
TRIGL SERPL-MCNC: 55 MG/DL
TSH SERPL DL<=0.005 MIU/L-ACNC: 2.02 MU/L (ref 0.4–4)
WBC # BLD AUTO: 3.6 10E9/L (ref 4–11)

## 2019-09-11 PROCEDURE — 85025 COMPLETE CBC W/AUTO DIFF WBC: CPT | Performed by: INTERNAL MEDICINE

## 2019-09-11 PROCEDURE — 80061 LIPID PANEL: CPT | Performed by: INTERNAL MEDICINE

## 2019-09-11 PROCEDURE — 83880 ASSAY OF NATRIURETIC PEPTIDE: CPT | Performed by: INTERNAL MEDICINE

## 2019-09-11 PROCEDURE — 36415 COLL VENOUS BLD VENIPUNCTURE: CPT | Performed by: INTERNAL MEDICINE

## 2019-09-11 PROCEDURE — 84443 ASSAY THYROID STIM HORMONE: CPT | Performed by: INTERNAL MEDICINE

## 2019-09-11 PROCEDURE — 93306 TTE W/DOPPLER COMPLETE: CPT | Mod: 26 | Performed by: INTERNAL MEDICINE

## 2019-09-11 PROCEDURE — 80053 COMPREHEN METABOLIC PANEL: CPT | Performed by: INTERNAL MEDICINE

## 2019-09-11 PROCEDURE — 40000264 ECHOCARDIOGRAM COMPLETE

## 2019-09-11 PROCEDURE — 25800025 ZZH RX 258: Performed by: INTERNAL MEDICINE

## 2019-09-11 RX ORDER — ACYCLOVIR 200 MG/1
30 CAPSULE ORAL ONCE
Status: COMPLETED | OUTPATIENT
Start: 2019-09-11 | End: 2019-09-11

## 2019-09-11 RX ADMIN — SODIUM CHLORIDE 30 ML: 9 INJECTION, SOLUTION INTRAMUSCULAR; INTRAVENOUS; SUBCUTANEOUS at 09:34

## 2019-09-11 NOTE — TELEPHONE ENCOUNTER
Next FILEMON OV 10-1-19. Last Dr. Conley OV 19 :  Hx:  of biventricular systolic heart failure and question of pulmonary hypertension.    Labs: FLP, CBC, CMP TSH/T4, BNP done     Echo done 19:  1. The left ventricle is normal in size. The visual ejection fraction is estimated at 40%. There is mild-moderate global hypokinesia of the left ventricle.    2. The right ventricle is moderately dilated. Mildly decreased right ventricular systolic function TAPSE 1.6cm, TV velocity 9cm/s.    3. There is mod-severe to severe (3-4+) mitral regurgitation. ERO 0.39cm2, RV  85ml/beat. MR is posterior directed. On short axis views MR is mostly from  coaptation site. No obvious prolapse, valve is only mildly thickend.  Ischemic MR is possible (remote CABG). Functional MR is possible with afib/low EF, but LV is not dilated.    4. There is moderate (2+) tricuspid regurgitation. RVSP 29mmHg.    5. The ascending aorta is Mildly dilated. Sinus of Valsalva 4.8cm, ascending  4.3cm.     Echo from 2018 showed EF 35-40%, mild-moderate RV dilation with moderate  hypokinesis (TAPSE 1.4cm, TV mima 6cm/s), 2-3+ MR, 1+ TR, sinus 4.5cm, ascending aorta 4.0cm.     When directly compared to previous echo the degree of MR does appear worse.     Consider ANTONETTE if clinically indicated to further evaluate MR.    Dr. Conley to review.

## 2019-09-11 NOTE — TELEPHONE ENCOUNTER
Dr. Conley,   Do you want me to consider a ANTONETTE at the visit?    Patsy,   Also can you check the schedule on 9/30/19 - Dr. Conley as a [facility] spot 10/3/19.  If it is not filled by 9/30/19, he could switch from me 10/1/19 to Dr. Conley 10/3/19.   Because ideally, I would not see her PH patients.     Juliano Caballero PA-C 9/11/2019 2:34 PM

## 2019-09-12 ENCOUNTER — HOSPITAL ENCOUNTER (OUTPATIENT)
Dept: PHYSICAL THERAPY | Facility: CLINIC | Age: 79
Setting detail: THERAPIES SERIES
End: 2019-09-12
Attending: FAMILY MEDICINE
Payer: MEDICARE

## 2019-09-12 DIAGNOSIS — E87.6 HYPOKALEMIA: ICD-10-CM
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
## 2019-09-12 PROCEDURE — 97161 PT EVAL LOW COMPLEX 20 MIN: CPT | Mod: GP | Performed by: PHYSICAL THERAPIST

## 2019-09-12 PROCEDURE — 97110 THERAPEUTIC EXERCISES: CPT | Mod: GP | Performed by: PHYSICAL THERAPIST

## 2019-09-12 RX ORDER — POTASSIUM CHLORIDE 750 MG/1
10 TABLET, EXTENDED RELEASE ORAL 2 TIMES DAILY
Qty: 180 TABLET | Refills: 1 | Status: SHIPPED | OUTPATIENT
Start: 2019-09-12 | End: 2019-10-03

## 2019-09-12 NOTE — RESULT ENCOUNTER NOTE
Looks like you need some potassium. [facility]  [facility]. We should check lab again in 3 months  Oscar Alves MD

## 2019-09-13 NOTE — PROGRESS NOTES
"   09/12/19 1400   Quick Adds   Quick Adds Certification   Type of Visit Initial OP PT Evaluation   General Information   Start of Care Date 09/12/19   Referring Physician Oscar Alves MD    Orders Evaluate and Treat as Indicated   Order Date 09/06/19   Medical Diagnosis Fall, initial encounter W19.XXXA    Onset of illness/injury or Date of Surgery 09/06/19  ((order date) - pt reports fall 1 1/2- 2 wks ago with injury)   Precautions/Limitations fall precautions  (wearing CAM boot L foot)   Surgical/Medical history reviewed Yes   Pertinent history of current problem (include personal factors and/or comorbidities that impact the POC) 79 yo M arrives for PT evaluation to address balance/falls refered by his PCP after follow-up with physician 6 days ago (see 9/6/19 note for details).  Pt reports 1 1/2-2 weeks ago he fell backwards while turning in kitchen while cooking (MD note reports \"he has given up melvin and water\" with re. to falls). Fall resulted in injury and hit to his head. He has been wearing L CAM boot as recommended by MD and reports he has been advised to continue wearing x4 more weeks. He thinks his foot caught on cabinet when he fell. Per chart, dx with closed bimalleolar fx of L ankle and closed fx of metatarsal bone. He suspects he hit his head with a lump back of head. Denies any additional falls since, but is often unsteady wearing CAM boot. He is discouraged by needing to wear it again, he was wearing L CAM boot for 4 months until about 2 months ago d/t another fall with injury in 03/2019. Pt reports on 3/13/18 he was in [place] and missed a step while carrying garbage 1 step down into garage. Step did not have a railing. Reports injury to his L ankle without surgical intervention and advised to wear CAM boot x4 months. Since his inital fall 6 months ago he feels like his balance has been off since.  He has not participated in PT for his balance in the past. Reports he used to walk 45 minute outside " "in [place] prior to inital fall. A week ago started walking on treadmill at home x15 min per day. Denies any instability on treadmill wearing CAM boot, and has increased speed.  Reports inclines/declines feel less steady. He has continued with daily activities, running errands and caring for his spouse with dementia. Other PHM: neuropathy of foot, venous stasis (worse in L leg with fxs), frequent PVCs (metoprolol), AFib (anticoagulated), chronic combined systolic and diastolic congestive heart failure (edema tx with compression - non on L side d/t fx), memory loss, lumbar hematoma.   Pertinent Visual History  wears glasses, has appointment for optometrist nxt month. denies any blurred/doubled vision   Prior level of function comment Continues to drive and participate in all self cares/primary care giver for spouse. Manages his own finances, cooking, cleaning, grocery shoping. Walking without AD reporting limited stability after inital fall in 03/2019. Reports his balance was better prior to his inital fall,   Previous/Current Treatment Other  (CAM boot for injury from falls)   Current Community Support   (none)   Patient role/Employment history Retired  (\"computer science\")   Living environment House/[facility]  (2 story home, lives with spouse)   Home/Community Accessibility Comments 3 steps to enter with railing. flight of stairs to finished basement with treadmill, on main level: washer/dryer , bedrooms, bathrooms, kitchen   Current Assistive Devices Four Wheeled Walker;Standard Cane  (L CAM boot )   Assistive Devices Comments pt reports he does not use an AD currently, used SPC for 1 week after most recent fall but then stopped because he didn't think he needed it, but now thinks he might bc of poor balance, owns 2 canes and 4ww with seat and breaks because his spouse has also had multiple falls   Patient/Family Goals Statement improve balance, prevent falls   General Information Comments Pt is caregiver for his spouse " with dementia. Pt arrives 15 minutes late to evaluation with difficulty finding clinic   Fall Risk Screen   Fall screen completed by PT   Have you fallen 2 or more times in the past year? Yes   Have you fallen and had an injury in the past year? Yes   Timed Up and Go score (seconds) 19.53 sec no AD with CAM boot   Is patient a fall risk? Yes;Department fall risk interventions implemented   Fall screen comments 2 significant falls with injury; 3/2019: injury to L ankle wearing CAM boot until approx 7/2019, most recent fall with injury about 1 1/2 - 2 weeks ago - wearing L CAM boot d/t fx to metatarsals in foot, pt also reports ongoing LOB/near falls since most recent fall   Abuse Screen (yes response referral indicated)   Feels Unsafe at Home or Work/School no   Feels Threatened by Someone no   Does Anyone Try to Keep You From Having Contact with Others or Doing Things Outside Your Home? no   Physical Signs of Abuse Present no   Pain   Patient currently in pain No   Pain comments denies pain despite injuries, reports occasional Tylonol   Cognitive Status Examination   Orientation orientation to person, place and time   Level of Consciousness alert   Follows Commands and Answers Questions able to follow multistep instructions;able to follow single-step instructions;100% of the time;75% of the time   Personal Safety and Judgment impaired;at risk behaviors demonstrated   Memory impaired   Cognitive Comment memory loss per chart review, also has difficulty with some time related events around falls, impulsive with LOB without fall during assessment, walks w/o AD despite recent fall with injury, needs repeated emphasis not to use TM d/t fall risk and while wearing CAM boot   Integumentary   Integumentary Comments significant hematoma extending between upper lumbar to pelvis aross entire low back   Posture   Posture Kyphosis   Posture Comments L CAM boot contributing to inc R sided trunk lean/pt able to correct with cues,  maintains wide NOBLE and LE ER   Range of Motion (ROM)   ROM Comment functionally assessed only, limited assessment to distal L LE d/t CAM boot, appears WFL with transfers/gait   Strength   Strength Comments functionally assessed only, limited assessment distal L LE d/t CAM boot, functionally impaired during transfers and gait,  (also see 30 sec sit <> stand)   Bed Mobility   Bed Mobility Comments not assessed, reports indp   Transfer Skills   Transfer Comments unable to rise/lower without UE support for control   Gait   Gait Comments over short distances indoors without AD with L CAM boot: maintains stability without path deviations or LOB, shortened stance time L LE in boot, flat foot initial contact bilat, shortened step length   Balance Special Tests   Balance Special Tests Sit to stand reps;Timed up and go;Spencer balance;Single leg stance right;Single leg stance left;Romberg   Balance Special Tests Timed Up and Go   Seconds 19.53 Seconds   Comments no AD, with L CAM boot   Balance Special Tests Spencer Balance   Score out of 56 37   Balance Special Tests Single Leg Stance Right,   Comments unable   Balance Special Tests Single Leg Stance Left   Comments not attempted in CAM boot   Balance Special Tests Romberg   Seconds 30 Seconds   Comments heels together only, LEs in ER   Balance Special Tests Sit to Stand Reps in 30 Seconds   Reps in 30 seconds 12.5   Height 18   Comments with L CAM boot, requires Bilat UE support from chair to rise/control descent   Sensory Examination   Sensory Perception Comments denies n/t    Modality Interventions   Planned Modality Interventions Comments per therapist discretion   Planned Therapy Interventions   Planned Therapy Interventions balance training;gait training;neuromuscular re-education;ROM;strengthening;stretching;transfer training;manual therapy   Clinical Impression   Criteria for Skilled Therapeutic Interventions Met yes, treatment indicated   PT Diagnosis multifactoral  posutral and gait instability with inc risk for falls   Influenced by the following impairments impaired: functional LE strength, posture, postural stanbility, gait, gait stability in part d/t L CAM boot, demonstrates some decreased insight/impulsive movements with LOB during evaluation and not currently using AD   Functional limitations due to impairments increased risk for falls/participation in daily activities   Clinical Presentation Stable/Uncomplicated   Clinical Decision Making (Complexity) Low complexity   Therapy Frequency 2 times/Week   Predicted Duration of Therapy Intervention (days/wks) 6 weeks   Risk & Benefits of therapy have been explained Yes   Patient, Family & other staff in agreement with plan of care Yes   Clinical Impression Comments Pt is leaving for [place] within 6 weeks for the winter. Recommending participation in PT for progressing pt's balance and safety prior to leaving for [place], and establishing appropriate HEP for continued self maintainance while out of town. Pt reports MD recommends he continue to wear CAM boot x4 weeks d/t toe fx, however therapist anticipates this will limit his progress in developing strength/balance with L LE and may benefit from continued therapy after he no longer needs CAM boot when in [place], Pt also reports his spouse has dementia and he cares for her; pt reports he can leave her alone but anticipates she will needs services. Therapist provided  information through [facility] and recommended f/u with MD for further recommendations on /services available to him/his spouse    Education Assessment   Barriers to Learning Hearing;Physical   GOALS   PT Eval Goals 1;2;3;4;5   Goal 1   Goal Identifier HEP (baseline: treadmill with CAM boot x15 min)   Goal Description Pt to demonstrate (I) with HEP for strengthening, postural and gait stability, and endurance exercises for progress towards all goals and continued self maintenance following d/c from therapy   "  Target Date 10/30/19   Goal 2   Goal Identifier CARRIZALES (baseline: 37/56)   Goal Description Pt will score >45/56 on the carrizales balance assessment to indicate improved postural stability and low fall risk.    Target Date 10/30/19   Goal 3   Goal Identifier TUG (baseline: 19.53 sec no AD with CAM boot)   Goal Description To complete TUG in < 13.5 sec with SPC to demonstrate improved safety with indoor ambulation over short distances and dec fall risk.   Target Date 10/30/19   Goal 4   Goal Identifier Falls (baseline: wearing CAM boot, no AD)   Goal Description Pt to report improved safety with consistent use of appropriate AD within home without report of falling throughout course of care.    Target Date 10/30/19   Goal 5   Goal Identifier 30 sec sit <> stand (baseline: 12.5 reps from 18\" with L CAM boot, requires Bilat UE support from chair to rise/control descent)   Goal Description Pt to achieve >/= 8 reps without UE support to demonstrate improved functional LE strength with transition from standard height chair.    Target Date 10/30/19   Total Evaluation Time   PT Eval, Low Complexity Minutes (85427) 35   Therapy Certification   Certification date from 09/12/19   Certification date to 10/30/19   Medical Diagnosis Fall, initial encounter W19.XXXA      "

## 2019-09-13 NOTE — PROGRESS NOTES
"                                                                                              [facility]        OUTPATIENT PHYSICAL THERAPY FUNCTIONAL EVALUATION  PLAN OF TREATMENT FOR OUTPATIENT REHABILITATION  (COMPLETE FOR INITIAL CLAIMS ONLY)  Patient's Last Name, First Name, M.I.  YOB: 1940  Milo Serna     Provider's Name   [facility]   Medical Record No.  [id number]     Start of Care Date:  09/12/19   Onset Date:  09/06/19((order date) - pt reports fall 1 1/2- 2 wks ago with injury)   Type:     _X__PT   ____OT  ____SLP Medical Diagnosis:  Fall, initial encounter W19.XXXA      PT Diagnosis:  multifactoral posutral and gait instability with inc risk for falls Visits from SOC:  1                              __________________________________________________________________________________  Plan of Treatment/Functional Goals:  balance training, gait training, neuromuscular re-education, ROM, strengthening, stretching, transfer training, manual therapy           GOALS  HEP (baseline: treadmill with CAM boot x15 min)  Pt to demonstrate (I) with HEP for strengthening, postural and gait stability, and endurance exercises for progress towards all goals and continued self maintenance following d/c from therapy   10/30/19    CARRIZALES (baseline: 37/56)  Pt will score >45/56 on the carrizales balance assessment to indicate improved postural stability and low fall risk.   10/30/19    TUG (baseline: 19.53 sec no AD with CAM boot)  To complete TUG in < 13.5 sec with SPC to demonstrate improved safety with indoor ambulation over short distances and dec fall risk.  10/30/19    Falls (baseline: wearing CAM boot, no AD)  Pt to report improved safety with consistent use of appropriate AD within home without report of falling throughout course of care.   10/30/19    30 sec sit <> stand (baseline: 12.5 reps from 18\" with L CAM boot, requires Bilat UE support from chair to " rise/control descent)  Pt to achieve >/= 8 reps without UE support to demonstrate improved functional LE strength with transition from standard height chair.   10/30/19                Therapy Frequency:  2 times/Week   Predicted Duration of Therapy Intervention:  6 weeks    Arelis Abdul, PT                                    I CERTIFY THE NEED FOR THESE SERVICES FURNISHED UNDER        THIS PLAN OF TREATMENT AND WHILE UNDER MY CARE     (Physician co-signature of this document indicates review and certification of the therapy plan).                Certification Date From:  09/12/19   Certification Date To:  10/30/19    Referring Provider:  Oscar Alves MD     Initial Assessment  See Epic Evaluation- Start of Care Date: 09/12/19

## 2019-09-16 ENCOUNTER — HOSPITAL ENCOUNTER (OUTPATIENT)
Dept: PHYSICAL THERAPY | Facility: CLINIC | Age: 79
Setting detail: THERAPIES SERIES
End: 2019-09-16
Attending: FAMILY MEDICINE
Payer: MEDICARE

## 2019-09-16 PROCEDURE — 97116 GAIT TRAINING THERAPY: CPT | Mod: GP | Performed by: PHYSICAL THERAPIST

## 2019-09-16 PROCEDURE — 97110 THERAPEUTIC EXERCISES: CPT | Mod: GP | Performed by: PHYSICAL THERAPIST

## 2019-09-18 ENCOUNTER — TELEPHONE (OUTPATIENT)
Dept: FAMILY MEDICINE | Facility: CLINIC | Age: 79
End: 2019-09-18

## 2019-09-18 ENCOUNTER — HOSPITAL ENCOUNTER (OUTPATIENT)
Dept: PHYSICAL THERAPY | Facility: CLINIC | Age: 79
Setting detail: THERAPIES SERIES
End: 2019-09-18
Attending: FAMILY MEDICINE
Payer: MEDICARE

## 2019-09-18 PROCEDURE — 97110 THERAPEUTIC EXERCISES: CPT | Mod: GP | Performed by: PHYSICAL THERAPIST

## 2019-09-18 NOTE — TELEPHONE ENCOUNTER
Pt calling stating he received an email from [facility] for RX# [id number] (potassium chloride ER 10 MEQ CR tablet - take 1 tablet by mouth 2 times daily) regarding dose directions with instructions to call [phone] for clarification.     Called [facility] at [phone] and spoke with Jackson, Pharmacy Tech. Confused because Rx stated ER and CR tablets. Huddled with Dr. Bay for clatrification. Will fill as Potassium Chloride ER 10 MEQ tablet - take 1 tablet by mouth 2 times daily for 180 tablets with 1 refill.     Clarified dose, directions, and read back with Jackson.    BARRETT/RICHARD for patient that problem has been resolved and to call the clinic back with further questions.    Larisa Young, MSN, RN, PHN

## 2019-09-23 ENCOUNTER — HOSPITAL ENCOUNTER (OUTPATIENT)
Dept: PHYSICAL THERAPY | Facility: CLINIC | Age: 79
Setting detail: THERAPIES SERIES
End: 2019-09-23
Attending: FAMILY MEDICINE
Payer: MEDICARE

## 2019-09-23 PROCEDURE — 97112 NEUROMUSCULAR REEDUCATION: CPT | Mod: GP | Performed by: PHYSICAL THERAPIST

## 2019-09-25 ENCOUNTER — HOSPITAL ENCOUNTER (OUTPATIENT)
Dept: PHYSICAL THERAPY | Facility: CLINIC | Age: 79
Setting detail: THERAPIES SERIES
End: 2019-09-25
Attending: FAMILY MEDICINE
Payer: MEDICARE

## 2019-09-25 PROCEDURE — 97110 THERAPEUTIC EXERCISES: CPT | Mod: GP | Performed by: PHYSICAL THERAPIST

## 2019-09-27 ENCOUNTER — OFFICE VISIT (OUTPATIENT)
Dept: SLEEP MEDICINE | Facility: CLINIC | Age: 79
End: 2019-09-27
Payer: MEDICARE

## 2019-09-27 VITALS
DIASTOLIC BLOOD PRESSURE: 63 MMHG | WEIGHT: 177 LBS | BODY MASS INDEX: 27.78 KG/M2 | SYSTOLIC BLOOD PRESSURE: 135 MMHG | HEART RATE: 51 BPM | OXYGEN SATURATION: 98 % | HEIGHT: 67 IN

## 2019-09-27 DIAGNOSIS — G47.33 OSA ON CPAP: Primary | ICD-10-CM

## 2019-09-27 PROCEDURE — 99214 OFFICE O/P EST MOD 30 MIN: CPT | Performed by: INTERNAL MEDICINE

## 2019-09-27 ASSESSMENT — MIFFLIN-ST. JEOR: SCORE: 1476.62

## 2019-09-27 NOTE — NURSING NOTE
"Chief Complaint   Patient presents with     RECHECK       Initial /63   Pulse 51   Ht 1.702 m (5' 7.01\")   Wt 80.3 kg (177 lb)   SpO2 98%   BMI 27.72 kg/m   Estimated body mass index is 27.72 kg/m  as calculated from the following:    Height as of this encounter: 1.702 m (5' 7.01\").    Weight as of this encounter: 80.3 kg (177 lb).    Medication Reconciliation: complete        DME:yes airsense 10    ESS 6  "

## 2019-09-27 NOTE — PROGRESS NOTES
Changed pressure via modem to 15.0cm pressure.  Went over how to apply mask to face, and use the headgear.  Asked Skip to attach the headgear to the mask. Explained to lay headgear down on surface with resmed at bottom and F20 at top.  He did find this to help and make it much simplier.  Explained how to adjust the mask if leak and to use the air pressure.  He will call if pressure or any other mask issues.

## 2019-09-27 NOTE — PROGRESS NOTES
SLEEP CLINIC FOLLOW UP VISIT     Date of visit: September 27, 2019    Purpose of visit:   Follow-up of sleep-related breathing disorder(moderate complex sleep apnea, sleep related hypoxemia, Cheyne-Alejandro Breathing pattern) and review CPAP compliance.      History of Present Illness:   Milo Serna is a pleasant 78 year old male with history of moderate complex sleep apnea, sleep related hypoxemia, Cheyne-Alejandro Breathing pattern, atrial fibrillation, systolic CHF EF 35-40%, CAD s/p CABG, pulmonary hypertension and hypertension who comes to [facility] for follow up of the sleep-related breathing disorder and to review CPAP compliance measures.    Patient was diagnosed sleep apnea on 6/10/18 and CPAP titration was obtained on  6/26/18 and he was prescribed CPAP pressure of 13 cmH2O, and was initially setup with PAP therapy on 7/23/18.      He was last seen at the sleep clinic on 8/23/19. During that  sleep clinic visit, he demonstrated low compliance with  PAP use and reported significant air leaks.  Based on the compliance measures,  the pressure setting was increased from 13 to 14 cmH2O and patient received a different mask. Patient reports feeling comfortable with the revised pressure settings at 14 cmH2O. He  reports irritation from the mask at the bridge of the nose.  He mentioned that last night he woke up to go to the bathroom and took his mask off and was having a hard time putting it back on again and wanted some help. He denies waking up gasping for air with CPAP. He does report waking up feeling rested most mornings.  He reports improved energy levels. He denies EDS. His ESS:6/24.  He does not nap as he used to before.  He denies drowsiness while driving.     PREVIOUS SLEEP STUDIES:  Date: 6/10/2018  AHI: 16.6/hr, central apnea with CSB  Intervention: CPAP  Lowest O2 saturation: 88.1%  O2 sat below 88% was 16.3 minutes  PLM's: 1.6/hr    LAST VISIT-DOWNLOADABLE COMPLIANCE DATA: with  "pressure at  13 cmH2O  Dates:7/10/2019 through 8/22/ 2019       27% >4hour use   Days used:  16 out of 44  Average daily use (days used):  5.51 hrs  Leak 95 percentile:  37.2 L/min  Residual AHI:  8.6 /hr (CA 0.2, OA 5.2, HI 3.0 and unknown 0.2)      CURRENT VISIT-DOWNLOADABLE COMPLIANCE DATA: Pressure settings on his CPAP fixed at 14 cmH2O.  Dates: 8/27/2019 through 9/25/2019   Days used: 28 out of 30 days  average use of 6 hours and 39 minutes on the days used.    95th percentile on leak was 24.2 L/min.   Residual AHI was 9.0 per hour. (Central : 0.3; obstructive: 4.7; hypopnea index: 3.6 and unknown 0.4, RERA index: 0.5 )      Current meds, Past medical history, Past surgical history, Allergies, Social history, Family history: reviewed, per EMR    Exam:     /63   Pulse 51   Ht 1.702 m (5' 7.01\")   Wt 80.3 kg (177 lb)   SpO2 98%   BMI 27.72 kg/m    General appearance:  in no apparent distress  Pt is dressed casually, cooperative with good eye contact.   Speech is spontaneous with regular rate and volume.   Mood: euthymic; affect congruent with full range and intensity.   Sensorium: awake, alert and oriented to person, place, time, and situation.    Assessment/Plan:   Sleep-related breathing disorder(moderate complex sleep apnea, sleep related hypoxemia, Cheyne-Alejandro Breathing pattern.   Patient reports improvement in the overall CPAP compliance and there also has been some improvement in the air leaks compared to his previous visit with the new mask.  Residual AHI is still high, with DL showing some persistent  obstructive  events, hence recommended increasing the pressure settings on his CPAP to 15 cmH2O. (During the titration study that was previously obtained the therapeutic pressure that was noted during the sleep study was CPAP at 15 cmH2O with a residual AHI of less than 5/h.)  Recommended him to continue using the CPAP regularly during sleep and instructed him to get the supplies for the device " "replaced regularly. He was instructed to get back to me if in case he has any trouble tolerating the increase in the pressure settings.    Recommended using gecko nasal gel pad to alleviate the irritation   at the bridge of the nose from the mask.    Our nurse MsBronson Samantha Arriaza explained to him explained how to adjust the mask if leak and to use the air pressure.    I plan to review the compliance download with the revised pressure settings in the next 2 to 3 weeks.  And if the  obstructive events improved,  he can be followed up at the sleep clinic on a yearly basis sooner if any concerns.    He will continue f/u with Cardiology.    Encouraged healthy diet and exercise.    Patient was strongly advised to avoid driving, operating any heavy machinery or other hazardous situations while drowsy or sleepy.  Patient was counseled on the importance of driving while alert, to pull over if drowsy, or nap before getting into the vehicle if sleepy.     The above note was dictated using voice recognition software. Although reviewed after completion, some word and grammatical error may remain . Please contact the author for any clarifications.    \"I spent a total of 25 minutes face to face with Milo Serna during today's office visit. Over 50% of this time was spent counseling the patient and  coordinating care regarding treatment of sleep-related breathing disorder\"       Marty Shearer MD   of Medicine,  Division of Pulmonary/Sleep Medicine  [facility].    "

## 2019-09-27 NOTE — PATIENT INSTRUCTIONS
Your blood pressure was checked while you were in clinic today.  Please read the guidelines below about what these numbers mean and what you should do about them.  Your systolic blood pressure is the top number.  This is the pressure when the heart is pumping.  Your diastolic blood pressure is the bottom number.  This is the pressure in between beats.  If your systolic blood pressure is less than 120 and your diastolic blood pressure is less than 80, then your blood pressure is normal. There is nothing more that you need to do about it  If your systolic blood pressure is 120-139 or your diastolic blood pressure is 80-89, your blood pressure may be higher than it should be.  You should have your blood pressure re-checked within a year by a primary care provider.  If your systolic blood pressure is 140 or greater or your diastolic blood pressure is 90 or greater, you may have high blood pressure.  High blood pressure is treatable, but if left untreated over time it can put you at risk for heart attack, stroke, or kidney failure.  You should have your blood pressure re-checked by a primary care provider within the next four weeks.  Your BMI is There is no height or weight on file to calculate BMI.  Weight management is a personal decision.  If you are interested in exploring weight loss strategies, the following discussion covers the approaches that may be successful. Body mass index (BMI) is one way to tell whether you are at a healthy weight, overweight, or obese. It measures your weight in relation to your height.  A BMI of 18.5 to 24.9 is in the healthy range. A person with a BMI of 25 to 29.9 is considered overweight, and someone with a BMI of 30 or greater is considered obese. More than two-thirds of American adults are considered overweight or obese.  Being overweight or obese increases the risk for further weight gain. Excess weight may lead to heart disease and diabetes.  Creating and following plans for  healthy eating and physical activity may help you improve your health.  Weight control is part of healthy lifestyle and includes exercise, emotional health, and healthy eating habits. Careful eating habits lifelong are the mainstay of weight control. Though there are significant health benefits from weight loss, long-term weight loss with diet alone may be very difficult to achieve- studies show long-term success with dietary management in less than 10% of people. Attaining a healthy weight may be especially difficult to achieve in those with severe obesity. In some cases, medications, devices and surgical management might be considered.  What can you do?  If you are overweight or obese and are interested in methods for weight loss, you should discuss this with your provider.     Consider reducing daily calorie intake by 500 calories.     Keep a food journal.     Avoiding skipping meals, consider cutting portions instead.    Diet combined with exercise helps maintain muscle while optimizing fat loss. Strength training is particularly important for building and maintaining muscle mass. Exercise helps reduce stress, increase energy, and improves fitness. Increasing exercise without diet control, however, may not burn enough calories to loose weight.       Start walking three days a week 10-20 minutes at a time    Work towards walking thirty minutes five days a week     Eventually, increase the speed of your walking for 1-2 minutes at time    In addition, we recommend that you review healthy lifestyles and methods for weight loss available through the National Institutes of Health patient information sites:  http://win.niddk.nih.gov/publications/index.htm    And look into health and wellness programs that may be available through your health insurance provider, employer, local community center, or [employer].    GECKO NASAL GEL PAD

## 2019-09-27 NOTE — Clinical Note
Kris reina,Kindly provide me with an updated  compliance download with the revised pressure settings at 15 cm water in the next 2 to 3 weeks. Thanks,Carmen

## 2019-10-01 ENCOUNTER — HOSPITAL ENCOUNTER (OUTPATIENT)
Dept: PHYSICAL THERAPY | Facility: CLINIC | Age: 79
Setting detail: THERAPIES SERIES
End: 2019-10-01
Attending: FAMILY MEDICINE
Payer: MEDICARE

## 2019-10-01 PROCEDURE — 97110 THERAPEUTIC EXERCISES: CPT | Mod: GP | Performed by: PHYSICAL THERAPIST

## 2019-10-03 ENCOUNTER — OFFICE VISIT (OUTPATIENT)
Dept: CARDIOLOGY | Facility: CLINIC | Age: 79
End: 2019-10-03
Payer: MEDICARE

## 2019-10-03 ENCOUNTER — CARE COORDINATION (OUTPATIENT)
Dept: CARDIOLOGY | Facility: CLINIC | Age: 79
End: 2019-10-03

## 2019-10-03 VITALS
HEART RATE: 42 BPM | BODY MASS INDEX: 28.09 KG/M2 | DIASTOLIC BLOOD PRESSURE: 70 MMHG | WEIGHT: 179 LBS | SYSTOLIC BLOOD PRESSURE: 130 MMHG | OXYGEN SATURATION: 97 % | HEIGHT: 67 IN

## 2019-10-03 DIAGNOSIS — Z79.01 ON APIXABAN THERAPY: ICD-10-CM

## 2019-10-03 DIAGNOSIS — G47.33 OBSTRUCTIVE SLEEP APNEA SYNDROME: ICD-10-CM

## 2019-10-03 DIAGNOSIS — E78.5 HYPERLIPIDEMIA LDL GOAL <70: ICD-10-CM

## 2019-10-03 DIAGNOSIS — I10 BENIGN ESSENTIAL HYPERTENSION: ICD-10-CM

## 2019-10-03 DIAGNOSIS — Z95.1 STATUS POST CORONARY ARTERY BYPASS GRAFT: ICD-10-CM

## 2019-10-03 DIAGNOSIS — I25.10 CORONARY ARTERY DISEASE INVOLVING NATIVE CORONARY ARTERY OF NATIVE HEART WITHOUT ANGINA PECTORIS: ICD-10-CM

## 2019-10-03 DIAGNOSIS — I50.42 CHRONIC COMBINED SYSTOLIC AND DIASTOLIC CONGESTIVE HEART FAILURE (H): ICD-10-CM

## 2019-10-03 DIAGNOSIS — I34.0 MITRAL VALVE INSUFFICIENCY, UNSPECIFIED ETIOLOGY: Primary | ICD-10-CM

## 2019-10-03 DIAGNOSIS — I48.20 CHRONIC ATRIAL FIBRILLATION (H): ICD-10-CM

## 2019-10-03 PROBLEM — D69.6 THROMBOCYTOPENIA (H): Status: RESOLVED | Noted: 2017-04-21 | Resolved: 2019-10-03

## 2019-10-03 PROBLEM — B07.8 OTHER VIRAL WARTS: Status: RESOLVED | Noted: 2019-09-06 | Resolved: 2019-10-03

## 2019-10-03 PROBLEM — E87.6 HYPOKALEMIA: Status: RESOLVED | Noted: 2019-09-12 | Resolved: 2019-10-03

## 2019-10-03 PROBLEM — Z63.79 STRESS DUE TO SPOUSE WITH DEMENTIA: Status: RESOLVED | Noted: 2019-06-18 | Resolved: 2019-10-03

## 2019-10-03 PROBLEM — R41.3 MEMORY LOSS: Status: RESOLVED | Noted: 2019-06-18 | Resolved: 2019-10-03

## 2019-10-03 PROBLEM — S82.842D CLOSED BIMALLEOLAR FRACTURE OF LEFT ANKLE WITH ROUTINE HEALING: Status: RESOLVED | Noted: 2019-04-05 | Resolved: 2019-10-03

## 2019-10-03 PROBLEM — R23.3 PETECHIAE: Status: RESOLVED | Noted: 2019-06-18 | Resolved: 2019-10-03

## 2019-10-03 PROBLEM — I27.20 PULMONARY HYPERTENSION (H): Status: RESOLVED | Noted: 2017-09-25 | Resolved: 2019-10-03

## 2019-10-03 PROBLEM — T14.8XXA HEMATOMA: Status: RESOLVED | Noted: 2019-09-09 | Resolved: 2019-10-03

## 2019-10-03 PROBLEM — R04.0 EPISTAXIS: Status: RESOLVED | Noted: 2019-09-09 | Resolved: 2019-10-03

## 2019-10-03 PROCEDURE — 99215 OFFICE O/P EST HI 40 MIN: CPT | Performed by: INTERNAL MEDICINE

## 2019-10-03 RX ORDER — POTASSIUM CHLORIDE 1500 MG/1
20 TABLET, EXTENDED RELEASE ORAL 2 TIMES DAILY
Qty: 200 TABLET | Refills: 3 | Status: SHIPPED | OUTPATIENT
Start: 2019-10-03 | End: 2020-11-13

## 2019-10-03 ASSESSMENT — MIFFLIN-ST. JEOR: SCORE: 1485.57

## 2019-10-03 NOTE — PROGRESS NOTES
Clinic visit note dictated. Dictation reference number - [id number]      REVIEW OF SYSTEMS:  A comprehensive 10-point review of systems was completed and the pertinent positives are documented in the history of present illness.    Skin:  Positive for bruising   Eyes:  Positive for glasses  ENT:  Positive for hearing loss  Respiratory:  Positive for sleep apnea;CPAP  Cardiovascular:    edema;Positive for  Gastroenterology: Positive for reflux  Genitourinary:       Musculoskeletal:  Positive for back pain  Neurologic:  Negative    Psychiatric:  Negative    Heme/Lymph/Imm:  Positive for easy bruising  Endocrine:  Negative      CURRENT MEDICATIONS:  Current Outpatient Medications   Medication Sig Dispense Refill     acetaminophen (TYLENOL) 650 MG CR tablet Take 1 tablet (650 mg) by mouth every 6 hours as needed for pain 100 tablet 11     apixaban ANTICOAGULANT (ELIQUIS) 2.5 MG tablet Take 1 tablet (2.5 mg) by mouth 2 times daily 200 tablet 1     co-enzyme Q-10 100 MG CAPS Take 1 capsule (100 mg) by mouth daily 180 capsule 3     diclofenac (VOLTAREN) 1 % GEL Apply 4 gramsto area qid prn 100 g 11     doxycycline hyclate (VIBRAMYCIN) 100 MG capsule Take 1 capsule (100 mg) by mouth daily 90 capsule 3     Glucosamine-Chondroitin (OSTEO BI-FLEX REGULAR STRENGTH) 250-200 MG TABS Take 1 tablet by mouth 2 times daily 100 tablet 8     lisinopril (PRINIVIL/ZESTRIL) 20 MG tablet Take 1 tablet (20 mg) by mouth daily 90 tablet 1     MELATONIN PO Take 5 mg by mouth At Bedtime        Multiple Vitamins-Minerals (MULTIVITAL) CHEW Take 2 chew tab by mouth 2 times daily (Patient taking differently: Take 1 chew tab by mouth 2 times daily ) 360 tablet 3     naftifine (NAFTIN) 1 % GEL topical gel Apply topically daily Apply to affected nails daily 90 g 3     Omega 3-6-9 Fatty Acids (OMEGA 3-6-9 PO) Take 1 tablet by mouth daily        omeprazole (PRILOSEC) 40 MG DR capsule Take 1 capsule (40 mg) by mouth daily 90 capsule 3     order for DME  Equipment being ordered:  Please dispense up to 3 pairs of knee high compression stockings at 20-30 mmHg and one donning device if needed. 4 Device 0     order for DME Equipment being ordered: Knee high compression hose LIGHT (15-20 MM Hg) with side zipper 6 each 1     order for DME Equipment being ordered: Please dispense up to 3 pairs of knee high compression stockings at 20-30 mmHg and one donning device if needed. 4 Device 0     potassium chloride ER (K-TAB) 20 MEQ CR tablet Take 1 tablet (20 mEq) by mouth 2 times daily 200 tablet 3     senna (SENOKOT) 8.6 MG tablet Take 1 tablet by mouth daily (Patient taking differently: Take 1 tablet by mouth daily as needed ) 60 tablet 0     simvastatin (ZOCOR) 20 MG tablet Take 1 tablet (20 mg) by mouth At Bedtime 90 tablet 1     tamsulosin (FLOMAX) 0.4 MG capsule TAKE 1 CAPSULE DAILY 90 capsule 1     torsemide (DEMADEX) 20 MG tablet Take 1 tablet (20 mg) by mouth daily (with breakfast) 90 tablet 1     urea (GORMEL) 20 % external cream Apply topically as needed Apply to right foot daily 120 g 2     ferrous gluconate (FERGON) 324 (38 Fe) MG tablet Take 1 tablet (324 mg) by mouth daily (with breakfast) (Patient not taking: Reported on 10/3/2019) 90 tablet 3     lidocaine (LIDODERM) 5 % Patch Apply 1 patches to bottom of right foot for up to 12 h within a 24 h period.  Remove after 12 hours. (Patient not taking: Reported on 9/27/2019) 30 patch 0     order for DME Equipment being ordered: zip compression stocking to knee. Left. 1 Device 0     order for DME Equipment being ordered: Ankle aircast stirrup. 1 Device 0     order for DME Equipment being ordered: ankle brace 1 Device 0     sodium chloride (OCEAN NASAL SPRAY) 0.65 % nasal spray Spray 1 spray into both nostrils daily as needed for congestion (Patient not taking: Reported on 10/3/2019) 2 Bottle 6         ALLERGIES:  No Known Allergies    PAST MEDICAL HISTORY:    Past Medical History:   Diagnosis Date     Anemia,  unspecified 11/8/2016     Atrial fibrillation (H) 5/27/2015     BPH (benign prostatic hyperplasia) 1/25/2012     CAD (coronary artery disease) 4/4/2012     Closed bimalleolar fracture of left ankle with routine healing 4/5/2019     Closed fracture of metatarsal bone 4/4/2012     Coronary atherosclerosis of unspecified type of vessel, native or graft [facility]    CAB 1996 following MI (at [facility])      Dilated aortic root (H) 5/26/2016     Drug-induced erectile dysfunction 10/2/2015     Elevated blood sugar 11/8/2016     Elevated PSA 9/19/2013     Epistaxis 9/9/2019     Essential hypertension with goal blood pressure less than 140/90 9/22/2016     Essential hypertension, benign      Fall 9/6/2019     Gallbladder polyp 5/1/2018     Gout      HAV (hallux abducto valgus) 4/4/2012     Hematoma 9/9/2019     Hernia, abdominal      History of prostate cancer 6/15/2016     HTN, goal below 150/90 4/20/2017     Hypokalemia 9/12/2019     Iron deficiency anemia secondary to inadequate dietary iron intake 11/15/2016     Low blood pressure reading 10/17/2016     Lumbago     had degendisc dz on MRI 7/07     Memory loss 6/18/2019     Mixed hyperlipidemia      Mumps      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     OA (osteoarthritis) 4/4/2012     Old myocardial infarction 4/4/2012     Other viral warts 9/6/2019     Palpitations      Pes planus 4/4/2012     Petechiae 6/18/2019     Prostate cancer (H) 5/26/2016     PUD (peptic ulcer disease) 4/4/2012     Pulmonary hypertension (H) 9/25/2017     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     Rosacea 1/15/2008     Stress due to spouse with dementia 6/18/2019     Thrombocytopenia (H) 4/21/2017     Unspecified hyperplasia of prostate with urinary obstruction and other lower urinary tract symptoms (LUTS)     better on avodart & hytrin     Venous stasis 4/4/2012     Venous stasis 4/4/2012       PAST SURGICAL HISTORY:    Past Surgical History:   Procedure Laterality Date     C NONSPECIFIC PROCEDURE      CAB 1996  [place]     C NONSPECIFIC PROCEDURE      l inguinal hernia repair      C NONSPECIFIC PROCEDURE      R hernia remote     C NONSPECIFIC PROCEDURE      R ankle ORIF for fx     C NONSPECIFIC PROCEDURE      nasal surgery      C NONSPECIFIC PROCEDURE      R rotater repair (remote)      C NONSPECIFIC PROCEDURE      appy 1966     C NONSPECIFIC PROCEDURE      sinus surgery x 2     C NONSPECIFIC PROCEDURE      L shoulder      CARDIAC SURGERY      bipass     CYSTOSCOPY FLEXIBLE, CYOABLATION PROSTATE N/A 2016    Procedure: CYSTOSCOPY FLEXIBLE, CRYOABLATION PROSTATE;  Surgeon: Krystian Owens MD;  Location: [facility] OR     GENITOURINARY SURGERY      prostate surgery      HERNIA REPAIR       LAPAROSCOPIC HERNIORRHAPHY INGUINAL Right 5/10/2017    Procedure: LAPAROSCOPIC HERNIORRHAPHY INGUINAL;  Laparoscopic preperitoneal repair of right inguinal hernia with placement of underlay mesh;  Surgeon: Enrico Bridges MD;  Location: [facility] OR     PROSTATE SURGERY         FAMILY HISTORY:    Family History   Problem Relation Age of Onset     Cancer Mother         stomach cancer,  age 61     Heart Disease Father         MI,  age 71     Heart Disease Brother         stent placement, RA      Hypertension Brother      Heart Disease Sister         rythmn problems with heart, hypertension       SOCIAL HISTORY:    Social History     Socioeconomic History     Marital status:      Spouse name: None     Number of children: None     Years of education: None     Highest education level: None   Occupational History     None   Social Needs     Financial resource strain: None     Food insecurity:     Worry: None     Inability: None     Transportation needs:     Medical: None     Non-medical: None   Tobacco Use     Smoking status: Former Smoker     Smokeless tobacco: Never Used     Tobacco comment: quit 3/1974   Substance and Sexual Activity     Alcohol use: Not Currently     Alcohol/week: 0.0 standard  "drinks     Comment: rare drink     Drug use: No     Sexual activity: Not Currently     Partners: Female   Lifestyle     Physical activity:     Days per week: None     Minutes per session: None     Stress: None   Relationships     Social connections:     Talks on phone: None     Gets together: None     Attends Hindu service: None     Active member of club or organization: None     Attends meetings of clubs or organizations: None     Relationship status: None     Intimate partner violence:     Fear of current or ex partner: None     Emotionally abused: None     Physically abused: None     Forced sexual activity: None   Other Topics Concern     Parent/sibling w/ CABG, MI or angioplasty before 65F 55M? Not Asked      Service Not Asked     Blood Transfusions Not Asked     Caffeine Concern Yes     Comment: 1 cups of coffee and soda per day     Occupational Exposure Not Asked     Hobby Hazards Not Asked     Sleep Concern Not Asked     Stress Concern Not Asked     Weight Concern Not Asked     Special Diet No     Back Care Not Asked     Exercise Yes     Comment: walking     Bike Helmet Not Asked     Seat Belt Yes     Self-Exams Not Asked   Social History Narrative     None       PHYSICAL EXAM:    Vitals: /70 (BP Location: Right arm, Patient Position: Sitting, Cuff Size: Adult Regular)   Pulse (!) 42   Ht 1.702 m (5' 7\")   Wt 81.2 kg (179 lb)   SpO2 97%   BMI 28.04 kg/m    Wt Readings from Last 5 Encounters:   10/03/19 81.2 kg (179 lb)   09/27/19 80.3 kg (177 lb)   09/06/19 80.3 kg (177 lb)   09/03/19 80.3 kg (177 lb)   08/23/19 80.3 kg (177 lb)           Encounter Diagnoses   Name Primary?     Mitral valve insufficiency, unspecified etiology Yes     Chronic atrial fibrillation      Chronic combined systolic and diastolic congestive heart failure (H)      Status post coronary artery bypass graft      Coronary artery disease involving native coronary artery of native heart without angina pectoris      On " apixaban therapy      Benign essential hypertension      Hyperlipidemia LDL goal <70      Obstructive sleep apnea syndrome        Orders Placed This Encounter   Procedures     XR Chest 2 Views     Basic metabolic panel     N terminal pro BNP outpatient     Comprehensive metabolic panel     N terminal pro BNP outpatient     Follow-Up with Cardiac Advanced Practice Provider     Follow-Up with Pulmonary Hypertension Clinic     Echocardiogram Complete       CC  REFERRING PROVIDER:  No referring provider defined for this encounter.

## 2019-10-03 NOTE — PROGRESS NOTES
Service Date: 10/03/2019      PRIMARY CARE AND REFERRING PROVIDER:  Dr. Oscar Alves      REASON FOR VISIT:  Scheduled followup of mitral regurgitation, chronic left ventricular systolic and diastolic heart failure, chronic right ventricular heart failure, chronic atrial fibrillation.      HISTORY OF PRESENT ILLNESS:    Luís Mckeon is known to me from previous visits and was unaccompanied today.  He is 78 years old, lives with his wife (for whom he is the main caregiver), retired,  gentleman.      Medical history is significant for:  1.  Coronary artery disease without recurrent angina status post CABG in 1997.  Angina-free since.   2.  Chronic atrial fibrillation.  He is on metoprolol XL and low-dose Eliquis anticoagulation.  On 2.5 mg b.i.d. due to history of epistaxis on the 5 mg b.i.d. dosage.   3.  Treated hypertension.   4.  Stable ascending aorta dilatation of 4.2 cm.   5.  Obstructive sleep apnea diagnosed in 2018.  Consistent CPAP use.   6.  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  NYHA class I status.   7.  Chronic right heart failure with mildly decreased systolic function, NYHA class I.   8.  Former tobacco use.  Quit in 1974.   9.  Moderately severe mitral valve regurgitation, likely functional.   10.  Frequent PVCs.  After we cut back on Toprol-XL to 12.5 mg daily, his PVC burden decreased from 22% to 18%.  He never had symptoms from this.     11.  Chronic anemia with a hemoglobin of 10.5 to 11.5.        It has been over a year since I last saw Luís.  Symptomatically, he is stable and without angina or heart failure symptoms.  In 03/2019, he missed a step, fell and broke his left ankle.  He fully healed from that but a few weeks ago tripped at his home, fell and broke his metatarsals on his foot.  His leg is in a boot.  Therefore, he has not been able to exercise regularly for several months and really misses it.       There have been some issues  with his medications.  He was supposed to take metoprolol XL 12.5 mg daily but has been taking it b.i.d.  His resting pulse in the 40s but he has no bradycardic symptoms.  He was also supposed to take torsemide 20 mg daily but has been taking 10 mg daily.     DIAGNOSTIC DATA:    Laboratories:  Sodium 139, potassium 3.5, BUN 17, creatinine 0.98 with an estimated GFR of 72.  Chronic anemia with a hemoglobin of 10.7, mild thrombocytopenia of 136.  TSH 2.0.  Total cholesterol 88, HDL 43, LDL 34, triglycerides 55.        ECG:  Atrial fibrillation that is rate controlled with occasional PVC.      Recent transthoracic echocardiogram.  Personally reviewed it.  He has chronic and stable left ventricular systolic dysfunction with an LVEF of 40% and global hypokinesis.  Normal LV size.  Right ventricle is moderately dilated with mildly decreased systolic function.  TAPSE 1.6 cm, tricuspid systolic velocity 9 cm per second.  Moderately severe mitral regurgitation with an ERO of 0.4 cm2 and regurgitant volume of 60-70.  The mechanism is most likely a fixed posterior leaflet with overriding of the anterior leaflet causing a posturally directed jet.  No prolapse.  Moderate tricuspid valve regurgitation.  Estimated RVSP is lower at 30 mmHg.  The IVC is dilated.  Stable ascending aortic dilatation of 4.2 cm.      Last cardiac MRI 09/2018.  Moderate LV dilatation.  LVEF 51%.  Moderate RV dilatation.  Low-normal RVEF of 54%.  Moderate MR.      PHYSICAL EXAMINATION:     VITALS SIGNS:  /70, pulse 42 BPM (slow AFib), weight 179 pounds (stable).    CARDIOVASCULAR:  JVP elevated with prominent V waves, midline well-healed sternotomy scar, apical impulse undisplaced.  Irregular heart sounds, soft pansystolic murmur.  No diastolic rumble.  Radiates to the axilla.  No vascular bruit, no lower extremity edema.   LUNGS:  Clear.     NEUROLOGIC:  Mentally, alert and oriented.      DIAGNOSES, ASSESSMENT:     1.  Moderately severe mitral  regurgitation, likely functional.   2.  Chronic combined systolic and diastolic left ventricular congestive heart failure, LVEF 40%-45%, NYHA class I.  Euvolemic weight 177-179 pounds.   3.  Chronic right heart failure, mildly decreased systolic function, NYHA class I.   4.  Coronary artery disease status post previous bypass.   5.  Chronic atrial fibrillation.  Rate control and anticoagulation therapy.      Luís's mitral regurgitation appears worse compared to a year ago on echocardiogram.  However, clinically, he does not have heart failure.  In fact, he has been taking less diuretic at 10 mg of torsemide than the prescribed dose of 20 mg.  I am not sure that his worsening mitral regurgitation is clinically significant at this point.  His LVEF has been mildly decreased for several years.  His pulmonary artery pressures are improved.  He is consistently using CPAP.  He is significantly bradycardic from taking a higher than recommended dose of metoprolol.       PLAN:     1.  Stop metoprolol completely.   2.  Stop concurrent aspirin and continue with Eliquis due to history of problematic epistaxis.   3.  Increase torsemide from 10 mg daily to 20 mg daily.   4.  Increase potassium supplement to  20 mEq b.i.d.   5.  Follow up with established cardiology [facility], [place], in 2 weeks with basic metabolic panel and NT-proBNP to see how he is doing with the medication changes.   6.  Luís is going to [place] for the winter at the end of November.  I will see him back when he returns with an echocardiogram, a chest x-ray and labs.  I have also advised them to set up care with a primary care provider there.  If he develops heart failure symptoms, we would like to see him sooner.   Regarding long-term plan for the mitral regurgitation, intervening on him surgically will not be ideal.  It would be a redo surgery and his LVEF has already decreased.  If he starts getting symptoms, I would refer him for a transcatheter MitraClip.       Fifty minutes spent in the care of this patient, greater than 50% spent in counseling and coordination of care.      cc:   Oscar Alves MD    [address] JOHANNY EPNA MD             D: 10/03/2019   T: 10/03/2019   MT: TERESSA      Name:     SAÚL GONZALEZ   MRN:      [id number]        Account:      [id number]   :      1940           Service Date: 10/03/2019      Document: [id number]

## 2019-10-03 NOTE — PATIENT INSTRUCTIONS
MEDICATIONS:  1.  Stop metoprolol XL.  2.  Stop aspirin 81 mg.  3.  Increase potassium supplement from 10 mEq 2 times daily to 20 mEq 2 times daily.  4.  Increase torsemide from 10 mg daily to 20 mg daily.    FOLLOW-UP:  1.  Follow-up visit with Qing Caballero in 2 weeks with basic metabolic panel and NT proBNP.  Please call patient a week prior and ensure he is taking the higher dose of torsemide.  2.  Follow-up with me in 9 months with a pre-visit complete transthoracic echocardiogram, CBC with differential, comprehensive metabolic panel, NT proBNP, chest x-ray.  3.  As discussed, you should get a primary care provider in [place].  If you get symptoms of increasing weight, leg swelling or shortness of breath, please call my office at the number below.    If you have any questions or concerns, please contact my nurses at [phone].

## 2019-10-03 NOTE — LETTER
10/3/2019      Oscar Alves [facility]  [address] [facility] [address]      RE: Milo MACIE Serna       Dear Colleague,    I had the pleasure of seeing Milo QUIJANO Daphne in the [facility].    Service Date: 10/03/2019      PRIMARY CARE AND REFERRING PROVIDER:  Dr. Oscar Alves      REASON FOR VISIT:  Scheduled followup of mitral regurgitation, chronic left ventricular systolic and diastolic heart failure, chronic right ventricular heart failure, chronic atrial fibrillation.      HISTORY OF PRESENT ILLNESS:  Luís Mckeon is known to me from previous visits and was unaccompanied today.  He is 78 years old, lives with his wife (for whom he is the main caregiver), retired,  gentleman.      MEDICAL HISTORY:  Significant for:   1.  Coronary artery disease without recurrent angina status post CABG in 1997.  Angina-free since.   2.  Chronic atrial fibrillation.  He is on metoprolol XL and low-dose Eliquis anticoagulation.  On 2.5 mg b.i.d. due to history of epistaxis on the 5 mg b.i.d. dosage.   3.  Treated hypertension.   4.  Stable ascending aorta dilatation of 4.2 cm.   5.  Obstructive sleep apnea diagnosed in 2018.  Consistent CPAP use.   6.  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  NYHA class I status.   7.  Chronic right heart failure with mildly decreased systolic function, NYHA class I.   8.  Former tobacco use.  Quit in 1974.   9.  Moderately severe mitral valve regurgitation, likely functional.   10.  Frequent PVCs.  After we cut back on Toprol-XL to 12.5 mg daily, his PVC burden decreased from 22% to 18%.  He never had symptoms from this.     11.  Chronic anemia with a hemoglobin of 10.5 to 11.5.        It has been over a year since I last saw Luís.  Symptomatically, he is stable.  He denies lower extremity edema, orthopnea or PND.  Whereas he used to engage in a daily walking program, he has not been able to do so.  In  03/2019, he missed a step, fell and broke his left ankle.  He fully healed from that but a few weeks ago tripped at his home, fell and broke his metatarsals on his foot.  His leg is in a boot.  Therefore, he has not been able to exercise regularly for several months and really misses it.  No angina.      There have been some issues with his medications.  He was supposed to take metoprolol XL 12.5 mg daily but has been taking it b.i.d.  His resting pulse in the 40s but he has no bradycardic symptoms.  He was also supposed to take torsemide 20 mg daily but has been taking 10 mg daily.   -   DIAGNOSTIC DATA:  Laboratories:  Sodium 139, potassium 3.5, BUN 17, creatinine 0.98 with an estimated GFR of 72.  Chronic anemia with a hemoglobin of 10.7, mild thrombocytopenia of 136.  TSH 2.0.  Total cholesterol 88, HDL 43, LDL 34, triglycerides 55.        ECG:  Atrial fibrillation that is rate controlled with occasional PVC.      Recent transthoracic echocardiogram.  Personally reviewed it.  He has chronic and stable left ventricular systolic dysfunction with an LVEF of 40% and global hypokinesis.  Normal LV size.  Right ventricle is moderately dilated with mildly decreased systolic function.  TAPSE 1.6 cm, tricuspid systolic velocity 9 cm per second.  Moderately severe mitral regurgitation with an ERO of 0.4 cm2 and regurgitant volume of 60-70.  The mechanism is most likely a fixed posterior leaflet with overriding of the anterior leaflet causing a posturally directed jet.  No prolapse.  Moderate tricuspid valve regurgitation.  Estimated RVSP is lower at 30 mmHg.  The IVC is dilated.  Stable ascending aortic dilatation of 4.2 cm.      Last cardiac MRI 09/2018.  Moderate LV dilatation.  LVEF 51%.  Moderate RV dilatation.  Low-normal RVEF of 54%.  Moderate MR.      PHYSICAL EXAMINATION:     VITALS SIGNS:  /70, pulse 42 BPM (slow AFib), weight 179 pounds (stable).    CARDIOVASCULAR:  JVP elevated with prominent V waves,  midline *** sternotomy scar, apical impulse undisplaced.  Irregular heart sounds, soft pansystolic murmur.  No diastolic rumble.  Radiates to the axilla.  No vascular bruit, no lower extremity edema.   LUNGS:  Clear.     NEUROLOGIC:  Mentally, alert and oriented.      DIAGNOSES, ASSESSMENT:     1.  Moderately severe mitral regurgitation, likely functional.   2.  Chronic combined systolic and diastolic left ventricular congestive heart failure, LVEF 40%-45%, NYHA class I.  Euvolemic weight 177-179 pounds.   3.  Chronic right heart failure, mildly decreased systolic function, NYHA class I.   4.  Coronary artery disease status post previous bypass.   5.  Chronic atrial fibrillation.  Rate control and anticoagulation therapy.      Luís's mitral regurgitation appears worse compared to a year ago on echocardiogram.  However, clinically, he does not have decompensated heart failure.  No new symptoms.  In fact, he has been taking less diuretic at 10 mg of torsemide than the prescribed dose of 20 mg.  I am not sure that his worsening mitral regurgitation is clinically significant at this point.  His LVEF has been mildly decreased for several years.  His pulmonary artery pressures are improved.  He is consistently using CPAP.  He is significantly bradycardic from taking a higher than recommended dose of metoprolol.       PLAN:     1.  Stop metoprolol completely.   2.  Stop concurrent aspirin and continue with Eliquis due to history of problematic epistaxis.   3.  Increase torsemide from 10 mg daily to 20 mg daily.   4.  Increase potassium supplement to *** 20 mEq b.i.d.   5.  Follow up with established cardiology [facility], Juliano Caballero, in 2 weeks with basic metabolic panel and NT-proBNP to see how he is doing with the medication changes.   6.  Luís is going to [place] for the winter at the end of November.  I will see him back when he returns with an echocardiogram, a chest x-ray and labs.  I have also advised them to set up care  with a primary care provider there.  If he develops heart failure symptoms, we would like to see him sooner.   Regarding long-term plan for the mitral regurgitation, intervening on him surgically will not be ideal.  It would be a redo surgery and his LVEF has already decreased.  If he starts getting symptoms, I would refer him for a transcatheter MitraClip.      Fifty minutes spent in the care of this patient, greater than 50% spent in counseling and coordination of care.      cc:   Oscar Alves MD    [address] JOHANNY PENA MD             D: 10/03/2019   T: 10/03/2019   MT: TERESSA      Name:     SAÚL GONZALEZ   MRN:      [id number]        Account:      [id number]   :      1940           Service Date: 10/03/2019      Document: [id number]         Outpatient Encounter Medications as of 10/3/2019   Medication Sig Dispense Refill     acetaminophen (TYLENOL) 650 MG CR tablet Take 1 tablet (650 mg) by mouth every 6 hours as needed for pain 100 tablet 11     apixaban ANTICOAGULANT (ELIQUIS) 2.5 MG tablet Take 1 tablet (2.5 mg) by mouth 2 times daily 200 tablet 1     co-enzyme Q-10 100 MG CAPS Take 1 capsule (100 mg) by mouth daily 180 capsule 3     diclofenac (VOLTAREN) 1 % GEL Apply 4 gramsto area qid prn 100 g 11     doxycycline hyclate (VIBRAMYCIN) 100 MG capsule Take 1 capsule (100 mg) by mouth daily 90 capsule 3     Glucosamine-Chondroitin (OSTEO BI-FLEX REGULAR STRENGTH) 250-200 MG TABS Take 1 tablet by mouth 2 times daily 100 tablet 8     lisinopril (PRINIVIL/ZESTRIL) 20 MG tablet Take 1 tablet (20 mg) by mouth daily 90 tablet 1     MELATONIN PO Take 5 mg by mouth At Bedtime        Multiple Vitamins-Minerals (MULTIVITAL) CHEW Take 2 chew tab by mouth 2 times daily (Patient taking differently: Take 1 chew tab by mouth 2 times daily ) 360 tablet 3     naftifine (NAFTIN) 1 % GEL topical gel Apply topically daily Apply to  affected nails daily 90 g 3     Omega 3-6-9 Fatty Acids (OMEGA 3-6-9 PO) Take 1 tablet by mouth daily        omeprazole (PRILOSEC) 40 MG DR capsule Take 1 capsule (40 mg) by mouth daily 90 capsule 3     order for DME Equipment being ordered:  Please dispense up to 3 pairs of knee high compression stockings at 20-30 mmHg and one donning device if needed. 4 Device 0     order for DME Equipment being ordered: Knee high compression hose LIGHT (15-20 MM Hg) with side zipper 6 each 1     order for DME Equipment being ordered: Please dispense up to 3 pairs of knee high compression stockings at 20-30 mmHg and one donning device if needed. 4 Device 0     potassium chloride ER (K-TAB) 20 MEQ CR tablet Take 1 tablet (20 mEq) by mouth 2 times daily 200 tablet 3     senna (SENOKOT) 8.6 MG tablet Take 1 tablet by mouth daily (Patient taking differently: Take 1 tablet by mouth daily as needed ) 60 tablet 0     simvastatin (ZOCOR) 20 MG tablet Take 1 tablet (20 mg) by mouth At Bedtime 90 tablet 1     tamsulosin (FLOMAX) 0.4 MG capsule TAKE 1 CAPSULE DAILY 90 capsule 1     torsemide (DEMADEX) 20 MG tablet Take 1 tablet (20 mg) by mouth daily (with breakfast) 90 tablet 1     urea (GORMEL) 20 % external cream Apply topically as needed Apply to right foot daily 120 g 2     ferrous gluconate (FERGON) 324 (38 Fe) MG tablet Take 1 tablet (324 mg) by mouth daily (with breakfast) (Patient not taking: Reported on 10/3/2019) 90 tablet 3     lidocaine (LIDODERM) 5 % Patch Apply 1 patches to bottom of right foot for up to 12 h within a 24 h period.  Remove after 12 hours. (Patient not taking: Reported on 9/27/2019) 30 patch 0     order for DME Equipment being ordered: zip compression stocking to knee. Left. 1 Device 0     order for DME Equipment being ordered: Ankle aircast stirrup. 1 Device 0     order for DME Equipment being ordered: ankle brace 1 Device 0     sodium chloride (OCEAN NASAL SPRAY) 0.65 % nasal spray Spray 1 spray into both  nostrils daily as needed for congestion (Patient not taking: Reported on 10/3/2019) 2 Bottle 6     [DISCONTINUED] aspirin 81 MG tablet Take 81 mg by mouth daily       [DISCONTINUED] metoprolol succinate ER (TOPROL-XL) 25 MG 24 hr tablet Take 0.5 tablets (12.5 mg) by mouth daily (with breakfast) (Patient taking differently: Take 12.5 mg by mouth 2 times daily ) 45 tablet 1     [DISCONTINUED] potassium chloride ER (K-TAB/KLOR-CON) 10 MEQ CR tablet Take 1 tablet (10 mEq) by mouth 2 times daily 180 tablet 1     Facility-Administered Encounter Medications as of 10/3/2019   Medication Dose Route Frequency Provider Last Rate Last Dose     triamcinolone (KENALOG) 0.1 % cream   Topical Once Oscar Alves MD           Again, thank you for allowing me to participate in the care of your patient.      Sincerely,    Sixto Colney MD     [facility]

## 2019-10-03 NOTE — LETTER
10/3/2019      [facility] [place]  [address] [facility] [address]      RE: Milo MACIE Serna       Dear Colleague,    I had the pleasure of seeing Milo QUIJANO Daphne in the [facility].    Service Date: 10/03/2019      PRIMARY CARE AND REFERRING PROVIDER:  Dr. Oscar Alves      REASON FOR VISIT:  Scheduled followup of mitral regurgitation, chronic left ventricular systolic and diastolic heart failure, chronic right ventricular heart failure, chronic atrial fibrillation.      HISTORY OF PRESENT ILLNESS:  Luís Mckeon is known to me from previous visits and was unaccompanied today.  He is 78 years old, lives with his wife (for whom he is the main caregiver), retired,  gentleman.      MEDICAL HISTORY:  Significant for:   1.  Coronary artery disease without recurrent angina status post CABG in 1997.  Angina-free since.   2.  Chronic atrial fibrillation.  He is on metoprolol XL and low-dose Eliquis anticoagulation.  On 2.5 mg b.i.d. due to history of epistaxis on the 5 mg b.i.d. dosage.   3.  Treated hypertension.   4.  Stable ascending aorta dilatation of 4.2 cm.   5.  Obstructive sleep apnea diagnosed in 2018.  Consistent CPAP use.   6.  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  NYHA class I status.   7.  Chronic right heart failure with mildly decreased systolic function, NYHA class I.   8.  Former tobacco use.  Quit in 1974.   9.  Moderately severe mitral valve regurgitation, likely functional.   10.  Frequent PVCs.  After we cut back on Toprol-XL to 12.5 mg daily, his PVC burden decreased from 22% to 18%.  He never had symptoms from this.     11.  Chronic anemia with a hemoglobin of 10.5 to 11.5.        It has been over a year since I last saw Luís.  Symptomatically, he is stable.  He denies lower extremity edema, orthopnea or PND.  Whereas he used to engage in a daily walking program, he has not been able to do so.  In  03/2019, he missed a step, fell and broke his left ankle.  He fully healed from that but a few weeks ago tripped at his home, fell and broke his metatarsals on his foot.  His leg is in a boot.  Therefore, he has not been able to exercise regularly for several months and really misses it.  No angina.      There have been some issues with his medications.  He was supposed to take metoprolol XL 12.5 mg daily but has been taking it b.i.d.  His resting pulse in the 40s but he has no bradycardic symptoms.  He was also supposed to take torsemide 20 mg daily but has been taking 10 mg daily.   -   DIAGNOSTIC DATA:  Laboratories:  Sodium 139, potassium 3.5, BUN 17, creatinine 0.98 with an estimated GFR of 72.  Chronic anemia with a hemoglobin of 10.7, mild thrombocytopenia of 136.  TSH 2.0.  Total cholesterol 88, HDL 43, LDL 34, triglycerides 55.        ECG:  Atrial fibrillation that is rate controlled with occasional PVC.      Recent transthoracic echocardiogram.  Personally reviewed it.  He has chronic and stable left ventricular systolic dysfunction with an LVEF of 40% and global hypokinesis.  Normal LV size.  Right ventricle is moderately dilated with mildly decreased systolic function.  TAPSE 1.6 cm, tricuspid systolic velocity 9 cm per second.  Moderately severe mitral regurgitation with an ERO of 0.4 cm2 and regurgitant volume of 60-70.  The mechanism is most likely a fixed posterior leaflet with overriding of the anterior leaflet causing a posturally directed jet.  No prolapse.  Moderate tricuspid valve regurgitation.  Estimated RVSP is lower at 30 mmHg.  The IVC is dilated.  Stable ascending aortic dilatation of 4.2 cm.      Last cardiac MRI 09/2018.  Moderate LV dilatation.  LVEF 51%.  Moderate RV dilatation.  Low-normal RVEF of 54%.  Moderate MR.      PHYSICAL EXAMINATION:     VITALS SIGNS:  /70, pulse 42 BPM (slow AFib), weight 179 pounds (stable).    CARDIOVASCULAR:  JVP elevated with prominent V waves,  midline *** sternotomy scar, apical impulse undisplaced.  Irregular heart sounds, soft pansystolic murmur.  No diastolic rumble.  Radiates to the axilla.  No vascular bruit, no lower extremity edema.   LUNGS:  Clear.     NEUROLOGIC:  Mentally, alert and oriented.      DIAGNOSES, ASSESSMENT:     1.  Moderately severe mitral regurgitation, likely functional.   2.  Chronic combined systolic and diastolic left ventricular congestive heart failure, LVEF 40%-45%, NYHA class I.  Euvolemic weight 177-179 pounds.   3.  Chronic right heart failure, mildly decreased systolic function, NYHA class I.   4.  Coronary artery disease status post previous bypass.   5.  Chronic atrial fibrillation.  Rate control and anticoagulation therapy.      Luís's mitral regurgitation appears worse compared to a year ago on echocardiogram.  However, clinically, he does not have decompensated heart failure.  No new symptoms.  In fact, he has been taking less diuretic at 10 mg of torsemide than the prescribed dose of 20 mg.  I am not sure that his worsening mitral regurgitation is clinically significant at this point.  His LVEF has been mildly decreased for several years.  His pulmonary artery pressures are improved.  He is consistently using CPAP.  He is significantly bradycardic from taking a higher than recommended dose of metoprolol.       PLAN:     1.  Stop metoprolol completely.   2.  Stop concurrent aspirin and continue with Eliquis due to history of problematic epistaxis.   3.  Increase torsemide from 10 mg daily to 20 mg daily.   4.  Increase potassium supplement to *** 20 mEq b.i.d.   5.  Follow up with established cardiology [facility], Juliano Caballero, in 2 weeks with basic metabolic panel and NT-proBNP to see how he is doing with the medication changes.   6.  Luís is going to [place] for the winter at the end of November.  I will see him back when he returns with an echocardiogram, a chest x-ray and labs.  I have also advised them to set up care  with a primary care provider there.  If he develops heart failure symptoms, we would like to see him sooner.   Regarding long-term plan for the mitral regurgitation, intervening on him surgically will not be ideal.  It would be a redo surgery and his LVEF has already decreased.  If he starts getting symptoms, I would refer him for a transcatheter MitraClip.      Fifty minutes spent in the care of this patient, greater than 50% spent in counseling and coordination of care.      cc:   Oscar Alves MD    [address] JOHANNY PENA MD             D: 10/03/2019   T: 10/03/2019   MT: TERESSA      Name:     SAÚL GONZALEZ   MRN:      [id number]        Account:      [id number]   :      1940           Service Date: 10/03/2019      Document: [id number]         Outpatient Encounter Medications as of 10/3/2019   Medication Sig Dispense Refill     acetaminophen (TYLENOL) 650 MG CR tablet Take 1 tablet (650 mg) by mouth every 6 hours as needed for pain 100 tablet 11     apixaban ANTICOAGULANT (ELIQUIS) 2.5 MG tablet Take 1 tablet (2.5 mg) by mouth 2 times daily 200 tablet 1     co-enzyme Q-10 100 MG CAPS Take 1 capsule (100 mg) by mouth daily 180 capsule 3     diclofenac (VOLTAREN) 1 % GEL Apply 4 gramsto area qid prn 100 g 11     doxycycline hyclate (VIBRAMYCIN) 100 MG capsule Take 1 capsule (100 mg) by mouth daily 90 capsule 3     Glucosamine-Chondroitin (OSTEO BI-FLEX REGULAR STRENGTH) 250-200 MG TABS Take 1 tablet by mouth 2 times daily 100 tablet 8     lisinopril (PRINIVIL/ZESTRIL) 20 MG tablet Take 1 tablet (20 mg) by mouth daily 90 tablet 1     MELATONIN PO Take 5 mg by mouth At Bedtime        Multiple Vitamins-Minerals (MULTIVITAL) CHEW Take 2 chew tab by mouth 2 times daily (Patient taking differently: Take 1 chew tab by mouth 2 times daily ) 360 tablet 3     naftifine (NAFTIN) 1 % GEL topical gel Apply topically daily Apply to  affected nails daily 90 g 3     Omega 3-6-9 Fatty Acids (OMEGA 3-6-9 PO) Take 1 tablet by mouth daily        omeprazole (PRILOSEC) 40 MG DR capsule Take 1 capsule (40 mg) by mouth daily 90 capsule 3     order for DME Equipment being ordered:  Please dispense up to 3 pairs of knee high compression stockings at 20-30 mmHg and one donning device if needed. 4 Device 0     order for DME Equipment being ordered: Knee high compression hose LIGHT (15-20 MM Hg) with side zipper 6 each 1     order for DME Equipment being ordered: Please dispense up to 3 pairs of knee high compression stockings at 20-30 mmHg and one donning device if needed. 4 Device 0     potassium chloride ER (K-TAB) 20 MEQ CR tablet Take 1 tablet (20 mEq) by mouth 2 times daily 200 tablet 3     senna (SENOKOT) 8.6 MG tablet Take 1 tablet by mouth daily (Patient taking differently: Take 1 tablet by mouth daily as needed ) 60 tablet 0     simvastatin (ZOCOR) 20 MG tablet Take 1 tablet (20 mg) by mouth At Bedtime 90 tablet 1     tamsulosin (FLOMAX) 0.4 MG capsule TAKE 1 CAPSULE DAILY 90 capsule 1     torsemide (DEMADEX) 20 MG tablet Take 1 tablet (20 mg) by mouth daily (with breakfast) 90 tablet 1     urea (GORMEL) 20 % external cream Apply topically as needed Apply to right foot daily 120 g 2     ferrous gluconate (FERGON) 324 (38 Fe) MG tablet Take 1 tablet (324 mg) by mouth daily (with breakfast) (Patient not taking: Reported on 10/3/2019) 90 tablet 3     lidocaine (LIDODERM) 5 % Patch Apply 1 patches to bottom of right foot for up to 12 h within a 24 h period.  Remove after 12 hours. (Patient not taking: Reported on 9/27/2019) 30 patch 0     order for DME Equipment being ordered: zip compression stocking to knee. Left. 1 Device 0     order for DME Equipment being ordered: Ankle aircast stirrup. 1 Device 0     order for DME Equipment being ordered: ankle brace 1 Device 0     sodium chloride (OCEAN NASAL SPRAY) 0.65 % nasal spray Spray 1 spray into both  nostrils daily as needed for congestion (Patient not taking: Reported on 10/3/2019) 2 Bottle 6     [DISCONTINUED] aspirin 81 MG tablet Take 81 mg by mouth daily       [DISCONTINUED] metoprolol succinate ER (TOPROL-XL) 25 MG 24 hr tablet Take 0.5 tablets (12.5 mg) by mouth daily (with breakfast) (Patient taking differently: Take 12.5 mg by mouth 2 times daily ) 45 tablet 1     [DISCONTINUED] potassium chloride ER (K-TAB/KLOR-CON) 10 MEQ CR tablet Take 1 tablet (10 mEq) by mouth 2 times daily 180 tablet 1     Facility-Administered Encounter Medications as of 10/3/2019   Medication Dose Route Frequency Provider Last Rate Last Dose     triamcinolone (KENALOG) 0.1 % cream   Topical Once Oscar Alves MD           Again, thank you for allowing me to participate in the care of your patient.      Sincerely,    Sixto Conley MD     [facility]

## 2019-10-03 NOTE — LETTER
10/3/2019    Oscar Alves MD  [address]  [facility] [address]    RE: Milo Serna       Dear Colleague,    I had the pleasure of seeing Milo [facility] in [facility].    Clinic visit note dictated. Dictation reference number - [id number]      REVIEW OF SYSTEMS:  A comprehensive 10-point review of systems was completed and the pertinent positives are documented in the history of present illness.    Skin:  Positive for bruising   Eyes:  Positive for glasses  ENT:  Positive for hearing loss  Respiratory:  Positive for sleep apnea;CPAP  Cardiovascular:    edema;Positive for  Gastroenterology: Positive for reflux  Genitourinary:       Musculoskeletal:  Positive for back pain  Neurologic:  Negative    Psychiatric:  Negative    Heme/Lymph/Imm:  Positive for easy bruising  Endocrine:  Negative      CURRENT MEDICATIONS:  Current Outpatient Medications   Medication Sig Dispense Refill     acetaminophen (TYLENOL) 650 MG CR tablet Take 1 tablet (650 mg) by mouth every 6 hours as needed for pain 100 tablet 11     apixaban ANTICOAGULANT (ELIQUIS) 2.5 MG tablet Take 1 tablet (2.5 mg) by mouth 2 times daily 200 tablet 1     co-enzyme Q-10 100 MG CAPS Take 1 capsule (100 mg) by mouth daily 180 capsule 3     diclofenac (VOLTAREN) 1 % GEL Apply 4 gramsto area qid prn 100 g 11     doxycycline hyclate (VIBRAMYCIN) 100 MG capsule Take 1 capsule (100 mg) by mouth daily 90 capsule 3     Glucosamine-Chondroitin (OSTEO BI-FLEX REGULAR STRENGTH) 250-200 MG TABS Take 1 tablet by mouth 2 times daily 100 tablet 8     lisinopril (PRINIVIL/ZESTRIL) 20 MG tablet Take 1 tablet (20 mg) by mouth daily 90 tablet 1     MELATONIN PO Take 5 mg by mouth At Bedtime        Multiple Vitamins-Minerals (MULTIVITAL) CHEW Take 2 chew tab by mouth 2 times daily (Patient taking differently: Take 1 chew tab by mouth 2 times daily ) 360 tablet 3     naftifine (NAFTIN) 1 % GEL topical gel Apply topically daily Apply to  affected nails daily 90 g 3     Omega 3-6-9 Fatty Acids (OMEGA 3-6-9 PO) Take 1 tablet by mouth daily        omeprazole (PRILOSEC) 40 MG DR capsule Take 1 capsule (40 mg) by mouth daily 90 capsule 3     order for DME Equipment being ordered:  Please dispense up to 3 pairs of knee high compression stockings at 20-30 mmHg and one donning device if needed. 4 Device 0     order for DME Equipment being ordered: Knee high compression hose LIGHT (15-20 MM Hg) with side zipper 6 each 1     order for DME Equipment being ordered: Please dispense up to 3 pairs of knee high compression stockings at 20-30 mmHg and one donning device if needed. 4 Device 0     potassium chloride ER (K-TAB) 20 MEQ CR tablet Take 1 tablet (20 mEq) by mouth 2 times daily 200 tablet 3     senna (SENOKOT) 8.6 MG tablet Take 1 tablet by mouth daily (Patient taking differently: Take 1 tablet by mouth daily as needed ) 60 tablet 0     simvastatin (ZOCOR) 20 MG tablet Take 1 tablet (20 mg) by mouth At Bedtime 90 tablet 1     tamsulosin (FLOMAX) 0.4 MG capsule TAKE 1 CAPSULE DAILY 90 capsule 1     torsemide (DEMADEX) 20 MG tablet Take 1 tablet (20 mg) by mouth daily (with breakfast) 90 tablet 1     urea (GORMEL) 20 % external cream Apply topically as needed Apply to right foot daily 120 g 2     ferrous gluconate (FERGON) 324 (38 Fe) MG tablet Take 1 tablet (324 mg) by mouth daily (with breakfast) (Patient not taking: Reported on 10/3/2019) 90 tablet 3     lidocaine (LIDODERM) 5 % Patch Apply 1 patches to bottom of right foot for up to 12 h within a 24 h period.  Remove after 12 hours. (Patient not taking: Reported on 9/27/2019) 30 patch 0     order for DME Equipment being ordered: zip compression stocking to knee. Left. 1 Device 0     order for DME Equipment being ordered: Ankle aircast stirrup. 1 Device 0     order for DME Equipment being ordered: ankle brace 1 Device 0     sodium chloride (OCEAN NASAL SPRAY) 0.65 % nasal spray Spray 1 spray into both  nostrils daily as needed for congestion (Patient not taking: Reported on 10/3/2019) 2 Bottle 6         ALLERGIES:  No Known Allergies    PAST MEDICAL HISTORY:    Past Medical History:   Diagnosis Date     Anemia, unspecified 11/8/2016     Atrial fibrillation (H) 5/27/2015     BPH (benign prostatic hyperplasia) 1/25/2012     CAD (coronary artery disease) 4/4/2012     Closed bimalleolar fracture of left ankle with routine healing 4/5/2019     Closed fracture of metatarsal bone 4/4/2012     Coronary atherosclerosis of unspecified type of vessel, native or graft [facility]    CAB 1996 following MI (at [facility])      Dilated aortic root (H) 5/26/2016     Drug-induced erectile dysfunction 10/2/2015     Elevated blood sugar 11/8/2016     Elevated PSA 9/19/2013     Epistaxis 9/9/2019     Essential hypertension with goal blood pressure less than 140/90 9/22/2016     Essential hypertension, benign      Fall 9/6/2019     Gallbladder polyp 5/1/2018     Gout      HAV (hallux abducto valgus) 4/4/2012     Hematoma 9/9/2019     Hernia, abdominal      History of prostate cancer 6/15/2016     HTN, goal below 150/90 4/20/2017     Hypokalemia 9/12/2019     Iron deficiency anemia secondary to inadequate dietary iron intake 11/15/2016     Low blood pressure reading 10/17/2016     Lumbago     had degendisc dz on MRI 7/07     Memory loss 6/18/2019     Mixed hyperlipidemia      Mumps      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     OA (osteoarthritis) 4/4/2012     Old myocardial infarction 4/4/2012     Other viral warts 9/6/2019     Palpitations      Pes planus 4/4/2012     Petechiae 6/18/2019     Prostate cancer (H) 5/26/2016     PUD (peptic ulcer disease) 4/4/2012     Pulmonary hypertension (H) 9/25/2017     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     Rosacea 1/15/2008     Stress due to spouse with dementia 6/18/2019     Thrombocytopenia (H) 4/21/2017     Unspecified hyperplasia of prostate with urinary obstruction and other lower urinary tract symptoms  (LUTS)     better on avodart & hytrin     Venous stasis 2012     Venous stasis 2012       PAST SURGICAL HISTORY:    Past Surgical History:   Procedure Laterality Date     C NONSPECIFIC PROCEDURE      CAB 1996     C NONSPECIFIC PROCEDURE      l inguinal hernia repair      C NONSPECIFIC PROCEDURE      R hernia remote     C NONSPECIFIC PROCEDURE      R ankle ORIF for fx     C NONSPECIFIC PROCEDURE      nasal surgery      C NONSPECIFIC PROCEDURE      R rotater repair (remote)      C NONSPECIFIC PROCEDURE      appy 1966     C NONSPECIFIC PROCEDURE      sinus surgery x 2     C NONSPECIFIC PROCEDURE      L shoulder      CARDIAC SURGERY      bipass     CYSTOSCOPY FLEXIBLE, CYOABLATION PROSTATE N/A 2016    Procedure: CYSTOSCOPY FLEXIBLE, CRYOABLATION PROSTATE;  Surgeon: Krystian Owens MD;  Location: [facility] OR     GENITOURINARY SURGERY      prostate surgery      HERNIA REPAIR       LAPAROSCOPIC HERNIORRHAPHY INGUINAL Right 5/10/2017    Procedure: LAPAROSCOPIC HERNIORRHAPHY INGUINAL;  Laparoscopic preperitoneal repair of right inguinal hernia with placement of underlay mesh;  Surgeon: Enrico Bridges MD;  Location: [facility] OR     PROSTATE SURGERY         FAMILY HISTORY:    Family History   Problem Relation Age of Onset     Cancer Mother         stomach cancer,  age 61     Heart Disease Father         MI,  age 71     Heart Disease Brother         stent placement, RA      Hypertension Brother      Heart Disease Sister         rythmn problems with heart, hypertension       SOCIAL HISTORY:    Social History     Socioeconomic History     Marital status:      Spouse name: None     Number of children: None     Years of education: None     Highest education level: None   Occupational History     None   Social Needs     Financial resource strain: None     Food insecurity:     Worry: None     Inability: None     Transportation needs:     Medical: None     Non-medical:  "None   Tobacco Use     Smoking status: Former Smoker     Smokeless tobacco: Never Used     Tobacco comment: quit 3/1974   Substance and Sexual Activity     Alcohol use: Not Currently     Alcohol/week: 0.0 standard drinks     Comment: rare drink     Drug use: No     Sexual activity: Not Currently     Partners: Female   Lifestyle     Physical activity:     Days per week: None     Minutes per session: None     Stress: None   Relationships     Social connections:     Talks on phone: None     Gets together: None     Attends [employer] service: None     Active member of club or organization: None     Attends meetings of clubs or organizations: None     Relationship status: None     Intimate partner violence:     Fear of current or ex partner: None     Emotionally abused: None     Physically abused: None     Forced sexual activity: None   Other Topics Concern     Parent/sibling w/ CABG, MI or angioplasty before 65F 55M? Not Asked      Service Not Asked     Blood Transfusions Not Asked     Caffeine Concern Yes     Comment: 1 cups of coffee and soda per day     Occupational Exposure Not Asked     Hobby Hazards Not Asked     Sleep Concern Not Asked     Stress Concern Not Asked     Weight Concern Not Asked     Special Diet No     Back Care Not Asked     Exercise Yes     Comment: walking     Bike Helmet Not Asked     Seat Belt Yes     Self-Exams Not Asked   Social History Narrative     None       PHYSICAL EXAM:    Vitals: /70 (BP Location: Right arm, Patient Position: Sitting, Cuff Size: Adult Regular)   Pulse (!) 42   Ht 1.702 m (5' 7\")   Wt 81.2 kg (179 lb)   SpO2 97%   BMI 28.04 kg/m     Wt Readings from Last 5 Encounters:   10/03/19 81.2 kg (179 lb)   09/27/19 80.3 kg (177 lb)   09/06/19 80.3 kg (177 lb)   09/03/19 80.3 kg (177 lb)   08/23/19 80.3 kg (177 lb)           Encounter Diagnoses   Name Primary?     Mitral valve insufficiency, unspecified etiology Yes     Chronic atrial fibrillation      Chronic " combined systolic and diastolic congestive heart failure (H)      Status post coronary artery bypass graft      Coronary artery disease involving native coronary artery of native heart without angina pectoris      On apixaban therapy      Benign essential hypertension      Hyperlipidemia LDL goal <70      Obstructive sleep apnea syndrome        Orders Placed This Encounter   Procedures     XR Chest 2 Views     Basic metabolic panel     N terminal pro BNP outpatient     Comprehensive metabolic panel     N terminal pro BNP outpatient     Follow-Up with Cardiac Advanced Practice Provider     Follow-Up with Pulmonary Hypertension Clinic     Echocardiogram Complete       CC  REFERRING PROVIDER:  No referring provider defined for this encounter.                  Thank you for allowing me to participate in the care of your patient.      Sincerely,     Sixto Conley MD     [facility]    cc:   No referring provider defined for this encounter.

## 2019-10-03 NOTE — PROGRESS NOTES
Reviewed the AVS (After Visit Summary) with patient in detail following their office visit with Dr. Conley. The patient was educated on the outlined plan of care including ordered tests, labs, medication changes, and follow up. Patient verbalized understanding of the information and agrees to call with any questions or concerns.     Return appointment: Patient was given instructions on when and how to schedule their next office visit with the clinic.     Patsy CARR, [place], RN  RN Care Coordinator  [facility]  [phone]

## 2019-10-08 ENCOUNTER — ANCILLARY PROCEDURE (OUTPATIENT)
Dept: GENERAL RADIOLOGY | Facility: CLINIC | Age: 79
End: 2019-10-08
Attending: PODIATRIST
Payer: MEDICARE

## 2019-10-08 ENCOUNTER — OFFICE VISIT (OUTPATIENT)
Dept: PODIATRY | Facility: CLINIC | Age: 79
End: 2019-10-08
Payer: MEDICARE

## 2019-10-08 VITALS
BODY MASS INDEX: 28.09 KG/M2 | HEIGHT: 67 IN | WEIGHT: 179 LBS | DIASTOLIC BLOOD PRESSURE: 70 MMHG | SYSTOLIC BLOOD PRESSURE: 128 MMHG

## 2019-10-08 DIAGNOSIS — M79.672 LEFT FOOT PAIN: Primary | ICD-10-CM

## 2019-10-08 DIAGNOSIS — S92.302G: ICD-10-CM

## 2019-10-08 DIAGNOSIS — M79.672 LEFT FOOT PAIN: ICD-10-CM

## 2019-10-08 DIAGNOSIS — G57.90 NEUROPATHY OF FOOT, UNSPECIFIED LATERALITY: ICD-10-CM

## 2019-10-08 PROCEDURE — 73630 X-RAY EXAM OF FOOT: CPT | Mod: LT

## 2019-10-08 PROCEDURE — 99213 OFFICE O/P EST LOW 20 MIN: CPT | Performed by: PODIATRIST

## 2019-10-08 ASSESSMENT — MIFFLIN-ST. JEOR: SCORE: 1485.57

## 2019-10-08 NOTE — PROGRESS NOTES
"Podiatry / Foot and Ankle Surgery Progress Note    October 8, 2019    Subject: Patient was seen for follow up on left metatarsal fractures 2-4. No pain to foot. But patient has neuropathy.     Objective:  Vitals: /70   Ht 1.702 m (5' 7\")   Wt 81.2 kg (179 lb)   BMI 28.04 kg/m    BMI= Body mass index is 28.04 kg/m .    General:  Patient is alert and orientated.  NAD    Derm:  No open lesions to back or foot noted.      Vascular: DP & PT pulses are intact & regular bilaterally.   Significant edema to left foot and leg. Varicosities noted.      Neurologic: Lower extremity sensation is diminished to feet. .     Musculoskeletal: Patient is ambulatory without assistive device or brace.  decrease range of motion of left ankle with crepitus.     Imaging: left foot xrays - bone callus noted to metatarsal neck fractures 2-4 with some incomplete healing.     Assessment:    Left foot pain  Neuropathy of foot, unspecified laterality  Multiple closed fractures of metatarsal bone of left foot with delayed healing    Plan:  Reviewed xrays. At this time, will let him transition to shoes. He will follow up as needed.     Zulay Alexandra DPM, Podiatry/Foot and Ankle Surgery    Weight management plan: Patient was referred to their PCP to discuss a diet and exercise plan.    Recommended to Milo Serna to follow up with Primary Care provider regarding elevated blood pressure.    "

## 2019-10-08 NOTE — PATIENT INSTRUCTIONS
Thank you for choosing [facility]!    DR. RODRIGUES'S CLINIC SCHEDULE  MONDAY AM - [place] TUESDAY - [place]   [address] [address] [place] [address] [place] [address]   [phone] / [phone] / [phone]       WEDNESDAY - [place] FRIDAY AM - [facility]   [address]   [place] [address] [place] [address]   [phone] / [phone]       FRIDAY PM - [place] SCHEDULE SURGERY: [phone]   [address] [facility] [address] BILLING QUESTIONS: [phone]   [place] [address] AFTER HOURS: [phone]   [phone] / [phone] APPOINTMENTS: [phone]     Consumer Price Line (CPL) [phone]           BODY WEIGHT AND YOUR FEET  The following information is included in the after visit summary for all patients. Body weight can be a sensitive issue to discuss in clinic, but we think the following information is very important. Although we focus on the feet and ankles, we do support the overall health of our patients.     Many things can cause foot and ankle problems. Foot structure, activity level, foot mechanics and injuries are common causes of pain. One very important issue that often goes unmentioned, is body weight. Extra weight can cause increased stress on muscles, ligaments, bones and tendons. Sometimes just a few extra pounds is all it takes to put one over her/his threshold. Without reducing that stress, it can be difficult to alleviate pain. As Foot & Ankle specialists, our job is addressing the lower extremity problem and possible causes. Regarding extra body weight, we encourage patients to discuss diet and weight management plans with their primary care doctors. It is this team approach that gives you the best opportunity for pain relief and getting you back on your feet.      [facility] has a Comprehensive Weight Management Program. This program includes counseling,  education, non-surgical and surgical approaches to weight loss. If you are interested in learning more either talk to you primary care provider or call [phone].

## 2019-10-08 NOTE — LETTER
"    10/8/2019         RE: Milo Serna  [address] [place] [address]        Dear Colleague,    Thank you for referring your patient, Milo Serna, to the [facility]. Please see a copy of my visit note below.    Podiatry / Foot and Ankle Surgery Progress Note    October 8, 2019    Subject: Patient was seen for follow up on left metatarsal fractures 2-4. No pain to foot. But patient has neuropathy.     Objective:  Vitals: /70   Ht 1.702 m (5' 7\")   Wt 81.2 kg (179 lb)   BMI 28.04 kg/m     BMI= Body mass index is 28.04 kg/m .    General:  Patient is alert and orientated.  NAD    Derm:  No open lesions to back or foot noted.      Vascular: DP & PT pulses are intact & regular bilaterally.   Significant edema to left foot and leg. Varicosities noted.      Neurologic: Lower extremity sensation is diminished to feet. .     Musculoskeletal: Patient is ambulatory without assistive device or brace.  decrease range of motion of left ankle with crepitus.     Imaging: left foot xrays - bone callus noted to metatarsal neck fractures 2-4 with some incomplete healing.     Assessment:    Left foot pain  Neuropathy of foot, unspecified laterality  Multiple closed fractures of metatarsal bone of left foot with delayed healing    Plan:  Reviewed xrays. At this time, will let him transition to shoes. He will follow up as needed.     Zulay Alexandra DPM, Podiatry/Foot and Ankle Surgery    Weight management plan: Patient was referred to their PCP to discuss a diet and exercise plan.    Recommended to Milo Serna to follow up with Primary Care provider regarding elevated blood pressure.      Again, thank you for allowing me to participate in the care of your patient.        Sincerely,        Zulay Alexandra DPM, Podiatry/Foot and Ankle Surgery    "

## 2019-10-11 ENCOUNTER — OFFICE VISIT (OUTPATIENT)
Dept: FAMILY MEDICINE | Facility: CLINIC | Age: 79
End: 2019-10-11
Payer: MEDICARE

## 2019-10-11 VITALS
HEART RATE: 60 BPM | WEIGHT: 180 LBS | HEIGHT: 67 IN | TEMPERATURE: 97.6 F | BODY MASS INDEX: 28.25 KG/M2 | OXYGEN SATURATION: 99 % | SYSTOLIC BLOOD PRESSURE: 125 MMHG | DIASTOLIC BLOOD PRESSURE: 60 MMHG | RESPIRATION RATE: 16 BRPM

## 2019-10-11 DIAGNOSIS — S92.302S: ICD-10-CM

## 2019-10-11 DIAGNOSIS — Z23 NEED FOR PROPHYLACTIC VACCINATION AND INOCULATION AGAINST INFLUENZA: ICD-10-CM

## 2019-10-11 DIAGNOSIS — L57.0 ACTINIC KERATOSIS: ICD-10-CM

## 2019-10-11 DIAGNOSIS — R60.0 PERIPHERAL EDEMA: ICD-10-CM

## 2019-10-11 DIAGNOSIS — R19.7 DIARRHEA OF PRESUMED INFECTIOUS ORIGIN: Primary | ICD-10-CM

## 2019-10-11 PROCEDURE — G0008 ADMIN INFLUENZA VIRUS VAC: HCPCS | Performed by: FAMILY MEDICINE

## 2019-10-11 PROCEDURE — 17110 DESTRUCTION B9 LES UP TO 14: CPT | Performed by: FAMILY MEDICINE

## 2019-10-11 PROCEDURE — 90662 IIV NO PRSV INCREASED AG IM: CPT | Performed by: FAMILY MEDICINE

## 2019-10-11 PROCEDURE — 99214 OFFICE O/P EST MOD 30 MIN: CPT | Mod: 25 | Performed by: FAMILY MEDICINE

## 2019-10-11 ASSESSMENT — MIFFLIN-ST. JEOR: SCORE: 1490.1

## 2019-10-11 NOTE — PROGRESS NOTES
Subjective     Milo Serna is a 78 year old male who presents to clinic today for the following health issues:    HPI   Vascular Disease Follow-up      Are you having any of the following symptoms? (Select all that apply) No complaints of shortness of breath, chest pain or pressure.  No increased sweating or nausea with activity.  No left-sided neck or arm pain.  No complaints of pain in calves when walking 1-2 blocks.    Do you take an aspirin every day? No        How many servings of fruits and vegetables do you eat daily?  4 or more    On average, how many sweetened beverages do you drink each day (soda, juice, sweet tea, etc)?   0    How many days per week do you miss taking your medication? 0      Diarrhea of presumed infectious origin  (primary encounter diagnosis)  3 weeks, no blood, no therapies tried  Actinic keratosis L wrist, previously treated with cryo. New lesion L post calf  Need for prophylactic vaccination and inoculation against influenza offered  Closed fracture of metatarsal bone of left foot, physeal involvement unspecified, unspecified metatarsal, sequela out of cam boot  Peripheral edema gone in AM. L leg (out of boot) swollen below sock top today              Past Medical History:   Diagnosis Date     Actinic keratosis 10/12/2019     Anemia, unspecified 11/8/2016     Atrial fibrillation (H) 5/27/2015     BPH (benign prostatic hyperplasia) 1/25/2012     CAD (coronary artery disease) 4/4/2012     Closed bimalleolar fracture of left ankle with routine healing 4/5/2019     Closed fracture of metatarsal bone 4/4/2012     Coronary atherosclerosis of unspecified type of vessel, native or graft [facility] 2006    CAB 1996 following MI (at [facility])      Dilated aortic root (H) 5/26/2016     Drug-induced erectile dysfunction 10/2/2015     Elevated blood sugar 11/8/2016     Elevated PSA 9/19/2013     Epistaxis 9/9/2019     Essential hypertension with goal blood pressure less than 140/90  9/22/2016     Essential hypertension, benign      Fall 9/6/2019     Gallbladder polyp 5/1/2018     Gout      HAV (hallux abducto valgus) 4/4/2012     Hematoma 9/9/2019     Hernia, abdominal      History of prostate cancer 6/15/2016     HTN, goal below 150/90 4/20/2017     Hypokalemia 9/12/2019     Iron deficiency anemia secondary to inadequate dietary iron intake 11/15/2016     Low blood pressure reading 10/17/2016     Lumbago     had degendisc dz on MRI 7/07     Memory loss 6/18/2019     Mixed hyperlipidemia      Mumps      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     OA (osteoarthritis) 4/4/2012     Old myocardial infarction 4/4/2012     Other viral warts 9/6/2019     Palpitations      Pes planus 4/4/2012     Petechiae 6/18/2019     Prostate cancer (H) 5/26/2016     PUD (peptic ulcer disease) 4/4/2012     Pulmonary hypertension (H) 9/25/2017     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     Rosacea 1/15/2008     Stress due to spouse with dementia 6/18/2019     Thrombocytopenia (H) 4/21/2017     Unspecified hyperplasia of prostate with urinary obstruction and other lower urinary tract symptoms (LUTS)     better on avodart & hytrin     Venous stasis 4/4/2012     Venous stasis 4/4/2012   Just saw cardiology, meds adjusted    Past Surgical History:   Procedure Laterality Date     C NONSPECIFIC PROCEDURE      CAB 1996 [place]     C NONSPECIFIC PROCEDURE  2/07    l inguinal hernia repair      C NONSPECIFIC PROCEDURE      R hernia remote     C NONSPECIFIC PROCEDURE  2000    R ankle ORIF for fx     C NONSPECIFIC PROCEDURE  2005    nasal surgery      C NONSPECIFIC PROCEDURE      R rotater repair (remote)      C NONSPECIFIC PROCEDURE      appy 1966     C NONSPECIFIC PROCEDURE      sinus surgery x 2     C NONSPECIFIC PROCEDURE      L shoulder 6/09     CARDIAC SURGERY      bipass     CYSTOSCOPY FLEXIBLE, CYOABLATION PROSTATE N/A 11/9/2016    Procedure: CYSTOSCOPY FLEXIBLE, CRYOABLATION PROSTATE;  Surgeon: Krystian Owens MD;  Location:  OR      GENITOURINARY SURGERY      prostate surgery      HERNIA REPAIR       LAPAROSCOPIC HERNIORRHAPHY INGUINAL Right 5/10/2017    Procedure: LAPAROSCOPIC HERNIORRHAPHY INGUINAL;  Laparoscopic preperitoneal repair of right inguinal hernia with placement of underlay mesh;  Surgeon: Enrico Bridges MD;  Location: [facility] OR     PROSTATE SURGERY         Family History   Problem Relation Age of Onset     Cancer Mother         stomach cancer,  age 61     Heart Disease Father         MI,  age 71     Heart Disease Brother         stent placement, RA      Hypertension Brother      Heart Disease Sister         rythmn problems with heart, hypertension       Social History     Tobacco Use     Smoking status: Former Smoker     Smokeless tobacco: Never Used     Tobacco comment: quit 3/1974   Substance Use Topics     Alcohol use: Not Currently     Alcohol/week: 0.0 standard drinks     Comment: rare drink   Tickets to [place] for Oct 31    Current Outpatient Medications   Medication     acetaminophen (TYLENOL) 650 MG CR tablet     apixaban ANTICOAGULANT (ELIQUIS) 2.5 MG tablet     co-enzyme Q-10 100 MG CAPS     diclofenac (VOLTAREN) 1 % GEL     doxycycline hyclate (VIBRAMYCIN) 100 MG capsule     ferrous gluconate (FERGON) 324 (38 Fe) MG tablet     Glucosamine-Chondroitin (OSTEO BI-FLEX REGULAR STRENGTH) 250-200 MG TABS     lidocaine (LIDODERM) 5 % Patch     lisinopril (PRINIVIL/ZESTRIL) 20 MG tablet     MELATONIN PO     Multiple Vitamins-Minerals (MULTIVITAL) CHEW     naftifine (NAFTIN) 1 % GEL topical gel     Omega 3-6-9 Fatty Acids (OMEGA 3-6-9 PO)     omeprazole (PRILOSEC) 40 MG DR capsule     order for DME     potassium chloride ER (K-TAB) 20 MEQ CR tablet     simvastatin (ZOCOR) 20 MG tablet     sodium chloride (OCEAN NASAL SPRAY) 0.65 % nasal spray     tamsulosin (FLOMAX) 0.4 MG capsule     torsemide (DEMADEX) 20 MG tablet     urea (GORMEL) 20 % external cream     Current Facility-Administered Medications   Medication  "    triamcinolone (KENALOG) 0.1 % cream     Colonoscopy 2014    Reviewed and updated as needed this visit by Provider       Review of Systems   No fevers. No chest pain. No cough.     This document serves as a record of the services and decisions personally performed and made by Oscar Alves MD. It was created on his behalf by Enrico Enrique, a trained medical scribe. The creation of this document is based on the provider's statements to the medical scribe.  Enrico Enrique October 11, 2019 2:48 PM          Objective    /60 (BP Location: Right arm, Patient Position: Chair, Cuff Size: Adult Large)   Pulse 60   Temp 97.6  F (36.4  C) (Oral)   Resp 16   Ht 1.702 m (5' 7\")   Wt 81.6 kg (180 lb)   SpO2 99%   BMI 28.19 kg/m    Body mass index is 28.19 kg/m .     Physical Exam   abn benign  L leg soft edema below mid calf sock top. No cords  L post calf with eroded keratosis    L wrist lesion has resolved    Cryotherapy applied      Diagnostic Test Results:  Labs reviewed in Epic  No results found for this or any previous visit (from the past 24 hour(s)).        Assessment & Plan   Problem List Items Addressed This Visit        Digestive    Diarrhea of presumed infectious origin - Primary     [employer] data base           Relevant Orders    Clostridium difficile Toxin B PCR    Fecal Lactoferrin       Musculoskeletal and Integumentary    Closed fracture of metatarsal bone of left foot, physeal involvement unspecified, unspecified metatarsal, sequela    Actinic keratosis     L wrist resolved. Recheck L calf. May need Bx in [place]         Relevant Orders    DESTRUCT BENIGN LESION, UP TO 14 (Completed)    Peripheral edema     Sock serves as tourniquet. Recommend knee high, compression if needed           Other Visit Diagnoses     Need for prophylactic vaccination and inoculation against influenza        Relevant Orders    INFLUENZA (HIGH DOSE) 3 VALENT VACCINE [40816] (Completed)    Vaccine Administration, Initial [62964] " (Completed)         He will need a primary care provider in [place]. He is informed    No follow-ups on file.    The information in this document, created by the medical scribe for me, accurately reflects the services I personally performed and the decisions made by me. I have reviewed and approved this document for accuracy prior to leaving the patient care area.  October 11, 2019 2:49 PM    Oscar Alves MD  [facility]

## 2019-10-12 PROBLEM — L57.0 ACTINIC KERATOSIS: Status: ACTIVE | Noted: 2019-10-12

## 2019-10-12 PROBLEM — R60.0 PERIPHERAL EDEMA: Status: ACTIVE | Noted: 2019-10-12

## 2019-10-12 PROBLEM — R19.7 DIARRHEA OF PRESUMED INFECTIOUS ORIGIN: Status: ACTIVE | Noted: 2019-10-12

## 2019-10-14 PROCEDURE — 83630 LACTOFERRIN FECAL (QUAL): CPT | Performed by: FAMILY MEDICINE

## 2019-10-16 ENCOUNTER — OFFICE VISIT (OUTPATIENT)
Dept: CARDIOLOGY | Facility: CLINIC | Age: 79
End: 2019-10-16
Attending: INTERNAL MEDICINE
Payer: MEDICARE

## 2019-10-16 VITALS
DIASTOLIC BLOOD PRESSURE: 64 MMHG | BODY MASS INDEX: 28.25 KG/M2 | WEIGHT: 180 LBS | SYSTOLIC BLOOD PRESSURE: 116 MMHG | HEIGHT: 67 IN | OXYGEN SATURATION: 99 % | HEART RATE: 68 BPM

## 2019-10-16 DIAGNOSIS — I25.10 CORONARY ARTERY DISEASE INVOLVING NATIVE CORONARY ARTERY OF NATIVE HEART WITHOUT ANGINA PECTORIS: ICD-10-CM

## 2019-10-16 DIAGNOSIS — I48.20 CHRONIC ATRIAL FIBRILLATION (H): ICD-10-CM

## 2019-10-16 DIAGNOSIS — E78.5 HYPERLIPIDEMIA LDL GOAL <70: ICD-10-CM

## 2019-10-16 DIAGNOSIS — I50.42 CHRONIC COMBINED SYSTOLIC AND DIASTOLIC CONGESTIVE HEART FAILURE (H): ICD-10-CM

## 2019-10-16 DIAGNOSIS — I34.0 MITRAL VALVE INSUFFICIENCY, UNSPECIFIED ETIOLOGY: Primary | ICD-10-CM

## 2019-10-16 DIAGNOSIS — Z95.1 STATUS POST CORONARY ARTERY BYPASS GRAFT: ICD-10-CM

## 2019-10-16 DIAGNOSIS — G47.33 OBSTRUCTIVE SLEEP APNEA SYNDROME: ICD-10-CM

## 2019-10-16 DIAGNOSIS — I34.0 MITRAL VALVE INSUFFICIENCY, UNSPECIFIED ETIOLOGY: ICD-10-CM

## 2019-10-16 DIAGNOSIS — R19.7 DIARRHEA OF PRESUMED INFECTIOUS ORIGIN: ICD-10-CM

## 2019-10-16 DIAGNOSIS — Z79.01 ON APIXABAN THERAPY: ICD-10-CM

## 2019-10-16 DIAGNOSIS — I10 BENIGN ESSENTIAL HYPERTENSION: ICD-10-CM

## 2019-10-16 LAB
ANION GAP SERPL CALCULATED.3IONS-SCNC: 6 MMOL/L (ref 3–14)
BUN SERPL-MCNC: 20 MG/DL (ref 7–30)
CALCIUM SERPL-MCNC: 9.2 MG/DL (ref 8.5–10.1)
CHLORIDE SERPL-SCNC: 99 MMOL/L (ref 94–109)
CO2 SERPL-SCNC: 27 MMOL/L (ref 20–32)
CREAT SERPL-MCNC: 1.04 MG/DL (ref 0.66–1.25)
GFR SERPL CREATININE-BSD FRML MDRD: 68 ML/MIN/{1.73_M2}
GLUCOSE SERPL-MCNC: 102 MG/DL (ref 70–99)
LACTOFERRIN STL QL IA: NEGATIVE
NT-PROBNP SERPL-MCNC: 748 PG/ML (ref 0–450)
POTASSIUM SERPL-SCNC: 4.1 MMOL/L (ref 3.4–5.3)
SODIUM SERPL-SCNC: 132 MMOL/L (ref 133–144)

## 2019-10-16 PROCEDURE — 83880 ASSAY OF NATRIURETIC PEPTIDE: CPT | Performed by: INTERNAL MEDICINE

## 2019-10-16 PROCEDURE — 99214 OFFICE O/P EST MOD 30 MIN: CPT | Performed by: PHYSICIAN ASSISTANT

## 2019-10-16 PROCEDURE — 80048 BASIC METABOLIC PNL TOTAL CA: CPT | Performed by: INTERNAL MEDICINE

## 2019-10-16 PROCEDURE — 36415 COLL VENOUS BLD VENIPUNCTURE: CPT | Performed by: INTERNAL MEDICINE

## 2019-10-16 ASSESSMENT — MIFFLIN-ST. JEOR: SCORE: 1490.1

## 2019-10-16 NOTE — PATIENT INSTRUCTIONS
Call [email or web address] nurse for any questions or concerns Mon-Fri 8am-4pm: [phone] For concerns after hours: [phone]    Medication changes:   Continue the current medications.   I don't want to lower the diuretic if the low sodium is partially from the diarrhea. So we will repeat a lab 10/25/19.  We will recheck the sodium level and follow the kidney function.          Lab results:   Component      Latest Ref Rng & Units 10/16/2019   Sodium      133 - 144 mmol/L 132 (L)   Potassium      3.4 - 5.3 mmol/L 4.1   Chloride      94 - 109 mmol/L 99   Carbon Dioxide      20 - 32 mmol/L 27   Anion Gap      3 - 14 mmol/L 6   Glucose      70 - 99 mg/dL 102 (H)   Urea Nitrogen      7 - 30 mg/dL 20   Creatinine      0.66 - 1.25 mg/dL 1.04   GFR Estimate      >60 mL/min/1.73:m2 68   GFR Estimate If Black      >60 mL/min/1.73:m2 78   Calcium      8.5 - 10.1 mg/dL 9.2   N-Terminal Pro Bnp      0 - 450 pg/mL 748 (H)

## 2019-10-16 NOTE — LETTER
10/16/2019    Oscar Alves MD  [address]  [facility] [address]    RE: Milo Hudsonnancy       Dear Colleague,    I had the pleasure of seeing Milo Serna in the [facility].    CARDIOLOGY CLINIC PROGRESS NOTE - [facility]    DOS: 10/16/19    Milo Serna  : 1940, 78 year old  MRN: [id number]      History:   I had the pleasure of following up with Milo (he goes by LuísKena QUIJANO Daphne today in the [facility]. He is a patient of Dr. Conley.     Luís is a 78-year-old gentleman known to have CAD s/p CABG (), chronic atrial fibrillation not requiring AV slowing agents (on lower-dose Eliquis anticoagulation 2.5 mg b.i.d. due to history of epistaxis), essential hypertension, ascending aorta dilatation (stable at 4.2 cm), LENORA on CPAP, chronic anemia.  He has had progressive dilatation of his right ventricle with mildly reduced systolic function (cardiac MRI 17 showed moderately dilated RV with normal systolic function, LVEF of 56%, severe biatrial enlargement and no evidence of intraatrial shunt), for which he saw Dr. Conley.  More recently, he has been found to have a high burden of PVCs and a new decline in LVEF (LVEF 40-45%).  Also worsening mitral regurgitation.       He underwent testing that has been reviewed by Dr. Conley.   -  V/Q scan was negative for pulmonary embolus.     -  Hepatitis and HIV workup was normal, HbA1c was 5.4%, normal renal panel with sodium of 141, potassium 4.5, BUN 12, creatinine 0.8, estimated GFR  greater than 90.  Normal TSH.  Hemoglobin 12.9.   -  A 24 hour Holter showed atrial fibrillation throughout with an average heart rate of 57 BPM, as low as 34 BPM when asleep.  However, he did have 17% (14,700) polymorphic PVCs with the longest run of 4 beats.  No patient-recorded symptoms.   -  Liver ultrasound showed a few small hepatic cysts without evidence of fatty infiltration of the liver.  Mild splenomegaly.  No ascites.   (This was done due to a history of excess alcohol intake in the past.)   -  Pulmonary function tests were satisfactory with a normal FEV1 of 2.7 L (104% predicted), FEV1/FVC ratio 73%, normal diffusing capacity.   -  A repeat transthoracic echocardiogram showed a drop in LV function from previously normal to 35%-40% with inferior and inferolateral wall akinesis and moderate global hypokinesis.  The right ventricle was moderately dilated with moderately decreased systolic function.  There was severe biatrial enlargement without color evidence of atrial shunt.  Moderately severe functional mitral regurgitation, mild tricuspid regurgitation with an estimated RVSP of 50 mmHg.  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  Aortic valve was trileaflet and sclerotic.  In the underlying rhythm was atrial fibrillation with a rate of 50 BPM.  The ascending aorta was dilated at 4.0 cm (stable).   -  A 6 minute walk distance was 1300 feet (lower limit of normal 1167).  Oxygen saturation was above 95% at all times, and the heart rate went from a baseline of 52 to a peak of 97 BPM with normal blood pressure response.     This testing revealed a new decline in LVEF.  He has significant PVC burden of 17%.   She started him on torsemide 20 mg daily, and Toprol XL 25 mg. Lisinopril 20 mg was continued.   Later due to some fatigue, Toprol XL was decreased to 12.5 mg.     He was most recently seen by Dr. Conley 10/3/19.  Echo 9/11/19 showed worsening MR, now moderately severe MR.  He was bradycardic.  It was noted he was taking Toprol XL 12.5 mg BID instead of every day. And he was taking torsemide 10 mg instead of 20 mg.   Dr. Conley stopped Toprol XL, stopped ASA, increased torsemide to 20 mg, increased KCl.     Interval History:   He presents today for follow up.   He feels no differently off the Toprol XL, or on higher dose torsemide 20 mg.   He has had diarrhea for 3 weeks. Stool sampled dropped off today at PCP office.    His weight is down 3 lbs at home.   BMP shows the K is WNL, Na is a little low at 132.  BUN and creat are WNL.   HR radial is 48 but apical check is 68. He has frequent ectopy.   BP is controlled.   Left leg/ankle is swollen but this is the side he broke his ankle and toes on recently.    He denies CP, significant ARAMBULA, orthopnea.   Leaves 10/31/19 for [place] until April 1, 2010.       ROS:  Skin:  Positive for bruising   Eyes:  Positive for glasses  ENT:  Positive for hearing loss  Respiratory:   sleep apnea;CPAP  Cardiovascular:    edema;Positive for;fatigue  Gastroenterology: Positive for reflux;diarrhea  Genitourinary:  Negative    Musculoskeletal:  Positive for back pain  Neurologic:  Negative    Psychiatric:  Negative    Heme/Lymph/Imm:  Positive for easy bruising  Endocrine:  Negative      PAST MEDICAL HISTORY:  Past Medical History:   Diagnosis Date     Actinic keratosis 10/12/2019     Anemia, unspecified 11/8/2016     Atrial fibrillation (H) 5/27/2015     BPH (benign prostatic hyperplasia) 1/25/2012     CAD (coronary artery disease) 4/4/2012     Closed bimalleolar fracture of left ankle with routine healing 4/5/2019     Closed fracture of metatarsal bone 4/4/2012     Coronary atherosclerosis of unspecified type of vessel, native or graft [facility] 2006    CAB 1996 following MI (at [facility])      Dilated aortic root (H) 5/26/2016     Drug-induced erectile dysfunction 10/2/2015     Elevated blood sugar 11/8/2016     Elevated PSA 9/19/2013     Epistaxis 9/9/2019     Essential hypertension with goal blood pressure less than 140/90 9/22/2016     Essential hypertension, benign      Fall 9/6/2019     Gallbladder polyp 5/1/2018     Gout      HAV (hallux abducto valgus) 4/4/2012     Hematoma 9/9/2019     Hernia, abdominal      History of prostate cancer 6/15/2016     HTN, goal below 150/90 4/20/2017     Hypokalemia 9/12/2019     Iron deficiency anemia secondary to inadequate dietary iron intake 11/15/2016      Low blood pressure reading 10/17/2016     Lumbago     had degendisc dz on MRI 7/07     Memory loss 6/18/2019     Mixed hyperlipidemia      Mumps      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     OA (osteoarthritis) 4/4/2012     Old myocardial infarction 4/4/2012     Other viral warts 9/6/2019     Palpitations      Pes planus 4/4/2012     Petechiae 6/18/2019     Prostate cancer (H) 5/26/2016     PUD (peptic ulcer disease) 4/4/2012     Pulmonary hypertension (H) 9/25/2017     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     Rosacea 1/15/2008     Stress due to spouse with dementia 6/18/2019     Thrombocytopenia (H) 4/21/2017     Unspecified hyperplasia of prostate with urinary obstruction and other lower urinary tract symptoms (LUTS)     better on avodart & hytrin     Venous stasis 4/4/2012     Venous stasis 4/4/2012       PAST SURGICAL HISTORY:  Past Surgical History:   Procedure Laterality Date     C NONSPECIFIC PROCEDURE      CAB 1996 [place]     C NONSPECIFIC PROCEDURE  2/07    l inguinal hernia repair      C NONSPECIFIC PROCEDURE      R hernia remote     C NONSPECIFIC PROCEDURE  2000    R ankle ORIF for fx     C NONSPECIFIC PROCEDURE  2005    nasal surgery      C NONSPECIFIC PROCEDURE      R rotater repair (remote)      C NONSPECIFIC PROCEDURE      appy 1966     C NONSPECIFIC PROCEDURE      sinus surgery x 2     C NONSPECIFIC PROCEDURE      L shoulder 6/09     CARDIAC SURGERY      bipass     CYSTOSCOPY FLEXIBLE, CYOABLATION PROSTATE N/A 11/9/2016    Procedure: CYSTOSCOPY FLEXIBLE, CRYOABLATION PROSTATE;  Surgeon: Krystian Owens MD;  Location:  OR     GENITOURINARY SURGERY      prostate surgery      HERNIA REPAIR       LAPAROSCOPIC HERNIORRHAPHY INGUINAL Right 5/10/2017    Procedure: LAPAROSCOPIC HERNIORRHAPHY INGUINAL;  Laparoscopic preperitoneal repair of right inguinal hernia with placement of underlay mesh;  Surgeon: Enrico Bridges MD;  Location:  OR     PROSTATE SURGERY         SOCIAL HISTORY:  Social History     Social  History     Marital status:      Spouse name: N/A     Number of children: N/A     Years of education: N/A     Social History Main Topics     Smoking status: Former Smoker     Smokeless tobacco: Never Used      Comment: quit 3/1974     Alcohol use 0.0 oz/week     0 Standard drinks or equivalent per week      Comment: 1-2 drinks per day     Drug use: No     Sexual activity: Yes     Partners: Female     Other Topics Concern     Caffeine Concern Yes     1 cups of coffee and soda per day     Special Diet No     Exercise Yes     walking     Seat Belt Yes     Social History Narrative       FAMILY HISTORY:  Family History   Problem Relation Age of Onset     Cancer Mother         stomach cancer,  age 61     Heart Disease Father         MI,  age 71     Heart Disease Brother         stent placement, RA      Hypertension Brother      Heart Disease Sister         rythmn problems with heart, hypertension       MEDS: acetaminophen (TYLENOL) 650 MG CR tablet, Take 1 tablet (650 mg) by mouth every 6 hours as needed for pain  apixaban ANTICOAGULANT (ELIQUIS) 2.5 MG tablet, Take 1 tablet (2.5 mg) by mouth 2 times daily  co-enzyme Q-10 100 MG CAPS, Take 1 capsule (100 mg) by mouth daily  diclofenac (VOLTAREN) 1 % GEL, Apply 4 gramsto area qid prn  doxycycline hyclate (VIBRAMYCIN) 100 MG capsule, Take 1 capsule (100 mg) by mouth daily  ferrous gluconate (FERGON) 324 (38 Fe) MG tablet, Take 1 tablet (324 mg) by mouth daily (with breakfast)  Glucosamine-Chondroitin (OSTEO BI-FLEX REGULAR STRENGTH) 250-200 MG TABS, Take 1 tablet by mouth 2 times daily  lisinopril (PRINIVIL/ZESTRIL) 20 MG tablet, Take 1 tablet (20 mg) by mouth daily  MELATONIN PO, Take 5 mg by mouth At Bedtime   Multiple Vitamins-Minerals (MULTIVITAL) CHEW, Take 2 chew tab by mouth 2 times daily (Patient taking differently: Take 1 chew tab by mouth 2 times daily )  Omega 3-6-9 Fatty Acids (OMEGA 3-6-9 PO), Take 1 tablet by mouth daily   omeprazole  "(PRILOSEC) 40 MG DR capsule, Take 1 capsule (40 mg) by mouth daily  potassium chloride ER (K-TAB) 20 MEQ CR tablet, Take 1 tablet (20 mEq) by mouth 2 times daily  simvastatin (ZOCOR) 20 MG tablet, Take 1 tablet (20 mg) by mouth At Bedtime  tamsulosin (FLOMAX) 0.4 MG capsule, TAKE 1 CAPSULE DAILY  torsemide (DEMADEX) 20 MG tablet, Take 1 tablet (20 mg) by mouth daily (with breakfast)  urea (GORMEL) 20 % external cream, Apply topically as needed Apply to right foot daily  lidocaine (LIDODERM) 5 % Patch, Apply 1 patches to bottom of right foot for up to 12 h within a 24 h period.  Remove after 12 hours. (Patient not taking: Reported on 9/27/2019)  naftifine (NAFTIN) 1 % GEL topical gel, Apply topically daily Apply to affected nails daily (Patient not taking: Reported on 10/16/2019)  order for DME, Equipment being ordered: zip compression stocking to knee. Left. (Patient not taking: Reported on 10/16/2019)    triamcinolone (KENALOG) 0.1 % cream        ALLERGIES: No Known Allergies    PHYSICAL EXAM:  Vitals: /64 (BP Location: Right arm, Patient Position: Chair, Cuff Size: Adult Regular)   Pulse 68   Ht 1.702 m (5' 7\")   Wt 81.6 kg (180 lb)   SpO2 99%   BMI 28.19 kg/m     Constitutional:  cooperative, alert and oriented, well developed, well nourished, in no acute distress        Skin:  warm and dry to the touch        Head:  normocephalic        Eyes:  pupils equal and round;conjunctivae and lids unremarkable        ENT:  no pallor or cyanosis        Neck:  JVP normal        Respiratory:  clear to auscultation        Cardiac:   bradycardic;irregular rhythm;frequent premature beats distant heart sounds   systolic murmur;grade 2;grade 3;apical          GI:  abdomen soft;non-tender        Vascular:                                        Extremities and Musculoskeletal:  no deformities, clubbing, cyanosis, erythema observed   chronic lower extremity skin discoloration consistent with possible venous stasis, LLE " has 1-2+ edema and non pitting edema in ankle and foot (recently broke this ankle and toes), RLE with trace edema     Neurological:  no gross motor deficits;affect appropriate          LABS/DATA:  I reviewed the following:  Echo 19:  Interpretation Summary  1. The left ventricle is normal in size. The visual ejection fraction is  estimated at 40%. There is mild-moderate global hypokinesia of the left  ventricle.  2. The right ventricle is moderately dilated. Mildly decreased right  ventricular systolic function TAPSE 1.6cm, TV velocity 9cm/s.  3. There is mod-severe to severe (3-4+) mitral regurgitation. ERO 0.39cm2, RV  85ml/beat. MR is posterior directed. On short axis views MR is mostly from   coaptation site. No obvious prolapse, valve is only mildly thickend.  Ischemic MR is possible (remote CABG). Functional MR is possible with afib/low  EF, but LV is not dilated.  4. There is moderate (2+) tricuspid regurgitation. RVSP 29mmHg.  5. The ascending aorta is Mildly dilated. Sinus of Valsalva 4.8cm, ascending  4.3cm.     Echo from 2018 showed EF 35-40%, mild-moderate RV dilation with moderate  hypokinesis (TAPSE 1.4cm, TV mima 6cm/s), 2-3+ MR, 1+ TR, sinus 4.5cm,  ascending aorta 4.0cm.     When directly compared to previous echo the degree of MR does appear worse.     Consider ANTONETTE if clinically indicated to further evaluate MR.        ASSESSMENT/PLAN:  1.  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   - ? If drop in LVEF from 55% to 40% is ischemic vs due to PVCs.  He has old grafts but no angina.  He has a significant PVC burden.   - last echo 19: LVEF stable at 40%, new worsening of his MR  - He is euvolemic on torsemide 20 mg.  This was just increased.  Na is 132 today.  He has had diarrhea.  We will recheck this next week before he goes to [place].   NT pro BNP today is down from 1165 to 748  - Defer ischemic eval or EP eval of PVcs to Dr. Conley    2.   Chronic  right heart failure, mildly decreased systolic function, NYHA class I.   - Evaluation per Dr. Conley     3.  Chronic atrial fibrillation with controlled rates.   - Prior history of epistaxis, therefore on lower-dose apixaban and ASA is stopped.   - Due to bradycardia, he is off Toprol XL.     4.  Frequent premature ventricular complexes (17%-22% ventricular ectopy burden).   - He was on Toprol XL, but this was stopped 10/3/19 for bradycardia  - Will need to continue to follow    5.  H/o coronary artery disease, status post CABG in 1997 (grafts not known).    - No anginal sxs  - On Eliquis 2.5 mg BID for afib, simvastatin 20 mg.     6.  Hypertension. Controlled on lisinopril 20 mg, torsemide 20 mg.     7.  LENORA on CPAP.     8.  Mitral regurgitation.   - Echo 9/23/19 shows worsening MR, moderately severe  - His LVEF has been mildly decreased for several years  - Continue torsemide 20 mg for now.  Repeat BMP next week  - Repeat echo next year when he sees dr. Conley.  If he develops sxs, Dr. Conley would refer him for MitraClip  - Needs to be seen in [place] if he develops sxs         Follow up:  I am hesitant to lower the torsemide for the Na level today given his diarrhea and the worsening MR and his upcoming travels.  Repeat BMP next week.  Needs to establish with PCP in [place].  See Dr. Conley with echo, CXR and labs when he returns from [place] next spring.          Juliano Caballero PA-C    Thank you for allowing me to participate in the care of your patient.      Sincerely,     Juliano Caballero PA-C     [facility]    cc:   Sixto Conley MD  [address] [place] [id number]

## 2019-10-16 NOTE — LETTER
10/16/2019    Oscar Alves MD  [address]  [facility] [address]    RE: Milo Hudsonnancy       Dear Colleague,    I had the pleasure of seeing Milo Serna in the [facility].    CARDIOLOGY CLINIC PROGRESS NOTE - [facility]    DOS: 10/16/19    Milo Serna  : 1940, 78 year old  MRN: [id number]      History:   I had the pleasure of following up with Milo (he goes by LuísKena QUIJANO Daphne today in the [facility]. He is a patient of Dr. Conley.     Luís is a 78-year-old gentleman known to have CAD s/p CABG (), chronic atrial fibrillation not requiring AV slowing agents (on lower-dose Eliquis anticoagulation 2.5 mg b.i.d. due to history of epistaxis), essential hypertension, ascending aorta dilatation (stable at 4.2 cm), LENORA on CPAP, chronic anemia.  He has had progressive dilatation of his right ventricle with mildly reduced systolic function (cardiac MRI 17 showed moderately dilated RV with normal systolic function, LVEF of 56%, severe biatrial enlargement and no evidence of intraatrial shunt), for which he saw Dr. Conley.  More recently, he has been found to have a high burden of PVCs and a new decline in LVEF (LVEF 40-45%).  Also worsening mitral regurgitation.       He underwent testing that has been reviewed by Dr. Conley.   -  V/Q scan was negative for pulmonary embolus.     -  Hepatitis and HIV workup was normal, HbA1c was 5.4%, normal renal panel with sodium of 141, potassium 4.5, BUN 12, creatinine 0.8, estimated GFR  greater than 90.  Normal TSH.  Hemoglobin 12.9.   -  A 24 hour Holter showed atrial fibrillation throughout with an average heart rate of 57 BPM, as low as 34 BPM when asleep.  However, he did have 17% (14,700) polymorphic PVCs with the longest run of 4 beats.  No patient-recorded symptoms.   -  Liver ultrasound showed a few small hepatic cysts without evidence of fatty infiltration of the liver.  Mild splenomegaly.  No ascites.   (This was done due to a history of excess alcohol intake in the past.)   -  Pulmonary function tests were satisfactory with a normal FEV1 of 2.7 L (104% predicted), FEV1/FVC ratio 73%, normal diffusing capacity.   -  A repeat transthoracic echocardiogram showed a drop in LV function from previously normal to 35%-40% with inferior and inferolateral wall akinesis and moderate global hypokinesis.  The right ventricle was moderately dilated with moderately decreased systolic function.  There was severe biatrial enlargement without color evidence of atrial shunt.  Moderately severe functional mitral regurgitation, mild tricuspid regurgitation with an estimated RVSP of 50 mmHg.  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  Aortic valve was trileaflet and sclerotic.  In the underlying rhythm was atrial fibrillation with a rate of 50 BPM.  The ascending aorta was dilated at 4.0 cm (stable).   -  A 6 minute walk distance was 1300 feet (lower limit of normal 1167).  Oxygen saturation was above 95% at all times, and the heart rate went from a baseline of 52 to a peak of 97 BPM with normal blood pressure response.     This testing revealed a new decline in LVEF.  He has significant PVC burden of 17%.   She started him on torsemide 20 mg daily, and Toprol XL 25 mg. Lisinopril 20 mg was continued.   Later due to some fatigue, Toprol XL was decreased to 12.5 mg.     He was most recently seen by Dr. Conley 10/3/19.  Echo 9/11/19 showed worsening MR, now moderately severe MR.  He was bradycardic.  It was noted he was taking Toprol XL 12.5 mg BID instead of every day. And he was taking torsemide 10 mg instead of 20 mg.   Dr. Conley stopped Toprol XL, stopped ASA, increased torsemide to 20 mg, increased KCl.     Interval History:   He presents today for follow up.   He feels no differently off the Toprol XL, or on higher dose torsemide 20 mg.   He has had diarrhea for 3 weeks. Stool sampled dropped off today at PCP office.    His weight is down 3 lbs at home.   BMP shows the K is WNL, Na is a little low at 132.  BUN and creat are WNL.   HR radial is 48 but apical check is 68. He has frequent ectopy.   BP is controlled.   Left leg/ankle is swollen but this is the side he broke his ankle and toes on recently.    He denies CP, significant ARAMBULA, orthopnea.   Leaves 10/31/19 for [place] until April 1, 2010.       ROS:  Skin:  Positive for bruising   Eyes:  Positive for glasses  ENT:  Positive for hearing loss  Respiratory:   sleep apnea;CPAP  Cardiovascular:    edema;Positive for;fatigue  Gastroenterology: Positive for reflux;diarrhea  Genitourinary:  Negative    Musculoskeletal:  Positive for back pain  Neurologic:  Negative    Psychiatric:  Negative    Heme/Lymph/Imm:  Positive for easy bruising  Endocrine:  Negative      PAST MEDICAL HISTORY:  Past Medical History:   Diagnosis Date     Actinic keratosis 10/12/2019     Anemia, unspecified 11/8/2016     Atrial fibrillation (H) 5/27/2015     BPH (benign prostatic hyperplasia) 1/25/2012     CAD (coronary artery disease) 4/4/2012     Closed bimalleolar fracture of left ankle with routine healing 4/5/2019     Closed fracture of metatarsal bone 4/4/2012     Coronary atherosclerosis of unspecified type of vessel, native or graft [facility] 2006    CAB 1996 following MI (at [facility])      Dilated aortic root (H) 5/26/2016     Drug-induced erectile dysfunction 10/2/2015     Elevated blood sugar 11/8/2016     Elevated PSA 9/19/2013     Epistaxis 9/9/2019     Essential hypertension with goal blood pressure less than 140/90 9/22/2016     Essential hypertension, benign      Fall 9/6/2019     Gallbladder polyp 5/1/2018     Gout      HAV (hallux abducto valgus) 4/4/2012     Hematoma 9/9/2019     Hernia, abdominal      History of prostate cancer 6/15/2016     HTN, goal below 150/90 4/20/2017     Hypokalemia 9/12/2019     Iron deficiency anemia secondary to inadequate dietary iron intake 11/15/2016      Low blood pressure reading 10/17/2016     Lumbago     had degendisc dz on MRI 7/07     Memory loss 6/18/2019     Mixed hyperlipidemia      Mumps      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     OA (osteoarthritis) 4/4/2012     Old myocardial infarction 4/4/2012     Other viral warts 9/6/2019     Palpitations      Pes planus 4/4/2012     Petechiae 6/18/2019     Prostate cancer (H) 5/26/2016     PUD (peptic ulcer disease) 4/4/2012     Pulmonary hypertension (H) 9/25/2017     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     Rosacea 1/15/2008     Stress due to spouse with dementia 6/18/2019     Thrombocytopenia (H) 4/21/2017     Unspecified hyperplasia of prostate with urinary obstruction and other lower urinary tract symptoms (LUTS)     better on avodart & hytrin     Venous stasis 4/4/2012     Venous stasis 4/4/2012       PAST SURGICAL HISTORY:  Past Surgical History:   Procedure Laterality Date     C NONSPECIFIC PROCEDURE      CAB 1996 [place]     C NONSPECIFIC PROCEDURE  2/07    l inguinal hernia repair      C NONSPECIFIC PROCEDURE      R hernia remote     C NONSPECIFIC PROCEDURE  2000    R ankle ORIF for fx     C NONSPECIFIC PROCEDURE  2005    nasal surgery      C NONSPECIFIC PROCEDURE      R rotater repair (remote)      C NONSPECIFIC PROCEDURE      appy 1966     C NONSPECIFIC PROCEDURE      sinus surgery x 2     C NONSPECIFIC PROCEDURE      L shoulder 6/09     CARDIAC SURGERY      bipass     CYSTOSCOPY FLEXIBLE, CYOABLATION PROSTATE N/A 11/9/2016    Procedure: CYSTOSCOPY FLEXIBLE, CRYOABLATION PROSTATE;  Surgeon: Krystian Owens MD;  Location:  OR     GENITOURINARY SURGERY      prostate surgery      HERNIA REPAIR       LAPAROSCOPIC HERNIORRHAPHY INGUINAL Right 5/10/2017    Procedure: LAPAROSCOPIC HERNIORRHAPHY INGUINAL;  Laparoscopic preperitoneal repair of right inguinal hernia with placement of underlay mesh;  Surgeon: Enrico Bridges MD;  Location:  OR     PROSTATE SURGERY         SOCIAL HISTORY:  Social History     Social  History     Marital status:      Spouse name: N/A     Number of children: N/A     Years of education: N/A     Social History Main Topics     Smoking status: Former Smoker     Smokeless tobacco: Never Used      Comment: quit 3/1974     Alcohol use 0.0 oz/week     0 Standard drinks or equivalent per week      Comment: 1-2 drinks per day     Drug use: No     Sexual activity: Yes     Partners: Female     Other Topics Concern     Caffeine Concern Yes     1 cups of coffee and soda per day     Special Diet No     Exercise Yes     walking     Seat Belt Yes     Social History Narrative       FAMILY HISTORY:  Family History   Problem Relation Age of Onset     Cancer Mother         stomach cancer,  age 61     Heart Disease Father         MI,  age 71     Heart Disease Brother         stent placement, RA      Hypertension Brother      Heart Disease Sister         rythmn problems with heart, hypertension       MEDS: acetaminophen (TYLENOL) 650 MG CR tablet, Take 1 tablet (650 mg) by mouth every 6 hours as needed for pain  apixaban ANTICOAGULANT (ELIQUIS) 2.5 MG tablet, Take 1 tablet (2.5 mg) by mouth 2 times daily  co-enzyme Q-10 100 MG CAPS, Take 1 capsule (100 mg) by mouth daily  diclofenac (VOLTAREN) 1 % GEL, Apply 4 gramsto area qid prn  doxycycline hyclate (VIBRAMYCIN) 100 MG capsule, Take 1 capsule (100 mg) by mouth daily  ferrous gluconate (FERGON) 324 (38 Fe) MG tablet, Take 1 tablet (324 mg) by mouth daily (with breakfast)  Glucosamine-Chondroitin (OSTEO BI-FLEX REGULAR STRENGTH) 250-200 MG TABS, Take 1 tablet by mouth 2 times daily  lisinopril (PRINIVIL/ZESTRIL) 20 MG tablet, Take 1 tablet (20 mg) by mouth daily  MELATONIN PO, Take 5 mg by mouth At Bedtime   Multiple Vitamins-Minerals (MULTIVITAL) CHEW, Take 2 chew tab by mouth 2 times daily (Patient taking differently: Take 1 chew tab by mouth 2 times daily )  Omega 3-6-9 Fatty Acids (OMEGA 3-6-9 PO), Take 1 tablet by mouth daily   omeprazole  "(PRILOSEC) 40 MG DR capsule, Take 1 capsule (40 mg) by mouth daily  potassium chloride ER (K-TAB) 20 MEQ CR tablet, Take 1 tablet (20 mEq) by mouth 2 times daily  simvastatin (ZOCOR) 20 MG tablet, Take 1 tablet (20 mg) by mouth At Bedtime  tamsulosin (FLOMAX) 0.4 MG capsule, TAKE 1 CAPSULE DAILY  torsemide (DEMADEX) 20 MG tablet, Take 1 tablet (20 mg) by mouth daily (with breakfast)  urea (GORMEL) 20 % external cream, Apply topically as needed Apply to right foot daily  lidocaine (LIDODERM) 5 % Patch, Apply 1 patches to bottom of right foot for up to 12 h within a 24 h period.  Remove after 12 hours. (Patient not taking: Reported on 9/27/2019)  naftifine (NAFTIN) 1 % GEL topical gel, Apply topically daily Apply to affected nails daily (Patient not taking: Reported on 10/16/2019)  order for DME, Equipment being ordered: zip compression stocking to knee. Left. (Patient not taking: Reported on 10/16/2019)    triamcinolone (KENALOG) 0.1 % cream        ALLERGIES: No Known Allergies    PHYSICAL EXAM:  Vitals: /64 (BP Location: Right arm, Patient Position: Chair, Cuff Size: Adult Regular)   Pulse 68   Ht 1.702 m (5' 7\")   Wt 81.6 kg (180 lb)   SpO2 99%   BMI 28.19 kg/m     Constitutional:  cooperative, alert and oriented, well developed, well nourished, in no acute distress        Skin:  warm and dry to the touch        Head:  normocephalic        Eyes:  pupils equal and round;conjunctivae and lids unremarkable        ENT:  no pallor or cyanosis        Neck:  JVP normal        Respiratory:  clear to auscultation        Cardiac:   bradycardic;irregular rhythm;frequent premature beats distant heart sounds   systolic murmur;grade 2;grade 3;apical          GI:  abdomen soft;non-tender        Vascular:                                        Extremities and Musculoskeletal:  no deformities, clubbing, cyanosis, erythema observed   chronic lower extremity skin discoloration consistent with possible venous stasis, LLE " has 1-2+ edema and non pitting edema in ankle and foot (recently broke this ankle and toes), RLE with trace edema     Neurological:  no gross motor deficits;affect appropriate          LABS/DATA:  I reviewed the following:  Echo 19:  Interpretation Summary  1. The left ventricle is normal in size. The visual ejection fraction is  estimated at 40%. There is mild-moderate global hypokinesia of the left  ventricle.  2. The right ventricle is moderately dilated. Mildly decreased right  ventricular systolic function TAPSE 1.6cm, TV velocity 9cm/s.  3. There is mod-severe to severe (3-4+) mitral regurgitation. ERO 0.39cm2, RV  85ml/beat. MR is posterior directed. On short axis views MR is mostly from   coaptation site. No obvious prolapse, valve is only mildly thickend.  Ischemic MR is possible (remote CABG). Functional MR is possible with afib/low  EF, but LV is not dilated.  4. There is moderate (2+) tricuspid regurgitation. RVSP 29mmHg.  5. The ascending aorta is Mildly dilated. Sinus of Valsalva 4.8cm, ascending  4.3cm.     Echo from 2018 showed EF 35-40%, mild-moderate RV dilation with moderate  hypokinesis (TAPSE 1.4cm, TV mima 6cm/s), 2-3+ MR, 1+ TR, sinus 4.5cm,  ascending aorta 4.0cm.     When directly compared to previous echo the degree of MR does appear worse.     Consider ANTONETTE if clinically indicated to further evaluate MR.        ASSESSMENT/PLAN:  1.  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   - ? If drop in LVEF from 55% to 40% is ischemic vs due to PVCs.  He has old grafts but no angina.  He has a significant PVC burden.   - last echo 19: LVEF stable at 40%, new worsening of his MR  - He is euvolemic on torsemide 20 mg.  This was just increased.  Na is 132 today.  He has had diarrhea.  We will recheck this next week before he goes to [place].   NT pro BNP today is down from 1165 to 748  - Defer ischemic eval or EP eval of PVcs to Dr. Conley    2.   Chronic  right heart failure, mildly decreased systolic function, NYHA class I.   - Evaluation per Dr. Conley     3.  Chronic atrial fibrillation with controlled rates.   - Prior history of epistaxis, therefore on lower-dose apixaban and ASA is stopped.   - Due to bradycardia, he is off Toprol XL.     4.  Frequent premature ventricular complexes (17%-22% ventricular ectopy burden).   - He was on Toprol XL, but this was stopped 10/3/19 for bradycardia  - Will need to continue to follow    5.  H/o coronary artery disease, status post CABG in 1997 (grafts not known).    - No anginal sxs  - On Eliquis 2.5 mg BID for afib, simvastatin 20 mg.     6.  Hypertension. Controlled on lisinopril 20 mg, torsemide 20 mg.     7.  LENORA on CPAP.     8.  Mitral regurgitation.   - Echo 9/23/19 shows worsening MR, moderately severe  - His LVEF has been mildly decreased for several years  - Continue torsemide 20 mg for now.  Repeat BMP next week  - Repeat echo next year when he sees dr. Conley.  If he develops sxs, Dr. Conley would refer him for MitraClip  - Needs to be seen in [place] if he develops sxs         Follow up:  I am hesitant to lower the torsemide for the Na level today given his diarrhea and the worsening MR and his upcoming travels.  Repeat BMP next week.  Needs to establish with PCP in [place].  See Dr. Conley with echo, CXR and labs when he returns from [place] next spring.            Thank you for allowing me to participate in the care of your patient.    Sincerely,     Juliano Caballero PA-C     [facility]

## 2019-10-16 NOTE — PROGRESS NOTES
CARDIOLOGY CLINIC PROGRESS NOTE - [facility]    DOS: 10/16/19    Milo Serna  : 1940, 78 year old  MRN: [id number]      History:   I had the pleasure of following up with Milo (he goes by Luís) MACIE Serna today in the [facility]. He is a patient of Dr. Conley.     Luís is a 78-year-old gentleman known to have CAD s/p CABG (), chronic atrial fibrillation not requiring AV slowing agents (on lower-dose Eliquis anticoagulation 2.5 mg b.i.d. due to history of epistaxis), essential hypertension, ascending aorta dilatation (stable at 4.2 cm), LENORA on CPAP, chronic anemia.  He has had progressive dilatation of his right ventricle with mildly reduced systolic function (cardiac MRI 17 showed moderately dilated RV with normal systolic function, LVEF of 56%, severe biatrial enlargement and no evidence of intraatrial shunt), for which he saw Dr. Conley.  More recently, he has been found to have a high burden of PVCs and a new decline in LVEF (LVEF 40-45%).  Also worsening mitral regurgitation.       He underwent testing that has been reviewed by Dr. Conley.   -  V/Q scan was negative for pulmonary embolus.     -  Hepatitis and HIV workup was normal, HbA1c was 5.4%, normal renal panel with sodium of 141, potassium 4.5, BUN 12, creatinine 0.8, estimated GFR  greater than 90.  Normal TSH.  Hemoglobin 12.9.   -  A 24 hour Holter showed atrial fibrillation throughout with an average heart rate of 57 BPM, as low as 34 BPM when asleep.  However, he did have 17% (14,700) polymorphic PVCs with the longest run of 4 beats.  No patient-recorded symptoms.   -  Liver ultrasound showed a few small hepatic cysts without evidence of fatty infiltration of the liver.  Mild splenomegaly.  No ascites.  (This was done due to a history of excess alcohol intake in the past.)   -  Pulmonary function tests were satisfactory with a normal FEV1 of 2.7 L (104% predicted), FEV1/FVC ratio 73%, normal diffusing capacity.   -  A repeat  transthoracic echocardiogram showed a drop in LV function from previously normal to 35%-40% with inferior and inferolateral wall akinesis and moderate global hypokinesis.  The right ventricle was moderately dilated with moderately decreased systolic function.  There was severe biatrial enlargement without color evidence of atrial shunt.  Moderately severe functional mitral regurgitation, mild tricuspid regurgitation with an estimated RVSP of 50 mmHg.  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  Aortic valve was trileaflet and sclerotic.  In the underlying rhythm was atrial fibrillation with a rate of 50 BPM.  The ascending aorta was dilated at 4.0 cm (stable).   -  A 6 minute walk distance was 1300 feet (lower limit of normal 1167).  Oxygen saturation was above 95% at all times, and the heart rate went from a baseline of 52 to a peak of 97 BPM with normal blood pressure response.     This testing revealed a new decline in LVEF.  He has significant PVC burden of 17%.   She started him on torsemide 20 mg daily, and Toprol XL 25 mg. Lisinopril 20 mg was continued.   Later due to some fatigue, Toprol XL was decreased to 12.5 mg.     He was most recently seen by Dr. Conley 10/3/19.  Echo 9/11/19 showed worsening MR, now moderately severe MR.  He was bradycardic.  It was noted he was taking Toprol XL 12.5 mg BID instead of every day. And he was taking torsemide 10 mg instead of 20 mg.   Dr. Conley stopped Toprol XL, stopped ASA, increased torsemide to 20 mg, increased KCl.     Interval History:   He presents today for follow up.   He feels no differently off the Toprol XL, or on higher dose torsemide 20 mg.   He has had diarrhea for 3 weeks. Stool sampled dropped off today at PCP office.   His weight is down 3 lbs at home.   BMP shows the K is WNL, Na is a little low at 132.  BUN and creat are WNL.   HR radial is 48 but apical check is 68. He has frequent ectopy.   BP is controlled.   Left leg/ankle is swollen  but this is the side he broke his ankle and toes on recently.    He denies CP, significant ARAMBULA, orthopnea.   Leaves 10/31/19 for [place] until April 1, 2010.       ROS:  Skin:  Positive for bruising   Eyes:  Positive for glasses  ENT:  Positive for hearing loss  Respiratory:   sleep apnea;CPAP  Cardiovascular:    edema;Positive for;fatigue  Gastroenterology: Positive for reflux;diarrhea  Genitourinary:  Negative    Musculoskeletal:  Positive for back pain  Neurologic:  Negative    Psychiatric:  Negative    Heme/Lymph/Imm:  Positive for easy bruising  Endocrine:  Negative      PAST MEDICAL HISTORY:  Past Medical History:   Diagnosis Date     Actinic keratosis 10/12/2019     Anemia, unspecified 11/8/2016     Atrial fibrillation (H) 5/27/2015     BPH (benign prostatic hyperplasia) 1/25/2012     CAD (coronary artery disease) 4/4/2012     Closed bimalleolar fracture of left ankle with routine healing 4/5/2019     Closed fracture of metatarsal bone 4/4/2012     Coronary atherosclerosis of unspecified type of vessel, native or graft [facility] 2006    CAB 1996 following MI (at [facility])      Dilated aortic root (H) 5/26/2016     Drug-induced erectile dysfunction 10/2/2015     Elevated blood sugar 11/8/2016     Elevated PSA 9/19/2013     Epistaxis 9/9/2019     Essential hypertension with goal blood pressure less than 140/90 9/22/2016     Essential hypertension, benign      Fall 9/6/2019     Gallbladder polyp 5/1/2018     Gout      HAV (hallux abducto valgus) 4/4/2012     Hematoma 9/9/2019     Hernia, abdominal      History of prostate cancer 6/15/2016     HTN, goal below 150/90 4/20/2017     Hypokalemia 9/12/2019     Iron deficiency anemia secondary to inadequate dietary iron intake 11/15/2016     Low blood pressure reading 10/17/2016     Lumbago     had degendisc dz on MRI 7/07     Memory loss 6/18/2019     Mixed hyperlipidemia      Mumps      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     OA (osteoarthritis) 4/4/2012     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     Other viral warts 9/6/2019     Palpitations      Pes planus 4/4/2012     Petechiae 6/18/2019     Prostate cancer (H) 5/26/2016     PUD (peptic ulcer disease) 4/4/2012     Pulmonary hypertension (H) 9/25/2017     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     Rosacea 1/15/2008     Stress due to spouse with dementia 6/18/2019     Thrombocytopenia (H) 4/21/2017     Unspecified hyperplasia of prostate with urinary obstruction and other lower urinary tract symptoms (LUTS)     better on avodart & hytrin     Venous stasis 4/4/2012     Venous stasis 4/4/2012       PAST SURGICAL HISTORY:  Past Surgical History:   Procedure Laterality Date     C NONSPECIFIC PROCEDURE      CAB 1996 [place]     C NONSPECIFIC PROCEDURE  2/07    l inguinal hernia repair      C NONSPECIFIC PROCEDURE      R hernia remote     C NONSPECIFIC PROCEDURE  2000    R ankle ORIF for fx     C NONSPECIFIC PROCEDURE  2005    nasal surgery      C NONSPECIFIC PROCEDURE      R rotater repair (remote)      C NONSPECIFIC PROCEDURE      appy 1966     C NONSPECIFIC PROCEDURE      sinus surgery x 2     C NONSPECIFIC PROCEDURE      L shoulder 6/09     CARDIAC SURGERY      bipass     CYSTOSCOPY FLEXIBLE, CYOABLATION PROSTATE N/A 11/9/2016    Procedure: CYSTOSCOPY FLEXIBLE, CRYOABLATION PROSTATE;  Surgeon: Krystian Owens MD;  Location:  OR     GENITOURINARY SURGERY      prostate surgery      HERNIA REPAIR       LAPAROSCOPIC HERNIORRHAPHY INGUINAL Right 5/10/2017    Procedure: LAPAROSCOPIC HERNIORRHAPHY INGUINAL;  Laparoscopic preperitoneal repair of right inguinal hernia with placement of underlay mesh;  Surgeon: Enrico Bridges MD;  Location: [facility] OR     PROSTATE SURGERY         SOCIAL HISTORY:  Social History     Social History     Marital status:      Spouse name: N/A     Number of children: N/A     Years of education: N/A     Social History Main Topics     Smoking status: Former Smoker     Smokeless tobacco: Never Used      Comment: quit  3/1974     Alcohol use 0.0 oz/week     0 Standard drinks or equivalent per week      Comment: 1-2 drinks per day     Drug use: No     Sexual activity: Yes     Partners: Female     Other Topics Concern     Caffeine Concern Yes     1 cups of coffee and soda per day     Special Diet No     Exercise Yes     walking     Seat Belt Yes     Social History Narrative       FAMILY HISTORY:  Family History   Problem Relation Age of Onset     Cancer Mother         stomach cancer,  age 61     Heart Disease Father         MI,  age 71     Heart Disease Brother         stent placement, RA      Hypertension Brother      Heart Disease Sister         rythmn problems with heart, hypertension       MEDS: acetaminophen (TYLENOL) 650 MG CR tablet, Take 1 tablet (650 mg) by mouth every 6 hours as needed for pain  apixaban ANTICOAGULANT (ELIQUIS) 2.5 MG tablet, Take 1 tablet (2.5 mg) by mouth 2 times daily  co-enzyme Q-10 100 MG CAPS, Take 1 capsule (100 mg) by mouth daily  diclofenac (VOLTAREN) 1 % GEL, Apply 4 gramsto area qid prn  doxycycline hyclate (VIBRAMYCIN) 100 MG capsule, Take 1 capsule (100 mg) by mouth daily  ferrous gluconate (FERGON) 324 (38 Fe) MG tablet, Take 1 tablet (324 mg) by mouth daily (with breakfast)  Glucosamine-Chondroitin (OSTEO BI-FLEX REGULAR STRENGTH) 250-200 MG TABS, Take 1 tablet by mouth 2 times daily  lisinopril (PRINIVIL/ZESTRIL) 20 MG tablet, Take 1 tablet (20 mg) by mouth daily  MELATONIN PO, Take 5 mg by mouth At Bedtime   Multiple Vitamins-Minerals (MULTIVITAL) CHEW, Take 2 chew tab by mouth 2 times daily (Patient taking differently: Take 1 chew tab by mouth 2 times daily )  Omega 3-6-9 Fatty Acids (OMEGA 3-6-9 PO), Take 1 tablet by mouth daily   omeprazole (PRILOSEC) 40 MG DR capsule, Take 1 capsule (40 mg) by mouth daily  potassium chloride ER (K-TAB) 20 MEQ CR tablet, Take 1 tablet (20 mEq) by mouth 2 times daily  simvastatin (ZOCOR) 20 MG tablet, Take 1 tablet (20 mg) by mouth At  "Bedtime  tamsulosin (FLOMAX) 0.4 MG capsule, TAKE 1 CAPSULE DAILY  torsemide (DEMADEX) 20 MG tablet, Take 1 tablet (20 mg) by mouth daily (with breakfast)  urea (GORMEL) 20 % external cream, Apply topically as needed Apply to right foot daily  lidocaine (LIDODERM) 5 % Patch, Apply 1 patches to bottom of right foot for up to 12 h within a 24 h period.  Remove after 12 hours. (Patient not taking: Reported on 9/27/2019)  naftifine (NAFTIN) 1 % GEL topical gel, Apply topically daily Apply to affected nails daily (Patient not taking: Reported on 10/16/2019)  order for DME, Equipment being ordered: zip compression stocking to knee. Left. (Patient not taking: Reported on 10/16/2019)    triamcinolone (KENALOG) 0.1 % cream        ALLERGIES: No Known Allergies    PHYSICAL EXAM:  Vitals: /64 (BP Location: Right arm, Patient Position: Chair, Cuff Size: Adult Regular)   Pulse 68   Ht 1.702 m (5' 7\")   Wt 81.6 kg (180 lb)   SpO2 99%   BMI 28.19 kg/m    Constitutional:  cooperative, alert and oriented, well developed, well nourished, in no acute distress        Skin:  warm and dry to the touch        Head:  normocephalic        Eyes:  pupils equal and round;conjunctivae and lids unremarkable        ENT:  no pallor or cyanosis        Neck:  JVP normal        Respiratory:  clear to auscultation        Cardiac:   bradycardic;irregular rhythm;frequent premature beats distant heart sounds   systolic murmur;grade 2;grade 3;apical          GI:  abdomen soft;non-tender        Vascular:                                        Extremities and Musculoskeletal:  no deformities, clubbing, cyanosis, erythema observed   chronic lower extremity skin discoloration consistent with possible venous stasis, LLE has 1-2+ edema and non pitting edema in ankle and foot (recently broke this ankle and toes), RLE with trace edema     Neurological:  no gross motor deficits;affect appropriate          LABS/DATA:  I reviewed the following:  Echo " 19:  Interpretation Summary  1. The left ventricle is normal in size. The visual ejection fraction is  estimated at 40%. There is mild-moderate global hypokinesia of the left  ventricle.  2. The right ventricle is moderately dilated. Mildly decreased right  ventricular systolic function TAPSE 1.6cm, TV velocity 9cm/s.  3. There is mod-severe to severe (3-4+) mitral regurgitation. ERO 0.39cm2, RV  85ml/beat. MR is posterior directed. On short axis views MR is mostly from   coaptation site. No obvious prolapse, valve is only mildly thickend.  Ischemic MR is possible (remote CABG). Functional MR is possible with afib/low  EF, but LV is not dilated.  4. There is moderate (2+) tricuspid regurgitation. RVSP 29mmHg.  5. The ascending aorta is Mildly dilated. Sinus of Valsalva 4.8cm, ascending  4.3cm.     Echo from 2018 showed EF 35-40%, mild-moderate RV dilation with moderate  hypokinesis (TAPSE 1.4cm, TV mima 6cm/s), 2-3+ MR, 1+ TR, sinus 4.5cm,  ascending aorta 4.0cm.     When directly compared to previous echo the degree of MR does appear worse.     Consider ANTONETTE if clinically indicated to further evaluate MR.        ASSESSMENT/PLAN:  1.  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   - ? If drop in LVEF from 55% to 40% is ischemic vs due to PVCs.  He has old grafts but no angina.  He has a significant PVC burden.   - last echo 19: LVEF stable at 40%, new worsening of his MR  - He is euvolemic on torsemide 20 mg.  This was just increased.  Na is 132 today.  He has had diarrhea.  We will recheck this next week before he goes to [place].   NT pro BNP today is down from 1165 to 748  - Defer ischemic eval or EP eval of PVcs to Dr. Conley    2.  Chronic right heart failure, mildly decreased systolic function, NYHA class I.   - Evaluation per Dr. Conley     3.  Chronic atrial fibrillation with controlled rates.   - Prior history of epistaxis, therefore on lower-dose apixaban and ASA  is stopped.   - Due to bradycardia, he is off Toprol XL.     4.  Frequent premature ventricular complexes (17%-22% ventricular ectopy burden).   - He was on Toprol XL, but this was stopped 10/3/19 for bradycardia  - Will need to continue to follow    5.  H/o coronary artery disease, status post CABG in 1997 (grafts not known).    - No anginal sxs  - On Eliquis 2.5 mg BID for afib, simvastatin 20 mg.     6.  Hypertension. Controlled on lisinopril 20 mg, torsemide 20 mg.     7.  LENORA on CPAP.     8.  Mitral regurgitation.   - Echo 9/23/19 shows worsening MR, moderately severe  - His LVEF has been mildly decreased for several years  - Continue torsemide 20 mg for now.  Repeat BMP next week  - Repeat echo next year when he sees dr. Conley.  If he develops sxs, Dr. Conley would refer him for MitraClip  - Needs to be seen in [place] if he develops sxs         Follow up:  I am hesitant to lower the torsemide for the Na level today given his diarrhea and the worsening MR and his upcoming travels.  Repeat BMP next week.  Needs to establish with PCP in [place].  See Dr. Conley with echo, CXR and labs when he returns from [place] next spring.          Juliano Caballero PA-C

## 2019-10-18 NOTE — PROGRESS NOTES
Subjective     Milo Serna is a 78 year old male who presents to clinic today for the following health issues:  Follow up appointment from 10-11-19   Diarrhea of presumed infectious origin  (primary encounter diagnosis)  3 weeks, no blood, no therapies tried  Diarrhea after eating, daily.  Lactoferrin C. difficile negative    Component Value Flag Ref Range Units Status Collected Lab   Fecal Lactoferrin Negative   NEG^Negative  Final 10/14/2019  4:30    Comment:     Closed fracture of metatarsal bone of left foot, physeal involvement unspecified, unspecified metatarsal, sequela out of cam boot occasional brief pain otherwise seems recovered  Peripheral edema gone in AM. L leg (out of boot) swollen below sock top today.      HPI   Hypertension Follow-up      Do you check your blood pressure regularly outside of the clinic? Yes     Are you following a low salt diet? Yes    Are your blood pressures ever more than 140 on the top number (systolic) OR more   than 90 on the bottom number (diastolic), for example 140/90? Yes  Vascular Disease Follow-up      Are you having any of the following symptoms? (Select all that apply) No complaints of shortness of breath, chest pain or pressure.  No increased sweating or nausea with activity.  No left-sided neck or arm pain.  No complaints of pain in calves when walking 1-2 blocks.    How often do you take nitroglycerin? Never    Do you take an aspirin every day? No    Heart Failure Follow-up  Notes from cardiology appt from 10-3-19.    Luís's mitral regurgitation appears worse compared to a year ago on echocardiogram.  However, clinically, he does not have decompensated heart failure.  No new symptoms.  In fact, he has been taking less diuretic at 10 mg of torsemide than the prescribed dose of 20 mg.  I am not sure that his worsening mitral regurgitation is clinically significant at this point.  His LVEF has been mildly decreased for several years.  His pulmonary artery  pressures are improved.  He is consistently using CPAP.  He is significantly bradycardic from taking a higher than recommended dose of metoprolol.       PLAN:     1.  Stop metoprolol completely.   2.  Stop concurrent aspirin and continue with Eliquis due to history of problematic epistaxis.   3.  Increase torsemide from 10 mg daily to 20 mg daily.   4.  Increase potassium supplement to  20 mEq b.i.d.   5.  Follow up with established cardiology [facility], Juliano Caballero, in 2 weeks with basic metabolic panel and NT-proBNP to see how he is doing with the medication changes.   6.  Luís is going to [place] for the winter at the end of November.  I will see him back when he returns with an echocardiogram, a chest x-ray and labs.  I have also advised them to set up care with a primary care provider there.  If he develops heart failure symptoms, we would like to see him sooner.   Regarding long-term plan for the mitral regurgitation, intervening on him surgically will not be ideal.  It would be a redo surgery and his LVEF has already decreased.  If he starts getting symptoms, I would refer him for a transcatheter MitraClip.          Are you experiencing any shortness of breath? No    Are you experiencing any swelling in your legs or feet?  No    Are you using more pillows than usual? No    Do you cough at night?  No    Do you check your weight daily?  Yes    Have you had a weight change recently?  No    Are you having any of the following side effects from your medications? (Select all that apply)  The patient does not report symptoms of dizziness, fatigue, cough, swelling, or slow heart beat.    Since your last visit, how many times have you gone to the cardiologist, urgent care, emergency room, or hospital because of your heart failure?   None    Patient wears C-PAP at night..  Notes from sleep clinic on 9-27-19.     Sleep-related breathing disorder(moderate complex sleep apnea, sleep related hypoxemia, Cheyne-Alejandro Breathing  pattern.   Patient reports improvement in the overall CPAP compliance and there also has been some improvement in the air leaks compared to his previous visit with the new mask.  Residual AHI is still high, with DL showing some persistent  obstructive  events, hence recommended increasing the pressure settings on his CPAP to 15 cmH2O. (During the titration study that was previously obtained the therapeutic pressure that was noted during the sleep study was CPAP at 15 cmH2O with a residual AHI of less than 5/h.)  Recommended him to continue using the CPAP regularly during sleep and instructed him to get the supplies for the device replaced regularly. He was instructed to get back to me if in case he has any trouble tolerating the increase in the pressure settings.     Recommended using gecko nasal gel pad to alleviate the irritation   at the bridge of the nose from the mask.    Our nurse Ms. Samantha Arriaza explained to him explained how to adjust the mask if leak and to use the air pressure.     I plan to review the compliance download with the revised pressure settings in the next 2 to 3 weeks.  And if the  obstructive events improved,  he can be followed up at the sleep clinic on a yearly basis sooner if any concerns.        Patient Active Problem List   Diagnosis     Neuropathy of foot     Closed fracture of metatarsal bone of left foot, physeal involvement unspecified, unspecified metatarsal, sequela     PUD (peptic ulcer disease)     CAD (coronary artery disease)     Rhinophyma     History of prostate cancer     Acute bilateral low back pain with left-sided sciatica     Gallbladder polyp     Cervicalgia     Fall     Chronic combined systolic and diastolic congestive heart failure (H)     Actinic keratosis     Diarrhea of presumed infectious origin     Peripheral edema     Past Surgical History:   Procedure Laterality Date     C NONSPECIFIC PROCEDURE      [facility] 1996 [place]     C NONSPECIFIC PROCEDURE  2/07    l  inguinal hernia repair      C NONSPECIFIC PROCEDURE      R hernia remote     C NONSPECIFIC PROCEDURE      R ankle ORIF for fx     C NONSPECIFIC PROCEDURE  2005    nasal surgery      C NONSPECIFIC PROCEDURE      R rotater repair (remote)      C NONSPECIFIC PROCEDURE      appy 1966     C NONSPECIFIC PROCEDURE      sinus surgery x 2     C NONSPECIFIC PROCEDURE      L shoulder      CARDIAC SURGERY      bipass     CYSTOSCOPY FLEXIBLE, CYOABLATION PROSTATE N/A 2016    Procedure: CYSTOSCOPY FLEXIBLE, CRYOABLATION PROSTATE;  Surgeon: Krystian Owens MD;  Location: [facility] OR     GENITOURINARY SURGERY      prostate surgery      HERNIA REPAIR       LAPAROSCOPIC HERNIORRHAPHY INGUINAL Right 5/10/2017    Procedure: LAPAROSCOPIC HERNIORRHAPHY INGUINAL;  Laparoscopic preperitoneal repair of right inguinal hernia with placement of underlay mesh;  Surgeon: Enrico Bridges MD;  Location: [facility] OR     PROSTATE SURGERY         Social History     Tobacco Use     Smoking status: Former Smoker     Smokeless tobacco: Never Used     Tobacco comment: quit 3/1974   Substance Use Topics     Alcohol use: Not Currently     Alcohol/week: 0.0 standard drinks     Comment: rare drink     Family History   Problem Relation Age of Onset     Cancer Mother         stomach cancer,  age 61     Heart Disease Father         MI,  age 71     Heart Disease Brother         stent placement, RA      Hypertension Brother      Heart Disease Sister         rythmn problems with heart, hypertension         Current Outpatient Medications   Medication Sig Dispense Refill     acetaminophen (TYLENOL) 650 MG CR tablet Take 1 tablet (650 mg) by mouth every 6 hours as needed for pain 100 tablet 11     apixaban ANTICOAGULANT (ELIQUIS) 2.5 MG tablet Take 1 tablet (2.5 mg) by mouth 2 times daily 200 tablet 1     co-enzyme Q-10 100 MG CAPS Take 1 capsule (100 mg) by mouth daily 180 capsule 3     diclofenac (VOLTAREN) 1 % GEL Apply 4 gramsto  area qid prn 100 g 11     doxycycline hyclate (VIBRAMYCIN) 100 MG capsule Take 1 capsule (100 mg) by mouth daily 90 capsule 3     ferrous gluconate (FERGON) 324 (38 Fe) MG tablet Take 1 tablet (324 mg) by mouth daily (with breakfast) 90 tablet 3     Glucosamine-Chondroitin (OSTEO BI-FLEX REGULAR STRENGTH) 250-200 MG TABS Take 1 tablet by mouth 2 times daily 100 tablet 8     lidocaine (LIDODERM) 5 % Patch Apply 1 patches to bottom of right foot for up to 12 h within a 24 h period.  Remove after 12 hours. (Patient not taking: Reported on 9/27/2019) 30 patch 0     lisinopril (PRINIVIL/ZESTRIL) 20 MG tablet Take 1 tablet (20 mg) by mouth daily 90 tablet 1     MELATONIN PO Take 5 mg by mouth At Bedtime        Multiple Vitamins-Minerals (MULTIVITAL) CHEW Take 2 chew tab by mouth 2 times daily (Patient taking differently: Take 1 chew tab by mouth 2 times daily ) 360 tablet 3     naftifine (NAFTIN) 1 % GEL topical gel Apply topically daily Apply to affected nails daily (Patient not taking: Reported on 10/16/2019) 90 g 3     Omega 3-6-9 Fatty Acids (OMEGA 3-6-9 PO) Take 1 tablet by mouth daily        omeprazole (PRILOSEC) 40 MG DR capsule Take 1 capsule (40 mg) by mouth daily 90 capsule 3     order for DME Equipment being ordered: zip compression stocking to knee. Left. (Patient not taking: Reported on 10/16/2019) 1 Device 0     potassium chloride ER (K-TAB) 20 MEQ CR tablet Take 1 tablet (20 mEq) by mouth 2 times daily 200 tablet 3     simvastatin (ZOCOR) 20 MG tablet Take 1 tablet (20 mg) by mouth At Bedtime 90 tablet 1     tamsulosin (FLOMAX) 0.4 MG capsule TAKE 1 CAPSULE DAILY 90 capsule 1     torsemide (DEMADEX) 20 MG tablet Take 1 tablet (20 mg) by mouth daily (with breakfast) 90 tablet 1     urea (GORMEL) 20 % external cream Apply topically as needed Apply to right foot daily 120 g 2     No Known Allergies  BP Readings from Last 3 Encounters:   10/16/19 116/64   10/11/19 125/60   10/08/19 128/70    Wt Readings from  Last 3 Encounters:   10/16/19 81.6 kg (180 lb)   10/11/19 81.6 kg (180 lb)   10/08/19 81.2 kg (179 lb)            Functional diarrhea  (primary encounter diagnosis) lactoferrin, C. 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)  Chronic combined systolic and diastolic congestive heart failure (H)  PVC's (premature ventricular contractions)  Nonrheumatic mitral valve regurgitation cardiac function is deteriorating  Neuropathy of foot, unspecified laterality symptoms controlled  Primary osteoarthritis involving multiple joints symptoms controlled  PUD (peptic ulcer disease) on PPI   Rosacea controlled with doxycycline   HTN, goal below 150/90-controlled   hyperlipidemia LDL goal <100 tolerates statin   LENORA (obstructive sleep apnea) mask leaks  History of prostate cancer PSA rising, patient has questions regarding suggested Lupron therapy.  That suggestion was next year if PSA continues to rise.  It may stabilize  Chronic atrial fibrillation anticoagulated at low-dose due to epistaxis.  On fish oil  Encounter for preventive measure vitamins  Past Medical History:   Diagnosis Date     Actinic keratosis 10/12/2019     Anemia, unspecified 11/8/2016     Atrial fibrillation (H) 5/27/2015     BPH (benign prostatic hyperplasia) 1/25/2012     CAD (coronary artery disease) 4/4/2012     Closed bimalleolar fracture of left ankle with routine healing 4/5/2019     Closed fracture of metatarsal bone 4/4/2012     Coronary atherosclerosis of unspecified type of vessel, native or graft [facility]    CAB 1996 following MI (at [facility])      Dilated aortic root (H) 5/26/2016     Drug-induced erectile dysfunction 10/2/2015     Elevated blood sugar 11/8/2016     Elevated PSA 9/19/2013     Epistaxis 9/9/2019     Essential hypertension with goal blood pressure less than 140/90 9/22/2016     Essential hypertension, benign      Fall 9/6/2019     Functional diarrhea 10/12/2019     Gallbladder  polyp 5/1/2018     Gout      HAV (hallux abducto valgus) 4/4/2012     Hematoma 9/9/2019     Hernia, abdominal      History of prostate cancer 6/15/2016     HTN, goal below 150/90 4/20/2017     Hypokalemia 9/12/2019     Iron deficiency anemia secondary to inadequate dietary iron intake 11/15/2016     Low blood pressure reading 10/17/2016     Lumbago     had degendisc dz on MRI 7/07     Memory loss 6/18/2019     Mixed hyperlipidemia      Mumps      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     Nonrheumatic mitral valve regurgitation 10/28/2019     OA (osteoarthritis) 4/4/2012     Old myocardial infarction 4/4/2012     LENORA (obstructive sleep apnea) 10/28/2019     Other viral warts 9/6/2019     Palpitations      Pes planus 4/4/2012     Petechiae 6/18/2019     Prostate cancer (H) 5/26/2016     PUD (peptic ulcer disease) 4/4/2012     Pulmonary hypertension (H) 9/25/2017     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     Rosacea 1/15/2008     Stress due to spouse with dementia 6/18/2019     Thrombocytopenia (H) 4/21/2017     Unspecified hyperplasia of prostate with urinary obstruction and other lower urinary tract symptoms (LUTS)     better on avodart & hytrin     Venous stasis 4/4/2012     Venous stasis 4/4/2012       Past Surgical History:   Procedure Laterality Date     C NONSPECIFIC PROCEDURE      CAB 1996 [place]     C NONSPECIFIC PROCEDURE  2/07    l inguinal hernia repair      C NONSPECIFIC PROCEDURE      R hernia remote     C NONSPECIFIC PROCEDURE  2000    R ankle ORIF for fx     C NONSPECIFIC PROCEDURE  2005    nasal surgery      C NONSPECIFIC PROCEDURE      R rotater repair (remote)      C NONSPECIFIC PROCEDURE      appy 1966     C NONSPECIFIC PROCEDURE      sinus surgery x 2     C NONSPECIFIC PROCEDURE      L shoulder 6/09     CARDIAC SURGERY      bipass     CYSTOSCOPY FLEXIBLE, CYOABLATION PROSTATE N/A 11/9/2016    Procedure: CYSTOSCOPY FLEXIBLE, CRYOABLATION PROSTATE;  Surgeon: Krystian Owens MD;  Location: [facility] OR     GENITOURINARY  "SURGERY      prostate surgery      HERNIA REPAIR       LAPAROSCOPIC HERNIORRHAPHY INGUINAL Right 5/10/2017    Procedure: LAPAROSCOPIC HERNIORRHAPHY INGUINAL;  Laparoscopic preperitoneal repair of right inguinal hernia with placement of underlay mesh;  Surgeon: Enrico Bridges MD;  Location: [facility] OR     PROSTATE SURGERY         Family History   Problem Relation Age of Onset     Cancer Mother         stomach cancer,  age 61     Heart Disease Father         MI,  age 71     Heart Disease Brother         stent placement, RA      Hypertension Brother      Heart Disease Sister         rythmn problems with heart, hypertension       Social History     Tobacco Use     Smoking status: Former Smoker     Smokeless tobacco: Never Used     Tobacco comment: quit 3/1974   Substance Use Topics     Alcohol use: Not Currently     Alcohol/week: 0.0 standard drinks     Comment: rare drink                Review of Systems   ROS COMP: Constitutional, HEENT, cardiovascular, pulmonary, GI, , musculoskeletal, neuro, skin, endocrine and psych systems are negative, except as otherwise noted.      Objective    /62 (BP Location: Right arm, Patient Position: Sitting, Cuff Size: Adult Regular)   Pulse 64   Temp 97.6  F (36.4  C) (Oral)   Resp 18   Ht 1.702 m (5' 7\")   Wt 83.8 kg (184 lb 11.2 oz)   SpO2 100%   BMI 28.93 kg/m    There is no height or weight on file to calculate BMI.     Vitals taken by Rodrick JOHNSON    Physical Exam   Antalgic gait  Sock line both legs  Chest clear  Cardiac rhythm irregular            Assessment & Plan   Problem List Items Addressed This Visit        Nervous and Auditory    Neuropathy of foot     Pain low, tolerable         Relevant Medications    acetaminophen (TYLENOL) 500 MG tablet       Respiratory    LENORA (obstructive sleep apnea)     New mask fits poorly            Digestive    PUD (peptic ulcer disease)     Reduce omeprazole 80-40, to 20 in future         Functional diarrhea - " "Primary     Lactoferrin, c diff neg. Recommend colonoscopy in [place]            Endocrine    Hyperlipidemia LDL goal <100     Statin tolerated         Relevant Medications    simvastatin (ZOCOR) 20 MG tablet       Circulatory    PVC's (premature ventricular contractions)     High burden         Chronic atrial fibrillation     anticoagulated         HTN, goal below 150/90     Adequate control         Relevant Medications    lisinopril (PRINIVIL/ZESTRIL) 20 MG tablet    Chronic combined systolic and diastolic congestive heart failure (H)     deteriorating         Relevant Medications    torsemide (DEMADEX) 20 MG tablet    Nonrheumatic mitral valve regurgitation     worsening            Musculoskeletal and Integumentary    Rosacea     Doxycycline controlled         Relevant Medications    doxycycline hyclate (VIBRAMYCIN) 100 MG capsule    Primary osteoarthritis involving multiple joints     Pain is low         Relevant Medications    acetaminophen (TYLENOL) 500 MG tablet    Glucosamine-Chondroitin (OSTEO BI-FLEX REGULAR STRENGTH) 250-200 MG TABS       Other    History of prostate cancer     PSA low, slowly rising. Lupron in future per urology, only if PSA continues upward         Relevant Medications    tamsulosin (FLOMAX) 0.4 MG capsule      Other Visit Diagnoses     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 (H)        Relevant Medications    torsemide (DEMADEX) 20 MG tablet    Encounter for preventive measure        Relevant Medications    multivitamin, therapeutic (THERA-VIT) TABS tablet             BMI:   Estimated body mass index is 28.93 kg/m  as calculated from the following:    Height as of this encounter: 1.702 m (5' 7\").    Weight as of this encounter: 83.8 kg (184 lb 11.2 oz).   Weight management plan: Not in this age group      Over 50 % of this visit 50 minute visit  spent in face to face counseling and coordination of care,    No follow-ups on file.    Oscar Alves MD  [facility] " [facility] [place]

## 2019-10-25 ENCOUNTER — TELEPHONE (OUTPATIENT)
Dept: CARDIOLOGY | Facility: CLINIC | Age: 79
End: 2019-10-25

## 2019-10-25 DIAGNOSIS — I34.0 MITRAL VALVE INSUFFICIENCY, UNSPECIFIED ETIOLOGY: ICD-10-CM

## 2019-10-25 DIAGNOSIS — R19.7 DIARRHEA OF PRESUMED INFECTIOUS ORIGIN: ICD-10-CM

## 2019-10-25 LAB
ANION GAP SERPL CALCULATED.3IONS-SCNC: 6 MMOL/L (ref 3–14)
BUN SERPL-MCNC: 16 MG/DL (ref 7–30)
C DIFF TOX B STL QL: NEGATIVE
CALCIUM SERPL-MCNC: 9.2 MG/DL (ref 8.5–10.1)
CHLORIDE SERPL-SCNC: 102 MMOL/L (ref 94–109)
CO2 SERPL-SCNC: 27 MMOL/L (ref 20–32)
CREAT SERPL-MCNC: 0.91 MG/DL (ref 0.66–1.25)
GFR SERPL CREATININE-BSD FRML MDRD: 79 ML/MIN/{1.73_M2}
GLUCOSE SERPL-MCNC: 111 MG/DL (ref 70–99)
POTASSIUM SERPL-SCNC: 4.4 MMOL/L (ref 3.4–5.3)
SODIUM SERPL-SCNC: 135 MMOL/L (ref 133–144)
SPECIMEN SOURCE: NORMAL

## 2019-10-25 PROCEDURE — 80048 BASIC METABOLIC PNL TOTAL CA: CPT | Performed by: PHYSICIAN ASSISTANT

## 2019-10-25 PROCEDURE — 87493 C DIFF AMPLIFIED PROBE: CPT | Performed by: FAMILY MEDICINE

## 2019-10-25 PROCEDURE — 36415 COLL VENOUS BLD VENIPUNCTURE: CPT | Performed by: PHYSICIAN ASSISTANT

## 2019-10-25 NOTE — TELEPHONE ENCOUNTER
Left VM for pt telling him his sodium is back within normal range. Also wanting to see how he is feeling.   Marga Roberson RN 1:43 PM 10/25/19

## 2019-10-28 ENCOUNTER — OFFICE VISIT (OUTPATIENT)
Dept: PEDIATRICS | Facility: CLINIC | Age: 79
End: 2019-10-28
Payer: MEDICARE

## 2019-10-28 ENCOUNTER — OFFICE VISIT (OUTPATIENT)
Dept: PHARMACY | Facility: CLINIC | Age: 79
End: 2019-10-28
Payer: OTHER GOVERNMENT

## 2019-10-28 VITALS
HEART RATE: 64 BPM | BODY MASS INDEX: 28.99 KG/M2 | WEIGHT: 184.7 LBS | DIASTOLIC BLOOD PRESSURE: 62 MMHG | HEIGHT: 67 IN | TEMPERATURE: 97.6 F | RESPIRATION RATE: 18 BRPM | OXYGEN SATURATION: 100 % | SYSTOLIC BLOOD PRESSURE: 126 MMHG

## 2019-10-28 DIAGNOSIS — I48.20 CHRONIC ATRIAL FIBRILLATION (H): ICD-10-CM

## 2019-10-28 DIAGNOSIS — G57.90 NEUROPATHY OF FOOT, UNSPECIFIED LATERALITY: ICD-10-CM

## 2019-10-28 DIAGNOSIS — M19.90 ARTHRITIS PAIN: ICD-10-CM

## 2019-10-28 DIAGNOSIS — K27.9 PUD (PEPTIC ULCER DISEASE): ICD-10-CM

## 2019-10-28 DIAGNOSIS — Z29.9 ENCOUNTER FOR PREVENTIVE MEASURE: ICD-10-CM

## 2019-10-28 DIAGNOSIS — I50.810 RIGHT HEART FAILURE WITH REDUCED RIGHT VENTRICULAR FUNCTION (H): ICD-10-CM

## 2019-10-28 DIAGNOSIS — G47.33 OSA (OBSTRUCTIVE SLEEP APNEA): ICD-10-CM

## 2019-10-28 DIAGNOSIS — E78.5 HYPERLIPIDEMIA LDL GOAL <100: ICD-10-CM

## 2019-10-28 DIAGNOSIS — Z85.46 HISTORY OF PROSTATE CANCER: ICD-10-CM

## 2019-10-28 DIAGNOSIS — E78.5 HYPERLIPIDEMIA LDL GOAL <70: ICD-10-CM

## 2019-10-28 DIAGNOSIS — D64.9 ANEMIA, UNSPECIFIED TYPE: ICD-10-CM

## 2019-10-28 DIAGNOSIS — K59.1 FUNCTIONAL DIARRHEA: Primary | ICD-10-CM

## 2019-10-28 DIAGNOSIS — R19.7 DIARRHEA, UNSPECIFIED TYPE: ICD-10-CM

## 2019-10-28 DIAGNOSIS — I34.0 NONRHEUMATIC MITRAL VALVE REGURGITATION: ICD-10-CM

## 2019-10-28 DIAGNOSIS — I50.42 CHRONIC COMBINED SYSTOLIC AND DIASTOLIC CONGESTIVE HEART FAILURE (H): ICD-10-CM

## 2019-10-28 DIAGNOSIS — I48.20 CHRONIC ATRIAL FIBRILLATION (H): Primary | ICD-10-CM

## 2019-10-28 DIAGNOSIS — M15.0 PRIMARY OSTEOARTHRITIS INVOLVING MULTIPLE JOINTS: ICD-10-CM

## 2019-10-28 DIAGNOSIS — I10 HTN, GOAL BELOW 150/90: ICD-10-CM

## 2019-10-28 DIAGNOSIS — L71.9 ROSACEA: ICD-10-CM

## 2019-10-28 DIAGNOSIS — L84 CALLUS OF FOOT: ICD-10-CM

## 2019-10-28 DIAGNOSIS — I49.3 PVC'S (PREMATURE VENTRICULAR CONTRACTIONS): ICD-10-CM

## 2019-10-28 PROCEDURE — 99215 OFFICE O/P EST HI 40 MIN: CPT | Performed by: FAMILY MEDICINE

## 2019-10-28 PROCEDURE — 99607 MTMS BY PHARM ADDL 15 MIN: CPT | Performed by: PHARMACIST

## 2019-10-28 PROCEDURE — 99605 MTMS BY PHARM NP 15 MIN: CPT | Performed by: PHARMACIST

## 2019-10-28 RX ORDER — SIMVASTATIN 20 MG
20 TABLET ORAL AT BEDTIME
Qty: 90 TABLET | Refills: 1 | Status: SHIPPED | OUTPATIENT
Start: 2019-10-28 | End: 2021-01-01

## 2019-10-28 RX ORDER — LISINOPRIL 20 MG/1
20 TABLET ORAL DAILY
Qty: 90 TABLET | Refills: 1 | Status: SHIPPED | OUTPATIENT
Start: 2019-10-28 | End: 2020-04-14

## 2019-10-28 RX ORDER — MULTIVITAMIN,THERAPEUTIC
1 TABLET ORAL DAILY
COMMUNITY

## 2019-10-28 RX ORDER — DOXYCYCLINE 100 MG/1
100 CAPSULE ORAL DAILY
Qty: 90 CAPSULE | Refills: 3 | Status: SHIPPED | OUTPATIENT
Start: 2019-10-28 | End: 2020-06-02

## 2019-10-28 RX ORDER — TORSEMIDE 20 MG/1
20 TABLET ORAL
Qty: 90 TABLET | Refills: 1 | Status: SHIPPED | OUTPATIENT
Start: 2019-10-28 | End: 2020-04-14

## 2019-10-28 RX ORDER — TAMSULOSIN HYDROCHLORIDE 0.4 MG/1
0.4 CAPSULE ORAL DAILY
Qty: 90 CAPSULE | Refills: 1 | Status: SHIPPED | OUTPATIENT
Start: 2019-10-28 | End: 2020-04-14

## 2019-10-28 RX ORDER — ACETAMINOPHEN 500 MG
500-1000 TABLET ORAL EVERY 8 HOURS PRN
COMMUNITY
End: 2022-01-01

## 2019-10-28 ASSESSMENT — MIFFLIN-ST. JEOR: SCORE: 1511.42

## 2019-10-28 ASSESSMENT — PAIN SCALES - GENERAL: PAINLEVEL: NO PAIN (0)

## 2019-10-28 NOTE — LETTER
My Heart Failure Action Plan   Name: Milo Serna    YOB: 1940   Date: 10/28/2019    [facility] [place]     [address] [place] [address]  [address]  [place] [address] [phone]  My Diagnosis: Reduced (HFr)- EF:40% - 44%   My Exercise Goal: 30 minutes daily  .     My Weight Plan:   Wt Readings from Last 2 Encounters:   10/28/19 184 lb 11.2 oz (83.8 kg)   10/16/19 180 lb (81.6 kg)     Weigh yourself daily using the same scale. If you gain more than 2 pounds in 24 hours or 5 pounds in a week call the heart clinic    My Diet Goal: No added salt, less than 1500mg per day    Emergency Room Visits:    Our goal is to improve your quality of life and help you avoid a visit to the emergency room or hospital.  If we work together, we can achieve this goal. But, if you feel you need to call 911 or go to the emergency room, please do so.  If you go to the emergency room, please bring your list of medicines and your daily weight chart with you.       GREEN ZONE     Doing well today    Weight gained is no more than 2 pounds a day or 5 pounds a week.    No swelling in feet, ankles, legs or stomach.    No more swelling than usual.    No more trouble breathing than usual.    No change in my sleep.    No other problems. Actions:    I am doing fine.  I will take my medicine, follow my diet, see my doctor, exercise, and watch for symptoms.           YELLOW ZONE         Having a bad day or flare up    Weight gain of more than 2 pounds in one day or 5 pounds in one week.    New swelling in ankle, leg, knee or thigh.    Bloating in belly, pants feel tighter.    Swelling in hands or face.    Coughing or trouble breathing while walking or talking.    Harder to breathe last night.    Have trouble sleeping, wake up short of breath.    Much more tired than usual.    Not eating.    Pain in my chest or bad leg cramps.    Feel weak or dizzy. Actions:    I need to take action and  call my doctor or nurse today.                 RED ZONE         Need medical care now    Weight gain of 5 pounds overnight.    Chest pain or pressure that does not go away.    Feel less alert.    Wheezing or have trouble breathing when at rest.    Cannot sleep lying down.    Cannot take my water pill.    Pass out or faint. Actions:    I need to call my doctor or nurse now!    Call 911 if I have chest pain or cannot breathe.

## 2019-10-28 NOTE — PROGRESS NOTES
"SUBJECTIVE/OBJECTIVE:                           Milo Serna is a 78 year old male { :641524} for an initial visit for Medication Therapy Management.  He was referred to me from ***.    Chief Complaint: ***.  Personal Healthcare Goals: ***    Allergies/ADRs: {1/2/3/4/5:710473}  Tobacco:  reports that he has quit smoking. He has never used smokeless tobacco.{Tobacco Cessation needed for ACO -- Delete if patient is a non-smoker:179773}  Alcohol: Social History    Substance and Sexual Activity      Alcohol use: Not Currently        Alcohol/week: 0.0 standard drinks        Comment: rare drink    Caffeine: {CAFFEINE INTAKE:282683}  Activity: ***  PMH: {1/2/3/4/5:330637}    Medication Adherence/Access:  {fumedadherence:858165}    ***: ***  ***: ***  ***: ***  ***: ***  ***: ***    Today's Vitals: There were no vitals taken for this visit.      ASSESSMENT:                          {mtmpartdquestion:064718}    Medication Adherence: {adherenceassess:669374}, {ADHERENCEOPTIONSASSES:134937}    ***: ***  ***: ***  ***: ***  ***: ***  ***: ***    PLAN:                          {Remind patient about MTM survey:817038}{ZULEMA?:296942}  ***    I spent {time:395991} with this patient today{MTMpartdbillingquestion:305261}. { :679961}. A copy of the visit note was provided to the patient's {[facility] chart:821387} provider.    Will follow up in ***.    The patient {GIVEN/NOT GIVEN:575394::\"was given\"} a summary of these recommendations as an after visit summary.     ***  "

## 2019-10-28 NOTE — PROGRESS NOTES
SUBJECTIVE/OBJECTIVE:                           Milo Serna is a 78 year old male coming in for an initial visit for Medication Therapy Management.  He was referred to me from Dr lAves.    Chief Complaint: He is leaving to go south to [place] on Thursday for 5 months.  Birthday and 56 Anniversary on 11[birth year]4.    Personal Healthcare Goals: He has Afib and try to following directions, leaky valvue and may need to be replaced, need good health     Allergies/ADRs: None  Tobacco:  reports that he has quit smoking. He has never used smokeless tobacco.  Alcohol: Social History    Substance and Sexual Activity      Alcohol use: Not Currently        Alcohol/week: 0.0 standard drinks        Comment: rare drink    PMH: Reviewed in Epic    Medication Adherence/Access:  Patient takes medications directly from bottles.  This works well.  Patient takes medications 3 time(s) per day.   He sepertes pills throughout the day   Forgot to renew the doxycyline and out  Medication barriers: none.   The patient fills medications at [place]: NO, fills medications at [facility], [facility].    Afib: Patient is currently taking Eliquis 2.5mg BID for anticoagulation, low dose due , to epistaxis. Patient reports frequent nose bleeds, last one 2 weeks ago. Patient does not have a hx of GI bleed.   History of CAD s/p CABG 1997  Patient is not  taking metoprolol stopped at previous cardiology visit per bradycardia. Pt reports no current medication side effects.   [id number]  Last saw Juliano Caballero PA-C from cardiology on 10/16/2019    HFrEF/HTN: Current medications include torsemide 20mg daily, potassium ER 20meq twice day, lisinopril 20mg every day at noon.  Patient reports no current medication side effects.   Stopped metoprolol due to bradycardia.  ECHO:  Date 9/11/19, EF 40%  He has worsening mitral regurgitation, high burden PVCs  Pt is not complaining of sx of HF.    Pt is measuring weekly weights and reports stable.   174 lb is normal  - 178lb  Sometimes 172  Last Comprehensive Metabolic Panel:  Sodium   Date Value Ref Range Status   10/25/2019 135 133 - 144 mmol/L Final     Potassium   Date Value Ref Range Status   10/25/2019 4.4 3.4 - 5.3 mmol/L Final     Chloride   Date Value Ref Range Status   10/25/2019 102 94 - 109 mmol/L Final     Carbon Dioxide   Date Value Ref Range Status   10/25/2019 27 20 - 32 mmol/L Final     Anion Gap   Date Value Ref Range Status   10/25/2019 6 3 - 14 mmol/L Final     Glucose   Date Value Ref Range Status   10/25/2019 111 (H) 70 - 99 mg/dL Final     Urea Nitrogen   Date Value Ref Range Status   10/25/2019 16 7 - 30 mg/dL Final     Creatinine   Date Value Ref Range Status   10/25/2019 0.91 0.66 - 1.25 mg/dL Final     GFR Estimate   Date Value Ref Range Status   10/25/2019 79 >60 mL/min/[1.73_m2] Final     Comment:     Non  GFR Calc  Starting 12/18/2018, serum creatinine based estimated GFR (eGFR) will be   calculated using the Chronic Kidney Disease Epidemiology Collaboration   (CKD-EPI) equation.       Calcium   Date Value Ref Range Status   10/25/2019 9.2 8.5 - 10.1 mg/dL Final     Estimated Creatinine Clearance: 68.2 mL/min (based on SCr of 0.91 mg/dL).    Chronic pain:  Current medications include: acetaminophen 650mg Q6 prn, glucosamine-chrondoitin 250-200mg every day , lidociaine, diclofenac gel. Pain is described as knee pain.   How is your pain? Fully effective control.  Are you able to do your normal activities? Can do everything I need to.  Are you able to sleep? Normal sleep. Patient reports the following side effects:  Denies Side Effects.    Rosacea: Current medication is doxycycline 100mg every day.  Out for 5 days.  He is  from MVI and iron.  S/E:  Diarrhea started 1 month ago    Callous:  Urea cream on right foot.  Got new shoes to see if helps    Hyperlipidemia: Current therapy includes simvastatin 20mg once daily, fish oil 1 once daily.  Pt reports the following side  effects: nose bleeding    Lab Results   Component Value Date    CHOL 88 09/11/2019     Lab Results   Component Value Date    HDL 43 09/11/2019     Lab Results   Component Value Date    LDL 34 09/11/2019     Lab Results   Component Value Date    TRIG 55 09/11/2019     Lab Results   Component Value Date    CHOLHDLRATIO 2.5 10/02/2015     Ulcer prevention: Current medications include: Prilosec (omeprazole) 40mg once twice daily. Pt c/o no current symptoms.  Patient feels that current regimen is effective.  He is worried about taking this long term.  He is wondering about long term side effects.    BPH: Current medication is tamsulosin 0.4mg every day.  Had prostate cancer, no problems with urinating.  He may need a shot in the prostate (hormone injection from note), will see when come back from [place].  PSA   Date Value Ref Range Status   04/20/2017  0 - 4 ug/L Final    <0.01  Assay Method:  Chemiluminescence using Siemens Vista analyzer         Anemia: Current medication ferrous gluconate 324mg every day.  He reports no side effects.  Hemoglobin   Date Value Ref Range Status   09/11/2019 10.7 (L) 13.3 - 17.7 g/dL Final   ]  Component      Latest Ref Rng & Units 11/11/2016 9/25/2017   Iron      35 - 180 ug/dL 32 (L)    Iron Binding Cap      240 - 430 ug/dL 280    Iron Saturation Index      15 - 46 % 11 (L)    Ferritin      26 - 388 ng/mL  97     Diarrhea: eating triggers this, he does not have infectious diarrhea per Dr. Alves    Insomnia: Current medications include: melatonin 5mg. Pt reports trouble staying asleep. This is due to his CPAP machine leaking.  He is working with sleep clinic and durable medical equipment store to find something to help with his CPAP.      Most Recent Immunizations   Administered Date(s) Administered     Influenza (High Dose) 3 valent vaccine 10/11/2019     Influenza (IIV3) PF 10/31/2012     Pneumo Conj 13-V (2010&after) 08/15/2016     Pneumococcal 23 valent 01/01/2005     TD (ADULT, 7+)  "09/22/2010     Zoster vaccine recombinant adjuvanted (SHINGRIX) 10/17/2018     Zoster vaccine, live 08/26/2011     Today's Vitals: /62 (BP Location: Right arm, Patient Position: Sitting, Cuff Size: Adult Regular)   Pulse 64   Temp 97.6  F (36.4  C) (Oral)   Resp 18   Ht 5' 7\" (1.702 m)   Wt 184 lb 11.2 oz (83.8 kg)   SpO2 100%   BMI 28.93 kg/m        ASSESSMENT:                              Medication Adherence: good, no issues identified    Afib: needs improvement.  Discussed stopping fish oil to reduce bleeding risk.    HFrEF: Needs Improvement. Patient is meeting BP goal of < 140/90mmHg.  Pt is appropriately avoiding NSAID's, diltiazem/verapamil, and pioglitazone. Pt would benefit from further education, reviewed HF action plan today.     Chronic pain:  Stable    Rosacea: stable    Callus:  Unimproved.  Pt to wear new shoes to see if helps.    Hyperlipidemia: stable, LDL <70    Ulcer prevention:  Needs improvement.  Pt would benefit from the following changes: proton pump inhibitor dose decrease.    BPH: stable    Anemia: stable    Diarrhea: unimproved.  Dr. Alves suggest colonoscopy as next steps, pt to arrange to get this done in [place]    Insomnia: Needs Improvement. Pt to work with [employer] store to get his CPAP machine fixed, encouraged him to continue to use this, he was wondering if he should stop.    PLAN:                            1.  Decrease omeprazole 40mg once daily.    2.  Diarrhea: Pt to  make an appointment with a primary physician in [place] to get colonoscopy.    3.  Stop the fish oil.      4.  Heart disease: daily weights, HF action plan reviewed.  Pt to see cardiologist in [place].    Next care team visit: April when you come back from [place], consider covisit at that time.    I spent 60 minutes with this patient today. All changes were made via verbal approval with Dr. Alves. A copy of the visit note was provided to the patient's primary care provider.    The patient was given a " summary of these recommendations as an after visit summary.     Nimo Andersen, PharmD [facility]  Medication Therapy Management Practitioner   #[phone]

## 2019-10-28 NOTE — PATIENT INSTRUCTIONS
Recommendations from today's Care Team visit:                                                      Thank you for participating in our care team visit today!    Great job getting your vaccinations!  Good job  doxycyline from the ferrous gluconate and multivitamin.    1.  Decrease omeprazole 40mg once daily.    2.  Diarrhea: you need a colonoscopy, make an appointment with a primary physician in [place].  We need to figure out a diagnosis.    3.  Stop the fish oil.  This may help reduce your nose bleeding.    4.  Heart disease: be sure to weigh yourself.  You need to see a cardiologist. When you are in [place].    Next care team visit: April when you come back from [place]    It was a pleasure seeing you today!  Please feel free to contact us  with any questions or concerns you have.      My Care Team Members                                                      Dr. Joselyn ADAMS (medication therapy management) is a service provided by a clinical pharmacist designed to help you get the most of out of your medicines.     My Clinical Pharmacist's contact information:    Nimo Andersen, [facility]  Medication Therapy Management Practitioner   #[phone]    It was great to speak with you today.  I value your experience and would be very thankful for your time with providing feedback on our clinic survey. You may receive a survey via email or text message in the next few days.

## 2019-10-28 NOTE — PROGRESS NOTES
SUBJECTIVE/OBJECTIVE:                           Milo Serna is a 78 year old male coming in for an initial visit for Medication Therapy Management.  He was referred to me from Dr Alves.    Chief Complaint: He is leaving to go south to [place] on Thursday for 5 months.  Birthday and 56 Anniversary on 11/4.    Personal Healthcare Goals: He has Afib and try to following directions, leaky valvue and may need to be replaced, need good health     Allergies/ADRs: None  Tobacco:  reports that he has quit smoking. He has never used smokeless tobacco.  Alcohol: Social History    Substance and Sexual Activity      Alcohol use: Not Currently        Alcohol/week: 0.0 standard drinks        Comment: rare drink    PMH: Reviewed in Epic    Medication Adherence/Access:  Patient takes medications directly from bottles.  This works well.  Patient takes medications 3 time(s) per day.   He sepertes pills throughout the day   Forgot to renew the doxycyline and out  Medication barriers: none.   The patient fills medications at [place]: NO, fills medications at [place], [facility].    Afib: Patient is currently taking Eliquis 2.5mg BID for anticoagulation, low dose due , to epistaxis. Patient reports frequent nose bleeds, last one 2 weeks ago. Patient does not have a hx of GI bleed.   History of CAD s/p CABG 1997  Patient is not  taking metoprolol stopped at previous cardiology visit per bradycardia. Pt reports no current medication side effects.   [id number]  Last saw Juliano Caballero PA-C from cardiology on 10/16/2019    HFrEF/HTN: Current medications include torsemide 20mg daily, potassium ER 20meq twice day, lisinopril 20mg every day at noon.  Patient reports no current medication side effects.   Stopped metoprolol due to bradycardia.  ECHO:  Date 9/11/19, EF 40%  He has worsening mitral regurgitation, high burden PVCs  Pt is not complaining of sx of HF.    Pt is measuring weekly weights and reports stable.   174 lb is normal  - 178lb  Sometimes 172  Last Comprehensive Metabolic Panel:  Sodium   Date Value Ref Range Status   10/25/2019 135 133 - 144 mmol/L Final     Potassium   Date Value Ref Range Status   10/25/2019 4.4 3.4 - 5.3 mmol/L Final     Chloride   Date Value Ref Range Status   10/25/2019 102 94 - 109 mmol/L Final     Carbon Dioxide   Date Value Ref Range Status   10/25/2019 27 20 - 32 mmol/L Final     Anion Gap   Date Value Ref Range Status   10/25/2019 6 3 - 14 mmol/L Final     Glucose   Date Value Ref Range Status   10/25/2019 111 (H) 70 - 99 mg/dL Final     Urea Nitrogen   Date Value Ref Range Status   10/25/2019 16 7 - 30 mg/dL Final     Creatinine   Date Value Ref Range Status   10/25/2019 0.91 0.66 - 1.25 mg/dL Final     GFR Estimate   Date Value Ref Range Status   10/25/2019 79 >60 mL/min/[1.73_m2] Final     Comment:     Non  GFR Calc  Starting 12/18/2018, serum creatinine based estimated GFR (eGFR) will be   calculated using the Chronic Kidney Disease Epidemiology Collaboration   (CKD-EPI) equation.       Calcium   Date Value Ref Range Status   10/25/2019 9.2 8.5 - 10.1 mg/dL Final     Estimated Creatinine Clearance: 68.2 mL/min (based on SCr of 0.91 mg/dL).    Chronic pain:  Current medications include: acetaminophen 650mg Q6 prn, glucosamine-chrondoitin 250-200mg every day , lidociaine, diclofenac gel. Pain is described as knee pain.   How is your pain? Fully effective control.  Are you able to do your normal activities? Can do everything I need to.  Are you able to sleep? Normal sleep. Patient reports the following side effects:  Denies Side Effects.    Rosacea: Current medication is doxycycline 100mg every day.  Out for 5 days.  He is  from MVI and iron.  S/E:  Diarrhea started 1 month ago    Callus:  Urea cream on right foot.  Got new shoes to see if helps    Hyperlipidemia: Current therapy includes simvastatin 20mg once daily, fish oil 1 once daily.  Pt reports the following side  effects: nose bleeding    Lab Results   Component Value Date    CHOL 88 09/11/2019     Lab Results   Component Value Date    HDL 43 09/11/2019     Lab Results   Component Value Date    LDL 34 09/11/2019     Lab Results   Component Value Date    TRIG 55 09/11/2019     Lab Results   Component Value Date    CHOLHDLRATIO 2.5 10/02/2015     Ulcer prevention: Current medications include: Prilosec (omeprazole) 40mg once twice daily. Pt c/o no current symptoms.  Patient feels that current regimen is effective.  He is worried about taking this long term.  He is wondering about long term side effects.    BPH: Current medication is tamsulosin 0.4mg every day.  Had prostate cancer, no problems with urinating.  He may need a shot in the prostate (hormone injection from note), will see when come back from [place].  PSA   Date Value Ref Range Status   04/20/2017  0 - 4 ug/L Final    <0.01  Assay Method:  Chemiluminescence using Siemens Vista analyzer         Anemia: Current medication ferrous gluconate 324mg every day.  He reports no side effects.  Hemoglobin   Date Value Ref Range Status   09/11/2019 10.7 (L) 13.3 - 17.7 g/dL Final   ]  Component      Latest Ref Rng & Units 11/11/2016 9/25/2017   Iron      35 - 180 ug/dL 32 (L)    Iron Binding Cap      240 - 430 ug/dL 280    Iron Saturation Index      15 - 46 % 11 (L)    Ferritin      26 - 388 ng/mL  97     Diarrhea: eating triggers this, recent lactoferrin, C. difficile, negative    Insomnia: Current medications include: melatonin 5mg. Pt reports trouble staying asleep. This is due to his new CPAP mask leaking.  He is working with sleep clinic and durable medical equipment store to find something to help with his CPAP.      Most Recent Immunizations   Administered Date(s) Administered     Influenza (High Dose) 3 valent vaccine 10/11/2019     Influenza (IIV3) PF 10/31/2012     Pneumo Conj 13-V (2010&after) 08/15/2016     Pneumococcal 23 valent 01/01/2005     TD (ADULT, 7+)  "09/22/2010     Zoster vaccine recombinant adjuvanted (SHINGRIX) 10/17/2018     Zoster vaccine, live 08/26/2011     Today's Vitals: /62 (BP Location: Right arm, Patient Position: Sitting, Cuff Size: Adult Regular)   Pulse 64   Temp 97.6  F (36.4  C) (Oral)   Resp 18   Ht 5' 7\" (1.702 m)   Wt 184 lb 11.2 oz (83.8 kg)   SpO2 100%   BMI 28.93 kg/m        ASSESSMENT:                              Medication Adherence: good, no issues identified    Afib: needs improvement.  Discussed stopping fish oil to reduce bleeding risk.    HFrEF: Needs Improvement. Patient is meeting BP goal of < 140/90mmHg.  Pt is appropriately avoiding NSAID's, diltiazem/verapamil, and pioglitazone. Pt would benefit from further education, reviewed HF action plan today.     Chronic pain:  Stable    Rosacea: stable    Callous:  Unimproved.  Pt to wear new shoes to see if helps.    Hyperlipidemia: stable, LDL <70    Ulcer prevention:  Needs improvement.  Pt would benefit from the following changes: proton pump inhibitor dose decrease.    BPH: stable    Anemia: stable    Diarrhea: unimproved.  Dr. Alves suggest colonoscopy as next steps, pt to arrange to get this done in [place]    Insomnia: Needs Improvement. Pt to work with [employer] store to get his CPAP machine fixed, encouraged him to continue to use this, he was wondering if he should stop.    PLAN:                            1.  Decrease omeprazole 40mg once daily.    2.  Diarrhea: Pt to  make an appointment with a primary physician in [place] to get colonoscopy.    3.  Stop the fish oil.      4.  Heart disease: daily weights, HF action plan reviewed.  Pt to see cardiologist in [place].    Next care team visit: April when you come back from [place], consider covisit at that time.    I spent 60 minutes with this patient today. All changes were made via verbal approval with Dr. Alves. A copy of the visit note was provided to the patient's primary care provider.    The patient was given a " summary of these recommendations as an after visit summary.     Nimo Andersen, PharmD [facility]  Medication Therapy Management Practitioner   #[phone]

## 2019-10-29 NOTE — PROGRESS NOTES
Subjective     Milo Serna is a 78 year old male who presents to clinic today for the following health issues:  Follow up appointment from 10-11-19   Diarrhea of presumed infectious origin  (primary encounter diagnosis)  3 weeks, no blood, no therapies tried    Component Value Flag Ref Range Units Status Collected Lab   Fecal Lactoferrin Negative   NEG^Negative  Final 10/14/2019  4:30    Comment:     Closed fracture of metatarsal bone of left foot, physeal involvement unspecified, unspecified metatarsal, sequela out of cam boot  Peripheral edema gone in AM. L leg (out of boot) swollen below sock top today.      HPI     Heart Failure Follow-up  Notes from cardiology appt from 10-3-19.    Luís's mitral regurgitation appears worse compared to a year ago on echocardiogram.  However, clinically, he does not have decompensated heart failure.  No new symptoms.  In fact, he has been taking less diuretic at 10 mg of torsemide than the prescribed dose of 20 mg.  I am not sure that his worsening mitral regurgitation is clinically significant at this point.  His LVEF has been mildly decreased for several years.  His pulmonary artery pressures are improved.  He is consistently using CPAP.  He is significantly bradycardic from taking a higher than recommended dose of metoprolol.       PLAN:     1.  Stop metoprolol completely.   2.  Stop concurrent aspirin and continue with Eliquis due to history of problematic epistaxis.   3.  Increase torsemide from 10 mg daily to 20 mg daily.   4.  Increase potassium supplement to 20 mEq b.i.d.   5.  Follow up with established cardiology [facility], Juliano Caballero, in 2 weeks with basic metabolic panel and NT-proBNP to see how he is doing with the medication changes.   6.  Luís is going to [place] for the winter at the end of November.  I will see him back when he returns with an echocardiogram, a chest x-ray and labs.  I have also advised them to set up care with a primary care provider  there.  If he develops heart failure symptoms, we would like to see him sooner.   Regarding long-term plan for the mitral regurgitation, intervening on him surgically will not be ideal.  It would be a redo surgery and his LVEF has already decreased.  If he starts getting symptoms, I would refer him for a transcatheter MitraClip.          Are you experiencing any shortness of breath? No    Are you experiencing any swelling in your legs or feet?  No    Are you using more pillows than usual? Yes    Do you cough at night?  Yes    Do you check your weight daily?  Yes    Have you had a weight change recently?  No    Are you having any of the following side effects from your medications? (Select all that apply)  The patient does not report symptoms of dizziness, fatigue, cough, swelling, or slow heart beat.    Since your last visit, how many times have you gone to the cardiologist, urgent care, emergency room, or hospital because of your heart failure?   None    Patient wears C-PAP at night..  Notes from sleep clinic on 9-27-19.     Sleep-related breathing disorder(moderate complex sleep apnea, sleep related hypoxemia, Cheyne-Alejandro Breathing pattern.   Patient reports improvement in the overall CPAP compliance and there also has been some improvement in the air leaks compared to his previous visit with the new mask.  Residual AHI is still high, with DL showing some persistent  obstructive  events, hence recommended increasing the pressure settings on his CPAP to 15 cmH2O. (During the titration study that was previously obtained the therapeutic pressure that was noted during the sleep study was CPAP at 15 cmH2O with a residual AHI of less than 5/h.)  Recommended him to continue using the CPAP regularly during sleep and instructed him to get the supplies for the device replaced regularly. He was instructed to get back to me if in case he has any trouble tolerating the increase in the pressure settings.     Recommended using  gecko nasal gel pad to alleviate the irritation   at the bridge of the nose from the mask.    Our nurse MsBronson Samantha Arsalan explained to him explained how to adjust the mask if leak and to use the air pressure.     I plan to review the compliance download with the revised pressure settings in the next 2 to 3 weeks.  And if the  obstructive events improved,  he can be followed up at the sleep clinic on a yearly basis sooner if any concerns.        Patient Active Problem List   Diagnosis     Rosacea     Hyperlipidemia LDL goal <100     Neuropathy of foot     Closed fracture of metatarsal bone of left foot, physeal involvement unspecified, unspecified metatarsal, sequela     PUD (peptic ulcer disease)     CAD (coronary artery disease)     Rhinophyma     PVC's (premature ventricular contractions)     Chronic atrial fibrillation     History of prostate cancer     Acute bilateral low back pain with left-sided sciatica     HTN, goal below 150/90     Gallbladder polyp     Cervicalgia     Fall     Chronic combined systolic and diastolic congestive heart failure (H)     Actinic keratosis     Functional diarrhea     Peripheral edema     LENORA (obstructive sleep apnea)     Nonrheumatic mitral valve regurgitation     Primary osteoarthritis involving multiple joints     Past Surgical History:   Procedure Laterality Date     C NONSPECIFIC PROCEDURE      [facility] 1996 [place]     C NONSPECIFIC PROCEDURE  2/07    l inguinal hernia repair      C NONSPECIFIC PROCEDURE      R hernia remote     C NONSPECIFIC PROCEDURE  2000    R ankle ORIF for fx     C NONSPECIFIC PROCEDURE  2005    nasal surgery      C NONSPECIFIC PROCEDURE      R rotater repair (remote)      C NONSPECIFIC PROCEDURE      appy 1966     C NONSPECIFIC PROCEDURE      sinus surgery x 2     C NONSPECIFIC PROCEDURE      L shoulder 6/09     CARDIAC SURGERY      bipass     CYSTOSCOPY FLEXIBLE, CYOABLATION PROSTATE N/A 11/9/2016    Procedure: CYSTOSCOPY FLEXIBLE, CRYOABLATION PROSTATE;   Surgeon: Krystian Owens MD;  Location: [facility] OR     GENITOURINARY SURGERY      prostate surgery      HERNIA REPAIR       LAPAROSCOPIC HERNIORRHAPHY INGUINAL Right 5/10/2017    Procedure: LAPAROSCOPIC HERNIORRHAPHY INGUINAL;  Laparoscopic preperitoneal repair of right inguinal hernia with placement of underlay mesh;  Surgeon: Enrico Bridges MD;  Location: [facility] OR     PROSTATE SURGERY         Social History     Tobacco Use     Smoking status: Former Smoker     Smokeless tobacco: Never Used     Tobacco comment: quit 3/1974   Substance Use Topics     Alcohol use: Not Currently     Alcohol/week: 0.0 standard drinks     Comment: rare drink     Family History   Problem Relation Age of Onset     Cancer Mother         stomach cancer,  age 61     Heart Disease Father         MI,  age 71     Heart Disease Brother         stent placement, RA      Hypertension Brother      Heart Disease Sister         rythmn problems with heart, hypertension         Current Outpatient Medications   Medication Sig Dispense Refill     acetaminophen (TYLENOL) 500 MG tablet Take 500-1,000 mg by mouth every 8 hours as needed       apixaban ANTICOAGULANT (ELIQUIS) 2.5 MG tablet Take 1 tablet (2.5 mg) by mouth 2 times daily 200 tablet 1     co-enzyme Q-10 100 MG CAPS Take 1 capsule (100 mg) by mouth daily 180 capsule 3     doxycycline hyclate (VIBRAMYCIN) 100 MG capsule Take 1 capsule (100 mg) by mouth daily 90 capsule 3     ferrous gluconate (FERGON) 324 (38 Fe) MG tablet Take 1 tablet (324 mg) by mouth daily (with breakfast) 90 tablet 3     Glucosamine-Chondroitin (OSTEO BI-FLEX REGULAR STRENGTH) 250-200 MG TABS Take 1 tablet by mouth daily 100 tablet 8     lisinopril (PRINIVIL/ZESTRIL) 20 MG tablet Take 1 tablet (20 mg) by mouth daily 90 tablet 1     MELATONIN PO Take 5 mg by mouth At Bedtime        multivitamin, therapeutic (THERA-VIT) TABS tablet Take 1 tablet by mouth daily       omeprazole (PRILOSEC) 40 MG DR capsule  "Take 1 capsule (40 mg) by mouth daily 90 capsule 3     potassium chloride ER (K-TAB) 20 MEQ CR tablet Take 1 tablet (20 mEq) by mouth 2 times daily 200 tablet 3     simvastatin (ZOCOR) 20 MG tablet Take 1 tablet (20 mg) by mouth At Bedtime 90 tablet 1     tamsulosin (FLOMAX) 0.4 MG capsule Take 1 capsule (0.4 mg) by mouth daily 90 capsule 1     torsemide (DEMADEX) 20 MG tablet Take 1 tablet (20 mg) by mouth daily (with breakfast) 90 tablet 1     urea (GORMEL) 20 % external cream Apply topically as needed Apply to right foot daily 120 g 2     diclofenac (VOLTAREN) 1 % GEL Apply 4 gramsto area qid prn 100 g 11     naftifine (NAFTIN) 1 % GEL topical gel Apply topically daily Apply to affected nails daily (Patient not taking: Reported on 10/16/2019) 90 g 3     order for DME Equipment being ordered: zip compression stocking to knee. Left. (Patient not taking: Reported on 10/16/2019) 1 Device 0     No Known Allergies  BP Readings from Last 3 Encounters:   10/28/19 126/62   10/16/19 116/64   10/11/19 125/60    Wt Readings from Last 3 Encounters:   10/28/19 83.8 kg (184 lb 11.2 oz)   10/16/19 81.6 kg (180 lb)   10/11/19 81.6 kg (180 lb)                      Reviewed and updated as needed this visit by Provider  Tobacco  Allergies  Meds  Problems  Med Hx  Surg Hx  Fam Hx         Review of Systems   ROS COMP: Constitutional, HEENT, cardiovascular, pulmonary, GI, , musculoskeletal, neuro, skin, endocrine and psych systems are negative, except as otherwise noted.      Objective                   BMI:   Estimated body mass index is 28.93 kg/m  as calculated from the following:    Height as of this encounter: 1.702 m (5' 7\").    Weight as of this encounter: 83.8 kg (184 lb 11.2 oz).         Return in about 6 months (around 4/28/2020).    Oscar Alves MD  [facility]            "

## 2019-10-30 DIAGNOSIS — I48.20 CHRONIC ATRIAL FIBRILLATION (H): ICD-10-CM

## 2019-12-10 ENCOUNTER — TRANSFERRED RECORDS (OUTPATIENT)
Dept: HEALTH INFORMATION MANAGEMENT | Facility: CLINIC | Age: 79
End: 2019-12-10

## 2019-12-19 NOTE — PROGRESS NOTES
Pt completed MRI.  Pt taken to lab via wheelchair.  Son In Law ( ) with pt.  Instructed  to stay with pt for 12 hours and Pt is not to operate a car for at least 12hours.  Pt and  verbalized understanding   No

## 2020-02-23 ENCOUNTER — HEALTH MAINTENANCE LETTER (OUTPATIENT)
Age: 80
End: 2020-02-23

## 2020-03-03 RX ORDER — METOPROLOL SUCCINATE 25 MG
TABLET, EXTENDED RELEASE 24 HR ORAL
Qty: 45 TABLET | Refills: 4 | OUTPATIENT
Start: 2020-03-03

## 2020-03-03 NOTE — TELEPHONE ENCOUNTER
Pt no longer on rashid Villasenor RN      PLAN:     1.  Stop metoprolol completely.   2.  Stop concurrent aspirin and continue with Eliquis due to history of problematic epistaxis.   3.  Increase torsemide from 10 mg daily to 20 mg daily.   4.  Increase potassium supplement to  20 mEq b.i.d.   5.  Follow up with established cardiology [facility], Juliano Caballero, in 2 weeks with basic metabolic panel and NT-proBNP to see how he is doing with the medication changes.   6.  Luís is going to [place] for the winter at the end of November.  I will see him back when he returns with an echocardiogram, a chest x-ray and labs.  I have also advised them to set up care with a primary care provider there.  If he develops heart failure symptoms, we would like to see him sooner.   Regarding long-term plan for the mitral regurgitation, intervening on him surgically will not be ideal.  It would be a redo surgery and his LVEF has already decreased.  If he starts getting symptoms, I would refer him for a transcatheter MitraClip.

## 2020-04-02 ENCOUNTER — TELEPHONE (OUTPATIENT)
Dept: FAMILY MEDICINE | Facility: CLINIC | Age: 80
End: 2020-04-02

## 2020-04-02 NOTE — TELEPHONE ENCOUNTER
04/02/2020    Pt called stating he wants to know if he can cancel his appt on 04/14/2020 or if a phone visit would be effective because he is currently on lock down. I asked why his appt was scheduled and pt doesn't remember. Pt would like a call with some advice for the best option.    Ph:[phone]    Torie Carson -Patient Representative

## 2020-04-02 NOTE — TELEPHONE ENCOUNTER
According to 10/28/19 OV the follow up is for  echocardiogram, a chest x-ray and labs.  Is a virtual/phone visit on 4/14/20 alright?    Yolie Anderson, CMA

## 2020-04-14 ENCOUNTER — VIRTUAL VISIT (OUTPATIENT)
Dept: FAMILY MEDICINE | Facility: CLINIC | Age: 80
End: 2020-04-14
Payer: MEDICARE

## 2020-04-14 ENCOUNTER — ALLIED HEALTH/NURSE VISIT (OUTPATIENT)
Dept: PHARMACY | Facility: CLINIC | Age: 80
End: 2020-04-14
Payer: OTHER GOVERNMENT

## 2020-04-14 DIAGNOSIS — L71.9 ROSACEA: ICD-10-CM

## 2020-04-14 DIAGNOSIS — G47.33 OSA (OBSTRUCTIVE SLEEP APNEA): ICD-10-CM

## 2020-04-14 DIAGNOSIS — Z85.46 HISTORY OF PROSTATE CANCER: ICD-10-CM

## 2020-04-14 DIAGNOSIS — K59.1 FUNCTIONAL DIARRHEA: ICD-10-CM

## 2020-04-14 DIAGNOSIS — Z78.9 TAKES DIETARY SUPPLEMENTS: ICD-10-CM

## 2020-04-14 DIAGNOSIS — M19.90 ARTHRITIS PAIN: ICD-10-CM

## 2020-04-14 DIAGNOSIS — I50.42 CHRONIC COMBINED SYSTOLIC AND DIASTOLIC CONGESTIVE HEART FAILURE (H): ICD-10-CM

## 2020-04-14 DIAGNOSIS — H10.45 CHRONIC ALLERGIC CONJUNCTIVITIS: ICD-10-CM

## 2020-04-14 DIAGNOSIS — I10 HTN, GOAL BELOW 150/90: ICD-10-CM

## 2020-04-14 DIAGNOSIS — R04.0 EPISTAXIS: ICD-10-CM

## 2020-04-14 DIAGNOSIS — K27.9 PUD (PEPTIC ULCER DISEASE): ICD-10-CM

## 2020-04-14 DIAGNOSIS — I48.20 CHRONIC ATRIAL FIBRILLATION (H): ICD-10-CM

## 2020-04-14 DIAGNOSIS — I48.20 CHRONIC ATRIAL FIBRILLATION (H): Primary | ICD-10-CM

## 2020-04-14 DIAGNOSIS — I50.810 RIGHT HEART FAILURE WITH REDUCED RIGHT VENTRICULAR FUNCTION (H): Primary | ICD-10-CM

## 2020-04-14 DIAGNOSIS — E78.5 HYPERLIPIDEMIA LDL GOAL <70: ICD-10-CM

## 2020-04-14 PROCEDURE — 99441 ZZC PHYSICIAN TELEPHONE EVALUATION 5-10 MIN: CPT | Performed by: FAMILY MEDICINE

## 2020-04-14 PROCEDURE — 99607 MTMS BY PHARM ADDL 15 MIN: CPT | Performed by: PHARMACIST

## 2020-04-14 PROCEDURE — 99605 MTMS BY PHARM NP 15 MIN: CPT | Performed by: PHARMACIST

## 2020-04-14 RX ORDER — LISINOPRIL 20 MG/1
20 TABLET ORAL DAILY
Qty: 90 TABLET | Refills: 1 | Status: SHIPPED | OUTPATIENT
Start: 2020-04-14 | End: 2020-11-11

## 2020-04-14 RX ORDER — TORSEMIDE 20 MG/1
20 TABLET ORAL
Qty: 90 TABLET | Refills: 1 | Status: SHIPPED | OUTPATIENT
Start: 2020-04-14 | End: 2020-11-11

## 2020-04-14 RX ORDER — IPRATROPIUM BROMIDE 21 UG/1
SPRAY, METERED NASAL
COMMUNITY
Start: 2020-03-30 | End: 2020-06-09

## 2020-04-14 RX ORDER — TAMSULOSIN HYDROCHLORIDE 0.4 MG/1
0.4 CAPSULE ORAL DAILY
Qty: 90 CAPSULE | Refills: 1 | Status: SHIPPED | OUTPATIENT
Start: 2020-04-14 | End: 2020-11-03

## 2020-04-14 NOTE — PROGRESS NOTES
"Milo Serna is a 79 year old male who is being evaluated via a billable telephone visit.      The patient has been notified of following:     \"This telephone visit will be conducted via a call between you and your physician/provider. We have found that certain health care needs can be provided without the need for a physical exam.  This service lets us provide the care you need with a short phone conversation.  If a prescription is necessary we can send it directly to your pharmacy.  If lab work is needed we can place an order for that and you can then stop by our lab to have the test done at a later time.    Telephone visits are billed at different rates depending on your insurance coverage. During this emergency period, for some insurers they may be billed the same as an in-person visit.  Please reach out to your insurance provider with any questions.    If during the course of the call the physician/provider feels a telephone visit is not appropriate, you will not be charged for this service.\"    Patient has given verbal consent for Telephone visit?  Yes    How would you like to obtain your AVS?     Subjective     Milo Serna is a 79 year old male who presents to clinic today for the following health issues:    Hyperlipidemia Follow-Up      Are you regularly taking any medication or supplement to lower your cholesterol?   No    Are you having muscle aches or other side effects that you think could be caused by your cholesterol lowering medication?  No    Hypertension Follow-up      Do you check your blood pressure regularly outside of the clinic? Yes     Are you following a low salt diet? Yes     Are your blood pressures ever more than 140 on the top number (systolic) OR more   than 90 on the bottom number (diastolic), for example 140/90? Yes      How many servings of fruits and vegetables do you eat daily?  2-3    On average, how many sweetened beverages do you drink each day (Examples: soda, " juice, sweet tea, etc.  Do NOT count diet or artificially sweetened beverages)?   0    How many days per week do you exercise enough to make your heart beat faster? 5    How many minutes a day do you exercise enough to make your heart beat faster? 20 - 29    How many days per week do you miss taking your medication? 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)  (primary encounter diagnosis)  Chronic combined systolic and diastolic congestive heart failure (H) he has per his report quite stable  Chronic atrial fibrillation anticoagulation has been adjusted so that he is no longer having epistaxis  HTN, goal below 150/90 status unknown but well controlled at last measure  History of prostate cancer plan was to follow with urology in the summer  Epistaxis single episode over the winter  Functional diarrhea this has almost resolved          Past Medical History:   Diagnosis Date     Actinic keratosis 10/12/2019     Anemia, unspecified 11/8/2016     Atrial fibrillation (H) 5/27/2015     BPH (benign prostatic hyperplasia) 1/25/2012     CAD (coronary artery disease) 4/4/2012     Closed bimalleolar fracture of left ankle with routine healing 4/5/2019     Closed fracture of metatarsal bone 4/4/2012     Coronary atherosclerosis of unspecified type of vessel, native or graft [facility] 2006    CAB 1996 following MI (at [facility])      Dilated aortic root (H) 5/26/2016     Drug-induced erectile dysfunction 10/2/2015     Elevated blood sugar 11/8/2016     Elevated PSA 9/19/2013     Epistaxis 9/9/2019     Essential hypertension with goal blood pressure less than 140/90 9/22/2016     Essential hypertension, benign      Fall 9/6/2019     Functional diarrhea 10/12/2019     Gallbladder polyp 5/1/2018     Gout      HAV (hallux abducto valgus) 4/4/2012     Hematoma 9/9/2019     Hernia, abdominal      History of prostate cancer 6/15/2016     HTN, goal below 150/90 4/20/2017     Hypokalemia 9/12/2019      Iron deficiency anemia secondary to inadequate dietary iron intake 11/15/2016     Low blood pressure reading 10/17/2016     Lumbago     had degendisc dz on MRI 7/07     Memory loss 6/18/2019     Mixed hyperlipidemia      Mumps      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     Nonrheumatic mitral valve regurgitation 10/28/2019     OA (osteoarthritis) 4/4/2012     Old myocardial infarction 4/4/2012     LENORA (obstructive sleep apnea) 10/28/2019     Other viral warts 9/6/2019     Palpitations      Pes planus 4/4/2012     Petechiae 6/18/2019     Prostate cancer (H) 5/26/2016     PUD (peptic ulcer disease) 4/4/2012     Pulmonary hypertension (H) 9/25/2017     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     Rosacea 1/15/2008     Stress due to spouse with dementia 6/18/2019     Thrombocytopenia (H) 4/21/2017     Unspecified hyperplasia of prostate with urinary obstruction and other lower urinary tract symptoms (LUTS)     better on avodart & hytrin     Venous stasis 4/4/2012     Venous stasis 4/4/2012       Past Surgical History:   Procedure Laterality Date     C NONSPECIFIC PROCEDURE      CAB 1996 [facility] [place]     C NONSPECIFIC PROCEDURE  2/07    l inguinal hernia repair      C NONSPECIFIC PROCEDURE      R hernia remote     C NONSPECIFIC PROCEDURE  2000    R ankle ORIF for fx     C NONSPECIFIC PROCEDURE  2005    nasal surgery      C NONSPECIFIC PROCEDURE      R rotater repair (remote)      C NONSPECIFIC PROCEDURE      appy 1966     C NONSPECIFIC PROCEDURE      sinus surgery x 2     C NONSPECIFIC PROCEDURE      L shoulder 6/09     CARDIAC SURGERY      bipass     CYSTOSCOPY FLEXIBLE, CYOABLATION PROSTATE N/A 11/9/2016    Procedure: CYSTOSCOPY FLEXIBLE, CRYOABLATION PROSTATE;  Surgeon: Krystian Owens MD;  Location:  OR     GENITOURINARY SURGERY      prostate surgery      HERNIA REPAIR       LAPAROSCOPIC HERNIORRHAPHY INGUINAL Right 5/10/2017    Procedure: LAPAROSCOPIC HERNIORRHAPHY INGUINAL;  Laparoscopic preperitoneal repair of right inguinal hernia with  placement of underlay mesh;  Surgeon: Enrico Bridges MD;  Location: [facility] OR     PROSTATE SURGERY         Family History   Problem Relation Age of Onset     Cancer Mother         stomach cancer,  age 61     Heart Disease Father         MI,  age 71     Heart Disease Brother         stent placement, RA      Hypertension Brother      Heart Disease Sister         rythmn problems with heart, hypertension       Social History     Tobacco Use     Smoking status: Former Smoker     Smokeless tobacco: Never Used     Tobacco comment: quit 3/1974   Substance Use Topics     Alcohol use: Not Currently     Alcohol/week: 0.0 standard drinks     Comment: rare drink   He is remaining in [place] at the advice of his family and his doctor there who suggested he not fly      Reviewed and updated as needed this visit by Provider  Allergies  Meds  Med Hx  Surg Hx         Review of Systems   Feels great.  No pulmonary or cardiac symptoms.  Bowel movements are okay.       Objective       Assessment/Plan:  Problem List Items Addressed This Visit        Respiratory    Epistaxis     Single episode over the entire winter            Digestive    Functional diarrhea     This is improved            Circulatory    Chronic atrial fibrillation      Current anticoagulation is tolerated         HTN, goal below 150/90     BP Readings from Last 1 Encounters:   10/28/19 126/62              Relevant Medications    lisinopril (ZESTRIL) 20 MG tablet    Chronic combined systolic and diastolic congestive heart failure (H)     He should have electrolytes checked.  Depending upon travel restrictions, he may have to do that in [place].  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  He continues with support hose emollients and urea to his callus         Relevant Medications    torsemide  (DEMADEX) 20 MG tablet       Other    History of prostate cancer     Plan is to follow-up with urology this summer         Relevant Medications    tamsulosin (FLOMAX) 0.4 MG capsule          Return in about 3 months (around 7/14/2020) for virtual visit.      Phone call duration:  6 minutes    Oscar Alves MD

## 2020-04-14 NOTE — PROGRESS NOTES
"MTM ENCOUNTER  SUBJECTIVE/OBJECTIVE:                           Milo Serna is a 79 year old male called for a follow-up visit. He was referred to me from Dr. Alves.  Today's visit is a follow-up MTM visit from 10/28/19. Had phone visit with Dr. Alves immediately prior to visit today.    Patient consented to a telehealth visit: yes    Chief Complaint: General medication review. Currently still in [place].    Tobacco:  reports that he has quit smoking. He has never used smokeless tobacco.  Alcohol: not currently using    Medication Adherence/Access: He didn't realize he had stopped taking torsemide - thought he wasn't supposed to take this. Has all his pill bottles lined up and he takes these every day plus his night pills in pillbox. 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 He established care with a general physician in [place].    Afib/HFrEF/HTN: Patient is currently taking Eliquis 2.5mg BID for anticoagulation, low dose due to epistaxis. Also takes torsemide 20mg daily (hasn't been taking for \"a while\" b/c he forgot about it), potassium ER 20meq twice day, lisinopril 20mg every day at noon. Patient reports no more nose bleeds b/c [place] provider helped solve his nose bleeds. Patient does not have a hx of GI bleed.   History of CAD s/p CABG 1997  Patient is not taking metoprolol stopped at previous cardiology visit per bradycardia. Pt reports no current medication side effects.   [id number]  Last saw Juliano Caballero PA-C from cardiology on 10/16/2019  9/2019 Echo EF - 35-40%  Pt is not complaining of sx of HF.  Pt is measuring weekly weights and reports stable.   Home weight today was 171 lbs, similar to last OV in 10/2019. Reports home BP monitoring always stable, if not lower 100-120's systolic.  Wt Readings from Last 3 Encounters:   10/28/19 184 lb 11.2 oz (83.8 kg)   10/16/19 180 lb (81.6 kg)   10/11/19 180 lb (81.6 kg)     BP Readings from Last 3 Encounters:   10/28/19 126/62   10/16/19 116/64   10/11/19 125/60       Chronic pain:  " Current medications include: acetaminophen 650mg Q6 prn (only some days), glucosamine-chrondoitin every day, diclofenac gel PRN. Pain is described as knee pain and diclofenac gel for lumbar spine.   How is your pain? Fully effective control.  Patient reports the following side effects:  Denies Side Effects.    Rosacea: Current medication is doxycycline 100mg every day. He is  from MVI and iron. No issues reported.    Hyperlipidemia: Current therapy includes simvastatin 20mg once daily.  Pt reports no side effects.  Recent Labs   Lab Test 09/11/19  0757 04/05/19  1211  10/02/15  0955 09/25/14  1210   CHOL 88 116   < > 122 117   HDL 43 46   < > 48 45   LDL 34 56   < > 63 60   TRIG 55 69   < > 53 62   CHOLHDLRATIO  --   --   --  2.5 2.6    < > = values in this interval not displayed.       Ulcer prevention: Current medications include: Prilosec (omeprazole) 40mg once daily. Pt c/o no current symptoms.  Patient feels that current regimen is effective.     BPH: Current medication is tamsulosin 0.4mg every day.  Had prostate cancer, no problems with urinating.     Insomnia: Current medications include: melatonin 5mg. Pt reports no trouble with sleeping.     Allergies: Pt takes ipratropium nasal spray BID PRN for sinuses and states this is effective. No issues.     Supplements: Current medication ferrous gluconate 324mg every day, co-enzyme Q10 daily, and Men's MVI daily.  He reports no side effects.      Today's Vitals: There were no vitals taken for this visit. - telephone visit      ASSESSMENT:                              Medication Adherence: needs improvement - see below      Afib/HFrEF/HTN: Needs improvement.  He required further education and adherence reminder to torsemide.  Will be due for BMP recheck in [place] in 2-3 weeks or next month once able to come into the clinic.    Chronic pain: Stable    Rosacea: Stable    Hyperlipidemia: Stable    Ulcer prevention: Stable    BPH: Stable    Insomnia:  Stable    Allergies: Stable    Supplements: Stable     PLAN:                            1. Adherence reminder for torsemide 20mg daily.    I spent 30 minutes with this patient today. All changes were made via collaborative practice agreement with Oscar Alves. A copy of the visit note was provided to the patient's primary care provider.    Will follow up in 1-2 months pending labs.    The patient declined a summary of these recommendations.     Stacia Weber, PharmD  Medication Therapy Management Provider, [facility]  Pager: [phone]

## 2020-04-14 NOTE — ASSESSMENT & PLAN NOTE
He should have electrolytes checked.  Depending upon travel restrictions, he may have to do that in [place].  Information sent to him

## 2020-05-21 DIAGNOSIS — M54.50 ACUTE LEFT-SIDED LOW BACK PAIN WITHOUT SCIATICA: ICD-10-CM

## 2020-05-21 NOTE — TELEPHONE ENCOUNTER
Diclofenac gel  Last Written Prescription Date:  9/22/16  Last Fill Quantity: 100,  # refills: 11   Last office visit: 10/11/2019 with prescribing provider:     Future Office Visit:      Not filled since 9/22/16.  Sent to provider.  Rebeka Gale RN

## 2020-05-28 DIAGNOSIS — C61 PROSTATE CANCER (H): Primary | ICD-10-CM

## 2020-06-01 ENCOUNTER — OFFICE VISIT (OUTPATIENT)
Dept: UROLOGY | Facility: CLINIC | Age: 80
End: 2020-06-01
Payer: MEDICARE

## 2020-06-01 VITALS
OXYGEN SATURATION: 97 % | BODY MASS INDEX: 26.21 KG/M2 | WEIGHT: 167 LBS | DIASTOLIC BLOOD PRESSURE: 64 MMHG | HEIGHT: 67 IN | HEART RATE: 68 BPM | SYSTOLIC BLOOD PRESSURE: 112 MMHG

## 2020-06-01 DIAGNOSIS — C61 PROSTATE CANCER (H): Primary | ICD-10-CM

## 2020-06-01 LAB — PSA SERPL-MCNC: 2 NG/ML (ref 0–4)

## 2020-06-01 PROCEDURE — 84153 ASSAY OF PSA TOTAL: CPT | Performed by: UROLOGY

## 2020-06-01 PROCEDURE — 96402 CHEMO HORMON ANTINEOPL SQ/IM: CPT | Performed by: UROLOGY

## 2020-06-01 PROCEDURE — 36415 COLL VENOUS BLD VENIPUNCTURE: CPT | Performed by: UROLOGY

## 2020-06-01 PROCEDURE — 99214 OFFICE O/P EST MOD 30 MIN: CPT | Performed by: UROLOGY

## 2020-06-01 RX ORDER — LEUPROLIDE ACETATE 45 MG
45 KIT INTRAMUSCULAR
Qty: 1 EACH | Refills: 0 | Status: SHIPPED | OUTPATIENT
Start: 2020-06-01 | End: 2020-06-01

## 2020-06-01 ASSESSMENT — PAIN SCALES - GENERAL: PAINLEVEL: NO PAIN (0)

## 2020-06-01 ASSESSMENT — MIFFLIN-ST. JEOR: SCORE: 1426.14

## 2020-06-01 NOTE — PROGRESS NOTES
History: It is a great pleasure to see this very pleasant 79-year-old gentleman in follow-up consultation today.  He has a significantly complicated history.  He had presented to us in 2016 with a PSA of 7.0.  He had been diagnosed with Angelique 7 and Angelique 8 adenocarcinoma prostate at that time, but also had a history of atrial fibrillation and was on treatment with both Eliquis and baby aspirin.  They have been careful discussions by my colleagues about therapeutic options at that time and it was considered that he was not a candidate for radical prostatectomy and it was also considered inappropriate for radiation therapy due to the need for anticoagulants with the concern about the future potential for bleeding because of radiation therapy resulting in radiation cystitis.  We are therefore discussed the possibility of cryoablation of the prostate.  Metastatic evaluation of a negative, this was subsequently performed in November 2016.  He has done well since then, he is continent of urine, does have very mild nocturia and a satisfactory urine stream.  We recall he had received Eligard before cryoablation of the prostate.  Results for JENI GONZALEZ (MRN [id number]) as of 6/1/2020 14:54   Ref. Range 1/21/2016 00:00 4/21/2016 00:00 7/18/2016 11:15 7/26/2016 00:00 4/20/2017 09:38   PSA Latest Ref Range: 0 - 4 ug/L 7.0 0.65 0.98 0.98 <0.01...   This reduced the PSA prior to cryoablation of 7.020.98.  This was done to reduce the volume of the prostate to make it more amenable to cryoablation.  Immediately after cryoablation the PSA dropped to an unrecordable level.  We have followed closely since then.  Results for JENI GONZALEZ (MRN [id number]) as of 6/1/2020 14:54   Ref. Range 12/20/2017 10:14 6/21/2018 14:00 6/24/2019 13:23 6/1/2020 14:01   PSA Diag Urologic Phys Latest Ref Range: 0.00 - 4.00 ng/mL 0.12 0.24 0.89 2.00   Since then the PSA has gradually risen and over the last year has doubled from 0.89 up to  2.0.  The patient has no other complaints, is voiding well, has no problems with incontinence, does have mild nocturia and does not observe gross hematuria.  His other medical issues are unchanged    Past Medical History:   Diagnosis Date     Actinic keratosis 10/12/2019     Anemia, unspecified 11/8/2016     Atrial fibrillation (H) 5/27/2015     BPH (benign prostatic hyperplasia) 1/25/2012     CAD (coronary artery disease) 4/4/2012     Closed bimalleolar fracture of left ankle with routine healing 4/5/2019     Closed fracture of metatarsal bone 4/4/2012     Coronary atherosclerosis of unspecified type of vessel, native or graft [facility]    CAB 1996 following MI (at [facility])      Dilated aortic root (H) 5/26/2016     Drug-induced erectile dysfunction 10/2/2015     Elevated blood sugar 11/8/2016     Elevated PSA 9/19/2013     Epistaxis 9/9/2019     Essential hypertension with goal blood pressure less than 140/90 9/22/2016     Essential hypertension, benign      Fall 9/6/2019     Functional diarrhea 10/12/2019     Gallbladder polyp 5/1/2018     Gout      HAV (hallux abducto valgus) 4/4/2012     Hematoma 9/9/2019     Hernia, abdominal      History of prostate cancer 6/15/2016     HTN, goal below 150/90 4/20/2017     Hypokalemia 9/12/2019     Iron deficiency anemia secondary to inadequate dietary iron intake 11/15/2016     Low blood pressure reading 10/17/2016     Lumbago     had degendisc dz on MRI 7/07     Memory loss 6/18/2019     Mixed hyperlipidemia      Mumps      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     Nonrheumatic mitral valve regurgitation 10/28/2019     OA (osteoarthritis) 4/4/2012     Old myocardial infarction 4/4/2012     LENORA (obstructive sleep apnea) 10/28/2019     Other viral warts 9/6/2019     Palpitations      Pes planus 4/4/2012     Petechiae 6/18/2019     Prostate cancer (H) 5/26/2016     PUD (peptic ulcer disease) 4/4/2012     Pulmonary hypertension (H) 9/25/2017     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      Rosacea 1/15/2008     Stress due to spouse with dementia 6/18/2019     Thrombocytopenia (H) 4/21/2017     Unspecified hyperplasia of prostate with urinary obstruction and other lower urinary tract symptoms (LUTS)     better on avodart & hytrin     Venous stasis 4/4/2012     Venous stasis 4/4/2012       Social History     Socioeconomic History     Marital status:      Spouse name: None     Number of children: None     Years of education: None     Highest education level: None   Occupational History     None   Social Needs     Financial resource strain: None     Food insecurity     Worry: None     Inability: None     Transportation needs     Medical: None     Non-medical: None   Tobacco Use     Smoking status: Former Smoker     Smokeless tobacco: Never Used     Tobacco comment: quit 3/1974   Substance and Sexual Activity     Alcohol use: Not Currently     Alcohol/week: 0.0 standard drinks     Comment: rare drink     Drug use: No     Sexual activity: Not Currently     Partners: Female   Lifestyle     Physical activity     Days per week: None     Minutes per session: None     Stress: None   Relationships     Social connections     Talks on phone: None     Gets together: None     Attends Confucianism service: None     Active member of club or organization: None     Attends meetings of clubs or organizations: None     Relationship status: None     Intimate partner violence     Fear of current or ex partner: None     Emotionally abused: None     Physically abused: None     Forced sexual activity: None   Other Topics Concern     Parent/sibling w/ CABG, MI or angioplasty before 65F 55M? Not Asked      Service Not Asked     Blood Transfusions Not Asked     Caffeine Concern Yes     Comment: 1 cups of coffee and soda per day     Occupational Exposure Not Asked     Hobby Hazards Not Asked     Sleep Concern Not Asked     Stress Concern Not Asked     Weight Concern Not Asked     Special Diet No     Back Care Not Asked      Exercise Yes     Comment: walking     Bike Helmet Not Asked     Seat Belt Yes     Self-Exams Not Asked   Social History Narrative     None       Past Surgical History:   Procedure Laterality Date     C NONSPECIFIC PROCEDURE      CAB 1996     C NONSPECIFIC PROCEDURE      l inguinal hernia repair      C NONSPECIFIC PROCEDURE      R hernia remote     C NONSPECIFIC PROCEDURE      R ankle ORIF for fx     C NONSPECIFIC PROCEDURE      nasal surgery      C NONSPECIFIC PROCEDURE      R rotater repair (remote)      C NONSPECIFIC PROCEDURE      appy 1966     C NONSPECIFIC PROCEDURE      sinus surgery x 2     C NONSPECIFIC PROCEDURE      L shoulder      CARDIAC SURGERY      bipass     CYSTOSCOPY FLEXIBLE, CYOABLATION PROSTATE N/A 2016    Procedure: CYSTOSCOPY FLEXIBLE, CRYOABLATION PROSTATE;  Surgeon: Krystian Owens MD;  Location: [facility] OR     GENITOURINARY SURGERY      prostate surgery      HERNIA REPAIR       LAPAROSCOPIC HERNIORRHAPHY INGUINAL Right 5/10/2017    Procedure: LAPAROSCOPIC HERNIORRHAPHY INGUINAL;  Laparoscopic preperitoneal repair of right inguinal hernia with placement of underlay mesh;  Surgeon: Enrico Bridges MD;  Location: [facility] OR     PROSTATE SURGERY         Family History   Problem Relation Age of Onset     Cancer Mother         stomach cancer,  age 61     Heart Disease Father         MI,  age 71     Heart Disease Brother         stent placement, RA      Hypertension Brother      Heart Disease Sister         rythmn problems with heart, hypertension         Current Outpatient Medications:      acetaminophen (TYLENOL) 500 MG tablet, Take 500-1,000 mg by mouth every 8 hours as needed, Disp: , Rfl:      apixaban ANTICOAGULANT (ELIQUIS) 2.5 MG tablet, Take 1 tablet (2.5 mg) by mouth 2 times daily, Disp: 180 tablet, Rfl: 3     co-enzyme Q-10 100 MG CAPS, Take 1 capsule (100 mg) by mouth daily, Disp: 180 capsule, Rfl: 3     diclofenac (VOLTAREN) 1 %  topical gel, Apply 4 gramsto area qid prn, Disp: 100 g, Rfl: 11     ferrous gluconate (FERGON) 324 (38 Fe) MG tablet, Take 1 tablet (324 mg) by mouth daily (with breakfast), Disp: 90 tablet, Rfl: 3     Glucosamine-Chondroitin (OSTEO BI-FLEX REGULAR STRENGTH) 250-200 MG TABS, Take 1 tablet by mouth daily, Disp: 100 tablet, Rfl: 8     ipratropium (ATROVENT) 0.03 % nasal spray, USE 1 SPRAY(S) IN EACH NOSTRIL TWICE DAILY, Disp: , Rfl:      lisinopril (ZESTRIL) 20 MG tablet, Take 1 tablet (20 mg) by mouth daily, Disp: 90 tablet, Rfl: 1     MELATONIN PO, Take 5 mg by mouth At Bedtime , Disp: , Rfl:      multivitamin, therapeutic (THERA-VIT) TABS tablet, Take 1 tablet by mouth daily, Disp: , Rfl:      omeprazole (PRILOSEC) 40 MG DR capsule, Take 1 capsule (40 mg) by mouth daily, Disp: 90 capsule, Rfl: 3     order for DME, Equipment being ordered: zip compression stocking to knee. Left., Disp: 1 Device, Rfl: 0     potassium chloride ER (K-TAB) 20 MEQ CR tablet, Take 1 tablet (20 mEq) by mouth 2 times daily, Disp: 200 tablet, Rfl: 3     simvastatin (ZOCOR) 20 MG tablet, Take 1 tablet (20 mg) by mouth At Bedtime, Disp: 90 tablet, Rfl: 1     tamsulosin (FLOMAX) 0.4 MG capsule, Take 1 capsule (0.4 mg) by mouth daily, Disp: 90 capsule, Rfl: 1     torsemide (DEMADEX) 20 MG tablet, Take 1 tablet (20 mg) by mouth daily (with breakfast), Disp: 90 tablet, Rfl: 1     UREA 20 INTENSIVE HYDRATING 20 % external cream, Apply topically as needed Apply to right foot daily, Disp: 120 g, Rfl: 11     doxycycline hyclate (VIBRAMYCIN) 100 MG capsule, Take 1 capsule (100 mg) by mouth daily (Patient not taking: Reported on 6/1/2020), Disp: 90 capsule, Rfl: 3    10 point ROS of systems including Constitutional, Eyes, Respiratory, Cardiovascular, Gastroenterology, Genitourinary, Integumentary, Muscularskeletal, Psychiatric and Neurologic were all negative except for pertinent positives noted in my HPI.    Examination:   /64   Pulse 68   Ht  "1.702 m (5' 7\")   Wt 75.8 kg (167 lb)   SpO2 97%   BMI 26.16 kg/m    General Impression: Very pleasant patient in no acute distress, well-oriented in time place and person and quite conversational  Mental Status: Normal  HEENT: Extraocular movements intact.  No clinical evidence of jaundice on examination of eyes.  Mucous membranes are unremarkable  Skin: Warm.  No other abnormalities  Respiratory System: Unlabored on room air.  Respiratory cycle normal  Lymph Nodes: Not examined  Back/Flank Tenderness: No evidence of kyphosis, scoliosis or lordosis  Cardiovascular System: No significant peripheral pitting edema  Abdominal Examination: He is not obese  Extremities: Extremities otherwise unremarkable  Genitial: Not examined  Rectal Examination: Not examined  Neurologic System: There are no significant acute abnormal neurological signs in the central or peripheral nervous systems    Impression: We did have a careful discussion today because this is a complex prostate cancer issue the patient has had cryoablation of the prostate.  He did have high grade disease i.e. Orovada 8 prior to cryoablation and the PSA has started to climb although it is still relatively low at only 2.0.  It is now 4 years since he is received any hormonal treatment.  I would recommend today that we give him a 45 mg leuprolide 6-month depot injection as part of intermittent hormone therapy.  I would anticipate this will not need to be given every 6 months but may and may not even need to be given it again for several years.  I have advised him about the possibility of hot flashes which really did not bother him significantly when he was on this drug before but if there are major problems with hot flashes we could use Megace to ameliorate the effects.  I would like to repeat the PSA in 6 months to observe his progress and will do this with a video virtual consultation.  I think it unlikely he will need another leuprolide at that point and we " "will continue to monitor the PSA closely.  I have asked the patient to call me with any significant side effects or other concerns in the interim.  I went over the entire situation with the patient in detail today.  I reviewed past and current records medications, pertinent labs and other studies in detail.  I answered all his questions    Plan: 6 months for PSA and follow-up with virtual video consultation    Time: Total time extended to 25 minutes today this was a complicated prostate cancer situation requiring considerable discussion following cryoablation for high-grade prostate cancer    \"This dictation was performed with voice recognition software and may contain errors,  omissions and inadvertent word substitution.\"      "

## 2020-06-01 NOTE — NURSING NOTE
Chief Complaint   Patient presents with     Hx of Prostate Cancer     Patient here today for SD PSa and Exam and Possible Lupron Injection           The following medication was given:     MEDICATION:  Lupron Depot 45 mg  ROUTE: IM  SITE: RUQ - Gluteus  DOSE: 45  LOT #: [id number]  : Jean Claude  EXPIRATION DATE: 06/03/2022  NDC#: Was there drug waste? No  Multi-dose vial: No        Nishi AbreuIVONNE

## 2020-06-02 ENCOUNTER — TELEPHONE (OUTPATIENT)
Dept: UROLOGY | Facility: CLINIC | Age: 80
End: 2020-06-02

## 2020-06-02 DIAGNOSIS — I10 HYPERTENSION GOAL BP (BLOOD PRESSURE) < 150/90: ICD-10-CM

## 2020-06-02 DIAGNOSIS — R04.0 EPISTAXIS: ICD-10-CM

## 2020-06-02 DIAGNOSIS — E78.5 HYPERLIPIDEMIA LDL GOAL <100: ICD-10-CM

## 2020-06-02 DIAGNOSIS — R73.09 ELEVATED GLUCOSE: ICD-10-CM

## 2020-06-02 DIAGNOSIS — Z85.46 HISTORY OF PROSTATE CANCER: ICD-10-CM

## 2020-06-02 DIAGNOSIS — I10 HTN, GOAL BELOW 150/90: Primary | ICD-10-CM

## 2020-06-02 DIAGNOSIS — R73.09 ELEVATED GLUCOSE: Primary | ICD-10-CM

## 2020-06-02 LAB
ALBUMIN SERPL-MCNC: 3.7 G/DL (ref 3.4–5)
ALP SERPL-CCNC: 216 U/L (ref 40–150)
ALT SERPL W P-5'-P-CCNC: 17 U/L (ref 0–70)
ANION GAP SERPL CALCULATED.3IONS-SCNC: 9 MMOL/L (ref 3–14)
AST SERPL W P-5'-P-CCNC: 26 U/L (ref 0–45)
BASOPHILS # BLD AUTO: 0 10E9/L (ref 0–0.2)
BASOPHILS NFR BLD AUTO: 0.4 %
BILIRUB SERPL-MCNC: 1.6 MG/DL (ref 0.2–1.3)
BUN SERPL-MCNC: 17 MG/DL (ref 7–30)
CALCIUM SERPL-MCNC: 9 MG/DL (ref 8.5–10.1)
CHLORIDE SERPL-SCNC: 100 MMOL/L (ref 94–109)
CHOLEST SERPL-MCNC: 118 MG/DL
CO2 SERPL-SCNC: 24 MMOL/L (ref 20–32)
CREAT SERPL-MCNC: 0.91 MG/DL (ref 0.66–1.25)
DIFFERENTIAL METHOD BLD: ABNORMAL
EOSINOPHIL # BLD AUTO: 0.1 10E9/L (ref 0–0.7)
EOSINOPHIL NFR BLD AUTO: 1.9 %
ERYTHROCYTE [DISTWIDTH] IN BLOOD BY AUTOMATED COUNT: 14.4 % (ref 10–15)
GFR SERPL CREATININE-BSD FRML MDRD: 79 ML/MIN/{1.73_M2}
GLUCOSE SERPL-MCNC: 103 MG/DL (ref 70–99)
HBA1C MFR BLD: 5.1 % (ref 0–5.6)
HCT VFR BLD AUTO: 35.2 % (ref 40–53)
HDLC SERPL-MCNC: 67 MG/DL
HGB BLD-MCNC: 11.9 G/DL (ref 13.3–17.7)
LDLC SERPL CALC-MCNC: 43 MG/DL
LYMPHOCYTES # BLD AUTO: 0.6 10E9/L (ref 0.8–5.3)
LYMPHOCYTES NFR BLD AUTO: 10.8 %
MCH RBC QN AUTO: 33.1 PG (ref 26.5–33)
MCHC RBC AUTO-ENTMCNC: 33.8 G/DL (ref 31.5–36.5)
MCV RBC AUTO: 98 FL (ref 78–100)
MONOCYTES # BLD AUTO: 0.7 10E9/L (ref 0–1.3)
MONOCYTES NFR BLD AUTO: 13.9 %
NEUTROPHILS # BLD AUTO: 3.8 10E9/L (ref 1.6–8.3)
NEUTROPHILS NFR BLD AUTO: 73 %
NONHDLC SERPL-MCNC: 51 MG/DL
PLATELET # BLD AUTO: 124 10E9/L (ref 150–450)
POTASSIUM SERPL-SCNC: 4.3 MMOL/L (ref 3.4–5.3)
PROT SERPL-MCNC: 7.9 G/DL (ref 6.8–8.8)
RBC # BLD AUTO: 3.59 10E12/L (ref 4.4–5.9)
SODIUM SERPL-SCNC: 133 MMOL/L (ref 133–144)
TRIGL SERPL-MCNC: 39 MG/DL
WBC # BLD AUTO: 5.2 10E9/L (ref 4–11)

## 2020-06-02 PROCEDURE — 83036 HEMOGLOBIN GLYCOSYLATED A1C: CPT | Performed by: FAMILY MEDICINE

## 2020-06-02 PROCEDURE — 80053 COMPREHEN METABOLIC PANEL: CPT | Performed by: FAMILY MEDICINE

## 2020-06-02 PROCEDURE — 85025 COMPLETE CBC W/AUTO DIFF WBC: CPT | Performed by: FAMILY MEDICINE

## 2020-06-02 PROCEDURE — 36415 COLL VENOUS BLD VENIPUNCTURE: CPT | Performed by: FAMILY MEDICINE

## 2020-06-02 PROCEDURE — 80061 LIPID PANEL: CPT | Performed by: FAMILY MEDICINE

## 2020-06-04 ENCOUNTER — HOSPITAL ENCOUNTER (EMERGENCY)
Facility: CLINIC | Age: 80
Discharge: HOME | End: 2020-06-04
Attending: EMERGENCY MEDICINE | Admitting: EMERGENCY MEDICINE
Payer: MEDICARE

## 2020-06-04 ENCOUNTER — APPOINTMENT (OUTPATIENT)
Dept: GENERAL RADIOLOGY | Facility: CLINIC | Age: 80
End: 2020-06-04
Attending: EMERGENCY MEDICINE
Payer: MEDICARE

## 2020-06-04 VITALS
HEART RATE: 59 BPM | DIASTOLIC BLOOD PRESSURE: 72 MMHG | RESPIRATION RATE: 18 BRPM | SYSTOLIC BLOOD PRESSURE: 135 MMHG | OXYGEN SATURATION: 100 % | TEMPERATURE: 98 F

## 2020-06-04 DIAGNOSIS — S80.02XA TRAUMATIC HEMATOMA OF LEFT KNEE, INITIAL ENCOUNTER: ICD-10-CM

## 2020-06-04 PROBLEM — R80.9 MICROALBUMINURIA: Status: ACTIVE | Noted: 2020-06-04

## 2020-06-04 LAB
ANION GAP SERPL CALCULATED.3IONS-SCNC: 6 MMOL/L (ref 3–14)
BASOPHILS # BLD AUTO: 0 10E9/L (ref 0–0.2)
BASOPHILS NFR BLD AUTO: 0.7 %
BUN SERPL-MCNC: 20 MG/DL (ref 7–30)
CALCIUM SERPL-MCNC: 9.3 MG/DL (ref 8.5–10.1)
CHLORIDE SERPL-SCNC: 104 MMOL/L (ref 94–109)
CO2 SERPL-SCNC: 26 MMOL/L (ref 20–32)
CREAT SERPL-MCNC: 0.8 MG/DL (ref 0.66–1.25)
DIFFERENTIAL METHOD BLD: ABNORMAL
EOSINOPHIL # BLD AUTO: 0.1 10E9/L (ref 0–0.7)
EOSINOPHIL NFR BLD AUTO: 1.2 %
ERYTHROCYTE [DISTWIDTH] IN BLOOD BY AUTOMATED COUNT: 14.6 % (ref 10–15)
GFR SERPL CREATININE-BSD FRML MDRD: 84 ML/MIN/{1.73_M2}
GLUCOSE SERPL-MCNC: 114 MG/DL (ref 70–99)
HCT VFR BLD AUTO: 35 % (ref 40–53)
HGB BLD-MCNC: 11.3 G/DL (ref 13.3–17.7)
IMM GRANULOCYTES # BLD: 0 10E9/L (ref 0–0.4)
IMM GRANULOCYTES NFR BLD: 0.2 %
LYMPHOCYTES # BLD AUTO: 0.4 10E9/L (ref 0.8–5.3)
LYMPHOCYTES NFR BLD AUTO: 10.6 %
MCH RBC QN AUTO: 32.2 PG (ref 26.5–33)
MCHC RBC AUTO-ENTMCNC: 32.3 G/DL (ref 31.5–36.5)
MCV RBC AUTO: 100 FL (ref 78–100)
MONOCYTES # BLD AUTO: 0.6 10E9/L (ref 0–1.3)
MONOCYTES NFR BLD AUTO: 14 %
NEUTROPHILS # BLD AUTO: 3 10E9/L (ref 1.6–8.3)
NEUTROPHILS NFR BLD AUTO: 73.3 %
NRBC # BLD AUTO: 0 10*3/UL
NRBC BLD AUTO-RTO: 0 /100
PLATELET # BLD AUTO: 116 10E9/L (ref 150–450)
POTASSIUM SERPL-SCNC: 3.9 MMOL/L (ref 3.4–5.3)
RBC # BLD AUTO: 3.51 10E12/L (ref 4.4–5.9)
SODIUM SERPL-SCNC: 136 MMOL/L (ref 133–144)
WBC # BLD AUTO: 4.1 10E9/L (ref 4–11)

## 2020-06-04 PROCEDURE — 93005 ELECTROCARDIOGRAM TRACING: CPT

## 2020-06-04 PROCEDURE — 71046 X-RAY EXAM CHEST 2 VIEWS: CPT

## 2020-06-04 PROCEDURE — 25000132 ZZH RX MED GY IP 250 OP 250 PS 637: Mod: GY | Performed by: EMERGENCY MEDICINE

## 2020-06-04 PROCEDURE — 85025 COMPLETE CBC W/AUTO DIFF WBC: CPT | Performed by: EMERGENCY MEDICINE

## 2020-06-04 PROCEDURE — 99285 EMERGENCY DEPT VISIT HI MDM: CPT | Mod: 25

## 2020-06-04 PROCEDURE — 73562 X-RAY EXAM OF KNEE 3: CPT | Mod: RT

## 2020-06-04 PROCEDURE — 80048 BASIC METABOLIC PNL TOTAL CA: CPT | Performed by: EMERGENCY MEDICINE

## 2020-06-04 RX ORDER — OXYCODONE HYDROCHLORIDE 5 MG/1
5 TABLET ORAL ONCE
Status: COMPLETED | OUTPATIENT
Start: 2020-06-04 | End: 2020-06-04

## 2020-06-04 RX ORDER — LISINOPRIL 20 MG/1
TABLET ORAL
COMMUNITY
Start: 2020-04-14 | End: 2020-06-09

## 2020-06-04 RX ORDER — SIMVASTATIN 40 MG
TABLET ORAL
COMMUNITY
End: 2020-06-09

## 2020-06-04 RX ORDER — DOXYCYCLINE HYCLATE 100 MG
100 TABLET ORAL DAILY
COMMUNITY
Start: 2019-10-28 | End: 2020-12-21

## 2020-06-04 RX ORDER — BUPROPION HYDROCHLORIDE 150 MG/1
TABLET ORAL
COMMUNITY
Start: 2019-06-18 | End: 2020-06-09

## 2020-06-04 RX ORDER — POTASSIUM CHLORIDE 750 MG/1
TABLET, EXTENDED RELEASE ORAL
COMMUNITY
Start: 2019-09-12 | End: 2020-06-09 | Stop reason: DRUGHIGH

## 2020-06-04 RX ORDER — CHLORAL HYDRATE 500 MG
CAPSULE ORAL
COMMUNITY
End: 2020-06-09

## 2020-06-04 RX ORDER — TAMSULOSIN HYDROCHLORIDE 0.4 MG/1
CAPSULE ORAL
COMMUNITY
Start: 2020-04-14 | End: 2020-06-09

## 2020-06-04 RX ORDER — ASPIRIN 81 MG/1
TABLET, CHEWABLE ORAL
COMMUNITY
End: 2020-06-09

## 2020-06-04 RX ORDER — ACETAMINOPHEN 325 MG/1
TABLET ORAL
COMMUNITY
End: 2020-06-09

## 2020-06-04 RX ORDER — CEPHALEXIN 500 MG/1
CAPSULE ORAL
COMMUNITY
Start: 2019-07-28 | End: 2020-06-09

## 2020-06-04 RX ORDER — NICOTINE POLACRILEX 4 MG/1
GUM, CHEWING ORAL
COMMUNITY
Start: 2019-09-06 | End: 2020-06-09

## 2020-06-04 RX ADMIN — OXYCODONE HYDROCHLORIDE 5 MG: 5 TABLET ORAL at 12:44

## 2020-06-04 ASSESSMENT — ENCOUNTER SYMPTOMS: ARTHRALGIAS: 1

## 2020-06-04 NOTE — ED PROVIDER NOTES
History     Chief Complaint:  Fall       HPI   Milo Serna is a 79 year old male with a history of Cad and atrial fibrillation anticoagulated on Eliquis who presents after a fall. The patient says that he tripped and fell last night. He says that he has been able to walk, but he has been having pain in his right knee. He denies hitting his head or having issues with his knee in the past.       Allergies:  No Known Drug Allergies      Medications:    Demadex  Flomax  Zocor  K-tab  Prilosec  Melatonin   Lisinopril  Atrovent  Osteo bi-flex  Fergon  Voltaren  Eliquis    Past Medical History:    Venous stasis  Hyperplasia of prostate  Thrombocytopenia  Rosacea  Rhinophyma  pulmonary hypertension   PUD  Prostate cancer   Petechiae  Pes planus  LENORA  myocardial infarction  osteoarthritis  non rheumatic mitral valve regurgitation  Hyperlipidemia   Memory loss  Lumbago  Anemia  hypokalemia  Abdominal hernia  hallux abducto valgus  Gout  Gallbladder polyp  Hypertension   Dilated aortic root  Coronary atherosclerosis of unspecified type of vessel  CAD  BPH  atrial fibrillation  Anemia  Actinic keratosis     Past Surgical History:    Prostate surgery  Herniorrhaphy inguinal  cystoscopy flexible  Cardiac bypass  left shoulder surgery  Sinus surgery x2  Appendectomy  Nasal surgery  Right rotator cuff repair  Right ankle ORIF  right hernia repair  Inguinal hernia repair  CABG     Family History:    Stomach cancer  Heart disease  Hypertension      Social History:  Smoking Status: Former Smoker  Smokeless Tobacco: Never Used  Alcohol Use: Not currently   Drug Use: No  PCP: Oscar Alves       Review of Systems   Musculoskeletal: Positive for arthralgias.   All other systems reviewed and are negative.    Physical Exam     Patient Vitals for the past 24 hrs:   BP Temp Temp src Pulse Resp SpO2   06/04/20 1145 130/67 -- -- -- -- 100 %   06/04/20 1130 (!) 141/83 -- -- -- -- --   06/04/20 1124 (!) 144/105 98  F (36.7  C) Oral 64  18 100 %        Physical Exam  General: Patient is alert and interactive when I enter the room  Head:  The scalp, face, and head appear normal  Eyes:  Conjunctivae are normal  ENT:    The nose is normal    Pinnae are normal    External acoustic canals are normal  Neck:  Trachea midline  CV:  Pulses are normal.    Resp:  No respiratory distress   Abdomen:      Soft, non-tender, non-distended  Musc:  Normal muscular tone    No major joint effusions    No asymmetric leg swelling    Mild tenderness to left lateral chest, no ecchymosis     Large subcutaneous hematoma with overlying ecchymosis on right knee     Tenderness to palpated, limited ROM secondary to swelling     Sensation warm and well perfused distally   Skin:  No rash or lesions noted  Neuro: Speech is normal and fluent. Face is symmetric.     Moving all extremities well.   Psych: Awake. Alert.  Normal affect.  Appropriate interactions.     Emergency Department Course     ECG:  ECG taken at 1159, ECG read at 1201  atrial fibrillation with slow ventricular response with premature ventricular or aberrantly conducted complexes  Nonspecific intraventricular delay  Nonspecific T wave abnormality  Prolonged QT   Abnormal ECG  Rate 54 bpm. CO interval * ms. QRS duration 118 ms. QT/QTc 488/462 ms. P-R-T axes * 25 71.    Imaging:  Radiology findings were communicated with the patient who voiced understanding of the findings.    XR Knee Right 3 Views   Marked prepatellar soft tissue swelling which could  represent subcutaneous hematoma. Lateral radiograph is indeterminate  for small joint effusion. No apparent fracture. Medial, lateral, and  patellofemoral compartment joint space widths appear within normal  limits. Femoral and popliteal artery calcification is noted.  Reading per radiology    XR Chest 2 Views   AP and lateral views of the chest were obtained.  Postsurgical changes of cardiac surgery with median sternotomy wires  and surgical clips. Stable enlargement  of the cardiac silhouette. No  suspicious focal pulmonary opacities. No significant pleural effusion  or pneumothorax. No definite acute osseous pathology. If clinical  suspicion of rib fractures remains high, rib series is more sensitive  for detection of subtle rib fractures.  MADDI RAMOS MD  Reading per radiology       Laboratory:  Laboratory findings were communicated with the patient who voiced understanding of the findings.    CBC: WBC 4.1, HGB 11.3 (L),  (L)  BMP:  (H) o/w WNL (Creatinine 0.80)    Procedures    Interventions:  1244 Roxicodone 5 mg Oral     Emergency Department Course:    1130 Nursing notes and vitals reviewed.    1132 I performed an exam of the patient as documented above.     1147 IV was inserted and blood was drawn for laboratory testing, results above.     1204 The patient was sent for a XR while in the emergency department, results above.      1240 Patient rechecked and updated.       Findings and plan explained to the Patient. Patient discharged home with instructions regarding supportive care, medications, and reasons to return. The importance of close follow-up was reviewed.     Impression & Plan      Medical Decision Making:  Milo Serna is a 79 year old male who presents to the emergency department today for evaluation of knee pain after a fall. Patient denies head injury. He is on anticoagulation. He does have some very mild chest discomfort from the fall as well. CXR was unremarkable. X-ray of knee revealed a no fracture. Patient has a very large hematoma to his knee that is likely related to his anticoagulation. Reviewing his chart his anticoagulation is for atrial fibrillation and stroke prophylaxis. He has not taken his eliquis today so given his large hematoma, he will hold his dose today and resume tomorrow. An ace wrap was placed for compression to his knee which helped with his pain. Patient felt comfortabletre with discharge.         Diagnosis:     ICD-10-CM    1. Traumatic hematoma of left knee, initial encounter  S80.02XA      Disposition:   The patient is discharged to home.     Discharge Medications:  New Prescriptions    No medications on file       Scribe Disclosure:  I, Marbin Alvarez, am serving as a scribe at 11:30 AM on 6/4/2020 to document services personally performed by Noreen Benson MD based on my observations and the provider's statements to me.        Noreen Benson MD  06/05/20 2022

## 2020-06-04 NOTE — ED AVS SNAPSHOT
[facility]  [address]  [facility] [address]  Phone:  [phone]  Fax:  [phone]                                    Milo Serna   MRN: [id number]    Department:  [facility]   Date of Visit:  6/4/2020           After Visit Summary Signature Page    I have received my discharge instructions, and my questions have been answered. I have discussed any challenges I see with this plan with the nurse or doctor.    ..........................................................................................................................................  Patient/Patient Representative Signature      ..........................................................................................................................................  Patient Representative Print Name and Relationship to Patient    ..................................................               ................................................  Date                                   Time    ..........................................................................................................................................  Reviewed by Signature/Title    ...................................................              ..............................................  Date                                               Time          22EPIC Rev 08/18

## 2020-06-05 LAB — INTERPRETATION ECG - MUSE: NORMAL

## 2020-06-09 NOTE — PROGRESS NOTES
Pre-Visit Planning     Future Appointments   Date Time Provider Department Center   6/10/2020  1:50 PM Joesph Hernandez MD [facility] CR   6/15/2020  8:15 AM Sixto Conley MD [facility] CLIN   10/26/2020  1:00 PM Krystian Owens MD [facility]     Arrival Time for this Appointment:  1:30 PM   Appointment Notes for this encounter:   ER follow up knee pain --F2F per ZB/jf  Rescheduled from video visit with TANO 6/11/20  Pt reports swelling continues, developed 2 blisters, one dime sized and second one quite small, burst 4 days after ER. Denies S&S infection, afebrile.    New since ER needs to use a cane to stand. Swings leg several times before able to stand. Painful to stand.     Med list reviewed.   Questionnaires Reviewed/Assigned  Additional questionnaires assigned        Patient preferred phone number: [phone]      Spoke to patient via phone. Patient does not have additional questions or concerns.       Visit is not preventive.    Health Maintenance Due   Topic Date Due     MEDICARE ANNUAL WELLNESS VISIT  04/20/2018     PHQ-2  01/01/2020     [employer]  Patient is active on [employer].  He is not able to sign in. MA please assist in resetting password.      Sudheer Hinojosa RN

## 2020-06-10 ENCOUNTER — OFFICE VISIT (OUTPATIENT)
Dept: FAMILY MEDICINE | Facility: CLINIC | Age: 80
End: 2020-06-10
Payer: MEDICARE

## 2020-06-10 ENCOUNTER — ANCILLARY PROCEDURE (OUTPATIENT)
Dept: GENERAL RADIOLOGY | Facility: CLINIC | Age: 80
End: 2020-06-10
Attending: FAMILY MEDICINE
Payer: MEDICARE

## 2020-06-10 VITALS
WEIGHT: 176 LBS | SYSTOLIC BLOOD PRESSURE: 102 MMHG | RESPIRATION RATE: 18 BRPM | TEMPERATURE: 98.1 F | BODY MASS INDEX: 27.57 KG/M2 | HEART RATE: 88 BPM | DIASTOLIC BLOOD PRESSURE: 48 MMHG

## 2020-06-10 DIAGNOSIS — S89.91XD INJURY OF RIGHT KNEE, SUBSEQUENT ENCOUNTER: ICD-10-CM

## 2020-06-10 DIAGNOSIS — S89.91XD INJURY OF RIGHT KNEE, SUBSEQUENT ENCOUNTER: Primary | ICD-10-CM

## 2020-06-10 PROCEDURE — 73562 X-RAY EXAM OF KNEE 3: CPT | Mod: RT

## 2020-06-10 PROCEDURE — 99214 OFFICE O/P EST MOD 30 MIN: CPT | Performed by: FAMILY MEDICINE

## 2020-06-10 ASSESSMENT — ENCOUNTER SYMPTOMS
CONSTITUTIONAL NEGATIVE: 1
CARDIOVASCULAR NEGATIVE: 1
RESPIRATORY NEGATIVE: 1
MYALGIAS: 1
JOINT SWELLING: 1
ARTHRALGIAS: 1
NEUROLOGICAL NEGATIVE: 1

## 2020-06-10 NOTE — PROGRESS NOTES
Subjective     Milo Serna is a 79 year old male who presents to clinic today for the following health issues:    HPI   ED/UC Followup:    Facility:  [facility]  Date of visit: 6/4/2020  Reason for visit: Fall, right knee pain  Current Status: Patient states he is still having a lot of pain, He also states that 2 blisters formed, and popped.      Slipped and fell several days ago (plumbing leak overnight) and injured knee.  Seen in ED the next day.  Overall feels knee is about the same - painful, swollen.  Felt to have a hematoma 2/2 chronic anticoag.  Notes leg I swollen at baseline, but is worse.  Does hurt to walk.  No CP, palpitations, shortness of breath.    Does have a cane at home that he uses around the house, especially to get up.    Reviewed and updated as needed this visit by Provider         Review of Systems   Constitutional: Negative.    Respiratory: Negative.    Cardiovascular: Negative.    Musculoskeletal: Positive for arthralgias, joint swelling and myalgias.   Neurological: Negative.             Objective    /48 (BP Location: Right arm, Patient Position: Sitting, Cuff Size: Adult Large)   Pulse 88   Temp 98.1  F (36.7  C) (Oral)   Resp 18   Wt 79.8 kg (176 lb)   BMI 27.57 kg/m    Body mass index is 27.57 kg/m .  Physical Exam  Vitals signs and nursing note reviewed.   Constitutional:       General: He is not in acute distress.  Musculoskeletal:        Legs:       Comments: Primary bruising extends up medial upper leg.  prepatellar hematoma, marked LE edema, migration of bruising into LE.  Generally tender.   Skin:     General: Skin is warm and dry.   Neurological:      General: No focal deficit present.      Mental Status: He is alert and oriented to person, place, and time.        Assessment and Plan    (S89.91XD) Injury of right knee, subsequent encounter  (primary encounter diagnosis)  Comment: XR appears stable.  Compression wrap applied from ankle to mid-thigh.  Discussed proper  use of cane (hold on unaffected side, move with affected side) and encouraged to use as much as possible to increase mobility.  Pain control and ice as needed.  Plan: XR Knee Right 3 Views              RTC in 2w if not improving.    Joesph Hernandez MD

## 2020-06-15 ENCOUNTER — VIRTUAL VISIT (OUTPATIENT)
Dept: CARDIOLOGY | Facility: CLINIC | Age: 80
End: 2020-06-15
Attending: INTERNAL MEDICINE
Payer: MEDICARE

## 2020-06-15 ENCOUNTER — CARE COORDINATION (OUTPATIENT)
Dept: CARDIOLOGY | Facility: CLINIC | Age: 80
End: 2020-06-15

## 2020-06-15 DIAGNOSIS — Z79.01 ON APIXABAN THERAPY: ICD-10-CM

## 2020-06-15 DIAGNOSIS — Z95.1 STATUS POST CORONARY ARTERY BYPASS GRAFT: ICD-10-CM

## 2020-06-15 DIAGNOSIS — I34.0 MODERATE MITRAL VALVE REGURGITATION: Primary | ICD-10-CM

## 2020-06-15 DIAGNOSIS — I50.42 CHRONIC COMBINED SYSTOLIC AND DIASTOLIC CONGESTIVE HEART FAILURE (H): ICD-10-CM

## 2020-06-15 DIAGNOSIS — I25.10 CORONARY ARTERY DISEASE INVOLVING NATIVE CORONARY ARTERY OF NATIVE HEART WITHOUT ANGINA PECTORIS: ICD-10-CM

## 2020-06-15 DIAGNOSIS — I48.20 CHRONIC ATRIAL FIBRILLATION (H): ICD-10-CM

## 2020-06-15 DIAGNOSIS — G47.33 OBSTRUCTIVE SLEEP APNEA SYNDROME: ICD-10-CM

## 2020-06-15 DIAGNOSIS — I50.42 CHRONIC COMBINED SYSTOLIC AND DIASTOLIC CONGESTIVE HEART FAILURE (H): Primary | ICD-10-CM

## 2020-06-15 DIAGNOSIS — I10 BENIGN ESSENTIAL HYPERTENSION: ICD-10-CM

## 2020-06-15 DIAGNOSIS — E78.5 HYPERLIPIDEMIA LDL GOAL <70: ICD-10-CM

## 2020-06-15 DIAGNOSIS — I34.0 MODERATE MITRAL VALVE REGURGITATION: ICD-10-CM

## 2020-06-15 PROBLEM — R04.0 EPISTAXIS: Status: RESOLVED | Noted: 2019-09-09 | Resolved: 2020-06-15

## 2020-06-15 PROBLEM — I50.810 RIGHT HEART FAILURE WITH REDUCED RIGHT VENTRICULAR FUNCTION (H): Status: RESOLVED | Noted: 2020-04-14 | Resolved: 2020-06-15

## 2020-06-15 PROBLEM — R60.0 PERIPHERAL EDEMA: Status: RESOLVED | Noted: 2019-10-12 | Resolved: 2020-06-15

## 2020-06-15 PROBLEM — K59.1 FUNCTIONAL DIARRHEA: Status: RESOLVED | Noted: 2019-10-12 | Resolved: 2020-06-15

## 2020-06-15 PROCEDURE — 99443 ZZC PHYSICIAN TELEPHONE EVALUATION 21-30 MIN: CPT | Performed by: INTERNAL MEDICINE

## 2020-06-15 NOTE — PATIENT INSTRUCTIONS
1. No changes to meds.    2. Echocardiogram in one week.     3. Follow up with Dr. Conley in one year.    If you have any questions or concerns, please call my nurse Patsy Gomez at [phone].

## 2020-06-15 NOTE — PROGRESS NOTES
"Service Date: 06/15/2020      TELEPHONE VISIT      PRIMARY CARE PROVIDER:  Dr. Oscar Alevs      CARDIOLOGY FILEMON:  Juliano Caballero      REASON FOR VISIT:    Scheduled followup of mitral valve regurgitation, chronic left ventricular systolic and diastolic heart failure, chronic right ventricular heart failure and atrial fibrillation.      HISTORY OF PRESENT ILLNESS:    Luís is known to me from previous visits.  He is a delightful 79-year-old gentleman who lives with his wife (she has Alzheimer dementia and he is her main caregiver).  Luís is retired and they spend their nieto in [place] and summers up here.      Medical history is significant for:  1.  Coronary artery disease without recurrent angina status post CABG in 1997.  Angina-free since.   2.  Chronic atrial fibrillation.  He is on metoprolol XL and low-dose Eliquis anticoagulation (2.5 mg BID dose due to history of epistaxis on the 5 mg b.i.d. dosage).   3.  Treated hypertension.   4.  Stable ascending aorta dilatation of 4.2 cm.   5.  Obstructive sleep apnea diagnosed in 2018.  CPAP intolerant.   6.  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  NYHA class I status.   7.  Chronic right heart failure with mildly decreased systolic function, NYHA class I.   8.  Former tobacco use.  Quit in 1974.   9.  Moderately severe mitral valve regurgitation, likely functional.   10.  Frequent PVCs.  After we cut back on Toprol-XL to 12.5 mg daily, his PVC burden decreased from 22% to 18%.  He never had symptoms from this.     11.  Chronic thrombocytopenia and anemia with a hemoglobin of 10.5 to 11.5.        Luís returned from [place] on June 11.  He is doing well.  About 2 weeks ago, he tripped and \"bumped up my knee.\"  This has been x-rayed and he has been advised conservative management.      Overall he is doing very well.  He has been walking extensively in [place].  He walks for 45 minutes each time about 5 times a week and has " lost about 10 pounds in weight.  He denies any symptoms of exertional dyspnea, fatigue and orthopnea/PND.  He does have chronic lower extremity edema that is better than before on torsemide 20 mg daily.        DIAGNOSTIC DATA:    Labs show a total cholesterol of 118, his HDL has increased from 43 to 67, LDL is 43, triglycerides 39.  Potassium 3.9, creatinine 0.8 with an estimated GFR of 84.  He has chronic mild anemia with a hemoglobin of 11.3 and chronic mild thrombocytopenia with platelets of 116.      He has not had an up-to-date echocardiogram but his last one in 2019 showed a normal left ventricular size with moderately reduced LVEF of 40%.  The right ventricle is also moderately dilated with mildly decreased systolic function with a TAPSE of 1.6 cm and tissue velocity of 0.9 meters per second.  He is known to have moderately severe mitral valve regurgitation with an ERO of 0.3 cm2.  The jet is posteriorly directed.  There is moderate functional TR with an RVSP of 29 mmHg and a moderately dilated aortic root of 4.8 cm and ascending aorta of 4.3 cm.      PHYSICAL EXAMINATION:    VITAL SIGNS:  The patient reported vital signs.  Weight 169 pounds, /60 mmHg, pulse 60 BPM.     GENERAL:  He sounded alert and oriented x3 without conversational dyspnea or wheeze.      DIAGNOSES, ASSESSMENT, PLAN:    1.  Moderately severe mitral valve regurgitation.  This being a video visit, I have no means of examining the patient to assess interval progression of the MR.  However, he appears clinically well and, if anything, is able to walk greater distances than before.  He has no symptoms of right or left heart failure.  His lower extremity edema is chronic and has actually improved.  Nevertheless, he needs a surveillance echocardiogram and we will try to obtain this within the week. I will review it and my office will update him with the results.   2.  I have not changed any of his medications.  He continues on Eliquis 2.5  mg daily.  Previously, he had epistaxis but this has completely resolved following treatment with ENT down in [place].  He is on the lower dose of Eliquis because with higher doses, he gets epistaxis.   3.  His chronic anemia and thrombocytopenia is under followup by Dr. Oscar Alves.   4.  FOLLOW UP: Assuming that his MR and TR are stable, I will plan on seeing him back in my clinic for an in-person visit in 1 year with pre-visit labs and echocardiogram.      Total telephone time 24 minutes.  An additional 10 minutes was spent in the review of previous echocardiogram images and labs.         DANIELA PENA MD             D: 06/15/2020   T: 06/15/2020   MT: TERESSA      Name:     SAÚL GONZALEZ   MRN:      [id number]        Account:      [id number]   :      1940           Service Date: 06/15/2020      Document: [id number]

## 2020-06-15 NOTE — LETTER
"6/15/2020    Oscar Alves MD  [address] [facility] [address]    RE: Milo Colliervicki       Dear Colleague,    I had the pleasure of seeing Milo Serna in the [facility].    Milo Serna is a 79 year old male who is being evaluated via a billable telephone visit.         REASON FOR VISIT:    Scheduled followup of mitral valve regurgitation, chronic left ventricular systolic and diastolic heart failure, chronic right ventricular heart failure and atrial fibrillation.      HISTORY OF PRESENT ILLNESS:    Luís is known to me from previous visits.  He is a delightful 79-year-old gentleman who lives with his wife (she has Alzheimer dementia and he is her main caregiver).  Luís is retired and they spend their nieto in [place] and summers up here.      Medical history is significant for:  1.  Coronary artery disease without recurrent angina status post CABG in 1997.  Angina-free since.   2.  Chronic atrial fibrillation.  He is on metoprolol XL and low-dose Eliquis anticoagulation (2.5 mg BID dose due to history of epistaxis on the 5 mg b.i.d. dosage).   3.  Treated hypertension.   4.  Stable ascending aorta dilatation of 4.2 cm.   5.  Obstructive sleep apnea diagnosed in 2018.  CPAP intolerant.   6.  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  NYHA class I status.   7.  Chronic right heart failure with mildly decreased systolic function, NYHA class I.   8.  Former tobacco use.  Quit in 1974.   9.  Moderately severe mitral valve regurgitation, likely functional.   10.  Frequent PVCs.  After we cut back on Toprol-XL to 12.5 mg daily, his PVC burden decreased from 22% to 18%.  He never had symptoms from this.     11.  Chronic thrombocytopenia and anemia with a hemoglobin of 10.5 to 11.5.        Luís returned from [place] on June 11.  He is doing well.  About 2 weeks ago, he tripped and \"bumped up my knee.\"  This has been x-rayed " and he has been advised conservative management.      Overall he is doing very well.  He has been walking extensively in [place].  He walks for 45 minutes each time about 5 times a week and has lost about 10 pounds in weight.  He denies any symptoms of exertional dyspnea, fatigue and orthopnea/PND.  He does have chronic lower extremity edema that is better than before on torsemide 20 mg daily.        DIAGNOSTIC DATA:    Labs show a total cholesterol of 118, his HDL has increased from 43 to 67, LDL is 43, triglycerides 39.  Potassium 3.9, creatinine 0.8 with an estimated GFR of 84.  He has chronic mild anemia with a hemoglobin of 11.3 and chronic mild thrombocytopenia with platelets of 116.      He has not had an up-to-date echocardiogram but his last one in 2019 showed a normal left ventricular size with moderately reduced LVEF of 40%.  The right ventricle is also moderately dilated with mildly decreased systolic function with a TAPSE of 1.6 cm and tissue velocity of 0.9 meters per second.  He is known to have moderately severe mitral valve regurgitation with an ERO of 0.3 cm2.  The jet is posteriorly directed.  There is moderate functional TR with an RVSP of 29 mmHg and a moderately dilated aortic root of 4.8 cm and ascending aorta of 4.3 cm.      PHYSICAL EXAMINATION:    VITAL SIGNS:  The patient reported vital signs.  Weight 169 pounds, /60 mmHg, pulse 60 BPM.     GENERAL:  He sounded alert and oriented x3 without conversational dyspnea or wheeze.      DIAGNOSES, ASSESSMENT, PLAN:    1.  Moderately severe mitral valve regurgitation.  This being a video visit, I have no means of examining the patient to assess interval progression of the MR.  However, he appears clinically well and, if anything, is able to walk greater distances than before.  He has no symptoms of right or left heart failure.  His lower extremity edema is chronic and has actually improved.  Nevertheless, he needs a surveillance  echocardiogram and we will try to obtain this within the week. I will review it and my office will update him with the results.   2.  I have not changed any of his medications.  He continues on Eliquis 2.5 mg daily.  Previously, he had epistaxis but this has completely resolved following treatment with ENT down in [place].  He is on the lower dose of Eliquis because with higher doses, he gets epistaxis.   3.  His chronic anemia and thrombocytopenia is under followup by Dr. Oscar Alves.   4.  FOLLOW UP: Assuming that his MR and TR are stable, I will plan on seeing him back in my clinic for an in-person visit in 1 year with pre-visit labs and echocardiogram.      Total telephone time 24 minutes.  An additional 10 minutes was spent in the review of previous echocardiogram images and labs.        Thank you for allowing me to participate in the care of your patient.    Sincerely,     Sixto Conley MD     [facility]

## 2020-06-15 NOTE — PROGRESS NOTES
Reviewed the AVS (After Visit Summary) with patient in detail following their office visit with Dr. Conley. The patient was called and educated on the outlined plan of care including ordered tests, no medication changes at this time, and follow up in one year. Patient verbalized understanding of the information and agrees to call with any questions or concerns.   Return appointment: Patient was given instructions on when and how to schedule their next office visit with the clinic. Pateint will set up Echo in one week. Messaged to nurse team to watch for results.  Patsy Donato RN 06/15/459899:39 AM

## 2020-06-15 NOTE — PROGRESS NOTES
"Milo Serna is a 79 year old male who is being evaluated via a billable telephone visit.      [id number] DICTATION ID    The patient has been notified of following:     \"This telephone visit will be conducted via a call between you and your physician/provider. We have found that certain health care needs can be provided without the need for a physical exam.  This service lets us provide the care you need with a short phone conversation.  If a prescription is necessary we can send it directly to your pharmacy.  If lab work is needed we can place an order for that and you can then stop by our lab to have the test done at a later time.    Telephone visits are billed at different rates depending on your insurance coverage. During this emergency period, for some insurers they may be billed the same as an in-person visit.  Please reach out to your insurance provider with any questions.    If during the course of the call the physician/provider feels a telephone visit is not appropriate, you will not be charged for this service.\"    Patient has given verbal consent for Telephone visit?  Yes    What phone number would you like to be contacted at? [phone]    How would you like to obtain your AVS? MyChart    VITALS:  Weight- 169 lbs  BP- 109/60mmHg  Pulse- 60 BPM    Review Of Systems:  Skin: NEGATIVE  Eyes:Ears/Nose/Throat: NEGATIVE  Respiratory: 'fine'  Cardiovascular: Bilateral swelling in his ankles. Wears wraps and diabetic socks.  Gastrointestinal: NEGATIVE  Genitourinary: History of prostate cancer  Musculoskeletal: Fell on his knees from cleaning  Neurologic: NEGATIVE  Psychiatric: Stress- water damage in home.  Hematologic/Lymphatic/Immunologic: NEGATIVE  Endocrine:  NEGATIVE    DIMITRIOS Garcia    Phone call duration: 24 minutes    Sixto Conley MD    "

## 2020-06-25 ENCOUNTER — HOSPITAL ENCOUNTER (OUTPATIENT)
Dept: CARDIOLOGY | Facility: CLINIC | Age: 80
Discharge: HOME | End: 2020-06-25
Attending: INTERNAL MEDICINE | Admitting: INTERNAL MEDICINE
Payer: MEDICARE

## 2020-06-25 DIAGNOSIS — I25.10 CORONARY ARTERY DISEASE INVOLVING NATIVE CORONARY ARTERY OF NATIVE HEART WITHOUT ANGINA PECTORIS: ICD-10-CM

## 2020-06-25 DIAGNOSIS — I34.0 MODERATE MITRAL VALVE REGURGITATION: ICD-10-CM

## 2020-06-25 DIAGNOSIS — I50.42 CHRONIC COMBINED SYSTOLIC AND DIASTOLIC CONGESTIVE HEART FAILURE (H): ICD-10-CM

## 2020-06-25 DIAGNOSIS — I10 BENIGN ESSENTIAL HYPERTENSION: ICD-10-CM

## 2020-06-25 DIAGNOSIS — Z95.1 STATUS POST CORONARY ARTERY BYPASS GRAFT: ICD-10-CM

## 2020-06-25 DIAGNOSIS — I48.20 CHRONIC ATRIAL FIBRILLATION (H): ICD-10-CM

## 2020-06-25 PROCEDURE — 93306 TTE W/DOPPLER COMPLETE: CPT | Mod: 26 | Performed by: INTERNAL MEDICINE

## 2020-06-25 PROCEDURE — 93306 TTE W/DOPPLER COMPLETE: CPT

## 2020-06-25 NOTE — PROGRESS NOTES
Echocardiogram results from 6/25/2020. Ordered post Dr Conley visit 6/15/2020 to assess MR.    Interpretation Summary  There is mild concentric left ventricular hypertrophy.  The visual ejection fraction is estimated at 40-45%.  The right ventricle is moderately dilated.  The right ventricular systolic function is mild to moderately reduced.  There is mod-severe biatrial enlargement.  There is moderate to mod-severe (2-3+) mitral regurgitation. Apprears less ompared with most recent study.  There is mild (1+) tricuspid regurgitation.  Dilation of the inferior vena cava is present with normal respiratory ariation in diameter.  Right ventricular systolic pressure is elevated, consistent with mild to oderate pulmonary hypertension.  There is mild (1+) aortic regurgitation.  There is moderate (2+) pulmonic valvular regurgitation.  Mild aortic root dilatation.  The ascending aorta is Mildly dilated.    Forwarded to Dr Conley for review.  Patsy Donato RN 06/25/2020 3:53 PM

## 2020-06-26 NOTE — PROGRESS NOTES
Left detailed voicemail for pt regarding results Dr. Conley's recommendations.  Left Patsy's call back number if pt has any further questions or concerns.   Placed orders for follow up and an echo in 6 months.

## 2020-06-26 NOTE — PROGRESS NOTES
Sixto Conley MD McMahon, Bullis Mary, RN    Caller: Unspecified (1 week ago)               Please let patient know that his heart function is reduced but stable at 40-45%.  Similarly, his valvular regurgitation is also stable.    I would like to see him back in 6 months with a repeat transthoracic echocardiogram (rather than 1 year).    Thank you.  Dr. Conley

## 2020-07-02 NOTE — PROGRESS NOTES
Call to patient with Dr Conley review of echo results and recommendations:    Please let patient know that his heart function is reduced but stable at 40-45%.  Similarly, his valvular regurgitation is also stable.  I would like to see him back in 6 months with a repeat transthoracic echocardiogram (rather than 1 year)    Patient states understanding and reports he leaves for [place] in October 2020 and will return in April 2021. Patient will be setting up visit and echo on his return to [place] in April. Insurance may not cover the echocardiogram if it is repeated too soon. Please contact us if you have change in symptoms or concerns prior to going to [place] in October for Dr Conley review. My Chart message sent to patient per his request with Dr Conley comments and recommendations. Update to DR Conley that patient in [place] for 6 months.  Patsy Donato RN 07/02/20 12:33 PM

## 2020-10-10 ENCOUNTER — HOSPITAL ENCOUNTER (EMERGENCY)
Facility: CLINIC | Age: 80
End: 2020-10-10
Payer: OTHER GOVERNMENT

## 2020-10-26 DIAGNOSIS — C61 PROSTATE CANCER (H): ICD-10-CM

## 2020-10-26 PROCEDURE — 84153 ASSAY OF PSA TOTAL: CPT | Performed by: UROLOGY

## 2020-10-26 PROCEDURE — 36415 COLL VENOUS BLD VENIPUNCTURE: CPT | Performed by: UROLOGY

## 2020-10-27 LAB — PSA SERPL-MCNC: 0.72 UG/L (ref 0–4)

## 2020-10-30 ENCOUNTER — APPOINTMENT (OUTPATIENT)
Dept: CT IMAGING | Facility: CLINIC | Age: 80
DRG: 552 | End: 2020-10-30
Attending: EMERGENCY MEDICINE
Payer: MEDICARE

## 2020-10-30 ENCOUNTER — HOSPITAL ENCOUNTER (INPATIENT)
Facility: CLINIC | Age: 80
LOS: 2 days | Discharge: HOME HEALTH | DRG: 552 | End: 2020-11-01
Attending: EMERGENCY MEDICINE | Admitting: INTERNAL MEDICINE
Payer: MEDICARE

## 2020-10-30 DIAGNOSIS — S22.068A OTHER CLOSED FRACTURE OF EIGHTH THORACIC VERTEBRA, INITIAL ENCOUNTER (H): ICD-10-CM

## 2020-10-30 DIAGNOSIS — K59.03 DRUG-INDUCED CONSTIPATION: Primary | ICD-10-CM
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
## 2020-10-30 PROBLEM — S22.069A: Status: ACTIVE | Noted: 2020-10-30

## 2020-10-30 PROBLEM — S22.009A THORACIC SPINE FRACTURE (H): Status: ACTIVE | Noted: 2020-10-30

## 2020-10-30 LAB
ANION GAP SERPL CALCULATED.3IONS-SCNC: 8 MMOL/L (ref 3–14)
BASOPHILS # BLD AUTO: 0 10E9/L (ref 0–0.2)
BASOPHILS NFR BLD AUTO: 0.9 %
BUN SERPL-MCNC: 16 MG/DL (ref 7–30)
CALCIUM SERPL-MCNC: 8.7 MG/DL (ref 8.5–10.1)
CHLORIDE SERPL-SCNC: 103 MMOL/L (ref 94–109)
CK SERPL-CCNC: 112 U/L (ref 30–300)
CO2 SERPL-SCNC: 26 MMOL/L (ref 20–32)
CREAT SERPL-MCNC: 0.88 MG/DL (ref 0.66–1.25)
DIFFERENTIAL METHOD BLD: ABNORMAL
EOSINOPHIL # BLD AUTO: 0.2 10E9/L (ref 0–0.7)
EOSINOPHIL NFR BLD AUTO: 3.4 %
ERYTHROCYTE [DISTWIDTH] IN BLOOD BY AUTOMATED COUNT: 14.5 % (ref 10–15)
GFR SERPL CREATININE-BSD FRML MDRD: 81 ML/MIN/{1.73_M2}
GLUCOSE SERPL-MCNC: 88 MG/DL (ref 70–99)
HCT VFR BLD AUTO: 34.9 % (ref 40–53)
HGB BLD-MCNC: 11.2 G/DL (ref 13.3–17.7)
IMM GRANULOCYTES # BLD: 0 10E9/L (ref 0–0.4)
IMM GRANULOCYTES NFR BLD: 0 %
INTERPRETATION ECG - MUSE: NORMAL
LYMPHOCYTES # BLD AUTO: 0.5 10E9/L (ref 0.8–5.3)
LYMPHOCYTES NFR BLD AUTO: 11.7 %
MCH RBC QN AUTO: 32.1 PG (ref 26.5–33)
MCHC RBC AUTO-ENTMCNC: 32.1 G/DL (ref 31.5–36.5)
MCV RBC AUTO: 100 FL (ref 78–100)
MONOCYTES # BLD AUTO: 0.5 10E9/L (ref 0–1.3)
MONOCYTES NFR BLD AUTO: 11 %
NEUTROPHILS # BLD AUTO: 3.3 10E9/L (ref 1.6–8.3)
NEUTROPHILS NFR BLD AUTO: 73 %
NRBC # BLD AUTO: 0 10*3/UL
NRBC BLD AUTO-RTO: 0 /100
PLATELET # BLD AUTO: 125 10E9/L (ref 150–450)
POTASSIUM SERPL-SCNC: 3.8 MMOL/L (ref 3.4–5.3)
RBC # BLD AUTO: 3.49 10E12/L (ref 4.4–5.9)
SODIUM SERPL-SCNC: 137 MMOL/L (ref 133–144)
TROPONIN I SERPL-MCNC: 0.02 UG/L (ref 0–0.04)
WBC # BLD AUTO: 4.5 10E9/L (ref 4–11)

## 2020-10-30 PROCEDURE — G0378 HOSPITAL OBSERVATION PER HR: HCPCS

## 2020-10-30 PROCEDURE — 250N000013 HC RX MED GY IP 250 OP 250 PS 637: Performed by: EMERGENCY MEDICINE

## 2020-10-30 PROCEDURE — 250N000011 HC RX IP 250 OP 636: Performed by: INTERNAL MEDICINE

## 2020-10-30 PROCEDURE — 99223 1ST HOSP IP/OBS HIGH 75: CPT | Mod: AI | Performed by: INTERNAL MEDICINE

## 2020-10-30 PROCEDURE — 258N000003 HC RX IP 258 OP 636: Performed by: INTERNAL MEDICINE

## 2020-10-30 PROCEDURE — 82550 ASSAY OF CK (CPK): CPT | Performed by: EMERGENCY MEDICINE

## 2020-10-30 PROCEDURE — 96361 HYDRATE IV INFUSION ADD-ON: CPT

## 2020-10-30 PROCEDURE — C9803 HOPD COVID-19 SPEC COLLECT: HCPCS

## 2020-10-30 PROCEDURE — 72128 CT CHEST SPINE W/O DYE: CPT

## 2020-10-30 PROCEDURE — 96376 TX/PRO/DX INJ SAME DRUG ADON: CPT

## 2020-10-30 PROCEDURE — 84484 ASSAY OF TROPONIN QUANT: CPT | Performed by: EMERGENCY MEDICINE

## 2020-10-30 PROCEDURE — L0464 TLSO 4MOD SACRO-SCAP PRE: HCPCS

## 2020-10-30 PROCEDURE — 72131 CT LUMBAR SPINE W/O DYE: CPT

## 2020-10-30 PROCEDURE — 99285 EMERGENCY DEPT VISIT HI MDM: CPT | Mod: 25

## 2020-10-30 PROCEDURE — 96374 THER/PROPH/DIAG INJ IV PUSH: CPT

## 2020-10-30 PROCEDURE — 71250 CT THORAX DX C-: CPT

## 2020-10-30 PROCEDURE — U0003 INFECTIOUS AGENT DETECTION BY NUCLEIC ACID (DNA OR RNA); SEVERE ACUTE RESPIRATORY SYNDROME CORONAVIRUS 2 (SARS-COV-2) (CORONAVIRUS DISEASE [COVID-19]), AMPLIFIED PROBE TECHNIQUE, MAKING USE OF HIGH THROUGHPUT TECHNOLOGIES AS DESCRIBED BY CMS-2020-01-R: HCPCS | Performed by: EMERGENCY MEDICINE

## 2020-10-30 PROCEDURE — 99207 PR CDG-CODE CATEGORY CHANGED: CPT | Performed by: INTERNAL MEDICINE

## 2020-10-30 PROCEDURE — 72125 CT NECK SPINE W/O DYE: CPT

## 2020-10-30 PROCEDURE — 85025 COMPLETE CBC W/AUTO DIFF WBC: CPT | Performed by: EMERGENCY MEDICINE

## 2020-10-30 PROCEDURE — 70450 CT HEAD/BRAIN W/O DYE: CPT

## 2020-10-30 PROCEDURE — 250N000013 HC RX MED GY IP 250 OP 250 PS 637: Performed by: INTERNAL MEDICINE

## 2020-10-30 PROCEDURE — 93005 ELECTROCARDIOGRAM TRACING: CPT

## 2020-10-30 PROCEDURE — 80048 BASIC METABOLIC PNL TOTAL CA: CPT | Performed by: EMERGENCY MEDICINE

## 2020-10-30 PROCEDURE — 120N000004 HC R&B MS OVERFLOW

## 2020-10-30 PROCEDURE — 250N000011 HC RX IP 250 OP 636: Performed by: EMERGENCY MEDICINE

## 2020-10-30 PROCEDURE — 99221 1ST HOSP IP/OBS SF/LOW 40: CPT | Performed by: PHYSICIAN ASSISTANT

## 2020-10-30 RX ORDER — NALOXONE HYDROCHLORIDE 0.4 MG/ML
.1-.4 INJECTION, SOLUTION INTRAMUSCULAR; INTRAVENOUS; SUBCUTANEOUS
Status: DISCONTINUED | OUTPATIENT
Start: 2020-10-30 | End: 2020-11-01 | Stop reason: HOSPADM

## 2020-10-30 RX ORDER — HYDROMORPHONE HYDROCHLORIDE 1 MG/ML
0.5 INJECTION, SOLUTION INTRAMUSCULAR; INTRAVENOUS; SUBCUTANEOUS
Status: COMPLETED | OUTPATIENT
Start: 2020-10-30 | End: 2020-10-30

## 2020-10-30 RX ORDER — LISINOPRIL 20 MG/1
20 TABLET ORAL DAILY
Status: DISCONTINUED | OUTPATIENT
Start: 2020-10-30 | End: 2020-11-01 | Stop reason: HOSPADM

## 2020-10-30 RX ORDER — AMOXICILLIN 250 MG
2 CAPSULE ORAL 2 TIMES DAILY
Status: DISCONTINUED | OUTPATIENT
Start: 2020-10-30 | End: 2020-11-01 | Stop reason: HOSPADM

## 2020-10-30 RX ORDER — TAMSULOSIN HYDROCHLORIDE 0.4 MG/1
0.4 CAPSULE ORAL DAILY
Status: DISCONTINUED | OUTPATIENT
Start: 2020-10-30 | End: 2020-11-01 | Stop reason: HOSPADM

## 2020-10-30 RX ORDER — POTASSIUM CHLORIDE 750 MG/1
20 TABLET, EXTENDED RELEASE ORAL 2 TIMES DAILY
Status: DISCONTINUED | OUTPATIENT
Start: 2020-10-30 | End: 2020-11-01 | Stop reason: HOSPADM

## 2020-10-30 RX ORDER — ONDANSETRON 4 MG/1
4 TABLET, ORALLY DISINTEGRATING ORAL EVERY 6 HOURS PRN
Status: DISCONTINUED | OUTPATIENT
Start: 2020-10-30 | End: 2020-11-01 | Stop reason: HOSPADM

## 2020-10-30 RX ORDER — ACETAMINOPHEN 500 MG
500-1000 TABLET ORAL EVERY 8 HOURS PRN
Status: DISCONTINUED | OUTPATIENT
Start: 2020-10-30 | End: 2020-11-01 | Stop reason: HOSPADM

## 2020-10-30 RX ORDER — SODIUM CHLORIDE, SODIUM LACTATE, POTASSIUM CHLORIDE, CALCIUM CHLORIDE 600; 310; 30; 20 MG/100ML; MG/100ML; MG/100ML; MG/100ML
INJECTION, SOLUTION INTRAVENOUS CONTINUOUS
Status: DISCONTINUED | OUTPATIENT
Start: 2020-10-30 | End: 2020-10-31

## 2020-10-30 RX ORDER — BISACODYL 10 MG
10 SUPPOSITORY, RECTAL RECTAL DAILY PRN
Status: DISCONTINUED | OUTPATIENT
Start: 2020-10-30 | End: 2020-11-01 | Stop reason: HOSPADM

## 2020-10-30 RX ORDER — CARBOXYMETHYLCELLULOSE SODIUM 5 MG/ML
1-2 SOLUTION/ DROPS OPHTHALMIC EVERY 6 HOURS PRN
Status: DISCONTINUED | OUTPATIENT
Start: 2020-10-30 | End: 2020-11-01 | Stop reason: HOSPADM

## 2020-10-30 RX ORDER — OXYCODONE HYDROCHLORIDE 5 MG/1
5-10 TABLET ORAL
Status: DISCONTINUED | OUTPATIENT
Start: 2020-10-30 | End: 2020-11-01 | Stop reason: HOSPADM

## 2020-10-30 RX ORDER — POLYETHYLENE GLYCOL 3350 17 G/17G
17 POWDER, FOR SOLUTION ORAL DAILY
Status: DISCONTINUED | OUTPATIENT
Start: 2020-10-30 | End: 2020-11-01 | Stop reason: HOSPADM

## 2020-10-30 RX ORDER — LORAZEPAM 0.5 MG/1
0.5 TABLET ORAL ONCE
Status: COMPLETED | OUTPATIENT
Start: 2020-10-30 | End: 2020-10-30

## 2020-10-30 RX ORDER — BISACODYL 5 MG
10 TABLET, DELAYED RELEASE (ENTERIC COATED) ORAL DAILY PRN
Status: DISCONTINUED | OUTPATIENT
Start: 2020-10-30 | End: 2020-11-01 | Stop reason: HOSPADM

## 2020-10-30 RX ORDER — HYDROMORPHONE HYDROCHLORIDE 1 MG/ML
0.3 INJECTION, SOLUTION INTRAMUSCULAR; INTRAVENOUS; SUBCUTANEOUS
Status: DISCONTINUED | OUTPATIENT
Start: 2020-10-30 | End: 2020-10-31

## 2020-10-30 RX ORDER — SIMVASTATIN 20 MG
20 TABLET ORAL AT BEDTIME
Status: DISCONTINUED | OUTPATIENT
Start: 2020-10-30 | End: 2020-11-01 | Stop reason: HOSPADM

## 2020-10-30 RX ORDER — ONDANSETRON 2 MG/ML
4 INJECTION INTRAMUSCULAR; INTRAVENOUS EVERY 6 HOURS PRN
Status: DISCONTINUED | OUTPATIENT
Start: 2020-10-30 | End: 2020-11-01 | Stop reason: HOSPADM

## 2020-10-30 RX ORDER — TORSEMIDE 20 MG/1
20 TABLET ORAL
Status: DISCONTINUED | OUTPATIENT
Start: 2020-10-31 | End: 2020-11-01 | Stop reason: HOSPADM

## 2020-10-30 RX ORDER — BISACODYL 5 MG
15 TABLET, DELAYED RELEASE (ENTERIC COATED) ORAL DAILY PRN
Status: DISCONTINUED | OUTPATIENT
Start: 2020-10-30 | End: 2020-11-01 | Stop reason: HOSPADM

## 2020-10-30 RX ORDER — AMOXICILLIN 250 MG
1 CAPSULE ORAL 2 TIMES DAILY
Status: DISCONTINUED | OUTPATIENT
Start: 2020-10-30 | End: 2020-11-01 | Stop reason: HOSPADM

## 2020-10-30 RX ORDER — BISACODYL 5 MG
5 TABLET, DELAYED RELEASE (ENTERIC COATED) ORAL DAILY PRN
Status: DISCONTINUED | OUTPATIENT
Start: 2020-10-30 | End: 2020-11-01 | Stop reason: HOSPADM

## 2020-10-30 RX ADMIN — OXYCODONE HYDROCHLORIDE 5 MG: 5 TABLET ORAL at 22:39

## 2020-10-30 RX ADMIN — HYDROMORPHONE HYDROCHLORIDE 0.3 MG: 1 INJECTION, SOLUTION INTRAMUSCULAR; INTRAVENOUS; SUBCUTANEOUS at 15:53

## 2020-10-30 RX ADMIN — OMEPRAZOLE 40 MG: 20 CAPSULE, DELAYED RELEASE ORAL at 18:43

## 2020-10-30 RX ADMIN — LISINOPRIL 20 MG: 20 TABLET ORAL at 18:43

## 2020-10-30 RX ADMIN — SODIUM CHLORIDE, POTASSIUM CHLORIDE, SODIUM LACTATE AND CALCIUM CHLORIDE: 600; 310; 30; 20 INJECTION, SOLUTION INTRAVENOUS at 15:29

## 2020-10-30 RX ADMIN — LORAZEPAM 0.5 MG: 0.5 TABLET ORAL at 12:43

## 2020-10-30 RX ADMIN — HYDROMORPHONE HYDROCHLORIDE 0.5 MG: 1 INJECTION, SOLUTION INTRAMUSCULAR; INTRAVENOUS; SUBCUTANEOUS at 11:35

## 2020-10-30 RX ADMIN — POTASSIUM CHLORIDE 20 MEQ: 750 TABLET, FILM COATED, EXTENDED RELEASE ORAL at 20:05

## 2020-10-30 RX ADMIN — ONDANSETRON 4 MG: 4 TABLET, ORALLY DISINTEGRATING ORAL at 14:56

## 2020-10-30 RX ADMIN — HYDROMORPHONE HYDROCHLORIDE 0.5 MG: 1 INJECTION, SOLUTION INTRAMUSCULAR; INTRAVENOUS; SUBCUTANEOUS at 09:12

## 2020-10-30 RX ADMIN — DOCUSATE SODIUM 50 MG AND SENNOSIDES 8.6 MG 1 TABLET: 8.6; 5 TABLET, FILM COATED ORAL at 20:05

## 2020-10-30 RX ADMIN — TAMSULOSIN HYDROCHLORIDE 0.4 MG: 0.4 CAPSULE ORAL at 18:43

## 2020-10-30 RX ADMIN — ACETAMINOPHEN 1000 MG: 500 TABLET, FILM COATED ORAL at 22:39

## 2020-10-30 RX ADMIN — HYDROMORPHONE HYDROCHLORIDE 0.5 MG: 1 INJECTION, SOLUTION INTRAMUSCULAR; INTRAVENOUS; SUBCUTANEOUS at 10:55

## 2020-10-30 RX ADMIN — SIMVASTATIN 20 MG: 20 TABLET, FILM COATED ORAL at 22:39

## 2020-10-30 RX ADMIN — DICLOFENAC SODIUM 4 G: 10 GEL TOPICAL at 22:40

## 2020-10-30 ASSESSMENT — ENCOUNTER SYMPTOMS
ARTHRALGIAS: 0
COUGH: 0
FEVER: 0
BACK PAIN: 1
NECK PAIN: 1
ABDOMINAL PAIN: 0
HEADACHES: 0
SHORTNESS OF BREATH: 0

## 2020-10-30 ASSESSMENT — MIFFLIN-ST. JEOR: SCORE: 1448.82

## 2020-10-30 NOTE — PHARMACY-ADMISSION MEDICATION HISTORY
Admission medication history interview status for this patient is complete. See [facility] admission navigator for allergy information, prior to admission medications and immunization status.     Medication history interview done via telephone during Covid-19 pandemic, indicate source(s): Patient and Family  Medication history resources (including written lists, pill bottles, clinic record):pts med list  Pharmacy: [facility]    Changes made to PTA medication list:  Added: none  Deleted: none  Changed: none    Actions taken by pharmacist (provider contacted, etc): call returned by daughter Stacia ([phone])     Additional medication history information:None    Medication reconciliation/reorder completed by provider prior to medication history?  N   (Y/N)         Prior to Admission medications    Medication Sig Last Dose Taking? Auth Provider   apixaban ANTICOAGULANT (ELIQUIS) 2.5 MG tablet Take 1 tablet (2.5 mg) by mouth 2 times daily 10/29/2020 at pm Yes Juliano Caballero PA-C   co-enzyme Q-10 100 MG CAPS Take 1 capsule (100 mg) by mouth daily 10/29/2020 at am Yes Thony Bay MD   [facility] GLUCOSAMINE-CHONDROITIN PO TAKE ONE TABLET BY MOUTH TWO TIMES A DAY 10/29/2020 at Unknown time Yes Reported, Patient   doxycycline hyclate (VIBRA-TABS) 100 MG tablet 100 mg daily  10/29/2020 at am Yes Reported, Patient   ferrous gluconate (FERGON) 324 (38 Fe) MG tablet Take 1 tablet (324 mg) by mouth daily (with breakfast) 10/29/2020 at am Yes Oscar Alves MD   lisinopril (ZESTRIL) 20 MG tablet Take 1 tablet (20 mg) by mouth daily 10/29/2020 at Unknown time Yes Oscar Alves MD   MELATONIN PO Take 5 mg by mouth At Bedtime  10/29/2020 at pm Yes Reported, Patient   multivitamin, therapeutic (THERA-VIT) TABS tablet Take 1 tablet by mouth daily 10/29/2020 at am Yes Reported, Patient   omeprazole (PRILOSEC) 40 MG DR capsule Take 1 capsule (40 mg) by mouth daily 10/29/2020 at am Yes Oscar Alves MD   potassium chloride ER (K-TAB) 20 MEQ CR  tablet Take 1 tablet (20 mEq) by mouth 2 times daily 10/29/2020 at pm Yes Sixto Conley MD   simvastatin (ZOCOR) 20 MG tablet Take 1 tablet (20 mg) by mouth At Bedtime 10/29/2020 at pm Yes Oscar Alves MD   tamsulosin (FLOMAX) 0.4 MG capsule Take 1 capsule (0.4 mg) by mouth daily 10/29/2020 at am Yes Oscar Alves MD   torsemide (DEMADEX) 20 MG tablet Take 1 tablet (20 mg) by mouth daily (with breakfast) 10/29/2020 at am Yes Oscar Alves MD   acetaminophen (TYLENOL) 500 MG tablet Take 500-1,000 mg by mouth every 8 hours as needed   Reported, Patient   Carboxymethylcellulose Sod PF 0.25 % SOLN INSTILL ONE DROP IN BOTH EYES FIVE TIMES A DAY AS NEEDED   Reported, Patient   diclofenac (VOLTAREN) 1 % topical gel Apply 4 gramsto area qid prn   Oscar Alves MD   Glucosamine-Chondroitin (OSTEO BI-FLEX REGULAR STRENGTH) 250-200 MG TABS Take 1 tablet by mouth daily   Oscar Alves MD   Misc Natural Products (SAW PALMETTO) CAPS TAKE  BY MOUTH EVERY DAY   Reported, Patient   UREA 20 INTENSIVE HYDRATING 20 % external cream Apply topically as needed Apply to right foot daily   Oscar Alves MD

## 2020-10-30 NOTE — ED NOTES
[facility]  ED Nurse Handoff Report    Milo Serna is a 79 year old male   ED Chief complaint: Fall  . ED Diagnosis:   Final diagnoses:   Fall, initial encounter   Other closed fracture of eighth thoracic vertebra, initial encounter (H)     Allergies: No Known Allergies    Code Status: Full Code  Activity level - Baseline/Home:  Independent. Activity Level - Current:   Assist X 2. Lift room needed: No. Bariatric: No   Needed: No   Isolation: No. Infection: Not Applicable.     Vital Signs:   Vitals:    10/30/20 0930 10/30/20 1000 10/30/20 1015 10/30/20 1030   BP: 118/66 126/69  123/70   Pulse: 52  60 (!) 46   Resp: 13  15 12   Temp:       TempSrc:       SpO2: 93%  95% 94%       Cardiac Rhythm:  ,      Pain level: 0-10 Pain Scale: 6  Patient confused: No. Patient Falls Risk: Yes.   Elimination Status: Has voided   Patient Report - Initial Complaint: 79 year old male presents via EMS after fall last night around 2200. Patient states he laid all the floor all night. Patient now complains of mid to lower back pain. Denies neck pain or head injury. ABCs intact. GCS 15.. Focused Assessment:   Musculoskeletal Musculoskeletal - Musculoskeletal WDL:  (patient c/o mid to lower back pain) KF     09:00 Neurological Cognitive - Cognitive/Neuro/Behavioral WDL: all  Level of Consciousness: alert  Arousal Level: opens eyes spontaneously  Orientation: oriented x 4  Follows Commands: yes  Speech: clear; spontaneous; logical  Best Language: 0 - No aphasia  Mood/Behavior: calm; cooperative; behavior appropriate to situation   Perry Coma Scale - Best Eye Response: 4-->(E4) spontaneous  Best Motor Response: 6-->(M6) obeys commands  Best Verbal Response: 5-->(V5) oriented  Perry Coma Scale Score: 15         Tests Performed: labs, CT, xray. Abnormal Results:   Labs Ordered and Resulted from Time of ED Arrival Up to the Time of Departure from the ED   CBC WITH PLATELETS DIFFERENTIAL - Abnormal; Notable for  the following components:       Result Value    RBC Count 3.49 (*)     Hemoglobin 11.2 (*)     Hematocrit 34.9 (*)     Platelet Count 125 (*)     Absolute Lymphocytes 0.5 (*)     All other components within normal limits   BASIC METABOLIC PANEL   CK TOTAL   TROPONIN I   COVID-19 VIRUS (CORONAVIRUS) BY PCR     Lumbar spine CT w/o contrast   Preliminary Result   IMPRESSION:   1. No evidence for fracture or posterior malalignment.   2. Multilevel degenerative changes, most advanced at L3-L4 where there   is moderate central canal stenosis.      CT Thoracic Spine w/o Contrast   Preliminary Result   IMPRESSION:   1. 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   2. Acute fracture involving at least the anterior column of T8. Since   the patient is ankylosed, this fracture has the propensity to involve   the middle and posterior columns but there is no definite fracture   identified through the middle or posterior columns on this exam.   3. Results called to and discussed with Dr. Fischer at 10:32 on   10/30/2020.      CT Cervical Spine w/o Contrast   Preliminary Result   IMPRESSION: Degenerative changes. No evidence for fracture.      CT Chest Abdomen Pelvis w/o Contrast   Final Result   IMPRESSION:   1.  No acute traumatic abnormality demonstrated in the chest, abdomen,   or pelvis.   2.  Cardiomegaly and severe atherosclerosis.   3.  Dilated ascending thoracic aorta measures 4.3 cm.   4.  Dilated main pulmonary artery suggestive of chronic pulmonary   arterial hypertension.   5.  Focal gallbladder wall thickening is nonspecific but could be   related to chronic cholecystitis or malignancy. Consider right upper   quadrant ultrasound.      JAMIR CORTES MD      CT Head w/o Contrast   Preliminary Result   IMPRESSION: Chronic changes. No evidence for intracranial hemorrhage   or any acute process.        .   Treatments provided: pain meds  Family Comments: daughter at bedside  OBS brochure/video  discussed/provided to patient:  Yes  ED Medications:   Medications   HYDROmorphone (PF) (DILAUDID) injection 0.5 mg (0.5 mg Intravenous Given 10/30/20 0912)     Drips infusing:  No  For the majority of the shift, the patient's behavior Green. Interventions performed were NA.    Sepsis treatment initiated: No     Patient tested for COVID 19 prior to admission: YES    ED Nurse Name/Phone Number: Chantel Johnson RN,   10:46 AM    RECEIVING UNIT ED HANDOFF REVIEW    Above ED Nurse Handoff Report was reviewed: Yes  Reviewed by: Nimo Hunt RN on October 30, 2020 at 12:24 PM

## 2020-10-30 NOTE — PROGRESS NOTES
Fit patient with tlso.  Written instructions and contact information given.  I went over orthosis with daughter and wife when I came back for paperwork.  Please call if questions.  Kemal GAMBOA.

## 2020-10-30 NOTE — UTILIZATION REVIEW
Admission Status; Secondary Review Determination       Under the authority of the Utilization Management Committee, the utilization review process indicated a secondary review on the above patient. The review outcome is based on review of the medical records, discussions with staff, and applying clinical experience noted on the date of the review.     (x) Inpatient Status Appropriate - This patient's medical care is consistent with medical management for inpatient care and reasonable inpatient medical practice.     RATIONALE FOR DETERMINATION   79-year-old male with history of coronary artery disease, myocardial infarction, hypertension, dyslipidemia, atrial fibrillation, chronic anticoagulation with Eliquis, pulmonary hypertension, obstructive sleep apnea (no longer uses CPAP by choice), low back pain, rosacea, benign prostatic hypertrophy, neuropathy of foot, metatarsal fracture, osteoarthritis, peptic ulcer disease, gout, prostate cancer, and gallbladder polyps.  He presented to the emergency department on 10/30/2020 for evaluation after a fall. There was an acute fracture of the anterior column of T8.  CT of L-spine showed no fracture.  It showed degenerative changes most prominent at L3-4.  CT of chest, abdomen, and pelvis showed no acute findings.  4.3 cm ascending thoracic aorta was noted.  Dilated cardiomyopathy was noted.  Dilated pulmonary artery was noted.  Focal gallbladder wall thickening was noted.  Neurosurgery was called by phone regarding this anterior column fracture of T8. Skip is currently unable to move. He is having severe pain and is requiring IV Dilaudid for management. Is not able to sit at this time to eat so is requiring IV fluid.    At the time of admission with the information available to the attending physician more than 2 nights of Hospital complex care was anticipated, based on patient risk of adverse outcome if treated as outpatient and complex care required.    This document was  produced using voice recognition software       The information on this document is developed by the utilization review team in order for the business office to ensure compliance. This only denotes the appropriateness of proper admission status and does not reflect the quality of care rendered.   The definitions of Inpatient Status and Observation Status used in making the determination above are those provided in the CMS Coverage Manual, Chapter 1 and Chapter 6, section 70.4.   Sincerely,   KATERYNA ACE MD   System Medical Director   Utilization Management   [facility].

## 2020-10-30 NOTE — ED TRIAGE NOTES
79 year old male presents via EMS after fall last night around 2200. Patient states he laid all the floor all night. Patient now complains of mid to lower back pain. Denies neck pain or head injury. ABCs intact. GCS 15.

## 2020-10-30 NOTE — H&P
[facility]  History and Physical   Hospitalist Service    Gigi Whitaker MD    Milo Serna MRN# [id number]   YOB: 1940 Age: 79 year old      Date of Admission:  10/30/2020           Assessment and Plan:   Luís Serna is an 79-year-old male with history of coronary artery disease, myocardial infarction, hypertension, dyslipidemia, atrial fibrillation, chronic anticoagulation with Eliquis, pulmonary hypertension, obstructive sleep apnea (no longer uses CPAP by choice), low back pain, rosacea, benign prostatic hypertrophy, neuropathy of foot, metatarsal fracture, osteoarthritis, peptic ulcer disease, gout, prostate cancer, and gallbladder polyps.  He presented to the emergency department on 10/30/2020 for evaluation after a fall.  Evidently, he fell onto the floor last night.  He denied any loss of consciousness.  He is not sure how the fall occurred but thinks that he may have slipped or tripped.  He was having back pain.  He was not able to get up.  He told his wife to leave him on the floor until morning.  This morning he was still not able to mobilize so was brought to the emergency department by ambulance.  Emergency department evaluation showed stable vital signs.  Basic metabolic panel, CK, and troponin were unremarkable.  CBC showed mild thrombocytopenia with platelet count of 125.  CT of C-spine showed degenerative changes but no fractures.  CT of T-spine showed extensive ankylosing spondylitis from T2-T12.  There was an acute fracture of the anterior column of T8.  CT of L-spine showed no fracture.  It showed degenerative changes most prominent at L3-4.  CT of chest, abdomen, and pelvis showed no acute findings.  4.3 cm ascending thoracic aorta was noted.  Dilated cardiomyopathy was noted.  Dilated pulmonary artery was noted.  Focal gallbladder wall thickening was noted.  Neurosurgery was called by phone regarding this anterior column fracture of T8.  They thought  that given his extensive enclosing spondylitis surgery would not be recommended.  They tentatively recommended TLSO bracing.    Problem list:   1.  Fall, likely mechanical, with anterior column T8 fracture Luís is currently unable to move.  He is having severe pain and is requiring IV Dilaudid for management.  Is not able to sit at this time to eat so is requiring IV fluid.  I discussed with utilization review.  Admit to inpatient as a couple of days of hospitalization are expected, at least.  I ordered diet as tolerated, IV fluids, and neurosurgery consult, bedrest.  I have ordered scheduled Tylenol and Lidoderm patch as well as as needed oxycodone and IV Dilaudid for pain antiemetic medications have been ordered as well for use as needed.  We will hold prior to admission Eliquis for now in case neurosurgery intervention is recommended.  Neurosurgery has already ordered TLSO brace.  Order physical therapy and Occupational Therapy once okay with neurosurgery.    2.  Dilated ascending thoracic aorta (4.3 cm).  Recommend outpatient follow-up with vascular surgery.    3.  Stable medical conditions include coronary artery disease, dyslipidemia, hypertension, and atrial fibrillation.  Continue prior to admission medications.    4.  Obstructive sleep apnea.  Patient has been noncompliant with CPAP for years.    Full code  Mechanical DVT prophylaxis  Disposition: Admit as inpatient.  Likely 2 or 3 days of hospitalization.  Additional care may be needed on discharge depending on progress in therapy recommendations           Code Status:   Full Code         Primary Care Physician:   Oscar Alves [phone]         Chief Complaint:   Fall with back pain    History is obtained from Dr. Heather Staley, and the medical record         History of Present Illness:   Luís Serna is an 79-year-old male with history of coronary artery disease, myocardial infarction, hypertension, dyslipidemia, atrial fibrillation, chronic  anticoagulation with Eliquis, pulmonary hypertension, obstructive sleep apnea (no longer uses CPAP by choice), low back pain, rosacea, benign prostatic hypertrophy, neuropathy of foot, metatarsal fracture, osteoarthritis, peptic ulcer disease, gout, prostate cancer, and gallbladder polyps.  He presented to the emergency department on 10/30/2020 for evaluation after a fall.  Evidently, he fell onto the floor last night.  He denied any loss of consciousness.  He is not sure how the fall occurred but thinks that he may have slipped or tripped.  He was having back pain.  He was not able to get up.  He told his wife to leave him on the floor until morning.  This morning he was still not able to mobilize so was brought to the emergency department by ambulance.  Emergency department evaluation showed stable vital signs.  Basic metabolic panel, CK, and troponin were unremarkable.  CBC showed mild thrombocytopenia with platelet count of 125.  CT of C-spine showed degenerative changes but no fractures.  CT of T-spine showed extensive ankylosing spondylitis from T2-T12.  There was an acute fracture of the anterior column of T8.  CT of L-spine showed no fracture.  It showed degenerative changes most prominent at L3-4.  CT of chest, abdomen, and pelvis showed no acute findings.  4.3 cm ascending thoracic aorta was noted.  Dilated cardiomyopathy was noted.  Dilated pulmonary artery was noted.  Focal gallbladder wall thickening was noted.  Neurosurgery was called by phone regarding this anterior column fracture of T8.  They thought that given his extensive enclosing spondylitis surgery would not be recommended.  They tentatively recommended TLSO bracing.           Past Medical History:     Patient Active Problem List   Diagnosis     Rosacea     Hyperlipidemia LDL goal <100     Neuropathy of foot     CAD (coronary artery disease)     Rhinophyma     Chronic atrial fibrillation (H)     History of prostate cancer     Gallbladder polyp      Cervicalgia     Fall     Chronic combined systolic and diastolic congestive heart failure (H)     Actinic keratosis     LENORA (obstructive sleep apnea)     Nonrheumatic mitral valve regurgitation     Primary osteoarthritis involving multiple joints     Prostate cancer (H)     Microalbuminuria     T8 vertebral fracture (H)     Other closed fracture of eighth thoracic vertebra, initial encounter (H)      Past Medical History:   Diagnosis Date     Actinic keratosis 10/12/2019     Anemia, unspecified 11/8/2016     Atrial fibrillation (H) 5/27/2015     BPH (benign prostatic hyperplasia) 1/25/2012     CAD (coronary artery disease) 4/4/2012     Closed bimalleolar fracture of left ankle with routine healing 4/5/2019     Closed fracture of metatarsal bone 4/4/2012     Coronary atherosclerosis of unspecified type of vessel, native or graft [facility] 2006    CAB 1996 following MI (at [facility])      Dilated aortic root (H) 5/26/2016     Drug-induced erectile dysfunction 10/2/2015     Elevated blood sugar 11/8/2016     Elevated PSA 9/19/2013     Epistaxis 9/9/2019     Essential hypertension with goal blood pressure less than 140/90 9/22/2016     Essential hypertension, benign      Fall 9/6/2019     Functional diarrhea 10/12/2019     Gallbladder polyp 5/1/2018     Gout      HAV (hallux abducto valgus) 4/4/2012     Hematoma 9/9/2019     Hernia, abdominal      History of prostate cancer 6/15/2016     HTN, goal below 150/90 4/20/2017     Hypokalemia 9/12/2019     Iron deficiency anemia secondary to inadequate dietary iron intake 11/15/2016     Low blood pressure reading 10/17/2016     Lumbago     had degendisc dz on MRI 7/07     Memory loss 6/18/2019     Microalbuminuria 6/4/2020    X1     Mixed hyperlipidemia      Mumps      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     Nonrheumatic mitral valve regurgitation 10/28/2019     OA (osteoarthritis) 4/4/2012     Old myocardial infarction 4/4/2012     LENORA (obstructive sleep apnea) 10/28/2019      Other viral warts 9/6/2019     Palpitations      Pes planus 4/4/2012     Petechiae 6/18/2019     Prostate cancer (H) 5/26/2016     PUD (peptic ulcer disease) 4/4/2012     Pulmonary hypertension (H) 9/25/2017     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     Rosacea 1/15/2008     Stress due to spouse with dementia 6/18/2019     Thrombocytopenia (H) 4/21/2017     Unspecified hyperplasia of prostate with urinary obstruction and other lower urinary tract symptoms (LUTS)     better on avodart & hytrin     Venous stasis 4/4/2012     Venous stasis 4/4/2012             Past Surgical History:     Past Surgical History:   Procedure Laterality Date     CARDIAC SURGERY      bipass     CYSTOSCOPY FLEXIBLE, CYOABLATION PROSTATE N/A 11/9/2016    Procedure: CYSTOSCOPY FLEXIBLE, CRYOABLATION PROSTATE;  Surgeon: Krystian Owens MD;  Location: [facility] OR     GENITOURINARY SURGERY      prostate surgery      HERNIA REPAIR       LAPAROSCOPIC HERNIORRHAPHY INGUINAL Right 5/10/2017    Procedure: LAPAROSCOPIC HERNIORRHAPHY INGUINAL;  Laparoscopic preperitoneal repair of right inguinal hernia with placement of underlay mesh;  Surgeon: Enrico Bridges MD;  Location: [facility] OR     PROSTATE SURGERY       [facility] NONSPECIFIC PROCEDURE      CAB 1996 [facility] NONSPECIFIC PROCEDURE  2/07    l inguinal hernia repair      [facility] NONSPECIFIC PROCEDURE      R hernia remote     [facility] NONSPECIFIC PROCEDURE  2000    R ankle ORIF for fx     [facility] NONSPECIFIC PROCEDURE  2005    nasal surgery      [facility] NONSPECIFIC PROCEDURE      R rotater repair (remote)      [facility] NONSPECIFIC PROCEDURE      appy 1966     [facility] NONSPECIFIC PROCEDURE      sinus surgery x 2     [facility] NONSPECIFIC PROCEDURE      L shoulder 6/09            Home Medications:     Prior to Admission medications    Medication Sig Last Dose Taking? Auth Provider   acetaminophen (TYLENOL) 500 MG tablet Take 500-1,000 mg by mouth every 8 hours as needed   Reported, Patient   apixaban ANTICOAGULANT (ELIQUIS) 2.5 MG tablet  Take 1 tablet (2.5 mg) by mouth 2 times daily   Juliano Caballero PA-C   Carboxymethylcellulose Sod PF 0.25 % SOLN INSTILL ONE DROP IN BOTH EYES FIVE TIMES A DAY AS NEEDED   Reported, Patient   co-enzyme Q-10 100 MG CAPS Take 1 capsule (100 mg) by mouth daily   Thony Bay MD   [facility] GLUCOSAMINE-CHONDROITIN PO TAKE ONE TABLET BY MOUTH TWO TIMES A DAY   Reported, Patient   diclofenac (VOLTAREN) 1 % topical gel Apply 4 gramsto area qid prn   Oscar Alves MD   doxycycline hyclate (VIBRA-TABS) 100 MG tablet    Reported, Patient   ferrous gluconate (FERGON) 324 (38 Fe) MG tablet Take 1 tablet (324 mg) by mouth daily (with breakfast)   Oscar Alves MD   Glucosamine-Chondroitin (OSTEO BI-FLEX REGULAR STRENGTH) 250-200 MG TABS Take 1 tablet by mouth daily   Oscar Alves MD   lisinopril (ZESTRIL) 20 MG tablet Take 1 tablet (20 mg) by mouth daily   Oscar Alves MD   MELATONIN PO Take 5 mg by mouth At Bedtime    Reported, Patient   Misc Natural Products (SAW PALMETTO) CAPS TAKE  BY MOUTH EVERY DAY   Reported, Patient   multivitamin, therapeutic (THERA-VIT) TABS tablet Take 1 tablet by mouth daily   Reported, Patient   omeprazole (PRILOSEC) 40 MG DR capsule Take 1 capsule (40 mg) by mouth daily   Oscar Alves MD   potassium chloride ER (K-TAB) 20 MEQ CR tablet Take 1 tablet (20 mEq) by mouth 2 times daily   Sixto Conley MD   simvastatin (ZOCOR) 20 MG tablet Take 1 tablet (20 mg) by mouth At Bedtime   Oscar Alves MD   tamsulosin (FLOMAX) 0.4 MG capsule Take 1 capsule (0.4 mg) by mouth daily   Oscar Alves MD   torsemide (DEMADEX) 20 MG tablet Take 1 tablet (20 mg) by mouth daily (with breakfast)   Oscar Alves MD   UREA 20 INTENSIVE HYDRATING 20 % external cream Apply topically as needed Apply to right foot daily   Oscar Alves MD            Allergies:   No Known Allergies         Social History:     Social History     Tobacco Use     Smoking status: Former Smoker     Smokeless tobacco: Never  "Used     Tobacco comment: quit 3/1974   Substance Use Topics     Alcohol use: Not Currently     Alcohol/week: 0.0 standard drinks     Comment: rare drink             Family History:     Family History   Problem Relation Age of Onset     Cancer Mother         stomach cancer,  age 61     Heart Disease Father         MI,  age 71     Heart Disease Brother         stent placement, RA      Hypertension Brother      Heart Disease Sister         rythmn problems with heart, hypertension              Review of Systems:   The 10 point Review of Systems is negative other than as noted in the HPI.           Physical Exam:   Blood pressure (!) 140/61, pulse 59, temperature 96  F (35.6  C), temperature source Oral, resp. rate 16, height 1.702 m (5' 7\"), weight 78 kg (172 lb), SpO2 90 %.  172 lbs 0 oz      GENERAL: Pleasant and cooperative. Moderate acute distress due to pain.   EYES: Pupils equal and round. No scleral erythema or icterus.  ENT: External ears are normal without deformity. Posterior oropharynx is without erythem, swelling, or exudate.  NECK: Supple. No masses or swelling. No tenderness. Thyroid is normal without mass or tenderness.  CHEST: Clear to auscultation. Normal breath sounds. No retractions. Mid back pain over spine.  CV: Regular rate and rhythm. No JVD. Pulses normal.  ABDOMEN: Bowel sounds present. No tenderness. No masses or hernia.  EXTREMETIES: No clubbing, cyanosis, or ischemia.  SKIN: Warm and dry to touch. No wounds or rashes.  NEUROLOGIC: Strength and sensation are normal. Deep tendon reflexes are normal. Cranial nerves are normal.             Data:   All new lab and imaging data was reviewed.     Results for orders placed or performed during the hospital encounter of 10/30/20 (from the past 24 hour(s))   CBC with platelets differential   Result Value Ref Range    WBC 4.5 4.0 - 11.0 10e9/L    RBC Count 3.49 (L) 4.4 - 5.9 10e12/L    Hemoglobin 11.2 (L) 13.3 - 17.7 g/dL    Hematocrit " 34.9 (L) 40.0 - 53.0 %     78 - 100 fl    MCH 32.1 26.5 - 33.0 pg    MCHC 32.1 31.5 - 36.5 g/dL    RDW 14.5 10.0 - 15.0 %    Platelet Count 125 (L) 150 - 450 10e9/L    Diff Method Automated Method     % Neutrophils 73.0 %    % Lymphocytes 11.7 %    % Monocytes 11.0 %    % Eosinophils 3.4 %    % Basophils 0.9 %    % Immature Granulocytes 0.0 %    Nucleated RBCs 0 0 /100    Absolute Neutrophil 3.3 1.6 - 8.3 10e9/L    Absolute Lymphocytes 0.5 (L) 0.8 - 5.3 10e9/L    Absolute Monocytes 0.5 0.0 - 1.3 10e9/L    Absolute Eosinophils 0.2 0.0 - 0.7 10e9/L    Absolute Basophils 0.0 0.0 - 0.2 10e9/L    Abs Immature Granulocytes 0.0 0 - 0.4 10e9/L    Absolute Nucleated RBC 0.0    Basic metabolic panel   Result Value Ref Range    Sodium 137 133 - 144 mmol/L    Potassium 3.8 3.4 - 5.3 mmol/L    Chloride 103 94 - 109 mmol/L    Carbon Dioxide 26 20 - 32 mmol/L    Anion Gap 8 3 - 14 mmol/L    Glucose 88 70 - 99 mg/dL    Urea Nitrogen 16 7 - 30 mg/dL    Creatinine 0.88 0.66 - 1.25 mg/dL    GFR Estimate 81 >60 mL/min/[1.73_m2]    GFR Estimate If Black >90 >60 mL/min/[1.73_m2]    Calcium 8.7 8.5 - 10.1 mg/dL   CK total   Result Value Ref Range    CK Total 112 30 - 300 U/L   Troponin I   Result Value Ref Range    Troponin I ES 0.016 0.000 - 0.045 ug/L   EKG 12-lead, tracing only   Result Value Ref Range    Interpretation ECG Click View Image link to view waveform and result    CT Head w/o Contrast    Narrative    CT SCAN OF THE HEAD WITHOUT CONTRAST   10/30/2020 10:03 AM     HISTORY: Head trauma, minor, GCS>=13, high clinical risk, initial  exam.    TECHNIQUE:  Axial images of the head and coronal reformations without  IV contrast material.  Radiation dose for this scan was reduced using  automated exposure control, adjustment of the mA and/or kV according  to patient size, or iterative reconstruction technique.    COMPARISON: 2/20/2007.    FINDINGS: Mild to moderate cerebral atrophy is present. Patchy white  matter changes are  seen in both hemispheres without mass effect. There  is no evidence for intracranial hemorrhage, mass effect, acute  infarct, or skull fracture. Visualized paranasal sinuses and mastoid  air cells are clear. Vascular calcifications noted.      Impression    IMPRESSION: Chronic changes. No evidence for intracranial hemorrhage  or any acute process.    ALEJANDRO STEVENS MD   CT Chest Abdomen Pelvis w/o Contrast    Narrative    CT CHEST, ABDOMEN AND PELVIS WITHOUT CONTRAST  10/30/2020 10:04 AM    CLINICAL HISTORY:  Chest, abdomen and pelvis trauma, moderate, blunt.  Thoracic back pain after ground level fall onto back.  Concern for  pain radiating around chest wall, ? rib fracture.    TECHNIQUE: CT scan of the chest, abdomen, and pelvis was performed  without IV contrast. Multiplanar reformats were obtained. Dose  reduction techniques were used.   CONTRAST: None.    COMPARISON: 10/10/2018    FINDINGS:   LUNGS AND PLEURA: Dependent bilateral airspace and groundglass  opacities likely related to minor atelectasis. No pneumothorax or  pleural effusion.    MEDIASTINUM/AXILLAE: The heart is enlarged. There is severe coronary  atherosclerosis. The ascending thoracic aorta is dilated at 4.3 cm.  The main pulmonary artery is also dilated at 4.3 cm. No pathologically  enlarged thoracic or axillary lymph nodes.    HEPATOBILIARY: Within the limits of a noncontrast study, no worrisome  liver abnormalities. There is focal gallbladder wall thickening along  the inferior lateral margin (series 3, image 187) that measures up to  1.4 cm. Gallstones are also present.    PANCREAS: Normal.    SPLEEN: Normal.    ADRENAL GLANDS: Normal.    KIDNEYS/BLADDER: Normal.    BOWEL: No bowel obstruction or free intraperitoneal air.    LYMPH NODES: Normal.    VASCULATURE: Nonaneurysmal aortic atherosclerosis.    PELVIC ORGANS: Normal.    OTHER: No ascites.    MUSCULOSKELETAL: No acute bone abnormalities.      Impression    IMPRESSION:  1.  No acute  traumatic abnormality demonstrated in the chest, abdomen,  or pelvis.  2.  Cardiomegaly and severe atherosclerosis.  3.  Dilated ascending thoracic aorta measures 4.3 cm.  4.  Dilated main pulmonary artery suggestive of chronic pulmonary  arterial hypertension.  5.  Focal gallbladder wall thickening is nonspecific but could be  related to chronic cholecystitis or malignancy. Consider right upper  quadrant ultrasound.    JAMIR CORTES MD   CT Cervical Spine w/o Contrast    Narrative    CT CERVICAL SPINE WITHOUT CONTRAST  10/30/2020 10:09 AM     HISTORY: Cervical spine trauma, high clinical risk (NEXUS/CCR).     TECHNIQUE: Axial images of the cervical spine were obtained without  intravenous contrast. Multiplanar reformations were performed.  Radiation dose for this scan was reduced using automated exposure  control, adjustment of the mA and/or kV according to patient size, or  iterative reconstruction technique.     COMPARISON: None.    FINDINGS: Sagittal reconstructions demonstrate minimal degenerative  spondylolisthesis at C7-T1 and minimal retrolisthesis at C3-C4 and  C4-C5. Posterior alignment is otherwise normal. There is no evidence  for acute fracture. Vertebral body heights are maintained. Mild to  moderate multilevel degenerative disc and facet disease is present.  Changes are most advanced at C4-C5 and C5-C6 where there are mild to  moderate central canal stenoses. Paraspinal soft tissues are  unremarkable as visualized.      Impression    IMPRESSION: Degenerative changes. No evidence for fracture.    ALEJANDRO STEVENS MD   CT Thoracic Spine w/o Contrast    Narrative    CT THORACIC SPINE WITHOUT CONTRAST  10/30/2020 10:10 AM     HISTORY: Polytrauma, critical, thoracolumbar spine injury suspected.    TECHNIQUE: Axial images were obtained through the thoracic spine  without contrast. Coronal and sagittal reconstructions were also  acquired. Radiation dose for this scan was reduced using automated  exposure  control, adjustment of the mA and/or kV according to patient  size, or iterative reconstruction technique.    FINDINGS: Sagittal images demonstrate normal posterior alignment.  There is extensive ankylosis of the thoracic spine from the T2 level  through T12. There is interruption of the anterior cortex of T8 with a  fracture line that extends to the midportion of the T8 vertebral body  but is not seen extending through the posterior cortex or any of the  posterior elements. The fracture line also extends through the left  lateral cortex as seen on images 37-40 of series 8. There is no  evidence for any epidural hemorrhage or any canal stenosis.  Degenerative changes also noted. Infiltrates are noted in the lung  bases.      Impression    IMPRESSION:  1. 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  2. Acute fracture involving at least the anterior column of T8. Since  the patient is ankylosed, this fracture has the propensity to involve  the middle and posterior columns but there is no definite fracture  identified through the middle or posterior columns on this exam.  3. Results called to and discussed with Dr. Fischer at 10:32 on  10/30/2020.    ALEJANDRO STEVENS MD   Lumbar spine CT w/o contrast    Narrative    CT LUMBAR SPINE WITHOUT CONTRAST  10/30/2020 10:10 AM     HISTORY: Polytrauma, critical, thoracolumbar spine injury suspected.    TECHNIQUE: Axial images were obtained through the lumbar spine without  contrast. Coronal and sagittal reconstructions were also acquired.  Radiation dose for this scan was reduced using automated exposure  control, adjustment of the mA and/or kV according to patient size, or  iterative reconstruction technique.    FINDINGS: Sagittal images demonstrate normal posterior alignment.  Vertebral body heights are maintained. There is no evidence for  fracture. Moderate multilevel degenerative disc and facet disease is  present. Degenerative changes are most advanced at L3-L4  where there  is moderate central canal stenosis.      Impression    IMPRESSION:  1. No evidence for fracture or posterior malalignment.  2. Multilevel degenerative changes, most advanced at L3-L4 where there  is moderate central canal stenosis.    ALEJANDRO STEVENS MD

## 2020-10-30 NOTE — CONSULTS
[facility]    Neurosurgery Consultation     Date of Admission:  10/30/2020  Date of Consult (When I saw the patient): 10/30/20    Assessment & Plan   Milo Serna is a 79 year old male who was admitted on 10/30/2020. I was asked to see the patient for T8 fx. Patient sustained a fall earlier today, landing on his back. He noted immediate localized pain in the area of the fracture.   No radicular leg pain or paresthesias.     Active Problems:    T8 vertebral fracture (H)    Assessment: anterior column fracture at T8 in the setting of ankylosing spondylitis.     Plan: TLSO ordered and fitted. Recommend f/u with xrays prior in 6 weeks [facility].   [phone] for appts.       I have discussed the following assessment and plan with Dr. Dao, who is in agreement with the initial plan and will follow up with further consultation recommendations.    Gary RAMOS [facility]  [address] [address]    Tel [phone]  Pager [phone]        Code Status    Full Code    Reason for Consult   Reason for consult: T8 fracture    Primary Care Physician   Oscar Alves    Chief Complaint   Back pain    History is obtained from the patient, electronic health record and emergency department physician    History of Present Illness   Milo Serna is a 79 year old male who presents with T8 fx. Patient sustained a fall earlier today, landing on his back. He noted immediate localized pain in the area of the fracture.   No radicular leg pain or paresthesias.     Past Medical History   I have reviewed this patient's medical history and updated it with pertinent information if needed.   Past Medical History:   Diagnosis Date     Actinic keratosis 10/12/2019     Anemia, unspecified 11/8/2016     Atrial fibrillation (H) 5/27/2015     BPH (benign prostatic hyperplasia) 1/25/2012     CAD  (coronary artery disease) 4/4/2012     Closed bimalleolar fracture of left ankle with routine healing 4/5/2019     Closed fracture of metatarsal bone 4/4/2012     Coronary atherosclerosis of unspecified type of vessel, native or graft [facility] 2006    CAB 1996 following MI (at [facility])      Dilated aortic root (H) 5/26/2016     Drug-induced erectile dysfunction 10/2/2015     Elevated blood sugar 11/8/2016     Elevated PSA 9/19/2013     Epistaxis 9/9/2019     Essential hypertension with goal blood pressure less than 140/90 9/22/2016     Essential hypertension, benign      Fall 9/6/2019     Functional diarrhea 10/12/2019     Gallbladder polyp 5/1/2018     Gout      HAV (hallux abducto valgus) 4/4/2012     Hematoma 9/9/2019     Hernia, abdominal      History of prostate cancer 6/15/2016     HTN, goal below 150/90 4/20/2017     Hypokalemia 9/12/2019     Iron deficiency anemia secondary to inadequate dietary iron intake 11/15/2016     Low blood pressure reading 10/17/2016     Lumbago     had degendisc dz on MRI 7/07     Memory loss 6/18/2019     Microalbuminuria 6/4/2020    X1     Mixed hyperlipidemia      Mumps      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     Nonrheumatic mitral valve regurgitation 10/28/2019     OA (osteoarthritis) 4/4/2012     Old myocardial infarction 4/4/2012     LENORA (obstructive sleep apnea) 10/28/2019     Other viral warts 9/6/2019     Palpitations      Pes planus 4/4/2012     Petechiae 6/18/2019     Prostate cancer (H) 5/26/2016     PUD (peptic ulcer disease) 4/4/2012     Pulmonary hypertension (H) 9/25/2017     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     Rosacea 1/15/2008     Stress due to spouse with dementia 6/18/2019     Thrombocytopenia (H) 4/21/2017     Unspecified hyperplasia of prostate with urinary obstruction and other lower urinary tract symptoms (LUTS)     better on avodart & hytrin     Venous stasis 4/4/2012     Venous stasis 4/4/2012       Past Surgical History   I have reviewed this patient's surgical  history and updated it with pertinent information if needed.  Past Surgical History:   Procedure Laterality Date     CARDIAC SURGERY      bipass     CYSTOSCOPY FLEXIBLE, CYOABLATION PROSTATE N/A 11/9/2016    Procedure: CYSTOSCOPY FLEXIBLE, CRYOABLATION PROSTATE;  Surgeon: Krystian Owens MD;  Location: [facility] OR     GENITOURINARY SURGERY      prostate surgery      HERNIA REPAIR       LAPAROSCOPIC HERNIORRHAPHY INGUINAL Right 5/10/2017    Procedure: LAPAROSCOPIC HERNIORRHAPHY INGUINAL;  Laparoscopic preperitoneal repair of right inguinal hernia with placement of underlay mesh;  Surgeon: Enrico Bridges MD;  Location: [facility] OR     PROSTATE SURGERY       [facility] NONSPECIFIC PROCEDURE      CAB 1996 [facility] NONSPECIFIC PROCEDURE  2/07    l inguinal hernia repair      [facility] NONSPECIFIC PROCEDURE      R hernia remote     [facility] NONSPECIFIC PROCEDURE  2000    R ankle ORIF for fx     [facility] NONSPECIFIC PROCEDURE  2005    nasal surgery      [facility] NONSPECIFIC PROCEDURE      R rotater repair (remote)      [facility] NONSPECIFIC PROCEDURE      appy 1966     [facility] NONSPECIFIC PROCEDURE      sinus surgery x 2     [facility] NONSPECIFIC PROCEDURE      L shoulder 6/09       Prior to Admission Medications   Prior to Admission Medications   Prescriptions Last Dose Informant Patient Reported? Taking?   [facility] GLUCOSAMINE-CHONDROITIN PO   Yes No   Sig: TAKE ONE TABLET BY MOUTH TWO TIMES A DAY   Carboxymethylcellulose Sod PF 0.25 % SOLN   Yes No   Sig: INSTILL ONE DROP IN BOTH EYES FIVE TIMES A DAY AS NEEDED   Glucosamine-Chondroitin (OSTEO BI-FLEX REGULAR STRENGTH) 250-200 MG TABS   Yes No   Sig: Take 1 tablet by mouth daily   MELATONIN PO   Yes No   Sig: Take 5 mg by mouth At Bedtime    Misc Natural Products (SAW PALMETTO) CAPS   Yes No   Sig: TAKE  BY MOUTH EVERY DAY   UREA 20 INTENSIVE HYDRATING 20 % external cream   No No   Sig: Apply topically as needed Apply to right foot daily   acetaminophen (TYLENOL) 500 MG tablet   Yes No   Sig: Take  500-1,000 mg by mouth every 8 hours as needed   apixaban ANTICOAGULANT (ELIQUIS) 2.5 MG tablet   No No   Sig: Take 1 tablet (2.5 mg) by mouth 2 times daily   co-enzyme Q-10 100 MG CAPS   No No   Sig: Take 1 capsule (100 mg) by mouth daily   diclofenac (VOLTAREN) 1 % topical gel   No No   Sig: Apply 4 gramsto area qid prn   doxycycline hyclate (VIBRA-TABS) 100 MG tablet   Yes No   ferrous gluconate (FERGON) 324 (38 Fe) MG tablet   No No   Sig: Take 1 tablet (324 mg) by mouth daily (with breakfast)   lisinopril (ZESTRIL) 20 MG tablet   No No   Sig: Take 1 tablet (20 mg) by mouth daily   multivitamin, therapeutic (THERA-VIT) TABS tablet   Yes No   Sig: Take 1 tablet by mouth daily   omeprazole (PRILOSEC) 40 MG DR capsule   No No   Sig: Take 1 capsule (40 mg) by mouth daily   potassium chloride ER (K-TAB) 20 MEQ CR tablet   No No   Sig: Take 1 tablet (20 mEq) by mouth 2 times daily   simvastatin (ZOCOR) 20 MG tablet   No No   Sig: Take 1 tablet (20 mg) by mouth At Bedtime   tamsulosin (FLOMAX) 0.4 MG capsule   No No   Sig: Take 1 capsule (0.4 mg) by mouth daily   torsemide (DEMADEX) 20 MG tablet   No No   Sig: Take 1 tablet (20 mg) by mouth daily (with breakfast)      Facility-Administered Medications: None     Allergies   No Known Allergies    Social History   I have reviewed this patient's social history and updated it with pertinent information if needed. Milo Serna  reports that he has quit smoking. He has never used smokeless tobacco. He reports previous alcohol use. He reports that he does not use drugs.    Family History   I have reviewed this patient's family history and updated it with pertinent information if needed.   Family History   Problem Relation Age of Onset     Cancer Mother         stomach cancer,  age 61     Heart Disease Father         MI,  age 71     Heart Disease Brother         stent placement, RA      Hypertension Brother      Heart Disease Sister         rythmn problems  "with heart, hypertension       Review of Systems   10 point review of systems is negative with the exception of HPI and PMH.     Physical Exam   Temp: 96.2  F (35.7  C) Temp src: Oral BP: (!) 149/66 Pulse: 58   Resp: 16 SpO2: 94 % O2 Device: None (Room air)    Vital Signs with Ranges  Temp:  [96  F (35.6  C)-98  F (36.7  C)] 96.2  F (35.7  C)  Pulse:  [46-86] 58  Resp:  [10-31] 16  BP: (118-155)/(61-83) 149/66  SpO2:  [90 %-98 %] 94 %  172 lbs 0 oz     , Blood pressure (!) 149/66, pulse 58, temperature 96.2  F (35.7  C), temperature source Oral, resp. rate 16, height 1.702 m (5' 7\"), weight 78 kg (172 lb), SpO2 94 %.  172 lbs 0 oz  HEENT:  Normocephalic, atraumatic.  PERRLA.  EOM s intact.   Neck:  Supple, non-tender, without lymphadenopathy.  Heart:  No peripheral edema  Lungs:  No SOB  Abdomen:  Soft, non-tender, non-distended.  Skin:  Warm and dry, good capillary refill.  Extremities:  Good radial and dorsalis pedis pulses bilaterally, no edema, cyanosis or clubbing.    NEUROLOGICAL EXAMINATION:     Mental status:  Alert and Oriented x 3, speech is fluent.  Cranial nerves:  II-XII intact.   Motor:  Strength is 5/5 throughout the upper and lower extremities    Sensation:  intact  Reflexes:   Negative Babinski.  Negative Clonus.    Coordination:  Smooth finger to nose testing.   Negative pronator drift.  Smooth tandem walking  Gait:  Stable, no difficulty with heel or toe walking.    Pain to palpation in the mid thoracic spine    Data   All new lab and imaging data was personally reviewed by me.  CT:1. 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  2. Acute fracture involving at least the anterior column of T8. Since  the patient is ankylosed, this fracture has the propensity to involve  the middle and posterior columns but there is no definite fracture  identified through the middle or posterior columns on this exam.  3. Results called to and discussed with Dr. Fischer at 10:32 on  10/30/2020.  MRI:  CBC " RESULTS:   Recent Labs   Lab Test 10/30/20  0909   WBC 4.5   RBC 3.49*   HGB 11.2*   HCT 34.9*      MCH 32.1   MCHC 32.1   RDW 14.5   *     Basic Metabolic Panel:  Lab Results   Component Value Date     10/30/2020      Lab Results   Component Value Date    POTASSIUM 3.8 10/30/2020     Lab Results   Component Value Date    CHLORIDE 103 10/30/2020     Lab Results   Component Value Date    ANG 8.7 10/30/2020     Lab Results   Component Value Date    CO2 26 10/30/2020     Lab Results   Component Value Date    BUN 16 10/30/2020     Lab Results   Component Value Date    CR 0.88 10/30/2020     Lab Results   Component Value Date    GLC 88 10/30/2020     INR:  Lab Results   Component Value Date    INR 1.39 06/18/2019    INR 1.27 11/15/2016

## 2020-10-30 NOTE — ED NOTES
Bed: ED22  Expected date: 10/30/20  Expected time: 8:41 AM  Means of arrival:   Comments:  Rosa Maria Gomez

## 2020-10-30 NOTE — ED PROVIDER NOTES
History     Chief Complaint:  Fall    HPI   Milo Serna is a 79 year old male on Eliquis with history of atrial fibrillation, CAD, hypertension, and chronic back pain who presents for evaluation after mechanical fall at home. The patient states that he tripped and fell last night around 10pm. He slept the night on the floor and his wife called EMS this morning. He was unable to get up himself. He denies hitting his head or loss of consciousness. He complains of lower thoracic back pain. He reports neck pain if he doesn't have a pillow/support. He denies injury to his extremities. He has an old bruise on his right knee from a fall a few months ago but no recent. He denies abdominal pain, chest pain or shortness of breath but states that it hurts to take a deep breath. He notes a history of chronic back pain at the L4-L5 but states that his pain today is in a different location. EMS gave 100 fentanyl prior to arrival.     Allergies:  No Known Drug Allergies    Medications:    Eliquis   Glucosamine-chondroitin  Melatonin  Thera-vit  Lisinopril   Prilosec  Potassium chloride  Zocor  Flomax  Demadex     Past Medical History:    Venous stasis  Hyperplasia of prostate  Thrombocytopenia  Rosacea  Rhinophyma  Pulmonary hypertension   PUD  Prostate cancer   Petechiae  Pes planus  LENORA  Myocardial infarction  Osteoarthritis  Non-rheumatic mitral valve regurgitation  Hyperlipidemia   Memory loss  Lumbago  Anemia  Hypokalemia  Abdominal hernia  Hallux abducto valgus  Gout  Gallbladder polyp  Hypertension   Dilated aortic root  Coronary atherosclerosis of unspecified type of vessel  CAD  BPH  Atrial fibrillation  Anemia  Actinic keratosis     Past Surgical History:    Bypass surgery   Cystoscopy   Genitourinary surgery   Hernia repair  Prostate surgery   Nasal surgery   Sinus surgery x2  Appendectomy   CAB   R ankle ORIF for fracutre  R rotator cuff repair  Left shoulder     Family History:    Stomach cancer  - mother    Heart disease - father (MI at 71), brother, sister  Hypertension - brother    Social History:  The patient was accompanied to the ED by EMS.  Smoking Status: Former Smoker  Smokeless Tobacco: Never Used  Alcohol Use: Not Currently   Marital Status:       Review of Systems   Constitutional: Negative for fever.   Respiratory: Negative for cough and shortness of breath.    Cardiovascular: Negative for chest pain.   Gastrointestinal: Negative for abdominal pain.   Musculoskeletal: Positive for back pain and neck pain. Negative for arthralgias.   Neurological: Negative for syncope and headaches.   All other systems reviewed and are negative.    Physical Exam     Patient Vitals for the past 24 hrs:   BP Temp Temp src Pulse Resp SpO2   10/30/20 1130 (!) 141/72 -- -- 64 28 93 %   10/30/20 1115 -- -- -- 52 13 94 %   10/30/20 1100 133/67 -- -- 55 17 94 %   10/30/20 1045 -- -- -- 53 10 96 %   10/30/20 1030 123/70 -- -- (!) 46 12 94 %   10/30/20 1015 -- -- -- 60 15 95 %   10/30/20 1000 126/69 -- -- -- -- --   10/30/20 0930 118/66 -- -- 52 13 93 %   10/30/20 0915 -- -- -- -- -- 94 %   10/30/20 0913 -- 98  F (36.7  C) Oral -- -- --   10/30/20 0900 129/78 -- -- -- -- 96 %   10/30/20 0857 129/78 -- -- 60 16 98 %       Physical Exam  General: Alert, appears elderly, otherwise well-developed and well-nourished. Cooperative.     In moderate distress  HEENT:  Head:  Atraumatic  Ears:  External ears are normal  Mouth/Throat:  Oropharynx is without erythema or exudate and mucous membranes are moist.   Eyes:   Conjunctivae normal and EOM are normal. No scleral icterus.    Pupils are equal, round, and reactive to light.   Neck:   Normal range of motion. Neck supple.  CV:  Bradycardic rate, irregular rhythm, normal heart sounds and radial pulses are 2+ and symmetric.  No murmur.  Resp:  Breath sounds are clear bilaterally    Non-labored, no retractions or accessory muscle use  GI:  Abdomen is soft, no distension, no tenderness. No  rebound or guarding.  No CVA tenderness bilaterally  MS:  Normal range of motion. No edema.    Normal strength in all 4 extremities.     Midline thoracic & lumbar tenderness.  No midline cervical spine tenderness.   Skin:  Warm and dry.  No rash or lesions noted.  Neuro: Alert. Normal strength.  Sensation intact in all 4 extremities. GCS: 15  Psych:  Normal mood and affect.      Emergency Department Course     ECG:  ECG taken at 0918, ECG read at 0922  Atrial fibrillation with slow ventricular response with premature ventricular or aberrantly conducted complexes   Nonspecific intraventricular conduction delay   Nonspecific ST and T wave abnormality   Abnormal ECG  When compared to prior ECG from 06/04/2020: No significant change.   Rate 49 bpm. NC interval * ms. QRS duration 122 ms. QT/QTc 482/435 ms. P-R-T axes * 57 182.    Imaging:  Radiology findings were communicated with the patient who voiced understanding of the findings.    Lumbar spine CT without contrast  1. No evidence for fracture or posterior malalignment.   2. Multilevel degenerative changes, most advanced at L3-L4 where there   is moderate central canal stenosis.    CT Thoracic Spine without Contrast  1. 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   2. Acute fracture involving at least the anterior column of T8. Since   the patient is ankylosed, this fracture has the propensity to involve   the middle and posterior columns but there is no definite fracture   identified through the middle or posterior columns on this exam.   3. Results called to and discussed with Dr. Fischer at 10:32 on   10/30/2020.    CT Cervical Spine without Contrast  Degenerative changes. No evidence for fracture.    CT Chest Abdomen Pelvis without Contrast  1.  No acute traumatic abnormality demonstrated in the chest, abdomen,   or pelvis.   2.  Cardiomegaly and severe atherosclerosis.   3.  Dilated ascending thoracic aorta measures 4.3 cm.   4.  Dilated main  pulmonary artery suggestive of chronic pulmonary   arterial hypertension.   5.  Focal gallbladder wall thickening is nonspecific but could be   related to chronic cholecystitis or malignancy. Consider right upper   quadrant ultrasound.     CT Head without Contrast  Chronic changes. No evidence for intracranial hemorrhage   or any acute process.    Reading per radiology.    Laboratory:  Laboratory findings were communicated with the patient who voiced understanding of the findings.    CBC: WBC 4.5, HGB 11.2 (L),  (L)    BMP: WNL (Creatinine 0.88)    CK: 112    Troponin (0909): <0.015    COVID-19 by PCR: pending    Interventions:  0915 Dilaudid, 0.5 mg, IV  1055 Dilaudid, 0.5 mg, IV  1135 Dilaudid, 0.5 mg, IV    Emergency Department Course:     Nursing notes and vitals reviewed.    0900 I performed an exam of the patient as documented above.     0909 IV was inserted and blood was drawn for laboratory testing, results above.    0918 ECG obtained as noted above.    0924 The patient's nose was swabbed and this sample was sent for COVID testing, findings above.     0942 The patient was sent for CT while in the emergency department, results above.     1032 I spoke with Dr. Malik of the radiology service from radiology regarding patient's presentation, findings, and plan of care.    1039 I spoke with Betty Lugo PA-C of the hospitalist service regarding patient's presentation, findings, and plan of care.    1043 I spoke with Basim Ignacio PA-C of the neurosurgery service regarding patient's presentation, findings, and plan of care.    1127 I spoke with Dr. Whitaker of the hospitalist service regarding patient's presentation, findings, and plan of care.    1131 Findings and plan explained to the Patient who consents to admission. Discussed the patient with Dr. Whitaker, who will admit the patient to a obs bed for further monitoring, evaluation, and treatment.     Impression & Plan      Medical Decision Making:   Milo  MACIE Serna is a 79 year old male who presents after a mechanical fall yesterday evening. Patient has been unable to get off the floor since yesterday evening.  He called the ambulance this morning due to being unable to get up from the floor.  Patient has significant thoracic back pain and CT confirms a T8 fracture. There is a fracture of the anterior column and patient also has significant ankylosing spondylitis.  Reassuringly, there is no definite fracture identified to the middle or posterior columns on CT.  I spoke briefly with neurosurgery in regards to the case and they recommended orthotic consult for TLSO.  As patient is unable to ambulate at this time due to significant thoracic back pain he will be admitted to the hospital for further pain management and neurosurgical consultation. Reassuringly, the remainder of his traumatic work-up was unremarkable.  CT scan of the head negative for acute intracranial hemorrhage.  CT scan of the chest abdomen pelvis showed no acute traumatic abnormalities although there was a noted dilated ascending thoracic aorta measuring 4.3 cm as well as dilated main pulmonary artery suggestive of chronic pulmonary arterial hypertension.  Spoke with Dr. Whitaker who agreed to admission.    Covid-19  Milo Serna was evaluated during a global COVID-19 pandemic, which necessitated consideration that the patient might be at risk for infection with the SARS-CoV-2 virus that causes COVID-19.   Applicable protocols for evaluation were followed during the patient's care.   COVID-19 was considered as part of the patient's evaluation. The plan for testing is:  a test was obtained during this visit.    Diagnosis:    ICD-10-CM   1. Fall, initial encounter  W19.XXXA   2. Other closed fracture of eighth thoracic vertebra, initial encounter (H)  S22.068A       Disposition:   Patient is admitted to the care of Dr. Whitaker.    Scribe Disclosure:  I, Ashlyn Haney, am serving as a scribe at  9:52 AM on 10/30/2020 to document services personally performed by Joesph Fischer MD based on my observations and the provider's statements to me.[facility] EMERGENCY DEPT       Joesph Fischer MD  10/30/20 1937

## 2020-10-31 ENCOUNTER — APPOINTMENT (OUTPATIENT)
Dept: ULTRASOUND IMAGING | Facility: CLINIC | Age: 80
DRG: 552 | End: 2020-10-31
Attending: HOSPITALIST
Payer: MEDICARE

## 2020-10-31 LAB
ALBUMIN SERPL-MCNC: 3.4 G/DL (ref 3.4–5)
ALP SERPL-CCNC: 181 U/L (ref 40–150)
ALT SERPL W P-5'-P-CCNC: 15 U/L (ref 0–70)
ANION GAP SERPL CALCULATED.3IONS-SCNC: 6 MMOL/L (ref 3–14)
AST SERPL W P-5'-P-CCNC: 22 U/L (ref 0–45)
BILIRUB DIRECT SERPL-MCNC: 0.5 MG/DL (ref 0–0.2)
BILIRUB SERPL-MCNC: 1.6 MG/DL (ref 0.2–1.3)
BUN SERPL-MCNC: 13 MG/DL (ref 7–30)
CALCIUM SERPL-MCNC: 8.8 MG/DL (ref 8.5–10.1)
CHLORIDE SERPL-SCNC: 103 MMOL/L (ref 94–109)
CO2 SERPL-SCNC: 27 MMOL/L (ref 20–32)
CREAT SERPL-MCNC: 0.74 MG/DL (ref 0.66–1.25)
ERYTHROCYTE [DISTWIDTH] IN BLOOD BY AUTOMATED COUNT: 14.6 % (ref 10–15)
GFR SERPL CREATININE-BSD FRML MDRD: 86 ML/MIN/{1.73_M2}
GLUCOSE SERPL-MCNC: 143 MG/DL (ref 70–99)
HCT VFR BLD AUTO: 35 % (ref 40–53)
HGB BLD-MCNC: 11.2 G/DL (ref 13.3–17.7)
MCH RBC QN AUTO: 32.2 PG (ref 26.5–33)
MCHC RBC AUTO-ENTMCNC: 32 G/DL (ref 31.5–36.5)
MCV RBC AUTO: 101 FL (ref 78–100)
PLATELET # BLD AUTO: 117 10E9/L (ref 150–450)
POTASSIUM SERPL-SCNC: 4 MMOL/L (ref 3.4–5.3)
PROT SERPL-MCNC: 7.1 G/DL (ref 6.8–8.8)
RBC # BLD AUTO: 3.48 10E12/L (ref 4.4–5.9)
SARS-COV-2 RNA SPEC QL NAA+PROBE: NOT DETECTED
SODIUM SERPL-SCNC: 136 MMOL/L (ref 133–144)
SPECIMEN SOURCE: NORMAL
WBC # BLD AUTO: 4.9 10E9/L (ref 4–11)

## 2020-10-31 PROCEDURE — 36415 COLL VENOUS BLD VENIPUNCTURE: CPT | Performed by: INTERNAL MEDICINE

## 2020-10-31 PROCEDURE — 76705 ECHO EXAM OF ABDOMEN: CPT

## 2020-10-31 PROCEDURE — 250N000013 HC RX MED GY IP 250 OP 250 PS 637: Performed by: INTERNAL MEDICINE

## 2020-10-31 PROCEDURE — 99233 SBSQ HOSP IP/OBS HIGH 50: CPT | Performed by: HOSPITALIST

## 2020-10-31 PROCEDURE — 120N000004 HC R&B MS OVERFLOW

## 2020-10-31 PROCEDURE — 80048 BASIC METABOLIC PNL TOTAL CA: CPT | Performed by: INTERNAL MEDICINE

## 2020-10-31 PROCEDURE — 250N000013 HC RX MED GY IP 250 OP 250 PS 637: Performed by: HOSPITALIST

## 2020-10-31 PROCEDURE — 80076 HEPATIC FUNCTION PANEL: CPT | Performed by: INTERNAL MEDICINE

## 2020-10-31 PROCEDURE — 85027 COMPLETE CBC AUTOMATED: CPT | Performed by: INTERNAL MEDICINE

## 2020-10-31 RX ORDER — LACTULOSE 10 G/15ML
10 SOLUTION ORAL 2 TIMES DAILY PRN
Status: DISCONTINUED | OUTPATIENT
Start: 2020-10-31 | End: 2020-11-01 | Stop reason: HOSPADM

## 2020-10-31 RX ORDER — MAGNESIUM CARB/ALUMINUM HYDROX 105-160MG
296 TABLET,CHEWABLE ORAL
Status: DISCONTINUED | OUTPATIENT
Start: 2020-10-31 | End: 2020-11-01 | Stop reason: HOSPADM

## 2020-10-31 RX ADMIN — DOCUSATE SODIUM 50 MG AND SENNOSIDES 8.6 MG 1 TABLET: 8.6; 5 TABLET, FILM COATED ORAL at 20:55

## 2020-10-31 RX ADMIN — SIMVASTATIN 20 MG: 20 TABLET, FILM COATED ORAL at 23:49

## 2020-10-31 RX ADMIN — OMEPRAZOLE 40 MG: 20 CAPSULE, DELAYED RELEASE ORAL at 09:55

## 2020-10-31 RX ADMIN — POTASSIUM CHLORIDE 20 MEQ: 750 TABLET, FILM COATED, EXTENDED RELEASE ORAL at 09:55

## 2020-10-31 RX ADMIN — OXYCODONE HYDROCHLORIDE 5 MG: 5 TABLET ORAL at 04:50

## 2020-10-31 RX ADMIN — TORSEMIDE 20 MG: 20 TABLET ORAL at 09:56

## 2020-10-31 RX ADMIN — POTASSIUM CHLORIDE 20 MEQ: 750 TABLET, FILM COATED, EXTENDED RELEASE ORAL at 20:55

## 2020-10-31 RX ADMIN — OXYCODONE HYDROCHLORIDE 5 MG: 5 TABLET ORAL at 18:19

## 2020-10-31 RX ADMIN — DICLOFENAC SODIUM 4 G: 10 GEL TOPICAL at 13:08

## 2020-10-31 RX ADMIN — OXYCODONE HYDROCHLORIDE 5 MG: 5 TABLET ORAL at 00:16

## 2020-10-31 RX ADMIN — APIXABAN 2.5 MG: 2.5 TABLET, FILM COATED ORAL at 13:08

## 2020-10-31 RX ADMIN — DICLOFENAC SODIUM 4 G: 10 GEL TOPICAL at 18:20

## 2020-10-31 RX ADMIN — LISINOPRIL 20 MG: 20 TABLET ORAL at 09:56

## 2020-10-31 RX ADMIN — TAMSULOSIN HYDROCHLORIDE 0.4 MG: 0.4 CAPSULE ORAL at 09:56

## 2020-10-31 RX ADMIN — APIXABAN 2.5 MG: 2.5 TABLET, FILM COATED ORAL at 20:55

## 2020-10-31 NOTE — PLAN OF CARE
"Blood pressure 126/58, pulse 94, temperature 96.1  F (35.6  C), temperature source Oral, resp. rate 18, height 1.702 m (5' 7\"), weight 78 kg (172 lb), SpO2 94 %.    Neuro:       Patient is alert, forgetful, asking the same questions frequently.                     Orientated x 3-4  Cardiac:     WDL  Lungs:       Clear,  Adequate sats on room air.    GI:              WDL  Pain:          Complains of low back discomfort, rating his pain a 1-2. TLSO                      On  Labs:          Covid pending  Activity:       Patient is an assist of one with gait belt and walker, ambulating                      To the bathroom prn and ambulating in the unit hallways  Plan:           Continue to provide supportive cares.  Encourage increased                      Activity, adequate pain control in preparation for discharge                      Tomorrow.    "

## 2020-10-31 NOTE — PLAN OF CARE
Pt A&Ox4, forgetful. VSS on RA. C/o 5-8/10 mid back pain, PRN tylenol and oxycodone given. IVF infusing. Weight shifted in bed. TLSO on. Neurosurgery consult completed. Using urinal in bed w/ assistance. Daughter at bedside. Will continue to monitor.

## 2020-10-31 NOTE — PROGRESS NOTES
[facility]    Medicine Progress Note - Hospitalist Service       Date of Admission:  10/30/2020  Assessment & Plan       Luís Serna is an 79-year-old male with history of coronary artery disease, myocardial infarction, hypertension, dyslipidemia, atrial fibrillation, chronic anticoagulation with Eliquis, pulmonary hypertension, obstructive sleep apnea (no longer uses CPAP by choice), low back pain, rosacea, benign prostatic hypertrophy, neuropathy of foot, metatarsal fracture, osteoarthritis, peptic ulcer disease, gout, prostate cancer, and gallbladder polyps. Admitted on 10/30/2020 after mechanical fall and found to have a T8 fracture.    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    - Neurosurgery was consulted and recommended TLSO brace with outpt follow-up with xrays in 6 weeks ([facility] [phone])    - has brace    - will get patient OOB with walker    - will add PT if patient has difficulties    - discontinue iv dilaudid    - cont po oxy and tylenol    Abdominal discomfort    - patient states he is constipated    - LBM ?wed    - cont senna and miralax    - add mag citrate and lactulose PRN    - difficult exam due to brace, but did not seem to have tenderness    - CT showed gallbladder thickening    - will obtain LFTs and RUQ US    A fib    - rate controlled    - not on rate controlling meds    - resume PTA Eliquis    HTN/CAD/HLP    - cont pta lisinopril, statin and torsemide    LENORA    - not compliant with CPAP    BPH    - cont pta flomax    Daughter in room and updated       Diet: Advance Diet as Tolerated: Clear Liquid Diet; Regular Diet Adult    DVT Prophylaxis: eliquis  Biggs Catheter: in place, indication:    Code Status: Full Code           Disposition Plan   Expected discharge: Tomorrow, recommended to prior living arrangement once work-up done.  Entered:  Sudheer Redmond MD 10/31/2020, 11:05 AM       The patient's care was discussed with the Bedside Nurse, Patient and Patient's Family.    Sudheer Redmond MD  Hospitalist Service  [facility]  Contact information available via [facility] Paging/Directory    ______________________________________________________________________    Interval History   Patient in bed. 0/10 pain at rest. Was able to get OOB on own and walk with walker with no pain. Still has some upper abdo discomfort. No chest pain/sob    Data reviewed today: I reviewed all medications, new labs and imaging results over the last 24 hours. I personally reviewed the CT image(s) showing thickened gallbladder, T8 fracture.    Physical Exam   Vital Signs: Temp: 97  F (36.1  C) Temp src: Oral BP: 122/59 Pulse: 57   Resp: 18 SpO2: 92 % O2 Device: None (Room air)    Weight: 172 lbs 0 oz  Constitutional: awake, alert, cooperative, no apparent distress, and appears stated age  Eyes: Lids and lashes normal, pupils equal, round and reactive to light, extra ocular muscles intact, sclera clear, conjunctiva normal  ENT: Normocephalic, without obvious abnormality, atraumatic, sinuses nontender on palpation, external ears without lesions, oral pharynx with moist mucous membranes, tonsils without erythema or exudates, gums normal and good dentition.  Respiratory: No increased work of breathing, good air exchange, clear to auscultation bilaterally, no crackles or wheezing  Cardiovascular: Normal apical impulse, regular rate and rhythm, normal S1 and S2, no S3 or S4, and no murmur noted  GI: No scars, normal bowel sounds, soft, non-distended, non-tender, no masses palpated, no hepatosplenomegally (difficult exam due to TLSO)  Skin: no bruising or bleeding  Musculoskeletal: no lower extremity pitting edema present    Data   Recent Labs   Lab 10/31/20  0515 10/30/20  0909   WBC 4.9 4.5   HGB 11.2* 11.2*   * 100   * 125*    137   POTASSIUM  4.0 3.8   CHLORIDE 103 103   CO2 27 26   BUN 13 16   CR 0.74 0.88   ANIONGAP 6 8   ANG 8.8 8.7   * 88   TROPI  --  0.016     No results found for this or any previous visit (from the past 24 hour(s)).

## 2020-10-31 NOTE — PLAN OF CARE
"BP (!) 149/66   Pulse 58   Temp 96.2  F (35.7  C) (Oral)   Resp 16   Ht 1.702 m (5' 7\")   Wt 78 kg (172 lb)   SpO2 94%   BMI 26.94 kg/m      Neuro: ex., confused at times, possibly due to pain meds  Cardiac: WDL  Lungs: WDL  GI: WDL  : WDL  Pain: Back, T8  IV: LR 75 hr   Labs/tests: Covid  pending  Diet: Regular  Activity: Assist of two   Misc: TLSO on patient, Neurosurgery consulted   Plan: Will continue to monitor and provide cares.     "

## 2020-11-01 VITALS
DIASTOLIC BLOOD PRESSURE: 63 MMHG | OXYGEN SATURATION: 95 % | SYSTOLIC BLOOD PRESSURE: 129 MMHG | BODY MASS INDEX: 27 KG/M2 | WEIGHT: 172 LBS | HEART RATE: 97 BPM | HEIGHT: 67 IN | TEMPERATURE: 96.8 F | RESPIRATION RATE: 18 BRPM

## 2020-11-01 PROCEDURE — 99239 HOSP IP/OBS DSCHRG MGMT >30: CPT | Performed by: HOSPITALIST

## 2020-11-01 RX ORDER — OXYCODONE HYDROCHLORIDE 5 MG/1
5 TABLET ORAL EVERY 6 HOURS PRN
Qty: 10 TABLET | Refills: 0 | Status: SHIPPED | OUTPATIENT
Start: 2020-11-01 | End: 2020-12-14

## 2020-11-01 RX ORDER — AMOXICILLIN 250 MG
1 CAPSULE ORAL 2 TIMES DAILY PRN
Qty: 20 TABLET | Refills: 0 | Status: SHIPPED | OUTPATIENT
Start: 2020-11-01 | End: 2021-01-01

## 2020-11-01 NOTE — CONSULTS
SW met with patients daughter. Discussed with patient the need for home care services after discharge. The patient was given a list of home care agencies. Patient has chosen [facility]. The criteria for homebound status was explained as applicable. Home care was described to the patient as skilled, intermittent service. Length and frequency of home care visits will be determined by the home care agency.   JEAN made referral.       CARLOS Serna, [facility]  [phone]  [address]   [facility]. [id number]  [facility]

## 2020-11-01 NOTE — DISCHARGE INSTRUCTIONS
The  has arranged home care,  for you after you return home from the hospital.   The Home Care Agency Arranged is [facility]  The telephone number is [phone]  You will be contacted by phone within a few days after your hospital stay by the Home Care Agency to set up your first visit.     If you have not heard from the Home Care Agency within 1-2  Days after discharge from the hospital, please contact the number provided above.

## 2020-11-01 NOTE — PROGRESS NOTES
OBSERVATION patient END time: 0945    Patient's After Visit Summary was reviewed with patient and/or family.   Patient verbalized understanding of After Visit Summary, recommended follow up and was given an opportunity to ask questions.   Discharge medications sent home with patient/family: YES   Discharged with daughter

## 2020-11-01 NOTE — PLAN OF CARE
[phone]    Pt A&O to self, month, year and occasionally place. Forgetful, needing more frequent reorientation overnight. Calm, pleasant. VSS on RA. Reported 3/10 back pain x1, otherwise denied back pain or other complaints for all other times assessed. Declined need for interventions. Moving quite well w/ SBA w/ walker, ambulated several laps around unit overnight. Neurosurgery consult completed. Will continue to monitor.

## 2020-11-01 NOTE — DISCHARGE SUMMARY
[facility]  Hospitalist Discharge Summary      Date of Admission:  10/30/2020  Date of Discharge:  11/1/2020  Discharging Provider: Sudheer Redmond MD      Discharge Diagnoses   Fall with T8 fracture. Incidental finding of gallbladder thickening    Follow-ups Needed After Discharge   Follow-up Appointments     Follow-up and recommended labs and tests       [facility] 6 weeks with new xrays prior.     [phone] for appts.         Follow-up and recommended labs and tests       Follow up with primary care provider, Oscar Alves, within 7 days for   hospital follow- up.  No follow up labs or test are needed. Follow-up at   the [facility] with Dr. James Alvarez (General Surgery, [phone]). He is   arranging for his office to call you to arrange an appointment.             Unresulted Labs Ordered in the Past 30 Days of this Admission     No orders found from 9/30/2020 to 10/31/2020.      These results will be followed up by NA    Discharge Disposition   Discharged to home  Condition at discharge: Stable      Hospital Course   Luís Serna is an 79-year-old male with history of coronary artery disease, myocardial infarction, hypertension, dyslipidemia, atrial fibrillation, chronic anticoagulation with Eliquis, pulmonary hypertension, obstructive sleep apnea (no longer uses CPAP by choice), low back pain, rosacea, benign prostatic hypertrophy, neuropathy of foot, metatarsal fracture, osteoarthritis, peptic ulcer disease, gout, prostate cancer, and gallbladder polyps.  He presented to the emergency department on 10/30/2020 for evaluation after a fall.  Evidently, he fell onto the floor last night.  He denied any loss of consciousness.  He is not sure how the fall occurred but thinks that he may have slipped or tripped.  He was having back pain.  He was not able to get up.  He told his wife to leave him on the floor until morning.  This morning he was still not able to  mobilize so was brought to the emergency department by ambulance.  Emergency department evaluation showed stable vital signs.  Basic metabolic panel, CK, and troponin were unremarkable.  CBC showed mild thrombocytopenia with platelet count of 125.  CT of C-spine showed degenerative changes but no fractures.  CT of T-spine showed extensive ankylosing spondylitis from T2-T12.  There was an acute fracture of the anterior column of T8.  CT of L-spine showed no fracture.  It showed degenerative changes most prominent at L3-4.  CT of chest, abdomen, and pelvis showed no acute findings.  4.3 cm ascending thoracic aorta was noted.  Dilated cardiomyopathy was noted.  Dilated pulmonary artery was noted.  Focal gallbladder wall thickening was noted.  Neurosurgery was called by phone regarding this anterior column fracture of T8.  They thought that given his extensive enclosing spondylitis surgery would not be recommended.  They tentatively recommended TLSO bracing.     Patient was admitted to the hospital. He was seen by Neurosurgery. Pain was controlled. He is really at this point not needing anything for pain. US was done to further assess the gallbladder thickening seen on CT. It showed:Masslike thickening of the gallbladder fundus which is nonspecific.This may be related to adenomyomatosis, malignancy would be difficult to exclude. I spoke with our surgeon who felt this would be a follow-up for a [facility] surgeon. I spoke with Dr. Alvarez from the . He agreed this likely is malignant and will see the patient in his office. I met with patient and his daughter. Follow-up plan was reviewed and questions were answered. Today he has no pain. Has ambulated in the halls. No chest pain, sob, abdo pain. Daughter in room. Will discharge home.    Consultations This Hospital Stay   ORTHOSIS BRACE IP CONSULT  NEUROSURGERY IP CONSULT    Code Status   Full Code    Time Spent on this Encounter   I, Sudheer Redmond MD, personally saw the  patient today and spent greater than 30 minutes discharging this patient.       Sudheer Redmond MD  [facility] OBSERVATION DEPT  [address]  [facility] [address]  Phone: [phone]  ______________________________________________________________________    Physical Exam   Vital Signs: Temp: 96.8  F (36  C) Temp src: Oral BP: 129/63 Pulse: 97   Resp: 18 SpO2: 95 % O2 Device: None (Room air)    Weight: 172 lbs 0 oz  Constitutional: awake, alert, cooperative, no apparent distress, and appears stated age  Eyes: Lids and lashes normal, pupils equal, round and reactive to light, extra ocular muscles intact, sclera clear, conjunctiva normal  ENT: Normocephalic, without obvious abnormality, atraumatic, sinuses nontender on palpation, external ears without lesions, oral pharynx with moist mucous membranes, tonsils without erythema or exudates, gums normal and good dentition.  Respiratory: No increased work of breathing, good air exchange, clear to auscultation bilaterally, no crackles or wheezing  Cardiovascular: Normal apical impulse, regular rate and rhythm, normal S1 and S2, no S3 or S4, and no murmur noted  GI: No scars, normal bowel sounds, soft, non-distended, non-tender, no masses palpated, no hepatosplenomegally  Skin: no bruising or bleeding       Primary Care Physician   Oscar Alves    Discharge Orders      XR Thoracic Spine 2 Views     Home Care PT Referral for Hospital Discharge      Home care nursing referral      Home Care OT Referral for Hospital Discharge      Follow-up and recommended labs and tests     [facility] 6 weeks with new xrays prior.     [phone] for appts.     Reason for your hospital stay    Mechanical fall with T8 fracture. Incidental gallbladder wall thickening found.     Follow-up and recommended labs and tests     Follow up with primary care provider, Oscar Alves, within 7 days for hospital follow- up.  No follow up labs or test  are needed. Follow-up at the [facility] with Dr. James Alvarez (General Surgery, [phone]). He is arranging for his office to call you to arrange an appointment.     Activity    Your activity upon discharge: activity as tolerated     MD face to face encounter    Documentation of Face to Face and Certification for Home Health Services    I certify that patient: Milo Serna is under my care and that I, or a nurse practitioner or physician's assistant working with me, had a face-to-face encounter that meets the physician face-to-face encounter requirements with this patient on: 11/1/2020.    This encounter with the patient was in whole, or in part, for the following medical condition, which is the primary reason for home health care: T8 fracture.    I certify that, based on my findings, the following services are medically necessary home health services: Nursing, Occupational Therapy and Physical Therapy.    My clinical findings support the need for the above services because: Nurse is needed: To assess vitals, mobility after changes in medications or other medical regimen.., Occupational Therapy Services are needed to assess and treat cognitive ability and address ADL safety due to impairment in mobility. and Physical Therapy Services are needed to assess and treat the following functional impairments: weakness, deconditioning.    Further, I certify that my clinical findings support that this patient is homebound (i.e. absences from home require considerable and taxing effort and are for medical reasons or [facility] services or infrequently or of short duration when for other reasons) because: Requires assistance of another person or specialized equipment to access medical services because patient: Requires supervision of another for safe transfer...    Based on the above findings. I certify that this patient is confined to the home and needs intermittent skilled nursing care, physical therapy and/or speech  therapy.  The patient is under my care, and I have initiated the establishment of the plan of care.  This patient will be followed by a physician who will periodically review the plan of care.  Physician/Provider to provide follow up care: Oscar Alves    Attending hospital physician (the Medicare certified [place] provider): Sudheer Redmond MD  Physician Signature: See electronic signature associated with these discharge orders.  Date: 11/1/2020     Diet    Follow this diet upon discharge: Orders Placed This Encounter      Regular Diet Adult       Significant Results and Procedures   Most Recent 3 CBC's:  Recent Labs   Lab Test 10/31/20  0515 10/30/20  0909 06/04/20  1147   WBC 4.9 4.5 4.1   HGB 11.2* 11.2* 11.3*   * 100 100   * 125* 116*     Most Recent 3 BMP's:  Recent Labs   Lab Test 10/31/20  0515 10/30/20  0909 06/04/20  1147    137 136   POTASSIUM 4.0 3.8 3.9   CHLORIDE 103 103 104   CO2 27 26 26   BUN 13 16 20   CR 0.74 0.88 0.80   ANIONGAP 6 8 6   ANG 8.8 8.7 9.3   * 88 114*     Most Recent 2 LFT's:  Recent Labs   Lab Test 10/31/20  0515 06/02/20  0932   AST 22 26   ALT 15 17   ALKPHOS 181* 216*   BILITOTAL 1.6* 1.6*   ,   Results for orders placed or performed during the hospital encounter of 10/30/20   CT Head w/o Contrast    Narrative    CT SCAN OF THE HEAD WITHOUT CONTRAST   10/30/2020 10:03 AM     HISTORY: Head trauma, minor, GCS>=13, high clinical risk, initial  exam.    TECHNIQUE:  Axial images of the head and coronal reformations without  IV contrast material.  Radiation dose for this scan was reduced using  automated exposure control, adjustment of the mA and/or kV according  to patient size, or iterative reconstruction technique.    COMPARISON: 2/20/2007.    FINDINGS: Mild to moderate cerebral atrophy is present. Patchy white  matter changes are seen in both hemispheres without mass effect. There  is no evidence for intracranial hemorrhage, mass effect, acute  infarct,  or skull fracture. Visualized paranasal sinuses and mastoid  air cells are clear. Vascular calcifications noted.      Impression    IMPRESSION: Chronic changes. No evidence for intracranial hemorrhage  or any acute process.    ALEJANDRO STEVENS MD   CT Cervical Spine w/o Contrast    Narrative    CT CERVICAL SPINE WITHOUT CONTRAST  10/30/2020 10:09 AM     HISTORY: Cervical spine trauma, high clinical risk (NEXUS/CCR).     TECHNIQUE: Axial images of the cervical spine were obtained without  intravenous contrast. Multiplanar reformations were performed.  Radiation dose for this scan was reduced using automated exposure  control, adjustment of the mA and/or kV according to patient size, or  iterative reconstruction technique.     COMPARISON: None.    FINDINGS: Sagittal reconstructions demonstrate minimal degenerative  spondylolisthesis at C7-T1 and minimal retrolisthesis at C3-C4 and  C4-C5. Posterior alignment is otherwise normal. There is no evidence  for acute fracture. Vertebral body heights are maintained. Mild to  moderate multilevel degenerative disc and facet disease is present.  Changes are most advanced at C4-C5 and C5-C6 where there are mild to  moderate central canal stenoses. Paraspinal soft tissues are  unremarkable as visualized.      Impression    IMPRESSION: Degenerative changes. No evidence for fracture.    ALEJANDRO STEVENS MD   CT Thoracic Spine w/o Contrast    Narrative    CT THORACIC SPINE WITHOUT CONTRAST  10/30/2020 10:10 AM     HISTORY: Polytrauma, critical, thoracolumbar spine injury suspected.    TECHNIQUE: Axial images were obtained through the thoracic spine  without contrast. Coronal and sagittal reconstructions were also  acquired. Radiation dose for this scan was reduced using automated  exposure control, adjustment of the mA and/or kV according to patient  size, or iterative reconstruction technique.    FINDINGS: Sagittal images demonstrate normal posterior alignment.  There is extensive ankylosis  of the thoracic spine from the T2 level  through T12. There is interruption of the anterior cortex of T8 with a  fracture line that extends to the midportion of the T8 vertebral body  but is not seen extending through the posterior cortex or any of the  posterior elements. The fracture line also extends through the left  lateral cortex as seen on images 37-40 of series 8. There is no  evidence for any epidural hemorrhage or any canal stenosis.  Degenerative changes also noted. Infiltrates are noted in the lung  bases.      Impression    IMPRESSION:  1. 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  2. Acute fracture involving at least the anterior column of T8. Since  the patient is ankylosed, this fracture has the propensity to involve  the middle and posterior columns but there is no definite fracture  identified through the middle or posterior columns on this exam.  3. Results called to and discussed with Dr. Fischer at 10:32 on  10/30/2020.    ALEJANDRO STEVENS MD   Lumbar spine CT w/o contrast    Narrative    CT LUMBAR SPINE WITHOUT CONTRAST  10/30/2020 10:10 AM     HISTORY: Polytrauma, critical, thoracolumbar spine injury suspected.    TECHNIQUE: Axial images were obtained through the lumbar spine without  contrast. Coronal and sagittal reconstructions were also acquired.  Radiation dose for this scan was reduced using automated exposure  control, adjustment of the mA and/or kV according to patient size, or  iterative reconstruction technique.    FINDINGS: Sagittal images demonstrate normal posterior alignment.  Vertebral body heights are maintained. There is no evidence for  fracture. Moderate multilevel degenerative disc and facet disease is  present. Degenerative changes are most advanced at L3-L4 where there  is moderate central canal stenosis.      Impression    IMPRESSION:  1. No evidence for fracture or posterior malalignment.  2. Multilevel degenerative changes, most advanced at L3-L4 where  there  is moderate central canal stenosis.    ALEJANDRO STEVENS MD   CT Chest Abdomen Pelvis w/o Contrast    Narrative    CT CHEST, ABDOMEN AND PELVIS WITHOUT CONTRAST  10/30/2020 10:04 AM    CLINICAL HISTORY:  Chest, abdomen and pelvis trauma, moderate, blunt.  Thoracic back pain after ground level fall onto back.  Concern for  pain radiating around chest wall, ? rib fracture.    TECHNIQUE: CT scan of the chest, abdomen, and pelvis was performed  without IV contrast. Multiplanar reformats were obtained. Dose  reduction techniques were used.   CONTRAST: None.    COMPARISON: 10/10/2018    FINDINGS:   LUNGS AND PLEURA: Dependent bilateral airspace and groundglass  opacities likely related to minor atelectasis. No pneumothorax or  pleural effusion.    MEDIASTINUM/AXILLAE: The heart is enlarged. There is severe coronary  atherosclerosis. The ascending thoracic aorta is dilated at 4.3 cm.  The main pulmonary artery is also dilated at 4.3 cm. No pathologically  enlarged thoracic or axillary lymph nodes.    HEPATOBILIARY: Within the limits of a noncontrast study, no worrisome  liver abnormalities. There is focal gallbladder wall thickening along  the inferior lateral margin (series 3, image 187) that measures up to  1.4 cm. Gallstones are also present.    PANCREAS: Normal.    SPLEEN: Normal.    ADRENAL GLANDS: Normal.    KIDNEYS/BLADDER: Normal.    BOWEL: No bowel obstruction or free intraperitoneal air.    LYMPH NODES: Normal.    VASCULATURE: Nonaneurysmal aortic atherosclerosis.    PELVIC ORGANS: Normal.    OTHER: No ascites.    MUSCULOSKELETAL: No acute bone abnormalities.      Impression    IMPRESSION:  1.  No acute traumatic abnormality demonstrated in the chest, abdomen,  or pelvis.  2.  Cardiomegaly and severe atherosclerosis.  3.  Dilated ascending thoracic aorta measures 4.3 cm.  4.  Dilated main pulmonary artery suggestive of chronic pulmonary  arterial hypertension.  5.  Focal gallbladder wall thickening is  nonspecific but could be  related to chronic cholecystitis or malignancy. Consider right upper  quadrant ultrasound.    JAMIR CORTES MD   US Abdomen Limited    Narrative    ULTRASOUND ABDOMEN LIMITED  10/31/2020 2:52 PM     HISTORY: Pain, CT findings, gallbladder wall thickening on CT.    COMPARISON: None.    FINDINGS:  Liver is unremarkable in echogenicity without focal solid  lesion. Subcentimeter cyst in the left lobe noted. Gallbladder  demonstrates masslike thickening at the gallbladder fundus measuring  2.7 x 1.3 x 2.8 cm. Gallbladder adenomyomatosis. No gallstones or  pathologic wall thickening. Extrahepatic bile duct is borderline  prominent at 6 mm but this may be related to age. Pancreas is normal  where visualized. Right kidney is normal in size. There is no  hydronephrosis.      Impression    IMPRESSION:    1. Masslike thickening of the gallbladder fundus which is nonspecific.  This may be related to adenomyomatosis, malignancy would be difficult  to exclude.  2. No definite cholecystitis demonstrated.    NOMI JOSEPH MD       Discharge Medications   Current Discharge Medication List      START taking these medications    Details   oxyCODONE (ROXICODONE) 5 MG tablet Take 1 tablet (5 mg) by mouth every 6 hours as needed for moderate to severe pain  Qty: 10 tablet, Refills: 0    Associated Diagnoses: Other closed fracture of eighth thoracic vertebra, initial encounter (H)      senna-docusate (SENOKOT-S/PERICOLACE) 8.6-50 MG tablet Take 1 tablet by mouth 2 times daily as needed for constipation  Qty: 20 tablet, Refills: 0    Associated Diagnoses: Drug-induced constipation         CONTINUE these medications which have NOT CHANGED    Details   apixaban ANTICOAGULANT (ELIQUIS) 2.5 MG tablet Take 1 tablet (2.5 mg) by mouth 2 times daily  Qty: 180 tablet, Refills: 3    Associated Diagnoses: Chronic atrial fibrillation (H)      co-enzyme Q-10 100 MG CAPS Take 1 capsule (100 mg) by mouth daily  Qty: 180  capsule, Refills: 3    Associated Diagnoses: Mixed hyperlipidemia      [employer] GLUCOSAMINE-CHONDROITIN PO TAKE ONE TABLET BY MOUTH TWO TIMES A DAY      doxycycline hyclate (VIBRA-TABS) 100 MG tablet 100 mg daily       ferrous gluconate (FERGON) 324 (38 Fe) MG tablet Take 1 tablet (324 mg) by mouth daily (with breakfast)  Qty: 90 tablet, Refills: 3    Associated Diagnoses: Iron deficiency anemia secondary to inadequate dietary iron intake      lisinopril (ZESTRIL) 20 MG tablet Take 1 tablet (20 mg) by mouth daily  Qty: 90 tablet, Refills: 1    Associated Diagnoses: HTN, goal below 150/90      MELATONIN PO Take 5 mg by mouth At Bedtime       multivitamin, therapeutic (THERA-VIT) TABS tablet Take 1 tablet by mouth daily    Associated Diagnoses: Encounter for preventive measure      omeprazole (PRILOSEC) 40 MG DR capsule Take 1 capsule (40 mg) by mouth daily  Qty: 90 capsule, Refills: 3    Associated Diagnoses: PUD (peptic ulcer disease)      potassium chloride ER (K-TAB) 20 MEQ CR tablet Take 1 tablet (20 mEq) by mouth 2 times daily  Qty: 200 tablet, Refills: 3      simvastatin (ZOCOR) 20 MG tablet Take 1 tablet (20 mg) by mouth At Bedtime  Qty: 90 tablet, Refills: 1    Associated Diagnoses: Hyperlipidemia LDL goal <100      tamsulosin (FLOMAX) 0.4 MG capsule Take 1 capsule (0.4 mg) by mouth daily  Qty: 90 capsule, Refills: 1    Associated Diagnoses: History of prostate cancer      torsemide (DEMADEX) 20 MG tablet Take 1 tablet (20 mg) by mouth daily (with breakfast)  Qty: 90 tablet, Refills: 1    Associated Diagnoses: 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 (H); Chronic combined systolic and diastolic congestive heart failure (H)      acetaminophen (TYLENOL) 500 MG tablet Take 500-1,000 mg by mouth every 8 hours as needed    Associated Diagnoses: Neuropathy of foot, unspecified laterality; Primary osteoarthritis involving multiple joints      Carboxymethylcellulose Sod PF 0.25 % SOLN INSTILL ONE DROP  IN BOTH EYES FIVE TIMES A DAY AS NEEDED      diclofenac (VOLTAREN) 1 % topical gel Apply 4 gramsto area qid prn  Qty: 100 g, Refills: 11    Associated Diagnoses: Acute left-sided low back pain without sciatica      Glucosamine-Chondroitin (OSTEO BI-FLEX REGULAR STRENGTH) 250-200 MG TABS Take 1 tablet by mouth daily  Qty: 100 tablet, Refills: 8    Associated Diagnoses: Primary osteoarthritis involving multiple joints      Misc Natural Products (SAW PALMETTO) CAPS TAKE  BY MOUTH EVERY DAY      UREA 20 INTENSIVE HYDRATING 20 % external cream Apply topically as needed Apply to right foot daily  Qty: 120 g, Refills: 11    Associated Diagnoses: Right foot pain; Skin callus; Pes planus of both feet; Venous insufficiency of both lower extremities; Hav (hallux abducto valgus), right; Onychomycosis of toenail           Allergies   No Known Allergies

## 2020-11-02 ENCOUNTER — PATIENT OUTREACH (OUTPATIENT)
Dept: SURGERY | Facility: CLINIC | Age: 80
End: 2020-11-02

## 2020-11-02 ENCOUNTER — VIRTUAL VISIT (OUTPATIENT)
Dept: UROLOGY | Facility: CLINIC | Age: 80
End: 2020-11-02
Payer: MEDICARE

## 2020-11-02 VITALS — WEIGHT: 168 LBS | HEIGHT: 67 IN | BODY MASS INDEX: 26.37 KG/M2

## 2020-11-02 DIAGNOSIS — Z85.46 PERSONAL HISTORY OF MALIGNANT NEOPLASM OF PROSTATE: Primary | ICD-10-CM

## 2020-11-02 DIAGNOSIS — Z85.46 HISTORY OF PROSTATE CANCER: ICD-10-CM

## 2020-11-02 PROCEDURE — 99213 OFFICE O/P EST LOW 20 MIN: CPT | Mod: 95 | Performed by: UROLOGY

## 2020-11-02 ASSESSMENT — MIFFLIN-ST. JEOR: SCORE: 1430.67

## 2020-11-02 ASSESSMENT — PAIN SCALES - GENERAL: PAINLEVEL: NO PAIN (0)

## 2020-11-02 NOTE — PROGRESS NOTES
"Milo Serna is a 79 year old male who is being evaluated via a billable video visit.      Pt was in pain from a fall over the weekend.  Pt broke a vertebra.     The patient has been notified of following:     \"This video visit will be conducted via a call between you and your physician/provider. We have found that certain health care needs can be provided without the need for an in-person physical exam.  This service lets us provide the care you need with a video conversation.  If a prescription is necessary we can send it directly to your pharmacy.  If lab work is needed we can place an order for that and you can then stop by our lab to have the test done at a later time.    Video visits are billed at different rates depending on your insurance coverage.  Please reach out to your insurance provider with any questions.    If during the course of the call the physician/provider feels a video visit is not appropriate, you will not be charged for this service.\"    Patient has given verbal consent for Video visit? Yes  How would you like to obtain your AVS? MyChart  If you are dropped from the video visit, the video invite should be resent to: Text to cell phone: [phone]  Will anyone else be joining your video visit? Sandy Champagne CMA    Video-Visit Details    Type of service:  Video Visit    It is a great pleasure to have a follow-up video consultation with this very pleasant 79-year-old gentleman today.  History: It is a great pleasure to see this very pleasant 79-year-old gentleman in follow-up consultation today.  He has a significantly complicated history.  He had presented to us in 2016 with a PSA of 7.0.  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  They have been careful discussions by my colleagues about therapeutic options at that time and it was " considered that he was not a candidate for radical prostatectomy and it was also considered inappropriate for radiation therapy due to the need for anticoagulants with the concern about the future potential for bleeding because of radiation therapy resulting in radiation cystitis.  We are therefore discussed the possibility of cryoablation of the prostate.  Metastatic evaluation of a negative, this was subsequently performed in November 2016.  He has done well since then, he is continent of urine, does have very mild nocturia and a satisfactory urine stream.  We recall he had received Eligard before cryoablation of the prostate.  This was to reduce the volume of the prostate prior to cryoablation.  We had been concerned that his last visit in June of this year that the PSA had risen from 0.89 up to 2.0 over the previous 12 months.  Although at that time he continued to void well with no other significant concerns.  We did decide at that time to give him a 6-month leuprolide Depo injection.  The PSA has recently been repeated elsewhere and is 0.72.  In addition to that he had a fall recently and fractured the eighth thoracic vertebra and during the course of the evaluation of that they did find an abnormality with the gallbladder with thickening of the wall which is not yet clearly understood but is being followed up by my surgical colleagues at the [facility].      Past Medical History:   Diagnosis Date     Actinic keratosis 10/12/2019     Anemia, unspecified 11/8/2016     Atrial fibrillation (H) 5/27/2015     BPH (benign prostatic hyperplasia) 1/25/2012     CAD (coronary artery disease) 4/4/2012     Closed bimalleolar fracture of left ankle with routine healing 4/5/2019     Closed fracture of metatarsal bone 4/4/2012     Coronary atherosclerosis of unspecified type of vessel, native or graft [facility]    CAB 1996 following MI (at [facility])      Dilated aortic root (H) 5/26/2016     Drug-induced erectile  dysfunction 10/2/2015     Elevated blood sugar 11/8/2016     Elevated PSA 9/19/2013     Epistaxis 9/9/2019     Essential hypertension with goal blood pressure less than 140/90 9/22/2016     Essential hypertension, benign      Fall 9/6/2019     Functional diarrhea 10/12/2019     Gallbladder polyp 5/1/2018     Gout      HAV (hallux abducto valgus) 4/4/2012     Hematoma 9/9/2019     Hernia, abdominal      History of prostate cancer 6/15/2016     HTN, goal below 150/90 4/20/2017     Hypokalemia 9/12/2019     Iron deficiency anemia secondary to inadequate dietary iron intake 11/15/2016     Low blood pressure reading 10/17/2016     Lumbago     had degendisc dz on MRI 7/07     Memory loss 6/18/2019     Microalbuminuria 6/4/2020    X1     Mixed hyperlipidemia      Mumps      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     Nonrheumatic mitral valve regurgitation 10/28/2019     OA (osteoarthritis) 4/4/2012     Old myocardial infarction 4/4/2012     LENORA (obstructive sleep apnea) 10/28/2019     Other viral warts 9/6/2019     Palpitations      Pes planus 4/4/2012     Petechiae 6/18/2019     Prostate cancer (H) 5/26/2016     PUD (peptic ulcer disease) 4/4/2012     Pulmonary hypertension (H) 9/25/2017     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     Rosacea 1/15/2008     Stress due to spouse with dementia 6/18/2019     Thrombocytopenia (H) 4/21/2017     Unspecified hyperplasia of prostate with urinary obstruction and other lower urinary tract symptoms (LUTS)     better on avodart & hytrin     Venous stasis 4/4/2012     Venous stasis 4/4/2012       Social History     Socioeconomic History     Marital status:      Spouse name: None     Number of children: None     Years of education: None     Highest education level: None   Occupational History     None   Social Needs     Financial resource strain: None     Food insecurity     Worry: None     Inability: None     Transportation needs     Medical: None     Non-medical: None   Tobacco Use     Smoking status: Former  Smoker     Smokeless tobacco: Never Used     Tobacco comment: quit 3/1974   Substance and Sexual Activity     Alcohol use: Not Currently     Alcohol/week: 0.0 standard drinks     Comment: rare drink     Drug use: No     Sexual activity: Not Currently     Partners: Female   Lifestyle     Physical activity     Days per week: None     Minutes per session: None     Stress: None   Relationships     Social connections     Talks on phone: None     Gets together: None     Attends Orthodoxy service: None     Active member of club or organization: None     Attends meetings of clubs or organizations: None     Relationship status: None     Intimate partner violence     Fear of current or ex partner: None     Emotionally abused: None     Physically abused: None     Forced sexual activity: None   Other Topics Concern     Parent/sibling w/ CABG, MI or angioplasty before 65F 55M? Not Asked      Service Not Asked     Blood Transfusions Not Asked     Caffeine Concern Yes     Comment: 1 cups of coffee and soda per day     Occupational Exposure Not Asked     Hobby Hazards Not Asked     Sleep Concern Not Asked     Stress Concern Not Asked     Weight Concern Not Asked     Special Diet No     Back Care Not Asked     Exercise Yes     Comment: walking     Bike Helmet Not Asked     Seat Belt Yes     Self-Exams Not Asked   Social History Narrative     None       Past Surgical History:   Procedure Laterality Date     CARDIAC SURGERY      bipass     CYSTOSCOPY FLEXIBLE, CYOABLATION PROSTATE N/A 11/9/2016    Procedure: CYSTOSCOPY FLEXIBLE, CRYOABLATION PROSTATE;  Surgeon: Krystian Owens MD;  Location:  OR     GENITOURINARY SURGERY      prostate surgery      HERNIA REPAIR       LAPAROSCOPIC HERNIORRHAPHY INGUINAL Right 5/10/2017    Procedure: LAPAROSCOPIC HERNIORRHAPHY INGUINAL;  Laparoscopic preperitoneal repair of right inguinal hernia with placement of underlay mesh;  Surgeon: Enrico Bridges MD;  Location:  OR      PROSTATE SURGERY       [facility] NONSPECIFIC PROCEDURE      CAB  [facility] NONSPECIFIC PROCEDURE      l inguinal hernia repair      [facility] NONSPECIFIC PROCEDURE      R hernia remote     [facility] NONSPECIFIC PROCEDURE      R ankle ORIF for fx     [facility] NONSPECIFIC PROCEDURE      nasal surgery      [facility] NONSPECIFIC PROCEDURE      R rotater repair (remote)      [facility] NONSPECIFIC PROCEDURE      appy 1966     [facility] NONSPECIFIC PROCEDURE      sinus surgery x 2     [facility] NONSPECIFIC PROCEDURE      L shoulder        Family History   Problem Relation Age of Onset     Cancer Mother         stomach cancer,  age 61     Heart Disease Father         MI,  age 71     Heart Disease Brother         stent placement, RA      Hypertension Brother      Heart Disease Sister         rythmn problems with heart, hypertension         Current Outpatient Medications:      acetaminophen (TYLENOL) 500 MG tablet, Take 500-1,000 mg by mouth every 8 hours as needed, Disp: , Rfl:      apixaban ANTICOAGULANT (ELIQUIS) 2.5 MG tablet, Take 1 tablet (2.5 mg) by mouth 2 times daily, Disp: 180 tablet, Rfl: 3     Carboxymethylcellulose Sod PF 0.25 % SOLN, INSTILL ONE DROP IN BOTH EYES FIVE TIMES A DAY AS NEEDED, Disp: , Rfl:      co-enzyme Q-10 100 MG CAPS, Take 1 capsule (100 mg) by mouth daily, Disp: 180 capsule, Rfl: 3     [facility] GLUCOSAMINE-CHONDROITIN PO, TAKE ONE TABLET BY MOUTH TWO TIMES A DAY, Disp: , Rfl:      diclofenac (VOLTAREN) 1 % topical gel, Apply 4 gramsto area qid prn, Disp: 100 g, Rfl: 11     doxycycline hyclate (VIBRA-TABS) 100 MG tablet, 100 mg daily , Disp: , Rfl:      ferrous gluconate (FERGON) 324 (38 Fe) MG tablet, Take 1 tablet (324 mg) by mouth daily (with breakfast), Disp: 90 tablet, Rfl: 3     Glucosamine-Chondroitin (OSTEO BI-FLEX REGULAR STRENGTH) 250-200 MG TABS, Take 1 tablet by mouth daily, Disp: 100 tablet, Rfl: 8     lisinopril (ZESTRIL) 20 MG tablet, Take 1 tablet (20 mg) by mouth daily, Disp: 90 tablet,  "Rfl: 1     MELATONIN PO, Take 5 mg by mouth At Bedtime , Disp: , Rfl:      Misc Natural Products (SAW PALMETTO) CAPS, TAKE  BY MOUTH EVERY DAY, Disp: , Rfl:      multivitamin, therapeutic (THERA-VIT) TABS tablet, Take 1 tablet by mouth daily, Disp: , Rfl:      omeprazole (PRILOSEC) 40 MG DR capsule, Take 1 capsule (40 mg) by mouth daily, Disp: 90 capsule, Rfl: 3     oxyCODONE (ROXICODONE) 5 MG tablet, Take 1 tablet (5 mg) by mouth every 6 hours as needed for moderate to severe pain, Disp: 10 tablet, Rfl: 0     potassium chloride ER (K-TAB) 20 MEQ CR tablet, Take 1 tablet (20 mEq) by mouth 2 times daily, Disp: 200 tablet, Rfl: 3     senna-docusate (SENOKOT-S/PERICOLACE) 8.6-50 MG tablet, Take 1 tablet by mouth 2 times daily as needed for constipation, Disp: 20 tablet, Rfl: 0     simvastatin (ZOCOR) 20 MG tablet, Take 1 tablet (20 mg) by mouth At Bedtime, Disp: 90 tablet, Rfl: 1     tamsulosin (FLOMAX) 0.4 MG capsule, Take 1 capsule (0.4 mg) by mouth daily, Disp: 90 capsule, Rfl: 1     torsemide (DEMADEX) 20 MG tablet, Take 1 tablet (20 mg) by mouth daily (with breakfast), Disp: 90 tablet, Rfl: 1     UREA 20 INTENSIVE HYDRATING 20 % external cream, Apply topically as needed Apply to right foot daily, Disp: 120 g, Rfl: 11    10 point ROS of systems including Constitutional, Eyes, Respiratory, Cardiovascular, Gastroenterology, Genitourinary, Integumentary, Muscularskeletal, Psychiatric and Neurologic were all negative except for pertinent positives noted in my HPI.    Examination: Based on video observation  Ht 1.702 m (5' 7\")   Wt 76.2 kg (168 lb)   BMI 26.31 kg/m    General Impression: Very pleasant patient in no acute distress, well-oriented in time place and person and quite conversational  Mental Status: Normal  HEENT: Extraocular movements intact.  No clinical evidence of jaundice on examination of eyes.  Mucous membranes are unremarkable  Skin: Warm.  No other abnormalities  Respiratory System: Unlabored on " room air.  Respiratory cycle normal  Lymph Nodes: Not examined  Back/Flank Tenderness: Not examined  Cardiovascular System: Not examined  Abdominal Examination: Not examined  Extremities: Not examined  Genitial: Not examined  Rectal Examination: Not examined  Neurologic System: There are no significant acute abnormal neurological signs in the central or peripheral nervous systems    Impression: He is recovering from a fracture of the thoracic spine.  There is some concern about a mass in the gallbladder which is undergoing evaluation at the moment.  However the PSA has declined from 2.0 down to 0.72 since he received the leuprolide injection.  I would anticipate therefore we continue observation and repeat the PSA in a further period of 6 months.  I went over the entire situation with the patient in detail today.  I reviewed all the current and previous records.  I answered all his questions    Plan: 6 months with PSA and examination    Time: Video face-to-face time was 11 minutes.  Additional time was required for careful review of records labs and other pertinent studies associated with this consultation.  Total time was in excess of 15 minutes          Originating Location (pt. Location): Home    Distant Location (provider location):  [facility] [place]     Platform used for Video Visit: Brittany Owens MD

## 2020-11-02 NOTE — PROGRESS NOTES
Contacted patients daughter, Stacia, to help arrange a clinic consult for patient. Patient to be seen to discuss gallbladder surgery. A virtual visit has been arranged for 11/16 at 1:30.

## 2020-11-02 NOTE — LETTER
"11/2/2020       RE: Milo Serna  [address] [place] [address]     Dear Colleague,    Thank you for referring your patient, Milo Serna, to the [facility]. Please see a copy of my visit note below.    Milo Serna is a 79 year old male who is being evaluated via a billable video visit.      Pt was in pain from a fall over the weekend.  Pt broke a vertebra.     The patient has been notified of following:     \"This video visit will be conducted via a call between you and your physician/provider. We have found that certain health care needs can be provided without the need for an in-person physical exam.  This service lets us provide the care you need with a video conversation.  If a prescription is necessary we can send it directly to your pharmacy.  If lab work is needed we can place an order for that and you can then stop by our lab to have the test done at a later time.    Video visits are billed at different rates depending on your insurance coverage.  Please reach out to your insurance provider with any questions.    If during the course of the call the physician/provider feels a video visit is not appropriate, you will not be charged for this service.\"    Patient has given verbal consent for Video visit? Yes  How would you like to obtain your AVS? MyChart  If you are dropped from the video visit, the video invite should be resent to: Text to cell phone: [phone]  Will anyone else be joining your video visit? Sandy Champagne CMA    Video-Visit Details    Type of service:  Video Visit    It is a great pleasure to have a follow-up video consultation with this very pleasant 79-year-old gentleman today.  History: It is a great pleasure to see this very pleasant 79-year-old gentleman in follow-up consultation today.  He has a significantly complicated history.  He had presented to us in 2016 with a " PSA of 7.0.  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  They have been careful discussions by my colleagues about therapeutic options at that time and it was considered that he was not a candidate for radical prostatectomy and it was also considered inappropriate for radiation therapy due to the need for anticoagulants with the concern about the future potential for bleeding because of radiation therapy resulting in radiation cystitis.  We are therefore discussed the possibility of cryoablation of the prostate.  Metastatic evaluation of a negative, this was subsequently performed in November 2016.  He has done well since then, he is continent of urine, does have very mild nocturia and a satisfactory urine stream.  We recall he had received Eligard before cryoablation of the prostate.  This was to reduce the volume of the prostate prior to cryoablation.  We had been concerned that his last visit in June of this year that the PSA had risen from 0.89 up to 2.0 over the previous 12 months.  Although at that time he continued to void well with no other significant concerns.  We did decide at that time to give him a 6-month leuprolide Depo injection.  The PSA has recently been repeated elsewhere and is 0.72.  In addition to that he had a fall recently and fractured the eighth thoracic vertebra and during the course of the evaluation of that they did find an abnormality with the gallbladder with thickening of the wall which is not yet clearly understood but is being followed up by [facility].      Past Medical History:   Diagnosis Date     Actinic keratosis 10/12/2019     Anemia, unspecified 11/8/2016     Atrial fibrillation (H) 5/27/2015     BPH (benign prostatic hyperplasia) 1/25/2012     CAD (coronary artery disease) 4/4/2012     Closed  bimalleolar fracture of left ankle with routine healing 4/5/2019     Closed fracture of metatarsal bone 4/4/2012     Coronary atherosclerosis of unspecified type of vessel, native or graft [facility]    CAB 1996 following MI (at [facility])      Dilated aortic root (H) 5/26/2016     Drug-induced erectile dysfunction 10/2/2015     Elevated blood sugar 11/8/2016     Elevated PSA 9/19/2013     Epistaxis 9/9/2019     Essential hypertension with goal blood pressure less than 140/90 9/22/2016     Essential hypertension, benign      Fall 9/6/2019     Functional diarrhea 10/12/2019     Gallbladder polyp 5/1/2018     Gout      HAV (hallux abducto valgus) 4/4/2012     Hematoma 9/9/2019     Hernia, abdominal      History of prostate cancer 6/15/2016     HTN, goal below 150/90 4/20/2017     Hypokalemia 9/12/2019     Iron deficiency anemia secondary to inadequate dietary iron intake 11/15/2016     Low blood pressure reading 10/17/2016     Lumbago     had degendisc dz on MRI 7/07     Memory loss 6/18/2019     Microalbuminuria 6/4/2020    X1     Mixed hyperlipidemia      Mumps      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     Nonrheumatic mitral valve regurgitation 10/28/2019     OA (osteoarthritis) 4/4/2012     Old myocardial infarction 4/4/2012     LENORA (obstructive sleep apnea) 10/28/2019     Other viral warts 9/6/2019     Palpitations      Pes planus 4/4/2012     Petechiae 6/18/2019     Prostate cancer (H) 5/26/2016     PUD (peptic ulcer disease) 4/4/2012     Pulmonary hypertension (H) 9/25/2017     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     Rosacea 1/15/2008     Stress due to spouse with dementia 6/18/2019     Thrombocytopenia (H) 4/21/2017     Unspecified hyperplasia of prostate with urinary obstruction and other lower urinary tract symptoms (LUTS)     better on avodart & hytrin     Venous stasis 4/4/2012     Venous stasis 4/4/2012       Social History     Socioeconomic History     Marital status:      Spouse name: None     Number of  children: None     Years of education: None     Highest education level: None   Occupational History     None   Social Needs     Financial resource strain: None     Food insecurity     Worry: None     Inability: None     Transportation needs     Medical: None     Non-medical: None   Tobacco Use     Smoking status: Former Smoker     Smokeless tobacco: Never Used     Tobacco comment: quit 3/1974   Substance and Sexual Activity     Alcohol use: Not Currently     Alcohol/week: 0.0 standard drinks     Comment: rare drink     Drug use: No     Sexual activity: Not Currently     Partners: Female   Lifestyle     Physical activity     Days per week: None     Minutes per session: None     Stress: None   Relationships     Social connections     Talks on phone: None     Gets together: None     Attends Yazidi service: None     Active member of club or organization: None     Attends meetings of clubs or organizations: None     Relationship status: None     Intimate partner violence     Fear of current or ex partner: None     Emotionally abused: None     Physically abused: None     Forced sexual activity: None   Other Topics Concern     Parent/sibling w/ CABG, MI or angioplasty before 65F 55M? Not Asked      Service Not Asked     Blood Transfusions Not Asked     Caffeine Concern Yes     Comment: 1 cups of coffee and soda per day     Occupational Exposure Not Asked     Hobby Hazards Not Asked     Sleep Concern Not Asked     Stress Concern Not Asked     Weight Concern Not Asked     Special Diet No     Back Care Not Asked     Exercise Yes     Comment: walking     Bike Helmet Not Asked     Seat Belt Yes     Self-Exams Not Asked   Social History Narrative     None       Past Surgical History:   Procedure Laterality Date     CARDIAC SURGERY      bipass     CYSTOSCOPY FLEXIBLE, CYOABLATION PROSTATE N/A 11/9/2016    Procedure: CYSTOSCOPY FLEXIBLE, CRYOABLATION PROSTATE;  Surgeon: Krystian Owens MD;  Location:  OR      GENITOURINARY SURGERY      prostate surgery      HERNIA REPAIR       LAPAROSCOPIC HERNIORRHAPHY INGUINAL Right 5/10/2017    Procedure: LAPAROSCOPIC HERNIORRHAPHY INGUINAL;  Laparoscopic preperitoneal repair of right inguinal hernia with placement of underlay mesh;  Surgeon: Enrico Bridges MD;  Location: [facility] OR     PROSTATE SURGERY       [facility] NONSPECIFIC PROCEDURE      CAB  [facility] NONSPECIFIC PROCEDURE      l inguinal hernia repair      [facility] NONSPECIFIC PROCEDURE      R hernia remote     [facility] NONSPECIFIC PROCEDURE      R ankle ORIF for fx     [facility] NONSPECIFIC PROCEDURE      nasal surgery      [facility] NONSPECIFIC PROCEDURE      R rotater repair (remote)      [facility] NONSPECIFIC PROCEDURE      appy 1966     [facility] NONSPECIFIC PROCEDURE      sinus surgery x 2     [facility] NONSPECIFIC PROCEDURE      L shoulder        Family History   Problem Relation Age of Onset     Cancer Mother         stomach cancer,  age 61     Heart Disease Father         MI,  age 71     Heart Disease Brother         stent placement, RA      Hypertension Brother      Heart Disease Sister         rythmn problems with heart, hypertension         Current Outpatient Medications:      acetaminophen (TYLENOL) 500 MG tablet, Take 500-1,000 mg by mouth every 8 hours as needed, Disp: , Rfl:      apixaban ANTICOAGULANT (ELIQUIS) 2.5 MG tablet, Take 1 tablet (2.5 mg) by mouth 2 times daily, Disp: 180 tablet, Rfl: 3     Carboxymethylcellulose Sod PF 0.25 % SOLN, INSTILL ONE DROP IN BOTH EYES FIVE TIMES A DAY AS NEEDED, Disp: , Rfl:      co-enzyme Q-10 100 MG CAPS, Take 1 capsule (100 mg) by mouth daily, Disp: 180 capsule, Rfl: 3     [facility] GLUCOSAMINE-CHONDROITIN PO, TAKE ONE TABLET BY MOUTH TWO TIMES A DAY, Disp: , Rfl:      diclofenac (VOLTAREN) 1 % topical gel, Apply 4 gramsto area qid prn, Disp: 100 g, Rfl: 11     doxycycline hyclate (VIBRA-TABS) 100 MG tablet, 100 mg daily , Disp: , Rfl:      ferrous gluconate (FERGON) 324  "(38 Fe) MG tablet, Take 1 tablet (324 mg) by mouth daily (with breakfast), Disp: 90 tablet, Rfl: 3     Glucosamine-Chondroitin (OSTEO BI-FLEX REGULAR STRENGTH) 250-200 MG TABS, Take 1 tablet by mouth daily, Disp: 100 tablet, Rfl: 8     lisinopril (ZESTRIL) 20 MG tablet, Take 1 tablet (20 mg) by mouth daily, Disp: 90 tablet, Rfl: 1     MELATONIN PO, Take 5 mg by mouth At Bedtime , Disp: , Rfl:      Misc Natural Products (SAW PALMETTO) CAPS, TAKE  BY MOUTH EVERY DAY, Disp: , Rfl:      multivitamin, therapeutic (THERA-VIT) TABS tablet, Take 1 tablet by mouth daily, Disp: , Rfl:      omeprazole (PRILOSEC) 40 MG DR capsule, Take 1 capsule (40 mg) by mouth daily, Disp: 90 capsule, Rfl: 3     oxyCODONE (ROXICODONE) 5 MG tablet, Take 1 tablet (5 mg) by mouth every 6 hours as needed for moderate to severe pain, Disp: 10 tablet, Rfl: 0     potassium chloride ER (K-TAB) 20 MEQ CR tablet, Take 1 tablet (20 mEq) by mouth 2 times daily, Disp: 200 tablet, Rfl: 3     senna-docusate (SENOKOT-S/PERICOLACE) 8.6-50 MG tablet, Take 1 tablet by mouth 2 times daily as needed for constipation, Disp: 20 tablet, Rfl: 0     simvastatin (ZOCOR) 20 MG tablet, Take 1 tablet (20 mg) by mouth At Bedtime, Disp: 90 tablet, Rfl: 1     tamsulosin (FLOMAX) 0.4 MG capsule, Take 1 capsule (0.4 mg) by mouth daily, Disp: 90 capsule, Rfl: 1     torsemide (DEMADEX) 20 MG tablet, Take 1 tablet (20 mg) by mouth daily (with breakfast), Disp: 90 tablet, Rfl: 1     UREA 20 INTENSIVE HYDRATING 20 % external cream, Apply topically as needed Apply to right foot daily, Disp: 120 g, Rfl: 11    10 point ROS of systems including Constitutional, Eyes, Respiratory, Cardiovascular, Gastroenterology, Genitourinary, Integumentary, Muscularskeletal, Psychiatric and Neurologic were all negative except for pertinent positives noted in my HPI.    Examination: Based on video observation  Ht 1.702 m (5' 7\")   Wt 76.2 kg (168 lb)   BMI 26.31 kg/m    General Impression: Very " pleasant patient in no acute distress, well-oriented in time place and person and quite conversational  Mental Status: Normal  HEENT: Extraocular movements intact.  No clinical evidence of jaundice on examination of eyes.  Mucous membranes are unremarkable  Skin: Warm.  No other abnormalities  Respiratory System: Unlabored on room air.  Respiratory cycle normal  Lymph Nodes: Not examined  Back/Flank Tenderness: Not examined  Cardiovascular System: Not examined  Abdominal Examination: Not examined  Extremities: Not examined  Genitial: Not examined  Rectal Examination: Not examined  Neurologic System: There are no significant acute abnormal neurological signs in the central or peripheral nervous systems    Impression: He is recovering from a fracture of the thoracic spine.  There is some concern about a mass in the gallbladder which is undergoing evaluation at the moment.  However the PSA has declined from 2.0 down to 0.72 since he received the leuprolide injection.  I would anticipate therefore we continue observation and repeat the PSA in a further period of 6 months.  I went over the entire situation with the patient in detail today.  I reviewed all the current and previous records.  I answered all his questions    Plan: 6 months with PSA and examination    Time: Video face-to-face time was 11 minutes.  Additional time was required for careful review of records labs and other pertinent studies associated with this consultation.  Total time was in excess of 15 minutes          Originating Location (pt. Location): Home    Distant Location (provider location):  [facility] [place]     Platform used for Video Visit: Brittany Owens MD

## 2020-11-03 ENCOUNTER — TELEPHONE (OUTPATIENT)
Dept: FAMILY MEDICINE | Facility: CLINIC | Age: 80
End: 2020-11-03

## 2020-11-03 ENCOUNTER — MEDICAL CORRESPONDENCE (OUTPATIENT)
Dept: HEALTH INFORMATION MANAGEMENT | Facility: CLINIC | Age: 80
End: 2020-11-03

## 2020-11-03 RX ORDER — TAMSULOSIN HYDROCHLORIDE 0.4 MG/1
CAPSULE ORAL
Qty: 90 CAPSULE | Refills: 2 | Status: SHIPPED | OUTPATIENT
Start: 2020-11-03 | End: 2021-01-01

## 2020-11-03 NOTE — TELEPHONE ENCOUNTER
REFERRAL INFORMATION:    Referring Provider:  N/A    Referring Clinic:  N/A    Reason for Visit/Diagnosis: Gallbladder consult        FUTURE VISIT INFORMATION:    Appointment Date: 11/16/2020    Appointment Time: 1:30 PM      NOTES RECORD STATUS  DETAILS   OFFICE NOTE from Referring Provider N/A    OFFICE NOTE from Other Specialists N/A    HOSPITAL DISCHARGE SUMMARY/ ED VISITS  N/A    OPERATIVE REPORT N/A    ENDOSCOPY (EGD)  Internal 8/3/10   PERTINENT LABS Internal    PATHOLOGY REPORTS (RELATED) N/A    IMAGING (CT, MRI, US, XR)  Internal US Abdomen: 10/31/2020  CT Abdomen Pelvis: 10/30/2020

## 2020-11-03 NOTE — TELEPHONE ENCOUNTER
"Dr. Alves  Pt was admitted to [facility] care 11/3/2020 s/p fall with T8 fracture. Requesting the following verbal orders and medication clarification:  RN 1w3, 3prn  PT/OT/SW to eval and treat    Pt is taking 200mg Co-q10 daily  Is pt to be taking Saw Palmetto?    SUMMARY TO MD  79 yr old male admitted to [facility] 10/30/2020 to 2020 s/p trip/fall onto the ground resulting in T8 compression fracture. Gallbladder thicking also found incidentially on imaging with US during admission and recommended follow up with PCP.  Pt with past medical history including chronic systolic and diastolic CHF, CAD, nonrheumatic mitral valve regurgitation, prostate cancer, neuropathy of feet, a fib on anticoagulant (eliquis), obstructive sleep apnea, osteoarthrits involving muliptle joints. Pt now returns to home where he lives with his spouse who is his primary caregiver, pts daughter also very involved in pts cares. Pt lives in a [facility] townhouse with all needs on the main level. Pt is now walking with a 2 wheeled walker which is new. Pts gait is very unsteady and feet shuffle. Pt does wear custom shoes due to a \"callous\" which appears more like a wart on the bottom of his right foot that is not currently involving any interventions. Pts home had multiple rugs on the floors making pt \"carry\" his walker as rugs were bunching under his wheels. Family has now removed these.  Pt has pain 9/10 at night time while lying down which wraps around his abdomen, he is taking 5mg oxycone daiy at night which is helpful, he is also using a heating pad. Pts appetite is fair, needs to increase oral fluids. BM 11/3/2020, he is taking occasional senna.  Pt appears confused regarding medications, mulitple meds in the home are .   Pt requires ongoing skilled nursing for pain management/mitigation, medication education, skin integrity, GI/.  PT/OT for HSE, HEP, balance, strengthening, endurance, transfers, bed mobility, " bathroom/shower safety, falls risk prevention, adaptive equipment needs.  SW for long term care planning, community resources, Long term care insurance information, VA benefits/spousal benefits  Refusing HHA at SOC although recommended.  Thank you or this referral.  Jenny Arriaza RN, BSN  [email or web address]  [phone]

## 2020-11-04 ENCOUNTER — TELEPHONE (OUTPATIENT)
Dept: FAMILY MEDICINE | Facility: CLINIC | Age: 80
End: 2020-11-04

## 2020-11-05 NOTE — TELEPHONE ENCOUNTER
[facility] now requests orders and shares plan of care/discharge summaries for some patients through [employer].  Please REPLY TO THIS MESSAGE OR ROUTE BACK TO THE AUTHOR in order to give authorization for orders when needed.  This is considered a verbal order, you will still receive a faxed copy of orders for signature.  Thank you for your assistance in improving collaboration for our patients.    ORDER  2x/wk for 3 wks for balance, strength, transfer, ambulation, stairs, donning/doffing TLSO

## 2020-11-10 ENCOUNTER — DOCUMENTATION ONLY (OUTPATIENT)
Dept: FAMILY MEDICINE | Facility: CLINIC | Age: 80
End: 2020-11-10
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
## 2020-11-10 DIAGNOSIS — R40.20 LOSS OF CONSCIOUSNESS (H): ICD-10-CM

## 2020-11-10 DIAGNOSIS — I48.20 CHRONIC ATRIAL FIBRILLATION (H): Primary | ICD-10-CM

## 2020-11-11 DIAGNOSIS — I50.42 CHRONIC COMBINED SYSTOLIC AND DIASTOLIC CONGESTIVE HEART FAILURE (H): ICD-10-CM

## 2020-11-11 DIAGNOSIS — K27.9 PUD (PEPTIC ULCER DISEASE): ICD-10-CM

## 2020-11-11 DIAGNOSIS — I50.810 RIGHT HEART FAILURE WITH REDUCED RIGHT VENTRICULAR FUNCTION (H): ICD-10-CM

## 2020-11-11 DIAGNOSIS — I10 HTN, GOAL BELOW 150/90: ICD-10-CM

## 2020-11-11 DIAGNOSIS — I48.20 CHRONIC ATRIAL FIBRILLATION (H): ICD-10-CM

## 2020-11-11 RX ORDER — OMEPRAZOLE 40 MG/1
CAPSULE, DELAYED RELEASE ORAL
Qty: 90 CAPSULE | Refills: 0 | Status: SHIPPED | OUTPATIENT
Start: 2020-11-11 | End: 2021-01-28

## 2020-11-11 RX ORDER — POTASSIUM CHLORIDE 1500 MG/1
20 TABLET, EXTENDED RELEASE ORAL 2 TIMES DAILY
Qty: 200 TABLET | Refills: 3 | Status: CANCELLED | OUTPATIENT
Start: 2020-11-11

## 2020-11-11 RX ORDER — LISINOPRIL 20 MG/1
TABLET ORAL
Qty: 90 TABLET | Refills: 0 | Status: SHIPPED | OUTPATIENT
Start: 2020-11-11 | End: 2021-01-28

## 2020-11-11 RX ORDER — TORSEMIDE 20 MG/1
TABLET ORAL
Qty: 90 TABLET | Refills: 0 | Status: SHIPPED | OUTPATIENT
Start: 2020-11-11 | End: 2021-01-28

## 2020-11-11 NOTE — PROGRESS NOTES
Dr. Alves -   The next available appointment with this order is not until next week 11/19   If you would like this done tomorrow we need the order to be stat     Thank you   Justyna Davenport, Registered Nurse, PAL (Patient Advocate Liason)   [facility]   [phone]

## 2020-11-11 NOTE — PROGRESS NOTES
"The \"emergency\" aspect of his  LOC is disappearing.  Outpt head scan ordered  Tomorrow?  Oscar Alves MD    "

## 2020-11-11 NOTE — PROGRESS NOTES
"Patient reported that he fell last night while standing in his room trying to pull his sweatshirt off. Stated he lost his balance. He was very uncertain and unable to clearly recount the incident stating he thinks he was \"knocked out\" and did not get up off of the floor until 4 this morning when his wife woke up and found him and called their son. He denies any pain. Has a small rug burn to right knee that is treated with neosporin and bandaid. No signs of bump or bruising to head, it does not appear he hit it. He is up and walking today with no difficulty. Instructed him that if he notices a headache or change he needs to go to ER.   "

## 2020-11-11 NOTE — PROGRESS NOTES
Dr. Alves     Patient declines going to the ER today and really does not want to go to the HUB today as he has a full schedule     RN explained to patient that this could be an emergency due to hitting his head and being on medication that can cause bleeding     Patient states he has an appt at 12 and 1:30 today that he can not miss     Patient also states that he does not have anyone to drive him and he would drive himself - RN advised this is not a good idea and that patient should find someone else to drive him     Patient is agreeable to the HUB tomorrow 11/12 and states he would drive himself (again RN advised this is a more urgent matter and should not wait until 11/12)     Justyna Davenport, Registered Nurse, PAL (Patient Advocate Liason)   [facility]   [phone]

## 2020-11-13 ENCOUNTER — PATIENT OUTREACH (OUTPATIENT)
Dept: SURGERY | Facility: CLINIC | Age: 80
End: 2020-11-13

## 2020-11-13 DIAGNOSIS — I50.9 CHF (CONGESTIVE HEART FAILURE) (H): Primary | ICD-10-CM

## 2020-11-13 RX ORDER — POTASSIUM CHLORIDE 1500 MG/1
20 TABLET, EXTENDED RELEASE ORAL 2 TIMES DAILY
Qty: 180 TABLET | Refills: 0 | Status: SHIPPED | OUTPATIENT
Start: 2020-11-13 | End: 2021-01-28

## 2020-11-16 ENCOUNTER — PRE VISIT (OUTPATIENT)
Dept: SURGERY | Facility: CLINIC | Age: 80
End: 2020-11-16

## 2020-11-16 ENCOUNTER — VIRTUAL VISIT (OUTPATIENT)
Dept: SURGERY | Facility: CLINIC | Age: 80
End: 2020-11-16
Payer: MEDICARE

## 2020-11-16 VITALS — HEIGHT: 67 IN | WEIGHT: 172 LBS | BODY MASS INDEX: 27 KG/M2

## 2020-11-16 DIAGNOSIS — K82.4 GALLBLADDER POLYP: Primary | ICD-10-CM

## 2020-11-16 PROCEDURE — 99213 OFFICE O/P EST LOW 20 MIN: CPT | Mod: 95 | Performed by: SURGERY

## 2020-11-16 RX ORDER — CEFAZOLIN SODIUM 2 G/50ML
2 SOLUTION INTRAVENOUS
Status: CANCELLED | OUTPATIENT
Start: 2020-11-16

## 2020-11-16 RX ORDER — CEFAZOLIN SODIUM 1 G/50ML
1 INJECTION, SOLUTION INTRAVENOUS SEE ADMIN INSTRUCTIONS
Status: CANCELLED | OUTPATIENT
Start: 2020-11-16

## 2020-11-16 ASSESSMENT — PAIN SCALES - GENERAL: PAINLEVEL: NO PAIN (0)

## 2020-11-16 ASSESSMENT — MIFFLIN-ST. JEOR: SCORE: 1443.82

## 2020-11-16 NOTE — LETTER
11/16/2020       RE: Milo Serna  [address] [place] [address]     Dear Colleague,    Thank you for referring your patient, Milo Serna, to the [facility] SURGERY [facility]IC [place] at [facility]. Please see a copy of my visit note below.    Visit today by [employer], a virtual visit based on the COVID-19 pandemic.  The patient understood the need for the virtual visit based on the pandemic.      HISTORY OF PRESENT ILLNESS:  Milo Serna, his daughter and his family members were able to participate in a virtual clinic using the [employer] system.  The patient's questions and concerns were answered.  The patient was at home, and I was here in the Surgery Clinic at the [address] [place] [address].  At this visit, we reviewed the findings on the ultrasound and CT scan, which are consistent with a benign tumor in the gallbladder; however, malignancy could not be ruled out.  This appears to be on a free wall of the gallbladder, and the patient understands that laparoscopic cholecystectomy could be curative; however, the patient will also benefit by having the gallbladder out and the pathology reviewed carefully to exclude malignancy.     ROS: a ten point ROS is negative.      PAST MEDICAL HISTORY:  Past Medical History:   Diagnosis Date     Actinic keratosis 10/12/2019     Anemia, unspecified 11/8/2016     Atrial fibrillation (H) 5/27/2015     BPH (benign prostatic hyperplasia) 1/25/2012     CAD (coronary artery disease) 4/4/2012     Closed bimalleolar fracture of left ankle with routine healing 4/5/2019     Closed fracture of metatarsal bone 4/4/2012     Coronary atherosclerosis of unspecified type of vessel, [facility] or [facility]    CAB 1996 following MI (at [facility])      Dilated aortic root (H) 5/26/2016     Drug-induced erectile dysfunction 10/2/2015     Elevated blood sugar 11/8/2016     Elevated PSA 9/19/2013     Epistaxis  9/9/2019     Essential hypertension with goal blood pressure less than 140/90 9/22/2016     Essential hypertension, benign      Fall 9/6/2019     Functional diarrhea 10/12/2019     Gallbladder polyp 5/1/2018     Gout      HAV (hallux abducto valgus) 4/4/2012     Hematoma 9/9/2019     Hernia, abdominal      History of prostate cancer 6/15/2016     HTN, goal below 150/90 4/20/2017     Hypokalemia 9/12/2019     Iron deficiency anemia secondary to inadequate dietary iron intake 11/15/2016     Low blood pressure reading 10/17/2016     Lumkierstengo     had degendisc dz on MRI 7/07     Memory loss 6/18/2019     Microalbuminuria 6/4/2020    X1     Mixed hyperlipidemia      Mumps      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     Nonrheumatic mitral valve regurgitation 10/28/2019     OA (osteoarthritis) 4/4/2012     Old myocardial infarction 4/4/2012     LENORA (obstructive sleep apnea) 10/28/2019     Other viral warts 9/6/2019     Palpitations      Pes planus 4/4/2012     Petechiae 6/18/2019     Prostate cancer (H) 5/26/2016     PUD (peptic ulcer disease) 4/4/2012     Pulmonary hypertension (H) 9/25/2017     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     Rosacea 1/15/2008     Stress due to spouse with dementia 6/18/2019     Thrombocytopenia (H) 4/21/2017     Unspecified hyperplasia of prostate with urinary obstruction and other lower urinary tract symptoms (LUTS)     better on avodart & hytrin     Venous stasis 4/4/2012     Venous stasis 4/4/2012        PAST SURGICAL HISTORY:  Past Surgical History:   Procedure Laterality Date     CARDIAC SURGERY      bipass     CYSTOSCOPY FLEXIBLE, CYOABLATION PROSTATE N/A 11/9/2016    Procedure: CYSTOSCOPY FLEXIBLE, CRYOABLATION PROSTATE;  Surgeon: Krystian Owens MD;  Location: [facility] OR     GENITOURINARY SURGERY      prostate surgery      HERNIA REPAIR       LAPAROSCOPIC HERNIORRHAPHY INGUINAL Right 5/10/2017    Procedure: LAPAROSCOPIC HERNIORRHAPHY INGUINAL;  Laparoscopic preperitoneal repair of right inguinal hernia with  placement of underlay mesh;  Surgeon: Enrico Bridges MD;  Location: [facility] OR     PROSTATE SURGERY       [facility] NONSPECIFIC PROCEDURE      CAB 1996 [facility] NONSPECIFIC PROCEDURE  2/07    l inguinal hernia repair      [facility] NONSPECIFIC PROCEDURE      R hernia remote     [facility] NONSPECIFIC PROCEDURE  2000    R ankle ORIF for fx     [facility] NONSPECIFIC PROCEDURE  2005    nasal surgery      [facility] NONSPECIFIC PROCEDURE      R rotater repair (remote)      [facility] NONSPECIFIC PROCEDURE      appy 1966     [facility] NONSPECIFIC PROCEDURE      sinus surgery x 2     [facility] NONSPECIFIC PROCEDURE      L shoulder 6/09        MEDICATIONS:  Current Outpatient Medications   Medication     acetaminophen (TYLENOL) 500 MG tablet     apixaban ANTICOAGULANT (ELIQUIS) 2.5 MG tablet     Carboxymethylcellulose Sod PF 0.25 % SOLN     co-enzyme Q-10 100 MG CAPS     [facility] GLUCOSAMINE-CHONDROITIN PO     diclofenac (VOLTAREN) 1 % topical gel     doxycycline hyclate (VIBRA-TABS) 100 MG tablet     ferrous gluconate (FERGON) 324 (38 Fe) MG tablet     Glucosamine-Chondroitin (OSTEO BI-FLEX REGULAR STRENGTH) 250-200 MG TABS     lisinopril (ZESTRIL) 20 MG tablet     MELATONIN PO     Misc Natural Products (SAW PALMETTO) CAPS     multivitamin, therapeutic (THERA-VIT) TABS tablet     omeprazole (PRILOSEC) 40 MG DR capsule     oxyCODONE (ROXICODONE) 5 MG tablet     potassium chloride ER (K-TAB) 20 MEQ CR tablet     senna-docusate (SENOKOT-S/PERICOLACE) 8.6-50 MG tablet     simvastatin (ZOCOR) 20 MG tablet     tamsulosin (FLOMAX) 0.4 MG capsule     torsemide (DEMADEX) 20 MG tablet     UREA 20 INTENSIVE HYDRATING 20 % external cream     No current facility-administered medications for this visit.         ALLERGIES:  No Known Allergies     SOCIAL HISTORY:  Social History     Socioeconomic History     Marital status:      Spouse name: None     Number of children: None     Years of education: None     Highest education level: None   Occupational History     None  "  Social Needs     Financial resource strain: None     Food insecurity     Worry: None     Inability: None     Transportation needs     Medical: None     Non-medical: None   Tobacco Use     Smoking status: Former Smoker     Smokeless tobacco: Never Used     Tobacco comment: quit 3/1974   Substance and Sexual Activity     Alcohol use: Not Currently     Alcohol/week: 0.0 standard drinks     Comment: rare drink     Drug use: No     Sexual activity: Not Currently     Partners: Female   Lifestyle     Physical activity     Days per week: None     Minutes per session: None     Stress: None   Relationships     Social connections     Talks on phone: None     Gets together: None     Attends Faith service: None     Active member of club or organization: None     Attends meetings of clubs or organizations: None     Relationship status: None     Intimate partner violence     Fear of current or ex partner: None     Emotionally abused: None     Physically abused: None     Forced sexual activity: None   Other Topics Concern     Parent/sibling w/ CABG, MI or angioplasty before 65F 55M? Not Asked      Service Not Asked     Blood Transfusions Not Asked     Caffeine Concern Yes     Comment: 1 cups of coffee and soda per day     Occupational Exposure Not Asked     Hobby Hazards Not Asked     Sleep Concern Not Asked     Stress Concern Not Asked     Weight Concern Not Asked     Special Diet No     Back Care Not Asked     Exercise Yes     Comment: walking     Bike Helmet Not Asked     Seat Belt Yes     Self-Exams Not Asked   Social History Narrative     None       FAMILY HISTORY:  Family History   Problem Relation Age of Onset     Cancer Mother         stomach cancer,  age 61     Heart Disease Father         MI,  age 71     Heart Disease Brother         stent placement, RA      Hypertension Brother      Heart Disease Sister         rythmn problems with heart, hypertension        Ht 1.702 m (5' 7\")   Wt 78 kg " "(172 lb)   BMI 26.94 kg/m      A/P:  Abnormal finding noted on the abdominal CT scan and on the US of the gallbladder.  Plan for laparoscopic cholecystectomy.  The patient understands the potential for additional surgery which might be necessary should a malignancy be identified.  The patient understood the risks and benefits of surgery including the potential for bleeding, infection, need for second surgery, hernia repair, infection at the umbilical site, infection in the abdomen, potential for injury to the common bile duct as well as injury to intraabdominal organs, and all of the anesthetic complications possible including myocardial infarction, pulmonary emboli, stroke and even death in the operating room.  The patient understood these risks and wished to proceed.  The patient will be scheduled at his soonest convenience, and the patient will participate in the PAC Clinic for anesthesia review prior to surgery.  The patient is well known to Surgery Service as the patient has a traumatic injury, and the patient will also need to be off anticoagulation for a short period of time so that surgery could be completed, and he can resume anticoagulation 1 day following surgery.               Milo Serna is a 80 year old male who is being evaluated via a billable video visit.      The patient has been notified of following:     \"This video visit will be conducted via a call between you and your physician/provider. We have found that certain health care needs can be provided without the need for an in-person physical exam.  This service lets us provide the care you need with a video conversation.  If a prescription is necessary we can send it directly to your pharmacy.  If lab work is needed we can place an order for that and you can then stop by our lab to have the test done at a later time.    Video visits are billed at different rates depending on your insurance coverage.  Please reach out to your insurance " "provider with any questions.    If during the course of the call the physician/provider feels a video visit is not appropriate, you will not be charged for this service.\"    Patient has given verbal consent for Video visit? Yes  How would you like to obtain your AVS? MyChart  If you are dropped from the video visit, the video invite should be resent to: Send to e-mail at: [email or web address]  Will anyone else be joining your video visit? No    During this virtual visit the patient is located in [place], patient verifies this as the location during the entirety of this visit.     Video-Visit Details    Type of service:  Video Visit    Video Time: 15 minutes    Originating Location (pt. Location): home    Distant Location (provider location):  [facility] GENERAL SURGERY CLINIC [place]     Platform used for Video Visit: Anabel Alvarez MD      "

## 2020-11-16 NOTE — NURSING NOTE
"Chief Complaint   Patient presents with     Consult     Virtual consult       Vitals:    11/16/20 1306   Weight: 78 kg (172 lb)   Height: 1.702 m (5' 7\")       Body mass index is 26.94 kg/m .      CALDERON AwadT                      "

## 2020-11-16 NOTE — PROGRESS NOTES
Visit today by [email or web address], a virtual visit based on the COVID-19 pandemic.  The patient understood the need for the virtual visit based on the pandemic.      HISTORY OF PRESENT ILLNESS:  Milo Serna, his daughter and his family members were able to participate in a virtual clinic using the [email or web address] system.  The patient's questions and concerns were answered.  The patient was at home, and I was here in the Surgery Clinic at the [address] [place] [address].  At this visit, we reviewed the findings on the ultrasound and CT scan, which are consistent with a benign tumor in the gallbladder; however, malignancy could not be ruled out.  This appears to be on a free wall of the gallbladder, and the patient understands that laparoscopic cholecystectomy could be curative; however, the patient will also benefit by having the gallbladder out and the pathology reviewed carefully to exclude malignancy.     ROS: a ten point ROS is negative.      PAST MEDICAL HISTORY:  Past Medical History:   Diagnosis Date     Actinic keratosis 10/12/2019     Anemia, unspecified 11/8/2016     Atrial fibrillation (H) 5/27/2015     BPH (benign prostatic hyperplasia) 1/25/2012     CAD (coronary artery disease) 4/4/2012     Closed bimalleolar fracture of left ankle with routine healing 4/5/2019     Closed fracture of metatarsal bone 4/4/2012     Coronary atherosclerosis of unspecified type of vessel, native or [facility]    CAB 1996 following MI (at [facility])      Dilated aortic root (H) 5/26/2016     Drug-induced erectile dysfunction 10/2/2015     Elevated blood sugar 11/8/2016     Elevated PSA 9/19/2013     Epistaxis 9/9/2019     Essential hypertension with goal blood pressure less than 140/90 9/22/2016     Essential hypertension, benign      Fall 9/6/2019     Functional diarrhea 10/12/2019     Gallbladder polyp 5/1/2018     Gout      HAV (hallux abducto valgus) 4/4/2012     Hematoma 9/9/2019     Hernia, abdominal      History of prostate  cancer 6/15/2016     HTN, goal below 150/90 4/20/2017     Hypokalemia 9/12/2019     Iron deficiency anemia secondary to inadequate dietary iron intake 11/15/2016     Low blood pressure reading 10/17/2016     Maria E     had degendisc dz on MRI 7/07     Memory loss 6/18/2019     Microalbuminuria 6/4/2020    X1     Mixed hyperlipidemia      Mumps      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     Nonrheumatic mitral valve regurgitation 10/28/2019     OA (osteoarthritis) 4/4/2012     Old myocardial infarction 4/4/2012     LENORA (obstructive sleep apnea) 10/28/2019     Other viral warts 9/6/2019     Palpitations      Pes planus 4/4/2012     Petechiae 6/18/2019     Prostate cancer (H) 5/26/2016     PUD (peptic ulcer disease) 4/4/2012     Pulmonary hypertension (H) 9/25/2017     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     Rosacea 1/15/2008     Stress due to spouse with dementia 6/18/2019     Thrombocytopenia (H) 4/21/2017     Unspecified hyperplasia of prostate with urinary obstruction and other lower urinary tract symptoms (LUTS)     better on avodart & hytrin     Venous stasis 4/4/2012     Venous stasis 4/4/2012        PAST SURGICAL HISTORY:  Past Surgical History:   Procedure Laterality Date     CARDIAC SURGERY      bipass     CYSTOSCOPY FLEXIBLE, CYOABLATION PROSTATE N/A 11/9/2016    Procedure: CYSTOSCOPY FLEXIBLE, CRYOABLATION PROSTATE;  Surgeon: Krystian Owens MD;  Location: [facility] OR     GENITOURINARY SURGERY      prostate surgery      HERNIA REPAIR       LAPAROSCOPIC HERNIORRHAPHY INGUINAL Right 5/10/2017    Procedure: LAPAROSCOPIC HERNIORRHAPHY INGUINAL;  Laparoscopic preperitoneal repair of right inguinal hernia with placement of underlay mesh;  Surgeon: Enrico Bridges MD;  Location: [facility] OR     PROSTATE SURGERY       [facility] NONSPECIFIC PROCEDURE      CAB 1996 [place]     [facility] NONSPECIFIC PROCEDURE  2/07    l inguinal hernia repair      [facility] NONSPECIFIC PROCEDURE      R hernia remote     [facility] NONSPECIFIC PROCEDURE  2000    R ankle ORIF for  fx     [facility] NONSPECIFIC PROCEDURE  2005    nasal surgery      [facility] NONSPECIFIC PROCEDURE      R rotater repair (remote)      [facility] NONSPECIFIC PROCEDURE      appy 1966     [facility] NONSPECIFIC PROCEDURE      sinus surgery x 2     [facility] NONSPECIFIC PROCEDURE      L shoulder 6/09        MEDICATIONS:  Current Outpatient Medications   Medication     acetaminophen (TYLENOL) 500 MG tablet     apixaban ANTICOAGULANT (ELIQUIS) 2.5 MG tablet     Carboxymethylcellulose Sod PF 0.25 % SOLN     co-enzyme Q-10 100 MG CAPS     [facility] GLUCOSAMINE-CHONDROITIN PO     diclofenac (VOLTAREN) 1 % topical gel     doxycycline hyclate (VIBRA-TABS) 100 MG tablet     ferrous gluconate (FERGON) 324 (38 Fe) MG tablet     Glucosamine-Chondroitin (OSTEO BI-FLEX REGULAR STRENGTH) 250-200 MG TABS     lisinopril (ZESTRIL) 20 MG tablet     MELATONIN PO     Misc Natural Products (SAW PALMETTO) CAPS     multivitamin, therapeutic (THERA-VIT) TABS tablet     omeprazole (PRILOSEC) 40 MG DR capsule     oxyCODONE (ROXICODONE) 5 MG tablet     potassium chloride ER (K-TAB) 20 MEQ CR tablet     senna-docusate (SENOKOT-S/PERICOLACE) 8.6-50 MG tablet     simvastatin (ZOCOR) 20 MG tablet     tamsulosin (FLOMAX) 0.4 MG capsule     torsemide (DEMADEX) 20 MG tablet     UREA 20 INTENSIVE HYDRATING 20 % external cream     No current facility-administered medications for this visit.         ALLERGIES:  No Known Allergies     SOCIAL HISTORY:  Social History     Socioeconomic History     Marital status:      Spouse name: None     Number of children: None     Years of education: None     Highest education level: None   Occupational History     None   Social Needs     Financial resource strain: None     Food insecurity     Worry: None     Inability: None     Transportation needs     Medical: None     Non-medical: None   Tobacco Use     Smoking status: Former Smoker     Smokeless tobacco: Never Used     Tobacco comment: quit 3/1974   Substance and Sexual Activity     Alcohol use:  "Not Currently     Alcohol/week: 0.0 standard drinks     Comment: rare drink     Drug use: No     Sexual activity: Not Currently     Partners: Female   Lifestyle     Physical activity     Days per week: None     Minutes per session: None     Stress: None   Relationships     Social connections     Talks on phone: None     Gets together: None     Attends Jainism service: None     Active member of club or organization: None     Attends meetings of clubs or organizations: None     Relationship status: None     Intimate partner violence     Fear of current or ex partner: None     Emotionally abused: None     Physically abused: None     Forced sexual activity: None   Other Topics Concern     Parent/sibling w/ CABG, MI or angioplasty before 65F 55M? Not Asked      Service Not Asked     Blood Transfusions Not Asked     Caffeine Concern Yes     Comment: 1 cups of coffee and soda per day     Occupational Exposure Not Asked     Hobby Hazards Not Asked     Sleep Concern Not Asked     Stress Concern Not Asked     Weight Concern Not Asked     Special Diet No     Back Care Not Asked     Exercise Yes     Comment: walking     Bike Helmet Not Asked     Seat Belt Yes     Self-Exams Not Asked   Social History Narrative     None       FAMILY HISTORY:  Family History   Problem Relation Age of Onset     Cancer Mother         stomach cancer,  age 61     Heart Disease Father         MI,  age 71     Heart Disease Brother         stent placement, RA      Hypertension Brother      Heart Disease Sister         rythmn problems with heart, hypertension        Ht 1.702 m (5' 7\")   Wt 78 kg (172 lb)   BMI 26.94 kg/m      A/P:  Abnormal finding noted on the abdominal CT scan and on the US of the gallbladder.  Plan for laparoscopic cholecystectomy.  The patient understands the potential for additional surgery which might be necessary should a malignancy be identified.  The patient understood the risks and benefits of " "surgery including the potential for bleeding, infection, need for second surgery, hernia repair, infection at the umbilical site, infection in the abdomen, potential for injury to the common bile duct as well as injury to intraabdominal organs, and all of the anesthetic complications possible including myocardial infarction, pulmonary emboli, stroke and even death in the operating room.  The patient understood these risks and wished to proceed.  The patient will be scheduled at his soonest convenience, and the patient will participate in the PAC Clinic for anesthesia review prior to surgery.  The patient is well known to Surgery Service as the patient has a traumatic injury, and the patient will also need to be off anticoagulation for a short period of time so that surgery could be completed, and he can resume anticoagulation 1 day following surgery.       Milo Serna is a 80 year old male who is being evaluated via a billable video visit.      The patient has been notified of following:     \"This video visit will be conducted via a call between you and your physician/provider. We have found that certain health care needs can be provided without the need for an in-person physical exam.  This service lets us provide the care you need with a video conversation.  If a prescription is necessary we can send it directly to your pharmacy.  If lab work is needed we can place an order for that and you can then stop by our lab to have the test done at a later time.    Video visits are billed at different rates depending on your insurance coverage.  Please reach out to your insurance provider with any questions.    If during the course of the call the physician/provider feels a video visit is not appropriate, you will not be charged for this service.\"    Patient has given verbal consent for Video visit? Yes  How would you like to obtain your AVS? MyChart  If you are dropped from the video visit, the video invite should " be resent to: Send to e-mail at: [email or web address]  Will anyone else be joining your video visit? No      During this virtual visit the patient is located in [place], patient verifies this as the location during the entirety of this visit.     Video-Visit Details    Type of service:  Video Visit    Video Time: 15 minutes    Originating Location (pt. Location): home    Distant Location (provider location):  [facility] SURGERY [facility]     Platform used for Video Visit: [email or web address]

## 2020-11-16 NOTE — PATIENT INSTRUCTIONS
You met with Dr. James Alvarez.      Today's visit instructions:    Reminder:  Surgery Requirements  1. Your surgery will be at [address] [place] [address]  2. You will need to arrive 1 1/2 to 2 hours early based on the location of your surgery.  3. You will need someone to drive you home (over 18 years old) and stay with you for 24 hours after the procedure  4. You will need a preop physical with your regular doctor (or PAC if requested by your surgeon) within 30 days of surgery- closer is always better  5. Stop any blood thinners, vitamins, minerals, or herbal supplements 5 days before surgery.  If you are taking a prescribed blood thinner please let us know for specific instructions  6. Fasting- a nurse from [facility] will call you 1-2 days before surgery to confirm your procedure and tell you when to stop eating and drinking  7. Wash with the soap the night before surgery and morning of surgery. See instructions in the Surgery Packet.  8. If you would like a procedure estimate please call Lila SURESH, Financial Counselor, [phone].    At this time, any patient that does not have COVID-19 testing within 4 days of surgery and results available to the surgeon and anesthesia team before the procedure may have their procedure postponed or canceled. We do this to keep our patients, providers, and employees safe. If you decline to test, then you will need to contact your surgeon to determine when or if your procedure will still take place.    OR    We highly encourage patients to get tested for COVID-19 at one of our designated [facility] testing sites. We process the tests in our lab, which allows us to get the results quickly. If you choose to get tested at a non-[facility] location, you will need to contact your primary care provider to make those arrangements and ensure the results are available to your surgeon before you arrive for your procedure. If we do not receive the results in time, your procedure  may be postponed or canceled. Please make sure your test is collected 3-days prior to your procedure date. The results will need to get faxed to [phone].     If you have questions please contact Jackson RN or Stephanie RN during regular clinic hours, Monday through Friday 7:30 AM - 4:00 PM, or you can contact us via [employer] at anytime.       If you have urgent needs after-hours, weekends, or holidays please call the hospital at [phone] and ask to speak with our on-call General Surgery Team.    Appointment [phone], option #1   Nurse Advice (Jackson or Stephanie): [phone]   Surgery Scheduler (Dana): [phone]  Fax: [phone]    After your Laparoscopic Cholecystectomy          Incision care     You may take a shower the day after surgery. Carefully wash your incision with soap and water. Do not submerge yourself in water (bath, whirlpool, hot tub, pool, lake) for 14 days after surgery.     Remove the bandage the day after surgery, but leave the medical tape (Steri-Strips) or glue in place. These will loosen and fall off on their own 5 to 7 days after surgery.       Always wash your hands before touching your incisions or removing bandages.     It is not unusual to form a collection of fluid or blood under your incision that may feel firm or squishy- it can take several weeks to months for your body to reabsorb it.  At times, it may even drain.  If that should happen keep the area clean with soap, water,  and cover with a clean gauze dressing. You can change this daily or as needed.       Other medicines     Wait to start aspirin or blood thinners until the day after surgery. You can continue your regular medicines at your normal time the day after surgery.      Your pain medicine may cause constipation (hard, dry stools). To help with this, take the stool softener your doctor gave you or an over-the-counter stool softener or laxative. You can stop taking this when you are no longer taking  pain medicine and your bowel movements are back to normal.      For pain or discomfort     Take the narcotic pain medicine your doctor gave you as needed and as instructed on the bottle. If you prefer to use over-the-counter medication, use acetaminophen (Tylenol) or ibuprofen (Advil, Motrin) as instructed on the box. Do not take Tylenol if it is in your narcotic pain medication.     Use an ice pack on your abdomen (belly) for 20 minutes at a time as needed for the first 24 hours. Be sure to protect your skin by putting a cloth between the ice pack and your skin.     After 24 hours you can switch to heat for 20 minutes as needed. Be sure to protect your skin by putting a cloth between the heat pack and your skin.       Activities     No driving until you feel it s safe to do so. Don t drive while taking narcotic pain medicine.     Don t lift anything heavier than 20 pounds for 3 to 4 weeks after surgery.      Special equipment     None     Diet     You can eat your regular meals after surgery.      When to call the doctor   Call your doctor if you have:     A fever above 101 F (38.3 C) (taken under the tongue), or a fever or chills lasting more than a day.     Redness at the incision site.     Any fluid or blood draining from the incision, especially if it smells bad.      Severe pain that doesn t improve with pain medicine.        We will call you 2 to 4 days after surgery to review this handout, answer questions and help arrange after-surgery care. If you have questions or concerns, please call [phone] during regular office hours. If you need to call after business hours, call [phone] and ask to page the surgeon on-call.         Transversus Abdominis Plane (TAP) Pain Block      What is a TAP block?   A TAP block can help you manage your pain after surgery. TAP stands for transversus abdominis plane, which is a muscle layer in your abdomen (belly). The TAP block uses numbing medicine similar to Novocaine  to block pain near the site of your surgery.       Why get a TAP block?     To better manage your pain after surgery. A tap block will help keep the pain from getting severe and out of control.     To block pain signals from the nerve, which helps decrease pain after surgery.     To help you sleep, easily breathe deeply, walk and visit with others.      How is it done?   You will lie still on a table. We will use an ultrasound machine to help us see the correct muscle layer of your abdomen. Then, we ll use a needle to inject the medicine. We may also give you some sleep medicine to lessen the pain of the injection.       The procedure takes between 5 and 15 minutes. It is usually done right before surgery, but will sometimes be after. It depends on your surgery and care needs.      What can I expect?     You may feel numbness, tingling or a heaviness in your abdomen.      You may have pain control up to 72 hours after surgery.     The TAP block may not lessen all of your surgery pain. But most patients feel 50 to 75 percent less pain than without the block.       Tell your nurse if you have:     Numbness or tingling in areas other than where the injection was     Blurry vision     Ringing in your ears     A metallic taste in your mouth

## 2020-11-17 ENCOUNTER — TELEPHONE (OUTPATIENT)
Dept: FAMILY MEDICINE | Facility: CLINIC | Age: 80
End: 2020-11-17

## 2020-11-18 ENCOUNTER — PATIENT OUTREACH (OUTPATIENT)
Dept: SURGERY | Facility: CLINIC | Age: 80
End: 2020-11-18

## 2020-11-18 NOTE — PROGRESS NOTES
Pre and Post op Patient Education/Teaching Flowsheet  Relevant Diagnosis:  Gallbladder  Teaching Topic:  Pre and post op teaching  Person(s) Involved in teaching:  DaughterJan    Motivation Level:  Asks Questions:  Yes  Eager to Learn:  Yes  Cooperative:  Yes  Receptive (willing/able to accept information):  Yes  Any cultural factors/Latter day beliefs that may influence understanding or compliance?  No    Patient/caregiver/family demonstrates understanding of the following:  Reason for the appointment, diagnosis, and treatment plan:  Yes  Patient demonstrates understanding of the following:  Pre-op bowel prep:  No  Post-op pain management recommendations (medications, ice compress, binder/athletic supporter (if applicable), etc.:  Yes  Inguinal hernia patients:  Post-op urinary retention- discussed signs/symptoms and visit to ER for [place] catheter placement and to stay in place for at least 48 hours:  NA  Restrictions:  Yes  Medications to take the day of surgery:  Per PCP  Blood thinner medications discussed and when to stop (if applicable):  Yes  Wound care:  Yes  Diabetes medication management (if applicable):  Per PCP  Which situations necessitate calling provider and whom to contact:  Discussed how to contact the hospital, nurse, and clinic scheduling staff if necessary      Date and time of surgery:  TBD  Location of surgery: [address] [place] [address]  History and Physical and any other testing necessary prior to surgery:  Yes  Required time line for completion of History and Physical and any pre-op testing:  Yes  Discuss need for someone to drive patient home and stay with them for 24 hours:  Yes  Pre-op showering/scrub information with Surgical Scrub:  Yes  NPO Guidelines:  NPO per Anesthesia Guidelines  COVID-19 Testing:  Yes    Infection Prevention: Patient demonstrates understanding of the following:  Patient instructed on hand hygiene:  Yes  Surgical procedure site care will be taught and will be  reviewed at the time of discharge  Signs and symptoms of infection taught:  Yes  Wound care reviewed and will be taught at the time of discharge  Central venous catheter care will be taught at the time of discharge (if applicable)    Post-op follow-up:  Instructional materials used/given/mailed:  [facility] Booklet, post op teaching sheet, Map, Soap, and arrival/location information    Surgical instructions mailed to patient              Nicole hunt

## 2020-11-19 ENCOUNTER — HOSPITAL ENCOUNTER (OUTPATIENT)
Dept: CT IMAGING | Facility: CLINIC | Age: 80
Discharge: HOME | End: 2020-11-19
Attending: FAMILY MEDICINE | Admitting: FAMILY MEDICINE
Payer: MEDICARE
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
## 2020-11-19 DIAGNOSIS — R40.20 LOSS OF CONSCIOUSNESS (H): ICD-10-CM

## 2020-11-19 DIAGNOSIS — I48.20 CHRONIC ATRIAL FIBRILLATION (H): ICD-10-CM

## 2020-11-19 PROCEDURE — 70450 CT HEAD/BRAIN W/O DYE: CPT

## 2020-11-23 ENCOUNTER — TELEPHONE (OUTPATIENT)
Dept: SURGERY | Facility: CLINIC | Age: 80
End: 2020-11-23

## 2020-11-23 NOTE — TELEPHONE ENCOUNTER
Case Request received from Dr. Alvarez for a Tier 3 SDS Laparoscopic Cholecystectomy case to be scheduled at [facility]. In reviewing availability in [facility] matched with Dr. Alvarez's availability, he is currently booked out to 2021. Dr Alvarez's 2021 calendar is not yet available. Spoke with ALEX Brown in clinic, she does not know when his 2021 calendar will be available, but she said that his admin is working on it and it should be available soon.    Called and spoke with patient rep/ daughter Stacia, updated her of the above and that I'd call her as soon as Dr. Alvarez's 2021 calendar is available. Stacia expressed understanding if this plan.

## 2020-11-28 ENCOUNTER — HOSPITAL ENCOUNTER (EMERGENCY)
Facility: CLINIC | Age: 80
Discharge: HOME | End: 2020-11-28
Attending: EMERGENCY MEDICINE | Admitting: EMERGENCY MEDICINE
Payer: MEDICARE

## 2020-11-28 ENCOUNTER — APPOINTMENT (OUTPATIENT)
Dept: ULTRASOUND IMAGING | Facility: CLINIC | Age: 80
End: 2020-11-28
Attending: EMERGENCY MEDICINE
Payer: MEDICARE

## 2020-11-28 ENCOUNTER — APPOINTMENT (OUTPATIENT)
Dept: CT IMAGING | Facility: CLINIC | Age: 80
End: 2020-11-28
Attending: EMERGENCY MEDICINE
Payer: MEDICARE

## 2020-11-28 VITALS
TEMPERATURE: 97.5 F | OXYGEN SATURATION: 100 % | RESPIRATION RATE: 16 BRPM | SYSTOLIC BLOOD PRESSURE: 141 MMHG | DIASTOLIC BLOOD PRESSURE: 69 MMHG | HEART RATE: 54 BPM

## 2020-11-28 DIAGNOSIS — R07.9 CHEST PAIN, UNSPECIFIED TYPE: ICD-10-CM

## 2020-11-28 DIAGNOSIS — R10.84 ABDOMINAL PAIN, GENERALIZED: ICD-10-CM

## 2020-11-28 LAB
ALBUMIN SERPL-MCNC: 3.9 G/DL (ref 3.4–5)
ALBUMIN UR-MCNC: NEGATIVE MG/DL
ALP SERPL-CCNC: 244 U/L (ref 40–150)
ALT SERPL W P-5'-P-CCNC: 21 U/L (ref 0–70)
ANION GAP SERPL CALCULATED.3IONS-SCNC: 5 MMOL/L (ref 3–14)
APPEARANCE UR: CLEAR
AST SERPL W P-5'-P-CCNC: 55 U/L (ref 0–45)
BASOPHILS # BLD AUTO: 0 10E9/L (ref 0–0.2)
BASOPHILS NFR BLD AUTO: 0.5 %
BILIRUB SERPL-MCNC: 2.6 MG/DL (ref 0.2–1.3)
BILIRUB UR QL STRIP: NEGATIVE
BUN SERPL-MCNC: 18 MG/DL (ref 7–30)
CALCIUM SERPL-MCNC: 9.3 MG/DL (ref 8.5–10.1)
CHLORIDE SERPL-SCNC: 100 MMOL/L (ref 94–109)
CO2 SERPL-SCNC: 29 MMOL/L (ref 20–32)
COLOR UR AUTO: ABNORMAL
CREAT SERPL-MCNC: 0.86 MG/DL (ref 0.66–1.25)
DIFFERENTIAL METHOD BLD: ABNORMAL
EOSINOPHIL # BLD AUTO: 0 10E9/L (ref 0–0.7)
EOSINOPHIL NFR BLD AUTO: 0.5 %
ERYTHROCYTE [DISTWIDTH] IN BLOOD BY AUTOMATED COUNT: 14 % (ref 10–15)
GFR SERPL CREATININE-BSD FRML MDRD: 81 ML/MIN/{1.73_M2}
GLUCOSE SERPL-MCNC: 117 MG/DL (ref 70–99)
GLUCOSE UR STRIP-MCNC: NEGATIVE MG/DL
HCT VFR BLD AUTO: 37.2 % (ref 40–53)
HGB BLD-MCNC: 12.1 G/DL (ref 13.3–17.7)
HGB UR QL STRIP: ABNORMAL
IMM GRANULOCYTES # BLD: 0 10E9/L (ref 0–0.4)
IMM GRANULOCYTES NFR BLD: 0.5 %
KETONES UR STRIP-MCNC: NEGATIVE MG/DL
LEUKOCYTE ESTERASE UR QL STRIP: NEGATIVE
LIPASE SERPL-CCNC: 156 U/L (ref 73–393)
LYMPHOCYTES # BLD AUTO: 0.3 10E9/L (ref 0.8–5.3)
LYMPHOCYTES NFR BLD AUTO: 4.3 %
MCH RBC QN AUTO: 31.9 PG (ref 26.5–33)
MCHC RBC AUTO-ENTMCNC: 32.5 G/DL (ref 31.5–36.5)
MCV RBC AUTO: 98 FL (ref 78–100)
MONOCYTES # BLD AUTO: 0.6 10E9/L (ref 0–1.3)
MONOCYTES NFR BLD AUTO: 8.1 %
MUCOUS THREADS #/AREA URNS LPF: PRESENT /LPF
NEUTROPHILS # BLD AUTO: 6.4 10E9/L (ref 1.6–8.3)
NEUTROPHILS NFR BLD AUTO: 86.1 %
NITRATE UR QL: NEGATIVE
NRBC # BLD AUTO: 0 10*3/UL
NRBC BLD AUTO-RTO: 0 /100
PH UR STRIP: 5.5 PH (ref 5–7)
PLATELET # BLD AUTO: 120 10E9/L (ref 150–450)
POTASSIUM SERPL-SCNC: 4 MMOL/L (ref 3.4–5.3)
PROT SERPL-MCNC: 8.2 G/DL (ref 6.8–8.8)
RBC # BLD AUTO: 3.79 10E12/L (ref 4.4–5.9)
RBC #/AREA URNS AUTO: 1 /HPF (ref 0–2)
SODIUM SERPL-SCNC: 134 MMOL/L (ref 133–144)
SOURCE: ABNORMAL
SP GR UR STRIP: 1.01 (ref 1–1.03)
TROPONIN I SERPL-MCNC: <0.015 UG/L (ref 0–0.04)
UROBILINOGEN UR STRIP-MCNC: NORMAL MG/DL (ref 0–2)
WBC # BLD AUTO: 7.4 10E9/L (ref 4–11)
WBC #/AREA URNS AUTO: <1 /HPF (ref 0–5)

## 2020-11-28 PROCEDURE — 250N000011 HC RX IP 250 OP 636: Performed by: EMERGENCY MEDICINE

## 2020-11-28 PROCEDURE — 81001 URINALYSIS AUTO W/SCOPE: CPT | Performed by: EMERGENCY MEDICINE

## 2020-11-28 PROCEDURE — 99285 EMERGENCY DEPT VISIT HI MDM: CPT | Mod: 25

## 2020-11-28 PROCEDURE — 93005 ELECTROCARDIOGRAM TRACING: CPT

## 2020-11-28 PROCEDURE — 84484 ASSAY OF TROPONIN QUANT: CPT | Performed by: EMERGENCY MEDICINE

## 2020-11-28 PROCEDURE — 258N000003 HC RX IP 258 OP 636: Performed by: EMERGENCY MEDICINE

## 2020-11-28 PROCEDURE — 85025 COMPLETE CBC W/AUTO DIFF WBC: CPT | Performed by: EMERGENCY MEDICINE

## 2020-11-28 PROCEDURE — 83690 ASSAY OF LIPASE: CPT | Performed by: EMERGENCY MEDICINE

## 2020-11-28 PROCEDURE — 74177 CT ABD & PELVIS W/CONTRAST: CPT

## 2020-11-28 PROCEDURE — 80053 COMPREHEN METABOLIC PANEL: CPT | Performed by: EMERGENCY MEDICINE

## 2020-11-28 PROCEDURE — 76705 ECHO EXAM OF ABDOMEN: CPT

## 2020-11-28 PROCEDURE — 96374 THER/PROPH/DIAG INJ IV PUSH: CPT | Mod: 59

## 2020-11-28 PROCEDURE — 250N000009 HC RX 250: Performed by: EMERGENCY MEDICINE

## 2020-11-28 RX ORDER — IOPAMIDOL 755 MG/ML
500 INJECTION, SOLUTION INTRAVASCULAR ONCE
Status: COMPLETED | OUTPATIENT
Start: 2020-11-28 | End: 2020-11-28

## 2020-11-28 RX ORDER — MORPHINE SULFATE 4 MG/ML
4 INJECTION, SOLUTION INTRAMUSCULAR; INTRAVENOUS
Status: DISCONTINUED | OUTPATIENT
Start: 2020-11-28 | End: 2020-11-28 | Stop reason: HOSPADM

## 2020-11-28 RX ORDER — OXYCODONE HYDROCHLORIDE 5 MG/1
5 TABLET ORAL EVERY 6 HOURS PRN
Qty: 12 TABLET | Refills: 0 | Status: SHIPPED | OUTPATIENT
Start: 2020-11-28 | End: 2020-12-15

## 2020-11-28 RX ADMIN — MORPHINE SULFATE 4 MG: 4 INJECTION INTRAVENOUS at 19:39

## 2020-11-28 RX ADMIN — IOPAMIDOL 84 ML: 755 INJECTION, SOLUTION INTRAVENOUS at 18:20

## 2020-11-28 RX ADMIN — SODIUM CHLORIDE 64 ML: 9 INJECTION, SOLUTION INTRAVENOUS at 18:20

## 2020-11-28 RX ADMIN — SODIUM CHLORIDE 500 ML: 9 INJECTION, SOLUTION INTRAVENOUS at 17:30

## 2020-11-28 ASSESSMENT — ENCOUNTER SYMPTOMS
FLANK PAIN: 1
CONSTIPATION: 0
HEMATURIA: 0
NAUSEA: 0
ABDOMINAL PAIN: 1
DIARRHEA: 0
BACK PAIN: 1
NUMBNESS: 0
VOMITING: 0
DYSURIA: 0

## 2020-11-28 NOTE — ED PROVIDER NOTES
History   Chief Complaint  Abdominal Pain and Back Pain    HPI   Milo Serna is a 80 year old male with a history of recent T8 vertebral fracture, atrial fibrillation, CAD, hypertension, MI,  who presents for evaluation of abdominal pain that radiates up into his chest and up around his flanks into his back. The patient reports that this pain is a severe sharp sensation that has been getting worse throughout the day. It started about 5 hours ago. The pain is exacerbated by sitting down and alleviated with walking. He took two tylenol and half an oxycodone an hour and a half ago with some relief in the discomfort. Here, he reports that the back pain is a 7/10 and the abdominal pain is a 5/10. The patient denies any nausea, vomiting, diarrhea, constipation, numbness, tingling, dysuria, hematuria, or shortness of breath. Denies any recent trauma or history of kidney stones.     Allergies  No Known Allergies    Medications  Eliquis   Glucosamine-chondroitin  Melatonin  Thera-vit  Lisinopril   Prilosec  Potassium chloride  Zocor  Flomax  Demadex  Roxicodone     Past Medical History  T8 vertebral fracture  Venous stasis  Hyperplasia of prostate  Thrombocytopenia  Rosacea  Rhinophyma  Pulmonary hypertension   PUD  Prostate cancer   Petechiae  Pes planus  LENORA  Myocardial infarction  Osteoarthritis  Non-rheumatic mitral valve regurgitation  Hyperlipidemia   Memory loss  Lumbago  Anemia  Hypokalemia  Abdominal hernia  Hallux abducto valgus  Gout  Gallbladder polyp  Hypertension   Dilated aortic root  Coronary atherosclerosis of unspecified type of vessel  CAD  BPH  Atrial fibrillation  Anemia  Actinic keratosis     Past Surgical History:    Bypass surgery   Cystoscopy   Genitourinary surgery   Hernia repair  Prostate surgery   Nasal surgery   Sinus surgery x2  Appendectomy   CAB   R ankle ORIF for fracutre  R rotator cuff repair  Left shoulder      Family History:    Stomach cancer  - mother   Heart disease - father  (MI at 71), brother, sister  Hypertension - brother     Social History:  The patient was accompanied to the ED by EMS.  Smoking Status: Former Smoker  Smokeless Tobacco: Never Used  Alcohol Use: Not Currently   Marital Status:       Review of Systems   Cardiovascular: Positive for chest pain.   Gastrointestinal: Positive for abdominal pain. Negative for constipation, diarrhea, nausea and vomiting.   Genitourinary: Positive for flank pain. Negative for dysuria and hematuria.   Musculoskeletal: Positive for back pain.   Neurological: Negative for numbness.   All other systems reviewed and are negative.    Physical Exam     Patient Vitals for the past 24 hrs:   BP Temp Temp src Pulse Resp SpO2   11/28/20 2030 127/76 -- -- 61 -- 100 %   11/28/20 1945 138/72 -- -- 70 -- 100 %   11/28/20 1900 (!) 142/77 -- -- 63 -- 100 %   11/28/20 1800 (!) 142/70 -- -- 57 -- 100 %   11/28/20 1745 131/69 -- -- 59 -- 100 %   11/28/20 1730 130/64 -- -- 57 -- 100 %   11/28/20 1632 137/64 97.5  F (36.4  C) Oral 81 16 100 %     Physical Exam  Constitutional: Patient is well appearing. No distress.  Head: Atraumatic.  Eyes: Conjunctivae and EOM are normal. No scleral icterus.  Neck: Normal range of motion. Neck supple.   Cardiovascular: Normal rate, regular rhythm, normal heart sounds and intact distal pulses.   Pulmonary/Chest: Breath sounds normal. No respiratory distress.  Abdominal: Soft. Bowel sounds are normal. No distension. No tenderness. No rebound or guarding.   Musculoskeletal: Normal range of motion. No edema or tenderness.   Neurological: Alert and orientated to person, place, and time. No observable focal neuro deficit  Skin: Warm and dry. No rash noted. Not diaphoretic.     Emergency Department Course   EKG  Indication: rule out cardiac   Time: 1722  Rate 64 bpm. [place] interval *. QRS duration 120. QT/QTc 460/474. P-R-T axes * 22 168.  Atrial fibrillation with premature ventricular or aberrantly conducted complexes.  Incomplete left bundle branch block. ST and T wave abnormality, consider lateral ischemia. Abnormal ECG.   Read by Nam Calero MD    Imaging:  Radiology findings were communicated with the patient who voiced understanding of the findings.    CT Chest/Abdomen/Pelvis with contrast:   1.  The mild bibasilar infiltrates seen previously have resolved. Now seen is an uncalcified 4.5 mm nodule in the left lower lobe and a 3 mm uncalcified nodule in the right lower lobe.   2.  Sternotomy with cardiac enlargement. Coronary artery calcification. No thoracic aortic dissection or aneurysm. Enlargement of the central pulmonary arteries compatible with pulmonary arterial hypertension.   3.  Liver is quite inhomogeneous perhaps due to phase of vascular enhancement. Would consider liver ultrasound to exclude underlying solid mass lesions.   4.  Changes of adenomyomatosis in the gallbladder fundus. Small gallstones. As per radiology.     US Abdomen, Limited RUQ:  1.  No solid masses are seen in the liver. Inhomogeneous appearance on CT compatible with phase of contrast enhancement. The 2 small hypodensities in the superior liver likely cysts are not seen on ultrasound.   2.  Changes of adenomyomatosis within the gallbladder fundus with overall mild gallbladder wall thickening. Small gallstone. Patient is not tender over the gallbladder. as per radiology.     Laboratory:  Laboratory findings were communicated with the patient who voiced understanding of the findings.    CBC: WBC: 7.4, HGB: 12.1 (L), PLT: 120 (L)  CMP: Glucose 117 (H), bilirubin 2.6 (H), alkphos 244 (H), AST 55 (H), o/w WNL (Creatinine: 0.86)  Lipase: 156  1729 Troponin I: <0.015    UA with Microscopic: blood trace (A), mucous present (A), o/w WNL    Interventions:  1730 NS 0.5 mL IV  1939 Morphine 4 mg IV    Emergency Department Course:  Past medical records, nursing notes, and vitals reviewed.    1701 The patient was started by the PA student.      I  physically examined the patient as documented above.     EKG obtained in the ED, see results above.      IV was inserted and blood was drawn for laboratory testing, results above.     The patient provided a urine sample here in the emergency department. This was sent for laboratory testing, findings above.     The patient was sent for radiographs while in the emergency department, results above.      I rechecked the patient and discussed the findings of their workup thus far. At that time I recommended admission secondary to the negative workup however he and his daughter expressed their desire for discharge. They understand the risks and need for return if symptoms should return.     Findings and plan explained to the Patient. Patient discharged home with instructions regarding supportive care, medications, and reasons to return. The importance of close follow-up was reviewed. The patient was prescribed Roxicodone.     I personally reviewed the laboratory and imaging results with the Patient and answered all related questions prior to discharge.     Impression & Plan   Medical Decision Making:  Milo Serna is a 80 year old male who presented to the ED today for evaluation of abdominal pain that radiates up into his chest and back. Differential diagnosis included appendicitis, diverticulitis, gastroenteritis, UTI, constipation, gallbladder pathology, Aorta, heart, lungs as well above the diaphragm amongst others. Laboratory analysis here today yielded no acute abnormalities. Imaging also indicated no pathology. Interventions here in the ED included normal saline and morphine. Upon reevaluation, the patient appeared greatly improved. It is my suspicion that the patient's symptoms are due to either exacerbation of compression fracture or pathology not detected on todays visit no life threat at this time.  I recommended admission secondary to the negative workup however he and his daughter expressed their desire  for discharge. They both understood the risks.  They should follow-up with their primary care provider within the next few days for recheck of their symptoms. They need to return to the ED for worsening abdominal pain, uncontrolled nausea/vomiting, black tarry/bloody stools, CP, SOB,  or if new concerns arise. All questions were answered prior to the patient's discharge. He was in agreement with the plan stated above.     All questions and concerns were answered. The patient was discharged home and recommended to follow up with his primary physician and return with any new or worsening symptoms.     Diagnosis:    ICD-10-CM    1. Abdominal pain, generalized  R10.84    2. Chest pain, unspecified type  R07.9      Disposition:  Discharged to home.    Discharge Medications:  New Prescriptions    OXYCODONE (ROXICODONE) 5 MG TABLET    Take 1 tablet (5 mg) by mouth every 6 hours as needed for pain     Scribe Disclosure:  Nam WREN, am serving as a scribe at 5:01 PM on 11/28/2020 to document services personally performed by aNm Calero MD based on my observations and the provider's statements to me.      Nam Calero MD  11/28/20 5807

## 2020-11-28 NOTE — ED TRIAGE NOTES
"Abdominal pain since this am and radiates into upper chest.  \"all the way around\"  Recent T8 fracture on 10/30 but not wearing brace because \"it hurts\".  Also following up with the [facility] for gall bladder problems.  Pain 7/10.  Took 1/2 Oxycodone and 2 Tylenol about 1530 this afternoon.   Patient very [place].  Here with daughter.  "

## 2020-11-28 NOTE — ED AVS SNAPSHOT
[facility] Emergency Dept  [address]  [facility] [address]  Phone: [phone]  Fax: [phone]                                    Milo Serna   MRN: [id number]    Department: [facility] Emergency Dept   Date of Visit: 11/28/2020           After Visit Summary Signature Page    I have received my discharge instructions, and my questions have been answered. I have discussed any challenges I see with this plan with the nurse or doctor.    ..........................................................................................................................................  Patient/Patient Representative Signature      ..........................................................................................................................................  Patient Representative Print Name and Relationship to Patient    ..................................................               ................................................  Date                                   Time    ..........................................................................................................................................  Reviewed by Signature/Title    ...................................................              ..............................................  Date                                               Time          22EPIC Rev 08/18

## 2020-11-29 LAB — INTERPRETATION ECG - MUSE: NORMAL

## 2020-11-29 NOTE — ED NOTES
"Pt's daughter approached nurses station to notify RN that pt's pain is increasing and pt \"is very uncomfortable in there.\"    Provided pt with water per daughter's request. MD Calero okayed.  "

## 2020-11-30 NOTE — PROGRESS NOTES
Pre-Visit Planning :     Future Appointments   Date Time Provider Department Center   12/1/2020 11:05 AM Oscar Alves MD [facility]      Arrival Time for this Appointment: 10:45 AM      Appointment Notes for this encounter:    f/u from fall- pt states that he needs f/u xray on spine  scrubbed OK     Questionnaires Reviewed/Assigned:   N/A    Spoke to patient via phone. Are there any additional questions or concerns you'd like to review with your provider during your visit? N/A      Last OV with provider:  4/14/2020;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 (H); Chronic combined systolic and diastolic congestive heart failure (H); Chronic atrial fibrillation; HTN, goal below 150/90; History of prostate cancer; Epistaxis; Functional diarrhea          Hospital ER Visits:11/28/2020; [facility]; Nam Calero MD;   Abdominal pain, generalized; Chest pain, unspecified type    Is the visit preventive? NO                 Specialty Visits:  11/16/2020; [facility]; Dr. James Alvarez;  Gallbladder polyp                    Imaging and Lab Review:11/28/2020; US ABDOMEN LIMITED; INDICATION: Elevated bilirubin; IMPRESSION:1.  No solid masses are seen in the liver. Inhomogeneous appearance on CT compatible with phase of contrast enhancement.                       The 2 small hypodensities in the superior liver likely cysts are not seen on ultrasound. 2.  Changes of adenomyomatosis within the gallbladder fundus with overall mild gallbladder wall thickening. Small gallstone. Patient is not tender over the                  gallbladder.     Recent Procedures:  N/A    MEDS ;    Current Outpatient Medications   Medication     acetaminophen (TYLENOL) 500 MG tablet     apixaban ANTICOAGULANT (ELIQUIS) 2.5 MG tablet     Carboxymethylcellulose Sod PF 0.25 % SOLN     co-enzyme Q-10 100 MG CAPS     [facility] GLUCOSAMINE-CHONDROITIN PO     diclofenac (VOLTAREN) 1 % topical gel      doxycycline hyclate (VIBRA-TABS) 100 MG tablet     ferrous gluconate (FERGON) 324 (38 Fe) MG tablet     Glucosamine-Chondroitin (OSTEO BI-FLEX REGULAR STRENGTH) 250-200 MG TABS     lisinopril (ZESTRIL) 20 MG tablet     MELATONIN PO     Misc Natural Products (SAW PALMETTO) CAPS     multivitamin, therapeutic (THERA-VIT) TABS tablet     omeprazole (PRILOSEC) 40 MG DR capsule     oxyCODONE (ROXICODONE) 5 MG tablet     oxyCODONE (ROXICODONE) 5 MG tablet     potassium chloride ER (K-TAB) 20 MEQ CR tablet     senna-docusate (SENOKOT-S/PERICOLACE) 8.6-50 MG tablet     simvastatin (ZOCOR) 20 MG tablet     tamsulosin (FLOMAX) 0.4 MG capsule     torsemide (DEMADEX) 20 MG tablet     UREA 20 INTENSIVE HYDRATING 20 % external cream     No current facility-administered medications for this visit.       Is there anything on your medication list that needs to be updated?  Ask pt @ check-in     Health Maintenance Due   Topic Date Due     MEDICARE ANNUAL WELLNESS VISIT  04/20/2018     ANNUAL REVIEW OF HM ORDERS  09/06/2020     FALL RISK ASSESSMENT  09/06/2020     DTAP/TDAP/TD IMMUNIZATION (2 - Td) 09/22/2020     Preferred pharmacy:   [facility]: YES     Patient preferred phone number: [phone]    Edita Li, Registered Nurse, Patient Advocate [facility]   [phone]

## 2020-12-01 ENCOUNTER — OFFICE VISIT (OUTPATIENT)
Dept: FAMILY MEDICINE | Facility: CLINIC | Age: 80
End: 2020-12-01
Payer: MEDICARE

## 2020-12-01 ENCOUNTER — PATIENT OUTREACH (OUTPATIENT)
Dept: NURSING | Facility: CLINIC | Age: 80
End: 2020-12-01
Payer: MEDICARE

## 2020-12-01 ENCOUNTER — PATIENT OUTREACH (OUTPATIENT)
Dept: CARE COORDINATION | Facility: CLINIC | Age: 80
End: 2020-12-01

## 2020-12-01 VITALS
OXYGEN SATURATION: 98 % | WEIGHT: 170.7 LBS | HEART RATE: 68 BPM | TEMPERATURE: 98.3 F | DIASTOLIC BLOOD PRESSURE: 60 MMHG | BODY MASS INDEX: 26.74 KG/M2 | SYSTOLIC BLOOD PRESSURE: 116 MMHG | RESPIRATION RATE: 16 BRPM

## 2020-12-01 DIAGNOSIS — K82.4 GALLBLADDER POLYP: ICD-10-CM

## 2020-12-01 DIAGNOSIS — Z71.89 OTHER SPECIFIED COUNSELING: Primary | Chronic | ICD-10-CM

## 2020-12-01 DIAGNOSIS — I25.10 CORONARY ARTERY DISEASE INVOLVING NATIVE CORONARY ARTERY OF NATIVE HEART WITHOUT ANGINA PECTORIS: ICD-10-CM

## 2020-12-01 DIAGNOSIS — Z95.1 STATUS POST CORONARY ARTERY BYPASS GRAFT: ICD-10-CM

## 2020-12-01 DIAGNOSIS — S22.068D OTHER CLOSED FRACTURE OF EIGHTH THORACIC VERTEBRA WITH ROUTINE HEALING, SUBSEQUENT ENCOUNTER: Primary | ICD-10-CM

## 2020-12-01 DIAGNOSIS — Z23 ENCOUNTER FOR IMMUNIZATION: ICD-10-CM

## 2020-12-01 DIAGNOSIS — D64.9 ANEMIA, UNSPECIFIED TYPE: ICD-10-CM

## 2020-12-01 DIAGNOSIS — G47.33 OBSTRUCTIVE SLEEP APNEA SYNDROME: ICD-10-CM

## 2020-12-01 DIAGNOSIS — R73.09 ELEVATED GLUCOSE: ICD-10-CM

## 2020-12-01 DIAGNOSIS — E78.5 HYPERLIPIDEMIA LDL GOAL <70: ICD-10-CM

## 2020-12-01 DIAGNOSIS — Z23 NEED FOR TDAP VACCINATION: ICD-10-CM

## 2020-12-01 DIAGNOSIS — I48.20 CHRONIC ATRIAL FIBRILLATION (H): ICD-10-CM

## 2020-12-01 DIAGNOSIS — I10 BENIGN ESSENTIAL HYPERTENSION: ICD-10-CM

## 2020-12-01 DIAGNOSIS — R10.13 ABDOMINAL PAIN, EPIGASTRIC: ICD-10-CM

## 2020-12-01 DIAGNOSIS — I34.0 MODERATE MITRAL VALVE REGURGITATION: ICD-10-CM

## 2020-12-01 DIAGNOSIS — I50.42 CHRONIC COMBINED SYSTOLIC AND DIASTOLIC CONGESTIVE HEART FAILURE (H): ICD-10-CM

## 2020-12-01 LAB
BASOPHILS # BLD AUTO: 0 10E9/L (ref 0–0.2)
BASOPHILS NFR BLD AUTO: 0.7 %
DIFFERENTIAL METHOD BLD: ABNORMAL
EOSINOPHIL # BLD AUTO: 0.2 10E9/L (ref 0–0.7)
EOSINOPHIL NFR BLD AUTO: 3 %
ERYTHROCYTE [DISTWIDTH] IN BLOOD BY AUTOMATED COUNT: 14.4 % (ref 10–15)
HBA1C MFR BLD: 5.2 % (ref 0–5.6)
HCT VFR BLD AUTO: 34.9 % (ref 40–53)
HGB BLD-MCNC: 11.6 G/DL (ref 13.3–17.7)
LYMPHOCYTES # BLD AUTO: 0.5 10E9/L (ref 0.8–5.3)
LYMPHOCYTES NFR BLD AUTO: 9.6 %
MCH RBC QN AUTO: 32.6 PG (ref 26.5–33)
MCHC RBC AUTO-ENTMCNC: 33.2 G/DL (ref 31.5–36.5)
MCV RBC AUTO: 98 FL (ref 78–100)
MONOCYTES # BLD AUTO: 0.8 10E9/L (ref 0–1.3)
MONOCYTES NFR BLD AUTO: 14.2 %
NEUTROPHILS # BLD AUTO: 4.1 10E9/L (ref 1.6–8.3)
NEUTROPHILS NFR BLD AUTO: 72.5 %
PLATELET # BLD AUTO: 113 10E9/L (ref 150–450)
RBC # BLD AUTO: 3.56 10E12/L (ref 4.4–5.9)
WBC # BLD AUTO: 5.6 10E9/L (ref 4–11)

## 2020-12-01 PROCEDURE — 36415 COLL VENOUS BLD VENIPUNCTURE: CPT | Performed by: INTERNAL MEDICINE

## 2020-12-01 PROCEDURE — 90471 IMMUNIZATION ADMIN: CPT | Performed by: FAMILY MEDICINE

## 2020-12-01 PROCEDURE — 83036 HEMOGLOBIN GLYCOSYLATED A1C: CPT | Performed by: INTERNAL MEDICINE

## 2020-12-01 PROCEDURE — 85025 COMPLETE CBC W/AUTO DIFF WBC: CPT | Performed by: INTERNAL MEDICINE

## 2020-12-01 PROCEDURE — 99214 OFFICE O/P EST MOD 30 MIN: CPT | Mod: 25 | Performed by: FAMILY MEDICINE

## 2020-12-01 PROCEDURE — 82728 ASSAY OF FERRITIN: CPT | Performed by: INTERNAL MEDICINE

## 2020-12-01 PROCEDURE — 90715 TDAP VACCINE 7 YRS/> IM: CPT | Performed by: FAMILY MEDICINE

## 2020-12-01 NOTE — PROGRESS NOTES
Clinic Care Coordination Contact  UT/Voicemail  Left Voicemail     Clinical Data: Care Coordinator Outreach  Outreach attempted x 1.  Left message on patient's voicemail with call back information and requested return call.    Chart Review: Referral from home care discharge, in ED on 11/28 for abdominal and back pain. Discharged to home, follow up with PCP  today at 11am.     Plan: Care Coordinator will try to reach patient again in 1-2 business days.    ARNAUD Mcconnell, B.S. [facility] Care Coordination  [facility] Clinics:  [place], [place], [place], [place], and [place]  Phone: [phone]

## 2020-12-01 NOTE — PROGRESS NOTES
Subjective     Milo Serna is a 80 year old male who presents to clinic today for the following health issues:    HPI         ED/UC Followup:    Facility:  [facility]   Date of visit: 11/28/2020  Reason for visit: ABD PAIN   Current Status: none currently      Patient presented to ED with abdominal pain.  Exam was unrevealing and the pain resolved by the next day.  He also suffered a thoracic vertebral body fracture a month ago discovered by CT.  It had been recommended that he have a follow-up x-ray.  He is completely symptom-free    Past Medical History:   Diagnosis Date     Actinic keratosis 10/12/2019     Anemia, unspecified 11/8/2016     Atrial fibrillation (H) 5/27/2015     BPH (benign prostatic hyperplasia) 1/25/2012     CAD (coronary artery disease) 4/4/2012     Closed bimalleolar fracture of left ankle with routine healing 4/5/2019     Closed fracture of metatarsal bone 4/4/2012     Coronary atherosclerosis of unspecified type of vessel, native or graft [facility]    CAB 1996 following MI (at [facility])      Dilated aortic root (H) 5/26/2016     Drug-induced erectile dysfunction 10/2/2015     Elevated blood sugar 11/8/2016     Elevated PSA 9/19/2013     Epistaxis 9/9/2019     Essential hypertension with goal blood pressure less than 140/90 9/22/2016     Essential hypertension, benign      Fall 9/6/2019     Functional diarrhea 10/12/2019     Gallbladder polyp 5/1/2018     Gout      HAV (hallux abducto valgus) 4/4/2012     Hematoma 9/9/2019     Hernia, abdominal      History of prostate cancer 6/15/2016     HTN, goal below 150/90 4/20/2017     Hypokalemia 9/12/2019     Iron deficiency anemia secondary to inadequate dietary iron intake 11/15/2016     Low blood pressure reading 10/17/2016     Lumbago     had degendisc dz on MRI 7/07     Memory loss 6/18/2019     Microalbuminuria 6/4/2020    X1     Mixed hyperlipidemia      Mumps      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     Nonrheumatic  mitral valve regurgitation 10/28/2019     OA (osteoarthritis) 2012     Old myocardial infarction 2012     LENORA (obstructive sleep apnea) 10/28/2019     Other viral warts 2019     Palpitations      Pes planus 2012     Petechiae 2019     Prostate cancer (H) 2016     PUD (peptic ulcer disease) 2012     Pulmonary hypertension (H) 2017     Rhinophyma 2012     Rosacea 1/15/2008     Stress due to spouse with dementia 2019     Thrombocytopenia (H) 2017     Unspecified hyperplasia of prostate with urinary obstruction and other lower urinary tract symptoms (LUTS)     better on avodart & hytrin     Venous stasis 2012     Venous stasis 2012       Past Surgical History:   Procedure Laterality Date     CARDIAC SURGERY      bipass     CYSTOSCOPY FLEXIBLE, CYOABLATION PROSTATE N/A 2016    Procedure: CYSTOSCOPY FLEXIBLE, CRYOABLATION PROSTATE;  Surgeon: Krystian Owens MD;  Location: [facility] OR     GENITOURINARY SURGERY      prostate surgery      HERNIA REPAIR       LAPAROSCOPIC HERNIORRHAPHY INGUINAL Right 5/10/2017    Procedure: LAPAROSCOPIC HERNIORRHAPHY INGUINAL;  Laparoscopic preperitoneal repair of right inguinal hernia with placement of underlay mesh;  Surgeon: Enrico Bridges MD;  Location: [facility] OR     PROSTATE SURGERY       Z NONSPECIFIC PROCEDURE      CAB  [facility] NONSPECIFIC PROCEDURE      l inguinal hernia repair      ZZC NONSPECIFIC PROCEDURE      R hernia remote     ZZ NONSPECIFIC PROCEDURE      R ankle ORIF for fx     ZZ NONSPECIFIC PROCEDURE      nasal surgery      ZZ NONSPECIFIC PROCEDURE      R rotater repair (remote)      ZZ NONSPECIFIC PROCEDURE      appy 1966     [facility] NONSPECIFIC PROCEDURE      sinus surgery x 2     ZZC NONSPECIFIC PROCEDURE      L shoulder        Family History   Problem Relation Age of Onset     Cancer Mother         stomach cancer,  age 61     Heart Disease Father         MI,   age 71     Heart Disease Brother         stent placement, RA      Hypertension Brother      Heart Disease Sister         rythmn problems with heart, hypertension       Social History     Tobacco Use     Smoking status: Former Smoker     Smokeless tobacco: Never Used     Tobacco comment: quit 3/1974   Substance Use Topics     Alcohol use: Not Currently     Alcohol/week: 0.0 standard drinks     Comment: rare drink     His wife is in the hospital at this time with plans to dismiss today      Review of Systems   No back pain no dyspnea no abdominal pain no constipation no fever      Objective    /60 (BP Location: Right arm, Patient Position: Sitting, Cuff Size: Adult Regular)   Pulse 68   Temp 98.3  F (36.8  C) (Oral)   Resp 16   Wt 77.4 kg (170 lb 11.2 oz)   SpO2 98%   BMI 26.74 kg/m    Body mass index is 26.74 kg/m .  Physical Exam               Assessment & Plan   Problem List Items Addressed This Visit     CAD (coronary artery disease)      Asymptomatic.  Cardiology.  See last note         Chronic atrial fibrillation (H)     Low-dose apixaban         Relevant Orders    Ferritin (Completed)    Anemia     Iron deficient remotely, now on maintenance iron.  His anemia has been stable.  He would like his iron checked  Hemoglobin   Date Value Ref Range Status   2020 11.6 (L) 13.3 - 17.7 g/dL Final     Comment:     Results confirmed by repeat test   ]           Relevant Orders    Ferritin (Completed)    Gallbladder polyp     General surgery recommends laparoscopic cholecystectomy.  He has questions.  Answered         Chronic combined systolic and diastolic congestive heart failure (H)     Clinically stable         T8 vertebral fracture (H) - Primary     Asymptomatic.  I see no need for further x-ray imaging         Abdominal pain, epigastric     Resolved.  ED evaluation reassuring.  May have been from gallbladder           Other Visit Diagnoses     Encounter for immunization        Status post  "coronary artery bypass graft        Moderate mitral valve regurgitation        Benign essential hypertension        Hyperlipidemia LDL goal <70        Obstructive sleep apnea syndrome        Elevated glucose        Relevant Orders    Hemoglobin A1c (Completed)    Need for Tdap vaccination        Relevant Orders    TDAP VACCINE (Adacel, Boostrix)  [[id number]] (Completed)             BMI:   Estimated body mass index is 26.74 kg/m  as calculated from the following:    Height as of 11/16/20: 1.702 m (5' 7\").    Weight as of this encounter: 77.4 kg (170 lb 11.2 oz).   Weight management plan: maintain             Return in about 6 months (around 6/1/2021) for recheck.    Oscar Alves MD  [facility]    "

## 2020-12-01 NOTE — NURSING NOTE
"Chief Complaint   Patient presents with     ER F/U     Initial /60 (BP Location: Right arm, Patient Position: Sitting, Cuff Size: Adult Regular)   Pulse 68   Temp 98.3  F (36.8  C) (Oral)   Resp 16   Wt 77.4 kg (170 lb 11.2 oz)   SpO2 98%   BMI 26.74 kg/m   Estimated body mass index is 26.74 kg/m  as calculated from the following:    Height as of 11/16/20: 1.702 m (5' 7\").    Weight as of this encounter: 77.4 kg (170 lb 11.2 oz).  BP completed using cuff size regular ADULT regular right arm    Lisa Magill, CMA    "

## 2020-12-01 NOTE — PROGRESS NOTES
Clinic Care Coordination Contact  Community Health Worker Initial Outreach  Spoke with patient     Chart Review: Referral from home care discharge, in ED on 11/28 for abdominal and back pain. Discharged to home, follow up with PCP  today at 11am.    CHW Initial Information Gathering:  Referral Source: IP Report  Preferred Hospital: [facility]  [phone]  Current living arrangement:: I live in a private home with spouse  Type of residence:: [facility] home  Community Resources: None  Supplies used at home:: None  Equipment Currently Used at Home: walker, rolling  Informal Support system:: Spouse  No PCP office visit in Past Year: No  Transportation means:: Accessible car  CHW Additional Questions  If ED/Hospital discharge, follow-up appointment scheduled as recommended?: Yes  Medication changes made following ED/Hospital discharge?: N/A  MyChart active?: Yes  Patient sent Social Determinants of Health questionnaire?: Yes    CHW introduced self and role with CC  Patient states that he is not doing well because his wife is in the hospital with a foot problem. He states it is not broken but she is in a lot of pain. He just got off the phone with her and she was crying and wanting to come home, patient is upset that he isn't able to see her. Patient discussed no issues or pain of his own.   CHW acknowledged the hardships during this time and provided extra support for patient.   CHW reminded patient of appointment with PCP today.   Patient has no outstanding questions or concerns, he would like a follow up after wife is discharged from hospital but he is not sure when this will be.   CHW scheduled CC for one week to assess their needs and provide additional ongoing support for patient and his wife.     Patient accepts CC: Yes. Patient scheduled for assessment with [facility] on 12/09 at 1:00pm. Patient noted desire to discuss coordination of care, possible support for wife.     ARNAUD Mcconnell,  [facility]  Clinic Care Coordination  [facility] Clinics:  [place], [place], [place], [place], and [place]  Phone: [phone]

## 2020-12-02 ENCOUNTER — CARE COORDINATION (OUTPATIENT)
Dept: CARDIOLOGY | Facility: CLINIC | Age: 80
End: 2020-12-02

## 2020-12-02 ENCOUNTER — OFFICE VISIT (OUTPATIENT)
Dept: FAMILY MEDICINE | Facility: CLINIC | Age: 80
End: 2020-12-02
Payer: MEDICARE

## 2020-12-02 DIAGNOSIS — Z20.822 EXPOSURE TO COVID-19 VIRUS: Primary | ICD-10-CM

## 2020-12-02 PROBLEM — S22.069A: Status: RESOLVED | Noted: 2020-10-30 | Resolved: 2020-12-02

## 2020-12-02 PROBLEM — R10.13 ABDOMINAL PAIN, EPIGASTRIC: Status: ACTIVE | Noted: 2020-12-02

## 2020-12-02 LAB — FERRITIN SERPL-MCNC: 226 NG/ML (ref 26–388)

## 2020-12-02 PROCEDURE — 99213 OFFICE O/P EST LOW 20 MIN: CPT | Performed by: FAMILY MEDICINE

## 2020-12-02 PROCEDURE — U0003 INFECTIOUS AGENT DETECTION BY NUCLEIC ACID (DNA OR RNA); SEVERE ACUTE RESPIRATORY SYNDROME CORONAVIRUS 2 (SARS-COV-2) (CORONAVIRUS DISEASE [COVID-19]), AMPLIFIED PROBE TECHNIQUE, MAKING USE OF HIGH THROUGHPUT TECHNOLOGIES AS DESCRIBED BY CMS-2020-01-R: HCPCS | Performed by: FAMILY MEDICINE

## 2020-12-02 NOTE — ASSESSMENT & PLAN NOTE
Iron deficient remotely, now on maintenance iron.  His anemia has been stable.  He would like his iron checked  Hemoglobin   Date Value Ref Range Status   12/01/2020 11.6 (L) 13.3 - 17.7 g/dL Final     Comment:     Results confirmed by repeat test   ]

## 2020-12-02 NOTE — PROGRESS NOTES
Spouse in hospital positive Covid today.  He is asymptomatic.  Test  Problem List Items Addressed This Visit     Exposure to COVID-19 virus - Primary     Spouse in hospital, positive today.  He is asymptomatic.  Test         Relevant Orders    Asymptomatic COVID-19 Virus (Coronavirus) by PCR (Completed)          Oscar Alves MD

## 2020-12-02 NOTE — PROGRESS NOTES
CBC w/Diff released to DR Conley inbox - due next year with followup. Last seen by Dr Conley 6/2020 with plan for Echo, labs and return visit in 1 year.     Seen by Primary care yesterday post Emergency room visit for abdominal pain.  Update to Dr Conley.  Patsy Donato RN 12/02/2020 12:25 PM    ===============    Sixto Conley MD You 12/3/20 (2:18 PM)       1 year visit is fine.  We will see in 6 months

## 2020-12-03 ENCOUNTER — TELEPHONE (OUTPATIENT)
Dept: SURGERY | Facility: CLINIC | Age: 80
End: 2020-12-03

## 2020-12-03 ENCOUNTER — PATIENT OUTREACH (OUTPATIENT)
Dept: SURGERY | Facility: CLINIC | Age: 80
End: 2020-12-03

## 2020-12-03 LAB
SARS-COV-2 RNA SPEC QL NAA+PROBE: NOT DETECTED
SPECIMEN SOURCE: NORMAL

## 2020-12-03 NOTE — PROGRESS NOTES
Per Dr. Alvarez, ok to go ahead and schedule patient for gallbladder surgery. Message sent to surgery scheduler to call Stacia (daughter) to help arrange.

## 2020-12-03 NOTE — TELEPHONE ENCOUNTER
----- Message from Paula Perera sent at 12/3/2020  4:59 PM CST -----  Surgery tentatively 12/28 with  at [facility] virtual on 12/15 at 1 PM    He is testing for COVID and so we will wait to schedule COVID based on if he is negative or positive. His wife is positive.    Daughter will leave me a VM to confirm if this date works.  ----- Message -----  From: Jackson Mistry RN  Sent: 12/3/2020   3:25 PM CST  To: Paula Mitchell-    This is another patient to schedule with SHAHEED the week of 12/28-1/1. His case request is in and he will need to see PAC.    Is this a multi-surgeon case?  No  Urgency of surgery:  Patient choice/next available  Location:  [address] [place] [address]  Surgeon procedure time in minutes:  120  Length of stay:  N/A  Laterality:  na  Type of anesthesia:  General  H&P to be completed by:  PAC   needed:  No  Post op appointment:  Protocol  Additional Comments:      Please call patients daughter to help schedule- Stacia.    Thanks,  Jackson

## 2020-12-04 NOTE — TELEPHONE ENCOUNTER
FUTURE VISIT INFORMATION      SURGERY INFORMATION:    Date: 20    Location: [facility] OR    Surgeon:  James Alvarez MD    Anesthesia Type:  general    Procedure: CHOLECYSTECTOMY, LAPAROSCOPIC    Consult: virtual visit     RECORDS REQUESTED FROM:       Primary Care Provider:    Oscar Alves MD   - [facility]    Pertinent Medical History: LENORA, CAD, Chronic atrial fibrillation, Nonrheumatic mitral valve regurgitation    Most recent EKG+ Tracin20    Most recent ECHO: 20    Most recent Cardiac Stress Test: 2008    Most recent PFT's: 18    Most recent Sleep Study:  18

## 2020-12-06 DIAGNOSIS — Z11.59 ENCOUNTER FOR SCREENING FOR OTHER VIRAL DISEASES: Primary | ICD-10-CM

## 2020-12-08 NOTE — TELEPHONE ENCOUNTER
LVM for Stacia inquiring if Skip tested positive or negative for COVID. Noted if negative we need to schedule a COVID test for surgery. Also inquiring if surgical plan works.    Provided direct number and requested that she leave a voicemail.

## 2020-12-10 ENCOUNTER — PATIENT OUTREACH (OUTPATIENT)
Dept: NURSING | Facility: CLINIC | Age: 80
End: 2020-12-10
Payer: MEDICARE

## 2020-12-10 SDOH — HEALTH STABILITY: MENTAL HEALTH
STRESS IS WHEN SOMEONE FEELS TENSE, NERVOUS, ANXIOUS, OR CAN'T SLEEP AT NIGHT BECAUSE THEIR MIND IS TROUBLED. HOW STRESSED ARE YOU?: NOT AT ALL

## 2020-12-10 SDOH — SOCIAL STABILITY: SOCIAL NETWORK
IN A TYPICAL WEEK, HOW MANY TIMES DO YOU TALK ON THE PHONE WITH FAMILY, FRIENDS, OR NEIGHBORS?: MORE THAN THREE TIMES A WEEK

## 2020-12-10 SDOH — ECONOMIC STABILITY: FOOD INSECURITY: WITHIN THE PAST 12 MONTHS, THE FOOD YOU BOUGHT JUST DIDN'T LAST AND YOU DIDN'T HAVE MONEY TO GET MORE.: NEVER TRUE

## 2020-12-10 SDOH — ECONOMIC STABILITY: INCOME INSECURITY: HOW HARD IS IT FOR YOU TO PAY FOR THE VERY BASICS LIKE FOOD, HOUSING, MEDICAL CARE, AND HEATING?: NOT HARD AT ALL

## 2020-12-10 SDOH — ECONOMIC STABILITY: FOOD INSECURITY: WITHIN THE PAST 12 MONTHS, YOU WORRIED THAT YOUR FOOD WOULD RUN OUT BEFORE YOU GOT MONEY TO BUY MORE.: NEVER TRUE

## 2020-12-10 SDOH — ECONOMIC STABILITY: TRANSPORTATION INSECURITY
IN THE PAST 12 MONTHS, HAS THE LACK OF TRANSPORTATION KEPT YOU FROM MEDICAL APPOINTMENTS OR FROM GETTING MEDICATIONS?: NO

## 2020-12-10 SDOH — ECONOMIC STABILITY: TRANSPORTATION INSECURITY
IN THE PAST 12 MONTHS, HAS LACK OF TRANSPORTATION KEPT YOU FROM MEETINGS, WORK, OR FROM GETTING THINGS NEEDED FOR DAILY LIVING?: NO

## 2020-12-10 SDOH — HEALTH STABILITY: PHYSICAL HEALTH: ON AVERAGE, HOW MANY DAYS PER WEEK DO YOU ENGAGE IN MODERATE TO STRENUOUS EXERCISE (LIKE A BRISK WALK)?: 4 DAYS

## 2020-12-10 ASSESSMENT — ACTIVITIES OF DAILY LIVING (ADL): DEPENDENT_IADLS:: INDEPENDENT

## 2020-12-10 NOTE — TELEPHONE ENCOUNTER
Surgery is scheduled with Dr. Alvarez on 12/28 at [place].  Scheduled per daughter, Stacia.    H&P: to be completed by PAC.  PAC: 12/15, virtual     Additional appointments:   NO ADDITIONAL APPOINTMENTS NEEDED AT THIS TIME    COVID-19 test: 12/24 at [facility]   Post-op: not needed at this time.    The RN completed the education regarding the surgery.     Patient will receive surgery arrival and start time from PAC.    The surgery packet was sent via US mail.    Patient will complete COVID-19 test that was scheduled by surgical coordinator 2-4 days prior to surgery.     LVM for Stacia regarding everything above. She had LVM for me confirming everything but requesting COVID be at [facility]. I explained that central scheduling must have called Skip and scheduled in [place] but that I moved it to [facility] on 12/24 at 11:30 AM.     She has my number and will call if there are any further questions.

## 2020-12-11 NOTE — PROGRESS NOTES
"Clinic Care Coordination Contact    Late entry for 12/10/20  Cinic Care Coordination Contact  OUTREACH    Referral Information:  Referral Source: IP Report         No chief complaint on file.       Universal Utilization: 21% Admission or ED Risk  Clinic Utilization  No PCP office visit in Past Year: No  Utilization    Last refreshed: 12/11/2020  8:28 AM: Hospital Admissions 1           Last refreshed: 12/11/2020  8:28 AM: ED Visits 3           Last refreshed: 12/11/2020  8:28 AM: No Show Count (past year) 1              Current as of: 12/11/2020  8:28 AM              Clinical Concerns:  Current Medical Concerns:   History of recent T8 vertebral fracture, atrial fibrillation, CAD, hypertension, MI,  who presented to ED on 11/28/20  for evaluation of abdominal pain that radiates up into his chest and up around his flanks into his back.    Diagnosis:      ICD-10-CM     1. Abdominal pain, generalized  R10.84     2. Chest pain, unspecified type  R07.9       Past Medical History  T8 vertebral fracture  Venous stasis  Hyperplasia of prostate  Thrombocytopenia  Rosacea  Rhinophyma  Pulmonary hypertension   PUD  Prostate cancer   Petechiae  Pes planus  LENORA  Myocardial infarction  Osteoarthritis  Non-rheumatic mitral valve regurgitation  Hyperlipidemia   Memory loss  Lumbago  Anemia  Hypokalemia  Abdominal hernia  Hallux abducto valgus  Gout  Gallbladder polyp  Hypertension   Dilated aortic root  Coronary atherosclerosis of unspecified type of vessel  CAD  BPH  Atrial fibrillation  Anemia  Actinic keratosis     Skip reports that he is doing well.  He is very independent.  He will be having gall bladder surgery soon.  He and his wife plan to go to [place] in January as they have enjoyed being \"snowbirds\" the last five years.       Current Behavioral Concerns: NA    Education Provided to patient: CC role, encouraged him to schedule his annual wellness visit.      Health Maintenance Reviewed: Due/Overdue   MEDICARE ANNUAL " WELLNESS VISIT    Clinical Pathway: None    Medication Management:  Manages independently,  Uses a pill box.    Functional Status:  Dependent ADLs:: Independent  Dependent IADLs:: Independent  Bed or wheelchair confined:: No  Mobility Status: Independent  Fallen 2 or more times in the past year?: Yes    Living Situation:  Current living arrangement:: I live in a private home with spouse  Type of residence:: [facility]    Lifestyle & Psychosocial Needs:  Lifestyle     Physical activity     Days per week: 4 days     Minutes per session: Not on file     Stress: Not at all     Social Needs     Financial resource strain: Not hard at all     Food insecurity     Worry: Never true     Inability: Never true     Transportation needs     Medical: No     Non-medical: No        Transportation means:: Accessible car     Informal Support system:: Spouse   Socioeconomic History     Marital status:      Spouse name: Not on file     Number of children: Not on file     Years of education: Not on file     Highest education level: Not on file   Relationships     Social connections     Talks on phone: More than three times a week     Gets together: Not on file     Attends Jew service: Not on file     Active member of club or organization: Not on file     Attends meetings of clubs or organizations: Not on file     Relationship status: Not on file     Intimate partner violence     Fear of current or ex partner: Not on file     Emotionally abused: Not on file     Physically abused: Not on file     Forced sexual activity: Not on file     Tobacco Use     Smoking status: Former Smoker     Smokeless tobacco: Never Used     Tobacco comment: quit 3/1974   Substance and Sexual Activity     Alcohol use: Not Currently     Alcohol/week: 0.0 standard drinks     Comment: rare drink     Drug use: No     Sexual activity: Not Currently     Partners: Female       Resources and Interventions:  Current Resources:      Community Resources:  None  Supplies used at home:: None  Equipment Currently Used at Home: none  Employment Status: retired)   )    Advance Care Plan/Directive  Advanced Care Plans/Directives on file:: No  Advanced Care Plan/Directive Status: Declined Further Information          Goals: NA      Patient/Caregiver understanding: Yes       Future Appointments              In 4 days Rebeka Castelan APRN CNS [facility] Assessment Center [place]    In 1 week [facility] PHARMACY LAB [facility], [facility]          Plan: Skip plans to schedule his annual wellness visit.  [facility] sent him her contact information if he needs CC support in the future.  No further outreaches will be made at this time.     CARLOS Gutierrez   Care Coordination Team  [phone]

## 2020-12-14 NOTE — PHARMACY - PREOPERATIVE ASSESSMENT CENTER
Anticoagulation Note - Preoperative Assessment Center (PAC) Pharmacist     Patient was interviewed via phone call on December 14, 2020 prior to PAC clinic appointment. The purpose of this note is to document the perioperative anticoagulation plan outlined by the providers caring for Milo Serna.     Current Regimen  Anticoagulation Regimen as of December 14, 2020: apixaban (ELIQUIS) 5 mg PO BID   Indication: Afib (denies any history of clotting or stroke)  Prescriber:  Dr. Conley   Expected Duration of therapy: indefinite  Current medications that may interact with this include: none    Creatinine   Date Value Ref Range Status   11/28/2020 0.86 0.66 - 1.25 mg/dL Final   CrCl ~67 mL/min    Note: patient on lower dose due to issues with epistaxis at dose of 5 mg BID.  H/o thrombocytopenia.  Has held x5 days w/o issue prior to previous procedure in 2017 (inguinal hernia repair).     Perioperative plan  Milo Serna is scheduled for laparoscopic cholecystectomy on 12/28/20 @ 7:30 AM with Dr. Alvarez and the perioperative anticoagulation plan outlined by PAC staff is hold apixaban x2 days (last dose on 12/25/20 PM).     Resumption of anticoagulation after procedure will be based on surgery team assessment of bleeding risks and complications.  This plan may require re-assessment and modification by his primary team in the perioperative setting depending on patients clinical situation.        Mayo Summers RPH  December 14, 2020  2:43 PM

## 2020-12-15 ENCOUNTER — ANESTHESIA EVENT (OUTPATIENT)
Dept: SURGERY | Facility: CLINIC | Age: 80
End: 2020-12-15
Payer: MEDICARE

## 2020-12-15 ENCOUNTER — PRE VISIT (OUTPATIENT)
Dept: SURGERY | Facility: CLINIC | Age: 80
End: 2020-12-15

## 2020-12-15 ENCOUNTER — VIRTUAL VISIT (OUTPATIENT)
Dept: SURGERY | Facility: CLINIC | Age: 80
End: 2020-12-15
Payer: MEDICARE

## 2020-12-15 DIAGNOSIS — Z01.818 PREOP EXAMINATION: Primary | ICD-10-CM

## 2020-12-15 PROCEDURE — 99202 OFFICE O/P NEW SF 15 MIN: CPT | Mod: 95 | Performed by: CLINICAL NURSE SPECIALIST

## 2020-12-15 ASSESSMENT — PAIN SCALES - GENERAL: PAINLEVEL: NO PAIN (0)

## 2020-12-15 ASSESSMENT — LIFESTYLE VARIABLES: TOBACCO_USE: 1

## 2020-12-15 ASSESSMENT — ENCOUNTER SYMPTOMS: DYSRHYTHMIAS: 1

## 2020-12-15 NOTE — H&P
Pre-Operative H & P     CC:  Preoperative exam to assess for increased cardiopulmonary risk while undergoing surgery and anesthesia.    Date of Encounter: 12/15/2020  Primary Care Physician:  Oscar Alves  Reason for visit: Gallbladder polyp [K82.4]  ZOIE Pedraza (Skip) MACIE Serna is a 80 year old male who presents for pre-operative H & P in preparation for CHOLECYSTECTOMY, LAPAROSCOPIC with Dr. Alvarez on 20 at [facility]. History is obtained from the patient and medical records.     At the beginning of this visit patient ID was confirmed using name and .     Video-Visit Details    Type of service:  Video Visit    Patient verbally consented to video service today: YES      Video Start Time: 12:50pm   Video End Time (time video stopped): 1:00pm    Originating Location (pt. Location): Home    Distant Location (provider location):  [facility] PREOPERATIVE [facility]    Mode of Communication:  Video Conference via Doximity    Patient with history of abdominal pain requiring admission. His imaging revealed findings consistent with a benign tumor in the gallbladder however malignancy could not be completely excluded. He was referred to Dr. Alvarez and was counseled for above procedure.     His history is otherwise complex with HLD, HTN, CAD, s/p MI, and CAB X 1 in , chronic atrial fibrillation, chronically anti coagulated with low dose apixaban due to epistaxis, ischemic cardiomyopathy, thrombocytopenia, anemia, GERD, BPH, recent T8 vertebral fracture with fall. For his cardiac issues, he is followed by Dr. Conley, last seen in 2020 with updated echo.     Today patient denies fever, cough, shortness of breath, chest pain, irregular HR, or ankle edema. He is not having pain.       Past Medical History  Past Medical History:   Diagnosis Date     Actinic keratosis 10/12/2019     Anemia, unspecified 2016     Atrial fibrillation (H) 2015     BPH  (benign prostatic hyperplasia) 01/25/2012     CAD (coronary artery disease) 04/04/2012     Closed bimalleolar fracture of left ankle with routine healing 04/05/2019     Closed fracture of metatarsal bone 04/04/2012     Coronary atherosclerosis of unspecified type of vessel, native or graft [facility] 2006    CAB 1996 following MI (at [facility])      Dilated aortic root (H) 05/26/2016     Drug-induced erectile dysfunction 10/02/2015     Elevated blood sugar 11/08/2016     Elevated PSA 09/19/2013     Epistaxis 09/09/2019     Essential hypertension with goal blood pressure less than 140/90 09/22/2016     Fall 09/06/2019     Functional diarrhea 10/12/2019     Gallbladder polyp 05/01/2018     Gout      HAV (hallux abducto valgus) 04/04/2012     Hematoma 09/09/2019     Hernia, abdominal      History of prostate cancer 06/15/2016     HTN, goal below 150/90 04/20/2017     Hypokalemia 09/12/2019     Iron deficiency anemia secondary to inadequate dietary iron intake 11/15/2016     Low blood pressure reading 10/17/2016     Lumbago     had degendisc dz on MRI 7/07     Memory loss 06/18/2019     Microalbuminuria 06/04/2020    X1     Mixed hyperlipidemia      Mumps      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     Nonrheumatic mitral valve regurgitation 10/28/2019     OA (osteoarthritis) 04/04/2012     Old myocardial infarction 04/04/2012     LENORA (obstructive sleep apnea) 10/28/2019     Other viral warts 09/06/2019     Palpitations      Pes planus 04/04/2012     Petechiae 06/18/2019     Prostate cancer (H) 05/26/2016     PUD (peptic ulcer disease) 04/04/2012     Pulmonary hypertension (H) 09/25/2017     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     Rosacea 01/15/2008     Stress due to spouse with dementia 06/18/2019     Thrombocytopenia (H) 04/21/2017     Unspecified hyperplasia of prostate with urinary obstruction and other lower urinary tract symptoms (LUTS)     better on avodart & hytrin     Venous stasis 04/04/2012       Past Surgical History  Past  Surgical History:   Procedure Laterality Date     CARDIAC SURGERY      CAB     CYSTOSCOPY FLEXIBLE, CYOABLATION PROSTATE N/A 11/09/2016    Procedure: CYSTOSCOPY FLEXIBLE, CRYOABLATION PROSTATE;  Surgeon: Krystian Owens MD;  Location: [facility] OR     GENITOURINARY SURGERY      prostate surgery      HERNIA REPAIR       LAPAROSCOPIC HERNIORRHAPHY INGUINAL Right 05/10/2017    Procedure: LAPAROSCOPIC HERNIORRHAPHY INGUINAL;  Laparoscopic preperitoneal repair of right inguinal hernia with placement of underlay mesh;  Surgeon: Enrico Bridges MD;  Location: [facility] OR     PROSTATE SURGERY       [facility] NONSPECIFIC PROCEDURE      CAB 1996 [place]     ZZ NONSPECIFIC PROCEDURE  02/2007    l inguinal hernia repair      Z NONSPECIFIC PROCEDURE      R hernia remote     Z NONSPECIFIC PROCEDURE  2000    R ankle ORIF for fx     Z NONSPECIFIC PROCEDURE  2005    nasal surgery      [facility] NONSPECIFIC PROCEDURE      R rotater repair (remote)      [facility] NONSPECIFIC PROCEDURE      appy 1966     [facility] NONSPECIFIC PROCEDURE      sinus surgery x 2     Z NONSPECIFIC PROCEDURE      L shoulder 6/09       Hx of Blood transfusions/reactions: Unknown.      Hx of abnormal bleeding or anti-platelet use: Anticoagulated with apixaban.    Menstrual history: No LMP for male patient.    Steroid use in the last year: Denies.     Personal or FH with difficulty with Anesthesia:  Denies.     Prior to Admission Medications  Current Outpatient Medications   Medication Sig Dispense Refill     acetaminophen (TYLENOL) 500 MG tablet Take 500-1,000 mg by mouth every 8 hours as needed       co-enzyme Q-10 100 MG CAPS Take 1 capsule (100 mg) by mouth daily (Patient taking differently: Take 100 mg by mouth every morning ) 180 capsule 3     [facility] GLUCOSAMINE-CHONDROITIN PO Take 1 tablet by mouth every morning        MELATONIN PO Take 5 mg by mouth At Bedtime        apixaban ANTICOAGULANT (ELIQUIS) 2.5 MG tablet Take 1 tablet (2.5 mg) by mouth 2 times daily 180  tablet 1     Carboxymethylcellulose Sod PF 0.25 % SOLN INSTILL ONE DROP IN BOTH EYES FIVE TIMES A DAY AS NEEDED       diclofenac (VOLTAREN) 1 % topical gel Apply 4 gramsto area qid prn 100 g 11     doxycycline hyclate (VIBRA-TABS) 100 MG tablet Take 100 mg by mouth daily        ferrous gluconate (FERGON) 324 (38 Fe) MG tablet Take 1 tablet (324 mg) by mouth daily (with breakfast) 90 tablet 3     lisinopril (ZESTRIL) 20 MG tablet TAKE 1 TABLET DAILY 90 tablet 0     multivitamin, therapeutic (THERA-VIT) TABS tablet Take 1 tablet by mouth daily       omeprazole (PRILOSEC) 40 MG DR capsule TAKE 1 CAPSULE DAILY 90 capsule 0     potassium chloride ER (K-TAB) 20 MEQ CR tablet Take 1 tablet (20 mEq) by mouth 2 times daily 180 tablet 0     senna-docusate (SENOKOT-S/PERICOLACE) 8.6-50 MG tablet Take 1 tablet by mouth 2 times daily as needed for constipation 20 tablet 0     simvastatin (ZOCOR) 20 MG tablet Take 1 tablet (20 mg) by mouth At Bedtime 90 tablet 1     tamsulosin (FLOMAX) 0.4 MG capsule TAKE 1 CAPSULE DAILY 90 capsule 2     torsemide (DEMADEX) 20 MG tablet TAKE 1 TABLET DAILY WITH BREAKFAST 90 tablet 0     UREA 20 INTENSIVE HYDRATING 20 % external cream Apply topically as needed Apply to right foot daily 120 g 11       Allergies  No Known Allergies    Social History  Social History     Socioeconomic History     Marital status:      Spouse name: Not on file     Number of children: Not on file     Years of education: Not on file     Highest education level: Not on file   Occupational History     Not on file   Social Needs     Financial resource strain: Not hard at all     Food insecurity     Worry: Never true     Inability: Never true     Transportation needs     Medical: No     Non-medical: No   Tobacco Use     Smoking status: Former Smoker     Smokeless tobacco: Never Used     Tobacco comment: quit 3/1974   Substance and Sexual Activity     Alcohol use: Not Currently     Alcohol/week: 0.0 standard drinks      Comment: rare drink     Drug use: No     Sexual activity: Not Currently     Partners: Female   Lifestyle     Physical activity     Days per week: 4 days     Minutes per session: Not on file     Stress: Not at all   Relationships     Social connections     Talks on phone: More than three times a week     Gets together: Not on file     Attends [employer] service: Not on file     Active member of club or organization: Not on file     Attends meetings of clubs or organizations: Not on file     Relationship status: Not on file     Intimate partner violence     Fear of current or ex partner: Not on file     Emotionally abused: Not on file     Physically abused: Not on file     Forced sexual activity: Not on file   Other Topics Concern     Parent/sibling w/ CABG, MI or angioplasty before 65F 55M? Not Asked      Service Not Asked     Blood Transfusions Not Asked     Caffeine Concern Yes     Comment: 1 cups of coffee and soda per day     Occupational Exposure Not Asked     Hobby Hazards Not Asked     Sleep Concern Not Asked     Stress Concern Not Asked     Weight Concern Not Asked     Special Diet No     Back Care Not Asked     Exercise Yes     Comment: walking     Bike Helmet Not Asked     Seat Belt Yes     Self-Exams Not Asked   Social History Narrative     Not on file       Family History  Family History   Problem Relation Age of Onset     Cancer Mother         stomach cancer,  age 61     Heart Disease Father         MI,  age 71     Heart Disease Brother         stent placement, RA      Hypertension Brother      Heart Disease Sister         rhythm problems with heart, hypertension     Personally reviewed  LABS:  CBC:   Lab Results   Component Value Date    WBC 5.6 2020    WBC 7.4 2020    HGB 11.6 (L) 2020    HGB 12.1 (L) 2020    HCT 34.9 (L) 2020    HCT 37.2 (L) 2020     (L) 2020     (L) 2020     BMP:   Lab Results   Component Value Date  "    11/28/2020     10/31/2020    POTASSIUM 4.0 11/28/2020    POTASSIUM 4.0 10/31/2020    CHLORIDE 100 11/28/2020    CHLORIDE 103 10/31/2020    CO2 29 11/28/2020    CO2 27 10/31/2020    BUN 18 11/28/2020    BUN 13 10/31/2020    CR 0.86 11/28/2020    CR 0.74 10/31/2020     (H) 11/28/2020     (H) 10/31/2020     COAGS:   Lab Results   Component Value Date    PTT 38 (H) 06/18/2019    INR 1.39 (H) 06/18/2019     POC:   Lab Results   Component Value Date    BGM 76 11/10/2016     OTHER:   Lab Results   Component Value Date    PH 7.38 02/20/2007    A1C 5.2 12/01/2020    ANG 9.3 11/28/2020    MAG 1.9 11/15/2016    ALBUMIN 3.9 11/28/2020    PROTTOTAL 8.2 11/28/2020    ALT 21 11/28/2020    AST 55 (H) 11/28/2020     (H) 08/02/2018    ALKPHOS 244 (H) 11/28/2020    BILITOTAL 2.6 (H) 11/28/2020    LIPASE 156 11/28/2020    TSH 2.02 09/11/2019        Preop Vitals    BP Readings from Last 3 Encounters:   12/01/20 116/60   11/28/20 (!) 141/69   11/01/20 129/63    Pulse Readings from Last 3 Encounters:   12/01/20 68   11/28/20 54   11/01/20 97      Resp Readings from Last 3 Encounters:   12/01/20 16   11/28/20 16   11/01/20 18    SpO2 Readings from Last 3 Encounters:   12/01/20 98%   11/28/20 100%   11/01/20 95%      Temp Readings from Last 1 Encounters:   12/01/20 98.3  F (36.8  C) (Oral)    Ht Readings from Last 1 Encounters:   11/16/20 1.702 m (5' 7\")      Wt Readings from Last 1 Encounters:   12/01/20 77.4 kg (170 lb 11.2 oz)    Estimated body mass index is 26.74 kg/m  as calculated from the following:    Height as of 11/16/20: 1.702 m (5' 7\").    Weight as of 12/1/20: 77.4 kg (170 lb 11.2 oz).     ROS/MED HX  The complete review of systems is negative other than noted in the HPI or here.   ENT/Pulmonary:     (+)sleep apnea, tobacco use, Past use doesn't use CPAP , . .    Neurologic:  - neg neurologic ROS     Cardiovascular: Comment: ascending aorta dilation    (+) Dyslipidemia, " hypertension-Peripheral Vascular Disease-- Other, CAD, -CABG-date: 1996 X 1, . Taking blood thinners Pt has received instructions: Instructions Given to patient: Will hold apixaban for two days prior to surgery. CHF etiology: ischemic Last EF: 40-45% date: 6/25/20 . . :. dysrhythmias a-fib, Irregular Heartbeat/Palpitations, valvular problems/murmurs type: AI and MR mild MR, AI, moderate MN:. pulmonary hypertension, Previous cardiac testing Echodate:6/25/20results:date: results:ECG reviewed date:11/28/20 results:Atrial fibrillation with PVC or aberrantly conducted complexes, inc LBBB, ST and T wave abnormality date: results:          METS/Exercise Tolerance:  3 - Able to walk 1-2 blocks without stopping   Hematologic:    (+) Anemia, Other Hematologic Disorder-chronic thrombocytopenia      Musculoskeletal: Comment: Fall 5 weeks ago with T8 vertebral fracture        GI/Hepatic:     (+) GERD Asymptomatic on medication,       Renal/Genitourinary:     (+) BPH,       Endo:  - neg endo ROS       Psychiatric:  - neg psychiatric ROS       Infectious Disease:  - neg infectious disease ROS       Malignancy:   (+) Malignancy History of Prostate  Prostate CA Remission status post Surgery,         Other:    (+) No chance of pregnancy C-spine cleared: N/A, no H/O Chronic Pain,no other significant disability   - neg other ROS         PHYSICAL EXAM:   Mental Status/Neuro: A/A/O; Age Appropriate   Airway: Facies: Feasible  Mallampati: Not Assessed  Mouth/Opening: Not Assessed  TM distance: Not Assessed  Neck ROM: Not Assessed   Respiratory:    CV:    Comments: Virtual visit     Dental: Normal Dentition                 Physical Exam  Constitutional: Awake, alert, no apparent distress, and appears stated age.  Respiratory: No cough or obvious dyspnea.  Neuropsychiatric: Calm, cooperative. Normal affect.     Please refer to the physical examination documented by the anesthesiologist in the anesthesia record on the day of surgery    EKG:  Personally reviewed 11/28/20 Atrial fibrillation with PVC or aberrantly conducted complexes, incomplete LBBB, ST and T wave abnormality  Cardiac echo: 6/25/20      Interpretation Summary     There is mild concentric left ventricular hypertrophy.  The visual ejection fraction is estimated at 40-45%.  The right ventricle is moderately dilated.  The right ventricular systolic function is mild to moderately reduced.  There is mod-severe biatrial enlargement.  There is moderate to mod-severe (2-3+) mitral regurgitation. Apprears less  compared with most recent study.  There is mild (1+) tricuspid regurgitation.  Dilation of the inferior vena cava is present with normal respiratory  variation in diameter.  Right ventricular systolic pressure is elevated, consistent with mild to  moderate pulmonary hypertension.  There is mild (1+) aortic regurgitation.  There is moderate (2+) pulmonic valvular regurgitation.  Mild aortic root dilatation.  The ascending aorta is Mildly dilated.  2018 MRI cardiac     1. The LV is moderately dilated. The global systolic function is mildly reduced. The LVEF is 51%. There is  moderate to severe hypokinesis of the mid to basal inferior wall.      2. The RV is moderately dilated. The global systolic function is low normal. The RVEF is 54%.      3. Both atria are normal in size.     4. There is moderate mitral regurgitation.      5. Late gadolinium enhancement imaging demonstrates subendocardial enhancement involving the mid to basal  inferior wall and the basal inferolateral wall, findings consistent with a prior infarction in the RCA  distribution.         CONCLUSIONS:  Moderate biventricular dilatation with mildly reduced left ventricular systolic function.   Late gadolinium enhancement imaging demonstrates subendocardial enhancement involving the mid to basal  inferior wall and the basal inferolateral wall, findings consistent with a prior infarction in the RCA  distribution.      In  comparison to the previous report dated 09/28/2017, the findings appear similar.      11.28.20 US abdomen                                                                 IMPRESSION:  1.  No solid masses are seen in the liver. Inhomogeneous appearance on CT compatible with phase of contrast enhancement. The 2 small hypodensities in the superior liver likely cysts are not seen on ultrasound.     2.  Changes of adenomyomatosis within the gallbladder fundus with overall mild gallbladder wall thickening. Small gallstone. Patient is not tender over the gallbladder.    CT CAP 11/28/20                                                                   IMPRESSION:  1.  The mild bibasilar infiltrates seen previously have resolved. Now seen is an uncalcified 4.5 mm nodule in the left lower lobe and a 3 mm uncalcified nodule in the right lower lobe.     2.  Sternotomy with cardiac enlargement. Coronary artery calcification. No thoracic aortic dissection or aneurysm. Enlargement of the central pulmonary arteries compatible with pulmonary arterial hypertension.     3.  Liver is quite inhomogeneous perhaps due to phase of vascular enhancement. Would consider liver ultrasound to exclude underlying solid mass lesions.     4.  Changes of adenomyomatosis in the gallbladder fundus. Small gallstones.     Imaging and cardiac testing reviewed by this provider       Outside records reviewed from: [facility] Everywhere    ASSESSMENT and PLAN  Milo (Jodie MACIE Serna is a 80 year old male scheduled to undergo CHOLECYSTECTOMY, LAPAROSCOPIC with Dr. Alvarez on 12/28/20. He has the following specific operative considerations:   - RCRI : 6.6% risk of major adverse cardiac event.   - Anesthesia considerations:  Refer to PAC assessment in anesthesia records  - VTE risk: 1.8%  - LENORA # of risks 3/8 = Intermediate risk  - If afib, [id number] score 5.  Risk category High risk.    - Risk of PONV score = 2.  If > 2, anti-emetic intervention  recommended.    --Gallbladder polyp with above procedure now planned.   --HLD simvastatin at HS. HYPERTENSION Will hold lisinopril on DOS. CAD, s/p MI, and CAB X 1 in 1996, ischemic cardiomyopathy, chronic atrial fibrillation anticoagulated on apixaban. Will hold for two days prior to surgery. Will take torsemide on DOs. Last echo: mild concentric left ventricular hypertrophy, EF 40-45%, moderately dilated, right ventricular systolic function moderately reduced, moderate-severe biatrial enlargement, mild TR, mild to moderate pulmonary HTN, mild AR, moderate pulmonary valvular regurgitation, mild aortic root and ascending aorta dilation. All testing above. Able to walk distance but recently less active due to fall.   --Former smoker. Denies pulmonary symptoms. LENORA but does not use CPAP as he was never able to tolerate it/get a seal.   --GERD. Will take omeprazole on DOS.  --Anemia Last ferritin 226, Hgb 11.6. Oral iron supplement. Thrombocytopenia last platelets 113, range has been 113- 136. Uncertain history of blood transfusion. Type and screen to be drawn on DOS.   --BPH Will take Flomax on DOS.  --OA. Recent fall with T8 vertebral fracture. Denies pain but will require careful positioning.     Arrival time, NPO, shower and medication instructions provided by nursing staff today via phone.     Patient is optimized and is acceptable candidate for the proposed procedure.  No further diagnostic evaluation is needed.       DEMETRI Link CNS  Preoperative Assessment Center  [facility]  Clinic and Surgery Center  Phone: [phone]  Fax: [phone]

## 2020-12-15 NOTE — PROGRESS NOTES
"Milo Serna is a 80 year old male who is being evaluated via a billable video visit.      The patient has been notified of following:     \"This video visit will be conducted via a call between you and your physician/provider. We have found that certain health care needs can be provided without the need for an in-person physical exam.  This service lets us provide the care you need with a video conversation.  If a prescription is necessary we can send it directly to your pharmacy.  If lab work is needed we can place an order for that and you can then stop by our lab to have the test done at a later time.    Video visits are billed at different rates depending on your insurance coverage.  Please reach out to your insurance provider with any questions.    If during the course of the call the physician/provider feels a video visit is not appropriate, you will not be charged for this service.\"    Patient has given verbal consent for Video visit? Yes  How would you like to obtain your AVS? Mail a copy  If you are dropped from the video visit, the video invite should be resent to: Text to cell phone: [phone]  Will anyone else be joining your video visit? No      CHRISTAL Villanueva LPN          "

## 2020-12-15 NOTE — ANESTHESIA PREPROCEDURE EVALUATION
"Anesthesia Pre-Procedure Evaluation    Patient: Milo Serna   MRN:     [id number] Gender:   male   Age:    80 year old :      1940        Preoperative Diagnosis: Gallbladder polyp [K82.4]   Procedure(s):  CHOLECYSTECTOMY, LAPAROSCOPIC     LABS:  CBC:   Lab Results   Component Value Date    WBC 5.6 2020    WBC 7.4 2020    HGB 11.6 (L) 2020    HGB 12.1 (L) 2020    HCT 34.9 (L) 2020    HCT 37.2 (L) 2020     (L) 2020     (L) 2020     BMP:   Lab Results   Component Value Date     2020     10/31/2020    POTASSIUM 4.0 2020    POTASSIUM 4.0 10/31/2020    CHLORIDE 100 2020    CHLORIDE 103 10/31/2020    CO2 29 2020    CO2 27 10/31/2020    BUN 18 2020    BUN 13 10/31/2020    CR 0.86 2020    CR 0.74 10/31/2020     (H) 2020     (H) 10/31/2020     COAGS:   Lab Results   Component Value Date    PTT 38 (H) 2019    INR 1.39 (H) 2019     POC:   Lab Results   Component Value Date    BGM 76 11/10/2016     OTHER:   Lab Results   Component Value Date    PH 7.38 2007    A1C 5.2 2020    ANG 9.3 2020    MAG 1.9 11/15/2016    ALBUMIN 3.9 2020    PROTTOTAL 8.2 2020    ALT 21 2020    AST 55 (H) 2020     (H) 2018    ALKPHOS 244 (H) 2020    BILITOTAL 2.6 (H) 2020    LIPASE 156 2020    TSH 2.02 2019        Preop Vitals    BP Readings from Last 3 Encounters:   20 116/60   20 (!) 141/69   20 129/63    Pulse Readings from Last 3 Encounters:   20 68   20 54   20 97      Resp Readings from Last 3 Encounters:   20 16   20 16   20 18    SpO2 Readings from Last 3 Encounters:   20 98%   20 100%   20 95%      Temp Readings from Last 1 Encounters:   20 98.3  F (36.8  C) (Oral)    Ht Readings from Last 1 Encounters:   20 1.702 m (5' 7\")      Wt " "Readings from Last 1 Encounters:   12/01/20 77.4 kg (170 lb 11.2 oz)    Estimated body mass index is 26.74 kg/m  as calculated from the following:    Height as of 11/16/20: 1.702 m (5' 7\").    Weight as of 12/1/20: 77.4 kg (170 lb 11.2 oz).     LDA:        Past Medical History:   Diagnosis Date     Actinic keratosis 10/12/2019     Anemia, unspecified 11/08/2016     Atrial fibrillation (H) 05/27/2015     BPH (benign prostatic hyperplasia) 01/25/2012     CAD (coronary artery disease) 04/04/2012     Closed bimalleolar fracture of left ankle with routine healing 04/05/2019     Closed fracture of metatarsal bone 04/04/2012     Coronary atherosclerosis of unspecified type of vessel, native or graft [facility]    CAB 1996 following MI (at [facility])      Dilated aortic root (H) 05/26/2016     Drug-induced erectile dysfunction 10/02/2015     Elevated blood sugar 11/08/2016     Elevated PSA 09/19/2013     Epistaxis 09/09/2019     Essential hypertension with goal blood pressure less than 140/90 09/22/2016     Fall 09/06/2019     Functional diarrhea 10/12/2019     Gallbladder polyp 05/01/2018     Gout      HAV (hallux abducto valgus) 04/04/2012     Hematoma 09/09/2019     Hernia, abdominal      History of prostate cancer 06/15/2016     HTN, goal below 150/90 04/20/2017     Hypokalemia 09/12/2019     Iron deficiency anemia secondary to inadequate dietary iron intake 11/15/2016     Low blood pressure reading 10/17/2016     Lumbago     had degendisc dz on MRI 7/07     Memory loss 06/18/2019     Microalbuminuria 06/04/2020    X1     Mixed hyperlipidemia      Mumps      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     Nonrheumatic mitral valve regurgitation 10/28/2019     OA (osteoarthritis) 04/04/2012     Old myocardial infarction 04/04/2012     LENORA (obstructive sleep apnea) 10/28/2019     Other viral warts 09/06/2019     Palpitations      Pes planus 04/04/2012     Petechiae 06/18/2019     Prostate cancer (H) 05/26/2016     PUD (peptic " ulcer disease) 04/04/2012     Pulmonary hypertension (H) 09/25/2017     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     Rosacea 01/15/2008     Stress due to spouse with dementia 06/18/2019     Thrombocytopenia (H) 04/21/2017     Unspecified hyperplasia of prostate with urinary obstruction and other lower urinary tract symptoms (LUTS)     better on avodart & hytrin     Venous stasis 04/04/2012      Past Surgical History:   Procedure Laterality Date     CARDIAC SURGERY      CAB     CYSTOSCOPY FLEXIBLE, CYOABLATION PROSTATE N/A 11/09/2016    Procedure: CYSTOSCOPY FLEXIBLE, CRYOABLATION PROSTATE;  Surgeon: Krystian Owens MD;  Location: [facility] OR     GENITOURINARY SURGERY      prostate surgery      HERNIA REPAIR       LAPAROSCOPIC HERNIORRHAPHY INGUINAL Right 05/10/2017    Procedure: LAPAROSCOPIC HERNIORRHAPHY INGUINAL;  Laparoscopic preperitoneal repair of right inguinal hernia with placement of underlay mesh;  Surgeon: Enrico Bridges MD;  Location: [facility] OR     PROSTATE SURGERY       [facility] NONSPECIFIC PROCEDURE      CAB 1996 [place]     Z NONSPECIFIC PROCEDURE  02/2007    l inguinal hernia repair      [facility] NONSPECIFIC PROCEDURE      R hernia remote     [facility] NONSPECIFIC PROCEDURE  2000    R ankle ORIF for fx     Z NONSPECIFIC PROCEDURE  2005    nasal surgery      [facility] NONSPECIFIC PROCEDURE      R rotater repair (remote)      Z NONSPECIFIC PROCEDURE      appy 1966     [facility] NONSPECIFIC PROCEDURE      sinus surgery x 2     Z NONSPECIFIC PROCEDURE      L shoulder 6/09      No Known Allergies     Anesthesia Evaluation     . Pt has had prior anesthetic. Type: General and MAC    No history of anesthetic complications          ROS/MED HX    ENT/Pulmonary:     (+)sleep apnea, tobacco use, Past use doesn't use CPAP , . .    Neurologic:  - neg neurologic ROS     Cardiovascular: Comment: ascending aorta dilation    (+) Dyslipidemia, hypertension-Peripheral Vascular Disease-- Other, CAD, -CABG-date: 1996 X 1, . Taking blood thinners Pt has  received instructions: Instructions Given to patient: Will hold apixaban for two days prior to surgery. CHF etiology: ischemic Last EF: 40-45% date: 6/25/20 . . :. dysrhythmias a-fib, Irregular Heartbeat/Palpitations, valvular problems/murmurs type: AI and MR mild MR, AI, moderate ME:. pulmonary hypertension, Previous cardiac testing Echodate:6/25/20results:date: results:ECG reviewed date:11/28/20 results:Atrial fibrillation with PVC or aberrantly conducted complexes, inc LBBB, ST and T wave abnormality date: results:          METS/Exercise Tolerance:  3 - Able to walk 1-2 blocks without stopping   Hematologic:     (+) Anemia, Other Hematologic Disorder-chronic thrombocytopenia      Musculoskeletal: Comment: Fall 5 weeks ago with T8 vertebral fracture        GI/Hepatic:     (+) GERD Asymptomatic on medication,       Renal/Genitourinary:     (+) BPH,       Endo:  - neg endo ROS       Psychiatric:  - neg psychiatric ROS       Infectious Disease:  - neg infectious disease ROS       Malignancy:   (+) Malignancy History of Prostate  Prostate CA Remission status post Surgery,         Other:    (+) No chance of pregnancy C-spine cleared: N/A, no H/O Chronic Pain,no other significant disability   - neg other ROS                     PHYSICAL EXAM:   Mental Status/Neuro: A/A/O; Age Appropriate   Airway: Facies: Feasible  Mallampati: I  Mouth/Opening: Full  TM distance: > 6 cm  Neck ROM: Full   Respiratory:   Resp. Rate: Normal     Resp. Effort: Normal      CV: Rhythm: Afib  Rate: Age appropriate  Heart: Normal Sounds   Comments: Virtual visit     Dental: Details                  Assessment:   ASA SCORE: 3    H&P: History and physical reviewed and following examination; no interval change.   Smoking Status:  Non-Smoker/Unknown   NPO Status: NPO Appropriate     Plan:   Anes. Type:  General   Pre-Medication: None   Induction:  IV (Standard)   Airway: ETT; Oral   Access/Monitoring: PIV   Maintenance: Balanced     Postop Plan:    Postop Pain: Opioids  Postop Sedation/Airway: Not planned  Disposition: Outpatient     PONV Management:  NO PONV Prophylaxis Required     CONSENT: Direct conversation   Plan and risks discussed with: Patient                   PAC Discussion and Assessment    ASA Classification: 3  Case is suitable for: [place]  Anesthetic techniques and relevant risks discussed: GA  Invasive monitoring and risk discussed: No  Types:   Possibility and Risk of blood transfusion discussed: No  NPO instructions given:   Additional anesthetic preparation and risks discussed:   Needs early admission to pre-op area:   Other:     PAC Resident/NP Anesthesia Assessment:  Milo (Luís) MACIE Serna is a 80 year old male scheduled to undergo CHOLECYSTECTOMY, LAPAROSCOPIC with Dr. Alvarez on 12/28/20. He has the following specific operative considerations:   - RCRI : 6.6% risk of major adverse cardiac event.   - VTE risk: 1.8%  - LENORA # of risks 3/8 = Intermediate risk  - If afib, [id number] score 5.  Risk category High risk.    - Risk of PONV score = 2.  If > 2, anti-emetic intervention recommended.    --Gallbladder polyp with above procedure now planned.   --HLD simvastatin at [facility]. HYPERTENSION Will hold lisinopril on DOS. CAD, s/p MI, and CAB X 1 in 1996, ischemic cardiomyopathy, chronic atrial fibrillation anticoagulated on apixaban. Will hold for two days prior to surgery. Will take torsemide on DOs. Last echo: mild concentric left ventricular hypertrophy, EF 40-45%, moderately dilated, right ventricular systolic function moderately reduced, moderate-severe biatrial enlargement, mild TR, mild to moderate pulmonary HTN, mild AR, moderate pulmonary valvular regurgitation, mild aortic root and ascending aorta dilation. All testing above. Able to walk distance but recently less active due to fall.   --Former smoker. Denies pulmonary symptoms. LENORA but does not use CPAP as he was never able to tolerate it/get a seal.   --GERD. Will take  omeprazole on DOS.  --Anemia Last ferritin 226, Hgb 11.6. Oral iron supplement. Thrombocytopenia last platelets 113, range has been 113- 136. Uncertain history of blood transfusion. Type and screen to be drawn on DOS.   --BPH Will take Flomax on DOS.  --OA. Recent fall with T8 vertebral fracture. Denies pain but will require careful positioning.     Patient is optimized and is acceptable candidate for the proposed procedure.  No further diagnostic evaluation is needed.         Reviewed and Signed by PAC Mid-Level Provider/Resident  Mid-Level Provider/Resident: DEMETRI Rawls, CNS  Date: 12/15/20  Time: 1:00pm    Attending Anesthesiologist Anesthesia Assessment:        Anesthesiologist:   Date:   Time:   Pass/Fail:   Disposition:     PAC Pharmacist Assessment:        Pharmacist:   Date:   Time:    DEMETRI Link

## 2020-12-15 NOTE — PATIENT INSTRUCTIONS
Preparing for Your Surgery      Name:  Milo Serna   MRN:  [id number]   :  1940   Today's Date:  12/15/2020       Arriving for surgery:  Surgery date:  20  Arrival time:  05:30 AM    Restrictions due to COVID 19:  No visitors are allowed at this time.    [employer] parking is available for anyone with mobility limitations or disabilities.  ([place]  24 hours/ 7 days a week; [facility]  7 am- 3:30 pm, Mon- Fri)    Please come to:   [facility], [place] [address]  [address] [place]  [address]   -    Please proceed to the Surgery Lounge on the 3rd floor. [phone]?     - ?If you are in need of directions, wheelchair or escort please stop at the Information Desk in the lobby.  Inform the information person that you are here for surgery; a wheelchair and escort will be provided to the Surgery Lounge .?     What can I eat or drink?  -  You may eat and drink normally for up to 8 hours before your surgery.   -  You may have clear liquids until 2 hours before surgery.     Examples of clear liquids:  Water  Clear broth  Juices (apple, white grape, white cranberry  and cider) without pulp  Noncarbonated, powder based beverages  (lemonade and Severo-Aid)  Sodas (Sprite, 7-Up, ginger ale and seltzer)  Coffee or tea (without milk or cream)  Gatorade    -  No Alcohol for at least 24 hours before surgery     Which medicines can I take?    Hold Aspirin for 7 days before surgery.   Hold Multivitamins for 7 days before surgery.  Hold Supplements for 7 days before surgery.  Hold Ibuprofen (Advil, Motrin) for 1 day before surgery--unless otherwise directed by surgeon.  Hold Naproxen (Aleve) for 4 days before surgery.  HOLD ELIQUIS (APIXABAN) X 2 DAYS BEFORE SURGERY - LAST DOSE 20 PM  -  DO NOT take these medications the day of surgery:  Lisinopril + iron if both are normally taken in the morning.  -  PLEASE TAKE these medications the day of surgery:  tylenol if needed; take all  other scheduled morning medications.    How do I prepare myself?  - Please take 2 showers before surgery using Scrubcare or Hibiclens soap.    Use this soap only from the neck to your toes.     Leave the soap on your skin for one minute--then rinse thoroughly.      You may use your own shampoo and conditioner; no other hair products.   - Please remove all jewelry and body piercings.  - No lotions, deodorants or fragrance.  - No makeup or fingernail polish.   - Bring your ID and insurance card.    - All patients are required to have a Covid-19 test within 4 days of surgery/procedure.      -Patients will be contacted by the [facility] scheduling team within 1 week of surgery to make an appointment.      - Patients may call the Scheduling team at [phone] if they have not been scheduled within 4 days of  surgery.      ALL PATIENTS GOING HOME THE SAME DAY OF SURGERY ARE REQUIRED TO HAVE A RESPONSIBLE ADULT TO DRIVE AND BE IN ATTENDANCE WITH THEM FOR 24 HOURS FOLLOWING SURGERY     Questions or Concerns:    - For any questions regarding the day of surgery or your hospital stay, please contact the Pre Admission Nursing Office at [phone].       - If you have health changes between today and your surgery please call your surgeon.       For questions after surgery please call your surgeons office.

## 2020-12-21 DIAGNOSIS — L71.9 ROSACEA: Primary | ICD-10-CM

## 2020-12-21 RX ORDER — DOXYCYCLINE HYCLATE 100 MG
100 TABLET ORAL DAILY
Qty: 90 TABLET | Refills: 3 | Status: SHIPPED | OUTPATIENT
Start: 2020-12-21 | End: 2021-01-01

## 2020-12-21 NOTE — TELEPHONE ENCOUNTER
Medication Question or Refill    Who is calling: Patient walked in to clinic     What medication are you calling about (include dose and sig)?: Pending Prescriptions:                       Disp   Refills    doxycycline hyclate (VIBRA-TABS) 100 MG t*                    Sig: Take 1 tablet (100 mg) by mouth daily      Controlled Substance Agreement on file: No    Who prescribed the medication?: Dr. Alves     Do you need a refill? Yes: has 6 pills left     When did you use the medication last? Does not remember     Patient offered an appointment? No    Do you have any questions or concerns?  No    Requested Pharmacy: [facility]     Okay to leave a detailed message?: Yes at Home number on file [phone] (home)    Mandie Herzog

## 2020-12-21 NOTE — TELEPHONE ENCOUNTER
Routing refill request to provider for review/approval because:  Medication is reported/historical  Pt informs still taking, BW please confirm, inform pt if problem  Patsy Whitaker, RN, BSN  Message handled by CLINIC NURSE.

## 2020-12-24 DIAGNOSIS — Z11.59 ENCOUNTER FOR SCREENING FOR OTHER VIRAL DISEASES: ICD-10-CM

## 2020-12-24 PROCEDURE — U0003 INFECTIOUS AGENT DETECTION BY NUCLEIC ACID (DNA OR RNA); SEVERE ACUTE RESPIRATORY SYNDROME CORONAVIRUS 2 (SARS-COV-2) (CORONAVIRUS DISEASE [COVID-19]), AMPLIFIED PROBE TECHNIQUE, MAKING USE OF HIGH THROUGHPUT TECHNOLOGIES AS DESCRIBED BY CMS-2020-01-R: HCPCS | Performed by: SURGERY

## 2020-12-25 LAB
LABORATORY COMMENT REPORT: NORMAL
SARS-COV-2 RNA SPEC QL NAA+PROBE: NEGATIVE
SARS-COV-2 RNA SPEC QL NAA+PROBE: NORMAL
SPECIMEN SOURCE: NORMAL
SPECIMEN SOURCE: NORMAL

## 2020-12-28 ENCOUNTER — HOSPITAL ENCOUNTER (OUTPATIENT)
Facility: CLINIC | Age: 80
Discharge: HOME | End: 2020-12-28
Attending: SURGERY | Admitting: SURGERY
Payer: MEDICARE

## 2020-12-28 ENCOUNTER — ANESTHESIA (OUTPATIENT)
Dept: SURGERY | Facility: CLINIC | Age: 80
End: 2020-12-28
Payer: MEDICARE

## 2020-12-28 VITALS
BODY MASS INDEX: 26.47 KG/M2 | HEART RATE: 60 BPM | RESPIRATION RATE: 16 BRPM | HEIGHT: 67 IN | DIASTOLIC BLOOD PRESSURE: 79 MMHG | TEMPERATURE: 97.8 F | SYSTOLIC BLOOD PRESSURE: 161 MMHG | OXYGEN SATURATION: 100 % | WEIGHT: 168.65 LBS

## 2020-12-28 DIAGNOSIS — K82.4 GALLBLADDER POLYP: Primary | ICD-10-CM

## 2020-12-28 LAB
ABO + RH BLD: ABNORMAL
ABO + RH BLD: ABNORMAL
BLD GP AB INVEST PLASRBC-IMP: ABNORMAL
BLD GP AB SCN SERPL QL: ABNORMAL
BLOOD BANK CMNT PATIENT-IMP: ABNORMAL
GLUCOSE BLDC GLUCOMTR-MCNC: 100 MG/DL (ref 70–99)
SPECIMEN EXP DATE BLD: ABNORMAL

## 2020-12-28 PROCEDURE — 88331 PATH CONSLTJ SURG 1 BLK 1SPC: CPT | Mod: TC | Performed by: SURGERY

## 2020-12-28 PROCEDURE — 47562 LAPAROSCOPIC CHOLECYSTECTOMY: CPT | Mod: GC | Performed by: SURGERY

## 2020-12-28 PROCEDURE — 999N001017 HC STATISTIC GLUCOSE BY METER IP

## 2020-12-28 PROCEDURE — 250N000013 HC RX MED GY IP 250 OP 250 PS 637: Performed by: STUDENT IN AN ORGANIZED HEALTH CARE EDUCATION/TRAINING PROGRAM

## 2020-12-28 PROCEDURE — 86900 BLOOD TYPING SEROLOGIC ABO: CPT | Performed by: SURGERY

## 2020-12-28 PROCEDURE — 250N000011 HC RX IP 250 OP 636: Performed by: SURGERY

## 2020-12-28 PROCEDURE — 272N000001 HC OR GENERAL SUPPLY STERILE: Performed by: SURGERY

## 2020-12-28 PROCEDURE — 258N000003 HC RX IP 258 OP 636: Performed by: NURSE ANESTHETIST, CERTIFIED REGISTERED

## 2020-12-28 PROCEDURE — 86905 BLOOD TYPING RBC ANTIGENS: CPT | Performed by: SURGERY

## 2020-12-28 PROCEDURE — 88304 TISSUE EXAM BY PATHOLOGIST: CPT | Mod: TC | Performed by: SURGERY

## 2020-12-28 PROCEDURE — 761N000007 HC RECOVERY PHASE 2 EACH 15 MINS: Performed by: SURGERY

## 2020-12-28 PROCEDURE — 250N000011 HC RX IP 250 OP 636: Performed by: NURSE ANESTHETIST, CERTIFIED REGISTERED

## 2020-12-28 PROCEDURE — 360N000022 HC SURGERY LEVEL 3 1ST 30 MIN - UMMC: Performed by: SURGERY

## 2020-12-28 PROCEDURE — 370N000002 HC ANESTHESIA TECHNICAL FEE, EACH ADDTL 15 MIN: Performed by: SURGERY

## 2020-12-28 PROCEDURE — 250N000013 HC RX MED GY IP 250 OP 250 PS 637: Performed by: ANESTHESIOLOGY

## 2020-12-28 PROCEDURE — 250N000009 HC RX 250: Performed by: NURSE ANESTHETIST, CERTIFIED REGISTERED

## 2020-12-28 PROCEDURE — 999N000139 HC STATISTIC PRE-PROCEDURE ASSESSMENT II: Performed by: SURGERY

## 2020-12-28 PROCEDURE — 370N000001 HC ANESTHESIA TECHNICAL FEE, 1ST 30 MIN: Performed by: SURGERY

## 2020-12-28 PROCEDURE — 360N000023 HC SURGERY LEVEL 3 EA 15 ADDTL MIN UMMC: Performed by: SURGERY

## 2020-12-28 PROCEDURE — 250N000003 HC SEVOFLURANE, EA 15 MIN: Performed by: SURGERY

## 2020-12-28 PROCEDURE — 88331 PATH CONSLTJ SURG 1 BLK 1SPC: CPT | Mod: 26 | Performed by: PATHOLOGY

## 2020-12-28 PROCEDURE — 88304 TISSUE EXAM BY PATHOLOGIST: CPT | Mod: 26 | Performed by: PATHOLOGY

## 2020-12-28 PROCEDURE — 86901 BLOOD TYPING SEROLOGIC RH(D): CPT | Performed by: SURGERY

## 2020-12-28 PROCEDURE — 36415 COLL VENOUS BLD VENIPUNCTURE: CPT | Performed by: SURGERY

## 2020-12-28 PROCEDURE — 86870 RBC ANTIBODY IDENTIFICATION: CPT | Performed by: SURGERY

## 2020-12-28 PROCEDURE — 761N000003 HC RECOVERY PHASE 1 LEVEL 2 FIRST HR: Performed by: SURGERY

## 2020-12-28 PROCEDURE — 761N000004 HC RECOVERY PHASE 1 LEVEL 2 EA ADDTL HR: Performed by: SURGERY

## 2020-12-28 PROCEDURE — 86850 RBC ANTIBODY SCREEN: CPT | Performed by: SURGERY

## 2020-12-28 RX ORDER — NALOXONE HYDROCHLORIDE 0.4 MG/ML
0.2 INJECTION, SOLUTION INTRAMUSCULAR; INTRAVENOUS; SUBCUTANEOUS
Status: DISCONTINUED | OUTPATIENT
Start: 2020-12-28 | End: 2020-12-28 | Stop reason: HOSPADM

## 2020-12-28 RX ORDER — NALOXONE HYDROCHLORIDE 0.4 MG/ML
0.4 INJECTION, SOLUTION INTRAMUSCULAR; INTRAVENOUS; SUBCUTANEOUS
Status: DISCONTINUED | OUTPATIENT
Start: 2020-12-28 | End: 2020-12-28 | Stop reason: HOSPADM

## 2020-12-28 RX ORDER — CEFAZOLIN SODIUM 2 G/100ML
2 INJECTION, SOLUTION INTRAVENOUS
Status: COMPLETED | OUTPATIENT
Start: 2020-12-28 | End: 2020-12-28

## 2020-12-28 RX ORDER — FLUMAZENIL 0.1 MG/ML
0.2 INJECTION, SOLUTION INTRAVENOUS
Status: DISCONTINUED | OUTPATIENT
Start: 2020-12-28 | End: 2020-12-28 | Stop reason: HOSPADM

## 2020-12-28 RX ORDER — BUPIVACAINE HYDROCHLORIDE 2.5 MG/ML
INJECTION, SOLUTION INFILTRATION; PERINEURAL PRN
Status: DISCONTINUED | OUTPATIENT
Start: 2020-12-28 | End: 2020-12-28 | Stop reason: HOSPADM

## 2020-12-28 RX ORDER — ACETAMINOPHEN 325 MG/1
975 TABLET ORAL ONCE
Status: COMPLETED | OUTPATIENT
Start: 2020-12-28 | End: 2020-12-28

## 2020-12-28 RX ORDER — ACETAMINOPHEN 325 MG/1
650 TABLET ORAL
Status: DISCONTINUED | OUTPATIENT
Start: 2020-12-28 | End: 2020-12-28 | Stop reason: HOSPADM

## 2020-12-28 RX ORDER — CEFAZOLIN SODIUM 1 G/3ML
1 INJECTION, POWDER, FOR SOLUTION INTRAMUSCULAR; INTRAVENOUS SEE ADMIN INSTRUCTIONS
Status: DISCONTINUED | OUTPATIENT
Start: 2020-12-28 | End: 2020-12-28 | Stop reason: HOSPADM

## 2020-12-28 RX ORDER — ONDANSETRON 2 MG/ML
4 INJECTION INTRAMUSCULAR; INTRAVENOUS EVERY 30 MIN PRN
Status: DISCONTINUED | OUTPATIENT
Start: 2020-12-28 | End: 2020-12-28 | Stop reason: HOSPADM

## 2020-12-28 RX ORDER — ONDANSETRON 4 MG/1
4 TABLET, ORALLY DISINTEGRATING ORAL
Status: DISCONTINUED | OUTPATIENT
Start: 2020-12-28 | End: 2020-12-28

## 2020-12-28 RX ORDER — PROPOFOL 10 MG/ML
INJECTION, EMULSION INTRAVENOUS PRN
Status: DISCONTINUED | OUTPATIENT
Start: 2020-12-28 | End: 2020-12-28

## 2020-12-28 RX ORDER — HYDROXYZINE HYDROCHLORIDE 10 MG/1
10 TABLET, FILM COATED ORAL
Status: DISCONTINUED | OUTPATIENT
Start: 2020-12-28 | End: 2020-12-28 | Stop reason: HOSPADM

## 2020-12-28 RX ORDER — FENTANYL CITRATE 50 UG/ML
25-50 INJECTION, SOLUTION INTRAMUSCULAR; INTRAVENOUS
Status: DISCONTINUED | OUTPATIENT
Start: 2020-12-28 | End: 2020-12-28 | Stop reason: HOSPADM

## 2020-12-28 RX ORDER — FENTANYL CITRATE 50 UG/ML
INJECTION, SOLUTION INTRAMUSCULAR; INTRAVENOUS PRN
Status: DISCONTINUED | OUTPATIENT
Start: 2020-12-28 | End: 2020-12-28

## 2020-12-28 RX ORDER — OXYCODONE HYDROCHLORIDE 5 MG/1
5-10 TABLET ORAL EVERY 4 HOURS PRN
Qty: 10 TABLET | Refills: 0 | Status: SHIPPED | OUTPATIENT
Start: 2020-12-28 | End: 2021-01-01

## 2020-12-28 RX ORDER — LIDOCAINE 40 MG/G
CREAM TOPICAL
Status: DISCONTINUED | OUTPATIENT
Start: 2020-12-28 | End: 2020-12-28 | Stop reason: HOSPADM

## 2020-12-28 RX ORDER — SODIUM CHLORIDE, SODIUM LACTATE, POTASSIUM CHLORIDE, CALCIUM CHLORIDE 600; 310; 30; 20 MG/100ML; MG/100ML; MG/100ML; MG/100ML
INJECTION, SOLUTION INTRAVENOUS CONTINUOUS PRN
Status: DISCONTINUED | OUTPATIENT
Start: 2020-12-28 | End: 2020-12-28

## 2020-12-28 RX ORDER — OXYCODONE HYDROCHLORIDE 5 MG/1
5 TABLET ORAL
Status: COMPLETED | OUTPATIENT
Start: 2020-12-28 | End: 2020-12-28

## 2020-12-28 RX ORDER — ONDANSETRON 2 MG/ML
INJECTION INTRAMUSCULAR; INTRAVENOUS PRN
Status: DISCONTINUED | OUTPATIENT
Start: 2020-12-28 | End: 2020-12-28

## 2020-12-28 RX ORDER — MEPERIDINE HYDROCHLORIDE 25 MG/ML
12.5 INJECTION INTRAMUSCULAR; INTRAVENOUS; SUBCUTANEOUS
Status: DISCONTINUED | OUTPATIENT
Start: 2020-12-28 | End: 2020-12-28 | Stop reason: HOSPADM

## 2020-12-28 RX ORDER — ONDANSETRON 4 MG/1
4 TABLET, ORALLY DISINTEGRATING ORAL EVERY 30 MIN PRN
Status: DISCONTINUED | OUTPATIENT
Start: 2020-12-28 | End: 2020-12-28 | Stop reason: HOSPADM

## 2020-12-28 RX ORDER — SODIUM CHLORIDE, SODIUM LACTATE, POTASSIUM CHLORIDE, CALCIUM CHLORIDE 600; 310; 30; 20 MG/100ML; MG/100ML; MG/100ML; MG/100ML
INJECTION, SOLUTION INTRAVENOUS CONTINUOUS
Status: DISCONTINUED | OUTPATIENT
Start: 2020-12-28 | End: 2020-12-28 | Stop reason: HOSPADM

## 2020-12-28 RX ORDER — AMOXICILLIN 250 MG
1-2 CAPSULE ORAL 2 TIMES DAILY
Qty: 30 TABLET | Refills: 0 | Status: SHIPPED | OUTPATIENT
Start: 2020-12-28 | End: 2021-01-01

## 2020-12-28 RX ORDER — LIDOCAINE HYDROCHLORIDE 20 MG/ML
INJECTION, SOLUTION INFILTRATION; PERINEURAL PRN
Status: DISCONTINUED | OUTPATIENT
Start: 2020-12-28 | End: 2020-12-28

## 2020-12-28 RX ADMIN — ROCURONIUM BROMIDE 30 MG: 10 INJECTION INTRAVENOUS at 08:07

## 2020-12-28 RX ADMIN — FENTANYL CITRATE 50 MCG: 50 INJECTION, SOLUTION INTRAMUSCULAR; INTRAVENOUS at 08:18

## 2020-12-28 RX ADMIN — PROPOFOL 30 MG: 10 INJECTION, EMULSION INTRAVENOUS at 07:42

## 2020-12-28 RX ADMIN — ROCURONIUM BROMIDE 30 MG: 10 INJECTION INTRAVENOUS at 07:54

## 2020-12-28 RX ADMIN — FENTANYL CITRATE 50 MCG: 50 INJECTION, SOLUTION INTRAMUSCULAR; INTRAVENOUS at 08:15

## 2020-12-28 RX ADMIN — ROCURONIUM BROMIDE 20 MG: 10 INJECTION INTRAVENOUS at 07:44

## 2020-12-28 RX ADMIN — ONDANSETRON 4 MG: 2 INJECTION INTRAMUSCULAR; INTRAVENOUS at 08:46

## 2020-12-28 RX ADMIN — FENTANYL CITRATE 50 MCG: 50 INJECTION, SOLUTION INTRAMUSCULAR; INTRAVENOUS at 07:40

## 2020-12-28 RX ADMIN — SUGAMMADEX 200 MG: 100 INJECTION, SOLUTION INTRAVENOUS at 09:02

## 2020-12-28 RX ADMIN — SODIUM CHLORIDE, POTASSIUM CHLORIDE, SODIUM LACTATE AND CALCIUM CHLORIDE: 600; 310; 30; 20 INJECTION, SOLUTION INTRAVENOUS at 07:26

## 2020-12-28 RX ADMIN — Medication 70 MG: at 07:41

## 2020-12-28 RX ADMIN — LIDOCAINE HYDROCHLORIDE 80 MG: 20 INJECTION, SOLUTION INFILTRATION; PERINEURAL at 07:40

## 2020-12-28 RX ADMIN — CEFAZOLIN 2 G: 10 INJECTION, POWDER, FOR SOLUTION INTRAVENOUS at 07:55

## 2020-12-28 RX ADMIN — ROCURONIUM BROMIDE 10 MG: 10 INJECTION INTRAVENOUS at 08:36

## 2020-12-28 RX ADMIN — OXYCODONE HYDROCHLORIDE 5 MG: 5 TABLET ORAL at 11:40

## 2020-12-28 RX ADMIN — ACETAMINOPHEN 975 MG: 325 TABLET, FILM COATED ORAL at 11:40

## 2020-12-28 RX ADMIN — PROPOFOL 80 MG: 10 INJECTION, EMULSION INTRAVENOUS at 07:40

## 2020-12-28 ASSESSMENT — MIFFLIN-ST. JEOR: SCORE: 1428.63

## 2020-12-28 NOTE — ANESTHESIA CARE TRANSFER NOTE
Patient: Milo Serna    Procedure(s):  CHOLECYSTECTOMY, LAPAROSCOPIC    Diagnosis: Gallbladder polyp [K82.4]  Diagnosis Additional Information: No value filed.    Anesthesia Type:   General     Note:  Airway :Nasal Cannula  Patient transferred to:PACU  Handoff Report: Identifed the Patient, Identified the Reponsible Provider, Reviewed the pertinent medical history, Discussed the surgical course, Reviewed Intra-OP anesthesia mangement and issues during anesthesia, Set expectations for post-procedure period and Allowed opportunity for questions and acknowledgement of understanding      Vitals: (Last set prior to Anesthesia Care Transfer)    CRNA VITALS  12/28/2020 0843 - 12/28/2020 0917      12/28/2020             Resp Rate (observed):  (!) 1                Electronically Signed By: DEMETRI Peña CRNA  December 28, 2020  9:17 AM

## 2020-12-28 NOTE — OR NURSING
Discussed pt HR with Dr Samuels, drops to 44 Afib when asleep, BP is stable at 130/70s. No treatment ordered, pt signed out of PACU.

## 2020-12-28 NOTE — ANESTHESIA POSTPROCEDURE EVALUATION
Anesthesia POST Procedure Evaluation    Patient: Milo Serna   MRN:     [id number] Gender:   male   Age:    80 year old :      1940        Preoperative Diagnosis: Gallbladder polyp [K82.4]   Procedure(s):  CHOLECYSTECTOMY, LAPAROSCOPIC   Postop Comments: No value filed.     Anesthesia Type: General       Disposition: Outpatient   Postop Pain Control: Uneventful            Sign Out: Well controlled pain   PONV: No   Neuro/Psych: Uneventful            Sign Out: Acceptable/Baseline neuro status   Airway/Respiratory: Uneventful            Sign Out: Acceptable/Baseline resp. status   CV/Hemodynamics: Uneventful            Sign Out: Acceptable CV status (bradycardia, afib c/w preop)   Other NRE: NONE   DID A NON-ROUTINE EVENT OCCUR? No         Last Anesthesia Record Vitals:  CRNA VITALS  2020 0843 - 2020 0943      2020             NIBP:  (!) 157/97    Pulse:  76    SpO2:  97 %    Resp Rate (observed):  20    EKG:  Atrial fibrillation;PVC's          Last PACU Vitals:  Vitals Value Taken Time   /71 20 1020   Temp 36.4  C (97.52  F) 20 1022   Pulse 52 20 1023   Resp 12 20 0915   SpO2 94 % 20 1023   Temp src Core 20 0915   NIBP 157/97 20 0915   Pulse 76 20 0915   SpO2 97 % 20 0915   Resp     Temp     Ht Rate     Temp 2     Vitals shown include unvalidated device data.      Electronically Signed By: April Samuels MD, 2020, 10:24 AM

## 2020-12-28 NOTE — BRIEF OP NOTE
[facility]     Brief Operative Note    Pre-operative diagnosis: Gallbladder polyp [K82.4]  Post-operative diagnosis Same as pre-operative diagnosis    Procedure: Procedure(s):  CHOLECYSTECTOMY, LAPAROSCOPIC  Surgeon: Surgeon(s) and Role:     * James Alvarez MD - Primary     * Jason Madrigal MD - Resident - Assisting     * Antonio Cui MD - Resident - Assisting  Anesthesia: General   Estimated blood loss: 15 mL  Drains: None  Specimens:   ID Type Source Tests Collected by Time Destination   A : Gallbladder Tissue Gallbladder and Contents SURGICAL PATHOLOGY EXAM James Alvarez MD 12/28/2020  8:32 AM      Findings:   None.  Complications: None.  Implants: * No implants in log *

## 2020-12-28 NOTE — DISCHARGE INSTRUCTIONS
General acute hospital  Same-Day Surgery   Adult Discharge Orders & Instructions     For 24 hours after surgery    1. Get plenty of rest.  A responsible adult must stay with you for at least 24 hours after you leave the hospital.   2. Do not drive or use heavy equipment.  If you have weakness or tingling, don't drive or use heavy equipment until this feeling goes away.  3. Do not drink alcohol.  4. Avoid strenuous or risky activities.  Ask for help when climbing stairs.   5. You may feel lightheaded.  IF so, sit for a few minutes before standing.  Have someone help you get up.   6. If you have nausea (feel sick to your stomach): Drink only clear liquids such as apple juice, ginger ale, broth or 7-Up.  Rest may also help.  Be sure to drink enough fluids.  Move to a regular diet as you feel able.  7. You may have a slight fever. Call the doctor if your fever is over 100 F (37.7 C) (taken under the tongue) or lasts longer than 24 hours.  8. You may have a dry mouth, a sore throat, muscle aches or trouble sleeping.  These should go away after 24 hours.  9. Do not make important or legal decisions.   Call your doctor for any of the followin.  Signs of infection (fever, growing tenderness at the surgery site, a large amount of drainage or bleeding, severe pain, foul-smelling drainage, redness, swelling).    2. It has been over 8 to 10 hours since surgery and you are still not able to urinate (pass water).    3.  Headache for over 24 hours.    4.  Numbness, tingling or weakness the day after surgery (if you had spinal anesthesia).  To contact a doctor, call Dr. Alvarez @ General surgery clinic [phone] or:        [phone] and ask for the resident on call for general surgery (answered 24 hours a day)      Emergency Department:    [facility]: [phone]       (TTY for hearing impaired: [phone])    [facility]: [phone]       (TTY for hearing impaired:  [phone])

## 2020-12-28 NOTE — OR NURSING
"Paged MD Cui at 1115: \"3C Meinhardt, A- When can patient resume apixaban? Thanks! Milka MANZANO RN [phone] or [phone]\"    No answer. MD Madrigal paged at 1200: \"3C Meinhardt, A- When can patient resume apixaban? Thanks! Milka MANZANO RN [phone] or [phone]\"    Per MD Madrigal, okay to restart apixaban tomorrow morning. Information added to discharge instructions. Daughter Stacia and patient aware.     Instructions reviewed with daughter Stacia over the phone. Responsible adult verbalized understanding of discharge instructions. Paper copy of discharge instructions sent with patient.     "

## 2020-12-28 NOTE — ANESTHESIA PROCEDURE NOTES
Airway   Date/Time: 12/28/2020 7:43 AM   Patient location during procedure: OR    Staff -   Performed By: resident    Consent for Airway   Urgency: elective    Indications and Patient Condition  Indications for airway management: king-procedural  Induction type:intravenousMask difficulty assessment: 2 - vent by mask + OA or adjuvant +/- NMBA    Final Airway Details  Final airway type: endotracheal airway  Successful airway:ETT - single  Endotracheal Airway Details   ETT size (mm): 8.0  Successful intubation technique: direct laryngoscopy  Grade View of Cords: 1  Adjucts: stylet  Measured from: gums/teeth  Secured at (cm): 23  Secured with: pink tape  Bite block used: None    Post intubation assessment   Placement verified by: capnometry, equal breath sounds and chest rise   Number of attempts at approach: 1  Number of other approaches attempted: 0  Secured with:pink tape  Ease of procedure: easy  Dentition: Lips/oral mucosa injury (small abrasion on center of upper lip)

## 2020-12-29 ENCOUNTER — PATIENT OUTREACH (OUTPATIENT)
Dept: SURGERY | Facility: CLINIC | Age: 80
End: 2020-12-29

## 2020-12-29 NOTE — PROGRESS NOTES
Milo Serna is a patient of Dr. Singer that underwent outpatient laparoscopic cholecystectomy yesterday.  The patients daughter, Stacia, is calling at this time with concerns regarding a PIV that remains in place in the patients left forearm.  The PIV was not noticed until this morning.    Call placed to [facility].  Spoke with Charge Nurse, Mercedes.  She will call the patients daughter directly ([phone]) and discuss removing the IV.      [facility] Report filed ([id number]).

## 2020-12-29 NOTE — OP NOTE
Procedure Date: 12/28/2020      PREOPERATIVE DIAGNOSES:  Abnormal Gallbladder mass.      POSTOPERATIVE DIAGNOSIS:  Benign Gallbladder mass.      PROCEDURE:  Laparoscopic cholecystectomy.      STAFF SURGEON:  James Alvarez MD      RESIDENT SURGEON:  Haider Madrigal MD      ASSISTANT RESIDENT SURGEON:  Antonio Cui MD       CLINICAL HISTORY:  Milo Serna is an 80-year-old male who was found to have an abnormal finding incidentally on a CT scan for other reason, the patient has had no significant pain in the right upper quadrant; however, the abnormality was noted.  This was reviewed with our staff Oncology surgeon, who agreed this was suspicious; however, the gallbladder should be removed and sent to Pathology as this is most likely benign.  The patient was given full informed consent regarding the risks and benefits of surgery including the potential for injury to intra-abdominal organs, potential for bleeding, infection, need for second surgery, potential injury to the common bile duct, as well as all the anesthetic complications possible including myocardial infarction, pulmonary emboli, stroke and even death in the operating room.  The patient understood these risks and wished to proceed.  Please note, the patient's family also understood these risks and participated in the consent as the patient has some minor memory loss.      DESCRIPTION OF PROCEDURE:  Milo Serna was taken to the main operating room under general endotracheal anesthesia.  The patient was prepped and draped in sterile fashion.  Following appropriate timeout procedure, approximately 1-cm incision was made in the periumbilical area and taken down to the level of midline fascia.  Fascia was incised with 11 blade and a Veress needle was introduced into the abdominal cavity and pneumoperitoneum was achieved and laparoscopic 12 mm port was placed in the periumbilical position.  A second 12 mm port was placed through a 12 mm incision  in the upper midline and two 5 mm ports were placed in the right upper quadrant.  Following this, the gallbladder was carefully inspected.  It was noted to have mild adhesions to the anterior portion of the gallbladder.  These were taken down with electrocautery and blunt dissection.  The gallbladder was retracted in the usual fashion.  The lateral and medial attachments of the gallbladder at the liver edge were taken down with hook cautery.  The cystic duct and cystic artery, both identified.  The critical view of safety was achieved.  Two tubes are going directly to the gallbladder, 2 windows were present and the gallbladder had been dissected off the gallbladder bed at the hilar plate.  At this point, the cystic duct and cystic artery were then clipped, 2 clips on the patient's side, 1 clip on the specimen side, cut with the Endoshears scissor.  The gallbladder was then completely dissected off the gallbladder bed and sent to pathology for frozen section.  Frozen section confirmed the benign nature of the patient's disease.  The abdominal cavity was carefully reinspected.  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  The remainder of the abdomen was completely benign.  At this point, the attention was then directed to closing the fascia at the umbilical and the upper midline ports.  These 12 mm ports were closed with 0 PDS suture and the suture passer.  Skin edges were then reapproximated with running 4-0 Monocryl suture and skin sealant.  The patient tolerated the procedure well.  Needle and sponge count correct x 2 and the patient was returned to postanesthesia recovery in good condition.         NINI QUINTANILLA MD             D: 2020   T: 2020   MT: ELEANOR      Name:     SAÚL GONZALEZ   MRN:      [id number]        Account:        [id number]   :      1940            Procedure Date: 12/28/2020      Document: [id number]

## 2020-12-30 ENCOUNTER — PATIENT OUTREACH (OUTPATIENT)
Dept: SURGERY | Facility: CLINIC | Age: 80
End: 2020-12-30

## 2020-12-30 NOTE — PROGRESS NOTES
RN Post-Op/Post-Discharge Care Coordination Note    Mr. Milo Serna is a 80 year old male who underwent laparoscopic cholecystectomy on 12/28 with  Dr. James Alvarez.  Spoke with Patient.    Support  Family member assisting with care     Health Status  Fevers/chills: patient states he had a temperature of 100.8 last night but it is back to normal today.  Nausea/Vomiting: Patient reports a lack of appetite.  Eating/drinking: Patient is able to eat and drink without any complaints. He said he is drinking plenty of fluid. Discussed eating small meals throughout the day.  Bowel habits: Patient reports no bowel movement since surgery. He is passing gas but feeling bloated. Discussed walking to help with this. He is using Senna, discussed increasing to 2 pills BID. He can also use Miralax if feeling uncomfortable.  Drains (LUISA): N/A  Incisions: Patient denies any signs and symptoms of infection..  Wound closure:  Skin Sealant  Pain: he has been using Oxycodone when needed, not every 4 hours but using throughout the day. He is also taking Tylenol Q6H. He has been using a heating pad to his abdomen prn and may also use this on his shoulder.  New Medications:  Oxycodone, Senna    Activity/Restrictions  No lifting in excess of 15-20 pounds for 3-4 weeks    Equipment  None    Pathology reviewed with patient:  No, not yet available    Forms/Letters  No    All of his questions were answered including reviewing restrictions, and wound care.  He will call this office if he has any further questions and/or concerns.       In lieu of a post-op clinic patient that patient would like to be contacted in approximately 7-10 days via telephone.    A copy of this note was routed to the primary surgeon.      Whom and When to Call  Patient acknowledges understanding of how to manage any medication changes and   when to seek medical care.     Patient advised that if after hour medical concerns arise to please call [phone] and choose  option 4 to speak to the physician on call.

## 2020-12-31 LAB — COPATH REPORT: NORMAL

## 2021-01-01 ENCOUNTER — OFFICE VISIT (OUTPATIENT)
Dept: FAMILY MEDICINE | Facility: CLINIC | Age: 81
End: 2021-01-01
Payer: MEDICARE

## 2021-01-01 ENCOUNTER — HOSPITAL ENCOUNTER (OUTPATIENT)
Dept: CARDIOLOGY | Facility: CLINIC | Age: 81
Discharge: HOME | End: 2021-07-08
Attending: INTERNAL MEDICINE | Admitting: INTERNAL MEDICINE
Payer: MEDICARE

## 2021-01-01 ENCOUNTER — APPOINTMENT (OUTPATIENT)
Dept: CT IMAGING | Facility: CLINIC | Age: 81
DRG: 291 | End: 2021-01-01
Attending: EMERGENCY MEDICINE
Payer: MEDICARE

## 2021-01-01 ENCOUNTER — LAB (OUTPATIENT)
Dept: LAB | Facility: CLINIC | Age: 81
End: 2021-01-01
Payer: MEDICARE

## 2021-01-01 ENCOUNTER — APPOINTMENT (OUTPATIENT)
Dept: PHYSICAL THERAPY | Facility: CLINIC | Age: 81
DRG: 291 | End: 2021-01-01
Attending: INTERNAL MEDICINE
Payer: MEDICARE

## 2021-01-01 ENCOUNTER — TELEPHONE (OUTPATIENT)
Dept: UROLOGY | Facility: CLINIC | Age: 81
End: 2021-01-01

## 2021-01-01 ENCOUNTER — TELEPHONE (OUTPATIENT)
Dept: FAMILY MEDICINE | Facility: CLINIC | Age: 81
End: 2021-01-01

## 2021-01-01 ENCOUNTER — APPOINTMENT (OUTPATIENT)
Dept: GENERAL RADIOLOGY | Facility: CLINIC | Age: 81
DRG: 291 | End: 2021-01-01
Attending: EMERGENCY MEDICINE
Payer: MEDICARE

## 2021-01-01 ENCOUNTER — OFFICE VISIT (OUTPATIENT)
Dept: CARDIOLOGY | Facility: CLINIC | Age: 81
End: 2021-01-01
Payer: MEDICARE

## 2021-01-01 ENCOUNTER — HOSPITAL ENCOUNTER (EMERGENCY)
Facility: CLINIC | Age: 81
End: 2021-01-01
Payer: MEDICARE

## 2021-01-01 ENCOUNTER — APPOINTMENT (OUTPATIENT)
Dept: OCCUPATIONAL THERAPY | Facility: CLINIC | Age: 81
DRG: 291 | End: 2021-01-01
Payer: MEDICARE

## 2021-01-01 ENCOUNTER — OFFICE VISIT (OUTPATIENT)
Dept: UROLOGY | Facility: CLINIC | Age: 81
End: 2021-01-01
Payer: MEDICARE

## 2021-01-01 ENCOUNTER — HOSPITAL ENCOUNTER (INPATIENT)
Facility: CLINIC | Age: 81
LOS: 9 days | Discharge: NURSING, REHAB OR LONG-TERM CARE | DRG: 291 | End: 2022-01-04
Attending: EMERGENCY MEDICINE | Admitting: HOSPITALIST
Payer: MEDICARE

## 2021-01-01 ENCOUNTER — TELEPHONE (OUTPATIENT)
Dept: OTHER | Facility: CLINIC | Age: 81
End: 2021-01-01

## 2021-01-01 ENCOUNTER — HOSPITAL ENCOUNTER (OUTPATIENT)
Dept: ULTRASOUND IMAGING | Facility: CLINIC | Age: 81
Discharge: HOME | End: 2021-10-15
Attending: PHYSICIAN ASSISTANT | Admitting: PHYSICIAN ASSISTANT
Payer: MEDICARE

## 2021-01-01 ENCOUNTER — APPOINTMENT (OUTPATIENT)
Dept: CT IMAGING | Facility: CLINIC | Age: 81
DRG: 291 | End: 2021-01-01
Attending: HOSPITALIST
Payer: MEDICARE

## 2021-01-01 ENCOUNTER — APPOINTMENT (OUTPATIENT)
Dept: OCCUPATIONAL THERAPY | Facility: CLINIC | Age: 81
DRG: 291 | End: 2021-01-01
Attending: HOSPITALIST
Payer: MEDICARE

## 2021-01-01 ENCOUNTER — OFFICE VISIT (OUTPATIENT)
Dept: PHARMACY | Facility: CLINIC | Age: 81
End: 2021-01-01
Payer: OTHER GOVERNMENT

## 2021-01-01 ENCOUNTER — APPOINTMENT (OUTPATIENT)
Dept: CARDIOLOGY | Facility: CLINIC | Age: 81
DRG: 291 | End: 2021-01-01
Attending: HOSPITALIST
Payer: MEDICARE

## 2021-01-01 ENCOUNTER — APPOINTMENT (OUTPATIENT)
Dept: GENERAL RADIOLOGY | Facility: CLINIC | Age: 81
DRG: 291 | End: 2021-01-01
Attending: PEDIATRICS
Payer: MEDICARE

## 2021-01-01 ENCOUNTER — OFFICE VISIT (OUTPATIENT)
Dept: PEDIATRICS | Facility: CLINIC | Age: 81
End: 2021-01-01
Payer: MEDICARE

## 2021-01-01 ENCOUNTER — CARE COORDINATION (OUTPATIENT)
Dept: CARDIOLOGY | Facility: CLINIC | Age: 81
End: 2021-01-01

## 2021-01-01 VITALS
DIASTOLIC BLOOD PRESSURE: 72 MMHG | HEIGHT: 67 IN | WEIGHT: 172.2 LBS | SYSTOLIC BLOOD PRESSURE: 138 MMHG | BODY MASS INDEX: 27.03 KG/M2 | HEART RATE: 75 BPM

## 2021-01-01 VITALS
HEART RATE: 58 BPM | WEIGHT: 163.8 LBS | OXYGEN SATURATION: 100 % | BODY MASS INDEX: 25.65 KG/M2 | RESPIRATION RATE: 26 BRPM | TEMPERATURE: 98.4 F | DIASTOLIC BLOOD PRESSURE: 62 MMHG | SYSTOLIC BLOOD PRESSURE: 124 MMHG

## 2021-01-01 VITALS
HEART RATE: 82 BPM | DIASTOLIC BLOOD PRESSURE: 76 MMHG | SYSTOLIC BLOOD PRESSURE: 128 MMHG | BODY MASS INDEX: 28.05 KG/M2 | TEMPERATURE: 98.2 F | OXYGEN SATURATION: 98 % | WEIGHT: 179.13 LBS

## 2021-01-01 VITALS
BODY MASS INDEX: 25.11 KG/M2 | WEIGHT: 160 LBS | HEART RATE: 53 BPM | SYSTOLIC BLOOD PRESSURE: 140 MMHG | DIASTOLIC BLOOD PRESSURE: 80 MMHG | OXYGEN SATURATION: 99 % | HEIGHT: 67 IN

## 2021-01-01 VITALS
HEART RATE: 87 BPM | DIASTOLIC BLOOD PRESSURE: 74 MMHG | SYSTOLIC BLOOD PRESSURE: 113 MMHG | BODY MASS INDEX: 26.16 KG/M2 | OXYGEN SATURATION: 100 % | TEMPERATURE: 97.9 F | HEIGHT: 67 IN

## 2021-01-01 VITALS
HEART RATE: 80 BPM | WEIGHT: 167 LBS | RESPIRATION RATE: 18 BRPM | SYSTOLIC BLOOD PRESSURE: 122 MMHG | TEMPERATURE: 98 F | DIASTOLIC BLOOD PRESSURE: 80 MMHG | BODY MASS INDEX: 26.16 KG/M2 | OXYGEN SATURATION: 99 %

## 2021-01-01 VITALS
HEART RATE: 67 BPM | WEIGHT: 170 LBS | DIASTOLIC BLOOD PRESSURE: 83 MMHG | SYSTOLIC BLOOD PRESSURE: 126 MMHG | OXYGEN SATURATION: 98 % | BODY MASS INDEX: 26.68 KG/M2 | HEIGHT: 67 IN | TEMPERATURE: 97.9 F

## 2021-01-01 VITALS
RESPIRATION RATE: 20 BRPM | OXYGEN SATURATION: 98 % | HEART RATE: 89 BPM | DIASTOLIC BLOOD PRESSURE: 82 MMHG | BODY MASS INDEX: 27 KG/M2 | HEIGHT: 67 IN | TEMPERATURE: 98 F | WEIGHT: 172 LBS | SYSTOLIC BLOOD PRESSURE: 130 MMHG

## 2021-01-01 VITALS
DIASTOLIC BLOOD PRESSURE: 81 MMHG | BODY MASS INDEX: 27.25 KG/M2 | HEART RATE: 64 BPM | OXYGEN SATURATION: 98 % | RESPIRATION RATE: 18 BRPM | SYSTOLIC BLOOD PRESSURE: 138 MMHG | TEMPERATURE: 98.1 F | WEIGHT: 174 LBS

## 2021-01-01 DIAGNOSIS — Z85.46 PERSONAL HISTORY OF MALIGNANT NEOPLASM OF PROSTATE: Primary | ICD-10-CM

## 2021-01-01 DIAGNOSIS — I34.0 MODERATE MITRAL VALVE REGURGITATION: ICD-10-CM

## 2021-01-01 DIAGNOSIS — I34.0 NONRHEUMATIC MITRAL VALVE REGURGITATION: ICD-10-CM

## 2021-01-01 DIAGNOSIS — I48.20 CHRONIC ATRIAL FIBRILLATION (H): ICD-10-CM

## 2021-01-01 DIAGNOSIS — I50.810 RIGHT HEART FAILURE WITH REDUCED RIGHT VENTRICULAR FUNCTION (H): ICD-10-CM

## 2021-01-01 DIAGNOSIS — Z79.01 ON APIXABAN THERAPY: ICD-10-CM

## 2021-01-01 DIAGNOSIS — I10 BENIGN ESSENTIAL HYPERTENSION: ICD-10-CM

## 2021-01-01 DIAGNOSIS — Z85.46 PERSONAL HISTORY OF MALIGNANT NEOPLASM OF PROSTATE: ICD-10-CM

## 2021-01-01 DIAGNOSIS — I49.3 PVC'S (PREMATURE VENTRICULAR CONTRACTIONS): ICD-10-CM

## 2021-01-01 DIAGNOSIS — I50.42 CHRONIC COMBINED SYSTOLIC AND DIASTOLIC CONGESTIVE HEART FAILURE (H): Primary | ICD-10-CM

## 2021-01-01 DIAGNOSIS — R60.0 BILATERAL LEG EDEMA: ICD-10-CM

## 2021-01-01 DIAGNOSIS — I50.42 CHRONIC COMBINED SYSTOLIC AND DIASTOLIC CONGESTIVE HEART FAILURE (H): ICD-10-CM

## 2021-01-01 DIAGNOSIS — I25.5 ISCHEMIC CARDIOMYOPATHY: ICD-10-CM

## 2021-01-01 DIAGNOSIS — Z23 COVID-19 VACCINE ADMINISTERED: ICD-10-CM

## 2021-01-01 DIAGNOSIS — C61 PROSTATE CANCER (H): ICD-10-CM

## 2021-01-01 DIAGNOSIS — M19.90 ARTHRITIS PAIN: ICD-10-CM

## 2021-01-01 DIAGNOSIS — I87.2 STASIS DERMATITIS OF BOTH LEGS: ICD-10-CM

## 2021-01-01 DIAGNOSIS — M79.89 RIGHT LEG SWELLING: ICD-10-CM

## 2021-01-01 DIAGNOSIS — R21 RASH: Primary | ICD-10-CM

## 2021-01-01 DIAGNOSIS — R73.09 ELEVATED GLUCOSE: ICD-10-CM

## 2021-01-01 DIAGNOSIS — K27.9 PUD (PEPTIC ULCER DISEASE): ICD-10-CM

## 2021-01-01 DIAGNOSIS — I50.9 CONGESTIVE HEART FAILURE, UNSPECIFIED HF CHRONICITY, UNSPECIFIED HEART FAILURE TYPE (H): ICD-10-CM

## 2021-01-01 DIAGNOSIS — Z98.890 STATUS POST CRYOABLATION: Primary | ICD-10-CM

## 2021-01-01 DIAGNOSIS — E78.5 HYPERLIPIDEMIA LDL GOAL <70: ICD-10-CM

## 2021-01-01 DIAGNOSIS — L95.9 VASCULITIS LIMITED TO THE SKIN, UNSPECIFIED: ICD-10-CM

## 2021-01-01 DIAGNOSIS — E78.5 HYPERLIPIDEMIA LDL GOAL <100: ICD-10-CM

## 2021-01-01 DIAGNOSIS — Z85.46 HISTORY OF PROSTATE CANCER: ICD-10-CM

## 2021-01-01 DIAGNOSIS — G47.00 INSOMNIA, UNSPECIFIED TYPE: ICD-10-CM

## 2021-01-01 DIAGNOSIS — G47.33 OBSTRUCTIVE SLEEP APNEA SYNDROME: ICD-10-CM

## 2021-01-01 DIAGNOSIS — L03.115 CELLULITIS OF RIGHT LOWER EXTREMITY: ICD-10-CM

## 2021-01-01 DIAGNOSIS — I25.10 CORONARY ARTERY DISEASE INVOLVING NATIVE CORONARY ARTERY OF NATIVE HEART WITHOUT ANGINA PECTORIS: ICD-10-CM

## 2021-01-01 DIAGNOSIS — L71.9 ROSACEA: ICD-10-CM

## 2021-01-01 DIAGNOSIS — E87.5 HYPERKALEMIA: ICD-10-CM

## 2021-01-01 DIAGNOSIS — Z95.1 STATUS POST CORONARY ARTERY BYPASS GRAFT: ICD-10-CM

## 2021-01-01 DIAGNOSIS — I48.20 CHRONIC ATRIAL FIBRILLATION (H): Primary | ICD-10-CM

## 2021-01-01 DIAGNOSIS — D64.9 ANEMIA, UNSPECIFIED TYPE: ICD-10-CM

## 2021-01-01 DIAGNOSIS — Z79.01 CHRONIC ANTICOAGULATION: ICD-10-CM

## 2021-01-01 DIAGNOSIS — Z63.79 STRESS DUE TO SPOUSE WITH DEMENTIA: ICD-10-CM

## 2021-01-01 DIAGNOSIS — Z23 NEED FOR PROPHYLACTIC VACCINATION AND INOCULATION AGAINST INFLUENZA: ICD-10-CM

## 2021-01-01 DIAGNOSIS — K59.03 DRUG-INDUCED CONSTIPATION: ICD-10-CM

## 2021-01-01 DIAGNOSIS — I50.23 ACUTE ON CHRONIC SYSTOLIC CONGESTIVE HEART FAILURE (H): Primary | ICD-10-CM

## 2021-01-01 DIAGNOSIS — I10 HTN, GOAL BELOW 150/90: ICD-10-CM

## 2021-01-01 DIAGNOSIS — I25.10 CORONARY ARTERY DISEASE INVOLVING NATIVE CORONARY ARTERY OF NATIVE HEART WITHOUT ANGINA PECTORIS: Primary | ICD-10-CM

## 2021-01-01 DIAGNOSIS — Z78.9 TAKES DIETARY SUPPLEMENTS: ICD-10-CM

## 2021-01-01 DIAGNOSIS — M79.89 RIGHT LEG SWELLING: Primary | ICD-10-CM

## 2021-01-01 LAB
ALBUMIN SERPL-MCNC: 2.5 G/DL (ref 3.4–5)
ALBUMIN SERPL-MCNC: 2.9 G/DL (ref 3.4–5)
ALBUMIN SERPL-MCNC: 3.6 G/DL (ref 3.4–5)
ALBUMIN SERPL-MCNC: 3.8 G/DL (ref 3.4–5)
ALBUMIN SERPL-MCNC: 3.8 G/DL (ref 3.4–5)
ALBUMIN UR-MCNC: NEGATIVE MG/DL
ALBUMIN UR-MCNC: NEGATIVE MG/DL
ALP SERPL-CCNC: 173 U/L (ref 40–150)
ALP SERPL-CCNC: 178 U/L (ref 40–150)
ALP SERPL-CCNC: 193 U/L (ref 40–150)
ALP SERPL-CCNC: 204 U/L (ref 40–150)
ALP SERPL-CCNC: 238 U/L (ref 40–150)
ALT SERPL W P-5'-P-CCNC: 13 U/L (ref 0–70)
ALT SERPL W P-5'-P-CCNC: 14 U/L (ref 0–70)
ALT SERPL W P-5'-P-CCNC: 15 U/L (ref 0–70)
ALT SERPL W P-5'-P-CCNC: 17 U/L (ref 0–70)
ALT SERPL W P-5'-P-CCNC: 20 U/L (ref 0–70)
ANION GAP SERPL CALCULATED.3IONS-SCNC: 3 MMOL/L (ref 3–14)
ANION GAP SERPL CALCULATED.3IONS-SCNC: 4 MMOL/L (ref 3–14)
ANION GAP SERPL CALCULATED.3IONS-SCNC: 4 MMOL/L (ref 3–14)
ANION GAP SERPL CALCULATED.3IONS-SCNC: 5 MMOL/L (ref 3–14)
ANION GAP SERPL CALCULATED.3IONS-SCNC: 6 MMOL/L (ref 3–14)
ANION GAP SERPL CALCULATED.3IONS-SCNC: 7 MMOL/L (ref 3–14)
ANION GAP SERPL CALCULATED.3IONS-SCNC: 8 MMOL/L (ref 3–14)
ANION GAP SERPL CALCULATED.3IONS-SCNC: 9 MMOL/L (ref 3–14)
APPEARANCE UR: ABNORMAL
APPEARANCE UR: CLEAR
AST SERPL W P-5'-P-CCNC: 19 U/L (ref 0–45)
AST SERPL W P-5'-P-CCNC: 19 U/L (ref 0–45)
AST SERPL W P-5'-P-CCNC: 21 U/L (ref 0–45)
AST SERPL W P-5'-P-CCNC: 24 U/L (ref 0–45)
AST SERPL W P-5'-P-CCNC: 73 U/L (ref 0–45)
ATRIAL RATE - MUSE: 113 BPM
ATRIAL RATE - MUSE: 300 BPM
ATRIAL RATE - MUSE: 71 BPM
ATRIAL RATE - MUSE: 74 BPM
BACTERIA #/AREA URNS HPF: ABNORMAL /HPF
BACTERIA BLD CULT: NO GROWTH
BACTERIA BLD CULT: NO GROWTH
BACTERIA UR CULT: NO GROWTH
BASE EXCESS BLDV CALC-SCNC: 3.6 MMOL/L (ref -7.7–1.9)
BASOPHILS # BLD AUTO: 0 10E3/UL (ref 0–0.2)
BASOPHILS # BLD AUTO: 0 10E9/L (ref 0–0.2)
BASOPHILS # BLD AUTO: 0.1 10E3/UL (ref 0–0.2)
BASOPHILS NFR BLD AUTO: 0.6 %
BASOPHILS NFR BLD AUTO: 1 %
BI-PLANE LVEF ECHO: NORMAL
BILIRUB SERPL-MCNC: 1.4 MG/DL (ref 0.2–1.3)
BILIRUB SERPL-MCNC: 1.6 MG/DL (ref 0.2–1.3)
BILIRUB SERPL-MCNC: 1.7 MG/DL (ref 0.2–1.3)
BILIRUB SERPL-MCNC: 1.9 MG/DL (ref 0.2–1.3)
BILIRUB SERPL-MCNC: 2.3 MG/DL (ref 0.2–1.3)
BILIRUB UR QL STRIP: NEGATIVE
BILIRUB UR QL STRIP: NEGATIVE
BUN SERPL-MCNC: 11 MG/DL (ref 7–30)
BUN SERPL-MCNC: 13 MG/DL (ref 7–30)
BUN SERPL-MCNC: 14 MG/DL (ref 7–30)
BUN SERPL-MCNC: 15 MG/DL (ref 7–30)
BUN SERPL-MCNC: 15 MG/DL (ref 7–30)
BUN SERPL-MCNC: 16 MG/DL (ref 7–30)
BUN SERPL-MCNC: 18 MG/DL (ref 7–30)
BUN SERPL-MCNC: 19 MG/DL (ref 7–30)
CALCIUM SERPL-MCNC: 8.3 MG/DL (ref 8.5–10.1)
CALCIUM SERPL-MCNC: 8.5 MG/DL (ref 8.5–10.1)
CALCIUM SERPL-MCNC: 8.6 MG/DL (ref 8.5–10.1)
CALCIUM SERPL-MCNC: 8.7 MG/DL (ref 8.5–10.1)
CALCIUM SERPL-MCNC: 8.8 MG/DL (ref 8.5–10.1)
CALCIUM SERPL-MCNC: 8.9 MG/DL (ref 8.5–10.1)
CALCIUM SERPL-MCNC: 9.1 MG/DL (ref 8.5–10.1)
CALCIUM SERPL-MCNC: 9.2 MG/DL (ref 8.5–10.1)
CALCIUM SERPL-MCNC: 9.3 MG/DL (ref 8.5–10.1)
CALCIUM SERPL-MCNC: 9.3 MG/DL (ref 8.5–10.1)
CHLORIDE BLD-SCNC: 104 MMOL/L (ref 94–109)
CHLORIDE BLD-SCNC: 105 MMOL/L (ref 94–109)
CHLORIDE BLD-SCNC: 106 MMOL/L (ref 94–109)
CHLORIDE BLD-SCNC: 98 MMOL/L (ref 94–109)
CHLORIDE BLD-SCNC: 99 MMOL/L (ref 94–109)
CHLORIDE SERPL-SCNC: 104 MMOL/L (ref 94–109)
CHLORIDE SERPL-SCNC: 104 MMOL/L (ref 94–109)
CHOLEST SERPL-MCNC: 101 MG/DL
CHOLEST SERPL-MCNC: 121 MG/DL
CO2 SERPL-SCNC: 25 MMOL/L (ref 20–32)
CO2 SERPL-SCNC: 25 MMOL/L (ref 20–32)
CO2 SERPL-SCNC: 27 MMOL/L (ref 20–32)
CO2 SERPL-SCNC: 28 MMOL/L (ref 20–32)
CO2 SERPL-SCNC: 29 MMOL/L (ref 20–32)
CO2 SERPL-SCNC: 33 MMOL/L (ref 20–32)
COLOR UR AUTO: ABNORMAL
COLOR UR AUTO: ABNORMAL
CREAT SERPL-MCNC: 0.76 MG/DL (ref 0.66–1.25)
CREAT SERPL-MCNC: 0.77 MG/DL (ref 0.66–1.25)
CREAT SERPL-MCNC: 0.87 MG/DL (ref 0.66–1.25)
CREAT SERPL-MCNC: 0.88 MG/DL (ref 0.66–1.25)
CREAT SERPL-MCNC: 0.89 MG/DL (ref 0.66–1.25)
CREAT SERPL-MCNC: 0.9 MG/DL (ref 0.66–1.25)
CREAT SERPL-MCNC: 0.94 MG/DL (ref 0.66–1.25)
CREAT SERPL-MCNC: 0.96 MG/DL (ref 0.66–1.25)
CREAT SERPL-MCNC: 0.98 MG/DL (ref 0.66–1.25)
CREAT SERPL-MCNC: 0.98 MG/DL (ref 0.66–1.25)
DIASTOLIC BLOOD PRESSURE - MUSE: NORMAL MMHG
DIFFERENTIAL METHOD BLD: ABNORMAL
EOSINOPHIL # BLD AUTO: 0.1 10E3/UL (ref 0–0.7)
EOSINOPHIL # BLD AUTO: 0.1 10E9/L (ref 0–0.7)
EOSINOPHIL # BLD AUTO: 0.3 10E3/UL (ref 0–0.7)
EOSINOPHIL NFR BLD AUTO: 1.9 %
EOSINOPHIL NFR BLD AUTO: 2 %
EOSINOPHIL NFR BLD AUTO: 6 %
ERYTHROCYTE [DISTWIDTH] IN BLOOD BY AUTOMATED COUNT: 14.2 % (ref 10–15)
ERYTHROCYTE [DISTWIDTH] IN BLOOD BY AUTOMATED COUNT: 15.4 % (ref 10–15)
ERYTHROCYTE [DISTWIDTH] IN BLOOD BY AUTOMATED COUNT: 15.7 % (ref 10–15)
ERYTHROCYTE [SEDIMENTATION RATE] IN BLOOD BY WESTERGREN METHOD: 19 MM/HR (ref 0–20)
ETHANOL SERPL-MCNC: <0.01 G/DL
GFR SERPL CREATININE-BSD FRML MDRD: 71 ML/MIN/{1.73_M2}
GFR SERPL CREATININE-BSD FRML MDRD: 73 ML/MIN/1.73M2
GFR SERPL CREATININE-BSD FRML MDRD: 77 ML/MIN/1.73M2
GFR SERPL CREATININE-BSD FRML MDRD: 80 ML/MIN/1.73M2
GFR SERPL CREATININE-BSD FRML MDRD: 81 ML/MIN/1.73M2
GFR SERPL CREATININE-BSD FRML MDRD: 85 ML/MIN/1.73M2
GFR SERPL CREATININE-BSD FRML MDRD: 85 ML/MIN/{1.73_M2}
GFR SERPL CREATININE-BSD FRML MDRD: 86 ML/MIN/1.73M2
GFR SERPL CREATININE-BSD FRML MDRD: 86 ML/MIN/1.73M2
GFR SERPL CREATININE-BSD FRML MDRD: 89 ML/MIN/1.73M2
GLUCOSE BLD-MCNC: 100 MG/DL (ref 70–99)
GLUCOSE BLD-MCNC: 101 MG/DL (ref 70–99)
GLUCOSE BLD-MCNC: 103 MG/DL (ref 70–99)
GLUCOSE BLD-MCNC: 121 MG/DL (ref 70–99)
GLUCOSE BLD-MCNC: 83 MG/DL (ref 70–99)
GLUCOSE BLD-MCNC: 94 MG/DL (ref 70–99)
GLUCOSE BLDC GLUCOMTR-MCNC: 105 MG/DL (ref 70–99)
GLUCOSE BLDC GLUCOMTR-MCNC: 113 MG/DL (ref 70–99)
GLUCOSE BLDC GLUCOMTR-MCNC: 79 MG/DL (ref 70–99)
GLUCOSE BLDC GLUCOMTR-MCNC: 88 MG/DL (ref 70–99)
GLUCOSE SERPL-MCNC: 110 MG/DL (ref 70–99)
GLUCOSE SERPL-MCNC: 95 MG/DL (ref 70–99)
GLUCOSE UR STRIP-MCNC: NEGATIVE MG/DL
GLUCOSE UR STRIP-MCNC: NEGATIVE MG/DL
HBA1C MFR BLD: 5.3 % (ref 0–5.6)
HCO3 BLDV-SCNC: 29 MMOL/L (ref 21–28)
HCT VFR BLD AUTO: 34.3 % (ref 40–53)
HCT VFR BLD AUTO: 34.6 % (ref 40–53)
HCT VFR BLD AUTO: 34.8 % (ref 40–53)
HCT VFR BLD AUTO: 36.4 % (ref 40–53)
HCT VFR BLD AUTO: 37.1 % (ref 40–53)
HDLC SERPL-MCNC: 59 MG/DL
HDLC SERPL-MCNC: 62 MG/DL
HGB BLD-MCNC: 10.7 G/DL (ref 13.3–17.7)
HGB BLD-MCNC: 11.3 G/DL (ref 13.3–17.7)
HGB BLD-MCNC: 11.3 G/DL (ref 13.3–17.7)
HGB BLD-MCNC: 11.4 G/DL (ref 13.3–17.7)
HGB BLD-MCNC: 11.5 G/DL (ref 13.3–17.7)
HGB BLD-MCNC: 11.6 G/DL (ref 13.3–17.7)
HGB UR QL STRIP: ABNORMAL
HGB UR QL STRIP: NEGATIVE
HOLD SPECIMEN: NORMAL
HOLD SPECIMEN: NORMAL
IMM GRANULOCYTES # BLD: 0 10E3/UL
IMM GRANULOCYTES NFR BLD: 0 %
INTERPRETATION ECG - MUSE: NORMAL
KETONES UR STRIP-MCNC: NEGATIVE MG/DL
KETONES UR STRIP-MCNC: NEGATIVE MG/DL
LDLC SERPL CALC-MCNC: 32 MG/DL
LDLC SERPL CALC-MCNC: 52 MG/DL
LEUKOCYTE ESTERASE UR QL STRIP: ABNORMAL
LEUKOCYTE ESTERASE UR QL STRIP: NEGATIVE
LVEF ECHO: NORMAL
LYMPHOCYTES # BLD AUTO: 0.4 10E3/UL (ref 0.8–5.3)
LYMPHOCYTES # BLD AUTO: 0.4 10E9/L (ref 0.8–5.3)
LYMPHOCYTES # BLD AUTO: 0.5 10E3/UL (ref 0.8–5.3)
LYMPHOCYTES NFR BLD AUTO: 11 %
LYMPHOCYTES NFR BLD AUTO: 11 %
LYMPHOCYTES NFR BLD AUTO: 13 %
LYMPHOCYTES NFR BLD AUTO: 13.6 %
LYMPHOCYTES NFR BLD AUTO: 8 %
MAGNESIUM SERPL-MCNC: 1.9 MG/DL (ref 1.6–2.3)
MAGNESIUM SERPL-MCNC: 2 MG/DL (ref 1.6–2.3)
MAGNESIUM SERPL-MCNC: 2.2 MG/DL (ref 1.6–2.3)
MAGNESIUM SERPL-MCNC: 2.4 MG/DL (ref 1.6–2.3)
MCH RBC QN AUTO: 31.9 PG (ref 26.5–33)
MCH RBC QN AUTO: 32.4 PG (ref 26.5–33)
MCH RBC QN AUTO: 32.5 PG (ref 26.5–33)
MCH RBC QN AUTO: 33 PG (ref 26.5–33)
MCH RBC QN AUTO: 33.4 PG (ref 26.5–33)
MCHC RBC AUTO-ENTMCNC: 30.5 G/DL (ref 31.5–36.5)
MCHC RBC AUTO-ENTMCNC: 31.2 G/DL (ref 31.5–36.5)
MCHC RBC AUTO-ENTMCNC: 31.9 G/DL (ref 31.5–36.5)
MCHC RBC AUTO-ENTMCNC: 32.8 G/DL (ref 31.5–36.5)
MCHC RBC AUTO-ENTMCNC: 33.2 G/DL (ref 31.5–36.5)
MCV RBC AUTO: 100 FL (ref 78–100)
MCV RBC AUTO: 102 FL (ref 78–100)
MCV RBC AUTO: 104 FL (ref 78–100)
MCV RBC AUTO: 107 FL (ref 78–100)
MCV RBC AUTO: 99 FL (ref 78–100)
MONOCYTES # BLD AUTO: 0.6 10E3/UL (ref 0–1.3)
MONOCYTES # BLD AUTO: 0.6 10E9/L (ref 0–1.3)
MONOCYTES # BLD AUTO: 0.7 10E3/UL (ref 0–1.3)
MONOCYTES NFR BLD AUTO: 12 %
MONOCYTES NFR BLD AUTO: 13 %
MONOCYTES NFR BLD AUTO: 14 %
MONOCYTES NFR BLD AUTO: 14 %
MONOCYTES NFR BLD AUTO: 18.5 %
MUCOUS THREADS #/AREA URNS LPF: PRESENT /LPF
MUCOUS THREADS #/AREA URNS LPF: PRESENT /LPF
NEUTROPHILS # BLD AUTO: 2.1 10E9/L (ref 1.6–8.3)
NEUTROPHILS # BLD AUTO: 2.6 10E3/UL (ref 1.6–8.3)
NEUTROPHILS # BLD AUTO: 3.5 10E3/UL (ref 1.6–8.3)
NEUTROPHILS # BLD AUTO: 3.5 10E3/UL (ref 1.6–8.3)
NEUTROPHILS # BLD AUTO: 3.6 10E3/UL (ref 1.6–8.3)
NEUTROPHILS NFR BLD AUTO: 65.4 %
NEUTROPHILS NFR BLD AUTO: 66 %
NEUTROPHILS NFR BLD AUTO: 73 %
NEUTROPHILS NFR BLD AUTO: 74 %
NEUTROPHILS NFR BLD AUTO: 75 %
NITRATE UR QL: NEGATIVE
NITRATE UR QL: NEGATIVE
NONHDLC SERPL-MCNC: 39 MG/DL
NONHDLC SERPL-MCNC: 62 MG/DL
NRBC # BLD AUTO: 0 10E3/UL
NRBC BLD AUTO-RTO: 0 /100
NT-PROBNP SERPL-MCNC: 1786 PG/ML (ref 0–450)
NT-PROBNP SERPL-MCNC: 1868 PG/ML (ref 0–1800)
O2/TOTAL GAS SETTING VFR VENT: 0 %
P AXIS - MUSE: NORMAL DEGREES
PCO2 BLDV: 46 MM HG (ref 40–50)
PH BLDV: 7.41 [PH] (ref 7.32–7.43)
PH UR STRIP: 6.5 [PH] (ref 5–7)
PH UR STRIP: 7 [PH] (ref 5–7)
PHOSPHATE SERPL-MCNC: 3.1 MG/DL (ref 2.5–4.5)
PHOSPHATE SERPL-MCNC: 3.1 MG/DL (ref 2.5–4.5)
PHOSPHATE SERPL-MCNC: 3.2 MG/DL (ref 2.5–4.5)
PLATELET # BLD AUTO: 124 10E3/UL (ref 150–450)
PLATELET # BLD AUTO: 137 10E9/L (ref 150–450)
PLATELET # BLD AUTO: 147 10E3/UL (ref 150–450)
PLATELET # BLD AUTO: 150 10E3/UL (ref 150–450)
PLATELET # BLD AUTO: 153 10E3/UL (ref 150–450)
PO2 BLDV: 31 MM HG (ref 25–47)
POTASSIUM BLD-SCNC: 3.3 MMOL/L (ref 3.4–5.3)
POTASSIUM BLD-SCNC: 3.3 MMOL/L (ref 3.4–5.3)
POTASSIUM BLD-SCNC: 3.4 MMOL/L (ref 3.4–5.3)
POTASSIUM BLD-SCNC: 3.4 MMOL/L (ref 3.4–5.3)
POTASSIUM BLD-SCNC: 3.5 MMOL/L (ref 3.4–5.3)
POTASSIUM BLD-SCNC: 3.5 MMOL/L (ref 3.4–5.3)
POTASSIUM BLD-SCNC: 3.6 MMOL/L (ref 3.4–5.3)
POTASSIUM BLD-SCNC: 3.7 MMOL/L (ref 3.4–5.3)
POTASSIUM BLD-SCNC: 3.8 MMOL/L (ref 3.4–5.3)
POTASSIUM BLD-SCNC: 3.9 MMOL/L (ref 3.4–5.3)
POTASSIUM BLD-SCNC: 4 MMOL/L (ref 3.4–5.3)
POTASSIUM BLD-SCNC: 5.9 MMOL/L (ref 3.4–5.3)
POTASSIUM SERPL-SCNC: 4 MMOL/L (ref 3.4–5.3)
POTASSIUM SERPL-SCNC: 4.1 MMOL/L (ref 3.4–5.3)
PR INTERVAL - MUSE: NORMAL MS
PROT SERPL-MCNC: 6.4 G/DL (ref 6.8–8.8)
PROT SERPL-MCNC: 7.6 G/DL (ref 6.8–8.8)
PROT SERPL-MCNC: 7.8 G/DL (ref 6.8–8.8)
PROT SERPL-MCNC: 7.9 G/DL (ref 6.8–8.8)
PROT SERPL-MCNC: 8 G/DL (ref 6.8–8.8)
PSA SERPL-MCNC: 0.74 UG/L (ref 0–4)
QRS DURATION - MUSE: 106 MS
QRS DURATION - MUSE: 110 MS
QRS DURATION - MUSE: 118 MS
QRS DURATION - MUSE: 118 MS
QT - MUSE: 428 MS
QT - MUSE: 444 MS
QT - MUSE: 450 MS
QT - MUSE: 468 MS
QTC - MUSE: 471 MS
QTC - MUSE: 475 MS
QTC - MUSE: 478 MS
QTC - MUSE: 492 MS
R AXIS - MUSE: 22 DEGREES
R AXIS - MUSE: 43 DEGREES
R AXIS - MUSE: 46 DEGREES
R AXIS - MUSE: 49 DEGREES
RBC # BLD AUTO: 3.3 10E6/UL (ref 4.4–5.9)
RBC # BLD AUTO: 3.41 10E6/UL (ref 4.4–5.9)
RBC # BLD AUTO: 3.48 10E6/UL (ref 4.4–5.9)
RBC # BLD AUTO: 3.49 10E12/L (ref 4.4–5.9)
RBC # BLD AUTO: 3.64 10E6/UL (ref 4.4–5.9)
RBC URINE: 1 /HPF
RBC URINE: >182 /HPF
SARS-COV-2 RNA RESP QL NAA+PROBE: NEGATIVE
SODIUM SERPL-SCNC: 134 MMOL/L (ref 133–144)
SODIUM SERPL-SCNC: 135 MMOL/L (ref 133–144)
SODIUM SERPL-SCNC: 137 MMOL/L (ref 133–144)
SODIUM SERPL-SCNC: 138 MMOL/L (ref 133–144)
SODIUM SERPL-SCNC: 138 MMOL/L (ref 133–144)
SODIUM SERPL-SCNC: 139 MMOL/L (ref 133–144)
SODIUM SERPL-SCNC: 140 MMOL/L (ref 133–144)
SODIUM SERPL-SCNC: 140 MMOL/L (ref 133–144)
SP GR UR STRIP: 1.01 (ref 1–1.03)
SP GR UR STRIP: 1.01 (ref 1–1.03)
SYSTOLIC BLOOD PRESSURE - MUSE: NORMAL MMHG
T AXIS - MUSE: -45 DEGREES
T AXIS - MUSE: -47 DEGREES
T AXIS - MUSE: 1 DEGREES
T AXIS - MUSE: 270 DEGREES
TRIGL SERPL-MCNC: 36 MG/DL
TRIGL SERPL-MCNC: 49 MG/DL
TROPONIN I SERPL HS-MCNC: 28 NG/L
UROBILINOGEN UR STRIP-MCNC: NORMAL MG/DL
UROBILINOGEN UR STRIP-MCNC: NORMAL MG/DL
VENTRICULAR RATE- MUSE: 61 BPM
VENTRICULAR RATE- MUSE: 68 BPM
VENTRICULAR RATE- MUSE: 74 BPM
VENTRICULAR RATE- MUSE: 74 BPM
WBC # BLD AUTO: 3.2 10E9/L (ref 4–11)
WBC # BLD AUTO: 3.9 10E3/UL (ref 4–11)
WBC # BLD AUTO: 4.7 10E3/UL (ref 4–11)
WBC # BLD AUTO: 4.8 10E3/UL (ref 4–11)
WBC # BLD AUTO: 4.9 10E3/UL (ref 4–11)
WBC URINE: 25 /HPF
WBC URINE: <1 /HPF

## 2021-01-01 PROCEDURE — 250N000009 HC RX 250: Performed by: EMERGENCY MEDICINE

## 2021-01-01 PROCEDURE — 97165 OT EVAL LOW COMPLEX 30 MIN: CPT | Mod: GO

## 2021-01-01 PROCEDURE — 97530 THERAPEUTIC ACTIVITIES: CPT | Mod: GO

## 2021-01-01 PROCEDURE — 36415 COLL VENOUS BLD VENIPUNCTURE: CPT

## 2021-01-01 PROCEDURE — 99605 MTMS BY PHARM NP 15 MIN: CPT | Performed by: PHARMACIST

## 2021-01-01 PROCEDURE — 250N000011 HC RX IP 250 OP 636: Performed by: HOSPITALIST

## 2021-01-01 PROCEDURE — 36415 COLL VENOUS BLD VENIPUNCTURE: CPT | Performed by: FAMILY MEDICINE

## 2021-01-01 PROCEDURE — 99207 PR NO CHARGE LOS: CPT | Performed by: PHYSICIAN ASSISTANT

## 2021-01-01 PROCEDURE — 71045 X-RAY EXAM CHEST 1 VIEW: CPT

## 2021-01-01 PROCEDURE — 97530 THERAPEUTIC ACTIVITIES: CPT | Mod: GP

## 2021-01-01 PROCEDURE — 99214 OFFICE O/P EST MOD 30 MIN: CPT | Mod: 25 | Performed by: FAMILY MEDICINE

## 2021-01-01 PROCEDURE — 84132 ASSAY OF SERUM POTASSIUM: CPT | Performed by: INTERNAL MEDICINE

## 2021-01-01 PROCEDURE — 99233 SBSQ HOSP IP/OBS HIGH 50: CPT | Performed by: PEDIATRICS

## 2021-01-01 PROCEDURE — 99215 OFFICE O/P EST HI 40 MIN: CPT | Mod: 25 | Performed by: PHYSICIAN ASSISTANT

## 2021-01-01 PROCEDURE — C9803 HOPD COVID-19 SPEC COLLECT: HCPCS

## 2021-01-01 PROCEDURE — 87086 URINE CULTURE/COLONY COUNT: CPT | Performed by: PEDIATRICS

## 2021-01-01 PROCEDURE — 80048 BASIC METABOLIC PNL TOTAL CA: CPT

## 2021-01-01 PROCEDURE — 96374 THER/PROPH/DIAG INJ IV PUSH: CPT | Mod: 59

## 2021-01-01 PROCEDURE — 99214 OFFICE O/P EST MOD 30 MIN: CPT | Performed by: FAMILY MEDICINE

## 2021-01-01 PROCEDURE — 83880 ASSAY OF NATRIURETIC PEPTIDE: CPT | Performed by: EMERGENCY MEDICINE

## 2021-01-01 PROCEDURE — 93005 ELECTROCARDIOGRAM TRACING: CPT

## 2021-01-01 PROCEDURE — 85004 AUTOMATED DIFF WBC COUNT: CPT | Performed by: INTERNAL MEDICINE

## 2021-01-01 PROCEDURE — 250N000011 HC RX IP 250 OP 636: Performed by: INTERNAL MEDICINE

## 2021-01-01 PROCEDURE — 80061 LIPID PANEL: CPT | Performed by: INTERNAL MEDICINE

## 2021-01-01 PROCEDURE — 250N000013 HC RX MED GY IP 250 OP 250 PS 637: Performed by: PEDIATRICS

## 2021-01-01 PROCEDURE — 250N000013 HC RX MED GY IP 250 OP 250 PS 637: Performed by: INTERNAL MEDICINE

## 2021-01-01 PROCEDURE — 36415 COLL VENOUS BLD VENIPUNCTURE: CPT | Performed by: HOSPITALIST

## 2021-01-01 PROCEDURE — 999N000157 HC STATISTIC RCP TIME EA 10 MIN

## 2021-01-01 PROCEDURE — 99213 OFFICE O/P EST LOW 20 MIN: CPT | Performed by: UROLOGY

## 2021-01-01 PROCEDURE — 250N000013 HC RX MED GY IP 250 OP 250 PS 637: Performed by: HOSPITALIST

## 2021-01-01 PROCEDURE — 99232 SBSQ HOSP IP/OBS MODERATE 35: CPT | Performed by: PEDIATRICS

## 2021-01-01 PROCEDURE — 97535 SELF CARE MNGMENT TRAINING: CPT | Mod: GO

## 2021-01-01 PROCEDURE — 250N000011 HC RX IP 250 OP 636: Performed by: EMERGENCY MEDICINE

## 2021-01-01 PROCEDURE — 120N000001 HC R&B MED SURG/OB

## 2021-01-01 PROCEDURE — 36415 COLL VENOUS BLD VENIPUNCTURE: CPT | Performed by: PHYSICIAN ASSISTANT

## 2021-01-01 PROCEDURE — G0463 HOSPITAL OUTPT CLINIC VISIT: HCPCS

## 2021-01-01 PROCEDURE — 83735 ASSAY OF MAGNESIUM: CPT | Performed by: PEDIATRICS

## 2021-01-01 PROCEDURE — 85018 HEMOGLOBIN: CPT | Performed by: STUDENT IN AN ORGANIZED HEALTH CARE EDUCATION/TRAINING PROGRAM

## 2021-01-01 PROCEDURE — G0008 ADMIN INFLUENZA VIRUS VAC: HCPCS | Performed by: FAMILY MEDICINE

## 2021-01-01 PROCEDURE — 70450 CT HEAD/BRAIN W/O DYE: CPT

## 2021-01-01 PROCEDURE — 85025 COMPLETE CBC W/AUTO DIFF WBC: CPT | Performed by: HOSPITALIST

## 2021-01-01 PROCEDURE — 93306 TTE W/DOPPLER COMPLETE: CPT

## 2021-01-01 PROCEDURE — 82040 ASSAY OF SERUM ALBUMIN: CPT | Performed by: EMERGENCY MEDICINE

## 2021-01-01 PROCEDURE — 250N000013 HC RX MED GY IP 250 OP 250 PS 637: Performed by: EMERGENCY MEDICINE

## 2021-01-01 PROCEDURE — 36415 COLL VENOUS BLD VENIPUNCTURE: CPT | Performed by: STUDENT IN AN ORGANIZED HEALTH CARE EDUCATION/TRAINING PROGRAM

## 2021-01-01 PROCEDURE — 99214 OFFICE O/P EST MOD 30 MIN: CPT | Performed by: PHYSICIAN ASSISTANT

## 2021-01-01 PROCEDURE — 93005 ELECTROCARDIOGRAM TRACING: CPT | Mod: 76

## 2021-01-01 PROCEDURE — 82077 ASSAY SPEC XCP UR&BREATH IA: CPT | Performed by: EMERGENCY MEDICINE

## 2021-01-01 PROCEDURE — 84100 ASSAY OF PHOSPHORUS: CPT | Performed by: PEDIATRICS

## 2021-01-01 PROCEDURE — 99213 OFFICE O/P EST LOW 20 MIN: CPT | Performed by: PHYSICIAN ASSISTANT

## 2021-01-01 PROCEDURE — 84484 ASSAY OF TROPONIN QUANT: CPT | Performed by: EMERGENCY MEDICINE

## 2021-01-01 PROCEDURE — 97161 PT EVAL LOW COMPLEX 20 MIN: CPT | Mod: GP

## 2021-01-01 PROCEDURE — 85025 COMPLETE CBC W/AUTO DIFF WBC: CPT | Performed by: PHYSICIAN ASSISTANT

## 2021-01-01 PROCEDURE — 51702 INSERT TEMP BLADDER CATH: CPT

## 2021-01-01 PROCEDURE — 83880 ASSAY OF NATRIURETIC PEPTIDE: CPT | Performed by: INTERNAL MEDICINE

## 2021-01-01 PROCEDURE — 80053 COMPREHEN METABOLIC PANEL: CPT | Performed by: HOSPITALIST

## 2021-01-01 PROCEDURE — 80048 BASIC METABOLIC PNL TOTAL CA: CPT | Performed by: INTERNAL MEDICINE

## 2021-01-01 PROCEDURE — 83036 HEMOGLOBIN GLYCOSYLATED A1C: CPT | Performed by: FAMILY MEDICINE

## 2021-01-01 PROCEDURE — 250N000011 HC RX IP 250 OP 636: Performed by: PEDIATRICS

## 2021-01-01 PROCEDURE — 80053 COMPREHEN METABOLIC PANEL: CPT | Performed by: EMERGENCY MEDICINE

## 2021-01-01 PROCEDURE — 36415 COLL VENOUS BLD VENIPUNCTURE: CPT | Performed by: EMERGENCY MEDICINE

## 2021-01-01 PROCEDURE — 99223 1ST HOSP IP/OBS HIGH 75: CPT | Mod: AI | Performed by: HOSPITALIST

## 2021-01-01 PROCEDURE — 99417 PROLNG OP E/M EACH 15 MIN: CPT | Performed by: PHYSICIAN ASSISTANT

## 2021-01-01 PROCEDURE — 85652 RBC SED RATE AUTOMATED: CPT | Performed by: PHYSICIAN ASSISTANT

## 2021-01-01 PROCEDURE — 87040 BLOOD CULTURE FOR BACTERIA: CPT | Performed by: EMERGENCY MEDICINE

## 2021-01-01 PROCEDURE — 36415 COLL VENOUS BLD VENIPUNCTURE: CPT | Performed by: INTERNAL MEDICINE

## 2021-01-01 PROCEDURE — 90662 IIV NO PRSV INCREASED AG IM: CPT | Performed by: FAMILY MEDICINE

## 2021-01-01 PROCEDURE — 82803 BLOOD GASES ANY COMBINATION: CPT | Performed by: EMERGENCY MEDICINE

## 2021-01-01 PROCEDURE — 93306 TTE W/DOPPLER COMPLETE: CPT | Mod: 26 | Performed by: INTERNAL MEDICINE

## 2021-01-01 PROCEDURE — 74176 CT ABD & PELVIS W/O CONTRAST: CPT

## 2021-01-01 PROCEDURE — 85025 COMPLETE CBC W/AUTO DIFF WBC: CPT | Performed by: EMERGENCY MEDICINE

## 2021-01-01 PROCEDURE — 80053 COMPREHEN METABOLIC PANEL: CPT | Performed by: INTERNAL MEDICINE

## 2021-01-01 PROCEDURE — 93971 EXTREMITY STUDY: CPT | Mod: RT

## 2021-01-01 PROCEDURE — 73030 X-RAY EXAM OF SHOULDER: CPT | Mod: LT

## 2021-01-01 PROCEDURE — 94660 CPAP INITIATION&MGMT: CPT

## 2021-01-01 PROCEDURE — 99233 SBSQ HOSP IP/OBS HIGH 50: CPT | Performed by: INTERNAL MEDICINE

## 2021-01-01 PROCEDURE — 84132 ASSAY OF SERUM POTASSIUM: CPT | Performed by: HOSPITALIST

## 2021-01-01 PROCEDURE — 80048 BASIC METABOLIC PNL TOTAL CA: CPT | Performed by: PEDIATRICS

## 2021-01-01 PROCEDURE — 99214 OFFICE O/P EST MOD 30 MIN: CPT | Performed by: INTERNAL MEDICINE

## 2021-01-01 PROCEDURE — 96375 TX/PRO/DX INJ NEW DRUG ADDON: CPT

## 2021-01-01 PROCEDURE — 87086 URINE CULTURE/COLONY COUNT: CPT | Performed by: EMERGENCY MEDICINE

## 2021-01-01 PROCEDURE — 250N000011 HC RX IP 250 OP 636: Performed by: STUDENT IN AN ORGANIZED HEALTH CARE EDUCATION/TRAINING PROGRAM

## 2021-01-01 PROCEDURE — 99607 MTMS BY PHARM ADDL 15 MIN: CPT | Performed by: PHARMACIST

## 2021-01-01 PROCEDURE — 36415 COLL VENOUS BLD VENIPUNCTURE: CPT | Performed by: PEDIATRICS

## 2021-01-01 PROCEDURE — 99285 EMERGENCY DEPT VISIT HI MDM: CPT | Mod: 25

## 2021-01-01 PROCEDURE — 81001 URINALYSIS AUTO W/SCOPE: CPT | Performed by: PEDIATRICS

## 2021-01-01 PROCEDURE — 84132 ASSAY OF SERUM POTASSIUM: CPT | Performed by: PEDIATRICS

## 2021-01-01 PROCEDURE — 81001 URINALYSIS AUTO W/SCOPE: CPT | Performed by: EMERGENCY MEDICINE

## 2021-01-01 PROCEDURE — 96372 THER/PROPH/DIAG INJ SC/IM: CPT | Performed by: PHYSICIAN ASSISTANT

## 2021-01-01 PROCEDURE — 0004A PR COVID VAC PFIZER DIL RECON 30 MCG/0.3 ML IM: CPT | Performed by: FAMILY MEDICINE

## 2021-01-01 PROCEDURE — 97116 GAIT TRAINING THERAPY: CPT | Mod: GP

## 2021-01-01 PROCEDURE — 36415 COLL VENOUS BLD VENIPUNCTURE: CPT | Performed by: UROLOGY

## 2021-01-01 PROCEDURE — 83735 ASSAY OF MAGNESIUM: CPT | Performed by: INTERNAL MEDICINE

## 2021-01-01 PROCEDURE — 51798 US URINE CAPACITY MEASURE: CPT

## 2021-01-01 PROCEDURE — 80061 LIPID PANEL: CPT | Performed by: FAMILY MEDICINE

## 2021-01-01 PROCEDURE — 999N000105 HC STATISTIC NO DOCUMENTATION TO SUPPORT CHARGE

## 2021-01-01 PROCEDURE — 80053 COMPREHEN METABOLIC PANEL: CPT | Performed by: PHYSICIAN ASSISTANT

## 2021-01-01 PROCEDURE — 258N000001 HC RX 258: Performed by: EMERGENCY MEDICINE

## 2021-01-01 PROCEDURE — 84153 ASSAY OF PSA TOTAL: CPT | Performed by: UROLOGY

## 2021-01-01 PROCEDURE — 93000 ELECTROCARDIOGRAM COMPLETE: CPT | Performed by: INTERNAL MEDICINE

## 2021-01-01 PROCEDURE — 85025 COMPLETE CBC W/AUTO DIFF WBC: CPT | Performed by: FAMILY MEDICINE

## 2021-01-01 PROCEDURE — 91300 PR COVID VAC PFIZER DIL RECON 30 MCG/0.3 ML IM: CPT | Performed by: FAMILY MEDICINE

## 2021-01-01 PROCEDURE — 73630 X-RAY EXAM OF FOOT: CPT | Mod: 50

## 2021-01-01 PROCEDURE — 80053 COMPREHEN METABOLIC PANEL: CPT | Performed by: FAMILY MEDICINE

## 2021-01-01 PROCEDURE — 87635 SARS-COV-2 COVID-19 AMP PRB: CPT | Performed by: EMERGENCY MEDICINE

## 2021-01-01 RX ORDER — FUROSEMIDE 10 MG/ML
60 INJECTION INTRAMUSCULAR; INTRAVENOUS ONCE
Status: COMPLETED | OUTPATIENT
Start: 2021-01-01 | End: 2021-01-01

## 2021-01-01 RX ORDER — DEXTROSE MONOHYDRATE 100 MG/ML
INJECTION, SOLUTION INTRAVENOUS CONTINUOUS
Status: DISCONTINUED | OUTPATIENT
Start: 2021-01-01 | End: 2021-01-01

## 2021-01-01 RX ORDER — LIDOCAINE 40 MG/G
CREAM TOPICAL
Status: DISCONTINUED | OUTPATIENT
Start: 2021-01-01 | End: 2022-01-01

## 2021-01-01 RX ORDER — PREDNISONE 20 MG/1
40 TABLET ORAL
Qty: 10 TABLET | Refills: 0 | Status: SHIPPED | OUTPATIENT
Start: 2021-01-01 | End: 2021-01-01

## 2021-01-01 RX ORDER — OLANZAPINE 5 MG/1
5 TABLET, ORALLY DISINTEGRATING ORAL AT BEDTIME
Status: DISCONTINUED | OUTPATIENT
Start: 2021-01-01 | End: 2022-01-01

## 2021-01-01 RX ORDER — LIDOCAINE 40 MG/G
CREAM TOPICAL
Status: DISCONTINUED | OUTPATIENT
Start: 2021-01-01 | End: 2022-01-01 | Stop reason: HOSPADM

## 2021-01-01 RX ORDER — AMOXICILLIN 250 MG
1 CAPSULE ORAL 2 TIMES DAILY
Qty: 180 TABLET | Refills: 3 | Status: SHIPPED | OUTPATIENT
Start: 2021-01-01 | End: 2021-01-01

## 2021-01-01 RX ORDER — ONDANSETRON 4 MG/1
4 TABLET, ORALLY DISINTEGRATING ORAL EVERY 6 HOURS PRN
Status: DISCONTINUED | OUTPATIENT
Start: 2021-01-01 | End: 2022-01-01 | Stop reason: HOSPADM

## 2021-01-01 RX ORDER — SODIUM POLYSTYRENE SULFONATE 15 G/60ML
15 SUSPENSION ORAL; RECTAL ONCE
Status: DISCONTINUED | OUTPATIENT
Start: 2021-01-01 | End: 2021-01-01

## 2021-01-01 RX ORDER — DOXYCYCLINE 100 MG/1
100 CAPSULE ORAL DAILY
Status: DISCONTINUED | OUTPATIENT
Start: 2021-01-01 | End: 2021-01-01

## 2021-01-01 RX ORDER — PREDNISONE 20 MG/1
TABLET ORAL
Qty: 20 TABLET | Refills: 0 | Status: SHIPPED | OUTPATIENT
Start: 2021-01-01 | End: 2021-01-01

## 2021-01-01 RX ORDER — NALOXONE HYDROCHLORIDE 0.4 MG/ML
0.4 INJECTION, SOLUTION INTRAMUSCULAR; INTRAVENOUS; SUBCUTANEOUS
Status: DISCONTINUED | OUTPATIENT
Start: 2021-01-01 | End: 2022-01-01 | Stop reason: HOSPADM

## 2021-01-01 RX ORDER — ACETAMINOPHEN 500 MG
500 TABLET ORAL EVERY 4 HOURS PRN
Status: DISCONTINUED | OUTPATIENT
Start: 2021-01-01 | End: 2021-01-01 | Stop reason: DRUGHIGH

## 2021-01-01 RX ORDER — DEXTROSE MONOHYDRATE 25 G/50ML
25 INJECTION, SOLUTION INTRAVENOUS ONCE
Status: COMPLETED | OUTPATIENT
Start: 2021-01-01 | End: 2021-01-01

## 2021-01-01 RX ORDER — POTASSIUM CHLORIDE 1500 MG/1
40 TABLET, EXTENDED RELEASE ORAL ONCE
Status: COMPLETED | OUTPATIENT
Start: 2021-01-01 | End: 2021-01-01

## 2021-01-01 RX ORDER — CETIRIZINE HYDROCHLORIDE 10 MG/1
10 TABLET ORAL 2 TIMES DAILY
Qty: 28 TABLET | Refills: 0 | Status: SHIPPED | OUTPATIENT
Start: 2021-01-01 | End: 2021-01-01

## 2021-01-01 RX ORDER — POLYETHYLENE GLYCOL 3350 17 G/17G
17 POWDER, FOR SOLUTION ORAL 2 TIMES DAILY PRN
Status: DISCONTINUED | OUTPATIENT
Start: 2021-01-01 | End: 2022-01-01 | Stop reason: HOSPADM

## 2021-01-01 RX ORDER — FUROSEMIDE 10 MG/ML
60 INJECTION INTRAMUSCULAR; INTRAVENOUS EVERY 8 HOURS
Status: DISCONTINUED | OUTPATIENT
Start: 2021-01-01 | End: 2021-01-01

## 2021-01-01 RX ORDER — ONDANSETRON 2 MG/ML
4 INJECTION INTRAMUSCULAR; INTRAVENOUS EVERY 6 HOURS PRN
Status: DISCONTINUED | OUTPATIENT
Start: 2021-01-01 | End: 2022-01-01 | Stop reason: HOSPADM

## 2021-01-01 RX ORDER — SENNOSIDES 8.6 MG
8.6 TABLET ORAL 2 TIMES DAILY PRN
Status: DISCONTINUED | OUTPATIENT
Start: 2021-01-01 | End: 2022-01-01 | Stop reason: HOSPADM

## 2021-01-01 RX ORDER — LISINOPRIL 20 MG/1
20 TABLET ORAL DAILY
Qty: 100 TABLET | Refills: 4 | Status: ON HOLD | COMMUNITY
Start: 2021-01-01 | End: 2022-01-01

## 2021-01-01 RX ORDER — FUROSEMIDE 10 MG/ML
40 INJECTION INTRAMUSCULAR; INTRAVENOUS
Status: DISCONTINUED | OUTPATIENT
Start: 2021-01-01 | End: 2022-01-01

## 2021-01-01 RX ORDER — CALCIUM GLUCONATE 20 MG/ML
1 INJECTION, SOLUTION INTRAVENOUS ONCE
Status: COMPLETED | OUTPATIENT
Start: 2021-01-01 | End: 2021-01-01

## 2021-01-01 RX ORDER — TAMSULOSIN HYDROCHLORIDE 0.4 MG/1
CAPSULE ORAL
Qty: 90 CAPSULE | Refills: 3 | Status: SHIPPED | OUTPATIENT
Start: 2021-01-01 | End: 2021-01-01

## 2021-01-01 RX ORDER — BISACODYL 5 MG
5 TABLET, DELAYED RELEASE (ENTERIC COATED) ORAL DAILY PRN
Status: DISCONTINUED | OUTPATIENT
Start: 2021-01-01 | End: 2022-01-01 | Stop reason: HOSPADM

## 2021-01-01 RX ORDER — CEFTRIAXONE 1 G/1
1 INJECTION, POWDER, FOR SOLUTION INTRAMUSCULAR; INTRAVENOUS EVERY 24 HOURS
Status: DISCONTINUED | OUTPATIENT
Start: 2021-01-01 | End: 2022-01-01

## 2021-01-01 RX ORDER — POTASSIUM CHLORIDE 1500 MG/1
20 TABLET, EXTENDED RELEASE ORAL 2 TIMES DAILY
Qty: 180 TABLET | Refills: 1 | Status: SHIPPED | OUTPATIENT
Start: 2021-01-01 | End: 2021-01-01

## 2021-01-01 RX ORDER — POTASSIUM CHLORIDE 1500 MG/1
20 TABLET, EXTENDED RELEASE ORAL DAILY
Qty: 3 TABLET | Refills: 0 | Status: SHIPPED | OUTPATIENT
Start: 2021-01-01 | End: 2021-01-01

## 2021-01-01 RX ORDER — ACETAMINOPHEN 500 MG
500-1000 TABLET ORAL EVERY 8 HOURS PRN
Status: DISCONTINUED | OUTPATIENT
Start: 2021-01-01 | End: 2022-01-01 | Stop reason: HOSPADM

## 2021-01-01 RX ORDER — CALCIUM CARBONATE 500(1250)
1 TABLET ORAL DAILY
COMMUNITY
End: 2021-01-01

## 2021-01-01 RX ORDER — FERROUS GLUCONATE 324(38)MG
324 TABLET ORAL
Status: DISCONTINUED | OUTPATIENT
Start: 2021-01-01 | End: 2022-01-01 | Stop reason: HOSPADM

## 2021-01-01 RX ORDER — OLANZAPINE 5 MG/1
5 TABLET, ORALLY DISINTEGRATING ORAL AT BEDTIME
Status: DISCONTINUED | OUTPATIENT
Start: 2021-01-01 | End: 2021-01-01

## 2021-01-01 RX ORDER — TAMSULOSIN HYDROCHLORIDE 0.4 MG/1
0.4 CAPSULE ORAL DAILY
Qty: 90 CAPSULE | Refills: 3 | Status: SHIPPED | OUTPATIENT
Start: 2021-01-01

## 2021-01-01 RX ORDER — GABAPENTIN 100 MG/1
100 CAPSULE ORAL 2 TIMES DAILY
Status: DISCONTINUED | OUTPATIENT
Start: 2021-01-01 | End: 2021-01-01

## 2021-01-01 RX ORDER — LISINOPRIL 20 MG/1
20 TABLET ORAL DAILY
Status: DISCONTINUED | OUTPATIENT
Start: 2021-01-01 | End: 2021-01-01

## 2021-01-01 RX ORDER — TORSEMIDE 20 MG/1
40 TABLET ORAL DAILY
Qty: 28 TABLET | Refills: 0 | Status: SHIPPED | OUTPATIENT
Start: 2021-01-01 | End: 2021-01-01

## 2021-01-01 RX ORDER — SIMVASTATIN 10 MG
20 TABLET ORAL AT BEDTIME
Status: DISCONTINUED | OUTPATIENT
Start: 2021-01-01 | End: 2022-01-01 | Stop reason: HOSPADM

## 2021-01-01 RX ORDER — CEPHALEXIN 500 MG/1
500 CAPSULE ORAL 3 TIMES DAILY
Qty: 30 CAPSULE | Refills: 0 | Status: SHIPPED | OUTPATIENT
Start: 2021-01-01 | End: 2021-01-01

## 2021-01-01 RX ORDER — TAMSULOSIN HYDROCHLORIDE 0.4 MG/1
0.4 CAPSULE ORAL DAILY
Status: DISCONTINUED | OUTPATIENT
Start: 2021-01-01 | End: 2022-01-01 | Stop reason: HOSPADM

## 2021-01-01 RX ORDER — NICOTINE POLACRILEX 4 MG
15-30 LOZENGE BUCCAL
Status: DISCONTINUED | OUTPATIENT
Start: 2021-01-01 | End: 2022-01-01 | Stop reason: HOSPADM

## 2021-01-01 RX ORDER — FUROSEMIDE 20 MG
20 TABLET ORAL DAILY
Qty: 3 TABLET | Refills: 0 | Status: SHIPPED | OUTPATIENT
Start: 2021-01-01 | End: 2021-01-01

## 2021-01-01 RX ORDER — TORSEMIDE 10 MG/1
10 TABLET ORAL EVERY MORNING
Qty: 100 TABLET | Refills: 4 | Status: ON HOLD | OUTPATIENT
Start: 2021-01-01 | End: 2022-01-01

## 2021-01-01 RX ORDER — FUROSEMIDE 40 MG
40 TABLET ORAL
Status: DISCONTINUED | OUTPATIENT
Start: 2021-01-01 | End: 2021-01-01

## 2021-01-01 RX ORDER — POTASSIUM CHLORIDE 1500 MG/1
20 TABLET, EXTENDED RELEASE ORAL 2 TIMES DAILY
Qty: 200 TABLET | Refills: 3 | COMMUNITY
Start: 2021-01-01

## 2021-01-01 RX ORDER — SIMVASTATIN 20 MG
20 TABLET ORAL AT BEDTIME
Qty: 100 TABLET | Refills: 4 | COMMUNITY
Start: 2021-01-01

## 2021-01-01 RX ORDER — CEFTRIAXONE SODIUM 1 G
1 VIAL (EA) INJECTION ONCE
Status: COMPLETED | OUTPATIENT
Start: 2021-01-01 | End: 2021-01-01

## 2021-01-01 RX ORDER — OMEPRAZOLE 40 MG/1
40 CAPSULE, DELAYED RELEASE ORAL DAILY
Qty: 90 CAPSULE | Refills: 3 | Status: SHIPPED | OUTPATIENT
Start: 2021-01-01

## 2021-01-01 RX ORDER — NALOXONE HYDROCHLORIDE 0.4 MG/ML
0.2 INJECTION, SOLUTION INTRAMUSCULAR; INTRAVENOUS; SUBCUTANEOUS
Status: DISCONTINUED | OUTPATIENT
Start: 2021-01-01 | End: 2022-01-01 | Stop reason: HOSPADM

## 2021-01-01 RX ORDER — TORSEMIDE 10 MG/1
10 TABLET ORAL EVERY MORNING
Qty: 100 TABLET | Refills: 4 | Status: SHIPPED | OUTPATIENT
Start: 2021-01-01 | End: 2021-01-01

## 2021-01-01 RX ORDER — UREA 200 MG/G
CREAM TOPICAL DAILY PRN
Status: DISCONTINUED | OUTPATIENT
Start: 2021-01-01 | End: 2022-01-01 | Stop reason: HOSPADM

## 2021-01-01 RX ORDER — DEXTROSE MONOHYDRATE 25 G/50ML
25-50 INJECTION, SOLUTION INTRAVENOUS
Status: DISCONTINUED | OUTPATIENT
Start: 2021-01-01 | End: 2022-01-01 | Stop reason: HOSPADM

## 2021-01-01 RX ORDER — FUROSEMIDE 10 MG/ML
40 INJECTION INTRAMUSCULAR; INTRAVENOUS EVERY 12 HOURS
Status: DISCONTINUED | OUTPATIENT
Start: 2021-01-01 | End: 2021-01-01

## 2021-01-01 RX ORDER — FUROSEMIDE 10 MG/ML
40 INJECTION INTRAMUSCULAR; INTRAVENOUS 2 TIMES DAILY
Status: DISCONTINUED | OUTPATIENT
Start: 2021-01-01 | End: 2021-01-01

## 2021-01-01 RX ORDER — SIMVASTATIN 20 MG
20 TABLET ORAL AT BEDTIME
Qty: 90 TABLET | Refills: 1 | Status: SHIPPED | OUTPATIENT
Start: 2021-01-01 | End: 2021-01-01

## 2021-01-01 RX ORDER — POTASSIUM CHLORIDE 1.5 G/1.58G
40 POWDER, FOR SOLUTION ORAL ONCE
Status: COMPLETED | OUTPATIENT
Start: 2021-01-01 | End: 2021-01-01

## 2021-01-01 RX ORDER — DOXYCYCLINE HYCLATE 100 MG
100 TABLET ORAL DAILY
Qty: 90 TABLET | Refills: 3 | Status: ON HOLD | OUTPATIENT
Start: 2021-01-01 | End: 2022-01-01

## 2021-01-01 RX ORDER — AMOXICILLIN 500 MG/1
CAPSULE ORAL
COMMUNITY
Start: 2021-01-01 | End: 2021-01-01

## 2021-01-01 RX ORDER — MAGNESIUM SULFATE HEPTAHYDRATE 40 MG/ML
2 INJECTION, SOLUTION INTRAVENOUS ONCE
Status: COMPLETED | OUTPATIENT
Start: 2021-01-01 | End: 2021-01-01

## 2021-01-01 RX ADMIN — FUROSEMIDE 40 MG: 10 INJECTION, SOLUTION INTRAMUSCULAR; INTRAVENOUS at 17:24

## 2021-01-01 RX ADMIN — ACETAMINOPHEN 1000 MG: 500 TABLET, FILM COATED ORAL at 15:03

## 2021-01-01 RX ADMIN — SODIUM BICARBONATE 50 MEQ: 84 INJECTION, SOLUTION INTRAVENOUS at 16:40

## 2021-01-01 RX ADMIN — MICONAZOLE NITRATE: 2 POWDER TOPICAL at 08:17

## 2021-01-01 RX ADMIN — GABAPENTIN 100 MG: 100 CAPSULE ORAL at 08:12

## 2021-01-01 RX ADMIN — ACETAMINOPHEN 1000 MG: 500 TABLET, FILM COATED ORAL at 10:09

## 2021-01-01 RX ADMIN — OMEPRAZOLE 40 MG: 20 CAPSULE, DELAYED RELEASE ORAL at 08:12

## 2021-01-01 RX ADMIN — DICLOFENAC SODIUM 2 G: 10 GEL TOPICAL at 07:58

## 2021-01-01 RX ADMIN — OMEPRAZOLE 40 MG: 20 CAPSULE, DELAYED RELEASE ORAL at 08:30

## 2021-01-01 RX ADMIN — APIXABAN 2.5 MG: 2.5 TABLET, FILM COATED ORAL at 10:05

## 2021-01-01 RX ADMIN — CEFTRIAXONE 1 G: 1 INJECTION, POWDER, FOR SOLUTION INTRAMUSCULAR; INTRAVENOUS at 20:47

## 2021-01-01 RX ADMIN — MAGNESIUM SULFATE HEPTAHYDRATE 2 G: 40 INJECTION, SOLUTION INTRAVENOUS at 07:53

## 2021-01-01 RX ADMIN — APIXABAN 2.5 MG: 2.5 TABLET, FILM COATED ORAL at 21:23

## 2021-01-01 RX ADMIN — FERROUS GLUCONATE 324 MG: 324 TABLET ORAL at 08:46

## 2021-01-01 RX ADMIN — APIXABAN 2.5 MG: 2.5 TABLET, FILM COATED ORAL at 08:12

## 2021-01-01 RX ADMIN — OLANZAPINE 5 MG: 5 TABLET, ORALLY DISINTEGRATING ORAL at 22:12

## 2021-01-01 RX ADMIN — OMEPRAZOLE 40 MG: 20 CAPSULE, DELAYED RELEASE ORAL at 07:52

## 2021-01-01 RX ADMIN — DOXYCYCLINE HYCLATE 100 MG: 100 CAPSULE ORAL at 08:12

## 2021-01-01 RX ADMIN — MICONAZOLE NITRATE: 2 POWDER TOPICAL at 22:10

## 2021-01-01 RX ADMIN — TAMSULOSIN HYDROCHLORIDE 0.4 MG: 0.4 CAPSULE ORAL at 08:30

## 2021-01-01 RX ADMIN — DICLOFENAC SODIUM 2 G: 10 GEL TOPICAL at 18:52

## 2021-01-01 RX ADMIN — ACETAMINOPHEN 500 MG: 500 TABLET, FILM COATED ORAL at 16:34

## 2021-01-01 RX ADMIN — MICONAZOLE NITRATE: 2 POWDER TOPICAL at 08:50

## 2021-01-01 RX ADMIN — MICONAZOLE NITRATE: 2 POWDER TOPICAL at 10:07

## 2021-01-01 RX ADMIN — OMEPRAZOLE 40 MG: 20 CAPSULE, DELAYED RELEASE ORAL at 08:46

## 2021-01-01 RX ADMIN — FUROSEMIDE 60 MG: 10 INJECTION, SOLUTION INTRAMUSCULAR; INTRAVENOUS at 22:42

## 2021-01-01 RX ADMIN — MICONAZOLE NITRATE: 2 POWDER TOPICAL at 20:36

## 2021-01-01 RX ADMIN — FERROUS GLUCONATE 324 MG: 324 TABLET ORAL at 08:30

## 2021-01-01 RX ADMIN — GABAPENTIN 100 MG: 100 CAPSULE ORAL at 05:58

## 2021-01-01 RX ADMIN — Medication 1 G: at 10:36

## 2021-01-01 RX ADMIN — FERROUS GLUCONATE 324 MG: 324 TABLET ORAL at 07:52

## 2021-01-01 RX ADMIN — APIXABAN 2.5 MG: 2.5 TABLET, FILM COATED ORAL at 08:30

## 2021-01-01 RX ADMIN — FUROSEMIDE 40 MG: 40 TABLET ORAL at 08:30

## 2021-01-01 RX ADMIN — APIXABAN 2.5 MG: 2.5 TABLET, FILM COATED ORAL at 23:33

## 2021-01-01 RX ADMIN — GABAPENTIN 100 MG: 100 CAPSULE ORAL at 10:05

## 2021-01-01 RX ADMIN — MICONAZOLE NITRATE: 2 POWDER TOPICAL at 22:43

## 2021-01-01 RX ADMIN — POTASSIUM CHLORIDE 40 MEQ: 1500 TABLET, EXTENDED RELEASE ORAL at 07:52

## 2021-01-01 RX ADMIN — SIMVASTATIN 20 MG: 10 TABLET, FILM COATED ORAL at 21:23

## 2021-01-01 RX ADMIN — TRAMADOL HYDROCHLORIDE 25 MG: 50 TABLET, FILM COATED ORAL at 21:23

## 2021-01-01 RX ADMIN — GABAPENTIN 100 MG: 100 CAPSULE ORAL at 08:46

## 2021-01-01 RX ADMIN — SIMVASTATIN 20 MG: 10 TABLET, FILM COATED ORAL at 22:20

## 2021-01-01 RX ADMIN — DICLOFENAC SODIUM 2 G: 10 GEL TOPICAL at 13:10

## 2021-01-01 RX ADMIN — GABAPENTIN 100 MG: 100 CAPSULE ORAL at 22:10

## 2021-01-01 RX ADMIN — DEXTROSE MONOHYDRATE 25 G: 25 INJECTION, SOLUTION INTRAVENOUS at 16:35

## 2021-01-01 RX ADMIN — FUROSEMIDE 40 MG: 10 INJECTION, SOLUTION INTRAMUSCULAR; INTRAVENOUS at 06:02

## 2021-01-01 RX ADMIN — APIXABAN 2.5 MG: 2.5 TABLET, FILM COATED ORAL at 22:10

## 2021-01-01 RX ADMIN — TRAMADOL HYDROCHLORIDE 50 MG: 50 TABLET, FILM COATED ORAL at 05:58

## 2021-01-01 RX ADMIN — APIXABAN 2.5 MG: 2.5 TABLET, FILM COATED ORAL at 20:40

## 2021-01-01 RX ADMIN — SIMVASTATIN 20 MG: 10 TABLET, FILM COATED ORAL at 22:56

## 2021-01-01 RX ADMIN — DICLOFENAC SODIUM 2 G: 10 GEL TOPICAL at 19:28

## 2021-01-01 RX ADMIN — DOXYCYCLINE HYCLATE 100 MG: 100 CAPSULE ORAL at 07:52

## 2021-01-01 RX ADMIN — APIXABAN 2.5 MG: 2.5 TABLET, FILM COATED ORAL at 20:37

## 2021-01-01 RX ADMIN — FUROSEMIDE 40 MG: 10 INJECTION, SOLUTION INTRAMUSCULAR; INTRAVENOUS at 18:41

## 2021-01-01 RX ADMIN — MICONAZOLE NITRATE: 2 POWDER TOPICAL at 07:59

## 2021-01-01 RX ADMIN — APIXABAN 2.5 MG: 2.5 TABLET, FILM COATED ORAL at 08:46

## 2021-01-01 RX ADMIN — SIMVASTATIN 20 MG: 10 TABLET, FILM COATED ORAL at 22:10

## 2021-01-01 RX ADMIN — DICLOFENAC SODIUM 2 G: 10 GEL TOPICAL at 08:31

## 2021-01-01 RX ADMIN — OMEPRAZOLE 40 MG: 20 CAPSULE, DELAYED RELEASE ORAL at 10:05

## 2021-01-01 RX ADMIN — DOXYCYCLINE HYCLATE 100 MG: 100 CAPSULE ORAL at 10:05

## 2021-01-01 RX ADMIN — POTASSIUM CHLORIDE 40 MEQ: 20 TABLET, EXTENDED RELEASE ORAL at 15:09

## 2021-01-01 RX ADMIN — ACETAMINOPHEN 1000 MG: 500 TABLET, FILM COATED ORAL at 04:55

## 2021-01-01 RX ADMIN — POTASSIUM CHLORIDE 40 MEQ: 1.5 POWDER, FOR SOLUTION ORAL at 08:51

## 2021-01-01 RX ADMIN — OLANZAPINE 2.5 MG: 5 TABLET, ORALLY DISINTEGRATING ORAL at 21:23

## 2021-01-01 RX ADMIN — DICLOFENAC SODIUM 2 G: 10 GEL TOPICAL at 15:06

## 2021-01-01 RX ADMIN — FUROSEMIDE 60 MG: 10 INJECTION, SOLUTION INTRAMUSCULAR; INTRAVENOUS at 05:21

## 2021-01-01 RX ADMIN — FUROSEMIDE 40 MG: 10 INJECTION, SOLUTION INTRAMUSCULAR; INTRAVENOUS at 18:02

## 2021-01-01 RX ADMIN — Medication 1 MG: at 21:23

## 2021-01-01 RX ADMIN — DOXYCYCLINE HYCLATE 100 MG: 100 CAPSULE ORAL at 08:46

## 2021-01-01 RX ADMIN — DICLOFENAC SODIUM 2 G: 10 GEL TOPICAL at 22:16

## 2021-01-01 RX ADMIN — APIXABAN 2.5 MG: 2.5 TABLET, FILM COATED ORAL at 07:53

## 2021-01-01 RX ADMIN — FERROUS GLUCONATE 324 MG: 324 TABLET ORAL at 10:05

## 2021-01-01 RX ADMIN — HUMAN INSULIN 7.6 UNITS: 100 INJECTION, SOLUTION SUBCUTANEOUS at 16:39

## 2021-01-01 RX ADMIN — MICONAZOLE NITRATE: 2 POWDER TOPICAL at 22:31

## 2021-01-01 RX ADMIN — FUROSEMIDE 40 MG: 10 INJECTION, SOLUTION INTRAMUSCULAR; INTRAVENOUS at 05:23

## 2021-01-01 RX ADMIN — SIMVASTATIN 20 MG: 10 TABLET, FILM COATED ORAL at 22:11

## 2021-01-01 RX ADMIN — LISINOPRIL 20 MG: 20 TABLET ORAL at 08:12

## 2021-01-01 RX ADMIN — FERROUS GLUCONATE 324 MG: 324 TABLET ORAL at 08:12

## 2021-01-01 RX ADMIN — FUROSEMIDE 40 MG: 10 INJECTION, SOLUTION INTRAMUSCULAR; INTRAVENOUS at 19:28

## 2021-01-01 RX ADMIN — TAMSULOSIN HYDROCHLORIDE 0.4 MG: 0.4 CAPSULE ORAL at 07:52

## 2021-01-01 RX ADMIN — DICLOFENAC SODIUM 2 G: 10 GEL TOPICAL at 12:36

## 2021-01-01 RX ADMIN — GABAPENTIN 100 MG: 100 CAPSULE ORAL at 20:37

## 2021-01-01 RX ADMIN — TAMSULOSIN HYDROCHLORIDE 0.4 MG: 0.4 CAPSULE ORAL at 08:46

## 2021-01-01 RX ADMIN — FUROSEMIDE 60 MG: 10 INJECTION, SOLUTION INTRAMUSCULAR; INTRAVENOUS at 13:44

## 2021-01-01 RX ADMIN — DICLOFENAC SODIUM 2 G: 10 GEL TOPICAL at 18:08

## 2021-01-01 RX ADMIN — TAMSULOSIN HYDROCHLORIDE 0.4 MG: 0.4 CAPSULE ORAL at 10:05

## 2021-01-01 RX ADMIN — OLANZAPINE 5 MG: 5 TABLET, ORALLY DISINTEGRATING ORAL at 22:57

## 2021-01-01 RX ADMIN — TAMSULOSIN HYDROCHLORIDE 0.4 MG: 0.4 CAPSULE ORAL at 08:12

## 2021-01-01 RX ADMIN — CALCIUM GLUCONATE 1 G: 20 INJECTION, SOLUTION INTRAVENOUS at 16:16

## 2021-01-01 RX ADMIN — APIXABAN 2.5 MG: 2.5 TABLET, FILM COATED ORAL at 22:13

## 2021-01-01 RX ADMIN — SIMVASTATIN 20 MG: 10 TABLET, FILM COATED ORAL at 23:33

## 2021-01-01 RX ADMIN — DICLOFENAC SODIUM 2 G: 10 GEL TOPICAL at 23:42

## 2021-01-01 ASSESSMENT — ACTIVITIES OF DAILY LIVING (ADL)
ADLS_ACUITY_SCORE: 11
ADLS_ACUITY_SCORE: 20
ADLS_ACUITY_SCORE: 20
ADLS_ACUITY_SCORE: 18
ADLS_ACUITY_SCORE: 12
ADLS_ACUITY_SCORE: 20
ADLS_ACUITY_SCORE: 11
ADLS_ACUITY_SCORE: 18
ADLS_ACUITY_SCORE: 11
ADLS_ACUITY_SCORE: 20
ADLS_ACUITY_SCORE: 20
ADLS_ACUITY_SCORE: 19
ADLS_ACUITY_SCORE: 20
ADLS_ACUITY_SCORE: 18
ADLS_ACUITY_SCORE: 18
ADLS_ACUITY_SCORE: 20
ADLS_ACUITY_SCORE: 20
ADLS_ACUITY_SCORE: 18
ADLS_ACUITY_SCORE: 18
ADLS_ACUITY_SCORE: 11
ADLS_ACUITY_SCORE: 18
ADLS_ACUITY_SCORE: 18
ADLS_ACUITY_SCORE: 11
ADLS_ACUITY_SCORE: 17
ADLS_ACUITY_SCORE: 18
ADLS_ACUITY_SCORE: 19
ADLS_ACUITY_SCORE: 18
ADLS_ACUITY_SCORE: 18
ADLS_ACUITY_SCORE: 22
ADLS_ACUITY_SCORE: 20
ADLS_ACUITY_SCORE: 22
ADLS_ACUITY_SCORE: 20
ADLS_ACUITY_SCORE: 18
ADLS_ACUITY_SCORE: 18
ADLS_ACUITY_SCORE: 20
ADLS_ACUITY_SCORE: 20
ADLS_ACUITY_SCORE: 22
ADLS_ACUITY_SCORE: 11
ADLS_ACUITY_SCORE: 20
ADLS_ACUITY_SCORE: 18
ADLS_ACUITY_SCORE: 20
ADLS_ACUITY_SCORE: 20
ADLS_ACUITY_SCORE: 18
ADLS_ACUITY_SCORE: 11
ADLS_ACUITY_SCORE: 22
ADLS_ACUITY_SCORE: 22
ADLS_ACUITY_SCORE: 18
ADLS_ACUITY_SCORE: 20
ADLS_ACUITY_SCORE: 11
ADLS_ACUITY_SCORE: 18
ADLS_ACUITY_SCORE: 11
ADLS_ACUITY_SCORE: 19
ADLS_ACUITY_SCORE: 12
ADLS_ACUITY_SCORE: 19
ADLS_ACUITY_SCORE: 17
ADLS_ACUITY_SCORE: 20
ADLS_ACUITY_SCORE: 18
ADLS_ACUITY_SCORE: 20
ADLS_ACUITY_SCORE: 11
ADLS_ACUITY_SCORE: 18
ADLS_ACUITY_SCORE: 18
ADLS_ACUITY_SCORE: 22
ADLS_ACUITY_SCORE: 18
ADLS_ACUITY_SCORE: 20
ADLS_ACUITY_SCORE: 20
ADLS_ACUITY_SCORE: 11
ADLS_ACUITY_SCORE: 18
ADLS_ACUITY_SCORE: 22
ADLS_ACUITY_SCORE: 22
ADLS_ACUITY_SCORE: 11
ADLS_ACUITY_SCORE: 19
ADLS_ACUITY_SCORE: 11
ADLS_ACUITY_SCORE: 18
ADLS_ACUITY_SCORE: 11
ADLS_ACUITY_SCORE: 22
ADLS_ACUITY_SCORE: 18
ADLS_ACUITY_SCORE: 11
ADLS_ACUITY_SCORE: 20
ADLS_ACUITY_SCORE: 19
ADLS_ACUITY_SCORE: 18
ADLS_ACUITY_SCORE: 12
ADLS_ACUITY_SCORE: 18
ADLS_ACUITY_SCORE: 18
ADLS_ACUITY_SCORE: 11
ADLS_ACUITY_SCORE: 18
ADLS_ACUITY_SCORE: 20
ADLS_ACUITY_SCORE: 19
ADLS_ACUITY_SCORE: 18
ADLS_ACUITY_SCORE: 20
ADLS_ACUITY_SCORE: 19
ADLS_ACUITY_SCORE: 18
ADLS_ACUITY_SCORE: 11
ADLS_ACUITY_SCORE: 19
ADLS_ACUITY_SCORE: 19
ADLS_ACUITY_SCORE: 11
ADLS_ACUITY_SCORE: 18
ADLS_ACUITY_SCORE: 20
ADLS_ACUITY_SCORE: 18
ADLS_ACUITY_SCORE: 19
ADLS_ACUITY_SCORE: 18
ADLS_ACUITY_SCORE: 11
ADLS_ACUITY_SCORE: 20
ADLS_ACUITY_SCORE: 19
ADLS_ACUITY_SCORE: 18
ADLS_ACUITY_SCORE: 20
ADLS_ACUITY_SCORE: 11
ADLS_ACUITY_SCORE: 20
ADLS_ACUITY_SCORE: 18
ADLS_ACUITY_SCORE: 18
ADLS_ACUITY_SCORE: 19
ADLS_ACUITY_SCORE: 19
ADLS_ACUITY_SCORE: 20

## 2021-01-01 ASSESSMENT — ENCOUNTER SYMPTOMS
HEMATURIA: 0
VOMITING: 0
TROUBLE SWALLOWING: 0
CONFUSION: 1
CHEST TIGHTNESS: 0
COUGH: 0
WOUND: 1
FEVER: 0
SORE THROAT: 0
BLOOD IN STOOL: 0
WEAKNESS: 1
NAUSEA: 0
DYSURIA: 0
DIARRHEA: 0

## 2021-01-01 ASSESSMENT — MIFFLIN-ST. JEOR
SCORE: 1443.82
SCORE: 1444.72
SCORE: 1522.73
SCORE: 1493.25
SCORE: 1389.39
SCORE: 1492.79
SCORE: 1506.86
SCORE: 1432.01
SCORE: 1434.74
SCORE: 1502.32

## 2021-01-01 ASSESSMENT — PAIN SCALES - GENERAL: PAINLEVEL: NO PAIN (0)

## 2021-01-02 ENCOUNTER — APPOINTMENT (OUTPATIENT)
Dept: CT IMAGING | Facility: CLINIC | Age: 81
End: 2021-01-02
Attending: EMERGENCY MEDICINE
Payer: MEDICARE

## 2021-01-02 ENCOUNTER — HOSPITAL ENCOUNTER (EMERGENCY)
Facility: CLINIC | Age: 81
Discharge: HOME | End: 2021-01-02
Attending: INTERNAL MEDICINE | Admitting: INTERNAL MEDICINE
Payer: MEDICARE

## 2021-01-02 VITALS
WEIGHT: 167 LBS | HEART RATE: 54 BPM | SYSTOLIC BLOOD PRESSURE: 134 MMHG | TEMPERATURE: 97.7 F | OXYGEN SATURATION: 98 % | RESPIRATION RATE: 16 BRPM | HEIGHT: 67 IN | BODY MASS INDEX: 26.21 KG/M2 | DIASTOLIC BLOOD PRESSURE: 89 MMHG

## 2021-01-02 DIAGNOSIS — L30.8 INTERFACE DERMATITIS: ICD-10-CM

## 2021-01-02 DIAGNOSIS — K91.870 POSTOPERATIVE HEMATOMA INVOLVING DIGESTIVE SYSTEM FOLLOWING DIGESTIVE SYSTEM PROCEDURE: ICD-10-CM

## 2021-01-02 DIAGNOSIS — S30.1XXA ABDOMINAL WALL HEMATOMA, INITIAL ENCOUNTER: ICD-10-CM

## 2021-01-02 DIAGNOSIS — L25.9 CONTACT DERMATITIS, UNSPECIFIED CONTACT DERMATITIS TYPE, UNSPECIFIED TRIGGER: ICD-10-CM

## 2021-01-02 LAB
ALBUMIN SERPL-MCNC: 3.5 G/DL (ref 3.4–5)
ALBUMIN UR-MCNC: NEGATIVE MG/DL
ALP SERPL-CCNC: 195 U/L (ref 40–150)
ALT SERPL W P-5'-P-CCNC: 14 U/L (ref 0–70)
ANION GAP SERPL CALCULATED.3IONS-SCNC: 5 MMOL/L (ref 3–14)
APPEARANCE UR: CLEAR
AST SERPL W P-5'-P-CCNC: 20 U/L (ref 0–45)
BASOPHILS # BLD AUTO: 0.1 10E9/L (ref 0–0.2)
BASOPHILS NFR BLD AUTO: 1.2 %
BILIRUB SERPL-MCNC: 1.4 MG/DL (ref 0.2–1.3)
BILIRUB UR QL STRIP: NEGATIVE
BUN SERPL-MCNC: 18 MG/DL (ref 7–30)
CALCIUM SERPL-MCNC: 9.1 MG/DL (ref 8.5–10.1)
CHLORIDE SERPL-SCNC: 101 MMOL/L (ref 94–109)
CO2 SERPL-SCNC: 29 MMOL/L (ref 20–32)
COLOR UR AUTO: ABNORMAL
CREAT SERPL-MCNC: 0.86 MG/DL (ref 0.66–1.25)
CRP SERPL-MCNC: 40 MG/L (ref 0–8)
DIFFERENTIAL METHOD BLD: ABNORMAL
EOSINOPHIL # BLD AUTO: 0.2 10E9/L (ref 0–0.7)
EOSINOPHIL NFR BLD AUTO: 3.9 %
ERYTHROCYTE [DISTWIDTH] IN BLOOD BY AUTOMATED COUNT: 14.3 % (ref 10–15)
GFR SERPL CREATININE-BSD FRML MDRD: 81 ML/MIN/{1.73_M2}
GLUCOSE SERPL-MCNC: 122 MG/DL (ref 70–99)
GLUCOSE UR STRIP-MCNC: NEGATIVE MG/DL
HCT VFR BLD AUTO: 31.7 % (ref 40–53)
HGB BLD-MCNC: 10.3 G/DL (ref 13.3–17.7)
HGB UR QL STRIP: NEGATIVE
HYALINE CASTS #/AREA URNS LPF: 1 /LPF (ref 0–2)
IMM GRANULOCYTES # BLD: 0 10E9/L (ref 0–0.4)
IMM GRANULOCYTES NFR BLD: 0.2 %
KETONES UR STRIP-MCNC: NEGATIVE MG/DL
LEUKOCYTE ESTERASE UR QL STRIP: NEGATIVE
LIPASE SERPL-CCNC: 219 U/L (ref 73–393)
LYMPHOCYTES # BLD AUTO: 0.4 10E9/L (ref 0.8–5.3)
LYMPHOCYTES NFR BLD AUTO: 9.5 %
MCH RBC QN AUTO: 31.8 PG (ref 26.5–33)
MCHC RBC AUTO-ENTMCNC: 32.5 G/DL (ref 31.5–36.5)
MCV RBC AUTO: 98 FL (ref 78–100)
MONOCYTES # BLD AUTO: 0.5 10E9/L (ref 0–1.3)
MONOCYTES NFR BLD AUTO: 12 %
NEUTROPHILS # BLD AUTO: 3 10E9/L (ref 1.6–8.3)
NEUTROPHILS NFR BLD AUTO: 73.2 %
NITRATE UR QL: NEGATIVE
NRBC # BLD AUTO: 0 10*3/UL
NRBC BLD AUTO-RTO: 0 /100
PH UR STRIP: 6 PH (ref 5–7)
PLATELET # BLD AUTO: 155 10E9/L (ref 150–450)
POTASSIUM SERPL-SCNC: 3.5 MMOL/L (ref 3.4–5.3)
PROT SERPL-MCNC: 7.6 G/DL (ref 6.8–8.8)
RADIOLOGIST FLAGS: ABNORMAL
RBC # BLD AUTO: 3.24 10E12/L (ref 4.4–5.9)
RBC #/AREA URNS AUTO: 5 /HPF (ref 0–2)
SODIUM SERPL-SCNC: 134 MMOL/L (ref 133–144)
SOURCE: ABNORMAL
SP GR UR STRIP: 1 (ref 1–1.03)
UROBILINOGEN UR STRIP-MCNC: NORMAL MG/DL (ref 0–2)
WBC # BLD AUTO: 4.1 10E9/L (ref 4–11)
WBC #/AREA URNS AUTO: 0 /HPF (ref 0–5)

## 2021-01-02 PROCEDURE — 85025 COMPLETE CBC W/AUTO DIFF WBC: CPT | Performed by: EMERGENCY MEDICINE

## 2021-01-02 PROCEDURE — 99285 EMERGENCY DEPT VISIT HI MDM: CPT | Performed by: INTERNAL MEDICINE

## 2021-01-02 PROCEDURE — 86140 C-REACTIVE PROTEIN: CPT | Performed by: EMERGENCY MEDICINE

## 2021-01-02 PROCEDURE — 80053 COMPREHEN METABOLIC PANEL: CPT | Performed by: EMERGENCY MEDICINE

## 2021-01-02 PROCEDURE — G1004 CDSM NDSC: HCPCS | Mod: GC | Performed by: RADIOLOGY

## 2021-01-02 PROCEDURE — 99285 EMERGENCY DEPT VISIT HI MDM: CPT | Mod: 25

## 2021-01-02 PROCEDURE — 250N000011 HC RX IP 250 OP 636: Performed by: STUDENT IN AN ORGANIZED HEALTH CARE EDUCATION/TRAINING PROGRAM

## 2021-01-02 PROCEDURE — 83690 ASSAY OF LIPASE: CPT | Performed by: EMERGENCY MEDICINE

## 2021-01-02 PROCEDURE — G1004 CDSM NDSC: HCPCS

## 2021-01-02 PROCEDURE — 74177 CT ABD & PELVIS W/CONTRAST: CPT | Mod: 26 | Performed by: RADIOLOGY

## 2021-01-02 PROCEDURE — 81001 URINALYSIS AUTO W/SCOPE: CPT | Performed by: INTERNAL MEDICINE

## 2021-01-02 RX ORDER — TRIAMCINOLONE ACETONIDE 1 MG/G
CREAM TOPICAL
Qty: 80 G | Refills: 0 | Status: SHIPPED | OUTPATIENT
Start: 2021-01-02 | End: 2021-01-16

## 2021-01-02 RX ORDER — IOPAMIDOL 755 MG/ML
103 INJECTION, SOLUTION INTRAVASCULAR ONCE
Status: COMPLETED | OUTPATIENT
Start: 2021-01-02 | End: 2021-01-02

## 2021-01-02 RX ADMIN — IOPAMIDOL 103 ML: 755 INJECTION, SOLUTION INTRAVENOUS at 19:01

## 2021-01-02 ASSESSMENT — ENCOUNTER SYMPTOMS
VOMITING: 0
DIARRHEA: 0
WHEEZING: 0
COLOR CHANGE: 1
LIGHT-HEADEDNESS: 0
CONFUSION: 0
CHILLS: 0
ABDOMINAL PAIN: 1
NUMBNESS: 0
WEAKNESS: 0
COUGH: 0
BACK PAIN: 0
NAUSEA: 0
HEADACHES: 0
ADENOPATHY: 0
DIFFICULTY URINATING: 0
SHORTNESS OF BREATH: 1
FEVER: 0

## 2021-01-02 ASSESSMENT — MIFFLIN-ST. JEOR: SCORE: 1421.14

## 2021-01-02 NOTE — ED AVS SNAPSHOT
[facility] Emergency Department  [address] [address]  Phone: [phone]                                    Milo Serna   MRN: [id number]    Department: [facility] Emergency Department   Date of Visit: 1/2/2021           After Visit Summary Signature Page    I have received my discharge instructions, and my questions have been answered. I have discussed any challenges I see with this plan with the nurse or doctor.    ..........................................................................................................................................  Patient/Patient Representative Signature      ..........................................................................................................................................  Patient Representative Print Name and Relationship to Patient    ..................................................               ................................................  Date                                   Time    ..........................................................................................................................................  Reviewed by Signature/Title    ...................................................              ..............................................  Date                                               Time          22EPIC Rev 08/18

## 2021-01-03 NOTE — ED PROVIDER NOTES
ED Provider Note  [facility]      History     Chief Complaint   Patient presents with     Post-op Problem     Pt reports abdominal pain and rash to abdomen. Cholecysectomy on 12/30.     HPI  Milo Serna is a 80 year old male who presents with rash on his lower abdomen and RUQ pain. He had same day laparoscopic cholecystomy on 12/30/20. Since surgery he has developed redness in the skin of the RUQ, periumbilical region and lower abdomen. There is some pain in the RUQ. There is swelling near the umbilicus. He states he has had a hernia in this area in the past. He has no fever, chills, cough, chest pain, nausea, vomiting or dysuria.    Past Medical History:   Diagnosis Date     Actinic keratosis 10/12/2019     Anemia, unspecified 11/08/2016     Atrial fibrillation (H) 05/27/2015     BPH (benign prostatic hyperplasia) 01/25/2012     CAD (coronary artery disease) 04/04/2012     Closed bimalleolar fracture of left ankle with routine healing 04/05/2019     Closed fracture of metatarsal bone 04/04/2012     Coronary atherosclerosis of unspecified type of vessel, native or graft [facility] 2006    CAB 1996 following MI (at [facility])      Dilated aortic root (H) 05/26/2016     Drug-induced erectile dysfunction 10/02/2015     Elevated blood sugar 11/08/2016     Elevated PSA 09/19/2013     Epistaxis 09/09/2019     Essential hypertension with goal blood pressure less than 140/90 09/22/2016     Fall 09/06/2019     Functional diarrhea 10/12/2019     Gallbladder polyp 05/01/2018     Gout      HAV (hallux abducto valgus) 04/04/2012     Hematoma 09/09/2019     Hernia, abdominal      History of prostate cancer 06/15/2016     HTN, goal below 150/90 04/20/2017     Hypokalemia 09/12/2019     Iron deficiency anemia secondary to inadequate dietary iron intake 11/15/2016     Low blood pressure reading 10/17/2016     Lumbago     had degendisc dz on MRI 7/07     Memory loss 06/18/2019      Microalbuminuria 06/04/2020    X1     Mixed hyperlipidemia      Mumps      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     Nonrheumatic mitral valve regurgitation 10/28/2019     OA (osteoarthritis) 04/04/2012     Old myocardial infarction 04/04/2012     LENORA (obstructive sleep apnea) 10/28/2019     Other viral warts 09/06/2019     Palpitations      Pes planus 04/04/2012     Petechiae 06/18/2019     Prostate cancer (H) 05/26/2016     PUD (peptic ulcer disease) 04/04/2012     Pulmonary hypertension (H) 09/25/2017     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     Rosacea 01/15/2008     Stress due to spouse with dementia 06/18/2019     Thrombocytopenia (H) 04/21/2017     Unspecified hyperplasia of prostate with urinary obstruction and other lower urinary tract symptoms (LUTS)     better on avodart & hytrin     Venous stasis 04/04/2012       Past Surgical History:   Procedure Laterality Date     CARDIAC SURGERY      CAB     CYSTOSCOPY FLEXIBLE, CYOABLATION PROSTATE N/A 11/09/2016    Procedure: CYSTOSCOPY FLEXIBLE, CRYOABLATION PROSTATE;  Surgeon: Krystian Owens MD;  Location:  OR     GENITOURINARY SURGERY      prostate surgery      HERNIA REPAIR       LAPAROSCOPIC CHOLECYSTECTOMY N/A 12/28/2020    Procedure: CHOLECYSTECTOMY, LAPAROSCOPIC;  Surgeon: James Alvarez MD;  Location: [facility] OR     LAPAROSCOPIC HERNIORRHAPHY INGUINAL Right 05/10/2017    Procedure: LAPAROSCOPIC HERNIORRHAPHY INGUINAL;  Laparoscopic preperitoneal repair of right inguinal hernia with placement of underlay mesh;  Surgeon: Enrico Bridges MD;  Location:  OR     PROSTATE SURGERY       [facility] NONSPECIFIC PROCEDURE      CAB 1996 [place]     Z NONSPECIFIC PROCEDURE  02/2007    l inguinal hernia repair      ZZ NONSPECIFIC PROCEDURE      R hernia remote     ZZ NONSPECIFIC PROCEDURE  2000    R ankle ORIF for fx     ZZ NONSPECIFIC PROCEDURE  2005    nasal surgery      [facility] NONSPECIFIC PROCEDURE      R rotater repair (remote)      Z NONSPECIFIC PROCEDURE      appy  "     [facility] NONSPECIFIC PROCEDURE      sinus surgery x 2     [facility] NONSPECIFIC PROCEDURE      L shoulder        Family History   Problem Relation Age of Onset     Cancer Mother         stomach cancer,  age 61     Heart Disease Father         MI,  age 71     Heart Disease Brother         stent placement, RA      Hypertension Brother      Heart Disease Sister         rhythm problems with heart, hypertension       Social History     Tobacco Use     Smoking status: Former Smoker     Smokeless tobacco: Never Used     Tobacco comment: quit 3/1974   Substance Use Topics     Alcohol use: Not Currently     Alcohol/week: 0.0 standard drinks     Comment: rare drink              Review of Systems   Constitutional: Negative for chills and fever.   HENT: Negative for congestion.    Eyes: Negative for visual disturbance.   Respiratory: Positive for shortness of breath. Negative for cough and wheezing.    Cardiovascular: Negative for chest pain.   Gastrointestinal: Positive for abdominal pain. Negative for diarrhea, nausea and vomiting.   Genitourinary: Negative for difficulty urinating.   Musculoskeletal: Negative for back pain.   Skin: Positive for color change and rash.   Neurological: Negative for weakness, light-headedness, numbness and headaches.   Hematological: Negative for adenopathy.   Psychiatric/Behavioral: Negative for confusion.         Physical Exam   BP: 139/71  Pulse: 78  Temp: 97.7  F (36.5  C)  Resp: 16  Height: 170.2 cm (5' 7\")  Weight: 75.8 kg (167 lb)  SpO2: 98 %  Physical Exam  Vitals signs and nursing note reviewed.   Constitutional:       Appearance: Normal appearance.   HENT:      Head: Normocephalic and atraumatic.      Right Ear: External ear normal.      Left Ear: External ear normal.      Nose: Nose normal.      Mouth/Throat:      Mouth: Mucous membranes are moist.   Eyes:      General: No scleral icterus.     Extraocular Movements: Extraocular movements intact.      Pupils: Pupils " are equal, round, and reactive to light.   Neck:      Musculoskeletal: Normal range of motion.   Cardiovascular:      Rate and Rhythm: Normal rate and regular rhythm.      Heart sounds: No murmur.   Pulmonary:      Effort: Pulmonary effort is normal.      Breath sounds: No wheezing or rales.   Abdominal:      General: Bowel sounds are normal.      Palpations: Abdomen is soft.      Tenderness: There is abdominal tenderness.       Musculoskeletal:      Right lower leg: Edema present.      Left lower leg: Edema present.   Skin:     Findings: Rash present.   Neurological:      General: No focal deficit present.      Mental Status: He is alert and oriented to person, place, and time.      Cranial Nerves: No cranial nerve deficit.   Psychiatric:         Mood and Affect: Mood normal.         Behavior: Behavior normal.         ED Course      Procedures             Results for orders placed or performed during the hospital encounter of 01/02/21   CT Abdomen Pelvis w Contrast     Status: Abnormal   Result Value Ref Range    Radiologist flags Abdominal findings (Urgent)     Narrative    EXAMINATION: CT ABDOMEN PELVIS W CONTRAST  1/2/2021 7:10 PM      CLINICAL HISTORY: pain, recent cholecystectomy. Cholecystectomy was  performed on 12/28/2020, 2/18/2010    COMPARISON: CT 11/28/2020    PROCEDURE COMMENTS: CT of the abdomen was performed with Isovue 370  intravenous contrast. Coronal and sagittal reformatted images were  obtained.    FINDINGS:  Lower thorax:   Bibasilar areas of linear consolidation which likely represent  atelectasis. Prominent size of the heart with calcifications about the  aortic valve. No pericardial effusion.    Abdomen and pelvis:  Within the right lobe of the liver, segment 7 there is a large  intrahepatic portal to hepatic vein shunt which is more conspicuous on  than on CT from 11/28/2020 due to difference in contrast bolus timing  and is better seen on CT from 10/10/18. Simple attenuating cyst  within  the left lobe of the liver.    Postoperative changes from cholecystectomy on 12/28/2020. There is  free air within the abdomen, likely sequela from recent surgery. The  cecum is prominently distended and located in the upper abdomen  anterior to the stomach. There is fecalization of the small bowel.  Questionable thickening of the bowel leading to the cecum in the upper  abdomen.    Atrophy of the pancreas. Spleen is unremarkable. Adrenal glands are  unremarkable. No focal renal lesion. No hydronephrosis.    Moderate calcific atherosclerotic disease. Splenic vein and superior  mesenteric veins are patent. Prominent size of the right portal vein  supplying the right portal hepatic shunt.    Scattered areas of free fluid within the abdomen.    Heavy calcific atherosclerotic disease.    Osseous structures:   Moderate to advanced degenerative changes of the spine. With several  levels of intervertebral disc height loss and near fusion. No  visualized acute osseous abnormalities.    Postoperative changes of the anterior abdominal wall. In the anterior  abdomen near the umbilicus there is a 3.9 x 2.8 cm circumscribed area  which likely represents hematoma associated with port site.      Impression    IMPRESSION:    1. Cecum is located in the upper abdomen and is quite distended. The  ileocecal junction is widely patent and fecalization of the small  bowel contents is likely due to slow transit. On prior exams also the  cecum had been located in this region, but mobile cecum is at  increased risk of developing cecal volvulus.    2. 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    3. 3.9 cm hematoma in the anterior abdominal wall likely associated  with port site near the umbilicus. Other expected postoperative  finding such as small amount of pneumoperitoneum are not unexpected.    4. Heavy calcific atherosclerotic disease.    5.  Cardiomegaly.    [Urgent Result: Abdominal findings]    Finding was identified on 1/2/2021 7:19 PM.     Dr. Morin was contacted by Dr. Melchor at 1/2/2021 7:48 PM and  verbalized understanding of the urgent finding.     I have personally reviewed the examination and initial interpretation  and I agree with the findings.    ARTURO HUITRON MD   CBC with platelets differential     Status: Abnormal   Result Value Ref Range    WBC 4.1 4.0 - 11.0 10e9/L    RBC Count 3.24 (L) 4.4 - 5.9 10e12/L    Hemoglobin 10.3 (L) 13.3 - 17.7 g/dL    Hematocrit 31.7 (L) 40.0 - 53.0 %    MCV 98 78 - 100 fl    MCH 31.8 26.5 - 33.0 pg    MCHC 32.5 31.5 - 36.5 g/dL    RDW 14.3 10.0 - 15.0 %    Platelet Count 155 150 - 450 10e9/L    Diff Method Automated Method     % Neutrophils 73.2 %    % Lymphocytes 9.5 %    % Monocytes 12.0 %    % Eosinophils 3.9 %    % Basophils 1.2 %    % Immature Granulocytes 0.2 %    Nucleated RBCs 0 0 /100    Absolute Neutrophil 3.0 1.6 - 8.3 10e9/L    Absolute Lymphocytes 0.4 (L) 0.8 - 5.3 10e9/L    Absolute Monocytes 0.5 0.0 - 1.3 10e9/L    Absolute Eosinophils 0.2 0.0 - 0.7 10e9/L    Absolute Basophils 0.1 0.0 - 0.2 10e9/L    Abs Immature Granulocytes 0.0 0 - 0.4 10e9/L    Absolute Nucleated RBC 0.0    Comprehensive metabolic panel     Status: Abnormal   Result Value Ref Range    Sodium 134 133 - 144 mmol/L    Potassium 3.5 3.4 - 5.3 mmol/L    Chloride 101 94 - 109 mmol/L    Carbon Dioxide 29 20 - 32 mmol/L    Anion Gap 5 3 - 14 mmol/L    Glucose 122 (H) 70 - 99 mg/dL    Urea Nitrogen 18 7 - 30 mg/dL    Creatinine 0.86 0.66 - 1.25 mg/dL    GFR Estimate 81 >60 mL/min/[1.73_m2]    GFR Estimate If Black >90 >60 mL/min/[1.73_m2]    Calcium 9.1 8.5 - 10.1 mg/dL    Bilirubin Total 1.4 (H) 0.2 - 1.3 mg/dL    Albumin 3.5 3.4 - 5.0 g/dL    Protein Total 7.6 6.8 - 8.8 g/dL    Alkaline Phosphatase 195 (H) 40 - 150 U/L    ALT 14 0 - 70 U/L    AST 20 0 - 45 U/L   Lipase     Status: None   Result Value Ref Range    Lipase  219 73 - 393 U/L   CRP inflammation     Status: Abnormal   Result Value Ref Range    CRP Inflammation 40.0 (H) 0.0 - 8.0 mg/L   UA with Microscopic     Status: Abnormal   Result Value Ref Range    Color Urine Light Yellow     Appearance Urine Clear     Glucose Urine Negative NEG^Negative mg/dL    Bilirubin Urine Negative NEG^Negative    Ketones Urine Negative NEG^Negative mg/dL    Specific Gravity Urine 1.005 1.003 - 1.035    Blood Urine Negative NEG^Negative    pH Urine 6.0 5.0 - 7.0 pH    Protein Albumin Urine Negative NEG^Negative mg/dL    Urobilinogen mg/dL Normal 0.0 - 2.0 mg/dL    Nitrite Urine Negative NEG^Negative    Leukocyte Esterase Urine Negative NEG^Negative    Source Urine     WBC Urine 0 0 - 5 /HPF    RBC Urine 5 (H) 0 - 2 /HPF    Hyaline Casts 1 0 - 2 /LPF     Medications   iopamidol (ISOVUE-370) solution 103 mL (103 mLs Intravenous Given 1/2/21 1901)   sodium chloride (PF) 0.9% PF flush 76 mL (76 mLs Intravenous Given 1/2/21 1901)        Assessments & Plan (with Medical Decision Making)   Impression:  Elderly male presents with abdominal rash 3 days after laparoscopic cholecystectomy. The rash is most suggestive of contact dermatitis, likely from surgical prep or the like. He has a small umbilical mass which appear most consistent with small hematoma on CT. He was seen in consultation by surgery on call. They recommend holding anticoagulation for a few days and topical steroids for the rash. Radiology noted a variety of findings on CT which were reviewed with general surgery.    I have reviewed the nursing notes. I have reviewed the findings, diagnosis, plan and need for follow up with the patient.    New Prescriptions    TRIAMCINOLONE (KENALOG) 0.1 % EXTERNAL CREAM    Apply to the skin rash twice/ day.       Final diagnoses:   Abdominal wall hematoma, initial encounter   Contact dermatitis, unspecified contact dermatitis type, unspecified trigger       --  Ramon Ramirez  [facility]  EMERGENCY DEPARTMENT  1/2/2021     Ramon Ramirez MD  01/02/21 0202

## 2021-01-03 NOTE — DISCHARGE INSTRUCTIONS
Do not take your Eliquis for 3-4 days.  Triamcinolone cream to the rash twice/ day.  Follow up with Dr Alvarez.   Return as needed for fever, worsening swelling, open sores in the skin. New or worsening symptoms.

## 2021-01-03 NOTE — CONSULTS
[facility] Surgery Consultation    Milo Serna MRN# [id number]   Age: 80 year old YOB: 1940     Date of Admission:  1/2/2021    Date of Consult:   1/02/21    Reason for consult: Rash       Requesting service: ED; requesting provider: James                   Assessment and Plan:   Assessment:   80 year old male with A fib on anticoagulation who underwent elective laparoscopic cholecystectomy on 12/28 who presents with likely hematoma/ecchymosis due to restarting anticoagulation on POD1, and likely contact dermatitis due to skin glue.      Plan:   - Hold anticoagulation for a few days  - Follow up outpatient with PCP  - Topical steroids for rash      Discussed with staff, Dr. Alvarez            Chief Complaint:   Rash         History of Present Illness:   80 year old male with A fib on anticoagulation who underwent elective laparoscopic cholecystectomy on 12/28 who presents with skin changes around incisions. Denies nausea, diarrhea/constipation, severe abdominal pain, fever, SOB. Denies pruritis.          Past Medical History:     Past Medical History:   Diagnosis Date     Actinic keratosis 10/12/2019     Anemia, unspecified 11/08/2016     Atrial fibrillation (H) 05/27/2015     BPH (benign prostatic hyperplasia) 01/25/2012     CAD (coronary artery disease) 04/04/2012     Closed bimalleolar fracture of left ankle with routine healing 04/05/2019     Closed fracture of metatarsal bone 04/04/2012     Coronary atherosclerosis of unspecified type of vessel, native or graft [facility]    CAB 1996 following MI (at [facility])      Dilated aortic root (H) 05/26/2016     Drug-induced erectile dysfunction 10/02/2015     Elevated blood sugar 11/08/2016     Elevated PSA 09/19/2013     Epistaxis 09/09/2019     Essential hypertension with goal blood pressure less than 140/90 09/22/2016     Fall 09/06/2019     Functional diarrhea 10/12/2019     Gallbladder polyp 05/01/2018     Gout      HAV  (hallux abducto valgus) 04/04/2012     Hematoma 09/09/2019     Hernia, abdominal      History of prostate cancer 06/15/2016     HTN, goal below 150/90 04/20/2017     Hypokalemia 09/12/2019     Iron deficiency anemia secondary to inadequate dietary iron intake 11/15/2016     Low blood pressure reading 10/17/2016     Lumbago     had degendisc dz on MRI 7/07     Memory loss 06/18/2019     Microalbuminuria 06/04/2020    X1     Mixed hyperlipidemia      Mumps      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     Nonrheumatic mitral valve regurgitation 10/28/2019     OA (osteoarthritis) 04/04/2012     Old myocardial infarction 04/04/2012     LENORA (obstructive sleep apnea) 10/28/2019     Other viral warts 09/06/2019     Palpitations      Pes planus 04/04/2012     Petechiae 06/18/2019     Prostate cancer (H) 05/26/2016     PUD (peptic ulcer disease) 04/04/2012     Pulmonary hypertension (H) 09/25/2017     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     Rosacea 01/15/2008     Stress due to spouse with dementia 06/18/2019     Thrombocytopenia (H) 04/21/2017     Unspecified hyperplasia of prostate with urinary obstruction and other lower urinary tract symptoms (LUTS)     better on avodart & hytrin     Venous stasis 04/04/2012             Past Surgical History:     Past Surgical History:   Procedure Laterality Date     CARDIAC SURGERY      CAB     CYSTOSCOPY FLEXIBLE, CYOABLATION PROSTATE N/A 11/09/2016    Procedure: CYSTOSCOPY FLEXIBLE, CRYOABLATION PROSTATE;  Surgeon: Krystian Owens MD;  Location:  OR     GENITOURINARY SURGERY      prostate surgery      HERNIA REPAIR       LAPAROSCOPIC CHOLECYSTECTOMY N/A 12/28/2020    Procedure: CHOLECYSTECTOMY, LAPAROSCOPIC;  Surgeon: James Alvarez MD;  Location: [facility] OR     LAPAROSCOPIC HERNIORRHAPHY INGUINAL Right 05/10/2017    Procedure: LAPAROSCOPIC HERNIORRHAPHY INGUINAL;  Laparoscopic preperitoneal repair of right inguinal hernia with placement of underlay mesh;  Surgeon: Enrico Bridges MD;   Location: [facility] OR     PROSTATE SURGERY       [facility] NONSPECIFIC PROCEDURE      CAB 1996 [facility] NONSPECIFIC PROCEDURE  2007    l inguinal hernia repair      [facility] NONSPECIFIC PROCEDURE      R hernia remote     [facility] NONSPECIFIC PROCEDURE      R ankle ORIF for fx     [facility] NONSPECIFIC PROCEDURE  2005    nasal surgery      [facility] NONSPECIFIC PROCEDURE      R rotater repair (remote)      [facility] NONSPECIFIC PROCEDURE      appy 1966     [facility] NONSPECIFIC PROCEDURE      sinus surgery x 2     [facility] NONSPECIFIC PROCEDURE      L shoulder              Social History:     Social History     Tobacco Use     Smoking status: Former Smoker     Smokeless tobacco: Never Used     Tobacco comment: quit 3/1974   Substance Use Topics     Alcohol use: Not Currently     Alcohol/week: 0.0 standard drinks     Comment: rare drink             Family History:     Family History   Problem Relation Age of Onset     Cancer Mother         stomach cancer,  age 61     Heart Disease Father         MI,  age 71     Heart Disease Brother         stent placement, RA      Hypertension Brother      Heart Disease Sister         rhythm problems with heart, hypertension                Allergies:     Allergies   Allergen Reactions     Blood Transfusion Related (Informational Only) Other (See Comments)     Patient has a history of a clinically significant antibody against RBC antigens.  A delay in compatible RBCs may occur.             Medications:     No current facility-administered medications for this encounter.      Current Outpatient Medications   Medication Sig     acetaminophen (TYLENOL) 500 MG tablet Take 500-1,000 mg by mouth every 8 hours as needed     apixaban ANTICOAGULANT (ELIQUIS) 2.5 MG tablet Take 1 tablet (2.5 mg) by mouth 2 times daily     Carboxymethylcellulose Sod PF 0.25 % SOLN INSTILL ONE DROP IN BOTH EYES FIVE TIMES A DAY AS NEEDED     co-enzyme Q-10 100 MG CAPS Take 1 capsule (100 mg) by mouth daily (Patient taking  differently: Take 100 mg by mouth every morning )     [employer] GLUCOSAMINE-CHONDROITIN PO Take 1 tablet by mouth every morning      diclofenac (VOLTAREN) 1 % topical gel Apply 4 gramsto area qid prn     doxycycline hyclate (VIBRA-TABS) 100 MG tablet Take 1 tablet (100 mg) by mouth daily     ferrous gluconate (FERGON) 324 (38 Fe) MG tablet Take 1 tablet (324 mg) by mouth daily (with breakfast)     lisinopril (ZESTRIL) 20 MG tablet TAKE 1 TABLET DAILY     MELATONIN PO Take 5 mg by mouth At Bedtime      multivitamin, therapeutic (THERA-VIT) TABS tablet Take 1 tablet by mouth daily     omeprazole (PRILOSEC) 40 MG DR capsule TAKE 1 CAPSULE DAILY     oxyCODONE (ROXICODONE) 5 MG tablet Take 1-2 tablets (5-10 mg) by mouth every 4 hours as needed for moderate to severe pain     potassium chloride ER (K-TAB) 20 MEQ CR tablet Take 1 tablet (20 mEq) by mouth 2 times daily     senna-docusate (SENOKOT-S/PERICOLACE) 8.6-50 MG tablet Take 1-2 tablets by mouth 2 times daily     senna-docusate (SENOKOT-S/PERICOLACE) 8.6-50 MG tablet Take 1 tablet by mouth 2 times daily as needed for constipation     simvastatin (ZOCOR) 20 MG tablet Take 1 tablet (20 mg) by mouth At Bedtime     tamsulosin (FLOMAX) 0.4 MG capsule TAKE 1 CAPSULE DAILY     torsemide (DEMADEX) 20 MG tablet TAKE 1 TABLET DAILY WITH BREAKFAST     UREA 20 INTENSIVE HYDRATING 20 % external cream Apply topically as needed Apply to right foot daily               Review of Systems:   Negative except as mentioned in the HPI         Physical Exam:   All vitals have been reviewed  Temp:  [97.7  F (36.5  C)] 97.7  F (36.5  C)  Pulse:  [78] 78  Resp:  [16] 16  BP: (139)/(71) 139/71  SpO2:  [98 %] 98 %  No intake or output data in the 24 hours ending 01/02/21 2001  Physical Exam:  Alert, oriented, NAD  RRR  Non labored breathing  Abd soft, non tender, with rash and ecchymosis as seen in pictures below, with palpable hematoma at umbilical incision  Ext wwp  Psych: appropriate mood and  affect  CN grossly intact  HEENT: normocephalic                      Data:   All laboratory data reviewed    Results:  BMP  Recent Labs   Lab 01/02/21  1735      POTASSIUM 3.5   CHLORIDE 101   CO2 29   BUN 18   CR 0.86   *     CBC  Recent Labs   Lab 01/02/21  1735   WBC 4.1   HGB 10.3*        LFT  Recent Labs   Lab 01/02/21  1735   AST 20   ALT 14   ALKPHOS 195*   BILITOTAL 1.4*   ALBUMIN 3.5     Recent Labs   Lab 01/02/21  1735 12/28/20  0533   *  --    BGM  --  100*       Imaging:    CT:   IMPRESSION:     1. Cecum is located in the upper abdomen and is quite distended. The  ileocecal junction is widely patent and fecalization of the small  bowel contents is likely due to slow transit. On prior exams also the  cecum had been located in this region, but mobile cecum is at  increased risk of developing cecal volvulus.     2. 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     3. 3.9 cm hematoma in the anterior abdominal wall likely associated  with port site near the umbilicus. Other expected postoperative  finding such as small amount of pneumoperitoneum are not unexpected.     4. Heavy calcific atherosclerotic disease.     5. Cardiomegaly.      Antonio Cui MD

## 2021-01-04 ENCOUNTER — PATIENT OUTREACH (OUTPATIENT)
Dept: NURSING | Facility: CLINIC | Age: 81
End: 2021-01-04
Payer: MEDICARE

## 2021-01-04 ENCOUNTER — PATIENT OUTREACH (OUTPATIENT)
Dept: SURGERY | Facility: CLINIC | Age: 81
End: 2021-01-04

## 2021-01-04 DIAGNOSIS — R21 RASH: Primary | ICD-10-CM

## 2021-01-04 DIAGNOSIS — Z71.89 OTHER SPECIFIED COUNSELING: ICD-10-CM

## 2021-01-04 SDOH — SOCIAL STABILITY: SOCIAL NETWORK: HOW OFTEN DO YOU GET TOGETHER WITH FRIENDS OR RELATIVES?: NOT ASKED

## 2021-01-04 SDOH — SOCIAL STABILITY: SOCIAL NETWORK: ARE YOU MARRIED, WIDOWED, DIVORCED, SEPARATED, NEVER MARRIED, OR LIVING WITH A PARTNER?: MARRIED

## 2021-01-04 SDOH — SOCIAL STABILITY: SOCIAL NETWORK: HOW OFTEN DO YOU ATTENT MEETINGS OF THE CLUB OR ORGANIZATION YOU BELONG TO?: NEVER

## 2021-01-04 SDOH — SOCIAL STABILITY: SOCIAL NETWORK: HOW OFTEN DO YOU ATTEND CHURCH OR RELIGIOUS SERVICES?: NEVER

## 2021-01-04 SDOH — SOCIAL STABILITY: SOCIAL NETWORK
DO YOU BELONG TO ANY CLUBS OR ORGANIZATIONS SUCH AS CHURCH GROUPS UNIONS, FRATERNAL OR ATHLETIC GROUPS, OR SCHOOL GROUPS?: NO

## 2021-01-04 ASSESSMENT — ACTIVITIES OF DAILY LIVING (ADL): DEPENDENT_IADLS:: INDEPENDENT

## 2021-01-04 NOTE — LETTER
[facility]  Complex Care Plan  About Me:    Patient Name:  Milo Serna    YOB: 1940  Age:         80 year old   [place] MRN:    [id number] Telephone Information:  Home Phone [phone]   Mobile [phone]       Address:  [address]   [place] [address] Email address:  [email or web address]      Emergency Contact(s)    Name Relationship Lgl Grd Work Phone Home Phone Mobile Phone   1. CONOR SANFORD* Daughter   [phone]   2. YOSEF SERNA* Spouse    [phone]           Primary language:  English     needed? No   [place] [employer]:  [phone] op. 1  Other communication barriers: None  Preferred Method of Communication:  Mail  Current living arrangement: I live in a private home with spouse  Mobility Status/ Medical Equipment: Independent    Health Maintenance  Health Maintenance Reviewed: Not assessed    My Access Plan  Medical Emergency 911   Primary Clinic Line [facility] - [phone]   24 Hour Appointment Line [phone] or  [phone]) (toll-free)   24 Hour Nurse Line [phone] (toll-free)   Preferred Urgent Care     Preferred Hospital [facility]  [phone]   Preferred [facility] - [facility]care mail order     Behavioral Health Crisis Line The National Suicide Prevention Lifeline at 1-487.301.5939 or 911             My Care Team Members  Patient Care Team       Relationship Specialty Notifications Start End    Oscar Alves MD PCP - General Family Practice  6/24/16     Phone: [phone] Fax: [phone] [address] [facility] [address]    Oscar Alves MD Assigned PCP   6/26/16     Phone: [phone] Fax: [phone] [address] [facility] [address]    Shara Bermudez, RN Registered Nurse   7/1/19     [facility]    Sixto Conley MD MD Cardiology  10/15/19     Phone:  [phone] Fax: [phone]         [address] [facility] [address]    Juliano Caballero PA-C Physician Assistant Cardiology  10/15/19     Phone: [phone] Fax: [phone]         [address] [facility] [address]    [facility]    10/28/19     Stacia Weber, [facility] Pharmacist Pharmacist  4/14/20     Phone: [phone] Fax: [phone] [address] [facility] [address]    Marty Shearer MD Assigned Sleep Provider   10/23/20     Phone: [phone] Fax: [phone]         [address] [facility] [address]    Zulay Alexandra DPM, Podiatry/Foot and Ankle Surgery Assigned Musculoskeletal Provider   10/23/20     Phone: [phone] Pager: [phone] Fax: [phone]        [address]  [address] [facility] [address]    Krystian Owens MD Assigned Surgical Provider   10/23/20     Phone: [phone] Fax: [phone] [address] [facility] [address] [facility] [address] [facility] [address]    [facility]  11/1/20     Phone: [phone]         Edita Li RN Personal Advocate & Liaison (PAL) Family Medicine Admissions 11/30/20             My Care Plans  Self Management and Treatment Plan  Goals and (Comments)  Goals        General    Monitoring (pt-stated)     Notes - Note created  1/4/2021 12:29 PM by Sandra Gregory RN    Goal Statement: I will seek medical help if rash spreads,increased drainage or fever  Date Goal set: 1/4/2021  Barriers: Non identified   Strengths: Agrees with the plan   Date to Achieve By: 2/4/2021  Patient expressed understanding of goal: Yes  Action steps to achieve this goal:  1. I will apply Triamcinolone cream as directed   2. I will call my surgeon if incisions has increased redness , drainage ,swelling ,pain or fever and  for questions or concerns   3. Care coordinator will follow up in 3-5 business days       Pain Management (pt-stated)     Notes - Note created  12/30/2020   9:57 AM by Jackson Mistry RN    Nursing Diagnosis  Acute pain related to injuring agents (i.e. Surgical incision, biological, chemical, or physical injury) as evidenced by patients verbal report of pain/discomfort.    Goals  1.  Patient will wean off of narcotics and transition to OTC analgesics per package instructions.   2.  Patient will use non-pharmacological techniques to help decrease pain during period of recovery.  Patient will report pain management methods relieve pain to a satisfactory level.    3.  Patient will verbally demonstrate the use of appropriate diversional activities and relaxation skills.  4.  Patient reports ability to get enough sleep, rest, and return to normal activities.    Interventions  1.  Anticipate the need for pain management.  Early and timely intervention is key to effective pain management. It can even reduce the total amount of analgesia required.  2.  Administer prescribed analgesics/muscle relaxant as ordered by provider.  3.  Provide and teach patient/caregivers non-pharmacologic comfort measures including ambulating, repositioning, splinting, heat/cold, and use of recommended equipment (athletic supporter, abdominal binder, IS, etc).  4.  The use of OTC aides such as Tylenol, Ibuprofen, etc.                         Action Plans on File:               Heart Failure       Advance Care Plans/Directives Type: None       My Medical and Care Information  Problem List   Patient Active Problem List   Diagnosis     Rosacea     Hyperlipidemia LDL goal <100     Neuropathy of foot     CAD (coronary artery disease)     Rhinophyma     Chronic atrial fibrillation (H)     History of prostate cancer     Anemia     Gallbladder polyp     Cervicalgia     Fall     Chronic combined systolic and diastolic congestive heart failure (H)     Actinic keratosis     LENORA (obstructive sleep apnea)     Nonrheumatic mitral valve regurgitation     Primary osteoarthritis involving multiple joints      Prostate cancer (H)     Microalbuminuria     Other closed fracture of eighth thoracic vertebra, initial encounter (H)     Thoracic spine fracture (H)     Abdominal pain, epigastric     Exposure to COVID-19 virus      Current Medications and Allergies:    Current Outpatient Medications   Medication     acetaminophen (TYLENOL) 500 MG tablet     apixaban ANTICOAGULANT (ELIQUIS) 2.5 MG tablet     Carboxymethylcellulose Sod PF 0.25 % SOLN     co-enzyme Q-10 100 MG CAPS     [employer] GLUCOSAMINE-CHONDROITIN PO     diclofenac (VOLTAREN) 1 % topical gel     doxycycline hyclate (VIBRA-TABS) 100 MG tablet     ferrous gluconate (FERGON) 324 (38 Fe) MG tablet     lisinopril (ZESTRIL) 20 MG tablet     MELATONIN PO     multivitamin, therapeutic (THERA-VIT) TABS tablet     omeprazole (PRILOSEC) 40 MG DR capsule     oxyCODONE (ROXICODONE) 5 MG tablet     potassium chloride ER (K-TAB) 20 MEQ CR tablet     senna-docusate (SENOKOT-S/PERICOLACE) 8.6-50 MG tablet     senna-docusate (SENOKOT-S/PERICOLACE) 8.6-50 MG tablet     simvastatin (ZOCOR) 20 MG tablet     tamsulosin (FLOMAX) 0.4 MG capsule     torsemide (DEMADEX) 20 MG tablet     triamcinolone (KENALOG) 0.1 % external cream     UREA 20 INTENSIVE HYDRATING 20 % external cream     No current facility-administered medications for this visit.        Care Coordination Start Date: 1/4/2021   Frequency of Care Coordination: weekly   Form Last Updated: 01/04/2021

## 2021-01-04 NOTE — PROGRESS NOTES
"Attempted to reach patient's daughter, Stacia, for a status update.  Per Dr. Alvarez, patient was seen for a rash around the skin sealant.  He asked that they remove it (cause use rubbing alcohol) and may apply Hydrocortisone cream PRN.     No answer. LM on  to call office with update.    ADDENDUM  01/06/21  8:54 AM  Spoke with patients daughter, Stacia.  By report, the rash has not worsened and \"is not really bothering him.\"  He has been applying a prescription cream which the patient does not feel is making a difference- he may stop the cream if he desires.  Stacia does not want to remove the glue as Luís is currently not having any problems.    She will call with questions/concerns.  "

## 2021-01-04 NOTE — PROGRESS NOTES
Clinic Care Coordination Contact    Clinic Care Coordination Contact  OUTREACH    Referral Information:  Referral Source: IP Report    Primary Diagnosis: Other (include Comment box)(Rash on abdomin)    Chief Complaint   Patient presents with     Clinic Care Coordination - Post Hospital     Clinic Care Coordination RN        Universal Utilization: 1/2/2021 ED visit Rash on abdomin probable cause surgical prep Cholecystectomy 12/30/2021  Clinic Utilization  Difficulty keeping appointments:: No  Compliance Concerns: No  No-Show Concerns: No  No PCP office visit in Past Year: No  Utilization    Last refreshed: 1/4/2021  9:10 AM: Hospital Admissions 2           Last refreshed: 1/4/2021  9:10 AM: ED Visits 4           Last refreshed: 1/4/2021  9:10 AM: No Show Count (past year) 2              Current as of: 1/4/2021  9:10 AM            Clinical Concerns:  Current Medical Concerns:  Patient reports the rash is about the same.  Patient continues to apply Triamcinolone as directed.  Patient saw his surgeon yesterday   .  Patient has 4 surgical incisions and has steri strips over the incision   Denies having any surgical pain  Current Behavioral Concerns: Not discussed   Education Provided to patient:Monitor increase of the rash,fever or any signs of a surgical infection   Pain  Pain (GOAL):: No  Health Maintenance Reviewed: Not assessed  Clinical Pathway: None    Medication Management:  Medication reconciliation status: Medications reviewed and reconciled.  Changed medications per patient report.     Functional Status:  Dependent ADLs:: Independent  Dependent IADLs:: Independent  Bed or wheelchair confined:: No  Mobility Status: Independent    Living Situation:  Current living arrangement:: I live in a private home with spouse  Type of residence:: Town home    Lifestyle & Psychosocial Needs:  Lifestyle     Physical activity     Days per week: 4 days     Minutes per session: Not on file     Stress: Not at all     Social Needs      Financial resource strain: Not hard at all     Food insecurity     Worry: Never true     Inability: Never true     Transportation needs     Medical: No     Non-medical: No     Diet:: Regular  Inadequate nutrition (GOAL):: No  Tube Feeding: No  Inadequate activity/exercise (GOAL):: No  Significant changes in sleep pattern (GOAL): No  Transportation means:: Accessible car     Christian or spiritual beliefs that impact treatment:: No  Mental health DX:: No  Mental health management concern (GOAL):: No  Chemical Dependency Status: No Current Concerns  Informal Support system:: Spouse   Socioeconomic History     Marital status:      Spouse name: Not on file     Number of children: Not on file     Years of education: Not on file     Highest education level: Not on file   Relationships     Social connections     Talks on phone: More than three times a week     Gets together: Not on file     Attends [employer] service: Never     Active member of club or organization: No     Attends meetings of clubs or organizations: Never     Relationship status:      Intimate partner violence     Fear of current or ex partner: Not on file     Emotionally abused: Not on file     Physically abused: Not on file     Forced sexual activity: Not on file     Tobacco Use     Smoking status: Former Smoker     Smokeless tobacco: Never Used     Tobacco comment: quit 3/1974   Substance and Sexual Activity     Alcohol use: Not Currently     Alcohol/week: 0.0 standard drinks     Comment: rare drink     Drug use: No     Sexual activity: Not Currently     Partners: Female        Resources and Interventions:  Current Resources:      Community Resources: None  Supplies used at home:: None  Equipment Currently Used at Home: none  Employment Status: retired)    Advance Care Plan/Directive  Advanced Care Plans/Directives on file:: No  Advanced Care Plan/Directive Status: Declined Further Information    Referrals Placed: None     Goals:    Goals        General    Monitoring (pt-stated)     Notes - Note created  1/4/2021 12:29 PM by Sandra Gregory, RN    Goal Statement: I will seek medical help if rash spreads,increased drainage or fever  Date Goal set: 1/4/2021  Barriers: Non identified   Strengths: Agrees with the plan   Date to Achieve By: 2/4/2021  Patient expressed understanding of goal: Yes  Action steps to achieve this goal:  1. I will apply Triamcinolone cream as directed   2. I will call my surgeon if incisions has increased redness , drainage ,swelling ,pain or fever and  for questions or concerns   3. Care coordinator will follow up in 3-5 business days       Pain Management (pt-stated)     Notes - Note created  12/30/2020  9:57 AM by Jackson Mistry RN    Nursing Diagnosis  Acute pain related to injuring agents (i.e. Surgical incision, biological, chemical, or physical injury) as evidenced by patients verbal report of pain/discomfort.    Goals  1.  Patient will wean off of narcotics and transition to OTC analgesics per package instructions.   2.  Patient will use non-pharmacological techniques to help decrease pain during period of recovery.  Patient will report pain management methods relieve pain to a satisfactory level.    3.  Patient will verbally demonstrate the use of appropriate diversional activities and relaxation skills.  4.  Patient reports ability to get enough sleep, rest, and return to normal activities.    Interventions  1.  Anticipate the need for pain management.  Early and timely intervention is key to effective pain management. It can even reduce the total amount of analgesia required.  2.  Administer prescribed analgesics/muscle relaxant as ordered by provider.  3.  Provide and teach patient/caregivers non-pharmacologic comfort measures including ambulating, repositioning, splinting, heat/cold, and use of recommended equipment (athletic supporter, abdominal binder, IS, etc).  4.  The use of OTC aides such as  Tylenol, Ibuprofen, etc.                        Patient/Caregiver understanding: Patient expresses a good understanding of discharge instructions      Outreach Frequency: weekly      Plan:   Patient will make a hospital follow up with PCP  Patient will seek medical help if rash increases or any signs of a surgical incision  Infection  CC introductory letter and care plan send via e-mail per patient request    Care coordinator will follow up in 3-5 business days   [facility]   Sandra Gregory RN, Care Coordinator   [facility]   E-mail [email or web address]   [phone]

## 2021-01-04 NOTE — LETTER
[place] CARE COORDINATION  [id number] [address]  [facility] [address]    January 4, 2021    Milo Serna  [address] [place] [address]      Dear Milo,    I am a clinic care coordinator who works with Oscar Alves MD at [facility] . I wanted to thank you for spending the time to talk with me.  Below is a description of clinic care coordination and how I can further assist you.      The clinic care coordination team is made up of a registered nurse,  and community health worker who understand the health care system. The goal of clinic care coordination is to help you manage your health and improve access to the health care system in the most efficient manner. The team can assist you in meeting your health care goals by providing education, coordinating services, strengthening the communication among your providers and supporting you with any resource needs.    Please feel free to contact me at [phone] with any questions or concerns. We are focused on providing you with the highest-quality healthcare experience possible and that all starts with you.     Sincerely,     [facility]   Sandra Gregory RN, Care Coordinator   [facility] [place] [facility]   E-mail [email or web address]   [phone]     Enclosed: I have enclosed a copy of the Complex Care Plan. This has helpful information and goals that we have talked about. Please keep this in an easy to access place to use as needed.

## 2021-01-05 ENCOUNTER — TELEPHONE (OUTPATIENT)
Dept: FAMILY MEDICINE | Facility: CLINIC | Age: 81
End: 2021-01-05

## 2021-01-05 NOTE — TELEPHONE ENCOUNTER
"ED/Discharge Protocol     \"Hi, my name is Edita Li RN, a registered nurse, and I am calling on behalf of Dr. Alves's office at [facility].  I am calling to follow up and see how things are going for you after your recent visit.  I see that you were in the ER @ [facility]  on 1/2/2021 for Abdominal wall hematoma, initial encounter;Contact dermatitis, unspecified contact dermatitis type, unspecified trigger.      How are you doing now that you are home?\" N/A    Is patient experiencing symptoms that may require a hospital visit?  \"No.\"    Discharge Instructions    \"Let's review your discharge instructions.  What is/are the follow-up recommendations?  Pt. Response: SEE AVS:  - Do not take your Eliquis for 3-4 days.  Triamcinolone cream to the rash twice/ day.  -Follow up with Dr Alvarez.  Return as needed for fever, worsening swelling,  open sores in the skin. New or worsening symptoms.    -    \"Were you instructed to make a follow-up appointment?\"  Pt. Response:   Yes  -Follow up with Oscar Alves MD   Wed 1/13/2021 @ 0900 -Surgery & ER F/U      Medications    \"How many new medications are you on since your hospitalization/ED visit?\"  1  -START taking:triamcinolone (KENALOG)  \"How many of your current medicines changed (dose, timing, name, etc.) while you were in the hospital/ED visit?\" 1  \"Do you have questions about your medications?\"  No  \"Were you newly diagnosed with heart failure, COPD, diabetes or did you have a heart attack?\"   No  For patients on insulin: \"Did you start on insulin in the hospital or did you have your insulin dose changed?\"   No  Post Discharge Medication Reconciliation Status: discharge medications reconciled, continue medications without change.    Was MTM referral placed (*Make sure to put transitions as reason for referral)?   No    Call Summary    \"Do you have any questions or concerns about your condition or care plan at the moment?\"    Yes   Triage " "nurse advice given: N/A    Patient was in ER (x4)  in the past year (assess appropriateness of ER visits.)      \"If you have questions or things don't continue to improve, we encourage you contact us through the main clinic number,  [phone].  Even if the clinic is not open, triage nurses are available 24/7 to help you.     We would like you to know that our clinic has extended hours (provide information).  We also have urgent care (provide details on closest location and hours/contact info)\"      \"Thank you for your time and take care!\"    Edita Li, Registered Nurse, Patient Advocate [facility]  [phone]        "

## 2021-01-07 ENCOUNTER — PATIENT OUTREACH (OUTPATIENT)
Dept: SURGERY | Facility: CLINIC | Age: 81
End: 2021-01-07

## 2021-01-07 NOTE — PROGRESS NOTES
Milo Serna was contacted several days post procedure via telephone for a status update and elected to have a telephone follow -up call approximately 7-10 days after original contact in lieu of a clinic visit with Dr. Nini Alvarez.  He continues to do well and the skin sealant has started to peel off. He said the rash seems to be getting better. The patients wounds are healed and the area is C/D/I.  He is afebrile, mostly pain free, and avelino po; the patient is slowly resuming his normal activity. He is using Tylenol prn. Discussed restrictions including no lifting in excess of 20 pounds for 3 weeks.    Pathology was reviewed with the patient: Yes    All of Milo Serna question's were answered and  he would like to return to the clinic on a PRN basis.  The patient is aware that  he may schedule a post op appointment at any time.    A copy of this note was routed to the patients surgeon.              Patient Name: MILO SERNA   MR#: [id number]   Specimen #: [id number]   Collected: 12/28/2020   Received: 12/28/2020   Reported: 12/30/2020 15:45   Ordering Phy(s): NINI ALVAREZ     For improved result formatting, select 'View Enhanced Report Format' under    Linked Documents section.     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     Electronically signed out by:     Nam García M.D., Physicicristian

## 2021-01-13 ENCOUNTER — OFFICE VISIT (OUTPATIENT)
Dept: FAMILY MEDICINE | Facility: CLINIC | Age: 81
End: 2021-01-13
Payer: MEDICARE

## 2021-01-13 VITALS
DIASTOLIC BLOOD PRESSURE: 78 MMHG | BODY MASS INDEX: 26.47 KG/M2 | SYSTOLIC BLOOD PRESSURE: 128 MMHG | WEIGHT: 169 LBS | HEART RATE: 60 BPM | OXYGEN SATURATION: 99 % | TEMPERATURE: 97.8 F

## 2021-01-13 DIAGNOSIS — I48.20 CHRONIC ATRIAL FIBRILLATION (H): ICD-10-CM

## 2021-01-13 DIAGNOSIS — I77.810 THORACIC AORTIC ECTASIA (H): ICD-10-CM

## 2021-01-13 DIAGNOSIS — K82.4 GALLBLADDER POLYP: Primary | ICD-10-CM

## 2021-01-13 DIAGNOSIS — I50.42 CHRONIC COMBINED SYSTOLIC AND DIASTOLIC CONGESTIVE HEART FAILURE (H): ICD-10-CM

## 2021-01-13 DIAGNOSIS — C61 PROSTATE CANCER (H): ICD-10-CM

## 2021-01-13 PROBLEM — Z63.79 STRESS DUE TO SPOUSE WITH DEMENTIA: Status: ACTIVE | Noted: 2019-06-18

## 2021-01-13 PROCEDURE — 99214 OFFICE O/P EST MOD 30 MIN: CPT | Performed by: FAMILY MEDICINE

## 2021-01-13 NOTE — PROGRESS NOTES
Pre-Visit Planning :     Future Appointments   Date Time Provider Department Center   1/13/2021  9:00 AM Oscar Alves MD [facility]      Arrival Time for this Appointment:  8:50 AM      Appointment Notes for this encounter:    surgery follow up        Questionnaires Reviewed/Assigned:   PHQ-2    Are there any additional questions or concerns you'd like to review with your provider during your visit? NO      Last OV with provider:  12/2/2020; Exposure to COVID-19 virus        Hospital ER Visits:[facility] Emergency Department;  Dr. Ramon Ramirez MD; 1/2/2021; Abdominal wall hematoma, initial encounter; Contact dermatitis, unspecified contact dermatitis type, unspecified trigger    Is the visit preventive? NO     Specialty Visits:  12/15/2020; [facility]; James Gonzalez MD;  Pre-op examinaiton               Imaging and Lab Review:1/2/2021; EXAMINATION: CT ABDOMEN PELVIS W CONTRAST;  CLINICAL HISTORY: pain, recent cholecystectomy. Cholecystectomy was performed on 12/28/2020, 2/18/2010; IMPRESSION:1. Cecum is located              in the upper abdomen and is quite distended. The ileocecal junction is widely patent and fecalization of the small bowel contents is likely due to slow transit. On prior exams also the cecum had been located in this region, but mobile cecum is at             increased risk of developing cecal volvulus. 2. 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 3.9 cm hematoma in the anterior abdominal wall likely associated            with port site near the umbilicus. Other expected postoperative finding such as small amount of pneumoperitoneum are not unexpected. 4. Heavy calcific atherosclerotic disease. 5. Cardiomegaly     Recent Procedures:  12/28/2020; CHOLECYSTECTOMY, LAPAROSCOPIC; James Alvarez MD; DX: Gallbladder polyp    MEDS ;    Current  "Outpatient Medications   Medication     acetaminophen (TYLENOL) 500 MG tablet     apixaban ANTICOAGULANT (ELIQUIS) 2.5 MG tablet     Carboxymethylcellulose Sod PF 0.25 % SOLN     co-enzyme Q-10 100 MG CAPS     [facility] GLUCOSAMINE-CHONDROITIN PO     diclofenac (VOLTAREN) 1 % topical gel     doxycycline hyclate (VIBRA-TABS) 100 MG tablet     ferrous gluconate (FERGON) 324 (38 Fe) MG tablet     lisinopril (ZESTRIL) 20 MG tablet     MELATONIN PO     multivitamin, therapeutic (THERA-VIT) TABS tablet     omeprazole (PRILOSEC) 40 MG DR capsule     oxyCODONE (ROXICODONE) 5 MG tablet     potassium chloride ER (K-TAB) 20 MEQ CR tablet     senna-docusate (SENOKOT-S/PERICOLACE) 8.6-50 MG tablet     senna-docusate (SENOKOT-S/PERICOLACE) 8.6-50 MG tablet     simvastatin (ZOCOR) 20 MG tablet     tamsulosin (FLOMAX) 0.4 MG capsule     torsemide (DEMADEX) 20 MG tablet     triamcinolone (KENALOG) 0.1 % external cream     UREA 20 INTENSIVE HYDRATING 20 % external cream     No current facility-administered medications for this visit.       Is there anything on your medication list that needs to be updated?  Ask pt @ check-in     Health Maintenance Due   Topic Date Due     MEDICARE ANNUAL WELLNESS VISIT  04/20/2018     PHQ-2  01/01/2021       Preferred pharmacy: [facility] [address]    MyChart: YES    Questionnaire Review :  Lastly, it appears there are some questions your care team has for you.    If FiTeqhart ACTIVE \"If you get a chance to do your questions on FiTeqhart, your appointment will be much faster and smoother so feel free to sign in and go through those, otherwise please plan on arriving early so that you have time to complete them.     Patient preferred phone number: [phone]    Edita Li, Registered Nurse, [facility] Advocate [facility]   [phone]    "

## 2021-01-14 NOTE — ASSESSMENT & PLAN NOTE
S/p surgery.  PSA   Date Value Ref Range Status   10/26/2020 0.72 0 - 4 ug/L Final     Comment:     Assay Method:  Chemiluminescence using Siemens Vista analyzer

## 2021-01-28 DIAGNOSIS — I50.9 CHF (CONGESTIVE HEART FAILURE) (H): ICD-10-CM

## 2021-01-28 RX ORDER — POTASSIUM CHLORIDE 1500 MG/1
20 TABLET, EXTENDED RELEASE ORAL 2 TIMES DAILY
Qty: 180 TABLET | Refills: 1 | Status: SHIPPED | OUTPATIENT
Start: 2021-01-28 | End: 2021-01-01

## 2021-03-03 NOTE — TELEPHONE ENCOUNTER
[facility]    Phone Message    May a detailed message be left on voicemail: yes     Reason for Call: Other: Pt needs to reschedule his 4/6 appt, but Dr. Summer pinon isn't tell 6/7. Pt wants to see him before that. He won't be back from [place] tell after 4/26. Will Cancel 4/6 appt. Please contact pt to discuss options at [phone]. Thank you.     Action Taken: Message routed to:  [facility] ([facility] uro phy sched pool    Travel Screening: Not Applicable

## 2021-04-21 NOTE — PROGRESS NOTES
"  Assessment & Plan   Problem List Items Addressed This Visit        ONCOLOGY SUPPORTIVE CARE    Prostate cancer (H)     S/p surgery.  PSA   Date Value Ref Range Status   10/26/2020 0.72 0 - 4 ug/L Final     Comment:     Assay Method:  Chemiluminescence using Siemens Vista analyzer                 Other    Chronic atrial fibrillation (H)     Apixaban         Thoracic aortic ectasia (H)     Stable last echo         Gallbladder polyp - Primary     Status post laparoscopic cholecystectomy with subsequent complications.  Doing well now         Chronic combined systolic and diastolic congestive heart failure (H)     Pulmonary hypertension regurgitation all valves           Reviewed echo discharge summaries laboratory studies                                   No follow-ups on file.  He plans on going to [place] for a few months in a few weeks  Oscar Alves MD  [facility] [place]    Subjective     Skip is a 80 year old who presents to clinic today for the following health issues     HPI       ED/UC Followup:    Facility:  [facility] Emergency Department  Date of visit: 1/2/2021  Reason for visit: Post op problem  Current Status: good       Annual Wellness Visit    Patient has been advised of split billing requirements and indicates understanding: Yes     Are you in the first 12 months of your Medicare Part B coverage?  No    Physical Health:    In general, how would you rate your overall physical health? good    Outside of work, how many days during the week do you exercise?none    Outside of work, approximately how many minutes a day do you exercise?not applicable    If you drink alcohol do you typically have >3 drinks per day or >7 drinks per week? No    Do you usually eat at least 4 servings of fruit and vegetables a day, include whole grains & fiber and avoid regularly eating high fat or \"junk\" foods? Yes    Do you have any problems taking medications regularly? No    Do you have any " side effects from medications? not applicable    Needs assistance for the following daily activities: no assistance needed    Which of the following safety concerns are present in your home?  none identified     Hearing impairment: Yes, Just a little     In the past 6 months, have you been bothered by leaking of urine? no    Mental Health:    In general, how would you rate your overall mental or emotional health? good  PHQ-2 Score:      Do you feel safe in your environment? Yes    Have you ever done Advance Care Planning? (For example, a Health Directive, POLST, or a discussion with a medical provider or your loved ones about your wishes)? Yes, patient states has an Advance Care Planning document and will bring a copy to the clinic.    Fall risk:  Fallen 2 or more times in the past year?: No  Any fall with injury in the past year?: No    Cognitive Screenin) Repeat 3 items (Leader, Season, Table)    2) Clock draw: ABNORMAL   3) 3 item recall: Recalls 2 objects   Results: ABNORMAL clock, 1-2 items recalled: PROBABLE COGNITIVE IMPAIRMENT, **INFORM PROVIDER**    Mini-CogTM Copyright MIKAELA Tucker. Licensed by the author for use in [facility]; reprinted with permission (soob@[employer]). All rights reserved.      Do you have sleep apnea, excessive snoring or daytime drowsiness?: yes    Current providers sharing in care for this patient include:   Patient Care Team:  Oscar Alves MD as PCP - General (Family Practice)  Oscar Alves MD as Assigned PCP  Shara Bermudez, ALEX as Registered Nurse  Sixto Conley MD as MD (Cardiology)  Juliano Caballero PA-C as Physician Assistant (Cardiology)  Albaro Mcneal Michelle T, [facility] as Pharmacist (Pharmacist)  Marty Shearer MD as Assigned Sleep Provider  Zulay Alexandra DPM, Podiatry/Foot and Ankle Surgery as Assigned Musculoskeletal Provider  Krystian Owens MD as Assigned Surgical Provider  [facility]  [facility] ([facility] ([place]))  Edita Li, RN as Personal Advocate & Liaison (PAL) (Family Medicine)    Patient has been advised of split billing requirements and indicates understanding: yes      Review of Systems   No pulmonary no cardiac symptoms bowel movements are good.  Bruises are resolving.  Much stress with spouse with dementia.  No systemic symptoms.  A complete review of systems is otherwise negative      Objective    /78   Pulse 60   Temp 97.8  F (36.6  C) (Oral)   Wt 76.7 kg (169 lb)   SpO2 99%   BMI 26.47 kg/m    Body mass index is 26.47 kg/m .  Physical Exam   Alert cooperative  Ears clear  No cervical nodes  Grade 2 ejection murmur left upper sternal border  Chest clear  Abdomen soft palpable hematoma superior to the umbilicus resolving bruising about the bottom of his panniculus wounds clean and dry  No edema        Oscar Alves MD             within normal limits

## 2021-05-06 NOTE — NURSING NOTE
Chief Complaint   Patient presents with     Personal hx of malingant neoplasm of prostate     Got PSA drawn     Betty Stokes

## 2021-05-06 NOTE — LETTER
5/6/2021       RE: Milo Serna  [address] [place] [address]     Dear Colleague,    Thank you for referring your patient, Milo Serna, to the [facility] at [facility]. Please see a copy of my visit note below.    History: It is a very great pleasure to see this very pleasant 80-year-old gentleman in follow-up consultation today.  As we recall, he had initially presented to us in 2016 with a PSA of 7; he has been subsequently diagnosed with [place] 7 and [place] 8 adenocarcinoma the prostate at that time but also history of atrial fibrillation and was on both Eliquis and aspirin.  We had had careful discussions with my colleagues about therapeutic options and he was not considered a candidate for radical prostatectomy, and was also considered inappropriate for radiation therapy due to the need for anticoagulants with the concern about future potential for bleeding because of radiation therapy and radiation cystitis.  We therefore had discussions about cryoablation of the prostate and after careful counseling about this and negative metastatic evaluation we performed this in November 2016.  Since then he is continent of urine has very mild nocturia and a good urine stream.  He did have Eligard before cryoablation of the prostate.  The PSA in June 2020 had risen from a previous level of 0.  8 9 up to 2.0 over the previous 12 months.  At that point we decided to give him a 6-month leuprolide injection and subsequently the PSA declined to 0.72 in October 2020 and today it is 0.74/6 months later.  He has no complaints and is voiding well with no other major symptoms.    Past Medical History:   Diagnosis Date     Actinic keratosis 10/12/2019     Anemia, unspecified 11/08/2016     Atrial fibrillation (H) 05/27/2015     BPH (benign prostatic hyperplasia) 01/25/2012     CAD (coronary artery disease) 04/04/2012     Closed  bimalleolar fracture of left ankle with routine healing 04/05/2019     Closed fracture of metatarsal bone 04/04/2012     Coronary atherosclerosis of unspecified type of vessel, native or graft n[facility] 2006    CAB 1996 following MI (at [facility])      Dilated aortic root (H) 05/26/2016     Drug-induced erectile dysfunction 10/02/2015     Elevated blood sugar 11/08/2016     Elevated PSA 09/19/2013     Epistaxis 09/09/2019     Essential hypertension with goal blood pressure less than 140/90 09/22/2016     Fall 09/06/2019     Functional diarrhea 10/12/2019     Gallbladder polyp 05/01/2018     Gout      HAV (hallux abducto valgus) 04/04/2012     Hematoma 09/09/2019     Hernia, abdominal      History of prostate cancer 06/15/2016     HTN, goal below 150/90 04/20/2017     Hypokalemia 09/12/2019     Iron deficiency anemia secondary to inadequate dietary iron intake 11/15/2016     Low blood pressure reading 10/17/2016     Lumbago     had degendisc dz on MRI 7/07     Memory loss 06/18/2019     Microalbuminuria 06/04/2020    X1     Mixed hyperlipidemia      Mumps      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     Nonrheumatic mitral valve regurgitation 10/28/2019     OA (osteoarthritis) 04/04/2012     Old myocardial infarction 04/04/2012     LENORA (obstructive sleep apnea) 10/28/2019     Other viral warts 09/06/2019     Palpitations      Pes planus 04/04/2012     Petechiae 06/18/2019     Prostate cancer (H) 05/26/2016     PUD (peptic ulcer disease) 04/04/2012     Pulmonary hypertension (H) 09/25/2017     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     Rosacea 01/15/2008     Stress due to spouse with dementia 06/18/2019     Thrombocytopenia (H) 04/21/2017     Unspecified hyperplasia of prostate with urinary obstruction and other lower urinary tract symptoms (LUTS)     better on avodart & hytrin     Venous stasis 04/04/2012       Social History     Socioeconomic History     Marital status:      Spouse name: None     Number of children: None      Years of education: None     Highest education level: None   Occupational History     None   Social Needs     Financial resource strain: Not hard at all     Food insecurity     Worry: Never true     Inability: Never true     Transportation needs     Medical: No     Non-medical: No   Tobacco Use     Smoking status: Former Smoker     Smokeless tobacco: Never Used     Tobacco comment: quit 3/1974   Substance and Sexual Activity     Alcohol use: Not Currently     Alcohol/week: 0.0 standard drinks     Comment: rare drink     Drug use: No     Sexual activity: Not Currently     Partners: Female   Lifestyle     Physical activity     Days per week: 4 days     Minutes per session: None     Stress: Not at all   Relationships     Social connections     Talks on phone: More than three times a week     Gets together: None     Attends Sabianism service: Never     Active member of club or organization: No     Attends meetings of clubs or organizations: Never     Relationship status:      Intimate partner violence     Fear of current or ex partner: None     Emotionally abused: None     Physically abused: None     Forced sexual activity: None   Other Topics Concern     Parent/sibling w/ CABG, MI or angioplasty before 65F 55M? Not Asked      Service Not Asked     Blood Transfusions Not Asked     Caffeine Concern Yes     Comment: 1 cups of coffee and soda per day     Occupational Exposure Not Asked     Hobby Hazards Not Asked     Sleep Concern Not Asked     Stress Concern Not Asked     Weight Concern Not Asked     Special Diet No     Back Care Not Asked     Exercise Yes     Comment: walking     Bike Helmet Not Asked     Seat Belt Yes     Self-Exams Not Asked   Social History Narrative     None       Past Surgical History:   Procedure Laterality Date     CARDIAC SURGERY      CAB     CYSTOSCOPY FLEXIBLE, CYOABLATION PROSTATE N/A 11/09/2016    Procedure: CYSTOSCOPY FLEXIBLE, CRYOABLATION PROSTATE;  Surgeon: Krystian Owens  MD Quinn;  Location: [facility] OR     GENITOURINARY SURGERY      prostate surgery      HERNIA REPAIR       LAPAROSCOPIC CHOLECYSTECTOMY N/A 2020    Procedure: CHOLECYSTECTOMY, LAPAROSCOPIC;  Surgeon: James Alvarez MD;  Location: [facility] OR     LAPAROSCOPIC HERNIORRHAPHY INGUINAL Right 05/10/2017    Procedure: LAPAROSCOPIC HERNIORRHAPHY INGUINAL;  Laparoscopic preperitoneal repair of right inguinal hernia with placement of underlay mesh;  Surgeon: nErico Bridges MD;  Location: [facility] OR     PROSTATE SURGERY       [facility] NONSPECIFIC PROCEDURE      CAB  [place]     [facility] NONSPECIFIC PROCEDURE  2007    l inguinal hernia repair      [facility] NONSPECIFIC PROCEDURE      R hernia remote     [facility] NONSPECIFIC PROCEDURE      R ankle ORIF for fx     Z NONSPECIFIC PROCEDURE      nasal surgery      [facility] NONSPECIFIC PROCEDURE      R rotater repair (remote)      [facility] NONSPECIFIC PROCEDURE      appy 1966     [facility] NONSPECIFIC PROCEDURE      sinus surgery x 2     [facility] NONSPECIFIC PROCEDURE      L shoulder        Family History   Problem Relation Age of Onset     Cancer Mother         stomach cancer,  age 61     Heart Disease Father         MI,  age 71     Heart Disease Brother         stent placement, RA      Hypertension Brother      Heart Disease Sister         rhythm problems with heart, hypertension         Current Outpatient Medications:      acetaminophen (TYLENOL) 500 MG tablet, Take 500-1,000 mg by mouth every 8 hours as needed, Disp: , Rfl:      apixaban ANTICOAGULANT (ELIQUIS) 2.5 MG tablet, Take 1 tablet (2.5 mg) by mouth 2 times daily, Disp: 180 tablet, Rfl: 0     co-enzyme Q-10 100 MG CAPS, Take 1 capsule (100 mg) by mouth daily (Patient taking differently: Take 100 mg by mouth every morning ), Disp: 180 capsule, Rfl: 3     [facility] GLUCOSAMINE-CHONDROITIN PO, Take 1 tablet by mouth every morning , Disp: , Rfl:      diclofenac (VOLTAREN) 1 % topical gel, Apply 4 gramsto area qid prn, Disp: 100 g,  Rfl: 11     doxycycline hyclate (VIBRA-TABS) 100 MG tablet, Take 1 tablet (100 mg) by mouth daily, Disp: 90 tablet, Rfl: 3     ferrous gluconate (FERGON) 324 (38 Fe) MG tablet, Take 1 tablet (324 mg) by mouth daily (with breakfast), Disp: 90 tablet, Rfl: 3     lisinopril (ZESTRIL) 20 MG tablet, TAKE 1 TABLET DAILY, Disp: 90 tablet, Rfl: 3     MELATONIN PO, Take 5 mg by mouth At Bedtime , Disp: , Rfl:      multivitamin, therapeutic (THERA-VIT) TABS tablet, Take 1 tablet by mouth daily, Disp: , Rfl:      omeprazole (PRILOSEC) 40 MG DR capsule, TAKE 1 CAPSULE DAILY, Disp: 90 capsule, Rfl: 3     potassium chloride ER (K-TAB) 20 MEQ CR tablet, Take 1 tablet (20 mEq) by mouth 2 times daily, Disp: 180 tablet, Rfl: 1     senna-docusate (SENOKOT-S/PERICOLACE) 8.6-50 MG tablet, Take 1 tablet by mouth 2 times daily as needed for constipation, Disp: 20 tablet, Rfl: 0     simvastatin (ZOCOR) 20 MG tablet, Take 1 tablet (20 mg) by mouth At Bedtime, Disp: 90 tablet, Rfl: 1     tamsulosin (FLOMAX) 0.4 MG capsule, TAKE 1 CAPSULE DAILY, Disp: 90 capsule, Rfl: 2     torsemide (DEMADEX) 20 MG tablet, TAKE 1 TABLET DAILY WITH BREAKFAST, Disp: 90 tablet, Rfl: 3     UREA 20 INTENSIVE HYDRATING 20 % external cream, Apply topically as needed Apply to right foot daily, Disp: 120 g, Rfl: 11     amoxicillin (AMOXIL) 500 MG capsule, TAKE ONE CAPSULE BY MOUTH THREE TIMES DAILY UNTIL GONE . TAKE WITH FOOD, Disp: , Rfl:      Carboxymethylcellulose Sod PF 0.25 % SOLN, INSTILL ONE DROP IN BOTH EYES FIVE TIMES A DAY AS NEEDED, Disp: , Rfl:      oxyCODONE (ROXICODONE) 5 MG tablet, Take 1-2 tablets (5-10 mg) by mouth every 4 hours as needed for moderate to severe pain (Patient not taking: Reported on 5/6/2021), Disp: 10 tablet, Rfl: 0    10 point ROS of systems including Constitutional, Eyes, Respiratory, Cardiovascular, Gastroenterology, Genitourinary, Integumentary, Muscularskeletal, Psychiatric and Neurologic were all negative except for pertinent  "positives noted in my HPI.    Examination:   BP (!) 140/80   Pulse 53   Ht 1.702 m (5' 7\")   Wt 72.6 kg (160 lb)   SpO2 99%   BMI 25.06 kg/m    General Impression: Very pleasant patient in no acute distress, well-oriented in time place and person and quite conversational  Mental Status: Normal  HEENT: Extraocular movements intact.  No clinical evidence of jaundice on examination of eyes.  Mucous membranes are unremarkable  Skin: Warm.  No other abnormalities  Respiratory System: Unlabored on room air.  Respiratory cycle normal  Lymph Nodes: Not examined  Back/Flank Tenderness: Not examined  Cardiovascular System: No significant peripheral pitting edema  Abdominal Examination: The abdomen is not obese  Extremities: Extremities otherwise unremarkable  Genitial: Not examined  Rectal Examination: Not examined today  Neurologic System: There are no significant acute abnormal neurological signs in the central or peripheral nervous systems    Impression: I reviewed the records in detail and discussed the situation with him.  Given, that the PSA seems to be stable and certainly lower than the level it was when the leuprolide injection was given 1 year ago I am going to recommend we defer leuprolide again and repeat the blood test for PSA in 6 months.  I have had a discussion with the patient about intermittent hormone treatment and it is possible that he may need further injections in the future possibly sooner 6 months from now depending on the PSA result.  I did explain to him about principles that we use for intermittent hormone treatment, i.e. that we hope to extend the effective period and response to this treatment by not giving it every 6 months but only when needed based on the significant rise in the PSA above baseline.  I discussed the situation with him carefully today.  I am retiring and I am going to recommend he follow-up with Dr. Allen who is an outstanding younger urologist who will take very good care " "of him..  I did go over the entire situation with the patient in detail today.  I addressed and answered many questions      Plan: 6 months of PSA and examination    Time: 15 minutes with greater than 50% in discussion and consultation    \"This dictation was performed with voice recognition software and may contain errors,  omissions and inadvertent word substitution.\"      Krystian Owens MD      "

## 2021-05-06 NOTE — PROGRESS NOTES
History: It is a very great pleasure to see this very pleasant 80-year-old gentleman in follow-up consultation today.  As we recall, he had initially presented to us in 2016 with a PSA of 7; he has been subsequently diagnosed with Angelique 7 and Malcolm 8 adenocarcinoma the prostate at that time but also history of atrial fibrillation and was on both Eliquis and aspirin.  We had had careful discussions with my colleagues about therapeutic options and he was not considered a candidate for radical prostatectomy, and was also considered inappropriate for radiation therapy due to the need for anticoagulants with the concern about future potential for bleeding because of radiation therapy and radiation cystitis.  We therefore had discussions about cryoablation of the prostate and after careful counseling about this and negative metastatic evaluation we performed this in November 2016.  Since then he is continent of urine has very mild nocturia and a good urine stream.  He did have Eligard before cryoablation of the prostate.  The PSA in June 2020 had risen from a previous level of 0.  8 9 up to 2.0 over the previous 12 months.  At that point we decided to give him a 6-month leuprolide injection and subsequently the PSA declined to 0.72 in October 2020 and today it is 0.74/6 months later.  He has no complaints and is voiding well with no other major symptoms.    Past Medical History:   Diagnosis Date     Actinic keratosis 10/12/2019     Anemia, unspecified 11/08/2016     Atrial fibrillation (H) 05/27/2015     BPH (benign prostatic hyperplasia) 01/25/2012     CAD (coronary artery disease) 04/04/2012     Closed bimalleolar fracture of left ankle with routine healing 04/05/2019     Closed fracture of metatarsal bone 04/04/2012     Coronary atherosclerosis of unspecified type of vessel, native or graft [facility]    CAB 1996 following MI (at [facility])      Dilated aortic root (H) 05/26/2016     Drug-induced erectile  dysfunction 10/02/2015     Elevated blood sugar 11/08/2016     Elevated PSA 09/19/2013     Epistaxis 09/09/2019     Essential hypertension with goal blood pressure less than 140/90 09/22/2016     Fall 09/06/2019     Functional diarrhea 10/12/2019     Gallbladder polyp 05/01/2018     Gout      HAV (hallux abducto valgus) 04/04/2012     Hematoma 09/09/2019     Hernia, abdominal      History of prostate cancer 06/15/2016     HTN, goal below 150/90 04/20/2017     Hypokalemia 09/12/2019     Iron deficiency anemia secondary to inadequate dietary iron intake 11/15/2016     Low blood pressure reading 10/17/2016     Lumbago     had degendisc dz on MRI 7/07     Memory loss 06/18/2019     Microalbuminuria 06/04/2020    X1     Mixed hyperlipidemia      Mumps      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     Nonrheumatic mitral valve regurgitation 10/28/2019     OA (osteoarthritis) 04/04/2012     Old myocardial infarction 04/04/2012     LENORA (obstructive sleep apnea) 10/28/2019     Other viral warts 09/06/2019     Palpitations      Pes planus 04/04/2012     Petechiae 06/18/2019     Prostate cancer (H) 05/26/2016     PUD (peptic ulcer disease) 04/04/2012     Pulmonary hypertension (H) 09/25/2017     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     Rosacea 01/15/2008     Stress due to spouse with dementia 06/18/2019     Thrombocytopenia (H) 04/21/2017     Unspecified hyperplasia of prostate with urinary obstruction and other lower urinary tract symptoms (LUTS)     better on avodart & hytrin     Venous stasis 04/04/2012       Social History     Socioeconomic History     Marital status:      Spouse name: None     Number of children: None     Years of education: None     Highest education level: None   Occupational History     None   Social Needs     Financial resource strain: Not hard at all     Food insecurity     Worry: Never true     Inability: Never true     Transportation needs     Medical: No     Non-medical: No   Tobacco Use     Smoking status: Former  Smoker     Smokeless tobacco: Never Used     Tobacco comment: quit 3/1974   Substance and Sexual Activity     Alcohol use: Not Currently     Alcohol/week: 0.0 standard drinks     Comment: rare drink     Drug use: No     Sexual activity: Not Currently     Partners: Female   Lifestyle     Physical activity     Days per week: 4 days     Minutes per session: None     Stress: Not at all   Relationships     Social connections     Talks on phone: More than three times a week     Gets together: None     Attends [employer] service: Never     Active member of club or organization: No     Attends meetings of clubs or organizations: Never     Relationship status:      Intimate partner violence     Fear of current or ex partner: None     Emotionally abused: None     Physically abused: None     Forced sexual activity: None   Other Topics Concern     Parent/sibling w/ CABG, MI or angioplasty before 65F 55M? Not Asked      Service Not Asked     Blood Transfusions Not Asked     Caffeine Concern Yes     Comment: 1 cups of coffee and soda per day     Occupational Exposure Not Asked     Hobby Hazards Not Asked     Sleep Concern Not Asked     Stress Concern Not Asked     Weight Concern Not Asked     Special Diet No     Back Care Not Asked     Exercise Yes     Comment: walking     Bike Helmet Not Asked     Seat Belt Yes     Self-Exams Not Asked   Social History Narrative     None       Past Surgical History:   Procedure Laterality Date     CARDIAC SURGERY      CAB     CYSTOSCOPY FLEXIBLE, CYOABLATION PROSTATE N/A 11/09/2016    Procedure: CYSTOSCOPY FLEXIBLE, CRYOABLATION PROSTATE;  Surgeon: Krystian Owens MD;  Location:  OR     GENITOURINARY SURGERY      prostate surgery      HERNIA REPAIR       LAPAROSCOPIC CHOLECYSTECTOMY N/A 12/28/2020    Procedure: CHOLECYSTECTOMY, LAPAROSCOPIC;  Surgeon: James Alvarez MD;  Location:  OR     LAPAROSCOPIC HERNIORRHAPHY INGUINAL Right 05/10/2017    Procedure:  LAPAROSCOPIC HERNIORRHAPHY INGUINAL;  Laparoscopic preperitoneal repair of right inguinal hernia with placement of underlay mesh;  Surgeon: Enrico Bridges MD;  Location: [facility] OR     PROSTATE SURGERY       [facility] NONSPECIFIC PROCEDURE      CAB  [facility] NONSPECIFIC PROCEDURE  2007    l inguinal hernia repair      [facility] NONSPECIFIC PROCEDURE      R hernia remote     [facility] NONSPECIFIC PROCEDURE      R ankle ORIF for fx     [facility] NONSPECIFIC PROCEDURE      nasal surgery      [facility] NONSPECIFIC PROCEDURE      R rotater repair (remote)      [facility] NONSPECIFIC PROCEDURE      appy 1966     [facility] NONSPECIFIC PROCEDURE      sinus surgery x 2     [facility] NONSPECIFIC PROCEDURE      L shoulder        Family History   Problem Relation Age of Onset     Cancer Mother         stomach cancer,  age 61     Heart Disease Father         MI,  age 71     Heart Disease Brother         stent placement, RA      Hypertension Brother      Heart Disease Sister         rhythm problems with heart, hypertension         Current Outpatient Medications:      acetaminophen (TYLENOL) 500 MG tablet, Take 500-1,000 mg by mouth every 8 hours as needed, Disp: , Rfl:      apixaban ANTICOAGULANT (ELIQUIS) 2.5 MG tablet, Take 1 tablet (2.5 mg) by mouth 2 times daily, Disp: 180 tablet, Rfl: 0     co-enzyme Q-10 100 MG CAPS, Take 1 capsule (100 mg) by mouth daily (Patient taking differently: Take 100 mg by mouth every morning ), Disp: 180 capsule, Rfl: 3     [facility] GLUCOSAMINE-CHONDROITIN PO, Take 1 tablet by mouth every morning , Disp: , Rfl:      diclofenac (VOLTAREN) 1 % topical gel, Apply 4 gramsto area qid prn, Disp: 100 g, Rfl: 11     doxycycline hyclate (VIBRA-TABS) 100 MG tablet, Take 1 tablet (100 mg) by mouth daily, Disp: 90 tablet, Rfl: 3     ferrous gluconate (FERGON) 324 (38 Fe) MG tablet, Take 1 tablet (324 mg) by mouth daily (with breakfast), Disp: 90 tablet, Rfl: 3     lisinopril (ZESTRIL) 20 MG tablet, TAKE 1 TABLET  "DAILY, Disp: 90 tablet, Rfl: 3     MELATONIN PO, Take 5 mg by mouth At Bedtime , Disp: , Rfl:      multivitamin, therapeutic (THERA-VIT) TABS tablet, Take 1 tablet by mouth daily, Disp: , Rfl:      omeprazole (PRILOSEC) 40 MG DR capsule, TAKE 1 CAPSULE DAILY, Disp: 90 capsule, Rfl: 3     potassium chloride ER (K-TAB) 20 MEQ CR tablet, Take 1 tablet (20 mEq) by mouth 2 times daily, Disp: 180 tablet, Rfl: 1     senna-docusate (SENOKOT-S/PERICOLACE) 8.6-50 MG tablet, Take 1 tablet by mouth 2 times daily as needed for constipation, Disp: 20 tablet, Rfl: 0     simvastatin (ZOCOR) 20 MG tablet, Take 1 tablet (20 mg) by mouth At Bedtime, Disp: 90 tablet, Rfl: 1     tamsulosin (FLOMAX) 0.4 MG capsule, TAKE 1 CAPSULE DAILY, Disp: 90 capsule, Rfl: 2     torsemide (DEMADEX) 20 MG tablet, TAKE 1 TABLET DAILY WITH BREAKFAST, Disp: 90 tablet, Rfl: 3     UREA 20 INTENSIVE HYDRATING 20 % external cream, Apply topically as needed Apply to right foot daily, Disp: 120 g, Rfl: 11     amoxicillin (AMOXIL) 500 MG capsule, TAKE ONE CAPSULE BY MOUTH THREE TIMES DAILY UNTIL GONE . TAKE WITH FOOD, Disp: , Rfl:      Carboxymethylcellulose Sod PF 0.25 % SOLN, INSTILL ONE DROP IN BOTH EYES FIVE TIMES A DAY AS NEEDED, Disp: , Rfl:      oxyCODONE (ROXICODONE) 5 MG tablet, Take 1-2 tablets (5-10 mg) by mouth every 4 hours as needed for moderate to severe pain (Patient not taking: Reported on 5/6/2021), Disp: 10 tablet, Rfl: 0    10 point ROS of systems including Constitutional, Eyes, Respiratory, Cardiovascular, Gastroenterology, Genitourinary, Integumentary, Muscularskeletal, Psychiatric and Neurologic were all negative except for pertinent positives noted in my HPI.    Examination:   BP (!) 140/80   Pulse 53   Ht 1.702 m (5' 7\")   Wt 72.6 kg (160 lb)   SpO2 99%   BMI 25.06 kg/m    General Impression: Very pleasant patient in no acute distress, well-oriented in time place and person and quite conversational  Mental Status: Normal  HEENT: " "Extraocular movements intact.  No clinical evidence of jaundice on examination of eyes.  Mucous membranes are unremarkable  Skin: Warm.  No other abnormalities  Respiratory System: Unlabored on room air.  Respiratory cycle normal  Lymph Nodes: Not examined  Back/Flank Tenderness: Not examined  Cardiovascular System: No significant peripheral pitting edema  Abdominal Examination: The abdomen is not obese  Extremities: Extremities otherwise unremarkable  Genitial: Not examined  Rectal Examination: Not examined today  Neurologic System: There are no significant acute abnormal neurological signs in the central or peripheral nervous systems    Impression: I reviewed the records in detail and discussed the situation with him.  Given, that the PSA seems to be stable and certainly lower than the level it was when the leuprolide injection was given 1 year ago I am going to recommend we defer leuprolide again and repeat the blood test for PSA in 6 months.  I have had a discussion with the patient about intermittent hormone treatment and it is possible that he may need further injections in the future possibly sooner 6 months from now depending on the PSA result.  I did explain to him about principles that we use for intermittent hormone treatment, i.e. that we hope to extend the effective period and response to this treatment by not giving it every 6 months but only when needed based on the significant rise in the PSA above baseline.  I discussed the situation with him carefully today.  I am retiring and I am going to recommend he follow-up with Dr. Allen who is an outstanding younger urologist who will take very good care of him..  I did go over the entire situation with the patient in detail today.  I addressed and answered many questions      Plan: 6 months of PSA and examination    Time: 15 minutes with greater than 50% in discussion and consultation    \"This dictation was performed with voice recognition software and may " "contain errors,  omissions and inadvertent word substitution.\"      "

## 2021-05-13 PROBLEM — I50.9 CHRONIC CONGESTIVE HEART FAILURE, UNSPECIFIED HEART FAILURE TYPE (H): Status: ACTIVE | Noted: 2021-01-01

## 2021-05-13 PROBLEM — I87.2 STASIS DERMATITIS OF BOTH LEGS: Status: ACTIVE | Noted: 2021-01-01

## 2021-05-13 PROBLEM — K59.03 DRUG-INDUCED CONSTIPATION: Status: ACTIVE | Noted: 2021-01-01

## 2021-05-13 NOTE — PROGRESS NOTES
Medication Therapy Management (MTM) Encounter    ASSESSMENT:                            Medication Adherence/Access: No issues identified    Afib/HFrEF/HTN/CAD: stable  Chronic pain:  stable  Rosacea: stable  Hyperlipidemia: Recommend restarting simvastatin.   Ulcer prevention: stable  BPH: stable  Insomnia: stable  Supplements: Recommend may discontinue coQ10 to decrease pill burden.     PLAN:                            1. Restart simvastatin 20mg bedtime.  2. Stop coQ10 supplement.    Follow-up: Return in about 1 year (around 5/13/2022) for Medication Therapy Management Pharmacist.      SUBJECTIVE/OBJECTIVE:                          Milo Serna is a 80 year old male coming in for a follow-up visit (1st visit of 2021). He was referred to me from Dr. steele. Today's visit is a co-visit with PCP. Today's visit is a follow-up MTM visit from 4/14/20.     Reason for visit: Medication review.    Tobacco: He reports that he has quit smoking. He has never used smokeless tobacco.  Alcohol: not currently using    Medication Adherence/Access: No issues, uses a pill box. 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 He established care with a general physician in [place].    Afib/HFrEF/HTN/CAD: Patient is currently taking Eliquis 2.5mg twice daily for anticoagulation, low dose due to epistaxis. Also takes torsemide 20mg daily, potassium ER 20meq twice day, lisinopril 20mg every day at noon. Patient reports no side effects or bruising. Patient does not have a hx of GI bleed.   History of CAD s/p CABG 1997  [id number]  6/2020 Echo EF - 40-45%  Pt is not complaining of sx of HF.  Reports home BP monitoring always stable, if not lower 100-120's systolic.  Wt Readings from Last 3 Encounters:   05/13/21 163 lb 12.8 oz (74.3 kg)   05/06/21 160 lb (72.6 kg)   01/13/21 169 lb (76.7 kg)     BP Readings from Last 3 Encounters:   05/13/21 124/62   05/06/21 (!) 140/80   01/13/21 128/78       Chronic pain:  Current medications include: acetaminophen 650mg Q6  prn (only some days), glucosamine-chrondoitin every day, diclofenac gel as needed which works well for him. Pain is described as knee pain and diclofenac gel for lumbar spine.   How is your pain? Fully effective control.  Patient reports the following side effects:  Denies Side Effects.    Rosacea: Current medication is doxycycline 100mg every day. He is  from MVI and iron. No issues reported.    Hyperlipidemia: Current therapy includes simvastatin 20mg once daily (been out so has not been taking).  Pt reports no side effects.  Recent Labs   Lab Test 09/11/19  0757 04/05/19  1211  10/02/15  0955 09/25/14  1210   CHOL 88 116   < > 122 117   HDL 43 46   < > 48 45   LDL 34 56   < > 63 60   TRIG 55 69   < > 53 62   CHOLHDLRATIO  --   --   --  2.5 2.6    < > = values in this interval not displayed.       Ulcer prevention: Current medications include: Prilosec (omeprazole) 40mg once daily. Pt c/o no current symptoms.  Patient feels that current regimen is effective.     BPH: Current medication is tamsulosin 0.4mg every day.  Had prostate cancer, no problems with urinating.     Insomnia: Current medications include: melatonin 5mg bedtime. Pt reports no trouble with sleeping.     Supplements: Current medication ferrous gluconate 324mg every day, co-enzyme Q10 daily, calcium daily, and Men's MVI daily.  He reports no side effects. Wonders if he needs coQ10.     Today's Vitals: There were no vitals taken for this visit.  ----------------    I spent 20 minutes with this patient today. All changes were made via collaborative practice agreement with Oscar Alves. A copy of the visit note was provided to the patient's primary care provider.    The patient was given a summary of these recommendations. See Provider note/AVS from today.     Stacia Weber, PharmD  Medication Therapy Management Provider, [facility]  Pager: [phone]        Medication Therapy Recommendations  Hyperlipidemia LDL  goal <100    Current Medication: simvastatin (ZOCOR) 20 MG tablet   Rationale: Patient forgets to take - Adherence - Adherence   Recommendation: Provide Adherence Intervention - Restart simvastatin 20mg bedtim   Status: Accepted per Provider         Takes dietary supplements    Rationale: No medical indication at this time - Unnecessary medication therapy - Indication   Recommendation: Discontinue Medication - co-enzyme Q-10 100 MG Caps capsule - Stop coQ10 supplement.   Status: Accepted - no CPA Needed

## 2021-05-13 NOTE — LETTER
May 17, 2021      Milo Serna  [address] [place] [address]        Dear ,    We are writing to inform you of your test results. All tests are quite stable.  Blood count is increasing       Resulted Orders   Lipid panel reflex to direct LDL Non-fasting   Result Value Ref Range    Cholesterol 121 <200 mg/dL    Triglycerides 49 <150 mg/dL      Comment:      Non Fasting    HDL Cholesterol 59 >39 mg/dL    LDL Cholesterol Calculated 52 <100 mg/dL      Comment:      Desirable:       <100 mg/dl    Non HDL Cholesterol 62 <130 mg/dL   Comprehensive metabolic panel   Result Value Ref Range    Sodium 135 133 - 144 mmol/L    Potassium 4.1 3.4 - 5.3 mmol/L    Chloride 104 94 - 109 mmol/L    Carbon Dioxide 28 20 - 32 mmol/L    Anion Gap 3 3 - 14 mmol/L    Glucose 95 70 - 99 mg/dL      Comment:      Non Fasting    Urea Nitrogen 11 7 - 30 mg/dL    Creatinine 0.76 0.66 - 1.25 mg/dL    GFR Estimate 85 >60 mL/min/[1.73_m2]      Comment:      Non  GFR Calc  Starting 12/18/2018, serum creatinine based estimated GFR (eGFR) will be   calculated using the Chronic Kidney Disease Epidemiology Collaboration   (CKD-EPI) equation.      GFR Estimate If Black >90 >60 mL/min/[1.73_m2]      Comment:       GFR Calc  Starting 12/18/2018, serum creatinine based estimated GFR (eGFR) will be   calculated using the Chronic Kidney Disease Epidemiology Collaboration   (CKD-EPI) equation.      Calcium 9.3 8.5 - 10.1 mg/dL    Bilirubin Total 1.4 (H) 0.2 - 1.3 mg/dL    Albumin 3.8 3.4 - 5.0 g/dL    Protein Total 7.9 6.8 - 8.8 g/dL    Alkaline Phosphatase 204 (H) 40 - 150 U/L    ALT 17 0 - 70 U/L    AST 24 0 - 45 U/L   CBC with platelets and differential   Result Value Ref Range    WBC 3.2 (L) 4.0 - 11.0 10e9/L      Comment:      Results confirmed by repeat test    RBC Count 3.49 (L) 4.4 - 5.9 10e12/L      Comment:      Results confirmed by repeat test    Hemoglobin 11.5 (L) 13.3 - 17.7 g/dL       Comment:      Results confirmed by repeat test    Hematocrit 34.6 (L) 40.0 - 53.0 %      Comment:      Results confirmed by repeat test    MCV 99 78 - 100 fl    MCH 33.0 26.5 - 33.0 pg    MCHC 33.2 31.5 - 36.5 g/dL    RDW 14.2 10.0 - 15.0 %    Platelet Count 137 (L) 150 - 450 10e9/L      Comment:      Results confirmed by repeat test    % Neutrophils 65.4 %    % Lymphocytes 13.6 %    % Monocytes 18.5 %    % Eosinophils 1.9 %    % Basophils 0.6 %    Absolute Neutrophil 2.1 1.6 - 8.3 10e9/L    Absolute Lymphocytes 0.4 (L) 0.8 - 5.3 10e9/L      Comment:      Results confirmed by repeat test    Absolute Monocytes 0.6 0.0 - 1.3 10e9/L    Absolute Eosinophils 0.1 0.0 - 0.7 10e9/L    Absolute Basophils 0.0 0.0 - 0.2 10e9/L    Diff Method Automated Method        If you have any questions or concerns, please call the clinic at the number listed above.       Sincerely,      Oscar Alves MD

## 2021-05-13 NOTE — TELEPHONE ENCOUNTER
Pt referred to [facility] by Oscar Alves MD for Stasis dermatitis of both legs, potential custom zippered compression knee hi hose.     Previously established with Dr. Biggs in 2017.     Pt needs to be scheduled for in person consult with Dr. Biggs/Vascular medicine. Will route to scheduling to coordinate an appointment at next available.     BRUNO Chan, RN  [facility]

## 2021-05-13 NOTE — PATIENT INSTRUCTIONS
1. Stop CoQ10 supplement.  2. Restart taking simvastatin 20 mg bedtime.     Dr. Joselyn Weber, PharmD  Medication Therapy Management Provider, [facility]

## 2021-05-13 NOTE — PROGRESS NOTES
Assessment & Plan   Problem List Items Addressed This Visit     Anemia     Monitor.  He is anticoagulated and on iron         Relevant Orders    CBC with platelets and differential (Completed)    CAD (coronary artery disease)     Indefinite statin therapy. S/p CABG         Chronic combined systolic and diastolic congestive heart failure (H) - Primary     He has edema in his feet and has purchased some socks that are creating a tourniquet effect.  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  No pulmonary symptoms         Relevant Orders    Lipid panel reflex to direct LDL Non-fasting (Completed)    VASCULAR MEDICINE REFERRAL    Comprehensive metabolic panel (Completed)    Stasis dermatitis of both legs     Brawny changes, skin intact.  He has trouble getting his socks on.  Discussed measuring him for custom socks with zippers.  He would like to consider.  Refer         Relevant Orders    VASCULAR MEDICINE REFERRAL    Stress due to spouse with dementia     He is looking at assisted living                             Return in about 6 months (around 11/13/2021) for , Physical Exam.    Oscar Alves MD  [facility] ALECIA Staley is a 80 year old who presents for the following health issues     HPI     Vascular Disease Follow-up      How often do you take nitroglycerin? Not prescribed Nitrogycerin    Do you take an aspirin every day?  No    Heart Failure Follow-up    Are you experiencing any shortness of breath? No    Are you experiencing any swelling in your legs or feet?  Better than usual - States he is using a new pair of socks which have been helping.     Are you using more pillows than usual? No    Do you cough at night?  No    Do you check your weight daily?  No    Have you had a weight change recently?  No    Are you having any of the following side effects from your medications? (Select all that apply)  The patient does not report symptoms of dizziness, fatigue, cough, swelling, or slow heart beat.    Since your last visit, how many times have you gone to the cardiologist, urgent care, emergency room, or hospital because of your heart failure?   None          Review of Systems   No dyspnea no fall no chest pain.  Lots of stress with his spouse, who is condition is deteriorating      Objective    /62 (BP Location: Right arm, Patient Position: Chair, Cuff Size: Adult Regular)   Pulse 58   Temp 98.4  F (36.9  C) (Oral)   Resp 26   Wt 74.3 kg (163 lb 12.8 oz)   SpO2 100%   BMI 25.65 kg/m    Body mass index is 25.65 kg/m .  Physical Exam   Chest is clear  1+ edema with sock line brawny changes intact skin        Oscar Alves MD

## 2021-05-13 NOTE — PROGRESS NOTES
"Pre-Visit Planning   Next 5 appointments (look out 90 days)    May 13, 2021  1:30 PM  Multi-Provider Visit with Oscar Alves MD  [facility] ([facility] ) [address] [address] [address] [phone]   Jul 15, 2021  3:45 PM  Return Visit with Sixto Conley MD  [facility] ([facility] ) [address]  [place] [address] [phone]        Appointment Notes for this encounter:      room at 1pm for mtm to start  annual, wellness  MTM co-visit       Questionnaires Reviewed/Assigned    SCRIPTING IF PT ANSWERS Hi, my name is NÉSTOR Schumacher RN and I am calling on behalf of your provider's office at [facility].  I am calling to confirm and prep your upcoming appointment on Thur 5/13/21.  I see that you are coming in for your annual wellness visit and an MTM visit w/one off our pharmacisits. Are there any additional questions or concerns you'd like to review with your provider during your visit? I'd like to talk about whether or not I need to stay on that Simvastatin.  He said he was going to send it in for me, but they [pharmacy] never sent to to me, so I never started it, so ,yes, I'd like to discuss that.  And if he wants me to take it, well then, I guess I'll be needing a prescription for it.  And that K-stuff....Postassium? Is that it?\"    Patient preferred phone number: [phone]      Spoke to patient via phone. Are there any additional questions or concerns you'd like to review with your provider during your visit? Yes: (please see above)    Patient does not have additional questions or concerns.        Visit is not preventive.    Meds  Is there anything on your medication list that needs to be updated? Yes: Simvastatin & ?? Potassium    Current Outpatient Medications   Medication     acetaminophen (TYLENOL) 500 MG tablet     " "amoxicillin (AMOXIL) 500 MG capsule     apixaban ANTICOAGULANT (ELIQUIS) 2.5 MG tablet     Carboxymethylcellulose Sod PF 0.25 % SOLN     co-enzyme Q-10 100 MG CAPS     [facility] GLUCOSAMINE-CHONDROITIN PO     diclofenac (VOLTAREN) 1 % topical gel     doxycycline hyclate (VIBRA-TABS) 100 MG tablet     ferrous gluconate (FERGON) 324 (38 Fe) MG tablet     lisinopril (ZESTRIL) 20 MG tablet     MELATONIN PO     multivitamin, therapeutic (THERA-VIT) TABS tablet     omeprazole (PRILOSEC) 40 MG DR capsule     oxyCODONE (ROXICODONE) 5 MG tablet     potassium chloride ER (K-TAB) 20 MEQ CR tablet     senna-docusate (SENOKOT-S/PERICOLACE) 8.6-50 MG tablet     simvastatin (ZOCOR) 20 MG tablet     tamsulosin (FLOMAX) 0.4 MG capsule     torsemide (DEMADEX) 20 MG tablet     UREA 20 INTENSIVE HYDRATING 20 % external cream     No current facility-administered medications for this visit.      Which pharmacy do you prefer to use for medications during this visit if needed? [facility] - [address]    Do you need refills on any of your medications? Yes: Simvistatin & Potassium     Health Maintenance Due   Topic Date Due     COVID-19 Vaccine (2 - Pfizer 2-dose series) 03/22/2021     LIPID  06/02/2021     Patient is due for:  No appointment needed.  MyChart  Patient is active on [employer].    Questionnaire Review   Advised patient to arrive early in order to complete questionnaires.    Call Summary  \"Thank you for your time today.  If anything comes up before your appointment, please feel free to contact us at [phone].\"     Edita Li, Registered Nurse, PAL (Patient Advocate Liaison) [facility]     [phone]    "

## 2021-05-14 PROBLEM — I50.9 CHRONIC CONGESTIVE HEART FAILURE, UNSPECIFIED HEART FAILURE TYPE (H): Status: RESOLVED | Noted: 2021-01-01 | Resolved: 2021-01-01

## 2021-05-14 NOTE — ASSESSMENT & PLAN NOTE
He has edema in his feet and has purchased some socks that are creating a tourniquet effect.  Discussed appropriate socks for his condition

## 2021-05-14 NOTE — ASSESSMENT & PLAN NOTE
Brawny changes, skin intact.  He has trouble getting his socks on.  Discussed measuring him for custom socks with zippers.  He would like to consider.  Refer

## 2021-05-14 NOTE — TELEPHONE ENCOUNTER
Spoke with Luís and he does not want to schedule at this time.  He said he just received his new stockings and would like to try those for a few months before he makes this appointment.  Patient has our number and will call back if/when he decides to schedule.

## 2021-09-02 NOTE — LETTER
9/2/2021    Oscar Alves MD  [address]  [facility] [place] [address]    RE: Milo Serna       Dear Colleague,    I had the pleasure of seeing Milo Serna in the [facility].    Clinic visit note dictated. Dictation reference number - [id number]          REVIEW OF SYSTEMS:  A comprehensive 10-point review of systems was completed and the pertinent positives are documented in the history of present illness.    Skin:  Negative     Eyes:  Negative    ENT:  Negative    Respiratory:  Positive for sleep apnea  Cardiovascular:    edema;Positive for  Gastroenterology: Negative    Genitourinary:  Negative    Musculoskeletal:  Negative    Neurologic:  Negative    Psychiatric:  Negative    Heme/Lymph/Imm:  Positive for anemia  Endocrine:  Negative      CURRENT MEDICATIONS:  Current Outpatient Medications   Medication Sig Dispense Refill     acetaminophen (TYLENOL) 500 MG tablet Take 500-1,000 mg by mouth every 8 hours as needed       apixaban ANTICOAGULANT (ELIQUIS) 2.5 MG tablet Take 1 tablet (2.5 mg) by mouth 2 times daily 200 tablet 4     calcium carbonate (OS-ANG) 500 MG tablet Take 1 tablet by mouth daily       [facility] GLUCOSAMINE-CHONDROITIN PO Take 1 tablet by mouth every morning        diclofenac (VOLTAREN) 1 % topical gel Apply 4 gramsto area qid prn 100 g 11     ferrous gluconate (FERGON) 324 (38 Fe) MG tablet Take 1 tablet (324 mg) by mouth daily (with breakfast) 90 tablet 3     lisinopril (ZESTRIL) 20 MG tablet Take 1 tablet (20 mg) by mouth daily 100 tablet 4     MELATONIN PO Take 5 mg by mouth At Bedtime        multivitamin, therapeutic (THERA-VIT) TABS tablet Take 1 tablet by mouth daily       omeprazole (PRILOSEC) 40 MG DR capsule TAKE 1 CAPSULE DAILY 90 capsule 3     potassium chloride ER (K-TAB) 20 MEQ CR tablet Take 1 tablet (20 mEq) by mouth 2 times daily 200 tablet 3     senna-docusate (SENOKOT-S/PERICOLACE) 8.6-50 MG tablet Take 1 tablet by  mouth 2 times daily (Patient taking differently: Take 1 tablet by mouth daily as needed ) 180 tablet 3     simvastatin (ZOCOR) 20 MG tablet Take 1 tablet (20 mg) by mouth At Bedtime 100 tablet 4     tamsulosin (FLOMAX) 0.4 MG capsule TAKE 1 CAPSULE DAILY 90 capsule 3     torsemide (DEMADEX) 10 MG tablet Take 1 tablet (10 mg) by mouth every morning 100 tablet 4     torsemide (DEMADEX) 20 MG tablet TAKE 1 TABLET DAILY WITH BREAKFAST 90 tablet 3     UREA 20 INTENSIVE HYDRATING 20 % external cream Apply topically as needed Apply to right foot daily 120 g 11     amoxicillin (AMOXIL) 500 MG capsule TAKE ONE CAPSULE BY MOUTH THREE TIMES DAILY UNTIL GONE . TAKE WITH FOOD (Patient not taking: Reported on 9/2/2021)       doxycycline hyclate (VIBRA-TABS) 100 MG tablet Take 1 tablet (100 mg) by mouth daily (Patient not taking: Reported on 9/2/2021) 90 tablet 3         ALLERGIES:  Allergies   Allergen Reactions     Blood Transfusion Related (Informational Only) Other (See Comments)     Patient has a history of a clinically significant antibody against RBC antigens.  A delay in compatible RBCs may occur.       PAST MEDICAL HISTORY:    Past Medical History:   Diagnosis Date     Actinic keratosis 10/12/2019     Anemia, unspecified 11/08/2016     Atrial fibrillation (H) 05/27/2015     BPH (benign prostatic hyperplasia) 01/25/2012     CAD (coronary artery disease) 04/04/2012    CAB 1996 following MI (at [facility])     Closed bimalleolar fracture of left ankle with routine healing 04/05/2019     Closed fracture of metatarsal bone 04/04/2012     Dilated aortic root (H) 05/26/2016     Drug-induced erectile dysfunction 10/02/2015     Elevated blood sugar 11/08/2016     Elevated PSA 09/19/2013     Epistaxis 09/09/2019     Essential hypertension with goal blood pressure less than 140/90 09/22/2016     Fall 09/06/2019     Functional diarrhea 10/12/2019     Gallbladder polyp 05/01/2018     Gout      HAV (hallux abducto valgus) 04/04/2012      Hematoma 09/09/2019     Hernia, abdominal      History of prostate cancer 06/15/2016     HTN, goal below 150/90 04/20/2017     Hypokalemia 09/12/2019     Iron deficiency anemia secondary to inadequate dietary iron intake 11/15/2016     Ischemic cardiomyopathy      Low blood pressure reading 10/17/2016     Lumbago     had degendisc dz on MRI 7/07     Memory loss 06/18/2019     Microalbuminuria 06/04/2020    X1     Mixed hyperlipidemia      Mumps      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     Nonrheumatic mitral valve regurgitation 10/28/2019     OA (osteoarthritis) 04/04/2012     Old myocardial infarction 04/04/2012     LENORA (obstructive sleep apnea) 10/28/2019     Other viral warts 09/06/2019     Palpitations      Pes planus 04/04/2012     Petechiae 06/18/2019     Prostate cancer (H) 05/26/2016     PUD (peptic ulcer disease) 04/04/2012     Pulmonary hypertension (H) 09/25/2017     PVC's (premature ventricular contractions)      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     Rosacea 01/15/2008     Stasis dermatitis      Stasis dermatitis of both legs 05/13/2021     Stress due to spouse with dementia 06/18/2019     Thrombocytopenia (H) 04/21/2017     Unspecified hyperplasia of prostate with urinary obstruction and other lower urinary tract symptoms (LUTS)     better on avodart & hytrin     Venous stasis 04/04/2012       PAST SURGICAL HISTORY:    Past Surgical History:   Procedure Laterality Date     CARDIAC SURGERY      CAB     CYSTOSCOPY FLEXIBLE, CYOABLATION PROSTATE N/A 11/09/2016    Procedure: CYSTOSCOPY FLEXIBLE, CRYOABLATION PROSTATE;  Surgeon: Krystian Owens MD;  Location:  OR     GENITOURINARY SURGERY      prostate surgery      HERNIA REPAIR       LAPAROSCOPIC CHOLECYSTECTOMY N/A 12/28/2020    Procedure: CHOLECYSTECTOMY, LAPAROSCOPIC;  Surgeon: James Alvarez MD;  Location: [facility] OR     LAPAROSCOPIC HERNIORRHAPHY INGUINAL Right 05/10/2017    Procedure: LAPAROSCOPIC HERNIORRHAPHY INGUINAL;  Laparoscopic preperitoneal repair of  right inguinal hernia with placement of underlay mesh;  Surgeon: Enrico Bridges MD;  Location: [facility] OR     PROSTATE SURGERY       [facility] [place] NONSPECIFIC PROCEDURE      CAB  [facility] NONSPECIFIC PROCEDURE  2007    l inguinal hernia repair      [facility] [place] NONSPECIFIC PROCEDURE      R hernia remote     [facility] [place] NONSPECIFIC PROCEDURE      R ankle ORIF for fx     [facility] [place] NONSPECIFIC PROCEDURE      nasal surgery      [facility] [place] NONSPECIFIC PROCEDURE      R rotater repair (remote)      [facility] [place] NONSPECIFIC PROCEDURE      appy 1966     [facility] [place] NONSPECIFIC PROCEDURE      sinus surgery x 2     [facility] [place] NONSPECIFIC PROCEDURE      L shoulder        FAMILY HISTORY:    Family History   Problem Relation Age of Onset     Cancer Mother         stomach cancer,  age 61     Heart Disease Father         MI,  age 71     Heart Disease Brother         stent placement, RA      Hypertension Brother      Heart Disease Sister         rhythm problems with heart, hypertension       SOCIAL HISTORY:    Social History     Socioeconomic History     Marital status:      Spouse name: None     Number of children: None     Years of education: None     Highest education level: None   Occupational History     None   Tobacco Use     Smoking status: Former Smoker     Smokeless tobacco: Never Used     Tobacco comment: quit 3/1974   Substance and Sexual Activity     Alcohol use: Yes     Alcohol/week: 0.0 standard drinks     Comment: One Beer a Day and maybe Wine     Drug use: No     Sexual activity: Not Currently     Partners: Female   Other Topics Concern     Parent/sibling w/ CABG, MI or angioplasty before 65F 55M? Not Asked      Service Not Asked     Blood Transfusions Not Asked     Caffeine Concern Yes     Comment: 1 cups of coffee and soda per day     Occupational Exposure Not Asked     Hobby Hazards Not Asked     Sleep Concern Not Asked     Stress Concern Not Asked     Weight Concern Not Asked     Special Diet No     Back Care Not  "Asked     Exercise Yes     Comment: walking     Bike Helmet Not Asked     Seat Belt Yes     Self-Exams Not Asked   Social History Narrative     None     Social Determinants of Health     Financial Resource Strain: Low Risk      Difficulty of Paying Living Expenses: Not hard at all   Food Insecurity: No Food Insecurity     Worried About Running Out of Food in the Last Year: Never true     Ran Out of Food in the Last Year: Never true   Transportation Needs: No Transportation Needs     Lack of Transportation (Medical): No     Lack of Transportation (Non-Medical): No   Physical Activity: Unknown     Days of Exercise per Week: 4 days     Minutes of Exercise per Session: Not on file   Stress: No Stress Concern Present     Feeling of Stress : Not at all   Social Connections: Moderately Isolated     Frequency of Communication with Friends and Family: More than three times a week     Frequency of Social Gatherings with Friends and Family: Not asked     Attends Buddhist Services: Never     Active Member of Clubs or Organizations: No     Attends Club or Organization Meetings: Never     Marital Status:    Intimate Partner Violence:      Fear of Current or Ex-Partner:      Emotionally Abused:      Physically Abused:      Sexually Abused:        PHYSICAL EXAM:    Vitals: /72   Pulse 75   Ht 1.702 m (5' 7\")   Wt 78.1 kg (172 lb 3.2 oz)   BMI 26.97 kg/m    Wt Readings from Last 5 Encounters:   09/02/21 78.1 kg (172 lb 3.2 oz)   05/13/21 74.3 kg (163 lb 12.8 oz)   05/06/21 72.6 kg (160 lb)   01/13/21 76.7 kg (169 lb)   01/02/21 75.8 kg (167 lb)           Encounter Diagnoses   Name Primary?     Chronic combined systolic and diastolic congestive heart failure (H) Yes     Nonrheumatic mitral valve regurgitation      Hyperlipidemia LDL goal <100      Chronic atrial fibrillation (H)        Orders Placed This Encounter   Procedures     EKG 12-lead complete w/read - Clinics (performed today)                   Thank you " for allowing me to participate in the care of your patient.      Sincerely,     Sixto Conley MD     [facility]  cc:   No referring provider defined for this encounter.

## 2021-09-02 NOTE — PROGRESS NOTES
Clinic visit note dictated. Dictation reference number - [id number]          REVIEW OF SYSTEMS:  A comprehensive 10-point review of systems was completed and the pertinent positives are documented in the history of present illness.    Skin:  Negative     Eyes:  Negative    ENT:  Negative    Respiratory:  Positive for sleep apnea  Cardiovascular:    edema;Positive for  Gastroenterology: Negative    Genitourinary:  Negative    Musculoskeletal:  Negative    Neurologic:  Negative    Psychiatric:  Negative    Heme/Lymph/Imm:  Positive for anemia  Endocrine:  Negative      CURRENT MEDICATIONS:  Current Outpatient Medications   Medication Sig Dispense Refill     acetaminophen (TYLENOL) 500 MG tablet Take 500-1,000 mg by mouth every 8 hours as needed       apixaban ANTICOAGULANT (ELIQUIS) 2.5 MG tablet Take 1 tablet (2.5 mg) by mouth 2 times daily 200 tablet 4     calcium carbonate (OS-ANG) 500 MG tablet Take 1 tablet by mouth daily       [facility] GLUCOSAMINE-CHONDROITIN PO Take 1 tablet by mouth every morning        diclofenac (VOLTAREN) 1 % topical gel Apply 4 gramsto area qid prn 100 g 11     ferrous gluconate (FERGON) 324 (38 Fe) MG tablet Take 1 tablet (324 mg) by mouth daily (with breakfast) 90 tablet 3     lisinopril (ZESTRIL) 20 MG tablet Take 1 tablet (20 mg) by mouth daily 100 tablet 4     MELATONIN PO Take 5 mg by mouth At Bedtime        multivitamin, therapeutic (THERA-VIT) TABS tablet Take 1 tablet by mouth daily       omeprazole (PRILOSEC) 40 MG DR capsule TAKE 1 CAPSULE DAILY 90 capsule 3     potassium chloride ER (K-TAB) 20 MEQ CR tablet Take 1 tablet (20 mEq) by mouth 2 times daily 200 tablet 3     senna-docusate (SENOKOT-S/PERICOLACE) 8.6-50 MG tablet Take 1 tablet by mouth 2 times daily (Patient taking differently: Take 1 tablet by mouth daily as needed ) 180 tablet 3     simvastatin (ZOCOR) 20 MG tablet Take 1 tablet (20 mg) by mouth At Bedtime 100 tablet 4     tamsulosin (FLOMAX) 0.4 MG capsule TAKE 1 CAPSULE  DAILY 90 capsule 3     torsemide (DEMADEX) 10 MG tablet Take 1 tablet (10 mg) by mouth every morning 100 tablet 4     torsemide (DEMADEX) 20 MG tablet TAKE 1 TABLET DAILY WITH BREAKFAST 90 tablet 3     UREA 20 INTENSIVE HYDRATING 20 % external cream Apply topically as needed Apply to right foot daily 120 g 11     amoxicillin (AMOXIL) 500 MG capsule TAKE ONE CAPSULE BY MOUTH THREE TIMES DAILY UNTIL GONE . TAKE WITH FOOD (Patient not taking: Reported on 9/2/2021)       doxycycline hyclate (VIBRA-TABS) 100 MG tablet Take 1 tablet (100 mg) by mouth daily (Patient not taking: Reported on 9/2/2021) 90 tablet 3         ALLERGIES:  Allergies   Allergen Reactions     Blood Transfusion Related (Informational Only) Other (See Comments)     Patient has a history of a clinically significant antibody against RBC antigens.  A delay in compatible RBCs may occur.       PAST MEDICAL HISTORY:    Past Medical History:   Diagnosis Date     Actinic keratosis 10/12/2019     Anemia, unspecified 11/08/2016     Atrial fibrillation (H) 05/27/2015     BPH (benign prostatic hyperplasia) 01/25/2012     CAD (coronary artery disease) 04/04/2012    CAB 1996 following MI (at [facility])     Closed bimalleolar fracture of left ankle with routine healing 04/05/2019     Closed fracture of metatarsal bone 04/04/2012     Dilated aortic root (H) 05/26/2016     Drug-induced erectile dysfunction 10/02/2015     Elevated blood sugar 11/08/2016     Elevated PSA 09/19/2013     Epistaxis 09/09/2019     Essential hypertension with goal blood pressure less than 140/90 09/22/2016     Fall 09/06/2019     Functional diarrhea 10/12/2019     Gallbladder polyp 05/01/2018     Gout      HAV (hallux abducto valgus) 04/04/2012     Hematoma 09/09/2019     Hernia, abdominal      History of prostate cancer 06/15/2016     HTN, goal below 150/90 04/20/2017     Hypokalemia 09/12/2019     Iron deficiency anemia secondary to inadequate dietary iron intake 11/15/2016     Ischemic  cardiomyopathy      Low blood pressure reading 10/17/2016     Lumbago     had degendisc dz on MRI 7/07     Memory loss 06/18/2019     Microalbuminuria 06/04/2020    X1     Mixed hyperlipidemia      Mumps      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     Nonrheumatic mitral valve regurgitation 10/28/2019     OA (osteoarthritis) 04/04/2012     Old myocardial infarction 04/04/2012     LENORA (obstructive sleep apnea) 10/28/2019     Other viral warts 09/06/2019     Palpitations      Pes planus 04/04/2012     Petechiae 06/18/2019     Prostate cancer (H) 05/26/2016     PUD (peptic ulcer disease) 04/04/2012     Pulmonary hypertension (H) 09/25/2017     PVC's (premature ventricular contractions)      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     Rosacea 01/15/2008     Stasis dermatitis      Stasis dermatitis of both legs 05/13/2021     Stress due to spouse with dementia 06/18/2019     Thrombocytopenia (H) 04/21/2017     Unspecified hyperplasia of prostate with urinary obstruction and other lower urinary tract symptoms (LUTS)     better on avodart & hytrin     Venous stasis 04/04/2012       PAST SURGICAL HISTORY:    Past Surgical History:   Procedure Laterality Date     CARDIAC SURGERY      CAB     CYSTOSCOPY FLEXIBLE, CYOABLATION PROSTATE N/A 11/09/2016    Procedure: CYSTOSCOPY FLEXIBLE, CRYOABLATION PROSTATE;  Surgeon: Krystian Owens MD;  Location:  OR     GENITOURINARY SURGERY      prostate surgery      HERNIA REPAIR       LAPAROSCOPIC CHOLECYSTECTOMY N/A 12/28/2020    Procedure: CHOLECYSTECTOMY, LAPAROSCOPIC;  Surgeon: James Alvarez MD;  Location: [facility] OR     LAPAROSCOPIC HERNIORRHAPHY INGUINAL Right 05/10/2017    Procedure: LAPAROSCOPIC HERNIORRHAPHY INGUINAL;  Laparoscopic preperitoneal repair of right inguinal hernia with placement of underlay mesh;  Surgeon: Enrico Bridges MD;  Location:  OR     PROSTATE SURGERY       [facility] NONSPECIFIC PROCEDURE      CAB 1996 [facility] NONSPECIFIC PROCEDURE  02/2007    l inguinal hernia  repair      [facility] NONSPECIFIC PROCEDURE      R hernia remote     [facility] NONSPECIFIC PROCEDURE  2000    R ankle ORIF for fx     [facility] NONSPECIFIC PROCEDURE  2005    nasal surgery      [facility] NONSPECIFIC PROCEDURE      R rotater repair (remote)      [facility] NONSPECIFIC PROCEDURE      appy 1966     [facility] NONSPECIFIC PROCEDURE      sinus surgery x 2     [facility] NONSPECIFIC PROCEDURE      L shoulder        FAMILY HISTORY:    Family History   Problem Relation Age of Onset     Cancer Mother         stomach cancer,  age 61     Heart Disease Father         MI,  age 71     Heart Disease Brother         stent placement, RA      Hypertension Brother      Heart Disease Sister         rhythm problems with heart, hypertension       SOCIAL HISTORY:    Social History     Socioeconomic History     Marital status:      Spouse name: None     Number of children: None     Years of education: None     Highest education level: None   Occupational History     None   Tobacco Use     Smoking status: Former Smoker     Smokeless tobacco: Never Used     Tobacco comment: quit 3/1974   Substance and Sexual Activity     Alcohol use: Yes     Alcohol/week: 0.0 standard drinks     Comment: One Beer a Day and maybe Wine     Drug use: No     Sexual activity: Not Currently     Partners: Female   Other Topics Concern     Parent/sibling w/ CABG, MI or angioplasty before 65F 55M? Not Asked      Service Not Asked     Blood Transfusions Not Asked     Caffeine Concern Yes     Comment: 1 cups of coffee and soda per day     Occupational Exposure Not Asked     Hobby Hazards Not Asked     Sleep Concern Not Asked     Stress Concern Not Asked     Weight Concern Not Asked     Special Diet No     Back Care Not Asked     Exercise Yes     Comment: walking     Bike Helmet Not Asked     Seat Belt Yes     Self-Exams Not Asked   Social History Narrative     None     Social Determinants of Health     Financial Resource Strain: Low Risk      Difficulty of  "Paying Living Expenses: Not hard at all   Food Insecurity: No Food Insecurity     Worried About Running Out of Food in the Last Year: Never true     Ran Out of Food in the Last Year: Never true   Transportation Needs: No Transportation Needs     Lack of Transportation (Medical): No     Lack of Transportation (Non-Medical): No   Physical Activity: Unknown     Days of Exercise per Week: 4 days     Minutes of Exercise per Session: Not on file   Stress: No Stress Concern Present     Feeling of Stress : Not at all   Social Connections: Moderately Isolated     Frequency of Communication with Friends and Family: More than three times a week     Frequency of Social Gatherings with Friends and Family: Not asked     Attends Taoism Services: Never     Active Member of Clubs or Organizations: No     Attends Club or Organization Meetings: Never     Marital Status:    Intimate Partner Violence:      Fear of Current or Ex-Partner:      Emotionally Abused:      Physically Abused:      Sexually Abused:        PHYSICAL EXAM:    Vitals: /72   Pulse 75   Ht 1.702 m (5' 7\")   Wt 78.1 kg (172 lb 3.2 oz)   BMI 26.97 kg/m    Wt Readings from Last 5 Encounters:   09/02/21 78.1 kg (172 lb 3.2 oz)   05/13/21 74.3 kg (163 lb 12.8 oz)   05/06/21 72.6 kg (160 lb)   01/13/21 76.7 kg (169 lb)   01/02/21 75.8 kg (167 lb)           Encounter Diagnoses   Name Primary?     Chronic combined systolic and diastolic congestive heart failure (H) Yes     Nonrheumatic mitral valve regurgitation      Hyperlipidemia LDL goal <100      Chronic atrial fibrillation (H)        Orders Placed This Encounter   Procedures     EKG 12-lead complete w/read - Clinics (performed today)               "

## 2021-09-02 NOTE — PROGRESS NOTES
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    HISTORY OF PRESENT ILLNESS:    It was my pleasure to follow up with Luís who is accompanied by his daughter, Stacia, and wife.  Luís's wife has Alzheimer dementia and he is her main caregiver.  He is retired and they spend their nieto in [place].    Luís's medical history is significant for:  1.  CAD, status post CABG. Angina-free.  CABG in 1997.  2.  Chronic atrial fibrillation.  Rate controlled on metoprolol.  Eliquis anticoagulation.  He is on a lower dose of 2.5 mg b.i.d. due to history of recurrent epistaxis on the higher dose.  3.  Hypertension.  4.  Stable ascending aorta dilatation of 4.2 cm.  5.  Obstructive sleep apnea.  Diagnosed 2018.  CPAP intolerant.  6.  Chronic left ventricular systolic failure with decreased ejection fraction 40%-45%.  NYHA class I status.  7.  Chronic right heart failure with decreased systolic function, NYHA class I.    8.  Quit smoking in 1974.  9.  Moderately severe functional mitral valve regurgitation.  10.  Chronic anemia and mild thrombocytopenia.    What was most striking today is Luís has had a decline in his cognitive function.  He is also very hard of hearing despite wearing hearing aids and this does not help.  His wife has advanced Alzheimer dementia and he is her primary caregiver.  When asked directly, Luís himself states that his memory is not what it used to be.    He has chronic and significant bilateral lower extremity edema.  He looks more unstable on his feet.  He does not walk as much as he used to.  He and his wife live in a townhouse here in the [facility].  He takes torsemide 20 mg daily.    DIAGNOSTIC DATA:  I reviewed labs and transthoracic echocardiogram and  concur with the report.  I personally reviewed the images of the echo.  He has mildly decreased left ventricular systolic function with LVEF of 42% and global hypokinesis that is more pronounced in the inferior and inferolateral walls.  The right ventricle is mildly dilated with moderately decreased systolic function.  There is massive biatrial enlargement in the context of persistent atrial fibrillation.  Mild tricuspid valve regurgitation with estimated RVSP of about 45 mmHg.  Moderately severe functional mitral valve regurgitation with at least 2 jets and some overriding of the anterior leaflet.  Aortic valve sclerosis without stenosis.  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    Labs: LDL 42 at goal, HDL 62, potassium 4.0, creatinine 0.98, estimated GFR 71.  Hemoglobin 11.5, platelets 137.    ECG done today shows persistent atrial fibrillation with frequent PVCs.    PHYSICAL EXAMINATION:    GENERAL:  He is nonobese, has significant bilateral 2 to 3+ pitting edema up to his knees without skin changes of chronic venous stasis.  VITAL SIGNS:  /72, pulse 75 per minute, AFib.  Weight has gone up about 9 pounds to 172 pounds.  BMI 27.  CHEST:  Healed sternotomy scar.  CARDIOVASCULAR:  Heart sounds are irregularly irregular.  Soft pansystolic murmur of mitral valve regurgitation, which is barely audible, but heard at the LV apex/axilla.  LUNGS:  Clear.    ASSESSMENT:    Luís needs a higher diuretic dosage based on his dilated IVC, elevated LV filling pressures, and significant bilateral lower extremity edema on the 20 mg of torsemide.  The interval change in the last year is decline in cognitive function, decline in physical briskness, but nevertheless, he and his wife still hope to go to [place] for the winter.  He typically flies there and this should be okay.  They have all received COVID vaccines.    His mitral valve regurgitation remains moderately severe.  Etiology is functional from massive atrial dilatation.   Although the EF is down at 42%, he has not had any acute heart failure exacerbation.  We will manage conservatively.    PLAN:    1.  Increase torsemide from 20 mg daily to 40 mg daily.  2.  Follow up with me in 6 months.  Unless there is a change in clinical status, we will get an echocardiogram once a year because the goal would be to manage this conservatively as much as possible in the absence of worsening symptoms.  I do not think he will be a redo bypass candidate.  When the time comes, we will have to explore transcatheter options for mitral valve regurgitation.    Total time 30 minutes.    Sixto Conley MD        D: 2021   T: 2021   MT: kiersten    Name:     SAÚL GONZALEZ  MRN:      [id number]        Account:      [id number]   :      1940           Service Date: 2021       Document: [id number]

## 2021-09-02 NOTE — PATIENT INSTRUCTIONS
MEDICATION CHANGES:  1. Increase Torsemide from 20 mg in the morning to 30 mg in the morning.    FOLLOW UP:  1. Follow up with Dr. Conley in 6 months.    If you have any questions or concerns, please call my nurse Patsy Gomez at [phone].

## 2021-10-14 NOTE — TELEPHONE ENCOUNTER
Reason for Call:  Other appointment    Detailed comments: Patient had called wanting to know if provider is able to get him in sooner for a in person appt - patient states that he has fluid leaking out of his legs; patient refused a virtual appointment and would like to be seen in person but is not scheduled until 11/08    Phone Number Patient can be reached at: Home number on file [phone] (home)    Best Time: Anytime    Can we leave a detailed message on this number? YES    Call taken on 10/14/2021 at 10:03 AM by Lacy Phan

## 2021-10-14 NOTE — TELEPHONE ENCOUNTER
S-(situation): fluid and swelling in leg.  New condition    B-(background): Hx of cardiac problems states has some swelling in LEs normally but this is not going away    A-(assessment): states not pitting he states he pushes on it, it goes back to normal.  He is noting fluid coming from the leg as well (weeping out).  Pt states no other swelling noted.  Pt is elevating and the swelling goes down but swells back after being unelevated.    R-(recommendations): be seen in clinic to day appt made with April HERNANDEZ.    Zoe Martinez. RN, BSN, PAL (Patient Advocate Liaison)  [facility]   [phone]

## 2021-10-14 NOTE — PROGRESS NOTES
Assessment & Plan     Right leg swelling  Due to the unilateral swelling I was concerned about possible DVT. This is less likely given patient is anticoagulated. Discussed case with Carlito at [facility] to be seen first thing tomorrow morning. He was in agreement.   Instructed patient to present to the ER tonight if having any significant worsening of swelling, shortness of breath or fevers.  I have low suspicion for cellulitis as skin is not hot to touch and patient has chronic venous stasis changes.  Zoe JOHNSON helped wrap patient's leg due to the weeping.   He will follow up tomorrow as planned at [facility].    Transfer to Acute and Diagnostic Services  I have contacted the staff at the [facility] at [phone] ([address]) to confirm patient acceptance.  Special Needs: None    Discussed transition to Acute & Diagnostic Services Clinic, and patient agrees with next level of care.  Patient transportation will be provided by the patient.    - Referral to Acute and Diagnostic Services (Day of diagnostic / First order acute); Future    Chronic combined systolic and diastolic congestive heart failure (H)      Bilateral leg edema  As noted above    28 minutes spent on the date of the encounter doing chart review, history and exam, documentation and further activities per the note       April Cesar PA-C  [facility]    Subjective   Skip is a 80 year old who presents for the following health issues  accompanied by his spouse:    HPI     Concern - swelling right leg  Onset: x3 days  Description: right leg  Intensity: moderate-severe  Progression of Symptoms:  worsening  Accompanying Signs & Symptoms: red, swelling, weeping    Patient is an 80 year old male with history of chronic systolic and diastolic congestive heart failure, chronic atrial fibrillation (on metoprolol for rate control and Eliquis), coronary artery disease status post CABG in 1997 and  "stasis dermatitis who presents today for evaluation of leg swelling and weeping. He does have chronic swelling in the bilateral lower extremities but worse in the right leg for the past 3 days.    Patient had ECHO performed in July of 2021 showing EF of 42%.    Per cardiology note in September 2021 patient has chronic and significant bilateral lower extremity edema on torsemide 20 mg daily but this was increased to 30 mg daily when seen in September.    Review of Systems   GENERAL:  No fevers  RESP:  No shortness of breath  MUSCULOSKELETAL: As noted in HPI          Objective    /82 (BP Location: Right arm, Patient Position: Chair, Cuff Size: Adult Regular)   Pulse 89   Temp 98  F (36.7  C) (Oral)   Resp 20   Ht 1.702 m (5' 7\")   Wt 78 kg (172 lb)   SpO2 98%   BMI 26.94 kg/m    Body mass index is 26.94 kg/m .  Physical Exam   GENERAL: No acute distress  HEENT: Normocephalic  CARDIAC: Irregular rate and rhythm. 1+ PT pulse on the right  PULMONARY: Lungs are clear to auscultation bilaterally.  EXTREMITIES: 2+ pitting edema bilateral lower extremities. Right calf circumference 48 cm. Left calf circumference 38 cm. Weeping of the right lower leg. Chronic venous stasis skin changes of the bilateral lower legs. Legs warm but not hot to touch.  NEURO: Alert and non-focal            "

## 2021-10-15 NOTE — PROGRESS NOTES
Assessment & Plan     Right leg swelling  Right leg ultrasound is negative for DVT  Leg appears quite swollen, some concern for infection  Trial course of lasix for swelling  Wear compression stockings  Follow up with PCP for recheck of leg swelling  - Referral to Acute and Diagnostic Services (Day of diagnostic / First order acute)  - Comprehensive metabolic panel (BMP + Alb, Alk Phos, ALT, AST, Total. Bili, TP); Future  - CBC with platelets and differential; Future  - US Lower Extremity Venous Duplex Right; Future  - Comprehensive metabolic panel (BMP + Alb, Alk Phos, ALT, AST, Total. Bili, TP)  - CBC with platelets and differential  - furosemide (LASIX) 20 MG tablet; Take 1 tablet (20 mg) by mouth daily  - potassium chloride ER (KLOR-CON M) 20 MEQ CR tablet; Take 1 tablet (20 mEq) by mouth daily    Chronic combined systolic and diastolic congestive heart failure (H)  CMP shows normal renal and hepatic function, no involvement.    - Comprehensive metabolic panel (BMP + Alb, Alk Phos, ALT, AST, Total. Bili, TP); Future  - CBC with platelets and differential; Future  - Comprehensive metabolic panel (BMP + Alb, Alk Phos, ALT, AST, Total. 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, unspecified  Rash is spread and non-blanching  Concern for possible vasculitis  Referral to Dermatology for further evaluation of skin rash  - ESR: Erythrocyte sedimentation rate; Future  - ESR: Erythrocyte sedimentation rate    Review of external notes as documented elsewhere in note  90 minutes spent on the date of the encounter doing chart review, review of outside records, review of test results, interpretation of tests, patient visit, documentation and  "discussion with other provider(s)        BMI:   Estimated body mass index is 27.25 kg/m  as calculated from the following:    Height as of 10/14/21: 1.702 m (5' 7\").    Weight as of this encounter: 78.9 kg (174 lb).     CONSULTATION/REFERRAL to Dermatology      [facility] YULY Staley is a 80 year old who presents for the following health issues     HPI     Concern - Edema  Onset: chronic X 5 years, recent flare up X 5 days  Description: Right lower extremity calf swelling and weeping at times.  Also notes rash on right lower extremity, bilateral thighs.  Denies any pain  Intensity: mild  Progression of Symptoms:  worsening  Accompanying Signs & Symptoms: Rash on bilateral lower extremities   Previous history of similar problem: legs are usually swollen, but not this bad.  This has been happening for a while.  Precipitating factors:        Worsened by: none  Alleviating factors:        Improved by: elevating and compression socks  Therapies tried and outcome: elevating legs and compression socks.        Review of Systems   Constitutional, HEENT, cardiovascular, pulmonary, gi and gu systems are negative, except as otherwise noted.      Objective    /81 (BP Location: Left arm, Patient Position: Chair, Cuff Size: Adult Large)   Pulse 64   Temp 98.1  F (36.7  C) (Oral)   Resp 18   Wt 78.9 kg (174 lb)   SpO2 98%   BMI 27.25 kg/m    Body mass index is 27.25 kg/m .  Physical Exam   GENERAL: healthy, alert and no distress  RESP: lungs clear to auscultation - no rales, rhonchi or wheezes  CV: irregularly irregular rhythm  ABDOMEN: soft, nontender, no hepatosplenomegaly, no masses and bowel sounds normal  MS: no gross musculoskeletal defects noted, no edema  SKIN: no suspicious lesions or rashes  NEURO: Normal strength and tone, mentation intact and speech normal  PSYCH: mentation appears normal, affect normal/bright    Results for orders placed or performed " during the hospital encounter of 10/15/21   US Lower Extremity Venous Duplex Right     Status: None    Narrative    ULTRASOUND VENOUS LOWER EXTREMITY UNILATERAL RIGHT   10/15/2021 9:49 AM     HISTORY: Right leg swelling    COMPARISON: None.    TECHNIQUE: Ultrasound gray scale, Color Doppler flow, and spectral  Doppler waveform analysis performed.    FINDINGS:   The right common femoral, superficial femoral, popliteal and  segmentally visualized calf veins are patent, fully compressible and  demonstrate antegrade Doppler flow. The visualized cephalad great  saphenous vein is negative for thrombus.    The contralateral left common femoral vein is patent without deep  venous thrombosis.      Impression    IMPRESSION:   The right lower extremity is negative for deep venous thrombosis.    JAMIR EATON MD         SYSTEM ID:  [id number]   Results for orders placed or performed in visit on 10/15/21   Comprehensive metabolic panel (BMP + Alb, Alk Phos, ALT, AST, Total. Bili, TP)     Status: Abnormal   Result Value Ref Range    Sodium 134 133 - 144 mmol/L    Potassium 3.9 3.4 - 5.3 mmol/L    Chloride 99 94 - 109 mmol/L    Carbon Dioxide (CO2) 27 20 - 32 mmol/L    Anion Gap 8 3 - 14 mmol/L    Urea Nitrogen 15 7 - 30 mg/dL    Creatinine 0.89 0.66 - 1.25 mg/dL    Calcium 9.1 8.5 - 10.1 mg/dL    Glucose 103 (H) 70 - 99 mg/dL    Alkaline Phosphatase 238 (H) 40 - 150 U/L    AST 21 0 - 45 U/L    ALT 13 0 - 70 U/L    Protein Total 8.0 6.8 - 8.8 g/dL    Albumin 3.8 3.4 - 5.0 g/dL    Bilirubin Total 2.3 (H) 0.2 - 1.3 mg/dL    GFR Estimate 80 >60 mL/min/1.73m2   CBC with platelets and differential     Status: Abnormal   Result Value Ref Range    WBC Count 4.8 4.0 - 11.0 10e3/uL    RBC Count 3.64 (L) 4.40 - 5.90 10e6/uL    Hemoglobin 11.6 (L) 13.3 - 17.7 g/dL    Hematocrit 36.4 (L) 40.0 - 53.0 %     78 - 100 fL    MCH 31.9 26.5 - 33.0 pg    MCHC 31.9 31.5 - 36.5 g/dL    RDW 15.4 (H) 10.0 - 15.0 %    Platelet Count 124 (L) 150 -  450 10e3/uL    % Neutrophils 75 %    % Lymphocytes 8 %    % Monocytes 14 %    % Eosinophils 2 %    % Basophils 1 %    % Immature Granulocytes 0 %    NRBCs per 100 WBC 0 <1 /100    Absolute Neutrophils 3.5 1.6 - 8.3 10e3/uL    Absolute Lymphocytes 0.4 (L) 0.8 - 5.3 10e3/uL    Absolute Monocytes 0.7 0.0 - 1.3 10e3/uL    Absolute Eosinophils 0.1 0.0 - 0.7 10e3/uL    Absolute Basophils 0.0 0.0 - 0.2 10e3/uL    Absolute Immature Granulocytes 0.0 <=0.0 10e3/uL    Absolute NRBCs 0.0 10e3/uL   CBC with platelets and differential     Status: Abnormal    Narrative    The following orders were created for panel order CBC with platelets and differential.  Procedure                               Abnormality         Status                     ---------                               -----------         ------                     CBC with platelets and d...[[id number]]  Abnormal            Final result                 Please view results for these tests on the individual orders.

## 2021-10-20 NOTE — PATIENT INSTRUCTIONS
Take Zyrtec (cetirizine) twice a day for 2 weeks.    Take prednisone in the morning for 5 days.    Take 40 mg (2 pills) of torsemide daily for 2 weeks. Stop all other torsemide prescriptions during that time. Re-start your old dose at the end of the 2 weeks.    Follow-up in 1 week for a recheck.

## 2021-10-20 NOTE — PROGRESS NOTES
"  Assessment & Plan     Rash    Unclear cause but could be allergic. Treat with steroid and antihistamine. Follow-up in 1 week.    - cetirizine (ZYRTEC) 10 MG tablet; Take 1 tablet (10 mg) by mouth 2 times daily  - predniSONE (DELTASONE) 20 MG tablet; Take 2 tablets (40 mg) by mouth daily (with breakfast) for 5 days      Bilateral leg edema    Recommend increasing torsemide to 40 mg daily for 2 weeks and wearing compression stockings. Follow-up in 1 week.    - Compression Sleeve/Stocking Order for DME - ONLY FOR DME  - torsemide (DEMADEX) 20 MG tablet; Take 2 tablets (40 mg) by mouth daily             BMI:   Estimated body mass index is 28.05 kg/m  as calculated from the following:    Height as of 10/14/21: 1.702 m (5' 7\").    Weight as of this encounter: 81.3 kg (179 lb 2 oz).       Patient Instructions   Take Zyrtec (cetirizine) twice a day for 2 weeks.    Take prednisone in the morning for 5 days.    Take 40 mg (2 pills) of torsemide daily for 2 weeks. Stop all other torsemide prescriptions during that time. Re-start your old dose at the end of the 2 weeks.    Follow-up in 1 week for a recheck.      Return in about 1 week (around 10/27/2021) for recheck.    Jackson Lion PA-C  [facility]    Wilda Staley is a 80 year old who presents for the following health issues     HPI     Concern - right leg swelling  Onset: a week and a half   Description: right leg swelling  Intensity: severe  Progression of Symptoms:  worsening  Accompanying Signs & Symptoms: red, swelling, weeping        Review of Systems   Constitutional, HEENT, cardiovascular, pulmonary, gi and gu systems are negative, except as otherwise noted.        Objective    /76 (BP Location: Right arm, Patient Position: Chair, Cuff Size: Adult Large)   Pulse 82   Temp 98.2  F (36.8  C) (Oral)   Wt 81.3 kg (179 lb 2 oz)   SpO2 98%   BMI 28.05 kg/m    Body mass index is 28.05 kg/m .       Physical Exam   GENERAL: " healthy, alert and no distress  EYES: Eyes grossly normal to inspection, PERRL and conjunctivae and sclerae normal  MS: no gross musculoskeletal defects noted, no edema  SKIN: no suspicious lesions or rashes  NEURO: Normal strength and tone, mentation intact and speech normal  PSYCH: mentation appears normal, affect normal/bright  Bilateral lower legs: Significant edema, worse on right side. Rash present on bilateral legs and arms that consists of pinpoint erythematous macules. These have run together into solid erythema on lower legs.

## 2021-10-27 NOTE — PROGRESS NOTES
"  {PROVIDER CHARTING PREFERENCE:809040}    Subjective   Skip is a 80 year old who presents for the following health issues {ACCOMPANIED BY STATEMENT (Optional):936562}    HPI     Concern - rash   Onset: ***  Description: ***  Intensity: {.:080712}  Progression of Symptoms:  {.:732002}  Accompanying Signs & Symptoms: ***  Previous history of similar problem: ***  Precipitating factors:        Worsened by: ***  Alleviating factors:        Improved by: ***  Therapies tried and outcome: {NONE DEFAULTED:466494::\" none \"}    {additonal problems for provider to add (Optional):187026}    Review of Systems   {ROS COMP (Optional):975159}      Objective    There were no vitals taken for this visit.  There is no height or weight on file to calculate BMI.  Physical Exam   {Exam List (Optional):510418}    {Diagnostic Test Results (Optional):249280}    {AMBULATORY ATTESTATION (Optional):616867}          "

## 2021-10-27 NOTE — PATIENT INSTRUCTIONS
Continue to take cetirizine twice a day for another week.    Continue to take the higher dose of torsemide for another week.    Start prednisone taper (unless dermatology tells you otherwise).

## 2021-10-27 NOTE — PROGRESS NOTES
"  Assessment & Plan     Rash    Starting to improve. Add prednisone taper dependent on what dermatology says. He is seeing them today.    - predniSONE (DELTASONE) 20 MG tablet; Take 3 pills (60 mg) for 3 days. Then take 2 pills (40 mg) for 3 days. Then take 1 pill (20 mg) for 3 days. Then take 1/2 pill (10 mg) for 3 days.             Patient Instructions   Continue to take cetirizine twice a day for another week.    Continue to take the higher dose of torsemide for another week.    Start prednisone taper (unless dermatology tells you otherwise).       No follow-ups on file.    [facility] NAHED Staley is a 80 year old who presents for the following health issues     HPI     Concern - Rash and swelling  Onset: 3 weeks ago  Description: on both legs  Intensity: mild  Progression of Symptoms:  improving   Therapies tried and outcome: medications        Review of Systems   Constitutional, HEENT, cardiovascular, pulmonary, gi and gu systems are negative, except as otherwise noted.        Objective    /83 (BP Location: Left arm, Patient Position: Chair, Cuff Size: Adult Large)   Pulse 67   Temp 97.9  F (36.6  C) (Oral)   Ht 1.702 m (5' 7\")   Wt 77.1 kg (170 lb)   SpO2 98%   BMI 26.63 kg/m    Body mass index is 26.63 kg/m .       Physical Exam   GENERAL: healthy, alert and no distress  EYES: Eyes grossly normal to inspection, PERRL and conjunctivae and sclerae normal  MS: no gross musculoskeletal defects noted, no edema  SKIN: Rash is starting to improve but still present on bilateral legs and right forearm. Pinpoint erythematous maculopapular lesions.   NEURO: Normal strength and tone, mentation intact and speech normal  PSYCH: mentation appears normal, affect normal/bright  Leg swelling is significantly improved and there is no longer any pitting edema.                 "

## 2021-11-08 PROBLEM — Z23 NEED FOR PROPHYLACTIC VACCINATION AND INOCULATION AGAINST INFLUENZA: Status: ACTIVE | Noted: 2021-01-01

## 2021-11-08 PROBLEM — R73.09 ELEVATED GLUCOSE: Status: ACTIVE | Noted: 2021-01-01

## 2021-11-08 NOTE — PROGRESS NOTES
Assessment & Plan   Problem List Items Addressed This Visit     CAD (coronary artery disease) - Primary     Asymptomatic.  He is to see cardiology in the next couple of months         Chronic combined systolic and diastolic congestive heart failure (H)     Current regimen includes anticoagulation ACE inhibitor.  Spironolactone was ended due to renal concerns         Relevant Medications    torsemide (DEMADEX) 10 MG tablet    Elevated glucose     Periodic A1c         Relevant Orders    Hemoglobin A1c (Completed)    RESOLVED: History of prostate cancer    Relevant Medications    tamsulosin (FLOMAX) 0.4 MG capsule    Need for prophylactic vaccination and inoculation against influenza     Offered         Relevant Orders    INFLUENZA, QUAD, HIGH DOSE, PF, 65YR + (FLUZONE HD) (Completed)    Nonrheumatic mitral valve regurgitation       He is to see cardiology.  Euvolemic, well compensated          Relevant Medications    torsemide (DEMADEX) 10 MG tablet    Prostate cancer (H)     He follows with urology, is due for recheck now         Relevant Medications    tamsulosin (FLOMAX) 0.4 MG capsule    PUD (peptic ulcer disease)     Asymptomatic on PPI.  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  He is to see cardiology before the year is over         Relevant Medications    torsemide (DEMADEX) 10 MG tablet    Rosacea     He reports no response to doxycycline orally in the past.  He would like to resume         Relevant Medications    doxycycline hyclate (VIBRA-TABS) 100 MG tablet    Stasis dermatitis of both legs     Markedly improved with regular support hose           Other Visit Diagnoses     COVID-19 vaccine administered        Relevant Orders    MI COVID VAC PFIZER DIL RECON 30 MCG/0.3 ML IM (Completed)         He declines assistance in making his specialty referrals,  reports he will make them himself               Return in about 6 months (around 5/8/2022) for P, Physical Exam.    Oscar Alves MD  [facility] NAHED Staley is a 81 year old who presents for the following health issues     HPI       General Follow Up    Concern: leg swelling  Problem started: 5 weeks ago  Progression of symptoms: better  Description: swelling in both legs    His biggest concern is he wants to get his medical care taken care of so that he can go to [place] for the winter  Review of Systems   Skin, edema as described.  No chest pain no dyspnea      Objective    /80 (BP Location: Left arm, Patient Position: Chair, Cuff Size: Adult Regular)   Pulse 80   Temp 98  F (36.7  C) (Oral)   Resp 18   Wt 75.8 kg (167 lb)   SpO2 99%   BMI 26.16 kg/m    Body mass index is 26.16 kg/m .  Physical Exam   Trace edema right, none left.  Skin intact, brawny changes      Oscar Alves MD

## 2021-11-09 NOTE — ASSESSMENT & PLAN NOTE
Current regimen includes anticoagulation ACE inhibitor.  Spironolactone was ended due to renal concerns

## 2021-11-29 NOTE — PATIENT INSTRUCTIONS
Take the complete course of the prednisone. Take pills in the morning.      Torsemide: Take 2 of your 10 mg pills daily (together) for 2 weeks.     Follow-up for lab only appointment in 2 weeks. If labs look good, I will send in 20 mg tablets of the torsemide to continue on.     Elevate legs as much as possible.    Once swelling comes down, wear compression socks daily.

## 2021-11-29 NOTE — PROGRESS NOTES
Assessment & Plan     Rash    Prednisone was effective previously. Repeat.    - predniSONE (DELTASONE) 20 MG tablet; Take 3 pills (60 mg) for 3 days. Then take 2 pills (40 mg) for 3 days. Then take 1 pill (20 mg) for 3 days. Then take 1/2 pill (10 mg) for 3 days.      Stasis dermatitis of both legs    See above.    - predniSONE (DELTASONE) 20 MG tablet; Take 3 pills (60 mg) for 3 days. Then take 2 pills (40 mg) for 3 days. Then take 1 pill (20 mg) for 3 days. Then take 1/2 pill (10 mg) for 3 days.      Bilateral leg edema    Exacerbation. Double dose of torsemide. Check BMP in 2 weeks. If stable/normal, can continue 20 mg dose long-term. Elevate legs and start wearing compression stockings once they fit again. Should be wearing these daily for foreseeable future.                Patient Instructions   Take the complete course of the prednisone. Take pills in the morning.      Torsemide: Take 2 of your 10 mg pills daily (together) for 2 weeks.     Follow-up for lab only appointment in 2 weeks. If labs look good, I will send in 20 mg tablets of the torsemide to continue on.     Elevate legs as much as possible.    Once swelling comes down, wear compression socks daily.       Return in about 2 weeks (around 12/13/2021) for Lab Work.    Jackson Lino PA-C  [facility]    Wilda Staley is a 81 year old who presents for the following health issues     HPI     Rash  Onset/Duration: 2-3 weeks  Description  Location: on legs  Character: flakey, red  Itching: severe  Intensity:  moderate  Progression of Symptoms:  same and constant  Accompanying signs and symptoms:   Fever: no  Body aches or joint pain: no  Sore throat symptoms: no  Recent cold symptoms: no  History:           Previous episodes of similar rash: Yes   New exposures: unknown  Recent travel: no  Exposure to similar rash: YES  Precipitating or alleviating factors:   Therapies tried and outcome: OTC cream     Swelling on both  "legs. Has been worsened since decreasing torsemide to 1 pill per day. No longer wearing compression stockings since he can't get them on. Thought once swelling was improved last time that he didn't need them anymore.        Review of Systems   Constitutional, HEENT, cardiovascular, pulmonary, gi and gu systems are negative, except as otherwise noted.        Objective    /74 (BP Location: Right arm, Patient Position: Chair, Cuff Size: Adult Large)   Pulse 87   Temp 97.9  F (36.6  C) (Oral)   Ht 1.702 m (5' 7\")   SpO2 100%   BMI 26.16 kg/m    Body mass index is 26.16 kg/m .       Physical Exam   GENERAL: healthy, alert and no distress  EYES: Eyes grossly normal to inspection, PERRL and conjunctivae and sclerae normal  RESP: lungs clear to auscultation - no rales, rhonchi or wheezes  CV: regular rate and rhythm, normal S1 S2, no S3 or S4, no murmur, click or rub, no peripheral edema and peripheral pulses strong  MS: no gross musculoskeletal defects noted, no edema  SKIN: no suspicious lesions or rashes  NEURO: Normal strength and tone, mentation intact and speech normal  PSYCH: mentation appears normal, affect normal/bright  Significant bilateral lower extremity edema. 2+ pitting with serous drainage.   Some erythema and rash noted on legs and arms.                  "

## 2021-12-13 NOTE — TELEPHONE ENCOUNTER
Please call patient. His kidney function looks good. He can stay on the 20 mg dose of torsemide. However, his potassium did decrease however so he needs to make sure that he is taking the potassium supplement. If he needs a refill, let me know.    Jackson Lino PA-C on 12/13/2021 at 1:26 PM

## 2021-12-14 NOTE — TELEPHONE ENCOUNTER
Patient calls.   Discussed lab results and patient understands he is to continue torsemide and potassium. Does not need refills of medications at this time.  Dalia Arriaza RN

## 2021-12-14 NOTE — TELEPHONE ENCOUNTER
Attempt #1 to reach patient regarding message below. Left voicemail to return phone call to clinic.       RIAZ PayneN, RN  [facility]

## 2021-12-26 PROBLEM — I50.23 ACUTE ON CHRONIC SYSTOLIC CONGESTIVE HEART FAILURE (H): Status: ACTIVE | Noted: 2021-01-01

## 2021-12-26 PROBLEM — E87.5 HYPERKALEMIA: Status: ACTIVE | Noted: 2021-01-01

## 2021-12-26 PROBLEM — I50.9 CONGESTIVE HEART FAILURE, UNSPECIFIED HF CHRONICITY, UNSPECIFIED HEART FAILURE TYPE (H): Status: ACTIVE | Noted: 2021-01-01

## 2021-12-26 PROBLEM — Z79.01 CHRONIC ANTICOAGULATION: Status: ACTIVE | Noted: 2021-01-01

## 2021-12-26 NOTE — H&P
[facility]    History and Physical - Hospitalist Service       Date of Admission:  12/26/2021    Assessment & Plan      Milo Serna is a 81 year old male admitted on 12/26/2021. He presented to the ER with testicular swelling noted today per patient. Not having any urinary symptoms.He also c/o increased leg swelling for last few weeks.  He does not weigh himself every day.  He denies chest pain, SOB.  Lives in assisted living.  He recently moved here couple days back.    He has bilateral feet pain and unable to walk on them due to that.  He denies URI symptoms.  He is vaccinated for Covid and also received booster.  According to the family patient is slightly confused lately.  And that was the reason he was moving to assisted living but for me he provided appropriate history.   In ER patient is in A. fib but not RVR.  He is not hypoxic.  His blood work showed elevated potassium at 5.9, elevated BNP.  Chest x-ray showed bilateral pulmonary vascular congestion with cardiomegaly.  CT head is negative, x-ray of bilateral feet negative.  When I was examining patient was complaining of lower abdominal pain and he was tender so I ordered CT abdomen pelvis.    Principal Problem:    Acute on chronic systolic congestive heart failure (H)    Assessment: he is not hypoxic.  He had echocardiogram last year which showed ejection fraction of 42% and also right ventricular dysfunction.  And takes torsemide at home.    Plan: Kept him telemetry, ordered Lasix 60 mg 3 times a day, echocardiogram is ordered.  Will order Ace wraps for bilateral lower extremities. Oxygen as needed.  Active Problems:  Elevated Liver enzymes  Assessment: From CHF  Plan: we will monitor    Neuropathy of foot    Assessment: Chronic and has been having increased pain.  X-ray of feet is negative    Plan: Continue home pain medication.    Chronic atrial fibrillation (H)    Assessment: Patient is not having any RVR.  Ventricular  ectopy is noted but patient is asymptomatic with it.    Plan: Continue home medications and continue home anticoagulation.    LENORA (obstructive sleep apnea)    Assessment: stable    Plan: Continue CPAP    Stasis dermatitis of both legs    Assessment: stable, mild oozing noted from swelling in LE, no ulcers noted    Plan: ordered ACE wraps     Hyperkalemia    Assessment: had cocktail in ER for this    Plan: we will recheck potassium and kept him on telemetry    Chronic anticoagulation    Assessment: on DOAC    Plan: Continue home meds        Diet:   Low salt diet  DVT Prophylaxis: DOAC  Biggs Catheter: Not present  Central Lines: None  Code Status:   FULL CODE       Disposition Plan   Expected Discharge: 3-5 days   Anticipated discharge location: Assisted living       The patient's care was discussed with the Patient.    Tammy Hair MD  [facility]  Securely message with the Vocera Web Console (learn more here)  Text page via [employer] Paging/Directory        ______________________________________________________________________    Chief Complaint   Testicular swelling    History is obtained from the patient    History of Present Illness   Milo Serna is a 81 year old male who presented to the ER with testicular swelling noted today per patient. Not having any urinary symptoms.He also c/o increased leg swelling for last few weeks.  He does not weigh himself every day so he doesn't know whether his weight is up or not.  He denies chest pain, SOB.  Lives in assisted living.  He recently moved here couple days back.    He has bilateral feet pain and unable to walk on them due to that.  He denies URI symptoms.  He is vaccinated for Covid and also received booster.  According to the family patient is slightly confused lately.  And that was the reason he was moving to assisted living.  In ER patient is in A. fib but not RVR.  He is not hypoxic.  His blood work showed elevated potassium at  5.9, elevated BNP.  Chest x-ray showed bilateral pulmonary vascular congestion with cardiomegaly.  CT head is negative, x-ray of bilateral feet negative.  When I was examining patient was complaining of lower abdominal pain and he was tender so I ordered CT abdomen pelvis.    Review of Systems    The 10 point Review of Systems is negative other than noted in the HPI or here.     Past Medical History    I have reviewed this patient's medical history and updated it with pertinent information if needed.   Past Medical History:   Diagnosis Date     Actinic keratosis 10/12/2019     Anemia, unspecified 11/08/2016     Atrial fibrillation (H) 05/27/2015     BPH (benign prostatic hyperplasia) 01/25/2012     CAD (coronary artery disease) 04/04/2012    CAB 1996 following MI (at [facility])     Closed bimalleolar fracture of left ankle with routine healing 04/05/2019     Closed fracture of metatarsal bone 04/04/2012     Dilated aortic root (H) 05/26/2016     Drug-induced erectile dysfunction 10/02/2015     Elevated blood sugar 11/08/2016     Elevated PSA 09/19/2013     Epistaxis 09/09/2019     Essential hypertension with goal blood pressure less than 140/90 09/22/2016     Fall 09/06/2019     Functional diarrhea 10/12/2019     Gallbladder polyp 05/01/2018     Gout      HAV (hallux abducto valgus) 04/04/2012     Hematoma 09/09/2019     Hernia, abdominal      History of prostate cancer 06/15/2016     HTN, goal below 150/90 04/20/2017     Hypokalemia 09/12/2019     Iron deficiency anemia secondary to inadequate dietary iron intake 11/15/2016     Ischemic cardiomyopathy      Low blood pressure reading 10/17/2016     Lumbago     had degendisc dz on MRI 7/07     Memory loss 06/18/2019     Microalbuminuria 06/04/2020    X1     Mixed hyperlipidemia      Mumps      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     Nonrheumatic mitral valve regurgitation 10/28/2019     OA (osteoarthritis) 04/04/2012     Old myocardial infarction 04/04/2012     LENORA  (obstructive sleep apnea) 10/28/2019     LENORA (obstructive sleep apnea)      Other viral warts 09/06/2019     Palpitations      Pes planus 04/04/2012     Petechiae 06/18/2019     Prostate cancer (H) 05/26/2016     PUD (peptic ulcer disease) 04/04/2012     Pulmonary hypertension (H) 09/25/2017     PVC's (premature ventricular contractions)      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  Past Surgical History:   Procedure Laterality Date     CARDIAC SURGERY      CAB     CYSTOSCOPY FLEXIBLE, CYOABLATION PROSTATE N/A 11/09/2016    Procedure: CYSTOSCOPY FLEXIBLE, CRYOABLATION PROSTATE;  Surgeon: Krystian Owens MD;  Location:  OR     GENITOURINARY SURGERY      prostate surgery      HERNIA REPAIR       LAPAROSCOPIC CHOLECYSTECTOMY N/A 12/28/2020    Procedure: CHOLECYSTECTOMY, LAPAROSCOPIC;  Surgeon: James Alvarez MD;  Location: [facility] OR     LAPAROSCOPIC HERNIORRHAPHY INGUINAL Right 05/10/2017    Procedure: LAPAROSCOPIC HERNIORRHAPHY INGUINAL;  Laparoscopic preperitoneal repair of right inguinal hernia with placement of underlay mesh;  Surgeon: Enrico Bridges MD;  Location:  OR     PROSTATE SURGERY       [facility] NONSPECIFIC PROCEDURE      CAB 1996 [facility] NONSPECIFIC PROCEDURE  02/2007    l inguinal hernia repair      [facility] NONSPECIFIC PROCEDURE      R hernia remote     [facility] NONSPECIFIC PROCEDURE  2000    R ankle ORIF for fx     [facility] NONSPECIFIC PROCEDURE  2005    nasal surgery      [facility] NONSPECIFIC PROCEDURE      R rotater repair (remote)      [facility]  NONSPECIFIC PROCEDURE      appy 1966     [facility] NONSPECIFIC PROCEDURE      sinus surgery x 2     [facility] NONSPECIFIC PROCEDURE      L shoulder        Social History   I have reviewed this patient's social history and updated it with pertinent information if needed.  Social History     Tobacco Use     Smoking status: Former Smoker     Smokeless tobacco: Never Used     Tobacco comment: quit 3/1974   Vaping Use     Vaping Use: Never used   Substance Use Topics     Alcohol use: Yes     Alcohol/week: 0.0 standard drinks     Comment: One Beer a Day and maybe Wine     Drug use: No       Family History   I have reviewed this patient's family history and updated it with pertinent information if needed.  Family History   Problem Relation Age of Onset     Cancer Mother         stomach cancer,  age 61     Heart Disease Father         MI,  age 71     Heart Disease Brother         stent placement, RA      Hypertension Brother      Heart Disease Sister         rhythm problems with heart, hypertension       Prior to Admission Medications   Prior to Admission Medications   Prescriptions Last Dose Informant Patient Reported? Taking?   [facility] GLUCOSAMINE-CHONDROITIN PO   Yes No   Sig: Take 1 tablet by mouth every morning    MELATONIN PO   Yes No   Sig: Take 5 mg by mouth At Bedtime    UREA 20 INTENSIVE HYDRATING 20 % external cream   No No   Sig: Apply topically as needed Apply to right foot daily   acetaminophen (TYLENOL) 500 MG tablet   Yes No   Sig: Take 500-1,000 mg by mouth every 8 hours as needed   apixaban ANTICOAGULANT (ELIQUIS) 2.5 MG tablet   Yes No   Sig: Take 1 tablet (2.5 mg) by mouth 2 times daily   calcium carbonate (OS-ANG) 500 MG tablet   Yes No   Sig: Take 1 tablet by mouth daily   cetirizine (ZYRTEC) 10 MG tablet   No No   Sig: Take 1 tablet (10 mg) by mouth 2 times daily   diclofenac (VOLTAREN) 1 % topical gel   No No   Sig: Apply 4 gramsto area qid prn   doxycycline hyclate (VIBRA-TABS) 100 MG tablet    No No   Sig: Take 1 tablet (100 mg) by mouth daily   ferrous gluconate (FERGON) 324 (38 Fe) MG tablet   No No   Sig: Take 1 tablet (324 mg) by mouth daily (with breakfast)   lisinopril (ZESTRIL) 20 MG tablet   Yes No   Sig: Take 1 tablet (20 mg) by mouth daily   multivitamin, therapeutic (THERA-VIT) TABS tablet   Yes No   Sig: Take 1 tablet by mouth daily   omeprazole (PRILOSEC) 40 MG DR capsule   No No   Sig: Take 1 capsule (40 mg) by mouth daily   potassium chloride ER (K-TAB) 20 MEQ CR tablet   Yes No   Sig: Take 1 tablet (20 mEq) by mouth 2 times daily   predniSONE (DELTASONE) 20 MG tablet   No No   Sig: Take 3 pills (60 mg) for 3 days. Then take 2 pills (40 mg) for 3 days. Then take 1 pill (20 mg) for 3 days. Then take 1/2 pill (10 mg) for 3 days.   senna-docusate (SENOKOT-S/PERICOLACE) 8.6-50 MG tablet   No No   Sig: Take 1 tablet by mouth 2 times daily   Patient taking differently: Take 1 tablet by mouth daily as needed    simvastatin (ZOCOR) 20 MG tablet   Yes No   Sig: Take 1 tablet (20 mg) by mouth At Bedtime   tamsulosin (FLOMAX) 0.4 MG capsule   No No   Sig: Take 1 capsule (0.4 mg) by mouth daily   torsemide (DEMADEX) 10 MG tablet   No No   Sig: Take 1 tablet (10 mg) by mouth every morning      Facility-Administered Medications: None     Allergies   Allergies   Allergen Reactions     Blood Transfusion Related (Informational Only) Other (See Comments)     Patient has a history of a clinically significant antibody against RBC antigens.  A delay in compatible RBCs may occur.       Physical Exam   Vital Signs: Temp: 97.7  F (36.5  C) Temp src: Temporal BP: (!) 144/76 Pulse: 68   Resp: 16 SpO2: 100 %      Weight: 0 lbs 0 oz    Constitutional: awake, alert, cooperative, no apparent distress, and appears stated age  Eyes: Lids and lashes normal, pupils equal, round and reactive to light, extra ocular muscles intact, sclera clear, conjunctiva normal  ENT: Normocephalic, without obvious abnormality, atraumatic,  sinuses nontender on palpation, external ears without lesions, oral pharynx with moist mucous membranes, tonsils without erythema or exudates, gums normal and good dentition.  Respiratory: No increased work of breathing, good air exchange, clear to auscultation bilaterally, no crackles or wheezing  Cardiovascular: irregularly irregular  GI: No scars, normal bowel sounds, soft, non-distended, lower abdominal tenderness,no hepatosplenomegally  Skin: cracked skin and venous stasis changes bilateral lower extremities, +3-4 edema  - there testicular swelling noted, has catheter  Neurologic: Awake, alert, oriented to name, place and time. Grossly intact    Data   Data reviewed today: I reviewed all medications, new labs and imaging results over the last 24 hours.     Recent Labs   Lab 12/26/21  1340   WBC 4.7   HGB 11.4*   *         POTASSIUM 5.9*   CHLORIDE 104   CO2 25   BUN 11   CR 0.77   ANIONGAP 6   ANG 8.6   *   ALBUMIN 2.9*   PROTTOTAL 7.6   BILITOTAL 1.7*   ALKPHOS 193*   ALT 20   AST 73*     Recent Results (from the past 24 hour(s))   XR Chest Port 1 View    Narrative    EXAM: XR CHEST PORT 1 VIEW  LOCATION: [facility]  DATE/TIME: 12/26/2021 2:10 PM    INDICATION: Confusion, congestive heart failure.  COMPARISON: Chest x-ray 6/4/2020.      Impression    IMPRESSION: Stable cardiomegaly. Interval increased vascular congestion and pulmonary edema pattern. Findings suggest development of CHF. Bibasilar opacities also noted appearing new. Cannot exclude pneumonia.   Head CT w/o contrast    Narrative    EXAM: CT HEAD WITHOUT CONTRAST  LOCATION: [facility]  DATE/TIME: 12/26/2021, 3:23 PM    INDICATION: Mental status change, unknown cause.  COMPARISON: 11/19/2020.  TECHNIQUE: Routine CT Head without IV contrast. Multiplanar reformats. Dose reduction techniques were used.    FINDINGS:  INTRACRANIAL CONTENTS: No intracranial hemorrhage, extraaxial  collection, or mass effect.  No CT evidence of acute infarct. Mild presumed chronic small vessel ischemic changes. Mild generalized volume loss. No hydrocephalus.     VISUALIZED ORBITS/SINUSES/MASTOIDS: No acute intraorbital abnormality. No paranasal sinus mucosal disease. No middle ear or mastoid effusion.    BONES/SOFT TISSUES: No acute abnormality.      Impression    IMPRESSION:  1.  No CT evidence for acute intracranial process.  2.  Brain atrophy and presumed chronic microvascular ischemic changes as above.     XR Foot Bilateral G/E 3 Views    Narrative    EXAM: XR FOOT BILATERAL G/E 3 VIEWS  LOCATION: [facility]  DATE/TIME: 12/26/2021, 3:32 PM    INDICATION: Pain, foot wounds.  COMPARISON: None.      Impression    IMPRESSION:     LEFT FOOT: No fracture or osteomyelitis. Old healed fracture deformities of the necks of the second, third and fourth metatarsals. Marked osteopenia. Moderate degenerative changes throughout the midfoot. Advanced degenerative changes at the tibiotalar   joint. Hammertoe deformities.    RIGHT FOOT: No acute fracture or osteomyelitis. Moderate hallux valgus. Old healed fractures of the second through fifth metatarsal necks. Cannulated screws in the medial malleolus. Screw and plate fixation of the lateral malleolus. Hindfoot valgus.   Advanced degenerative changes throughout the midfoot. Hammertoe deformities.

## 2021-12-26 NOTE — PHARMACY-ADMISSION MEDICATION HISTORY
Admission medication history interview status for this patient is complete. See [facility] admission navigator for allergy information, prior to admission medications and immunization status.     Medication history interview done, indicate source(s): Patient's daughter  Medication history resources (including written lists, pill bottles, clinic record): fill history, Care Everywhere  Ph[facility]: [facility]    Changes made to PTA medication list:  Added: none  Changed: torsemide 10 mg to 20 mg daily  Reported as Not Taking: -  Removed: calcium, cetirizine, glucosamin-chondroitin, prednisone (taper completed), senna-docusate    Actions taken by pharmacist (provider contacted, etc):None     Additional medication history information:None    Medication reconciliation/reorder completed by provider prior to medication history?  N   (Y/N)     Prior to Admission medications    Medication Sig Last Dose Taking? Auth Provider   apixaban ANTICOAGULANT (ELIQUIS) 2.5 MG tablet Take 1 tablet (2.5 mg) by mouth 2 times daily 12/26/2021 at am Yes Sixto Conley MD   diclofenac (VOLTAREN) 1 % topical gel Apply 4 gramsto area qid prn  Yes Oscar Alves MD   doxycycline hyclate (VIBRA-TABS) 100 MG tablet Take 1 tablet (100 mg) by mouth daily 12/26/2021 Yes Oscar Alves MD   lisinopril (ZESTRIL) 20 MG tablet Take 1 tablet (20 mg) by mouth daily 12/26/2021 Yes Sixto Conley MD   MELATONIN PO Take 5 mg by mouth At Bedtime  Unknown at Unknown time Yes Reported, Patient   multivitamin, therapeutic (THERA-VIT) TABS tablet Take 1 tablet by mouth daily Unknown at Unknown time Yes Reported, Patient   omeprazole (PRILOSEC) 40 MG DR capsule Take 1 capsule (40 mg) by mouth daily 12/26/2021 Yes Oscar Alves MD   potassium chloride ER (K-TAB) 20 MEQ CR tablet Take 1 tablet (20 mEq) by mouth 2 times daily 12/26/2021 at am Yes Sixto Conley MD   simvastatin (ZOCOR) 20 MG tablet Take 1 tablet (20 mg) by mouth At Bedtime 12/25/2021 Yes  Sixto Conley MD   tamsulosin (FLOMAX) 0.4 MG capsule Take 1 capsule (0.4 mg) by mouth daily 12/26/2021 Yes Oscar Alves MD   torsemide (DEMADEX) 10 MG tablet Take 1 tablet (10 mg) by mouth every morning  Patient taking differently: Take 20 mg by mouth every morning Takes 2 x 10 mg tablets = 20 mg 12/26/2021 Yes Oscar Alves MD   UREA 20 INTENSIVE HYDRATING 20 % external cream Apply topically as needed Apply to right foot daily  Yes Oscar Alves MD   acetaminophen (TYLENOL) 500 MG tablet Take 500-1,000 mg by mouth every 8 hours as needed   Reported, Patient   ferrous gluconate (FERGON) 324 (38 Fe) MG tablet Take 1 tablet (324 mg) by mouth daily (with breakfast) 12/26/2021  Oscar Alves MD

## 2021-12-26 NOTE — ED PROVIDER NOTES
History   Chief Complaint:  Foot Swelling     The history is provided by the patient and a relative.      Milo Serna is a 81 year old male with history of atrial fibrillation, CAD, hypertension, hyperlipidemia, and heart disease who presents with swelling. Patient complains of increased leg and testicular swelling worsening over the past week. Patient has not been able to get the swelling to go down. Family states increased confusion over the past two months. He moved into an assisted living facility a week ago and this has added confusion. He is moving slower than usual. He also notes a sore on the bottom of his foot. Denies chest pain, chest tightness, or nausea. No cough, fever, trouble swallowing, vomiting, or diarrhea. No dysuria or hematuria. No sore throat. No bleeding anywhere. No recent falls. He is vaccinated against Covid.     Review of Systems   Constitutional: Negative for fever.   HENT: Negative for nosebleeds, sore throat and trouble swallowing.    Respiratory: Negative for cough and chest tightness.    Cardiovascular: Positive for leg swelling. Negative for chest pain.   Gastrointestinal: Negative for blood in stool, diarrhea, nausea and vomiting.   Genitourinary: Positive for scrotal swelling and testicular pain. Negative for dysuria and hematuria.   Skin: Positive for wound.   Neurological: Positive for weakness.   Psychiatric/Behavioral: Positive for confusion.   All other systems reviewed and are negative.     Allergies:  Niacin   Pravastatin     Medications:  Eliquis   Os-wolf   Zyrtec   Fergon   Zestril   Melatonin   Prilosec   K-tab   Deltasone   Senokot-s  Zocor  Flomax   Demadex     Past Medical History:     Actinic keratosis  Anemia  Atrial fibrillation   Benign prostatic hyperplasia  CAD  Dilated aortic root   Elevated PSA   Essential hypertension   Gallbladder polyp   Gout   Hallux abducto valgus   Abdominal hernia  Prostate cancer   Ischemic cardiomyopathy   Lumbago  "  Hyperlipidemia   Neuropathy of foot   Nonrheumatic mitral valve regurgitation   Osteoarthritis   LENORA   Pes planus   Petechiae   Peptic ulcer disease   PVCs   Statis dermatitis   Thrombocytopenia   Heart disease       Past Surgical History:    Left shoulder surgery   Sinus surgery   Appendectomy   Rotator cuff repair, right   Nasal surgery   Orif ankle   Hernia repair x3  CAB   Laparoscopic cholecystectomy   Prostate surgery   Cystoscopy flexible, cryoablation prostate      Family History:    Mother: stomach cancer  Father: MI  Brother: heart disease, hypertension   Sister: heart disease     Social History:  Presents to ED with daughter     Physical Exam     Patient Vitals for the past 24 hrs:   BP Temp Temp src Pulse Resp SpO2 Height Weight   12/26/21 1829 108/53 (!) 96.2  F (35.7  C) Oral 78 16 98 % 1.702 m (5' 7\") 86.4 kg (190 lb 8 oz)   12/26/21 1805 -- -- -- -- 16 -- -- --   12/26/21 1745 108/79 -- -- 76 12 100 % -- --   12/26/21 1645 (!) 141/76 -- -- 94 15 100 % -- --   12/26/21 1600 136/71 -- -- 97 12 100 % -- --   12/26/21 1400 (!) 144/76 -- -- 68 -- 100 % -- --   12/26/21 1210 (!) 147/64 97.7  F (36.5  C) Temporal 84 16 100 % -- --       Physical Exam  General: The patient is alert, in no respiratory distress.    HENT: Mucous membranes moist.    Cardiovascular: Regular rate and rhythm. Good pulses in all four extremities. Normal capillary refill and skin turgor.     Respiratory: Lungs are clear. No nasal flaring. No retractions. No wheezing, no crackles.Decreased breath sounds at lung bases.     Gastrointestinal: Abdomen soft. No guarding, no rebound. No palpable hernias. Nontender.     Musculoskeletal: No gross deformity.     Skin: No rashes or petechiae. Erythema, increased keratosis, and cracking of both calves. Identical sores in the mid foot bilaterally.     Neurologic: The patient is alert and oriented x3. GCS 15. No testable cranial nerve deficit. Follows commands with clear and appropriate speech. " Gives appropriate answers. No gross neurologic deficit. Gross sensation intact. Pupils are round and reactive. No meningismus. Confusion. Generalized weakness. Strength not intact.     Lymphatic: No cervical adenopathy. Bilateral lower extremity edema with serous drainage from legs.     Psychiatric: The patient is non-tearful.    Emergency Department Course   ECG  ECG obtained at 1355, ECG read at 1355  A-fib with PVC    Abnormal ECG   Rate 74 bpm. IN interval * ms. QRS duration 106 ms. QT/QTc 444/492 ms. P-R-T axes * 43 -45.     ECG #2  ECG taken at 1501, ECG read at 1501  Atrial fibrillation with premature ventricular or aberrantly conducted complexes   Nonspecific ST and T wave abnormality   Abnormal ECG   Rate 68 bpm. IN interval *. QRS duration 110. QT/QTc 450/478. P-R-T axes * 49 1.    Imaging:  XR Foot Bilateral G/E 3 Views   Final Result   IMPRESSION:       LEFT FOOT: No fracture or osteomyelitis. Old healed fracture deformities of the necks of the second, third and fourth metatarsals. Marked osteopenia. Moderate degenerative changes throughout the midfoot. Advanced degenerative changes at the tibiotalar    joint. Hammertoe deformities.      RIGHT FOOT: No acute fracture or osteomyelitis. Moderate hallux valgus. Old healed fractures of the second through fifth metatarsal necks. Cannulated screws in the medial malleolus. Screw and plate fixation of the lateral malleolus. Hindfoot valgus.    Advanced degenerative changes throughout the midfoot. Hammertoe deformities.         Head CT w/o contrast   Final Result   IMPRESSION:   1.  No CT evidence for acute intracranial process.   2.  Brain atrophy and presumed chronic microvascular ischemic changes as above.         XR Chest Port 1 View   Final Result   IMPRESSION: Stable cardiomegaly. Interval increased vascular congestion and pulmonary edema pattern. Findings suggest development of CHF. Bibasilar opacities also noted appearing new. Cannot exclude pneumonia.       CT Abdomen Pelvis w/o Contrast    (Results Pending)   Echocardiogram Complete    (Results Pending)   Report per radiology    Laboratory:  Labs Ordered and Resulted from Time of ED Arrival to Time of ED Departure   COMPREHENSIVE METABOLIC PANEL - Abnormal       Result Value    Sodium 135      Potassium 5.9 (*)     Chloride 104      Carbon Dioxide (CO2) 25      Anion Gap 6      Urea Nitrogen 11      Creatinine 0.77      Calcium 8.6      Glucose 100 (*)     Alkaline Phosphatase 193 (*)     AST 73 (*)     ALT 20      Protein Total 7.6      Albumin 2.9 (*)     Bilirubin Total 1.7 (*)     GFR Estimate 89     NT PROBNP INPATIENT - Abnormal    N terminal Pro BNP Inpatient 1,868 (*)    BLOOD GAS VENOUS - Abnormal    pH Venous 7.41      pCO2 Venous 46      pO2 Venous 31      Bicarbonate Venous 29 (*)     Base Excess/Deficit (+/-) 3.6 (*)     FIO2 0     ROUTINE UA WITH MICROSCOPIC - Abnormal    Color Urine Light Yellow      Appearance Urine Clear      Glucose Urine Negative      Bilirubin Urine Negative      Ketones Urine Negative      Specific Gravity Urine 1.008      Blood Urine Negative      pH Urine 6.5      Protein Albumin Urine Negative      Urobilinogen Urine Normal      Nitrite Urine Negative      Leukocyte Esterase Urine Negative      Mucus Urine Present (*)     RBC Urine 1      WBC Urine <1     CBC WITH PLATELETS AND DIFFERENTIAL - Abnormal    WBC Count 4.7      RBC Count 3.41 (*)     Hemoglobin 11.4 (*)     Hematocrit 34.8 (*)      (*)     MCH 33.4 (*)     MCHC 32.8      RDW 15.4 (*)     Platelet Count 153      % Neutrophils 73      % Lymphocytes 11      % Monocytes 13      % Eosinophils 2      % Basophils 1      % Immature Granulocytes 0      NRBCs per 100 WBC 0      Absolute Neutrophils 3.5      Absolute Lymphocytes 0.5 (*)     Absolute Monocytes 0.6      Absolute Eosinophils 0.1      Absolute Basophils 0.1      Absolute Immature Granulocytes 0.0      Absolute NRBCs 0.0     TROPONIN I - Normal    Troponin  I High Sensitivity 28     COVID-19 VIRUS (CORONAVIRUS) BY PCR - Normal    SARS CoV2 PCR Negative     ETHYL ALCOHOL LEVEL - Normal    Alcohol ethyl <0.01     GLUCOSE BY METER - Normal    GLUCOSE BY METER POCT 79     GLUCOSE MONITOR NURSING POCT   GLUCOSE MONITOR NURSING POCT   BASIC METABOLIC PANEL   POTASSIUM   BLOOD CULTURE   BLOOD CULTURE   URINE CULTURE      Emergency Department Course:    Reviewed:  I reviewed nursing notes, vitals, past medical history and Care Everywhere    Assessments:  1308 I obtained history and examined the patient as noted above.   1510 I rechecked the patient and explained findings.     Consults:  1542 I spoke with Dr. Hair from the Hospitalist service regarding patient's presentation, findings, and plan of care.     Interventions:  Medications   glucose gel 15-30 g (has no administration in time range)     Or   dextrose 50 % injection 25-50 mL (has no administration in time range)     Or   glucagon injection 1 mg (has no administration in time range)   sodium polystyrene (KAYEXALATE) suspension 15 g (has no administration in time range)   acetaminophen (TYLENOL) tablet 500 mg (500 mg Oral Given 12/26/21 1634)   lidocaine 1 % 0.1-1 mL (has no administration in time range)   lidocaine (LMX4) cream (has no administration in time range)   sodium chloride (PF) 0.9% PF flush 3 mL (has no administration in time range)   sodium chloride (PF) 0.9% PF flush 3 mL (has no administration in time range)   melatonin tablet 1 mg (has no administration in time range)   HOLD: nitroGLYcerin IF (has no administration in time range)   Patient is already receiving anticoagulation with heparin, enoxaparin (LOVENOX), warfarin (COUMADIN)  or other anticoagulant medication (has no administration in time range)   ondansetron (ZOFRAN-ODT) ODT tab 4 mg (has no administration in time range)     Or   ondansetron (ZOFRAN) injection 4 mg (has no administration in time range)   furosemide (LASIX) injection 60 mg  (has no administration in time range)   furosemide (LASIX) injection 60 mg (60 mg Intravenous Given 12/26/21 1344)   calcium gluconate 1 g in 50 mL sodium chloride intermittent infusion (premix) (1 g Intravenous Given 12/26/21 1616)   sodium bicarbonate 8.4 % injection 50 mEq (50 mEq Intravenous Given 12/26/21 1640)   dextrose 50 % injection 25 g (25 g Intravenous Given 12/26/21 1635)   insulin regular 1 unit/mL injection 7.6 Units (7.6 Units Intravenous Given 12/26/21 1639)     Disposition:  The patient was admitted to the hospital under the care of Dr. Hair.     Impression & Plan         Medical Decision Making:  The patient has had increasing confusion that was more significant over approximate last week.  This is while this could be due to him moving to an assisted living facility is associated increasing lower extremity edema is concerning.  The patient's labs did return with an elevated potassium which was quite significant requiring aggressive treatment.  I did order chest x-ray that showed signs suggestive of CHF which I think is likely his diagnosis as well as a head CT did not show intracranial processes.  The patient was started on diuretics.  I did as stated above aggressively treat his hyperkalemia and was admitted to hospital in good condition for diuresis and further medical management.  Of note the patient is chronically in A. fib and is currently in A. fib but rate controlled.    Critical Care Time: was 60 minutes for this patient excluding procedures    Diagnosis:    ICD-10-CM    1. Congestive heart failure, unspecified HF chronicity, unspecified heart failure type (H)  I50.9    2. Hyperkalemia  E87.5    3. Chronic anticoagulation  Z79.01    4. Chronic atrial fibrillation (H)  I48.20        Scribe Disclosure:  Pete WREN, am serving as a scribe at 1:04 PM on 12/26/2021 to document services personally performed by Grover Love MD based on my observations and the provider's  statements to me.            Grover Love MD  12/26/21 9211

## 2021-12-26 NOTE — ED NOTES
Pt complaining of lower abdominal pain. Abdomin noted to be taught and painful to palpation. Bladder scan completed and patient noted to have >999 mL in his bladder. MD aware and ordering damon catheter.

## 2021-12-26 NOTE — ED NOTES
[facility]  ED Nurse Handoff Report    Milo Serna is a 81 year old male   ED Chief complaint: Foot Swelling  . ED Diagnosis:   Final diagnoses:   None     Allergies:   Allergies   Allergen Reactions     Blood Transfusion Related (Informational Only) Other (See Comments)     Patient has a history of a clinically significant antibody against RBC antigens.  A delay in compatible RBCs may occur.       Code Status: Full Code  Activity level - Baseline/Home:  Stand by Assist. With walker at baseline Activity Level - Current:   Assist X 2. Lift room needed: Yes. Bariatric: No   Needed: No   Isolation: No. Infection: Not Applicable.     Vital Signs:   Vitals:    12/26/21 1210 12/26/21 1400   BP: (!) 147/64 (!) 144/76   Pulse: 84 68   Resp: 16    Temp: 97.7  F (36.5  C)    TempSrc: Temporal    SpO2: 100% 100%       Cardiac Rhythm:  ,      Pain level:    Patient confused: Yes. Patient Falls Risk: Yes.   Elimination Status: Has voided , baseline, lasix given and urinal provided  Patient Report - Initial Complaint: BLE edema. Focused Assessment:     Milo Serna is a 81 year old male with history of atrial fibrillation, CAD, hypertension, hyperlipidemia, and heart disease who presents with swelling. Patient complains of increased leg and testicular swelling worsening over the past week. Patient has not been able to get the swelling to go down. Family states increased confusion over the past two months. He moved into an assisted living facility a week ago and this has added confusion. He is moving slower than usual. He also notes a sore on the bottom of his foot. Denies chest pain, chest tightness, or nausea. No cough, fever, trouble swallowing, vomiting, or diarrhea. No dysuria or hematuria. No sore throat. No bleeding anywhere. No recent falls. He is vaccinated against Covid.      Review of Systems   Constitutional: Negative for fever.   HENT: Negative for nosebleeds, sore throat and  trouble swallowing.    Respiratory: Negative for cough and chest tightness.    Cardiovascular: Positive for leg swelling. Negative for chest pain.   Gastrointestinal: Negative for blood in stool, diarrhea, nausea and vomiting.   Genitourinary: Positive for scrotal swelling and testicular pain. Negative for dysuria and hematuria.   Skin: Positive for wound.   Neurological: Positive for weakness.   Psychiatric/Behavioral: Positive for confusion.   All other systems reviewed and are negative.      14:13 Respiratory Respiratory - Respiratory WDL: WDL  J     14:12 Cardiac Cardiac - Cardiac WDL: .WDL except (+3 Edema BLE, red, bernie, peeling/flaking. Pain, Skin tear LUE, skin tears easily. )         Tests Performed: lab s, imaging. Abnormal Results:   Labs Ordered and Resulted from Time of ED Arrival to Time of ED Departure   BLOOD GAS VENOUS - Abnormal       Result Value    pH Venous 7.41      pCO2 Venous 46      pO2 Venous 31      Bicarbonate Venous 29 (*)     Base Excess/Deficit (+/-) 3.6 (*)     FIO2 0     CBC WITH PLATELETS AND DIFFERENTIAL - Abnormal    WBC Count 4.7      RBC Count 3.41 (*)     Hemoglobin 11.4 (*)     Hematocrit 34.8 (*)      (*)     MCH 33.4 (*)     MCHC 32.8      RDW 15.4 (*)     Platelet Count 153      % Neutrophils 73      % Lymphocytes 11      % Monocytes 13      % Eosinophils 2      % Basophils 1      % Immature Granulocytes 0      NRBCs per 100 WBC 0      Absolute Neutrophils 3.5      Absolute Lymphocytes 0.5 (*)     Absolute Monocytes 0.6      Absolute Eosinophils 0.1      Absolute Basophils 0.1      Absolute Immature Granulocytes 0.0      Absolute NRBCs 0.0     COMPREHENSIVE METABOLIC PANEL   TROPONIN I   NT PROBNP INPATIENT   COVID-19 VIRUS (CORONAVIRUS) BY PCR   ETHYL ALCOHOL LEVEL   ROUTINE UA WITH MICROSCOPIC   BLOOD CULTURE   BLOOD CULTURE   URINE CULTURE     XR Chest Port 1 View    (Results Pending)   Head CT w/o contrast    (Results Pending)     .   Treatments provided:  lasix, frequent VS  Family Comments: dtr at bedside  OBS brochure/video discussed/provided to patient:  No  ED Medications:   Medications   furosemide (LASIX) injection 60 mg (60 mg Intravenous Given 12/26/21 1344)     Drips infusing:  No  For the majority of the shift, the patient's behavior Green. Interventions performed were n/a.    Sepsis treatment initiated: No     Patient tested for COVID 19 prior to admission: YES    ED Nurse Name/Phone Number: Zena Arriaza RN,   2:13 PM    RECEIVING UNIT ED HANDOFF REVIEW    Above ED Nurse Handoff Report was reviewed: Yes  Reviewed by: Chase Edmond RN on December 26, 2021 at 6:00 PM

## 2021-12-26 NOTE — ED TRIAGE NOTES
Pt presents for complaint of increased swelling of his feet, ankles, calf and groin. States this has been worsening for approx a week. Drainage and scaling noted of the calves. Family also states the patient has been increasingly confused for the past two months. Pt is currently being moved to assisted living situation. Pt appears unsteady on his feet. ABC intact, A&Ox4.

## 2021-12-27 NOTE — PLAN OF CARE
"Diagnosis: CHF exacerbation  History: atrial fibrillation, CAD, hypertension, hyperlipidemia, and heart disease  Labs/Protocols: K was 5.9 in ED, recheck shows 3.5. MD is aware. Pt was having PVCs and vtach runs, Mag checked and resulted 2.0, MD aware. Will place pt on K replacement protocol. Recheck in AM.  Vitals: /53 (BP Location: Right arm)   Pulse 78   Temp (!) 96.2  F (35.7  C) (Oral)   Resp 16   Ht 1.702 m (5' 7\")   Wt 86.4 kg (190 lb 8 oz)   SpO2 98%   BMI 29.84 kg/m    Telemetry: a-fib CVR with PVCs  Respiratory: on RA, denies SOB. Hx LENORA, CPAP for NOC. Fine crackles in bases.  Neuro: A&O but family reports increased confusion in the past few weeks  GI/: damon in place for high retention. Pt had CT for abd pain and shows large stool, no obstruction. Stool softeners ordered.   Skin: BLE red/bernie, cracked/flaky, weeping. +3/+4 edema. Wounds on bilat bottom feet. Skin tears/bruising on BUE. Groin/coccyx blanchable red. Rash on perineum. Consider WOC consult. Dual skin check with Mireya MANZANO RN  LDAs: PIV saline locked  Diet: 2gm NA  Activity: A1 with gb and walker   Plan: continue to diurese. Monitor labs    Chase Edmond RN on 12/26/2021 at 9:59 PM    "

## 2021-12-27 NOTE — PROGRESS NOTES
"   12/27/21 0841   Quick Adds   Type of Visit Initial Occupational Therapy Evaluation   Living Environment   People in home alone   Current Living Arrangements assisted living   Home Accessibility no concerns   Living Environment Comments walk in shower with seat, high toilet, no stairs    Self-Care   Usual Activity Tolerance moderate   Current Activity Tolerance fair   Equipment Currently Used at Home walker, rolling;shower chair   Activity/Exercise/Self-Care Comment amb with FWW (currently in room). reports indp with dressing, bathing, self cares, toileting   Instrumental Activities of Daily Living (IADL)   IADL Comments reports he manages his own medications, laundry. cleaning services and meals provided by facility   Disability/Function   Hearing Difficulty or Deaf yes   Patient's preferred means of communication English speaker with hearing loss, no speech problems.;verbal   Wear Glasses or Blind yes   Fall history within last six months yes   Number of times patient has fallen within last six months   (unable to report )   General Information   Onset of Illness/Injury or Date of Surgery 12/26/21   Referring Physician Tammy Hair MD   Patient/Family Therapy Goal Statement (OT) pt reports no goals. has had OT in the past   Additional Occupational Profile Info/Pertinent History of Current Problem per H&P \"Milo Serna is a 81 year old male admitted on 12/26/2021. He presented to the ER with testicular swelling noted today per patient. Not having any urinary symptoms.He also c/o increased leg swelling for last few weeks.\"   Existing Precautions/Restrictions fall   General Observations and Info orders for HF. family requesting cog assessment. activity orders up with A   Cognitive Status Examination   Orientation Status orientation to person, place and time   Affect/Mental Status (Cognitive) confused   Follows Commands follows one-step commands;over 90% accuracy   Safety Deficit safety precautions " "awareness   Cognitive Status Comments pt appears to be somewhat confused this date. able to answer orientation questions appropiately and can recall hospital admission reasons and why he is here. does not recall confusion events of previous evening. open to instruction and command following this AM during session. pleasant. word finding difficulty noted on 5 occassions during session. behavior appears consistent with notes from family and H& P \"According to the family patient is slightly confused lately.  And that was the reason he was moving to assisted living \". concerns for cognitive impairment that could impact safety.   SLUMS eval and continued assessment to follow.    Visual Perception   Visual Impairment/Limitations corrective lenses full-time   Pain Assessment   Patient Currently in Pain Yes, see Vital Sign flowsheet   Integumentary/Edema   Integumentary/Edema Comments bilateral LE severe swelling   Posture   Posture forward head position;protracted shoulders   Range of Motion Comprehensive   General Range of Motion bilateral upper extremity ROM WFL   Strength Comprehensive (MMT)   Comment, General Manual Muscle Testing (MMT) Assessment generalized deconditioning but functional    Coordination   Upper Extremity Coordination No deficits were identified   Bed Mobility   Bed Mobility supine-sit   Supine-Sit [place] (Bed Mobility) contact guard   Comment (Bed Mobility) extended time and use of bed rail    Activities of Daily Living   BADL Assessment upper body dressing   Upper Body Dressing Assessment   [place] Level (Upper Body Dressing) minimum assist (75% patient effort)   Comment (Upper Body Dressing) bev chaidez   Clinical Impression   Criteria for Skilled Therapeutic Interventions Met (OT) yes;meets criteria;skilled treatment is necessary   OT Diagnosis decreased function in ADL    OT Problem List-Impairments impacting ADL problems related to;activity tolerance " impaired;cognition;mobility;pain;balance   Assessment of Occupational Performance 3-5 Performance Deficits   Identified Performance Deficits bathing, dressing, home mgmt, mobility, med mgmt    Planned Therapy Interventions (OT) ADL retraining;IADL retraining;cognition;progressive activity/exercise;transfer training;risk factor education;home program guidelines;strengthening   Clinical Decision Making Complexity (OT) low complexity   Therapy Frequency (OT) Daily   Predicted Duration of Therapy 5 days    Risk & Benefits of therapy have been explained evaluation/treatment results reviewed;care plan/treatment goals reviewed;risks/benefits reviewed;current/potential barriers reviewed;participants voiced agreement with care plan;participants included;patient   Comment-Clinical Impression decreased function and safety warrants skilled IP OT tx   OT Discharge Planning    OT Discharge Recommendation (DC Rec) Transitional Care Facility;home with home care occupational therapy;Home with assist   OT Rationale for DC Rec pt below baseline function and deficits impacting safety to complete ADL and self care. recommending TCU at discharge at this time for continued OT tx to increase indp and safety.  questionable cog and guarded follow through for CHF ed. requires A of 1 at this time for ADL. pt may be able to discharge to home (assisted living) with HH OT and increased assistance. if pt returns to assisted living would recommend supervision for medication mgmt, supervision with use of stove, and A of 1 for mobility with FWW. pt high fall risk and risk for readmission   OT Brief overview of current status  A of 1   Total Evaluation Time (Minutes)   Total Evaluation Time (Minutes) 15

## 2021-12-27 NOTE — PROGRESS NOTES
Was called for bilateral leg pain rated 8 out of 10.  History of neuropathy.  We will try Neurontin 100 mg twice daily and Ultram as needed.

## 2021-12-27 NOTE — PROVIDER NOTIFICATION
"MD paged: \"Tele tech says pt has been having 2 second pauses and HR has been 40's-50's. Pt very lethargic. ECHO is at bedside now. BP is 104/55, has IV Lasix due. No parameters for Lasix and BP, do you have a preference for when to hold Lasix?\"  "

## 2021-12-27 NOTE — CONSULTS
Care Management Initial Consult    General Information  Assessment completed with: Patient,Children, Nikole  Type of CM/SW Visit: Initial Assessment    Primary Care Provider verified and updated as needed: Yes   Readmission within the last 30 days:        Reason for Consult: discharge planning  Advance Care Planning: Advance Care Planning Reviewed: present on chart          Communication Assessment  Patient's communication style: spoken language (English or Bilingual)    Hearing Difficulty or Deaf: yes   Wear Glasses or Blind: yes    Cognitive  Cognitive/Neuro/Behavioral: WDL  Level of Consciousness: lethargic,confused  Arousal Level: arouses to voice  Orientation: oriented x 4  Mood/Behavior: calm,cooperative  Best Language: 0 - No aphasia  Speech: clear,logical    Living Environment:   People in home: facility resident     Current living Arrangements: independent living;  Name of Facility: [facility]   Able to return to prior arrangements: other (see comments) (TBD based on improvment, pt resides at an [facility])       Family/Social Support:  Care provided by: self  Provides care for: no one  Marital Status:   Wife,Children,Facility resident(s)/Staff;  Leslie (wife) - lives in  at [facility]  Description of Support System: Involved,Supportive    Support Assessment: Adequate family and caregiver support,Adequate social supports    Current Resources:   Patient receiving home care services: No     Community Resources: Skilled Nursing Facility  Equipment currently used at home: walker, rolling,shower chair  Supplies currently used at home: None    Employment/Financial:  Employment Status: retired,, previous service        Financial Concerns: No concerns identified   Referral to Financial Counselor: No       Lifestyle & Psychosocial Needs:  Social Determinants of Health     Tobacco Use: Medium Risk     Smoking Tobacco Use: Former Smoker     Smokeless Tobacco Use: Never  Used   Alcohol Use: Not on file   Financial Resource Strain: Low Risk      Difficulty of Paying Living Expenses: Not hard at all   Food Insecurity: No Food Insecurity     Worried About Running Out of Food in the Last Year: Never true     Ran Out of Food in the Last Year: Never true   Transportation Needs: No Transportation Needs     Lack of Transportation (Medical): No     Lack of Transportation (Non-Medical): No   Physical Activity: Not on file   Stress: Not on file   Social Connections: Unknown     Frequency of Communication with Friends and Family: Not on file     Frequency of Social Gatherings with Friends and Family: Not asked     Attends Roman Catholic Services: Never     Active Member of Clubs or Organizations: No     Attends Club or Organization Meetings: Never     Marital Status:    Intimate Partner Violence: Not on file   Depression: Not at risk     PHQ-2 Score: 0   Housing Stability: Not on file       Functional Status:  Prior to admission patient needed assistance:    Pt receives some meals from his ILF as well as some cleaning assistance.        Mental Health Status:  Mental Health Status: No Current Concerns       Chemical Dependency Status:  Chemical Dependency Status: No Current Concerns             Values/Beliefs:  Spiritual, Cultural Beliefs, Roman Catholic Practices, Values that affect care:            Values/Beliefs Comment: Simone    Additional Information:  Sw met with the pt to discuss discharge planning.  The pt was resting in bed when sw arrived.  The pt was in a pleasant mood and contributed appropriately to the conversation.  The pt could not remember the name of the [facility] that he currently resides at.  He said that he has only lived there about 2-3 days.  He was able to tell sw his phone number and PCP's name.  He said that he is  and that his wife lives at the same facility as he does, but in their  area.  He joked that maybe he should be in  since he can't remember where he  lives.  The pt is aware of the discharge recommendation for TCU.  He is agreeable and requested that a referral be sent to [facility] for a shared room.  The pt was A/O, but had some confusion.  Jeffery offered to call his dtr Stacia to gather the name of the pt's facility and he agreed.    Stacia was on her way to the hospital when Jeffery called, so Jeffery followed-up with her once she was in the pt's room.  Stacia had shown Sw that she tunde a family tree with his name, his spouse's name and then their immediate family.  Stacia has concerns because the pt could not remember his wife's name.  Jeffery explained what the pt was able to tell Sw earlier and what he could not remember.  She said that this is new for him.  He had began to show signs of confusion a few months ago, but the last 2 months, his memory has continued to worsen.  Stacia confirmed that the pt's wife is in the  area at [facility] and once there is a more appropriate apartment available in  for both the pt and his wife, the pt will transition to the  area.  The pt is currently in the ILF because there was not any availability in the [facility].  Jeffery discussed the discharge recommendation of TCU and Stacia agreed to have a referral sent to [facility] as well, but is unsure if that is the appropriate discharge plan.  She said that after speaking with the physician, she is unsure if the pt will be able to fully benefit from TCU or if a different approach needs to be considered in the form of palliative.  Jeffery told Stacia that there is a Palliative consult ordered at this time.  Stacia asked about hospice and Jeffery told her that it will be appropriate to discuss that with Palliative.  Stacia said that if they opt for hospice and treatment, the pt won't live long without his heart medication.  Stacia asked that her brother who lives in [place] be involved.  Stacia said that her brother is in town until Thursday.    Jeffery spoke with the Palliative Care Team  regarding the conversation with Stacia and the request to have her brother involved in the conversation.    Sw sent the TCU referral to [facility] while the family and pt discuss goals of care.    Sw will continue with discharge planning and will be available as needed until discharge.    ELIEZER Cash, [facility]  Inpatient Care Coordination  [facility]  [phone]

## 2021-12-27 NOTE — PROVIDER NOTIFICATION
Chief complaint:   Chief Complaint   Patient presents with   • Vaginal Problem     little discharge, odor   • Contraception     discuss b/c options       Vitals:  Visit Vitals  /66 (BP Location: RUE - Right upper extremity, Patient Position: Sitting, Cuff Size: Regular)   Pulse 76   Temp 98.1 °F (36.7 °C) (Oral)   Resp 16   Ht 5' 2.5\" (1.588 m)   Wt 85.2 kg   LMP 12/01/2019 (Approximate)   BMI 33.80 kg/m²       HISTORY OF PRESENT ILLNESS     A 27-year-old female who presents to the clinic to check for recurrent bacterial vaginal infection and STD screening.  She has a new sexual partner and has noticed vaginal discharge that \"smells off\".  She states she has been on numbness, but on some occasions the condoms have broke.  Patient denies any fever or chills, or stomach pain.  She does not have any pelvic pain.  Patient would like to consider starting birth control and oral contraceptives around age 17 but stopped because she kept mixing up the timing of the doses.  She denies a family history of PE or DVT.  She denies a personal history of PE or DVT.  She is a nonsmoker.  Patient does have a history of recurrent bacterial vaginosis.  She did have an extended vaginal swab performed insert of 2019 that reported M hominis, U. Urealyticum/parvum.    I have reviewed the past medical, family and social history sections including the medications and allergies listed in the patient's medical record as well as the nursing notes.   Care Everywhere chart review with [facility] records form [facility].      Other significant problems:  Patient Active Problem List    Diagnosis Date Noted   • Low back pain 09/24/2019     Priority: Low   • Acute right-sided low back pain with right-sided sciatica 08/25/2019     Priority: Low   • Other idiopathic scoliosis, thoracolumbar region 08/25/2019     Priority: Low   • Pelvic pressure in female 02/28/2016     Priority: Low   • Genital herpes 12/15/2015     Priority: Low       PAST MEDICAL,  Paged Dr. Leon on admitting pager- Labs resulted, Mag 2.0 and K 3.5. CT also resulted, please let me know if there are any new orders/changes. I am assuming we will skip kayexelate. Let me know thanks    Chase Edmond RN on 12/26/2021 at 9:18 PM     FAMILY AND SOCIAL HISTORY     Medications:  Current Outpatient Medications   Medication   • cyclobenzaprine (FLEXERIL) 10 MG tablet   • acetaminophen (TYLENOL) 500 MG tablet   • raNITIdine HCl (RANITIDINE ACID REDUCER PO)   • gabapentin (NEURONTIN) 300 MG capsule   • triamcinolone (KENALOG) 0.025 % cream   • etonogestrel-ethinyl estradiol (NUVARING) 0.12-0.015 MG/24HR vaginal ring     No current facility-administered medications for this visit.        Allergies:  ALLERGIES:   Allergen Reactions   • Fish PRURITUS     scratchy throat       Past Medical  History/Surgeries:  Past Medical History:   Diagnosis Date   • Allergy    • GERD (gastroesophageal reflux disease)    • NO HX OF 11/6/13    none, per pt       Past Surgical History:   Procedure Laterality Date   • No past surgeries  11/6/13       Family History:  Family History   Problem Relation Age of Onset   • Diabetes Maternal Grandmother        Social History:  Social History     Tobacco Use   • Smoking status: Never Smoker   • Smokeless tobacco: Never Used   Substance Use Topics   • Alcohol use: Yes     Alcohol/week: 1.7 standard drinks     Types: 2 Standard drinks or equivalent per week     Comment: Occasional       REVIEW OF SYSTEMS     Review of Systems   Constitutional: Negative for chills and fever.   Respiratory: Negative for cough.    Cardiovascular: Negative for chest pain.   Gastrointestinal: Negative for abdominal pain, nausea and vomiting.   Genitourinary: Positive for vaginal discharge. Negative for decreased urine volume, difficulty urinating, dyspareunia, dysuria, flank pain, frequency, genital sores, pelvic pain, urgency, vaginal bleeding and vaginal pain.   Musculoskeletal: Negative for back pain.   Neurological: Negative for headaches.   All other systems reviewed and are negative.      PHYSICAL EXAM     Physical Exam  Vitals signs and nursing note reviewed.   Constitutional:       General: She is not in acute distress.     Appearance: Normal  appearance.   HENT:      Head: Normocephalic and atraumatic.      Mouth/Throat:      Mouth: Mucous membranes are moist.   Cardiovascular:      Rate and Rhythm: Normal rate and regular rhythm.      Heart sounds: Normal heart sounds.   Pulmonary:      Effort: Pulmonary effort is normal. No respiratory distress.      Breath sounds: Normal breath sounds.      Comments: CTAB. Unlabored.  Abdominal:      General: Bowel sounds are normal.      Palpations: Abdomen is soft.      Tenderness: There is no tenderness. There is no guarding or rebound.      Comments: Rounded.   Genitourinary:     Vagina: Vaginal discharge present.      Comments: Assisted with pelvic exam by [facility] Medical Assistant.  Musculoskeletal: Normal range of motion.   Skin:     General: Skin is warm and dry.      Coloration: Skin is not pale.      Findings: No rash.   Neurological:      Mental Status: She is alert and oriented to person, place, and time.         ASSESSMENT/PLAN     Lizet was seen today for vaginal problem and contraception.    Diagnoses and all orders for this visit:    Screen for STD (sexually transmitted disease)  -     CHLAMYDIA/GC BY NUCLEIC ACID AMPLIFICATION  -     WET MOUNT    Birth control counseling  -     Discontinue: etonogestrel-ethinyl estradiol (NUVARING) 0.12-0.015 MG/24HR vaginal ring; Place 1 each vaginally as directed.  -     Discontinue: etonogestrel-ethinyl estradiol (NUVARING) 0.12-0.015 MG/24HR vaginal ring; Insert 1 ring vaginally as directed every 4 weeks.  -     etonogestrel-ethinyl estradiol (NUVARING) 0.12-0.015 MG/24HR vaginal ring; Insert 1 ring vaginally and keep in place for 3 weeks. Remove and leave out 4th week, then repeat process every 4 weeks.    Treatment plan reviewed with verbalized understanding. Treatment options were discussed with patient and patient verbalized understanding of information presented and is agreeable with the assessment and plan.  Patient has a recurrence of bacterial vaginosis  and we discussed use of a new her ring to decrease the incidence of repeat bacterial vaginal infection due to the continual presence of estrogen.  Questions were invited and answered. Verbal and written instructions were provided to the patient explaining when to seek immediate medical attention and when to follow up. The patient was notified of concerning symptoms that would prompt emergency department evaluation. Patient was instructed on the appropriate use of medications to take after discharge.She will follow up in 3 months. The outpatient follow up plan was discussed. Patient is agreeable with the plan of care and has no further questions.       Patient was seen and evaluated by Divina Murphy, Nurse Practitioner.  Collaborating physician available today was Dr. Tiffany Cope.

## 2021-12-27 NOTE — CONSULTS
[facility] Nurse Inpatient Wound Assessment   Reason for consultation: Evaluate and treat  Bilateral legs, left arm    Assessment  Bilateral legs due to venous insufficiency  Status: initial assessment  Dry flaking legs with minor weeping.  No open areas visualized.  Minor callusing to planter feet.  May benefit from compression but currently not tolerating due to pain.    Left upper arm wound due to Skin Tear  Status: initial assessment  Small agranular area    Treatment Plan  Bilateral legs: Daily    1. Wash with soap and water and dry  2. Apply Sween cream to legs  3. Can leave open to air, if start draining cover with ABDs and wrap with kerlix  4. Float heels with pillows under calves     Left upper arm wound: Every 3 days  1. Cleanse with wound cleanser and dry  2. Apply Mepilex 4x4     Orders Written  Recommended provider order: None, at this time  WO Nurse follow-up plan:weekly  Nursing to notify the Provider(s) and re-consult the WO Nurse if wound(s) deteriorates or new skin concern.    Patient History  According to provider note(s):  Milo Serna is a 81 year old male with history of chronic atrial fibrillation on anticoagulation, CAD, hypertension, systolic CHF, hyperlipidemia who presented here with increasing swelling and found with anasarca and eventually admitted admitted on 12/26/2021    Objective Data    Active Diet Order  Orders Placed This Encounter      2 Gram Sodium Diet Other - please comment      Output:   I/O last 3 completed shifts:  In: 740 [P.O.:740]  Out: 5100 [Urine:5100]    Risk Assessment:   Sensory Perception: 3-->slightly limited  Moisture: 3-->occasionally moist  Activity: 3-->walks occasionally  Mobility: 3-->slightly limited  Nutrition: 3-->adequate  Friction and Shear: 2-->potential problem  Sung Score: 17                          Labs:   Recent Labs   Lab 12/27/21  0739   ALBUMIN 2.5*   HGB 10.7*   WBC 4.9       Physical Exam  Areas of skin assessed: focused Bilateral legs,  left arm    Wound Location:  Bilateral legs  Date of last photo 12/27/21    Right       Left     Wound History: Patient unable to provide any history of legs.  Bilateral legs with dry flaking skin.  Slight weeping noted on kerlix removed however no open areas visualized.  Minor callusing on planter feet.  Legs with edema but did not tolerate compression per RN, patient states wraps were too painful.      Wound Location:  Left upper arm  Date of last photo NA  Wound History: Skin tear  Wound Base: 100 % agranular     Palpation of the wound bed: normal      Drainage: moderate     Description of drainage: bloody     Measurements (length x width x depth, in cm) 1.3  x 0.3 cm      Tunneling N/A     Undermining N/A  Periwound skin: intact      Color: normal and consistent with surrounding tissue      Temperature: normal   Odor: none    Interventions  Visual inspection and assessment completed   Wound Care Rationale Improve absorptive capacity, Promote moist wound healing without tissue dehydration  and Provide protection   Wound Care: done per plan of care  Supplies: gathered and placed at the bedside  Current off-loading measures: Pillows under calves and Pillows  Current support surface: Standard  Atmos Air mattress  Education provided to: plan of care  Discussed plan of care with Patient and Nurse    Terrlel Alexander RN CWOCN

## 2021-12-27 NOTE — PLAN OF CARE
Orientation: A&O x 4 with episodes of confusion   VS: WDL  Pain:  started to complain 8 out of 10 bilateral leg pain. Orders for gabapentin and tramadol received. Given at 0615   Activity:  Assist 2  Resp:   WDL  GI:  WDL  : Kalyan    Other:  Woke up during the night very confused and frustrated with staff. Attempted to call his daughter per his request. Unable to get a hold of her. Pt did attempt to go back to sleep.    Plan:   Discharge TBD.

## 2021-12-27 NOTE — PROVIDER NOTIFICATION
Herber admitting MD: One more thing: upon skin assessment, found pt to have rash in groin area, can i get nyastatin powder? Also, weeping BLE, cracked skin, red, small wounds on bottom of both feet, consider WOC consult. Thanks.     Chase Edmond, RN on 12/26/2021 at 9:56 PM

## 2021-12-27 NOTE — PLAN OF CARE
"Patient became very agitated and confused at 0250. RN spoke with patient and tried to clarify that patient was in the hospital and that he had been admitted for an exacerbation of his heart failure. Patient was adamant that he did not have heart failure. RN explained what heart failure meant and that it was the cause of patient's edema, patient was adamant that RN was lying to him. Patient requested to go home multiple times, was informed that without a safe ride home it would not be possible but that RN would contact daughter. RN used phone in patient room and attempted to get a hold of patient's daughter but was unable to. Patient was disoriented to time and situation (I.e. stating \"I've been here for three whole days and you haven't done anything to help me get better.\" Patient was frustrated when RN explained that he had only been seen in the ED at noon on the 26th). Patient became defensive when it was suggested that he may have had some confusion or disorientation as he had just woken up and insisted that he was \"with it\".    Later in the morning, patient's daughter returned call and verbalized her concern for her dad. She stated that her mother had been diagnosed with Alzheimer's and was recently moved to memory care. She notes that her father has been confused more and more often but especially at nighttime. Daughter requested that a cognitive evaluation be performed to assess memory. Bedside RN updated, who wrote sticky note for rounding MD.  "

## 2021-12-27 NOTE — PROGRESS NOTES
[facility]  Hospitalist Progress Note  Dougie Lopez MD, MD 12/27/2021  (Text Page)  Reason for Stay (Diagnosis): Anasarca, CHF in exacerbation         Assessment and Plan:      Summary of Stay: Milo Serna is a 81 year old male with history of chronic atrial fibrillation on anticoagulation, CAD, hypertension, systolic CHF, hyperlipidemia who presented here with increasing swelling and found with anasarca and eventually admitted admitted on 12/26/2021      Problem List:   1. Acute on chronic systolic CHF in exacerbation  2. Chronic atrial fibrillation on anticoagulation  3. Obstructive sleep apnea on appliance  4. Hyperkalemia-resolved  5. Lymphedema  6. CAD  7. Hypertension  8. History of gout  9. BPH  10. Stable chronic anemia    Continue inpatient care he remain at risk for clinical deterioration.  Fortunately he is not hypoxic.  Creatinine is permitting.  Currently on active diuresis receiving 60 mg intravenous Lasix every 8 hours.  -Echocardiogram pending  -Close monitoring of kidney function  -Strict I's and O's and daily weights please  -Resuming ACE inhibitor's, oral DOAC with Eliquis  -Statins been resumed  -Flomax resumed  -Appreciate input from PT, OT  -Requested palliative care input for POLST assistance  -WOC input for his lower extremity oozing edema and wounds  -Might benefit for lymphedema evaluation as well.      DVT Prophylaxis: DOAC  Code Status: Full Code  Discharge Dispo: Hopeful back to prior living arrangements  Estimated Disch Date / # of Days until Disch: Anticipating needing at least 2 more days        Interval History (Subjective):      I assume medicine service care today.  Seen and examined.  Chart reviewed.  Case discussed with nursing service.  Family updated at bedside as patient's daughter present during kyler  I found Skimo currently sleeping but easily aroused with minimal verbal stimuli.  He was able to provide meaningful conversation earlier.  Follows  "some simple verbal instructions.  Voiding freely.  No complaints of nausea, vomiting.  Tolerated his food tray this morning.       # Pain Assessment:  Current Pain Score 12/27/2021   Patient currently in pain? sleeping, patient not able to self report   Pain score (0-10) -   Pain location -   Pain descriptors -   Milo s pain level was assessed and he currently denies pain.                  Physical Exam:      Last Vital Signs:  /55 (BP Location: Right arm, Patient Position: Semi-Carver's)   Pulse 54   Temp 97.6  F (36.4  C) (Oral)   Resp 20   Ht 1.702 m (5' 7\")   Wt 84.8 kg (187 lb)   SpO2 97%   BMI 29.29 kg/m      I/O last 3 completed shifts:  In: 200 [P.O.:200]  Out: 3775 [Urine:3775]  Wt Readings from Last 1 Encounters:   12/27/21 84.8 kg (187 lb)     Vitals:    12/26/21 1829 12/27/21 0530   Weight: 86.4 kg (190 lb 8 oz) 84.8 kg (187 lb)       Constitutional: Awake, alert, cooperative, no apparent distress   Respiratory: Clear to auscultation bilaterally, fine crackles on both bases      Cardiovascular:  Irregularly irregular  rate and rhythm, normal S1 and S2, bilateral lymphedema, skin excoriations both lower extremities   Abdomen: Normal bowel sounds, soft, non-distended, non-tender   Skin: No rashes, no cyanosis, dry to touch   Neuro: Alert and oriented x3, no weakness, spontaneous and coherent speech   Extremities:  Oozing, swollen lower extremity edema, normal range of motion   Other(s): Euthymic mood, not agitated       All other systems: Negative          Medications:      All current medications were reviewed with changes reflected in problem list.         Data:      All new lab and imaging data was reviewed.   Labs:  No results for input(s): CULT in the last 168 hours.  Recent Labs   Lab 12/27/21  0739 12/26/21  1340   WBC 4.9 4.7   HGB 10.7* 11.4*   HCT 34.3* 34.8*   * 102*   * 153     Recent Labs   Lab 12/27/21  0739 12/26/21  2034 12/26/21  1903 12/26/21  1748 " 12/26/21  1340    139  --   --  135   POTASSIUM 3.4 3.5  3.5  --   --  5.9*   CHLORIDE 104 105  --   --  104   CO2 28 25  --   --  25   ANIONGAP 6 9  --   --  6   GLC 94 121* 105*   < > 100*   BUN 13 11  --   --  11   CR 0.90 0.87  --   --  0.77   GFRESTIMATED 85 86  --   --  89   ANG 8.5 8.8  --   --  8.6   MAG  --  2.0  --   --   --    PROTTOTAL 6.4*  --   --   --  7.6   ALBUMIN 2.5*  --   --   --  2.9*   BILITOTAL 1.9*  --   --   --  1.7*   ALKPHOS 173*  --   --   --  193*   AST 19  --   --   --  73*   ALT 14  --   --   --  20    < > = values in this interval not displayed.     No results for input(s): SED, CRP in the last 168 hours.  Recent Labs   Lab 12/27/21  0739 12/26/21  2034 12/26/21  1903 12/26/21  1748 12/26/21  1340   GLC 94 121* 105* 79 100*     No results for input(s): INR in the last 168 hours.  No results for input(s): CHOL, HDL, LDL, TRIG, CHOLHDLRATIO in the last 168 hours.  No results for input(s): TROPONIN, TROPI, TROPR in the last 168 hours.    Invalid input(s): TROP, TROPONINIES   Imaging:   Results for orders placed or performed during the hospital encounter of 12/26/21   XR Chest Port 1 View    Narrative    EXAM: XR CHEST PORT 1 VIEW  LOCATION: [facility]  DATE/TIME: 12/26/2021 2:10 PM    INDICATION: Confusion, congestive heart failure.  COMPARISON: Chest x-ray 6/4/2020.      Impression    IMPRESSION: Stable cardiomegaly. Interval increased vascular congestion and pulmonary edema pattern. Findings suggest development of CHF. Bibasilar opacities also noted appearing new. Cannot exclude pneumonia.   Head CT w/o contrast    Narrative    EXAM: CT HEAD WITHOUT CONTRAST  LOCATION: [facility]  DATE/TIME: 12/26/2021, 3:23 PM    INDICATION: Mental status change, unknown cause.  COMPARISON: 11/19/2020.  TECHNIQUE: Routine CT Head without IV contrast. Multiplanar reformats. Dose reduction techniques were used.    FINDINGS:  INTRACRANIAL CONTENTS: No  intracranial hemorrhage, extraaxial collection, or mass effect.  No CT evidence of acute infarct. Mild presumed chronic small vessel ischemic changes. Mild generalized volume loss. No hydrocephalus.     VISUALIZED ORBITS/SINUSES/MASTOIDS: No acute intraorbital abnormality. No paranasal sinus mucosal disease. No middle ear or mastoid effusion.    BONES/SOFT TISSUES: No acute abnormality.      Impression    IMPRESSION:  1.  No CT evidence for acute intracranial process.  2.  Brain atrophy and presumed chronic microvascular ischemic changes as above.     XR Foot Bilateral G/E 3 Views    Narrative    EXAM: XR FOOT BILATERAL G/E 3 VIEWS  LOCATION: [facility]  DATE/TIME: 12/26/2021, 3:32 PM    INDICATION: Pain, foot wounds.  COMPARISON: None.      Impression    IMPRESSION:     LEFT FOOT: No fracture or osteomyelitis. Old healed fracture deformities of the necks of the second, third and fourth metatarsals. Marked osteopenia. Moderate degenerative changes throughout the midfoot. Advanced degenerative changes at the tibiotalar   joint. Hammertoe deformities.    RIGHT FOOT: No acute fracture or osteomyelitis. Moderate hallux valgus. Old healed fractures of the second through fifth metatarsal necks. Cannulated screws in the medial malleolus. Screw and plate fixation of the lateral malleolus. Hindfoot valgus.   Advanced degenerative changes throughout the midfoot. Hammertoe deformities.     CT Abdomen Pelvis w/o Contrast    Narrative    EXAM: CT ABDOMEN PELVIS W/O CONTRAST  LOCATION: [facility]  DATE/TIME: 12/26/2021 8:00 PM    INDICATION: Abdominal pain, acute, nonlocalized.  COMPARISON: CT abdomen and pelvis 01/02/2021.  TECHNIQUE: CT scan of the abdomen and pelvis was performed without IV contrast. Multiplanar reformats were obtained. Dose reduction techniques were used.  CONTRAST: None.    FINDINGS:   LOWER CHEST: 0.7 cm pulmonary nodule posterior lateral right lower lobe,  previously obscured by atelectasis series 3 image 38.    HEPATOBILIARY: Cholecystectomy. Stable right hepatic cyst series 3 image 41. Previously noted vascular process within the posterior right liver is not well visualized at unenhanced scanning. Stable small left hepatic cyst as well.    PANCREAS: Normal.    SPLEEN: Normal.    ADRENAL GLANDS: Stable mild thickening of the left adrenal of doubtful significance. Right adrenal is unremarkable.    KIDNEYS/BLADDER: No hydronephrosis or obstructing stone. No acute renal abnormality at unenhanced scanning. Decompressed bladder with a Biggs catheter.    BOWEL: Large stool distends the rectum. No small bowel obstruction is seen. Appendix cannot be assessed related to nonvisualization.    LYMPH NODES: Normal.    VASCULATURE: Diffuse calcifications.    PELVIC ORGANS: Normal.    MUSCULOSKELETAL: There is anasarca. No aggressive bone lesion. Diffuse spine degenerative changes.      Impression    IMPRESSION:   1.  Large stool at the rectum.  2.  Anasarca.  3.  No acute abnormality otherwise seen.  4.  Indeterminant right lung base pulmonary nodule. See below for follow-up.    Recommendations for an incidental lung nodule = or > 6mm to 8mm:  Low-Risk Patient: Initial follow-up CT at 6-12 months, then consider CT at 18-24 months if no change.  High-Risk Patient: Initial follow-up CT at 6-12 months, then CT at 18-24 months if no change.    *Low Risk: Minimal or absent history of smoking or other known risk factors.  *Nonsolid (ground-glass) or partly solid nodules may require longer follow-up to exclude indolent adenocarcinoma.  *Recommendations based on Guidelines for the Management of Incidental Pulmonary Nodules Detected at CT: From the Fleischner Society 2017, Radiology 2017.

## 2021-12-27 NOTE — PLAN OF CARE
Pt Ox4 but forgetful. VSS on RA. Denied pain. Tele a-fib SVR/CVR / BBB, denied CP. Per tele tech, pt had multiple 2 second pauses and HR 40's-50's, MD informed and ordered EKG. PIV to right intact, SL. Up Ax1 GB W. WOC consulted for eval BLEs and skin tear to LUE; ace wraps removed this AM per pt request for reported BLE pain, pain improved w/ removal and WOC RN said ok to leave wraps off / elevate BLEs. Biggs patent and intact. K+ replaced per protocol w/ recheck 3.7. Had ECHO today. Will continue POC.     Heart Failure Care Map  GOALS TO BE MET BEFORE DISCHARGE:    1. Decrease congestion and/or edema with diuretic therapy to achieve near optimal volume status.     Dyspnea improved: No, further care required to meet this goal. Please explain ARAMBULA   Edema improved: No, further care required to meet this goal. Please explain +3 BLEs + scrotum + penis        Net I/O and Weights since admission:   11/27 1500 - 12/27 1459  In: 740 [P.O.:740]  Out: 5400 [Urine:5400]  Net: -4660     Vitals:    12/26/21 1829 12/27/21 0530   Weight: 86.4 kg (190 lb 8 oz) 84.8 kg (187 lb)       2.  O2 sats > 90% on room air, or at prior home O2 therapy level.      Able to wean O2 this shift to keep sats above 90%?: Yes, satisfactory for discharge.   Does patient use Home O2? No          Current oxygenation status:   SpO2: 97 %     O2 Device: None (Room air),      3.  Tolerates ambulation and mobility near baseline.     Ambulation: No, further care required to meet this goal. Please explain ARAMBULA   Times patient ambulated with staff this shift: 1    Please review the Heart Failure Care Map for additional HF goal outcomes.    Rachele Boss, ALEX  12/27/2021

## 2021-12-27 NOTE — PROVIDER NOTIFICATION
Spoke with admitting hospitalist Dr. Leon. Kayexalate was not given in ER. Per MDs instruction, obtain K levels and and then page back with levels before giving kayexalate. Will refrain from giving until lab is resulted. Also verbally spoke with MD about vtach runs, we will add on magnesium to K lab draw. Will let MD know results and obtain further orders as needed.    Chase Edmond RN on 12/26/2021 at 7:50 PM

## 2021-12-27 NOTE — PROGRESS NOTES
Pt going to sleep, paged RT to set up CPAP for pt per orders    Chase Edmond RN on 12/26/2021 at 10:56 PM

## 2021-12-27 NOTE — PROVIDER NOTIFICATION
Alerted by telemetry tech that pt had 9 beat run of vtach    Chase Edmond RN on 12/26/2021 at 7:40 PM

## 2021-12-28 NOTE — CONSULTS
[facility] CORE Clinic    Received CORE Clinic Consult from Dr Hair.    Reviewed Luís's chart and note the are admitted for testicular swelling and increased leg edema. Feet are very swollen making it painful to walk.  Echocardiogram shows LVEF 50%. This is their first admissions in the past year for concerns of heart failure.    Given above information, Luís is a CORE patient and is overdue for a CORE appointment and with Dr Conley.     I have arranged a CORE visit with MARIA Coombs on 1/12/2022 (her soonest opening).    Left voice mail for daughter to make sure appt day and time are okay.       Request beside nursing to complete Heart Failure education and provide Heart Failure booklet.    Please call with questions.      Marga Roberson RN  CORE Clinic Care Coordinator  [facility]  [phone]      C.O.R.E. Clinic: Cardiomyopathy, Optimization, Rehabilitation, Education   The C.O.R.E. Clinic is a heart failure specialty clinic within the [facility] where you will work with your cardiologist, nurse practitioners, physician assistants and registered nurses who specialize in heart failure care. They are dedicated to helping patients with heart failure to carefully adjust medications, receive education, and learn who and when to call if symptoms develop. They specialize in helping you better understand your condition, slow the progression of your disease, improve the length and quality of your life, help you detect future heart problems before they become life threatening, and avoid hospitalizations.

## 2021-12-28 NOTE — PROGRESS NOTES
[facility]  Hospitalist Progress Note  Dougie Lopez MD, MD 12/28/2021  (Text Page)  Reason for Stay (Diagnosis): Anasarca, CHF in exacerbation         Assessment and Plan:      Summary of Stay: Milo Serna is a 81 year old male with history of chronic atrial fibrillation on anticoagulation, CAD, hypertension, systolic CHF, hyperlipidemia who presented here with increasing swelling and found with anasarca and eventually admitted admitted on 12/26/2021      Problem List:   1. Acute on chronic systolic CHF in exacerbation  2. Chronic atrial fibrillation on anticoagulation  3. Obstructive sleep apnea on appliance  4. Hyperkalemia-resolved  5. Lymphedema  6. CAD  7. Hypertension  8. History of gout  9. BPH  10. Stable chronic anemia    Continue inpatient care he remain at risk for clinical deterioration.  Fortunately he is not hypoxic.  Creatinine is permitting.  Currently on active diuresis   -Earlier he had soft blood pressure levels and decrease IV Lasix to 40 every 12 hours   -Continue current regimen for today   -Echocardiogram reassuring and showing improvement of his EF now at 50%   -Close monitoring of kidney function  -Strict I's and O's and daily weights please  -Resuming ACE inhibitor's, oral DOAC with Eliquis  -Statins been resumed  -Flomax resumed  -Appreciate input from PT, OT  -Requested palliative care input for POLST assistance  -WOC input for his lower extremity oozing edema and wounds  -Might benefit for lymphedema evaluation as well.      DVT Prophylaxis: DOAC  Code Status: Full Code  Discharge Dispo: Hopeful back to prior living arrangements versus TCU  Estimated Disch Date / # of Days until Disch: Anticipating needing at least 2 more days        Interval History (Subjective):      Continuing medicine service care today.  Seen and examined.  Chart reviewed.  Case discussed with nursing service was gracious enough to be present at bedside during exam.  Family updated over  "the phone   Skip had no significant reported events overnight. Still not requiring any oxygen supplementation.  He denies any ongoing shortness of breath, nausea or vomiting. Mental status appears baseline as he is conversant, pleasant and interactive.  He just ordered oral breakfast  Afebrile     # Pain Assessment:  Current Pain Score 12/27/2021   Patient currently in pain? denies   Pain score (0-10) -   Pain location -   Pain descriptors -   Milo fulton pain level was assessed and he currently denies pain.                  Physical Exam:      Last Vital Signs:  /62 (BP Location: Right arm)   Pulse 66   Temp 97.6  F (36.4  C) (Oral)   Resp 13   Ht 1.702 m (5' 7\")   Wt 84.4 kg (186 lb)   SpO2 99%   BMI 29.13 kg/m      I/O last 3 completed shifts:  In: 540 [P.O.:540]  Out: 2275 [Urine:2275]  Wt Readings from Last 1 Encounters:   12/28/21 84.4 kg (186 lb)     Vitals:    12/26/21 1829 12/27/21 0530 12/28/21 0404   Weight: 86.4 kg (190 lb 8 oz) 84.8 kg (187 lb) 84.4 kg (186 lb)       Constitutional: Awake, alert, cooperative, no apparent distress   Respiratory: Clear to auscultation bilaterally, fine crackles on both bases      Cardiovascular:  Irregularly irregular  rate and rhythm, normal S1 and S2, bilateral lymphedema, skin excoriations both lower extremities   Abdomen: Normal bowel sounds, soft, non-distended, non-tender   Skin: No rashes, no cyanosis, dry to touch   Neuro: Alert and oriented x3, no weakness, spontaneous and coherent speech   Extremities:   bilateral lower extremity edema, no longer losing, +2 normal range of motion   Other(s): Euthymic mood, not agitated       All other systems: Negative          Medications:      All current medications were reviewed with changes reflected in problem list.         Data:      All new lab and imaging data was reviewed.   Labs:  No results for input(s): CULT in the last 168 hours.  Recent Labs   Lab 12/27/21  0739 12/26/21  1340   WBC 4.9 4.7   HGB 10.7* " 11.4*   HCT 34.3* 34.8*   * 102*   * 153     Recent Labs   Lab 12/27/21  1327 12/27/21  1246 12/27/21  0739 12/26/21 2034 12/26/21  1748 12/26/21  1340   NA  --   --  138 139  --  135   POTASSIUM 3.7  --  3.4 3.5  3.5  --  5.9*   CHLORIDE  --   --  104 105  --  104   CO2  --   --  28 25  --  25   ANIONGAP  --   --  6 9  --  6   GLC  --  88 94 121*   < > 100*   BUN  --   --  13 11  --  11   CR  --   --  0.90 0.87  --  0.77   GFRESTIMATED  --   --  85 86  --  89   ANG  --   --  8.5 8.8  --  8.6   MAG  --   --   --  2.0  --   --    PROTTOTAL  --   --  6.4*  --   --  7.6   ALBUMIN  --   --  2.5*  --   --  2.9*   BILITOTAL  --   --  1.9*  --   --  1.7*   ALKPHOS  --   --  173*  --   --  193*   AST  --   --  19  --   --  73*   ALT  --   --  14  --   --  20    < > = values in this interval not displayed.     No results for input(s): SED, CRP in the last 168 hours.  Recent Labs   Lab 12/27/21  1246 12/27/21  0739 12/26/21 2034 12/26/21  1903 12/26/21  1748   GLC 88 94 121* 105* 79     No results for input(s): INR in the last 168 hours.  No results for input(s): CHOL, HDL, LDL, TRIG, CHOLHDLRATIO in the last 168 hours.  No results for input(s): TROPONIN, TROPI, TROPR in the last 168 hours.    Invalid input(s): TROP, TROPONINIES   Imaging:   Results for orders placed or performed during the hospital encounter of 12/26/21   XR Chest Port 1 View    Narrative    EXAM: XR CHEST PORT 1 VIEW  LOCATION: [facility]  DATE/TIME: 12/26/2021 2:10 PM    INDICATION: Confusion, congestive heart failure.  COMPARISON: Chest x-ray 6/4/2020.      Impression    IMPRESSION: Stable cardiomegaly. Interval increased vascular congestion and pulmonary edema pattern. Findings suggest development of CHF. Bibasilar opacities also noted appearing new. Cannot exclude pneumonia.   Head CT w/o contrast    Narrative    EXAM: CT HEAD WITHOUT CONTRAST  LOCATION: [facility]  DATE/TIME: 12/26/2021,  3:23 PM    INDICATION: Mental status change, unknown cause.  COMPARISON: 11/19/2020.  TECHNIQUE: Routine CT Head without IV contrast. Multiplanar reformats. Dose reduction techniques were used.    FINDINGS:  INTRACRANIAL CONTENTS: No intracranial hemorrhage, extraaxial collection, or mass effect.  No CT evidence of acute infarct. Mild presumed chronic small vessel ischemic changes. Mild generalized volume loss. No hydrocephalus.     VISUALIZED ORBITS/SINUSES/MASTOIDS: No acute intraorbital abnormality. No paranasal sinus mucosal disease. No middle ear or mastoid effusion.    BONES/SOFT TISSUES: No acute abnormality.      Impression    IMPRESSION:  1.  No CT evidence for acute intracranial process.  2.  Brain atrophy and presumed chronic microvascular ischemic changes as above.     XR Foot Bilateral G/E 3 Views    Narrative    EXAM: XR FOOT BILATERAL G/E 3 VIEWS  LOCATION: [facility]  DATE/TIME: 12/26/2021, 3:32 PM    INDICATION: Pain, foot wounds.  COMPARISON: None.      Impression    IMPRESSION:     LEFT FOOT: No fracture or osteomyelitis. Old healed fracture deformities of the necks of the second, third and fourth metatarsals. Marked osteopenia. Moderate degenerative changes throughout the midfoot. Advanced degenerative changes at the tibiotalar   joint. Hammertoe deformities.    RIGHT FOOT: No acute fracture or osteomyelitis. Moderate hallux valgus. Old healed fractures of the second through fifth metatarsal necks. Cannulated screws in the medial malleolus. Screw and plate fixation of the lateral malleolus. Hindfoot valgus.   Advanced degenerative changes throughout the midfoot. Hammertoe deformities.     CT Abdomen Pelvis w/o Contrast    Narrative    EXAM: CT ABDOMEN PELVIS W/O CONTRAST  LOCATION: [facility]  DATE/TIME: 12/26/2021 8:00 PM    INDICATION: Abdominal pain, acute, nonlocalized.  COMPARISON: CT abdomen and pelvis 01/02/2021.  TECHNIQUE: CT scan of the  abdomen and pelvis was performed without IV contrast. Multiplanar reformats were obtained. Dose reduction techniques were used.  CONTRAST: None.    FINDINGS:   LOWER CHEST: 0.7 cm pulmonary nodule posterior lateral right lower lobe, previously obscured by atelectasis series 3 image 38.    HEPATOBILIARY: Cholecystectomy. Stable right hepatic cyst series 3 image 41. Previously noted vascular process within the posterior right liver is not well visualized at unenhanced scanning. Stable small left hepatic cyst as well.    PANCREAS: Normal.    SPLEEN: Normal.    ADRENAL GLANDS: Stable mild thickening of the left adrenal of doubtful significance. Right adrenal is unremarkable.    KIDNEYS/BLADDER: No hydronephrosis or obstructing stone. No acute renal abnormality at unenhanced scanning. Decompressed bladder with a Biggs catheter.    BOWEL: Large stool distends the rectum. No small bowel obstruction is seen. Appendix cannot be assessed related to nonvisualization.    LYMPH NODES: Normal.    VASCULATURE: Diffuse calcifications.    PELVIC ORGANS: Normal.    MUSCULOSKELETAL: There is anasarca. No aggressive bone lesion. Diffuse spine degenerative changes.      Impression    IMPRESSION:   1.  Large stool at the rectum.  2.  Anasarca.  3.  No acute abnormality otherwise seen.  4.  Indeterminant right lung base pulmonary nodule. See below for follow-up.    Recommendations for an incidental lung nodule = or > 6mm to 8mm:  Low-Risk Patient: Initial follow-up CT at 6-12 months, then consider CT at 18-24 months if no change.  High-Risk Patient: Initial follow-up CT at 6-12 months, then CT at 18-24 months if no change.    *Low Risk: Minimal or absent history of smoking or other known risk factors.  *Nonsolid (ground-glass) or partly solid nodules may require longer follow-up to exclude indolent adenocarcinoma.  *Recommendations based on Guidelines for the Management of Incidental Pulmonary Nodules Detected at CT: From the Fleischner  Society 2017, Radiology 2017.

## 2021-12-28 NOTE — CONSULTS
"    [facility]  Palliative Care Consultation   Text Page    Assessment & Plan   Milo Serna is a 81 year old male who was admitted on 12/26/2021.   Consulted by . Dr. Nolan to assist with goals of care, POLST completion    Recommendations:  1. Goals of Care- Full Code  Hospitalization goals discussed with patient's daughter Stacia. Goal for restorative cares and TCU placement.    Decisional Capacity- Questionable. Patient has an advance directive dated 5/11/2001.  If patient becomes unable to demonstrate decisional capacity, Leslie Serna (spouse) is the primary Health Care Agent listed, deferred however due to severe dementia.  Alternates are Stacia Camarillo (daughter) and Nitish Serna (son).   POLST none on file.    2. Encephalopathy  Likely multifactorial, question progressive cognitive decline with superimposed instability due to fluid imbalance.  - appreciate OT imput with SLUMS exam and evaluation  - correct underlying etiology  - placement to safe environment for rehab and with appropriate level of care.    3. Edema   Fluid overload, now improved with diuresis  - per primary team    4. Spiritual Care  Oriented to Spiritual Health as part of Palliative Care team.  Spiritual Background: Restoration, declined visit.    5. Care Planning  Appreciate Care of Teodora Russo.  - TCU for rehab, ongoing assessment for safe transition back to previous residence    Medical Decision Making and Goals of Care:  Discussed on December 28, 2021 with Anita MOSQUERA, CNP:   Met with patient's daughter at the bedside.  Skip is alert and pleasantly confused.  Able to interact with conversation to some degree.  Lacks insight into larger goals of care conversation.  Stacia outlined previous goals that she has seen her dad have.  Stated that he has most recently gone between DNR and full code status and has been uncertain.  She states things \"have changed\" in the past few months " "during his multiple moves to a care facility adjacent to his wife's memory care unit.  She states that she has noticed a difference, but is hopeful that things will \"settle down\" and they can assess what he needs more completely.    It was discussed that his cognitive exam is concerning for dementia and that this may have been present for some time prior to his moves.  Reviewed the natural progression of disease and the potential concerns for safety and self-care deficits.  Stacia verbalized understanding.  Utility of CPR and intubation was outlined and reviewed the risks/benefits as it applies to his quality of life.  Stacia stated that she would need to discuss this with her brother for decision making.    Reviewed symptoms and Skip denied pain, shortness of breath, change in appetite or bathroom habits.  He was able to stated that he wanted to be up with therapy and to go to rehab close to his wife.    Thank you for involving us in the patient's care.     Anita MOSQUERA, CNP  Pain Management and Palliative Care  [facility]  Pgr: [phone]    Time Spent on this Encounter   Total unit/floor time 65 minutes, time consisted of the following, examination of the patient, reviewing the record and completing documentation. >50% of time spent in counseling and coordination of care, Bedside Nurse April Cohen and Hospitalist Dr. Lopez.  Time spend counseling with patient and family consisted of the following topics, goals of care, education about prognosis, care planning for discharge and symptom management.    Understanding of disease process:   This has been discussed with patient and family.    History of Present Illness   History is obtained from the patient, electronic health record and patient's family    Milo Serna is a 81 year old male with a past medical history of atrial fibrillation, CAD, HF, HLD, who presented 12/26 with generalized edema and fluid " overload.  This is in the setting of recent relocation to assisted living facility near his wife's memory care unit, he has had decline in cognition in recent months which has been exacerbated by moves to a new facility.  he has had no recent hospitalizations, weight loss or loss of function.      Past Medical History   I have reviewed this patient's medical history and updated it with pertinent information if needed.   Past Medical History:   Diagnosis Date     Actinic keratosis 10/12/2019     Anemia, unspecified 11/08/2016     Atrial fibrillation (H) 05/27/2015     BPH (benign prostatic hyperplasia) 01/25/2012     CAD (coronary artery disease) 04/04/2012    CAB 1996 following MI (at [facility])     Closed bimalleolar fracture of left ankle with routine healing 04/05/2019     Closed fracture of metatarsal bone 04/04/2012     Dilated aortic root (H) 05/26/2016     Drug-induced erectile dysfunction 10/02/2015     Elevated blood sugar 11/08/2016     Elevated PSA 09/19/2013     Epistaxis 09/09/2019     Essential hypertension with goal blood pressure less than 140/90 09/22/2016     Fall 09/06/2019     Functional diarrhea 10/12/2019     Gallbladder polyp 05/01/2018     Gout      HAV (hallux abducto valgus) 04/04/2012     Hematoma 09/09/2019     Hernia, abdominal      History of prostate cancer 06/15/2016     HTN, goal below 150/90 04/20/2017     Hypokalemia 09/12/2019     Iron deficiency anemia secondary to inadequate dietary iron intake 11/15/2016     Ischemic cardiomyopathy      Low blood pressure reading 10/17/2016     Lumbago     had degendisc dz on MRI 7/07     Memory loss 06/18/2019     Microalbuminuria 06/04/2020    X1     Mixed hyperlipidemia      Mumps      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     Nonrheumatic mitral valve regurgitation 10/28/2019     OA (osteoarthritis) 04/04/2012     Old myocardial infarction 04/04/2012     LENORA (obstructive sleep apnea) 10/28/2019     LENORA (obstructive sleep apnea)      Other viral  warts 09/06/2019     Palpitations      Pes planus 04/04/2012     Petechiae 06/18/2019     Prostate cancer (H) 05/26/2016     PUD (peptic ulcer disease) 04/04/2012     Pulmonary hypertension (H) 09/25/2017     PVC's (premature ventricular contractions)      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  Past Surgical History:   Procedure Laterality Date     CARDIAC SURGERY      CAB     CYSTOSCOPY FLEXIBLE, CYOABLATION PROSTATE N/A 11/09/2016    Procedure: CYSTOSCOPY FLEXIBLE, CRYOABLATION PROSTATE;  Surgeon: Krystian Owens MD;  Location:  OR     GENITOURINARY SURGERY      prostate surgery      HERNIA REPAIR       LAPAROSCOPIC CHOLECYSTECTOMY N/A 12/28/2020    Procedure: CHOLECYSTECTOMY, LAPAROSCOPIC;  Surgeon: James Alvarez MD;  Location: [facility] OR     LAPAROSCOPIC HERNIORRHAPHY INGUINAL Right 05/10/2017    Procedure: LAPAROSCOPIC HERNIORRHAPHY INGUINAL;  Laparoscopic preperitoneal repair of right inguinal hernia with placement of underlay mesh;  Surgeon: Enrico Bridges MD;  Location:  OR     PROSTATE SURGERY       [facility] NONSPECIFIC PROCEDURE      CAB 1996 [place]     Z NONSPECIFIC PROCEDURE  02/2007    l inguinal hernia repair      Z NONSPECIFIC PROCEDURE      R hernia remote     Z NONSPECIFIC PROCEDURE  2000    R ankle ORIF for fx     ZZ NONSPECIFIC PROCEDURE  2005    nasal surgery      [facility] NONSPECIFIC PROCEDURE      R rotater repair (remote)      Z NONSPECIFIC PROCEDURE      appy 1966     Z NONSPECIFIC PROCEDURE      sinus surgery x 2      ZZC NONSPECIFIC PROCEDURE      L shoulder 6/09       Prior to Admission Medications   Prior to Admission Medications   Prescriptions Last Dose Informant Patient Reported? Taking?   MELATONIN PO Unknown at Unknown time  Yes Yes   Sig: Take 5 mg by mouth At Bedtime    UREA 20 INTENSIVE HYDRATING 20 % external cream   No Yes   Sig: Apply topically as needed Apply to right foot daily   acetaminophen (TYLENOL) 500 MG tablet   Yes No   Sig: Take 500-1,000 mg by mouth every 8 hours as needed   apixaban ANTICOAGULANT (ELIQUIS) 2.5 MG tablet 12/26/2021 at am  Yes Yes   Sig: Take 1 tablet (2.5 mg) by mouth 2 times daily   diclofenac (VOLTAREN) 1 % topical gel   No Yes   Sig: Apply 4 gramsto area qid prn   doxycycline hyclate (VIBRA-TABS) 100 MG tablet 12/26/2021  No Yes   Sig: Take 1 tablet (100 mg) by mouth daily   ferrous gluconate (FERGON) 324 (38 Fe) MG tablet   No No   Sig: Take 1 tablet (324 mg) by mouth daily (with breakfast)   lisinopril (ZESTRIL) 20 MG tablet 12/26/2021  Yes Yes   Sig: Take 1 tablet (20 mg) by mouth daily   multivitamin, therapeutic (THERA-VIT) TABS tablet Unknown at Unknown time  Yes Yes   Sig: Take 1 tablet by mouth daily   omeprazole (PRILOSEC) 40 MG DR capsule 12/26/2021  No Yes   Sig: Take 1 capsule (40 mg) by mouth daily   potassium chloride ER (K-TAB) 20 MEQ CR tablet 12/26/2021 at am  Yes Yes   Sig: Take 1 tablet (20 mEq) by mouth 2 times daily   simvastatin (ZOCOR) 20 MG tablet 12/25/2021  Yes Yes   Sig: Take 1 tablet (20 mg) by mouth At Bedtime   tamsulosin (FLOMAX) 0.4 MG capsule 12/26/2021  No Yes   Sig: Take 1 capsule (0.4 mg) by mouth daily   torsemide (DEMADEX) 10 MG tablet 12/26/2021  No Yes   Sig: Take 1 tablet (10 mg) by mouth every morning   Patient taking differently: Take 20 mg by mouth every morning Takes 2 x 10 mg tablets = 20 mg      Facility-Administered Medications: None     Allergies   Allergies   Allergen Reactions     Blood Transfusion Related (Informational Only)  Other (See Comments)     Patient has a history of a clinically significant antibody against RBC antigens.  A delay in compatible RBCs may occur.       Social History   I have updated and reviewed the following Social History Narrative:   Social History     Social History Narrative     Not on file           Living situation: independent senior living facility       Support system: daughter, son       Actual/Potential Caregiver: independent, facility staff       Functional status: independent  History of substance use/abuse: no    Family History   I have reviewed this patient's family history and updated it with pertinent information if needed.   Family History   Problem Relation Age of Onset     Cancer Mother         stomach cancer,  age 61     Heart Disease Father         MI,  age 71     Heart Disease Brother         stent placement, RA      Hypertension Brother      Heart Disease Sister         rhythm problems with heart, hypertension       Review of Systems   The 10 point Review of Systems is negative other than noted in the HPI or here.       Physical Exam   Temp:  [97.5  F (36.4  C)-97.6  F (36.4  C)] 97.6  F (36.4  C)  Pulse:  [54-66] 66  Resp:  [12-20] 13  BP: ()/(49-62) 128/62  FiO2 (%):  [21 %] 21 %  SpO2:  [95 %-99 %] 99 %  186 lbs 0 oz  Exam:  GEN:  Alert, oriented to self and hospital, appears comfortable, NAD.  HEENT:  Normocephalic/atraumatic, no scleral icterus, no nasal discharge, mouth moist.  CV:  RRR, S1, S2; no murmurs or other irregularities noted.  +3 DP/PT pulses bilatererally; 1+ edema BLE.  RESP:  Clear to auscultation bilaterally without rales/rhonchi/wheezing/retractions.  Symmetric chest rise on inhalation noted.  Normal respiratory effort.  ABD:  Rounded, soft, non-tender/non-distended.  +BS  EXT:  Edema & pulses as noted above.  CMS intact x 4.   Reddened, bernie appearance of BLE, sores on bilateral plantar surface of feet.    SKIN:  Dry to touch, no exanthems noted  in the visualized areas.    NEURO: moves all extremities to command, confused, no focal deficits  PAIN BEHAVIOR: Cooperative  Psych:  Normal affect.  Calm, cooperative, confused conversation.    Data   Results for orders placed or performed during the hospital encounter of 12/26/21 (from the past 24 hour(s))   CBC with Platelets & Differential    Narrative    The following orders were created for panel order CBC with Platelets & Differential.  Procedure                               Abnormality         Status                     ---------                               -----------         ------                     CBC with platelets and d...[[id number]]  Abnormal            Final result                 Please view results for these tests on the individual orders.   Basic metabolic panel   Result Value Ref Range    Sodium 138 133 - 144 mmol/L    Potassium 3.8 3.4 - 5.3 mmol/L    Chloride 104 94 - 109 mmol/L    Carbon Dioxide (CO2) 29 20 - 32 mmol/L    Anion Gap 5 3 - 14 mmol/L    Urea Nitrogen 14 7 - 30 mg/dL    Creatinine 0.88 0.66 - 1.25 mg/dL    Calcium 8.9 8.5 - 10.1 mg/dL    Glucose 94 70 - 99 mg/dL    GFR Estimate 86 >60 mL/min/1.73m2   CBC with platelets and differential   Result Value Ref Range    WBC Count 3.9 (L) 4.0 - 11.0 10e3/uL    RBC Count 3.48 (L) 4.40 - 5.90 10e6/uL    Hemoglobin 11.3 (L) 13.3 - 17.7 g/dL    Hematocrit 37.1 (L) 40.0 - 53.0 %     (H) 78 - 100 fL    MCH 32.5 26.5 - 33.0 pg    MCHC 30.5 (L) 31.5 - 36.5 g/dL    RDW 15.7 (H) 10.0 - 15.0 %    Platelet Count 150 150 - 450 10e3/uL    % Neutrophils 66 %    % Lymphocytes 13 %    % Monocytes 14 %    % Eosinophils 6 %    % Basophils 1 %    % Immature Granulocytes 0 %    NRBCs per 100 WBC 0 <1 /100    Absolute Neutrophils 2.6 1.6 - 8.3 10e3/uL    Absolute Lymphocytes 0.5 (L) 0.8 - 5.3 10e3/uL    Absolute Monocytes 0.6 0.0 - 1.3 10e3/uL    Absolute Eosinophils 0.3 0.0 - 0.7 10e3/uL    Absolute Basophils 0.0 0.0 - 0.2 10e3/uL    Absolute  Immature Granulocytes 0.0 <=0.4 10e3/uL    Absolute NRBCs 0.0 10e3/uL

## 2021-12-28 NOTE — PROGRESS NOTES
Received CORE consult for Skip on 12/27/21. Left message for daughter to see if Juliano Caballero PAC next opening on 1/12 at 1245 would work for hospital Follow-up.   Per hospital notes, unsure if Skip will be going to TCU, [facility] or to [facility].     Appt made just in case it works.     Marga Roberson RN 4:34 PM 12/28/21

## 2021-12-28 NOTE — CONSULTS
REASON FOR ASSESSMENT:  CHF Consult for 2 gm NA Diet Education    NUTRITION HISTORY:    Information obtained from:  Chart review and patient at bedside this morning    Living situation:   [facility]    Patient notes he receives 3 meals/day at [facility] and takes all 3 offered meals daily. States he does not use the salt shaker at home and limits the amount of gravy he consumes. Eats a soup daily but otherwise the menu rotates.     Previous diet instructions:  No previous low sodium diet education       CURRENT DIET:  2 gram Sodium    NUTRITION DIAGNOSIS:  Food- and nutrition-related knowledge deficit R/t no prior low sodium diet education as evidenced by pt report and need for overview at bedside this afternoon       INTERVENTIONS:    Nutrition Prescription:  2 gram Na diet    Implementation:    Assessed learning needs, learning preferences, and willingness to learn    Nutrition Education (Content):  a) Provided handouts:  1) Tips for Low Na Diet  b) Discussed rational for limiting Na for CHF and stressed importance of following 2 gm Na guidelines   c) Discussed high sodium foods that may be prepared at [facility] and encouraged moderation and limiting these foods in his diet.     Nutrition Education (Application):  a) Discussed current eating habits and recommended alternative food choices    Anticipated good compliance    Diet Education - refer to Education Flowsheet    Goals:    Patient verbalizes understanding of diet by stating 3 foods high in sodium to limit/avoid    All of the above goals met during the education session    Follow Up:    Provided RD contact information for future questions.    Recommend Out-Patient Nutrition Referral, if further diet instructions are needed.    Charleen Herrera, RD, LD

## 2021-12-28 NOTE — PROGRESS NOTES
Placed on hospital cpap +13 ( last known settings) for documented LENORA.    Pt states he does not wear Cpap anymore , but RN noticed abnormal breathing pattern with apnea episodes.     Will continue with MD order.

## 2021-12-28 NOTE — PLAN OF CARE
A&O but can be  forgetful. VSS ex soft BP at times. Lasix held d/t not meeting BP parameters, clarified with MD that those parameters were correct. Tele A-fib CVR w/ inv T waves and PVCs. Denies pain. WOC following for multiple wounds to BLE and skin tear to LUE. Per orders, sween cream daily. Ace wraps off per pt request. [place] cares completed, patent with good UOP. K recheck in AM. Had echo, EF 50%. A11 with  gb and walker, LS dim with some ARAMBULA. Will continue POC    Heart Failure Care Map  GOALS TO BE MET BEFORE DISCHARGE:    1. Decrease congestion and/or edema with diuretic therapy to achieve near optimal volume status.     Dyspnea improved: ARAMBULA still    Edema improved: No, further care required to meet this goal. Please explain +3/4 edema still to BLE + scrotum/penis        Net I/O and Weights since admission:   11/27 2300 - 12/27 2259  In: 740 [P.O.:740]  Out: 5700 [Urine:5700]  Net: -4960     Vitals:    12/26/21 1829 12/27/21 0530   Weight: 86.4 kg (190 lb 8 oz) 84.8 kg (187 lb)       2.  O2 sats > 90% on room air, or at prior home O2 therapy level.      Able to wean O2 this shift to keep sats above 90%?: Yes, satisfactory for discharge.   Does patient use Home O2? No          Current oxygenation status:   SpO2: 95 %     O2 Device: None (Room air),      3.  Tolerates ambulation and mobility near baseline.     Ambulation: No, further care required to meet this goal. Please explain still A1 with alea and walker, PT following   Times patient ambulated with staff this shift: 1    Please review the Heart Failure Care Map for additional HF goal outcomes.    Chase Edmond, RN  12/27/2021

## 2021-12-28 NOTE — PROGRESS NOTES
12/28/21 1500   Quick Adds   Type of Visit Initial PT Evaluation   Living Environment   People in home alone   Current Living Arrangements assisted living   Home Accessibility no concerns   Living Environment Comments Mod I with a FWW.    Self-Care   Usual Activity Tolerance moderate   Current Activity Tolerance fair   Activity/Exercise/Self-Care Comment Pt reports independence with self cares, typically loves to walk the hallway for exercise.    Disability/Function   Fall history within last six months yes   General Information   Onset of Illness/Injury or Date of Surgery 12/26/21   Referring Physician Dougie Lopez MD   Patient/Family Therapy Goals Statement (PT) To get stronger   Pertinent History of Current Problem (include personal factors and/or comorbidities that impact the POC) 81 year old male with history of chronic atrial fibrillation on anticoagulation, CAD, hypertension, systolic CHF, hyperlipidemia who presented here with increasing swelling and found with anasarca and eventually admitted admitted on 12/26/2021   Existing Precautions/Restrictions fall   Cognition   Orientation Status (Cognition) oriented to;person;place   Cognitive Status Comments Per chart pt with 14/30 on the SLUMS.   Integumentary/Edema   Integumentary/Edema Comments 2-3+ edema to B LE with redness from knees to ankles. Marked drainage noted on the chux pad behind pts LEs.    Strength   Strength Comments Decreased generally, needs assist with mobility as noted below.    Bed Mobility   Bed Mobility supine-sit   Supine-Sit [place] (Bed Mobility) contact guard   Transfers   Transfers sit-stand transfer   Sit-Stand Transfer   Sit-Stand [place] (Transfers) minimum assist (75% patient effort)   Gait/Stairs (Locomotion)   Comment (Gait/Stairs) Ambulates with FWW, CGA    Balance   Balance Comments Good static sitting balance. Fair standing dynamic balance - requires B UE support on FWW for safe mobility   Clinical  Impression   Criteria for Skilled Therapeutic Intervention yes, treatment indicated   PT Diagnosis (PT) Impaired functional mobility   Influenced by the following impairments Global weakness, decreased activity tolerance   Functional limitations due to impairments Decreased IND with transfers, gait   Clinical Presentation Stable/Uncomplicated   Clinical Presentation Rationale goals of care clarified per RN, agreeable to TCU, medically improving   Clinical Decision Making (Complexity) low complexity   Therapy Frequency (PT) 5x/week   Predicted Duration of Therapy Intervention (days/wks) One week   Planned Therapy Interventions (PT) bed mobility training;gait training;patient/family education;strengthening;transfer training;progressive activity/exercise   Risk & Benefits of therapy have been explained evaluation/treatment results reviewed;care plan/treatment goals reviewed;participants voiced agreement with care plan;participants included;patient   PT Discharge Planning    PT Discharge Recommendation (DC Rec) Transitional Care Facility;home with home care physical therapy;home with assist   PT Rationale for DC Rec Below baseline, recommend TCU to maximize IND/safety with mobility. Pt requiring [place] for functional transfers. If pt were to return home would need Ax1 for mobility/cares and HHPT.    PT Brief overview of current status  Ax1, FWW   Total Evaluation Time   Total Evaluation Time (Minutes) 5

## 2021-12-28 NOTE — PROGRESS NOTES
OT: [facility] cognitive screen completed with pt scoring 14/30 (1-20 = dementia) indicating dementia level deficits. Pt with difficulties in areas of STM/recall (2/5 words, 2/4 story questions), math/money calculation, naming (9 animals in 1 minute), reverse/auditory memory and clock draw (numbers and time incorrect). Pt educated on results of screen and implications for IADLs (med mgmt, driving, cooking, etc). Does endorse problems with his memory.        12/28/21 1001   Cognitive Assessments   Cognitive Assessments Completed [facility] Mental Status Exam (UMS):  Total Score out of /30 14/30   Norms 1-20 equals dementia   Domains assessed: orientation, memory, attention, executive functions

## 2021-12-28 NOTE — PLAN OF CARE
"/59 (BP Location: Right arm)   Pulse 57   Temp 97.5  F (36.4  C) (Oral)   Resp 13   Ht 1.702 m (5' 7\")   Wt 84.4 kg (186 lb)   SpO2 99%   BMI 29.13 kg/m      Pt vss and slept most of night with CPAP. Start of shift pt was A&O x4 but some confusion was noted later in shift. +3/4 edema noted bilaterally on legs and +3 edema noted on scrotum. Denies pain. Assist of 1 w/ belt and walker. 2g sodium diet.       Heart Failure Care Map  GOALS TO BE MET BEFORE DISCHARGE:    1. Decrease congestion and/or edema with diuretic therapy to achieve near optimal volume status.     Dyspnea improved: No, further care required to meet this goal. Please explain ARAMBULA still   Edema improved: No, further care required to meet this goal. Please explain Edema still +3 on scrotum and legs        Net I/O and Weights since admission:   11/28 1500 - 12/28 1459  In: 740 [P.O.:740]  Out: 6175 [Urine:6175]  Net: -5435     Vitals:    12/26/21 1829 12/27/21 0530 12/28/21 0404   Weight: 86.4 kg (190 lb 8 oz) 84.8 kg (187 lb) 84.4 kg (186 lb)       2.  O2 sats > 90% on room air, or at prior home O2 therapy level.      Able to wean O2 this shift to keep sats above 90%?: Yes, satisfactory for discharge.   Does patient use Home O2? No          Current oxygenation status:   SpO2: 99 %     O2 Device: BiPAP/CPAP,      3.  Tolerates ambulation and mobility near baseline.     Ambulation: No, further care required to meet this goal. Please explain A1 w/ gb and walker   Times patient ambulated with staff this shift: 0    Please review the Heart Failure Care Map for additional HF goal outcomes.    Manny Fernandes RN  12/28/2021       "

## 2021-12-29 NOTE — PROGRESS NOTES
RT Note:    Pt was placed on CPAP at 13cm H2O, on O2 21%  LPM for LENORA.  Mask: Under the nose, Large.  Patient has some redness on bridge and nose pads from glasses left red indentations. Mask changed to under the nose.       Will continue to follow and assess.    Ruma Arriaza, RT

## 2021-12-29 NOTE — PLAN OF CARE
Vital sign stable, remote telemetry monitoring, per tele tech, A Fib cvr 80's with pvc's. No chest pain.  Lungs diminished, on iv lasix, edema to scrotum, penis, legs, ankles and feet.  Legs elevated on pillows.  Damon patent, king and damon cares done.  Denies pain..  Daily weight.  Falls risk precautions.  Care plan reviewed with pt.

## 2021-12-29 NOTE — PROGRESS NOTES
Care Management Follow Up    Length of Stay (days): 3    Expected Discharge Date: 12/30/2021     Concerns to be Addressed: discharge planning     Patient plan of care discussed at interdisciplinary rounds: Yes    Anticipated Discharge Disposition: Skilled Nursing Facilty,Transitional Care     Anticipated Discharge Services: None  Anticipated Discharge DME: Walker    Patient/family educated on Medicare website which has current facility and service quality ratings: yes  Education Provided on the Discharge Plan:  yes  Patient/Family in Agreement with the Plan: yes    Referrals Placed by CM/SW: Post Acute Facilities  Private pay costs discussed: private room/amenity fees and transportation costs    Additional Information:  Jeffery received a call from Mary at [facility], [phone], who said that they can admit the pt tomorrow after 1300.  Jeffery spoke with the pt to update him that [facility] can accept him tomorrow.  He is agreeable to the plan and requests a double room.  He requested Sw contact his dtr Stacia and update her.  Jeffery spoke with Stacia who is agreeable to the plan.  She said that she can be at the hospital tomorrow at 1300 to pick the pt up.  Jeffery confirmed the discharge time with Mary.  The physician was updated.    Sw will continue with discharge planning and will be available as needed until discharge.      ELIEZER Cash, [facility]  Inpatient Care Coordination  [facility]  [phone]    ADDENDUM 1340:  Jeffery was updated by the physician that the pt will not be ready for discharge tomorrow and will need further assessment.  Jeffery called Mary and updated her that the pt may be ready for discharge on Friday, but more likely on Saturday.  Mary said that they cannot put him on the schedule for Saturday as it is too far in advance.  Jeffery asked for them to put him on the admission list for Friday in case he is ready.  Mary told Jeffery to call her on Friday.  Jeffery will follow-up with Mary tomorrow.   normal...

## 2021-12-29 NOTE — PLAN OF CARE
"Heart Failure Care Map  GOALS TO BE MET BEFORE DISCHARGE:    1. Decrease congestion and/or edema with diuretic therapy to achieve near optimal volume status.     Dyspnea improved: No, further care required to meet this goal. Please explain ARAMBULA.   Edema improved: No, further care required to meet this goal. Please explain Still 3+ edema on legs and scrotum.        Net I/O and Weights since admission:   11/29 1500 - 12/29 1459  In: 1395 [P.O.:1395]  Out: 7650 [Urine:7650]  Net: -6255     Vitals:    12/26/21 1829 12/27/21 0530 12/28/21 0404 12/29/21 0532   Weight: 86.4 kg (190 lb 8 oz) 84.8 kg (187 lb) 84.4 kg (186 lb) 83.5 kg (184 lb)       2.  O2 sats > 90% on room air, or at prior home O2 therapy level.      Able to wean O2 this shift to keep sats above 90%?: Yes, satisfactory for discharge.   Does patient use Home O2? No.           Current oxygenation status:   SpO2: 99 %     O2 Device: BiPAP/CPAP,      3.  Tolerates ambulation and mobility near baseline.     Ambulation: No, further care required to meet this goal. Please explain Ax1.   Times patient ambulated with staff this shift: 0    Please review the Heart Failure Care Map for additional HF goal outcomes.    Manny Fernandes RN  12/29/2021     /56 (BP Location: Right arm)   Pulse 66   Temp 97.6  F (36.4  C) (Axillary)   Resp 16   Ht 1.702 m (5' 7\")   Wt 83.5 kg (184 lb)   SpO2 99%   BMI 28.82 kg/m      Pt was confused at start of shift. C-pap was placed at 0000. Pt was stable on 21% fio2 c-pap. Lasix given at 0500. Assist of 1 when up. Oral cares given. Continue POC.     "

## 2021-12-29 NOTE — PROGRESS NOTES
[facility]    Medicine Progress Note - Hospitalist Service       Date of Admission:  12/26/2021    Assessment & Plan         Milo Martinez is an 81-year-old male with a history of chronic A. fib on anticoagulation, CAD, hypertension, systolic heart failure, hyperlipidemia who presented due to anasarca on 12/26.  He had been previously seen within the last 2 weeks by his primary care doctor for the same, and a rash on his bilateral legs.    Acute on chronic systolic heart failure exacerbation  A. fib  CAD  Anasarca  His dry weight is approximately 167 pounds.  He is currently up over 180.  However he had some hypotension associated with Lasix 60 mg IV.  Echocardiogram showing improvement with EF not 50%  -Continue 40 mg IV Lasix twice a day goal net -1 to 1.5 L/day   -Continue apixaban  -Continue home statin   -Holding home lisinopril with recent lower blood pressure while diuresing  -Monitor potassium and replace as needed  -Lymphedema wraps  - HR twice dipped down to the 30's - getting stat EKG now. - shows afib with PVCs - will check potassium, phos and mag now and replace. If develops low sustained heart rates will consider a cards consult.    Obstructive sleep apnea  -BiPAP with sleeping    Hyperkalemia-resolved  Present on admission    BPH  On tamsulosin    Stable chronic anemia  -Continue home iron    Redness on legs  Previously had been given prednisone and increase in his diuretics to try and treat this.  Likely related to venous stasis and lymphedema.  No evidence of infection at this point in time.  Previously this had been quite painful.  Appeared to have improved on gabapentin and with diuresis.  -Appreciate WOC consult on 12/27  -Holding gabapentin for possible contribution to encephalopathy.    Acute encephalopathy on top of dementia  He had previously been managing his own finances.  Recently moved to independent living to be near his wife who is in memory care.  Per report  was not sundowning at his new facility.  However on admission here he was very confused and continues to sundown most nights.  Slums on 12/28 was 14 out of 30.  Head CT showing only microvascular changes.  No evidence of acute stroke.  MRI not done at this time.  No evidence of acute infection.  Electrolytes normal.  -Start olanzapine 5 mg at bedtime  -Continue to monitor for signs of infection or other causes for acute encephalopathy  -Holding gabapentin at this point in time    Rosacea  -On doxycycline, continued    Left shoulder pain  Reproducible with palpation and movement of arm.  With previous history of rotator cuff injury.  Reports that diclofenac gel has helped in the past.  -Restarting diclofenac gel.       Diet: 2 Gram Sodium Diet Other - please comment    DVT Prophylaxis: DOAC  Biggs Catheter: PRESENT, indication: Strict 1-2 Hour I&O  Central Lines: None  Code Status: Full Code      Disposition Plan   Expected Discharge: 01/01/2022     Anticipated discharge location: inpatient rehabilitation facility    Delays:     Administering IV medications     Will be ready to discharge from closer to his dry weight hopefully about Friday or Saturday.  And that time.  We will continue to work of his encephalopathy.       The patient's care was discussed with the Bedside Nurse, Patient and Patient's Family.    Alysia Galindo MD  Hospitalist Service  [facility]  Securely message with the [employer] Web Console (learn more here)  Text page via [employer] Paging/Directory        Clinically Significant Risk Factors Present on Admission             # Overweight: last Body mass index is 28.82 kg/m .      ______________________________________________________________________    Interval History   No acute events overnight.  Patient remains confused to circumstance.  Although was able to state that he was here because of swelling in his legs.  Denies any shortness of breath, nausea vomiting, diarrhea.   No chest pain does endorse left arm pain that makes it difficult for him to push up on the chair.  Per daughter has a history of rotator cuff injury.  Denies any pain in his legs at this point time    Data reviewed today: I reviewed all medications, new labs and imaging results over the last 24 hours. I personally reviewed the Shoulder x-ray image(s) showing No evidence of any acute fractures.    Physical Exam   Vital Signs: Temp: 97.6  F (36.4  C) Temp src: Axillary BP: 103/56 Pulse: 66   Resp: 16 SpO2: 99 % O2 Device: BiPAP/CPAP    Weight: 184 lbs 0 oz  General Appearance: Very pleasant gentleman no acute distress  Respiratory: Clear to auscultation bilaterally  Cardiovascular: Irregularly irregular no murmurs  GI: Soft, nontender, bowel sounds normal active  Skin: Feet edematous, with flaking skin on left leg in particular  Other: Legs wrapped with Ace bandages, unable to see further.   deferred:    Data   Recent Labs   Lab 12/29/21  0832 12/28/21  0719 12/27/21  1327 12/27/21  1246 12/27/21  0739 12/26/21  1748 12/26/21  1340   WBC  --  3.9*  --   --  4.9  --  4.7   HGB  --  11.3*  --   --  10.7*  --  11.4*   MCV  --  107*  --   --  104*  --  102*   PLT  --  150  --   --  147*  --  153    138  --   --  138   < > 135   POTASSIUM 3.6 3.8 3.7  --  3.4   < > 5.9*   CHLORIDE 104 104  --   --  104   < > 104   CO2 29 29  --   --  28   < > 25   BUN 15 14  --   --  13   < > 11   CR 0.94 0.88  --   --  0.90   < > 0.77   ANIONGAP 4 5  --   --  6   < > 6   ANG 8.3* 8.9  --   --  8.5   < > 8.6   * 94  --  88 94   < > 100*   ALBUMIN  --   --   --   --  2.5*  --  2.9*   PROTTOTAL  --   --   --   --  6.4*  --  7.6   BILITOTAL  --   --   --   --  1.9*  --  1.7*   ALKPHOS  --   --   --   --  173*  --  193*   ALT  --   --   --   --  14  --  20   AST  --   --   --   --  19  --  73*    < > = values in this interval not displayed.     Recent Results (from the past 24 hour(s))   XR Shoulder Left 2 Views    Narrative     SHOULDER TWO VIEW LEFT   12/29/2021 1:24 PM     HISTORY: Pain in upper arm, near shoulder. Question fracture.    COMPARISON: X-ray from 6/18/2009.      Impression    IMPRESSION: No evidence of acute fracture. On the axillary view there  appears to be anterior subluxation of the humeral head without zhanna  dislocation. No evidence of acute fracture. Pulmonary vascular  prominence.

## 2021-12-29 NOTE — PLAN OF CARE
"VSS with some soft BP in am. Lasix held. A&ox2, memory waxes and wanes. Denies pain. SR Afib with frequent PVCs. Legs with 3+ edema. Biggs in place, patent. Ambulating with walker/gb with PT. CPAP at night. IV lasix given 1800. Discharge to TCU when medically stable. Continue with POC.    /56 (BP Location: Right arm)   Pulse 65   Temp 98.3  F (36.8  C) (Oral)   Resp 16   Ht 1.702 m (5' 7\")   Wt 84.4 kg (186 lb)   SpO2 97%   BMI 29.13 kg/m      "

## 2021-12-30 NOTE — PROGRESS NOTES
Care Management Follow Up    Length of Stay (days): 4    Expected Discharge Date: 01/01/2022     Concerns to be Addressed: discharge planning     Patient plan of care discussed at interdisciplinary rounds: Yes    Anticipated Discharge Disposition: Skilled Nursing Facilty,Transitional Care     Anticipated Discharge Services: None  Anticipated Discharge DME: [employer]    Patient/family educated on Medicare website which has current facility and service quality ratings: yes  Education Provided on the Discharge Plan:    Patient/Family in Agreement with the Plan: yes    Referrals Placed by CM/JEAN: Post Acute Facilities    Additional Information:  JEAN was informed by MD that patient will not be ready for discharge on Friday. JEAN called Mary at [facility] to inform her. A  was left requesting a return call.     ELIEZER Pierce, SW  , ED Care Management   [phone]

## 2021-12-30 NOTE — PLAN OF CARE
Vitals: Temp: 98.4  F (36.9  C) Temp src: Oral BP: 113/51 Pulse: 57   Resp: 16 SpO2: 100 % O2 Device: None (Room air)    Pain: Voltaren gel ordered for shoulder pain- effective  Neuro: AO4, forgeful. Able to make needs known  Respiratory: LS dim, mendoza.   Cardiac/tele: Tele afib CVR, pQT, PVCs. Denies CP. HR dropped to 30s briefly today 2 times, did not sustain, pt asymptomatic, HR typically 50s-60s. MD aware.   GI/: Biggs in place, good output. 1 BM this shift.   Skin: BLE red, +2/3 edema. Lymph wraps off @ 1815 for EKG  LDAs: PIV SL  Labs: K 3.8, Mag 1.7, Phos 3.1; rechecks scheduled.   Diet: 2gm Na  Activity: A1 with gaitbelt and walker  Plan: Cont to diurese. Will continue POC.     Heart Failure Care Map  GOALS TO BE MET BEFORE DISCHARGE:    1. Decrease congestion and/or edema with diuretic therapy to achieve near optimal volume status.     Dyspnea improved: Yes, satisfactory for discharge.   Edema improved: No, further care required to meet this goal. Please explain +2-3 edema in BLE, lymph wraps on        Net I/O and Weights since admission:   11/30 0700 - 12/30 0659  In: 1521 [P.O.:1515; I.V.:6]  Out: 80798 [Urine:07408]  Net: -8604     Vitals:    12/26/21 1829 12/27/21 0530 12/28/21 0404 12/29/21 0532   Weight: 86.4 kg (190 lb 8 oz) 84.8 kg (187 lb) 84.4 kg (186 lb) 83.5 kg (184 lb)       2.  O2 sats > 90% on room air, or at prior home O2 therapy level.      Able to wean O2 this shift to keep sats above 90%?: Yes, satisfactory for discharge.   Does patient use Home O2? No          Current oxygenation status:   SpO2: 100 %     O2 Device: None (Room air),      3.  Tolerates ambulation and mobility near baseline.     Ambulation: No, further care required to meet this goal. Please explain Pt is A1 with gaitbelt and walker.   Times patient ambulated with staff this shift: 4    Please review the Heart Failure Care Map for additional HF goal outcomes.    Yu Olivares RN  12/29/2021

## 2021-12-30 NOTE — DISCHARGE INSTRUCTIONS
Bilateral legs: Daily    1. Wash with soap and water and dry  2. Apply Sween cream to legs  3. Can leave open to air, if start draining cover with ABDs and wrap with kerlix  4. Float heels with pillows under calves     Left upper arm wound: Every 3 days  1. Cleanse with wound cleanser and dry  2. Cover with oil emulsion gauze (adaptic) or vasoline gauze.   3. Secure with Mepliex 4x4 or foam dressing

## 2021-12-30 NOTE — PROGRESS NOTES
[facility]    Medicine Progress Note - Hospitalist Service       Date of Admission:  12/26/2021    Assessment & Plan                    Milo Martinez is an 81-year-old male with a history of chronic A. fib on anticoagulation, CAD, hypertension, systolic heart failure, hyperlipidemia who presented due to anasarca on 12/26.  He had been previously seen within the last 2 weeks by his primary care doctor for the same, and a rash on his bilateral legs.    Acute on chronic systolic heart failure exacerbation  A. Fib with intermittent loretta cardia  CAD  Anasarca  His dry weight is approximately 167 pounds.  He is currently up over 180.  However he had some hypotension associated with Lasix 60 mg IV.  Echocardiogram showing improvement with EF not 50%  -Continue 40 mg IV Lasix twice a day goal net -1 to 1.5 L/day   -Continue apixaban  -Continue home statin   -Holding home lisinopril with recent lower blood pressure while diuresing  -Monitor potassium and replace as needed  -Lymphedema wraps  -BMP in a.m., replacing electrolytes as needed during diuresis    Obstructive sleep apnea  -BiPAP with sleeping    Hyperkalemia-resolved  Present on admission    BPH  On tamsulosin    Stable chronic anemia  -Continue home iron    Redness on legs  Previously had been given prednisone and increase in his diuretics to try and treat this.  Likely related to venous stasis and lymphedema.  No evidence of infection at this point in time.  Previously this had been quite painful.  Appeared to have improved on gabapentin and with diuresis.  -Appreciate WOC consult on 12/27  -Holding gabapentin for possible contribution to encephalopathy.    Acute encephalopathy on top of dementia  He had previously been managing his own finances.  Recently moved to independent living to be near his wife who is in memory care.  Per report was not sundowning at his new facility.  However on admission here he was very confused and continues  to sundown most nights.  Slums on 12/28 was 14 out of 30.  Head CT showing only microvascular changes.  No evidence of acute stroke.  MRI not done at this time.  No evidence of acute infection.  Electrolytes normal.  -Decrease olanzapine to 2.5 mg at bedtime  -Continue to monitor for signs of infection or other causes for acute encephalopathy  -Holding gabapentin at this point in time    Rosacea  -stopping doxycycline    Left shoulder pain  Reproducible with palpation and movement of arm.  With previous history of rotator cuff injury.  Reports that diclofenac gel has helped in the past.  - diclofenac gel.    Groin rash  -Miconazole powder     Diet: 2 Gram Sodium Diet Other - please comment    DVT Prophylaxis: DOAC  Biggs Catheter: PRESENT, indication: Retention  Central Lines: None  Code Status: Full Code      Disposition Plan   Expected Discharge: 01/01/2022     Anticipated discharge location: inpatient rehabilitation facility    Delays:     Administering IV medications            The patient's care was discussed with the Bedside Nurse, Patient and Patient's Family.    Alysia Galindo MD  Hospitalist Service  [facility]  Securely message with the [employer] Web Console (learn more here)  Text page via [employer] Paging/Directory        Clinically Significant Risk Factors Present on Admission             # Overweight: last Body mass index is 28.8 kg/m .      ______________________________________________________________________    Interval History   No acute events overnight.  Was not noted to be more anxious regarding sundowning overnight last night.  Although he had had that previously.  No nausea no vomiting no diarrhea.  No chest pain.  Left shoulder pain appears significantly improved from yesterday.  He notes that his legs still feel swollen but that that significantly improved.      Data reviewed today: I reviewed all medications, new labs and imaging results over the last 24 hours. I  personally reviewed the EKG tracing showing Slow A. fib.    Physical Exam   Vital Signs: Temp: 96.8  F (36  C) Temp src: Oral BP: 110/67 Pulse: 52   Resp: 18 SpO2: 99 % O2 Device: None (Room air)    Weight: 183 lbs 14.4 oz  General Appearance: Pleasant gentleman no acute distress  Respiratory: Clear to auscultation bilaterally  Cardiovascular: Irregularly irregular, S1, S2, no murmurs rubs or gallops  GI: Soft, nontender, bowel sounds normal active  Skin: Venous stasis changes over his legs,   Extremities: Hard and thickened venous stasis changes on his legs without clear weeping   : scrotum not significantly edematous, penis with some dried blood around it Biggs in place  Other: Neuro alert and oriented x3 to person place and situation not to date this is significantly improved from previous however he had recently seen his daughter and been unable to remember his wife's name    Data   Recent Labs   Lab 12/30/21  0618 12/30/21  0149 12/29/21  1901 12/29/21  0832 12/28/21  0719 12/27/21  1246 12/27/21  0739 12/26/21  1748 12/26/21  1340   WBC  --   --   --   --  3.9*  --  4.9  --  4.7   HGB  --   --   --   --  11.3*  --  10.7*  --  11.4*   MCV  --   --   --   --  107*  --  104*  --  102*   PLT  --   --   --   --  150  --  147*  --  153   NA  --   --   --  137 138  --  138   < > 135   POTASSIUM 3.3*  --  3.8 3.6 3.8   < > 3.4   < > 5.9*   CHLORIDE  --   --   --  104 104  --  104   < > 104   CO2  --   --   --  29 29  --  28   < > 25   BUN  --   --   --  15 14  --  13   < > 11   CR  --   --   --  0.94 0.88  --  0.90   < > 0.77   ANIONGAP  --   --   --  4 5  --  6   < > 6   ANG  --   --   --  8.3* 8.9  --  8.5   < > 8.6   GLC  --  113*  --  101* 94   < > 94   < > 100*   ALBUMIN  --   --   --   --   --   --  2.5*  --  2.9*   PROTTOTAL  --   --   --   --   --   --  6.4*  --  7.6   BILITOTAL  --   --   --   --   --   --  1.9*  --  1.7*   ALKPHOS  --   --   --   --   --   --  173*  --  193*   ALT  --   --   --   --   --    --  14  --  20   AST  --   --   --   --   --   --  19  --  73*    < > = values in this interval not displayed.     Recent Results (from the past 24 hour(s))   XR Shoulder Left 2 Views    Narrative    SHOULDER TWO VIEW LEFT   12/29/2021 1:24 PM     HISTORY: Pain in upper arm, near shoulder. Question fracture.    COMPARISON: X-ray from 6/18/2009.      Impression    IMPRESSION: No evidence of acute fracture. On the axillary view there  appears to be anterior subluxation of the humeral head without zhanna  dislocation. No evidence of acute fracture. Pulmonary vascular  prominence.    DIAMOND VIDALES MD         SYSTEM ID:  [id number]

## 2021-12-30 NOTE — PROGRESS NOTES
[facility] Nurse Inpatient Wound Assessment   Reason for consultation: Evaluate and treat  Bilateral legs, left arm    Assessment  Bilateral legs due to venous insufficiency  Status: stable  Dry flaking legs with minor weeping.  No open areas visualized.  Minor callusing to planter feet.  May benefit from compression but currently not tolerating due to pain.    Left upper arm wound due to Skin Tear  Status: stable  Small agranular area    Treatment Plan  Bilateral legs: Daily    1. Wash with soap and water and dry  2. Apply Sween cream to legs  3. Can leave open to air, if start draining cover with ABDs and wrap with kerlix  4. Float heels with pillows under calves     Left upper arm wound: Every 3 days  1. Cleanse with wound cleanser and dry  2. Cover with oil emulsion gauze (adaptic) or vasoline gauze.   3. Secure with Mepilex 4x4    Orders Reviewed and Updated  Recommended provider order: None, at this time  WO Nurse follow-up plan:weekly  Nursing to notify the Provider(s) and re-consult the [facility] Nurse if wound(s) deteriorates or new skin concern.    Patient History  According to provider note(s):  Milo Serna is a 81 year old male with history of chronic atrial fibrillation on anticoagulation, CAD, hypertension, systolic CHF, hyperlipidemia who presented here with increasing swelling and found with anasarca and eventually admitted admitted on 12/26/2021    Objective Data    Active Diet Order  Orders Placed This Encounter      2 Gram Sodium Diet Other - please comment      Output:   I/O last 3 completed shifts:  In: 126 [P.O.:120; I.V.:6]  Out: 3075 [Urine:3075]    Risk Assessment:   Sensory Perception: 3-->slightly limited  Moisture: 4-->rarely moist  Activity: 3-->walks occasionally  Mobility: 3-->slightly limited  Nutrition: 3-->adequate  Friction and Shear: 2-->potential problem  Sung Score: 18                          Labs:   Recent Labs   Lab 12/28/21  0719 12/27/21  0739   ALBUMIN  --  2.5*   HGB 11.3*  10.7*   WBC 3.9* 4.9       Physical Exam  Areas of skin assessed: focused Bilateral legs, left arm    Wound Location:  Bilateral legs  Date of last photo 12/30 12/27/21    Right       Left     Wound History: Patient unable to provide any history of legs.  Bilateral legs with dry flaking skin.  Slight weeping noted on kerlix removed however no open areas visualized.  Minor callusing on planter feet.  Legs with edema but did not tolerate compression per RN, patient states wraps were too painful.      Wound Location:  Left upper arm  Date of last photo 12/30    Wound History: Skin tear  Wound Base: 100 % agranular     Palpation of the wound bed: normal      Drainage: moderate     Description of drainage: bloody     Measurements (length x width x depth, in cm) 1.3  x 0.3 cm      Tunneling N/A     Undermining N/A  Periwound skin: intact      Color: normal and consistent with surrounding tissue      Temperature: normal   Odor: none    Interventions  Visual inspection and assessment completed   Wound Care Rationale Improve absorptive capacity, Promote moist wound healing without tissue dehydration  and Provide protection   Wound Care: done per plan of care  Supplies: gathered and placed at the bedside  Current off-loading measures: Pillows under calves and Pillows  Current support surface: Standard  Atmos Air mattress  Education provided to: plan of care  Discussed plan of care with Patient and Nurse    Amanda Jackson RN CWOCN

## 2021-12-30 NOTE — PLAN OF CARE
"Heart Failure Care Map  GOALS TO BE MET BEFORE DISCHARGE:    1. Decrease congestion and/or edema with diuretic therapy to achieve near optimal volume status.     Dyspnea improved: Yes, satisfactory for discharge.   Edema improved: No, further care required to meet this goal. Please explain 3+ edema on legs and scrotum         Net I/O and Weights since admission:   11/30 0700 - 12/30 0659  In: 1521 [P.O.:1515; I.V.:6]  Out: 39326 [Urine:81396]  Net: -9204     Vitals:    12/26/21 1829 12/27/21 0530 12/28/21 0404 12/29/21 0532   Weight: 86.4 kg (190 lb 8 oz) 84.8 kg (187 lb) 84.4 kg (186 lb) 83.5 kg (184 lb)    12/30/21 0550   Weight: 83.4 kg (183 lb 14.4 oz)       2.  O2 sats > 90% on room air, or at prior home O2 therapy level.      Able to wean O2 this shift to keep sats above 90%?: Yes, satisfactory for discharge.   Does patient use Home O2? No          Current oxygenation status:   SpO2: 97 %     O2 Device: None (Room air),      3.  Tolerates ambulation and mobility near baseline.     Ambulation: No, further care required to meet this goal. Please explain stayed in bed all night   Times patient ambulated with staff this shift: 0    Please review the Heart Failure Care Map for additional HF goal outcomes.    Manny Fernandes RN  12/30/2021     BP 95/50 (BP Location: Right arm)   Pulse 50   Temp (!) 96.3  F (35.7  C) (Axillary)   Resp 18   Ht 1.702 m (5' 7\")   Wt 83.4 kg (183 lb 14.4 oz)   SpO2 97%   BMI 28.80 kg/m    Pt stable with low BP. Not oriented to situation.Pt is more confused tonight and refused bipap.  Biggs in place. Linens changed. Pt had two pauses in HR that lasted 2 seconds each. HR would also dip down to 30s at times but then bounce back up to 50/60s. Leg edema is still present and skin on legs is scaling/peeling. Continue POC.    "

## 2021-12-31 NOTE — PROGRESS NOTES
Care Management Follow Up    Length of Stay (days): 5    Expected Discharge Date: 01/01/2022     Concerns to be Addressed: discharge planning     Patient plan of care discussed at interdisciplinary rounds: Yes    Anticipated Discharge Disposition: Skilled Nursing Facilty,Transitional Care     Anticipated Discharge Services: None  Anticipated Discharge DME: [facility]    Patient/family educated on Medicare website which has current facility and service quality ratings: yes  Education Provided on the Discharge Plan:  yes  Patient/Family in Agreement with the Plan: yes    Referrals Placed by CM/SW: Post Acute Facilities  Private pay costs discussed: private room/amenity fees and transportation costs    Additional Information:  Jeffery spoke with Mary at [facility], [phone], who said that they will be able to admit the pt tomorrow if he is medically ready.  Mary requested a 1400 discharge.  Jeffery spoke with the pt's dtr Stacia, who said that she will provide transport for the pt tomorrow at 1400.    Sw will continue with discharge planning and will be available as needed until discharge.      ELIEZER Cash, [facility]  Inpatient Care Coordination  [facility]  [phone]

## 2021-12-31 NOTE — PLAN OF CARE
Pt alert, but confused/forgetful at times.  Has c/o left shoulder pain.  PRN ultram and scheduled voltarin cream.  Ambulates with assist of 1 gait belt and walker.  Lungs: diminished with crackles.  99% RA.  Refused c-pap at night. Tele: A-fib with cvr.  2 gm sodium diet. Biggs catheter in place.  PIV saline locked.  Potassium replaced today.  Re-check in AM.  Continue IV lasix and pain control.  Discharge to TCU when ready.  Palliative, WOC and PT/OT following    Heart Failure Care Map  GOALS TO BE MET BEFORE DISCHARGE:    1. Decrease congestion and/or edema with diuretic therapy to achieve near optimal volume status.     Dyspnea improved: Yes, satisfactory for discharge.   Edema improved: No, further care required to meet this goal. Please explain Edema L/E continues        Net I/O and Weights since admission:   11/30 2300 - 12/30 2259  In: 1881 [P.O.:1875; I.V.:6]  Out: 92269 [Urine:68461]  Net: -01958     Vitals:    12/26/21 1829 12/27/21 0530 12/28/21 0404 12/29/21 0532   Weight: 86.4 kg (190 lb 8 oz) 84.8 kg (187 lb) 84.4 kg (186 lb) 83.5 kg (184 lb)    12/30/21 0550   Weight: 83.4 kg (183 lb 14.4 oz)       2.  O2 sats > 90% on room air, or at prior home O2 therapy level.      Able to wean O2 this shift to keep sats above 90%?: Yes, satisfactory for discharge.   Does patient use Home O2? No          Current oxygenation status:   SpO2: 99 %     O2 Device: None (Room air),      3.  Tolerates ambulation and mobility near baseline.     Ambulation: No, further care required to meet this goal. Please explain Assist of 1 GB and walker   Times patient ambulated with staff this shift: 2    Please review the Heart Failure Care Map for additional HF goal outcomes.    Dalia Reyna RN  12/30/2021

## 2021-12-31 NOTE — ACP (ADVANCE CARE PLANNING)
Assumed care this am at 9 am.      Heart Failure Care Map  GOALS TO BE MET BEFORE DISCHARGE:    1. Decrease congestion and/or edema with diuretic therapy to achieve near optimal volume status.     Dyspnea improved: yes   Edema improved: No, further care required to meet this goal. Please explain still with BLE edema 2-3 +        Net I/O and Weights since admission:   12/01 1500 - 12/31 1459  In: 1881 [P.O.:1875; I.V.:6]  Out: 00349 [Urine:51770]  Net: -99827     Vitals:    12/26/21 1829 12/27/21 0530 12/28/21 0404 12/29/21 0532   Weight: 86.4 kg (190 lb 8 oz) 84.8 kg (187 lb) 84.4 kg (186 lb) 83.5 kg (184 lb)    12/30/21 0550 12/31/21 0659   Weight: 83.4 kg (183 lb 14.4 oz) 77.3 kg (170 lb 8 oz)       2.  O2 sats > 90% on room air, or at prior home O2 therapy level.      Able to wean O2 this shift to keep sats above 90%?: Yes, satisfactory for discharge.   Does patient use Home O2? No          Current oxygenation status:   SpO2: 100 %     O2 Device: None (Room air),      3.  Tolerates ambulation and mobility near baseline.     Ambulation: Yes, satisfactory for discharge.   Times patient ambulated with staff this shift: 1    Please review the Heart Failure Care Map for additional HF goal outcomes.    Shanna Lim RN  12/31/2021     Confused at times.   Is redirectable.    Biggs to bsd.  ble venous stasis, red.    Plan:  Safety, SWS for discontinue planning.        Diurese.    Biggs output noted to be pink tinged.  Tube is securely attached.

## 2021-12-31 NOTE — PROGRESS NOTES
[facility]    Medicine Progress Note - Hospitalist Service       Date of Admission:  12/26/2021    Assessment & Plan         Milo Martinez is an 81-year-old male with a history of chronic A. fib on anticoagulation, CAD, hypertension, systolic heart failure, hyperlipidemia who presented due to anasarca on 12/26.  He had been previously seen within the last 2 weeks by his primary care doctor for the same, and a rash on his bilateral legs.    Acute on chronic systolic heart failure exacerbation  A. Fib with intermittent loretta cardia  CAD  Anasarca  His dry weight is approximately 167 pounds.  He is currently 170.  Echocardiogram showing improvement with EF  50%.  IV pulled overnight attempted oral Lasix today with no response.  -40 IV Lasix now  -Continue apixaban  -Continue home statin   -Holding home lisinopril with recent lower blood pressure while diuresing  -Monitor potassium and replace as needed  -Lymphedema wraps  -BMP in a.m., replacing electrolytes as needed during diuresis    Acute encephalopathy on top of dementia  He had previously been managing his own finances.  Recently moved to independent living to be near his wife who is in memory care.  Per report was not sundowning at his new facility.  However on admission here he was very confused and continues to sundown most nights.  Slums on 12/28 was 14 out of 30.  Head CT showing only microvascular changes.  No evidence of acute stroke.  MRI not done at this time.  No evidence of acute infection.  Electrolytes normal.  -increase olanzapine to 5 mg at bedtime  -Continue to monitor for signs of infection or other causes for acute encephalopathy  -Holding gabapentin at this point in time    Urinary retention  Prostate cancer  Biggs placed on admission due to retention.  Has a history of prostate cancer.  UA UC negative on admission.  Today now has dark urine.  Continued encephalopathy.  -UA/UC if positive will start on ceftriaxone, if  positive will remove Biggs at midnight consider replacing in a.m. if continues to retain    Obstructive sleep apnea  -BiPAP with sleeping    Hyperkalemia-resolved  Present on admission    BPH  On tamsulosin    Stable chronic anemia  -Continue home iron    Redness on legs  Previously had been given prednisone and increase in his diuretics to try and treat this.  Likely related to venous stasis and lymphedema.  No evidence of infection at this point in time.  Previously this had been quite painful.  Appeared to have improved on gabapentin and with diuresis.  -Appreciate WOC consult on 12/27  -Holding gabapentin for possible contribution to encephalopathy.    Rosacea  -stopped doxycycline    Left shoulder pain  Reproducible with palpation and movement of arm.  With previous history of rotator cuff injury.  Reports that diclofenac gel has helped in the past.  - diclofenac gel.    Groin rash  -Miconazole powder       Diet: 2 Gram Sodium Diet Other - please comment    DVT Prophylaxis: DOAC  Biggs Catheter: PRESENT, indication: Retention  Central Lines: None  Code Status: Full Code      Disposition Plan   Expected Discharge: 01/01/2022     Anticipated discharge location: inpatient rehabilitation facility    Delays:     Placement - TCU            The patient's care was discussed with the Bedside Nurse, Patient and Patient's Family.    Alysia Galindo MD  Hospitalist Service  [facility]  Securely message with the [employer] Web Console (learn more here)  Text page via [employer] Paging/Directory        Clinically Significant Risk Factors Present on Admission                    ______________________________________________________________________    Interval History   Overnight he was a little bit more agitated.  Pulling at his lines in his Biggs.  He pulled out his IV.  No new cough.  Complains of no new pain.  Soft well.  Met with daughter this afternoon.    Data reviewed today: I reviewed all  medications, new labs and imaging results over the last 24 hours. I personally reviewed no images or EKG's today.    Physical Exam   Vital Signs: Temp: (!) 96.2  F (35.7  C) Temp src: Oral BP: 134/81 Pulse: 77   Resp: 18 SpO2: 100 % O2 Device: None (Room air)    Weight: 170 lbs 8 oz  General Appearance: Pleasant gentleman no acute distress  Respiratory: Clear to auscultation bilaterally  Cardiovascular: Irregularly irregular, S1, S2, no murmurs rubs or gallops  GI: Soft, nontender, bowel sounds normal active  Skin: Legs with 2+ edema and venous stasis changes  Other: Neuro alert and oriented x2 today  Fully with red urine minimal output today    Data   Recent Labs   Lab 12/31/21  0608 12/30/21  1834 12/30/21  1342 12/30/21  0618 12/30/21  0149 12/29/21  1901 12/29/21  0832 12/28/21  0719 12/27/21  1246 12/27/21  0739 12/26/21  1748 12/26/21  1340   WBC  --   --   --   --   --   --   --  3.9*  --  4.9  --  4.7   HGB  --   --   --   --   --   --   --  11.3*  --  10.7*  --  11.4*   MCV  --   --   --   --   --   --   --  107*  --  104*  --  102*   PLT  --   --   --   --   --   --   --  150  --  147*  --  153     --   --   --   --   --  137 138  --  138   < > 135   POTASSIUM 3.8 4.0 3.4   < >  --    < > 3.6 3.8   < > 3.4   < > 5.9*   CHLORIDE 106  --   --   --   --   --  104 104  --  104   < > 104   CO2 28  --   --   --   --   --  29 29  --  28   < > 25   BUN 19  --   --   --   --   --  15 14  --  13   < > 11   CR 0.98  --   --   --   --   --  0.94 0.88  --  0.90   < > 0.77   ANIONGAP 6  --   --   --   --   --  4 5  --  6   < > 6   ANG 8.7  --   --   --   --   --  8.3* 8.9  --  8.5   < > 8.6   GLC 94  --   --   --  113*  --  101* 94   < > 94   < > 100*   ALBUMIN  --   --   --   --   --   --   --   --   --  2.5*  --  2.9*   PROTTOTAL  --   --   --   --   --   --   --   --   --  6.4*  --  7.6   BILITOTAL  --   --   --   --   --   --   --   --   --  1.9*  --  1.7*   ALKPHOS  --   --   --   --   --   --   --   --   --   173*  --  193*   ALT  --   --   --   --   --   --   --   --   --  14  --  20   AST  --   --   --   --   --   --   --   --   --  19  --  73*    < > = values in this interval not displayed.     No results found for this or any previous visit (from the past 24 hour(s)).

## 2022-01-01 ENCOUNTER — APPOINTMENT (OUTPATIENT)
Dept: GENERAL RADIOLOGY | Facility: CLINIC | Age: 82
DRG: 291 | End: 2022-01-01
Attending: EMERGENCY MEDICINE
Payer: MEDICARE

## 2022-01-01 ENCOUNTER — PATIENT OUTREACH (OUTPATIENT)
Dept: FAMILY MEDICINE | Facility: CLINIC | Age: 82
End: 2022-01-01
Payer: OTHER GOVERNMENT

## 2022-01-01 ENCOUNTER — LAB REQUISITION (OUTPATIENT)
Dept: LAB | Facility: CLINIC | Age: 82
End: 2022-01-01
Payer: MEDICARE

## 2022-01-01 ENCOUNTER — APPOINTMENT (OUTPATIENT)
Dept: CT IMAGING | Facility: CLINIC | Age: 82
DRG: 291 | End: 2022-01-01
Attending: EMERGENCY MEDICINE
Payer: MEDICARE

## 2022-01-01 ENCOUNTER — DOCUMENTATION ONLY (OUTPATIENT)
Dept: OTHER | Facility: CLINIC | Age: 82
End: 2022-01-01
Payer: OTHER GOVERNMENT

## 2022-01-01 ENCOUNTER — RESULTS ONLY (OUTPATIENT)
Dept: URGENT CARE | Facility: URGENT CARE | Age: 82
End: 2022-01-01
Payer: OTHER GOVERNMENT

## 2022-01-01 ENCOUNTER — TRANSITIONAL CARE UNIT VISIT (OUTPATIENT)
Dept: GERIATRICS | Facility: CLINIC | Age: 82
End: 2022-01-01
Payer: MEDICARE

## 2022-01-01 ENCOUNTER — APPOINTMENT (OUTPATIENT)
Dept: PHYSICAL THERAPY | Facility: CLINIC | Age: 82
DRG: 291 | End: 2022-01-01
Payer: MEDICARE

## 2022-01-01 ENCOUNTER — PATIENT OUTREACH (OUTPATIENT)
Dept: CARE COORDINATION | Facility: CLINIC | Age: 82
End: 2022-01-01
Payer: OTHER GOVERNMENT

## 2022-01-01 ENCOUNTER — PATIENT OUTREACH (OUTPATIENT)
Dept: FAMILY MEDICINE | Facility: CLINIC | Age: 82
End: 2022-01-01

## 2022-01-01 ENCOUNTER — PATIENT OUTREACH (OUTPATIENT)
Dept: CARE COORDINATION | Facility: CLINIC | Age: 82
End: 2022-01-01

## 2022-01-01 ENCOUNTER — APPOINTMENT (OUTPATIENT)
Dept: CARDIOLOGY | Facility: CLINIC | Age: 82
DRG: 291 | End: 2022-01-01
Attending: INTERNAL MEDICINE
Payer: MEDICARE

## 2022-01-01 ENCOUNTER — DISCHARGE SUMMARY NURSING HOME (OUTPATIENT)
Dept: GERIATRICS | Facility: CLINIC | Age: 82
End: 2022-01-01
Payer: MEDICARE

## 2022-01-01 ENCOUNTER — TELEPHONE (OUTPATIENT)
Dept: GERIATRICS | Facility: CLINIC | Age: 82
End: 2022-01-01
Payer: OTHER GOVERNMENT

## 2022-01-01 ENCOUNTER — APPOINTMENT (OUTPATIENT)
Dept: ULTRASOUND IMAGING | Facility: CLINIC | Age: 82
DRG: 291 | End: 2022-01-01
Attending: EMERGENCY MEDICINE
Payer: MEDICARE

## 2022-01-01 ENCOUNTER — APPOINTMENT (OUTPATIENT)
Dept: OCCUPATIONAL THERAPY | Facility: CLINIC | Age: 82
DRG: 291 | End: 2022-01-01
Payer: MEDICARE

## 2022-01-01 ENCOUNTER — HOSPITAL ENCOUNTER (EMERGENCY)
Facility: CLINIC | Age: 82
Discharge: HOME | End: 2022-01-25
Attending: NURSE PRACTITIONER | Admitting: NURSE PRACTITIONER
Payer: MEDICARE

## 2022-01-01 ENCOUNTER — TELEPHONE (OUTPATIENT)
Dept: CARDIOLOGY | Facility: CLINIC | Age: 82
End: 2022-01-01

## 2022-01-01 ENCOUNTER — HOSPITAL ENCOUNTER (INPATIENT)
Facility: CLINIC | Age: 82
LOS: 3 days | Discharge: HOSPICE | DRG: 291 | End: 2022-02-08
Attending: EMERGENCY MEDICINE | Admitting: INTERNAL MEDICINE
Payer: MEDICARE

## 2022-01-01 ENCOUNTER — HOSPITAL ENCOUNTER (INPATIENT)
Facility: CLINIC | Age: 82
LOS: 6 days | Discharge: NURSING, REHAB OR LONG-TERM CARE | DRG: 291 | End: 2022-02-03
Attending: EMERGENCY MEDICINE | Admitting: INTERNAL MEDICINE
Payer: MEDICARE

## 2022-01-01 ENCOUNTER — APPOINTMENT (OUTPATIENT)
Dept: PHYSICAL THERAPY | Facility: CLINIC | Age: 82
DRG: 291 | End: 2022-01-01
Attending: INTERNAL MEDICINE
Payer: MEDICARE

## 2022-01-01 ENCOUNTER — TELEPHONE (OUTPATIENT)
Dept: FAMILY MEDICINE | Facility: CLINIC | Age: 82
End: 2022-01-01
Payer: OTHER GOVERNMENT

## 2022-01-01 VITALS
DIASTOLIC BLOOD PRESSURE: 75 MMHG | BODY MASS INDEX: 27.06 KG/M2 | WEIGHT: 172.8 LBS | HEART RATE: 56 BPM | RESPIRATION RATE: 18 BRPM | TEMPERATURE: 96.7 F | OXYGEN SATURATION: 97 % | SYSTOLIC BLOOD PRESSURE: 120 MMHG

## 2022-01-01 VITALS
RESPIRATION RATE: 18 BRPM | SYSTOLIC BLOOD PRESSURE: 120 MMHG | OXYGEN SATURATION: 96 % | TEMPERATURE: 97.6 F | HEIGHT: 67 IN | HEART RATE: 80 BPM | BODY MASS INDEX: 26.68 KG/M2 | DIASTOLIC BLOOD PRESSURE: 68 MMHG | WEIGHT: 170 LBS

## 2022-01-01 VITALS
HEART RATE: 65 BPM | RESPIRATION RATE: 17 BRPM | BODY MASS INDEX: 26.59 KG/M2 | WEIGHT: 169.4 LBS | OXYGEN SATURATION: 98 % | HEIGHT: 67 IN | DIASTOLIC BLOOD PRESSURE: 70 MMHG | TEMPERATURE: 97.5 F | SYSTOLIC BLOOD PRESSURE: 125 MMHG

## 2022-01-01 VITALS
BODY MASS INDEX: 25.62 KG/M2 | RESPIRATION RATE: 16 BRPM | TEMPERATURE: 98.2 F | SYSTOLIC BLOOD PRESSURE: 108 MMHG | WEIGHT: 163.6 LBS | DIASTOLIC BLOOD PRESSURE: 66 MMHG | OXYGEN SATURATION: 98 % | HEART RATE: 66 BPM

## 2022-01-01 VITALS
HEIGHT: 67 IN | BODY MASS INDEX: 26.59 KG/M2 | HEART RATE: 88 BPM | RESPIRATION RATE: 16 BRPM | SYSTOLIC BLOOD PRESSURE: 143 MMHG | TEMPERATURE: 97.9 F | WEIGHT: 169.4 LBS | DIASTOLIC BLOOD PRESSURE: 86 MMHG | OXYGEN SATURATION: 95 %

## 2022-01-01 VITALS
SYSTOLIC BLOOD PRESSURE: 142 MMHG | HEART RATE: 32 BPM | DIASTOLIC BLOOD PRESSURE: 86 MMHG | OXYGEN SATURATION: 100 % | RESPIRATION RATE: 20 BRPM | TEMPERATURE: 98.1 F

## 2022-01-01 VITALS
RESPIRATION RATE: 18 BRPM | BODY MASS INDEX: 26.27 KG/M2 | DIASTOLIC BLOOD PRESSURE: 68 MMHG | HEIGHT: 67 IN | SYSTOLIC BLOOD PRESSURE: 156 MMHG | OXYGEN SATURATION: 98 % | WEIGHT: 167.4 LBS | TEMPERATURE: 98.4 F | HEART RATE: 80 BPM

## 2022-01-01 VITALS
BODY MASS INDEX: 27.12 KG/M2 | SYSTOLIC BLOOD PRESSURE: 150 MMHG | TEMPERATURE: 96.6 F | OXYGEN SATURATION: 99 % | HEIGHT: 67 IN | HEART RATE: 71 BPM | RESPIRATION RATE: 16 BRPM | DIASTOLIC BLOOD PRESSURE: 75 MMHG | WEIGHT: 172.8 LBS

## 2022-01-01 VITALS
OXYGEN SATURATION: 96 % | DIASTOLIC BLOOD PRESSURE: 69 MMHG | TEMPERATURE: 98.4 F | WEIGHT: 173.6 LBS | HEIGHT: 67 IN | HEART RATE: 84 BPM | RESPIRATION RATE: 18 BRPM | BODY MASS INDEX: 27.25 KG/M2 | SYSTOLIC BLOOD PRESSURE: 128 MMHG

## 2022-01-01 DIAGNOSIS — E78.5 HYPERLIPIDEMIA LDL GOAL <100: Primary | ICD-10-CM

## 2022-01-01 DIAGNOSIS — I25.10 CORONARY ARTERY DISEASE INVOLVING NATIVE CORONARY ARTERY OF NATIVE HEART WITHOUT ANGINA PECTORIS: ICD-10-CM

## 2022-01-01 DIAGNOSIS — I34.0 NONRHEUMATIC MITRAL VALVE REGURGITATION: ICD-10-CM

## 2022-01-01 DIAGNOSIS — N40.1 BENIGN PROSTATIC HYPERPLASIA WITH LOWER URINARY TRACT SYMPTOMS, SYMPTOM DETAILS UNSPECIFIED: ICD-10-CM

## 2022-01-01 DIAGNOSIS — U07.1 COVID-19: ICD-10-CM

## 2022-01-01 DIAGNOSIS — E78.5 HYPERLIPIDEMIA LDL GOAL <100: ICD-10-CM

## 2022-01-01 DIAGNOSIS — I50.42 CHRONIC COMBINED SYSTOLIC (CONGESTIVE) AND DIASTOLIC (CONGESTIVE) HEART FAILURE (H): ICD-10-CM

## 2022-01-01 DIAGNOSIS — G47.00 INSOMNIA, UNSPECIFIED TYPE: ICD-10-CM

## 2022-01-01 DIAGNOSIS — E78.5 DYSLIPIDEMIA: ICD-10-CM

## 2022-01-01 DIAGNOSIS — I50.20 HFREF (HEART FAILURE WITH REDUCED EJECTION FRACTION) (H): Primary | ICD-10-CM

## 2022-01-01 DIAGNOSIS — G47.33 OSA (OBSTRUCTIVE SLEEP APNEA): ICD-10-CM

## 2022-01-01 DIAGNOSIS — M21.42 PES PLANUS OF BOTH FEET: ICD-10-CM

## 2022-01-01 DIAGNOSIS — I77.810 THORACIC AORTIC ECTASIA (H): ICD-10-CM

## 2022-01-01 DIAGNOSIS — I50.42 CHRONIC COMBINED SYSTOLIC AND DIASTOLIC CONGESTIVE HEART FAILURE (H): Primary | ICD-10-CM

## 2022-01-01 DIAGNOSIS — M20.11 HAV (HALLUX ABDUCTO VALGUS), RIGHT: ICD-10-CM

## 2022-01-01 DIAGNOSIS — I50.9 CONGESTIVE HEART FAILURE, UNSPECIFIED HF CHRONICITY, UNSPECIFIED HEART FAILURE TYPE (H): Primary | ICD-10-CM

## 2022-01-01 DIAGNOSIS — I10 ESSENTIAL HYPERTENSION: ICD-10-CM

## 2022-01-01 DIAGNOSIS — R41.89 COGNITIVE IMPAIRMENT: ICD-10-CM

## 2022-01-01 DIAGNOSIS — R91.1 PULMONARY NODULE: ICD-10-CM

## 2022-01-01 DIAGNOSIS — R53.81 PHYSICAL DECONDITIONING: ICD-10-CM

## 2022-01-01 DIAGNOSIS — I87.2 VENOUS INSUFFICIENCY OF BOTH LOWER EXTREMITIES: ICD-10-CM

## 2022-01-01 DIAGNOSIS — R60.9 EDEMA, UNSPECIFIED TYPE: ICD-10-CM

## 2022-01-01 DIAGNOSIS — R60.0 LOWER EXTREMITY EDEMA: ICD-10-CM

## 2022-01-01 DIAGNOSIS — L71.9 ROSACEA: ICD-10-CM

## 2022-01-01 DIAGNOSIS — C61 PROSTATE CANCER (H): ICD-10-CM

## 2022-01-01 DIAGNOSIS — B35.1 ONYCHOMYCOSIS OF TOENAIL: ICD-10-CM

## 2022-01-01 DIAGNOSIS — R06.02 SOB (SHORTNESS OF BREATH): ICD-10-CM

## 2022-01-01 DIAGNOSIS — I87.8 VENOUS STASIS: ICD-10-CM

## 2022-01-01 DIAGNOSIS — E87.6 HYPOKALEMIA: ICD-10-CM

## 2022-01-01 DIAGNOSIS — R41.0 DELIRIUM: ICD-10-CM

## 2022-01-01 DIAGNOSIS — I48.20 CHRONIC ATRIAL FIBRILLATION (H): ICD-10-CM

## 2022-01-01 DIAGNOSIS — M25.512 LEFT SHOULDER PAIN, UNSPECIFIED CHRONICITY: ICD-10-CM

## 2022-01-01 DIAGNOSIS — I87.2 STASIS DERMATITIS OF BOTH LEGS: ICD-10-CM

## 2022-01-01 DIAGNOSIS — M79.671 RIGHT FOOT PAIN: ICD-10-CM

## 2022-01-01 DIAGNOSIS — I50.33 ACUTE ON CHRONIC DIASTOLIC CONGESTIVE HEART FAILURE (H): ICD-10-CM

## 2022-01-01 DIAGNOSIS — F03.91 DEMENTIA WITH BEHAVIORAL DISTURBANCE, UNSPECIFIED DEMENTIA TYPE: ICD-10-CM

## 2022-01-01 DIAGNOSIS — I89.0 LYMPHEDEMA: ICD-10-CM

## 2022-01-01 DIAGNOSIS — M21.41 PES PLANUS OF BOTH FEET: ICD-10-CM

## 2022-01-01 DIAGNOSIS — L84 SKIN CALLUS: ICD-10-CM

## 2022-01-01 DIAGNOSIS — Z85.46 HISTORY OF PROSTATE CANCER: ICD-10-CM

## 2022-01-01 DIAGNOSIS — Z11.1 ENCOUNTER FOR SCREENING FOR RESPIRATORY TUBERCULOSIS: ICD-10-CM

## 2022-01-01 DIAGNOSIS — F03.91 DEMENTIA WITH BEHAVIORAL DISTURBANCE, UNSPECIFIED DEMENTIA TYPE: Primary | ICD-10-CM

## 2022-01-01 DIAGNOSIS — L30.9 DERMATITIS: ICD-10-CM

## 2022-01-01 DIAGNOSIS — M79.89 LEG SWELLING: Primary | ICD-10-CM

## 2022-01-01 DIAGNOSIS — I50.42 CHRONIC COMBINED SYSTOLIC AND DIASTOLIC CONGESTIVE HEART FAILURE (H): ICD-10-CM

## 2022-01-01 DIAGNOSIS — I50.23 ACUTE ON CHRONIC HFREF (HEART FAILURE WITH REDUCED EJECTION FRACTION) (H): Primary | ICD-10-CM

## 2022-01-01 DIAGNOSIS — R33.9 URINARY RETENTION: ICD-10-CM

## 2022-01-01 DIAGNOSIS — I48.21 PERMANENT ATRIAL FIBRILLATION (H): ICD-10-CM

## 2022-01-01 DIAGNOSIS — N18.9 CHRONIC KIDNEY DISEASE, UNSPECIFIED: ICD-10-CM

## 2022-01-01 DIAGNOSIS — R40.2422 GLASGOW COMA SCALE TOTAL SCORE 9-12, AT ARRIVAL TO EMERGENCY DEPARTMENT: ICD-10-CM

## 2022-01-01 LAB
ALBUMIN SERPL-MCNC: 2.9 G/DL (ref 3.4–5)
ALBUMIN UR-MCNC: 10 MG/DL
ALBUMIN UR-MCNC: NEGATIVE MG/DL
ALP SERPL-CCNC: 218 U/L (ref 40–150)
ALT SERPL W P-5'-P-CCNC: 22 U/L (ref 0–70)
ANION GAP SERPL CALCULATED.3IONS-SCNC: 3 MMOL/L (ref 3–14)
ANION GAP SERPL CALCULATED.3IONS-SCNC: 3 MMOL/L (ref 3–14)
ANION GAP SERPL CALCULATED.3IONS-SCNC: 4 MMOL/L (ref 3–14)
ANION GAP SERPL CALCULATED.3IONS-SCNC: 5 MMOL/L (ref 3–14)
ANION GAP SERPL CALCULATED.3IONS-SCNC: 5 MMOL/L (ref 3–14)
ANION GAP SERPL CALCULATED.3IONS-SCNC: 6 MMOL/L (ref 3–14)
ANION GAP SERPL CALCULATED.3IONS-SCNC: 7 MMOL/L (ref 3–14)
ANION GAP SERPL CALCULATED.3IONS-SCNC: 7 MMOL/L (ref 3–14)
ANION GAP SERPL CALCULATED.3IONS-SCNC: 8 MMOL/L (ref 3–14)
ANION GAP SERPL CALCULATED.3IONS-SCNC: 8 MMOL/L (ref 3–14)
APPEARANCE UR: CLEAR
APPEARANCE UR: CLEAR
AST SERPL W P-5'-P-CCNC: 23 U/L (ref 0–45)
ATRIAL RATE - MUSE: 107 BPM
ATRIAL RATE - MUSE: 29 BPM
ATRIAL RATE - MUSE: 340 BPM
ATRIAL RATE - MUSE: 48 BPM
ATRIAL RATE - MUSE: 56 BPM
ATRIAL RATE - MUSE: 67 BPM
BACTERIA UR CULT: NORMAL
BASE EXCESS BLDV CALC-SCNC: 3 MMOL/L (ref -7.7–1.9)
BASOPHILS # BLD AUTO: 0 10E3/UL (ref 0–0.2)
BASOPHILS # BLD AUTO: 0.1 10E3/UL (ref 0–0.2)
BASOPHILS NFR BLD AUTO: 1 %
BASOPHILS NFR BLD AUTO: 1 %
BILIRUB SERPL-MCNC: 1 MG/DL (ref 0.2–1.3)
BILIRUB UR QL STRIP: NEGATIVE
BILIRUB UR QL STRIP: NEGATIVE
BUN SERPL-MCNC: 16 MG/DL (ref 7–30)
BUN SERPL-MCNC: 18 MG/DL (ref 7–30)
BUN SERPL-MCNC: 19 MG/DL (ref 7–30)
BUN SERPL-MCNC: 20 MG/DL (ref 7–30)
BUN SERPL-MCNC: 21 MG/DL (ref 7–30)
BUN SERPL-MCNC: 21 MG/DL (ref 7–30)
BUN SERPL-MCNC: 23 MG/DL (ref 7–30)
BUN SERPL-MCNC: 25 MG/DL (ref 7–30)
BUN SERPL-MCNC: 25 MG/DL (ref 7–30)
BUN SERPL-MCNC: 26 MG/DL (ref 7–30)
BUN SERPL-MCNC: 27 MG/DL (ref 7–30)
CALCIUM SERPL-MCNC: 8.1 MG/DL (ref 8.5–10.1)
CALCIUM SERPL-MCNC: 8.6 MG/DL (ref 8.5–10.1)
CALCIUM SERPL-MCNC: 8.6 MG/DL (ref 8.5–10.1)
CALCIUM SERPL-MCNC: 8.7 MG/DL (ref 8.5–10.1)
CALCIUM SERPL-MCNC: 8.8 MG/DL (ref 8.5–10.1)
CALCIUM SERPL-MCNC: 8.9 MG/DL (ref 8.5–10.1)
CALCIUM SERPL-MCNC: 9.1 MG/DL (ref 8.5–10.1)
CALCIUM SERPL-MCNC: 9.3 MG/DL (ref 8.5–10.1)
CALCIUM SERPL-MCNC: 9.4 MG/DL (ref 8.5–10.1)
CALCIUM SERPL-MCNC: 9.6 MG/DL (ref 8.5–10.1)
CALCIUM SERPL-MCNC: 9.6 MG/DL (ref 8.5–10.1)
CALCIUM SERPL-MCNC: 9.9 MG/DL (ref 8.5–10.1)
CHLORIDE BLD-SCNC: 106 MMOL/L (ref 94–109)
CHLORIDE BLD-SCNC: 107 MMOL/L (ref 94–109)
CHLORIDE BLD-SCNC: 107 MMOL/L (ref 94–109)
CHLORIDE BLD-SCNC: 108 MMOL/L (ref 94–109)
CHLORIDE BLD-SCNC: 109 MMOL/L (ref 94–109)
CHLORIDE BLD-SCNC: 110 MMOL/L (ref 94–109)
CHLORIDE BLD-SCNC: 111 MMOL/L (ref 94–109)
CHLORIDE BLD-SCNC: 112 MMOL/L (ref 94–109)
CHLORIDE BLD-SCNC: 112 MMOL/L (ref 94–109)
CO2 SERPL-SCNC: 24 MMOL/L (ref 20–32)
CO2 SERPL-SCNC: 25 MMOL/L (ref 20–32)
CO2 SERPL-SCNC: 26 MMOL/L (ref 20–32)
CO2 SERPL-SCNC: 27 MMOL/L (ref 20–32)
CO2 SERPL-SCNC: 27 MMOL/L (ref 20–32)
CO2 SERPL-SCNC: 28 MMOL/L (ref 20–32)
CO2 SERPL-SCNC: 29 MMOL/L (ref 20–32)
COLOR UR AUTO: YELLOW
COLOR UR AUTO: YELLOW
CREAT SERPL-MCNC: 0.82 MG/DL (ref 0.66–1.25)
CREAT SERPL-MCNC: 0.87 MG/DL (ref 0.66–1.25)
CREAT SERPL-MCNC: 0.91 MG/DL (ref 0.66–1.25)
CREAT SERPL-MCNC: 0.96 MG/DL (ref 0.66–1.25)
CREAT SERPL-MCNC: 1 MG/DL (ref 0.66–1.25)
CREAT SERPL-MCNC: 1.03 MG/DL (ref 0.66–1.25)
CREAT SERPL-MCNC: 1.03 MG/DL (ref 0.66–1.25)
CREAT SERPL-MCNC: 1.05 MG/DL (ref 0.66–1.25)
CREAT SERPL-MCNC: 1.08 MG/DL (ref 0.66–1.25)
CREAT SERPL-MCNC: 1.12 MG/DL (ref 0.66–1.25)
CREAT SERPL-MCNC: 1.16 MG/DL (ref 0.66–1.25)
CREAT SERPL-MCNC: 1.16 MG/DL (ref 0.66–1.25)
CREAT SERPL-MCNC: 1.18 MG/DL (ref 0.66–1.25)
CREAT SERPL-MCNC: 1.22 MG/DL (ref 0.66–1.25)
CRP SERPL-MCNC: 18.9 MG/L (ref 0–8)
CRP SERPL-MCNC: 4.1 MG/L (ref 0–8)
CRP SERPL-MCNC: 7.4 MG/L (ref 0–8)
DIASTOLIC BLOOD PRESSURE - MUSE: NORMAL MMHG
EOSINOPHIL # BLD AUTO: 0.1 10E3/UL (ref 0–0.7)
EOSINOPHIL # BLD AUTO: 0.2 10E3/UL (ref 0–0.7)
EOSINOPHIL NFR BLD AUTO: 2 %
EOSINOPHIL NFR BLD AUTO: 3 %
ERYTHROCYTE [DISTWIDTH] IN BLOOD BY AUTOMATED COUNT: 15 % (ref 10–15)
ERYTHROCYTE [DISTWIDTH] IN BLOOD BY AUTOMATED COUNT: 15 % (ref 10–15)
ERYTHROCYTE [DISTWIDTH] IN BLOOD BY AUTOMATED COUNT: 15.1 % (ref 10–15)
ERYTHROCYTE [DISTWIDTH] IN BLOOD BY AUTOMATED COUNT: 15.2 % (ref 10–15)
ERYTHROCYTE [DISTWIDTH] IN BLOOD BY AUTOMATED COUNT: 15.4 % (ref 10–15)
ERYTHROCYTE [DISTWIDTH] IN BLOOD BY AUTOMATED COUNT: 15.9 % (ref 10–15)
ERYTHROCYTE [DISTWIDTH] IN BLOOD BY AUTOMATED COUNT: 15.9 % (ref 10–15)
ERYTHROCYTE [SEDIMENTATION RATE] IN BLOOD BY WESTERGREN METHOD: 31 MM/HR (ref 0–20)
ERYTHROCYTE [SEDIMENTATION RATE] IN BLOOD BY WESTERGREN METHOD: 34 MM/HR (ref 0–20)
FLUAV RNA SPEC QL NAA+PROBE: NEGATIVE
FLUBV RNA RESP QL NAA+PROBE: NEGATIVE
GAMMA INTERFERON BACKGROUND BLD IA-ACNC: 0.05 IU/ML
GFR SERPL CREATININE-BSD FRML MDRD: 59 ML/MIN/1.73M2
GFR SERPL CREATININE-BSD FRML MDRD: 62 ML/MIN/1.73M2
GFR SERPL CREATININE-BSD FRML MDRD: 63 ML/MIN/1.73M2
GFR SERPL CREATININE-BSD FRML MDRD: 63 ML/MIN/1.73M2
GFR SERPL CREATININE-BSD FRML MDRD: 66 ML/MIN/1.73M2
GFR SERPL CREATININE-BSD FRML MDRD: 69 ML/MIN/1.73M2
GFR SERPL CREATININE-BSD FRML MDRD: 71 ML/MIN/1.73M2
GFR SERPL CREATININE-BSD FRML MDRD: 73 ML/MIN/1.73M2
GFR SERPL CREATININE-BSD FRML MDRD: 73 ML/MIN/1.73M2
GFR SERPL CREATININE-BSD FRML MDRD: 75 ML/MIN/1.73M2
GFR SERPL CREATININE-BSD FRML MDRD: 79 ML/MIN/1.73M2
GFR SERPL CREATININE-BSD FRML MDRD: 84 ML/MIN/1.73M2
GFR SERPL CREATININE-BSD FRML MDRD: 86 ML/MIN/1.73M2
GFR SERPL CREATININE-BSD FRML MDRD: 88 ML/MIN/1.73M2
GLUCOSE BLD-MCNC: 106 MG/DL (ref 70–99)
GLUCOSE BLD-MCNC: 112 MG/DL (ref 70–99)
GLUCOSE BLD-MCNC: 113 MG/DL (ref 70–99)
GLUCOSE BLD-MCNC: 120 MG/DL (ref 70–99)
GLUCOSE BLD-MCNC: 126 MG/DL (ref 70–99)
GLUCOSE BLD-MCNC: 131 MG/DL (ref 70–99)
GLUCOSE BLD-MCNC: 81 MG/DL (ref 70–99)
GLUCOSE BLD-MCNC: 84 MG/DL (ref 70–99)
GLUCOSE BLD-MCNC: 86 MG/DL (ref 70–99)
GLUCOSE BLD-MCNC: 88 MG/DL (ref 70–99)
GLUCOSE BLD-MCNC: 91 MG/DL (ref 70–99)
GLUCOSE BLD-MCNC: 92 MG/DL (ref 70–99)
GLUCOSE BLD-MCNC: 92 MG/DL (ref 70–99)
GLUCOSE BLD-MCNC: 93 MG/DL (ref 70–99)
GLUCOSE BLD-MCNC: 93 MG/DL (ref 70–99)
GLUCOSE BLD-MCNC: 98 MG/DL (ref 70–99)
GLUCOSE BLDC GLUCOMTR-MCNC: 134 MG/DL (ref 70–99)
GLUCOSE BLDC GLUCOMTR-MCNC: 155 MG/DL (ref 70–99)
GLUCOSE UR STRIP-MCNC: NEGATIVE MG/DL
GLUCOSE UR STRIP-MCNC: NEGATIVE MG/DL
HCO3 BLDV-SCNC: 27 MMOL/L (ref 21–28)
HCT VFR BLD AUTO: 33.2 % (ref 40–53)
HCT VFR BLD AUTO: 33.2 % (ref 40–53)
HCT VFR BLD AUTO: 34.1 % (ref 40–53)
HCT VFR BLD AUTO: 34.3 % (ref 40–53)
HCT VFR BLD AUTO: 35.3 % (ref 40–53)
HCT VFR BLD AUTO: 36.5 % (ref 40–53)
HCT VFR BLD AUTO: 37.1 % (ref 40–53)
HGB BLD-MCNC: 10.3 G/DL (ref 13.3–17.7)
HGB BLD-MCNC: 10.6 G/DL (ref 13.3–17.7)
HGB BLD-MCNC: 10.6 G/DL (ref 13.3–17.7)
HGB BLD-MCNC: 10.8 G/DL (ref 13.3–17.7)
HGB BLD-MCNC: 10.9 G/DL (ref 13.3–17.7)
HGB BLD-MCNC: 11 G/DL (ref 13.3–17.7)
HGB BLD-MCNC: 11.5 G/DL (ref 13.3–17.7)
HGB UR QL STRIP: ABNORMAL
HGB UR QL STRIP: ABNORMAL
HOLD SPECIMEN: NORMAL
IMM GRANULOCYTES # BLD: 0 10E3/UL
IMM GRANULOCYTES # BLD: 0 10E3/UL
IMM GRANULOCYTES NFR BLD: 0 %
IMM GRANULOCYTES NFR BLD: 0 %
INTERPRETATION ECG - MUSE: NORMAL
KETONES UR STRIP-MCNC: NEGATIVE MG/DL
KETONES UR STRIP-MCNC: NEGATIVE MG/DL
LEUKOCYTE ESTERASE UR QL STRIP: NEGATIVE
LEUKOCYTE ESTERASE UR QL STRIP: NEGATIVE
LYMPHOCYTES # BLD AUTO: 0.5 10E3/UL (ref 0.8–5.3)
LYMPHOCYTES # BLD AUTO: 0.6 10E3/UL (ref 0.8–5.3)
LYMPHOCYTES NFR BLD AUTO: 12 %
LYMPHOCYTES NFR BLD AUTO: 9 %
M TB IFN-G BLD-IMP: NEGATIVE
M TB IFN-G CD4+ BCKGRND COR BLD-ACNC: 1.58 IU/ML
MAGNESIUM SERPL-MCNC: 2.2 MG/DL (ref 1.6–2.3)
MAGNESIUM SERPL-MCNC: 2.3 MG/DL (ref 1.6–2.3)
MAGNESIUM SERPL-MCNC: 2.4 MG/DL (ref 1.6–2.3)
MAGNESIUM SERPL-MCNC: 2.4 MG/DL (ref 1.6–2.3)
MAGNESIUM SERPL-MCNC: 2.7 MG/DL (ref 1.6–2.3)
MCH RBC QN AUTO: 31.5 PG (ref 26.5–33)
MCH RBC QN AUTO: 31.7 PG (ref 26.5–33)
MCH RBC QN AUTO: 31.7 PG (ref 26.5–33)
MCH RBC QN AUTO: 31.8 PG (ref 26.5–33)
MCH RBC QN AUTO: 31.9 PG (ref 26.5–33)
MCH RBC QN AUTO: 32.1 PG (ref 26.5–33)
MCH RBC QN AUTO: 32.2 PG (ref 26.5–33)
MCHC RBC AUTO-ENTMCNC: 30.1 G/DL (ref 31.5–36.5)
MCHC RBC AUTO-ENTMCNC: 30.6 G/DL (ref 31.5–36.5)
MCHC RBC AUTO-ENTMCNC: 31 G/DL (ref 31.5–36.5)
MCHC RBC AUTO-ENTMCNC: 31 G/DL (ref 31.5–36.5)
MCHC RBC AUTO-ENTMCNC: 31.1 G/DL (ref 31.5–36.5)
MCHC RBC AUTO-ENTMCNC: 31.8 G/DL (ref 31.5–36.5)
MCHC RBC AUTO-ENTMCNC: 31.9 G/DL (ref 31.5–36.5)
MCV RBC AUTO: 100 FL (ref 78–100)
MCV RBC AUTO: 101 FL (ref 78–100)
MCV RBC AUTO: 102 FL (ref 78–100)
MCV RBC AUTO: 103 FL (ref 78–100)
MCV RBC AUTO: 103 FL (ref 78–100)
MCV RBC AUTO: 104 FL (ref 78–100)
MCV RBC AUTO: 105 FL (ref 78–100)
MITOGEN IGNF BCKGRD COR BLD-ACNC: 0.01 IU/ML
MITOGEN IGNF BCKGRD COR BLD-ACNC: 0.03 IU/ML
MONOCYTES # BLD AUTO: 0.6 10E3/UL (ref 0–1.3)
MONOCYTES # BLD AUTO: 0.7 10E3/UL (ref 0–1.3)
MONOCYTES NFR BLD AUTO: 11 %
MONOCYTES NFR BLD AUTO: 13 %
MUCOUS THREADS #/AREA URNS LPF: PRESENT /LPF
MUCOUS THREADS #/AREA URNS LPF: PRESENT /LPF
NEUTROPHILS # BLD AUTO: 3.6 10E3/UL (ref 1.6–8.3)
NEUTROPHILS # BLD AUTO: 4.4 10E3/UL (ref 1.6–8.3)
NEUTROPHILS NFR BLD AUTO: 71 %
NEUTROPHILS NFR BLD AUTO: 77 %
NITRATE UR QL: NEGATIVE
NITRATE UR QL: NEGATIVE
NRBC # BLD AUTO: 0 10E3/UL
NRBC # BLD AUTO: 0 10E3/UL
NRBC BLD AUTO-RTO: 0 /100
NRBC BLD AUTO-RTO: 0 /100
NT-PROBNP SERPL-MCNC: 1037 PG/ML (ref 0–1800)
NT-PROBNP SERPL-MCNC: 1188 PG/ML (ref 0–1800)
NT-PROBNP SERPL-MCNC: 1506 PG/ML (ref 0–1800)
O2/TOTAL GAS SETTING VFR VENT: 23 %
P AXIS - MUSE: NORMAL DEGREES
PCO2 BLDV: 41 MM HG (ref 40–50)
PH BLDV: 7.44 [PH] (ref 7.32–7.43)
PH UR STRIP: 6.5 [PH] (ref 5–7)
PH UR STRIP: 6.5 [PH] (ref 5–7)
PHOSPHATE SERPL-MCNC: 3 MG/DL (ref 2.5–4.5)
PHOSPHATE SERPL-MCNC: 3.2 MG/DL (ref 2.5–4.5)
PHOSPHATE SERPL-MCNC: 3.2 MG/DL (ref 2.5–4.5)
PHOSPHATE SERPL-MCNC: 3.5 MG/DL (ref 2.5–4.5)
PLATELET # BLD AUTO: 113 10E3/UL (ref 150–450)
PLATELET # BLD AUTO: 118 10E3/UL (ref 150–450)
PLATELET # BLD AUTO: 122 10E3/UL (ref 150–450)
PLATELET # BLD AUTO: 128 10E3/UL (ref 150–450)
PLATELET # BLD AUTO: 147 10E3/UL (ref 150–450)
PLATELET # BLD AUTO: 147 10E3/UL (ref 150–450)
PLATELET # BLD AUTO: 99 10E3/UL (ref 150–450)
PO2 BLDV: 45 MM HG (ref 25–47)
POTASSIUM BLD-SCNC: 3.1 MMOL/L (ref 3.4–5.3)
POTASSIUM BLD-SCNC: 3.5 MMOL/L (ref 3.4–5.3)
POTASSIUM BLD-SCNC: 3.5 MMOL/L (ref 3.4–5.3)
POTASSIUM BLD-SCNC: 3.6 MMOL/L (ref 3.4–5.3)
POTASSIUM BLD-SCNC: 3.8 MMOL/L (ref 3.4–5.3)
POTASSIUM BLD-SCNC: 3.9 MMOL/L (ref 3.4–5.3)
POTASSIUM BLD-SCNC: 4 MMOL/L (ref 3.4–5.3)
POTASSIUM BLD-SCNC: 4.1 MMOL/L (ref 3.4–5.3)
POTASSIUM BLD-SCNC: 4.2 MMOL/L (ref 3.4–5.3)
POTASSIUM BLD-SCNC: 4.3 MMOL/L (ref 3.4–5.3)
POTASSIUM BLD-SCNC: 4.3 MMOL/L (ref 3.4–5.3)
POTASSIUM BLD-SCNC: 4.9 MMOL/L (ref 3.4–5.3)
PR INTERVAL - MUSE: NORMAL MS
PROCALCITONIN SERPL-MCNC: 0.05 NG/ML
PROCALCITONIN SERPL-MCNC: <0.05 NG/ML
PROT SERPL-MCNC: 7.5 G/DL (ref 6.8–8.8)
QRS DURATION - MUSE: 102 MS
QRS DURATION - MUSE: 104 MS
QRS DURATION - MUSE: 116 MS
QRS DURATION - MUSE: 116 MS
QRS DURATION - MUSE: 122 MS
QRS DURATION - MUSE: 124 MS
QT - MUSE: 402 MS
QT - MUSE: 440 MS
QT - MUSE: 460 MS
QT - MUSE: 474 MS
QT - MUSE: 484 MS
QT - MUSE: 502 MS
QTC - MUSE: 428 MS
QTC - MUSE: 436 MS
QTC - MUSE: 509 MS
QTC - MUSE: 513 MS
QTC - MUSE: 514 MS
QTC - MUSE: 550 MS
QUANTIFERON MITOGEN: 1.63 IU/ML
QUANTIFERON NIL TUBE: 0.05 IU/ML
QUANTIFERON TB1 TUBE: 0.08 IU/ML
QUANTIFERON TB2 TUBE: 0.06
R AXIS - MUSE: -3 DEGREES
R AXIS - MUSE: 15 DEGREES
R AXIS - MUSE: 36 DEGREES
R AXIS - MUSE: 41 DEGREES
R AXIS - MUSE: 45 DEGREES
R AXIS - MUSE: 65 DEGREES
RBC # BLD AUTO: 3.24 10E6/UL (ref 4.4–5.9)
RBC # BLD AUTO: 3.29 10E6/UL (ref 4.4–5.9)
RBC # BLD AUTO: 3.3 10E6/UL (ref 4.4–5.9)
RBC # BLD AUTO: 3.41 10E6/UL (ref 4.4–5.9)
RBC # BLD AUTO: 3.42 10E6/UL (ref 4.4–5.9)
RBC # BLD AUTO: 3.49 10E6/UL (ref 4.4–5.9)
RBC # BLD AUTO: 3.63 10E6/UL (ref 4.4–5.9)
RBC URINE: 22 /HPF
RBC URINE: 35 /HPF
SARS-COV-2 RNA RESP QL NAA+PROBE: NEGATIVE
SODIUM SERPL-SCNC: 138 MMOL/L (ref 133–144)
SODIUM SERPL-SCNC: 139 MMOL/L (ref 133–144)
SODIUM SERPL-SCNC: 139 MMOL/L (ref 133–144)
SODIUM SERPL-SCNC: 141 MMOL/L (ref 133–144)
SODIUM SERPL-SCNC: 142 MMOL/L (ref 133–144)
SODIUM SERPL-SCNC: 143 MMOL/L (ref 133–144)
SODIUM SERPL-SCNC: 144 MMOL/L (ref 133–144)
SP GR UR STRIP: 1.01 (ref 1–1.03)
SP GR UR STRIP: 1.03 (ref 1–1.03)
SYSTOLIC BLOOD PRESSURE - MUSE: NORMAL MMHG
T AXIS - MUSE: -41 DEGREES
T AXIS - MUSE: -43 DEGREES
T AXIS - MUSE: -61 DEGREES
T AXIS - MUSE: 259 DEGREES
T AXIS - MUSE: 261 DEGREES
T AXIS - MUSE: 268 DEGREES
TROPONIN I SERPL HS-MCNC: 30 NG/L
UROBILINOGEN UR STRIP-MCNC: 4 MG/DL
UROBILINOGEN UR STRIP-MCNC: NORMAL MG/DL
VENTRICULAR RATE- MUSE: 47 BPM
VENTRICULAR RATE- MUSE: 51 BPM
VENTRICULAR RATE- MUSE: 62 BPM
VENTRICULAR RATE- MUSE: 82 BPM
VENTRICULAR RATE- MUSE: 86 BPM
VENTRICULAR RATE- MUSE: 98 BPM
WBC # BLD AUTO: 3.9 10E3/UL (ref 4–11)
WBC # BLD AUTO: 4 10E3/UL (ref 4–11)
WBC # BLD AUTO: 4.1 10E3/UL (ref 4–11)
WBC # BLD AUTO: 4.3 10E3/UL (ref 4–11)
WBC # BLD AUTO: 4.7 10E3/UL (ref 4–11)
WBC # BLD AUTO: 5.1 10E3/UL (ref 4–11)
WBC # BLD AUTO: 5.7 10E3/UL (ref 4–11)
WBC URINE: 1 /HPF
WBC URINE: 1 /HPF

## 2022-01-01 PROCEDURE — 250N000013 HC RX MED GY IP 250 OP 250 PS 637: Performed by: PHYSICIAN ASSISTANT

## 2022-01-01 PROCEDURE — 36415 COLL VENOUS BLD VENIPUNCTURE: CPT | Performed by: PHYSICIAN ASSISTANT

## 2022-01-01 PROCEDURE — G0378 HOSPITAL OBSERVATION PER HR: HCPCS

## 2022-01-01 PROCEDURE — 80048 BASIC METABOLIC PNL TOTAL CA: CPT | Performed by: PEDIATRICS

## 2022-01-01 PROCEDURE — 250N000011 HC RX IP 250 OP 636: Performed by: INTERNAL MEDICINE

## 2022-01-01 PROCEDURE — 83735 ASSAY OF MAGNESIUM: CPT | Performed by: PEDIATRICS

## 2022-01-01 PROCEDURE — 99239 HOSP IP/OBS DSCHRG MGMT >30: CPT | Performed by: HOSPITALIST

## 2022-01-01 PROCEDURE — 250N000013 HC RX MED GY IP 250 OP 250 PS 637: Performed by: INTERNAL MEDICINE

## 2022-01-01 PROCEDURE — 99285 EMERGENCY DEPT VISIT HI MDM: CPT | Mod: 25

## 2022-01-01 PROCEDURE — 97140 MANUAL THERAPY 1/> REGIONS: CPT | Mod: GP

## 2022-01-01 PROCEDURE — 99232 SBSQ HOSP IP/OBS MODERATE 35: CPT | Performed by: INTERNAL MEDICINE

## 2022-01-01 PROCEDURE — 97140 MANUAL THERAPY 1/> REGIONS: CPT | Mod: GP | Performed by: PHYSICAL THERAPIST

## 2022-01-01 PROCEDURE — 36415 COLL VENOUS BLD VENIPUNCTURE: CPT | Performed by: PEDIATRICS

## 2022-01-01 PROCEDURE — 250N000013 HC RX MED GY IP 250 OP 250 PS 637: Performed by: HOSPITALIST

## 2022-01-01 PROCEDURE — U0003 INFECTIOUS AGENT DETECTION BY NUCLEIC ACID (DNA OR RNA); SEVERE ACUTE RESPIRATORY SYNDROME CORONAVIRUS 2 (SARS-COV-2) (CORONAVIRUS DISEASE [COVID-19]), AMPLIFIED PROBE TECHNIQUE, MAKING USE OF HIGH THROUGHPUT TECHNOLOGIES AS DESCRIBED BY CMS-2020-01-R: HCPCS | Performed by: NURSE PRACTITIONER

## 2022-01-01 PROCEDURE — 97530 THERAPEUTIC ACTIVITIES: CPT | Mod: GP | Performed by: PHYSICAL THERAPIST

## 2022-01-01 PROCEDURE — 97161 PT EVAL LOW COMPLEX 20 MIN: CPT | Mod: GP

## 2022-01-01 PROCEDURE — 83880 ASSAY OF NATRIURETIC PEPTIDE: CPT | Performed by: EMERGENCY MEDICINE

## 2022-01-01 PROCEDURE — 120N000004 HC R&B MS OVERFLOW

## 2022-01-01 PROCEDURE — 96374 THER/PROPH/DIAG INJ IV PUSH: CPT

## 2022-01-01 PROCEDURE — 84100 ASSAY OF PHOSPHORUS: CPT | Performed by: PEDIATRICS

## 2022-01-01 PROCEDURE — 250N000011 HC RX IP 250 OP 636: Performed by: PHYSICIAN ASSISTANT

## 2022-01-01 PROCEDURE — 250N000011 HC RX IP 250 OP 636: Performed by: STUDENT IN AN ORGANIZED HEALTH CARE EDUCATION/TRAINING PROGRAM

## 2022-01-01 PROCEDURE — 96376 TX/PRO/DX INJ SAME DRUG ADON: CPT

## 2022-01-01 PROCEDURE — 80048 BASIC METABOLIC PNL TOTAL CA: CPT | Performed by: EMERGENCY MEDICINE

## 2022-01-01 PROCEDURE — 250N000013 HC RX MED GY IP 250 OP 250 PS 637: Performed by: PEDIATRICS

## 2022-01-01 PROCEDURE — 82310 ASSAY OF CALCIUM: CPT | Performed by: INTERNAL MEDICINE

## 2022-01-01 PROCEDURE — 99232 SBSQ HOSP IP/OBS MODERATE 35: CPT | Mod: FS | Performed by: INTERNAL MEDICINE

## 2022-01-01 PROCEDURE — 93227 XTRNL ECG REC<48 HR R&I: CPT | Performed by: INTERNAL MEDICINE

## 2022-01-01 PROCEDURE — 71045 X-RAY EXAM CHEST 1 VIEW: CPT

## 2022-01-01 PROCEDURE — 250N000012 HC RX MED GY IP 250 OP 636 PS 637: Performed by: INTERNAL MEDICINE

## 2022-01-01 PROCEDURE — 36415 COLL VENOUS BLD VENIPUNCTURE: CPT | Performed by: EMERGENCY MEDICINE

## 2022-01-01 PROCEDURE — U0005 INFEC AGEN DETEC AMPLI PROBE: HCPCS | Performed by: NURSE PRACTITIONER

## 2022-01-01 PROCEDURE — 99233 SBSQ HOSP IP/OBS HIGH 50: CPT | Performed by: PEDIATRICS

## 2022-01-01 PROCEDURE — 36415 COLL VENOUS BLD VENIPUNCTURE: CPT | Performed by: INTERNAL MEDICINE

## 2022-01-01 PROCEDURE — 99233 SBSQ HOSP IP/OBS HIGH 50: CPT | Performed by: HOSPITALIST

## 2022-01-01 PROCEDURE — 99309 SBSQ NF CARE MODERATE MDM 30: CPT | Performed by: PHYSICIAN ASSISTANT

## 2022-01-01 PROCEDURE — 120N000001 HC R&B MED SURG/OB

## 2022-01-01 PROCEDURE — 85027 COMPLETE CBC AUTOMATED: CPT | Performed by: INTERNAL MEDICINE

## 2022-01-01 PROCEDURE — C9803 HOPD COVID-19 SPEC COLLECT: HCPCS

## 2022-01-01 PROCEDURE — 84145 PROCALCITONIN (PCT): CPT | Performed by: PEDIATRICS

## 2022-01-01 PROCEDURE — 93225 XTRNL ECG REC<48 HRS REC: CPT

## 2022-01-01 PROCEDURE — 99232 SBSQ HOSP IP/OBS MODERATE 35: CPT | Performed by: PEDIATRICS

## 2022-01-01 PROCEDURE — 99207 PR CDG-MDM COMPONENT: MEETS MODERATE - UP CODED: CPT | Performed by: INTERNAL MEDICINE

## 2022-01-01 PROCEDURE — 99232 SBSQ HOSP IP/OBS MODERATE 35: CPT | Performed by: HOSPITALIST

## 2022-01-01 PROCEDURE — 36415 COLL VENOUS BLD VENIPUNCTURE: CPT | Performed by: HOSPITALIST

## 2022-01-01 PROCEDURE — 250N000013 HC RX MED GY IP 250 OP 250 PS 637: Performed by: NURSE PRACTITIONER

## 2022-01-01 PROCEDURE — 99207 PR CDG-MDM COMPONENT: MEETS LOW - DOWN CODED: CPT | Performed by: HOSPITALIST

## 2022-01-01 PROCEDURE — 99233 SBSQ HOSP IP/OBS HIGH 50: CPT | Mod: FS | Performed by: INTERNAL MEDICINE

## 2022-01-01 PROCEDURE — 99233 SBSQ HOSP IP/OBS HIGH 50: CPT | Performed by: INTERNAL MEDICINE

## 2022-01-01 PROCEDURE — 250N000011 HC RX IP 250 OP 636: Performed by: PEDIATRICS

## 2022-01-01 PROCEDURE — 81001 URINALYSIS AUTO W/SCOPE: CPT | Performed by: EMERGENCY MEDICINE

## 2022-01-01 PROCEDURE — 99284 EMERGENCY DEPT VISIT MOD MDM: CPT

## 2022-01-01 PROCEDURE — 84145 PROCALCITONIN (PCT): CPT | Performed by: INTERNAL MEDICINE

## 2022-01-01 PROCEDURE — 97116 GAIT TRAINING THERAPY: CPT | Mod: GP

## 2022-01-01 PROCEDURE — 82803 BLOOD GASES ANY COMBINATION: CPT | Performed by: EMERGENCY MEDICINE

## 2022-01-01 PROCEDURE — 93005 ELECTROCARDIOGRAM TRACING: CPT

## 2022-01-01 PROCEDURE — 99220 PR INITIAL OBSERVATION CARE,LEVEL III: CPT | Performed by: INTERNAL MEDICINE

## 2022-01-01 PROCEDURE — 97530 THERAPEUTIC ACTIVITIES: CPT | Mod: GP

## 2022-01-01 PROCEDURE — 85652 RBC SED RATE AUTOMATED: CPT | Performed by: EMERGENCY MEDICINE

## 2022-01-01 PROCEDURE — 85652 RBC SED RATE AUTOMATED: CPT | Performed by: PEDIATRICS

## 2022-01-01 PROCEDURE — 36415 COLL VENOUS BLD VENIPUNCTURE: CPT

## 2022-01-01 PROCEDURE — 80048 BASIC METABOLIC PNL TOTAL CA: CPT | Performed by: INTERNAL MEDICINE

## 2022-01-01 PROCEDURE — 87635 SARS-COV-2 COVID-19 AMP PRB: CPT | Performed by: PEDIATRICS

## 2022-01-01 PROCEDURE — 80051 ELECTROLYTE PANEL: CPT | Performed by: EMERGENCY MEDICINE

## 2022-01-01 PROCEDURE — 82310 ASSAY OF CALCIUM: CPT | Performed by: PHYSICIAN ASSISTANT

## 2022-01-01 PROCEDURE — 83735 ASSAY OF MAGNESIUM: CPT | Performed by: INTERNAL MEDICINE

## 2022-01-01 PROCEDURE — 99233 SBSQ HOSP IP/OBS HIGH 50: CPT | Performed by: PHYSICIAN ASSISTANT

## 2022-01-01 PROCEDURE — 84132 ASSAY OF SERUM POTASSIUM: CPT | Performed by: PHYSICIAN ASSISTANT

## 2022-01-01 PROCEDURE — 83735 ASSAY OF MAGNESIUM: CPT | Performed by: PHYSICIAN ASSISTANT

## 2022-01-01 PROCEDURE — 85014 HEMATOCRIT: CPT | Performed by: EMERGENCY MEDICINE

## 2022-01-01 PROCEDURE — P9604 ONE-WAY ALLOW PRORATED TRIP: HCPCS

## 2022-01-01 PROCEDURE — 99316 NF DSCHRG MGMT 30 MIN+: CPT | Performed by: NURSE PRACTITIONER

## 2022-01-01 PROCEDURE — 86481 TB AG RESPONSE T-CELL SUSP: CPT

## 2022-01-01 PROCEDURE — 93970 EXTREMITY STUDY: CPT

## 2022-01-01 PROCEDURE — 82310 ASSAY OF CALCIUM: CPT | Performed by: HOSPITALIST

## 2022-01-01 PROCEDURE — 86140 C-REACTIVE PROTEIN: CPT | Performed by: EMERGENCY MEDICINE

## 2022-01-01 PROCEDURE — 85025 COMPLETE CBC W/AUTO DIFF WBC: CPT | Performed by: EMERGENCY MEDICINE

## 2022-01-01 PROCEDURE — 99223 1ST HOSP IP/OBS HIGH 75: CPT | Performed by: INTERNAL MEDICINE

## 2022-01-01 PROCEDURE — 70450 CT HEAD/BRAIN W/O DYE: CPT

## 2022-01-01 PROCEDURE — 87636 SARSCOV2 & INF A&B AMP PRB: CPT | Performed by: EMERGENCY MEDICINE

## 2022-01-01 PROCEDURE — 86140 C-REACTIVE PROTEIN: CPT | Performed by: INTERNAL MEDICINE

## 2022-01-01 PROCEDURE — 99207 PR CONSULT E&M CHANGED TO INITIAL LEVEL: CPT | Performed by: NURSE PRACTITIONER

## 2022-01-01 PROCEDURE — 84484 ASSAY OF TROPONIN QUANT: CPT | Performed by: EMERGENCY MEDICINE

## 2022-01-01 PROCEDURE — 82310 ASSAY OF CALCIUM: CPT

## 2022-01-01 PROCEDURE — U0003 INFECTIOUS AGENT DETECTION BY NUCLEIC ACID (DNA OR RNA); SEVERE ACUTE RESPIRATORY SYNDROME CORONAVIRUS 2 (SARS-COV-2) (CORONAVIRUS DISEASE [COVID-19]), AMPLIFIED PROBE TECHNIQUE, MAKING USE OF HIGH THROUGHPUT TECHNOLOGIES AS DESCRIBED BY CMS-2020-01-R: HCPCS

## 2022-01-01 PROCEDURE — 87635 SARS-COV-2 COVID-19 AMP PRB: CPT | Performed by: EMERGENCY MEDICINE

## 2022-01-01 PROCEDURE — 83735 ASSAY OF MAGNESIUM: CPT | Performed by: HOSPITALIST

## 2022-01-01 PROCEDURE — 80048 BASIC METABOLIC PNL TOTAL CA: CPT

## 2022-01-01 PROCEDURE — 80053 COMPREHEN METABOLIC PANEL: CPT | Performed by: EMERGENCY MEDICINE

## 2022-01-01 PROCEDURE — 84132 ASSAY OF SERUM POTASSIUM: CPT | Performed by: INTERNAL MEDICINE

## 2022-01-01 PROCEDURE — 85014 HEMATOCRIT: CPT | Performed by: INTERNAL MEDICINE

## 2022-01-01 PROCEDURE — 73130 X-RAY EXAM OF HAND: CPT | Mod: LT

## 2022-01-01 PROCEDURE — 84132 ASSAY OF SERUM POTASSIUM: CPT | Performed by: PEDIATRICS

## 2022-01-01 PROCEDURE — 84100 ASSAY OF PHOSPHORUS: CPT | Performed by: NURSE PRACTITIONER

## 2022-01-01 PROCEDURE — 99221 1ST HOSP IP/OBS SF/LOW 40: CPT | Mod: FS | Performed by: INTERNAL MEDICINE

## 2022-01-01 PROCEDURE — 99305 1ST NF CARE MODERATE MDM 35: CPT | Performed by: INTERNAL MEDICINE

## 2022-01-01 PROCEDURE — 99222 1ST HOSP IP/OBS MODERATE 55: CPT | Performed by: NURSE PRACTITIONER

## 2022-01-01 PROCEDURE — 85027 COMPLETE CBC AUTOMATED: CPT | Performed by: PHYSICIAN ASSISTANT

## 2022-01-01 PROCEDURE — 83735 ASSAY OF MAGNESIUM: CPT | Performed by: NURSE PRACTITIONER

## 2022-01-01 PROCEDURE — 86140 C-REACTIVE PROTEIN: CPT | Performed by: PEDIATRICS

## 2022-01-01 PROCEDURE — 250N000011 HC RX IP 250 OP 636: Performed by: EMERGENCY MEDICINE

## 2022-01-01 PROCEDURE — 73560 X-RAY EXAM OF KNEE 1 OR 2: CPT | Mod: RT

## 2022-01-01 PROCEDURE — 97116 GAIT TRAINING THERAPY: CPT | Mod: GP | Performed by: PHYSICAL THERAPIST

## 2022-01-01 PROCEDURE — 99310 SBSQ NF CARE HIGH MDM 45: CPT | Performed by: PHYSICIAN ASSISTANT

## 2022-01-01 PROCEDURE — 97530 THERAPEUTIC ACTIVITIES: CPT | Mod: GO

## 2022-01-01 PROCEDURE — 97535 SELF CARE MNGMENT TRAINING: CPT | Mod: GO

## 2022-01-01 PROCEDURE — 99239 HOSP IP/OBS DSCHRG MGMT >30: CPT | Performed by: STUDENT IN AN ORGANIZED HEALTH CARE EDUCATION/TRAINING PROGRAM

## 2022-01-01 RX ORDER — OLANZAPINE 5 MG/1
5 TABLET ORAL 2 TIMES DAILY PRN
Status: DISCONTINUED | OUTPATIENT
Start: 2022-01-01 | End: 2022-01-01

## 2022-01-01 RX ORDER — QUINIDINE GLUCONATE 324 MG
1 TABLET, EXTENDED RELEASE ORAL EVERY MORNING
COMMUNITY

## 2022-01-01 RX ORDER — ONDANSETRON 4 MG/1
4 TABLET, ORALLY DISINTEGRATING ORAL EVERY 6 HOURS PRN
Status: DISCONTINUED | OUTPATIENT
Start: 2022-01-01 | End: 2022-01-01 | Stop reason: HOSPADM

## 2022-01-01 RX ORDER — POTASSIUM CHLORIDE 1500 MG/1
40 TABLET, EXTENDED RELEASE ORAL ONCE
Status: COMPLETED | OUTPATIENT
Start: 2022-01-01 | End: 2022-01-01

## 2022-01-01 RX ORDER — FUROSEMIDE 10 MG/ML
40 INJECTION INTRAMUSCULAR; INTRAVENOUS ONCE
Status: COMPLETED | OUTPATIENT
Start: 2022-01-01 | End: 2022-01-01

## 2022-01-01 RX ORDER — TRAZODONE HYDROCHLORIDE 50 MG/1
50 TABLET, FILM COATED ORAL
Status: DISCONTINUED | OUTPATIENT
Start: 2022-01-01 | End: 2022-01-01

## 2022-01-01 RX ORDER — OLANZAPINE 5 MG/1
TABLET, ORALLY DISINTEGRATING ORAL
Refills: 0
Start: 2022-01-01 | End: 2022-01-01

## 2022-01-01 RX ORDER — OLANZAPINE 5 MG/1
5 TABLET, ORALLY DISINTEGRATING ORAL 2 TIMES DAILY PRN
Status: DISCONTINUED | OUTPATIENT
Start: 2022-01-01 | End: 2022-01-01 | Stop reason: HOSPADM

## 2022-01-01 RX ORDER — QUETIAPINE FUMARATE 25 MG/1
25 TABLET, FILM COATED ORAL ONCE
Status: COMPLETED | OUTPATIENT
Start: 2022-01-01 | End: 2022-01-01

## 2022-01-01 RX ORDER — OLANZAPINE 5 MG/1
5 TABLET, ORALLY DISINTEGRATING ORAL AT BEDTIME
Status: DISCONTINUED | OUTPATIENT
Start: 2022-01-01 | End: 2022-01-01

## 2022-01-01 RX ORDER — ACETAMINOPHEN 500 MG
500 TABLET ORAL EVERY 6 HOURS PRN
COMMUNITY
Start: 2022-01-01

## 2022-01-01 RX ORDER — ONDANSETRON 2 MG/ML
4 INJECTION INTRAMUSCULAR; INTRAVENOUS EVERY 6 HOURS PRN
Status: DISCONTINUED | OUTPATIENT
Start: 2022-01-01 | End: 2022-01-01 | Stop reason: HOSPADM

## 2022-01-01 RX ORDER — SIMVASTATIN 20 MG
20 TABLET ORAL AT BEDTIME
Status: DISCONTINUED | OUTPATIENT
Start: 2022-01-01 | End: 2022-01-01 | Stop reason: HOSPADM

## 2022-01-01 RX ORDER — TORSEMIDE 20 MG/1
20 TABLET ORAL
Status: DISCONTINUED | OUTPATIENT
Start: 2022-01-01 | End: 2022-01-01

## 2022-01-01 RX ORDER — OLANZAPINE 5 MG/1
5-10 TABLET, ORALLY DISINTEGRATING ORAL EVERY 6 HOURS PRN
Status: DISCONTINUED | OUTPATIENT
Start: 2022-01-01 | End: 2022-01-01

## 2022-01-01 RX ORDER — QUETIAPINE FUMARATE 25 MG/1
25 TABLET, FILM COATED ORAL AT BEDTIME
Status: DISCONTINUED | OUTPATIENT
Start: 2022-01-01 | End: 2022-01-01

## 2022-01-01 RX ORDER — FUROSEMIDE 10 MG/ML
40 INJECTION INTRAMUSCULAR; INTRAVENOUS
Status: DISCONTINUED | OUTPATIENT
Start: 2022-01-01 | End: 2022-01-01

## 2022-01-01 RX ORDER — POTASSIUM CHLORIDE 1500 MG/1
20 TABLET, EXTENDED RELEASE ORAL 2 TIMES DAILY WITH MEALS
Status: DISCONTINUED | OUTPATIENT
Start: 2022-01-01 | End: 2022-01-01 | Stop reason: HOSPADM

## 2022-01-01 RX ORDER — TAMSULOSIN HYDROCHLORIDE 0.4 MG/1
0.4 CAPSULE ORAL DAILY
Status: CANCELLED | OUTPATIENT
Start: 2022-01-01

## 2022-01-01 RX ORDER — OLANZAPINE 5 MG/1
5 TABLET ORAL 2 TIMES DAILY PRN
COMMUNITY
Start: 2022-01-01

## 2022-01-01 RX ORDER — SPIRONOLACTONE 25 MG/1
25 TABLET ORAL DAILY
Status: DISCONTINUED | OUTPATIENT
Start: 2022-01-01 | End: 2022-01-01 | Stop reason: HOSPADM

## 2022-01-01 RX ORDER — ACETAMINOPHEN 500 MG
1000 TABLET ORAL 3 TIMES DAILY
Status: DISCONTINUED | OUTPATIENT
Start: 2022-01-01 | End: 2022-01-01 | Stop reason: HOSPADM

## 2022-01-01 RX ORDER — SPIRONOLACTONE 25 MG/1
25 TABLET ORAL
Status: DISCONTINUED | OUTPATIENT
Start: 2022-01-01 | End: 2022-01-01

## 2022-01-01 RX ORDER — FUROSEMIDE 10 MG/ML
60 INJECTION INTRAMUSCULAR; INTRAVENOUS ONCE
Status: COMPLETED | OUTPATIENT
Start: 2022-01-01 | End: 2022-01-01

## 2022-01-01 RX ORDER — OLANZAPINE 5 MG/1
5 TABLET, ORALLY DISINTEGRATING ORAL
Qty: 30 TABLET | Refills: 0 | Status: SHIPPED | OUTPATIENT
Start: 2022-01-01 | End: 2022-01-01

## 2022-01-01 RX ORDER — LISINOPRIL 5 MG/1
5 TABLET ORAL DAILY
Status: DISCONTINUED | OUTPATIENT
Start: 2022-01-01 | End: 2022-01-01 | Stop reason: HOSPADM

## 2022-01-01 RX ORDER — LISINOPRIL 2.5 MG/1
2.5 TABLET ORAL DAILY
Status: DISCONTINUED | OUTPATIENT
Start: 2022-01-01 | End: 2022-01-01

## 2022-01-01 RX ORDER — HALOPERIDOL 5 MG/ML
2.5-5 INJECTION INTRAMUSCULAR EVERY 6 HOURS PRN
Status: DISCONTINUED | OUTPATIENT
Start: 2022-01-01 | End: 2022-01-01

## 2022-01-01 RX ORDER — OLANZAPINE 5 MG/1
5 TABLET, ORALLY DISINTEGRATING ORAL EVERY 6 HOURS PRN
Status: DISCONTINUED | OUTPATIENT
Start: 2022-01-01 | End: 2022-01-01

## 2022-01-01 RX ORDER — TORSEMIDE 20 MG/1
20 TABLET ORAL DAILY
Status: DISCONTINUED | OUTPATIENT
Start: 2022-01-01 | End: 2022-01-01 | Stop reason: HOSPADM

## 2022-01-01 RX ORDER — PROCHLORPERAZINE 25 MG
12.5 SUPPOSITORY, RECTAL RECTAL EVERY 12 HOURS PRN
Status: DISCONTINUED | OUTPATIENT
Start: 2022-01-01 | End: 2022-01-01 | Stop reason: HOSPADM

## 2022-01-01 RX ORDER — OLANZAPINE 5 MG/1
5 TABLET, ORALLY DISINTEGRATING ORAL
Status: DISCONTINUED | OUTPATIENT
Start: 2022-01-01 | End: 2022-01-01 | Stop reason: HOSPADM

## 2022-01-01 RX ORDER — QUETIAPINE FUMARATE 25 MG/1
12.5 TABLET, FILM COATED ORAL DAILY
Start: 2022-01-01 | End: 2024-08-08

## 2022-01-01 RX ORDER — LISINOPRIL 5 MG/1
5 TABLET ORAL DAILY
DISCHARGE
Start: 2022-01-01 | End: 2022-01-01

## 2022-01-01 RX ORDER — OLANZAPINE 10 MG/2ML
2.5-5 INJECTION, POWDER, FOR SOLUTION INTRAMUSCULAR EVERY 6 HOURS PRN
Status: DISCONTINUED | OUTPATIENT
Start: 2022-01-01 | End: 2022-01-01

## 2022-01-01 RX ORDER — POTASSIUM CHLORIDE 1500 MG/1
20 TABLET, EXTENDED RELEASE ORAL DAILY
Status: DISCONTINUED | OUTPATIENT
Start: 2022-01-01 | End: 2022-01-01 | Stop reason: HOSPADM

## 2022-01-01 RX ORDER — QUETIAPINE FUMARATE 25 MG/1
25 TABLET, FILM COATED ORAL AT BEDTIME
Status: DISCONTINUED | OUTPATIENT
Start: 2022-01-01 | End: 2022-01-01 | Stop reason: HOSPADM

## 2022-01-01 RX ORDER — POTASSIUM CHLORIDE 750 MG/1
20 TABLET, EXTENDED RELEASE ORAL 2 TIMES DAILY
Status: DISCONTINUED | OUTPATIENT
Start: 2022-01-01 | End: 2022-01-01

## 2022-01-01 RX ORDER — TAMSULOSIN HYDROCHLORIDE 0.4 MG/1
0.4 CAPSULE ORAL DAILY
Status: DISCONTINUED | OUTPATIENT
Start: 2022-01-01 | End: 2022-01-01 | Stop reason: HOSPADM

## 2022-01-01 RX ORDER — PREDNISONE 20 MG/1
40 TABLET ORAL DAILY
Status: DISCONTINUED | OUTPATIENT
Start: 2022-01-01 | End: 2022-01-01 | Stop reason: HOSPADM

## 2022-01-01 RX ORDER — PROCHLORPERAZINE MALEATE 5 MG
5 TABLET ORAL EVERY 6 HOURS PRN
Status: DISCONTINUED | OUTPATIENT
Start: 2022-01-01 | End: 2022-01-01 | Stop reason: HOSPADM

## 2022-01-01 RX ORDER — ACETAMINOPHEN 325 MG/10.15ML
650 LIQUID ORAL EVERY 4 HOURS PRN
Status: DISCONTINUED | OUTPATIENT
Start: 2022-01-01 | End: 2022-01-01 | Stop reason: HOSPADM

## 2022-01-01 RX ORDER — PREDNISONE 20 MG/1
40 TABLET ORAL DAILY
Qty: 10 TABLET | Refills: 0 | Status: SHIPPED | OUTPATIENT
Start: 2022-01-01 | End: 2022-01-01

## 2022-01-01 RX ORDER — SPIRONOLACTONE 25 MG/1
25 TABLET ORAL DAILY
DISCHARGE
Start: 2022-01-01 | End: 2022-01-01

## 2022-01-01 RX ORDER — OLANZAPINE 5 MG/1
5 TABLET ORAL 2 TIMES DAILY PRN
Status: DISCONTINUED | OUTPATIENT
Start: 2022-01-01 | End: 2022-01-01 | Stop reason: HOSPADM

## 2022-01-01 RX ORDER — UREA 20 %
CREAM (GRAM) TOPICAL
Qty: 120 G | Refills: 11
Start: 2022-01-01

## 2022-01-01 RX ORDER — TORSEMIDE 20 MG/1
40 TABLET ORAL DAILY
Status: DISCONTINUED | OUTPATIENT
Start: 2022-01-01 | End: 2022-01-01

## 2022-01-01 RX ORDER — FUROSEMIDE 10 MG/ML
40 INJECTION INTRAMUSCULAR; INTRAVENOUS ONCE
Status: DISCONTINUED | OUTPATIENT
Start: 2022-01-01 | End: 2022-01-01

## 2022-01-01 RX ORDER — BENZTROPINE MESYLATE 1 MG/1
1 TABLET ORAL 3 TIMES DAILY PRN
Status: DISCONTINUED | OUTPATIENT
Start: 2022-01-01 | End: 2022-01-01 | Stop reason: HOSPADM

## 2022-01-01 RX ORDER — QUETIAPINE FUMARATE 25 MG/1
25 TABLET, FILM COATED ORAL AT BEDTIME
Status: ON HOLD | COMMUNITY
Start: 2022-01-01 | End: 2022-01-01

## 2022-01-01 RX ORDER — FUROSEMIDE 10 MG/ML
20 INJECTION INTRAMUSCULAR; INTRAVENOUS ONCE
Status: COMPLETED | OUTPATIENT
Start: 2022-01-01 | End: 2022-01-01

## 2022-01-01 RX ORDER — QUETIAPINE FUMARATE 25 MG/1
25 TABLET, FILM COATED ORAL AT BEDTIME
COMMUNITY
Start: 2022-01-01

## 2022-01-01 RX ORDER — QUETIAPINE FUMARATE 50 MG/1
50 TABLET, FILM COATED ORAL AT BEDTIME
Status: DISCONTINUED | OUTPATIENT
Start: 2022-01-01 | End: 2022-01-01

## 2022-01-01 RX ORDER — OLANZAPINE 5 MG/1
5 TABLET, ORALLY DISINTEGRATING ORAL ONCE
Status: COMPLETED | OUTPATIENT
Start: 2022-01-01 | End: 2022-01-01

## 2022-01-01 RX ORDER — AMOXICILLIN 250 MG
2 CAPSULE ORAL 2 TIMES DAILY PRN
Status: DISCONTINUED | OUTPATIENT
Start: 2022-01-01 | End: 2022-01-01 | Stop reason: HOSPADM

## 2022-01-01 RX ORDER — ECHINACEA PURPUREA EXTRACT 125 MG
TABLET ORAL
Qty: 30 ML
Start: 2022-01-01 | End: 2024-08-08

## 2022-01-01 RX ORDER — LIDOCAINE 4 G/G
1 PATCH TOPICAL
Status: DISCONTINUED | OUTPATIENT
Start: 2022-01-01 | End: 2022-01-01 | Stop reason: HOSPADM

## 2022-01-01 RX ORDER — FUROSEMIDE 10 MG/ML
40 INJECTION INTRAMUSCULAR; INTRAVENOUS
Status: COMPLETED | OUTPATIENT
Start: 2022-01-01 | End: 2022-01-01

## 2022-01-01 RX ORDER — MINERAL OIL/HYDROPHIL PETROLAT
OINTMENT (GRAM) TOPICAL DAILY
Status: DISCONTINUED | OUTPATIENT
Start: 2022-01-01 | End: 2022-01-01 | Stop reason: HOSPADM

## 2022-01-01 RX ORDER — OLANZAPINE 5 MG/1
5 TABLET, ORALLY DISINTEGRATING ORAL
Status: DISCONTINUED | OUTPATIENT
Start: 2022-01-01 | End: 2022-01-01

## 2022-01-01 RX ORDER — QUETIAPINE FUMARATE 25 MG/1
12.5 TABLET, FILM COATED ORAL AT BEDTIME
DISCHARGE
Start: 2022-01-01 | End: 2022-01-01

## 2022-01-01 RX ORDER — LISINOPRIL 5 MG/1
5 TABLET ORAL DAILY
Qty: 30 TABLET | Refills: 1 | Status: ON HOLD | OUTPATIENT
Start: 2022-01-01 | End: 2022-01-01

## 2022-01-01 RX ORDER — LORAZEPAM 2 MG/ML
0.25 INJECTION INTRAMUSCULAR EVERY 4 HOURS PRN
Status: DISCONTINUED | OUTPATIENT
Start: 2022-01-01 | End: 2022-01-01

## 2022-01-01 RX ORDER — AMOXICILLIN 250 MG
1 CAPSULE ORAL 2 TIMES DAILY PRN
Status: DISCONTINUED | OUTPATIENT
Start: 2022-01-01 | End: 2022-01-01 | Stop reason: HOSPADM

## 2022-01-01 RX ORDER — TORSEMIDE 20 MG/1
20 TABLET ORAL DAILY
Qty: 30 TABLET | Refills: 0
Start: 2022-01-01

## 2022-01-01 RX ORDER — SPIRONOLACTONE 25 MG/1
25 TABLET ORAL DAILY
Qty: 30 TABLET | Refills: 1 | Status: SHIPPED | OUTPATIENT
Start: 2022-01-01

## 2022-01-01 RX ORDER — PANTOPRAZOLE SODIUM 40 MG/1
40 TABLET, DELAYED RELEASE ORAL DAILY
Status: DISCONTINUED | OUTPATIENT
Start: 2022-01-01 | End: 2022-01-01 | Stop reason: HOSPADM

## 2022-01-01 RX ORDER — ACETAMINOPHEN 500 MG
1000 TABLET ORAL ONCE
Status: COMPLETED | OUTPATIENT
Start: 2022-01-01 | End: 2022-01-01

## 2022-01-01 RX ADMIN — TORSEMIDE 20 MG: 20 TABLET ORAL at 08:04

## 2022-01-01 RX ADMIN — SIMVASTATIN 20 MG: 20 TABLET, FILM COATED ORAL at 22:06

## 2022-01-01 RX ADMIN — OLANZAPINE 5 MG: 5 TABLET, ORALLY DISINTEGRATING ORAL at 18:20

## 2022-01-01 RX ADMIN — QUETIAPINE FUMARATE 25 MG: 25 TABLET ORAL at 21:00

## 2022-01-01 RX ADMIN — TAMSULOSIN HYDROCHLORIDE 0.4 MG: 0.4 CAPSULE ORAL at 08:17

## 2022-01-01 RX ADMIN — OLANZAPINE 5 MG: 5 TABLET, FILM COATED ORAL at 03:05

## 2022-01-01 RX ADMIN — QUETIAPINE FUMARATE 25 MG: 25 TABLET ORAL at 01:26

## 2022-01-01 RX ADMIN — PANTOPRAZOLE SODIUM 40 MG: 40 TABLET, DELAYED RELEASE ORAL at 08:07

## 2022-01-01 RX ADMIN — PANTOPRAZOLE SODIUM 40 MG: 40 TABLET, DELAYED RELEASE ORAL at 08:38

## 2022-01-01 RX ADMIN — APIXABAN 2.5 MG: 2.5 TABLET, FILM COATED ORAL at 19:08

## 2022-01-01 RX ADMIN — OLANZAPINE 5 MG: 5 TABLET, ORALLY DISINTEGRATING ORAL at 02:59

## 2022-01-01 RX ADMIN — CEFTRIAXONE 1 G: 1 INJECTION, POWDER, FOR SOLUTION INTRAMUSCULAR; INTRAVENOUS at 21:22

## 2022-01-01 RX ADMIN — DICLOFENAC SODIUM 2 G: 10 GEL TOPICAL at 14:43

## 2022-01-01 RX ADMIN — DICLOFENAC SODIUM 2 G: 10 GEL TOPICAL at 13:32

## 2022-01-01 RX ADMIN — APIXABAN 2.5 MG: 2.5 TABLET, FILM COATED ORAL at 21:00

## 2022-01-01 RX ADMIN — SPIRONOLACTONE 25 MG: 25 TABLET ORAL at 08:09

## 2022-01-01 RX ADMIN — OLANZAPINE 5 MG: 5 TABLET, ORALLY DISINTEGRATING ORAL at 23:56

## 2022-01-01 RX ADMIN — TORSEMIDE 20 MG: 20 TABLET ORAL at 09:46

## 2022-01-01 RX ADMIN — SIMVASTATIN 20 MG: 20 TABLET, FILM COATED ORAL at 21:17

## 2022-01-01 RX ADMIN — FUROSEMIDE 40 MG: 10 INJECTION, SOLUTION INTRAMUSCULAR; INTRAVENOUS at 16:23

## 2022-01-01 RX ADMIN — SPIRONOLACTONE 25 MG: 25 TABLET ORAL at 16:22

## 2022-01-01 RX ADMIN — ACETAMINOPHEN 1000 MG: 500 TABLET, FILM COATED ORAL at 16:22

## 2022-01-01 RX ADMIN — ACETAMINOPHEN 1000 MG: 500 TABLET, FILM COATED ORAL at 14:43

## 2022-01-01 RX ADMIN — OLANZAPINE 5 MG: 5 TABLET, FILM COATED ORAL at 00:42

## 2022-01-01 RX ADMIN — Medication 1 MG: at 00:25

## 2022-01-01 RX ADMIN — POTASSIUM CHLORIDE 20 MEQ: 750 TABLET, FILM COATED, EXTENDED RELEASE ORAL at 08:24

## 2022-01-01 RX ADMIN — ACETAMINOPHEN 1000 MG: 500 TABLET, FILM COATED ORAL at 09:40

## 2022-01-01 RX ADMIN — ACETAMINOPHEN 1000 MG: 500 TABLET, FILM COATED ORAL at 14:27

## 2022-01-01 RX ADMIN — DICLOFENAC SODIUM 2 G: 10 GEL TOPICAL at 21:05

## 2022-01-01 RX ADMIN — DICLOFENAC SODIUM 2 G: 10 GEL TOPICAL at 22:04

## 2022-01-01 RX ADMIN — SPIRONOLACTONE 25 MG: 25 TABLET ORAL at 11:42

## 2022-01-01 RX ADMIN — OMEPRAZOLE 40 MG: 20 CAPSULE, DELAYED RELEASE ORAL at 09:32

## 2022-01-01 RX ADMIN — OMEPRAZOLE 40 MG: 20 CAPSULE, DELAYED RELEASE ORAL at 08:25

## 2022-01-01 RX ADMIN — OMEPRAZOLE 40 MG: 20 CAPSULE, DELAYED RELEASE ORAL at 09:41

## 2022-01-01 RX ADMIN — WHITE PETROLATUM: 1.75 OINTMENT TOPICAL at 08:08

## 2022-01-01 RX ADMIN — DICLOFENAC SODIUM 2 G: 10 GEL TOPICAL at 09:33

## 2022-01-01 RX ADMIN — ACETAMINOPHEN 650 MG: 325 SOLUTION ORAL at 16:32

## 2022-01-01 RX ADMIN — OLANZAPINE 2.5 MG: 5 TABLET, ORALLY DISINTEGRATING ORAL at 18:04

## 2022-01-01 RX ADMIN — ACETAMINOPHEN 650 MG: 325 SOLUTION ORAL at 03:32

## 2022-01-01 RX ADMIN — DICLOFENAC SODIUM 2 G: 10 GEL TOPICAL at 08:36

## 2022-01-01 RX ADMIN — POTASSIUM CHLORIDE 20 MEQ: 750 TABLET, FILM COATED, EXTENDED RELEASE ORAL at 08:19

## 2022-01-01 RX ADMIN — POTASSIUM CHLORIDE 20 MEQ: 750 TABLET, FILM COATED, EXTENDED RELEASE ORAL at 19:08

## 2022-01-01 RX ADMIN — LISINOPRIL 2.5 MG: 2.5 TABLET ORAL at 14:42

## 2022-01-01 RX ADMIN — Medication 1 MG: at 02:20

## 2022-01-01 RX ADMIN — LIDOCAINE 1 PATCH: 246 PATCH TOPICAL at 10:58

## 2022-01-01 RX ADMIN — DICLOFENAC SODIUM 2 G: 10 GEL TOPICAL at 13:48

## 2022-01-01 RX ADMIN — TRAZODONE HYDROCHLORIDE 50 MG: 50 TABLET ORAL at 22:31

## 2022-01-01 RX ADMIN — FUROSEMIDE 40 MG: 10 INJECTION, SOLUTION INTRAMUSCULAR; INTRAVENOUS at 17:04

## 2022-01-01 RX ADMIN — LISINOPRIL 2.5 MG: 2.5 TABLET ORAL at 13:01

## 2022-01-01 RX ADMIN — DICLOFENAC SODIUM 2 G: 10 GEL TOPICAL at 20:01

## 2022-01-01 RX ADMIN — APIXABAN 2.5 MG: 2.5 TABLET, FILM COATED ORAL at 22:04

## 2022-01-01 RX ADMIN — ACETAMINOPHEN 1000 MG: 500 TABLET, FILM COATED ORAL at 09:31

## 2022-01-01 RX ADMIN — Medication 5 MG: at 22:04

## 2022-01-01 RX ADMIN — DICLOFENAC SODIUM 2 G: 10 GEL TOPICAL at 21:06

## 2022-01-01 RX ADMIN — SIMVASTATIN 20 MG: 10 TABLET, FILM COATED ORAL at 21:25

## 2022-01-01 RX ADMIN — DICLOFENAC SODIUM 2 G: 10 GEL TOPICAL at 18:55

## 2022-01-01 RX ADMIN — OLANZAPINE 5 MG: 5 TABLET, ORALLY DISINTEGRATING ORAL at 00:15

## 2022-01-01 RX ADMIN — APIXABAN 2.5 MG: 2.5 TABLET, FILM COATED ORAL at 21:18

## 2022-01-01 RX ADMIN — APIXABAN 2.5 MG: 2.5 TABLET, FILM COATED ORAL at 10:33

## 2022-01-01 RX ADMIN — POTASSIUM CHLORIDE 20 MEQ: 20 TABLET, EXTENDED RELEASE ORAL at 17:50

## 2022-01-01 RX ADMIN — PREDNISONE 40 MG: 20 TABLET ORAL at 09:46

## 2022-01-01 RX ADMIN — APIXABAN 2.5 MG: 2.5 TABLET, FILM COATED ORAL at 11:42

## 2022-01-01 RX ADMIN — DICLOFENAC SODIUM 2 G: 10 GEL TOPICAL at 09:49

## 2022-01-01 RX ADMIN — APIXABAN 2.5 MG: 2.5 TABLET, FILM COATED ORAL at 09:46

## 2022-01-01 RX ADMIN — DICLOFENAC SODIUM 2 G: 10 GEL TOPICAL at 09:58

## 2022-01-01 RX ADMIN — POTASSIUM CHLORIDE 20 MEQ: 750 TABLET, FILM COATED, EXTENDED RELEASE ORAL at 21:00

## 2022-01-01 RX ADMIN — POTASSIUM CHLORIDE 20 MEQ: 750 TABLET, FILM COATED, EXTENDED RELEASE ORAL at 09:32

## 2022-01-01 RX ADMIN — ACETAMINOPHEN 1000 MG: 500 TABLET, FILM COATED ORAL at 15:46

## 2022-01-01 RX ADMIN — POTASSIUM CHLORIDE 20 MEQ: 750 TABLET, FILM COATED, EXTENDED RELEASE ORAL at 21:23

## 2022-01-01 RX ADMIN — FUROSEMIDE 40 MG: 10 INJECTION, SOLUTION INTRAMUSCULAR; INTRAVENOUS at 16:35

## 2022-01-01 RX ADMIN — SPIRONOLACTONE 25 MG: 25 TABLET ORAL at 18:20

## 2022-01-01 RX ADMIN — ACETAMINOPHEN 1000 MG: 500 TABLET, FILM COATED ORAL at 17:02

## 2022-01-01 RX ADMIN — POTASSIUM CHLORIDE 20 MEQ: 20 TABLET, EXTENDED RELEASE ORAL at 09:46

## 2022-01-01 RX ADMIN — TORSEMIDE 20 MG: 20 TABLET ORAL at 16:22

## 2022-01-01 RX ADMIN — OMEPRAZOLE 40 MG: 20 CAPSULE, DELAYED RELEASE ORAL at 08:32

## 2022-01-01 RX ADMIN — APIXABAN 2.5 MG: 2.5 TABLET, FILM COATED ORAL at 08:38

## 2022-01-01 RX ADMIN — FERROUS GLUCONATE 324 MG: 324 TABLET ORAL at 08:17

## 2022-01-01 RX ADMIN — ACETAMINOPHEN 1000 MG: 500 TABLET, FILM COATED ORAL at 08:19

## 2022-01-01 RX ADMIN — POTASSIUM CHLORIDE 20 MEQ: 750 TABLET, FILM COATED, EXTENDED RELEASE ORAL at 20:01

## 2022-01-01 RX ADMIN — TORSEMIDE 20 MG: 20 TABLET ORAL at 09:32

## 2022-01-01 RX ADMIN — ACETAMINOPHEN 1000 MG: 500 TABLET, FILM COATED ORAL at 00:11

## 2022-01-01 RX ADMIN — ACETAMINOPHEN 1000 MG: 500 TABLET, FILM COATED ORAL at 08:32

## 2022-01-01 RX ADMIN — SPIRONOLACTONE 25 MG: 25 TABLET ORAL at 08:38

## 2022-01-01 RX ADMIN — POTASSIUM CHLORIDE 20 MEQ: 20 TABLET, EXTENDED RELEASE ORAL at 08:03

## 2022-01-01 RX ADMIN — FUROSEMIDE 40 MG: 10 INJECTION, SOLUTION INTRAMUSCULAR; INTRAVENOUS at 12:06

## 2022-01-01 RX ADMIN — FUROSEMIDE 40 MG: 10 INJECTION, SOLUTION INTRAMUSCULAR; INTRAVENOUS at 16:10

## 2022-01-01 RX ADMIN — SIMVASTATIN 20 MG: 20 TABLET, FILM COATED ORAL at 21:24

## 2022-01-01 RX ADMIN — DICLOFENAC SODIUM 2 G: 10 GEL TOPICAL at 16:19

## 2022-01-01 RX ADMIN — DICLOFENAC SODIUM 2 G: 10 GEL TOPICAL at 11:57

## 2022-01-01 RX ADMIN — SIMVASTATIN 20 MG: 10 TABLET, FILM COATED ORAL at 22:04

## 2022-01-01 RX ADMIN — DICLOFENAC SODIUM 2 G: 10 GEL TOPICAL at 16:30

## 2022-01-01 RX ADMIN — LIDOCAINE 1 PATCH: 246 PATCH TOPICAL at 09:30

## 2022-01-01 RX ADMIN — ACETAMINOPHEN 1000 MG: 500 TABLET, FILM COATED ORAL at 18:21

## 2022-01-01 RX ADMIN — POTASSIUM CHLORIDE 40 MEQ: 20 TABLET, EXTENDED RELEASE ORAL at 16:10

## 2022-01-01 RX ADMIN — SIMVASTATIN 20 MG: 20 TABLET, FILM COATED ORAL at 21:43

## 2022-01-01 RX ADMIN — SIMVASTATIN 20 MG: 20 TABLET, FILM COATED ORAL at 21:20

## 2022-01-01 RX ADMIN — POTASSIUM CHLORIDE 20 MEQ: 20 TABLET, EXTENDED RELEASE ORAL at 08:09

## 2022-01-01 RX ADMIN — TAMSULOSIN HYDROCHLORIDE 0.4 MG: 0.4 CAPSULE ORAL at 09:32

## 2022-01-01 RX ADMIN — APIXABAN 2.5 MG: 2.5 TABLET, FILM COATED ORAL at 08:19

## 2022-01-01 RX ADMIN — SPIRONOLACTONE 25 MG: 25 TABLET ORAL at 09:31

## 2022-01-01 RX ADMIN — FUROSEMIDE 40 MG: 10 INJECTION, SOLUTION INTRAMUSCULAR; INTRAVENOUS at 08:17

## 2022-01-01 RX ADMIN — TAMSULOSIN HYDROCHLORIDE 0.4 MG: 0.4 CAPSULE ORAL at 14:42

## 2022-01-01 RX ADMIN — LIDOCAINE 1 PATCH: 246 PATCH TOPICAL at 08:25

## 2022-01-01 RX ADMIN — DICLOFENAC SODIUM 2 G: 10 GEL TOPICAL at 08:27

## 2022-01-01 RX ADMIN — TORSEMIDE 20 MG: 20 TABLET ORAL at 11:49

## 2022-01-01 RX ADMIN — APIXABAN 2.5 MG: 2.5 TABLET, FILM COATED ORAL at 08:24

## 2022-01-01 RX ADMIN — SIMVASTATIN 20 MG: 20 TABLET, FILM COATED ORAL at 21:05

## 2022-01-01 RX ADMIN — Medication 1 MG: at 23:57

## 2022-01-01 RX ADMIN — POTASSIUM CHLORIDE 20 MEQ: 750 TABLET, FILM COATED, EXTENDED RELEASE ORAL at 21:01

## 2022-01-01 RX ADMIN — TAMSULOSIN HYDROCHLORIDE 0.4 MG: 0.4 CAPSULE ORAL at 11:41

## 2022-01-01 RX ADMIN — SIMVASTATIN 20 MG: 20 TABLET, FILM COATED ORAL at 21:11

## 2022-01-01 RX ADMIN — FUROSEMIDE 60 MG: 10 INJECTION, SOLUTION INTRAMUSCULAR; INTRAVENOUS at 09:29

## 2022-01-01 RX ADMIN — FUROSEMIDE 40 MG: 10 INJECTION, SOLUTION INTRAMUSCULAR; INTRAVENOUS at 10:55

## 2022-01-01 RX ADMIN — QUETIAPINE FUMARATE 25 MG: 25 TABLET ORAL at 21:02

## 2022-01-01 RX ADMIN — LIDOCAINE 1 PATCH: 246 PATCH TOPICAL at 08:18

## 2022-01-01 RX ADMIN — POTASSIUM CHLORIDE 20 MEQ: 20 TABLET, EXTENDED RELEASE ORAL at 08:38

## 2022-01-01 RX ADMIN — LORAZEPAM 0.25 MG: 2 INJECTION INTRAMUSCULAR; INTRAVENOUS at 05:11

## 2022-01-01 RX ADMIN — Medication 1 MG: at 22:42

## 2022-01-01 RX ADMIN — DICLOFENAC SODIUM 2 G: 10 GEL TOPICAL at 12:06

## 2022-01-01 RX ADMIN — DICLOFENAC SODIUM 2 G: 10 GEL TOPICAL at 08:18

## 2022-01-01 RX ADMIN — ACETAMINOPHEN 650 MG: 325 SOLUTION ORAL at 19:45

## 2022-01-01 RX ADMIN — Medication 1 MG: at 21:25

## 2022-01-01 RX ADMIN — Medication 1 MG: at 19:45

## 2022-01-01 RX ADMIN — LORAZEPAM 0.25 MG: 2 INJECTION INTRAMUSCULAR; INTRAVENOUS at 19:48

## 2022-01-01 RX ADMIN — DICLOFENAC SODIUM 2 G: 10 GEL TOPICAL at 21:37

## 2022-01-01 RX ADMIN — APIXABAN 2.5 MG: 2.5 TABLET, FILM COATED ORAL at 08:32

## 2022-01-01 RX ADMIN — ACETAMINOPHEN 1000 MG: 500 TABLET, FILM COATED ORAL at 08:24

## 2022-01-01 RX ADMIN — APIXABAN 2.5 MG: 2.5 TABLET, FILM COATED ORAL at 08:17

## 2022-01-01 RX ADMIN — APIXABAN 2.5 MG: 2.5 TABLET, FILM COATED ORAL at 09:42

## 2022-01-01 RX ADMIN — APIXABAN 2.5 MG: 2.5 TABLET, FILM COATED ORAL at 19:58

## 2022-01-01 RX ADMIN — APIXABAN 2.5 MG: 2.5 TABLET, FILM COATED ORAL at 11:49

## 2022-01-01 RX ADMIN — TAMSULOSIN HYDROCHLORIDE 0.4 MG: 0.4 CAPSULE ORAL at 09:46

## 2022-01-01 RX ADMIN — TAMSULOSIN HYDROCHLORIDE 0.4 MG: 0.4 CAPSULE ORAL at 08:03

## 2022-01-01 RX ADMIN — DICLOFENAC SODIUM 2 G: 10 GEL TOPICAL at 08:08

## 2022-01-01 RX ADMIN — WHITE PETROLATUM: 1.75 OINTMENT TOPICAL at 13:32

## 2022-01-01 RX ADMIN — TRAMADOL HYDROCHLORIDE 25 MG: 50 TABLET, FILM COATED ORAL at 00:25

## 2022-01-01 RX ADMIN — QUETIAPINE FUMARATE 25 MG: 25 TABLET ORAL at 21:17

## 2022-01-01 RX ADMIN — QUETIAPINE FUMARATE 25 MG: 25 TABLET ORAL at 21:20

## 2022-01-01 RX ADMIN — OLANZAPINE 5 MG: 5 TABLET, ORALLY DISINTEGRATING ORAL at 06:15

## 2022-01-01 RX ADMIN — OMEPRAZOLE 40 MG: 20 CAPSULE, DELAYED RELEASE ORAL at 09:40

## 2022-01-01 RX ADMIN — OMEPRAZOLE 40 MG: 20 CAPSULE, DELAYED RELEASE ORAL at 09:46

## 2022-01-01 RX ADMIN — FUROSEMIDE 40 MG: 10 INJECTION, SOLUTION INTRAMUSCULAR; INTRAVENOUS at 17:50

## 2022-01-01 RX ADMIN — DICLOFENAC SODIUM 2 G: 10 GEL TOPICAL at 17:09

## 2022-01-01 RX ADMIN — PANTOPRAZOLE SODIUM 40 MG: 40 TABLET, DELAYED RELEASE ORAL at 09:46

## 2022-01-01 RX ADMIN — DICLOFENAC SODIUM 2 G: 10 GEL TOPICAL at 13:01

## 2022-01-01 RX ADMIN — PREDNISONE 40 MG: 20 TABLET ORAL at 08:37

## 2022-01-01 RX ADMIN — DICLOFENAC SODIUM 2 G: 10 GEL TOPICAL at 19:15

## 2022-01-01 RX ADMIN — DICLOFENAC SODIUM 2 G: 10 GEL TOPICAL at 18:22

## 2022-01-01 RX ADMIN — SIMVASTATIN 20 MG: 10 TABLET, FILM COATED ORAL at 21:35

## 2022-01-01 RX ADMIN — OMEPRAZOLE 40 MG: 20 CAPSULE, DELAYED RELEASE ORAL at 08:17

## 2022-01-01 RX ADMIN — Medication 1 MG: at 20:59

## 2022-01-01 RX ADMIN — FERROUS GLUCONATE 324 MG: 324 TABLET ORAL at 08:03

## 2022-01-01 RX ADMIN — QUETIAPINE FUMARATE 25 MG: 25 TABLET ORAL at 22:26

## 2022-01-01 RX ADMIN — LISINOPRIL 5 MG: 5 TABLET ORAL at 11:50

## 2022-01-01 RX ADMIN — WHITE PETROLATUM: 1.75 OINTMENT TOPICAL at 10:37

## 2022-01-01 RX ADMIN — OMEPRAZOLE 40 MG: 20 CAPSULE, DELAYED RELEASE ORAL at 10:32

## 2022-01-01 RX ADMIN — APIXABAN 2.5 MG: 2.5 TABLET, FILM COATED ORAL at 19:45

## 2022-01-01 RX ADMIN — ACETAMINOPHEN 650 MG: 325 SOLUTION ORAL at 21:22

## 2022-01-01 RX ADMIN — POTASSIUM CHLORIDE 20 MEQ: 750 TABLET, FILM COATED, EXTENDED RELEASE ORAL at 09:41

## 2022-01-01 RX ADMIN — FUROSEMIDE 40 MG: 10 INJECTION, SOLUTION INTRAMUSCULAR; INTRAVENOUS at 08:37

## 2022-01-01 RX ADMIN — ACETAMINOPHEN 1000 MG: 500 TABLET, FILM COATED ORAL at 11:49

## 2022-01-01 RX ADMIN — OLANZAPINE 2.5 MG: 5 TABLET, ORALLY DISINTEGRATING ORAL at 01:27

## 2022-01-01 RX ADMIN — POTASSIUM CHLORIDE 20 MEQ: 20 TABLET, EXTENDED RELEASE ORAL at 20:55

## 2022-01-01 RX ADMIN — LISINOPRIL 2.5 MG: 2.5 TABLET ORAL at 09:42

## 2022-01-01 RX ADMIN — DICLOFENAC SODIUM 2 G: 10 GEL TOPICAL at 09:38

## 2022-01-01 RX ADMIN — APIXABAN 2.5 MG: 2.5 TABLET, FILM COATED ORAL at 08:08

## 2022-01-01 RX ADMIN — TORSEMIDE 40 MG: 20 TABLET ORAL at 11:50

## 2022-01-01 RX ADMIN — DICLOFENAC SODIUM 2 G: 10 GEL TOPICAL at 21:25

## 2022-01-01 RX ADMIN — TAMSULOSIN HYDROCHLORIDE 0.4 MG: 0.4 CAPSULE ORAL at 08:38

## 2022-01-01 RX ADMIN — Medication 1 MG: at 21:22

## 2022-01-01 RX ADMIN — DICLOFENAC SODIUM 2 G: 10 GEL TOPICAL at 16:33

## 2022-01-01 RX ADMIN — OMEPRAZOLE 40 MG: 20 CAPSULE, DELAYED RELEASE ORAL at 08:19

## 2022-01-01 RX ADMIN — POTASSIUM CHLORIDE 40 MEQ: 20 TABLET, EXTENDED RELEASE ORAL at 09:45

## 2022-01-01 RX ADMIN — OMEPRAZOLE 40 MG: 20 CAPSULE, DELAYED RELEASE ORAL at 11:49

## 2022-01-01 RX ADMIN — SIMVASTATIN 20 MG: 20 TABLET, FILM COATED ORAL at 21:00

## 2022-01-01 RX ADMIN — FUROSEMIDE 20 MG: 10 INJECTION, SOLUTION INTRAMUSCULAR; INTRAVENOUS at 05:50

## 2022-01-01 RX ADMIN — APIXABAN 2.5 MG: 2.5 TABLET, FILM COATED ORAL at 21:24

## 2022-01-01 RX ADMIN — TAMSULOSIN HYDROCHLORIDE 0.4 MG: 0.4 CAPSULE ORAL at 10:33

## 2022-01-01 RX ADMIN — OLANZAPINE 5 MG: 5 TABLET, ORALLY DISINTEGRATING ORAL at 19:58

## 2022-01-01 RX ADMIN — POTASSIUM CHLORIDE 20 MEQ: 20 TABLET, EXTENDED RELEASE ORAL at 18:06

## 2022-01-01 RX ADMIN — APIXABAN 2.5 MG: 2.5 TABLET, FILM COATED ORAL at 20:02

## 2022-01-01 RX ADMIN — DICLOFENAC SODIUM 2 G: 10 GEL TOPICAL at 21:30

## 2022-01-01 RX ADMIN — LIDOCAINE 1 PATCH: 246 PATCH TOPICAL at 08:32

## 2022-01-01 RX ADMIN — APIXABAN 2.5 MG: 2.5 TABLET, FILM COATED ORAL at 21:25

## 2022-01-01 RX ADMIN — PANTOPRAZOLE SODIUM 40 MG: 40 TABLET, DELAYED RELEASE ORAL at 11:41

## 2022-01-01 RX ADMIN — APIXABAN 2.5 MG: 2.5 TABLET, FILM COATED ORAL at 08:03

## 2022-01-01 RX ADMIN — DICLOFENAC SODIUM 2 G: 10 GEL TOPICAL at 16:01

## 2022-01-01 RX ADMIN — APIXABAN 2.5 MG: 2.5 TABLET, FILM COATED ORAL at 20:55

## 2022-01-01 RX ADMIN — POTASSIUM CHLORIDE 20 MEQ: 20 TABLET, EXTENDED RELEASE ORAL at 11:41

## 2022-01-01 RX ADMIN — PREDNISONE 40 MG: 20 TABLET ORAL at 11:42

## 2022-01-01 RX ADMIN — FERROUS GLUCONATE 324 MG: 324 TABLET ORAL at 10:33

## 2022-01-01 RX ADMIN — TORSEMIDE 20 MG: 20 TABLET ORAL at 18:21

## 2022-01-01 RX ADMIN — DICLOFENAC SODIUM 2 G: 10 GEL TOPICAL at 21:45

## 2022-01-01 RX ADMIN — OMEPRAZOLE 40 MG: 20 CAPSULE, DELAYED RELEASE ORAL at 08:03

## 2022-01-01 RX ADMIN — ACETAMINOPHEN 1000 MG: 500 TABLET, FILM COATED ORAL at 15:04

## 2022-01-01 RX ADMIN — SPIRONOLACTONE 25 MG: 25 TABLET ORAL at 08:32

## 2022-01-01 RX ADMIN — Medication 1 MG: at 01:19

## 2022-01-01 RX ADMIN — APIXABAN 2.5 MG: 2.5 TABLET, FILM COATED ORAL at 09:41

## 2022-01-01 RX ADMIN — OLANZAPINE 5 MG: 5 TABLET, ORALLY DISINTEGRATING ORAL at 21:21

## 2022-01-01 RX ADMIN — LIDOCAINE 1 PATCH: 246 PATCH TOPICAL at 09:41

## 2022-01-01 RX ADMIN — TAMSULOSIN HYDROCHLORIDE 0.4 MG: 0.4 CAPSULE ORAL at 08:32

## 2022-01-01 RX ADMIN — ACETAMINOPHEN 1000 MG: 500 TABLET, FILM COATED ORAL at 09:41

## 2022-01-01 RX ADMIN — TAMSULOSIN HYDROCHLORIDE 0.4 MG: 0.4 CAPSULE ORAL at 08:08

## 2022-01-01 RX ADMIN — ACETAMINOPHEN 1000 MG: 500 TABLET, FILM COATED ORAL at 13:52

## 2022-01-01 RX ADMIN — TAMSULOSIN HYDROCHLORIDE 0.4 MG: 0.4 CAPSULE ORAL at 11:50

## 2022-01-01 RX ADMIN — SPIRONOLACTONE 25 MG: 25 TABLET ORAL at 09:46

## 2022-01-01 RX ADMIN — SIMVASTATIN 20 MG: 20 TABLET, FILM COATED ORAL at 01:26

## 2022-01-01 RX ADMIN — OLANZAPINE 5 MG: 5 TABLET, ORALLY DISINTEGRATING ORAL at 10:34

## 2022-01-01 RX ADMIN — PREDNISONE 40 MG: 20 TABLET ORAL at 08:08

## 2022-01-01 RX ADMIN — ACETAMINOPHEN 1000 MG: 500 TABLET, FILM COATED ORAL at 21:24

## 2022-01-01 RX ADMIN — APIXABAN 2.5 MG: 2.5 TABLET, FILM COATED ORAL at 21:05

## 2022-01-01 RX ADMIN — QUETIAPINE FUMARATE 25 MG: 25 TABLET ORAL at 21:24

## 2022-01-01 RX ADMIN — SPIRONOLACTONE 25 MG: 25 TABLET ORAL at 11:50

## 2022-01-01 RX ADMIN — APIXABAN 2.5 MG: 2.5 TABLET, FILM COATED ORAL at 21:02

## 2022-01-01 RX ADMIN — TORSEMIDE 20 MG: 20 TABLET ORAL at 08:32

## 2022-01-01 RX ADMIN — FUROSEMIDE 40 MG: 10 INJECTION, SOLUTION INTRAMUSCULAR; INTRAVENOUS at 16:32

## 2022-01-01 RX ADMIN — QUETIAPINE FUMARATE 25 MG: 25 TABLET ORAL at 21:05

## 2022-01-01 RX ADMIN — FUROSEMIDE 60 MG: 10 INJECTION, SOLUTION INTRAMUSCULAR; INTRAVENOUS at 15:46

## 2022-01-01 RX ADMIN — OLANZAPINE 5 MG: 5 TABLET, ORALLY DISINTEGRATING ORAL at 18:54

## 2022-01-01 RX ADMIN — FERROUS GLUCONATE 324 MG: 324 TABLET ORAL at 09:46

## 2022-01-01 RX ADMIN — POTASSIUM CHLORIDE 20 MEQ: 750 TABLET, FILM COATED, EXTENDED RELEASE ORAL at 21:05

## 2022-01-01 RX ADMIN — ACETAMINOPHEN 1000 MG: 500 TABLET, FILM COATED ORAL at 21:20

## 2022-01-01 RX ADMIN — OLANZAPINE 5 MG: 5 TABLET, ORALLY DISINTEGRATING ORAL at 21:20

## 2022-01-01 RX ADMIN — APIXABAN 2.5 MG: 2.5 TABLET, FILM COATED ORAL at 21:35

## 2022-01-01 RX ADMIN — DICLOFENAC SODIUM 2 G: 10 GEL TOPICAL at 09:41

## 2022-01-01 RX ADMIN — OLANZAPINE 5 MG: 5 TABLET, FILM COATED ORAL at 06:40

## 2022-01-01 RX ADMIN — DICLOFENAC SODIUM 2 G: 10 GEL TOPICAL at 11:51

## 2022-01-01 RX ADMIN — QUETIAPINE FUMARATE 25 MG: 25 TABLET ORAL at 21:43

## 2022-01-01 RX ADMIN — DICLOFENAC SODIUM 2 G: 10 GEL TOPICAL at 13:05

## 2022-01-01 RX ADMIN — FUROSEMIDE 40 MG: 10 INJECTION, SOLUTION INTRAMUSCULAR; INTRAVENOUS at 08:07

## 2022-01-01 RX ADMIN — ACETAMINOPHEN 1000 MG: 500 TABLET, FILM COATED ORAL at 21:05

## 2022-01-01 RX ADMIN — ACETAMINOPHEN 1000 MG: 500 TABLET, FILM COATED ORAL at 19:09

## 2022-01-01 RX ADMIN — QUETIAPINE FUMARATE 25 MG: 25 TABLET ORAL at 22:06

## 2022-01-01 RX ADMIN — APIXABAN 2.5 MG: 2.5 TABLET, FILM COATED ORAL at 09:31

## 2022-01-01 RX ADMIN — POTASSIUM CHLORIDE 20 MEQ: 750 TABLET, FILM COATED, EXTENDED RELEASE ORAL at 08:32

## 2022-01-01 RX ADMIN — POTASSIUM CHLORIDE 40 MEQ: 20 TABLET, EXTENDED RELEASE ORAL at 10:57

## 2022-01-01 RX ADMIN — ACETAMINOPHEN 1000 MG: 500 TABLET, FILM COATED ORAL at 21:02

## 2022-01-01 RX ADMIN — TAMSULOSIN HYDROCHLORIDE 0.4 MG: 0.4 CAPSULE ORAL at 09:40

## 2022-01-01 RX ADMIN — POTASSIUM CHLORIDE 20 MEQ: 750 TABLET, FILM COATED, EXTENDED RELEASE ORAL at 09:39

## 2022-01-01 RX ADMIN — DICLOFENAC SODIUM 2 G: 10 GEL TOPICAL at 16:22

## 2022-01-01 RX ADMIN — ACETAMINOPHEN 1000 MG: 500 TABLET, FILM COATED ORAL at 20:01

## 2022-01-01 ASSESSMENT — ACTIVITIES OF DAILY LIVING (ADL)
ADLS_ACUITY_SCORE: 19
ADLS_ACUITY_SCORE: 19
ADLS_ACUITY_SCORE: 11
ADLS_ACUITY_SCORE: 24
ADLS_ACUITY_SCORE: 26
ADLS_ACUITY_SCORE: 19
ADLS_ACUITY_SCORE: 11
ADLS_ACUITY_SCORE: 26
ADLS_ACUITY_SCORE: 19
ADLS_ACUITY_SCORE: 15
ADLS_ACUITY_SCORE: 11
ADLS_ACUITY_SCORE: 15
ADLS_ACUITY_SCORE: 17
ADLS_ACUITY_SCORE: 19
ADLS_ACUITY_SCORE: 12
ADLS_ACUITY_SCORE: 26
ADLS_ACUITY_SCORE: 26
ADLS_ACUITY_SCORE: 19
ADLS_ACUITY_SCORE: 20
ADLS_ACUITY_SCORE: 19
ADLS_ACUITY_SCORE: 19
ADLS_ACUITY_SCORE: 22
ADLS_ACUITY_SCORE: 22
ADLS_ACUITY_SCORE: 11
ADLS_ACUITY_SCORE: 24
ADLS_ACUITY_SCORE: 17
ADLS_ACUITY_SCORE: 16
ADLS_ACUITY_SCORE: 26
ADLS_ACUITY_SCORE: 19
ADLS_ACUITY_SCORE: 26
ADLS_ACUITY_SCORE: 19
ADLS_ACUITY_SCORE: 24
ADLS_ACUITY_SCORE: 11
ADLS_ACUITY_SCORE: 19
ADLS_ACUITY_SCORE: 19
ADLS_ACUITY_SCORE: 11
ADLS_ACUITY_SCORE: 24
ADLS_ACUITY_SCORE: 15
ADLS_ACUITY_SCORE: 11
ADLS_ACUITY_SCORE: 24
ADLS_ACUITY_SCORE: 26
ADLS_ACUITY_SCORE: 13
ADLS_ACUITY_SCORE: 15
ADLS_ACUITY_SCORE: 16
ADLS_ACUITY_SCORE: 24
ADLS_ACUITY_SCORE: 28
ADLS_ACUITY_SCORE: 24
ADLS_ACUITY_SCORE: 26
ADLS_ACUITY_SCORE: 24
ADLS_ACUITY_SCORE: 22
ADLS_ACUITY_SCORE: 15
ADLS_ACUITY_SCORE: 11
ADLS_ACUITY_SCORE: 24
ADLS_ACUITY_SCORE: 26
ADLS_ACUITY_SCORE: 19
ADLS_ACUITY_SCORE: 22
ADLS_ACUITY_SCORE: 19
ADLS_ACUITY_SCORE: 11
ADLS_ACUITY_SCORE: 24
ADLS_ACUITY_SCORE: 22
ADLS_ACUITY_SCORE: 26
ADLS_ACUITY_SCORE: 24
ADLS_ACUITY_SCORE: 11
ADLS_ACUITY_SCORE: 26
ADLS_ACUITY_SCORE: 24
ADLS_ACUITY_SCORE: 11
ADLS_ACUITY_SCORE: 15
ADLS_ACUITY_SCORE: 22
ADLS_ACUITY_SCORE: 19
ADLS_ACUITY_SCORE: 26
ADLS_ACUITY_SCORE: 19
ADLS_ACUITY_SCORE: 28
ADLS_ACUITY_SCORE: 26
ADLS_ACUITY_SCORE: 19
ADLS_ACUITY_SCORE: 19
ADLS_ACUITY_SCORE: 22
ADLS_ACUITY_SCORE: 19
ADLS_ACUITY_SCORE: 11
ADLS_ACUITY_SCORE: 19
ADLS_ACUITY_SCORE: 19
ADLS_ACUITY_SCORE: 28
ADLS_ACUITY_SCORE: 15
ADLS_ACUITY_SCORE: 12
ADLS_ACUITY_SCORE: 28
ADLS_ACUITY_SCORE: 19
ADLS_ACUITY_SCORE: 26
ADLS_ACUITY_SCORE: 11
ADLS_ACUITY_SCORE: 22
ADLS_ACUITY_SCORE: 19
ADLS_ACUITY_SCORE: 11
ADLS_ACUITY_SCORE: 13
ADLS_ACUITY_SCORE: 24
ADLS_ACUITY_SCORE: 24
ADLS_ACUITY_SCORE: 13
ADLS_ACUITY_SCORE: 11
ADLS_ACUITY_SCORE: 11
ADLS_ACUITY_SCORE: 24
ADLS_ACUITY_SCORE: 26
ADLS_ACUITY_SCORE: 11
ADLS_ACUITY_SCORE: 13
ADLS_ACUITY_SCORE: 24
ADLS_ACUITY_SCORE: 24
ADLS_ACUITY_SCORE: 19
ADLS_ACUITY_SCORE: 28
ADLS_ACUITY_SCORE: 22
ADLS_ACUITY_SCORE: 28
ADLS_ACUITY_SCORE: 21
ADLS_ACUITY_SCORE: 24
ADLS_ACUITY_SCORE: 24
DEPENDENT_IADLS:: INDEPENDENT
ADLS_ACUITY_SCORE: 11
ADLS_ACUITY_SCORE: 28
ADLS_ACUITY_SCORE: 19
ADLS_ACUITY_SCORE: 28
ADLS_ACUITY_SCORE: 15
ADLS_ACUITY_SCORE: 19
ADLS_ACUITY_SCORE: 28
ADLS_ACUITY_SCORE: 12
ADLS_ACUITY_SCORE: 24
ADLS_ACUITY_SCORE: 19
ADLS_ACUITY_SCORE: 15
ADLS_ACUITY_SCORE: 17
ADLS_ACUITY_SCORE: 20
ADLS_ACUITY_SCORE: 19
ADLS_ACUITY_SCORE: 22
ADLS_ACUITY_SCORE: 24
ADLS_ACUITY_SCORE: 11
ADLS_ACUITY_SCORE: 13
ADLS_ACUITY_SCORE: 11
ADLS_ACUITY_SCORE: 20
ADLS_ACUITY_SCORE: 11
ADLS_ACUITY_SCORE: 11
ADLS_ACUITY_SCORE: 24
ADLS_ACUITY_SCORE: 20
ADLS_ACUITY_SCORE: 13
ADLS_ACUITY_SCORE: 15
ADLS_ACUITY_SCORE: 11
ADLS_ACUITY_SCORE: 28
ADLS_ACUITY_SCORE: 20
ADLS_ACUITY_SCORE: 19
ADLS_ACUITY_SCORE: 19
ADLS_ACUITY_SCORE: 11
ADLS_ACUITY_SCORE: 19
ADLS_ACUITY_SCORE: 24
ADLS_ACUITY_SCORE: 11
ADLS_ACUITY_SCORE: 13
ADLS_ACUITY_SCORE: 15
ADLS_ACUITY_SCORE: 22
ADLS_ACUITY_SCORE: 24
ADLS_ACUITY_SCORE: 11
ADLS_ACUITY_SCORE: 24
ADLS_ACUITY_SCORE: 28
ADLS_ACUITY_SCORE: 15
ADLS_ACUITY_SCORE: 24
ADLS_ACUITY_SCORE: 26
ADLS_ACUITY_SCORE: 11
ADLS_ACUITY_SCORE: 17
ADLS_ACUITY_SCORE: 26
ADLS_ACUITY_SCORE: 24
ADLS_ACUITY_SCORE: 11
ADLS_ACUITY_SCORE: 28
DEPENDENT_IADLS:: INDEPENDENT
ADLS_ACUITY_SCORE: 24
ADLS_ACUITY_SCORE: 22
ADLS_ACUITY_SCORE: 17
ADLS_ACUITY_SCORE: 11
ADLS_ACUITY_SCORE: 15
ADLS_ACUITY_SCORE: 21
ADLS_ACUITY_SCORE: 11
ADLS_ACUITY_SCORE: 24
ADLS_ACUITY_SCORE: 11
ADLS_ACUITY_SCORE: 19
ADLS_ACUITY_SCORE: 19
ADLS_ACUITY_SCORE: 22
ADLS_ACUITY_SCORE: 19
ADLS_ACUITY_SCORE: 26
ADLS_ACUITY_SCORE: 22
ADLS_ACUITY_SCORE: 15
DEPENDENT_IADLS:: INDEPENDENT
ADLS_ACUITY_SCORE: 26
ADLS_ACUITY_SCORE: 12
ADLS_ACUITY_SCORE: 19
ADLS_ACUITY_SCORE: 26
ADLS_ACUITY_SCORE: 11
ADLS_ACUITY_SCORE: 26
ADLS_ACUITY_SCORE: 22
ADLS_ACUITY_SCORE: 28
ADLS_ACUITY_SCORE: 19
ADLS_ACUITY_SCORE: 24
ADLS_ACUITY_SCORE: 26
ADLS_ACUITY_SCORE: 17
ADLS_ACUITY_SCORE: 19
ADLS_ACUITY_SCORE: 28
ADLS_ACUITY_SCORE: 28
ADLS_ACUITY_SCORE: 22
ADLS_ACUITY_SCORE: 11
ADLS_ACUITY_SCORE: 26
ADLS_ACUITY_SCORE: 11
ADLS_ACUITY_SCORE: 12
ADLS_ACUITY_SCORE: 26
ADLS_ACUITY_SCORE: 11
ADLS_ACUITY_SCORE: 19
ADLS_ACUITY_SCORE: 12
ADLS_ACUITY_SCORE: 17
ADLS_ACUITY_SCORE: 16
ADLS_ACUITY_SCORE: 22
ADLS_ACUITY_SCORE: 26
ADLS_ACUITY_SCORE: 22
ADLS_ACUITY_SCORE: 19
ADLS_ACUITY_SCORE: 26
ADLS_ACUITY_SCORE: 19
ADLS_ACUITY_SCORE: 11
ADLS_ACUITY_SCORE: 15
ADLS_ACUITY_SCORE: 20
ADLS_ACUITY_SCORE: 11
ADLS_ACUITY_SCORE: 24
ADLS_ACUITY_SCORE: 26
DEPENDENT_IADLS:: INDEPENDENT
ADLS_ACUITY_SCORE: 22
ADLS_ACUITY_SCORE: 19
ADLS_ACUITY_SCORE: 26
ADLS_ACUITY_SCORE: 17
ADLS_ACUITY_SCORE: 22
ADLS_ACUITY_SCORE: 20
ADLS_ACUITY_SCORE: 18
ADLS_ACUITY_SCORE: 19
ADLS_ACUITY_SCORE: 13
ADLS_ACUITY_SCORE: 11
ADLS_ACUITY_SCORE: 24
ADLS_ACUITY_SCORE: 19
ADLS_ACUITY_SCORE: 13
ADLS_ACUITY_SCORE: 28
ADLS_ACUITY_SCORE: 11
ADLS_ACUITY_SCORE: 13
ADLS_ACUITY_SCORE: 26
ADLS_ACUITY_SCORE: 26
ADLS_ACUITY_SCORE: 28
ADLS_ACUITY_SCORE: 24
ADLS_ACUITY_SCORE: 11
ADLS_ACUITY_SCORE: 28
ADLS_ACUITY_SCORE: 24
DEPENDENT_IADLS:: INDEPENDENT
ADLS_ACUITY_SCORE: 24
ADLS_ACUITY_SCORE: 22
ADLS_ACUITY_SCORE: 26
ADLS_ACUITY_SCORE: 26
ADLS_ACUITY_SCORE: 11
ADLS_ACUITY_SCORE: 24
ADLS_ACUITY_SCORE: 22
ADLS_ACUITY_SCORE: 12
ADLS_ACUITY_SCORE: 26
ADLS_ACUITY_SCORE: 20
ADLS_ACUITY_SCORE: 24
ADLS_ACUITY_SCORE: 28
ADLS_ACUITY_SCORE: 11
ADLS_ACUITY_SCORE: 19
ADLS_ACUITY_SCORE: 24
ADLS_ACUITY_SCORE: 24
ADLS_ACUITY_SCORE: 19
ADLS_ACUITY_SCORE: 28
ADLS_ACUITY_SCORE: 24
ADLS_ACUITY_SCORE: 11
ADLS_ACUITY_SCORE: 11
ADLS_ACUITY_SCORE: 20
ADLS_ACUITY_SCORE: 13
ADLS_ACUITY_SCORE: 18
ADLS_ACUITY_SCORE: 24
ADLS_ACUITY_SCORE: 17
ADLS_ACUITY_SCORE: 28
ADLS_ACUITY_SCORE: 18
ADLS_ACUITY_SCORE: 13
ADLS_ACUITY_SCORE: 28
ADLS_ACUITY_SCORE: 11
ADLS_ACUITY_SCORE: 28
ADLS_ACUITY_SCORE: 22
ADLS_ACUITY_SCORE: 19
ADLS_ACUITY_SCORE: 26
ADLS_ACUITY_SCORE: 24
ADLS_ACUITY_SCORE: 19
ADLS_ACUITY_SCORE: 18
ADLS_ACUITY_SCORE: 19
ADLS_ACUITY_SCORE: 15
ADLS_ACUITY_SCORE: 26
ADLS_ACUITY_SCORE: 26
ADLS_ACUITY_SCORE: 22
ADLS_ACUITY_SCORE: 24
ADLS_ACUITY_SCORE: 15
ADLS_ACUITY_SCORE: 28
ADLS_ACUITY_SCORE: 22
ADLS_ACUITY_SCORE: 19
ADLS_ACUITY_SCORE: 24
ADLS_ACUITY_SCORE: 28
ADLS_ACUITY_SCORE: 11
ADLS_ACUITY_SCORE: 20
ADLS_ACUITY_SCORE: 11
ADLS_ACUITY_SCORE: 19
ADLS_ACUITY_SCORE: 28
ADLS_ACUITY_SCORE: 11
ADLS_ACUITY_SCORE: 26
ADLS_ACUITY_SCORE: 19
ADLS_ACUITY_SCORE: 26
ADLS_ACUITY_SCORE: 11
ADLS_ACUITY_SCORE: 26

## 2022-01-01 ASSESSMENT — ENCOUNTER SYMPTOMS
FEVER: 0
FATIGUE: 1
ABDOMINAL PAIN: 0
CHILLS: 0
ARTHRALGIAS: 1
ARTHRALGIAS: 1
SHORTNESS OF BREATH: 0
COUGH: 0

## 2022-01-01 ASSESSMENT — MIFFLIN-ST. JEOR
SCORE: 1427.14
SCORE: 1446.19
SCORE: 1427.14
SCORE: 1442.45
SCORE: 1417.95
SCORE: 1435.64
SCORE: 1446.07
SCORE: 1429.86

## 2022-01-01 ASSESSMENT — COLUMBIA-SUICIDE SEVERITY RATING SCALE - C-SSRS: IS THE PATIENT NOT ABLE TO COMPLETE C-SSRS: UNABLE TO VERBALIZE

## 2022-01-01 NOTE — PROGRESS NOTES
Care Management Follow Up    Length of Stay (days): 6    Expected Discharge Date: 01/04/2022     Concerns to be Addressed: discharge planning     Patient plan of care discussed at interdisciplinary rounds: Yes    Anticipated Discharge Disposition: Skilled Nursing Facilty,Transitional Care     Anticipated Discharge Services: None  Anticipated Discharge DME: [facility]    Patient/family educated on Medicare website which has current facility and service quality ratings: yes  Education Provided on the Discharge Plan:    Patient/Family in Agreement with the Plan: yes    Referrals Placed by CM/SW: Post Acute Facilities  Private pay costs discussed: private room/amenity fees and transportation costs    Additional Information:  Jeffery was updated by the physician that the pt will not be ready for discharge today and may need a couple more days.  Jeffery updated Mandie at [facility], [phone], on the pt's potential discharge on 1/3/22.  Mandie requested that Sw follow-up on Monday to determine bed availability.    Sw will continue with discharge planning and will be available as needed until discharge.      ELIEZER Cash, LGSW  Inpatient Care Coordination  [facility]  [phone]

## 2022-01-01 NOTE — PLAN OF CARE
VSS on RA. Pt confused, difficult to redirect at times. Pt hallucinating at times, speaking to people not in room. Scheduled zyprexa given at bedtime. Tramadol given for L shoulder pain, melatonin for sleep. Pt was able to sleep for a few hours and then became restless and agitated again around 0430 this AM. Unable to redirect. Ambulated with pt in halls. MD paged, new orders for 0.25mg prn ativan administered.     Biggs removed @midnight. Pt voided this AM. Bladder scanned for 17ml this AM. Urine pink/red with clots.     Heart Failure Care Map  GOALS TO BE MET BEFORE DISCHARGE:    1. Decrease congestion and/or edema with diuretic therapy to achieve near optimal volume status.     Dyspnea improved: Yes, satisfactory for discharge.   Edema improved: No, further care required to meet this goal. Please explain 2+ BLE edema        Net I/O and Weights since admission:   12/02 0700 - 01/01 0659  In: 2361 [P.O.:2355; I.V.:6]  Out: 39466 [Urine:26191]  Net: -66551     Vitals:    12/26/21 1829 12/27/21 0530 12/28/21 0404 12/29/21 0532   Weight: 86.4 kg (190 lb 8 oz) 84.8 kg (187 lb) 84.4 kg (186 lb) 83.5 kg (184 lb)    12/30/21 0550 12/31/21 0659   Weight: 83.4 kg (183 lb 14.4 oz) 77.3 kg (170 lb 8 oz)       2.  O2 sats > 90% on room air, or at prior home O2 therapy level.      Able to wean O2 this shift to keep sats above 90%?: Yes, satisfactory for discharge.   Does patient use Home O2? No          Current oxygenation status:   SpO2: 98 %     O2 Device: None (Room air),      3.  Tolerates ambulation and mobility near baseline.     Ambulation: Yes, satisfactory for discharge.   Times patient ambulated with staff this shift: 3    Please review the Heart Failure Care Map for additional HF goal outcomes.    Ling Berumen RN  1/1/2022

## 2022-01-01 NOTE — PLAN OF CARE
Pt. Is alert, disoriented to place, time and situation. Can be redirectable at times. Incontinent of urine a couple times this shift. Needs nursing staff to order meals. 2 L fluid restriction. Mepilex in place to elbow and leg. Possible discharge on Monday. Up with A x 1 , gait belt and walker. Daughter will be here later today.

## 2022-01-01 NOTE — PROGRESS NOTES
Cross cover notified of patient with ongoing agitation.  Frequently setting of bed alarm with difficulty redirecting patient.  High risk for fall.  Already receiving olanzapine 5 mg at night which was increased last night.  QTC 470s.  -Trial low-dose lorazepam IV 0.25 mg every 4 hours as needed for agitation and monitor for effect

## 2022-01-01 NOTE — PROGRESS NOTES
Medicine Progress Note - Hospitalist Service       Date of Admission:  12/26/2021    Assessment & Plan            Milo Martinez is an 81-year-old male with a history of chronic A. fib on anticoagulation, CAD, hypertension, systolic heart failure, hyperlipidemia who presented due to anasarca on 12/26.  He had been previously seen within the last 2 weeks by his primary care doctor for the same, and a rash on his bilateral legs.    Acute on chronic systolic heart failure exacerbation  A. Fib with intermittent loretta cardia  CAD  Anasarca  His dry weight is approximately 167 pounds.  He is currently 170.  Echocardiogram showing improvement with EF  50%.   -40 IV Lasix BID  - strict I's and O's  -Continue apixaban  -Continue home statin   -Holding home lisinopril with recent lower blood pressure while diuresing  -Monitor potassium and replace as needed  -Lymphedema wraps  -BMP in a.m., replacing electrolytes as needed during diuresis    Acute encephalopathy on top of dementia  He had previously been managing his own finances.  Recently moved to independent living to be near his wife who is in memory care.  Per report was not sundowning at his new facility.  However on admission here he was very confused and continues to sundown most nights.  Slums on 12/28 was 14 out of 30.  Head CT showing only microvascular changes.  No evidence of acute stroke.  MRI not done at this time.  No evidence of acute infection.  Electrolytes normal.  - olanzapine to 5 mg at bedtime  -Continue to monitor for signs of infection or other causes for acute encephalopathy  -Holding gabapentin at this point in time    Urinary retention  Prostate cancer  Damon placed on admission due to retention.  Has a history of prostate cancer.  UA UC negative on admission.  Repeat UA/UC after having damon in for several days with with blood, but he had been pulling on the damon when agitated overnight. Was given one dose of  ceftriaxone overnight  - checking procal now, if negative will stop ceftriaxone  - to follow up with urology as an outpatient    Obstructive sleep apnea  -BiPAP with sleeping    Hyperkalemia-resolved  Present on admission    BPH  On tamsulosin    Stable chronic anemia  -Continue home iron    Redness on legs  Previously had been given prednisone and increase in his diuretics to try and treat this.  Likely related to venous stasis and lymphedema.  No evidence of infection at this point in time.  Previously this had been quite painful.  Appeared to have improved on gabapentin and with diuresis.  -Appreciate WOC consult on 12/27  -Holding gabapentin for possible contribution to encephalopathy.    Rosacea  -stopped doxycycline    Left shoulder pain  Reproducible with palpation and movement of arm.  With previous history of rotator cuff injury.  Reports that diclofenac gel has helped in the past.  - diclofenac gel.    Groin rash  -Miconazole powder         Diet: 2 Gram Sodium Diet Other - please comment    DVT Prophylaxis: DOAC  Biggs Catheter: Not present  Central Lines: None  Code Status: Full Code      Disposition Plan   Expected Discharge: 01/03/2022     Anticipated discharge location: inpatient rehabilitation facility    Delays:     Placement - TCU  Administering IV medications  Voiding/Eating Trial            The patient's care was discussed with the Bedside Nurse, Patient and Patient's Family.    Alysia Galindo MD  Hospitalist Service  [facility]  Securely message with the [employer] (learn more here)  Text page via [employer] Paging/Directory        Clinically Significant Risk Factors Present on Admission                    ______________________________________________________________________    Interval History   He was significantly more agitated overnight.  Biggs was removed.  No fevers.  No cough.  Able to urinate on his own this morning.  No diarrhea    Data reviewed today: I  reviewed all medications, new labs and imaging results over the last 24 hours. I personally reviewed no images or EKG's today.    Physical Exam   Vital Signs: Temp: (!) 95.9  F (35.5  C) Temp src: Oral BP: (!) 147/69 Pulse: 56   Resp: 18 SpO2: 100 % O2 Device: None (Room air)    Weight: 173 lbs 9.6 oz  Constitutional: awake, alert, cooperative, no apparent distress, and appears stated age  Respiratory: No increased work of breathing, good air exchange, clear to auscultation bilaterally, no crackles or wheezing  Cardiovascular: Irregular rhythm, S1, S2, no murmurs rubs or gallops  GI: No scars, normal bowel sounds, soft, non-distended, non-tender, no masses palpated, no hepatosplenomegally  Skin: .  Venous venous stasis changes bilaterally  Musculoskeletal: Significant edema 3+ bilaterally  Neurologic: Alert and oriented x2, able recognize that I am a doctor, not able to fully understand his circumstance.    Data   Recent Labs   Lab 01/01/22  0534 12/31/21  2331 12/31/21  0608 12/30/21  1834 12/30/21  0618 12/30/21  0149 12/29/21  1901 12/29/21  0832 12/28/21  0719 12/27/21  1246 12/27/21  0739 12/26/21  1748 12/26/21  1340   WBC  --   --   --   --   --   --   --   --  3.9*  --  4.9  --  4.7   HGB  --  11.3*  --   --   --   --   --   --  11.3*  --  10.7*  --  11.4*   MCV  --   --   --   --   --   --   --   --  107*  --  104*  --  102*   PLT  --   --   --   --   --   --   --   --  150  --  147*  --  153     --  140  --   --   --   --  137 138  --  138   < > 135   POTASSIUM 3.6  --  3.8 4.0   < >  --    < > 3.6 3.8   < > 3.4   < > 5.9*   CHLORIDE 110*  --  106  --   --   --   --  104 104  --  104   < > 104   CO2 24  --  28  --   --   --   --  29 29  --  28   < > 25   BUN 19  --  19  --   --   --   --  15 14  --  13   < > 11   CR 0.82  --  0.98  --   --   --   --  0.94 0.88  --  0.90   < > 0.77   ANIONGAP 8  --  6  --   --   --   --  4 5  --  6   < > 6   ANG 8.1*  --  8.7  --   --   --   --  8.3* 8.9  --  8.5   <  > 8.6   GLC 81  --  94  --   --  113*  --  101* 94   < > 94   < > 100*   ALBUMIN  --   --   --   --   --   --   --   --   --   --  2.5*  --  2.9*   PROTTOTAL  --   --   --   --   --   --   --   --   --   --  6.4*  --  7.6   BILITOTAL  --   --   --   --   --   --   --   --   --   --  1.9*  --  1.7*   ALKPHOS  --   --   --   --   --   --   --   --   --   --  173*  --  193*   ALT  --   --   --   --   --   --   --   --   --   --  14  --  20   AST  --   --   --   --   --   --   --   --   --   --  19  --  73*    < > = values in this interval not displayed.     No results found for this or any previous visit (from the past 24 hour(s)).

## 2022-01-01 NOTE — PROGRESS NOTES
Urinalysis resulted with trace leuk esterase, large blood with greater than 182 RBCs.  Is not entirely convincing for a urinary tract infection but I will give the patient a dose of ceftriaxone tonight and let the primary provider reconsider in the morning.    The etiology of the patient's hematuria is unclear.  I wonder if it is related to his known BPH, prostate cancer with the Biggs placed.  I will recheck a hemoglobin as the patient is on a blood thinner at baseline just to ensure that his hemoglobin is not dropping significantly.  Again I will defer further work-up to the patient's primary doctor in the morning.    Wally Leong, DO

## 2022-01-02 NOTE — PROGRESS NOTES
[facility]    Medicine Progress Note - Hospitalist Service       Date of Admission:  12/26/2021    Assessment & Plan            Milo Martinez is an 81-year-old male with a history of chronic A. fib on anticoagulation, CAD, hypertension, systolic heart failure, hyperlipidemia who presented due to anasarca on 12/26.  He had been previously seen within the last 2 weeks by his primary care doctor for the same, and a rash on his bilateral legs.  Significantly more confusion, and new hallucinations.    Acute on chronic systolic heart failure exacerbation  A. Fib with intermittent bradycardia and occasional sinus pauses  CAD  Anasarca  His dry weight is approximately 167 pounds.  He is currently 170.  Echocardiogram showing improvement with EF  50%.  -With significant bigeminy and hypokalemia this a.m. we will hold diuretics until after potassium is replaced  - restart 40 IV Lasix BID after potassium replaced  -Cardiology consulted for sinus pauses and significant bradycardia  - strict I's and O's  -Continue apixaban  -Continue home statin   -Holding home lisinopril with recent lower blood pressure while diuresing  -Lymphedema wraps    Acute encephalopathy on top of dementia  New hallucinations  He had previously been managing his own finances.  Recently moved to independent living to be near his wife who is in memory care.  Per report was not sundowning at his new facility.  However on admission here he was very confused and continues to sundown most nights.  Slums on 12/28 was 14 out of 30.  Head CT showing only microvascular changes.  No evidence of acute stroke.  MRI not done at this time.  No evidence of acute infection.  Electrolytes normal.  - olanzapine to 5 mg at bedtime  -As needed olanzapine available, will discuss with cardiology safety with longer QTC  -Holding gabapentin at this point in time  -No Ativan or trazodone at this time as they can exacerbate delirium    Urinary  retention  Prostate cancer  Damon placed on admission due to retention.  Has a history of prostate cancer.  UA UC negative on admission.  Repeat UA/UC after having damon in for several days with with blood, but he had been pulling on the damon when agitated overnight. Was given 2 doses of ceftriaxone while awaiting urine culture results pro-Jean-Paul negative.  Ceftriaxone stopped  - to follow up with urology as an outpatient  -Bladder scan every 8 hours and post void post void to monitor for retention  -Intermittent straight cath as needed  -Continue tamsulosin    Obstructive sleep apnea  -BiPAP with sleeping  -Patient refused overnight  -If becomes somnolent can consider checking a CO2    Hyperkalemia-resolved  Hypokalemia  Hyperkalemia present on admission.  Became hypokalemic with diuresis.  -Replacing potassium as needed    Stable chronic anemia  -Continue home iron    Redness on legs  Previously had been given prednisone and increase in his diuretics to try and treat this.  Likely related to venous stasis and lymphedema.  No evidence of infection at this point in time.  Previously this had been quite painful.  Appeared to have improved on gabapentin and with diuresis.  Previous notes state that there was vasculitis and he was put on prednisone.  However at this point in time CRP and sed rate are minimally elevated and not consistent with a vasculitis.  At this point time venous stasis changes on his legs are also not consistent with vasculitis.  -Appreciate WOC consult on 12/27  -Holding gabapentin for possible contribution to encephalopathy.    Rosacea  -stopped doxycycline    Left shoulder pain  Reproducible with palpation and movement of arm.  With previous history of rotator cuff injury.  Reports that diclofenac gel has helped in the past.  - diclofenac gel.    Groin rash  -Miconazole powder     Diet: 2 Gram Sodium Diet Other - please comment    DVT Prophylaxis: DOAC  Damon Catheter: Not present  Central Lines:  None  Code Status: Full Code      Disposition Plan   Expected Discharge: 01/03/2022     Anticipated discharge location: inpatient rehabilitation facility  If able to urinate when given oral diuretics  Delays:     Placement - TCU  Administering IV medications  Voiding/Eating Trial            The patient's care was discussed with the Patient, Patient's Family and Cardiology Consultant.    Alysia Galindo MD  Hospitalist Service  [facility]  Securely message with the [employer] Web Console (learn more here)  Text page via [employer] Paging/Directory        Clinically Significant Risk Factors Present on Admission                    ______________________________________________________________________    Interval History   Overnight skipped continue to have hallucinations.  No difficulty breathing.  Has continued to urinate significantly with incontinence unable to track output accurately.  Swelling still present.  Sleeping this a.m.    Data reviewed today: I reviewed all medications, new labs and imaging results over the last 24 hours. I personally reviewed the EKG tracing showing Atrial fibrillation with frequent bigeminy  occasional short sinus pauses.    Physical Exam   Vital Signs: Temp: 97.2  F (36.2  C) Temp src: Oral BP: 136/70 Pulse: 54   Resp: 18 SpO2: 96 % O2 Device: None (Room air)    Weight: 171 lbs 4.8 oz  Constitutional: Sleeping when I entered was able to arouse by voice  Respiratory: No increased work of breathing, good air exchange, clear to auscultation bilaterally, no crackles or wheezing  Cardiovascular: Irregularly irregular  GI:  normal bowel sounds, soft, non-distended, non-tender,   Skin: Significant venous stasis changes of lower extremities with significant dryness, still very swollen  Musculoskeletal: Chronic venous stasis changes of lower extremities  Neurologic: Sleeping at the time of exam answers basic questions falls back asleep    Data   Recent Labs   Lab  01/02/22  1139 01/02/22  0808 01/01/22  0534 12/31/21  2331 12/31/21  0608 12/29/21  0832 12/28/21  0719 12/27/21  1246 12/27/21  0739 12/26/21  1748 12/26/21  1340   WBC  --   --   --   --   --   --  3.9*  --  4.9  --  4.7   HGB  --   --   --  11.3*  --   --  11.3*  --  10.7*  --  11.4*   MCV  --   --   --   --   --   --  107*  --  104*  --  102*   PLT  --   --   --   --   --   --  150  --  147*  --  153   NA  --  142 142  --  140   < > 138  --  138   < > 135   POTASSIUM  --  3.1* 3.6  --  3.8   < > 3.8   < > 3.4   < > 5.9*   CHLORIDE  --  107 110*  --  106   < > 104  --  104   < > 104   CO2  --  28 24  --  28   < > 29  --  28   < > 25   BUN  --  19 19  --  19   < > 14  --  13   < > 11   CR  --  0.96 0.82  --  0.98   < > 0.88  --  0.90   < > 0.77   ANIONGAP  --  7 8  --  6   < > 5  --  6   < > 6   ANG  --  8.6 8.1*  --  8.7   < > 8.9  --  8.5   < > 8.6   * 86 81  --  94   < > 94   < > 94   < > 100*   ALBUMIN  --   --   --   --   --   --   --   --  2.5*  --  2.9*   PROTTOTAL  --   --   --   --   --   --   --   --  6.4*  --  7.6   BILITOTAL  --   --   --   --   --   --   --   --  1.9*  --  1.7*   ALKPHOS  --   --   --   --   --   --   --   --  173*  --  193*   ALT  --   --   --   --   --   --   --   --  14  --  20   AST  --   --   --   --   --   --   --   --  19  --  73*    < > = values in this interval not displayed.     No results found for this or any previous visit (from the past 24 hour(s)).

## 2022-01-02 NOTE — PLAN OF CARE
Orientation: Confusion, Agitation  VS: WDL  Pain:  Denies  Activity:  +1 walker. Bedside sitter in room  Resp:   WDL  GI:  WDL  : WDL    Skin: LE edema. WOC following   Other:  Pt slept complaint free throughout the night after receiving evening medications  Plan:   Discharge 2-3 days to TCU.

## 2022-01-02 NOTE — PLAN OF CARE
Disoriented to time and place. Ax1 w/ walker/gb.  VSS. Tele- A Fib w/ frequent PVCs. Pt having increased agitation, given PRN Zyprexa and ativan without relief. Pt attempting to leave and increased agitation and setting of bed alarm and attempting to leave. MD notified. Sitter now at bedside. MD notified again at 2200 for agitation. PRN trazodone ordered. Daughter, Stacia, stated she believes pt may have had alcohol use PTA, and would like this concern to be brought to the rounding MD's attention tomorrow. Receiving IV Rocephin. Diet- Cardiac. WOC and Palliative following

## 2022-01-02 NOTE — PROGRESS NOTES
Pulled pt's CPAP as it was in hallway. Pt states he doesn't wear one at home, had trialed it for a year, but could not tolerate it. CPAP masks left in room. Pt to let nsg know if resp status changes. CPAP will be available as needed.  Lila Hunter, RT,RT  1/2/2022 10:33 AM

## 2022-01-02 NOTE — PROGRESS NOTES
"Care Management Follow Up    Length of Stay (days): 7    Expected Discharge Date: 01/03/2022     Concerns to be Addressed: discharge planning     Patient plan of care discussed at interdisciplinary rounds: Yes    Anticipated Discharge Disposition: Skilled Nursing Facilty,Transitional Care     Anticipated Discharge Services: None  Anticipated Discharge DME: [facility]    Patient/family educated on Medicare website which has current facility and service quality ratings: yes    Referrals Placed by CM/SW: Post Acute Facilities    Additional Information:  Per provider notes, anticipate discharge to TCU on 1/3. Pt had been accepted to [facility], called and LM to ensure they would be able to accept on 1/3, awaiting call back.     Completed PAS. \"Your information has been submitted on January 02nd, 2022 at 03:22:06 PM CST. The confirmation number is [id number]\"    Lou Bergeron RN BSN   Inpatient Care Coordination  [facility]   Phone [phone]    "

## 2022-01-02 NOTE — CONSULTS
Pharmacy Delirium Chart Review    Upon chart review, the following medications may contribute to possible patient delirium: olanzapine.  Please consult unit pharmacist with further questions.    Juju Valencia STACEY  Phone/Pager: [phone]

## 2022-01-02 NOTE — CONSULTS
Cardiology Consultation:    Milo Serna MRN#: [id number]   YOB: 1940 Age: 81 year old     Date of Admission: 12/26/2021  Consult indication: PVCs, pauses         Assessment and Plan / Recommendations:      # Frequent PVCs, baseline permanent atrial fibrillation, intermittent pauses up to 2.6 seconds, asymptomatic.  Patient has a longstanding history of frequent PVCs as well as pauses, with bradycardia limiting management of these PVCs, actually metoprolol succinate was discontinued entirely in 2019 due to bradycardia.  Fortunately, it seems that patient has been asymptomatic of the PVCs and pauses.  He is auto rate controlled, rates currently in the 50s to 70s beats per minute range.  # Acute on chronic HFrEF, close to dry weight 167 lb, net neg 13L/admission  # Chronic lymphedema  # Mod-mod severe MR, mod TR, stable from prior  # CAD s/p CABG. clinical presentation not consistent with an acute coronary syndrome, reassuringly high-sensitivity troponin normal on admission  # Dementia/delirium  # LENORA, intolerant of CPAP  # HTN  # HL    -Agree with electrolyte replacement, goal potassium greater than 4, magnesium greater than 2  -Continue diuresis for now, though patient is close to euvolemic, change to home regimen of torsemide 20 mg daily on 1/4/2022  -Continue remainder of home cardiac regimen including apixaban  -Should follow-up with Lymphedema clinic outpatient  -Holter monitor on discharge  -Follow-up with his outpatient cardiology team in 1 to 2 weeks  -May need to discharge on scheduled potassium supplementation, though notably was hyperkalemic on admission, and so recommend close follow-up with BMP in 1 week post discharge  -Daughter, Stacia, updated over the phone, she requested that a Neurology consultation be considered, will defer to primary team  -Cardiology will sign off, please not hesitate to page or call with any questions or concerns    Thank you for allowing our team to  participate in the care of Milo Serna. Please do not hesitate to page me with any questions or concerns.    Robert Pabon MD, [facility]  Cardiology  Text Page   January 2, 2022    Voice recognition software utilized.          History of Present Illness:     I had the opportunity to see patient Milo Serna at [facility] for a cardiology consultation.     As you know, patient is a pleasant 81-year-old male with a past medical history significant for permanent atrial fibrillation, coronary artery disease status post CABG, hypertension, heart failure with reduced ejection fraction, hyperlipidemia, sleep apnea (not on CPAP therapy), ascending aorta dilatation, valvular heart disease, frequent PVCs, bradycardia, who was admitted on 12/26/2021 with decompensated heart failure.    Patient is followed in the outpatient setting by my colleagues Dr. Conley and Juliano HERNANDEZ.  Patient has a history of chronic heart failure with reduced ejection fraction and valvular heart disease that has been managed medically.  Patient also has a history of atrial fibrillation (permanent), frequent PVCs, with resting bradycardia.  On review of prior notes, the most recent clinic note from 9/2/2021 notes that the patient was on metoprolol succinate for rate control, however on review of prior notes, it seems that metoprolol was actually discontinued in 2019 due to bradycardia.  As aforementioned, patient has a longstanding history of frequent PVCs and pauses, on review of a Holter monitor from 6/2015, patient had frequent PVCs, also pauses, the longest 2.9 seconds.  Other Holter monitors demonstrated frequent PVCs up to 22% burden.    Cardiology was consulted due to frequent PVCs and pauses this morning.  On review of telemetry, patient has had frequent PVCs, as well as several pauses, the longest 2.6 seconds this morning.  Heart rates are generally in the 50s to 70s beats per minute range, no sustained bradycardia seen over  the past 24 hours.  Of note, patient has been actively diuresed, and potassium was 3.1 this morning.    On chart review, patient has been having episodes of altered mental status.  Even so, on our encounter, patient was lucid and alert, though not oriented to time.  He agrees that he likely has some level of dementia, and is aware of his memory deficits.  He denies any recent episodes of chest pain, chest pressure, dizziness/lightheadedness.    TTE 12/27/2021 demonstrates LVEF 50%, mildly reduced RV function, moderate to moderate severe MR, moderate TR, dilated IVC.  Ascending aorta 4.4 cm.           Past Medical History:   I have reviewed this patient's past medical history    Past Medical History:   Diagnosis Date     Actinic keratosis 10/12/2019     Anemia, unspecified 11/08/2016     Atrial fibrillation (H) 05/27/2015     BPH (benign prostatic hyperplasia) 01/25/2012     CAD (coronary artery disease) 04/04/2012    CAB 1996 following MI (at [facility])     Closed bimalleolar fracture of left ankle with routine healing 04/05/2019     Closed fracture of metatarsal bone 04/04/2012     Dilated aortic root (H) 05/26/2016     Drug-induced erectile dysfunction 10/02/2015     Elevated blood sugar 11/08/2016     Elevated PSA 09/19/2013     Epistaxis 09/09/2019     Essential hypertension with goal blood pressure less than 140/90 09/22/2016     Fall 09/06/2019     Functional diarrhea 10/12/2019     Gallbladder polyp 05/01/2018     Gout      HAV (hallux abducto valgus) 04/04/2012     Hematoma 09/09/2019     Hernia, abdominal      History of prostate cancer 06/15/2016     HTN, goal below 150/90 04/20/2017     Hypokalemia 09/12/2019     Iron deficiency anemia secondary to inadequate dietary iron intake 11/15/2016     Ischemic cardiomyopathy      Low blood pressure reading 10/17/2016     Lumbago     had degendisc dz on MRI 7/07     Memory loss 06/18/2019     Microalbuminuria 06/04/2020    X1     Mixed hyperlipidemia      Mumps       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     Nonrheumatic mitral valve regurgitation 10/28/2019     OA (osteoarthritis) 04/04/2012     Old myocardial infarction 04/04/2012     LENORA (obstructive sleep apnea) 10/28/2019     LENORA (obstructive sleep apnea)      Other viral warts 09/06/2019     Palpitations      Pes planus 04/04/2012     Petechiae 06/18/2019     Prostate cancer (H) 05/26/2016     PUD (peptic ulcer disease) 04/04/2012     Pulmonary hypertension (H) 09/25/2017     PVC's (premature ventricular contractions)      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     Rosacea 01/15/2008     Stasis dermatitis      Stasis dermatitis of both legs 05/13/2021     Stress due to spouse with dementia 06/18/2019     Thrombocytopenia (H) 04/21/2017     Unspecified hyperplasia of prostate with urinary obstruction and other lower urinary tract symptoms (LUTS)     better on avodart & hytrin     Venous stasis 04/04/2012             Past Surgical History:   I have reviewed this patient's past surgical history   Past Surgical History:   Procedure Laterality Date     CARDIAC SURGERY      CAB     CYSTOSCOPY FLEXIBLE, CYOABLATION PROSTATE N/A 11/09/2016    Procedure: CYSTOSCOPY FLEXIBLE, CRYOABLATION PROSTATE;  Surgeon: Krystian Owens MD;  Location:  OR     GENITOURINARY SURGERY      prostate surgery      HERNIA REPAIR       LAPAROSCOPIC CHOLECYSTECTOMY N/A 12/28/2020    Procedure: CHOLECYSTECTOMY, LAPAROSCOPIC;  Surgeon: James Alvarez MD;  Location: [facility] OR     LAPAROSCOPIC HERNIORRHAPHY INGUINAL Right 05/10/2017    Procedure: LAPAROSCOPIC HERNIORRHAPHY INGUINAL;  Laparoscopic preperitoneal repair of right inguinal hernia with placement of underlay mesh;  Surgeon: Enrico Bridges MD;  Location:  OR     PROSTATE SURGERY       [facility] NONSPECIFIC PROCEDURE      CAB 1996 [place]     ZZ NONSPECIFIC PROCEDURE  02/2007    l inguinal hernia repair      ZZC NONSPECIFIC PROCEDURE      R hernia remote     ZZC NONSPECIFIC PROCEDURE  2000    R ankle ORIF  for fx     [facility] NONSPECIFIC PROCEDURE  2005    nasal surgery      [facility] NONSPECIFIC PROCEDURE      R rotater repair (remote)      [facility] NONSPECIFIC PROCEDURE      appy 1966     [facility] NONSPECIFIC PROCEDURE      sinus surgery x 2     [facility] NONSPECIFIC PROCEDURE      L shoulder              Social History:   I have reviewed this patient's social history  Social History     Tobacco Use     Smoking status: Former Smoker     Smokeless tobacco: Never Used     Tobacco comment: quit 3/1974   Substance Use Topics     Alcohol use: Yes     Alcohol/week: 0.0 standard drinks     Comment: One Beer a Day and maybe Wine             Family History:   I have reviewed this patient's family history  Family History   Problem Relation Age of Onset     Cancer Mother         stomach cancer,  age 61     Heart Disease Father         MI,  age 71     Heart Disease Brother         stent placement, RA      Hypertension Brother      Heart Disease Sister         rhythm problems with heart, hypertension             Allergies:   I have reviewed this patient's allergy history  Allergies   Allergen Reactions     Blood Transfusion Related (Informational Only) Other (See Comments)     Patient has a history of a clinically significant antibody against RBC antigens.  A delay in compatible RBCs may occur.             Medications reviewed:   Prior to admission medications:  Prior to Admission medications    Medication Sig Start Date End Date Taking? Authorizing Provider   apixaban ANTICOAGULANT (ELIQUIS) 2.5 MG tablet Take 1 tablet (2.5 mg) by mouth 2 times daily 21  Yes Sixto Conley MD   diclofenac (VOLTAREN) 1 % topical gel Apply 4 gramsto area qid prn 20  Yes Oscar Alves MD   doxycycline hyclate (VIBRA-TABS) 100 MG tablet Take 1 tablet (100 mg) by mouth daily 21  Yes Oscar Alves MD   lisinopril (ZESTRIL) 20 MG tablet Take 1 tablet (20 mg) by mouth daily 21  Yes Sixto Conley MD   MELATONIN PO Take 5 mg  by mouth At Bedtime    Yes Reported, Patient   multivitamin, therapeutic (THERA-VIT) TABS tablet Take 1 tablet by mouth daily   Yes Reported, Patient   omeprazole (PRILOSEC) 40 MG DR capsule Take 1 capsule (40 mg) by mouth daily 11/8/21  Yes Oscar Alves MD   potassium chloride ER (K-TAB) 20 MEQ CR tablet Take 1 tablet (20 mEq) by mouth 2 times daily 9/2/21  Yes Sixto Conley MD   simvastatin (ZOCOR) 20 MG tablet Take 1 tablet (20 mg) by mouth At Bedtime 9/2/21  Yes Sixto Conley MD   tamsulosin (FLOMAX) 0.4 MG capsule Take 1 capsule (0.4 mg) by mouth daily 11/8/21  Yes Oscar Alves MD   torsemide (DEMADEX) 10 MG tablet Take 1 tablet (10 mg) by mouth every morning  Patient taking differently: Take 20 mg by mouth every morning Takes 2 x 10 mg tablets = 20 mg 11/8/21  Yes Oscar Alves MD   UREA 20 INTENSIVE HYDRATING 20 % external cream Apply topically as needed Apply to right foot daily 5/18/20  Yes Oscar Alves MD   acetaminophen (TYLENOL) 500 MG tablet Take 500-1,000 mg by mouth every 8 hours as needed    Reported, Patient   ferrous gluconate (FERGON) 324 (38 Fe) MG tablet Take 1 tablet (324 mg) by mouth daily (with breakfast) 9/6/19   Oscar Alves MD      Current medications:  Current Facility-Administered Medications Ordered in Epic   Medication Dose Route Frequency Last Rate Last Admin     acetaminophen (TYLENOL) tablet 500-1,000 mg  500-1,000 mg Oral Q8H PRN   1,000 mg at 01/01/22 2120     apixaban ANTICOAGULANT (ELIQUIS) tablet 2.5 mg  2.5 mg Oral BID   2.5 mg at 01/02/22 0946     benztropine (COGENTIN) tablet 1 mg  1 mg Oral TID PRN         bisacodyl (DULCOLAX) EC tablet 5 mg  5 mg Oral Daily PRN         glucose gel 15-30 g  15-30 g Oral Q15 Min PRN        Or     dextrose 50 % injection 25-50 mL  25-50 mL Intravenous Q15 Min PRN        Or     glucagon injection 1 mg  1 mg Subcutaneous Q15 Min PRN         diclofenac (VOLTAREN) 1 % topical gel 2 g  2 g Topical 4x Daily   2 g at  01/02/22 1332     ferrous gluconate (FERGON) tablet 324 mg  324 mg Oral Daily with breakfast   324 mg at 01/02/22 0946     [Held by provider] furosemide (LASIX) injection 40 mg  40 mg Intravenous BID   40 mg at 01/01/22 1704     lidocaine (LMX4) cream   Topical Q1H PRN         lidocaine (LMX4) cream   Topical Q1H PRN         lidocaine 1 % 0.1-1 mL  0.1-1 mL Other Q1H PRN         lidocaine 1 % 0.1-1 mL  0.1-1 mL Other Q1H PRN         melatonin tablet 1 mg  1 mg Oral At Bedtime PRN   1 mg at 01/01/22 0025     miconazole (MICATIN) 2 % powder   Topical BID   Given at 01/02/22 0958     mineral oil-hydrophilic petrolatum (AQUAPHOR)   Topical Daily   Given at 01/02/22 1332     naloxone (NARCAN) injection 0.2 mg  0.2 mg Intravenous Q2 Min PRN        Or     naloxone (NARCAN) injection 0.4 mg  0.4 mg Intravenous Q2 Min PRN        Or     naloxone (NARCAN) injection 0.2 mg  0.2 mg Intramuscular Q2 Min PRN        Or     naloxone (NARCAN) injection 0.4 mg  0.4 mg Intramuscular Q2 Min PRN         OLANZapine zydis (zyPREXA) ODT half-tab 2.5 mg  2.5 mg Oral BID PRN   2.5 mg at 01/01/22 1804     omeprazole (priLOSEC) CR capsule 40 mg  40 mg Oral Daily   40 mg at 01/02/22 0946     ondansetron (ZOFRAN-ODT) ODT tab 4 mg  4 mg Oral Q6H PRN        Or     ondansetron (ZOFRAN) injection 4 mg  4 mg Intravenous Q6H PRN         Patient is already receiving anticoagulation with heparin, enoxaparin (LOVENOX), warfarin (COUMADIN)  or other anticoagulant medication   Does not apply Continuous PRN         polyethylene glycol (MIRALAX) Packet 17 g  17 g Oral BID PRN         potassium chloride ER (KLOR-CON M) CR tablet 40 mEq  40 mEq Oral Once         sennosides (SENOKOT) tablet 8.6 mg  8.6 mg Oral BID PRN         simvastatin (ZOCOR) tablet 20 mg  20 mg Oral At Bedtime   20 mg at 01/01/22 2135     sodium chloride (PF) 0.9% PF flush 3 mL  3 mL Intracatheter Q8H   3 mL at 01/02/22 0948     sodium chloride (PF) 0.9% PF flush 3 mL  3 mL Intracatheter q1  min prn         sodium chloride (PF) 0.9% PF flush 3 mL  3 mL Intracatheter Q8H   3 mL at 22 0529     sodium chloride (PF) 0.9% PF flush 3 mL  3 mL Intracatheter q1 min prn   3 mL at 21 1851     tamsulosin (FLOMAX) capsule 0.4 mg  0.4 mg Oral Daily   0.4 mg at 22 0946     urea (GORMEL) 20 % cream CREA   Topical Daily PRN         No current [facility]-ordered outpatient medications on file.             Review of Systems:   A complete review of systems was performed and was negative except as mentioned in the HPI.          Physical Exam:   Vital signs were personally reviewed:  Temperatures:  Current - Temp: 97.2  F (36.2  C); Max - Temp  Av.8  F (36  C)  Min: 96.2  F (35.7  C)  Max: 97.2  F (36.2  C)  Respiration range: Resp  Av.7  Min: 18  Max: 20  Pulse range: Pulse  Av.7  Min: 54  Max: 84  Blood pressure range: Systolic (24hrs), Av , Min:114 , Max:136   ; Diastolic (24hrs), Av, Min:56, Max:91    Pulse oximetry range: SpO2  Av %  Min: 96 %  Max: 99 %    Intake/Output Summary (Last 24 hours) at 2022 1346  Last data filed at 2022 1333  Gross per 24 hour   Intake 960 ml   Output 200 ml   Net 760 ml     171 lbs 4.8 oz  Body mass index is 26.83 kg/m .   Body surface area is 1.92 meters squared.    Constitutional: appears stated age, in no apparent distress, appears to be well nourished, oriented to person and place only  Head: normocephalic, atraumatic  Neck: supple, trachea midline  Pulmonary: mildly coarse at the bases bilaterally   Cardiovascular: regular rate, irregularly irregular rhythm, 2/6 systolic murmur in the RUSB, 2+BLE edema  Gastrointestinal: no guarding, non-rigid   Neurologic: awake, alert, moves all extremities  Skin: no jaundice, warm on limited exam  Psychiatric: affect is normal, answers questions appropriately, oriented to self and place         Laboratory tests:   Laboratory tests personally reviewed:   [facility]  Recent Labs   Lab 22  7152  01/02/22  0808 01/01/22  0534 12/31/21  0608 12/30/21  1834 12/30/21  1342 12/30/21  0618 12/29/21  1901 12/29/21  0832 12/27/21  1246 12/27/21  0739   NA  --  142 142 140  --   --   --   --  137   < > 138   POTASSIUM  --  3.1* 3.6 3.8 4.0   < > 3.3*   < > 3.6   < > 3.4   CHLORIDE  --  107 110* 106  --   --   --   --  104   < > 104   CO2  --  28 24 28  --   --   --   --  29   < > 28   ANIONGAP  --  7 8 6  --   --   --   --  4   < > 6   * 86 81 94  --   --   --    < > 101*   < > 94   BUN  --  19 19 19  --   --   --   --  15   < > 13   CR  --  0.96 0.82 0.98  --   --   --   --  0.94   < > 0.90   GFRESTIMATED  --  79 88 77  --   --   --   --  81   < > 85   ANG  --  8.6 8.1* 8.7  --   --   --   --  8.3*   < > 8.5   MAG  --  2.2 2.2 2.4*  --   --  1.9   < >  --   --   --    PHOS  --  3.5 3.0 3.1  --   --  3.2   < >  --   --   --    PROTTOTAL  --   --   --   --   --   --   --   --   --   --  6.4*   ALBUMIN  --   --   --   --   --   --   --   --   --   --  2.5*   BILITOTAL  --   --   --   --   --   --   --   --   --   --  1.9*   ALKPHOS  --   --   --   --   --   --   --   --   --   --  173*   AST  --   --   --   --   --   --   --   --   --   --  19   ALT  --   --   --   --   --   --   --   --   --   --  14    < > = values in this interval not displayed.     CBC  Recent Labs   Lab 12/31/21  2331 12/28/21  0719 12/27/21  0739   WBC  --  3.9* 4.9   RBC  --  3.48* 3.30*   HGB 11.3* 11.3* 10.7*   HCT  --  37.1* 34.3*   MCV  --  107* 104*   MCH  --  32.5 32.4   MCHC  --  30.5* 31.2*   RDW  --  15.7* 15.4*   PLT  --  150 147*     INRNo lab results found in last 7 days.  Lab Results   Component Value Date    TROPI <0.015 11/28/2020    TROPI 0.016 10/30/2020    TROPI  11/15/2016     <0.015  The 99th percentile for upper reference range is 0.045 ug/L.  Troponin values in   the range of 0.045 - 0.120 ug/L may be associated with risks of adverse   clinical events.      TROPONIN <0.04 02/20/2007     Recent Labs   Lab Test  21  0951 21  1414 17  0950 10/02/15  0955 14  1210   CHOL 101 121   < > 122 117   HDL 62 59   < > 48 45   LDL 32 52   < > 63 60   TRIG 36 49   < > 53 62   CHOLHDLRATIO  --   --   --  2.5 2.6    < > = values in this interval not displayed.     Lab Results   Component Value Date    A1C 5.3 2021    A1C 5.2 2020    A1C 5.1 2020    A1C 4.9 2019    A1C 5.4 2018    A1C 5.4 2017     TSH   Date Value Ref Range Status   2019 2.02 0.40 - 4.00 mU/L Final            Imaging and Additional Data:   Additional data personally reviewed:  Recent Results (from the past 4320 hour(s))   Echocardiogram Complete   Result Value    LVEF  50%    Narrative    [id number]  [id number]  [id number]  [id number]^JOSE RAFAEL^TESSY     [facility]  Echocardiography Laboratory  [address] [place] [address]     Name: SAÚL GONZALEZ  MRN: [id number]  : 1940  Study Date: 2021 01:01 PM  Age: 81 yrs  Gender: Male  Patient Location: [facility]  Reason For Study: Cardiomyopathy  Ordering Physician: TESSY MANTILLA  Performed By: Pankaj White RDCS     BSA: 2.0 m2  Height: 67 in  Weight: 187 lb  HR: 80  BP: 108/79 mmHg  ______________________________________________________________________________  Procedure  Complete Portable Echo Adult.  ______________________________________________________________________________  Interpretation Summary     1. The left ventricle is normal in structure, function and size. The visual  ejection fraction is estimated at 50%.  2. The right ventricle is mildly dilated. Mildly decreased right ventricular  systolic function  3. There is moderate to mod-severe (2-3+) mitral regurgitation.  4. There is moderate (2+) tricuspid regurgitation.  5. The ascending aorta is Moderately dilated. 4.4cm.  6. Dilation of the inferior vena cava is present with abnormal respiratory  variation in diameter.     Echo from 2021 showed EF 42%, 3+  MR, 1+ TR.  ______________________________________________________________________________  Left Ventricle  The left ventricle is normal in structure, function and size. There is normal  left ventricular wall thickness. The visual ejection fraction is estimated at  50%. Left ventricular diastolic function is indeterminate. Septal motion is  consistent with conduction abnormality.     Right Ventricle  The right ventricle is mildly dilated. Mildly decreased right ventricular  systolic function.     Atria  The left atrium is severely dilated. The right atrium is severely dilated.  There is no atrial shunt seen.     Mitral Valve  The mitral valve leaflets appear thickened, but open well. There is moderate  to mod-severe (2-3+) mitral regurgitation.     Tricuspid Valve  There is moderate (2+) tricuspid regurgitation. The right ventricular systolic  pressure is approximated at 22.0 mmHg plus the right atrial pressure.     Aortic Valve  There is mild trileaflet aortic sclerosis. There is mild (1+) aortic  regurgitation.     Pulmonic Valve  The pulmonic valve is normal in structure and function.     Vessels  The ascending aorta is Moderately dilated. Dilation of the inferior vena cava  is present with abnormal respiratory variation in diameter.     Pericardium  There is no pericardial effusion.     Rhythm  The rhythm was atrial fibrillation with wide QRS rhythm.  ______________________________________________________________________________  MMode/2D Measurements & Calculations     IVSd: 1.2 cm  LVIDd: 5.1 cm  LVIDs: 4.2 cm  LVPWd: 1.2 cm  FS: 17.8 %  LV mass(C)d: 241.8 grams  LV mass(C)dI: 123.0 grams/m2  Ao root diam: 4.4 cm  LA dimension: 6.0 cm  asc Aorta Diam: 4.4 cm  LA/Ao: 1.4  LVOT diam: 2.7 cm  LVOT area: 5.7 cm2  LA Volume (BP): 235.0 ml  LA Volume Index (BP): 119.3 ml/m2  RWT: 0.47     Doppler Measurements & Calculations  MV E max mima: 82.7 cm/sec  MV max P.1 mmHg  MV mean P.0 mmHg  MV V2 VTI: 28.4  cm  MVA(VTI): 4.1 cm2  MV dec time: 0.27 sec  LV V1 max PG: 3.3 mmHg  LV V1 max: 91.2 cm/sec  LV V1 VTI: 20.5 cm  SV(LVOT): 117.4 ml  SI(LVOT): 59.7 ml/m2  PA acc time: 0.16 sec  PI end-d mima: 74.8 cm/sec  TR max mima: 233.0 cm/sec  TR max P.0 mmHg  E/E' av.4  Lateral E/e': 7.7  Medial E/e': 15.0     ______________________________________________________________________________  Report approved by: Raffaele Hernandez 2021 03:12 PM         Echocardiogram Complete   Result Value    Biplane LVEF 42%    Narrative    [id number]  [id number]  [id number]  [id number]^BECKY^DANIELA^JOHANNY     [facility]  Echocardiography Laboratory  [address] [place] [address]     Name: SAÚL GONZALEZ  MRN: [id number]  : 1940  Study Date: 2021 10:00 AM  Age: 80 yrs  Gender: Male  Patient Location: [facility]  Reason For Study: Moderate mitral valve regurgitation  Ordering Physician: DANIELA PENA  Referring Physician: DANIELA PENA  Performed By: Zoya Dumont     BSA: 1.9 m2  Height: 67 in  Weight: 176 lb  BP: 140/82 mmHg  ______________________________________________________________________________  Procedure  Complete Echo Adult.  ______________________________________________________________________________  Interpretation Summary     The left ventricle is borderline dilated.  There is normal left ventricular wall thickness.  Biplane LVEF is 42%.  There is moderate to severe inferior and inferolateral wall hypokinesis.  The right ventricle is mildly dilated.  Moderately decreased right ventricular systolic function  There is severe biatrial enlargement.  There is moderately severe (3+) mitral regurgitation, ERO .27, RV 55cc.  There is mild (1+) tricuspid regurgitation.  The right ventricular systolic pressure is approximated at 33mmHg plus the  right atrial pressure.  There is mild (1+) aortic regurgitation.  There is moderate trileaflet aortic sclerosis.  No aortic  stenosis is present.  Mild aortic root dilatation.  The ascending aorta is Mildly dilated.  IVC diameter >2.1 cm collapsing <50% with sniff suggests a high RA pressure  estimated at 15 mmHg or greater.  Directly compared to the last echo of 6/2020, the LV function and MR appear  similar.  ______________________________________________________________________________  Left Ventricle  The left ventricle is borderline dilated. There is normal left ventricular  wall thickness. Biplane LVEF is 42%. Diastolic function not assessed due to  atrial fibrillation. There is moderate to severe inferior and inferolateral  wall hypokinesis.     Right Ventricle  The right ventricle is mildly dilated. Moderately decreased right ventricular  systolic function.     Atria  There is severe biatrial enlargement. There is no atrial shunt seen.     Mitral Valve  The mitral valve leaflets appear thickened, but open well. There is moderately  severe (3+) mitral regurgitation.     Tricuspid Valve  Normal tricuspid valve. There is mild (1+) tricuspid regurgitation. The right  ventricular systolic pressure is approximated at 33mmHg plus the right atrial  pressure. IVC diameter >2.1 cm collapsing <50% with sniff suggests a high RA  pressure estimated at 15 mmHg or greater.     Aortic Valve  There is moderate trileaflet aortic sclerosis. There is mild (1+) aortic  regurgitation. No aortic stenosis is present.     Pulmonic Valve  There is trace pulmonic valvular regurgitation.     Vessels  Mild aortic root dilatation. The ascending aorta is Mildly dilated.     Pericardium  There is no pericardial effusion.     Rhythm  The rhythm was atrial fibrillation.  ______________________________________________________________________________  MMode/2D Measurements & Calculations     IVSd: 1.0 cm  LVIDd: 5.7 cm  LVIDs: 4.5 cm  LVPWd: 0.86 cm  FS: 21.7 %  LV mass(C)d: 212.2 grams  LV mass(C)dI: 110.8 grams/m2  Ao root diam: 4.4 cm  asc Aorta Diam: 4.2  cm  LVOT diam: 2.8 cm  LVOT area: 6.3 cm2  LA Volume (BP): 196.0 ml  LA Volume Index (BP): 105.4 ml/m2  RWT: 0.30     Doppler Measurements & Calculations  LV V1 max P.0 mmHg  LV V1 max: 100.3 cm/sec  LV V1 VTI: 23.8 cm  SV(LVOT): 150.3 ml  SI(LVOT): 78.5 ml/m2  TR max mima: 276.0 cm/sec  TR max P.3 mmHg     ______________________________________________________________________________  Report approved by: Raffaele Whipple 2021 11:58 AM              Clinically Significant Risk Factors Present on Admission              Cardiovascular: Non-Rheumatic Valve Disease: Mitral valve insufficiency  Tricuspid valve insufficiency   Atrial fibrillation    Fluid & Electrolyte Disorders: Hyperkalemia and Fluid overload, unspecified    Hematology/Oncology: Thrombocytopenia Including Purpuric, HIT, & Other Platelet Defects: Thrombocytopenia, unspecified  Coagulation defect, unspecified    Neurology: Dementia: Unspecified dementia with behavioral disturbance    Systemic: Chronic Fatigue and Other Debilities: Age-related physical debility  Chronic fatigue, unspecified

## 2022-01-03 NOTE — PROGRESS NOTES
Care Management Follow Up    Length of Stay (days): 8    Expected Discharge Date: 01/04/2022     Concerns to be Addressed: discharge planning     Patient plan of care discussed at interdisciplinary rounds: Yes    Anticipated Discharge Disposition: Skilled Nursing Facilty,Transitional Care     Anticipated Discharge Services: None  Anticipated Discharge DME: [facility]    Patient/family educated on Medicare website which has current facility and service quality ratings: yes  Education Provided on the Discharge Plan:  yes  Patient/Family in Agreement with the Plan: yes    Referrals Placed by CM/SW: Post Acute Facilities  Private pay costs discussed: private room/amenity fees and transportation costs    Additional Information:  Jeffery was updated that the pt will be ready for discharge tomorrow.  Jeffery updated Mary at [facility], [phone], that the pt will be ready to discharge tomorrow.  Mary said that they are able to admit the pt tomorrow.  They are able to accommodate the pt's heart monitor that will be placed at discharge.  Jeffery spoke with the pt's dtr Stacia, who said that she will be able to provide transport for the pt tomorrow at 1600.  Jeffery updated Mary on the pt's discharge time for tomorrow.    Jeffery will continue with discharge planning and will be available as needed until discharge.      ELIEZER Cash, [facility]  Inpatient Care Coordination  [facility]  [phone]

## 2022-01-03 NOTE — PLAN OF CARE
"Patient Aox4 when asked orientation questions, but confused and forgetful at times. Sitter present for part of shift, patient on alarms for rest and easy to redirect. No agitation present, very pleasant. Denies pain, nausea, n/t. Tele= a fib SVR w/ multifocal PVCs. Per tele tech, frequent PVCs and pauses. K, Mg, Phos protocols, K replaced x2. Plan to continue IV diuresis for another day. Legs with no drainage, aquaphor applied per orders. Up with assist of 1 and fww. 2000 mL FR: 1500 mL input so far today. Plan to discharge to TCU in a couple days. Will continue to monitor and follow plan of care.    /56 (BP Location: Left arm)   Pulse 56   Temp (!) 96.2  F (35.7  C) (Oral)   Resp 20   Ht 1.702 m (5' 7\")   Wt 77.7 kg (171 lb 4.8 oz)   SpO2 99%   BMI 26.83 kg/m      Madeline Raphael RN on 1/2/2022 at 6:30 PM    "

## 2022-01-03 NOTE — PROGRESS NOTES
[facility]    Medicine Progress Note - Hospitalist Service       Date of Admission:  12/26/2021    Assessment & Plan            Milo Martinez is an 81-year-old male with a history of chronic A. fib on anticoagulation, CAD, hypertension, systolic heart failure, hyperlipidemia who presented due to anasarca on 12/26.  He had been previously seen within the last 2 weeks by his primary care doctor for the same, and a rash on his bilateral legs.  Less confusion overnight. Urinating well without retention post damon removal.     Acute on chronic HFrEF exacerbation  A. Fib with intermittent bradycardia and occasional sinus pauses  Moderate sever MR, moderate TR, stable from prior  CAD s/p CABG reassuring high sensitive troponin on admission  Anasarca, chronic lymphedema  Frequent PVCs  HTN  HL  His dry weight is approximately 167 pounds.  He is currently 170.  Echocardiogram showing improvement with EF  50%.  -With significant bigeminy and hypokalemia this a.m. we will hold diuretics until after potassium is replaced  - restart 40 IV Lasix BID after potassium replaced  - will send home on Potassium 20 mEq daily and recheck BMP in 1 week  -Cardiology consulted for sinus pauses and significant bradycardia  - strict I's and O's  -Continue home apixaban and statin   -Holding home lisinopril with recent lower blood pressure while diuresing  -Lymphedema wraps - follow up with lymphedema clinic as an outpatient  - follow up with cardiology in 1-2 weeks  - discharge on potassium replacement  - holter monitor on discharge    Acute encephalopathy on top of dementia  New hallucinations -improved this AM  He had previously been managing his own finances.  Recently moved to independent living to be near his wife who is in memory care.  Per report was not sundowning at his new facility.  However on admission here he was very confused and continues to sundown most nights.  Slums on 12/28 was 14 out of 30.  Head CT  showing only microvascular changes.  No evidence of acute stroke.  MRI not done at this time.  No evidence of acute infection.  Has had low potassium with diuresis, improved today  - goal potassium of >4 and Mag >2 for frequent PVCs this may also help with his mentation  -change olanzapine to 5 mg at bedtime PRN  -As needed olanzapine available, will discuss with cardiology safety with longer QTC  -Holding gabapentin at this point in time  -No Ativan or trazodone at this time as they can exacerbate delirium    Urinary retention  Prostate cancer  Damon placed on admission due to retention.  Has a history of prostate cancer.  UA UC negative on admission.  Repeat UA/UC after having damon in for several days with with blood, but he had been pulling on the damon when agitated overnight. Was given 2 doses of ceftriaxone while awaiting urine culture results pro-Jean-Paul negative.  Ceftriaxone stopped. No retention post voiding after damon was removed.   - to follow up with urology as an outpatient  -Intermittent straight cath as needed  -Continue tamsulosin    Obstructive sleep apnea  -BiPAP with sleeping  -Patient refused overnight  -If becomes somnolent can consider checking a CO2    Hyperkalemia-resolved  Hypokalemia  Hyperkalemia present on admission.  Became hypokalemic with diuresis.  -will send home with scheduled potassium 20 mEq daily, and recheck BMP within 1 week    Stable chronic anemia  -Continue home iron    Redness on legs  Previously had been given prednisone and increase in his diuretics to try and treat this.  Likely related to venous stasis and lymphedema.  No evidence of infection at this point in time.  Previously this had been quite painful.  Appeared to have improved on gabapentin and with diuresis.  Previous notes state that there was vasculitis and he was put on prednisone.  However at this point in time CRP and sed rate are minimally elevated and not consistent with a vasculitis.  At this point time  venous stasis changes on his legs are also not consistent with vasculitis.  -Appreciate WOC consult on 12/27  -Holding gabapentin for possible contribution to encephalopathy.    Rosacea  -stopped doxycycline    Left shoulder pain  Reproducible with palpation and movement of arm.  With previous history of rotator cuff injury.  Reports that diclofenac gel has helped in the past.  - diclofenac gel.    Groin rash  -Miconazole powder       Diet: 2 Gram Sodium Diet Other - please comment    DVT Prophylaxis: DOAC  Biggs Catheter: Not present  Central Lines: None  Code Status: Full Code      Disposition Plan   Expected Discharge: 01/04/2022     Anticipated discharge location: inpatient rehabilitation facility    Delays:     Placement - TCU  Administering IV medications  Voiding/Eating Trial            The patient's care was discussed with the Bedside Nurse, Patient and Patient's Family.    Alysia Galindo MD  Hospitalist Service  [facility]  Securely message with the Vocera Web Console (learn more here)  Text page via [employer] Paging/Directory        Clinically Significant Risk Factors Present on Admission                    ______________________________________________________________________    Interval History   No acute events overnight.  He continues to urinate well.  No evidence of retention overnight.  Less confused yesterday although still confused.  No acute hallucinations.  No shortness of breath.  Reports no pain in his legs.  Reports the pain in his left arm is improved.    Data reviewed today: I reviewed all medications, new labs and imaging results over the last 24 hours. I personally reviewed the EKG tracing showing Frequent PVCs.    Physical Exam   Vital Signs: Temp: 98.6  F (37  C) Temp src: Oral BP: 118/66 Pulse: 66   Resp: 16 SpO2: 97 % O2 Device: None (Room air)    Weight: 172 lbs 12.8 oz  General Appearance: Very pleasant gentleman in no acute distress   Respiratory: clear to  auscultation bilaterally  Cardiovascular: Irregularly irregular, 2 out of 6 systolic murmur  GI: Soft, nontender, bowel sounds normoactive  Skin: Crusting and chronic venous stasis changes over bilateral legs  Other: 2+ edema venous stasis changes and legs neuro: A&O x4 able to say that he was seen by another doctor yesterday but still generally confused about situation      Data   Recent Labs   Lab 01/03/22  0654 01/02/22  1746 01/02/22  1344 01/02/22  1139 01/02/22  0808 01/01/22  0534 12/31/21  2331 12/29/21  0832 12/28/21  0719   WBC  --   --   --   --   --   --   --   --  3.9*   HGB  --   --   --   --   --   --  11.3*  --  11.3*   MCV  --   --   --   --   --   --   --   --  107*   PLT  --   --   --   --   --   --   --   --  150     --   --   --  142 142  --    < > 138   POTASSIUM 3.8 4.3 3.5  --  3.1* 3.6  --    < > 3.8   CHLORIDE 110*  --   --   --  107 110*  --    < > 104   CO2 27  --   --   --  28 24  --    < > 29   BUN 20  --   --   --  19 19  --    < > 14   CR 0.87  --   --   --  0.96 0.82  --    < > 0.88   ANIONGAP 4  --   --   --  7 8  --    < > 5   ANG 8.8  --   --   --  8.6 8.1*  --    < > 8.9   GLC 98  --   --  155* 86 81  --    < > 94    < > = values in this interval not displayed.     No results found for this or any previous visit (from the past 24 hour(s)).

## 2022-01-03 NOTE — PLAN OF CARE
For VS and complete assessments, please see documentation in flowsheets.     Pertinent shift assessments: VSS. A&Ox4 upon start of shift, overnight he sundown's and is disoriented x3 ([place] airport and [place] go start his car to leave several times). Gave prn zyprexa with about 2 hr of relief/sleep. Transfers Ax1 and walker. Denies pain. Denies CP/SOB/Dizziness/ Light-headedness. Tele Afib CVR. No sitter at bedside overnight, alarms on for safety.     Treatment Plan: Continue POC.     Expected Discharge Date/Disposition: TBD. Will need Holter monitor @ TCU.

## 2022-01-03 NOTE — PLAN OF CARE
Pt A/O x 4, forgetful. VSS on RA. Denied pain. Tele a-fib CVR w/ freq PVCs, denied CP. PIV to left intact, SL. Up Ax1 GB W. Wound cares performed and sween cream applied to BLEs and LUE per orders. Potential discharge 1/4. Will continue POC.     Addendum:     Heart Failure Care Map  GOALS TO BE MET BEFORE DISCHARGE:    1. Decrease congestion and/or edema with diuretic therapy to achieve near optimal volume status.     Dyspnea improved: Yes, satisfactory for discharge.   Edema improved: No, further care required to meet this goal. Please explain BLEs        Net I/O and Weights since admission:   12/04 1500 - 01/03 1459  In: 5441 [P.O.:5435; I.V.:6]  Out: 12564 [Urine:28435]  Net: -60184     Vitals:    12/26/21 1829 12/27/21 0530 12/28/21 0404 12/29/21 0532   Weight: 86.4 kg (190 lb 8 oz) 84.8 kg (187 lb) 84.4 kg (186 lb) 83.5 kg (184 lb)    12/30/21 0550 12/31/21 0659 01/01/22 0621 01/02/22 0500   Weight: 83.4 kg (183 lb 14.4 oz) 77.3 kg (170 lb 8 oz) 78.7 kg (173 lb 9.6 oz) 77.7 kg (171 lb 4.8 oz)    01/03/22 0613   Weight: 78.4 kg (172 lb 12.8 oz)       2.  O2 sats > 90% on room air, or at prior home O2 therapy level.      Able to wean O2 this shift to keep sats above 90%?: Yes, satisfactory for discharge.   Does patient use Home O2? No          Current oxygenation status:   SpO2: 100 %     O2 Device: None (Room air),      3.  Tolerates ambulation and mobility near baseline.     Ambulation: Yes, satisfactory for discharge.   Times patient ambulated with staff this shift: 2    Please review the Heart Failure Care Map for additional HF goal outcomes.    aRchele Boss RN  1/3/2022

## 2022-01-04 PROBLEM — G47.00 INSOMNIA: Status: ACTIVE | Noted: 2022-01-01

## 2022-01-04 NOTE — PLAN OF CARE
"Pt confused to place/situation. VSS on RA except sl elevated BP. C/O HA this AM but refused offered analgesics stating, \"it's already starting to resolve.\" Tele a-fib CVR w/ BBB / freq PVCs, denied CP. PIV to left intact, SL. Up Ax1 GB W. Planned discharge to TCU today @ 1600 via daughter. Will continue POC.     Heart Failure Care Map  GOALS TO BE MET BEFORE DISCHARGE:    1. Decrease congestion and/or edema with diuretic therapy to achieve near optimal volume status.     Dyspnea improved: Yes, satisfactory for discharge.   Edema improved: No, further care required to meet this goal. Please explain BLEs        Net I/O and Weights since admission:   12/05 2300 - 01/04 2259  In: 6041 [P.O.:6035; I.V.:6]  Out: 99706 [Urine:81023]  Net: -18900     Vitals:    12/26/21 1829 12/27/21 0530 12/28/21 0404 12/29/21 0532   Weight: 86.4 kg (190 lb 8 oz) 84.8 kg (187 lb) 84.4 kg (186 lb) 83.5 kg (184 lb)    12/30/21 0550 12/31/21 0659 01/01/22 0621 01/02/22 0500   Weight: 83.4 kg (183 lb 14.4 oz) 77.3 kg (170 lb 8 oz) 78.7 kg (173 lb 9.6 oz) 77.7 kg (171 lb 4.8 oz)    01/03/22 0613   Weight: 78.4 kg (172 lb 12.8 oz)       2.  O2 sats > 90% on room air, or at prior home O2 therapy level.      Able to wean O2 this shift to keep sats above 90%?: Yes, satisfactory for discharge.   Does patient use Home O2? No          Current oxygenation status:   SpO2: 99 %     O2 Device: None (Room air),      3.  Tolerates ambulation and mobility near baseline.     Ambulation: Yes, satisfactory for discharge.   Times patient ambulated with staff this shift: 2    Please review the Heart Failure Care Map for additional HF goal outcomes.    Rachele Boss RN  1/4/2022     "

## 2022-01-04 NOTE — PLAN OF CARE
"BP (!) 142/52 (BP Location: Left arm)   Pulse 71   Temp 97.1  F (36.2  C) (Oral)   Resp 18   Ht 1.702 m (5' 7\")   Wt 78.4 kg (172 lb 12.8 oz)   SpO2 99%   BMI 27.06 kg/m      Alert but disorientated to place and situation this shift. Pt was agitated and restless throughout the night refused any interventions. Pt was confused thinking he was at his house. Assist of 1 with gb and walker. Denies pain. Tele: A fib CVR. Took off and refused to put back on. Alarms on for safety continue with POC.  "

## 2022-01-04 NOTE — PLAN OF CARE
Disoriented to situation, can be forgetful at times. Denies pain. VSS. Tele afib CVR w/ PVCs. PIV patent and saline locked. 2000 ml fluid restriction. A1 w gb and walker. Wounds to BLE, see flowsheets. Sween cream applied. Declines CPAP for night, respiratory aware. 2gm sodium diet. Good appetite. Will continue POC    Heart Failure Care Map  GOALS TO BE MET BEFORE DISCHARGE:    1. Decrease congestion and/or edema with diuretic therapy to achieve near optimal volume status.     Dyspnea improved: Yes, satisfactory for discharge.   Edema improved: No, further care required to meet this goal. Please explain +2 BLE and scrotal edema         Net I/O and Weights since admission:   12/04 2300 - 01/03 2259  In: 5801 [P.O.:5795; I.V.:6]  Out: 82026 [Urine:24604]  Net: -25563     Vitals:    12/26/21 1829 12/27/21 0530 12/28/21 0404 12/29/21 0532   Weight: 86.4 kg (190 lb 8 oz) 84.8 kg (187 lb) 84.4 kg (186 lb) 83.5 kg (184 lb)    12/30/21 0550 12/31/21 0659 01/01/22 0621 01/02/22 0500   Weight: 83.4 kg (183 lb 14.4 oz) 77.3 kg (170 lb 8 oz) 78.7 kg (173 lb 9.6 oz) 77.7 kg (171 lb 4.8 oz)    01/03/22 0613   Weight: 78.4 kg (172 lb 12.8 oz)       2.  O2 sats > 90% on room air, or at prior home O2 therapy level.      Able to wean O2 this shift to keep sats above 90%?: Yes, satisfactory for discharge.   Does patient use Home O2? No          Current oxygenation status:   SpO2: 99 %     O2 Device: None (Room air),      3.  Tolerates ambulation and mobility near baseline.     Ambulation: Yes, satisfactory for discharge.   Times patient ambulated with staff this shift: 3    Please review the Heart Failure Care Map for additional HF goal outcomes.    Chase Edmond RN  1/3/2022

## 2022-01-04 NOTE — PLAN OF CARE
15:50 Pt discharge to TCU at this time.  Daughter provided transportation.  Interagency transfer packet and all patient's belongings sent along.

## 2022-01-04 NOTE — PROGRESS NOTES
Care Management Discharge Note    Discharge Date: 01/04/2022       Discharge Disposition: Skilled Nursing Facilty,Transitional Care    Discharge Services: None    Discharge DME: Walker    Discharge Transportation:  (TBD)    Private pay costs discussed: private room/amenity fees and transportation costs    PAS Confirmation Code: [id number]  Patient/family educated on Medicare website which has current facility and service quality ratings: yes    Education Provided on the Discharge Plan:    Persons Notified of Discharge Plans: TCU Admissions MD Mary, daughter, RN   Patient/Family in Agreement with the Plan: yes    Handoff Referral Completed: Yes    Additional Information:  Discharge today at 1600 to [facility] via daughter Stacia [phone]      ELIEZER Heart

## 2022-01-04 NOTE — PLAN OF CARE
"Physical Therapy Discharge Summary    Reason for therapy discharge:    Discharged to transitional care facility.    Progress towards therapy goal(s). See goals on Care Plan in [facility] electronic health record for goal details.  Goals not met.  Barriers to achieving goals:   discharge from facility.    Therapy recommendation(s):    \"Below baseline, recommend TCU to maximize IND/safety with mobility. Pt requiring [facility] for functional transfers. If pt were to return home would need Ax1 for mobility/cares and HHPT. \"      "

## 2022-01-04 NOTE — DISCHARGE SUMMARY
[facility] [place]  Hospitalist Discharge Summary      Date of Admission:  12/26/2021  Date of Discharge:  1/4/2022  Discharging Provider: Mitchell Finnegan MD      Discharge Diagnoses   Acute on chronic HFrEF exacerbation  A. Fib with intermittent bradycardia and occasional sinus pauses  Moderate sever MR, moderate TR, stable from prior  CAD s/p CABG reassuring high sensitive troponin on admission  Chronic lymphedema with stasis changes  Frequent PVCs  HTN  Hyperlipidemia   Acute encephalopathy on top of dementia  New hallucinations -improved this AM  Urinary retention  Prostate cancer  Obstructive sleep apnea  Hyperkalemia-resolved  Hypokalemia  Stable Chronic Anemia  Rosacea  Left Should Pain  Groin Rash    Follow-ups Needed After Discharge   Follow-up Appointments     Follow Up and recommended labs and tests      Follow up with skilled nursing physician.  The following labs/tests are   recommended: BMP in 1 week and adjust potassium as needed.  Follow up with primary care provider within 1-2 weeks to assess for   baseline mentation and consider if neurology referral is appropriate.  Follow up with cardiology in 1-2 weeks to review holter and fluid status.               Unresulted Labs Ordered in the Past 30 Days of this Admission     No orders found from 11/26/2021 to 12/27/2021.      These results will be followed up by N/A    Discharge Disposition   Discharged to short-term care facility  Condition at discharge: [facility]          Milo Martinez is an 81-year-old male with a history of chronic A. fib on anticoagulation, CAD, hypertension, systolic heart failure, hyperlipidemia who presented due to anasarca on 12/26.  He had been previously seen within the last 2 weeks by his primary care doctor for the same, and a rash on his bilateral legs.    Acute on chronic HFrEF exacerbation  A. Fib with intermittent bradycardia and occasional sinus pauses  Moderate sever MR, moderate TR, stable  from prior  CAD s/p CABG reassuring high sensitive troponin on admission  Anasarca, chronic lymphedema  Frequent PVCs  Admission weight 86.4kg (dry weight ~76kg).  He was gently diuresed with IV furosemide and converted to PO torsemide as his weight approached baseline (previously on 10mg daily, increased to 20mg daily prior to discharge).  Echocardiogram showed improvement with EF  50%. Telemetry throughout hospitalization with significant bigeminy, recurrent hypokalemia in the setting of aggressive diuresis.  He was seen by cardiology who recommended electrolyte repletion and holter monitor at discharge.    - discharged on Potassium 20 mEq daily and recheck BMP in 1 week  - PTA home apixaban and statin   - HELD PTA lisinopril due to soft blood pressures during admission, may be restarted after f/u electrolytes  - Lymphedema wraps daily - follow up with lymphedema clinic as an outpatient  - Follow up with cardiology in 1-2 weeks for holter interpretation.    Acute encephalopathy on top of dementia with reportedly new hallucinations  Patient previously entirely independent, recently moved to independent living to be near his wife who is in memory care.  Per report he had not been sundowning at the independent living facility. However on admission he was very confused and had substantial sundowning most nights.  Slums on 12/28 was 14 out of 30.  Head CT showed only microvascular changes.  No evidence of acute stroke.  MRI not done at this time.  No evidence of acute infection.  Has had low potassium with diuresis which resolved, not felt to be contributory to moderate encephalopathy.  PTA gabpentin and trazadone held with concern for contribution to acute encephalopathy, and he was started on Olanzapine to 5 mg PRN at bedtime.   -No Ativan or trazodone at this time as they can exacerbate delirium    Urinary retention  Prostate cancer  Biggs placed on admission due to retention.  Has a history of prostate cancer.   UA/UC negative on admission.  Repeat UA/UC after having damon in for several days with with blood, but he had been pulling on the damon when agitated overnight. Was given 2 doses of ceftriaxone while awaiting urine culture results which were ultimately negative, pro-wolf negative.  Ceftriaxone stopped. No retention post voiding after damon was removed. Plan for outpatient follow-up with urology   -Continue tamsulosin    Obstructive sleep apnea  -BiPAP available for sleeping though patient declined most nights.     Hyperkalemia-resolved  Hypokalemia  Hyperkalemia present on admission.  Became hypokalemic with diuresis.  -will send home with scheduled potassium 20 mEq daily, and recheck BMP within 1 week    Stable chronic anemia  -Continue home iron    Redness on legs  Previously had been given prednisone and increase in his diuretics.  Leg redness likely related to venous stasis and lymphedema.  No evidence of infection at this point in time.  Previously this had been quite painful although it was not at the time of discharge.  Somewhat improved with diuresis during this admission.  Previous notes state that there was vasculitis and he was put on prednisone, however on this admission CRP and sed rate were minimally elevated and not consistent with a vasculitis.  At this point time venous stasis changes on his legs are also not consistent with vasculitis.  -Appreciate WOC consult on 12/27  -Gabapentin helpful for lower extremity pain but ultimately not tolerated from a neurocognitive standpoint    Rosacea  -stopped doxycycline    Left shoulder pain  Reproducible with palpation and movement of arm.  With previous history of rotator cuff injury.  Reports that diclofenac gel has helped in the past.  - diclofenac gel.    Groin rash  -Miconazole powder      Consultations This Hospital Stay   CORE CLINIC EVALUATION IP CONSULT  OCCUPATIONAL THERAPY ADULT IP CONSULT  NUTRITION SERVICES ADULT IP CONSULT  CARE MANAGEMENT /  SOCIAL WORK IP CONSULT  PHYSICAL THERAPY ADULT IP CONSULT  WOUND OSTOMY CONTINENCE NURSE  IP CONSULT  PALLIATIVE CARE ADULT IP CONSULT  WOUND OSTOMY CONTINENCE NURSE  IP CONSULT  PHARMACY IP CONSULT  CARDIOLOGY IP CONSULT  PHYSICAL THERAPY ADULT IP CONSULT  OCCUPATIONAL THERAPY ADULT IP CONSULT    Code Status   Full Code    Time Spent on this Encounter   I, Mitchell Finnegan MD, personally saw the patient today and spent greater than 30 minutes discharging this patient.       Mitchell Finnegan MD  [place] [id number] MEDICAL SURGICAL  [address] [facility] [address]  Phone: [phone]  Fax: [phone]  ______________________________________________________________________    Physical Exam   Vital Signs: Temp: (!) 96.6  F (35.9  C) Temp src: Oral BP: (!) 150/75 Pulse: 71   Resp: 16 SpO2: 99 % O2 Device: None (Room air)    Weight: 172 lbs 12.8 oz  General Appearance:  Very pleasant gentleman in no acute distress   Respiratory: clear to auscultation bilaterally  Cardiovascular: Irregularly irregular, 2 out of 6 systolic murmur  GI: Soft, nontender, bowel sounds normoactive  Skin: Crusting and chronic venous stasis changes over bilateral legs  Other:  2+ edema venous stasis changes and legs neuro: A&O x4 but difficulty accurately describing condition, occasionally discussing events that had not happened.          Primary Care Physician   Oscar Alves    Discharge Orders      Basic metabolic panel     Discharge Order: F/U with Cardiac  FILEMON      Follow-Up with Cardiologist      Care Coordination Referral      General info for SNF    Length of Stay Estimate: Short Term Care: Estimated # of Days <30  Condition at Discharge: Improving  Level of care:skilled   Rehabilitation Potential: Fair  Admission H&P remains valid and up-to-date: Yes  Recent Chemotherapy: N/A  Use Nursing Home Standing Orders: Yes     Mantoux instructions    Give two-step Mantoux (PPD) Per Facility Policy Yes     Follow Up and  recommended labs and tests    Follow up with [facility] physician.  The following labs/tests are recommended: BMP in 1 week and adjust potassium as needed.  Follow up with primary care provider within 1-2 weeks to assess for baseline mentation and consider if neurology referral is appropriate.  Follow up with cardiology in 1-2 weeks to review holter and fluid status.     Reason for your hospital stay    Luís was admitted for swelling of his legs and pain. He was found to be significantly fluid overloaded. He received IV diuretics until the day of discharge when he was back close to his dry weight and was transitioned back to oral diuretics (at a higher dose than previous).     He has some baseline dementia that has been worsening over the past few months. Uncertain etiology, but likely vascular. He did have a rash on his legs that was once called vasculitis, but never had inflammatory labs drawn. He was placed on prednisone several times this fall, and that might have helped, but unclear. He had inflammatory tests (ESR and CRP) done here that were only mildly elevated (as would be expected at his age). He had no other signs of infection. He was placed intermittently on medicines for agitation (ativan, trazodone, olanzapine). The ativan and trazodone seemed to make things worse. When his potassium was low was when he was the worst. He was restarted on daily potassium with a goal >4. To check BMP in 1 week. If his mentation does not improve with stable electrolytes for 1 week, may consider a referral to neurology as an outpatient.     Bladder scan    X 2 for post void residual     Intake and output    Every shift     Daily weights    Call Provider for weight gain of more than 2 pounds per day or 5 pounds per week.     Activity - Up with nursing assistance     Full Code     Physical Therapy Adult Consult    Evaluate and treat as clinically indicated.    Reason:  deconditioning     Occupational Therapy Adult Consult     Evaluate and treat as clinically indicated.    Reason:  deconditioning and decreased mentation, also lymphedema     Holter Monitor 48 hour Adult Pediatric     Fall precautions     Diet    Follow this diet upon discharge: Orders Placed This Encounter      2 Gram Sodium Diet Other - please comment       Significant Results and Procedures   Most Recent 3 CBC's:Recent Labs   Lab Test 12/31/21  2331 12/28/21  0719 12/27/21  0739 12/26/21  1340   WBC  --  3.9* 4.9 4.7   HGB 11.3* 11.3* 10.7* 11.4*   MCV  --  107* 104* 102*   PLT  --  150 147* 153     Most Recent 3 BMP's:Recent Labs   Lab Test 01/04/22  0710 01/03/22  0654 01/02/22  1746 01/02/22  1344 01/02/22  1139 01/02/22  0808    141  --   --   --  142   POTASSIUM 3.6 3.8 4.3   < >  --  3.1*   CHLORIDE 108 110*  --   --   --  107   CO2 29 27  --   --   --  28   BUN 20 20  --   --   --  19   CR 1.00 0.87  --   --   --  0.96   ANIONGAP 4 4  --   --   --  7   ANG 9.6 8.8  --   --   --  8.6   * 98  --   --  155* 86    < > = values in this interval not displayed.     Most Recent 2 LFT's:Recent Labs   Lab Test 12/27/21  0739 12/26/21  1340   AST 19 73*   ALT 14 20   ALKPHOS 173* 193*   BILITOTAL 1.9* 1.7*     Most Recent 3 BNP's:Recent Labs   Lab Test 12/26/21  1340 07/08/21  0951 10/16/19  1254 09/11/19  0757   NTBNPI 1,868*  --   --   --    NTBNP  --  1,786* 748* 1,165*     Most Recent 6 glucoses:Recent Labs   Lab Test 01/04/22  0710 01/03/22  0654 01/02/22  1139 01/02/22  0808 01/01/22  0534 12/31/21  0608   * 98 155* 86 81 94     Most Recent ESR & CRP:Recent Labs   Lab Test 01/01/22  0917 01/01/22  0534   SED 34*  --    CRP  --  18.9*   ,   Results for orders placed or performed during the hospital encounter of 12/26/21   XR Chest Port 1 View    Narrative    EXAM: XR CHEST PORT 1 VIEW  LOCATION: [facility]  DATE/TIME: 12/26/2021 2:10 PM    INDICATION: Confusion, congestive heart failure.  COMPARISON: Chest x-ray 6/4/2020.       Impression    IMPRESSION: Stable cardiomegaly. Interval increased vascular congestion and pulmonary edema pattern. Findings suggest development of CHF. Bibasilar opacities also noted appearing new. Cannot exclude pneumonia.   Head CT w/o contrast    Narrative    EXAM: CT HEAD WITHOUT CONTRAST  LOCATION: [facility]  DATE/TIME: 12/26/2021, 3:23 PM    INDICATION: Mental status change, unknown cause.  COMPARISON: 11/19/2020.  TECHNIQUE: Routine CT Head without IV contrast. Multiplanar reformats. Dose reduction techniques were used.    FINDINGS:  INTRACRANIAL CONTENTS: No intracranial hemorrhage, extraaxial collection, or mass effect.  No CT evidence of acute infarct. Mild presumed chronic small vessel ischemic changes. Mild generalized volume loss. No hydrocephalus.     VISUALIZED ORBITS/SINUSES/MASTOIDS: No acute intraorbital abnormality. No paranasal sinus mucosal disease. No middle ear or mastoid effusion.    BONES/SOFT TISSUES: No acute abnormality.      Impression    IMPRESSION:  1.  No CT evidence for acute intracranial process.  2.  Brain atrophy and presumed chronic microvascular ischemic changes as above.     XR Foot Bilateral G/E 3 Views    Narrative    EXAM: XR FOOT BILATERAL G/E 3 VIEWS  LOCATION: [facility]  DATE/TIME: 12/26/2021, 3:32 PM    INDICATION: Pain, foot wounds.  COMPARISON: None.      Impression    IMPRESSION:     LEFT FOOT: No fracture or osteomyelitis. Old healed fracture deformities of the necks of the second, third and fourth metatarsals. Marked osteopenia. Moderate degenerative changes throughout the midfoot. Advanced degenerative changes at the tibiotalar   joint. Hammertoe deformities.    RIGHT FOOT: No acute fracture or osteomyelitis. Moderate hallux valgus. Old healed fractures of the second through fifth metatarsal necks. Cannulated screws in the medial malleolus. Screw and plate fixation of the lateral malleolus. Hindfoot valgus.    Advanced degenerative changes throughout the midfoot. Hammertoe deformities.     CT Abdomen Pelvis w/o Contrast    Narrative    EXAM: CT ABDOMEN PELVIS W/O CONTRAST  LOCATION: [facility]  DATE/TIME: 12/26/2021 8:00 PM    INDICATION: Abdominal pain, acute, nonlocalized.  COMPARISON: CT abdomen and pelvis 01/02/2021.  TECHNIQUE: CT scan of the abdomen and pelvis was performed without IV contrast. Multiplanar reformats were obtained. Dose reduction techniques were used.  CONTRAST: None.    FINDINGS:   LOWER CHEST: 0.7 cm pulmonary nodule posterior lateral right lower lobe, previously obscured by atelectasis series 3 image 38.    HEPATOBILIARY: Cholecystectomy. Stable right hepatic cyst series 3 image 41. Previously noted vascular process within the posterior right liver is not well visualized at unenhanced scanning. Stable small left hepatic cyst as well.    PANCREAS: Normal.    SPLEEN: Normal.    ADRENAL GLANDS: Stable mild thickening of the left adrenal of doubtful significance. Right adrenal is unremarkable.    KIDNEYS/BLADDER: No hydronephrosis or obstructing stone. No acute renal abnormality at unenhanced scanning. Decompressed bladder with a Biggs catheter.    BOWEL: Large stool distends the rectum. No small bowel obstruction is seen. Appendix cannot be assessed related to nonvisualization.    LYMPH NODES: Normal.    VASCULATURE: Diffuse calcifications.    PELVIC ORGANS: Normal.    MUSCULOSKELETAL: There is anasarca. No aggressive bone lesion. Diffuse spine degenerative changes.      Impression    IMPRESSION:   1.  Large stool at the rectum.  2.  Anasarca.  3.  No acute abnormality otherwise seen.  4.  Indeterminant right lung base pulmonary nodule. See below for follow-up.    Recommendations for an incidental lung nodule = or > 6mm to 8mm:  Low-Risk Patient: Initial follow-up CT at 6-12 months, then consider CT at 18-24 months if no change.  High-Risk Patient: Initial follow-up CT at  6-12 months, then CT at 18-24 months if no change.    *Low Risk: Minimal or absent history of smoking or other known risk factors.  *Nonsolid (ground-glass) or partly solid nodules may require longer follow-up to exclude indolent adenocarcinoma.  *Recommendations based on Guidelines for the Management of Incidental Pulmonary Nodules Detected at CT: From the Fleischner Society 2017, Radiology 2017.     XR Shoulder Left 2 Views    Narrative    SHOULDER TWO VIEW LEFT   2021 1:24 PM     HISTORY: Pain in upper arm, near shoulder. Question fracture.    COMPARISON: X-ray from 2009.      Impression    IMPRESSION: No evidence of acute fracture. On the axillary view there  appears to be anterior subluxation of the humeral head without zhanna  dislocation. No evidence of acute fracture. Pulmonary vascular  prominence.    DIAMOND VIDALES MD         SYSTEM ID:  [id number]   Echocardiogram Complete     Value    LVEF  50%    Narrative    [id number]  [id number]  [id number]  [id number]^JOSE RAFAEL^TESSY     [facility]  Echocardiography Laboratory  [address] [place] [address]     Name: SAÚL GONZALEZ  MRN: [id number]  : 1940  Study Date: 2021 01:01 PM  Age: 81 yrs  Gender: Male  Patient Location: [facility]  Reason For Study: Cardiomyopathy  Ordering Physician: TESSY MANTILLA  Performed By: Pankaj White RDCS     BSA: 2.0 m2  Height: 67 in  Weight: 187 lb  HR: 80  BP: 108/79 mmHg  ______________________________________________________________________________  Procedure  Complete Portable Echo Adult.  ______________________________________________________________________________  Interpretation Summary     1. The left ventricle is normal in structure, function and size. The visual  ejection fraction is estimated at 50%.  2. The right ventricle is mildly dilated. Mildly decreased right ventricular  systolic function  3. There is moderate to mod-severe (2-3+) mitral regurgitation.  4. There  is moderate (2+) tricuspid regurgitation.  5. The ascending aorta is Moderately dilated. 4.4cm.  6. Dilation of the inferior vena cava is present with abnormal respiratory  variation in diameter.     Echo from 7/2021 showed EF 42%, 3+ MR, 1+ TR.  ______________________________________________________________________________  Left Ventricle  The left ventricle is normal in structure, function and size. There is normal  left ventricular wall thickness. The visual ejection fraction is estimated at  50%. Left ventricular diastolic function is indeterminate. Septal motion is  consistent with conduction abnormality.     Right Ventricle  The right ventricle is mildly dilated. Mildly decreased right ventricular  systolic function.     Atria  The left atrium is severely dilated. The right atrium is severely dilated.  There is no atrial shunt seen.     Mitral Valve  The mitral valve leaflets appear thickened, but open well. There is moderate  to mod-severe (2-3+) mitral regurgitation.     Tricuspid Valve  There is moderate (2+) tricuspid regurgitation. The right ventricular systolic  pressure is approximated at 22.0 mmHg plus the right atrial pressure.     Aortic Valve  There is mild trileaflet aortic sclerosis. There is mild (1+) aortic  regurgitation.     Pulmonic Valve  The pulmonic valve is normal in structure and function.     Vessels  The ascending aorta is Moderately dilated. Dilation of the inferior vena cava  is present with abnormal respiratory variation in diameter.     Pericardium  There is no pericardial effusion.     Rhythm  The rhythm was atrial fibrillation with wide QRS rhythm.  ______________________________________________________________________________  MMode/2D Measurements & Calculations     IVSd: 1.2 cm  LVIDd: 5.1 cm  LVIDs: 4.2 cm  LVPWd: 1.2 cm  FS: 17.8 %  LV mass(C)d: 241.8 grams  LV mass(C)dI: 123.0 grams/m2  Ao root diam: 4.4 cm  LA dimension: 6.0 cm  asc Aorta Diam: 4.4 cm  LA/Ao: 1.4  LVOT  diam: 2.7 cm  LVOT area: 5.7 cm2  LA Volume (BP): 235.0 ml  LA Volume Index (BP): 119.3 ml/m2  RWT: 0.47     Doppler Measurements & Calculations  MV E max mima: 82.7 cm/sec  MV max P.1 mmHg  MV mean P.0 mmHg  MV V2 VTI: 28.4 cm  MVA(VTI): 4.1 cm2  MV dec time: 0.27 sec  LV V1 max PG: 3.3 mmHg  LV V1 max: 91.2 cm/sec  LV V1 VTI: 20.5 cm  SV(LVOT): 117.4 ml  SI(LVOT): 59.7 ml/m2  PA acc time: 0.16 sec  PI end-d mima: 74.8 cm/sec  TR max mima: 233.0 cm/sec  TR max P.0 mmHg  E/E' av.4  Lateral E/e': 7.7  Medial E/e': 15.0     ______________________________________________________________________________  Report approved by: Raffaele Hernandez 2021 03:12 PM               Discharge Medications   Current Discharge Medication List      START taking these medications    Details   OLANZapine zydis (ZYPREXA) 5 MG ODT Take 1 tablet (5 mg) by mouth nightly as needed for agitation  Qty: 30 tablet, Refills: 0    Associated Diagnoses: Insomnia, unspecified type         CONTINUE these medications which have CHANGED    Details   torsemide (DEMADEX) 20 MG tablet Take 1 tablet (20 mg) by mouth daily  Qty: 30 tablet, Refills: 0    Associated Diagnoses: Ischemic cardiomyopathy; 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 (H)         CONTINUE these medications which have NOT CHANGED    Details   apixaban ANTICOAGULANT (ELIQUIS) 2.5 MG tablet Take 1 tablet (2.5 mg) by mouth 2 times daily  Qty: 200 tablet, Refills: 4    Associated Diagnoses: Chronic atrial fibrillation (H)      diclofenac (VOLTAREN) 1 % topical gel Apply 4 gramsto area qid prn  Qty: 100 g, Refills: 11    Associated Diagnoses: Acute left-sided low back pain without sciatica      MELATONIN PO Take 5 mg by mouth At Bedtime       multivitamin, therapeutic (THERA-VIT) TABS tablet Take 1 tablet by mouth daily    Associated Diagnoses: Encounter for preventive measure      omeprazole (PRILOSEC) 40 MG DR capsule Take 1 capsule (40 mg) by  mouth daily  Qty: 90 capsule, Refills: 3    Comments: profile  Associated Diagnoses: PUD (peptic ulcer disease)      potassium chloride ER (K-TAB) 20 MEQ CR tablet Take 1 tablet (20 mEq) by mouth 2 times daily  Qty: 200 tablet, Refills: 3    Associated Diagnoses: Chronic combined systolic and diastolic congestive heart failure (H)      simvastatin (ZOCOR) 20 MG tablet Take 1 tablet (20 mg) by mouth At Bedtime  Qty: 100 tablet, Refills: 4    Associated Diagnoses: Hyperlipidemia LDL goal <100      tamsulosin (FLOMAX) 0.4 MG capsule Take 1 capsule (0.4 mg) by mouth daily  Qty: 90 capsule, Refills: 3    Comments: profile  Associated Diagnoses: Prostate cancer (H)      UREA 20 INTENSIVE HYDRATING 20 % external cream Apply topically as needed Apply to right foot daily  Qty: 120 g, Refills: 11    Associated Diagnoses: Right foot pain; Skin callus; Pes planus of both feet; Venous insufficiency of both lower extremities; Hav (hallux abducto valgus), right; Onychomycosis of toenail      acetaminophen (TYLENOL) 500 MG tablet Take 500-1,000 mg by mouth every 8 hours as needed    Associated Diagnoses: Neuropathy of foot, unspecified laterality; Primary osteoarthritis involving multiple joints      ferrous gluconate (FERGON) 324 (38 Fe) MG tablet Take 1 tablet (324 mg) by mouth daily (with breakfast)  Qty: 90 tablet, Refills: 3    Associated Diagnoses: Iron deficiency anemia secondary to inadequate dietary iron intake         STOP taking these medications       doxycycline hyclate (VIBRA-TABS) 100 MG tablet Comments:   Reason for Stopping:         lisinopril (ZESTRIL) 20 MG tablet Comments:   Reason for Stopping:             Allergies   Allergies   Allergen Reactions     Blood Transfusion Related (Informational Only) Other (See Comments)     Patient has a history of a clinically significant antibody against RBC antigens.  A delay in compatible RBCs may occur.

## 2022-01-05 NOTE — PATIENT INSTRUCTIONS
January 5, 2022    Milo Serna  1940      ORDERS:    1. Change tylenol to 500 mg po q 6 hrs prn for pain/fever.  2. BMP 1/7/22 dx hypokalemia  3. Lymphedema consult    Chantel Bryson PA-C

## 2022-01-05 NOTE — PLAN OF CARE
"Occupational Therapy Discharge Summary    Reason for therapy discharge:    Discharged to transitional care facility.    Progress towards therapy goal(s). See goals on Care Plan in [facility] electronic health record for goal details.  Goals not met.  Barriers to achieving goals:   discharge from facility.    Therapy recommendation(s):    Continued therapy is recommended.  Rationale/Recommendations:  pre treating OT \"Pt remains below baseline level of functioning in ADLs and mobility tasks. Remains a fall risk due to poor safety mgmt of walker. Recommend ongoing skilled OT while IP and in TCU setting to improve strength, functional activity tolerance, balance and safety needed for daily tasks. If returns to [facility], would require Ax1 for all transfers/mobility, ADLs and IADLs (specifically for med mgmt, driving and meal prep) along with HH therapies. May benefit from more supportive living environment\".    **This patient was not seen by writing OT, information for discharge summary taken from treating OT's notes.**    " 6

## 2022-01-05 NOTE — PROGRESS NOTES
Pre Visit Call and Assessment    Date of call:  11/13/2020    Phone numbers:  Cell number on file:    Telephone Information:   Mobile [phone]       Reached patient/confirmed appointment:  Yes  Patient care team/Primary provider:  Oscar Alves  Preferred outpatient pharmacy:    [facility] mail order  [facility]  Phone: [phone] Fax: [phone]    [facility] - [address] [address]  Phone: [phone] Fax: [phone]    [id number] - [place], [place] - [address] [facility]  [address] [facility] [address]  Phone: [phone] Fax: [phone]    Referred to:  Dr. James Alvarez    Reason for visit:  Gallbladder consult                     Statement Selected

## 2022-01-05 NOTE — PROGRESS NOTES
Clinic Care Coordination Contact  Care Team Conversations    Situation: Clinical Product Navigator following patient through TCU care progression.     Background: Patient inpatient at [facility] 12/26/21-1/4/22 due to acute on chronic HFrEF exacerbation.  Patient discharged to [facility] TCU 1/4/22.    Assessment: Patient has had 3 ED visits and 2 hospital admissions in the past 12 months.  2  Patient to have BMP in 1 week and potassium adjusted as needed.  If mentation does not improve with stable electrolytes for 1 week, consider neurology referral.  Patient to follow-up with PCP in 1-2 weeks to assess for baseline mentation and consider neurology referral if appropriate.  Follow-up with cardiology in 1-2 weeks to review holter and fluid status.  Note patient has a follow-up appointment scheduled in person with CORE 1/12/22 at 12:30 pm [facility].  Follow-up with Urology outpatient for urinary retention and prostate cancer.    Patient lives in independent living at [facility] and prior to admission received some meals and cleaning assistance from his ILF.  Patient is  and wife lives in memory care unit of same facility.      Plan/Recommendations: Writer will send TCU Care Team Care Management hand in communication and request involvement in discharge planning and coordination of care.      Melissa Behl BSN, RN, PHN, CCM  Clinical Product Navigator

## 2022-01-05 NOTE — PROGRESS NOTES
Clinic Care Coordination Contact  Care Coordination Transition Communication    Referral Source: IP Handoff    Clinical Data: Patient was hospitalized at [facility] from 12/26/2021 to 01/04/2022 with diagnosis of CHF exacerbation amongst others.     Transition to Facility:              Facility Name: [facility]              Contact name and phone number/fax: RNCC sent secure PHI email requesting notification of patients discharge and any outstanding care coordination needs.      Plan: RN Care Coordinator will await notification from facility staff informing RN Care Coordinator of patient's discharge plans/needs. RN Care Coordinator will review chart and outreach to facility staff every 4 weeks and as needed.     Stella Loredo RN Care Coordinator  [facility] [place], [place], [place]  Email: [email or web address]  Phone: [phone]

## 2022-01-05 NOTE — PROGRESS NOTES
[facility] GERIATRIC SERVICES    PRIMARY CARE PROVIDER AND CLINIC:  Oscar Al[place], [address] [facility] [address]  Chief Complaint   Patient presents with     Hospital F/U      [facility] Medical Record Number:  [id number]  Place of Service where encounter took place:  [facility]  ([facility]) [[id number]]    Milo Serna  is a 81 year old  (1940), admitted to the above facility from  [facility]. Hospital stay 12/26/21 through 1/4/22..   HPI:    Milo Serna is an 81-year-old male with a past medical history of HFrEF, atrial fibrillation, obstructive sleep apnea noncompliant with CPAP, lower extremity edema with stasis dermatitis, coronary artery disease status post CABG in 1997 and severe mitral regurgitation who was recently admitted to [facility] for evaluation of acute CHF exacerbation and anasarca.    He presented on 12/26 for evaluation of testicular swelling.  Labs are noted with hyperkalemia with potassium of 5.9.  N-terminal BNP elevated at 1800.  EKG with atrial fibrillation and controlled ventricular response.Chest x-ray with signs of pulmonary vascular congestion.  Head CT negative.  CT of the abdomen and pelvis with large amount of stool and anasarca.  He was admitted and initiated on IV diuretics.  His torsemide was increased from 10 mg daily to 20 mg daily.  Repeat TTE showed improved ejection fraction to 50% (previously 42%).  He developed recurrent hypokalemia and bigeminy.  Cardiology was consulted who recommended electrolyte replacement Holter monitor at discharge.  Hospital course was complicated by waxing and waning delirium.  In review of records it appears patient has had a decline in his cognitive status for the past couple of months.  His trazodone was discontinued and he was started on as needed Zyprexa at bedtime.  He was ultimately discharged to [facility]    Today Skip is examined in his room.  Nursing reports that he was found wandering  this morning in an office on the floor.  In my interview he believes he is on a cruise ship.  Does not recall being in the hospital.  Incidentally, does know the month year and president.  He denies chest pain.  No shortness of breath.    CODE STATUS/ADVANCE DIRECTIVES DISCUSSION:  Full Code  CPR/Full code   ALLERGIES:   Allergies   Allergen Reactions     Blood Transfusion Related (Informational Only) Other (See Comments)     Patient has a history of a clinically significant antibody against RBC antigens.  A delay in compatible RBCs may occur.      PAST MEDICAL HISTORY:   Past Medical History:   Diagnosis Date     Actinic keratosis 10/12/2019     Anemia, unspecified 11/08/2016     Atrial fibrillation (H) 05/27/2015     BPH (benign prostatic hyperplasia) 01/25/2012     CAD (coronary artery disease) 04/04/2012    CAB 1996 following MI (at [facility])     Closed bimalleolar fracture of left ankle with routine healing 04/05/2019     Closed fracture of metatarsal bone 04/04/2012     Dilated aortic root (H) 05/26/2016     Drug-induced erectile dysfunction 10/02/2015     Elevated blood sugar 11/08/2016     Elevated PSA 09/19/2013     Epistaxis 09/09/2019     Essential hypertension with goal blood pressure less than 140/90 09/22/2016     Fall 09/06/2019     Functional diarrhea 10/12/2019     Gallbladder polyp 05/01/2018     Gout      HAV (hallux abducto valgus) 04/04/2012     Hematoma 09/09/2019     Hernia, abdominal      History of prostate cancer 06/15/2016     HTN, goal below 150/90 04/20/2017     Hypokalemia 09/12/2019     Iron deficiency anemia secondary to inadequate dietary iron intake 11/15/2016     Ischemic cardiomyopathy      Low blood pressure reading 10/17/2016     Lumbago     had degendisc dz on MRI 7/07     Memory loss 06/18/2019     Microalbuminuria 06/04/2020    X1     Mixed hyperlipidemia      Mumps      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     Nonrheumatic mitral valve regurgitation 10/28/2019     OA  (osteoarthritis) 04/04/2012     Old myocardial infarction 04/04/2012     LENORA (obstructive sleep apnea) 10/28/2019     LENORA (obstructive sleep apnea)      Other viral warts 09/06/2019     Palpitations      Pes planus 04/04/2012     Petechiae 06/18/2019     Prostate cancer (H) 05/26/2016     PUD (peptic ulcer disease) 04/04/2012     Pulmonary hypertension (H) 09/25/2017     PVC's (premature ventricular contractions)      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     Rosacea 01/15/2008     Stasis dermatitis      Stasis dermatitis of both legs 05/13/2021     Stress due to spouse with dementia 06/18/2019     Thrombocytopenia (H) 04/21/2017     Unspecified hyperplasia of prostate with urinary obstruction and other lower urinary tract symptoms (LUTS)     better on avodart & hytrin     Venous stasis 04/04/2012      PAST SURGICAL HISTORY:   has a past surgical history that includes NONSPECIFIC PROCEDURE; NONSPECIFIC PROCEDURE (02/2007); NONSPECIFIC PROCEDURE; NONSPECIFIC PROCEDURE (2000); NONSPECIFIC PROCEDURE (2005); NONSPECIFIC PROCEDURE; NONSPECIFIC PROCEDURE; NONSPECIFIC PROCEDURE; NONSPECIFIC PROCEDURE; Cystoscopy Flexible, Cyoablation Prostate (N/A, 11/09/2016); Abdomen surgery; Cardiac surgery; hernia repair; Prostate surgery; Laparoscopic herniorrhaphy inguinal (Right, 05/10/2017); and Laparoscopic cholecystectomy (N/A, 12/28/2020).  FAMILY HISTORY: family history includes Cancer in his mother; Heart Disease in his brother, father, and sister; Hypertension in his brother.  SOCIAL HISTORY:   reports that he has quit smoking. He has never used smokeless tobacco. He reports current alcohol use. He reports that he does not use drugs.  Patient's living condition: lives in an assisted living facility    Post Discharge Medication Reconciliation Status: discharge medications reconciled and changed, per note/orders  Current Outpatient Medications   Medication Sig     ACE/ARB/ARNI NOT PRESCRIBED (INTENTIONAL) Please choose reason not prescribed  "from choices below.     acetaminophen (TYLENOL) 500 MG tablet Take 1 tablet (500 mg) by mouth every 6 hours as needed for mild pain     apixaban ANTICOAGULANT (ELIQUIS) 2.5 MG tablet Take 1 tablet (2.5 mg) by mouth 2 times daily     BETA BLOCKER NOT PRESCRIBED (INTENTIONAL) Please choose reason not prescribed from choices below.     diclofenac (VOLTAREN) 1 % topical gel Apply 4 gramsto area qid prn     ferrous gluconate (FERGON) 324 (38 Fe) MG tablet Take 1 tablet (324 mg) by mouth daily (with breakfast)     MELATONIN PO Take 5 mg by mouth At Bedtime      multivitamin, therapeutic (THERA-VIT) TABS tablet Take 1 tablet by mouth daily     OLANZapine zydis (ZYPREXA) 5 MG ODT Take 1 tablet (5 mg) by mouth nightly as needed for agitation     omeprazole (PRILOSEC) 40 MG DR capsule Take 1 capsule (40 mg) by mouth daily     potassium chloride ER (K-TAB) 20 MEQ CR tablet Take 1 tablet (20 mEq) by mouth 2 times daily     simvastatin (ZOCOR) 20 MG tablet Take 1 tablet (20 mg) by mouth At Bedtime     tamsulosin (FLOMAX) 0.4 MG capsule Take 1 capsule (0.4 mg) by mouth daily     torsemide (DEMADEX) 20 MG tablet Take 1 tablet (20 mg) by mouth daily     UREA 20 INTENSIVE HYDRATING 20 % external cream Apply topically as needed Apply to right foot daily     No current facility-administered medications for this visit.       ROS:  Limited secondary to cognitive impairment but today pt reports no chest pain, no shortness of breath,  no constipation    Vitals:  /69   Pulse 84   Temp 98.4  F (36.9  C)   Resp 18   Ht 1.702 m (5' 7.01\")   Wt 78.7 kg (173 lb 9.6 oz)   SpO2 96%   BMI 27.18 kg/m    Exam:  Physical Exam  Constitutional:       Appearance: Normal appearance. He is normal weight.   HENT:      Head: Normocephalic and atraumatic.   Eyes:      General: No scleral icterus.  Cardiovascular:      Rate and Rhythm: Normal rate and regular rhythm.      Heart sounds: Murmur heard.       Pulmonary:      Effort: Pulmonary effort " is normal. No respiratory distress.      Breath sounds: Normal breath sounds. No rales.   Abdominal:      General: Abdomen is flat. There is no distension.      Palpations: Abdomen is soft.   Musculoskeletal:         General: Normal range of motion.      Comments: 3+ edema.   Skin:     General: Skin is warm.      Comments: Status dermatitis of BLE   Neurological:      General: No focal deficit present.      Mental Status: He is alert.      Comments: Alert to year and month. Disoriented to place. COuld not tell me where he lived prior to hospitalization. Could not tell me his wife or daughter's name   Psychiatric:         Mood and Affect: Mood normal.     '      Lab/Diagnostic data:  Recent labs in [facility] reviewed by me today.  and   Most Recent 3 CBC's:  Recent Labs   Lab Test 12/31/21  2331 12/28/21  0719 12/27/21  0739 12/26/21  1340   WBC  --  3.9* 4.9 4.7   HGB 11.3* 11.3* 10.7* 11.4*   MCV  --  107* 104* 102*   PLT  --  150 147* 153     Most Recent 3 BMP's:  Recent Labs   Lab Test 01/04/22  0710 01/03/22  0654 01/02/22  1746 01/02/22  1344 01/02/22  1139 01/02/22  0808    141  --   --   --  142   POTASSIUM 3.6 3.8 4.3   < >  --  3.1*   CHLORIDE 108 110*  --   --   --  107   CO2 29 27  --   --   --  28   BUN 20 20  --   --   --  19   CR 1.00 0.87  --   --   --  0.96   ANIONGAP 4 4  --   --   --  7   ANG 9.6 8.8  --   --   --  8.6   * 98  --   --  155* 86    < > = values in this interval not displayed.     Most Recent 3 Troponin's:  Recent Labs   Lab Test 11/28/20  1729 10/30/20  0909 11/15/16  1124   TROPI <0.015 0.016 <0.015  The 99th percentile for upper reference range is 0.045 ug/L.  Troponin values in   the range of 0.045 - 0.120 ug/L may be associated with risks of adverse   clinical events.       Most Recent 3 BNP's:  Recent Labs   Lab Test 12/26/21  1340 07/08/21  0951 10/16/19  1254 09/11/19  0757   NTBNPI 1,868*  --   --   --    NTBNP  --  1,786* 748* 1,165*     Most Recent 6 Bacteria  Isolates From Any Culture (See EPIC Reports for Culture Details):No lab results found.  Most Recent TSH and T4:  Recent Labs   Lab Test 09/11/19  0757   TSH 2.02       ASSESSMENT/PLAN:    Acute HFrEF  Mod-Severe MR  Mod TR  Anasarca  Chronic Lymphedema: Presented to the hospital for evaluation of testicular swelling and found to have acute CHF exacerbation.  BNP 1800.  Chest x-ray with pulmonary vascular infiltrates.  Repeat TTE 12/26 with improved ejection fraction to 50%, mildly decreased right ventricular systolic function, mod/severe mitral regurgitation, mild tricuspid regurgitation, ascending aorta moderately dilated at 4.4 cm.  Diuresed with IV Lasix and transition to increased dose of torsemide at discharge  -Continue torsemide 20 mg daily  -Not on beta-blocker due to history of bradycardia.  ACE inhibitor discontinued during hospitalization due to electrolyte abnormalities and mild hypotension.  Consider resumption pending blood pressures  -Daily weights, notify provider if increased weight of 3 pounds in 24 hours or 5 pounds in 7 days  -Lymphedema consult  -Follow-up with cardiology 1/12/2022    Chronic atrial fibrillation  Bigeminy/PVCs: History of atrial fibrillation chronically anticoagulated with warfarin 2.5 mg twice daily (cardiology notes indicate not on higher dose due to history of epistaxis in the past on 5 mg) during most recent hospitalization he was noted to have frequent runs of PVCs with bigeminy.  Cardiology was consulted.  They recommended stabilization of electrolytes and Holter monitor  -Not on rate controlling medications  -Continue Holter monitor.  Follow-up with cardiology as planned 1/12  -Continue apixaban at 2.5 mg twice daily, again not on higher doses due to history of epistaxis on 5 mg twice daily in the past    Coronary artery disease status post CABG 2007  -Continue simvastatin  -Not on aspirin due to anticoagulation with apixaban  -Consider resumption of lisinopril pending  blood pressure    Cognitive impairment, NOS: No formal diagnosis of dementia but notes indicate overall cognitive decline over the past few months.  Wife lives in memory care and patient lives in independent living.  Had issues with sundowning and increasing agitation at the hospital.  Slums 12/28 14/30.  Head CT showed no acute abnormalities.  He was started on as needed Zyprexa  -Continue Zyprexa as needed at bedtime  -Continue melatonin at bedtime  -Standard delirium protocol  -PT assessment    History of prostate cancer  Urinary retention: Details of prostate cancer unclear.  Had urinary retention during hospital stay Biggs catheter was placed.  Biggs catheter ultimately removed with no recurrent retention.  -Monitor PVRs  -Outpatient follow-up with urology as needed    FEN: Initially had hyperkalemia on admission which resolved.  Then became hypokalemic with diuresis.  Started on potassium 40 mEq twice daily  -Continue potassium supplementation  -Repeat BMP 1/7/2022    Bilateral stasis dermatitis, chronic: No signs of secondary infection.  Wound nurse evaluated in the hospital  -Continue local wound care  -Lymphedema consult    Obstructive sleep apnea  -Patient noncompliant with CPAP    Orders:  1. Change tylenol to 500 mg po q 6 hrs prn for pain/fever.  2. BMP 1/7/22 dx hypokalemia  3. Lymphedema consult    A total 45 min were spent on this initial visit. With > 50% spent on coordination of care including extensive review of medical records, most recent hospital physician, labs and imaging are essential given patient's cognitive impairment and inability to provide adequate history. Further details as discussed in HPI.      Electronically signed by:  Chantel Bryson PA-C

## 2022-01-05 NOTE — PROGRESS NOTES
Spoke to Stacia. Luís is at [facility]'s TCU and Stacia would prefer to wait until he is discharged due to his increased memory loss.     Will watch his chart and make CORE appt as appropriate.     Marga Roberson RN 3:33 PM 01/05/22

## 2022-01-06 NOTE — LETTER
1/6/2022        RE: Milo Serna  [address] [place] [address]        [facility]  INITIAL VISIT NOTE  January 6, 2022    PRIMARY CARE PROVIDER AND CLINIC:  Oscar Alves [address] [facility] [id number]    CHIEF COMPLAINT:  Hospital follow-up/Initial visit    HPI:    Milo Serna is a 81 year old  (1940) male who was seen at [facility] on January 6, 2022 for an initial visit.     Medical history is notable for CAD, ischemic cardiomyopathy, permanent atrial fibrillation, hypertension, dyslipidemia, chronic anemia, BPH, prostate cancer, gout, peripheral neuropathy, stasis dermatitis, PUD, osteoarthritis, LENORA, and cognitive impairment.    Summary of hospital course:  Patient was hospitalized at [facility] from December 26, 2021 through January 4, 2022 for acute on chronic heart failure as well as atrial fibrillation with intermittent bradycardia and occasional sinus pauses.  Patient originally presented with anasarca.  BNP was elevated at 1,868.  Troponin I was 28 and normal.  Test for COVID-19 was negative.  Chest x-ray revealed stable cardiomegaly and interval increased vascular congestion and pulmonary edema pattern.  CT abdomen/pelvis demonstrated anasarca and indeterminate right lung base pulmonary nodule, measuring 0.7 cm.  He was gently diuresed with IV furosemide which eventually transitioned to oral torsemide.  Echocardiogram showed LV ejection fraction of 50%, 2-3+ MR, and 2+ TR.  PTA lisinopril was held due to soft blood pressures.  PTA gabapentin and trazodone were also held with concern for contributing to increased confusion/encephalopathy.  Notably, CT head was negative for acute intracranial process.  He was started on PRN olanzapine.  Lower extremity erythema somewhat improved with diuresis.  UA was abnormal on December 31 but urine culture grew less than 10,000 colonies per mL urogenital lea.  TCU was recommended  per therapies.    Patient is admitted to this facility for medical management, nursing care, and rehab.     Of note, history was obtained from patient, facility RN, and extensive review of the chart.    Today's visit:  Patient was seen in his room, while sitting in a chair.  He appears comfortable.  He clearly has some cognitive deficits.  He is wearing a Holter monitor.  He reports that he had a bowel movement yesterday.  He denies cough, sputum, fever, chills, chest pain, palpitation, dyspnea, nausea, vomiting, abdominal pain, or urinary symptoms.      CODE STATUS:   CPR/Full code     PAST MEDICAL HISTORY:   CAD, s/p CABG in 1996  Ischemic cardiomyopathy; LVEF 50% on December 27, 2021  2-3+ MR and 2+ TR  Moderately dilated ascending aorta, measuring 4.4 cm on echo from December 27, 2021  Permanent atrial fibrillation  Hypertension  Dyslipidemia  Chronic anemia, baseline Hgb 11-12  BPH  Prostate cancer, s/p cryoablation in 2016  Gout  Peripheral neuropathy  Chronic lower extremity stasis dermatitis  Epistaxis  PUD in 2010  Colon polyp  Rosacea  Degenerative disc disease of lumbar spine with moderate central canal stenosis  Mumps  Osteoarthritis  Rotator cuff injury  LENORA; noncompliant with CPAP  Cognitive impairment  RLL pulmonary nodule, measuring 0.7 cm, noted incidentally on CT abdomen/pelvis on December 26, 2021    Past Medical History:   Diagnosis Date     Actinic keratosis 10/12/2019     Anemia, unspecified 11/08/2016     Atrial fibrillation (H) 05/27/2015     BPH (benign prostatic hyperplasia) 01/25/2012     CAD (coronary artery disease) 04/04/2012    CAB 1996 following MI (at [facility])     Closed bimalleolar fracture of left ankle with routine healing 04/05/2019     Closed fracture of metatarsal bone 04/04/2012     Dilated aortic root (H) 05/26/2016     Drug-induced erectile dysfunction 10/02/2015     Elevated blood sugar 11/08/2016     Elevated PSA 09/19/2013     Epistaxis 09/09/2019     Essential  hypertension with goal blood pressure less than 140/90 09/22/2016     Fall 09/06/2019     Functional diarrhea 10/12/2019     Gallbladder polyp 05/01/2018     Gout      HAV (hallux abducto valgus) 04/04/2012     Hematoma 09/09/2019     Hernia, abdominal      History of prostate cancer 06/15/2016     HTN, goal below 150/90 04/20/2017     Hypokalemia 09/12/2019     Iron deficiency anemia secondary to inadequate dietary iron intake 11/15/2016     Ischemic cardiomyopathy      Low blood pressure reading 10/17/2016     Lumbago     had degendisc dz on MRI 7/07     Memory loss 06/18/2019     Microalbuminuria 06/04/2020    X1     Mixed hyperlipidemia      Mumps      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     Nonrheumatic mitral valve regurgitation 10/28/2019     OA (osteoarthritis) 04/04/2012     Old myocardial infarction 04/04/2012     LENORA (obstructive sleep apnea) 10/28/2019     LENORA (obstructive sleep apnea)      Other viral warts 09/06/2019     Palpitations      Pes planus 04/04/2012     Petechiae 06/18/2019     Prostate cancer (H) 05/26/2016     PUD (peptic ulcer disease) 04/04/2012     Pulmonary hypertension (H) 09/25/2017     PVC's (premature ventricular contractions)      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     Rosacea 01/15/2008     Stasis dermatitis      Stasis dermatitis of both legs 05/13/2021     Stress due to spouse with dementia 06/18/2019     Thrombocytopenia (H) 04/21/2017     Unspecified hyperplasia of prostate with urinary obstruction and other lower urinary tract symptoms (LUTS)     better on avodart & hytrin     Venous stasis 04/04/2012       PAST SURGICAL HISTORY:   Past Surgical History:   Procedure Laterality Date     CARDIAC SURGERY      CAB     CYSTOSCOPY FLEXIBLE, CYOABLATION PROSTATE N/A 11/09/2016    Procedure: CYSTOSCOPY FLEXIBLE, CRYOABLATION PROSTATE;  Surgeon: Krystian Owens MD;  Location: [facility] OR     GENITOURINARY SURGERY      prostate surgery      HERNIA REPAIR       LAPAROSCOPIC CHOLECYSTECTOMY N/A  2020    Procedure: CHOLECYSTECTOMY, LAPAROSCOPIC;  Surgeon: James Alvarez MD;  Location: [facility] OR     LAPAROSCOPIC HERNIORRHAPHY INGUINAL Right 05/10/2017    Procedure: LAPAROSCOPIC HERNIORRHAPHY INGUINAL;  Laparoscopic preperitoneal repair of right inguinal hernia with placement of underlay mesh;  Surgeon: Enrico Bridges MD;  Location: [facility] OR     PROSTATE SURGERY       [facility] NONSPECIFIC PROCEDURE      CAB  [facility] NONSPECIFIC PROCEDURE  2007    l inguinal hernia repair      [facility] NONSPECIFIC PROCEDURE      R hernia remote     [facility] NONSPECIFIC PROCEDURE      R ankle ORIF for fx     [facility] NONSPECIFIC PROCEDURE      nasal surgery      [facility] NONSPECIFIC PROCEDURE      R rotater repair (remote)      [facility] NONSPECIFIC PROCEDURE      appy 1966     [facility] NONSPECIFIC PROCEDURE      sinus surgery x 2     [facility] NONSPECIFIC PROCEDURE      L shoulder        FAMILY HISTORY:   Family History   Problem Relation Age of Onset     Cancer Mother         stomach cancer,  age 61     Heart Disease Father         MI,  age 71     Heart Disease Brother         stent placement, RA      Hypertension Brother      Heart Disease Sister         rhythm problems with heart, hypertension       SOCIAL HISTORY:  Social History     Tobacco Use     Smoking status: Former Smoker     Smokeless tobacco: Never Used     Tobacco comment: quit 3/1974   Substance Use Topics     Alcohol use: Yes     Alcohol/week: 0.0 standard drinks     Comment: One Beer a Day and maybe Wine       MEDICATIONS:  Current Outpatient Medications   Medication Sig Dispense Refill     ACE/ARB/ARNI NOT PRESCRIBED (INTENTIONAL) Please choose reason not prescribed from choices below.       acetaminophen (TYLENOL) 500 MG tablet Take 1 tablet (500 mg) by mouth every 6 hours as needed for mild pain       apixaban ANTICOAGULANT (ELIQUIS) 2.5 MG tablet Take 1 tablet (2.5 mg) by mouth 2 times daily 200 tablet 4     BETA BLOCKER NOT PRESCRIBED  "(INTENTIONAL) Please choose reason not prescribed from choices below.       diclofenac (VOLTAREN) 1 % topical gel Apply 4 gramsto area qid prn 100 g 11     ferrous gluconate (FERGON) 324 (38 Fe) MG tablet Take 1 tablet (324 mg) by mouth daily (with breakfast) 90 tablet 3     MELATONIN PO Take 5 mg by mouth At Bedtime        multivitamin, therapeutic (THERA-VIT) TABS tablet Take 1 tablet by mouth daily       OLANZapine zydis (ZYPREXA) 5 MG ODT Take 1 tablet (5 mg) by mouth nightly as needed for agitation 30 tablet 0     omeprazole (PRILOSEC) 40 MG DR capsule Take 1 capsule (40 mg) by mouth daily 90 capsule 3     potassium chloride ER (K-TAB) 20 MEQ CR tablet Take 1 tablet (20 mEq) by mouth 2 times daily 200 tablet 3     simvastatin (ZOCOR) 20 MG tablet Take 1 tablet (20 mg) by mouth At Bedtime 100 tablet 4     tamsulosin (FLOMAX) 0.4 MG capsule Take 1 capsule (0.4 mg) by mouth daily 90 capsule 3     torsemide (DEMADEX) 20 MG tablet Take 1 tablet (20 mg) by mouth daily 30 tablet 0     UREA 20 INTENSIVE HYDRATING 20 % external cream Apply topically as needed Apply to right foot daily 120 g 11       ALLERGIES:  Allergies   Allergen Reactions     Blood Transfusion Related (Informational Only) Other (See Comments)     Patient has a history of a clinically significant antibody against RBC antigens.  A delay in compatible RBCs may occur.       ROS:  10 point ROS were negative other than the symptoms noted above in the HPI.    PHYSICAL EXAM:  Vital signs were reviewed in the chart.  Vital Signs: BP (!) 156/68   Pulse 80   Temp 98.4  F (36.9  C)   Resp 18   Ht 1.702 m (5' 7\")   Wt 75.9 kg (167 lb 6.4 oz)   SpO2 98%   BMI 26.22 kg/m    General: Comfortable and in no acute distress  HEENT: No conjunctival pallor  Cardiovascular: Normal S1, S2, irregular, grade 2/6 systolic murmur  Respiratory: Lungs clear to auscultation bilaterally  GI: Abdomen soft, non-tender, non-distended, +BS  Extremities: 1-2+ bilateral LE " edema  Neuro: CX II-XII grossly intact; ROM in all four extremities grossly intact  Psych: Alert and oriented x3; normal affect  Skin: Skin erythema over the lower legs bilaterally    LABORATORY/IMAGING DATA:  All relevant labs and imaging data were reviewed personally today.      Most Recent 3 CBC's:Recent Labs   Lab Test 12/31/21  2331 12/28/21  0719 12/27/21  0739 12/26/21  1340   WBC  --  3.9* 4.9 4.7   HGB 11.3* 11.3* 10.7* 11.4*   MCV  --  107* 104* 102*   PLT  --  150 147* 153     Most Recent 3 BMP's:Recent Labs   Lab Test 01/04/22  0710 01/03/22  0654 01/02/22  1746 01/02/22  1344 01/02/22  1139 01/02/22  0808    141  --   --   --  142   POTASSIUM 3.6 3.8 4.3   < >  --  3.1*   CHLORIDE 108 110*  --   --   --  107   CO2 29 27  --   --   --  28   BUN 20 20  --   --   --  19   CR 1.00 0.87  --   --   --  0.96   ANIONGAP 4 4  --   --   --  7   ANG 9.6 8.8  --   --   --  8.6   * 98  --   --  155* 86    < > = values in this interval not displayed.     Most Recent 2 LFT's:Recent Labs   Lab Test 12/27/21  0739 12/26/21  1340   AST 19 73*   ALT 14 20   ALKPHOS 173* 193*   BILITOTAL 1.9* 1.7*     Most Recent 3 Troponin's:Recent Labs   Lab Test 11/28/20  1729 10/30/20  0909 11/15/16  1124   TROPI <0.015 0.016 <0.015  The 99th percentile for upper reference range is 0.045 ug/L.  Troponin values in   the range of 0.045 - 0.120 ug/L may be associated with risks of adverse   clinical events.       Most Recent 6 Bacteria Isolates From Any Culture (See EPIC Reports for Culture Details):No lab results found.  Most Recent TSH and T4:Recent Labs   Lab Test 09/11/19  0757   TSH 2.02     Most Recent Hemoglobin A1c:Recent Labs   Lab Test 11/08/21  1711   A1C 5.3     Most Recent 6 glucoses:Recent Labs   Lab Test 01/04/22  0710 01/03/22  0654 01/02/22  1139 01/02/22  0808 01/01/22  0534 12/31/21  0608   * 98 155* 86 81 94         ASSESSMENT/PLAN:  Acute on chronic HFrEF (LVEF 50% on December 27, 2021),  Ischemic  cardiomyopathy,  2-3+ MR and 2+ TR,  Chronic lower extremity venous stasis.  Patient was diuresed with IV furosemide while inpatient.  Patient is not on beta-blocker due to history of bradycardia.  PTA lisinopril was held due to soft blood pressures.  He currently weighs 167.4 LBS and appears fairly stable.  Plan:  Continue torsemide 20 mg p.o. daily  Continue potassium chloride 20 mg p.o. twice daily  Lymphedema consult has been placed  Monitor weights, leg edema, volume status  Monitor renal function and electrolytes (Next BMP is scheduled for January 7)  Follow-up with cardiology on January 12 as scheduled    Moderately dilated ascending aorta.  Measuring 4.4 cm on echo from December 27, 2021.  Plan:  Follow-up as outpatient    Permanent atrial fibrillation,  Bigeminy/PVCs.  Holter was ordered by cardiology for frequent runs of PVCs with bigeminy.  Patient is not on rate control medication due to history of bradycardia.  Heart rate is controlled.  Plan:  Complete Holter monitoring  Continue anticoagulation with apixaban 2.5 mg p.o. twice daily  Monitor heart rate  Follow-up with cardiology on January 12 as planned    CAD, s/p CABG in 1996,  Essential hypertension,  Dyslipidemia.  Appears stable.  Patient is not on beta-blocker due to history of bradycardia.  Plan:  Continue torsemide 20 mg p.o. daily and simvastatin 20 mg p.o. at bedtime  Patient is not on antiplatelet therapy likely due to treatment with apixaban  Monitor blood pressure and cardiac status    Chronic anemia.  Baseline Hgb 11-12.  Last hemoglobin was 11.3 on December 31.  Plan:  Continue PTA ferrous gluconate 324 mg p.o. daily  Monitor Hgb periodically    BPH,  History of prostate cancer, s/p cryoablation in 2016.  Plan:  Continue PTA tamsulosin 0.4 mg p.o. daily  Monitor for retention    History of PUD.  Plan:  Continue PTA omeprazole 40 mg p.o. daily    Rosacea.  PTA doxycycline was discontinued in the hospital.  Plan:  Monitor    Left shoulder  pain.  2 view x-ray on December 29 showed no evidence of acute fracture.  On the axillary view there appeared to be anterior subluxation of the humeral head without zhanna dislocation.  Plan:  Continue PRN acetaminophen and diclofenac topical    LENORA.  Patient is noncompliant with CPAP.    Cognitive impairment.  Patient clearly has some cognitive deficit.  Recent SLUMS score of 14/30 on December 28, 2021.  Plan:  Continue olanzapine 5 mg p.o. nightly as needed for agitation  Continue standard delirium precaution  Formal cognitive evaluation per OT    Physical deconditioning.  Plan:  Continue PT/OT evaluation and therapy    INCIDENTAL FINDINGS:  RLL pulmonary nodule.  Measuring 0.7 cm, noted incidentally on CT abdomen/pelvis on December 26, 2021  Plan:  Monitor as outpatient        Orders written by provider at facility:  None.        Recommendation by provider at facility:  Follow-up imaging for RLL pulmonary nodule in 6-12 months        Electronically signed by:  Kaitlynn Philippe MD                          Sincerely,        Kaitlynn Philippe MD

## 2022-01-07 NOTE — TELEPHONE ENCOUNTER
[facility] Nurse Triage Telephone Note    Provider: Chantel Bryson PA-C  Facility: [facility]   Facility Type:  TCU    Caller: Heydi  Call Back Number: [phone]    Allergies   Allergen Reactions     Blood Transfusion Related (Informational Only) Other (See Comments)     Patient has a history of a clinically significant antibody against RBC antigens.  A delay in compatible RBCs may occur.       Reason for call: Nurse reports pt has had an increase in episodes of wandering into other pt's rooms and becoming agitated, not wanting to leave. Other patients feel unsafe. PRN Zyprexa at night has been effective, however pt continues to have behavior during the day.    Verbal Order/Direction given by Provider: Schedule Zyprexa 5mg q HS and 5mg daily PRN for agitation    Provider giving Order:  Chantel Bryson PA-C    Verbal Order given to: Heydi Espinoza RN

## 2022-01-07 NOTE — TELEPHONE ENCOUNTER
Patient was evaluated by cardiology while inpatient for hyperkalemia and decompensated HF. PMH: frequent PVC's, permanent A. Fib with int pauses up to 2.6 seconds-asymptomatic, bradycardia, HFpEF, chronic lymphedema, mod-severe MR, mod TR, CD s/p CABG, dementia, LENORA-intolerant of CPAP, HTN, HLD. TTE 12/27/2021 demonstrates LVEF 50%, mildly reduced RV function, moderate to moderate severe MR, moderate TR, dilated IVC.  Ascending aorta 4.4 cm. IV diuresed and transitioned back to PTA Torsemide 20 mg daily. PTA Lisinopril discontinued. Discharged wearing a 48 hr Holter Monitor for PVC's, HR control and pauses. RN will call [facility] to confirm the follow up plan for patient with Care Coordinator. Patient has a scheduled F/U CORE OV on 1/12/21 at 1230 with FILEMON Juliano Caballero at our [place] Office.LETTY Rocha RN.              
Writer notes f/u appt as below was cancelled by pt's daughter. Will close this encounter. LETTY Rocha RN.  
I personally performed the service described in the documentation recorded by the scribe in my presence, and it accurately and completely records my words and actions.

## 2022-01-10 NOTE — PATIENT INSTRUCTIONS
January 10, 2022    Milo Serna  1940      ORDERS:    1. Discontinue current Zyprexa orders  2. Start Seroquel 25 mg q hs dx delirium  3. Zyprexa ODT 5 mg po bid prn dx agitation  4. Monitor PVR x 3 days, notify provider if > 350 ml  5. BMP 1/12/22 dx CKD      Chantel Bryson PA-C

## 2022-01-10 NOTE — PROGRESS NOTES
[facility] GERIATRIC SERVICES    Chief Complaint   Patient presents with     RECHECK       HPI:  Milo Serna is a 81 year old  (1940), who is being seen today for an episodic care visit at: [facility]  ([facility]) [[id number]].     Brief summary: Milo Serna is an 81-year-old male with a past medical history of HFrEF, atrial fibrillation, obstructive sleep apnea noncompliant with CPAP, lower extremity edema with stasis dermatitis, coronary artery disease status post CABG in 1997 and severe mitral regurgitation who was recently admitted to [facility] for evaluation of acute CHF exacerbation and anasarca.    He presented on 12/26 for evaluation of testicular swelling.  Labs are noted with hyperkalemia with potassium of 5.9.  N-terminal BNP elevated at 1800.  EKG with atrial fibrillation and controlled ventricular response.Chest x-ray with signs of pulmonary vascular congestion.  Head CT negative.  CT of the abdomen and pelvis with large amount of stool and anasarca.  He was admitted and initiated on IV diuretics.  His torsemide was increased from 10 mg daily to 20 mg daily.  Repeat TTE showed improved ejection fraction to 50% (previously 42%).  He developed recurrent hypokalemia and bigeminy.  Cardiology was consulted who recommended electrolyte replacement Holter monitor at discharge.  Hospital course was complicated by waxing and waning delirium.  In review of records it appears patient has had a decline in his cognitive status for the past couple of months.  His trazodone was discontinued and he was started on as needed Zyprexa at bedtime.  He was ultimately discharged to [facility]    Today's concern is: Luís has had increased wondering, confusion and agitation. Discussed with nursing and he is typically up at 3 am. Using Zyprexa bid with some affect. On exam Luís is unable to tell me where he is, year or month. States he has pain in his feet related to neuropathy. Denies chest pain or  "SOB.    Discussed with daughter, Stacia, via phone. Discussed his increased confusion. She notes he has had a general decline over the past several months but an abrupt change the past 1 month. He had recently moved into independent living and about 5 days later was hospitalized. She does note moments of clarity but has been having occasional hallucinations which are new. Discussed likely etiology of acute delirium on top of probable dementia. Will continue medication management and reorient as able.     Allergies, and PMH/PSH reviewed in [facility] today.    REVIEW OF SYSTEMS:  Limited secondary to cognitive impairment but today pt reports pain in his feet related to neuropathy. No chest pain or SOB    Objective:   BP (!) 143/86   Pulse 88   Temp 97.9  F (36.6  C)   Resp 16   Ht 1.702 m (5' 7.01\")   Wt 76.8 kg (169 lb 6.4 oz)   SpO2 95%   BMI 26.53 kg/m      Physical Exam  Constitutional:       General: He is not in acute distress.     Appearance: Normal appearance.   HENT:      Head: Normocephalic and atraumatic.   Eyes:      General: No scleral icterus.  Cardiovascular:      Rate and Rhythm: Normal rate and regular rhythm.      Heart sounds: No murmur heard.      Pulmonary:      Effort: Pulmonary effort is normal. No respiratory distress.      Breath sounds: No wheezing.   Musculoskeletal:      Comments: Bilateral lymphedema with chronic status dermatitis   Neurological:      General: No focal deficit present.      Mental Status: He is alert.      Cranial Nerves: No cranial nerve deficit.   Psychiatric:         Mood and Affect: Mood normal.         Thought Content: Thought content normal.          Recent labs in [facility] reviewed by me today.  and   Most Recent 3 CBC's:Recent Labs   Lab Test 12/31/21  2331 12/28/21  0719 12/27/21  0739 12/26/21  1340   WBC  --  3.9* 4.9 4.7   HGB 11.3* 11.3* 10.7* 11.4*   MCV  --  107* 104* 102*   PLT  --  150 147* 153     Most Recent 3 BMP's:Recent Labs   Lab Test 01/10/22  1202 " 01/04/22  0710 01/03/22  0654    141 141   POTASSIUM 4.3 3.6 3.8   CHLORIDE 107 108 110*   CO2 26 29 27   BUN 16 20 20   CR 0.91 1.00 0.87   ANIONGAP 6 4 4   ANG 9.9 9.6 8.8   GLC 88 113* 98       Assessment/Plan:  Acute HFrEF  Mod-Severe MR  Mod TR  Anasarca  Chronic Lymphedema: Presented to the hospital for evaluation of testicular swelling and found to have acute CHF exacerbation.  BNP 1800.  Chest x-ray with pulmonary vascular infiltrates.  Repeat TTE 12/26 with improved ejection fraction to 50%, mildly decreased right ventricular systolic function, mod/severe mitral regurgitation, mild tricuspid regurgitation, ascending aorta moderately dilated at 4.4 cm.  Diuresed with IV Lasix and transition to increased dose of torsemide at discharge  -Continue torsemide 20 mg daily  -Not on beta-blocker due to history of bradycardia.  ACE inhibitor discontinued during hospitalization due to electrolyte abnormalities and mild hypotension.  Consider resumption pending blood pressures  -Daily weights, notify provider if increased weight of 3 pounds in 24 hours or 5 pounds in 7 days, weights have been stable at approx 170lbs  -Lymphedema consult  -Follow-up with cardiology 1/12/2022    Chronic atrial fibrillation  Bigeminy/PVCs: History of atrial fibrillation chronically anticoagulated with warfarin 2.5 mg twice daily (cardiology notes indicate not on higher dose due to history of epistaxis in the past on 5 mg) during most recent hospitalization he was noted to have frequent runs of PVCs with bigeminy.  Cardiology was consulted.  They recommended stabilization of electrolytes and Holter monitor  -Not on rate controlling medications  -Continue Holter monitor.  Follow-up with cardiology as planned 1/12  -Continue apixaban at 2.5 mg twice daily, again not on higher doses due to history of epistaxis on 5 mg twice daily in the past    Coronary artery disease status post CABG 2007  -Continue simvastatin  -Not on aspirin due to  anticoagulation with apixaban  -Consider resumption of lisinopril pending blood pressure    Cognitive impairment, NOS  Acute Delerium: No formal diagnosis of dementia but notes indicate overall cognitive decline over the past few months.  Wife lives in memory care and patient lives in independent living.  Had issues with sundowning and increasing agitation at the hospital.  Slums 12/28 14/30.  Head CT showed no acute abnormalities.  He was started on as needed Zyprexa. Increasing wondering and agitation over the weekend. Suspect component of delerium on top of cognitive impairment  - Add Seroquel 25 mg nightly  - Continue Zyprexa 5 mg bid prn  -Continue melatonin at bedtime  -Standard delirium protocol  -OT assessment pending    History of prostate cancer  Urinary retention: Details of prostate cancer unclear.  Had urinary retention during hospital stay Biggs catheter was placed.  Biggs catheter ultimately removed with no recurrent retention.  -Given increased restless will monitor PVR x 3 days to assess retention as contributing factor  -Outpatient follow-up with urology as needed    FEN: Initially had hyperkalemia on admission which resolved.  Then became hypokalemic with diuresis.  Started on potassium 40 mEq twice daily  -Continue potassium supplementation  -Labs not done last week due to issue with lab. BMP today with stable K    Bilateral stasis dermatitis, chronic: No signs of secondary infection.  Wound nurse evaluated in the hospital  -Continue local wound care  -Lymphedema consult    Obstructive sleep apnea  -Patient noncompliant with CPAP    Orders:    1. Discontinue current Zyprexa orders  2. Start Seroquel 25 mg q hs dx delirium  3. Zyprexa ODT 5 mg po bid prn dx agitation  4. Monitor PVR x 3 days, notify provider if > 350 ml  5. BMP 1/12/22 dx CKD    A total 36 min were spent on this f/u visit. With > 50% spent on coordination of care including examining of the patient, coordinating with nursing and  discussing plan of care with patient's daughter via phone as patient currently is not able to participate in medical decision making process.. Further details as discussed in HPI.      Electronically signed by: Chantel Bryson PA-C

## 2022-01-12 NOTE — PROGRESS NOTES
[facility] GERIATRIC SERVICES    Chief Complaint   Patient presents with     RECHECK       HPI:  Milo Serna is a 81 year old  (1940), who is being seen today for an episodic care visit at: [facility]  ([facility]) [[id number]].     Brief summary:Milo Serna is an 81-year-old male with a past medical history of HFrEF, atrial fibrillation, obstructive sleep apnea noncompliant with CPAP, lower extremity edema with stasis dermatitis, coronary artery disease status post CABG in 1997 and severe mitral regurgitation who was recently admitted to [facility] for evaluation of acute CHF exacerbation and anasarca.    He presented on 12/26 for evaluation of testicular swelling.  Labs are noted with hyperkalemia with potassium of 5.9.  N-terminal BNP elevated at 1800.  EKG with atrial fibrillation and controlled ventricular response.Chest x-ray with signs of pulmonary vascular congestion.  Head CT negative.  CT of the abdomen and pelvis with large amount of stool and anasarca.  He was admitted and initiated on IV diuretics.  His torsemide was increased from 10 mg daily to 20 mg daily.  Repeat TTE showed improved ejection fraction to 50% (previously 42%).  He developed recurrent hypokalemia and bigeminy.  Cardiology was consulted who recommended electrolyte replacement Holter monitor at discharge.  Hospital course was complicated by waxing and waning delirium.  In review of records it appears patient has had a decline in his cognitive status for the past couple of months.  His trazodone was discontinued and he was started on as needed Zyprexa at bedtime.  He was ultimately discharged to [facility]     Today's concern is: Luís is seen today for follow up visit. Still not alert to place but knew year, month, and wife's name.Nursing notes mild improvement in wondering/sleep with adjustment of medications. Denies chest pain or SOB.     Allergies, and PMH/PSH reviewed in [facility] today.    REVIEW OF SYSTEMS:  Limited  "secondary to cognitive impairment but today pt reports no chest pain, SOB or neuropathy sx    Objective:   /68   Pulse 80   Temp 97.6  F (36.4  C)   Resp 18   Ht 1.702 m (5' 7.01\")   Wt 77.1 kg (170 lb)   SpO2 96%   BMI 26.62 kg/m      Physical Exam  Constitutional:       Appearance: Normal appearance. He is normal weight.   HENT:      Head: Normocephalic and atraumatic.   Eyes:      General: No scleral icterus.  Cardiovascular:      Rate and Rhythm: Normal rate and regular rhythm.      Heart sounds: No murmur heard.      Pulmonary:      Effort: Pulmonary effort is normal. No respiratory distress.      Breath sounds: Normal breath sounds. No rales.   Abdominal:      General: Abdomen is flat. There is no distension.      Palpations: Abdomen is soft.   Musculoskeletal:         General: Normal range of motion.      Comments: Bilateral LE edema.   Skin:     General: Skin is warm and dry.      Comments: Venous status dermatitis bilaterally. No warmth or tenderness   Neurological:      General: No focal deficit present.      Mental Status: He is alert. Mental status is at baseline.   Psychiatric:         Mood and Affect: Mood normal.          Labs done in SNF are in [facility]. Please refer to them using [email or web address]. and   Most Recent 3 CBC's:Recent Labs   Lab Test 12/31/21  2331 12/28/21  0719 12/27/21  0739 12/26/21  1340   WBC  --  3.9* 4.9 4.7   HGB 11.3* 11.3* 10.7* 11.4*   MCV  --  107* 104* 102*   PLT  --  150 147* 153     Most Recent 3 BMP's:Recent Labs   Lab Test 01/10/22  1202 01/04/22  0710 01/03/22  0654    141 141   POTASSIUM 4.3 3.6 3.8   CHLORIDE 107 108 110*   CO2 26 29 27   BUN 16 20 20   CR 0.91 1.00 0.87   ANIONGAP 6 4 4   ANG 9.9 9.6 8.8   GLC 88 113* 98       Assessment/Plan:  Chronic  HFrEF  Mod-Severe MR  Mod TR  Anasarca  Chronic Lymphedema: Presented to the hospital for evaluation of testicular swelling and found to have acute CHF exacerbation.  BNP 1800.  Chest " x-ray with pulmonary vascular infiltrates.  Repeat TTE 12/26 with improved ejection fraction to 50%, mildly decreased right ventricular systolic function, mod/severe mitral regurgitation, mild tricuspid regurgitation, ascending aorta moderately dilated at 4.4 cm.  Diuresed with IV Lasix and transition to increased dose of torsemide at discharge  -Continue torsemide 20 mg daily  -Not on beta-blocker due to history of bradycardia.  ACE inhibitor discontinued during hospitalization due to electrolyte abnormalities and mild hypotension.  Consider resumption pending blood pressures  -Daily weights, notify provider if increased weight of 3 pounds in 24 hours or 5 pounds in 7 days, weights have been stable at approx 170lbs  -Lymphedema consult  -Follow-up with cardiology     Chronic atrial fibrillation  Bigeminy/PVCs: History of atrial fibrillation chronically anticoagulated with warfarin 2.5 mg twice daily (cardiology notes indicate not on higher dose due to history of epistaxis in the past on 5 mg) during most recent hospitalization he was noted to have frequent runs of PVCs with bigeminy.  Cardiology was consulted.  They recommended stabilization of electrolytes and Holter monitor  -Not on rate controlling medications  -Continue Holter monitor.  Follow-up with cardiology as planned   -Continue apixaban at 2.5 mg twice daily, again not on higher doses due to history of epistaxis on 5 mg twice daily in the past    Coronary artery disease status post CABG 2007  -Continue simvastatin  -Not on aspirin due to anticoagulation with apixaban  -Consider resumption of lisinopril pending blood pressure    Cognitive impairment, NOS  Acute Delerium: No formal diagnosis of dementia but notes indicate overall cognitive decline over the past few months.  Wife lives in memory care and patient lives in independent living.  Had issues with sundowning and increasing agitation at the hospital.  Slums 12/28 14/30.  Head CT showed no acute  abnormalities.  He was started on as needed Zyprexa. Increasing wondering and agitation over the weekend. Suspect component of delerium on top of cognitive impairment  - Continue Seroquel 25 mg nightly  - Continue Zyprexa 5 mg bid prn  -Continue melatonin at bedtime  -Standard delirium protocol  -OT assessment pending    History of prostate cancer  Urinary retention: Details of prostate cancer unclear.  Had urinary retention during hospital stay Biggs catheter was placed.  Biggs catheter ultimately removed with no recurrent retention.  -Given increased restless will monitor PVR x 3 days to assess retention as contributing factor. Reviewed and all < 350cc  -Outpatient follow-up with urology as needed    FEN: Initially had hyperkalemia on admission which resolved.  Then became hypokalemic with diuresis.  Started on potassium 40 mEq twice daily  -Continue potassium supplementation  -BMP today with K stable at 3.9    Bilateral stasis dermatitis, chronic: No signs of secondary infection.  Wound nurse evaluated in the hospital  -Continue local wound care  -Lymphedema consult    Obstructive sleep apnea  -Patient noncompliant with CPAP    Orders:    Orders:  1. BMP 1/14/22 dx hypokalemia    Electronically signed by: Chantel Bryson PA-C

## 2022-01-12 NOTE — PATIENT INSTRUCTIONS
January 12, 2022    Milo Serna  1940      ORDERS:    1. BMP 1/17/22 dx hypokalemia    Chantel Bryson PA-C

## 2022-01-18 NOTE — LETTER
"    1/18/2022        RE: Milo Serna  [address] [place] [address]        [facility] GERIATRIC SERVICES DISCHARGE SUMMARY  PATIENT'S NAME: Milo Serna  YOB: 1940  MEDICAL RECORD NUMBER:  [id number]  Place of Service where encounter took place:  [facility]  ([facility]) [[id number]]    PRIMARY CARE PROVIDER AND CLINIC RESPONSIBLE AFTER TRANSFER:   Oscar Alves MD, [id number] [facility] / [facility] [address]    [facility] Provider     Transferring providers: DEMETRI Willingham CNP, Kaitlynn Philippe MD  Recent Hospitalization/ED:  [facility] stay 12/26/21 to 1/4/22.  Date of SNF Admission: January 04, 2022  Date of SNF (anticipated) Discharge: January 19, 2022  Discharged to: new assisted living for patient [facility]  Cognitive Scores: SLUMS: 10/30; CPT 2.5/5.6  Physical Function: Ambulating 150  ft with walker; max assist for dressing and toileting    CODE STATUS/ADVANCE DIRECTIVES DISCUSSION:  Full Code  ALLERGIES: Blood transfusion related (informational only)    NURSING FACILITY COURSE     From primary U provider notes, \"Brief summary:Milo Serna is an 81-year-old male with a past medical history of HFrEF, atrial fibrillation, obstructive sleep apnea noncompliant with CPAP, lower extremity edema with stasis dermatitis, coronary artery disease status post CABG in 1997 and severe mitral regurgitation who was recently admitted to [facility] for evaluation of acute CHF exacerbation and anasarca.     He presented on 12/26 for evaluation of testicular swelling.  Labs are noted with hyperkalemia with potassium of 5.9.  N-terminal BNP elevated at 1800.  EKG with atrial fibrillation and controlled ventricular response.Chest x-ray with signs of pulmonary vascular congestion.  Head CT negative.  CT of the abdomen and pelvis with large amount of stool and anasarca.  He was admitted and initiated on IV diuretics.  His torsemide was " "increased from 10 mg daily to 20 mg daily.  Repeat TTE showed improved ejection fraction to 50% (previously 42%).  He developed recurrent hypokalemia and bigeminy.  Cardiology was consulted who recommended electrolyte replacement Holter monitor at discharge.  Hospital course was complicated by waxing and waning delirium.  In review of records it appears patient has had a decline in his cognitive status for the past couple of months.  His trazodone was discontinued and he was started on as needed Zyprexa at bedtime.  He was ultimately discharged to TCU.\"    Summary of nursing facility stay:   While in TCU he made progress with therapy, as noted above. He did eventually plateau and will be discharged to [facility]. Home care services ordered to continue therapy at discharge. He should follow up with PCP in 1 week.     Specific problems in TCU:  Chronic  HFrEF  Mod-Severe MR  Mod TR  Anasarca  Chronic Lymphedema: Presented to the hospital for evaluation of testicular swelling and found to have acute CHF exacerbation.  BNP 1800.  Chest x-ray with pulmonary vascular infiltrates.  Repeat TTE 12/26 with improved ejection fraction to 50%, mildly decreased right ventricular systolic function, mod/severe mitral regurgitation, mild tricuspid regurgitation, ascending aorta moderately dilated at 4.4 cm.  Diuresed with IV Lasix and transition to increased dose of torsemide at discharge  -Continue torsemide 20 mg daily  -Not on beta-blocker due to history of bradycardia.  ACE inhibitor discontinued during hospitalization due to electrolyte abnormalities and mild hypotension.  Consider resumption pending blood pressures at follow up with PCP  -Daily weights, notify provider if increased weight of 3 pounds in 24 hours or 5 pounds in 7 days, weights have been stable at approx 170lbs during TCU stay  -Follow-up with cardiology      Chronic atrial fibrillation  Bigeminy/PVCs: History of atrial fibrillation chronically anticoagulated with " warfarin 2.5 mg twice daily (cardiology notes indicate not on higher dose due to history of epistaxis in the past on 5 mg) during most recent hospitalization he was noted to have frequent runs of PVCs with bigeminy.  Cardiology was consulted.  They recommended stabilization of electrolytes and Holter monitor  -Not on rate controlling medications  -Continue Holter monitor.  Follow-up with cardiology as planned   -Continue apixaban at 2.5 mg twice daily, again not on higher doses due to history of epistaxis on 5 mg twice daily in the past  - HR in TCU has been controlled (60-90s)     Coronary artery disease status post CABG 2007  Hyperlipidemia  -Continue simvastatin  -Not on aspirin due to anticoagulation with apixaban  -Consider resumption of lisinopril pending blood pressure at follow up with PCP     Cognitive impairment  Acute Delerium: No formal diagnosis of dementia but notes indicate overall cognitive decline over the past few months.  Wife lives in memory care and patient lives in independent living.  Had issues with sundowning and increasing agitation at the hospital. Head CT showed no acute abnormalities.  He was started on as needed Zyprexa. While in TCU had increasing wandering and agitation, which was suspected to be component of delerium on top of cognitive impairment (SLUMS: 10/30; CPT 2.5/5.6 in TCU)  -nursing have been utilizing PRN zyprexa at times  - Continue Seroquel 25 mg nightly  - Continue Zyprexa 5 mg bid prn  -Continue melatonin at bedtime  -Standard delirium protocol     History of prostate cancer  Urinary retention: Details of prostate cancer unclear.  Had urinary retention during hospital stay Biggs catheter was placed.  Biggs catheter ultimately removed with no recurrent retention.  -Given increased restless PVR were monitored in TCU and all < 350cc.  -He is now on toileting program which should [facility] at [facility]  -Outpatient follow-up with urology as needed     FEN: Initially had  hyperkalemia on admission which resolved.  Then became hypokalemic with diuresis.  Started on potassium 20 mEq twice daily  -potassium level stable at last check.  Potassium   Date Value Ref Range Status   01/17/2022 3.8 3.4 - 5.3 mmol/L Final   07/08/2021 4.0 3.4 - 5.3 mmol/L Final     -Continue potassium supplementation    Bilateral stasis dermatitis, chronic: No signs of secondary infection or open areas noted today.  Wound nurse evaluated in the hospital  -Monitor closely for s/sx of infection  -Continue wound care as ordered below     Obstructive sleep apnea  -Patient noncompliant with CPAP    Discharge Medications:  Current Outpatient Medications   Medication Sig Dispense Refill     acetaminophen (TYLENOL) 500 MG tablet Take 1 tablet (500 mg) by mouth every 6 hours as needed for mild pain       apixaban ANTICOAGULANT (ELIQUIS) 2.5 MG tablet Take 1 tablet (2.5 mg) by mouth 2 times daily 200 tablet 4     diclofenac (VOLTAREN) 1 % topical gel Apply 4 gramsto area qid prn 100 g 11     ferrous gluconate (FERGON) 324 (38 Fe) MG tablet Take 1 tablet (324 mg) by mouth daily (with breakfast) 90 tablet 3     MELATONIN PO Take 5 mg by mouth At Bedtime        multivitamin, therapeutic (THERA-VIT) TABS tablet Take 1 tablet by mouth daily       OLANZapine (ZYPREXA) 5 MG tablet Take 1 tablet (5 mg) by mouth 2 times daily as needed (agitation)       omeprazole (PRILOSEC) 40 MG DR capsule Take 1 capsule (40 mg) by mouth daily 90 capsule 3     potassium chloride ER (K-TAB) 20 MEQ CR tablet Take 1 tablet (20 mEq) by mouth 2 times daily 200 tablet 3     QUEtiapine (SEROQUEL) 25 MG tablet Take 1 tablet (25 mg) by mouth At Bedtime       simvastatin (ZOCOR) 20 MG tablet Take 1 tablet (20 mg) by mouth At Bedtime 100 tablet 4     tamsulosin (FLOMAX) 0.4 MG capsule Take 1 capsule (0.4 mg) by mouth daily 90 capsule 3     torsemide (DEMADEX) 20 MG tablet Take 1 tablet (20 mg) by mouth daily 30 tablet 0     UREA 20 INTENSIVE HYDRATING 20  % external cream Apply topically as needed Apply to right foot daily 120 g 11     ACE/ARB/ARNI NOT PRESCRIBED (INTENTIONAL) Please choose reason not prescribed from choices below.       BETA BLOCKER NOT PRESCRIBED (INTENTIONAL) Please choose reason not prescribed from choices below.        Controlled medications:   Medication: seroquel  And zyprexa; Ok to send remaining tabs with patient at the time of discharge to [facility]     Past Medical History:   Past Medical History:   Diagnosis Date     Actinic keratosis 10/12/2019     Anemia, unspecified 11/08/2016     Atrial fibrillation (H) 05/27/2015     BPH (benign prostatic hyperplasia) 01/25/2012     CAD (coronary artery disease) 04/04/2012    CAB 1996 following MI (at [facility])     Closed bimalleolar fracture of left ankle with routine healing 04/05/2019     Closed fracture of metatarsal bone 04/04/2012     Dilated aortic root (H) 05/26/2016     Drug-induced erectile dysfunction 10/02/2015     Elevated blood sugar 11/08/2016     Elevated PSA 09/19/2013     Epistaxis 09/09/2019     Essential hypertension with goal blood pressure less than 140/90 09/22/2016     Fall 09/06/2019     Functional diarrhea 10/12/2019     Gallbladder polyp 05/01/2018     Gout      HAV (hallux abducto valgus) 04/04/2012     Hematoma 09/09/2019     Hernia, abdominal      History of prostate cancer 06/15/2016     HTN, goal below 150/90 04/20/2017     Hypokalemia 09/12/2019     Iron deficiency anemia secondary to inadequate dietary iron intake 11/15/2016     Ischemic cardiomyopathy      Low blood pressure reading 10/17/2016     Lumbago     had degendisc dz on MRI 7/07     Memory loss 06/18/2019     Microalbuminuria 06/04/2020    X1     Mixed hyperlipidemia      Mumps      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     Nonrheumatic mitral valve regurgitation 10/28/2019     OA (osteoarthritis) 04/04/2012     Old myocardial infarction 04/04/2012     LENORA (obstructive sleep apnea) 10/28/2019     LENORA (obstructive sleep  "apnea)      Other viral warts 09/06/2019     Palpitations      Pes planus 04/04/2012     Petechiae 06/18/2019     Prostate cancer (H) 05/26/2016     PUD (peptic ulcer disease) 04/04/2012     Pulmonary hypertension (H) 09/25/2017     PVC's (premature ventricular contractions)      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     Rosacea 01/15/2008     Stasis dermatitis      Stasis dermatitis of both legs 05/13/2021     Stress due to spouse with dementia 06/18/2019     Thrombocytopenia (H) 04/21/2017     Unspecified hyperplasia of prostate with urinary obstruction and other lower urinary tract symptoms (LUTS)     better on avodart & hytrin     Venous stasis 04/04/2012     Physical Exam:   Vitals: /70   Pulse 65   Temp 97.5  F (36.4  C)   Resp 17   Ht 1.702 m (5' 7.01\")   Wt 76.8 kg (169 lb 6.4 oz)   SpO2 98%   BMI 26.53 kg/m    BMI= Body mass index is 26.53 kg/m .  GENERAL APPEARANCE:  Alert, in NAD  HEENT: normocephalic, moist mucous membranes, nose without drainage or crusting  RESP:  respiratory effort normal, no respiratory distress, Lung sounds clear, patient is on RA  CV: auscultation of heart done, rate and rhythm regular.  ABDOMEN: + bowel sounds, soft, nontender, no grimacing or guarding with palpation.  M/S:   Gait and station abnormal: uses walker for ambulation; chronic lower extremity lymphedema  SKIN:  Inspection and palpation of skin and subcutaneous tissue: skin warm, dry without rashes; with venous stasis to lower extremities bilaterally- no open areas seen today and no signs of infection  NEURO: cranial nerves 2-12 grossly intact and at patient's baseline; moves extremities freely  PSYCH: oriented x self, insight and judgement impaired, memory impaired, affect and mood ok    SNF labs: Labs done in SNF are in [facility] EPIC. Please refer to them using [facility] Everywhere. and Recent labs in EPIC reviewed by me today.     DISCHARGE PLAN:    Follow up labs: BMP, CBC due 1/24 to be drawn by home care with results " to PCP    Medical Follow Up:      Follow up with primary care provider in 1 week   Follow up with specialist cardiology per recommendations, urology per previous recommendations      Discharge Services: Home Care:  Occupational Therapy, Physical Therapy, Registered Nurse, Home Health Aide and From:  [facility]    Discharge Instructions :     Weight bearing restrictions:  Weight bearing as tolerated.      Continue to follow your diet:  Heart healthy, low sodium diet    Toilet program: upon waking up, before each meal, at bedtime, and once overnight     Weigh yourself daily in the morning and keep a record. Call your primary clinic: a) if you are more short of breath, or b) if your weight changes more than 2 pounds in one day or more than 5 pounds in one week.     Wound care :     to lower extremities:    Twice daily: 1) Wash with soap  And water and pat dry. 2) Apply sween cream to legs. 3) Leave open to air. If start weeping cover with ABD and wrap in kerlix. 4) Float heels with pillow under calves    And upper left arm wound: every 3 days 1) Cleanse with wound cleanser and dry. 2) Cover with adaptic or vaseline gauze 3) secure with mepilex 4x4 or foam dressing    24-hour supervision is recommended for safety.     TOTAL DISCHARGE TIME:   Greater than 30 minutes  Electronically signed by:  DEMETRI Willingham CNP     Home care Face to Face documentation done in [facility] attached to Home care orders for [facility].                   Sincerely,        DEMETRI Willingham CNP

## 2022-01-18 NOTE — PROGRESS NOTES
"[facility] GERIATRIC SERVICES DISCHARGE SUMMARY  PATIENT'S NAME: Milo Serna  YOB: 1940  MEDICAL RECORD NUMBER:  [id number]  Place of Service where encounter took place:  [facility] ([facility]  ([facility]) [[id number]]    PRIMARY CARE PROVIDER AND CLINIC RESPONSIBLE AFTER TRANSFER:   Oscar Alves MD, [id number] [facility] [id number]    [facility] Provider     Transferring providers: DEMETRI Willingham CNP, Kaitlynn Philippe MD  Recent Hospitalization/ED:  [facility] Hospital stay 12/26/21 to 1/4/22.  Date of SNF Admission: January 04, 2022  Date of SNF (anticipated) Discharge: January 19, 2022  Discharged to: new assisted living for patient [facility]  Cognitive Scores: SLUMS: 10/30; CPT 2.5/5.6  Physical Function: Ambulating 150  ft with walker; max assist for dressing and toileting    CODE STATUS/ADVANCE DIRECTIVES DISCUSSION:  Full Code  ALLERGIES: Blood transfusion related (informational only)    NURSING FACILITY COURSE     From primary U provider notes, \"Brief summary:Milo Serna is an 81-year-old male with a past medical history of HFrEF, atrial fibrillation, obstructive sleep apnea noncompliant with CPAP, lower extremity edema with stasis dermatitis, coronary artery disease status post CABG in 1997 and severe mitral regurgitation who was recently admitted to [facility] for evaluation of acute CHF exacerbation and anasarca.     He presented on 12/26 for evaluation of testicular swelling.  Labs are noted with hyperkalemia with potassium of 5.9.  N-terminal BNP elevated at 1800.  EKG with atrial fibrillation and controlled ventricular response.Chest x-ray with signs of pulmonary vascular congestion.  Head CT negative.  CT of the abdomen and pelvis with large amount of stool and anasarca.  He was admitted and initiated on IV diuretics.  His torsemide was increased from 10 mg daily to 20 mg daily.  Repeat TTE showed improved ejection fraction to 50% " "(previously 42%).  He developed recurrent hypokalemia and bigeminy.  Cardiology was consulted who recommended electrolyte replacement Holter monitor at discharge.  Hospital course was complicated by waxing and waning delirium.  In review of records it appears patient has had a decline in his cognitive status for the past couple of months.  His trazodone was discontinued and he was started on as needed Zyprexa at bedtime.  He was ultimately discharged to TCU.\"    Summary of nursing facility stay:   While in TCU he made progress with therapy, as noted above. He did eventually plateau and will be discharged to [facility]. Home care services ordered to continue therapy at discharge. He should follow up with PCP in 1 week.     Specific problems in TCU:  Chronic  HFrEF  Mod-Severe MR  Mod TR  Anasarca  Chronic Lymphedema: Presented to the hospital for evaluation of testicular swelling and found to have acute CHF exacerbation.  BNP 1800.  Chest x-ray with pulmonary vascular infiltrates.  Repeat TTE 12/26 with improved ejection fraction to 50%, mildly decreased right ventricular systolic function, mod/severe mitral regurgitation, mild tricuspid regurgitation, ascending aorta moderately dilated at 4.4 cm.  Diuresed with IV Lasix and transition to increased dose of torsemide at discharge  -Continue torsemide 20 mg daily  -Not on beta-blocker due to history of bradycardia.  ACE inhibitor discontinued during hospitalization due to electrolyte abnormalities and mild hypotension.  Consider resumption pending blood pressures at follow up with PCP  -Daily weights, notify provider if increased weight of 3 pounds in 24 hours or 5 pounds in 7 days, weights have been stable at approx 170lbs during TCU stay  -Follow-up with cardiology      Chronic atrial fibrillation  Bigeminy/PVCs: History of atrial fibrillation chronically anticoagulated with warfarin 2.5 mg twice daily (cardiology notes indicate not on higher dose due to history of " epistaxis in the past on 5 mg) during most recent hospitalization he was noted to have frequent runs of PVCs with bigeminy.  Cardiology was consulted.  They recommended stabilization of electrolytes and Holter monitor  -Not on rate controlling medications  -Continue Holter monitor.  Follow-up with cardiology as planned   -Continue apixaban at 2.5 mg twice daily, again not on higher doses due to history of epistaxis on 5 mg twice daily in the past  - HR in TCU has been controlled (60-90s)     Coronary artery disease status post CABG 2007  Hyperlipidemia  -Continue simvastatin  -Not on aspirin due to anticoagulation with apixaban  -Consider resumption of lisinopril pending blood pressure at follow up with PCP     Dementia with behavioral disturbance   Acute Delerium: No history of formal diagnosis of dementia but notes indicate overall cognitive decline over the past few months.  Prior to admission wife lives in memory care and he lived in independent living.  Had issues with sundowning and increasing agitation at the hospital. Head CT showed no acute abnormalities.  He was started on as needed Zyprexa. While in TCU had increasing wandering and agitation, which was suspected to be component of delerium on top of cognitive impairment . Cognitive scores from TCU stay meet criteria for dementia.(SLUMS: 10/30; CPT 2.5/5.6 in TCU)  -nursing have been utilizing PRN zyprexa at times  - Continue Seroquel 25 mg nightly  - Continue Zyprexa 5 mg bid prn  -Continue melatonin at bedtime  -Standard delirium protocol     History of prostate cancer  Urinary retention: Details of prostate cancer unclear.  Had urinary retention during hospital stay Biggs catheter was placed.  Biggs catheter ultimately removed with no recurrent retention.  -Given increased restless PVR were monitored in TCU and all < 350cc.  -He is now on toileting program which should continue at [facility]  -Outpatient follow-up with urology as needed     FEN: Initially had  hyperkalemia on admission which resolved.  Then became hypokalemic with diuresis.  Started on potassium 20 mEq twice daily  -potassium level stable at last check.  Potassium   Date Value Ref Range Status   01/17/2022 3.8 3.4 - 5.3 mmol/L Final   07/08/2021 4.0 3.4 - 5.3 mmol/L Final     -Continue potassium supplementation    Bilateral stasis dermatitis, chronic: No signs of secondary infection or open areas noted today.  Wound nurse evaluated in the hospital  -Monitor closely for s/sx of infection  -Continue wound care as ordered below     Obstructive sleep apnea  -Patient noncompliant with CPAP    Discharge Medications:  Current Outpatient Medications   Medication Sig Dispense Refill     acetaminophen (TYLENOL) 500 MG tablet Take 1 tablet (500 mg) by mouth every 6 hours as needed for mild pain       apixaban ANTICOAGULANT (ELIQUIS) 2.5 MG tablet Take 1 tablet (2.5 mg) by mouth 2 times daily 200 tablet 4     diclofenac (VOLTAREN) 1 % topical gel Apply 4 gramsto area qid prn 100 g 11     ferrous gluconate (FERGON) 324 (38 Fe) MG tablet Take 1 tablet (324 mg) by mouth daily (with breakfast) 90 tablet 3     MELATONIN PO Take 5 mg by mouth At Bedtime        multivitamin, therapeutic (THERA-VIT) TABS tablet Take 1 tablet by mouth daily       OLANZapine (ZYPREXA) 5 MG tablet Take 1 tablet (5 mg) by mouth 2 times daily as needed (agitation)       omeprazole (PRILOSEC) 40 MG DR capsule Take 1 capsule (40 mg) by mouth daily 90 capsule 3     potassium chloride ER (K-TAB) 20 MEQ CR tablet Take 1 tablet (20 mEq) by mouth 2 times daily 200 tablet 3     QUEtiapine (SEROQUEL) 25 MG tablet Take 1 tablet (25 mg) by mouth At Bedtime       simvastatin (ZOCOR) 20 MG tablet Take 1 tablet (20 mg) by mouth At Bedtime 100 tablet 4     tamsulosin (FLOMAX) 0.4 MG capsule Take 1 capsule (0.4 mg) by mouth daily 90 capsule 3     torsemide (DEMADEX) 20 MG tablet Take 1 tablet (20 mg) by mouth daily 30 tablet 0     UREA 20 INTENSIVE HYDRATING 20  % external cream Apply topically as needed Apply to right foot daily 120 g 11     ACE/ARB/ARNI NOT PRESCRIBED (INTENTIONAL) Please choose reason not prescribed from choices below.       BETA BLOCKER NOT PRESCRIBED (INTENTIONAL) Please choose reason not prescribed from choices below.        Controlled medications:   Medication: seroquel  And zyprexa; Ok to send remaining tabs with patient at the time of discharge to [facility]     Past Medical History:   Past Medical History:   Diagnosis Date     Actinic keratosis 10/12/2019     Anemia, unspecified 11/08/2016     Atrial fibrillation (H) 05/27/2015     BPH (benign prostatic hyperplasia) 01/25/2012     CAD (coronary artery disease) 04/04/2012    CAB 1996 following MI (at [facility])     Closed bimalleolar fracture of left ankle with routine healing 04/05/2019     Closed fracture of metatarsal bone 04/04/2012     Dilated aortic root (H) 05/26/2016     Drug-induced erectile dysfunction 10/02/2015     Elevated blood sugar 11/08/2016     Elevated PSA 09/19/2013     Epistaxis 09/09/2019     Essential hypertension with goal blood pressure less than 140/90 09/22/2016     Fall 09/06/2019     Functional diarrhea 10/12/2019     Gallbladder polyp 05/01/2018     Gout      HAV (hallux abducto valgus) 04/04/2012     Hematoma 09/09/2019     Hernia, abdominal      History of prostate cancer 06/15/2016     HTN, goal below 150/90 04/20/2017     Hypokalemia 09/12/2019     Iron deficiency anemia secondary to inadequate dietary iron intake 11/15/2016     Ischemic cardiomyopathy      Low blood pressure reading 10/17/2016     Lumbago     had degendisc dz on MRI 7/07     Memory loss 06/18/2019     Microalbuminuria 06/04/2020    X1     Mixed hyperlipidemia      Mumps      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     Nonrheumatic mitral valve regurgitation 10/28/2019     OA (osteoarthritis) 04/04/2012     Old myocardial infarction 04/04/2012     LENORA (obstructive sleep apnea) 10/28/2019     LENORA (obstructive sleep  "apnea)      Other viral warts 09/06/2019     Palpitations      Pes planus 04/04/2012     Petechiae 06/18/2019     Prostate cancer (H) 05/26/2016     PUD (peptic ulcer disease) 04/04/2012     Pulmonary hypertension (H) 09/25/2017     PVC's (premature ventricular contractions)      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     Rosacea 01/15/2008     Stasis dermatitis      Stasis dermatitis of both legs 05/13/2021     Stress due to spouse with dementia 06/18/2019     Thrombocytopenia (H) 04/21/2017     Unspecified hyperplasia of prostate with urinary obstruction and other lower urinary tract symptoms (LUTS)     better on avodart & hytrin     Venous stasis 04/04/2012     Physical Exam:   Vitals: /70   Pulse 65   Temp 97.5  F (36.4  C)   Resp 17   Ht 1.702 m (5' 7.01\")   Wt 76.8 kg (169 lb 6.4 oz)   SpO2 98%   BMI 26.53 kg/m    BMI= Body mass index is 26.53 kg/m .  GENERAL APPEARANCE:  Alert, in NAD  HEENT: normocephalic, moist mucous membranes, nose without drainage or crusting  RESP:  respiratory effort normal, no respiratory distress, Lung sounds clear, patient is on RA  CV: auscultation of heart done, rate and rhythm regular.  ABDOMEN: + bowel sounds, soft, nontender, no grimacing or guarding with palpation.  M/S:   Gait and station abnormal: uses walker for ambulation; chronic lower extremity lymphedema  SKIN:  Inspection and palpation of skin and subcutaneous tissue: skin warm, dry without rashes; with venous stasis to lower extremities bilaterally- no open areas seen today and no signs of infection  NEURO: cranial nerves 2-12 grossly intact and at patient's baseline; moves extremities freely  PSYCH: oriented x self, insight and judgement impaired, memory impaired, affect and mood ok    SNF labs: Labs done in SNF are in [facility] EPIC. Please refer to them using [facility] Everywhere. and Recent labs in EPIC reviewed by me today.     DISCHARGE PLAN:    Follow up labs: BMP, CBC due 1/24 to be drawn by home care with results " to PCP    Medical Follow Up:      Follow up with primary care provider in 1 week   Follow up with specialist cardiology per recommendations, urology per previous recommendations      Discharge Services: Home Care:  Occupational Therapy, Physical Therapy, Registered Nurse, Home Health Aide and From:  [facility]    Discharge Instructions :     Weight bearing restrictions:  Weight bearing as tolerated.      Continue to follow your diet:  Heart healthy, low sodium diet    Toilet program: upon waking up, before each meal, at bedtime, and once overnight     Weigh yourself daily in the morning and keep a record. Call your primary clinic: a) if you are more short of breath, or b) if your weight changes more than 2 pounds in one day or more than 5 pounds in one week.     Wound care :     to lower extremities:    Twice daily: 1) Wash with soap  And water and pat dry. 2) Apply sween cream to legs. 3) Leave open to air. If start weeping cover with ABD and wrap in kerlix. 4) Float heels with pillow under calves    And upper left arm wound: every 3 days 1) Cleanse with wound cleanser and dry. 2) Cover with adaptic or vaseline gauze 3) secure with mepilex 4x4 or foam dressing    24-hour supervision is recommended for safety.     TOTAL DISCHARGE TIME:   Greater than 30 minutes  Electronically signed by:  DEMETRI Willingham CNP     Home care Face to Face documentation done in [facility] attached to Home care orders for [facility].

## 2022-01-18 NOTE — PATIENT INSTRUCTIONS
[facility] Discharge Orders    Name: Milo Serna  : 1940  Planned Discharge Date: 22  Discharged to: [facility]    DISCHARGE PLAN:    Follow up labs: BMP, CBC due  to be drawn by home care with results to PCP    Medical Follow Up:                 Follow up with primary care provider in 1 week              Follow up with specialist cardiology per recommendations, urology per previous recommendations       Discharge Services: Home Care:  Occupational Therapy, Physical Therapy, Registered Nurse, Home Health Aide and From:  [facility]    Discharge Instructions :   ? Weight bearing restrictions:  Weight bearing as tolerated.    ? Continue to follow your diet:  Heart healthy, low sodium diet  ? Toilet program: upon waking up, before each meal, at bedtime, and once overnight   ? Weigh yourself daily in the morning and keep a record. Call your primary clinic: a) if you are more short of breath, or b) if your weight changes more than 2 pounds in one day or more than 5 pounds in one week.   ? Wound care :     to lower extremities:    Twice daily: 1) Wash with soap  And water and pat dry. 2) Apply sween cream to legs. 3) Leave open to air. If start weeping cover with ABD and wrap in kerlix. 4) Float heels with pillow under calves    And upper left arm wound: every 3 days 1) Cleanse with wound cleanser and dry. 2) Cover with adaptic or vaseline gauze 3) secure with mepilex 4x4 or foam dressing  ? 24-hour supervision is recommended for safety.       DISCHARGE MEDICATIONS:  The patient s pharmacy is authorized to dispense a 30-day supply of medications. Refill requests should be directed to the primary provider, Oscar Alves   Medication: seroquel  And zyprexa; Ok to send remaining tabs with patient at the time of discharge to [facility]  Current Outpatient Medications   Medication Sig Dispense Refill     acetaminophen (TYLENOL) 500 MG tablet Take 1 tablet (500 mg) by mouth  every 6 hours as needed for mild pain       apixaban ANTICOAGULANT (ELIQUIS) 2.5 MG tablet Take 1 tablet (2.5 mg) by mouth 2 times daily 200 tablet 4     diclofenac (VOLTAREN) 1 % topical gel Apply 4 gramsto area qid prn 100 g 11     ferrous gluconate (FERGON) 324 (38 Fe) MG tablet Take 1 tablet (324 mg) by mouth daily (with breakfast) 90 tablet 3     MELATONIN PO Take 5 mg by mouth At Bedtime        multivitamin, therapeutic (THERA-VIT) TABS tablet Take 1 tablet by mouth daily       OLANZapine (ZYPREXA) 5 MG tablet Take 1 tablet (5 mg) by mouth 2 times daily as needed (agitation)       omeprazole (PRILOSEC) 40 MG DR capsule Take 1 capsule (40 mg) by mouth daily 90 capsule 3     potassium chloride ER (K-TAB) 20 MEQ CR tablet Take 1 tablet (20 mEq) by mouth 2 times daily 200 tablet 3     QUEtiapine (SEROQUEL) 25 MG tablet Take 1 tablet (25 mg) by mouth At Bedtime       simvastatin (ZOCOR) 20 MG tablet Take 1 tablet (20 mg) by mouth At Bedtime 100 tablet 4     tamsulosin (FLOMAX) 0.4 MG capsule Take 1 capsule (0.4 mg) by mouth daily 90 capsule 3     torsemide (DEMADEX) 20 MG tablet Take 1 tablet (20 mg) by mouth daily 30 tablet 0     UREA 20 INTENSIVE HYDRATING 20 % external cream Apply topically as needed Apply to right foot daily 120 g 11     ACE/ARB/ARNI NOT PRESCRIBED (INTENTIONAL) Please choose reason not prescribed from choices below.       BETA BLOCKER NOT PRESCRIBED (INTENTIONAL) Please choose reason not prescribed from choices below.       DEMETRI Willingham CNP  This document was electronically signed on January 18, 2022

## 2022-01-18 NOTE — Clinical Note
Plan is for patient to discharge from TCU tomorrow to memory care unit. He should follow up in 1 week. Home care services have been arranged and will draw labs next week. Please let me know if you have any questions.   Bushra Sanchez CNP  [facility]

## 2022-01-19 NOTE — PROGRESS NOTES
Orders placed for C[facility] FILEMON and MD Follow-up. Messaged scheduling to set up appts with daughter.     Marga Roberson RN 11:47 AM 01/19/22

## 2022-01-20 NOTE — PROGRESS NOTES
Clinic Care Coordination Contact    Clinic Care Coordination Contact  OUTREACH    Referral Information:  Referral Source: Focused [employer]    Primary Diagnosis: CHF    No chief complaint on file.       [facility] Utilization:   Clinic Utilization  Difficulty keeping appointments:: No  Compliance Concerns: No  No-Show Concerns: No  No PCP office visit in Past Year: No  Utilization    Hospital Admissions  1             ED Visits  1             No Show Count (past year)  0                Current as of: 1/20/2022  9:55 AM              Clinical Concerns:  Current Medical Concerns:  Patient was at [facility] 12/26/21-1/4/22 due to acute on chronic HFrEF.  Patient was at [facility] 1/4/22-1/19/22.  Patient discharged to [facility].    Writer spoke with patient's daughter Stacia (consent to communicate on file).  Writer offered to schedule post-discharge TCU appointment with PCP for patient.  Stacia declined, stating she was concerned with taking him out of the memory care unit, as the patient had multiple changes in his scenery lately.  Stacia was informed of virtual visit options.  Stacia declined at this time stating she is exploring having patient receive his care from the provider service at [facility].  Stacia states she will call if she has any questions.    Patient Active Problem List   Diagnosis     Rosacea     Hyperlipidemia LDL goal <100     Neuropathy of foot     PUD (peptic ulcer disease)     CAD (coronary artery disease)     Rhinophyma     PVC's (premature ventricular contractions)     Chronic atrial fibrillation (H)     Thoracic aortic ectasia (H)     Anemia     Gallbladder polyp     Cervicalgia     Stress due to spouse with dementia     Fall     Chronic combined systolic and diastolic congestive heart failure (H)     Actinic keratosis     LENORA (obstructive sleep apnea)     Nonrheumatic mitral valve regurgitation     Primary osteoarthritis involving multiple joints     Right  heart failure with reduced right ventricular function (H)     Prostate cancer (H)     Microalbuminuria     Other closed fracture of eighth thoracic vertebra, initial encounter (H)     Thoracic spine fracture (H)     Abdominal pain, epigastric     Exposure to COVID-19 virus     Drug-induced constipation     Stasis dermatitis of both legs     Ischemic cardiomyopathy     Elevated glucose     Need for prophylactic vaccination and inoculation against influenza     Acute on chronic systolic congestive heart failure (H)     Hyperkalemia     Chronic anticoagulation     Congestive heart failure, unspecified HF chronicity, unspecified heart failure type (H)     Insomnia        Current Behavioral Concerns: Patient in memory care due to dementia with behavioral disturbance.    Education Provided to patient: Writer educated Stacia on need for post-TCU discharge appointment with PCP and of Care Coordination services.       Pain  Pain (GOAL):: No  Health Maintenance Reviewed:  Due/Overdue    Health Maintenance Due   Topic Date Due     PHQ-2  01/01/2022     FALL RISK ASSESSMENT  01/13/2022     MEDICARE ANNUAL WELLNESS VISIT  01/13/2022      Clinical Pathway: None    Medication Management:  Medication review status: n/a, patient's medications are managed by his memory care unit       Functional Status:  Dependent ADLs:: Independent  Dependent IADLs:: Independent  Bed or wheelchair confined:: No  Mobility Status: Independent    Living Situation:  Current living arrangement:: I live in a nursing home  Type of residence:: Nursing home    Lifestyle & Psychosocial Needs:    Social Determinants of Health     Tobacco Use: Medium Risk     Smoking Tobacco Use: Former Smoker     Smokeless Tobacco Use: Never Used   Alcohol Use: Not on file   Financial Resource Strain: Low Risk      Difficulty of Paying Living Expenses: Not hard at all   Food Insecurity: No Food Insecurity     Worried About Running Out of Food in the Last Year: Never true      Ran Out of Food in the Last Year: Never true   Transportation Needs: No Transportation Needs     Lack of Transportation (Medical): No     Lack of Transportation (Non-Medical): No   Physical Activity: Not on file   Stress: Not on file   Social Connections: Not on file   Intimate Partner Violence: Not on file   Depression: Not at risk     PHQ-2 Score: 0   Housing Stability: Not on file     Diet:: Regular  Inadequate nutrition (GOAL):: No  Tube Feeding: No  Inadequate activity/exercise (GOAL):: No  Significant changes in sleep pattern (GOAL): No  Transportation means:: Accessible car     Rastafarian or spiritual beliefs that impact treatment:: No  Mental health DX:: No  Mental health management concern (GOAL):: No  Informal Support system:: Spouse,Children           Resources and Interventions:  Current Resources:      Community Resources: Skilled Nursing Facility (memory care)  Supplies used at home:: None  Equipment Currently Used at Home: walker, rolling,shower chair  Employment Status: retired,, previous service         Advance Care Plan/Directive  Advanced Care Plans/Directives on file:: No  Advanced Care Plan/Directive Status: Declined Further Information    Referrals Placed: None     Goals:   Goals        General     Monitoring (pt-stated)      Notes - Note created  1/4/2021 12:29 PM by Sandra Gregory RN     Goal Statement: I will seek medical help if rash spreads,increased drainage or fever  Date Goal set: 1/4/2021  Barriers: Non identified   Strengths: Agrees with the plan   Date to Achieve By: 2/4/2021  Patient expressed understanding of goal: Yes  Action steps to achieve this goal:  1. I will apply Triamcinolone cream as directed   2. I will call my surgeon if incisions has increased redness , drainage ,swelling ,pain or fever and  for questions or concerns   3. Care coordinator will follow up in 3-5 business days        Pain Management (pt-stated)      Notes - Note created  12/30/2020  9:57 AM by  Jackson Mistry, RN     Nursing Diagnosis  Acute pain related to injuring agents (i.e. Surgical incision, biological, chemical, or physical injury) as evidenced by patients verbal report of pain/discomfort.    Goals  1.  Patient will wean off of narcotics and transition to OTC analgesics per package instructions.   2.  Patient will use non-pharmacological techniques to help decrease pain during period of recovery.  Patient will report pain management methods relieve pain to a satisfactory level.    3.  Patient will verbally demonstrate the use of appropriate diversional activities and relaxation skills.  4.  Patient reports ability to get enough sleep, rest, and return to normal activities.    Interventions  1.  Anticipate the need for pain management.  Early and timely intervention is key to effective pain management. It can even reduce the total amount of analgesia required.  2.  Administer prescribed analgesics/muscle relaxant as ordered by provider.  3.  Provide and teach patient/caregivers non-pharmacologic comfort measures including ambulating, repositioning, splinting, heat/cold, and use of recommended equipment (athletic supporter, abdominal binder, IS, etc).  4.  The use of OTC aides such as Tylenol, Ibuprofen, etc.                        Patient/Caregiver understanding: Stacia verbalized understanding of information provided.    Outreach Frequency: 2 months      Plan: CPN will send patient Introduction to Care Coordination and monitor for any newly identified care navigation needs in 1-2 months.    Melissa Behl BSN, RN, PHN, [facility]  RN Clinical Product Navigator  [phone]

## 2022-01-20 NOTE — LETTER
[facility] CARE COORDINATION  [address]  [place] [address]   January 20, 2022    Milo Serna  [address] [place] [address]      Dear Luís (Stacia),    I am a clinic care coordinator who works with [facility].   I wanted to thank you for spending the time to talk with me.  Below is a description of clinic care coordination and how I can further assist you if needed in the future.    The clinic care coordination team is made up of a registered nurse,  and community health worker who understand the health care system. The goal of clinic care coordination is to help you manage your health and improve access to the health care system in the most efficient manner. The team can assist you in meeting your health care goals by providing education, coordinating services, strengthening the communication among your providers and supporting you with any resource needs.    Please feel free to contact me at [phone] with any questions or concerns. We are focused on providing you with the highest-quality healthcare experience possible.    Sincerely,     Melissa Behl BSN, RN, PHN, [facility]  RN Clinical Product Navigator  [phone]

## 2022-01-21 NOTE — TELEPHONE ENCOUNTER
"Dr.Brian Joselyn MD,    PAL RN received a call from ALEX Sorto from Memory Care @ [facility] [phone] re: orders for pt.    RN stated pt's daughter, Stacia Camarillo (HAS CTC) believes pt to be, \"Just having trouble with his memory,\" despite having a formal diagnosis of Dementia.      Pt was receiving Olanzapine BID @ TCU , however, daughter does not wish to have this scheduled for pt.    RN @ [facility] states pt given QUEtiapine (SEROQUEL) 25 MG tablet, 1 (one) tablet by mouth @ bedtime and reacts well to that.  RN wondering if it would possible for to have:    1) Seroquel 12.5 mg by mouth (1/2 noc dose) @ 0800 daily (NEEDS NEW RX)     Pt has history of frequent bouts of epistaxis during winter.  2) Nasal saline spray twice daily and PRN for epistaxis  (NEEDS NEW RX)    3)Would like a diagnosis for: Dementia with behaviors (TCU orders simply stated Memory loss with behaviors)  Pt's SLUM score was 10/30    4)  Eucerin cream to right foot daily and once daily PRN DX: dry skin (THEY HAVE THIS ALREADY)    Please fax orders to [facility] @  [phone]    Pt uses: [facility] [place], [place] [phone]    Please advise.    Edita Li, Registered Nurse, PAL (Patient Advocate Liaison)   [facility]     [phone]    "

## 2022-01-21 NOTE — TELEPHONE ENCOUNTER
PAL RN returned call to [facility] [phone] @ [facility] @ [facility] re: pt.    Will await a return call.    Edita Li, Registered Nurse, PAL (Patient Advocate Liaison)   [facility]     [phone]

## 2022-01-25 NOTE — CONSULTS
Care Management Follow Up    Length of Stay (days): 0    Expected Discharge Date:       Concerns to be Addressed: discharge planning     Patient plan of care discussed at interdisciplinary rounds: No    Anticipated Discharge Disposition: Long Term Care     Anticipated Discharge Services:    Anticipated Discharge DME:      Patient/family educated on Medicare website which has current facility and service quality ratings: no  Education Provided on the Discharge Plan:    Patient/Family in Agreement with the Plan: yes    Referrals Placed by CM/SW:  none  Private pay costs discussed: Not applicable    Additional Information:  See ED note for SW consult.     ELIEZER Pierce, LGSW  , ED Care Management   [phone]      Carley Caruso

## 2022-01-25 NOTE — ED NOTES
Attempted to call  at patient's facility with no answer. discharge orders given to daughter to give to staff, daughter ok to transport back to facility by self

## 2022-01-25 NOTE — ED PROVIDER NOTES
History   Chief Complaint:  Leg Swelling       HPI   Milo Serna is a 81 year old male with a complex past medical history who presents with leg swelling. Per chart review, the patient was recently admitted 12/26-1/4 for acute on chronic systolic congestive heart failure. He was then at a TCU until recently. The patient's daughter states that for the last 3 days the patients leg swelling and redness has been increasing to the point where he is having difficulty walking. He has physical therapy scheduled but they have not had any meetings yet. His care facility has not been placing his compression socks on or elevating his legs as frequently as recommended.     Review of Systems   Constitutional: Negative for chills and fever.   Respiratory: Negative for cough and shortness of breath.    Cardiovascular: Leg swelling: chronic.   Gastrointestinal: Negative for abdominal pain.   Musculoskeletal: Positive for arthralgias (chronic).   All other systems reviewed and are negative.    Patient does have history of dementia making ROS is somewhat unreliable.      Allergies:  Blood Transfusion Related (Informational Only)    Medications:  Zyprexa  Seroquel  Demadex  Prilosec  Flomax  Eliquis  Potassium chloride  Zocor    Past Medical History:     Actinic keratosis  Atrial fibrillation  Hypertension   Gout  Hernia  Hallux abducto valgus  Ischemic cardiomyopathy  Hyperlipidemia   Memory loss   Neuropathy of foot  Non rheumatic mitral valve regurgitation  Osteoarthritis  Obstructive sleep apnea   Prostate cancer  Peptic ulcer disease  Premature ventricular contractions  Thrombocytopenia  Rosacea  Coronary artery disease   Congestive heart failure    Past Surgical History:    CAB   Prostate surgery  Hernia repair x3  Cholecystectomy   ORIF ankle   Nasal surgery x3  Left shoulder surgery    Family History:    Mother- stomach cancer  Father- myocardial infarction   Brother- stent placement, hypertension   Sister- heart  disease    Social History:  Presents with his daughter      Physical Exam     Patient Vitals for the past 24 hrs:   BP Temp Temp src Pulse Resp SpO2   01/25/22 1516 109/61 -- -- (!) 32 -- --   01/25/22 1240 (!) 145/87 98.1  F (36.7  C) Temporal 52 18 100 %       Physical Exam  General: Alert, No obvious discomfort, well kept  Eyes: PERRL, conjunctivae pink no scleral icterus or conjunctival injection  ENT:   Moist mucus membranes, posterior oropharynx clear without erythema or exudates, No lymphadenopathy, Normal voice  Resp:  Lungs clear to auscultation bilaterally, no crackles/rubs/wheezes. Good air movement  CV:  Normal rate and rhythm, no murmurs/rubs/gallops  GI:  Abdomen soft and non-distended.  Normoactive BS.  No tenderness, guarding or rebound, No masses  Skin:  Bilateral lower extremity edema with chronic discoloration and skin changes.  No open sores.  Musculoskeletal: No peripheral edema or calf tenderness, Normal gross ROM   Neuro: Alert and oriented to person/place/time, normal sensation  Psychiatric: Normal affect, cooperative, good eye contact    Emergency Department Course   ECG  ECG obtained at 1247, ECG read at 1730  Leg swelling   Rate 82 bpm. MS interval * ms. QRS duration 116 ms. QT/QTc 440/514 ms. P-R-T axes * -3 -43. Accelerated junctional rhythm with frequent and consecutive premature ventricular complexes. Cannot rule out inferior infarct. Prolonged QR. History of atrial fibrillation, premature ventricular complexes.      Laboratory:  Labs Ordered and Resulted from Time of ED Arrival to Time of ED Departure   BASIC METABOLIC PANEL - Abnormal       Result Value    Sodium 144      Potassium 4.0      Chloride 110 (*)     Carbon Dioxide (CO2) 26      Anion Gap 8      Urea Nitrogen 21      Creatinine 1.12      Calcium 9.3      Glucose 91      GFR Estimate 66     CBC WITH PLATELETS - Abnormal    WBC Count 4.7      RBC Count 3.41 (*)     Hemoglobin 10.8 (*)     Hematocrit 35.3 (*)     MCV  104 (*)     MCH 31.7      MCHC 30.6 (*)     RDW 15.2 (*)     Platelet Count 147 (*)    URINE MACROSCOPIC WITH REFLEX TO MICRO - Abnormal    Color Urine Yellow      Appearance Urine Clear      Glucose Urine Negative      Bilirubin Urine Negative      Ketones Urine Negative      Specific Gravity Urine 1.014      Blood Urine Trace (*)     pH Urine 6.5      Protein Albumin Urine Negative      Urobilinogen Urine Normal      Nitrite Urine Negative      Leukocyte Esterase Urine Negative      RBC Urine 22 (*)     WBC Urine 1      Mucus Urine Present (*)    NT PROBNP INPATIENT - Normal    N terminal Pro BNP Inpatient 1,188       Emergency Department Course:    Reviewed:  I reviewed nursing notes, vitals and past medical history    Assessments:  1522 I obtained history and examined the patient as noted above.   1755 I rechecked the patient and explained findings.     Consults:  I spoke with social work.    Interventions:  1702: Tylenol 1000 mg PO     Disposition:  The patient was discharged to home.     Impression & Plan     Medical Decision Making:  Milo Serna is a 81 year old male who presents today for evaluation of bilateral ongoing lower extremity edema.  He has a history of chronic edema and takes torsemide for this.  He was recently moved from a transitional care unit to a memory care unit and during the move he did not receive his compression stockings nor has he had his feet elevated at night.  Today he noted some foot discomfort and the memory care unit staff noted that his lower extremity edema is worse therefore sent him here for further evaluation.  His laboratory studies were noncontributory.  He did not have signs of CHF.  No signs of infection.  This appears to be his chronic edema and mild exacerbation.  His daughter notes that he has not had his compression stockings on nor has he had his feet elevated at night.  She also was concerned that they have not been giving him his glucosamine and  chondroitin.  He is placed in compression stockings here.  New instructions for the nursing home to make sure that his legs are elevated at bedtime as well as that he wear stockings daily were provided.  Also advised to increase furosemide to double dose for the next 3 days then return to normal.  Appropriate dosing for Tylenol and lower extremity discomfort discussed.  Patient did not want anything stronger as he was concerned for addiction.  There was no indication for further testing or imagery.  He appears to be safe and appropriate for outpatient management to return to his care facility.      Diagnosis:    ICD-10-CM    1. Leg swelling  M79.89 Care Coordination Referral   2. Lower extremity edema  R60.0        Discharge Medications:  New Prescriptions    No medications on file       Scribe Disclosure:  I, Judith Carlson, am serving as a scribe at 3:10 PM on 1/25/2022 to document services personally performed by Carlito Elmore APRN based on my observations and the provider's statements to me.            Carlito Elmore APRN CNP  01/25/22 1825

## 2022-01-25 NOTE — ED NOTES
Care Management Discharge Note    Discharge Date:         Discharge Disposition: Long Term Care    Discharge Services:      Discharge DME:      Discharge Transportation: family or friend will provide    Private pay costs discussed: Not applicable    PAS Confirmation Code:    Patient/family educated on Medicare website which has current facility and service quality ratings: yes    Education Provided on the Discharge Plan:    Persons Notified of Discharge Plans: Memory Care   Patient/Family in Agreement with the Plan: yes    Handoff Referral Completed: Yes    Additional Information:  SW updated memory care of patient's return.        Carley Caruso

## 2022-01-25 NOTE — DISCHARGE INSTRUCTIONS
Nursing staff should make sure patient's feet are elevated at night.    *Compression stockings should be worn daily.    *Please increase patient's Torsemide to 40 mg x 3 days then return to normal dosing    *Tylenol 500 to 2000 mg every 6 hours as needed for discomfort.  *Restart patient's glucosamine/chondroitin supplementation.  Use standard dosing.  1 tablet daily.

## 2022-01-25 NOTE — ED NOTES
Agree with triage- daughter at bedside states he just got out of the hospital/TCU last week and back to his memory care. Patient has bilateral LE swelling and redness with some pitting. No SOB, lungs clear. Patient's daughter states he's been sitting in his recliner more and forgets to elevate his legs.

## 2022-01-25 NOTE — ED NOTES
Care Management Follow Up    Length of Stay (days): 0    Expected Discharge Date:       Concerns to be Addressed: discharge planning     Patient plan of care discussed at interdisciplinary rounds: No    Anticipated Discharge Disposition: Long Term Care     Anticipated Discharge Services:  Back to Memory Care Unit  Anticipated Discharge DME:      Patient/family educated on Medicare website which has current facility and service quality ratings: no  Education Provided on the Discharge Plan:    Patient/Family in Agreement with the Plan: yes    Referrals Placed by CM/SW:  None   Private pay costs discussed: Not applicable    Additional Information:  Patient presents to the ED from [facility]. They can be reached at [phone] or [phone]. SW was asked to coordinate transition back to . SW called and could not reach anyone to discuss discharge planning. SW will continue to call.     Addendum 1624- JEAN spoke with [facility] care. Patient can come back at any time. They asked about patients pain. They do not have orders for compression stocking but patient will start with [facility] (PT, OT and RN) on Thursday to get his socks.     Carley Caruso, MSW, LGSW  , ED Care Management   [phone]      Carley Caruso

## 2022-01-26 NOTE — PROGRESS NOTES
Clinic Care Coordination Contact  Care Team Conversations    Patient was at [facility] ED 1/25/22 due to lower extremity edema.  Per ED notes, care facility had not been placing his compression socks or elevating legs as frequently as recommended.  ED provided new instructions for Memory Care staff.    Writer contacted daughter Stacia (consent to communicate on file) to inquire if patient would be establishing primary care with the Memory Care providers or if he would be returning to [facility].  Stacia informed writer that patient was going to be followed by the [facility] Care facility providers.    Writer left a message for [facility] nurse to verify their providers would be following patient, as they had contacted [facility] yesterday due to change in symptoms.    Melissa Behl BSN, RN, PHN, CCM  RN Clinical Product Navigator  [phone]

## 2022-01-26 NOTE — PROGRESS NOTES
Clinic Care Coordination Contact  Care Team Conversations    Writer received call back from Noreen with [facility].  Noreen informed writer the paperwork has been submitted for the provider group that visits their facility to establish care with patient, however, the first visit typically books 1 month out.  Patient will have a start of care visit with [facility] 1/27/22.  Noreen has been communicating with Dr. Alves until patient can establish with the [facility] facility provider.    No other needs identified from writer at this time.    Plan: CPN will monitor for any newly identified care navigation needs in 1-2 months.    Melissa Behl BSN, RN, PHN, CCM  RN Clinical Product Navigator  [phone]

## 2022-01-26 NOTE — TELEPHONE ENCOUNTER
PAL RN received a message from Katia Rodriguez RN Clinical Supervisor [phone] from [facility] requesting verbal order for a delay of start for pt's home care for Thursday 1/27/2022.    PAL RN returned call to RN and gave verbal order.  RN will fax order to [phone] for Dr.Brian Joselyn MD to sign.    Edita Li, Registered Nurse, PAL (Patient Advocate Liaison)   [facility]     [phone]

## 2022-01-27 PROBLEM — R60.9 EDEMA, UNSPECIFIED TYPE: Status: ACTIVE | Noted: 2022-01-01

## 2022-01-27 PROBLEM — I50.33 ACUTE ON CHRONIC DIASTOLIC CONGESTIVE HEART FAILURE (H): Status: ACTIVE | Noted: 2022-01-01

## 2022-01-27 PROBLEM — R40.2422 GLASGOW COMA SCALE TOTAL SCORE 9-12, AT ARRIVAL TO EMERGENCY DEPARTMENT: Status: ACTIVE | Noted: 2022-01-01

## 2022-01-27 NOTE — H&P
[facility]  Hospitalist Admission Note  Name: Milo Serna    MRN: [id number]  YOB: 1940    Age: 81 year old  Date of admission: 1/27/2022  Primary care provider: Oscar Alves    Chief Complaint: Right knee pain, edema    Assessment and Plan:   Acute on chronic systolic CHF  CAD  Lymphedema, chronic venous stasis  HTN  Moderate severe MR, moderate TR: s/p CABG in 1996.  Most recent EF improved to 50%.  He does have moderate to severe MR and moderate TR on most recent echocardiogram last month.  He does have chronic severe lymphedema.  He was changed to torsemide 20 mg daily by cardiology last admission when he was not responding to oral Lasix.  He was not getting compression stockings at his [facility] until about 2 days ago when they were ordered.  He will not elevate his legs on his own due to his cognitive impairment.  He has 2-3+ lymphedema on exam to the hips with chronic venous stasis changes.  Although the legs appear red bilaterally they are not warm to the touch, he does not fever leukocytosis, and I doubt cellulitis at this time.  He is having significant pain involving the right knee and leg and may benefit from some diuresis here.  Chest x-ray does show central pulmonary vascular congestion and pulmonary edema.  He is not hypoxic.  -Received IV Lasix 20 mg in the ER, continue with 40 mg IV Lasix twice daily  -Strict intake and output Daily weight  -Apply compression stockings/OT lymphedema consult  -2 g sodium restriction diet  -Continue his potassium supplement 20 meq twice daily and potassium magnesium replacement protocols  -Resume simvastatin    Right knee pain: Reporting new right knee pain over the last 2 days per his daughter.  He was kneeling on the right knee when they found him and there is a small abrasion to the patella.  Unclear if there is any fall or not.  No obvious trauma on exam.  X-ray of the right knee does show swelling of the prepatellar soft  tissue.  The right knee is not warm to the touch or any more swollen than the left side.  I suspect the pain is most likely due to his significant lymphedema.  No objective findings of infection such as septic bursitis or septic arthritis.  I doubt any cellulitis involving the legs.  He has a prescription for Voltaren gel and acetaminophen per his medication list, unclear if receiving.  -Schedule acetaminophen 1000 mg 3 times daily and Voltaren gel 4 times daily to the right knee  -Add lidocaine patch for the right knee  -Fall precautions  -Consult PT  -Diurese patient to see if he is more mobile with improvement in lymphedema    Acute encephalopathy, dementia: Substantial decline over the past few months ever since spouse broke her hip and ended up in memory care.  Likely significant advancing dementia unmasked when they were no longer cohabitating.  Significant issues at nighttime with confusion and sundowning while in the hospital last time and per notes from the TCU.  Now in memory care himself.  Has been receiving Seroquel 25 mg at bedtime and has a prescription for Zyprexa 5 mg twice daily as needed for agitation.  He did not come with a MAR so not sure if he received Zyprexa prior to coming or not.  He is significantly somnolent here in the ER.  No obvious infection.  Doubt any leg cellulitis.  Chest x-ray shows pulmonary edema.  Urinalysis does not show any pyuria.  He is afebrile without leukocytosis.  COVID-19 PCR is negative.  Noncontrast head CT shows brain atrophy, but no acute pathology.  He is on Eliquis and was found kneeling on the ground as above.  His PCO2 is not elevated.  No significant electrolyte abnormalities or renal failure.  -We will need to wait for the patient to wake up later in the day to do better neurologic assessment to see if he is far from the baseline or not.  He may need a lower dose of Zyprexa for agitation or perhaps increased Seroquel dose at nighttime for sleep.  He will  need ongoing memory care.  For now continue his Seroquel and Zyprexa as needed.    A. fib: Chronic A. fib with history of bradycardia and pauses in addition to PVCs.  -Continue PTA Eliquis 2.5 mg twice daily    Prostate cancer, BPH, history urinary retention: Monitor for any retention while here.  -Continue PTA Flomax    LENORA: Not adherent with CPAP.  It feels claustrophobic for him.  He would not use when in the hospital last time and unlikely to use moving forward.  CPAP nonadherence will not help his lymphedema.    Chronic anemia: Hemoglobin at baseline of 10-11.    PUD: Continue omeprazole daily    DVT Prophylaxis: Eliquis  Code Status: DNR -in the event of cardiac arrest, I discussed this with his daughter Stacia.  She thinks he would be okay for temporary intubation/mechanical ventilation for potentially reversible causes such as pneumonia  FEN: 2 g sodium restriction, 1.8 L fluid restriction  Discharge Dispo: Recently moved to memory care unit which is appropriate for him.  Consult PT given knee pain and possible fall.  Consult social work  Estimated Disch Date / # of Days until Disch: Admit to observation for mild CHF exacerbation without hypoxia, right knee pain, and encephalopathy.  Anticipate discharge within 48 hours if back to baseline mental status and improvement in mobility with diuresis      History of Present Illness:  Milo Serna is a 81 year old male with PMH including CAD s/p remote CABG, A. fib on Eliquis with frequent bradycardia, pauses, PVCs, anemia, PUD, HTN, systolic CHF with most recent EF 50%, moderate severe MR, moderate TR, severe lymphedema, LENORA nonadherent to CPAP, BPH, prostate cancer, urinary retention, and more recently diagnosed dementia who presents with right knee pain and confusion.  He was hospitalized here from 12/26 through 1/4/2022 for lower extremity edema and confusion.  Seroquel and Zyprexa were added.  He was diuresed with IV diuretics and discharged on  torsemide per cardiology recommendations.  He went to TCU where diuretics were continued.  His mental status did not improve much and overall had failure to thrive.  He was discharged to a memory care unit.  He was seen here 2 days ago in the ER for worsening lower extremity edema.  It was recommended he double his torsemide dose for 3 days and follow-up with cardiology.  He takes 20 mg daily normally.  Tonight his memory care he was found in his room kneeling with his head on his chair.  He was confused, somnolent, and reporting right knee pain so he was sent in for evaluation to the ER by EMS.  He is somnolent and cannot provide any history for me.  He did follow a few commands, but will open his eyes.  His daughter Stacia provides all the history.  She thinks his lower extremity swelling is worse than before.  He is very somnolent and not responding to stimulation which is very far from his baseline.  He has had a very difficult few months and significant decline in his overall function and memory since his spouse broke her hip and had to go to memory care herself.  He is not eating as much.  He refuses to use CPAP or elevate his legs.  She reports that he was not having his compression stockings applied and that they need an order for this which they got 2 days ago.  She does note that he is reported some pain in the right leg for the past 2 days, but she is not aware of any trauma.  She assumes he is getting the medications that are prescribed, but cannot say for certain.  I am uncertain if he received Zyprexa or not before coming in, there is no MAR.    History obtained from patient, patient's daughter Stacia, medical record, and from Dr. Scott in the emergency department.  Blood pressure 123/82, heart rate 76, temperature 97  F, oxygen 99% on room air.  WBC 5.1, hemoglobin 10.6, platelet count 147.  VBG shows pH 7.44, PCO2 41, PO2 45, bicarbonate 27.  Blood glucose 131.  Creatinine 1.03 otherwise BMP  unremarkable.  Albumin 2.2 and alk phos 218 otherwise LFTs normal.  Urinalysis shows 1 WBC and 35 RBCs.  COVID-19 PCR negative.  Influenza A/B PCR negative.  Chest x-ray shows pulmonary vascular congestion and pulmonary edema.  Noncontrast head CT shows brain atrophy, but no other acute pathology.  Right knee shows swelling of the prepatellar soft tissues.  He is being admitted to observation for mild CHF exacerbation with hypoxia in addition to his significant confusion from baseline and somnolence.  He received 20 mg IV Lasix.     Past Medical History reviewed:  Past Medical History:   Diagnosis Date     Actinic keratosis 10/12/2019     Anemia, unspecified 11/08/2016     Atrial fibrillation (H) 05/27/2015     BPH (benign prostatic hyperplasia) 01/25/2012     CAD (coronary artery disease) 04/04/2012    CAB 1996 following MI (at [facility])     Closed bimalleolar fracture of left ankle with routine healing 04/05/2019     Closed fracture of metatarsal bone 04/04/2012     Dilated aortic root (H) 05/26/2016     Drug-induced erectile dysfunction 10/02/2015     Elevated blood sugar 11/08/2016     Elevated PSA 09/19/2013     Epistaxis 09/09/2019     Essential hypertension with goal blood pressure less than 140/90 09/22/2016     Fall 09/06/2019     Functional diarrhea 10/12/2019     Gallbladder polyp 05/01/2018     Gout      HAV (hallux abducto valgus) 04/04/2012     Hematoma 09/09/2019     Hernia, abdominal      History of prostate cancer 06/15/2016     HTN, goal below 150/90 04/20/2017     Hypokalemia 09/12/2019     Iron deficiency anemia secondary to inadequate dietary iron intake 11/15/2016     Ischemic cardiomyopathy      Low blood pressure reading 10/17/2016     Lumbago     had degendisc dz on MRI 7/07     Memory loss 06/18/2019     Microalbuminuria 06/04/2020    X1     Mixed hyperlipidemia      Mumps      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     Nonrheumatic mitral valve regurgitation 10/28/2019     OA (osteoarthritis)  04/04/2012     Old myocardial infarction 04/04/2012     LENORA (obstructive sleep apnea) 10/28/2019     LENORA (obstructive sleep apnea)      Other viral warts 09/06/2019     Palpitations      Pes planus 04/04/2012     Petechiae 06/18/2019     Prostate cancer (H) 05/26/2016     PUD (peptic ulcer disease) 04/04/2012     Pulmonary hypertension (H) 09/25/2017     PVC's (premature ventricular contractions)      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     Rosacea 01/15/2008     Stasis dermatitis      Stasis dermatitis of both legs 05/13/2021     Stress due to spouse with dementia 06/18/2019     Thrombocytopenia (H) 04/21/2017     Unspecified hyperplasia of prostate with urinary obstruction and other lower urinary tract symptoms (LUTS)     better on avodart & hytrin     Venous stasis 04/04/2012     Past Surgical History reviewed:  Past Surgical History:   Procedure Laterality Date     CARDIAC SURGERY      CAB     CYSTOSCOPY FLEXIBLE, CYOABLATION PROSTATE N/A 11/09/2016    Procedure: CYSTOSCOPY FLEXIBLE, CRYOABLATION PROSTATE;  Surgeon: Krystian Owens MD;  Location:  OR     GENITOURINARY SURGERY      prostate surgery      HERNIA REPAIR       LAPAROSCOPIC CHOLECYSTECTOMY N/A 12/28/2020    Procedure: CHOLECYSTECTOMY, LAPAROSCOPIC;  Surgeon: James Alvarez MD;  Location: [facility] OR     LAPAROSCOPIC HERNIORRHAPHY INGUINAL Right 05/10/2017    Procedure: LAPAROSCOPIC HERNIORRHAPHY INGUINAL;  Laparoscopic preperitoneal repair of right inguinal hernia with placement of underlay mesh;  Surgeon: Enrico Bridges MD;  Location:  OR     PROSTATE SURGERY       [facility] NONSPECIFIC PROCEDURE      CAB 1996 [place]     Z NONSPECIFIC PROCEDURE  02/2007    l inguinal hernia repair      ZZ NONSPECIFIC PROCEDURE      R hernia remote     ZZ NONSPECIFIC PROCEDURE  2000    R ankle ORIF for fx     ZZ NONSPECIFIC PROCEDURE  2005    nasal surgery      [facility] NONSPECIFIC PROCEDURE      R rotater repair (remote)      Z NONSPECIFIC PROCEDURE      appy 1966      [facility] NONSPECIFIC PROCEDURE      sinus surgery x 2     [facility] NONSPECIFIC PROCEDURE      L shoulder      Social History reviewed:  Social History     Tobacco Use     Smoking status: Former Smoker     Smokeless tobacco: Never Used     Tobacco comment: quit 3/1974   Substance Use Topics     Alcohol use: Yes     Alcohol/week: 0.0 standard drinks     Comment: One Beer a Day and maybe Wine     Social History     Social History Narrative     Not on file     Family History reviewed:  Family History   Problem Relation Age of Onset     Cancer Mother         stomach cancer,  age 61     Heart Disease Father         MI,  age 71     Heart Disease Brother         stent placement, RA      Hypertension Brother      Heart Disease Sister         rhythm problems with heart, hypertension     Allergies:  Allergies   Allergen Reactions     Blood Transfusion Related (Informational Only) Other (See Comments)     Patient has a history of a clinically significant antibody against RBC antigens.  A delay in compatible RBCs may occur.     Medications:  Prior to Admission medications    Medication Sig Last Dose Taking? Auth Provider   ACE/ARB/ARNI NOT PRESCRIBED (INTENTIONAL) Please choose reason not prescribed from choices below.   Chantel Bryson PA-C   acetaminophen (TYLENOL) 500 MG tablet Take 1 tablet (500 mg) by mouth every 6 hours as needed for mild pain   Chantel Bryson PA-C   apixaban ANTICOAGULANT (ELIQUIS) 2.5 MG tablet Take 1 tablet (2.5 mg) by mouth 2 times daily   Sixto Conley MD   BETA BLOCKER NOT PRESCRIBED (INTENTIONAL) Please choose reason not prescribed from choices below.   Chantel Bryson PA-C   diclofenac (VOLTAREN) 1 % topical gel Apply 4 gramsto area qid prn   Oscar Alves MD   ferrous gluconate (FERGON) 324 (38 Fe) MG tablet Take 1 tablet (324 mg) by mouth daily (with breakfast)   Oscar Alves MD   MELATONIN PO Take 5 mg by mouth At Bedtime    Reported, Patient    multivitamin, therapeutic (THERA-VIT) TABS tablet Take 1 tablet by mouth daily   Reported, Patient   OLANZapine (ZYPREXA) 5 MG tablet Take 1 tablet (5 mg) by mouth 2 times daily as needed (agitation)   Chantel Bryson PA-C   omeprazole (PRILOSEC) 40 MG DR capsule Take 1 capsule (40 mg) by mouth daily   Oscar Alves MD   potassium chloride ER (K-TAB) 20 MEQ CR tablet Take 1 tablet (20 mEq) by mouth 2 times daily   Sixto Conley MD   QUEtiapine (SEROQUEL) 25 MG tablet Take 1 tablet (25 mg) by mouth At Bedtime   hCantel Bryson PA-C   simvastatin (ZOCOR) 20 MG tablet Take 1 tablet (20 mg) by mouth At Bedtime   Sixto Conley MD   tamsulosin (FLOMAX) 0.4 MG capsule Take 1 capsule (0.4 mg) by mouth daily   Oscar Alves MD   torsemide (DEMADEX) 20 MG tablet Take 1 tablet (20 mg) by mouth daily   Alysia Galindo MD   urea (UREA 20 INTENSIVE HYDRATING) 20 % external cream Apply topically as needed Apply to right foot daily for dryness or rash   Oscar Alves MD     Review of Systems:  Very limited ROS due to patient with significant somnolence and confusion, see HPI for details.     Physical Exam:  Blood pressure 111/71, pulse 63, temperature 97  F (36.1  C), temperature source Oral, resp. rate 12, SpO2 100 %.  Wt Readings from Last 1 Encounters:   01/18/22 76.8 kg (169 lb 6.4 oz)     Exam:  Constitutional: Somnolent  Eyes: sclera white, small pupils that are reactive to light and equal  HEENT: atraumatic, MMM  Respiratory: Anterior lungs clear without focal crackles or wheeze and appears comfortable on room air without tachypnea  Cardiovascular: Irregularly, regular rhythm, regular rate, 1/6 systolic murmur  GI: non-tender, not distended, bowel sounds present  Skin: Erythema from the ankles to just above the knee along with scaling and cracked skin consistent with venous stasis.  Small abrasion to right patella and left lower anterior leg.  The right knee is not warm to the touch  compared to the left nor is it more swollen.  He does seem to grown some with range of motion of both legs at the knees    Musculoskeletal/extremities: 2-3+ bilateral lower extremity pitting edema.  No obvious trauma  Neurologic: Somnolent, follows a few commands, no spontaneous speech or answering questions, will not open his eyes  Psychiatric: calm     Lab and imaging data personally reviewed:  Labs:  Recent Labs   Lab 01/27/22 0457   PHV 7.44*   PO2V 45   PCO2V 41   HCO3V 27     Recent Labs   Lab 01/27/22 0457 01/25/22  1323   WBC 5.1 4.7   HGB 10.6* 10.8*   HCT 34.1* 35.3*   * 104*   * 147*     Recent Labs   Lab 01/27/22 0457 01/27/22 0450 01/25/22  1323     --  144   POTASSIUM 3.9  --  4.0   CHLORIDE 110*  --  110*   CO2 28  --  26   ANIONGAP 3  --  8   * 134* 91   BUN 20  --  21   CR 1.03  --  1.12   GFRESTIMATED 73  --  66   ANG 9.1  --  9.3   PROTTOTAL 7.5  --   --    ALBUMIN 2.9*  --   --    BILITOTAL 1.0  --   --    ALKPHOS 218*  --   --    AST 23  --   --    ALT 22  --   --      Recent Labs   Lab 01/27/22  0548   COLOR Yellow   APPEARANCE Clear   URINEGLC Negative   URINEBILI Negative   URINEKETONE Negative   SG 1.026   UBLD Trace*   URINEPH 6.5   PROTEIN 10 *   NITRITE Negative   LEUKEST Negative   RBCU 35*   WBCU 1     COVID-19 PCR negative  Influenza A/B PCR negative      Imaging:  Recent Results (from the past 24 hour(s))   Head CT w/o contrast    Narrative    EXAM: CT HEAD W/O CONTRAST  LOCATION: [facility]  DATE/TIME: 1/27/2022 5:15 AM    INDICATION: Mental status change, unknown cause. Confusion.  COMPARISON: 12/06/2021.  TECHNIQUE: Routine CT Head without IV contrast. Multiplanar reformats. Dose reduction techniques were used.    FINDINGS:  INTRACRANIAL CONTENTS: No intracranial hemorrhage, extraaxial collection, or mass effect.  No CT evidence of acute infarct. Mild to moderate presumed chronic small vessel ischemic changes. Mild to moderate  generalized volume loss. No hydrocephalus. Note   is made of coarse atherosclerotic calcifications of the intracranial vasculature.       VISUALIZED ORBITS/SINUSES/MASTOIDS: Prior right cataract surgery. Visualized portions of the orbits are otherwise unremarkable. Mild mucosal thickening scattered about the paranasal sinuses. No middle ear or mastoid effusion.    BONES/SOFT TISSUES: No acute abnormality.      Impression    IMPRESSION:  1.  No CT evidence for acute intracranial process.  2.  Brain atrophy and presumed chronic microvascular ischemic changes as above.                XR Chest 1 View    Narrative    EXAM: XR CHEST 1 VIEW  LOCATION: [facility]  DATE/TIME: 1/27/2022 5:33 AM    INDICATION: leg swelling eval for chf  COMPARISON: 12/26/2021      Impression    IMPRESSION: Large indistinct central pulmonary vasculature consistent with interstitial pulmonary edema. Recommend follow-up to resolution. No pneumothorax or pleural effusion. Moderately enlarged heart. Stable tortuous aorta. Multiple median sternotomy   wires.   XR Knee Right 1/2 Views    Narrative    EXAM: XR KNEE RT 1 /2 VW  LOCATION: [facility]  DATE/TIME: 1/27/2022 5:33 AM    INDICATION: Pain  COMPARISON: None.      Impression    IMPRESSION: Prepatellar soft tissue swelling. No acute fracture or dislocation. No joint effusion. Atherosclerotic vascular calcifications.       Ace Negrete MD  Hospitalist  [facility]

## 2022-01-27 NOTE — CONSULTS
[facility]  C.O.R.E. Consult at [facility]        Received C.O.R.E. Clinic Consult from Penelope.    Reviewed Carlos chart and note he is admitted for Acute/Chronic Diastolic/Systolic Heart Failure.   He presented to the ER with Knee Pain and worsening BLE Edema. Echocardiogram shows LVEF is 50% on 12/26/21. This is his first admission in the past year for concerns of heart failure.    Luís is currently followed by the Juliano Caballero PA-C (over-do for follow-up) and Dr. Conley. Messaged scheduling asking for them to reach out to his daughter to schedule a C.O.R.E. Return with Juliano Caballero PA-C as well as a follow-up appointment with Dr. Conley.       Request beside nursing to complete Heart Failure education and provide Heart Failure booklet.        Please call with questions or concerns.    Shara Bermudez RN  Care Coordinator  [facility] C.O.R.E.   C.O.R.E. Nurse Line: [phone]  / Personal Line: [phone]  01/27/22 11:23 AM       Scheduling Number: [phone]

## 2022-01-27 NOTE — PROGRESS NOTES
Brief progress update:  Please see admission H & P from earlier today.  Admitted for diuresis for CHF from [facility]. He has known severe MV dz and is due for Cardiology follow up in March in which case they may discuss MVR.   Remains lethargic - awakens to voice but poor historian. Edematous but no hypoxia, dyspnea or orthopnea.  Hold PTA Zyprexa and seroquel if remains drowsy. IV Lasix adjusted to accommodate softer BP this am.  Normr, Stacia updated. May be able to discharge back to [facility] care 1/28/2022 if improving.

## 2022-01-27 NOTE — ED PROVIDER NOTES
History   Chief Complaint:  Knee Pain       The history is provided by the EMS personnel, the nursing home, the patient and a relative. The history is limited by the condition of the patient.      Milo Serna is a 81 year old male with history of CAD, hypertension, prostate cancer, cardiomyopathy, hyperlipidemia, LENORA, PVC's, CHF, and atrial fibrillation on apixaban who presents via ambulance with knee pain. The patient comes from [facility] where staff note he was found kneeling on the floor and leaning on a chair tonight. They are unsure if he fell or how he came to be on the floor. The staff explain he seemed more tired tonight as he is typically walking around, but instead was laying down most of the night. He was complaining of right knee pain earlier today. Upon arrival to the ED his daughter who is at bedside reports his legs look more swollen tonight.     Review of Systems   Constitutional: Positive for fatigue.   Cardiovascular: Positive for leg swelling.   Musculoskeletal: Positive for arthralgias.   All other systems reviewed and are negative.        Allergies:  Blood Transfusion Related (Informational Only)  Niacin  Pravastatin    Medications:  apixaban  Beta blocker   ferrous gluconate   Melatonin   Olanzapine  omeprazole  potassium chloride  Quetiapine   simvastatin   tamsulosin   torsemide     Past Medical History:     Actinic keratosis  Iron deficiency anemia   Atrial fibrillation  BPH (benign prostatic hyperplasia)  CAD (coronary artery disease)  Closed bimalleolar fracture of left ankle   Closed fracture of metatarsal bone  Dilated aortic root   Essential hypertension  Gallbladder polyp  Gout  HAV (hallux abducto valgus)  Hernia, abdominal  Prostate Cancer  Ischemic cardiomyopathy  Lumbago  Memory loss  Microalbuminuria  Mixed hyperlipidemia  Mumps  Nonrheumatic mitral valve regurgitation  OA (osteoarthritis)  Myocardial infarction  LENORA (obstructive sleep apnea)  Viral  warts  Pes planus  Petechiae  PUD (peptic ulcer disease)  PVC's (premature ventricular contractions)  Rhinophyma  Rosacea  Stasis dermatitis of both legs  Thrombocytopenia   Venous stasis  Unspecified hyperplasia of prostate with urinary obstruction and other lower urinary tract symptoms (LUTS)  Thoracic aortic ectasia  Cervicalgia  Chronic combined systolic and diastolic congestive heart failure  Thoracic spine fracture  Insomnia      Past Surgical History:    Coronary artery bypass graft   Cystoscopy flexible, cyoablation prostate  Prostate surgery   Hernia repair  Laparoscopic cholecystectomy   Laparoscopic herniorrhaphy inguinal   Right ankle surgery   Sinus surgery   Right rotator repair   Left shoulder surgery    Appendectomy     Family History:    Stomach Cancer  Heart disease  Hypertension     Social History:  Presents with daughter.   PCP: Oscar Alves     Physical Exam     Patient Vitals for the past 24 hrs:   BP Temp Temp src Pulse Resp SpO2   01/27/22 0540 123/82 -- -- 76 -- 99 %   01/27/22 0510 115/81 -- -- 65 -- 100 %   01/27/22 0451 107/75 97  F (36.1  C) Oral 72 12 100 %       Physical Exam  Vitals and nursing note reviewed.   Constitutional:       Comments: Elderly sleepy response to painful stimuli.   HENT:      Head: Normocephalic.      Right Ear: Tympanic membrane normal.      Left Ear: Tympanic membrane normal.      Mouth/Throat:      Mouth: Mucous membranes are moist.   Eyes:      Pupils: Pupils are equal, round, and reactive to light.   Cardiovascular:      Rate and Rhythm: Normal rate. Rhythm irregular.      Pulses: Normal pulses.   Pulmonary:      Effort: Pulmonary effort is normal.      Comments: Decreased breath sounds bilateral bases.  Abdominal:      General: Abdomen is flat.   Musculoskeletal:      Cervical back: Normal range of motion.      Comments: There is significant bilateral stasis dermatitis and dry skin over the lower legs extending up towards the knee.  Patient seems to be  tender with palpation of the right knee.  There is no large effusion.  There is no open wound.   Skin:     Capillary Refill: Capillary refill takes 2 to 3 seconds.   Neurological:      Mental Status: He is disoriented.      Comments: Sleepy response to painful stimuli.  History of dementia.                 Emergency Department Course     Imaging:  XR Knee Right 1/2 Views   Final Result   IMPRESSION: Prepatellar soft tissue swelling. No acute fracture or dislocation. No joint effusion. Atherosclerotic vascular calcifications.      XR Chest 1 View   Final Result   IMPRESSION: Large indistinct central pulmonary vasculature consistent with interstitial pulmonary edema. Recommend follow-up to resolution. No pneumothorax or pleural effusion. Moderately enlarged heart. Stable tortuous aorta. Multiple median sternotomy    wires.      Head CT w/o contrast    (Results Pending)     Report per radiology    Laboratory:  Labs Ordered and Resulted from Time of ED Arrival to Time of ED Departure   COMPREHENSIVE METABOLIC PANEL - Abnormal       Result Value    Sodium 141      Potassium 3.9      Chloride 110 (*)     Carbon Dioxide (CO2) 28      Anion Gap 3      Urea Nitrogen 20      Creatinine 1.03      Calcium 9.1      Glucose 131 (*)     Alkaline Phosphatase 218 (*)     AST 23      ALT 22      Protein Total 7.5      Albumin 2.9 (*)     Bilirubin Total 1.0      GFR Estimate 73     BLOOD GAS VENOUS - Abnormal    pH Venous 7.44 (*)     pCO2 Venous 41      pO2 Venous 45      Bicarbonate Venous 27      Base Excess/Deficit (+/-) 3.0 (*)     FIO2 23     CBC WITH PLATELETS AND DIFFERENTIAL - Abnormal    WBC Count 5.1      RBC Count 3.30 (*)     Hemoglobin 10.6 (*)     Hematocrit 34.1 (*)      (*)     MCH 32.1      MCHC 31.1 (*)     RDW 15.0      Platelet Count 147 (*)     % Neutrophils 71      % Lymphocytes 12      % Monocytes 13      % Eosinophils 3      % Basophils 1      % Immature Granulocytes 0      NRBCs per 100 WBC 0       Absolute Neutrophils 3.6      Absolute Lymphocytes 0.6 (*)     Absolute Monocytes 0.7      Absolute Eosinophils 0.2      Absolute Basophils 0.0      Absolute Immature Granulocytes 0.0      Absolute NRBCs 0.0     GLUCOSE BY METER - Abnormal    GLUCOSE BY METER POCT 134 (*)    INFLUENZA A/B & SARS-COV2 PCR MULTIPLEX - Normal    Influenza A PCR Negative      Influenza B PCR Negative      SARS CoV2 PCR Negative     GLUCOSE MONITOR NURSING POCT   ROUTINE UA WITH MICROSCOPIC REFLEX TO CULTURE   NT PROBNP INPATIENT   TROPONIN I   COVID-19 VIRUS (CORONAVIRUS) BY PCR       Emergency Department Course:       Reviewed:  I reviewed nursing notes, vitals, past medical history and Care Everywhere    Assessments:  0444 I obtained history and examined the patient as noted above.   0457 I rechecked the patient and spoke with daughter who has arrived at bedside.    I rechecked the patient and explained findings.     Consults:  4668 I consulted with Dr. Negrete of the hospitalist services who is in agreement to accept the patient for admission.     Interventions:  0550 Furosemide, 20 mg, IV     Disposition:  The patient was admitted to the hospital under the care of Dr. Negrete.     Impression & Plan     Medical Decision Making:  Patient presents with mental status change.  Cause of which is unclear patient is on Zyprexa and evening Seroquel and suspect sedating medications a cause.  Due to leg swelling evaluation for kidney liver and congestive heart failure were obtained this does identify x-ray concerns.  Pulmonary edema.  Clinically suspect fluid overload will recommend inpatient diuresis due to patient's active altered mental status care was discussed with the hospitalist due to the complexity of the situation congestive heart failure and also altered mental status would recommend inpatient admission.        Diagnosis:    ICD-10-CM    1. [place] coma scale total score 9-12, at arrival to emergency department  R40.2422    2. Edema,  unspecified type  R60.9    3. Acute on chronic diastolic congestive heart failure (H)  I50.33          Scribe Disclosure:  I, Dana Aime, am serving as a scribe at 4:44 AM on 1/27/2022 to document services personally performed by Oscar Scott MD based on my observations and the provider's statements to me.            Oscar Scott MD  01/28/22 0521

## 2022-01-27 NOTE — PLAN OF CARE
PRIMARY DIAGNOSIS: ACUTE PAIN & CONGESTIVE HEART FAILURE  OUTPATIENT/OBSERVATION GOALS TO BE MET BEFORE DISCHARGE:  1. Dyspnea improved and O2 sats >88% at RA or at prior home O2 therapy level: Yes        SpO2: 97 %, O2 Device: None (Room air)  Vitals:    01/27/22 0850   Weight: 87.2 kg (192 lb 4.8 oz)        2. ECHO and other diagnostic testing complete (if applicable): No, orders for echo at this time.     3. Return to near baseline physical activity: No, total care A2.     Discharge Planner Nurse   Safe discharge environment identified: Yes.  Barriers to discharge: Yes.       Entered by: Fallon Meredith 01/27/2022 1:24 PM   Pt. Disorientated x4, very lethargic upon arrival to unit. Initially Pt. Was laughing and illogically having conversation with staff. Now Pt. Withdrawing from pain and occasionally will open his eyes but will not keep them open. Pt. Drank a little water spooned into his mouth but cannot get Pt. To take pills or food. Mouth cares performed. PIV SL. 2 gram diet with 1800 mL fluid restrictions. Daily weights. On tele SR/SB with HR 50-60's with frequent PVC's and pausing 2.0 seconds. Provider aware-nursing to update if more that 3.0 second pauses. PT/OT/SW and nutrition following. Pt. Grimacing with repositioning to R knee-volatern gel and lido patch being utilized as unable to get Pt. To safely swallow pills. Strict I&O's. Elevating BLE, +4 edema. Pulses weaks +1 bilaterally to pedial pulses. Nursing to continue to monitor and assess Pt. And provide supportive cares.   /81 (BP Location: Right arm)   Pulse 70   Temp 96.9  F (36.1  C) (Axillary)   Resp 14   Wt 87.2 kg (192 lb 4.8 oz)   SpO2 97%   BMI 30.11 kg/m      Please review provider order for any additional goals.   Nurse to notify provider when observation goals have been met and patient is ready for discharge.

## 2022-01-27 NOTE — PHARMACY-ADMISSION MEDICATION HISTORY
Admission medication history interview status for this patient is complete. See [facility] admission navigator for allergy information, prior to admission medications and immunization status.     Medication history interview done, indicate source(s): MAR from [facility], [phone], RN line - [phone]  Medication history resources (including written lists, pill bottles, clinic record):see above    Changes made to PTA medication list:  Added: none  Changed: urea to daily from prn  Reported as Not Taking: none  Removed: none    Actions taken by pharmacist (provider contacted, etc):None     Additional medication history information:had one dose of torsemide 40mg yesterday (was to continue 40mg daily x 3d then resume 20mg daily)    Medication reconciliation/reorder completed by provider prior to medication history?  Y   (Y/N)     Prior to Admission medications    Medication Sig Last Dose Taking? Auth Provider   acetaminophen (TYLENOL) 500 MG tablet Take 1 tablet (500 mg) by mouth every 6 hours as needed for mild pain  Yes Chantel Bryson PA-C   apixaban ANTICOAGULANT (ELIQUIS) 2.5 MG tablet Take 1 tablet (2.5 mg) by mouth 2 times daily 1/26/2022 at Unknown time Yes Sixto Conley MD   diclofenac (VOLTAREN) 1 % topical gel Apply 4 gramsto area qid prn  Yes Oscar Alves MD   ferrous gluconate (FERGON) 324 (38 Fe) MG tablet Take 1 tablet (324 mg) by mouth daily (with breakfast) 1/26/2022 at Unknown time Yes Oscar Alves MD   MELATONIN PO Take 5 mg by mouth At Bedtime  1/26/2022 at Unknown time Yes Reported, Patient   multivitamin, therapeutic (THERA-VIT) TABS tablet Take 1 tablet by mouth daily 1/26/2022 at Unknown time Yes Reported, Patient   OLANZapine (ZYPREXA) 5 MG tablet Take 1 tablet (5 mg) by mouth 2 times daily as needed (agitation)  Yes Chantel Bryson PA-C   omeprazole (PRILOSEC) 40 MG DR capsule Take 1 capsule (40 mg) by mouth daily 1/26/2022 at Unknown time Yes Oscar Alves  MD   potassium chloride ER (K-TAB) 20 MEQ CR tablet Take 1 tablet (20 mEq) by mouth 2 times daily 1/26/2022 at Unknown time Yes Sixto Conley MD   QUEtiapine (SEROQUEL) 25 MG tablet Take 1 tablet (25 mg) by mouth At Bedtime 1/26/2022 at Unknown time Yes Chantel Bryson PA-C   simvastatin (ZOCOR) 20 MG tablet Take 1 tablet (20 mg) by mouth At Bedtime 1/26/2022 at Unknown time Yes Sixto Conley MD   tamsulosin (FLOMAX) 0.4 MG capsule Take 1 capsule (0.4 mg) by mouth daily 1/26/2022 at Unknown time Yes Oscar Alves MD   torsemide (DEMADEX) 20 MG tablet Take 1 tablet (20 mg) by mouth daily 1/26/2022 at 40mg Yes Alysia Galindo MD   urea (UREA 20 INTENSIVE HYDRATING) 20 % external cream Apply topically as needed Apply to right foot daily for dryness or rash  Patient taking differently: Apply topically daily Apply topically to right foot daily, clean legs with soap and water and pat dry before applying 1/26/2022 at Unknown time Yes Oscar Alves MD   ACE/ARB/ARNI NOT PRESCRIBED (INTENTIONAL) Please choose reason not prescribed from choices below.   Chantel Bryson PA-C   BETA BLOCKER NOT PRESCRIBED (INTENTIONAL) Please choose reason not prescribed from choices below.   Chantel Bryson PA-C

## 2022-01-27 NOTE — ED TRIAGE NOTES
Pt brought in by EMS from [facility] for possible fall; pt found kneeling on the floor and leaning on chair; pt c/o right knee pain; pt normally pacing majority of the night per EMS and facility; pt seemed more sleepy per facility; baseline GCS 14

## 2022-01-27 NOTE — PROGRESS NOTES
CM consulted for discharge planning, per chart review pt resides at [facility]. Called [phone],  with  requesting call back from nursing staff.     Lou Bergeron RN BSN   Inpatient Care Coordination  [facility]   Phone [phone]

## 2022-01-27 NOTE — CONSULTS
"NUTRITION EDUCATION    REASON FOR NUTRITION CONSULT:  Provider Order  -  Nutrition Education - \"Heart Failure - Dietitian to instruct patient on 2 gram sodium diet\"     ASSESSMENT:  Unable to complete nutrition assessment and instructions due to patient observation status.     FOLLOW UP:   Will follow up when patient status changes to inpatient. Recommend outpatient follow up if discharges.     Noreen Hunt RD, LD  Clinical Dietitian   "

## 2022-01-27 NOTE — CONSULTS
Care Management Initial Consult    General Information  Assessment completed with: Caregiver,Children,    Type of CM/SW Visit: Initial Assessment    Primary Care Provider verified and updated as needed: Yes   Readmission within the last 30 days: no previous admission in last 30 days      Reason for Consult: care coordination/care conference,discharge planning    Cognitive  Cognitive/Neuro/Behavioral: .WDL except,arousability,orientation,level of consciousness  Level of Consciousness: lethargic  Arousal Level: arouses to pain  Orientation: disoriented x 4  Mood/Behavior: hypoactive (quiet, withdrawn),calm  Best Language: 0 - No aphasia  Speech: spontaneous,illogical    Living Environment:   People in home: facility resident     Current living Arrangements: assisted living  Name of Facility: [facility]    Able to return to prior arrangements: yes     Family/Social Support:  Care provided by:    Provides care for: no one, unable/limited ability to care for self  Marital Status:   Children,Facility resident(s)/Staff          Description of Support System: Supportive,Involved    Support Assessment: Adequate family and caregiver support    Current Resources:   Patient receiving home care services: No  Community Resources: Home Care  Equipment currently used at home: walker, rolling,shower chair  Supplies currently used at home: None    Lifestyle & Psychosocial Needs:  Social Determinants of Health     Tobacco Use: Medium Risk     Smoking Tobacco Use: Former Smoker     Smokeless Tobacco Use: Never Used   Alcohol Use: Not on file   Financial Resource Strain: Low Risk      Difficulty of Paying Living Expenses: Not hard at all   Food Insecurity: No Food Insecurity     Worried About Running Out of Food in the Last Year: Never true     Ran Out of Food in the Last Year: Never true   Transportation Needs: No Transportation Needs     Lack of Transportation (Medical): No     Lack of Transportation (Non-Medical):  No   Physical Activity: Not on file   Stress: Not on file   Social Connections: Not on file   Intimate Partner Violence: Not on file   Depression: Not at risk     PHQ-2 Score: 0   Housing Stability: Not on file       Additional Information:  CM consulted for discharge planning, per chart review pt resides at [facility]. Called and spoke with nurse Noreen [phone] who confirms that the pt resides in . He had a recent TCU stay and discharged from TCU to  on 1/19. Initially pt had behaviors and was combative but he has become calmer and more agreeable to cares with time. Prior to admission she reports increased fatigue, lethargy, incontinent of stool and unable to follow cues.     Noreen reports that they are unable to provide a low salt diet, only no added salt. They also are unable to do strict I&O for pt. They do not have compression stockings for the pt, if these need to be ordered they need to know what type of compression stocking and the amount of pressure they should provide. Noreen also expressing concern that pt may be a hospice candidate and wondering if this will be pursued at the hospital, informed that this is not currently the plan but will update provider. Updated provider with this information.     At baseline pt ambulates with a WW independently, only requiring cueing, he has assistance with medications, bathing, toileting, dressing, meal escorts, 3 meals/day and safety checks. He was scheduled to begin services with [facility] for RN/PT/OT but was readmitted before services were able to begin. Home RN/PT/OT will need to be reordered at discharge.      is able to accept back on weekend if pt is at baseline, if pt has a need for increased services he will be unable to return on the weekend. Weekend nurse can be reached at [phone]. Any new medications should be filled at [facility] in [place]. Orders should be faxed to  at [phone]. Called pt's  dtr Stacia [phone] to verify this information and see if she will be able to transport at discharge, LM requesting call back.     Lou Bergeron RN BSN   Inpatient Care Coordination  [facility]   Phone [phone]

## 2022-01-27 NOTE — ED NOTES
[facility]  ED Nurse Handoff Report    Milo Serna is a 81 year old male   ED Chief complaint: Knee Pain  . ED Diagnosis:   Final diagnoses:   [place] coma scale total score 9-12, at arrival to emergency department   Edema, unspecified type   Acute on chronic diastolic congestive heart failure (H)     Allergies:   Allergies   Allergen Reactions     Blood Transfusion Related (Informational Only) Other (See Comments)     Patient has a history of a clinically significant antibody against RBC antigens.  A delay in compatible RBCs may occur.       Code Status: Full Code  Activity level - Baseline/Home:  Stand by Assist. Activity Level - Current:   Assist X 2. Lift room needed: No. Bariatric: No   Needed: No   Isolation: No. Infection: Not Applicable.     Vital Signs:   Vitals:    01/27/22 0451 01/27/22 0510 01/27/22 0540 01/27/22 0615   BP: 107/75 115/81 123/82 111/71   Pulse: 72 65 76 63   Resp: 12      Temp: 97  F (36.1  C)      TempSrc: Oral      SpO2: 100% 100% 99% 100%       Cardiac Rhythm:  ,      Pain level:    Patient confused: Yes. Patient Falls Risk: Yes.   Elimination Status: Has voided   Patient Report - Initial Complaint: Altered Mental Status. Focused Assessment:    05:59 Peripheral Vascular Peripheral Neurovascular - Peripheral Neurovascular WDL: .WDL except; edema   Edema - Edema: leg, left; leg, right (family states edema has worsened since 2 days ago)  Dorsalis Pedis Pulse - Left Dorsalis Pedis Pulse: 2+ (normal)  Right Dorsalis Pedis Pulse: 2+ (normal)   Edema - Leg, Right Edema: 4+ (Severe)  DH     05:58 Neurological Cognitive - Cognitive/Neuro/Behavioral WDL: .WDL except; level of consciousness (Increased lethargy per family and sending facility) Level of Consciousness: lethargic  Arousal Level: arouses to pain          Tests Performed:   Labs Ordered and Resulted from Time of ED Arrival to Time of ED Departure   COMPREHENSIVE METABOLIC PANEL - Abnormal       Result  Value    Sodium 141      Potassium 3.9      Chloride 110 (*)     Carbon Dioxide (CO2) 28      Anion Gap 3      Urea Nitrogen 20      Creatinine 1.03      Calcium 9.1      Glucose 131 (*)     Alkaline Phosphatase 218 (*)     AST 23      ALT 22      Protein Total 7.5      Albumin 2.9 (*)     Bilirubin Total 1.0      GFR Estimate 73     BLOOD GAS VENOUS - Abnormal    pH Venous 7.44 (*)     pCO2 Venous 41      pO2 Venous 45      Bicarbonate Venous 27      Base Excess/Deficit (+/-) 3.0 (*)     FIO2 23     CBC WITH PLATELETS AND DIFFERENTIAL - Abnormal    WBC Count 5.1      RBC Count 3.30 (*)     Hemoglobin 10.6 (*)     Hematocrit 34.1 (*)      (*)     MCH 32.1      MCHC 31.1 (*)     RDW 15.0      Platelet Count 147 (*)     % Neutrophils 71      % Lymphocytes 12      % Monocytes 13      % Eosinophils 3      % Basophils 1      % Immature Granulocytes 0      NRBCs per 100 WBC 0      Absolute Neutrophils 3.6      Absolute Lymphocytes 0.6 (*)     Absolute Monocytes 0.7      Absolute Eosinophils 0.2      Absolute Basophils 0.0      Absolute Immature Granulocytes 0.0      Absolute NRBCs 0.0     GLUCOSE BY METER - Abnormal    GLUCOSE BY METER POCT 134 (*)    ROUTINE UA WITH MICROSCOPIC REFLEX TO CULTURE - Abnormal    Color Urine Yellow      Appearance Urine Clear      Glucose Urine Negative      Bilirubin Urine Negative      Ketones Urine Negative      Specific Gravity Urine 1.026      Blood Urine Trace (*)     pH Urine 6.5      Protein Albumin Urine 10  (*)     Urobilinogen Urine 4.0 (*)     Nitrite Urine Negative      Leukocyte Esterase Urine Negative      Mucus Urine Present (*)     RBC Urine 35 (*)     WBC Urine 1     INFLUENZA A/B & SARS-COV2 PCR MULTIPLEX - Normal    Influenza A PCR Negative      Influenza B PCR Negative      SARS CoV2 PCR Negative     COVID-19 VIRUS (CORONAVIRUS) BY PCR - Normal    SARS CoV2 PCR Negative     GLUCOSE MONITOR NURSING POCT   NT PROBNP INPATIENT   TROPONIN I     XR Knee Right 1/2 Views    Final Result   IMPRESSION: Prepatellar soft tissue swelling. No acute fracture or dislocation. No joint effusion. Atherosclerotic vascular calcifications.      XR Chest 1 View   Final Result   IMPRESSION: Large indistinct central pulmonary vasculature consistent with interstitial pulmonary edema. Recommend follow-up to resolution. No pneumothorax or pleural effusion. Moderately enlarged heart. Stable tortuous aorta. Multiple median sternotomy    wires.      Head CT w/o contrast   Final Result   IMPRESSION:   1.  No CT evidence for acute intracranial process.   2.  Brain atrophy and presumed chronic microvascular ischemic changes as above.                       . Abnormal Results:   Treatments provided: Lasix 20mg  Family Comments: daughter is power of  who is contemplating palliative care and a DNR status   OBS brochure/video discussed/provided to patient:  N/A  ED Medications:   Medications   furosemide (LASIX) injection 20 mg (20 mg Intravenous Given 1/27/22 0550)     Drips infusing:  No  For the majority of the shift, the patient's behavior Green. Interventions performed were     Sepsis treatment initiated: No     Patient tested for COVID 19 prior to admission: YES    ED Nurse Name/Phone Number: Joey Duron RN,   6:32 AM    RECEIVING UNIT ED HANDOFF REVIEW    Above ED Nurse Handoff Report was reviewed: Yes  Reviewed by: Fallon Meredith RN on January 27, 2022 at 7:58 AM

## 2022-01-28 NOTE — PROGRESS NOTES
[facility]  Hospitalist Progress Note      Assessment & Plan   Milo Serna is a 81 year old male with PMH including CAD s/p remote CABG, A. fib on Eliquis with frequent bradycardia, pauses, PVCs, anemia, PUD, HTN, systolic CHF with most recent EF 50%, moderate severe MR, moderate TR, severe lymphedema, LENORA nonadherent to CPAP, BPH, prostate cancer, urinary retention, memory impairment, dementia, who was admitted with decompensated heart failure on 1/27/2022 after presenting for evaluation from memory care with right knee pain and altered behavior. Third admission since December of 2021 for decompensated CHF.    Diuresed on admission with IV lasix 60 mg on 1/27/2022 softer BP limiting diuresis this AM. Net output is low, remains admitted for further diuresis.     Acute on Chronic HFrEF exacerbation   Chronic AF with frequent PVC's, intermittent bradycardia and occasional sinus pauses  Moderate-severe MR, moderate TR  CAD s/p CABG  Noted dyspnea at rest, no hypoxia or angina. CXR is congested.  Weight not recorded today.  Yesterday ?bed weight? 192 pounds which is up from the TCU discharge summary on January 18 recording a weight of 169 pounds.   PTA medical therapy: torsemide 20 mg daily, Not on beta-blocker due to history of bradycardia.  ACE inhibitor (lisinopril) discontinued during prior hospitalization due to electrolyte abnormalities and mild hypotension. Most recent EF improved to 50%.  He does have moderate to severe MR and moderate TR on most recent echocardiogram last month. Investigated with holter monitor at prior discharge, results pending.     -Diuresis today intended to trial increased home dose of demadex 20 ->40 mg PO to prep for discharge. Unfortunately given later than anticipated in the day due to softer BP in anticipation for return to memory care.. Is not improved today and has had low response to diuresis thus far. Remain admitted for further IV Diuresis ->  anticipate additional IV dose of 60 mg Lasix if BP will tolerate @1500 today, rounding provider to reassess further diuresis requirements tomorrow. I have re-ordered 60 mg IV lasix for 0800 on 1/29 tentatively   - telemetry   - Strict intake and output, daily weights  - monitor/replace elytes -- goal potassium greater than 4, magnesium greater than 2  - not on ASA due to anticoag with apixaban (resumed), resume statin  - consider restarting ACEI at discharge  - [facility]   -  needs close outpatient follow up with his Cardiologist, Dr. Conley for further discussion of pursuing valvular repair such as mitral clip etc. If no intervention at that time is recommend for valvular disease recommend pursuing hospice or palliative care     Right Knee Pain  Mild bursitis from kneeling on the floor PTA. No objective findings of infection such as septic bursitis or septic arthritis.  I doubt any cellulitis involving the legs. Right knee imaging shows swelling of the prepatellar soft tissues.  Pain is well controlled. He is ambulatory but his main barrier to discharge in terms of mobility is cueing required for safe mobility.   -Schedule acetaminophen 1000 mg 3 times daily and Voltaren gel 4 times daily to the right knee  -Fall precautions  -Diurese patient to see if he is more mobile with improvement in lymphedema    Acute Encephalopathy, Delirium   Dementia with Behavior disturbances  - >his memory care he was found in his room kneeling with his head on his chair.  He was confused, somnolent, and reporting right knee pain so he was sent in for evaluation to the ER by EMS. <-   History of memory issues preceding 2019 and worked up by primary care, with imaging findings of microvascular changes on head CT.  Slums in December was 14 out of 30.  Accelerated decline has been reported over the past few months with behavioral disturbances including reported hallucinations, mood changes, sundowning/day night reversal. Has been moved to  "memory care in the past month and started on Seroquel QHS and has a prescription for Zyprexa 5 mg twice daily as needed for agitation  Nursing notes \"lethargy\" however he awakens easily. Restless.   On my evaluation lucid, tearful but disoriented to time. Expected mental status worsening in new setting exacerbated recent moves and recurrent hospitalization   -Resume Seroquel, 25 mg at bedtime  -Resume as needed Zyprexa, recommend 5 mg at bedtime as needed for agitation  -If this regimen is ineffective may consider giving an additional dose of Zyprexa if needed this evening  -Would like to avoid antipsychotics as able as not to complicate lethargy    Chronic Lymphedema  PVD, Venous Stasis of [place]. LE  Decompensated. Seen in the ED for this on 1/25. No signs of secondary skin or soft tissue infection. Some weeping noted and dermatitis.  - Would benefit from ongoing lymphedema therapy, orders placed.   - diuresis as noted above    LENORA: Noncompliant with CPAP.  Oxygen use may be needed nightly as needed    History of prostate cancer  Urinary retention: Details of prostate cancer unclear.  During prior hospitalization for CHF exacerbation required Biggs catheter for retention which was since discontinued with no recurrent retention.    -Urinary retention has been noted here with a bladder scan of 500 cc today   -Monitor PVRs, if requiring subsequent straight cath may need Biggs again   -Strict intake and output    DVT Prophylaxis: DOAC  Code Status: No CPR- Pre-arrest intubation OK   Baseline Functional Status: ambulatory with walker, adl assistance at [facility]  Expected Discharge: 1/29/2022  Anticipated discharge location: other (see comments) ([facility])      Daughter updated with POC. Present at bedside evaluation today.     Kallie Gilmore PA-C      Interval History   Patient is reported to be more lethargic this morning per nursing was given extra Seroquel last night for restlessness/agitation.    He " is lucid difficult to interview and tearful throughout discussion with daughter regarding heart failure management.  He is unable to state why he is crying or what is upsetting him. On subsequent assessment this afternoon delirious, restless and attempting to get OOB.     He was up to eat breakfast this morning.  Has been struggling with urinary retention and low output recorded from Lasix overnight.  He appears short of breath but denies any chest pain or orthopnea.  Denies pain. No syncopal episodes or palpitations.     -Data reviewed today: I reviewed all new labs and imaging results over the last 24 hours.  telemetry with controlled A. fib, nonsustained VT 6 bts, asymptomatic of the PVCs and pauses    Physical Exam   Temp: 97.6  F (36.4  C) Temp src: Oral BP: 123/59 Pulse: 63   Resp: 18 SpO2: 97 % O2 Device: None (Room air)    Vitals:    01/27/22 0850   Weight: 87.2 kg (192 lb 4.8 oz)     Vital Signs with Ranges  Temp:  [96.8  F (36  C)-97.6  F (36.4  C)] 97.6  F (36.4  C)  Pulse:  [50-87] 63  Resp:  [12-18] 18  BP: (102-134)/(52-83) 123/59  SpO2:  [96 %-100 %] 97 %  I/O last 3 completed shifts:  In: 488 [P.O.:488]  Out: -       Constitutional:Awake, alert, sitting in the recliner. Visibly short of breath.   Respiratory:  Tachypneic. Crackles at bases. No wheezing.   Cardiovascular: Regular rate and irregular rhythm, normal S1 and S2, and no appreciated murmur.  GI: Normal bowel sounds, soft, non-distended, non-tender.   Skin/Integument: No rashes, no cyanosis, see ED provider note for imaging of lower extremities.  Pitting edema extending through mid thighs confluent mild erythema with scaling and weeping in some areas.  No palpable fluctuance or increased pain or warmth.   Musculoskeletal: No pain with passive range of motion of bilateral knees.  Mild swelling patellar region on right.  Neuro: Oriented to self.  Sometimes oriented to situation and place.  Moving all extremities. Coordination and sensation  grossly intact. Speech clear. No focal deficits.   Psych: tearful. No overt hallucinations.       Medications     ACE/ARB/ARNI NOT PRESCRIBED       BETA BLOCKER NOT PRESCRIBED         acetaminophen  1,000 mg Oral TID     apixaban ANTICOAGULANT  2.5 mg Oral BID     diclofenac  2 g Topical 4x Daily     lidocaine  1 patch Transdermal Q24H     lidocaine   Transdermal Q8H     omeprazole  40 mg Oral Daily     potassium chloride ER  20 mEq Oral BID     QUEtiapine  50 mg Oral At Bedtime     simvastatin  20 mg Oral At Bedtime       Data   Recent Labs   Lab 01/28/22  0556 01/27/22  0457 01/27/22  0450 01/25/22  1323   WBC 4.1 5.1  --  4.7   HGB 10.3* 10.6*  --  10.8*   * 103*  --  104*   * 147*  --  147*    141  --  144   POTASSIUM 3.8 3.9  --  4.0   CHLORIDE 109 110*  --  110*   CO2 26 28  --  26   BUN 25 20  --  21   CR 1.18 1.03  --  1.12   ANIONGAP 6 3  --  8   ANG 8.8 9.1  --  9.3   GLC 84 131* 134* 91   ALBUMIN  --  2.9*  --   --    PROTTOTAL  --  7.5  --   --    BILITOTAL  --  1.0  --   --    ALKPHOS  --  218*  --   --    ALT  --  22  --   --    AST  --  23  --   --        No results found for this or any previous visit (from the past 24 hour(s)).

## 2022-01-28 NOTE — PROGRESS NOTES
Care Management Follow Up    Length of Stay (days): 0    Expected Discharge Date: 01/28/2022 pending medical clearance.      Concerns to be Addressed: care coordination/care conferences,discharge planning     Patient plan of care discussed at interdisciplinary rounds: Yes    Anticipated Discharge Disposition: Assisted Living,Home Care ([facility])     Anticipated Discharge Services: Home RN/PT/OT through [facility]     Anticipated Discharge DME: None    Patient/family educated on Medicare website which has current facility and service quality ratings:  (NA)  Patient/Family in Agreement with the Plan: yes    Referrals Placed by CM/SW: Homecare    Additional Information:  CM following for discharge planning, spoke with pt's dtr Stacia [phone] via phone, she reports that she will be coming to the hospital this morning and was hopeful for an update from provider. If pt is medically ready to discharge Stacia would like to transport if pt is transferring well enough to get in/out of the car with assist of 1. If pt is requiring more assistance, he would require WC transport. Reviewed out of pocket cost for [facility] transport, $81.80 for base rate and $5.26 per mile to the destination. Will determine transport closer to discharge.     Pt was ambulating with a WW and cueing prior to admission. PT evaluated this morning and report that pt's ability is limited due to pt's lethargy and he is requiring assistance of 2 for transfers, if [facility] unable to accommodate will need TCU placement. If pt returns to , PT recommends home lymphedema therapy and orders to measure for compression garments in addition to orders for home RN/PT/OT.     Spoke with provider, unsure yet if pt will be medically ready to discharge today. Called and spoke with nurse Noreen [phone] who now reports pt would need to return by 1300 in order to return today. If pt discharges on the weekend he needs to be  ambulating with a WW and only require cueing, otherwise we need to pursue TCU since he isn't at baseline or wait until Monday when they can assess for increased services. Paged provider to update.     Update 1320: Per provider, pt is not medically ready for discharge today. Called Noreen and CHRISTINE with update. CM will continue to follow for discharge planning.     Lou Bergeron RN BSN   Inpatient Care Coordination  [facility]   Phone [phone]

## 2022-01-28 NOTE — PLAN OF CARE
Per tele tech, patient had 6 beat run of V-tach, strip requested, provider notified. Patient asymptomatic at time of occurrence.

## 2022-01-28 NOTE — PLAN OF CARE
PRIMARY DIAGNOSIS: CONGESTIVE HEART FAILURE  OUTPATIENT/OBSERVATION GOALS TO BE MET BEFORE DISCHARGE:  1. Dyspnea improved and O2 sats >88% at RA or at prior home O2 therapy level: Yes        SpO2: 98 %, O2 Device: None (Room air)  Vitals:    01/27/22 0850   Weight: 87.2 kg (192 lb 4.8 oz)        2. ECHO and other diagnostic testing complete (if applicable): Yes    3. Return to near baseline physical activity: No    Discharge Planner Nurse   Safe discharge environment identified: Yes - memory care  Barriers to discharge: Yes       Entered by: Lesly Worthy 01/27/2022 10:18 PM     Please review provider order for any additional goals.   Nurse to notify provider when observation goals have been met and patient is ready for discharge.

## 2022-01-28 NOTE — PLAN OF CARE
PRIMARY DIAGNOSIS: ACUTE PAIN & CONGESTIVE HEART FAILURE  OUTPATIENT/OBSERVATION GOALS TO BE MET BEFORE DISCHARGE:  1. Dyspnea improved and O2 sats >88% at RA or at prior home O2 therapy level: Yes        SpO2: 98 %, O2 Device: None (Room air)  Vitals:    01/27/22 0850   Weight: 87.2 kg (192 lb 4.8 oz)        2. ECHO and other diagnostic testing complete (if applicable): No, orders for echo at this time.     3. Return to near baseline physical activity: No, A2 with walker and gait belt.    Discharge Planner Nurse   Safe discharge environment identified: Yes.  Barriers to discharge: Yes.       Entered by: Fallon Meredith 01/27/2022 7:48 PM   Pt. Disorientated x2 to situation and time. Lethargy improving, understands he is in the hospital-believes it is for his heart  And knows who he is. Repetitive with questions and repeats back what you say. Pleasantly confused-compliant with ques. Pt. Tolerated lunch and dinner, ate 100% on 2 gram sodium diet with 1800 mL fluid restrictions-had about 500 mL of fluids throughout the day. Daily weights. On tele SR/SB with HR 50-60's with frequent PVC's and pausing. PT/OT/SW and nutrition following. Pt. Grimacing with repositioning to R knee-volatern gel and lido patch being utilized and scheduled Tylenol. Strict I&O's. Elevating BLE, +4 edema. Pulses weaks +1 bilaterally to pedial pulses. Sween applied to BLE and wrapped in ACE wraps this evening. IV lasix to assist with diuresing. Pt. Incontinent of stool and urine x2 throughout shift. Nursing to continue to monitor and assess Pt. And provide supportive cares.   /83 (BP Location: Left arm)   Pulse 87   Temp 97.4  F (36.3  C) (Oral)   Resp 16   Wt 87.2 kg (192 lb 4.8 oz)   SpO2 98%   BMI 30.11 kg/m    Please review provider order for any additional goals.   Nurse to notify provider when observation goals have been met and patient is ready for discharge.

## 2022-01-28 NOTE — PLAN OF CARE
PRIMARY DIAGNOSIS: CONGESTIVE HEART FAILURE  OUTPATIENT/OBSERVATION GOALS TO BE MET BEFORE DISCHARGE:  Dyspnea improved and O2 sats >88% at RA or at prior home O2 therapy level: Yes        SpO2: 98 %, O2 Device: None (Room air)  Vitals:    01/27/22 0850   Weight: 87.2 kg (192 lb 4.8 oz)        ECHO and other diagnostic testing complete (if applicable): No    Return to near baseline physical activity: No    Discharge Planner Nurse   Safe discharge environment identified: No  Barriers to discharge: Yes       Entered by: Bogdan Samuels 01/28/2022 3:16 AM    Please review provider order for any additional goals.   Nurse to notify provider when observation goals have been met and patient is ready for discharge.

## 2022-01-28 NOTE — PROGRESS NOTES
01/28/22 1045   Quick Adds   Quick Adds Edema Eval   Type of Visit Initial PT Evaluation   Living Environment   People in home alone   Current Living Arrangements assisted living  (memory care)   Home Accessibility no concerns   Transportation Anticipated   (TBD)   Self-Care   Usual Activity Tolerance moderate   Current Activity Tolerance fair   Equipment Currently Used at Home walker, rolling   Activity/Exercise/Self-Care Comment Patient poor historian, per medical chart, is modified independent with ambulation at baseline with 2WW.   Disability/Function   Hearing Difficulty or Deaf yes   Patient's preferred means of communication English speaker with hearing loss, no speech problems.   Describe hearing loss bilateral hearing loss   Use of hearing assistive devices none   Were auxiliary aids offered? yes   The following aids were provided;   (speak loud and clear)   Hearing Management   (hearing aides at home )   Wear Glasses or Blind yes   Vision Management Glasses not present on admsison    Concentrating, Remembering or Making Decisions Difficulty yes   Concentration Management Lives at [facility]   Difficulty Communicating no   Difficulty Eating/Swallowing no   Walking or Climbing Stairs Difficulty no   Dressing/Bathing Difficulty no   Toileting issues no   Doing Errands Independently Difficulty (such as shopping) no   Fall history within last six months yes   Number of times patient has fallen within last six months 1   Change in Functional Status Since Onset of Current Illness/Injury no   General Information   Onset of Illness/Injury or Date of Surgery 01/27/22   Referring Physician Kallie Gilmore PA-C   Patient/Family Therapy Goals Statement (PT) Patient lethargic, unable to ascertain   Pertinent History of Current Problem (include personal factors and/or comorbidities that impact the POC) Per medical chart: Milo Serna is a 81 year old male with history of CAD, hypertension, prostate  cancer, cardiomyopathy, hyperlipidemia, LENORA, PVC's, CHF, and atrial fibrillation on apixaban who presents via ambulance with knee pain. The patient comes from [facility] unit where staff note he was found kneeling on the floor and leaning on a chair tonight. They are unsure if he fell or how he came to be on the floor. The staff explain he seemed more tired tonight as he is typically walking around, but instead was laying down most of the night. He was complaining of right knee pain earlier today. Upon arrival to the ED his daughter who is at bedside reports his legs look more swollen tonight.    Existing Precautions/Restrictions fall   Edema General Information   Affected Body Part(s) Left LE;Right LE   Edema Etiology Chronic Venous Insfficiency   Edema Precautions Cardiac Edema/CHF   General Comments/Previous Edema Treatment/Edema Equipment Per Kallie Gilmore PA-C, patient was supposed to get lymphedema follow up following last admission, unable to ask patient if this happened.  Per medical chart, [facility] care wants an order for compression socks for lymphedema management.   Edema Examination/Assessment   Skin Condition Comments  Bilateral feet and calves with erythema, yellow scales, one covered wound at left anterior calf.   Pitting Assessment Patient presents with +3 edema in bilateral feet, +2 at left calf and +3 at right calf.     Cognition   Affect/Mental Status (Cognition) low arousal/lethargic   Cognitive Status Comments Patient able to open eyes with heavy cueing, able to follow single step commands approx 25% of the time with repeated cueing, tactile cueing required.   Strength   Strength Comments Decreased generally, needs assist with mobility as noted below.    Bed Mobility   Bed Mobility supine-sit   Supine-Sit [place] (Bed Mobility) maximum assist (25% patient effort)   Bed Mobility Limitations cognitive deficits   Comment (Bed Mobility) Patient required max A x1 with HOB raised,  verbal cueing.   Transfers   Transfers sit-stand transfer   Transfer Safety Concerns Noted decreased balance during turns;decreased sequencing ability   Impairments Contributing to Impaired Transfers   (lethargy)   Transfer Safety Comments mod A x2 with constant, direct cueing with 2WW and tactile cueing for weight shift and transfer to sitting   Sit-Stand Transfer   Sit-Stand [place] (Transfers) moderate assist (50% patient effort);2 person assist   Sit/Stand Transfer Comments Patient lethargic, limited ability to follow cueing   Gait/Stairs (Locomotion)   Comment (Gait/Stairs) Unable to assess ambulation secondary to lethargy, decreased ability to safely follow cueing.   Balance   Balance Comments Fair sitting and standing balance with CGA, poor dynamic standing balance requiring min A secondary to lethargy   Clinical Impression   Criteria for Skilled Therapeutic Intervention yes, treatment indicated   PT Diagnosis (PT) decreased independence with mobility   Edema: Patient Presentation Stage 2 Lymphedema;Wounds/Ulcers   Influenced by the following impairments Global weakness, decreased activity tolerance, lethargy   Functional limitations due to impairments Decreased IND with transfers, gait   Clinical Presentation Stable/Uncomplicated   Clinical Presentation Rationale goals of care clarified per RN, agreeable to TCU, medically improving   Clinical Decision Making (Complexity) low complexity   Therapy Frequency (PT) 5x/week   Predicted Duration of Therapy Intervention (days/wks) 7 days   Planned Therapy Interventions (PT) bed mobility training;gait training;patient/family education;strengthening;transfer training;progressive activity/exercise   Edema: Planned Interventions Gradient compression bandaging;Precautions to prevent infection/exacerbation;Manual therapy   Risk & Benefits of therapy have been explained evaluation/treatment results reviewed;care plan/treatment goals reviewed;participants voiced  agreement with care plan;participants included;patient   PT Discharge Planning    PT Discharge Recommendation (DC Rec) home with assist;home with home care physical therapy;Transitional Care Facility   PT Rationale for DC Rec Patient presents below baseline, however evaluation of patient's abilities limited by patient's lethargy.  Currently required Ax2 for transfers at bedside.  If memory care cannot accomodate Ax2, would require TCU.   PT Brief overview of current status  Ax2, transfers at bedside

## 2022-01-28 NOTE — PROVIDER NOTIFICATION
Interpretation of cardiac rhythm per telemetry tech: SB 2.2 Pause,  7 sec bigeminy.    On-call Hospitalist paged and Patient bedside RN Notified.     Temp: 97.4  F (36.3  C) Temp src: Oral BP: 134/83 Pulse: 87   Resp: 16 SpO2: 98 % O2 Device: None (Room air)

## 2022-01-28 NOTE — PLAN OF CARE
PRIMARY DIAGNOSIS: CONGESTIVE HEART FAILURE  OUTPATIENT/OBSERVATION GOALS TO BE MET BEFORE DISCHARGE:  1. Dyspnea improved and O2 sats >88% at RA or at prior home O2 therapy level: Yes        SpO2: 97 %, O2 Device: None (Room air)  Vitals:    01/27/22 0850   Weight: 87.2 kg (192 lb 4.8 oz)      2. ECHO and other diagnostic testing complete (if applicable): No    3. Return to near baseline physical activity: No    Discharge Planner Nurse   Safe discharge environment identified: No  Barriers to discharge: Yes       Entered by: Margo Gill 01/28/2022     Disoriented x4. Patient is lethargic this AM, unable to keep his eyes open. /53 this AM, torsemide held per orders. Continues remote tele: A-fib, HR continues to be 30-50s, inverted T-waves. Scheduled Tylenol, Lidocaine patch and topical Voltaren gel for pain control. 3+ edema to BLE, bernie. Elevated as patient tolerates. Gradient compression bandages applied by PT this AM. Up w/ heavy Ax2 w/ gait belt and walker to chair. Up in chair for breakfast. Patient required assistance w/ meal tray. Ate 100% of meal, but continued to have his eyes closed. Responsive to voice and to touch. O2 sats stable on RA. LS coarse w/ crackles  CMS intact. PT consulted. BS active x4, tolerating 2 gm Na diet w/ 1800mL fluid restriction. Daily weights. Bladder scanned for 514 mL this AM, voided 475 mL. SL. PT, OT, SW/CM, Nutrition consulted. Will continue to provide supportive cares.     Will continue to provide supportive cares.     Please review provider order for any additional goals.   Nurse to notify provider when observation goals have been met and patient is ready for discharge.

## 2022-01-28 NOTE — PLAN OF CARE
PRIMARY DIAGNOSIS: CONGESTIVE HEART FAILURE  OUTPATIENT/OBSERVATION GOALS TO BE MET BEFORE DISCHARGE:  1. Dyspnea improved and O2 sats >88% at RA or at prior home O2 therapy level: Yes        SpO2: 98 %, O2 Device: None (Room air)  Vitals:    01/27/22 0850   Weight: 87.2 kg (192 lb 4.8 oz)        2. ECHO and other diagnostic testing complete (if applicable): No    3. Return to near baseline physical activity: No    Discharge Planner Nurse   Safe discharge environment identified: No  Barriers to discharge: Yes       Entered by: Bogdan Samuels 01/28/2022 5:29 AM  /62 (BP Location: Right arm)   Pulse 50   Temp 97.3  F (36.3  C) (Oral)   Resp 16   Wt 87.2 kg (192 lb 4.8 oz)   SpO2 97%   BMI 30.11 kg/m    Bradycardic on RA. Disorientated x4, PSC recommended. Has not been OOB, Ax2 turns, bed alarm active. Tolerating 2Na diet w/ 1800mL fluid restriction. Denies pain. PIV SL. Pt was fairly agitated at beginning of shift, but was able to be redirected and fell asleep. On tele running Bradycardic 60s. Tele reported SB w/ a 2 second pause, 7 second bigeminal and VE Couplet, as well as episode of HR 34. Pt found to be drowsy and reporting SOB. MD paged(see Note) VSS, HOB elevated, repositioned and denied SOB upon reassessment. Plan to monitor strict I/Os, daily Wts, K+, administer Lasix, Simvistatin, Lidocaine, Seroquel, PTAs, and Zyprexa as ordered (see MAR), wrap and elevate LEs, while consulting PT. Pt is resting peacefully. Will continue to provide supportive cares.  Please review provider order for any additional goals.   Nurse to notify provider when observation goals have been met and patient is ready for discharge.

## 2022-01-29 NOTE — PLAN OF CARE
PRIMARY DIAGNOSIS: CONGESTIVE HEART FAILURE  OUTPATIENT/OBSERVATION GOALS TO BE MET BEFORE DISCHARGE:  Dyspnea improved and O2 sats >88% at RA or at prior home O2 therapy level: Yes        SpO2: 97 %, O2 Device: None (Room air)  Vitals:    01/27/22 0850   Weight: 87.2 kg (192 lb 4.8 oz)      ECHO and other diagnostic testing complete (if applicable): No    Return to near baseline physical activity: No    Discharge Planner Nurse   Safe discharge environment identified: No  Barriers to discharge: Yes       Entered by: Mrago Gill 01/28/2022     Disoriented x4. Patient was lethargic earlier this AM, but more awake mid to late morning. Scheduled Tylenol, Lidocaine patch and topical Voltaren gel for pain control. 3+ edema to BLE, bernie. Elevated as patient tolerates. Gradient compression bandages applied by PT this AM. Up w/ heavy Ax2 w/ gait belt and walker to chair. BS active x4, tolerating 2 gm Na diet w/ 1800mL fluid restriction. Strict I&O's. Up in chair for meals. Patient needs assistance with meal trays, good appetite. O2 sats stable on RA. LS coarse w/ crackles  CMS intact. PT consulted.  Daily weights. Voiding in adequate amt's with urinal. Patient has needed cues and reminders to urinate. SL. PT, OT, SW/CM, Nutrition consulted. Will continue to provide supportive cares.     Will continue to provide supportive cares.     Please review provider order for any additional goals.   Nurse to notify provider when observation goals have been met and patient is ready for discharge.

## 2022-01-29 NOTE — PROGRESS NOTES
Hospitalist Medicine Progress Note   [facility]       Milo Serna is a 81 year old gentleman with past medical history of CAD s/p CABG in 1996, chronic venous stasis with lymphedema, essential hypertension, chronic systolic congestive heart failure with LVEF of 50%, moderately severe mitral insufficiency, moderate tricuspid insufficiency, obstructive sleep apnea with nonadherence to CPAP, BPH, prostate cancer, peptic ulcer disease, atrial fibrillation on Eliquis treatment, dementia who came to [facility] on January 27, 2022 from a TCU Have where he had altered mental status and overall failure to thrive from where he was sent to memory care unit at [facility].  He has had his third admission since December 2021 for decompensated congestive heart failure when he came in to [facility] on 1/27/2022 with possible fall having being found on the floor leaning on a chair kneeling down with right knee pain.  He was diagnosed with acute exacerbation of chronic systolic congestive heart failure and was started on IV furosemide therapy with which he is losing significant amount of weight.  His mentation at admission has been slowly improving as well.  He was evaluated by physical therapy who recommend TCU if he is unable to get an assist of 2 people for moving around.  I did talk to the patient's daughter Stacia who is concerned about multiple admissions and wanted to talk about hospice care.  I did tell her that in my opinion patient might have more than 6 months to live which might be a disqualify her for hospice nevertheless we will consult palliative care      Date of Admission:  1/27/2022  Assessment & Plan     Acute on Chronic HFrEF exacerbation   Chronic AF with frequent PVC's, intermittent bradycardia and occasional sinus pauses  Moderate-severe MR, moderate TR  CAD s/p CABG  Symptoms are improving with decrease in weight and improvement in shortness of  breath  Weight is down from 87 kg to 82 kg   We will continue with Lasix at this time  Will start ACE inhibitor at discharge  Patient's daughter wanted me to have cardiology see the patient to deem necessary if he requires hospice care -we will consult palliative care and I did tell that it is better that his primary cardiologist makes that decision    Chronic Lymphedema  PVD, Venous Stasis of Melvin. LE  Decompensated. Seen in the ED for this on 1/25. No signs of secondary skin or soft tissue infection. Some weeping noted and dermatitis.  - Would benefit from ongoing lymphedema therapy, orders placed.   - diuresis as noted above     LENORA: Noncompliant with CPAP.  Oxygen use may be needed nightly as needed     History of prostate cancer  Urinary retention: Details of prostate cancer unclear.  During prior hospitalization for CHF exacerbation required Biggs catheter for retention which was since discontinued with no recurrent retention.    -Urinary retention has been noted here with a bladder scan of 500 cc today   -Monitor PVRs, if requiring subsequent straight cath may need Biggs again   -Strict intake and output     DVT Prophylaxis: DOAC  Code Status: No CPR- Pre-arrest intubation OK   Baseline Functional Status: ambulatory with walker, adl assistance at [facility]  Expected Discharge: 1/29/2022  Anticipated discharge location: other (see comments) ([facility])                    Plan:   Consult palliative care  Monitor weight  BMP in am      Diet: 2 Gram Sodium Diet Other - please comment    DVT Prophylaxis: Low Risk/Ambulatory with no VTE prophylaxis indicated and DOAC  Biggs Catheter: Not present  Code Status: No CPR- Pre-arrest intubation OK               The patient's care was discussed with the Patient and daughter    Eloy Webber MD  Hospitalist Service  [facility]    ______________________________________________________________________    Interval History     Symptoms    Patient shortness of breath is better    Review of Systems:   Denies any other symptoms.  Says that he might have been drinking a lot of soups lately    Data reviewed today: I reviewed all medications, new labs and imaging results over the last 24 hours.     Physical Exam   Vital Signs: Temp: 97.5  F (36.4  C) Temp src: Oral BP: (!) 147/75 Pulse: 57   Resp: 18 SpO2: 96 % O2 Device: None (Room air)    Weight: 182 lbs 9.6 oz      GENERAL: Patient is not  in acute distress  HEENT:  EOM+,Conjunctiva is clear    NECK:   no Jugular Venous distention  HEART: S1 S2  regular Rate and Rhythm,  there is   no murmur,   LUNGS: Respirations are   not laboured, Lungs are   clear to auscultation  without Crepitations or Wheezing   ABDOMEN: Soft  , there is no  tenderness  ,Bowel Sounds are   Positive   LOWER LIMBS: there is Pedal Edema   Bilaterally   CNS:   Alert,   Oriented x 3, Moving all the Four Limbs      Data   Recent Labs   Lab 01/29/22  0526 01/28/22  1756 01/28/22  0556 01/27/22  0457   WBC 4.3  --  4.1 5.1   HGB 10.6*  --  10.3* 10.6*   *  --  103* 103*   *  --  122* 147*     --  141 141   POTASSIUM 3.5 3.8 3.8 3.9   CHLORIDE 109  --  109 110*   CO2 28  --  26 28   BUN 23  --  25 20   CR 1.16  --  1.18 1.03   ANIONGAP 5  --  6 3   ANG 8.7  --  8.8 9.1   GLC 92  --  84 131*   ALBUMIN  --   --   --  2.9*   PROTTOTAL  --   --   --  7.5   BILITOTAL  --   --   --  1.0   ALKPHOS  --   --   --  218*   ALT  --   --   --  22   AST  --   --   --  23         No results found for this or any previous visit (from the past 24 hour(s)).

## 2022-01-29 NOTE — PLAN OF CARE
Disoriented x4. Patient was lethargic earlier this AM, but became more awake mid to late morning. Scheduled Tylenol, Lidocaine patch and topical Voltaren gel for pain control. 3+ edema to BLE, bernie. BLE elevated as patient tolerates. Gradient compression bandages applied by PT this AM. Up w/ heavy Ax2 w/ gait belt and walker to chair. BS active x4, tolerating 2 gm Na diet w/ 1800mL fluid restriction. Strict I&O's. Up in chair for meals. Patient needs assistance with meal trays, good appetite. O2 sats stable on RA. LS coarse w/ crackles  CMS intact. PT consulted.  Daily weights. Voiding in adequate amt's with urinal. Patient has needed cues and reminders to urinate throughout the day today. SL. PT, OT, SW/CM, Nutrition consulted. Sitter remains at bedside for safety. Patient is restless at times, throwing legs out of bed but has been redirectable. Patient's mood noted to fluctuate throughout the day today (from sad and tearful to happy and laughing moments later). Daughter at bedside today, updated on POC. K+ (3.8) and Mg (2.4) protocols, no indication for replacement today. Will continue to provide supportive cares.

## 2022-01-29 NOTE — PLAN OF CARE
"INPATIENT NOTE: [id number]     PRIMARY PROBLEM: Acute on chronic diastolic congestive heart failure, Lymphedema,  Memory problem        Vital signs:  Temp: 97.5  F (36.4  C) Temp src: Oral BP: 107/51 Pulse: 70   Resp: 16 SpO2: 100 % O2 Device: None (Room air)     Weight: 87.2 kg (192 lb 4.8 oz)  Estimated body mass index is 30.11 kg/m  as calculated from the following:    Height as of 1/18/22: 1.702 m (5' 7.01\").    Weight as of this encounter: 87.2 kg (192 lb 4.8 oz).        Orientation: Disorientated x4  Neuro: Intact   Pain status: denying pain.   Resp: Lung sounds are Clear. Denies any SOB.   Cardiac: WNL, Denies any Chest Pain   GI: Bowels are active x 4 Quads.  : Voiding with no concern    Skin: WNL   Peripheral edema: 3+ edema to BLE  LDA: Peripheral IV   Infusions: Patient is Saline locked.   Pertinent Labs: pt is on K+ protocol (K+ 3.5)  Diet: 2 gram sodium  Activity: are 2 assist with Walker and Cane  Consults: SL. PT, OT, SW/CM, Nutrition   Major Shift Event: pt has 1:1 sitter at bedside. Pt was given Melatonin at bedtime and Zyprexa for agitation. Strict I&O's, 1800mL fluid restriction. Pt drunk 400 ml during the shift.      Will continue to monitor and provide cares.     Harshil Stacy RN  "

## 2022-01-29 NOTE — PLAN OF CARE
Vitals:/59 (BP Location: Left arm)   Pulse 63   Temp 97.4  F (36.3  C) (Oral)   Resp 18   Wt 82.8 kg (182 lb 9.6 oz)   SpO2 98%   BMI 28.59 kg/m    Labs:Hgb 10.6   Cardiac: Per tele tech - Afib CVR with freq PVCs triplets, BLE edema +2  Resp:WDL  Neuro:Disoriented x 4  GI:WDL  :Incontinent   Skin:WDL  Activity:Assist of 1 with walker, needs continuous cuing  Diet:1800 fluid restriction, 2 g sodium diet   Pain:Denies   Plan:PT, diuresing with lasix, sitter due to prior behaviors, pt more cooperative today. No combative behavior.

## 2022-01-30 NOTE — PLAN OF CARE
"INPATIENT NOTE: [phone]     PRIMARY PROBLEM: Acute on chronic diastolic congestive heart failure, Lymphedema,  Memory problem        Vital signs:  Temp: 97.1  F (36.2  C) Temp src: Axillary BP: (!) 139/96 Pulse: 54   Resp: 18 SpO2: 98 % O2 Device: None (Room air)     Weight: 79 kg (174 lb 3.2 oz)  Estimated body mass index is 27.28 kg/m  as calculated from the following:    Height as of 1/18/22: 1.702 m (5' 7.01\").    Weight as of this encounter: 79 kg (174 lb 3.2 oz).        Orientation: Disorientated x4  Neuro: Intact   Pain status: denying pain.   Resp: Lung sounds are Clear. Denies any SOB.   Cardiac: WNL, Denies any Chest Pain   GI:Patient is incontinent of bowel. Bowels are active x 4 Quads.  :Patient is incontinent of bladder.Voiding with no concern    Skin: Lymphedema wraps intact on lower extremities  Peripheral edema: 3+ edema to BLE  LDA: Peripheral IV   Infusions: Patient is Saline locked.   Pertinent Labs: pt is Magnesium 2.4   Diet: 2 gram sodium  Activity: are 2 assist with Walker and Cane  Consults: SL. PT, OT, SW/CM, Nutrition   Major Shift Event: pt has 1:1 sitter at bedside. Pt was given Melatonin at bedtime. Strict I&O's, 1800mL fluid restriction. Pt is on Tele monitoring.      Will continue to monitor and provide cares.     Harshil Stacy RN  "

## 2022-01-30 NOTE — PLAN OF CARE
Blood pressure 120/70, pulse 61, temperature 98.1  F (36.7  C), temperature source Oral, resp. rate 16, weight 82.8 kg (182 lb 9.6 oz), SpO2 98 %.      Neuro:   Patient is confused as to time, place and situation.  As the shift continues patient is becoming more agitated; however easily redirected at this time.  Sitter at bedside for safety.  Pulm:    LS clear, adequate sats on room air.  Cardiac:Monitor per tele tech:  Atrial Fib, with frequent PVCs, couplets, triplets.    Resp:    WDL  GI:         WDL, needs consistent direction to eat.  :        Patient is incontinent of bowel and bladder  Activity:  Patient is an assist of one with gait-belt and walker  Skin:      Lymphedema wraps intact on lower extremities.  Post tibial pulses easily palpable.  1-2+ thigh edema continues to improve after diuresis.  Plan:      Continue to provide supportive cares.  K+ and Mag replacement RN managed.  Redraw in am.  Strict I & O Monitor/Assess for safety

## 2022-01-30 NOTE — PROGRESS NOTES
Care Management Follow Up    Length of Stay (days): 2    Expected Discharge Date: 01/31/2022     Concerns to be Addressed: care coordination/care conferences,discharge planning     Patient plan of care discussed at interdisciplinary rounds: Yes    Anticipated Discharge Disposition: Assisted Living,Home Care ([facility])     Referrals Placed by CM/SW: Homecare    Additional Information:  CM following for return to [facility] at discharge. Per AM care rounds, pt is at baseline mobility, ambulating with WW and only requiring cueing. Per provider, pt is not medically ready to discharge today. Will await Palliative Care consult at this time and follow-up with pt/family to further coordinate discharge planning once goals of care are addressed.      Lou Bergeron RN BSN   Inpatient Care Coordination  [facility]   Phone [phone]

## 2022-01-31 NOTE — PLAN OF CARE
Updated by tele tech that Pt. Had 19 beats for V tach, assessed Pt. And VS checked. Pt. Denies any chest pain but difficult to assess r/t cognition. Rounding provider updated.     /58 (BP Location: Right arm, Patient Position: Standing)   Pulse 68   Temp 97.8  F (36.6  C) (Oral)   Resp 17   Wt 81.9 kg (180 lb 9.6 oz)   SpO2 97%   BMI 28.28 kg/m

## 2022-01-31 NOTE — CONSULTS
Cardiology Consultation     Milo Serna MRN# [id number]   YOB: 1940 Age: 81 year old   Date of Admission: 1/27/2022     Reason for consult: I was asked by Dr Hackett to evaluate this patient for nonsustained VT and heart failure      Follows with Dr Conley and [facility]       Assessment and Plan:   Plan for palliative care discussion to help define ongoing plan of care.  Family open to hospice if he qualifies    Acute on Chronic combined systolic and diastolic HF / Bi-ventricular dysfunction   -BNP 1500 / interstitial pulmonary edema  -LVEF 50% with inferior hypokinesis, mild RV dysfunction/enlargement  -3rd HF admission in past couple of months due to poor compliance likely from recent mentation decline and untreated sleep apnea.  -improved status with IV Lasix, (last dose 2 days ago?)  -weights  192--> 174--> 180 lbs today  (euvolemic weight 167 lbs)   -(-2400 total OP)  (+350 OP overnight)  -continue diuresis,  -lymphedema PT / low salt diet / fluid restriction  -on low dose Lisinopril  -[facility] follow up already arranged (2/9/22) with DAVID Coombs    Nonsustained VT  -asymptomatic  -previously noted along with frequent PVCs  -unable to take beta-blockers due to underlying bradycardia  -consider amiodarone?  EP evaluation?    Permanent Atrial Fibrillation with slow ventricular response  -HRs 50-80 bpm while awake and down to 39 bpm during sleep  -Chronic Eliquis 2.5 mg BID (due to recurrent epistaxis on 5 mg dose)    Coronary Artery Disease:  -hx of CABG (1997)  -cMRI (2018) findings consistent with prior RCA infarction  -NL troponin / no significant ST abnormalities  -No CP---> doubt acute coronary syndrome    Moderate/severe Functional MR    Moderate Ascending Aorta Dilatation  -4.4 cm    Dementia:  -some delirium also  -sitter at bedside    Untreated sleep apnea  -intolerant to CPAP    Urinary Retention   -possible UTI              Chief Complaint:       History is obtained from  the patient         History of Present Illness:   This patient is a 81 year old male who presents (1/27/22) via EMS from [facility] for knee pain and mentation change.  Found on floor with presumed fall. Fortunately, there was no evidence of fracture on knee X-ray   He was admitted for concern of HF exacerbation.  Of note, this is his 3rd admission in 2 months for heart failure.     He has a complex past cardiovascular history  He underwent CABG (1997) and has chronic atrial fibrillation and is taking Eliquis for CVA prophylaxis (only 2.5 mg BD due to recurrent epistaxis on higher dose)  ECHOs in the past have shown mod-severe functional MR, chronic systolic heart failure with bi-ventricular dysfunction (LVEF 40-50%) with chronic lymphedema    He has a long history of frequent PVCs and resting bradycardia and therefore has not been on beta-blockers.    He was recently discharged from TCU (1/20/22) following a hospitalization for CHF exacerbation (12/26/21-1/4/22)  At that time, the patient diuresed well with IV lasix   Discharge weight was 167 lbs.  His ECHO then showed LVEF 50%, mild RV enlargement with mild RV dysfunction, 2+ TR, 2-3+ MR, and moderate ascending aorta dilatation (4.4 cm)  Telemetry again showed frequent PVCs, bradycardia with some pauses up to 2.6 sec and was sent home with a Holter monitor  (results still pending)  Of note, he has had decline in his mentation for the past several months and experiences intermittent delirium.  He did not follow up in [facility] clinic as planned earlier this month.    Since admission, his symptoms have improved with IV lasix and his weight is down at least 10 lbs  His EKG showed A-fib with slow ventricular response  No significant ST abnormalities. his CXR showed interstitial pulmonary edema. BNP: 1,506,  Troponin HS: 30, Creatinine: 1.03, Hgb: 10.6 (stable)  He is receiving lymphedema treatment  There have been some issues with urinary retention.    We were consulted  today due to 20-beat run of nonsustained VT noted on telemetry this am.  He has been in A-fib with HRs 39-80 bpm. There have been several pauses, all < 3 seconds.  It appears that he was fairly asymptomatic at time of VT, but somewhat difficult to assess due to poor cognition.  Palliative care has been consulted to discuss ongoing plan of care.    During our visit, he was sitting comfortably in the chair  He denied any chest pain, significant shortness of breath, palpitations, or lightheadedness  He states that his leg edema is about the same  He thinks he has a good appetite.              Past Medical History:     Past Medical History:   Diagnosis Date     Actinic keratosis 10/12/2019     Anemia, unspecified 11/08/2016     Atrial fibrillation (H) 05/27/2015     BPH (benign prostatic hyperplasia) 01/25/2012     CAD (coronary artery disease) 04/04/2012    CAB 1996 following MI (at [facility])     Closed bimalleolar fracture of left ankle with routine healing 04/05/2019     Closed fracture of metatarsal bone 04/04/2012     Dilated aortic root (H) 05/26/2016     Drug-induced erectile dysfunction 10/02/2015     Elevated blood sugar 11/08/2016     Elevated PSA 09/19/2013     Epistaxis 09/09/2019     Essential hypertension with goal blood pressure less than 140/90 09/22/2016     Fall 09/06/2019     Functional diarrhea 10/12/2019     Gallbladder polyp 05/01/2018     Gout      HAV (hallux abducto valgus) 04/04/2012     Hematoma 09/09/2019     Hernia, abdominal      History of prostate cancer 06/15/2016     HTN, goal below 150/90 04/20/2017     Hypokalemia 09/12/2019     Iron deficiency anemia secondary to inadequate dietary iron intake 11/15/2016     Ischemic cardiomyopathy      Low blood pressure reading 10/17/2016     Lumbago     had degendisc dz on MRI 7/07     Memory loss 06/18/2019     Microalbuminuria 06/04/2020    X1     Mixed hyperlipidemia      Mumps      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     Nonrheumatic mitral valve  regurgitation 10/28/2019     OA (osteoarthritis) 04/04/2012     Old myocardial infarction 04/04/2012     LENORA (obstructive sleep apnea) 10/28/2019     LENORA (obstructive sleep apnea)      Other viral warts 09/06/2019     Palpitations      Pes planus 04/04/2012     Petechiae 06/18/2019     Prostate cancer (H) 05/26/2016     PUD (peptic ulcer disease) 04/04/2012     Pulmonary hypertension (H) 09/25/2017     PVC's (premature ventricular contractions)      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     Rosacea 01/15/2008     Stasis dermatitis      Stasis dermatitis of both legs 05/13/2021     Stress due to spouse with dementia 06/18/2019     Thrombocytopenia (H) 04/21/2017     Unspecified hyperplasia of prostate with urinary obstruction and other lower urinary tract symptoms (LUTS)     better on avodart & hytrin     Venous stasis 04/04/2012             Past Surgical History:     Past Surgical History:   Procedure Laterality Date     CARDIAC SURGERY      CAB     CYSTOSCOPY FLEXIBLE, CYOABLATION PROSTATE N/A 11/09/2016    Procedure: CYSTOSCOPY FLEXIBLE, CRYOABLATION PROSTATE;  Surgeon: Krystian Owens MD;  Location:  OR     GENITOURINARY SURGERY      prostate surgery      HERNIA REPAIR       LAPAROSCOPIC CHOLECYSTECTOMY N/A 12/28/2020    Procedure: CHOLECYSTECTOMY, LAPAROSCOPIC;  Surgeon: James Alvarez MD;  Location: [facility] OR     LAPAROSCOPIC HERNIORRHAPHY INGUINAL Right 05/10/2017    Procedure: LAPAROSCOPIC HERNIORRHAPHY INGUINAL;  Laparoscopic preperitoneal repair of right inguinal hernia with placement of underlay mesh;  Surgeon: Enrico Bridges MD;  Location:  OR     PROSTATE SURGERY       Z NONSPECIFIC PROCEDURE      CAB 1996 [place]     ZZC NONSPECIFIC PROCEDURE  02/2007    l inguinal hernia repair      ZZC NONSPECIFIC PROCEDURE      R hernia remote     ZZC NONSPECIFIC PROCEDURE  2000    R ankle ORIF for fx     ZZC NONSPECIFIC PROCEDURE  2005    nasal surgery      ZZC NONSPECIFIC PROCEDURE      R rotater repair  (remote)      [facility] NONSPECIFIC PROCEDURE      appy 1966     [facility] NONSPECIFIC PROCEDURE      sinus surgery x 2     [facility] NONSPECIFIC PROCEDURE      L shoulder                 Social History:     Social History     Tobacco Use     Smoking status: Former Smoker     Smokeless tobacco: Never Used     Tobacco comment: quit 3/1974   Substance Use Topics     Alcohol use: Yes     Alcohol/week: 0.0 standard drinks     Comment: One Beer a Day and maybe Wine             Family History:     Family History   Problem Relation Age of Onset     Cancer Mother         stomach cancer,  age 61     Heart Disease Father         MI,  age 71     Heart Disease Brother         stent placement, RA      Hypertension Brother      Heart Disease Sister         rhythm problems with heart, hypertension             Immunizations:     Immunization History   Administered Date(s) Administered     COVID-19,PF,Pfizer (12+ Yrs) 2021, 2021, 2021     Influenza (High Dose) 3 valent vaccine 2014, 10/02/2015, 2016, 2017, 10/02/2018, 10/11/2019     Influenza (IIV3) PF 10/16/2008, 2010, 10/31/2012     Influenza, Quad, High Dose, Pf, 65yr+ (Fluzone HD) 2021     Pneumo Conj 13-V (2010&after) 08/15/2016     Pneumococcal 23 valent 2004, 2005     Pneumococcal, Unspecified 10/24/2005     TD (ADULT, 7+) 2010     TDAP Vaccine (Boostrix) 2020     Tdap (Adacel,Boostrix) 2020     Zoster vaccine recombinant adjuvanted (SHINGRIX) 2018, 10/17/2018     Zoster vaccine, live 2011             Allergies:     Allergies   Allergen Reactions     Blood Transfusion Related (Informational Only) Other (See Comments)     Patient has a history of a clinically significant antibody against RBC antigens.  A delay in compatible RBCs may occur.             Medications:     Medications Prior to Admission   Medication Sig Dispense Refill Last Dose     acetaminophen (TYLENOL) 500 MG tablet Take  1 tablet (500 mg) by mouth every 6 hours as needed for mild pain        apixaban ANTICOAGULANT (ELIQUIS) 2.5 MG tablet Take 1 tablet (2.5 mg) by mouth 2 times daily 200 tablet 4 1/26/2022 at Unknown time     diclofenac (VOLTAREN) 1 % topical gel Apply 4 gramsto area qid prn 100 g 11      ferrous gluconate (FERGON) 324 (38 Fe) MG tablet Take 1 tablet (324 mg) by mouth daily (with breakfast) 90 tablet 3 1/26/2022 at Unknown time     MELATONIN PO Take 5 mg by mouth At Bedtime    1/26/2022 at Unknown time     multivitamin, therapeutic (THERA-VIT) TABS tablet Take 1 tablet by mouth daily   1/26/2022 at Unknown time     OLANZapine (ZYPREXA) 5 MG tablet Take 1 tablet (5 mg) by mouth 2 times daily as needed (agitation)        omeprazole (PRILOSEC) 40 MG DR capsule Take 1 capsule (40 mg) by mouth daily 90 capsule 3 1/26/2022 at Unknown time     potassium chloride ER (K-TAB) 20 MEQ CR tablet Take 1 tablet (20 mEq) by mouth 2 times daily 200 tablet 3 1/26/2022 at Unknown time     QUEtiapine (SEROQUEL) 25 MG tablet Take 1 tablet (25 mg) by mouth At Bedtime   1/26/2022 at Unknown time     simvastatin (ZOCOR) 20 MG tablet Take 1 tablet (20 mg) by mouth At Bedtime 100 tablet 4 1/26/2022 at Unknown time     tamsulosin (FLOMAX) 0.4 MG capsule Take 1 capsule (0.4 mg) by mouth daily 90 capsule 3 1/26/2022 at Unknown time     torsemide (DEMADEX) 20 MG tablet Take 1 tablet (20 mg) by mouth daily 30 tablet 0 1/26/2022 at 40mg     urea (UREA 20 INTENSIVE HYDRATING) 20 % external cream Apply topically as needed Apply to right foot daily for dryness or rash (Patient taking differently: Apply topically daily Apply topically to right foot daily, clean legs with soap and water and pat dry before applying) 120 g 11 1/26/2022 at Unknown time     ACE/ARB/ARNI NOT PRESCRIBED (INTENTIONAL) Please choose reason not prescribed from choices below.        BETA BLOCKER NOT PRESCRIBED (INTENTIONAL) Please choose reason not prescribed from choices below.                 Review of Systems:   The 10 point Review of Systems is negative other than noted in the HPI           Physical Exam:   Vitals were reviewed  Temp: 97.8  F (36.6  C) Temp src: Oral BP: 100/58 Pulse: 68   Resp: 17 SpO2: 97 % O2 Device: None (Room air)      NEURO:  Oriented to name   Sitter at bedside to redirect  LUNGS  Fairly clear bilaterally  CARDIO:  Irreg, Irreg  Bradycardia  II/VI systolic murmurs  Elevated JVP  ABD:  Soft  +BS  EXT:  2+ bilateral shin edema  Lymphedema wraps in place bilaterally          Data:     Lab Results   Component Value Date    WBC 4.3 01/29/2022    HGB 10.6 (L) 01/29/2022    HCT 33.2 (L) 01/29/2022     (L) 01/29/2022     01/31/2022    POTASSIUM 3.6 01/31/2022    CHLORIDE 109 01/31/2022    CO2 28 01/31/2022    BUN 21 01/31/2022    CR 1.00 01/31/2022     (H) 01/31/2022    SED 34 (H) 01/01/2022    DD 2.5 (H) 02/20/2007    NTBNPI 1,506 01/27/2022    NTBNP 1,786 (H) 07/08/2021    TROPONIN <0.04 02/20/2007    TROPI <0.015 11/28/2020    AST 23 01/27/2022    ALT 22 01/27/2022     (H) 08/02/2018    ALKPHOS 218 (H) 01/27/2022    BILITOTAL 1.0 01/27/2022    INR 1.39 (H) 06/18/2019       XR Chest 1 View   Final Result (1/27/22)   IMPRESSION: Large indistinct central pulmonary vasculature consistent with interstitial pulmonary edema. Recommend follow-up to resolution. No pneumothorax or pleural effusion. Moderately enlarged heart. Stable tortuous aorta. Multiple median sternotomy    wires.

## 2022-01-31 NOTE — PLAN OF CARE
[id number]    Inpatient Progress Note:    /81 (BP Location: Right arm)   Pulse 104   Temp 98.3  F (36.8  C) (Oral)   Resp 16   Wt (P) 81.9 kg (180 lb 9.6 oz)   SpO2 97%   BMI (P) 28.28 kg/m       Pt is disoriented to situation and time. Confused. Very restless, continuously attempting to get out of bed. Difficult to redirect, zyprexa and melatonin given. Dumas box given and ambulated halls. Lavender patches in use. Pt went to bed at 0300 and has been sleeping since. Sitter provided for safety. Tele A-fib with PVC's hr in 40'50's. Drank 240mL.  Palliative consult today. Will continue to provide supportive cares.

## 2022-01-31 NOTE — CONSULTS
[facility]  Palliative Care Consultation   Text Page    Assessment & Plan   Milo Serna is a 81 year old male who was admitted on 1/27/2022.   Consulted by Dr. Eloy Webber to assist with goals of care and development of plan of care     Recommendations:  1. Goals of Care- No CPR- Do NOT Intubate  Hospitalization goals discussed with patient's daughter Stacia. Selective treatments as documented.  DNR/DNI  Decisional Capacity- Unreliable. Patient has an advance directive dated 5/11/2001.  Decisions and consents should be managed by health care agent.   Leslie Serna (spouse) is the primary Health Care Agent listed, deferred due to her diagnosis of severe dementia.  Alternates are Stacia Richjessicajenni (daughter) and Nitish Serna (son).   POLST none on file.    - Goal for medical management and selective measures.  - considering hospice following discharge back to memory care     2. Encephalopathy  Likely multifactorial, question progressive cognitive decline with superimposed instability due to fluid imbalance.   - PT/OT evaluations  - correct underlying etiology  - placement to safe environment for rehab and with appropriate level of care.     3. Edema   Fluid overload secondary to heart failure.  - per primary team     4. Spiritual Care  Oriented to Spiritual Health as part of Palliative Care team.  Spiritual Background: Hindu.     5. Care Planning  Appreciate Care of Teodora Russo.  - Plan of care pending.  Family considering hospice (?)    Medical Decision Making and Goals of Care:  Discussed on January 31, 2022 with Anita MOSQUERA, CNP:   Met with Luís at the bedside, he is encephalopathic and unable to discuss goals of care.  Reviewed symptom management, denies symptoms.    Called to speak with georgina Palomo , expecting return call.    Stacia returned the call and reviewed her father's recent medical history.  She reflected that he has declined significantly  in the past couple of months.  She outlined her worry about his cardiac function and his cognitive decline.  Reviewed symptoms and diagnoses that would qualify him for hospice and reviewed the hospice philosophy with her.  During review it was apparent that she values the hospice care plan, but wishes to confirm that there is nothing else that can be done to stabilize his chronic conditions.  Reviewed specialist notes and confirmed he was receiving the appropriate treatments such that he should stabilize in the coming days.  Reviewed the parallels in the hospice and restorative care plans.  Stacia stated that she would be interested in hospice informational session and wanted to prepare for either option. She mentioned that she would value a plan that got him out of the cycle of re-hospitalizations.    Reviewed code status with her and she explained the choice for intubation in a non-emergent setting.  Clarified that she would not want intubation in the setting of emergency and discussed the appropriate change to DNR/DNI.  She verbalized agreement for this choice.    Thank you for involving us in the patient's care.     Anita MOSQUERA, CNP  Pain Management and Palliative Care  [facility]  Pgr: [phone]    Time Spent on this Encounter   Total unit/floor time 55 minutes, time consisted of the following, examination of the patient, reviewing the record and completing documentation. >50% of time spent in counseling and coordination of care, Bedside Nurse Fallon Meredith and Hospitalist Dr. Eloy Webber.  Time spend counseling with patient and family consisted of the following topics, education about prognosis, care planning for discharge and symptom management.    Understanding of disease process:   This has been discussed with primary team.    History of Present Illness   History is obtained from the electronic health record    Milo Serna is a 81 year old male with a past  medical history of atrial fibrillation, CAD, HF, HLD, who presented 1/27 with generalized edema and fluid overload.  He has had two similar hospitalizations in the past 1 month with similar etiology.  Patient with progressive worsening mental status likely multifactorial.    This is in the setting of recent relocation to assisted living facility near his wife's memory care unit, and two hospitalizations between 12/2021 and 1/2022.   He has had decline in cognition in recent months which has been exacerbated by moves to a new facility.  he has no documented weight loss, but weights remain difficult to track due to fluctuating fluid balance.    Past Medical History   I have reviewed this patient's medical history and updated it with pertinent information if needed.   Past Medical History:   Diagnosis Date     Actinic keratosis 10/12/2019     Anemia, unspecified 11/08/2016     Atrial fibrillation (H) 05/27/2015     BPH (benign prostatic hyperplasia) 01/25/2012     CAD (coronary artery disease) 04/04/2012    CAB 1996 following MI (at [facility])     Closed bimalleolar fracture of left ankle with routine healing 04/05/2019     Closed fracture of metatarsal bone 04/04/2012     Dilated aortic root (H) 05/26/2016     Drug-induced erectile dysfunction 10/02/2015     Elevated blood sugar 11/08/2016     Elevated PSA 09/19/2013     Epistaxis 09/09/2019     Essential hypertension with goal blood pressure less than 140/90 09/22/2016     Fall 09/06/2019     Functional diarrhea 10/12/2019     Gallbladder polyp 05/01/2018     Gout      HAV (hallux abducto valgus) 04/04/2012     Hematoma 09/09/2019     Hernia, abdominal      History of prostate cancer 06/15/2016     HTN, goal below 150/90 04/20/2017     Hypokalemia 09/12/2019     Iron deficiency anemia secondary to inadequate dietary iron intake 11/15/2016     Ischemic cardiomyopathy      Low blood pressure reading 10/17/2016     Lumbago     had degendisc dz on MRI 7/07     Memory  loss 06/18/2019     Microalbuminuria 06/04/2020    X1     Mixed hyperlipidemia      Mumps      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     Nonrheumatic mitral valve regurgitation 10/28/2019     OA (osteoarthritis) 04/04/2012     Old myocardial infarction 04/04/2012     LENORA (obstructive sleep apnea) 10/28/2019     LENORA (obstructive sleep apnea)      Other viral warts 09/06/2019     Palpitations      Pes planus 04/04/2012     Petechiae 06/18/2019     Prostate cancer (H) 05/26/2016     PUD (peptic ulcer disease) 04/04/2012     Pulmonary hypertension (H) 09/25/2017     PVC's (premature ventricular contractions)      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  Past Surgical History:   Procedure Laterality Date     CARDIAC SURGERY      CAB     CYSTOSCOPY FLEXIBLE, CYOABLATION PROSTATE N/A 11/09/2016    Procedure: CYSTOSCOPY FLEXIBLE, CRYOABLATION PROSTATE;  Surgeon: Krystian Owens MD;  Location:  OR     GENITOURINARY SURGERY      prostate surgery      HERNIA REPAIR       LAPAROSCOPIC CHOLECYSTECTOMY N/A 12/28/2020    Procedure: CHOLECYSTECTOMY, LAPAROSCOPIC;  Surgeon: James Alvarez MD;  Location: [facility] OR     LAPAROSCOPIC HERNIORRHAPHY INGUINAL Right 05/10/2017    Procedure: LAPAROSCOPIC HERNIORRHAPHY INGUINAL;  Laparoscopic preperitoneal repair of right inguinal hernia with placement of underlay mesh;  Surgeon: Enrico Bridges MD;  Location:  OR     PROSTATE SURGERY       [facility] NONSPECIFIC PROCEDURE      [facility] NONSPECIFIC PROCEDURE  02/2007     l inguinal hernia repair      [facility] NONSPECIFIC PROCEDURE      R hernia remote     [facility] NONSPECIFIC PROCEDURE  2000    R ankle ORIF for fx     [facility] NONSPECIFIC PROCEDURE  2005    nasal surgery      [facility] NONSPECIFIC PROCEDURE      R rotater repair (remote)      [facility] NONSPECIFIC PROCEDURE      appy 1966     [facility] NONSPECIFIC PROCEDURE      sinus surgery x 2     [facility] NONSPECIFIC PROCEDURE      L shoulder 6/09       Prior to Admission Medications   Prior to Admission Medications   Prescriptions Last Dose Informant Patient Reported? Taking?   ACE/ARB/ARNI NOT PRESCRIBED (INTENTIONAL)   No No   Sig: Please choose reason not prescribed from choices below.   BETA BLOCKER NOT PRESCRIBED (INTENTIONAL)   No No   Sig: Please choose reason not prescribed from choices below.   MELATONIN PO 1/26/2022 at Unknown time  Yes Yes   Sig: Take 5 mg by mouth At Bedtime    OLANZapine (ZYPREXA) 5 MG tablet   Yes Yes   Sig: Take 1 tablet (5 mg) by mouth 2 times daily as needed (agitation)   QUEtiapine (SEROQUEL) 25 MG tablet 1/26/2022 at Unknown time  Yes Yes   Sig: Take 1 tablet (25 mg) by mouth At Bedtime   acetaminophen (TYLENOL) 500 MG tablet   Yes Yes   Sig: Take 1 tablet (500 mg) by mouth every 6 hours as needed for mild pain   apixaban ANTICOAGULANT (ELIQUIS) 2.5 MG tablet 1/26/2022 at Unknown time  Yes Yes   Sig: Take 1 tablet (2.5 mg) by mouth 2 times daily   diclofenac (VOLTAREN) 1 % topical gel   No Yes   Sig: Apply 4 gramsto area qid prn   ferrous gluconate (FERGON) 324 (38 Fe) MG tablet 1/26/2022 at Unknown time  No Yes   Sig: Take 1 tablet (324 mg) by mouth daily (with breakfast)   multivitamin, therapeutic (THERA-VIT) TABS tablet 1/26/2022 at Unknown time  Yes Yes   Sig: Take 1 tablet by mouth daily   omeprazole (PRILOSEC) 40 MG DR capsule 1/26/2022 at Unknown time  No Yes   Sig: Take 1 capsule (40 mg) by mouth daily   potassium chloride ER (K-TAB) 20 MEQ CR tablet 1/26/2022 at Unknown time  Yes Yes   Sig: Take 1 tablet (20 mEq) by  mouth 2 times daily   simvastatin (ZOCOR) 20 MG tablet 2022 at Unknown time  Yes Yes   Sig: Take 1 tablet (20 mg) by mouth At Bedtime   tamsulosin (FLOMAX) 0.4 MG capsule 2022 at Unknown time  No Yes   Sig: Take 1 capsule (0.4 mg) by mouth daily   torsemide (DEMADEX) 20 MG tablet 2022 at 40mg  No Yes   Sig: Take 1 tablet (20 mg) by mouth daily   urea (UREA 20 INTENSIVE HYDRATING) 20 % external cream 2022 at Unknown time  No Yes   Sig: Apply topically as needed Apply to right foot daily for dryness or rash   Patient taking differently: Apply topically daily Apply topically to right foot daily, clean legs with soap and water and pat dry before applying      Facility-Administered Medications: None     Allergies   Allergies   Allergen Reactions     Blood Transfusion Related (Informational Only) Other (See Comments)     Patient has a history of a clinically significant antibody against RBC antigens.  A delay in compatible RBCs may occur.       Social History   I have updated and reviewed the following Social History Narrative:   Social History     Social History Narrative     Not on file           Living situation: Nursing facility       Support system: daughter Stacia       Actual/Potential Caregiver: facility staff, daughter       Functional status: requires assist with ADLs  History of substance use/abuse: no    Family History   I have reviewed this patient's family history and updated it with pertinent information if needed.   Family History   Problem Relation Age of Onset     Cancer Mother         stomach cancer,  age 61     Heart Disease Father         MI,  age 71     Heart Disease Brother         stent placement, RA      Hypertension Brother      Heart Disease Sister         rhythm problems with heart, hypertension       Review of Systems   The 10 point Review of Systems is negative other than noted in the HPI or here.     Physical Exam   Temp:  [97.8  F (36.6  C)-98.3  F  (36.8  C)] 97.8  F (36.6  C)  Pulse:  [] 68  Resp:  [16-17] 17  BP: (100-132)/(58-81) 100/58  SpO2:  [95 %-97 %] 97 %  180 lbs 9.6 oz  Exam:  GEN:  Alert, disoriented, sitting in chair, appears comfortable, NAD.  HEENT:  Normocephalic/atraumatic, no scleral icterus, no nasal discharge, mouth moist.  CV:  RRR, S1, S2; no murmurs or other irregularities noted.  +3 DP/PT pulses bilatererally; 1+ edema BLE.  RESP:  Symmetric chest rise on inhalation noted.  Normal respiratory effort.  ABD:  Rounded, soft, non-tender/non-distended.  +BS  EXT:  Edema & pulses as noted above.  CMS intact x 4.   Reddened, bernie appearance of BLE, sores on bilateral plantar surface of feet.    SKIN:  Dry to touch, no exanthems noted in the visualized areas.    NEURO: moves all extremities to command, confused, no focal deficits  PAIN BEHAVIOR: Cooperative  Psych:  Restless affect, confused conversation.     Data   Results for orders placed or performed during the hospital encounter of 01/27/22 (from the past 24 hour(s))   Basic metabolic panel   Result Value Ref Range    Sodium 141 133 - 144 mmol/L    Potassium 3.6 3.4 - 5.3 mmol/L    Chloride 109 94 - 109 mmol/L    Carbon Dioxide (CO2) 28 20 - 32 mmol/L    Anion Gap 4 3 - 14 mmol/L    Urea Nitrogen 21 7 - 30 mg/dL    Creatinine 1.00 0.66 - 1.25 mg/dL    Calcium 8.6 8.5 - 10.1 mg/dL    Glucose 120 (H) 70 - 99 mg/dL    GFR Estimate 75 >60 mL/min/1.73m2   Magnesium   Result Value Ref Range    Magnesium 2.2 1.6 - 2.3 mg/dL   Cardiology IP Consult: Patient to be seen: Routine - within 24 hours; NSVT and Heart failure; Consultant may enter orders: Yes; Requesting provider? Hospitalist (if different from attending physician)    Dorita Lin, APRN CNP     1/31/2022  2:39 PM  Cardiology Consultation     Milo Serna MRN# [id number]   YOB: 1940 Age: 81 year old   Date of Admission: 1/27/2022     Reason for consult: I was asked by Dr Hackett to evaluate this    patient for nonsustained VT and heart failure      Follows with Dr Conley and [facility] Coom[facility]       Assessment and Plan:   Plan for palliative care discussion to help define ongoing plan   of care.  Family open to hospice if he qualifies    Acute on Chronic combined systolic and diastolic HF /   Bi-ventricular dysfunction   -BNP 1500 / interstitial pulmonary edema  -LVEF 50% with inferior hypokinesis, mild RV   dysfunction/enlargement  -3rd HF admission in past couple of months due to poor compliance   likely from recent mentation decline and untreated sleep apnea.  -improved status with IV Lasix, (last dose 2 days ago?)  -weights  192--> 174--> 180 lbs today  (euvolemic weight 167 lbs)     -(-2400 total OP)  (+350 OP overnight)  -continue diuresis,  -lymphedema PT / low salt diet / fluid restriction  -on low dose Lisinopril  -[facility] follow up already arranged (2/9/22) with DAVID Coombs    Nonsustained VT  -asymptomatic  -previously noted along with frequent PVCs  -unable to take beta-blockers due to underlying bradycardia  -consider amiodarone?  EP evaluation?    Permanent Atrial Fibrillation with slow ventricular response  -HRs 50-80 bpm while awake and down to 39 bpm during sleep  -Chronic Eliquis 2.5 mg BID (due to recurrent epistaxis on 5 mg   dose)    Coronary Artery Disease:  -hx of CABG (1997)  -cMRI (2018) findings consistent with prior RCA infarction  -NL troponin / no significant ST abnormalities  -No CP---> doubt acute coronary syndrome    Moderate/severe Functional MR    Moderate Ascending Aorta Dilatation  -4.4 cm    Dementia:  -some delirium also  -sitter at bedside    Untreated sleep apnea  -intolerant to CPAP    Urinary Retention   -possible UTI              Chief Complaint:       History is obtained from the patient         History of Present Illness:   This patient is a 81 year old male who presents (1/27/22) via EMS   from memory care for knee pain and mentation change.  Found on   floor  with presumed fall. Fortunately, there was no evidence of   fracture on knee X-ray   He was admitted for concern of HF   exacerbation.  Of note, this is his 3rd admission in 2 months for   heart failure.     He has a complex past cardiovascular history  He underwent CABG   (1997) and has chronic atrial fibrillation and is taking Eliquis   for CVA prophylaxis (only 2.5 mg BD due to recurrent epistaxis on   higher dose)  ECHOs in the past have shown mod-severe functional   MR, chronic systolic heart failure with bi-ventricular   dysfunction (LVEF 40-50%) with chronic lymphedema    He has a long history of frequent PVCs and resting bradycardia   and therefore has not been on beta-blockers.    He was recently discharged from TCU (1/20/22) following a   hospitalization for CHF exacerbation (12/26/21-1/4/22)  At that   time, the patient diuresed well with IV lasix   Discharge weight   was 167 lbs.  His ECHO then showed LVEF 50%, mild RV enlargement   with mild RV dysfunction, 2+ TR, 2-3+ MR, and moderate ascending   aorta dilatation (4.4 cm)  Telemetry again showed frequent PVCs,   bradycardia with some pauses up to 2.6 sec and was sent home with   a Holter monitor  (results still pending)  Of note, he has had   decline in his mentation for the past several months and   experiences intermittent delirium.  He did not follow up in [facility]   clinic as planned earlier this month.    Since admission, his symptoms have improved with IV lasix and his   weight is down at least 10 lbs  His EKG showed A-fib with slow   ventricular response  No significant ST abnormalities. his CXR   showed interstitial pulmonary edema. BNP: 1,506,  Troponin HS:   30, Creatinine: 1.03, Hgb: 10.6 (stable)  He is receiving   lymphedema treatment  There have been some issues with urinary   retention.    We were consulted today due to 20-beat run of nonsustained VT   noted on telemetry this am.  He has been in A-fib with HRs 39-80   bpm. There have been  several pauses, all < 3 seconds.  It appears   that he was fairly asymptomatic at time of VT, but somewhat   difficult to assess due to poor cognition.  Palliative care has   been consulted to discuss ongoing plan of care.    During our visit, he was sitting comfortably in the chair  He   denied any chest pain, significant shortness of breath,   palpitations, or lightheadedness  He states that his leg edema is   about the same  He thinks he has a good appetite.              Past Medical History:     Past Medical History:   Diagnosis Date     Actinic keratosis 10/12/2019     Anemia, unspecified 11/08/2016     Atrial fibrillation (H) 05/27/2015     BPH (benign prostatic hyperplasia) 01/25/2012     CAD (coronary artery disease) 04/04/2012    CAB 1996 following MI (at [facility])     Closed bimalleolar fracture of left ankle with routine healing   04/05/2019     Closed fracture of metatarsal bone 04/04/2012     Dilated aortic root (H) 05/26/2016     Drug-induced erectile dysfunction 10/02/2015     Elevated blood sugar 11/08/2016     Elevated PSA 09/19/2013     Epistaxis 09/09/2019     Essential hypertension with goal blood pressure less than   140/90 09/22/2016     Fall 09/06/2019     Functional diarrhea 10/12/2019     Gallbladder polyp 05/01/2018     Gout      HAV (hallux abducto valgus) 04/04/2012     Hematoma 09/09/2019     Hernia, abdominal      History of prostate cancer 06/15/2016     HTN, goal below 150/90 04/20/2017     Hypokalemia 09/12/2019     Iron deficiency anemia secondary to inadequate dietary iron   intake 11/15/2016     Ischemic cardiomyopathy      Low blood pressure reading 10/17/2016     Lumbago     had degendisc dz on MRI 7/07     Memory loss 06/18/2019     Microalbuminuria 06/04/2020    X1     Mixed hyperlipidemia      Mumps      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     Nonrheumatic mitral valve regurgitation 10/28/2019     OA (osteoarthritis) 04/04/2012     Old myocardial infarction 04/04/2012     LENORA  (obstructive sleep apnea) 10/28/2019     LENORA (obstructive sleep apnea)      Other viral warts 09/06/2019     Palpitations      Pes planus 04/04/2012     Petechiae 06/18/2019     Prostate cancer (H) 05/26/2016     PUD (peptic ulcer disease) 04/04/2012     Pulmonary hypertension (H) 09/25/2017     PVC's (premature ventricular contractions)      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     Rosacea 01/15/2008     Stasis dermatitis      Stasis dermatitis of both legs 05/13/2021     Stress due to spouse with dementia 06/18/2019     Thrombocytopenia (H) 04/21/2017     Unspecified hyperplasia of prostate with urinary obstruction   and other lower urinary tract symptoms (LUTS)     better on avodart & hytrin     Venous stasis 04/04/2012             Past Surgical History:     Past Surgical History:   Procedure Laterality Date     CARDIAC SURGERY      CAB     CYSTOSCOPY FLEXIBLE, CYOABLATION PROSTATE N/A 11/09/2016    Procedure: CYSTOSCOPY FLEXIBLE, CRYOABLATION PROSTATE;  Surgeon:   Krystian Owens MD;  Location:  OR     GENITOURINARY SURGERY      prostate surgery      HERNIA REPAIR       LAPAROSCOPIC CHOLECYSTECTOMY N/A 12/28/2020    Procedure: CHOLECYSTECTOMY, LAPAROSCOPIC;  Surgeon: James Alvarez MD;  Location: [facility] OR     LAPAROSCOPIC HERNIORRHAPHY INGUINAL Right 05/10/2017    Procedure: LAPAROSCOPIC HERNIORRHAPHY INGUINAL;  Laparoscopic   preperitoneal repair of right inguinal hernia with placement of   underlay mesh;  Surgeon: Enrico Bridges MD;  Location:    OR     PROSTATE SURGERY       [facility] NONSPECIFIC PROCEDURE      CAB 1996 [place]     Z NONSPECIFIC PROCEDURE  02/2007    l inguinal hernia repair      [facility] NONSPECIFIC PROCEDURE      R hernia remote     [facility] NONSPECIFIC PROCEDURE  2000    R ankle ORIF for fx     [facility] NONSPECIFIC PROCEDURE  2005    nasal surgery      [facility] NONSPECIFIC PROCEDURE      R rotater repair (remote)      [facility] NONSPECIFIC PROCEDURE      appy 1966     [facility] NONSPECIFIC PROCEDURE      sinus  surgery x 2     [facility] NONSPECIFIC PROCEDURE      L shoulder                 Social History:     Social History     Tobacco Use     Smoking status: Former Smoker     Smokeless tobacco: Never Used     Tobacco comment: quit 3/1974   Substance Use Topics     Alcohol use: Yes     Alcohol/week: 0.0 standard drinks     Comment: One Beer a Day and maybe Wine             Family History:     Family History   Problem Relation Age of Onset     Cancer Mother         stomach cancer,  age 61     Heart Disease Father         MI,  age 71     Heart Disease Brother         stent placement, RA      Hypertension Brother      Heart Disease Sister         rhythm problems with heart, hypertension             Immunizations:     Immunization History   Administered Date(s) Administered     COVID-19,PF,Pfizer (12+ Yrs) 2021, 2021, 2021       Influenza (High Dose) 3 valent vaccine 2014, 10/02/2015,   2016, 2017, 10/02/2018, 10/11/2019     Influenza (IIV3) PF 10/16/2008, 2010, 10/31/2012     Influenza, Quad, High Dose, Pf, 65yr+ (Fluzone HD) 2021     Pneumo Conj 13-V (2010&after) 08/15/2016     Pneumococcal 23 valent 2004, 2005     Pneumococcal, Unspecified 10/24/2005     TD (ADULT, 7+) 2010     TDAP Vaccine (Boostrix) 2020     Tdap (Adacel,Boostrix) 2020     Zoster vaccine recombinant adjuvanted (SHINGRIX) 2018,   10/17/2018     Zoster vaccine, live 2011             Allergies:     Allergies   Allergen Reactions     Blood Transfusion Related (Informational Only) Other (See   Comments)     Patient has a history of a clinically significant antibody   against RBC antigens.  A delay in compatible RBCs may occur.             Medications:     Medications Prior to Admission   Medication Sig Dispense Refill Last Dose     acetaminophen (TYLENOL) 500 MG tablet Take 1 tablet (500 mg) by   mouth every 6 hours as needed for mild pain        apixaban  ANTICOAGULANT (ELIQUIS) 2.5 MG tablet Take 1 tablet   (2.5 mg) by mouth 2 times daily 200 tablet 4 1/26/2022 at Unknown   time     diclofenac (VOLTAREN) 1 % topical gel Apply 4 gramsto area qid   prn 100 g 11      ferrous gluconate (FERGON) 324 (38 Fe) MG tablet Take 1 tablet   (324 mg) by mouth daily (with breakfast) 90 tablet 3 1/26/2022 at   Unknown time     MELATONIN PO Take 5 mg by mouth At Bedtime    1/26/2022 at   Unknown time     multivitamin, therapeutic (THERA-VIT) TABS tablet Take 1 tablet   by mouth daily   1/26/2022 at Unknown time     OLANZapine (ZYPREXA) 5 MG tablet Take 1 tablet (5 mg) by mouth   2 times daily as needed (agitation)        omeprazole (PRILOSEC) 40 MG DR capsule Take 1 capsule (40 mg)   by mouth daily 90 capsule 3 1/26/2022 at Unknown time     potassium chloride ER (K-TAB) 20 MEQ CR tablet Take 1 tablet   (20 mEq) by mouth 2 times daily 200 tablet 3 1/26/2022 at Unknown   time     QUEtiapine (SEROQUEL) 25 MG tablet Take 1 tablet (25 mg) by   mouth At Bedtime   1/26/2022 at Unknown time     simvastatin (ZOCOR) 20 MG tablet Take 1 tablet (20 mg) by mouth   At Bedtime 100 tablet 4 1/26/2022 at Unknown time     tamsulosin (FLOMAX) 0.4 MG capsule Take 1 capsule (0.4 mg) by   mouth daily 90 capsule 3 1/26/2022 at Unknown time     torsemide (DEMADEX) 20 MG tablet Take 1 tablet (20 mg) by mouth   daily 30 tablet 0 1/26/2022 at 40mg     urea (UREA 20 INTENSIVE HYDRATING) 20 % external cream Apply   topically as needed Apply to right foot daily for dryness or rash   (Patient taking differently: Apply topically daily Apply   topically to right foot daily, clean legs with soap and water and   pat dry before applying) 120 g 11 1/26/2022 at Unknown time     ACE/ARB/ARNI NOT PRESCRIBED (INTENTIONAL) Please choose reason   not prescribed from choices below.        BETA BLOCKER NOT PRESCRIBED (INTENTIONAL) Please choose reason   not prescribed from choices below.                Review of Systems:   The  10 point Review of Systems is negative other than noted in   the HPI           Physical Exam:   Vitals were reviewed  Temp: 97.8  F (36.6  C) Temp src: Oral BP: 100/58 Pulse: 68     Resp: 17 SpO2: 97 % O2 Device: None (Room air)      NEURO:  Oriented to name   Sitter at bedside to redirect  LUNGS  Fairly clear bilaterally  CARDIO:  Irreg, Irreg  Bradycardia  II/VI systolic murmurs    Elevated JVP  ABD:  Soft  +BS  EXT:  2+ bilateral shin edema  Lymphedema wraps in place   bilaterally          Data:     Lab Results   Component Value Date    WBC 4.3 01/29/2022    HGB 10.6 (L) 01/29/2022    HCT 33.2 (L) 01/29/2022     (L) 01/29/2022     01/31/2022    POTASSIUM 3.6 01/31/2022    CHLORIDE 109 01/31/2022    CO2 28 01/31/2022    BUN 21 01/31/2022    CR 1.00 01/31/2022     (H) 01/31/2022    SED 34 (H) 01/01/2022    DD 2.5 (H) 02/20/2007    NTBNPI 1,506 01/27/2022    NTBNP 1,786 (H) 07/08/2021    TROPONIN <0.04 02/20/2007    TROPI <0.015 11/28/2020    AST 23 01/27/2022    ALT 22 01/27/2022     (H) 08/02/2018    ALKPHOS 218 (H) 01/27/2022    BILITOTAL 1.0 01/27/2022    INR 1.39 (H) 06/18/2019       XR Chest 1 View   Final Result (1/27/22)   IMPRESSION: Large indistinct central pulmonary vasculature   consistent with interstitial pulmonary edema. Recommend follow-up   to resolution. No pneumothorax or pleural effusion. Moderately   enlarged heart. Stable tortuous aorta. Multiple median sternotomy      wires.

## 2022-01-31 NOTE — PLAN OF CARE
Attempted to get Pt. To stand this AM for standing weight to obtain accuracy. Pt. Unable to stand onto scale A3 assisting, having difficulty with balance and stepping onto scale. Will reattempt later when Pt. Is not as drowsy. Up now in recliner eating breakfast.     Attempted to stand on scale again, Pt. Unable to tolerate standing up on scale.

## 2022-01-31 NOTE — PLAN OF CARE
*Care resumed for Pt. 0700 until 1930.     Pt. A&Ox1, unable to repeat back correct birth date. Unsure of situation, place or time. VSS throughout shift, BP soft but stable. Denies pain, utilizing PAINAD scale. Tylenol scheduled 1000 mg TID, Lidocaine patch to R knee and Voltaren gel ordered. Pt. Was lethargic this AM upon waking up. Having some difficulty getting up to scale and lifting legs with transfers. A2 today with walker and gait belt. Pt. On 2 gram diet, appetite good. PIV SL. On tele afib primarily with PVC's and occasional pauses <3 seconds, did have a 19 beat run of v tach today while resting in recliner. Strict I&O's. Pt. Is not having difficulty with retention just needing ques to use urinal ever couple hours-Pt. Also having some urinary incontinence intermittently. Palliative, PT, and cardiology consult added today. SW following with discharge plans. Code status has changed per palliative and discussion with daughter to DNR/DNI. Mag stable at 2.2 and K+ 3.6, on replacement protocol, recheck in AM. Pt. Has lymph wraps in place to BLE. Sitter at bedside for safety. Pt. Redirectable with cares. Nursing to continue to monitor and assess Pt. And provide supportive cares.     /67 (BP Location: Left arm)   Pulse 72   Temp 97.5  F (36.4  C) (Oral)   Resp 18   Wt 81.9 kg (180 lb 9.6 oz)   SpO2 95%   BMI 28.28 kg/m

## 2022-01-31 NOTE — PROGRESS NOTES
CLINICAL NUTRITION SERVICES - EDUCATION NOTE    Received diet education consult for Heart Failure - Dietitian to instruct patient on 2 gram sodium diet     NUTRITION HISTORY:  Information obtained from patient chart:    Diet:  2 gm Na   ?  Living situation:   Assisted Living,Home Care ([facility])  ?  Grocery shopping:  Patient stated self, however, unsure if accurate as he lives at [facility] facility  ?  Meal preparation:  Patient stated self, however, unsure if accurate as he lives at [facility] care facility  ?  Breakfast:  Scrambled eggs  ?  Previous diet instructions:  Patient stated he has been following a low sodium diet for many years    ?  NUTRITION DIAGNOSIS:  Food- and nutrition-related knowledge deficit related to CHF as evidenced by inability to explain usual intake      INTERVENTIONS:  Provided instruction on low sodium diet    Provided  the following handouts: Heart Failure Nutrition Therapy    Goals:  Patient verbalizes understanding of diet by adhering to diet recommendations     Follow Up:  Patient to ask any further nutrition-related questions before discharge. In addition, pt may request outpatient RD appointment.     Please place consult if further nutrition concerns arise

## 2022-01-31 NOTE — PROGRESS NOTES
Care Management Follow Up    Length of Stay (days): 3    Expected Discharge Date: 02/02/2022     Concerns to be Addressed: care coordination/care conferences,discharge planning     Patient plan of care discussed at interdisciplinary rounds: Yes    Anticipated Discharge Disposition: Assisted Living,Home Care ([facility])     Anticipated Discharge Services: None  Anticipated Discharge DME: None    Patient/family educated on Medicare website which has current facility and service quality ratings:  (NA)  Education Provided on the Discharge Plan: Yes  Patient/Family in Agreement with the Plan: yes    Referrals Placed by CM/SW: Homecare  Private pay costs discussed: Not applicable    Additional Information:  Care management following for plan of care and discharge needs.  Spoke with nursing, Noreen, at [facility]. Updated on current plan and status. Patient currently has a 1:1 attendant and requiring Zyprexa d/t restlessness. Patient was unable to stand this morning d/t increased lethargy.  Nursing stated that patient was requiring Zyprexa twice daily at times.     Patient will need to be free of 1:1 Attendant for 24 hours or longer prior to return to [facility]. Patient will also need to be at or close to baseline (stand by assist) mobility prior to return.    Awaiting further determination of plan of care and discussion with Palliative.       Argelia Alba, RN      Argelia Alba RN Case Manager  Inpatient Care Coordination  [facility]   [phone]

## 2022-02-01 NOTE — CONSULTS
Care Management Follow Up    Length of Stay (days): 4    Expected Discharge Date: 02/02/2022     Concerns to be Addressed: Discharge planning     Patient plan of care discussed at interdisciplinary rounds: Yes    Anticipated Discharge Disposition: [facility]     Anticipated Discharge Services: Home PT/OT/RN, outpatient hospice info session  Anticipated Discharge DME: None    Patient/family educated on Medicare website which has current facility and service quality ratings:  (NA)  Education Provided on the Discharge Plan:  Yes  Patient/Family in Agreement with the Plan: yes    Referrals Placed by CM/SW: Homecare, hospice info session  Private pay costs discussed: Not applicable    Additional Information:  SW left VM for RN at [facility] to provide update that patient will not be discharging today d/t not being sitter-free for 24 hours, [phone].     SW left VM for daughter Stacia ([phone]) requesting a return call to coordinate hospice info session at discharge per request of Palliative care. Awaiting return call.    RN CC/SW will continue to follow.     1445: JEAN received call back from ALEX Smith at [facility]. They do not work most closely with any 1 particular hospice agency. They will not have an RN onsite Friday-Monday so patient would need to be at his baseline to return on those days. Patient cannot have a sitter or video monitoring for approximately 24 hours prior to discharge.     1500: Spoke with daughter Stacia. She is agreeable to [facility] completing info session as patient was referred to the same agency to start home care prior to him being admitted. She would prefer that the info session via phone be later afternoon around 1600 or next Tuesday 2/8 as she has the day off. Emailed [facility] hospice liaison [place] with this request.     ELIEZER Quintanilla

## 2022-02-01 NOTE — PLAN OF CARE
Alert and oriented to self only, confused but redirectable. Denies pain. Ax1 w walker, gb. No PRNs given. Lisinopril discontinued, diuretics adjusted, see Cardiology note for additional information. PIV SL. Sitter outside of room with Ipad. CM following for discharge planning.     /73 (BP Location: Right arm)   Pulse 62   Temp 97.3  F (36.3  C) (Oral)   Resp 19   Wt 81.9 kg (180 lb 9.6 oz)   SpO2 99%   BMI 28.28 kg/m

## 2022-02-01 NOTE — PLAN OF CARE
"Patient is agitated, attempting to leave. Patient states he needs to \"drive everyone to [place].\" Patient re-directed and given PRN Zyprexa.   "

## 2022-02-01 NOTE — TELEPHONE ENCOUNTER
----- Message from Shara Michel RN sent at 1/31/2022 12:04 PM CST -----  Regarding: FW: Follow-Up on Scheduling Appointments  2/9's appt cannot be virtual.  Provider needs to see pt in clinic to safely make recommendations since this is for hospital follow up.  Juliano Caballero hasn't seen him in clinic since 2019.  Please reach out to dtr again to change to in clinic appt.     If CORE pts and/or families are requesting virtual appts, please be sure to reach out to CORE team to clarify as many CORE providers cannot do safely do virtual appts.     Thanks,  EZEQUIEL Laird CORE RN  [phone]  ----- Message -----  From: Shara Bermudez, RN  Sent: 1/31/2022  12:00 AM CST  To: [facility] Core Nurse  Subject: Follow-Up on Scheduling Appointments             He was a CORE Consult on 1/27/22. Sent a message to scheduling asking for them to call his daughter to arrange a CORE Return with Juliano and MD Return with Dr. Conley.      Shara MCCRAY

## 2022-02-01 NOTE — PLAN OF CARE
Alert to self only. O2 sats stable on RA. Continues scheduled Tylenol, Lidocaine patch and topical Voltaren gel for pain control. 2+ to 3+ edema to BLE, bernie. Patient's BLE assessed by PT during lymphedema wrap change, skin noted to be extremely dry and cracking. Sween lotion applied with improvement noted this evening - Lymphedema wraps not re-applied due to change in skin. Plan to continue to monitor skin for any changes. BLE elevated as patient tolerates. Up w/ SBA w/ gait belt and walker. Patient has ambulated many times in the hallway today d/t restlessness. Patient has been intermittently agitated and impulsive throughout the day, but has been able to be re-directed with sitter. Given PRN Zyprexa w/ some relief. Sitter remains at bedside for safety. BS active x4, tolerating 2 gm Na diet w/ 1800mL fluid restriction. Strict I&O's. Up in chair for meals. Patient needs some assistance with meal trays, good appetite. O2 sats stable on RA. LS diminished. CMS intact. Continues daily weights and strict I&O's. Voiding in adequate amt's with urinal. Incontinent at times. Patient needs cues and reminders to urinate throughout the day. SL. Patient can be restless at times. PT, OT, SW/CM, Nutrition, Cardiology and Palliative consulted. K+ (4.2) and Mg (2.3) protocols, no indication for replacement today. Plan to return to [facility] once patient is sitter-free for 24 hours. Will continue to provide supportive cares.

## 2022-02-01 NOTE — PROGRESS NOTES
[facility]    Medicine Progress Note - Hospitalist Service    Date of Admission:  1/27/2022    Assessment & Plan            Milo Serna is a 81 year old male with PMH including CAD s/p remote CABG, A. fib on Eliquis with frequent bradycardia, pauses, PVCs, anemia, PUD, HTN, systolic CHF with most recent EF 50%, moderate severe MR, moderate TR, severe lymphedema, LENORA nonadherent to CPAP, BPH, prostate cancer, urinary retention, and more recently diagnosed dementia who presents with right knee pain and confusion.  He was hospitalized here from 12/26 through 1/4/2022 for lower extremity edema and confusion.  Seroquel and Zyprexa were added.  He was diuresed with IV diuretics and discharged on torsemide per cardiology recommendations.  He went to TCU where diuretics were continued.  His mental status did not improve much and overall had failure to thrive.  He was discharged to a memory care unit.  He was seen here 2 days ago in the ER for worsening lower extremity edema.  It was recommended he double his torsemide dose for 3 days and follow-up with cardiology.  He takes 20 mg daily normally. Admitted on 1/27 after his memory care found him kneeling with his head on his chair.  He was confused, somnolent, and reporting right knee pain so he was sent in for evaluation to the ER by EMS. Found to again be in heart failure.    Acute on Chronic combined systolic and diastolic HF / Bi-ventricular dysfunction     - BNP was 1500 / interstitial pulmonary edema    - LVEF 50% with inferior hypokinesis, mild RV dysfunction/enlargement    - 3rd HF admission in past couple of months due to poor compliance likely from recent mentation decline and untreated sleep apnea.    - cards following    - improving    - on Torsemide 20 mg BID and spironolactone    - low albumin contributing to 3rd spacing     - lymphedema PT / low salt diet / fluid restriction    - on low dose Lisinopril    - CORE follow up already  arranged (2/9/22) with DAVID Coombs    Asymptomatic nonsustained VT / Frequent PVCs    - previous history    - cannot use beta-blockers due to underlying bradycardia    - no plans for aggressive treatment for VT given DNR status.     Permanent Atrial Fibrillation with slow ventricular response    - HRs 50-80 bpm while awake and down to 39 bpm during sleep    - some pauses  Longest 2.4 sec    - chronic Eliquis 2.5 mg BID (due to recurrent epistaxis on 5 mg dose)     Coronary Artery Disease:    - hx of CABG (1997)    - cMRI (2018) findings consistent with prior RCA infarction    - NL troponin / no significant ST abnormalities    - No CP---> doubt acute coronary syndrome     Moderate/severe Functional MR     Moderate Ascending Aorta Dilatation    - 4.4 cm     Dementia    - some delirium also    - sitter at bedside    - ambulating in halls on his own     Untreated sleep apnea    - intolerant to CPAP    Called daughter and updated her     Diet: 2 Gram Sodium Diet Other - please comment    DVT Prophylaxis: DOAC  Biggs Catheter: Not present  Central Lines: None  Cardiac Monitoring: ACTIVE order. Indication: Acute decompensated heart failure (48 hours)  Code Status: No CPR- Do NOT Intubate      Disposition Plan   Expected Discharge: 02/02/2022     Anticipated discharge location: other (see comments) ([facility])    Delays:     1:1 Sitter            The patient's care was discussed with the Bedside Nurse, Patient and Patient's Family.    Sudheer Redmond MD  Hospitalist Service  [facility]  Securely message with the Vocera Web Console (learn more here)  Text page via [employer] Paging/Directory         Clinically Significant Risk Factors Present on Admission                    ______________________________________________________________________    Interval History   Patient has been ambulating independently in the halls with a walker and no assist    Data reviewed today: I reviewed all  medications, new labs and imaging results over the last 24 hours. I personally reviewed no images or EKG's today.    Physical Exam   Vital Signs: Temp: 97.7  F (36.5  C) Temp src: Oral BP: (!) 145/79 Pulse: 76   Resp: 20 SpO2: 99 % O2 Device: None (Room air)    Weight: 183 lbs 4.8 oz  Constitutional: awake, alert, cooperative, no apparent distress, and appears stated age  Eyes: Lids and lashes normal, pupils equal, round and reactive to light, extra ocular muscles intact, sclera clear, conjunctiva normal  ENT: Normocephalic, without obvious abnormality, atraumatic, sinuses nontender on palpation, external ears without lesions, oral pharynx with moist mucous membranes, tonsils without erythema or exudates, gums normal and good dentition.  Respiratory: rales in bases  Cardiovascular: Normal apical impulse, regular rate and rhythm, normal S1 and S2, no S3 or S4, and no murmur noted  GI: No scars, normal bowel sounds, soft, non-distended, non-tender, no masses palpated, no hepatosplenomegally  Skin: no bruising or bleeding  Musculoskeletal: + edema, wrapped    Data   No results found for this or any previous visit (from the past 24 hour(s)).  Medications     ACE/ARB/ARNI NOT PRESCRIBED       BETA BLOCKER NOT PRESCRIBED         acetaminophen  1,000 mg Oral TID     apixaban ANTICOAGULANT  2.5 mg Oral BID     diclofenac  2 g Topical 4x Daily     lidocaine  1 patch Transdermal Q24H     lidocaine   Transdermal Q8H     omeprazole  40 mg Oral Daily     potassium chloride ER  20 mEq Oral BID     QUEtiapine  25 mg Oral At Bedtime     simvastatin  20 mg Oral At Bedtime     spironolactone  25 mg Oral BID     tamsulosin  0.4 mg Oral Daily     torsemide  20 mg Oral BID

## 2022-02-01 NOTE — PROGRESS NOTES
Cardiology Progress Note  Dorita Moya APRN, CNP          Assessment and Plan:     DNR/DNI     Acute on Chronic combined systolic and diastolic HF / Bi-ventricular dysfunction   -BNP 1500 / interstitial pulmonary edema  -LVEF 50% with inferior hypokinesis, mild RV dysfunction/enlargement  -3rd HF admission in past couple of months due to poor compliance likely from recent mentation decline and untreated sleep apnea.  -improved status with IV Lasix, but 3 days without diuretics due to hypotension  -weights  192--> 174--> 183 lbs today  (euvolemic weight closer to 170 lbs)   -(-1800 total OP)  (+550 OP overnight)  -Torsemide 20 mg BID and spironolactone to start today  -low albumin contributing to 3rd spacing   -lymphedema PT / low salt diet / fluid restriction  -on low dose Lisinopril  -CORE follow up already arranged (2/9/22) with DAVID Coombs     Asymptomatic nonsustained VT / Frequent PVCs  -previous history  -cannot use beta-blockers due to underlying bradycardia  -no plans for aggressive treatment for VT given DNR status.       Permanent Atrial Fibrillation with slow ventricular response  -HRs 50-80 bpm while awake and down to 39 bpm during sleep  -some pauses  Longest 2.4 sec  -Chronic Eliquis 2.5 mg BID (due to recurrent epistaxis on 5 mg dose)     Coronary Artery Disease:  -hx of CABG (1997)  -cMRI (2018) findings consistent with prior RCA infarction  -NL troponin / no significant ST abnormalities  -No CP---> doubt acute coronary syndrome     Moderate/severe Functional MR     Moderate Ascending Aorta Dilatation  -4.4 cm     Dementia:  -some delirium also  -sitter at bedside     Untreated sleep apnea  -intolerant to CPAP                    Interval History:     Pleasant  Denies any chest pain or significant shortness of breath  Encouraged increasing protein intake                Medications:       acetaminophen  1,000 mg Oral TID     apixaban ANTICOAGULANT  2.5 mg Oral BID     diclofenac  2 g Topical 4x  Daily     lidocaine  1 patch Transdermal Q24H     lidocaine   Transdermal Q8H     omeprazole  40 mg Oral Daily     potassium chloride ER  20 mEq Oral BID     QUEtiapine  25 mg Oral At Bedtime     simvastatin  20 mg Oral At Bedtime     spironolactone  25 mg Oral BID     tamsulosin  0.4 mg Oral Daily     torsemide  20 mg Oral BID            Physical Exam:   Blood pressure 137/75, pulse 58, temperature 97.7  F (36.5  C), temperature source Oral, resp. rate 18, weight 83.1 kg (183 lb 4.8 oz), SpO2 99 %.  Wt Readings from Last 3 Encounters:   02/01/22 83.1 kg (183 lb 4.8 oz)   01/18/22 76.8 kg (169 lb 6.4 oz)   01/12/22 77.1 kg (170 lb)     I/O last 3 completed shifts:  In: 2305 [P.O.:2305]  Out: 1725 [Urine:1725]    CONST:  Oriented to name only  pleasant  LUNGS:  clear anteriorly bilaterally  CARDIO:  Irregular   II/VI systolic murmur  ABD:  Soft  Good appetite  EXT:  2+ bilateral shin edema with lymphedema wraps in place           Data:   TELE:  A-fib with frequent PVCs    CBC  Recent Labs   Lab 02/01/22  0650 01/29/22  0526   WBC 3.9* 4.3   HGB 11.0* 10.6*   PLT 99* 118*       BMP  Recent Labs   Lab 02/01/22  0650 01/31/22  0525 01/30/22  0532 01/29/22  0526 01/28/22  1756 01/28/22  0556    141  --  142  --  141   POTASSIUM 4.2 3.6 3.8 3.5   < > 3.8   CHLORIDE 109 109  --  109  --  109   ANG 8.9 8.6  --  8.7  --  8.8   CO2 25 28  --  28  --  26   BUN 20 21  --  23  --  25   CR 1.03 1.00  --  1.16  --  1.18   GLC 93 120*  --  92  --  84    < > = values in this interval not displayed.     Recent Labs   Lab Test 07/08/21  0951 05/13/21  1414 09/25/17  0950 10/02/15  0955 09/25/14  1210   CHOL 101 121   < > 122 117   HDL 62 59   < > 48 45   LDL 32 52   < > 63 60   TRIG 36 49   < > 53 62   CHOLHDLRATIO  --   --   --  2.5 2.6    < > = values in this interval not displayed.       TROP  Lab Results   Component Value Date    TROPI <0.015 11/28/2020    TROPI 0.016 10/30/2020    TROPI  11/15/2016     <0.015  The 99th  percentile for upper reference range is 0.045 ug/L.  Troponin values in   the range of 0.045 - 0.120 ug/L may be associated with risks of adverse   clinical events.      TROPONIN <0.04 02/20/2007       BNP  Recent Labs   Lab 01/27/22  0457   NTBNPI 1,506

## 2022-02-01 NOTE — PROGRESS NOTES
"7:52 PM  Pt ambulated the entire unit (approx 800ft.) SBA w/ gaitbelt and walker, requiring minimal cueing and/or assistance. Pt stated he feels \"stronger and faster\" and denied dizziness or SOB.  "

## 2022-02-02 NOTE — PROGRESS NOTES
Cardiology Progress Note            Assessment and Plan:     DNR/DNI     Acute on Chronic combined systolic and diastolic HF  Bi-ventricular dysfunction   -BNP 1500 / interstitial pulmonary edema  -LVEF 50% with inferior hypokinesis, mild RV dysfunction/enlargement  -3rd HF admission in past couple of months due to poor compliance likely from recent mentation decline and untreated sleep apnea.  -low albumin contributing to 3rd spacing   -improved status with IV Lasix, but 3 days without diuretics due to hypotension.  Now on torsemide 20 mg BID, spironolactone 25 mg BID and diuresing  -weights  192--> 174--> 183 lbs --> back to 179 lbs today.  Dry weight closer to 170 lbs  -Still has edema and JVD   -Once back to dry weight, he may not need as much diuretic  -holding lisinopril, can add back in clinic if tolerated  -lymphedema PT / low salt diet / fluid restriction  -CORE follow up already arranged 2/9/22     Asymptomatic nonsustained VT  Frequent PVCs  -previous history  -cannot use beta-blockers due to underlying bradycardia  -no plans for aggressive treatment for VT given DNR status.       Permanent Atrial Fibrillation with slow ventricular response  -HRs 50-80 bpm while awake and down to 39 bpm during sleep  -some pauses  Longest 2.4 sec  -Chronic Eliquis 2.5 mg BID (due to recurrent epistaxis on 5 mg dose)     Coronary Artery Disease  -hx of CABG 1997  -cMRI 2018 findings consistent with prior RCA infarction  -NL troponin / no significant ST abnormalities  -No CP---> doubt acute coronary syndrome     Moderate/severe Functional MR     Moderate Ascending Aorta Dilatation  -4.4 cm     Dementia  -some delirium also  -sitter at bedside     Untreated sleep apnea  -intolerant to CPAP              The total time for the visit by me today was 30 minutes which includes patient visit, reviewing of records, discussion, and placing of orders of the outpatient coordination of cardiovascular care as described.  The level of  medical decision making during this visit was of moderate complexity.  Thank you for allowing me to participate in their care.      Discussed with Dr. Redmond.  Cardiology will sign off.       Juliano Caballero PA-C                    Interval History:     Sleeping, wakes a little but confusion                 Medications:       acetaminophen  1,000 mg Oral TID     apixaban ANTICOAGULANT  2.5 mg Oral BID     diclofenac  2 g Topical 4x Daily     lidocaine  1 patch Transdermal Q24H     lidocaine   Transdermal Q8H     omeprazole  40 mg Oral Daily     potassium chloride ER  20 mEq Oral BID     QUEtiapine  25 mg Oral At Bedtime     simvastatin  20 mg Oral At Bedtime     spironolactone  25 mg Oral BID     tamsulosin  0.4 mg Oral Daily     torsemide  20 mg Oral BID            Physical Exam:   Blood pressure 127/77, pulse 67, temperature (!) 96  F (35.6  C), temperature source Axillary, resp. rate 22, weight 81.3 kg (179 lb 3.2 oz), SpO2 98 %.  Wt Readings from Last 3 Encounters:   02/02/22 81.3 kg (179 lb 3.2 oz)   01/18/22 76.8 kg (169 lb 6.4 oz)   01/12/22 77.1 kg (170 lb)     I/O last 3 completed shifts:  In: 610 [P.O.:610]  Out: 3000 [Urine:3000]    CONST:  Sleepy  LUNGS:  clear anteriorly bilaterally  CARDIO:  Irregular   II-III/VI systolic murmur, + JVD, + pitting edema  ABD:  Soft  Good appetite  EXT:  2+ bilateral shin edema with lymphedema wraps in place           Data:   TELE:  Afib with frequent PVCs    CBC  Recent Labs   Lab 02/01/22  0650 01/29/22  0526   WBC 3.9* 4.3   HGB 11.0* 10.6*   PLT 99* 118*       BMP  Recent Labs   Lab 02/01/22  0650 01/31/22  0525 01/30/22  0532 01/29/22  0526 01/28/22  1756 01/28/22  0556    141  --  142  --  141   POTASSIUM 4.2 3.6 3.8 3.5   < > 3.8   CHLORIDE 109 109  --  109  --  109   ANG 8.9 8.6  --  8.7  --  8.8   CO2 25 28  --  28  --  26   BUN 20 21  --  23  --  25   CR 1.03 1.00  --  1.16  --  1.18   GLC 93 120*  --  92  --  84    < > = values in this interval not  displayed.     Recent Labs   Lab Test 07/08/21  0951 05/13/21  1414 09/25/17  0950 10/02/15  0955 09/25/14  1210   CHOL 101 121   < > 122 117   HDL 62 59   < > 48 45   LDL 32 52   < > 63 60   TRIG 36 49   < > 53 62   CHOLHDLRATIO  --   --   --  2.5 2.6    < > = values in this interval not displayed.       TROP  Lab Results   Component Value Date    TROPI <0.015 11/28/2020    TROPI 0.016 10/30/2020    TROPI  11/15/2016     <0.015  The 99th percentile for upper reference range is 0.045 ug/L.  Troponin values in   the range of 0.045 - 0.120 ug/L may be associated with risks of adverse   clinical events.      TROPONIN <0.04 02/20/2007       BNP  Recent Labs   Lab 01/27/22  0457   NTBNPI 1,506

## 2022-02-02 NOTE — TELEPHONE ENCOUNTER
Stacia returned call late afternoon.   Called her back with no answer. Left her a voice mail to call back as Juliano is requesting appt to be in person, 2 appt times and he is at a TCU so we need to move appt to 2/9.     Spoke to hospital  who also has call out to Stacia. I did let her know that we did move Luís's appt and the reason. I asked if she could let Stacia know if she speaks to her before I do. She will relay message. Luís will be discharging with [facility] and will have them draw labs. Luís is suppose to be discharged tomorrow, 2/3.     Future Appointments   Date Time Provider Department Center   2/2/2022  3:30 PM Nancy Ravi, [facility]   2/8/2022 11:00 AM RU LAB [facility]   2/9/2022  8:10 AM Juliano Caballero PA-C [facility]   4/14/2022  3:00 PM RU LAB [facility]   4/14/2022  3:15 PM Sixto Conley MD [facility]         Marga Roberson, RN 5:02 PM 02/02/22

## 2022-02-02 NOTE — PROGRESS NOTES
JEAN spoke with daughter Stacia who is requesting medical transport be arranged for patient to return to [facility] tomorrow. Discussed possible need for stretcher d/t Dementia and needing supervision. Dtr is adamant that patient does not need stretcher and can go via WC. Reviewed out of pocket cost for [facility] transport, $81.80 for base rate and $5.26 per mile to the destination. Spoke with Stacia, they expressed understanding and are agreeable to this. HE WC transport arranged for 12:15pm tomorrow.     ELIEZER Quintanilla, [facility]   Inpatient Care Coordination  [facility]   [phone]

## 2022-02-02 NOTE — PROGRESS NOTES
Pt alert to self only. Disoriented to place, time and situation. VSS, Assist of 1 - 2 with gait belt and walker. Has Dementia. Has edema on BLE. Has chronic severe lymphedema and was assessed by PT yesterday. PT noted skin on BLE to be extremely dry and cracking. Sween lotion applied with improvement noted. PT therefore, removed lymphadema wrap and replaced with stocking. Pull stocking down to assess LE. Strict I/O. Tolerating 2 gm Na diet. Daily weight. Sitter at bedside. Incontinent at times.    Pt should be sitter free for 24 hours before discharge. To be discharge to [facility] on hospice    BP (!) 140/94 (BP Location: Right arm)   Pulse 67   Temp 97.2  F (36.2  C) (Oral)   Resp 18   Wt 83.1 kg (183 lb 4.8 oz)   SpO2 98%   BMI 28.70 kg/m

## 2022-02-02 NOTE — PLAN OF CARE
/77 (BP Location: Left arm)   Pulse 67   Temp (!) 96  F (35.6  C) (Axillary)   Resp 22   Wt 81.3 kg (179 lb 3.2 oz)   SpO2 98%   BMI 28.06 kg/m      Pt is Alert to self. Disoriented to time, situation, and place. Pt is up Assist x 1 - 2 w/ walker and GB. Hx of Dementia - from [facility]. LS diminished on RA. Tele A fib CVR. Has BLE edema and chronic lymphedema - has compression stockings on. Skin is red/bernie/peeling/cracked. Pt has R knee pain - Voltaren gel and Lidocaine patch applied. Strict I&Os. Pt is tolerating 2 g Na diet. Daily weights - weight down 4 lbs since 2/1. Intermittently INC.     Pt to be sitter free for 24 hrs before discharging back to [facility] for Hospice.     Plan to go back [facility] tomorrow via  W/C transport @ [id number].

## 2022-02-02 NOTE — PROGRESS NOTES
[facility]    Medicine Progress Note - Hospitalist Service    Date of Admission:  1/27/2022    Assessment & Plan              Milo Serna is a 81 year old male with PMH including CAD s/p remote CABG, A. fib on Eliquis with frequent bradycardia, pauses, PVCs, anemia, PUD, HTN, systolic CHF with most recent EF 50%, moderate severe MR, moderate TR, severe lymphedema, LENORA nonadherent to CPAP, BPH, prostate cancer, urinary retention, and more recently diagnosed dementia who presents with right knee pain and confusion.  He was hospitalized here from 12/26 through 1/4/2022 for lower extremity edema and confusion.  Seroquel and Zyprexa were added.  He was diuresed with IV diuretics and discharged on torsemide per cardiology recommendations.  He went to TCU where diuretics were continued.  His mental status did not improve much and overall had failure to thrive.  He was discharged to a memory care unit.  He was seen here 2 days ago in the ER for worsening lower extremity edema.  It was recommended he double his torsemide dose for 3 days and follow-up with cardiology.  He takes 20 mg daily normally. Admitted on 1/27 after his memory care found him kneeling with his head on his chair.  He was confused, somnolent, and reporting right knee pain so he was sent in for evaluation to the ER by EMS. Found to again be in heart failure.    Acute on Chronic combined systolic and diastolic HF / Bi-ventricular dysfunction     - BNP was 1500 / interstitial pulmonary edema    - LVEF 50% with inferior hypokinesis, mild RV dysfunction/enlargement    - 3rd HF admission in past couple of months due to poor compliance likely from recent mentation decline and untreated sleep apnea.    - cards following    - improving    - on Torsemide 20 mg BID and spironolactone    - low albumin contributing to 3rd spacing     - lymphedema PT / low salt diet / fluid restriction    - on low dose Lisinopril    - CORE follow up already  arranged (2/9/22) with DAVID Coombs    - output since admission: -[id number]    Asymptomatic nonsustained VT / Frequent PVCs    - previous history    - cannot use beta-blockers due to underlying bradycardia    - no plans for aggressive treatment for VT given DNR status.     Permanent Atrial Fibrillation with slow ventricular response    - HRs 50-80 bpm while awake and down to 39 bpm during sleep    - some pauses  Longest 2.4 sec    - chronic Eliquis 2.5 mg BID (due to recurrent epistaxis on 5 mg dose)     Coronary Artery Disease:    - hx of CABG (1997)    - cMRI (2018) findings consistent with prior RCA infarction    - NL troponin / no significant ST abnormalities    - No CP---> doubt acute coronary syndrome     Moderate/severe Functional MR     Moderate Ascending Aorta Dilatation    - 4.4 cm     Dementia    - some delirium also    - sitter at bedside    - ambulating in halls on his own    - sleepy today, will decrease frequency of zyprexa     Untreated sleep apnea    - intolerant to CPAP    Called daughter and updated her- left a message today     Diet: 2 Gram Sodium Diet Other - please comment    DVT Prophylaxis: DOAC  Biggs Catheter: Not present  Central Lines: None  Cardiac Monitoring: ACTIVE order. Indication: Acute decompensated heart failure (48 hours)  Code Status: No CPR- Do NOT Intubate      Disposition Plan   Expected Discharge: 02/03/2022     Anticipated discharge location: other (see comments) ([facility])    Delays:     1:1 Sitter            The patient's care was discussed with the Bedside Nurse, Patient and Patient's Family.    Sudheer Redmond MD  Hospitalist Service  [facility]  Securely message with the Voc[employer] Web Console (learn more here)  Text page via [employer] Paging/Directory         Clinically Significant Risk Factors Present on Admission                    ______________________________________________________________________    Interval History   Patient sleepy  today. Was up in chair. Walked to the bathroom with walker. Eating. Denies pain. No new complaints    Data reviewed today: I reviewed all medications, new labs and imaging results over the last 24 hours. I personally reviewed no images or EKG's today.    Physical Exam   Vital Signs: Temp: 96.8  F (36  C) Temp src: Axillary BP: 125/72 Pulse: 62   Resp: 21 SpO2: 99 % O2 Device: None (Room air)    Weight: 179 lbs 3.2 oz  Constitutional: awake, alert, cooperative, no apparent distress, and appears stated age  Eyes: Lids and lashes normal, pupils equal, round and reactive to light, extra ocular muscles intact, sclera clear, conjunctiva normal  ENT: Normocephalic, without obvious abnormality, atraumatic, sinuses nontender on palpation, external ears without lesions, oral pharynx with moist mucous membranes, tonsils without erythema or exudates, gums normal and good dentition.  Respiratory: rales in bases  Cardiovascular: Normal apical impulse, regular rate and rhythm, normal S1 and S2, no S3 or S4, and no murmur noted  GI: No scars, normal bowel sounds, soft, non-distended, non-tender, no masses palpated, no hepatosplenomegally  Skin: no bruising or bleeding  Musculoskeletal: + edema, wrapped    Data   No results found for this or any previous visit (from the past 24 hour(s)).  Medications     ACE/ARB/ARNI NOT PRESCRIBED       BETA BLOCKER NOT PRESCRIBED         acetaminophen  1,000 mg Oral TID     apixaban ANTICOAGULANT  2.5 mg Oral BID     diclofenac  2 g Topical 4x Daily     lidocaine  1 patch Transdermal Q24H     lidocaine   Transdermal Q8H     lisinopril  2.5 mg Oral Daily     omeprazole  40 mg Oral Daily     QUEtiapine  25 mg Oral At Bedtime     simvastatin  20 mg Oral At Bedtime     spironolactone  25 mg Oral BID     tamsulosin  0.4 mg Oral Daily     torsemide  20 mg Oral BID

## 2022-02-02 NOTE — PROGRESS NOTES
Pt's sitter pulled @ 9759. Pt has been tired but pleasant. Somewhat up and down, but been cooperative with most cares. SW and charge notified.

## 2022-02-02 NOTE — PROGRESS NOTES
Care Management Follow Up    Length of Stay (days): 5    Expected Discharge Date: 02/03/2022     Concerns to be Addressed: care coordination/care conferences,discharge planning     Patient plan of care discussed at interdisciplinary rounds: Yes    Anticipated Discharge Disposition[facility]     Anticipated Discharge Services: None  Anticipated Discharge DME: None    Patient/family educated on Medicare website which has current facility and service quality ratings:  (NA)  Education Provided on the Discharge Plan:    Patient/Family in Agreement with the Plan: yes    Referrals Placed by CM/SW: Homecare    Additional Information:  Per provider, pt is medically ready for discharge. Pt is now sitter free and has been appropriate for staff. Will plan for late morning discharge tomorrow 2/3. Called and LM for dtr to schedule transportation for [phone], awaiting call back.  nurse Noreen was updated by SW that pt will likely discharge tomorrow, Noreen okayed pt to return if it hasn't been a full 24 hours without sitter, they just want to ensure he isn't requiring one.     Lou Bergeron RN BSN   Inpatient Care Coordination  [facility]

## 2022-02-03 NOTE — PLAN OF CARE
Care from [id number]     Temp: (!) 96.7  F (35.9  C)  Temp src: Axillary  BP: 120/75  Pulse: 56  Resp: 18  SpO2: 97 %  O2 Device: None (Room air)        Neuro: Disorientated to, Time, Place, and Situation  Activity: are 1 Assist with Gait Belt and Walker  Telemetry Monitoring: Yes - Afib  Pain: denying pain.  GI:WNL  :WNL  Medications:Xarelto  Misc:Slept morning until noon.   Diet: Regular  Living Situation: [facility]  Discharge Disposition:  Home to     Plan of Care: discontinue at 1430 with HE transport

## 2022-02-03 NOTE — PLAN OF CARE
Alert, disoriented to time, place and situation. Bed alarm on. Patient cooperative but talking frequently.  Denies pain. Lidocaine patch removed from right knee and Voltaren gel applied. Edema to LE, stockings in place. Plan to discharge back to memory care tomorrow

## 2022-02-03 NOTE — PROGRESS NOTES
[facility]    Medicine Progress Note - Hospitalist Service    Date of Admission:  1/27/2022    Assessment & Plan              Milo Serna is a 81 year old male with PMH including CAD s/p remote CABG, A. fib on Eliquis with frequent bradycardia, pauses, PVCs, anemia, PUD, HTN, systolic CHF with most recent EF 50%, moderate severe MR, moderate TR, severe lymphedema, LENORA nonadherent to CPAP, BPH, prostate cancer, urinary retention, and more recently diagnosed dementia who presents with right knee pain and confusion.  He was hospitalized here from 12/26 through 1/4/2022 for lower extremity edema and confusion.  Seroquel and Zyprexa were added.  He was diuresed with IV diuretics and discharged on torsemide per cardiology recommendations.  He went to TCU where diuretics were continued.  His mental status did not improve much and overall had failure to thrive.  He was discharged to a memory care unit.  He was seen here 2 days ago in the ER for worsening lower extremity edema.  It was recommended he double his torsemide dose for 3 days and follow-up with cardiology.  He takes 20 mg daily normally. Admitted on 1/27 after his memory care found him kneeling with his head on his chair.  He was confused, somnolent, and reporting right knee pain so he was sent in for evaluation to the ER by EMS. Found to again be in heart failure.    Acute on Chronic combined systolic and diastolic HF / Bi-ventricular dysfunction     - BNP was 1500 / interstitial pulmonary edema    - LVEF 50% with inferior hypokinesis, mild RV dysfunction/enlargement    - 3rd HF admission in past couple of months due to poor compliance likely from recent mentation decline and untreated sleep apnea.    - cards following    - improving    - on Torsemide 20 mg BID and spironolactone    - low albumin contributing to 3rd spacing     - lymphedema PT / low salt diet / fluid restriction    - on low dose Lisinopril    - CORE follow up already  arranged (2/9/22) with DAVID Coombs    - output since admission: -7349  >> spoke to Dr. Bronson. Will increase lisinopril to 5mg, decrease torsemide to once daily, cont spironolactone    Asymptomatic nonsustained VT / Frequent PVCs    - previous history    - cannot use beta-blockers due to underlying bradycardia    - no plans for aggressive treatment for VT given DNR status.     Permanent Atrial Fibrillation with slow ventricular response    - HRs 50-80 bpm while awake and down to 39 bpm during sleep    - some pauses  Longest 2.4 sec    - chronic Eliquis 2.5 mg BID (due to recurrent epistaxis on 5 mg dose)     Coronary Artery Disease:    - hx of CABG (1997)    - cMRI (2018) findings consistent with prior RCA infarction    - NL troponin / no significant ST abnormalities    - No CP---> doubt acute coronary syndrome     Moderate/severe Functional MR     Moderate Ascending Aorta Dilatation    - 4.4 cm     Dementia    - some delirium also    - sitter at bedside    - ambulating in halls on his own    - patient's day/night cycles are flipped    - was up to bathroom multiple times last night    - now this am was given PRN zyprexa at 6am (unclear  why as it does not appear like he was agitated). Now he is sleeping/sedated    - decrease PM seroquel to 12.5 and change to PRN    Untreated sleep apnea    - intolerant to CPAP    Called daughter and updated her- left a message today. She called back and I was able to answer her questions     Diet: 2 Gram Sodium Diet Other - please comment    DVT Prophylaxis: DOAC  Biggs Catheter: Not present  Central Lines: None  Cardiac Monitoring: ACTIVE order. Indication: Acute decompensated heart failure (48 hours)  Code Status: No CPR- Do NOT Intubate      Disposition Plan   Expected Discharge: 02/03/2022     Anticipated discharge location: other (see comments) ([facility])    Delays:     1:1 Sitter            The patient's care was discussed with the Bedside Nurse, Patient  and Patient's Family.    Sudheer Redmond MD  Hospitalist Service  [facility]  Securely message with the [employer] Web Console (learn more here)  Text page via [employer] Paging/Directory     Clinically Significant Risk Factors Present on Admission                  ____________________________________________________________________    Interval History   Patient sleepy today. Will grumble when I try to wake him but will not actually wake up. Per report, he slept most of the day yesterday and then was up most of the night. Given zyprexa 5mg at 6am.    Data reviewed today: I reviewed all medications, new labs and imaging results over the last 24 hours. I personally reviewed no images or EKG's today.    Physical Exam   Vital Signs: Temp: (!) 96.7  F (35.9  C) Temp src: Axillary BP: 120/75 Pulse: 56   Resp: 18 SpO2: 97 % O2 Device: None (Room air)    Weight: 172 lbs 12.8 oz  Constitutional: asleep. Grumbles when I ry to arouse him  Eyes: Lids and lashes normal, pupils equal, round and reactive to light, extra ocular muscles intact, sclera clear, conjunctiva normal  ENT: Normocephalic, without obvious abnormality, atraumatic, sinuses nontender on palpation, external ears without lesions, oral pharynx with moist mucous membranes, tonsils without erythema or exudates, gums normal and good dentition.  Respiratory: clear  Cardiovascular: Normal apical impulse, regular rate and rhythm, normal S1 and S2, no S3 or S4, and no murmur noted  GI: No scars, normal bowel sounds, soft, non-distended, non-tender, no masses palpated, no hepatosplenomegally  Skin: no bruising or bleeding  Musculoskeletal: + edema, chronic skin changes    Data   No results found for this or any previous visit (from the past 24 hour(s)).  Medications     ACE/ARB/ARNI NOT PRESCRIBED       BETA BLOCKER NOT PRESCRIBED         acetaminophen  1,000 mg Oral TID     apixaban ANTICOAGULANT  2.5 mg Oral BID     diclofenac  2 g Topical 4x Daily      lidocaine  1 patch Transdermal Q24H     lidocaine   Transdermal Q8H     lisinopril  5 mg Oral Daily     omeprazole  40 mg Oral Daily     QUEtiapine  25 mg Oral At Bedtime     simvastatin  20 mg Oral At Bedtime     spironolactone  25 mg Oral Daily     tamsulosin  0.4 mg Oral Daily     torsemide  20 mg Oral Daily

## 2022-02-03 NOTE — PROGRESS NOTES
Care Management Follow Up    Length of Stay (days): 6    Expected Discharge Date: 02/03/2022     Concerns to be Addressed: care coordination/care conferences,discharge planning     Patient plan of care discussed at interdisciplinary rounds: Yes    Anticipated [facility] Dis[facility]     Anticipated Discharge Services: None  Anticipated Discharge DME: None    Patient/family educated on Medicare website which has current facility and service quality ratings:  (NA)  Education Provided on the Discharge Plan:  Yes  Patient/Family in Agreement with the Plan: yes    Referrals Placed by CM/SW: Homecare  Private pay costs discussed: Not applicable    Additional Information:  HE WC transport rescheduled from 1215 to 1430 for today. RN CC/SW will continue to follow.     ELIEZER Quintanilla

## 2022-02-03 NOTE — DISCHARGE SUMMARY
[facility]  Hospitalist Discharge Summary      Date of Admission:  1/27/2022  Date of Discharge:  2/3/2022  Discharging Provider: Sudheer Redmond MD  Discharge Service: Hospitalist Service    Discharge Diagnoses   Acute on Chronic combined systolic and diastolic HF / Bi-ventricular dysfunction, Asymptomatic nonsustained VT / Frequent PVCs.    Follow-ups Needed After Discharge   Follow-up Appointments     Follow Up and recommended labs and tests      [facility] (Cardiology Clinic) follow up already arranged (2/9/22) with DAVID Coombs             Unresulted Labs Ordered in the Past 30 Days of this Admission     No orders found from 12/28/2021 to 1/28/2022.      These results will be followed up by NA    Discharge Disposition   Discharged to memory care  Condition at discharge: Stable      Hospital Course   Milo Serna is a 81 year old male with PMH including CAD s/p remote CABG, A. fib on Eliquis with frequent bradycardia, pauses, PVCs, anemia, PUD, HTN, systolic CHF with most recent EF 50%, moderate severe MR, moderate TR, severe lymphedema, LENORA nonadherent to CPAP, BPH, prostate cancer, urinary retention, and more recently diagnosed dementia who presents with right knee pain and confusion.  He was hospitalized here from 12/26 through 1/4/2022 for lower extremity edema and confusion.  Seroquel and Zyprexa were added.  He was diuresed with IV diuretics and discharged on torsemide per cardiology recommendations.  He went to TCU where diuretics were continued.  His mental status did not improve much and overall had failure to thrive.  He was discharged to a memory care unit.  He was seen here 2 days ago in the ER for worsening lower extremity edema.  It was recommended he double his torsemide dose for 3 days and follow-up with cardiology.  He takes 20 mg daily normally.  Tonight his memory care he was found in his room kneeling with his head on his chair.  He was confused, somnolent, and  reporting right knee pain so he was sent in for evaluation to the ER by EMS.  He is somnolent and cannot provide any history for me.  He did follow a few commands, but will open his eyes.  His daughter Stacia provides all the history.  She thinks his lower extremity swelling is worse than before.  He is very somnolent and not responding to stimulation which is very far from his baseline.  He has had a very difficult few months and significant decline in his overall function and memory since his spouse broke her hip and had to go to memory care herself.  He is not eating as much.  He refuses to use CPAP or elevate his legs.  She reports that he was not having his compression stockings applied and that they need an order for this which they got 2 days ago.  She does note that he is reported some pain in the right leg for the past 2 days, but she is not aware of any trauma.  She assumes he is getting the medications that are prescribed, but cannot say for certain.  I am uncertain if he received Zyprexa or not before coming in, there is no MAR.     History obtained from patient, patient's daughter Stacia, medical record, and from Dr. Scott in the emergency department.  Blood pressure 123/82, heart rate 76, temperature 97  F, oxygen 99% on room air.  WBC 5.1, hemoglobin 10.6, platelet count 147.  VBG shows pH 7.44, PCO2 41, PO2 45, bicarbonate 27.  Blood glucose 131.  Creatinine 1.03 otherwise BMP unremarkable.  Albumin 2.2 and alk phos 218 otherwise LFTs normal.  Urinalysis shows 1 WBC and 35 RBCs.  COVID-19 PCR negative.  Influenza A/B PCR negative.  Chest x-ray shows pulmonary vascular congestion and pulmonary edema.  Noncontrast head CT shows brain atrophy, but no other acute pathology.  Right knee shows swelling of the prepatellar soft tissues.  He is being admitted to observation for mild CHF exacerbation with hypoxia in addition to his significant confusion from baseline and somnolence.  He received 20 mg IV  Lasix.  Acute on Chronic combined systolic and diastolic HF / Bi-ventricular dysfunction     - BNP was 1500 / interstitial pulmonary edema    - LVEF 50% with inferior hypokinesis, mild RV dysfunction/enlargement    - 3rd HF admission in past couple of months due to poor compliance likely from recent mentation decline and untreated sleep apnea.    - cards following    - improving    - on Torsemide 20 mg BID and spironolactone    - low albumin contributing to 3rd spacing     - lymphedema PT / low salt diet / fluid restriction    - on low dose Lisinopril    - [facility] follow up already arranged (2/9/22) with DAVID Coombs    - output since admission: -[id number]  >> spoke to Dr. Bronson. Will increase lisinopril to 5mg, decrease torsemide to once daily, cont spironolactone     Asymptomatic nonsustained VT / Frequent PVCs    - previous history    - cannot use beta-blockers due to underlying bradycardia    - no plans for aggressive treatment for VT given DNR status.     Permanent Atrial Fibrillation with slow ventricular response    - HRs 50-80 bpm while awake and down to 39 bpm during sleep    - some pauses  Longest 2.4 sec    - chronic Eliquis 2.5 mg BID (due to recurrent epistaxis on 5 mg dose)     Coronary Artery Disease:    - hx of CABG (1997)    - cMRI (2018) findings consistent with prior RCA infarction    - NL troponin / no significant ST abnormalities    - No CP---> doubt acute coronary syndrome     Moderate/severe Functional MR     Moderate Ascending Aorta Dilatation    - 4.4 cm     Dementia    - some delirium also    - sitter at bedside    - ambulating in halls on his own    - patient's day/night cycles are flipped    - was up to bathroom multiple times last night    - now this am was given PRN zyprexa at 6am (unclear  why as it does not appear like he was agitated). Now he is sleeping/sedated    - decrease PM seroquel to 12.5 and change to PRN     Untreated sleep apnea    - intolerant to CPAP    Patient was  sleeping this am and now is awake. Doing well. Able to ambulate with walker. Eating. No complaints. I did touch base with Dr. Bronson. Changes to his meds were made as above. Will discharge back to his memory care today. I called and updated his daughter.     Consultations This Hospital Stay   [facility] CLINIC EVALUATION IP CONSULT  OCCUPATIONAL THERAPY ADULT IP CONSULT  NUTRITION SERVICES ADULT IP CONSULT  CARE MANAGEMENT / SOCIAL WORK IP CONSULT  PHYSICAL THERAPY ADULT IP CONSULT  CARE MANAGEMENT / SOCIAL WORK IP CONSULT  PHYSICAL THERAPY ADULT IP CONSULT  PHYSICAL THERAPY ADULT IP CONSULT  PALLIATIVE CARE ADULT IP CONSULT  CARDIOLOGY IP CONSULT  SOCIAL WORK IP CONSULT  PHYSICAL THERAPY ADULT IP CONSULT  OCCUPATIONAL THERAPY ADULT IP CONSULT    Code Status   No CPR- Do NOT Intubate    Time Spent on this Encounter   I, Sudheer Redmond MD, personally saw the patient today and spent greater than 30 minutes discharging this patient.       Sudheer Redmond MD  [facility] OBSERVATION DEPT  [address] [place] [address]  Phone: [phone]  ______________________________________________________________________    Physical Exam   Vital Signs: Temp: (!) 96.7  F (35.9  C) Temp src: Axillary BP: 120/75 Pulse: 56   Resp: 18 SpO2: 97 % O2 Device: None (Room air)    Weight: 172 lbs 12.8 oz  Constitutional: awake, alert, cooperative, no apparent distress, and appears stated age  Eyes: Lids and lashes normal, pupils equal, round and reactive to light, extra ocular muscles intact, sclera clear, conjunctiva normal  ENT: Normocephalic, without obvious abnormality, atraumatic, sinuses nontender on palpation, external ears without lesions, oral pharynx with moist mucous membranes, tonsils without erythema or exudates, gums normal and good dentition.  Cardiovascular: Normal apical impulse, regular rate and rhythm, normal S1 and S2, no S3 or S4, and no murmur noted  GI: No scars, normal bowel sounds, soft,  non-distended, non-tender, no masses palpated, no hepatosplenomegally  Skin: no bruising or bleeding         Primary Care Physician   Oscar Alves    Discharge Orders      Home care nursing referral      Home Care PT Referral for Hospital Discharge      Home Care OT Referral for Hospital Discharge      Follow Up and recommended labs and tests    [facility] (Cardiology Clinic) follow up already arranged (2/9/22) with DAVID Coombs     Reason for your hospital stay    Acute on Chronic combined systolic and diastolic HF / Bi-ventricular dysfunction     Activity    Your activity upon discharge: activity as tolerated     MD face to face encounter    Documentation of Face to Face and Certification for Home Health Services    I certify that patient: Milo Serna is under my care and that I, or a nurse practitioner or physician's assistant working with me, had a face-to-face encounter that meets the physician face-to-face encounter requirements with this patient on: 2/3/2022.    This encounter with the patient was in whole, or in part, for the following medical condition, which is the primary reason for home health care: deconditioning. Advanced heart failure.    I certify that, based on my findings, the following services are medically necessary home health services: Nursing, Occupational Therapy, and Physical Therapy.    My clinical findings support the need for the above services because: Nurse is needed: To assess vitals, symptoms after changes in medications or other medical regimen.., Occupational Therapy Services are needed to assess and treat cognitive ability and address ADL safety due to impairment in mobility., and Physical Therapy Services are needed to assess and treat the following functional impairments: end stage CHF.    Further, I certify that my clinical findings support that this patient is homebound (i.e. absences from home require considerable and taxing effort and are for medical reasons or  [facility] services or infrequently or of short duration when for other reasons) because: Requires assistance of another person or specialized equipment to access medical services because patient: Requires supervision of another for safe transfer...    Based on the above findings. I certify that this patient is confined to the home and needs intermittent skilled nursing care, physical therapy and/or speech therapy.  The patient is under my care, and I have initiated the establishment of the plan of care.  This patient will be followed by a physician who will periodically review the plan of care.  Physician/Provider to provide follow up care: Oscar Alves    Attending hospital physician (the Medicare certified [place] provider): Sudheer Redmond MD  Physician Signature: See electronic signature associated with these discharge orders.  Date: 2/3/2022     No CPR- Do NOT Intubate     Diet    Follow this diet upon discharge: Orders Placed This Encounter      2 Gram Sodium Diet Other - please comment       Significant Results and Procedures   Most Recent 3 CBC's:Recent Labs   Lab Test 02/01/22  0650 01/29/22  0526 01/28/22  0556   WBC 3.9* 4.3 4.1   HGB 11.0* 10.6* 10.3*   * 101* 103*   PLT 99* 118* 122*     Most Recent 3 BMP's:Recent Labs   Lab Test 02/03/22  0542 02/01/22  0650 01/31/22  0525    138 141   POTASSIUM 3.9 4.2 3.6   CHLORIDE 109 109 109   CO2 26 25 28   BUN 26 20 21   CR 1.22 1.03 1.00   ANIONGAP 6 4 4   ANG 9.3 8.9 8.6   GLC 92 93 120*     Most Recent 2 LFT's:Recent Labs   Lab Test 01/27/22  0457 12/27/21  0739   AST 23 19   ALT 22 14   ALKPHOS 218* 173*   BILITOTAL 1.0 1.9*   ,   Results for orders placed or performed during the hospital encounter of 01/27/22   Head CT w/o contrast    Narrative    EXAM: CT HEAD W/O CONTRAST  LOCATION: [facility]  DATE/TIME: 1/27/2022 5:15 AM    INDICATION: Mental status change, unknown cause. Confusion.  COMPARISON:  12/06/2021.  TECHNIQUE: Routine CT Head without IV contrast. Multiplanar reformats. Dose reduction techniques were used.    FINDINGS:  INTRACRANIAL CONTENTS: No intracranial hemorrhage, extraaxial collection, or mass effect.  No CT evidence of acute infarct. Mild to moderate presumed chronic small vessel ischemic changes. Mild to moderate generalized volume loss. No hydrocephalus. Note   is made of coarse atherosclerotic calcifications of the intracranial vasculature.       VISUALIZED ORBITS/SINUSES/MASTOIDS: Prior right cataract surgery. Visualized portions of the orbits are otherwise unremarkable. Mild mucosal thickening scattered about the paranasal sinuses. No middle ear or mastoid effusion.    BONES/SOFT TISSUES: No acute abnormality.      Impression    IMPRESSION:  1.  No CT evidence for acute intracranial process.  2.  Brain atrophy and presumed chronic microvascular ischemic changes as above.                XR Chest 1 View    Narrative    EXAM: XR CHEST 1 VIEW  LOCATION: [facility]  DATE/TIME: 1/27/2022 5:33 AM    INDICATION: leg swelling eval for chf  COMPARISON: 12/26/2021      Impression    IMPRESSION: Large indistinct central pulmonary vasculature consistent with interstitial pulmonary edema. Recommend follow-up to resolution. No pneumothorax or pleural effusion. Moderately enlarged heart. Stable tortuous aorta. Multiple median sternotomy   wires.   XR Knee Right 1/2 Views    Narrative    EXAM: XR KNEE RT 1 /2 VW  LOCATION: [facility]  DATE/TIME: 1/27/2022 5:33 AM    INDICATION: Pain  COMPARISON: None.      Impression    IMPRESSION: Prepatellar soft tissue swelling. No acute fracture or dislocation. No joint effusion. Atherosclerotic vascular calcifications.       Discharge Medications   Current Discharge Medication List      START taking these medications    Details   lisinopril (ZESTRIL) 5 MG tablet Take 1 tablet (5 mg) by mouth daily  Qty: 30 tablet,  Refills: 1    Associated Diagnoses: Acute on chronic diastolic congestive heart failure (H)      spironolactone (ALDACTONE) 25 MG tablet Take 1 tablet (25 mg) by mouth daily  Qty: 30 tablet, Refills: 1    Associated Diagnoses: Acute on chronic diastolic congestive heart failure (H)         CONTINUE these medications which have CHANGED    Details   QUEtiapine (SEROQUEL) 25 MG tablet Take 1 tablet (25 mg) by mouth At Bedtime    Associated Diagnoses: Dementia with behavioral disturbance, unspecified dementia type (H)         CONTINUE these medications which have NOT CHANGED    Details   acetaminophen (TYLENOL) 500 MG tablet Take 1 tablet (500 mg) by mouth every 6 hours as needed for mild pain      apixaban ANTICOAGULANT (ELIQUIS) 2.5 MG tablet Take 1 tablet (2.5 mg) by mouth 2 times daily  Qty: 200 tablet, Refills: 4    Associated Diagnoses: Chronic atrial fibrillation (H)      diclofenac (VOLTAREN) 1 % topical gel Apply 4 gramsto area qid prn  Qty: 100 g, Refills: 11    Associated Diagnoses: Acute left-sided low back pain without sciatica      ferrous gluconate (FERGON) 324 (38 Fe) MG tablet Take 1 tablet (324 mg) by mouth daily (with breakfast)  Qty: 90 tablet, Refills: 3    Associated Diagnoses: Iron deficiency anemia secondary to inadequate dietary iron intake      MELATONIN PO Take 5 mg by mouth At Bedtime       multivitamin, therapeutic (THERA-VIT) TABS tablet Take 1 tablet by mouth daily    Associated Diagnoses: Encounter for preventive measure      OLANZapine (ZYPREXA) 5 MG tablet Take 1 tablet (5 mg) by mouth 2 times daily as needed (agitation)      omeprazole (PRILOSEC) 40 MG DR capsule Take 1 capsule (40 mg) by mouth daily  Qty: 90 capsule, Refills: 3    Comments: profile  Associated Diagnoses: PUD (peptic ulcer disease)      potassium chloride ER (K-TAB) 20 MEQ CR tablet Take 1 tablet (20 mEq) by mouth 2 times daily  Qty: 200 tablet, Refills: 3    Associated Diagnoses: Chronic combined systolic and  diastolic congestive heart failure (H)      simvastatin (ZOCOR) 20 MG tablet Take 1 tablet (20 mg) by mouth At Bedtime  Qty: 100 tablet, Refills: 4    Associated Diagnoses: Hyperlipidemia LDL goal <100      tamsulosin (FLOMAX) 0.4 MG capsule Take 1 capsule (0.4 mg) by mouth daily  Qty: 90 capsule, Refills: 3    Comments: profile  Associated Diagnoses: Prostate cancer (H)      torsemide (DEMADEX) 20 MG tablet Take 1 tablet (20 mg) by mouth daily  Qty: 30 tablet, Refills: 0    Associated Diagnoses: Ischemic cardiomyopathy; 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 (H)      urea (UREA 20 INTENSIVE HYDRATING) 20 % external cream Apply topically as needed Apply to right foot daily for dryness or rash  Qty: 120 g, Refills: 11    Associated Diagnoses: Right foot pain; Skin callus; Pes planus of both feet; Venous insufficiency of both lower extremities; Hav (hallux abducto valgus), right; Onychomycosis of toenail      !! ACE/ARB/ARNI NOT PRESCRIBED (INTENTIONAL) Please choose reason not prescribed from choices below.    Associated Diagnoses: Chronic combined systolic and diastolic congestive heart failure (H)      !! BETA BLOCKER NOT PRESCRIBED (INTENTIONAL) Please choose reason not prescribed from choices below.    Associated Diagnoses: Chronic combined systolic and diastolic congestive heart failure (H)       !! - Potential duplicate medications found. Please discuss with provider.        Allergies   Allergies   Allergen Reactions     Blood Transfusion Related (Informational Only) Other (See Comments)     Patient has a history of a clinically significant antibody against RBC antigens.  A delay in compatible RBCs may occur.

## 2022-02-03 NOTE — PROGRESS NOTES
Cardiology Progress Note            Assessment and Plan:     DNR/DNI     Acute on Chronic combined systolic and diastolic HF  Bi-ventricular dysfunction   -BNP 1500 / interstitial pulmonary edema  -LVEF 50% with inferior hypokinesis, mild RV dysfunction/enlargement  -3rd HF admission in past couple of months due to poor compliance likely from recent mentation decline and untreated sleep apnea.  -low albumin contributing to 3rd spacing   -improved status with IV Lasix, but 3 days without diuretics due to hypotension.  Now on torsemide 20 mg BID, spironolactone 25 mg BID and significant diuresis  -weights  192--> 174--> 183 lbs --> back to 172 lbs today.  Dry weight close to 170 lbs  -With the significant diuresis, will lower torsemide and spironolactone to once daily dosing  -Added back lisinopril, and increase to 5 mg today (BP higher yesterday and we are lowering diuretics)  -lymphedema PT / low salt diet / fluid restriction  -CORE follow up      Asymptomatic nonsustained VT  Frequent PVCs  -previous history  -cannot use beta-blockers due to underlying bradycardia  -no plans for aggressive treatment for VT given DNR status.       Permanent Atrial Fibrillation with slow ventricular response  -HRs 50-80 bpm while awake and down to 39 bpm during sleep  -some pauses  Longest 2.4 sec  -Chronic Eliquis 2.5 mg BID (due to recurrent epistaxis on 5 mg dose)     Coronary Artery Disease  -hx of CABG 1997  -cMRI 2018 findings consistent with prior RCA infarction  -NL troponin / no significant ST abnormalities  -No CP---> doubt acute coronary syndrome     Moderate/severe Functional MR     Moderate Ascending Aorta Dilatation  -4.4 cm     Dementia  -some delirium also  -sitter at bedside     Untreated sleep apnea  -intolerant to CPAP              The total time for the visit by me today was 30 minutes which includes patient visit, reviewing of records, discussion, and placing of orders of the outpatient coordination of  cardiovascular care as described.  The level of medical decision making during this visit was of moderate complexity.  Thank you for allowing me to participate in their care.      CORE Follow up is scheduled for 2/9, but this may be moved back to 2/16 since he will be at TCU.  We will contact patient/family/facility to clarify date/time of appt.       Juliano Caballero PA-C                    Interval History:     Sleeping                Medications:       acetaminophen  1,000 mg Oral TID     apixaban ANTICOAGULANT  2.5 mg Oral BID     diclofenac  2 g Topical 4x Daily     lidocaine  1 patch Transdermal Q24H     lidocaine   Transdermal Q8H     lisinopril  5 mg Oral Daily     omeprazole  40 mg Oral Daily     QUEtiapine  25 mg Oral At Bedtime     simvastatin  20 mg Oral At Bedtime     spironolactone  25 mg Oral Daily     tamsulosin  0.4 mg Oral Daily     torsemide  20 mg Oral Daily            Physical Exam:   Blood pressure 120/75, pulse 56, temperature (!) 96.7  F (35.9  C), temperature source Axillary, resp. rate 18, weight 78.4 kg (172 lb 12.8 oz), SpO2 97 %.  Wt Readings from Last 3 Encounters:   02/03/22 78.4 kg (172 lb 12.8 oz)   01/18/22 76.8 kg (169 lb 6.4 oz)   01/12/22 77.1 kg (170 lb)     I/O last 3 completed shifts:  In: 920 [P.O.:920]  Out: 3500 [Urine:3500]    CONST:  Sleepy  LUNGS:  clear anteriorly bilaterally  CARDIO:  Irregular   II-III/VI systolic murmur, minimally elevated JVP but much imporved, + pitting edema  ABD:  Soft  Good appetite  EXT:  2+ bilateral shin edema with lymphedema wraps in place           Data:   TELE:  Afib with frequent PVCs    CBC  Recent Labs   Lab 02/01/22  0650 01/29/22  0526   WBC 3.9* 4.3   HGB 11.0* 10.6*   PLT 99* 118*       BMP  Recent Labs   Lab 02/03/22  0542 02/01/22  0650 01/31/22  0525 01/30/22  0532 01/29/22  0526    138 141  --  142   POTASSIUM 3.9 4.2 3.6 3.8 3.5   CHLORIDE 109 109 109  --  109   ANG 9.3 8.9 8.6  --  8.7   CO2 26 25 28  --  28   BUN 26 20  21  --  23   CR 1.22 1.03 1.00  --  1.16   GLC 92 93 120*  --  92     Recent Labs   Lab Test 07/08/21  0951 05/13/21  1414 09/25/17  0950 10/02/15  0955 09/25/14  1210   CHOL 101 121   < > 122 117   HDL 62 59   < > 48 45   LDL 32 52   < > 63 60   TRIG 36 49   < > 53 62   CHOLHDLRATIO  --   --   --  2.5 2.6    < > = values in this interval not displayed.       TROP  Lab Results   Component Value Date    TROPI <0.015 11/28/2020    TROPI 0.016 10/30/2020    TROPI  11/15/2016     <0.015  The 99th percentile for upper reference range is 0.045 ug/L.  Troponin values in   the range of 0.045 - 0.120 ug/L may be associated with risks of adverse   clinical events.      TROPONIN <0.04 02/20/2007       BNP  No results for input(s): NTBNPI, NTBNP in the last 168 hours.

## 2022-02-03 NOTE — PROGRESS NOTES
Care Management Discharge Note    Discharge Date: 02/03/2022     Discharge Disposition: Assisted Living,Home Care ([facility])    Private pay costs discussed: transportation costs    Patient/family educated on Medicare website which has current facility and service quality ratings:  (NA)    Patient/Family in Agreement with the Plan: yes    Additional Information:  CM following for discharge to [facility] w/ Home RN/PT/OT through [facility], pt/dtr will also have a Hospice info meeting on 2/8 with [facility]. AVS updated with  info, updated agency on orders. Called and spoke with nurse Noreen at [facility]  [phone], she is aware of 1430 transport time. Orders completed and faxed to [phone]. Dtr Stacia updated via phone [phone].     Lou Bergeron RN BSN   Inpatient Care Coordination  [facility]   Phone [phone]

## 2022-02-03 NOTE — PLAN OF CARE
/67 (BP Location: Left arm)   Pulse 80   Temp (!) 96.5  F (35.8  C) (Oral)   Resp 16   Wt 81.3 kg (179 lb 3.2 oz)   SpO2 99%   BMI 28.06 kg/m    Neuro: pt is disoriented to time, situation, and place but alert to self - has a hx of dementia   Cardiac: pt is on tele - afib controlled with frequent PCVs   Lungs: WDL  : pt is incontinent of urine, but during shift pt was able to ambulate with an assist x2 with gait belt and walker to bathroom, but was unable to sit down d/t confusion and dementia - urinal was provided in the bathroom and pt urinated 800 ml   GI: incontinent   Pain: pt slept comfortable through half the night, no nonverbal or verbal indicators of pain shown   IV: SL  Labs/tests: K+ and Mag standard replacement rn managed - WDL  Diet: cardiac diet + 1800 ml fluid restriction   Activity: assist x2 gait belt and walker  Misc: pt is a daily weight and strict I/  Plan: discharge today back to memory care     Patient is stable and on  Room air .  Assist of  2 with gaitbelt and walker, on a cardiac diet. 1800 ml fluid restriction -  Will continue to monitor and provide cares.

## 2022-02-03 NOTE — DISCHARGE INSTRUCTIONS
Your home care referral was sent to [facility]  If you haven't heard from them within the next 24-48 hours,  Please call them at [phone]

## 2022-02-04 PROBLEM — R60.0 LOWER EXTREMITY EDEMA: Status: ACTIVE | Noted: 2022-01-01

## 2022-02-04 NOTE — TELEPHONE ENCOUNTER
Called and left voice mail for Stacia to see if Luís wants one more cardiology visit as it appears after reviewing his chart he has decided to go onto hospice/     Also left voicemail for nurse at [facility].    His CORE appt for 2/9 was cancelled. It is unsure who cancelled it and if it is still wanted.     Future Appointments   Date Time Provider Department Center   4/14/2022  3:00 PM RU LAB [facility]   4/14/2022  3:15 PM Sixto Conley MD [facility]       Update to Juliano Roberson, RN 4:02 PM 02/04/22

## 2022-02-04 NOTE — PLAN OF CARE
Physical Therapy Discharge Summary     Reason for therapy discharge:    Discharged to home with home therapy.     Progress towards therapy goal(s). See goals on Care Plan in [facility] electronic health record for goal details.  Goals partially met.  Barriers to achieving goals:   discharge from facility.     Therapy recommendation(s):    Continued therapy is recommended.  Rationale/Recommendations:  Patient would benefit from home lymphedema therapy for ongoing management of edema.  Patient requires home lymphedema therapy at this time as attending therapy in a clinic setting would be a considerable and significantly taxing effort, limiting his ability to participate in therapy session.

## 2022-02-04 NOTE — TELEPHONE ENCOUNTER
I thought he was scheduled 2/9/22 and we were moving him to 2/16/22 ?    But if hospice, we do not need to see.     Thanks,   Juliano Caballero PA-C 2/4/2022 4:20 PM

## 2022-02-04 NOTE — PROGRESS NOTES
Clinic Care Coordination Contact  Care Team Conversations    Patient was inpatient at [facility] 1/27/22-2/3/22 due to acute on chronic combined systolic and diastolic HF.  Patient was discharged to his memory care unit [facility].  Writer had spoke with a nurse prior to admission and their provider was planning on seeing patient to establish primary care, but had expressed that their timeframe to see patients was 1 month out.  Brenr left a message on the nurse voicemail at [facility] ([phone]) requesting a call back as to whether patient would need a follow-up with his [facility] PCP until he could establish with his [facility] provider.    Note patient has a CORE appointment on 2/9/22.    Patient will have an information meeting with hospice on 2/8/22.    Melissa Behl BSN, RN, PHN, [facility]  RN Clinical Product Navigator  [phone]

## 2022-02-05 PROBLEM — L30.9 DERMATITIS: Status: ACTIVE | Noted: 2022-01-01

## 2022-02-05 NOTE — PLAN OF CARE
ROOM # 204/2    Living Situation (if not independent, order SW consult):  Facility name: [facility]  : Stacia (daughter)[phone]    Activity level at baseline: Assistx2  Activity level on admit: Total care      Patient registered to observation; given Patient Bill of Rights; given the opportunity to ask questions about observation status and their plan of care.  Patient has been oriented to the observation room, bathroom and call light is in place.    Discussed discharge goals and expectations with patient/family.

## 2022-02-05 NOTE — PLAN OF CARE
PRIMARY DIAGNOSIS: WORSENING LE EDEMA/ RASH  OUTPATIENT/OBSERVATION GOALS TO BE MET BEFORE DISCHARGE:  1. ADLs back to baseline: No    2. Activity and level of assistance: assist of x2    3. Pain status: Improved-controlled with oral pain medications.    4. Return to near baseline physical activity:  SAURABH hasn't been OOB     Discharge Planner Nurse   Safe discharge environment identified: Yes  Barriers to discharge: Yes       Entered by: ANTWON PARRA 02/05/2022   Vital signs:  Temp: 97.5  F (36.4  C) Temp src: Axillary BP: 114/73 Pulse: 65   Resp: 18 SpO2: 96 % O2 Device: None (Room air) Alert but disoriented x3. Drowsy. Calm and cooperative with cares after woke up. New IV placed and IV Lasix given with other morning medications. Voided in urinal 500cc. LE 3+ edema, weepy, and red. Eating late breakfast currently. Reported back pain improved with repositioned. SW consulted. Will continue to monitor.        Please review provider order for any additional goals.   Nurse to notify provider when observation goals have been met and patient is ready for discharge.

## 2022-02-05 NOTE — UTILIZATION REVIEW
Admission Status; Secondary Review Determination       Under the authority of the Utilization Management Committee, the utilization review process indicated a secondary review on the above patient. The review outcome is based on review of the medical records, discussions with staff, and applying clinical experience noted on the date of the review.     (x) Inpatient Status Appropriate - This patient's medical care is consistent with medical management for inpatient care and reasonable inpatient medical practice.     RATIONALE FOR DETERMINATION   81-year-old male with PMH including CAD s/p remote CABG, A. fib on Eliquis with frequent bradycardia/pauses/PVCs, anemia, PUD, HTN, combined systolic and diastolic CHF, severe lymphedema, LENORA nonadherent to CPAP, BPH, prostate cancer and more recently diagnosed dementia (dramatic decline within the past 3 months) who is currently residing in Memory Care who was just discharged on 2/3/22 (CHF exacerbation) was readmitted on 02/4/22 with worsening LE edema and rash.    Additionally, he was previously hospitalized here from 12/26 through 1/4/2022 for lower extremity edema and confusion.  Seroquel and Zyprexa were added. He was diuresed with IV diuretics and discharged on torsemide per cardiology recommendations.  He went to TCU where diuretics were continued. His mental status did not improve much and overall had failure to thrive.  He was discharged to a memory care unit.      He was also hospitalized here 1/27-2/3 for acute on chronic combined systolic and diastolic CHF.      This is his 4th hospitalization in the past several months for CHF which is a poor prognostic sign.  He remains on scheduled IV diuresis twice daily.  It appears the primary service anticipates he will stay here through the weekend for additional diuresis and reviewing goals of care with palliative care service.    At this time, more than 2 nights hospital complex care is anticipated, based on patient  risk of adverse outcome if treated as outpatient and complex care required. Inpatient admission is appropriate based on the Medicare guidelines.     This document was produced using voice recognition software.    The information on this document is developed by the utilization review team in order for the business office to ensure compliance. This only denotes the appropriateness of proper admission status and does not reflect the quality of care rendered.   The definitions of Inpatient Status and Observation Status used in making the determination above are those provided in the CMS Coverage Manual, Chapter 1 and Chapter 6, section 70.4.     Sincerely,   Christy Fischer MD  Utilization Review  Physician Advisor  [facility].

## 2022-02-05 NOTE — PHARMACY-ADMISSION MEDICATION HISTORY
Admission medication history interview status for this patient is complete. See [facility] admission navigator for allergy information, prior to admission medications and immunization status.     Medication history interview done, indicate source(s): [facility] Nurse (Care facility [phone]; RN line [phone])  Medication history resources (including written lists, pill bottles, clinic record): SureScripts, Care Everywhere, and Nurse's med list and last dose record  Pharmacy: Mail order    Changes made to [facility] medication list:  Added: glucosamine-chondroiten  Changed: None  Reported as Not Taking: None  Removed: None    Actions taken by pharmacist (provider contacted, etc): Spoke with nurse at [facility] to get a verbal MAR.     Additional medication history information: Per nurse last hospital visit his stockings were tight and had to soak them in order to remove them when returned to [facility].    Medication reconciliation/reorder completed by provider prior to medication history?  Y   (Y/N)     For patients on insulin therapy: N  (Y/N)      Prior to Admission medications    Medication Sig Last Dose Taking? Auth Provider   acetaminophen (TYLENOL) 500 MG tablet Take 1 tablet (500 mg) by mouth every 6 hours as needed for mild pain Unknown at Unknown time Yes Chantel Bryson PA-C   apixaban ANTICOAGULANT (ELIQUIS) 2.5 MG tablet Take 1 tablet (2.5 mg) by mouth 2 times daily 2/4/2022 at pm Yes Sixto Conley MD   diclofenac (VOLTAREN) 1 % topical gel Apply 4 gramsto area qid prn Unknown at Unknown time Yes Oscar Alves MD   ferrous gluconate (FERGON) 324 (38 Fe) MG tablet Take 1 tablet (324 mg) by mouth daily (with breakfast) 2/4/2022 at am Yes Oscar Alves MD   Glucosamine-Chondroit-Vit C-Mn (GLUCOSAMINE-CHONDROITIN) TABS Take 1 tablet by mouth every morning 2/4/2022 at am Yes Unknown, Entered By History   lisinopril (ZESTRIL) 5 MG tablet Take 1 tablet (5 mg) by mouth daily 2/4/2022 at am Yes  Sudheer Redmond MD   melatonin 5 MG tablet Take 5 mg by mouth At Bedtime 2/4/2022 at pm Yes Unknown, Entered By History   multivitamin, therapeutic (THERA-VIT) TABS tablet Take 1 tablet by mouth daily 2/4/2022 at am Yes Reported, Patient   OLANZapine (ZYPREXA) 5 MG tablet Take 1 tablet (5 mg) by mouth 2 times daily as needed (agitation) 2/4/2022 at am Yes Chantel Bryson PA-C   omeprazole (PRILOSEC) 40 MG DR capsule Take 1 capsule (40 mg) by mouth daily 2/4/2022 at Unknown time Yes Oscar Alves MD   potassium chloride ER (K-TAB) 20 MEQ CR tablet Take 1 tablet (20 mEq) by mouth 2 times daily 2/4/2022 at pm Yes Sixto Conley MD   QUEtiapine (SEROQUEL) 25 MG tablet Take 1 tablet (25 mg) by mouth At Bedtime 2/4/2022 at pm Yes Sudheer Redmond MD   simvastatin (ZOCOR) 20 MG tablet Take 1 tablet (20 mg) by mouth At Bedtime 2/4/2022 at pm Yes Sixto Conley MD   spironolactone (ALDACTONE) 25 MG tablet Take 1 tablet (25 mg) by mouth daily 2/4/2022 at am Yes Sudheer Redmond MD   tamsulosin (FLOMAX) 0.4 MG capsule Take 1 capsule (0.4 mg) by mouth daily 2/4/2022 at am Yes Oscar Alves MD   torsemide (DEMADEX) 20 MG tablet Take 1 tablet (20 mg) by mouth daily 2/4/2022 at am Yes Alysia Galindo MD   urea (UREA 20 INTENSIVE HYDRATING) 20 % external cream Apply topically as needed Apply to right foot daily for dryness or rash  Patient taking differently: Apply topically daily Apply topically to right foot daily, clean legs with soap and water and pat dry before applying Unknown at Unknown time Yes Oscar Alves MD   ACE/ARB/ARNI NOT PRESCRIBED (INTENTIONAL) Please choose reason not prescribed from choices below.   Chantel Bryson PA-C   BETA BLOCKER NOT PRESCRIBED (INTENTIONAL) Please choose reason not prescribed from choices below.   Chantel Bryson PA-C

## 2022-02-05 NOTE — PLAN OF CARE
PRIMARY DIAGNOSIS: GENERALIZED WEAKNESS    OUTPATIENT/OBSERVATION GOALS TO BE MET BEFORE DISCHARGE  1. Tolerating PO medications: Yes    3. Return to near baseline physical activity: No    4. Cleared for discharge by consultants (if involved): No    Discharge Planner Nurse   Safe discharge environment identified: Yes  Barriers to discharge: Yes       Entered by: Argelia Romero 02/05/2022 6:48 AM     Pt is disorientated x3, total care,  inconsistent with following commands, combative and trying to get out of bed during cares, Zyprexa given w/some relief. lower extremity edema +2  to +3. Pt is incontinent. Bed alarm on for safety     Temp: 98.3  F (36.8  C) Temp src: Oral BP: (!) 142/72 Pulse: 79   Resp: 16 SpO2: 97 % O2 Device: None (Room air)    Please review provider order for any additional goals.   Nurse to notify provider when observation goals have been met and patient is ready for discharge.

## 2022-02-05 NOTE — PROGRESS NOTES
[facility]    Hospitalist Progress Note  Provider : Liam Swan MD  Date of Service (when I saw the patient): 02/05/2022    Assessment & Plan   Milo Serna is a 81 year old male with PMH including CAD s/p remote CABG, A. fib on Eliquis with frequent bradycardia/pauses/PVCs, anemia, PUD, HTN, combined systolic and diastolic CHF, severe lymphedema, LENORA nonadherent to CPAP, BPH, prostate cancer and more recently diagnosed dementia (dramatic decline within the past 3 months) who is currently residing in Memory Care who was just discharged on 2/3 (CHF exacerbation) was admitted on 02/05/22 with worsening LE edema and rash.  He was hospitalized here from 12/26 through 1/4/2022 for lower extremity edema and confusion.  Seroquel and Zyprexa were added. He was diuresed with IV diuretics and discharged on torsemide per cardiology recommendations.  He went to TCU where diuretics were continued. His mental status did not improve much and overall had failure to thrive.  He was discharged to a memory care unit.    He was also hospitalized here 1/27-2/3 for acute on chronic combined systolic and diastolic CHF.       Acute on Chronic combined systolic and diastolic HF / Bi-ventricular dysfunction: Presented from memory care with worsening LE edema and rash.  Was hospitalized here 1/27-2/3 and underwent IV diuresis.    -Weight had gone from 192 lbs to 172 lbs (dry weight ~170 lbs).    -Most recent TTE with LVEF 50% with inferior hypokinesis, mild RV dysfunction/enlargement.   -This is his 4th admission in the past few months for CHF which is likely worsened by untreated LENORA.   -He also has low albumin which is worsening LE edema and he does not tolerate compression stockings per prior notes.   -Was discharged on Torsemide 20 mg BID, Spironolactone 25 mg BID, Lisinopril 5 mg every day.    -Current BNP is 1037 (lower than prior values), CXR shows overall improvement of CHF with minimal  pulmonary vascular congestion.  Patient is not hypoxic.   -Hold lisinopril which was started last admission in case this is causing some sort of reaction in his legs, see below  -Daily weights and intake/output  -Lasix 40 mg IV twice daily  -Cardiology consulted. Seen during last admission and recommended outpatient follow up with CORE clinic     Bilateral lower extremity rash:   -Patient does have lymphedema.    -Per prior documentation his legs have appeared red bilaterally  -doesn't have evidence of vasculitis or cellulitis.? Contact dermitis  -Started on trial of prednisone 40 mg daily  -If prednisone worsens mental status would stop  -Lisinopril stopped     Asymptomatic nonsustained VT / Frequent PVCs:   -Not on BB due to bradycardia. no plans for aggressive treatment for VT given DNR status.     Permanent Atrial Fibrillation with slow ventricular response:   -Not BB due to bradycardia. He is on chronic Eliquis 2.5 mg BID (due to recurrent epistaxis on 5 mg dose).  -Continue Eliquis     Coronary Artery Disease:   -hx of CABG (1997), cMRI (2018) findings consistent with prior RCA infarction.  Denies chest pain.  Continue PTA medications with the exception of holding lisinopril as this was a new medication during his last admission.  -Lisinopril on hold     Dementia:   -Substantial decline over the past few months ever since spouse broke her hip and ended up in memory care. -Likely significant advancing dementia unmasked when they were no longer cohabitating.  Significant issues at nighttime with confusion and sundowning while in the hospital.  Now in memory care himself.    -Has been receiving Seroquel 25 mg at bedtime and has a prescription for Zyprexa 5 mg twice daily as needed for agitation.   -Continue Seroquel and as needed Zyprexa  -Currently sleeping     Untreated sleep apnea: intolerant to CPAP     Prostate cancer, BPH, history urinary retention: Monitor for any retention while here.  -Continue  Flomax    Diet:   2 gram sodium diet  DVT Prophylaxis: DOAC    Code Status: No CPR- Do NOT Intubate    Disposition/Goal of care: Expected discharge: anticipate patient will be here over the weekend.  His daughter does have a hospice meeting set up for 2/8. Patient is DNR/DNI. She would like him to go back to memory care with the support of hospice. Will need palliative care consult on Monday for determination of goal of care    Liam Swan MD    Interval History   Patient seen and examined. Patient has dementia and unable to provide any reliable history. Comfortably sleeping.     -Data reviewed today: I reviewed all new labs and imaging results over the last 24 hours. I personally reviewed    Physical Exam   Temp: 97.5  F (36.4  C) Temp src: Axillary BP: 114/73 Pulse: 65   Resp: 18 SpO2: 96 % O2 Device: None (Room air)    Vitals:    02/04/22 2128 02/05/22 0106   Weight: 82.2 kg (181 lb 3.5 oz) 79.9 kg (176 lb 1.6 oz)     Vital Signs with Ranges  Temp:  [97.5  F (36.4  C)-98.3  F (36.8  C)] 97.5  F (36.4  C)  Pulse:  [61-82] 65  Resp:  [14-18] 18  BP: (108-142)/(66-82) 114/73  SpO2:  [96 %-100 %] 96 %  No intake/output data recorded.    GEN:  Demented. appears comfortable, NAD.  HEENT:  Normocephalic/atraumatic, no scleral icterus, no nasal discharge, mouth moist.  CV:  Regular rate and rhythm, no murmur or JVD.  S1 + S2 noted, no S3 or S4.  LUNGS:  Clear to auscultation bilaterally without rales/rhonchi/wheezing/retractions.  Symmetric chest rise on inhalation noted.  ABD:  Active bowel sounds, soft, non-tender/non-distended.  No rebound/guarding/rigidity.  EXT:  No edema or cyanosis.  Hands/feet warm to touch with good signs of peripheral perfusion.  No joint synovitis noted.  SKIN: Bilateral erythematous lower extremities with wheeping  NEURO:  Symmetric muscle strength, sensation to touch grossly intact.  No new focal deficits appreciated.    Medications       apixaban ANTICOAGULANT  2.5 mg Oral BID      furosemide  40 mg Intravenous BID     pantoprazole  40 mg Oral Daily     potassium chloride ER  20 mEq Oral BID w/meals     predniSONE  40 mg Oral Daily     QUEtiapine  25 mg Oral At Bedtime     simvastatin  20 mg Oral At Bedtime     spironolactone  25 mg Oral Daily     tamsulosin  0.4 mg Oral Daily       Data   Recent Labs   Lab 02/04/22  2202 02/03/22  0542 02/01/22  0650   WBC 5.7  --  3.9*   HGB 10.9*  --  11.0*     --  105*   *  --  99*    141 138   POTASSIUM 4.9 3.9 4.2   CHLORIDE 112* 109 109   CO2 26 26 25   BUN 27 26 20   CR 1.16 1.22 1.03   ANIONGAP 4 6 4   ANG 8.8 9.3 8.9   * 92 93       Recent Results (from the past 24 hour(s))   US Lower Extremity Venous Duplex Bilateral    Narrative    EXAM: US LOWER EXTREMITY VENOUS DUPLEX BILATERAL  LOCATION: [facility]  DATE/TIME: 2/4/2022 10:23 PM    INDICATION: Bilateral lower extremity edema  COMPARISON: Right 10/15/2021  TECHNIQUE: Venous Duplex ultrasound of bilateral lower extremities with and without compression, augmentation and duplex. Color flow and spectral Doppler with waveform analysis performed.    FINDINGS: Exam includes the common femoral, femoral, popliteal veins as well as segmentally visualized deep calf veins and greater saphenous vein.     RIGHT: No deep vein thrombosis. No superficial thrombophlebitis. No popliteal cyst.    LEFT: No deep vein thrombosis. No superficial thrombophlebitis. No popliteal cyst.      Impression    IMPRESSION:  1.  No deep venous thrombosis in the bilateral lower extremities although calf veins are not well visualized due to distal nonspecific subcutaneous edema and body habitus.   XR Chest Port 1 View    Narrative    EXAM: XR CHEST PORT 1 VIEW  LOCATION: [facility]  DATE/TIME: 2/4/2022 10:53 PM    INDICATION: Bilateral lower extremity edema. Evaluate for volume overload.  COMPARISON: 01/27/2022      Impression    IMPRESSION: Interval improvement  in volume overload/CHF. There still remains minimal pulmonary vascular congestion compatible with a component of CHF/volume overload with minimal interstitial edema. Interval decrease in ill-defined perihilar opacities   since 01/27/2022. Cardiac enlargement. Tortuous calcified thoracic aorta. Sternotomy. Degenerative changes in both shoulders and the spine.   XR Hand Left G/E 3 Views    Narrative    EXAM: XR HAND LT G/E 3 VW  LOCATION: [facility]  DATE/TIME: 2/4/2022 11:51 PM    INDICATION: left hand pain, base of thumb  COMPARISON: None.      Impression    IMPRESSION: No acute fracture or dislocation. 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

## 2022-02-05 NOTE — ED PROVIDER NOTES
History   Chief Complaint:  Leg Swelling       The history is provided by the patient and the EMS personnel. History limited by: Patient has dementia.      Milo Serna is a 81 year old male on Eliquis with history of atrial fibrillation, CAD, CHF hypertension, hyperlipidemia, and hypokalemia who presents via EMS with leg swelling. EMS reports that the patient was seen in the ED previously for the same problem of Edema, however the staff of his memory care facility decided to send him back due to worsening edema, weakness. The patient denies being in any pain.    Review of Systems   Reason unable to perform ROS: Patient has dementia.          Allergies:  Blood transfusion related  Niacin  Pravastatin     Medications:  Eliquis  Fergon   Zestril   Melatonin   Zyprexa  Prilosec   Potassium chloride   Seroquel   Zocor   Aldactone   Flomax  Demadex    Past Medical History:     Actinic keratosis   Anemia  Atrial fibrillation   BPH  CAD   Closed bimalleolar fracture of left ankle with routine healing  Closed fracture of metatarsal bone  Dilated aortic root  Drug-induced erectile dysfunction  Elevated blood sugar   Elevated PSA  Epistaxis   Essential hypertension   Functional diarrhea  Gallbladder polyp  Gout  HAV  Hematoma  Hernia, abdominal   Prostate cancer  Hypokalemia   Iron deficiency anemia secondary to inadequate dietary iron intake  Ischemic cardiomyopathy  Lumbago   Memory loss  Microalbuminuria  Mixed hyperlipidemia   Mumps  Neuropathy of foot  Nonrheumatic mitral valve regurgitation  OA  MI  LENORA  Viral warts   Palpitations  Pes planus  Petechiae  PUD  Pulmonary hypertension  PVCs  Rhinophyma  Rosacea  Stasis dermatitis   Stress due to spouse with dementia  Thrombocytopenia   Unspecified hyperplasia of prostate with urinary obstruction and other lower urinary tract symptoms  Venous stasis   Thoracic aortic ectasia  Cervicalgia  Chronic combined systolic and diastolic congestive heart failure  Primary  osteoarthritis involving multiple joints  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  Other closed fracture of eighth thoracic vertebra  Drug-induced constipation  Hyperkalemia  Chronic anticoagulation  Congestive heart failure  Insomnia  Francesca coma scale total score 9-12  Edema    Past Surgical History:    Cardiac surgery: CAB  Cystoscopy flexible, cyoablation prostate  Genitourinary surgery: prostate surgery  Hernia repair  Laparoscopic cholecystectomy  Laparoscopic herniorrhaphy inguinal  R ankle ORIF  Nasal surgery  R rotater repair  Sinus surgery  L shoulder surgery  Appendectomy     Family History:    Mother: stomach cancer  Father: heart disease, MI  Brother: heart disease, hypertension  Sister: heart disease    Social History:  Presents unaccompanied  Lives at [facility]   PCP: Oscar Alves      Physical Exam     Patient Vitals for the past 24 hrs:   BP Temp Pulse Resp SpO2 Weight   02/04/22 2215 112/74 -- 82 -- 99 % --   02/04/22 2200 108/74 -- 66 -- 98 % --   02/04/22 2130 113/82 -- -- -- 100 % --   02/04/22 2128 113/82 97.8  F (36.6  C) 61 14 100 % 82.2 kg (181 lb 3.5 oz)       Physical Exam  Gen: well appearing, in no acute distress  Oral : Mucous membranes moist,   Nose: No rhinorhea  Ears: External near normal, without drainage  Eyes: periorbital tissues and sclera normal   Neck: supple, no abnormal swelling  Lungs:  CTAB,  no resp distress, speaks full sentences  CV: Regular rate, regular rhythm  Abd: soft, nontender, nondistended, no rebound/guarding  Ext: no lower extremity edema  Skin: Legs have no blanching petechial diffuse rash with diffuse edema and mild warmth below the knees. Some serious weeping bilaterally   Neuro: alert, no gross motor or sensory deficits, not oriented  Psych: Cooperative                    Emergency Department Course   ECG  ECG obtained at 2132, ECG read at 2150  Atrial fibrillation with premature ventricular or aberrantly conducted  complexes. ST & T wave abnormality, consider inferior ischemia. Prolonged QT. Abnormal ECG.    No significant change as compared to prior, dated 01/30/2022.  Rate 86 bpm. SD interval * ms. QRS duration 104 ms. QT/QTc 460/550 ms. P-R-T axes * 15 -41.     Imaging:  XR Chest Port 1 View   Final Result   IMPRESSION: Interval improvement in volume overload/CHF. There still remains minimal pulmonary vascular congestion compatible with a component of CHF/volume overload with minimal interstitial edema. Interval decrease in ill-defined perihilar opacities    since 01/27/2022. Cardiac enlargement. Tortuous calcified thoracic aorta. Sternotomy. Degenerative changes in both shoulders and the spine.      US Lower Extremity Venous Duplex Bilateral   Final Result   IMPRESSION:   1.  No deep venous thrombosis in the bilateral lower extremities although calf veins are not well visualized due to distal nonspecific subcutaneous edema and body habitus.      XR Hand Left G/E 3 Views    (Results Pending)     Report per radiology    Laboratory:  Labs Ordered and Resulted from Time of ED Arrival to Time of ED Departure   BASIC METABOLIC PANEL - Abnormal       Result Value    Sodium 142      Potassium 4.9      Chloride 112 (*)     Carbon Dioxide (CO2) 26      Anion Gap 4      Urea Nitrogen 27      Creatinine 1.16      Calcium 8.8      Glucose 112 (*)     GFR Estimate 63     CBC WITH PLATELETS AND DIFFERENTIAL - Abnormal    WBC Count 5.7      RBC Count 3.42 (*)     Hemoglobin 10.9 (*)     Hematocrit 34.3 (*)           MCH 31.9      MCHC 31.8      RDW 15.9 (*)     Platelet Count 113 (*)     % Neutrophils 77      % Lymphocytes 9      % Monocytes 11      % Eosinophils 2      % Basophils 1      % Immature Granulocytes 0      NRBCs per 100 WBC 0      Absolute Neutrophils 4.4      Absolute Lymphocytes 0.5 (*)     Absolute Monocytes 0.6      Absolute Eosinophils 0.1      Absolute Basophils 0.1      Absolute Immature Granulocytes 0.0       Absolute NRBCs 0.0     ERYTHROCYTE SEDIMENTATION RATE AUTO - Abnormal    Erythrocyte Sedimentation Rate 31 (*)    NT PROBNP INPATIENT - Normal    N terminal Pro BNP Inpatient 1,037     CRP INFLAMMATION - Normal    CRP Inflammation 4.1     COVID-19 VIRUS (CORONAVIRUS) BY PCR        Procedures      Emergency Department Course:             Reviewed:  I reviewed nursing notes and vitals    Assessments:  2149 I obtained history and examined the patient as noted above.   2358 I rechecked the patient and explained findings.     Consults:  2332 I spoke with Dr. Bearden of the hospitalist services who is in agreement to accept the patient for admission.     Interventions:      Disposition:  The patient was admitted to the hospital under the care of Dr. Bearden.     Impression & Plan     CMS Diagnoses:   and None      Medical Decision Making:  The patient presents tot he ED with worsening lower leg redness and swelling. He was just discharged from the hospital yesterday. He was admitted for congestive heart failure exacerbation. He has had chronic edema in the legs I think for some time with chronic stasis changes according to the inpatient notes. The brief info I got from his assisted living dementia care unit is that the legs seem to be getting worse and the patient is very weak and difficult to get up in the assisted living unit portion of his care center.  Patient's legs seem to be worse with more red and palpable purpura today.  Reviewing notes from previous admission that seems likely to be not have been present at that time.  Seems unlikely to be infectious.  Differential diagnosis includes vasculitis versus allergy from medications.  Contacted the hospitalist in agreement for inpatient management at this time.        Diagnosis:    ICD-10-CM    1. Lower extremity edema  R60.0    2. Dermatitis  L30.9        Discharge Medications:  New Prescriptions    No medications on file       Scribe Disclosure:  Ebony WREN am  serving as a scribe at 9:27 PM on 2/4/2022 to document services personally performed by Bird Valenzuela,* based on my observations and the provider's statements to me.              Bird Valenzuela MD  02/05/22 0009

## 2022-02-05 NOTE — PLAN OF CARE
PRIMARY DIAGNOSIS: WORSENING LOWER EXTREMITY EDEMA/ RASH   OUTPATIENT/OBSERVATION GOALS TO BE MET BEFORE DISCHARGE:  ADLs back to baseline:  unable to assess. Pt sleeping.     Activity and level of assistance: assist of x2 in bed    Pain status: SAURABH. Pt sleeping    Return to near baseline physical activity:  SAURABH. Pt sleeping     Discharge Planner Nurse   Safe discharge environment identified: Yes  Barriers to discharge: Yes       Entered by: ANTWON PARRA 02/05/2022   Vital signs:  Temp: 97.5  F (36.4  C) Temp src: Axillary BP: 116/66 Pulse: 78   Resp: 18 SpO2: 96 % O2 Device: None (Room air) Pt had a rough night. Had PO Zyprexa for agitation. Sitter assigned and watching pt from outside of the room. Sleeping since this morning. Okay not to wake pt up per provider in this morning. No IV access. Resides in the memory care. Pt most likely stayed the weekend due the memory care won't take pt back during the weekend. Will continue to monitor.      Please review provider order for any additional goals.   Nurse to notify provider when observation goals have been met and patient is ready for discharge.

## 2022-02-05 NOTE — PLAN OF CARE
PRIMARY DIAGNOSIS: WORSENING LE EDEMA/ RASH  OUTPATIENT/OBSERVATION GOALS TO BE MET BEFORE DISCHARGE:  ADLs back to baseline: Yes    Activity and level of assistance: assist of x2    Pain status: Pain free.    Return to near baseline physical activity: Yes     Discharge Planner Nurse   Safe discharge environment identified: Yes  Barriers to discharge: Yes       Entered by: ANTWON PARRA 02/05/2022 4:58 PM  Vital signs:  Temp: 97.8  F (36.6  C) Temp src: Axillary BP: 118/69 Pulse: 59   Resp: 18 SpO2: 94 % O2 Device: None (Room air) Disoriented x4. Alert. Able to walk to the bathroom assist of x2 with a walker and galt belt. Had a BM. Sat in a chair for a while. Sitter remain at bedside. Lower legs elevated while pt in bed. Legs red and swollen. Weeping improved. No dressing on. On Prednisone for possible contact dermatitis. Discharge TBD. May return to memory care tomorrow with possible transition to Hospice since pt appeared on their baseline or stay the weekend and meet with palliative here on Monday. On 2gm sodium diet. Good appetite. Good appetite. Will continue to monitor.       Please review provider order for any additional goals.   Nurse to notify provider when observation goals have been met and patient is ready for discharge.

## 2022-02-05 NOTE — ED NOTES
Bed: ED32  Expected date:   Expected time:   Means of arrival:   Comments:  A595 - 81 M weakness, leg edema

## 2022-02-05 NOTE — ED TRIAGE NOTES
Pt lives at [facility] in [place]. Pt seen in ED for same problem of edema. Staff sent pt to ED with concerns of edema worsening. Facility staff were very poor with information; were unable to give medication list, or any paperwork. Hx CHF, Afib, and Alzheimer's. ABC in tact. A/OX1

## 2022-02-05 NOTE — ED NOTES
[facility]  ED Nurse Handoff Report    Milo Serna is a 81 year old male   ED Chief complaint: Leg Swelling  . ED Diagnosis:   Final diagnoses:   Lower extremity edema   Dermatitis     Allergies:   Allergies   Allergen Reactions     Blood Transfusion Related (Informational Only) Other (See Comments)     Patient has a history of a clinically significant antibody against RBC antigens.  A delay in compatible RBCs may occur.       Code Status: Full Code  Activity level - Baseline/Home:  Assist X 2. Activity Level - Current:   Total Care. Lift room needed: No. Bariatric: No   Needed: No   Isolation: No. Infection: Not Applicable.     Vital Signs:   Vitals:    02/04/22 2128 02/04/22 2130 02/04/22 2200 02/04/22 2215   BP: 113/82 113/82 108/74 112/74   Pulse: 61  66 82   Resp: 14      Temp: 97.8  F (36.6  C)      SpO2: 100% 100% 98% 99%   Weight: 82.2 kg (181 lb 3.5 oz)          Cardiac Rhythm:  ,      Pain level:    Patient confused: Yes. Patient Falls Risk: Yes.   Elimination Status: Has voided   Patient Report - Initial Complaint: 81 year old male on Eliquis with history of atrial fibrillation, CAD, CHF hypertension, hyperlipidemia, and hypokalemia who presents via EMS with leg swelling. EMS reports that the patient was seen in the ED previously for the same problem of Edema, however the staff of his memory care facility decided to send him back due to worsening edema. The patient denies being in any pain.. Focused Assessment: Pt disoriented to place, time and situation at baseline. Pt not impulsive at this time. BLE pitting/weeping edema and nonblanchable redness.    Tests Performed:   Labs Ordered and Resulted from Time of ED Arrival to Time of ED Departure   BASIC METABOLIC PANEL - Abnormal       Result Value    Sodium 142      Potassium 4.9      Chloride 112 (*)     Carbon Dioxide (CO2) 26      Anion Gap 4      Urea Nitrogen 27      Creatinine 1.16      Calcium 8.8      Glucose 112 (*)      GFR Estimate 63     CBC WITH PLATELETS AND DIFFERENTIAL - Abnormal    WBC Count 5.7      RBC Count 3.42 (*)     Hemoglobin 10.9 (*)     Hematocrit 34.3 (*)           MCH 31.9      MCHC 31.8      RDW 15.9 (*)     Platelet Count 113 (*)     % Neutrophils 77      % Lymphocytes 9      % Monocytes 11      % Eosinophils 2      % Basophils 1      % Immature Granulocytes 0      NRBCs per 100 WBC 0      Absolute Neutrophils 4.4      Absolute Lymphocytes 0.5 (*)     Absolute Monocytes 0.6      Absolute Eosinophils 0.1      Absolute Basophils 0.1      Absolute Immature Granulocytes 0.0      Absolute NRBCs 0.0     ERYTHROCYTE SEDIMENTATION RATE AUTO - Abnormal    Erythrocyte Sedimentation Rate 31 (*)    NT PROBNP INPATIENT - Normal    N terminal Pro BNP Inpatient 1,037     CRP INFLAMMATION - Normal    CRP Inflammation 4.1     COVID-19 VIRUS (CORONAVIRUS) BY PCR     . Abnormal Results:   XR Chest Port 1 View   Final Result   IMPRESSION: Interval improvement in volume overload/CHF. There still remains minimal pulmonary vascular congestion compatible with a component of CHF/volume overload with minimal interstitial edema. Interval decrease in ill-defined perihilar opacities    since 01/27/2022. Cardiac enlargement. Tortuous calcified thoracic aorta. Sternotomy. Degenerative changes in both shoulders and the spine.      US Lower Extremity Venous Duplex Bilateral   Final Result   IMPRESSION:   1.  No deep venous thrombosis in the bilateral lower extremities although calf veins are not well visualized due to distal nonspecific subcutaneous edema and body habitus.      XR Hand Left G/E 3 Views    (Results Pending)     .   Treatments provided: See mar, flowsheets and notes  Family Comments: Daughter at bedside, will leave on admission transfer  OBS brochure/video discussed/provided to patient:  Yes  ED Medications: Medications - No data to display  Drips infusing:  No  For the majority of the shift, the patient's behavior  Green. Interventions performed were n/a.    Sepsis treatment initiated: No     Patient tested for COVID 19 prior to admission: YES - pending    ED Nurse Name/Phone Number: Venus Marcial RN,   11:39 PM   RECEIVING UNIT ED HANDOFF REVIEW    Above ED Nurse Handoff Report was reviewed: Yes  Reviewed by: Argelia Romero RN on February 5, 2022 at 12:42 AM

## 2022-02-05 NOTE — CONSULTS
Care Management Initial Consult     General Information  Assessment completed with: Caregiver,Children,    Type of CM/SW Visit: Initial Assessment     Primary Care Provider verified and updated as needed: Yes   Readmission within the last 30 days: Patient was just I the hospital (discharged on (2/2/2022)     Reason for Consult: care coordination/care conference,discharge planning     Cognitive  Cognitive/Neuro/Behavioral: .WDL except,arousability,orientation,level of consciousness  Level of Consciousness: lethargic  Arousal Level: arouses to pain  Orientation: disoriented x 4  Mood/Behavior: hypoactive (quiet, withdrawn),calm  Best Language: 0 - No aphasia  Speech: spontaneous,illogical     Living Environment:   People in home: facility resident     Current living Arrangements: assisted living  Name of Facility: [facility]    Able to return to prior arrangements: yes     Family/Social Support:  Care provided by:    Provides care for: no one, unable/limited ability to care for self  Marital Status:   Children,Facility resident(s)/Staff          Description of Support System: Supportive,Involved    Support Assessment: Adequate family and caregiver support     Current Resources:   Patient receiving home care services: No  Community Resources: Home Care  Equipment currently used at home: walker, rolling,shower chair  Supplies currently used at home: None     Lifestyle & Psychosocial Needs:  Social Determinants of Health          Tobacco Use: Medium Risk     Smoking Tobacco Use: Former Smoker     Smokeless Tobacco Use: Never Used   Alcohol Use: Not on file   Financial Resource Strain: Low Risk      Difficulty of Paying Living Expenses: Not hard at all   Food Insecurity: No Food Insecurity     Worried About Running Out of Food in the Last Year: Never true     Ran Out of Food in the Last Year: Never true   Transportation Needs: No Transportation Needs     Lack of Transportation (Medical): No     Lack of  Transportation (Non-Medical): No   Physical Activity: Not on file   Stress: Not on file   Social Connections: Not on file   Intimate Partner Violence: Not on file   Depression: Not at risk     PHQ-2 Score: 0   Housing Stability: Not on file         Additional Information:  CM consulted for discharge planning, per chart review pt resides at [facility]. SW spoke with daughter Stacia and she confirmed patient has an appointment with [facility] on 2/8/2022.  She desires patient to discharge back to [facility] when medically cleared.     The charge nurse is Noreen [phone]. Chart reviewed and patient had a recent TCU stay and discharged from TCU to  on 1/19. Initially pt had behaviors and was combative but he has become calmer and more agreeable to cares with time. Prior to admission she reports increased fatigue, lethargy, incontinent of stool and unable to follow cues.      On the previous admission Noreen reports that they are unable to provide a low salt diet, only no added salt. They also are unable to do strict I&O for pt. They do not have compression stockings for the pt, if these need to be ordered they need to know what type of compression stocking and the amount of pressure they should provide. Noreen also expressing concern that pt may be a hospice candidate and wondering if this will be pursued at the hospital, informed that this is not currently the plan but will update provider. Updated provider with this information.      At baseline pt ambulates with a WW independently, only requiring cueing, he has assistance with medications, bathing, toileting, dressing, meal escorts, 3 meals/day and safety checks. He was scheduled to begin services with [facility] for RN/PT/OT but was readmitted before services were able to begin. Home RN/PT/OT will need to be reordered at discharge.       is able to accept back on weekend if pt is at baseline, if pt has a need for increased  services he will be unable to return on the weekend. Weekend nurse can be reached at [phone]. Any new medications should be filled at [facility] in [place]. Orders should be faxed to  at [phone].     CARLOS Serna [facility]  [phone]  [address]   [facility]. [id number]  [facility]

## 2022-02-06 NOTE — PLAN OF CARE
PRIMARY DIAGNOSIS: WORSENING LE EDEMA/ RASH  OUTPATIENT/OBSERVATION GOALS TO BE MET BEFORE DISCHARGE:  1. ADLs back to baseline: Yes    2. Activity and level of assistance: assist of x2    3. Pain status: Pain free.    4. Return to near baseline physical activity: Yes     Discharge Planner Nurse   Safe discharge environment identified: Yes  Barriers to discharge: Yes       Entered by: Argelia Romero 02/05/2022 9:21 PM  Vital signs:  Cares From 1900 to 2300  Disoriented x4. Alert. Assist x2. Sitter remain at bedside.  Legs red and swollen. Weeping improved. No dressing on.  Discharge TBD. May return to memory care tomorrow with possible transition to Hospice since pt appeared on their baseline or stay weekend and meet with palliative on Monday.     Temp: 97.4  F (36.3  C) Temp src: Oral BP: 129/71 Pulse: 65   Resp: 16 SpO2: 96 % O2 Device: None (Room air)       Please review provider order for any additional goals.   Nurse to notify provider when observation goals have been met and patient is ready for discharge.

## 2022-02-06 NOTE — PLAN OF CARE
INPATIENT NOTE: [id number]    PRIMARY PROBLEM: Bilateral lower extremity rash and edema      BP (!) 148/83 (BP Location: Left arm)   Pulse 95   Temp 96.9  F (36.1  C) (Axillary)   Resp 12   Wt 79.9 kg (176 lb 1.6 oz)   SpO2 99%   BMI 27.57 kg/m         Orientation: Disorientated to, Self, Time, and Place  Neuro: Intact   Pain status: denying pain.   Resp: Lung sounds are Clear. Denies any SOB.   Cardiac: WNL, Denies any Chest Pain   GI: Bowels are active x 4 Quads.    : Voiding with no concern. Incontinent of bladder at times.   Skin: BLE petechiae   Peripheral edema: BLE Non-pitting +2   LDA: Peripheral IV. New IV placement during shift. Previous one was leaking.   Infusions: Patient is Saline locked. In between IV lasix BID.  Diet: Combination 2 gm sodium.   Activity: 2 assist with Gait Belt and Walker  Alarms/Safety: Sitter at bed side  Consults: Palliative  Discharge Plan: Plan is to continue to monitor and provide supportive cares.       Will continue to monitor and provide cares.     Lianet Verdin RN

## 2022-02-06 NOTE — PROGRESS NOTES
[facility]    Hospitalist Progress Note  Provider : Liam Swan MD  Date of Service (when I saw the patient): 02/06/2022    Assessment & Plan   Milo Serna is a 81 year old male with PMH including CAD s/p remote CABG, A. fib on Eliquis with frequent bradycardia/pauses/PVCs, anemia, PUD, HTN, combined systolic and diastolic CHF, severe lymphedema, LENORA nonadherent to CPAP, BPH, prostate cancer and more recently diagnosed dementia (dramatic decline within the past 3 months) who is currently residing in Memory Care who was just discharged on 2/3 (CHF exacerbation) was admitted on 02/05/22 with worsening LE edema and rash.  He was hospitalized here from 12/26 through 1/4/2022 for lower extremity edema and confusion.  Seroquel and Zyprexa were added. He was diuresed with IV diuretics and discharged on torsemide per cardiology recommendations.  He went to TCU where diuretics were continued. His mental status did not improve much and overall had failure to thrive.  He was discharged to a memory care unit.    He was also hospitalized here 1/27-2/3 for acute on chronic combined systolic and diastolic CHF.       Acute on Chronic combined systolic and diastolic HF / Bi-ventricular dysfunction: Presented from memory care with worsening LE edema and rash.  Was hospitalized here 1/27-2/3 and underwent IV diuresis.    -Weight had gone from 192 lbs to 172 lbs (dry weight ~170 lbs).    -Most recent TTE with LVEF 50% with inferior hypokinesis, mild RV dysfunction/enlargement.   -This is his 4th admission in the past few months for CHF which is likely worsened by untreated LENORA.   -He also has low albumin which is worsening LE edema and he does not tolerate compression stockings per prior notes.   -Was discharged on Torsemide 20 mg BID, Spironolactone 25 mg BID, Lisinopril 5 mg every day.    -Current BNP is 1037 (lower than prior values), CXR shows overall improvement of CHF with minimal  pulmonary vascular congestion.  Patient is not hypoxic.   -Hold lisinopril which was started last admission in case this is causing some sort of reaction in his legs, see below  -Daily weights and intake/output  -Lasix 40 mg IV twice daily  -Cardiology consulted. Seen during last admission and recommended outpatient follow up with [facility]     Bilateral lower extremity rash:   -Patient does have lymphedema.    -Per prior documentation his legs have appeared red bilaterally  -doesn't have evidence of vasculitis or cellulitis.? Contact dermitis  -Started on trial of prednisone 40 mg daily  -If prednisone worsens mental status would stop  -Lisinopril stopped     Asymptomatic nonsustained VT / Frequent PVCs:   -Not on BB due to bradycardia. no plans for aggressive treatment for VT given DNR status.     Permanent Atrial Fibrillation with slow ventricular response:   -Not BB due to bradycardia. He is on chronic Eliquis 2.5 mg BID (due to recurrent epistaxis on 5 mg dose).  -Continue Eliquis     Coronary Artery Disease:   -hx of CABG (1997), cMRI (2018) findings consistent with prior RCA infarction.  Denies chest pain.  Continue PTA medications with the exception of holding lisinopril as this was a new medication during his last admission.  -Lisinopril on hold     Dementia:   -Substantial decline over the past few months ever since spouse broke her hip and ended up in memory care. -Likely significant advancing dementia unmasked when they were no longer cohabitating.  Significant issues at nighttime with confusion and sundowning while in the hospital.  Now in memory care himself.    -Has been receiving Seroquel 25 mg at bedtime and has a prescription for Zyprexa 5 mg twice daily as needed for agitation.   -Continue Seroquel and as needed Zyprexa  -try to discontinue sitter as able     Untreated sleep apnea: intolerant to CPAP     Prostate cancer, BPH, history urinary retention: Monitor for any retention while  here.  -Continue Flomax    Diet:   2 gram sodium diet  DVT Prophylaxis: DOAC    Code Status: No CPR- Do NOT Intubate    Disposition/Goal of care: Expected discharge:Anticipate discharge tomorrow.  His daughter does have a hospice meeting set up for 2/8. Patient is DNR/DNI. She would like him to go back to memory care. Updated his daughter today    Liam Swan MD    Interval History   Patient seen and examined. Patient sitting in chair. Unable to provide reliable history due to his dementia. Has no cough or shortness of breath.     -Data reviewed today: I reviewed all new labs and imaging results over the last 24 hours. I personally reviewed    Physical Exam   Temp: (!) 69.5  F (20.8  C) Temp src: Axillary BP: 125/72 Pulse: 86   Resp: 16 SpO2: 98 % O2 Device: None (Room air)    Vitals:    02/04/22 2128 02/05/22 0106 02/06/22 0551   Weight: 82.2 kg (181 lb 3.5 oz) 79.9 kg (176 lb 1.6 oz) 78.9 kg (174 lb)     Vital Signs with Ranges  Temp:  [69.5  F (20.8  C)-97.8  F (36.6  C)] 69.5  F (20.8  C)  Pulse:  [59-95] 86  Resp:  [12-18] 16  BP: (114-148)/(69-83) 125/72  SpO2:  [94 %-99 %] 98 %  I/O last 3 completed shifts:  In: -   Out: 1900 [Urine:1900]    GEN:  Demented. appears comfortable, NAD.  HEENT:  Normocephalic/atraumatic, no scleral icterus, no nasal discharge, mouth moist.  CV:  Regular rate and rhythm, no murmur or JVD.  S1 + S2 noted, no S3 or S4.  LUNGS:  Clear to auscultation bilaterally without rales/rhonchi/wheezing/retractions.  Symmetric chest rise on inhalation noted.  ABD:  Active bowel sounds, soft, non-tender/non-distended.  No rebound/guarding/rigidity.  EXT:  No edema or cyanosis.  Hands/feet warm to touch with good signs of peripheral perfusion.  No joint synovitis noted.  SKIN: Bilateral erythematous lower extremities with wheeping  NEURO:  Symmetric muscle strength, sensation to touch grossly intact.  No new focal deficits appreciated.    Medications       apixaban ANTICOAGULANT  2.5 mg Oral  BID     furosemide  40 mg Intravenous BID     pantoprazole  40 mg Oral Daily     potassium chloride ER  20 mEq Oral BID w/meals     predniSONE  40 mg Oral Daily     QUEtiapine  25 mg Oral At Bedtime     simvastatin  20 mg Oral At Bedtime     spironolactone  25 mg Oral Daily     tamsulosin  0.4 mg Oral Daily       Data   Recent Labs   Lab 02/06/22  0552 02/04/22  2202 02/03/22  0542 02/01/22  0650   WBC 4.0 5.7  --  3.9*   HGB 11.5* 10.9*  --  11.0*   * 100  --  105*   * 113*  --  99*    142 141 138   POTASSIUM 4.1 4.9 3.9 4.2   CHLORIDE 111* 112* 109 109   CO2 27 26 26 25   BUN 25 27 26 20   CR 1.00 1.16 1.22 1.03   ANIONGAP 3 4 6 4   ANG 9.6 8.8 9.3 8.9   * 112* 92 93       No results found for this or any previous visit (from the past 24 hour(s)).

## 2022-02-07 NOTE — PROGRESS NOTES
Noted patient was re-admitted to [facility] on 2/4/22 due to increase in edema and worsening rash.  Writer will complete hand-in encounter.    Melissa Behl BSN, RN, PHN, CCM  RN Clinical Product Navigator  [phone]

## 2022-02-07 NOTE — PROGRESS NOTES
[facility]    Hospitalist Progress Note  Provider : Liam Swan MD  Date of Service (when I saw the patient): 02/07/2022    Assessment & Plan   Milo Serna is a 81 year old male with PMH including CAD s/p remote CABG, A. fib on Eliquis with frequent bradycardia/pauses/PVCs, anemia, PUD, HTN, combined systolic and diastolic CHF, severe lymphedema, LENORA nonadherent to CPAP, BPH, prostate cancer and more recently diagnosed dementia (dramatic decline within the past 3 months) who is currently residing in Memory Care who was just discharged on 2/3 (CHF exacerbation) was admitted on 02/05/22 with worsening LE edema and rash.  He was hospitalized here from 12/26 through 1/4/2022 for lower extremity edema and confusion.  Seroquel and Zyprexa were added. He was diuresed with IV diuretics and discharged on torsemide per cardiology recommendations.  He went to TCU where diuretics were continued. His mental status did not improve much and overall had failure to thrive.  He was discharged to a memory care unit.    He was also hospitalized here 1/27-2/3 for acute on chronic combined systolic and diastolic CHF.       Acute on Chronic combined systolic and diastolic HF / Bi-ventricular dysfunction: Presented from memory care with worsening LE edema and rash.  Was hospitalized here 1/27-2/3 and underwent IV diuresis.    -Weight had gone from 192 lbs to 172 lbs (dry weight ~170 lbs).    -Most recent TTE with LVEF 50% with inferior hypokinesis, mild RV dysfunction/enlargement.   -This is his 4th admission in the past few months for CHF which is likely worsened by untreated LENORA.   -He also has low albumin which is worsening LE edema and he does not tolerate compression stockings per prior notes.   -Was discharged on Torsemide 20 mg BID, Spironolactone 25 mg BID, Lisinopril 5 mg every day.    -Current BNP is 1037 (lower than prior values), CXR shows overall improvement of CHF with minimal  pulmonary vascular congestion.  Patient is not hypoxic.   -Hold lisinopril which was started last admission in case this is causing some sort of reaction in his legs, see below  -Diuresed with Lasix 40 mg IV twice daily during hospital course. Weight down by 9 kgs since admission. Leg edema significantly improved.   --Will switch back to po torsemide 20 mg daily today  -Hospice meeting planned for 2/8  -Discharge back to memory care unit when able to accept     Bilateral lower extremity rash:   -Patient does have lymphedema.    -Per prior documentation his legs have appeared red bilaterally  -doesn't have evidence of vasculitis or cellulitis.? Contact dermitis  -Started on trial of prednisone 40 mg daily with significant improvement of erythema  -Lisinopril stopped(only new medication)  -Will likely need steroid taper on discharge     Asymptomatic nonsustained VT / Frequent PVCs:   -Not on BB due to bradycardia. no plans for aggressive treatment for VT given DNR status.     Permanent Atrial Fibrillation with slow ventricular response:   -Not BB due to bradycardia. He is on chronic Eliquis 2.5 mg BID (due to recurrent epistaxis on 5 mg dose).  -Continue Eliquis     Coronary Artery Disease:   -hx of CABG (1997), cMRI (2018) findings consistent with prior RCA infarction.  Denies chest pain.  Continue PTA medications with the exception of holding lisinopril as this was a new medication during his last admission.  -Lisinopril on hold     Dementia:   -Substantial decline over the past few months ever since spouse broke her hip and ended up in memory care. -Likely significant advancing dementia unmasked when they were no longer cohabitating.  Significant issues at nighttime with confusion and sundowning while in the hospital.  Now in memory care himself.    -Has been receiving Seroquel 25 mg at bedtime and has a prescription for Zyprexa 5 mg twice daily as needed for agitation.   -Continue Seroquel and as needed  Zyprexa  -try to discontinue sitter as able     Untreated sleep apnea: intolerant to CPAP     Prostate cancer, BPH, history urinary retention: Monitor for any retention while here.  -Continue Flomax    Diet:  2 gram sodium diet  DVT Prophylaxis: DOAC    Code Status: No CPR- Do NOT Intubate    Disposition/Goal of care: Expected discharge:Anticipate discharge when memory care ready to accept the patient.  His daughter does have a hospice meeting set up for 2/8. Patient is DNR/DNI. She would like him to go back to memory care.     Liam Swan MD    Interval History   Patient seen and examined. Patient sitting in chair. Unable to provide reliable history due to his dementia. Has no cough or shortness of breath.     -Data reviewed today: I reviewed all new labs and imaging results over the last 24 hours. I personally reviewed    Physical Exam   Temp: 98.1  F (36.7  C) Temp src: Oral BP: (!) 152/99 Pulse: 82   Resp: 18 SpO2: 100 % O2 Device: None (Room air)    Vitals:    02/05/22 0106 02/06/22 0551 02/07/22 0803   Weight: 79.9 kg (176 lb 1.6 oz) 78.9 kg (174 lb) 73.5 kg (162 lb)     Vital Signs with Ranges  Temp:  [96  F (35.6  C)-98.1  F (36.7  C)] 98.1  F (36.7  C)  Pulse:  [59-92] 82  Resp:  [16-18] 18  BP: (124-152)/(61-99) 152/99  SpO2:  [97 %-100 %] 100 %  I/O last 3 completed shifts:  In: 690 [P.O.:690]  Out: 600 [Urine:600]    GEN:  Demented. appears comfortable, NAD.  HEENT:  Normocephalic/atraumatic, no scleral icterus, no nasal discharge, mouth moist.  CV:  Regular rate and rhythm, no murmur or JVD.  S1 + S2 noted, no S3 or S4.  LUNGS:  Clear to auscultation bilaterally without rales/rhonchi/wheezing/retractions.  Symmetric chest rise on inhalation noted.  ABD:  Active bowel sounds, soft, non-tender/non-distended.  No rebound/guarding/rigidity.  EXT:  No edema or cyanosis.  Hands/feet warm to touch with good signs of peripheral perfusion.  No joint synovitis noted.  SKIN: Bilateral erythematous lower  extremities with wheeping  NEURO:  Symmetric muscle strength, sensation to touch grossly intact.  No new focal deficits appreciated.    Medications       apixaban ANTICOAGULANT  2.5 mg Oral BID     pantoprazole  40 mg Oral Daily     potassium chloride ER  20 mEq Oral BID w/meals     predniSONE  40 mg Oral Daily     QUEtiapine  25 mg Oral At Bedtime     simvastatin  20 mg Oral At Bedtime     spironolactone  25 mg Oral Daily     tamsulosin  0.4 mg Oral Daily     [START ON 2/8/2022] torsemide  20 mg Oral Daily       Data   Recent Labs   Lab 02/06/22  0552 02/04/22  2202 02/03/22  0542 02/01/22  0650   WBC 4.0 5.7  --  3.9*   HGB 11.5* 10.9*  --  11.0*   * 100  --  105*   * 113*  --  99*    142 141 138   POTASSIUM 4.1 4.9 3.9 4.2   CHLORIDE 111* 112* 109 109   CO2 27 26 26 25   BUN 25 27 26 20   CR 1.00 1.16 1.22 1.03   ANIONGAP 3 4 6 4   ANG 9.6 8.8 9.3 8.9   * 112* 92 93       No results found for this or any previous visit (from the past 24 hour(s)).

## 2022-02-07 NOTE — CONSULTS
Hospice consult complete with pt dtr Stacia, spouse Leslie and on telephone son Yang and AMY Weathers. The hospice philosophy of care, available services and medicare benefit is explained.  Pt will return to [facility] tomorrow at 1230 via w/c transport. BSC and  w/c have been ordered for delivery tomorrow between 12-4. Pt owns adjustable twin sized bed.   Please fill and send 3 days supply of indicated comfort meds-bubble packed, no ranges and ACFV nurse will reassess at intake visit.    Care coordinated today with pt family and Ashley PENA who agree to POC.    Thank you for this referral.    Heather Bone RN CHPN  [facility]  Referral Specialist  [phone]

## 2022-02-07 NOTE — PROGRESS NOTES
Vitals: /79 (BP Location: Right arm)   Pulse 75   Temp 97.6  F (36.4  C) (Oral)   Resp 16   Wt 78.9 kg (174 lb)   SpO2 98%   BMI 27.25 kg/m       [id number]    Neuro: disoriented x4. Calm, cooperative, and easily redirectable throughout the day. No prn meds given for agitation except for Tylenol for shoulder pain.   Cardiac: wdl ex for bradycardic at times.    Lungs: diminished.   GI: on IV Lasix 40mg BID. Adequate urine output. Incontinent at times.  : wdl  Pain: tylenol given for shoulder pain.  IV: saline locked  Labs/Imaging: K 4.1. WBC 4.0. Hgb 11.5.  Diet: 2gm sodium diet. Good appetite.  Activity: assist of x1 with a walker and galt belt.  Misc: sitter at bedside.  Plan: possible meeting with palliative care tomorrow here or discharge to memory care and meet with hospice on Tuesday. SW on board with discharge process.    Will continue to monitor and provide supportive care.

## 2022-02-07 NOTE — TELEPHONE ENCOUNTER
"Spoke to Stacia. Luís will be going back to his facility on Hospice. He is currently at [facility] for increased shortness of breath and leg swelling. His facility is \"pushing Hospice. He is going to keep going back to the hospital. He won't need the appointment. But if he gets better, can I call and get him in.\" I did let her know yes we that is possible.     Will update Ana.    Marga Roberson RN 12:24 PM 02/07/22        "

## 2022-02-07 NOTE — PROGRESS NOTES
Clinic Care Coordination Contact  Ambulatory Care Coordination to Inpatient Care Management   Hand-In Communication    Date:  February 7, 2022  Name: Milo Serna is enrolled in Ambulatory Care Coordination program and I am the Lead Care Coordinator.  CC Contact Information: Epic In42Floorset + phone  Payor Source: Payor: ELIZABETH/KAMILLA / Plan:  FOR LIFE / Product Type: Indemnity /   Current services in place: Home Care   Please see the CC Snaphot and Care Management Flowsheets for specific  details of this Milo Serna care plan.   Additional details/specific concerns r/t this admission:    Readmission Risk 46%, patient has had 3 admissions in the past 2 months.  Noted inpatient Care Management is following patient.      I will follow this admission in Epic. Please feel free to contact me with questions or for further collaboration in discharge planning.    Melissa Behl BSN, RN, PHN, CCM  RN Clinical Product Navigator  [phone]

## 2022-02-07 NOTE — PROGRESS NOTES
"Care Management Follow Up    Length of Stay (days): 2    Expected Discharge Date: 02/08/2022     Concerns to be Addressed: Discharge planning      Patient plan of care discussed at interdisciplinary rounds: Yes    Anticipated Discharge Disposition:  [facility]     Anticipated Discharge Services:  Hospice  Anticipated Discharge DME:  TBD    Patient/family educated on Medicare website which has current facility and service quality ratings:  Yes  Patient/Family in Agreement with the Plan:  Yes    Referrals Placed by CM/SW:  Hospice  Private pay costs discussed: Not applicable    Additional Information:  JEAN spoke with ALEX Sorto at [facility] ([phone]). She states that patient cannot return to  unless he is enrolled in hospice due to his readmissions and not being \"stable\". JEAN explained that hospice info session is arranged for tomorrow with plans to enroll after further discussions with family. Not willing to allow patient return unless enrolled while hospitalized.     JEAN placed call to daughter Stacia ([phone]). She expressed some frustration about the facility and being rushed into this as she has tomorrow off of work and planned the hospice info session accordingly. She is requesting an info session with her, her brother, and her mother at [facility] this afternoon and enroll patient tomorrow.     JEAN spoke with hospice liaison Heather. She will check availability for a hospice info session today with family and availability for an enrollment tomorrow and return call to JEAN. Updated dtr that they may be able to do an info session at 1400 today.     JEAN will continue to follow.     1115: Call from [facility] liaison Heather. She will do info session with dtr and spouse today at 1400--she will call Stacia to update/confirm. Availability for hospice enrollment pending. They for sure have availability on Wednesday at 9am but are checking into staffing availability for tomorrow at 1300. " Awaiting confirmation.     ELIEZER Quintanilla

## 2022-02-07 NOTE — PLAN OF CARE
PRIMARY DIAGNOSIS: WORSENING LE EDEMA/ RASH  OUTPATIENT/OBSERVATION GOALS TO BE MET BEFORE DISCHARGE:  1. ADLs back to baseline: Yes    2. Activity and level of assistance: assist of x1    3. Pain status: Pain free.    4. Return to near baseline physical activity: Yes     Discharge Planner Nurse   Safe discharge environment identified: Yes  Barriers to discharge: Yes       Entered by: Argelia Romero 02/07/2022 5:57 AM  Vital signs:  Cares From 1900 to 2300  Disoriented x3,  Alert. Assist x1. Sitter remain at bedside.    possible meeting with palliative care 2/7 here or discharge to memory care and meet with hospice on Tuesday. SW on board with discharge process.        Please review provider order for any additional goals.   Nurse to notify provider when observation goals have been met and patient is ready for discharge.    Temp: 97.7  F (36.5  C) Temp src: Oral BP: 131/79 Pulse: 92   Resp: 16 SpO2: 97 % O2 Device: None (Room air)

## 2022-02-07 NOTE — PROGRESS NOTES
Care Management Discharge Note    Discharge Date: 02/08/2022     Discharge Disposition:  [facility]     Discharge Services:  Hospice    Discharge DME:  WC, commode, possible lift chair    Discharge Transportation:  Reviewed out of pocket cost for [facility]  transport, $81.80 for base rate and $5.26 per mile to the destination. Spoke with Stacia, they expressed understanding and are agreeable to this.     Private pay costs discussed: Not applicable    Patient/family educated on Medicare website which has current facility and service quality ratings:  Yes    Education Provided on the Discharge Plan:  Yes  Persons Notified of Discharge Plans: Patient's daughter Stacia, ALEX Sorto at [facility]  Patient/Family in Agreement with the Plan:  Yes    Handoff Referral Completed: No    Additional Information:  SW received call from [facility] hospice liasion. They completed info session with family and signed consents. They have an RN to complete hospice enrollment at facility at 1300. HE WC transport arranged for 12:30pm tomorrow, 2/8. Dtr and facility updated on transport time. Hospice liaison will complete note with requested meds to be filled prior to discharge-per facility, meds need to be bubble packed and solutabs if possible-no liquids or ranges. SW will continue to follow.     ELIEZER Quintanilla

## 2022-02-08 NOTE — PLAN OF CARE
Disoriented x3, pleasant throughout the shift, tolerating regular diet, heart sounds-WNL, lungs- clear, bowels- active, voiding/incontient at times, denies pain. Plan to discharge tomorrow,  transport arranged for 1230 per  note. Plan for hospice enrollment tomorrow at 1300 at [facility]

## 2022-02-08 NOTE — PLAN OF CARE
PRIMARY DIAGNOSIS: GENERALIZED WEAKNESS    OUTPATIENT/OBSERVATION GOALS TO BE MET BEFORE DISCHARGE  1. Orthostatic performed: No    2. Tolerating PO medications: Yes    3. Return to near baseline physical activity: Yes    4. Cleared for discharge by consultants (if involved): Yes    Discharge Planner Nurse   Safe discharge environment identified: Yes  Barriers to discharge: No       Entered by: Lior Zuñiga 02/08/2022 4:36 AM   VSS, remains confused, denies pain patient to discharge today ride set up no acute needs seen this shift will cont to monitor.   Please review provider order for any additional goals.   Nurse to notify provider when observation goals have been met and patient is ready for discharge.

## 2022-02-08 NOTE — DISCHARGE SUMMARY
[facility]  Hospitalist Discharge Summary      Date of Admission:  2/4/2022  Date of Discharge:  2/8/2022  Discharging Provider: Sudheer Redmond MD  Discharge Service: Hospitalist Service    Discharge Diagnoses   Lower extremity edema, dermatitis, Acute on Chronic combined systolic and diastolic HF / Bi-ventricular dysfunction    Follow-ups Needed After Discharge   Follow-up Appointments     Follow-up and recommended labs and tests       Follow up with primary care provider, Oscar Alves, within 7 days  Hospice follow up as scheduled             Unresulted Labs Ordered in the Past 30 Days of this Admission     No orders found from 1/5/2022 to 2/5/2022.      These results will be followed up by NA    Discharge Disposition   Discharged to memory care  Condition at discharge: Stable      Hospital Course   Milo Serna is a 81 year old male with PMH including CAD s/p remote CABG, A. fib on Eliquis with frequent bradycardia/pauses/PVCs, anemia, PUD, HTN, combined systolic and diastolic CHF, severe lymphedema, LENORA nonadherent to CPAP, BPH, prostate cancer and more recently diagnosed dementia (dramatic decline within the past 3 months) who is currently residing in Memory Care who was just discharged on 2/3 (CHF exacerbation) was admitted on 02/05/22 with worsening LE edema and rash.  History was obtained through patient interview, chart review and discussion with Dr. Valenzuela in the ER.     The patient cannot really provide any history.  I discussed with the patient's daughter at bedside.  As I was speaking with her the patient was talking to himself which the patient's daughter reports this is recent baseline.  He did deny chest pain or shortness of breath.  He did state that his left hand hurt.  He also said that his feet hurt.     The patient has been hospitalized here 3 times in the past few months for heart failure exacerbations.  He was actually just discharged on 2/3 after being  "treated for heart failure exacerbation with IV diuretics.  He was seen by cardiology during that time.     His daughter reports that memory care called her today and told her that he has to go back to the hospital.  They stated that his legs were more swollen and he had a new rash.     Patient was again mated to the hospital.  He was diuresed.  Likely the issue is his end-stage congestive heart failure and inability of his memory care facility to comfortably take care of him in the setting.  At this point palliative care has met with the patient and his family.  Plans are now made to have him discharged back to his memory care on hospice.  They will continue the course of steroids that were started for his dermatitis.  Of note I took care of the patient on his last admission and this rash was present at that time.  On admission his lisinopril was stopped.  At this point I am not going to resume it.  He will continue his dose of torsemide.  He is not needing any \"comfort meds\" currently.  He will discharge back to his memory care on hospice.    Consultations This Hospital Stay   CARE MANAGEMENT / SOCIAL WORK IP CONSULT    Code Status   No CPR- Do NOT Intubate    Time Spent on this Encounter   I, Sudheer Redmond MD, personally saw the patient today and spent greater than 30 minutes discharging this patient.       Sudheer Redmond MD  [facility] OBSERVATION DEPT  [address] [place] [address]  Phone: [phone]  ______________________________________________________________________    Physical Exam   Vital Signs: Temp: 98.1  F (36.7  C) Temp src: Oral BP: 115/69 Pulse: 69   Resp: 16 SpO2: 96 % O2 Device: Nasal cannula    Weight: 163 lbs 9.6 oz  Constitutional: awake, alert, cooperative, no apparent distress, and appears stated age. Confused at his baseline  Eyes: Lids and lashes normal, pupils equal, round and reactive to light, extra ocular muscles intact, sclera clear, conjunctiva " normal  ENT: Normocephalic, without obvious abnormality, atraumatic, sinuses nontender on palpation, external ears without lesions, oral pharynx with moist mucous membranes, tonsils without erythema or exudates, gums normal and good dentition.  Respiratory: crackles in R base, left clear  Cardiovascular: Normal apical impulse, regular rate and rhythm, normal S1 and S2, no S3 or S4, and no murmur noted  GI: No scars, normal bowel sounds, soft, non-distended, non-tender, no masses palpated, no hepatosplenomegally  Skin: no bruising or bleeding       Primary Care Physician   Oscar Alves    Discharge Orders      Medication Therapy Management Referral      Care Coordination Referral      Hospice Referral      Reason for your hospital stay    CHF exacerbation     Activity    Your activity upon discharge: activity as tolerated     Follow-up and recommended labs and tests     Follow up with primary care provider, Oscar Alves, within 7 days  Hospice follow up as scheduled     Diet    Follow this diet upon discharge: Orders Placed This Encounter      Combination Diet 2 gm NA Diet       Significant Results and Procedures   Most Recent 3 CBC's:Recent Labs   Lab Test 02/06/22  0552 02/04/22 2202 02/01/22  0650   WBC 4.0 5.7 3.9*   HGB 11.5* 10.9* 11.0*   * 100 105*   * 113* 99*     Most Recent 3 BMP's:Recent Labs   Lab Test 02/06/22  0552 02/04/22 2202 02/03/22  0542    142 141   POTASSIUM 4.1 4.9 3.9   CHLORIDE 111* 112* 109   CO2 27 26 26   BUN 25 27 26   CR 1.00 1.16 1.22   ANIONGAP 3 4 6   ANG 9.6 8.8 9.3   * 112* 92     Most Recent 2 LFT's:Recent Labs   Lab Test 01/27/22  0457 12/27/21  0739   AST 23 19   ALT 22 14   ALKPHOS 218* 173*   BILITOTAL 1.0 1.9*   ,   Results for orders placed or performed during the hospital encounter of 02/04/22   US Lower Extremity Venous Duplex Bilateral    Narrative    EXAM: US LOWER EXTREMITY VENOUS DUPLEX BILATERAL  LOCATION: [facility]  HOSPITAL  DATE/TIME: 2/4/2022 10:23 PM    INDICATION: Bilateral lower extremity edema  COMPARISON: Right 10/15/2021  TECHNIQUE: Venous Duplex ultrasound of bilateral lower extremities with and without compression, augmentation and duplex. Color flow and spectral Doppler with waveform analysis performed.    FINDINGS: Exam includes the common femoral, femoral, popliteal veins as well as segmentally visualized deep calf veins and greater saphenous vein.     RIGHT: No deep vein thrombosis. No superficial thrombophlebitis. No popliteal cyst.    LEFT: No deep vein thrombosis. No superficial thrombophlebitis. No popliteal cyst.      Impression    IMPRESSION:  1.  No deep venous thrombosis in the bilateral lower extremities although calf veins are not well visualized due to distal nonspecific subcutaneous edema and body habitus.   XR Chest Port 1 View    Narrative    EXAM: XR CHEST PORT 1 VIEW  LOCATION: [facility]  DATE/TIME: 2/4/2022 10:53 PM    INDICATION: Bilateral lower extremity edema. Evaluate for volume overload.  COMPARISON: 01/27/2022      Impression    IMPRESSION: Interval improvement in volume overload/CHF. There still remains minimal pulmonary vascular congestion compatible with a component of CHF/volume overload with minimal interstitial edema. Interval decrease in ill-defined perihilar opacities   since 01/27/2022. Cardiac enlargement. Tortuous calcified thoracic aorta. Sternotomy. Degenerative changes in both shoulders and the spine.   XR Hand Left G/E 3 Views    Narrative    EXAM: XR HAND LT G/E 3 VW  LOCATION: [facility]  DATE/TIME: 2/4/2022 11:51 PM    INDICATION: left hand pain, base of thumb  COMPARISON: None.      Impression    IMPRESSION: No acute fracture or dislocation. 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       Discharge Medications   Current Discharge Medication List      START taking  these medications    Details   predniSONE (DELTASONE) 20 MG tablet Take 2 tablets (40 mg) by mouth daily for 5 days  Qty: 10 tablet, Refills: 0    Associated Diagnoses: Dermatitis         CONTINUE these medications which have NOT CHANGED    Details   acetaminophen (TYLENOL) 500 MG tablet Take 1 tablet (500 mg) by mouth every 6 hours as needed for mild pain      apixaban ANTICOAGULANT (ELIQUIS) 2.5 MG tablet Take 1 tablet (2.5 mg) by mouth 2 times daily  Qty: 200 tablet, Refills: 4    Associated Diagnoses: Chronic atrial fibrillation (H)      diclofenac (VOLTAREN) 1 % topical gel Apply 4 gramsto area qid prn  Qty: 100 g, Refills: 11    Associated Diagnoses: Acute left-sided low back pain without sciatica      ferrous gluconate (FERGON) 324 (38 Fe) MG tablet Take 1 tablet (324 mg) by mouth daily (with breakfast)  Qty: 90 tablet, Refills: 3    Associated Diagnoses: Iron deficiency anemia secondary to inadequate dietary iron intake      Glucosamine-Chondroit-Vit C-Mn (GLUCOSAMINE-CHONDROITIN) TABS Take 1 tablet by mouth every morning      melatonin 5 MG tablet Take 5 mg by mouth At Bedtime      multivitamin, therapeutic (THERA-VIT) TABS tablet Take 1 tablet by mouth daily    Associated Diagnoses: Encounter for preventive measure      OLANZapine (ZYPREXA) 5 MG tablet Take 1 tablet (5 mg) by mouth 2 times daily as needed (agitation)      omeprazole (PRILOSEC) 40 MG DR capsule Take 1 capsule (40 mg) by mouth daily  Qty: 90 capsule, Refills: 3    Comments: profile  Associated Diagnoses: PUD (peptic ulcer disease)      potassium chloride ER (K-TAB) 20 MEQ CR tablet Take 1 tablet (20 mEq) by mouth 2 times daily  Qty: 200 tablet, Refills: 3    Associated Diagnoses: Chronic combined systolic and diastolic congestive heart failure (H)      QUEtiapine (SEROQUEL) 25 MG tablet Take 1 tablet (25 mg) by mouth At Bedtime    Associated Diagnoses: Dementia with behavioral disturbance, unspecified dementia type (H)      simvastatin  (ZOCOR) 20 MG tablet Take 1 tablet (20 mg) by mouth At Bedtime  Qty: 100 tablet, Refills: 4    Associated Diagnoses: Hyperlipidemia LDL goal <100      spironolactone (ALDACTONE) 25 MG tablet Take 1 tablet (25 mg) by mouth daily  Qty: 30 tablet, Refills: 1    Associated Diagnoses: Acute on chronic diastolic congestive heart failure (H)      tamsulosin (FLOMAX) 0.4 MG capsule Take 1 capsule (0.4 mg) by mouth daily  Qty: 90 capsule, Refills: 3    Comments: profile  Associated Diagnoses: Prostate cancer (H)      torsemide (DEMADEX) 20 MG tablet Take 1 tablet (20 mg) by mouth daily  Qty: 30 tablet, Refills: 0    Associated Diagnoses: Ischemic cardiomyopathy; 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 (H)      urea (UREA 20 INTENSIVE HYDRATING) 20 % external cream Apply topically as needed Apply to right foot daily for dryness or rash  Qty: 120 g, Refills: 11    Associated Diagnoses: Right foot pain; Skin callus; Pes planus of both feet; Venous insufficiency of both lower extremities; Hav (hallux abducto valgus), right; Onychomycosis of toenail      !! ACE/ARB/ARNI NOT PRESCRIBED (INTENTIONAL) Please choose reason not prescribed from choices below.    Associated Diagnoses: Chronic combined systolic and diastolic congestive heart failure (H)      !! BETA BLOCKER NOT PRESCRIBED (INTENTIONAL) Please choose reason not prescribed from choices below.    Associated Diagnoses: Chronic combined systolic and diastolic congestive heart failure (H)       !! - Potential duplicate medications found. Please discuss with provider.      STOP taking these medications       lisinopril (ZESTRIL) 5 MG tablet Comments:   Reason for Stopping:             Allergies   Allergies   Allergen Reactions     Blood Transfusion Related (Informational Only) Other (See Comments)     Patient has a history of a clinically significant antibody against RBC antigens.  A delay in compatible RBCs may occur.

## 2022-02-09 NOTE — PROGRESS NOTES
S-(situation): Post Discharge Chart Review    B-(background): Patient previously discharged from [facility] 1/19/22 to [facility].  Patient was readmitted [facility] 1/27/22-2/3/22 and discharged back to [facility].    A-(assessment): Patient was admitted to [facility] 2/4/22-2/8/22 due to lower extremity edema, dermatitis, acute on chronic combined systolic and diastolic HF/ Bi-ventricular dysfunction.  Patient and family met with [facility] on 2/8/22 and signed consent for enrollment in hospice.  Patient discharged on 2/8/22 back to [facility] with hospice services.  Enrollment completion will be completed 2/9/22.    R-(recommendations/plan): No outreach by Care Coordination needed at this time due to hospice enrollment. Care Coordination will monitor for any newly identified care navigation needs in 1-2 months.    Melissa Behl BSN, RN, PHN, CCM  RN Clinical Product Navigator  [phone]

## 2022-02-13 NOTE — LETTER
[place] CARE COORDINATION  [facility]   December 11, 2021    Milo Serna  [id number]   [place] [address]      Dear Milo,    I am a clinic care coordinator who works with Oscar Alves MD at [facility] . I wanted to thank you for spending the time to talk with me.  Below is a description of clinic care coordination and how I can further assist you.      The clinic care coordination team is made up of a registered nurse,  and community health worker who understand the health care system. The goal of clinic care coordination is to help you manage your health and improve access to the health care system in the most efficient manner. The team can assist you in meeting your health care goals by providing education, coordinating services, strengthening the communication among your providers and supporting you with any resource needs.    Please feel free to contact me at [phone] with any questions or concerns. We are focused on providing you with the highest-quality healthcare experience possible and that all starts with you.     Sincerely,     CARLOS Gutierrez   Care Coordination Team  [phone]         no

## 2022-03-04 ENCOUNTER — PATIENT OUTREACH (OUTPATIENT)
Dept: CARE COORDINATION | Facility: CLINIC | Age: 82
End: 2022-03-04
Payer: OTHER GOVERNMENT

## 2022-05-06 NOTE — ED AVS SNAPSHOT
[facility] Emergency Department  [address]  [facility] [address]  Phone:  [phone]  Fax:  [phone]                                    Milo Serna   MRN: [id number]    Department:  [facility] Emergency Department   Date of Visit:  7/28/2019           After Visit Summary Signature Page    I have received my discharge instructions, and my questions have been answered. I have discussed any challenges I see with this plan with the nurse or doctor.    ..........................................................................................................................................  Patient/Patient Representative Signature      ..........................................................................................................................................  Patient Representative Print Name and Relationship to Patient    ..................................................               ................................................  Date                                   Time    ..........................................................................................................................................  Reviewed by Signature/Title    ...................................................              ..............................................  Date                                               Time          22EPIC Rev 08/18       
No

## 2022-10-31 NOTE — ED NOTES
5 y o  female   Chief complaint:   Chief Complaint   Patient presents with   • Right Hand - Pain       HPI: Andrés Lakhani is a 5 y o  female who presents today with mother who assisted in history  Patient is here today for evaluation of right pinky pain  DOI: 10/23/2022 Patient was playing field hockey when her hand got crushed between her stick and another players stick  Patient was seen in [facility] following injury and has been wearing an immobilizer splint  Pt states she has progressively been feeling better since initial injury  Location: right  small finger  Severity: mild   Timing: 10/23/2022  Modifying factors: rest relieves  activity worsens   Associated Signs/symptoms: pain swelling subungal hematoma     Past Medical History:   Diagnosis Date   • Allergic      History reviewed  No pertinent surgical history  History reviewed  No pertinent family history    Social History     Socioeconomic History   • Marital status: Single     Spouse name: Not on file   • Number of children: Not on file   • Years of education: Not on file   • Highest education level: Not on file   Occupational History   • Not on file   Tobacco Use   • Smoking status: Never Smoker   • Smokeless tobacco: Never Used   Substance and Sexual Activity   • Alcohol use: Never   • Drug use: Never   • Sexual activity: Not on file   Other Topics Concern   • Not on file   Social History Narrative   • Not on file     Social Determinants of Health     Financial Resource Strain: Not on file   Food Insecurity: Not on file   Transportation Needs: Not on file   Physical Activity: Not on file   Stress: Not on file   Intimate Partner Violence: Not on file   Housing Stability: Not on file     Current Outpatient Medications   Medication Sig Dispense Refill   • acetaminophen (TYLENOL) 160 mg/5 mL liquid Take 10 mL (320 mg total) by mouth every 6 (six) hours as needed for moderate pain (Patient not taking: No sig reported) 473 mL 0   • Patient discharged with daughter in w/c and gait belt. ERT helped patient get in to her car. discharge information given to patient's daughter and will relay orders to staff.    brompheniramine-pseudoephedrine-DM 30-2-10 MG/5ML syrup Take 5 mL by mouth 4 (four) times a day as needed for congestion or cough (Patient not taking: No sig reported) 120 mL 0   • fluticasone (FLONASE) 50 mcg/act nasal spray 1 spray into each nostril daily (Patient not taking: No sig reported) 1 Bottle 0   • LORATADINE CHILDRENS 5 MG/5ML syrup  (Patient not taking: Reported on 10/3/2022)       No current facility-administered medications for this visit  Juveelizabeth mohamudarmani    Patient's medications, allergies, past medical, surgical, social and family histories were reviewed and updated as appropriate  Unless otherwise noted above, past medical history, family history, and social history are noncontributory  Review of Systems:  Constitutional: no chills  Respiratory: no chest pain  Cardio: no syncope  GI: no abdominal pain  : no urinary continence  Neuro: no headaches  Psych: no anxiety  Skin: no rash  MS: except as noted in HPI and chief complaint  Allergic/immunology: no contact dermatitis    Physical Exam:  Blood pressure (!) 98/76, pulse 74, height 4' 10" (1 473 m), weight 34 5 kg (76 lb)  General:  Constitutional: Patient is cooperative  Does not have a sickly appearance  Does not appear ill  No distress  Head: Atraumatic  Eyes: Conjunctivae are normal    Cardiovascular: 2+ radial pulses bilaterally with brisk cap refill of all fingers  Pulmonary/Chest: Effort normal  No stridor  Abdomen: soft NT/ND  Skin: Skin is warm and dry  No rash noted  No erythema  No skin breakdown  Psychiatric: mood/affect appropriate, behavior is normal   Gait: Appropriate gait observed per baseline ambulatory status  Musculoskeletal: Right whole  small   Skin Intact               Nailbed injury Positive   TTP Distal Phalanx              Rotational/Angular Deformity Negative   Flexor/extensor function intact to testing  Limited in flexion secondary to pain and stiffness      Sensation and motor function intact throughout all fingers  Capillary refill < 2 seconds  Wrist, elbow and shoulder demonstrate no swelling or deformity  There is no tenderness to palpation throughout  The patient has full painless ROM and stability of all  joints  The contralateral upper extremity is negative for any tenderness to palpation  There is no deformity present  Patient is neurovascularly intact throughout  Studies reviewed:  XR performed on 10/23/2022 was reviewed during today's visit  XR indicates  tuft fracture of the right small finger  Impression:  Tuft Fracture- Distal Phalanx Right Small Finger     Plan:  Patient's caretaker was present and provided pertinent history  I personally reviewed all images and discussed them with the caretaker  All plans outlined below were discussed with the patient's caretaker present for this visit  Treatment options were discussed in detail  After considering all various options, the treatment plan will include:  - this injury is amendable to non-operative treatment      - patient may continue to wear splint as needed; instructed pt to not wear longer than 3 weeks   - I explained fauzia may fall off that is normal; continue to monitor for infection   - I will plan to see this patient as needed  - may continue physical activity as tolerated       Scribe Attestation    I,:  Sofiya Booth am acting as a scribe while in the presence of the attending physician :       I,:  Pepe Mendez MD personally performed the services described in this documentation    as scribed in my presence :

## 2022-11-19 NOTE — MR AVS SNAPSHOT
After Visit Summary   5/1/2018    Milo Serna    MRN: [id number]           Patient Information     Date Of Birth          1940        Visit Information        Provider Department      5/1/2018 9:15 AM Zulay Alexandra DPM, Podiatry/Foot and Ankle Surgery [facility]        Today's Diagnoses     Neuropathy of right foot    -  1    Pre-ulcerative corn or callous        Pes planus of both feet        Foot pain, right          Care Instructions    Thank you for choosing [facility]!    DR. ALEXANDRA'S CLINIC SCHEDULE  MONDAY AM - [place] TUESDAY - [place]   [address] [place] [address] [place] [address]   [phone] / [phone] / [phone]       WEDNESDAY - [place] FRIDAY AM - [facility]   [address]   [place] [address] [place] [address]   [phone] / [phone]       FRIDAY PM - [place] SCHEDULE SURGERY: [phone]   [address] BILLING QUESTIONS: [phone]   [place] [address] AFTER HOURS: [phone]   [phone] / [phone] APPOINTMENTS: [phone]     CALLUS / CORNS / IPKs  When there is excessive friction or pressure on the skin, the body responds by making the skin thicker to protect the deeper structures from becoming exposed. While this works well to protect the deeper structures, the thickened skin can increase pressure and pain.    CALLUS: Flat, diffuse thickening are simple calluses and they are usually caused by friction. Often these are the result of rubbing on a shoe or going barefoot.    CORNS: Calluses with a central core between the toes are called corns. These result from prominent joints on adjacent toes rubbing together. Theses are a symptom of bone malalignment and will always recur unless the underlying bones are addressed surgically.    IPKs: Calluses with a central core on the  5 ball of the foot are usually IPKs (intractable plantar keratosis). These are caused by excessive pressure from the metatarsals, the bones that make up the ball of the foot. Often one of these bones is too long or too prominent.  Again, these will always recur unless the underlying bone issue is addressed. There is no cure for these. They will either go away by themselves, recur, or more could develop.    ROUTINE MAINTENANCE  1. File them down with a pumice stone or callus file a couple times a week.   2. An electric callus removing device. Amope Pedi Perfect Electronic Pedicure Foot File and Callus Remover can be a good option.   3. Lotion can be applied to soften the callus. A urea based cream such as Kersal or Vanicream or thicker cream with shea butter are good options.  4. Toe spacers or toe covers can be used for corns, gel pads can be used for other lesions on the bottom of the foot.   If there is a surgical pathology noted, such as a prominent bone, often this needs to be addressed surgically to minimize recurrence. However, sometimes the lesion simply migrates to another spot after surgery, so it is not a guaranteed cure.             Body Mass Index (BMI)  Many things can cause foot and ankle problems. Foot structure, activity level, foot mechanics and injuries are common causes of pain.  One very important issue that often goes unmentioned, is body weight. Extra weight can cause increased stress on muscles, ligaments, bones and tendons.  Sometimes just a few extra pounds is all it takes to put one over her/his threshold. Without reducing that stress, it can be difficult to alleviate pain. Some people are uncomfortable addressing this issue, but we feel it is important for you to think about it. As Foot &  Ankle specialists, our job is addressing the lower extremity problem and possible causes. Regarding extra body weight, we encourage patients to discuss diet and weight management plans with their primary  care doctors. It is this team approach that gives you the best opportunity for pain relief and getting you back on your feet.                  Follow-ups after your visit        Your next 10 appointments already scheduled     May 01, 2018 10:00 AM CDT   SHORT with Oscar Alves MD   [facility]    [address] [id number] [phone]            May 17, 2018 10:45 AM CDT   Pulmonary Hypertension Return with Sixto Conley MD   [facility] ([facility])    [address]  [place] [id number] [phone]            Jun 20, 2018 10:20 AM CDT   Return Visit with Krystian Owens MD   [facility] ([facility])    [address]  [address]  [place] [id number] [phone]              Who to contact     If you have questions or need follow up information about today's clinic visit or your schedule please contact [facility] directly at [phone].  Normal or non-critical lab and imaging results will be communicated to you by ET Waterhart, letter or phone within 4 business days after the clinic has received the results. If you do not hear from us within 7 days, please contact the clinic through ET Waterhart or phone. If you have a critical or abnormal lab result, we will notify you by phone as soon as possible.  Submit refill requests through Buccaneer or call your pharmacy and they will forward the refill request to us. Please allow 3 business days for your refill to be completed.          Additional Information About Your Visit        MyChart Information     Buccaneer gives you secure access to your electronic health record. If you see a primary care provider, you can also send messages to your care team and make appointments. If you have questions, please call your primary care clinic.  If you  "do not have a primary care provider, please call [phone] and they will assist you.        Care EveryWhere ID     This is your Care EveryWhere ID. This could be used by other organizations to access your [facility] medical records  [id number]        Your Vitals Were     Height BMI (Body Mass Index)                5' 7\" (1.702 m) 27.41 kg/m2           Blood Pressure from Last 3 Encounters:   05/01/18 130/78   12/20/17 142/68   10/02/17 169/82    Weight from Last 3 Encounters:   05/01/18 175 lb (79.4 kg)   12/20/17 182 lb (82.6 kg)   10/02/17 182 lb 9.6 oz (82.8 kg)              Today, you had the following     No orders found for display       Primary Care Provider Office Phone # Fax #    Oscar Alves [phone] [address] [address]        Equal Access to Services     [facility]: Hadii aad ku hadasho Soomaali, waaxda luqadaha, qaybta kaalmada adeegyada, waxay idiin hayangel luisn alice vallearaedgardo layamilka . So [facility] [phone].    ATENCIÓN: Si habla español, tiene a randall disposición servicios gratuitos de asistencia lingüística. Llame al [phone].    We comply with applicable federal civil rights laws and [place] laws. We do not discriminate on the basis of race, color, national origin, age, disability, sex, sexual orientation, or gender identity.            Thank you!     Thank you for choosing [facility]  for your care. Our goal is always to provide you with excellent care. Hearing back from our patients is one way we can continue to improve our services. Please take a few minutes to complete the written survey that you may receive in the mail after your visit with us. Thank you!             Your Updated Medication List - Protect others around you: Learn how to safely use, store and throw away your medicines at www.disposemymeds.org.          This list is accurate as of 5/1/18  9:34 AM.  Always use your most recent med list.                   Brand Name " Dispense Instructions for use Diagnosis    acetaminophen 650 MG CR tablet    TYLENOL    100 tablet    Take 1 tablet (650 mg) by mouth every 6 hours as needed for pain    Osteoarthritis       aspirin 81 MG tablet      Take 81 mg by mouth daily        co-enzyme Q-10 100 MG Caps capsule     180 capsule    Take 1 capsule (100 mg) by mouth daily    Mixed hyperlipidemia       diclofenac 1 % Gel topical gel    VOLTAREN    100 g    Apply 4 gramsto area qid prn    Acute left-sided low back pain without sciatica       doxycycline 100 MG capsule    VIBRAMYCIN    90 capsule    TAKE 1 CAPSULE DAILY    Rosacea       ELIQUIS 2.5 MG tablet   Generic drug:  apixaban ANTICOAGULANT     180 tablet    TAKE 1 TABLET TWICE A DAY    Persistent atrial fibrillation (H)       FERROUS GLUCONATE PO      Take 324 mg by mouth daily (with breakfast)        Glucosamine-Chondroitin 250-200 MG Tabs    OSTEO BI-FLEX REGULAR STRENGTH    100 tablet    Take 1 tablet by mouth 2 times daily    Osteoarthritis       * lisinopril 20 MG tablet    PRINIVIL/ZESTRIL    90 tablet    Take 1 tablet (20 mg) by mouth daily    HTN, goal below 150/90       * lisinopril 20 MG tablet    PRINIVIL/ZESTRIL    90 tablet    TAKE 1 TABLET DAILY    HTN, goal below 150/90       MULTIVITAL Chew     360 tablet    Take 2 chew tab by mouth 2 times daily    Neuropathy of foot       naftifine 1 % Gel topical gel    NAFTIN    90 g    Apply topically daily Apply to affected nails daily    Onychomycosis of toenail       OMEGA 3-6-9 PO           omeprazole 40 MG capsule    priLOSEC    90 capsule    TAKE 1 CAPSULE DAILY    PUD (peptic ulcer disease)       senna 8.6 MG tablet    SENOKOT    60 tablet    Take 1 tablet by mouth daily    Prostate cancer (H)       simvastatin 20 MG tablet    ZOCOR    90 tablet    TAKE 1 TABLET AT BEDTIME    Hyperlipidemia LDL goal <100       sodium chloride 0.65 % nasal spray    OCEAN NASAL SPRAY    2 Bottle    Spray 1 spray into both nostrils daily as needed for  congestion    Bloody nose       tamsulosin 0.4 MG capsule    FLOMAX    90 capsule    TAKE 1 CAPSULE DAILY    Benign prostatic hyperplasia with lower urinary tract symptoms       Urea 20 % Crea cream     120 g    Apply topically as needed Apply to right foot daily    Right foot pain, Skin callus, Pes planus of both feet, Venous insufficiency of both lower extremities, Hav (hallux abducto valgus), right, Onychomycosis of toenail       * Notice:  This list has 2 medication(s) that are the same as other medications prescribed for you. Read the directions carefully, and ask your doctor or other care provider to review them with you.

## 2023-05-05 NOTE — LETTER
10/3/2019      Oscar Alves MD  [address] [facility] [address]      RE: Milo Serna       Dear Colleague,    I had the pleasure of seeing Milo QUIJANO Daphne in the [facility].    Service Date: 10/03/2019      PRIMARY CARE AND REFERRING PROVIDER:  Dr. Oscar Alves      REASON FOR VISIT:  Scheduled followup of mitral regurgitation, chronic left ventricular systolic and diastolic heart failure, chronic right ventricular heart failure, chronic atrial fibrillation.      HISTORY OF PRESENT ILLNESS:    Luís Mckeon is known to me from previous visits and was unaccompanied today.  He is 78 years old, lives with his wife (for whom he is the main caregiver), retired,  gentleman.      Medical history is significant for:  1.  Coronary artery disease without recurrent angina status post CABG in 1997.  Angina-free since.   2.  Chronic atrial fibrillation.  He is on metoprolol XL and low-dose Eliquis anticoagulation.  On 2.5 mg b.i.d. due to history of epistaxis on the 5 mg b.i.d. dosage.   3.  Treated hypertension.   4.  Stable ascending aorta dilatation of 4.2 cm.   5.  Obstructive sleep apnea diagnosed in 2018.  Consistent CPAP use.   6.  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  NYHA class I status.   7.  Chronic right heart failure with mildly decreased systolic function, NYHA class I.   8.  Former tobacco use.  Quit in 1974.   9.  Moderately severe mitral valve regurgitation, likely functional.   10.  Frequent PVCs.  After we cut back on Toprol-XL to 12.5 mg daily, his PVC burden decreased from 22% to 18%.  He never had symptoms from this.     11.  Chronic anemia with a hemoglobin of 10.5 to 11.5.        It has been over a year since I last saw Luís.  Symptomatically, he is stable and without angina or heart failure symptoms.  In 03/2019, he missed a step, fell and broke his left ankle.  He fully healed from that but a few  weeks ago tripped at his home, fell and broke his metatarsals on his foot.  His leg is in a boot.  Therefore, he has not been able to exercise regularly for several months and really misses it.       There have been some issues with his medications.  He was supposed to take metoprolol XL 12.5 mg daily but has been taking it b.i.d.  His resting pulse in the 40s but he has no bradycardic symptoms.  He was also supposed to take torsemide 20 mg daily but has been taking 10 mg daily.     DIAGNOSTIC DATA:    Laboratories:  Sodium 139, potassium 3.5, BUN 17, creatinine 0.98 with an estimated GFR of 72.  Chronic anemia with a hemoglobin of 10.7, mild thrombocytopenia of 136.  TSH 2.0.  Total cholesterol 88, HDL 43, LDL 34, triglycerides 55.        ECG:  Atrial fibrillation that is rate controlled with occasional PVC.      Recent transthoracic echocardiogram.  Personally reviewed it.  He has chronic and stable left ventricular systolic dysfunction with an LVEF of 40% and global hypokinesis.  Normal LV size.  Right ventricle is moderately dilated with mildly decreased systolic function.  TAPSE 1.6 cm, tricuspid systolic velocity 9 cm per second.  Moderately severe mitral regurgitation with an ERO of 0.4 cm2 and regurgitant volume of 60-70.  The mechanism is most likely a fixed posterior leaflet with overriding of the anterior leaflet causing a posturally directed jet.  No prolapse.  Moderate tricuspid valve regurgitation.  Estimated RVSP is lower at 30 mmHg.  The IVC is dilated.  Stable ascending aortic dilatation of 4.2 cm.      Last cardiac MRI 09/2018.  Moderate LV dilatation.  LVEF 51%.  Moderate RV dilatation.  Low-normal RVEF of 54%.  Moderate MR.      PHYSICAL EXAMINATION:     VITALS SIGNS:  /70, pulse 42 BPM (slow AFib), weight 179 pounds (stable).    CARDIOVASCULAR:  JVP elevated with prominent V waves, midline well-healed sternotomy scar, apical impulse undisplaced.  Irregular heart sounds, soft pansystolic  murmur.  No diastolic rumble.  Radiates to the axilla.  No vascular bruit, no lower extremity edema.   LUNGS:  Clear.     NEUROLOGIC:  Mentally, alert and oriented.      DIAGNOSES, ASSESSMENT:     1.  Moderately severe mitral regurgitation, likely functional.   2.  Chronic combined systolic and diastolic left ventricular congestive heart failure, LVEF 40%-45%, NYHA class I.  Euvolemic weight 177-179 pounds.   3.  Chronic right heart failure, mildly decreased systolic function, NYHA class I.   4.  Coronary artery disease status post previous bypass.   5.  Chronic atrial fibrillation.  Rate control and anticoagulation therapy.      Luís's mitral regurgitation appears worse compared to a year ago on echocardiogram.  However, clinically, he does not have heart failure.  In fact, he has been taking less diuretic at 10 mg of torsemide than the prescribed dose of 20 mg.  I am not sure that his worsening mitral regurgitation is clinically significant at this point.  His LVEF has been mildly decreased for several years.  His pulmonary artery pressures are improved.  He is consistently using CPAP.  He is significantly bradycardic from taking a higher than recommended dose of metoprolol.       PLAN:     1.  Stop metoprolol completely.   2.  Stop concurrent aspirin and continue with Eliquis due to history of problematic epistaxis.   3.  Increase torsemide from 10 mg daily to 20 mg daily.   4.  Increase potassium supplement to  20 mEq b.i.d.   5.  Follow up with established cardiology FILE[facility], Juliano Caballero, in 2 weeks with basic metabolic panel and NT-proBNP to see how he is doing with the medication changes.   6.  Luís is going to [place] for the winter at the end of November.  I will see him back when he returns with an echocardiogram, a chest x-ray and labs.  I have also advised them to set up care with a primary care provider there.  If he develops heart failure symptoms, we would like to see him sooner.   Regarding long-term  plan for the mitral regurgitation, intervening on him surgically will not be ideal.  It would be a redo surgery and his LVEF has already decreased.  If he starts getting symptoms, I would refer him for a transcatheter MitraClip.      Fifty minutes spent in the care of this patient, greater than 50% spent in counseling and coordination of care.      cc:   Oscar Alves MD    [address] JOHANNY PENA MD             D: 10/03/2019   T: 10/03/2019   MT: TERESSA      Name:     SAÚL GONZALEZ   MRN:      [id number]        Account:      [id number]   :      1940           Service Date: 10/03/2019      Document: [id number]           Outpatient Encounter Medications as of 10/3/2019   Medication Sig Dispense Refill     co-enzyme Q-10 100 MG CAPS Take 1 capsule (100 mg) by mouth daily 180 capsule 3     diclofenac (VOLTAREN) 1 % GEL Apply 4 gramsto area qid prn 100 g 11     MELATONIN PO Take 5 mg by mouth At Bedtime        naftifine (NAFTIN) 1 % GEL topical gel Apply topically daily Apply to affected nails daily (Patient not taking: Reported on 10/16/2019) 90 g 3     omeprazole (PRILOSEC) 40 MG DR capsule Take 1 capsule (40 mg) by mouth daily 90 capsule 3     potassium chloride ER (K-TAB) 20 MEQ CR tablet Take 1 tablet (20 mEq) by mouth 2 times daily 200 tablet 3     urea (GORMEL) 20 % external cream Apply topically as needed Apply to right foot daily 120 g 2     [DISCONTINUED] acetaminophen (TYLENOL) 650 MG CR tablet Take 1 tablet (650 mg) by mouth every 6 hours as needed for pain 100 tablet 11     [DISCONTINUED] apixaban ANTICOAGULANT (ELIQUIS) 2.5 MG tablet Take 1 tablet (2.5 mg) by mouth 2 times daily 200 tablet 1     [DISCONTINUED] doxycycline hyclate (VIBRAMYCIN) 100 MG capsule Take 1 capsule (100 mg) by mouth daily 90 capsule 3     [DISCONTINUED] Glucosamine-Chondroitin (OSTEO BI-FLEX REGULAR STRENGTH) 250-200 MG TABS Take 1 tablet by  mouth 2 times daily 100 tablet 8     [DISCONTINUED] lisinopril (PRINIVIL/ZESTRIL) 20 MG tablet Take 1 tablet (20 mg) by mouth daily 90 tablet 1     [DISCONTINUED] Multiple Vitamins-Minerals (MULTIVITAL) CHEW Take 2 chew tab by mouth 2 times daily (Patient taking differently: Take 1 chew tab by mouth 2 times daily ) 360 tablet 3     [DISCONTINUED] Omega 3-6-9 Fatty Acids (OMEGA 3-6-9 PO) Take 1 tablet by mouth daily        [DISCONTINUED] order for DME Equipment being ordered:  Please dispense up to 3 pairs of knee high compression stockings at 20-30 mmHg and one donning device if needed. 4 Device 0     [DISCONTINUED] order for DME Equipment being ordered: Knee high compression hose LIGHT (15-20 MM Hg) with side zipper 6 each 1     [DISCONTINUED] order for DME Equipment being ordered: Please dispense up to 3 pairs of knee high compression stockings at 20-30 mmHg and one donning device if needed. 4 Device 0     [DISCONTINUED] senna (SENOKOT) 8.6 MG tablet Take 1 tablet by mouth daily (Patient taking differently: Take 1 tablet by mouth daily as needed ) 60 tablet 0     [DISCONTINUED] simvastatin (ZOCOR) 20 MG tablet Take 1 tablet (20 mg) by mouth At Bedtime 90 tablet 1     [DISCONTINUED] tamsulosin (FLOMAX) 0.4 MG capsule TAKE 1 CAPSULE DAILY 90 capsule 1     [DISCONTINUED] torsemide (DEMADEX) 20 MG tablet Take 1 tablet (20 mg) by mouth daily (with breakfast) 90 tablet 1     ferrous gluconate (FERGON) 324 (38 Fe) MG tablet Take 1 tablet (324 mg) by mouth daily (with breakfast) 90 tablet 3     order for DME Equipment being ordered: zip compression stocking to knee. Left. (Patient not taking: Reported on 10/16/2019) 1 Device 0     [DISCONTINUED] aspirin 81 MG tablet Take 81 mg by mouth daily       [DISCONTINUED] lidocaine (LIDODERM) 5 % Patch Apply 1 patches to bottom of right foot for up to 12 h within a 24 h period.  Remove after 12 hours. (Patient not taking: Reported on 9/27/2019) 30 patch 0     [DISCONTINUED]  metoprolol succinate ER (TOPROL-XL) 25 MG 24 hr tablet Take 0.5 tablets (12.5 mg) by mouth daily (with breakfast) (Patient taking differently: Take 12.5 mg by mouth 2 times daily ) 45 tablet 1     [DISCONTINUED] order for DME Equipment being ordered: Ankle aircast stirrup. 1 Device 0     [DISCONTINUED] order for DME Equipment being ordered: ankle brace 1 Device 0     [DISCONTINUED] potassium chloride ER (K-TAB/KLOR-CON) 10 MEQ CR tablet Take 1 tablet (10 mEq) by mouth 2 times daily 180 tablet 1     [DISCONTINUED] sodium chloride (OCEAN NASAL SPRAY) 0.65 % nasal spray Spray 1 spray into both nostrils daily as needed for congestion (Patient not taking: Reported on 10/3/2019) 2 Bottle 6     [DISCONTINUED] triamcinolone (KENALOG) 0.1 % cream        No facility-administered encounter medications on file as of 10/3/2019.        Again, thank you for allowing me to participate in the care of your patient.      Sincerely,    Sixto Conley MD     [facility]     [FreeTextEntry1] : Patient seen and evaluated. Discussed etiology and treatment plan. Patient verbalized understanding.\par PAtient has been inconsistent with HBOT. Discussed at length with patient the importance of HBOT. Also advised patient to call the physician access line for pain management. Patient to maintain strict glycemic control. Patient  Spent 20 minutes for patient care and medical decision making. Patient is high risk for limb loss.

## 2023-08-10 NOTE — TELEPHONE ENCOUNTER
Called daughter to [facility] to let her know that Juliano cannot safely evaluate Skip safely without seeing him in person. Appointment cannot be virtual.      Future Appointments   Date Time Provider Department Center   2/1/2022  4:00 PM Nancy Ravi, PT [facility]   2/8/2022 11:00 AM RU LAB [facility]   2/9/2022  8:10 AM Juliano Caballero PA-C [facility]   4/14/2022  3:00 PM RU LAB [facility]   4/14/2022  3:15 PM Sixto Conley MD [facility]       Marga Roberson, RN 2:25 PM 02/01/22       Plan: Follow up in 2 months Render In Strict Bullet Format?: Yes Initiate Treatment: -\\n- triamcinolone: Apply to affected areas twice daily for 2 weeks on, then 1 week off. Repeat cycle prn flares. Avoid face/groin/axillae.\\n- Apply ceraVe cream to affected area twice daily Detail Level: Zone

## 2024-01-31 NOTE — PROGRESS NOTES
Bed: 11  Expected date:   Expected time:   Means of arrival:   Comments:  Hold for trauma   Hospitalist Medicine Progress Note   [facility]       Milo Serna is a 81 year old gentleman with past medical history of CAD s/p CABG in 1996, chronic venous stasis with lymphedema, essential hypertension, chronic systolic congestive heart failure with LVEF of 50%, moderately severe mitral insufficiency, moderate tricuspid insufficiency, obstructive sleep apnea with nonadherence to CPAP, BPH, prostate cancer, peptic ulcer disease, atrial fibrillation on Eliquis treatment, dementia who came to [facility] on January 27, 2022 from a TCU Have where he had altered mental status and overall failure to thrive from where he was sent to memory care unit at [facility].  He has had his third admission since December 2021 for decompensated congestive heart failure when he came in to [facility] on 1/27/2022 with possible fall having being found on the floor leaning on a chair kneeling down with right knee pain.  He was diagnosed with acute exacerbation of chronic systolic congestive heart failure and was started on IV furosemide therapy with which he is losing significant amount of weight.  His mentation at admission has been slowly improving as well.  He was evaluated by physical therapy who recommend TCU if he is unable to get an assist of 2 people for moving around.  I did talk to the patient's daughter Stacia who is concerned about multiple admissions and wanted to talk about hospice care.  I did tell her that in my opinion patient might have more than 6 months. Palliative care and cardiology has been consulted.       Date of Admission:  1/27/2022  Assessment & Plan     Acute on Chronic HFrEF exacerbation   Chronic AF with frequent PVC's, intermittent bradycardia and occasional sinus pauses  Moderate-severe MR, moderate TR  CAD s/p CABG  Symptoms are improving with decrease in weight except today when this is increased by 3 kg nevertheless has improvement in  shortness of breath  Weight is down from 87 kg to 79 kg but today it is 82 kg  We will continue with Lasix at this time  Started ACE inhibitor 1/30/2022 lisinopril 2.5 mg daily    Nonsustained ventricular tachycardia  Consulted cardiology  Magnesium levels was normal  LVEF was 50% in the echo that was done 12/26/2021    Chronic Lymphedema  PVD, Venous Stasis of Melvin. LE  Decompensated. Seen in the ED for this on 1/25. No signs of secondary skin or soft tissue infection. Some weeping noted and dermatitis.  - Would benefit from ongoing lymphedema therapy, orders placed.   - diuresis as noted above     LENORA Obstructive Sleep Apnea  Noncompliant with CPAP.    Oxygen use may be needed nightly as needed     History of prostate cancer  Urinary retention:   Details of prostate cancer unclear.    During prior hospitalization for CHF exacerbation required Biggs catheter for retention which was since discontinued with no recurrent retention.    -Urinary retention has been noted here with a bladder scan of 500 cc today   -Monitor PVRs, if requiring subsequent straight cath may need Biggs again   -Strict intake and output     DVT Prophylaxis: DOAC  Code Status: No CPR- Pre-arrest intubation OK           Plan:   Consult palliative care  Monitor weight  BMP in am  Take the sitter off when he is felt to be safe       Diet: 2 Gram Sodium Diet Other - please comment    DVT Prophylaxis: Low Risk/Ambulatory with no VTE prophylaxis indicated and DOAC  Biggs Catheter: Not present  Code Status: No CPR- Pre-arrest intubation OK               The patient's care was discussed with the Patient and daughter Stacia Webber MD  Hospitalist Service  [facility]    ______________________________________________________________________    Interval History     Symptoms   Patient is less sleepy today as compared to yesterday nevertheless is confused    Review of Systems:   He denies anything and is talking things which  do not make sense    Data reviewed today: I reviewed all medications, new labs and imaging results over the last 24 hours.     Physical Exam   Vital Signs: Temp: 97.8  F (36.6  C) Temp src: Oral BP: 100/58 Pulse: 68   Resp: 17 SpO2: 97 % O2 Device: None (Room air)    Weight: 180 lbs 9.6 oz      GENERAL: Patient is not  in acute distress  HEENT:  EOM+,Conjunctiva is clear    NECK:   no Jugular Venous distention  HEART: S1 S2  regular Rate and Rhythm,  there is   no murmur,   LUNGS: Respirations are   not laboured, Lungs are   clear to auscultation  without Crepitations or Wheezing   ABDOMEN: Soft  , there is no  tenderness  ,Bowel Sounds are   Positive   LOWER LIMBS: there is Pedal Edema   Bilaterally   CNS: Sleepy moving all the Four Limbs      Data   Recent Labs   Lab 01/31/22  0525 01/30/22  0532 01/29/22  0526 01/28/22  1756 01/28/22  0556 01/27/22  0457   WBC  --   --  4.3  --  4.1 5.1   HGB  --   --  10.6*  --  10.3* 10.6*   MCV  --   --  101*  --  103* 103*   PLT  --   --  118*  --  122* 147*     --  142  --  141 141   POTASSIUM 3.6 3.8 3.5   < > 3.8 3.9   CHLORIDE 109  --  109  --  109 110*   CO2 28  --  28  --  26 28   BUN 21  --  23  --  25 20   CR 1.00  --  1.16  --  1.18 1.03   ANIONGAP 4  --  5  --  6 3   ANG 8.6  --  8.7  --  8.8 9.1   *  --  92  --  84 131*   ALBUMIN  --   --   --   --   --  2.9*   PROTTOTAL  --   --   --   --   --  7.5   BILITOTAL  --   --   --   --   --  1.0   ALKPHOS  --   --   --   --   --  218*   ALT  --   --   --   --   --  22   AST  --   --   --   --   --  23    < > = values in this interval not displayed.         No results found for this or any previous visit (from the past 24 hour(s)).

## 2025-01-21 NOTE — PLAN OF CARE
Disoriented x4. RA. Continues scheduled Tylenol, Lidocaine patch and topical Voltaren gel for pain control. 2+ edema to BLE, bernie. BLE elevated as patient tolerates, lymphedema wraps applied by PT this AM. Up w/ SBA w/ gait belt and walker. BS active x4, tolerating 2 gm Na diet w/ 1800mL fluid restriction. Strict I&O's. Up in chair for meals. Patient needs some assistance with meal trays, good appetite. O2 sats stable on RA. LS diminished. CMS intact. Continues daily weights and strict I&O's. Voiding in adequate amt's with urinal. Incontinent at times. Patient has needed cues and reminders to urinate throughout the day. SL.  Sitter remains at bedside for safety. Patient can be restless at times. Daughter at bedside today, updated on POC. PT, OT, SW/CM, Nutrition and Palliative consulted. K+ (3.8) and Mg (2.4) protocols, no indication for replacement today. Plan to repeat BMP and discuss POC (possible hospice) w/ Palliative team tomorrow. Will continue to provide supportive cares.      no

## (undated) DEVICE — LINEN TOWEL PACK X30 5481

## (undated) DEVICE — ESU GROUND PAD ADULT W/CORD E7507

## (undated) DEVICE — ENDO DISSECTOR KITTNER 05MM 28-0804

## (undated) DEVICE — PREP CHLORAPREP 26ML TINTED ORANGE  260815

## (undated) DEVICE — LINEN FULL SHEET 5511

## (undated) DEVICE — BLADE CLIPPER 3M 9670

## (undated) DEVICE — DEVICE SUTURE PASSER 14GA WECK EFX EFXSP2

## (undated) DEVICE — ENDO SCOPE WARMER LF TM500

## (undated) DEVICE — SU MONOCRYL 4-0 PS-2 27" UND Y426H

## (undated) DEVICE — SUCTION IRR STRYKERFLOW II W/TIP 250-070-520

## (undated) DEVICE — SU VICRYL 0 UR-6 27" J603H

## (undated) DEVICE — SOL NACL 0.9% INJ 1000ML BAG 2B1324X

## (undated) DEVICE — SU VICRYL 0 CT-2 27" J334H

## (undated) DEVICE — LINEN TOWEL PACK X5 5464

## (undated) DEVICE — ENDO TROCAR SLEEVE KII Z-THREADED 05X100MM CTS02

## (undated) DEVICE — BLADE KNIFE SURG 11 371111

## (undated) DEVICE — LINEN POUCH DBL 5427

## (undated) DEVICE — DISSECTOR BALLOON SPACEMAKER PRO BTT & RND SMBTTRNDX

## (undated) DEVICE — SU PDS II 0 CT-1 27" Z340H

## (undated) DEVICE — ENDO POUCH UNIV RETRIEVAL SYSTEM INZII 10MM CD001

## (undated) DEVICE — ESU PENCIL W/HOLSTER E2350H

## (undated) DEVICE — ENDO TROCAR SLEEVE KII Z-THREADED 12X100MM CTS22

## (undated) DEVICE — CATH TRAY FOLEY COUDE SURESTEP 16FR W/DRN BAG LATEX A304416A

## (undated) DEVICE — SUCTION MANIFOLD DORNOCH ULTRA CART UL-CL500

## (undated) DEVICE — Device

## (undated) DEVICE — ENDO TROCAR FIRST ENTRY KII FIOS Z-THRD 05X100MM CTF03

## (undated) DEVICE — LINEN HALF SHEET 5512

## (undated) DEVICE — ESU ELEC BLADE 2.75" COATED/INSULATED E1455

## (undated) DEVICE — GLOVE PROTEXIS POWDER FREE 7.5 ORTHOPEDIC 2D73ET75

## (undated) DEVICE — ADH SKIN CLOSURE PREMIERPRO EXOFIN 1.0ML 3470

## (undated) DEVICE — NDL INSUFFLATION 13GA 120MM C2201

## (undated) DEVICE — LINEN TOWEL PACK X6 WHITE 5487

## (undated) DEVICE — CLEANSER WOUND IRRISEPT 0.05% CHG IRRISEPT-403

## (undated) DEVICE — SU VICRYL 4-0 PS-2 18" UND J496H

## (undated) DEVICE — ESU CORD MONOPOLAR 10'  E0510

## (undated) DEVICE — SU DERMABOND MINI DHVM12

## (undated) DEVICE — ANTIFOG SOLUTION W/FOAM PAD 31142527

## (undated) DEVICE — GLOVE PROTEXIS BLUE W/NEU-THERA 8.0  2D73EB80

## (undated) DEVICE — GOWN IMPERVIOUS ZONED XLG 9041

## (undated) DEVICE — CLIP APPLIER ENDO ROTATING 10MM MED/LG ER320

## (undated) DEVICE — ENDO TROCAR FIRST ENTRY KII FIOS Z-THRD 12X100MM CTF73

## (undated) DEVICE — SOL WATER IRRIG 1000ML BOTTLE 2F7114

## (undated) DEVICE — BAG CLEAR TRASH 1.3M 39X33" P4040C

## (undated) RX ORDER — FENTANYL CITRATE 50 UG/ML
INJECTION, SOLUTION INTRAMUSCULAR; INTRAVENOUS
Status: DISPENSED
Start: 2020-12-28

## (undated) RX ORDER — BUPIVACAINE HYDROCHLORIDE 5 MG/ML
INJECTION, SOLUTION EPIDURAL; INTRACAUDAL
Status: DISPENSED
Start: 2017-05-10

## (undated) RX ORDER — DEXAMETHASONE SODIUM PHOSPHATE 4 MG/ML
INJECTION, SOLUTION INTRA-ARTICULAR; INTRALESIONAL; INTRAMUSCULAR; INTRAVENOUS; SOFT TISSUE
Status: DISPENSED
Start: 2020-12-28

## (undated) RX ORDER — LIDOCAINE HYDROCHLORIDE 20 MG/ML
INJECTION, SOLUTION EPIDURAL; INFILTRATION; INTRACAUDAL; PERINEURAL
Status: DISPENSED
Start: 2020-12-28

## (undated) RX ORDER — PROPOFOL 10 MG/ML
INJECTION, EMULSION INTRAVENOUS
Status: DISPENSED
Start: 2020-12-28

## (undated) RX ORDER — FENTANYL CITRATE 50 UG/ML
INJECTION, SOLUTION INTRAMUSCULAR; INTRAVENOUS
Status: DISPENSED
Start: 2017-05-10

## (undated) RX ORDER — NEOSTIGMINE METHYLSULFATE 5 MG/5 ML
SYRINGE (ML) INTRAVENOUS
Status: DISPENSED
Start: 2017-05-10

## (undated) RX ORDER — CEFAZOLIN SODIUM 2 G/100ML
INJECTION, SOLUTION INTRAVENOUS
Status: DISPENSED
Start: 2017-05-10

## (undated) RX ORDER — FENTANYL CITRATE-0.9 % NACL/PF 10 MCG/ML
PLASTIC BAG, INJECTION (ML) INTRAVENOUS
Status: DISPENSED
Start: 2020-12-28

## (undated) RX ORDER — DIPHENHYDRAMINE HYDROCHLORIDE 50 MG/ML
INJECTION INTRAMUSCULAR; INTRAVENOUS
Status: DISPENSED
Start: 2020-12-28

## (undated) RX ORDER — EPHEDRINE SULFATE 50 MG/ML
INJECTION, SOLUTION INTRAMUSCULAR; INTRAVENOUS; SUBCUTANEOUS
Status: DISPENSED
Start: 2020-12-28

## (undated) RX ORDER — BUPIVACAINE HYDROCHLORIDE 2.5 MG/ML
INJECTION, SOLUTION EPIDURAL; INFILTRATION; INTRACAUDAL
Status: DISPENSED
Start: 2020-12-28

## (undated) RX ORDER — ONDANSETRON 2 MG/ML
INJECTION INTRAMUSCULAR; INTRAVENOUS
Status: DISPENSED
Start: 2020-12-28

## (undated) RX ORDER — CEFAZOLIN SODIUM 2 G/100ML
INJECTION, SOLUTION INTRAVENOUS
Status: DISPENSED
Start: 2020-12-28

## (undated) RX ORDER — ACETAMINOPHEN 10 MG/ML
INJECTION, SOLUTION INTRAVENOUS
Status: DISPENSED
Start: 2017-05-10

## (undated) RX ORDER — DIAZEPAM 5 MG
TABLET ORAL
Status: DISPENSED
Start: 2018-09-19

## (undated) RX ORDER — GLYCOPYRROLATE 0.2 MG/ML
INJECTION INTRAMUSCULAR; INTRAVENOUS
Status: DISPENSED
Start: 2017-05-10

## (undated) RX ORDER — DEXAMETHASONE SODIUM PHOSPHATE 4 MG/ML
INJECTION, SOLUTION INTRA-ARTICULAR; INTRALESIONAL; INTRAMUSCULAR; INTRAVENOUS; SOFT TISSUE
Status: DISPENSED
Start: 2017-05-10

## (undated) RX ORDER — OXYCODONE HYDROCHLORIDE 5 MG/1
TABLET ORAL
Status: DISPENSED
Start: 2020-12-28

## (undated) RX ORDER — LIDOCAINE HYDROCHLORIDE 10 MG/ML
INJECTION, SOLUTION EPIDURAL; INFILTRATION; INTRACAUDAL; PERINEURAL
Status: DISPENSED
Start: 2020-12-28

## (undated) RX ORDER — GLYCOPYRROLATE 0.2 MG/ML
INJECTION, SOLUTION INTRAMUSCULAR; INTRAVENOUS
Status: DISPENSED
Start: 2020-12-28

## (undated) RX ORDER — ACETAMINOPHEN 325 MG/1
TABLET ORAL
Status: DISPENSED
Start: 2020-12-28